# Patient Record
Sex: FEMALE | Race: WHITE | Employment: OTHER | URBAN - METROPOLITAN AREA
[De-identification: names, ages, dates, MRNs, and addresses within clinical notes are randomized per-mention and may not be internally consistent; named-entity substitution may affect disease eponyms.]

---

## 2017-01-04 ENCOUNTER — APPOINTMENT (OUTPATIENT)
Dept: GENERAL RADIOLOGY | Age: 57
End: 2017-01-04
Attending: EMERGENCY MEDICINE
Payer: MEDICAID

## 2017-01-04 ENCOUNTER — HOSPITAL ENCOUNTER (EMERGENCY)
Age: 57
Discharge: HOME | End: 2017-01-04
Attending: EMERGENCY MEDICINE | Admitting: EMERGENCY MEDICINE
Payer: MEDICAID

## 2017-01-04 VITALS
BODY MASS INDEX: 49.61 KG/M2 | HEART RATE: 61 BPM | TEMPERATURE: 98.3 F | RESPIRATION RATE: 16 BRPM | WEIGHT: 280 LBS | HEIGHT: 63 IN | OXYGEN SATURATION: 93 %

## 2017-01-04 DIAGNOSIS — M54.50 ACUTE BILATERAL LOW BACK PAIN WITHOUT SCIATICA: ICD-10-CM

## 2017-01-04 DIAGNOSIS — M25.561 RECURRENT PAIN OF RIGHT KNEE: ICD-10-CM

## 2017-01-04 DIAGNOSIS — M16.11 PRIMARY OSTEOARTHRITIS OF RIGHT HIP: Primary | ICD-10-CM
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
## 2017-01-04 PROCEDURE — 72100 X-RAY EXAM L-S SPINE 2/3 VWS: CPT

## 2017-01-04 PROCEDURE — 99283 EMERGENCY DEPT VISIT LOW MDM: CPT

## 2017-01-04 PROCEDURE — 73502 X-RAY EXAM HIP UNI 2-3 VIEWS: CPT

## 2017-01-04 PROCEDURE — 74011250637 HC RX REV CODE- 250/637: Performed by: EMERGENCY MEDICINE

## 2017-01-04 RX ORDER — OXYCODONE AND ACETAMINOPHEN 5; 325 MG/1; MG/1
1 TABLET ORAL
Status: COMPLETED | OUTPATIENT
Start: 2017-01-04 | End: 2017-01-04

## 2017-01-04 RX ORDER — OXYCODONE AND ACETAMINOPHEN 5; 325 MG/1; MG/1
1 TABLET ORAL
Qty: 20 TAB | Refills: 0 | Status: SHIPPED | OUTPATIENT
Start: 2017-01-04 | End: 2017-03-29

## 2017-01-04 RX ORDER — CYCLOBENZAPRINE HCL 10 MG
10 TABLET ORAL
Qty: 20 TAB | Refills: 0 | Status: SHIPPED | OUTPATIENT
Start: 2017-01-04 | End: 2017-03-29

## 2017-01-04 RX ADMIN — OXYCODONE HYDROCHLORIDE AND ACETAMINOPHEN 1 TABLET: 5; 325 TABLET ORAL at 20:08

## 2017-01-04 NOTE — ED PROVIDER NOTES
HPI Comments: 6:37 PM Josué Neal is a 61 y.o. female with h/o HTN, dyslipidemia, hypothyroid and arthritis who presents to the Emergency Department with c/o sharp R hip and leg pain s/p fall yesterday. Pt explains she was walking when her walker got caught on a crack in the sidewalk causing her to fall backwards. Denies head trauma or LOC. No other complaints at this time. Patient is a 61 y.o. female presenting with fall and leg pain. The history is provided by the patient. Fall   Pertinent negatives include no fever, no abdominal pain, no nausea and no vomiting. Leg Pain    Pertinent negatives include no back pain. Past Medical History:   Diagnosis Date    Atrial fibrillation      CHADS score 1  (-CHF, +HTN, -AGE, -DM, -CVA)    Carpal tunnel syndrome     Chronic pain     Congenital heart disease      presumed pulmonary stenosis s/p repair 1969    Degenerative disc disease     Degenerative joint disease     Dyslipidemia     GERD     Hypertension     Hypothyroid     Morbid obesity     Noncompliance     Schizoaffective disorder     Substance abuse      marijuana, crack cocaine       Past Surgical History:   Procedure Laterality Date    Hx knee replacement       left    Hx knee replacement       right    Hx thyroidectomy      Hx carpal tunnel release       left    Hx hysterectomy           Family History:   Problem Relation Age of Onset    Hypertension Mother     Coronary Artery Disease Mother     Coronary Artery Disease Father     Hypertension Father        Social History     Social History    Marital status: SINGLE     Spouse name: N/A    Number of children: N/A    Years of education: N/A     Occupational History    Not on file.      Social History Main Topics    Smoking status: Never Smoker    Smokeless tobacco: Not on file    Alcohol use No    Drug use: No    Sexual activity: Yes     Birth control/ protection: Surgical     Other Topics Concern    Not on file     Social History Narrative         ALLERGIES: Tetanus vaccines and toxoid    Review of Systems   Constitutional: Negative for chills, fatigue, fever and unexpected weight change. HENT: Negative for congestion and rhinorrhea. Respiratory: Negative for chest tightness and shortness of breath. Cardiovascular: Negative for chest pain, palpitations and leg swelling. Gastrointestinal: Negative for abdominal pain, nausea and vomiting. Genitourinary: Negative for dysuria. Musculoskeletal: Positive for arthralgias (R hip and leg). Negative for back pain. Skin: Negative for rash. Neurological: Negative for dizziness and weakness. Psychiatric/Behavioral: The patient is not nervous/anxious. Vitals:    01/04/17 1832   Pulse: 61   Resp: 16   Temp: 98.3 °F (36.8 °C)   SpO2: 93%   Weight: 127 kg (280 lb)   Height: 5' 3\" (1.6 m)            Physical Exam   Constitutional: She is oriented to person, place, and time. She appears well-developed and well-nourished. No distress. obese   HENT:   Head: Normocephalic and atraumatic. Right Ear: External ear normal.   Left Ear: External ear normal.   Nose: Nose normal.   Mouth/Throat: Oropharynx is clear and moist.   Eyes: Conjunctivae and EOM are normal. Pupils are equal, round, and reactive to light. No scleral icterus. Neck: Normal range of motion. Neck supple. No JVD present. No tracheal deviation present. No thyromegaly present. Cardiovascular: Normal rate, regular rhythm, normal heart sounds and intact distal pulses. Exam reveals no gallop and no friction rub. No murmur heard. Pulmonary/Chest: Effort normal and breath sounds normal. She exhibits no tenderness. Abdominal: Soft. Bowel sounds are normal. She exhibits no distension. There is no tenderness. There is no rebound and no guarding. Musculoskeletal: She exhibits no edema.    R hip pain, bilateral lumbar TTP  Lumbar tenderness poorly localized, R hip pain increases with internal/external rotation   Lymphadenopathy:     She has no cervical adenopathy. Neurological: She is alert and oriented to person, place, and time. No cranial nerve deficit. Coordination normal.   No sensory loss, Gait not observed, R leg motor testing limited due to pain   Skin: Skin is warm and dry. Psychiatric: She has a normal mood and affect. Her behavior is normal. Judgment and thought content normal.   Nursing note and vitals reviewed. [facility]  ED Course       Procedures      Vitals:  Patient Vitals for the past 12 hrs:   Temp Pulse Resp SpO2   01/04/17 1832 98.3 °F (36.8 °C) 61 16 93 %       Medications ordered:   Medications   oxyCODONE-acetaminophen (PERCOCET) 5-325 mg per tablet 1 Tab (1 Tab Oral Given 1/4/17 2008)         Lab findings:  No results found for this or any previous visit (from the past 12 hour(s)). X-Ray, CT or other radiology findings or impressions:  XR HIP RT W OR WO PELV 2-3 VWS      IMPRESSION:    Redemonstration of very severe osteoarthritis at right hip joint. There is no evidence of acute fracture or dislocation at right hip or on rest of  AP view of pelvis. XR SPINE LUMB 2 OR 3 V      IMPRESSION:    Redemonstration of very severe degenerative disc disease at L4-L5 level. No evidence of acute fracture or dislocation or subluxation in lumbar spine or  sacrum. R hip with severe DJD and loss of the joint space, superior displacement, no real change as compared to previous     Progress notes, Consult notes or additional Procedure notes:   Pt is asking to go home. Pt has advanced DJD noted. Awaiting a wet read to evaluate for fracture. If negative will proceed with close outpatient supportive care with percocet. Will have her follow with her ortho and to return if at all worsened or concerned Kapil Good DO 9:05 PM      Disposition:  Diagnosis:   1. Primary osteoarthritis of right hip    2. Fall, initial encounter    3. Recurrent pain of right knee    4. Acute bilateral low back pain without sciatica        Disposition: discharge    Follow-up Information     None           Patient's Medications   Start Taking    OXYCODONE-ACETAMINOPHEN (PERCOCET) 5-325 MG PER TABLET    Take 1 Tab by mouth every four (4) hours as needed for Pain for up to 20 doses. Max Daily Amount: 6 Tabs. Continue Taking    ARIPIPRAZOLE (ABILIFY) 15 MG TABLET    Take 15 mg by mouth daily. ASPIRIN DELAYED-RELEASE 325 MG TABLET    TAKE ONE TABLET BY MOUTH ONCE DAILY    CHLORTHALIDONE (HYGROTEN) 25 MG TABLET    TAKE ONE TABLET BY MOUTH ONCE DAILY    FUROSEMIDE (LASIX) 40 MG TABLET    TAKE ONE TABLET BY MOUTH TWICE DAILY OR AS DIRECTED    IBUPROFEN PO    Take  by mouth as needed. ISOSORBIDE MONONITRATE ER (IMDUR) 30 MG TABLET    Take 1 Tab by mouth every evening. LEVOTHYROXINE (SYNTHROID) 125 MCG TABLET    Take  by mouth daily (before breakfast). LISINOPRIL (PRINIVIL, ZESTRIL) 20 MG TABLET    Take 1 Tab by mouth two (2) times a day. METOPROLOL TARTRATE (LOPRESSOR) 50 MG TABLET    TAKE ONE TABLET BY MOUTH TWICE DAILY    OMEPRAZOLE (PRILOSEC) 20 MG CAPSULE    Take 20 mg by mouth daily. PAROXETINE (PAXIL) 20 MG TABLET    Take 1 Tab by mouth daily. POTASSIUM CHLORIDE (K-DUR, KLOR-CON) 20 MEQ TABLET    TAKE ONE TABLET BY MOUTH TWICE DAILY OR AS DIRECTED    SIMVASTATIN (ZOCOR) 40 MG TABLET    Take  by mouth nightly. These Medications have changed    Modified Medication Previous Medication    CYCLOBENZAPRINE (FLEXERIL) 10 MG TABLET cyclobenzaprine (FLEXERIL) 10 mg tablet       Take 1 Tab by mouth three (3) times daily as needed for Muscle Spasm(s). Take 1 Tab by mouth three (3) times daily as needed for Muscle Spasm(s).    Stop Taking    No medications on file     Scribe Attestation  I, Harl Carrel, am scribing for, and in the presence of Michael Frazier MD, 01/04/17, 6:37 PM    Signed by: Harl Carrel, Scribe, 01/04/17, 6:37 PM    Physician Attestation  SANDRA personally performed the services described in the documentation, reviewed the documentation, as recorded by the scribe in my presence, and it accurately and completely records my words and actions.     Genna Sicard, MD

## 2017-01-04 NOTE — ED TRIAGE NOTES
Irasema Dung yesterday over her walker that got caught up on a crack in the sidewalk. C/o of right leg pain and back pain.

## 2017-01-20 ENCOUNTER — APPOINTMENT (OUTPATIENT)
Dept: GENERAL RADIOLOGY | Age: 57
End: 2017-01-20
Attending: PHYSICIAN ASSISTANT
Payer: MEDICAID

## 2017-01-20 ENCOUNTER — HOSPITAL ENCOUNTER (EMERGENCY)
Age: 57
Discharge: HOME | End: 2017-01-20
Attending: EMERGENCY MEDICINE
Payer: MEDICAID

## 2017-01-20 VITALS
HEART RATE: 61 BPM | RESPIRATION RATE: 18 BRPM | WEIGHT: 279 LBS | SYSTOLIC BLOOD PRESSURE: 146 MMHG | DIASTOLIC BLOOD PRESSURE: 78 MMHG | HEIGHT: 63 IN | TEMPERATURE: 98.4 F | BODY MASS INDEX: 49.43 KG/M2 | OXYGEN SATURATION: 96 %

## 2017-01-20 DIAGNOSIS — S40.022A CONTUSION OF LEFT UPPER ARM, INITIAL ENCOUNTER: Primary | ICD-10-CM

## 2017-01-20 PROCEDURE — 73060 X-RAY EXAM OF HUMERUS: CPT

## 2017-01-20 PROCEDURE — 99282 EMERGENCY DEPT VISIT SF MDM: CPT

## 2017-01-20 NOTE — ED PROVIDER NOTES
HPI Comments: 9:48 AM Kayla Pratt is a 61 y.o. female w/ hx of HTN, asthma, Afib, and chronic pain who presents to the ED c/o L shoulder pain post fall. Pt states that she got up last night to use the restroom when she turned around and her knee gave out causing her to fall and hit her L shoulder into her bed post. Pt has taken Ibuprofen for pain management w/ no relief. Pt is currently using an inhaler for asthma. Pt denies using ice, or concern for fracture. Pt has no other sx or complaints. The history is provided by the patient. Past Medical History:   Diagnosis Date    Atrial fibrillation      CHADS score 1  (-CHF, +HTN, -AGE, -DM, -CVA)    Carpal tunnel syndrome     Chronic pain     Congenital heart disease      presumed pulmonary stenosis s/p repair 1969    Degenerative disc disease     Degenerative joint disease     Dyslipidemia     GERD     Hypertension     Hypothyroid     Morbid obesity     Noncompliance     Schizoaffective disorder     Substance abuse      marijuana, crack cocaine       Past Surgical History:   Procedure Laterality Date    Hx knee replacement       left    Hx knee replacement       right    Hx thyroidectomy      Hx carpal tunnel release       left    Hx hysterectomy           Family History:   Problem Relation Age of Onset    Hypertension Mother     Coronary Artery Disease Mother     Coronary Artery Disease Father     Hypertension Father        Social History     Social History    Marital status: SINGLE     Spouse name: N/A    Number of children: N/A    Years of education: N/A     Occupational History    Not on file.      Social History Main Topics    Smoking status: Never Smoker    Smokeless tobacco: Not on file    Alcohol use No    Drug use: No    Sexual activity: Yes     Birth control/ protection: Surgical     Other Topics Concern    Not on file     Social History Narrative         ALLERGIES: Tetanus vaccines and toxoid    Review of Systems   Constitutional: Negative for activity change and appetite change. Musculoskeletal: Positive for myalgias (L shoulder). Negative for back pain, gait problem and joint swelling. Arthralgias: L shoulder    Neurological: Negative for dizziness, weakness, light-headedness and numbness. All other systems reviewed and are negative. Vitals:    01/20/17 0935   BP: 146/78   Pulse: 61   Resp: 18   Temp: 98.4 °F (36.9 °C)   SpO2: 96%   Weight: 126.6 kg (279 lb)   Height: 5' 3\" (1.6 m)            Physical Exam   Constitutional: She is oriented to person, place, and time. She appears well-developed. No distress. Morbidly obese   HENT:   Head: Atraumatic. Neck: Normal range of motion. Neck supple. Cardiovascular: Normal rate, regular rhythm and normal heart sounds. No murmur heard. Pulmonary/Chest: Effort normal and breath sounds normal.   Musculoskeletal: Normal range of motion. She exhibits no edema. Minimal/mild TTP of left upper arm, laterally and posteriorly. There is a faint linear bruise to lateral aspect. No obvious swelling, no redness, no warmth. Normal ROM of wrist, elbow, shoulder without pain. Neurological: She is alert and oriented to person, place, and time. Skin: She is not diaphoretic. Psychiatric: She has a normal mood and affect. Nursing note and vitals reviewed. MDM  Number of Diagnoses or Management Options  Diagnosis management comments: 60yo female with left arm pain after she fell from standing position into bed post last night. Xray of humerus negative. Symptom secondary to muscle contusion. Advised to continue Motrin. ED Course       Procedures    LEFT HUMERUS- NORMAL.                     Scribe Attestation:   Judy FLORES am scribing for and in the presence of Rick Avila on this day 01/20/17 at 9:48 AM   maria teresa Mccray    Provider Attestation:  I personally performed the services described in the documentation, reviewed the documentation, as recorded by the scribe in my presence, and it accurately and completely records my words and actions.   Lucero Rooney PA-C. 9:48 AM      Signed by: [place], 9:48 AM

## 2017-01-20 NOTE — ED TRIAGE NOTES
Pt, states  Her   Left knee gave way  Last  Night  She fell on her left side,  Hurt her  Left shoulder

## 2017-01-25 ENCOUNTER — TELEPHONE (OUTPATIENT)
Dept: ORTHOPEDIC SURGERY | Facility: CLINIC | Age: 57
End: 2017-01-25

## 2017-01-25 NOTE — TELEPHONE ENCOUNTER
I have contacted every participating pain management provider listed by [facility]. The patient has been denied acceptance w/Dr. Shad Major, Dr. Mook Salmeron, [address] for previous discharge in 2009, [facility], BSN [facility] and [facility] Isaac Narvaez does not want to attend Dr. Aaron Meeks. There are no other participating providers available under her insurance plan for pain mgmt. Our resources are exhausted. Please call the patient to inform her of our inability to place her with a pain mgmt provider and if any alternative treatment can be recommended by Dr. Isa Ho. Thank you.

## 2017-01-27 NOTE — TELEPHONE ENCOUNTER
Ms. Bryn Cranker called for an update regarding her pain management referral. I have explained I cannot locate a provider for her. If she can locate one, we can provide her records to the new provider. Also, I explained I have a message in for Dr. Derrell Banks to see if any alternative treatment is available. Thank you.

## 2017-02-14 NOTE — TELEPHONE ENCOUNTER
Ms. Gabriella Rodríguez has been denied by 2 additional pain providers. I have received 9 denials. A message was left on her phone stating she will be responsible for obtaining her records from [address] [place] [address] and seeking a pain management provider on her own. (See referral notes for further information. Referral dated 11/10/16.) Thank you.

## 2017-02-20 ENCOUNTER — OFFICE VISIT (OUTPATIENT)
Dept: ORTHOPEDIC SURGERY | Facility: CLINIC | Age: 57
End: 2017-02-20

## 2017-02-20 DIAGNOSIS — M75.52 SHOULDER BURSITIS, LEFT: ICD-10-CM

## 2017-02-20 DIAGNOSIS — M25.562 CHRONIC PAIN OF LEFT KNEE: ICD-10-CM

## 2017-02-20 DIAGNOSIS — Z96.652 STATUS POST TOTAL LEFT KNEE REPLACEMENT: ICD-10-CM

## 2017-02-20 DIAGNOSIS — M54.5 RIGHT LOW BACK PAIN, UNSPECIFIED CHRONICITY, WITH SCIATICA PRESENCE UNSPECIFIED: ICD-10-CM

## 2017-02-20 DIAGNOSIS — E66.01 MORBID OBESITY WITH BMI OF 45.0-49.9, ADULT (HCC): ICD-10-CM

## 2017-02-20 DIAGNOSIS — R29.6 FREQUENT FALLS: ICD-10-CM

## 2017-02-20 DIAGNOSIS — M25.512 LEFT SHOULDER PAIN, UNSPECIFIED CHRONICITY: ICD-10-CM

## 2017-02-20 DIAGNOSIS — G89.29 CHRONIC PAIN OF LEFT KNEE: ICD-10-CM

## 2017-02-20 DIAGNOSIS — M16.11 PRIMARY OSTEOARTHRITIS OF RIGHT HIP: Primary | ICD-10-CM

## 2017-02-20 DIAGNOSIS — G89.29 CHRONIC RIGHT HIP PAIN: ICD-10-CM

## 2017-02-20 DIAGNOSIS — M47.26 OSTEOARTHRITIS OF SPINE WITH RADICULOPATHY, LUMBAR REGION: ICD-10-CM

## 2017-02-20 DIAGNOSIS — M25.551 CHRONIC RIGHT HIP PAIN: ICD-10-CM

## 2017-02-20 DIAGNOSIS — M70.61 TROCHANTERIC BURSITIS OF RIGHT HIP: ICD-10-CM

## 2017-02-20 DIAGNOSIS — M25.662 DECREASED ROM OF LEFT KNEE: ICD-10-CM

## 2017-02-20 RX ORDER — BETAMETHASONE SODIUM PHOSPHATE AND BETAMETHASONE ACETATE 3; 3 MG/ML; MG/ML
3 INJECTION, SUSPENSION INTRA-ARTICULAR; INTRALESIONAL; INTRAMUSCULAR; SOFT TISSUE ONCE
Qty: 0.5 ML | Refills: 0 | Status: CANCELLED
Start: 2017-02-20 | End: 2017-02-20

## 2017-02-20 RX ORDER — BETAMETHASONE SODIUM PHOSPHATE AND BETAMETHASONE ACETATE 3; 3 MG/ML; MG/ML
3 INJECTION, SUSPENSION INTRA-ARTICULAR; INTRALESIONAL; INTRAMUSCULAR; SOFT TISSUE ONCE
Qty: 0.5 ML | Refills: 0
Start: 2017-02-20 | End: 2017-02-20

## 2017-02-20 RX ORDER — BUPIVACAINE HYDROCHLORIDE 2.5 MG/ML
4 INJECTION, SOLUTION EPIDURAL; INFILTRATION; INTRACAUDAL ONCE
Qty: 4 ML | Refills: 0 | Status: CANCELLED
Start: 2017-02-20 | End: 2017-02-20

## 2017-02-20 RX ORDER — HYDROCODONE BITARTRATE AND ACETAMINOPHEN 7.5; 325 MG/1; MG/1
1-2 TABLET ORAL
Qty: 60 TAB | Refills: 0 | Status: SHIPPED | OUTPATIENT
Start: 2017-02-20 | End: 2017-05-24

## 2017-02-20 RX ORDER — BUPIVACAINE HYDROCHLORIDE 2.5 MG/ML
4 INJECTION, SOLUTION EPIDURAL; INFILTRATION; INTRACAUDAL ONCE
Qty: 4 ML | Refills: 0
Start: 2017-02-20 | End: 2017-02-20

## 2017-02-20 NOTE — PROGRESS NOTES
Patient: Sridevi Stephenson                MRN: [id number]       SSN: [id number]  YOB: 1960        AGE: 61 y.o. SEX: female    PCP: Adelaida North MD  02/20/17    CC:  RIGHT HIP AND LEFT SHOULDER PAIN    HISTORY:  Sridevi Stephenson is a 61 y.o. female who is seen for right hip and left shoulder pain. She has pain walking, standing, sleeping, and climbing stairs. She is s/p transverse fracture of the left patella on 8/20/15. She has increased pain in the morning. Her hip stiffens up when she sits. She is now a limited ambulator. She describes it as a painful lump over the side of her hip. She has been ambulating with a single point cane. She has difficulty rising from a seated position. She has pain laying on her right side. She states that she wants her raggedy hip out and is ready to throw it away. She is using a single-point cane to ambulate. She states that she fell 2 weeks ago and was seen at the ED where she had x rays taken and was prescribed pain medication. She states that she fell while climbing a step to her porch recently. She responded to previous cortisone injections. She is scheduled to begin pain management on 10/10/16. She notes great difficulty sleeping due to her hip and shoulder pains. She reports throbbing pains in her left shoulder. She is currently in a course of PT. She states that her pains keep her from sleeping at night. She states that she was not accepted into pain management recently because she was told that she needs surgery. Pain Assessment  11/10/2016   Location of Pain Hip   Location Modifiers Right   Severity of Pain 10   Quality of Pain Dull;Aching   Duration of Pain A few days   Frequency of Pain Several times daily   Aggravating Factors Bending   Limiting Behavior Some   Relieving Factors Rest   Result of Injury -     Occupation, etc:  Ms. Lia Carvalho receives social security disability benefits for numerus medical problems.   She has a  assigned to her. She reports that she has gained 30 lbs recently. Current weight is 279 pounds. She does not exercise. She has a h/o diabetes but says that she is no longer diabetic. She lives in [place] with her 42-year-old daughter and her 10 children. She is hard of hearing but is now wearing a hearing aid. She says that she enjoys to play bingo but has not been able to recently due to her pains. She states that she has high blood pressure. There were no vitals filed for this visit. There is no height or weight on file to calculate BMI. Patient Active Problem List   Diagnosis Code    Hypertension I11.9    Congenital heart disease Q24.9    Atrial fibrillation I48.91    Osteoarthritis of both knees M17.0    H/O total knee replacement Z96.659    DJD (degenerative joint disease), lumbosacral M51.37    Schizoaffective disorder, chronic condition ([facility] [address] [facility] F25.8    Unspecified hypothyroidism E03.9    Morbid obesity ([facility] E66.01    Esophageal reflux K21.9    Dyspnea R06.00    Dyslipidemia E78.5    Morbid obesity with BMI of 45.0-49.9, adult ([facility] E66.01, Z68.42     REVIEW OF SYSTEMS: All Below are Negative except: See HPI   Constitutional: negative for fever, chills, and weight loss. Cardiovascular: negative for chest pain, claudication, leg swelling, SOB, PITTS   Gastrointestinal: Negative for pain, N/V/C/D, Blood in stool or urine, dysuria,  hematuria, incontinence, pelvic pain. Musculoskeletal: See HPI   Neurological: Negative for dizziness and weakness. Negative for headaches, Visual changes, confusion, seizures   Phychiatric/Behavioral: Negative for depression, memory loss, substance  abuse. Extremities: Negative for hair changes, rash, or skin lesion changes. Hematologic: Negative for bleeding problems, bruising, pallor or swollen lymph  nodes   Peripheral Vascular: No calf pain, no circulation deficits.     Social History     Social History    Marital status: SINGLE     Spouse name: N/A    Number of children: N/A    Years of education: N/A     Occupational History    Not on file. Social History Main Topics    Smoking status: Never Smoker    Smokeless tobacco: Not on file    Alcohol use No    Drug use: No    Sexual activity: Yes     Birth control/ protection: Surgical     Other Topics Concern    Not on file     Social History Narrative     Allergies   Allergen Reactions    Tetanus Vaccines And Toxoid Swelling     Current Outpatient Prescriptions   Medication Sig    cyclobenzaprine (FLEXERIL) 10 mg tablet Take 1 Tab by mouth three (3) times daily as needed for Muscle Spasm(s).  oxyCODONE-acetaminophen (PERCOCET) 5-325 mg per tablet Take 1 Tab by mouth every four (4) hours as needed for Pain for up to 20 doses. Max Daily Amount: 6 Tabs.  furosemide (LASIX) 40 mg tablet TAKE ONE TABLET BY MOUTH TWICE DAILY OR AS DIRECTED    potassium chloride (K-DUR, KLOR-CON) 20 mEq tablet TAKE ONE TABLET BY MOUTH TWICE DAILY OR AS DIRECTED    metoprolol tartrate (LOPRESSOR) 50 mg tablet TAKE ONE TABLET BY MOUTH TWICE DAILY    chlorthalidone (HYGROTEN) 25 mg tablet TAKE ONE TABLET BY MOUTH ONCE DAILY    isosorbide mononitrate ER (IMDUR) 30 mg tablet Take 1 Tab by mouth every evening.  lisinopril (PRINIVIL, ZESTRIL) 20 mg tablet Take 1 Tab by mouth two (2) times a day.  IBUPROFEN PO Take  by mouth as needed.  PARoxetine (PAXIL) 20 mg tablet Take 1 Tab by mouth daily.  aspirin delayed-release 325 mg tablet TAKE ONE TABLET BY MOUTH ONCE DAILY    omeprazole (PRILOSEC) 20 mg capsule Take 20 mg by mouth daily.  simvastatin (ZOCOR) 40 mg tablet Take  by mouth nightly.  aripiprazole (ABILIFY) 15 mg tablet Take 15 mg by mouth daily.  levothyroxine (SYNTHROID) 125 mcg tablet Take  by mouth daily (before breakfast). No current facility-administered medications for this visit.       PHYSICAL EXAMINATION:  There were no vitals taken for this visit. ORTHO EXAMINATION:  Examination Left shoulder   Skin Intact   Effusion -   Biceps deformity -   Atrophy -   AC joint tenderness -   Acromial tenderness +   Biceps tenderness -   Forward flexion/Elevation    Active abduction    External rotation ROM 20   Internal rotation ROM 40, with pain   Apprehension -   Impingement +   Drop Arm Test -   Neurovascular Intact     Examination Lumbar   Skin Intact   Tenderness +   Tightness +   Lordosis Normal   Kyphosis N/A   Scoliosis -   Flexion Fingertips to knees   Extension 10   Knee reflexes Normal   Ankle reflexes Normal   Straight leg raise -   Calf tenderness -   Wearing a fall-risk bracelet and medical alert necklace    Examination Left hip Right hip   Skin Intact Intact   External Rotation ROM 10 10   Internal Rotation ROM 0 0   Trochanteric tenderness - +, lateral   Hip flexion contracture 5 degree 5 degree   Antalgic gait + +   Trendelenberg sign - -   Lumbar tenderness - -   Straight leg raise - -   Calf tenderness - -   Neurovascular Intact Intact   Stands slightly forward flexed at the wait, she has a 5 degree external rotation contracture, and a 5 degree hip flexion contracture, using a single-point cane to walk, very unsteady gait pattern    PROCEDURE:  After discussing treatment options, patient's left shoulder and right paralumbar region were injected with 4 cc Marcaine and 1/2 cc Celestone.     Chart reviewed for the following:   Delia Rodgers MD, have reviewed the History, Physical and updated the Allergic reactions for [address] [facility] performed immediately prior to start of procedure:  Delia Rodgers MD, have performed the following reviews on [address] prior to the start of the procedure:            * Patient was identified by name and date of birth   * Agreement on procedure being performed was verified  * Risks and Benefits explained to the patient  * Procedure site verified and marked as necessary  * Patient was positioned for comfort  * Consent was obtained     Time: 12:06 PM     Date of procedure: 2/20/2017    Procedure performed by:  Rosalba Mcneil MD    Ms. Angel tolerated the procedure well with no complications. CT PELVIS WO CONT 7/20/15  IMPRESSION:  No acute fracture in the pelvis or sacrum/coccyx. Advanced degenerative joint disease in the right hip. Degenerative disease in the lumbar spine. CT RIGHT HIP WO CONT 5/6/15  IMPRESSION:  Severe osteoarthritis, likely post traumatic, stable since October 2014 with no acute injuries suspected. RADIOGRAPHS:  XR MAREN HIPS 1/25/16    IMPRESSION:  No fractures, near-complete joint space narrowing, no osteophytes present, femoral neck shortening. XR LUMBAR SPINE 7/20/15  IMPRESSION:  No acute findings. Severe degenerative change L4-5 level similar prior exam 05/06/2015. XR RIGHT HIP 5/6/15  IMPRESSION:  Stable chronic findings of the right hip. IMPRESSION:      ICD-10-CM ICD-9-CM    1. Primary osteoarthritis of right hip M16.11 715.15 HYDROcodone-acetaminophen (NORCO) 7.5-325 mg per tablet      REFERRAL TO PAIN MANAGEMENT    Severe   2. Chronic right hip pain M25.551 719.45 HYDROcodone-acetaminophen (NORCO) 7.5-325 mg per tablet    G89.29 338.29 REFERRAL TO PAIN MANAGEMENT   3. Shoulder bursitis, left M75.52 726.10 HYDROcodone-acetaminophen (NORCO) 7.5-325 mg per tablet      REFERRAL TO PAIN MANAGEMENT      betamethasone (CELESTONE SOLUSPAN) 6 mg/mL injection      BETAMETHASONE ACETATE & SODIUM PHOSPHATE INJECTION 3 MG EA.      DRAIN/INJECT LARGE JOINT/BURSA      bupivacaine, PF, (MARCAINE, PF,) 0.25 % (2.5 mg/mL) injection   4.  Left shoulder pain, unspecified chronicity M25.512 719.41 HYDROcodone-acetaminophen (NORCO) 7.5-325 mg per tablet      REFERRAL TO PAIN MANAGEMENT      betamethasone (CELESTONE SOLUSPAN) 6 mg/mL injection      BETAMETHASONE ACETATE & SODIUM PHOSPHATE INJECTION 3 MG EA.      DRAIN/INJECT LARGE JOINT/BURSA      bupivacaine, PF, (MARCAINE, PF,) 0.25 % (2.5 mg/mL) injection   5. Osteoarthritis of spine with radiculopathy, lumbar region M47.26 721.3 HYDROcodone-acetaminophen (NORCO) 7.5-325 mg per tablet      REFERRAL TO PAIN MANAGEMENT      betamethasone (CELESTONE SOLUSPAN) 6 mg/mL injection      BETAMETHASONE ACETATE & SODIUM PHOSPHATE INJECTION 3 MG EA. THER/PROPH/DIAG INJECTION, SUBCUT/IM      bupivacaine, PF, (MARCAINE, PF,) 0.25 % (2.5 mg/mL) injection      REFERRAL TO SPINE SURGERY   6. Trochanteric bursitis of right hip M70.61 726.5 HYDROcodone-acetaminophen (NORCO) 7.5-325 mg per tablet      REFERRAL TO PAIN MANAGEMENT   7. Status post total left knee replacement Z96.652 V43.65 HYDROcodone-acetaminophen (NORCO) 7.5-325 mg per tablet      REFERRAL TO PAIN MANAGEMENT   8. Frequent falls R29.6 V15.88 HYDROcodone-acetaminophen (NORCO) 7.5-325 mg per tablet      REFERRAL TO PAIN MANAGEMENT   9. Decreased ROM of left knee M25.662 719.56 HYDROcodone-acetaminophen (NORCO) 7.5-325 mg per tablet      REFERRAL TO PAIN MANAGEMENT   10. Chronic pain of left knee M25.562 719.46 HYDROcodone-acetaminophen (NORCO) 7.5-325 mg per tablet    G89.29 338.29 REFERRAL TO PAIN MANAGEMENT   11. Morbid obesity with BMI of 45.0-49.9, adult ([facility]) E66.01 278.01 HYDROcodone-acetaminophen (NORCO) 7.5-325 mg per tablet    Z68.42 V85.42 REFERRAL TO PAIN MANAGEMENT   12. Right low back pain, unspecified chronicity, with sciatica presence unspecified M54.5 724.2 HYDROcodone-acetaminophen (NORCO) 7.5-325 mg per tablet      REFERRAL TO PAIN MANAGEMENT      betamethasone (CELESTONE SOLUSPAN) 6 mg/mL injection      BETAMETHASONE ACETATE & SODIUM PHOSPHATE INJECTION 3 MG EA.       THER/PROPH/DIAG INJECTION, SUBCUT/IM      bupivacaine, PF, (MARCAINE, PF,) 0.25 % (2.5 mg/mL) injection      REFERRAL TO SPINE SURGERY      PLAN:  After discussing treatment options, patient's left shoulder and right paralumbar region were injected with 4 cc Marcaine and 1/2 cc Celestone. We discussed need for right hip arthroplasty at some time in the future pending significant weight loss. She will follow up as needed. She will continue with her current course of PT. She will take Norco for the pain as needed at night. She will start pain management and receive her future pain medication there. She will follow up at the spine center if low back pain continues.       Scribed by [employer] ([address] [place] [address] [address]) as dictated by Herminia Martinez MD

## 2017-02-20 NOTE — PATIENT INSTRUCTIONS
Hip Arthritis: Exercises  Your Care Instructions  Here are some examples of exercises for hip arthritis. Start each exercise slowly. Ease off the exercise if you start to have pain. Your doctor or physical therapist will tell you when you can start these exercises and which ones will work best for you. How to do the exercises  Straight-leg raises to the outside    1. Lie on your side, with your affected hip on top. 2. Tighten the front thigh muscles of your top leg to keep your knee straight. 3. Keep your hip and your leg straight in line with the rest of your body, and keep your knee pointing forward. Do not drop your hip back. 4. Lift your top leg straight up toward the ceiling, about 12 inches off the floor. Hold for about 6 seconds, then slowly lower your leg. 5. Repeat 8 to 12 times. 6. Switch legs and repeat steps 1 through 5, even if only one hip is sore. Straight-leg raises to the inside    1. Lie on your side with your affected hip on the floor. 2. You can either prop up your other leg on a chair, or you can bend that knee and put that foot in front of your other knee. Do not drop your hip back. 3. Tighten the muscles on the front thigh of your bottom leg to straighten that knee. 4. Keep your kneecap pointing forward and your leg straight, and lift your bottom leg up toward the ceiling about 6 inches. Hold for about 6 seconds, then lower slowly. 5. Repeat 8 to 12 times. 6. Switch legs and repeat steps 1 through 5, even if only one hip is sore. Hip hike    1. Stand sideways on the bottom step of a staircase, and hold on to the banister or wall. 2. Keeping both knees straight, lift your good leg off the step and let it hang down. Then hike your good hip up to the same level as your affected hip or a little higher. 3. Repeat 8 to 12 times. 4. Switch legs and repeat steps 1 through 3, even if only one hip is sore. Bridging    1. Lie on your back with both knees bent.  Your knees should be bent about 90 degrees. 2. Then push your feet into the floor, squeeze your buttocks, and lift your hips off the floor until your shoulders, hips, and knees are all in a straight line. 3. Hold for about 6 seconds as you continue to breathe normally, and then slowly lower your hips back down to the floor and rest for up to 10 seconds. 4. Repeat 8 to 12 times. Hamstring stretch (lying down)    1. Lie flat on your back with your legs straight. If you feel discomfort in your back, place a small towel roll under your lower back. 2. Holding the back of your affected leg, lift your leg straight up and toward your body until you feel a stretch at the back of your thigh. 3. Hold the stretch for at least 30 seconds. 4. Repeat 2 to 4 times. 5. Switch legs and repeat steps 1 through 4, even if only one hip is sore. Standing quadriceps stretch    1. If you are not steady on your feet, hold on to a chair, counter, or wall. You can also lie on your stomach or your side to do this exercise. 2. Bend the knee of the leg you want to stretch, and reach behind you to grab the front of your foot or ankle with the hand on the same side. For example, if you are stretching your right leg, use your right hand. 3. Keeping your knees next to each other, pull your foot toward your buttock until you feel a gentle stretch across the front of your hip and down the front of your thigh. Your knee should be pointed directly to the ground, and not out to the side. 4. Hold the stretch for at least 15 to 30 seconds. 5. Repeat 2 to 4 times. 6. Switch legs and repeat steps 1 through 5, even if only one hip is sore. Hip rotator stretch    1. Lie on your back with both knees bent and your feet flat on the floor. 2. Put the ankle of your affected leg on your opposite thigh near your knee. 3. Use your hand to gently push your knee away from your body until you feel a gentle stretch around your hip.   4. Hold the stretch for 15 to 30 seconds. 5. Repeat 2 to 4 times. 6. Repeat steps 1 through 5, but this time use your hand to gently pull your knee toward your opposite shoulder. 7. Switch legs and repeat steps 1 through 6, even if only one hip is sore. Knee-to-chest    1. Lie on your back with your knees bent and your feet flat on the floor. 2. Bring your affected leg to your chest, keeping the other foot flat on the floor (or keeping the other leg straight, whichever feels better on your lower back). 3. Keep your lower back pressed to the floor. Hold for at least 15 to 30 seconds. 4. Relax, and lower the knee to the starting position. 5. Repeat 2 to 4 times. 6. Switch legs and repeat steps 1 through 5, even if only one hip is sore. 7. To get more stretch, put your other leg flat on the floor while pulling your knee to your chest.  Clamshell    1. Lie on your side, with your affected hip on top. Keep your feet and knees together and your knees bent. 2. Raise your top knee, but keep your feet together. Do not let your hips roll back. Your legs should open up like a clamshell. 3. Hold for 6 seconds. 4. Slowly lower your knee back down. Rest for 10 seconds. 5. Repeat 8 to 12 times. 6. Switch legs and repeat steps 1 through 5, even if only one hip is sore. Follow-up care is a key part of your treatment and safety. Be sure to make and go to all appointments, and call your doctor if you are having problems. It's also a good idea to know your test results and keep a list of the medicines you take. Where can you learn more? Go to http://bucky-gerard.info/. Enter O669 in the search box to learn more about \"Hip Arthritis: Exercises. \"  Current as of: May 23, 2016  Content Version: 11.1  © 7139-0227 [employer]. Care instructions adapted under license by [employer] (which disclaims liability or warranty for this information).  If you have questions about a medical condition or this instruction, always ask your healthcare professional. [place] [address] any warranty or liability for your use of this information. Shoulder Bursitis: Exercises  Your Care Instructions  Here are some examples of typical rehabilitation exercises for your condition. Start each exercise slowly. Ease off the exercise if you start to have pain. Your doctor or physical therapist will tell you when you can start these exercises and which ones will work best for you. How to do the exercises  Posterior stretching exercise    7. Hold the elbow of your injured arm with your other hand. 8. Use your hand to pull your injured arm gently up and across your body. You will feel a gentle stretch across the back of your injured shoulder. 9. Hold for at least 15 to 30 seconds. Then slowly lower your arm. 10. Repeat 2 to 4 times. Up-the-back stretch    Note: Your doctor or physical therapist may want you to wait to do this stretch until you have regained most of your range of motion and strength. You can do this stretch in different ways. Hold any of these stretches for at least 15 to 30 seconds. Repeat them 2 to 4 times. 7. Put your hand in your back pocket. Let it rest there to stretch your shoulder. 8. With your other hand, hold your injured arm (palm outward) behind your back by the wrist. Pull your arm up gently to stretch your shoulder. 9. Next, put a towel over your other shoulder. Put the hand of your injured arm behind your back. Now hold the back end of the towel. With the other hand, hold the front end of the towel in front of your body. Pull gently on the front end of the towel. This will bring your hand farther up your back to stretch your shoulder. Overhead stretch    5. Standing about an arm's length away, grasp onto a solid surface. You could use a countertop, a doorknob, or the back of a sturdy chair. 6. With your knees slightly bent, bend forward with your arms straight.  Lower your upper body, and let your shoulders stretch. 7. As your shoulders are able to stretch farther, you may need to take a step or two backward. 8. Hold for at least 15 to 30 seconds. Then stand up and relax. If you had stepped back during your stretch, step forward so you can keep your hands on the solid surface. 9. Repeat 2 to 4 times. Shoulder flexion (lying down)    Note: To make a wand for this exercise, use a piece of PVC pipe or a broom handle with the broom removed. Make the wand about a foot wider than your shoulders. 5. Lie on your back, holding a wand with both hands. Your palms should face down as you hold the wand. 6. Keeping your elbows straight, slowly raise your arms over your head. Raise them until you feel a stretch in your shoulders, upper back, and chest.  7. Hold for 15 to 30 seconds. 8. Repeat 2 to 4 times. Shoulder rotation (lying down)    Note: To make a wand for this exercise, use a piece of PVC pipe or a broom handle with the broom removed. Make the wand about a foot wider than your shoulders. 6. Lie on your back. Hold a wand with both hands with your elbows bent and palms up. 7. Keep your elbows close to your body, and move the wand across your body toward the sore arm. 8. Hold for 8 to 12 seconds. 9. Repeat 2 to 4 times. Shoulder blade squeeze    7. Stand with your arms at your sides, and squeeze your shoulder blades together. Do not raise your shoulders up as you squeeze. 8. Hold 6 seconds. 9. Repeat 8 to 12 times. Shoulder flexor and extensor exercise    Note: These are isometric exercises. That means you contract your muscles without actually moving. · Push forward (flex): Stand facing a wall or doorjamb, about 6 inches or less back. Hold your injured arm against your body. Make a closed fist with your thumb on top. Then gently push your hand forward into the wall with about 25% to 50% of your strength. Don't let your body move backward as you push. Hold for about 6 seconds.  Relax for a few seconds. Repeat 8 to 12 times. · Push backward (extend): Stand with your back flat against a wall. Your upper arm should be against the wall, with your elbow bent 90 degrees (your hand straight ahead). Push your elbow gently back against the wall with about 25% to 50% of your strength. Don't let your body move forward as you push. Hold for about 6 seconds. Relax for a few seconds. Repeat 8 to 12 times. Scapular exercise: Wall push-ups    Note: This exercise is best done with your fingers somewhat turned out, rather than straight up and down. 8. Stand facing a wall, about 12 inches to 18 inches away. 9. Place your hands on the wall at shoulder height. 10. Slowly bend your elbows and bring your face to the wall. Keep your back and hips straight. 11. Push back to where you started. 12. Repeat 8 to 12 times. 13. When you can do this exercise against a wall comfortably, you can try it against a counter. You can then slowly progress to the end of a couch, then to a sturdy chair, and finally to the floor. Scapular exercise: Retraction    Note: For this exercise, you will need elastic exercise material, such as surgical tubing or Thera-Band. 7. Put the band around a solid object at about waist level. (A bedpost will work well.) Each hand should hold an end of the band. 8. With your elbows at your sides and bent to 90 degrees, pull the band back. Your shoulder blades should move toward each other. Then move your arms back where you started. 9. Repeat 8 to 12 times. 10. If you have good range of motion in your shoulders, try this exercise with your arms lifted out to the sides. Keep your elbows at a 90-degree angle. Raise the elastic band up to about shoulder level. Pull the band back to move your shoulder blades toward each other. Then move your arms back where you started. Internal rotator strengthening exercise    1. Start by tying a piece of elastic exercise material to a doorknob.  You can use surgical tubing or Thera-Band. 2. Stand or sit with your shoulder relaxed and your elbow bent 90 degrees. Your upper arm should rest comfortably against your side. Squeeze a rolled towel between your elbow and your body for comfort. This will help keep your arm at your side. 3. Hold one end of the elastic band in the hand of the painful arm. 4. Slowly rotate your forearm toward your body until it touches your belly. Slowly move it back to where you started. 5. Keep your elbow and upper arm firmly tucked against the towel roll or at your side. 6. Repeat 8 to 12 times. External rotator strengthening exercise    1. Start by tying a piece of elastic exercise material to a doorknob. You can use surgical tubing or Thera-Band. (You may also hold one end of the band in each hand.)  2. Stand or sit with your shoulder relaxed and your elbow bent 90 degrees. Your upper arm should rest comfortably against your side. Squeeze a rolled towel between your elbow and your body for comfort. This will help keep your arm at your side. 3. Hold one end of the elastic band with the hand of the painful arm. 4. Start with your forearm across your belly. Slowly rotate the forearm out away from your body. Keep your elbow and upper arm tucked against the towel roll or the side of your body until you begin to feel tightness in your shoulder. Slowly move your arm back to where you started. 5. Repeat 8 to 12 times. Follow-up care is a key part of your treatment and safety. Be sure to make and go to all appointments, and call your doctor if you are having problems. It's also a good idea to know your test results and keep a list of the medicines you take. Where can you learn more? Go to http://bucky-gerard.info/. Enter P686 in the search box to learn more about \"Shoulder Bursitis: Exercises. \"  Current as of: May 23, 2016  Content Version: 11.1  © 8129-6739 [employer]  Care instructions adapted under license by [employer] (which disclaims liability or warranty for this information). If you have questions about a medical condition or this instruction, always ask your healthcare professional. [address] any warranty or liability for your use of this information. Joint Injections: Care Instructions  Your Care Instructions  Joint injections are shots into a joint, such as the knee. They may be used to put in medicines, such as pain relievers. Or they can be used to take out fluid. Sometimes the fluid is tested in a lab. This can help find the cause of a joint problem. A corticosteroid, or steroid, shot is used to reduce inflammation in tendons or joints. It is often used to treat problems such as arthritis, tendinitis, and bursitis. Steroids can be injected directly into a painful, inflamed joint. They can also help reduce inflammation of a bursa. A bursa is a sac of fluid. It cushions and lubricates areas where tendons, ligaments, skin, muscles, or bones rub against each other. A steroid shot can sometimes help with short-term pain relief when other treatments haven't worked. If steroid shots help, pain may improve for weeks or months. Follow-up care is a key part of your treatment and safety. Be sure to make and go to all appointments, and call your doctor if you are having problems. It's also a good idea to know your test results and keep a list of the medicines you take. How can you care for yourself at home? · Put ice or a cold pack on the area for 10 to 20 minutes at a time. Put a thin cloth between the ice and your skin. · Take anti-inflammatory medicines to reduce pain, swelling, or inflammation. These include ibuprofen (Advil, Motrin) and naproxen (Aleve). Read and follow all instructions on the label. · Avoid strenuous activities for several days, especially those that put stress on the area where you got the shot.   · If you have dressings over the area, keep them clean and dry. You may remove them when your doctor tells you to. When should you call for help? Call your doctor now or seek immediate medical care if:  · You have signs of infection, such as:  ¨ Increased pain, swelling, warmth, or redness. ¨ Red streaks leading from the site. ¨ Pus draining from the site. ¨ A fever. Watch closely for changes in your health, and be sure to contact your doctor if you have any problems. Where can you learn more? Go to http://bucky-gerard.info/. Enter N616 in the search box to learn more about \"Joint Injections: Care Instructions. \"  Current as of: May 23, 2016  Content Version: 11.1  © 1994-2262 [employer]. Care instructions adapted under license by [employer] (which disclaims liability or warranty for this information). If you have questions about a medical condition or this instruction, always ask your healthcare professional. [address] any warranty or liability for your use of this information.

## 2017-02-28 ENCOUNTER — TELEPHONE (OUTPATIENT)
Dept: ORTHOPEDIC SURGERY | Facility: CLINIC | Age: 57
End: 2017-02-28

## 2017-02-28 NOTE — TELEPHONE ENCOUNTER
I have spoken to Ms. Ortiz on several occasions regarding her pain mgmt referral. Due to the number of denials I have received, I am unable to place her with a practice. [facility] states they have received a request NOT to forward her pain mgmt records to any provider. Without the records, no other provider will agree to see her. It has been explained to Ms. Ortiz she will need to obtain her [facility] records and locate a provider on her own. She expressed her understanding.

## 2017-03-29 ENCOUNTER — OFFICE VISIT (OUTPATIENT)
Dept: ORTHOPEDIC SURGERY | Age: 57
End: 2017-03-29

## 2017-03-29 VITALS
BODY MASS INDEX: 51.38 KG/M2 | RESPIRATION RATE: 18 BRPM | WEIGHT: 290 LBS | SYSTOLIC BLOOD PRESSURE: 147 MMHG | TEMPERATURE: 97.8 F | HEIGHT: 63 IN | DIASTOLIC BLOOD PRESSURE: 102 MMHG | HEART RATE: 95 BPM | OXYGEN SATURATION: 91 %

## 2017-03-29 DIAGNOSIS — M16.11 PRIMARY OSTEOARTHRITIS OF RIGHT HIP: ICD-10-CM

## 2017-03-29 DIAGNOSIS — M54.16 RIGHT LUMBAR RADICULITIS: ICD-10-CM

## 2017-03-29 DIAGNOSIS — M51.36 DDD (DEGENERATIVE DISC DISEASE), LUMBAR: Primary | Chronic | ICD-10-CM

## 2017-03-29 RX ORDER — KETOROLAC TROMETHAMINE 15 MG/ML
60 INJECTION, SOLUTION INTRAMUSCULAR; INTRAVENOUS ONCE
Qty: 1 VIAL | Refills: 0
Start: 2017-03-29 | End: 2017-03-29

## 2017-03-29 RX ORDER — IBUPROFEN 800 MG/1
TABLET ORAL
Status: ON HOLD | COMMUNITY
Start: 2017-03-28 | End: 2017-04-26

## 2017-03-29 RX ORDER — AMOXICILLIN 500 MG/1
CAPSULE ORAL
COMMUNITY
Start: 2017-03-20 | End: 2017-03-29

## 2017-03-29 RX ORDER — LIDOCAINE 50 MG/G
OINTMENT TOPICAL
COMMUNITY
Start: 2017-03-21 | End: 2017-03-29

## 2017-03-29 RX ORDER — ALBUTEROL SULFATE 90 UG/1
AEROSOL, METERED RESPIRATORY (INHALATION)
Status: ON HOLD | COMMUNITY
Start: 2017-03-19 | End: 2017-04-26

## 2017-03-29 RX ORDER — ACETAMINOPHEN 500 MG/1
TABLET, FILM COATED ORAL
COMMUNITY
Start: 2017-03-17 | End: 2017-03-29

## 2017-03-29 RX ORDER — TOPIRAMATE 50 MG/1
TABLET, FILM COATED ORAL
Qty: 60 TAB | Refills: 1 | Status: ON HOLD | OUTPATIENT
Start: 2017-03-29 | End: 2017-04-26

## 2017-03-29 RX ORDER — LOPERAMIDE HYDROCHLORIDE 2 MG/1
CAPSULE ORAL
COMMUNITY
Start: 2017-02-17 | End: 2017-03-29

## 2017-03-29 RX ORDER — GABAPENTIN 100 MG/1
CAPSULE ORAL
COMMUNITY
Start: 2017-03-17 | End: 2017-03-29

## 2017-03-29 RX ORDER — HYDROCODONE BITARTRATE AND ACETAMINOPHEN 5; 300 MG/1; MG/1
TABLET ORAL
COMMUNITY
Start: 2017-03-20 | End: 2017-03-29

## 2017-03-29 RX ORDER — RANITIDINE 150 MG/1
TABLET, FILM COATED ORAL
COMMUNITY
Start: 2017-02-01 | End: 2017-03-29

## 2017-03-29 RX ORDER — HYDROCODONE BITARTRATE AND ACETAMINOPHEN 7.5; 325 MG/1; MG/1
TABLET ORAL
Qty: 21 TAB | Refills: 0 | Status: SHIPPED | OUTPATIENT
Start: 2017-03-29 | End: 2017-04-26

## 2017-03-29 NOTE — PROGRESS NOTES
[address]  [address], [address]  [place] [facility]  Phone: [phone]  Fax: [phone]        [facility]  : 1960  PCP: Cecille Low MD    PROGRESS NOTE      ASSESSMENT AND PLAN    Mai Park was seen today for back pain. Diagnoses and all orders for this visit:    DDD (degenerative disc disease), lumbar, L4/5 advanced  -     KETOROLAC TROMETHAMINE INJ  -     THER/PROPH/DIAG INJECTION, SUBCUT/IM    Right lumbar radiculitis  -     KETOROLAC TROMETHAMINE INJ  -     THER/PROPH/DIAG INJECTION, SUBCUT/IM  -     HYDROcodone-acetaminophen (NORCO) 7.5-325 mg per tablet; 1 tab PO TID PRN severe pain  1 week supply    Primary osteoarthritis of right hip    Other orders  -     ketorolac (TORADOL) 15 mg/mL soln injection; 4 mL by IntraMUSCular route once for 1 dose. -     topiramate (TOPAMAX) 50 mg tablet; 1 tab PO QHS x 1 week then increase to 2 tabs PO QHS    1. Trial of Topamax. 2. Discussed referral to Pain Management. Pt declines at this time. 3. One week Rx for Norco.   4. Pt may be released from specialty care at next visit if stable. 5. Given care instructions for neuropathic pain. Follow-up Disposition:  Return in about 6 weeks (around 5/10/2017). HISTORY OF PRESENT ILLNESS  Cirilo Jenkins is a 61 y.o. female. She was last evaluated by Dr. Annamarie Pichardo in  for lumbar stenosis. Pt returns to the office today for back pain at the request of Dr. Subhash García. This is my first evaluation of this pt. Dr. Annamarie Pichardo not taking pt insurance . Her back pain is mainly in her low back. Pt reports that she has burning pain in her anterior RLE. She also experiences paresthesia in her RLE as well. She has been experiencing burning in her RLE for a few months now. Pt denies having DM or neuropathy. Her pain increases with walking, standing, and ambulating up steps. Her pain interrupts her sleep at night. She denies any weakness in her BLE.  Pt reports that her pain limits her greatly from performing activities. Pt with severe OA hip, not surgical candidate until she loses weight. She does not wish to consider bariatric surgery at this time. No saddle paresthesia. She has taken Gabapentin that caused her to have weakness and dizziness. She was taking two tabs 100 mg. Pt believes that she may have been taking her Gabapentin incorrectly. She has not been on Topamax in the past. Pt is taking Norco 7.5-325 mg for her pain that she received from Dr. Billy Nevarez. Requesting refill of [place]. Pt denies any dizziness, confusion, uncontrolled constipation, and cravings due to controlled substances. She has not been to Pain Management and does not wish to go to Pain Management at this time. Denies persistent fevers, chills, weight changes, neurogenic bowel or bladder symptoms. Pt denies recent ED visits or hospitalizations. Pt denies any recent GI ulcers, bleeds or renal dysfucntion. She also c/o shoulder pain for which she consults with Dr. Billy Nevarez for. Surgical Hx of bilateral knee replacements. Last Creatinine was 0.88    Pain Assessment  3/29/2017   Location of Pain Back; Hip   Location Modifiers Right   Severity of Pain 10   Quality of Pain Aching; [place]; Throbbing   Quality of Pain Comment numbness, tingling   Duration of Pain -   Frequency of Pain Constant   Aggravating Factors Walking;Standing;Squatting;Kneeling;Stretching;Bending   Limiting Behavior -   Relieving Factors (No Data)   Relieving Factors Comment meds sometimes   Result of Injury -           XR Results:      Results from Hospital Encounter encounter on 01/04/17   XR SPINE LUMB 2 OR 3 V   Narrative Lumbar spine, 3 views:        INDICATION:    Trauma due to fall. Low back pain. Lumbar radiculopathy. COMPARISON STUDY: Dated LUMBAR SPINE ON 7/20/2015. FINDINGS:    There is mild lumbar levoscoliosis redemonstrated. On the lateral view there is no malalignment.     There are findings of very severe degenerative disc disease with moderate  narrowing of the space, severe endplate sclerosis and prominent osteophytosis at  L4-L5 level, similar to previous study. There are findings of mild degenerative disc disease at L5-S1 and L3/L4 levels. There is no definable acute fracture or malalignment in lumbar spine or in  visualized sacrum. Impression IMPRESSION:    Redemonstration of very severe degenerative disc disease at L4-L5 level. No evidence of acute fracture or dislocation or subluxation in lumbar spine or  sacrum. XR HIP RT W OR WO PELV 2-3 VWS   Narrative Right hip joint, with AP view of pelvis, 2 views:        INDICATION:    Trauma due to fall. Low back pain. Pain in right hip and leg. COMPARISON STUDY: On radiograph of pelvis on 5/6/2015, 10/16/2014. FINDINGS:    Redemonstration of very severe osteoarthritis at right hip joint with marked  heterogeneous sclerotic changes in head and neck of right femur and right  acetabulum, with evidence of shortening of right femoral neck, similar to  previous studies. There is no finding or change suspicious for acute fracture or  dislocation at right hip joint. On rest of AP view of pelvis including left hip joint there is no fracture or  malalignment of bones. Impression IMPRESSION:        Redemonstration of very severe osteoarthritis at right hip joint. There is no evidence of acute fracture or dislocation at right hip or on rest of  AP view of pelvis. MRI Results (maximum last 3): Results from [place] encounter on 06/30/14   MRI LUMB SPINE WO CONT   Narrative EXAM:  MRI LUMB SPINE WO CONT    INDICATION:   Degenerative discogenic disease, right hip and leg pain    COMPARISON: MR lumbar spine June 29, 2011    TECHNIQUE:  MR imaging of the lumbar spine was performed with sagittal T1, T2, STIR;  axial  T1, T2. Contrast was not administered.     FINDINGS:  Grade 1 anterior listhesis of L4 on L5. Remainder of vertebral bodies maintain  normal alignment. Probable 1.7 cm Hemangioma in the L1 vertebral body, stable  in comparison to prior MR. Moderate to severe degenerative discogenic disease  and endplate the lower degenerative changes at L5-S1. Vertebral body heights  are maintained. No pathologic marrow signal.  The conus medullaris terminates  at L1-L2. L1/2:  Disc bulge with prominence in the posterior lateral corners. Mild facet  and ligamentous hypertrophy. No significant central canal or foraminal  stenosis. L2/3:  Mild disc bulge and facet arthropathy. Mild left foraminal stenosis. Central canal and right neural foramen are patent. L3/4:  Diffuse disc bulge with prominence in the left foraminal region. Moderate facet and ligamentous hypertrophy. Moderate left and mild right  foraminal stenosis. Central canal is patent. L4/5:  Diffuse disc bulge more prominent on the right. Severe facet  arthropathy. Prominent ligamentous hypertrophy. Severe right, moderate to  severe left foraminal stenosis. Moderate central canal stenosis. L5/S1:  Diffuse disc bulge. Moderate facet arthropathy. Central canal is  patent. Moderate foraminal stenosis. Impression IMPRESSION:    Multilevel degenerative changes most prominent at L4-L5. Findings have mildly  progressed in comparison to prior. Again noted is diffuse disc bulge, moderate  to severe degenerative discogenic and facet disease at L4-L5 resulting in  severe right and moderate to severe left foraminal stenosis. Moderate central  canal stenosis at this level. Thank you for this referral.      Results from [place] encounter on 07/29/11   MRI SPINE LUMBAR WITHOUT CONTRAST   Narrative Ordering MD: Silva Currie MD  Signed By: Geoff Stockton.  NOHEMI NICLOAS  ** FINAL **  ---------------------------------------------------------------------  Procedure Date:  07/29/2011   Accession Number:  [id number] Order No:   [id number]        Procedure:   [facility] - L SPINE W/O CONTRAST       CPT Code:   81526     Admit Diag:   LUMBAGO             Reason:   PAIN  INTERPRETATION:  Description:  MRI lumbar spine without contrast  Clinical Indication:  Right sciatica for 3 months  Comparison: 06/13/2006. Findings:    Some straightening of lumbar lordosis is evident. There is moderate   narrowing of the L4-L5 disc with relatively extensive disc centered   marrow signal changes at L4 and L5, with a moderate amount of edema   within these vertebral bodies. The conus medullaris terminates at   L1. Visualized portions of lumbar cord are unremarkable. A few fat   signal lesions are noted within the lumbar spine, benign   characteristics, typical of hemangiomas. L1-L2, small right paracentral-foraminal disc protrusion without   foraminal stenosis. Canal patent. Left neural foramina patent. L2-L3, minimal diffuse disc bulge. No canal or foraminal stenosis. L3-L4, small left foraminal disc protrusion causes mild left   foraminal narrowing. Canal patent. Right neural foramina patent. L4-L5, moderate to marked loss of disc space height with diffuse   disc bulge which narrows the canal (AP diameter is 10 mm) but does   not cause stenosis. Moderate to marked narrowing of the right   neuroforamen. Mild to moderate narrowing the left neural foramen. Moderate facet arthropathy is seen at this level. L5-S1, minimal broad-based disc protrusion. No canal stenosis. Mild   narrowing of the left neural foramina. Right neural foramen patent. SI joints are not well seen but appear grossly normal.  IMPRESSION:    There is moderate to marked degenerative disc disease at L4-L5 with   presumed discogenic edema within the L4 and L5 vertebral bodies. There is moderate to marked narrowing of the right neural foramen at   this level. Moderate facet arthropathy. Mild degenerative disc and joint disease is noted elsewhere as   described above. PAST MEDICAL HISTORY   Past Medical History:   Diagnosis Date    Atrial fibrillation     CHADS score 1  (-CHF, +HTN, -AGE, -DM, -CVA)    Carpal tunnel syndrome     Chronic pain     Congenital heart disease     presumed pulmonary stenosis s/p repair 1969    Degenerative disc disease     Degenerative joint disease     Dyslipidemia     GERD     Hypertension     Hypothyroid     Morbid obesity     Morbid obesity with BMI of 45.0-49.9, adult ([facility] [address] 2/20/2017    Noncompliance     Schizoaffective disorder     Substance abuse     marijuana, crack cocaine       Past Surgical History:   Procedure Laterality Date    HX CARPAL TUNNEL RELEASE      left    HX HYSTERECTOMY      HX KNEE REPLACEMENT      left    HX KNEE REPLACEMENT      right    HX THYROIDECTOMY     . MEDICATIONS      Current Outpatient Prescriptions   Medication Sig Dispense Refill    VENTOLIN HFA 90 mcg/actuation inhaler       ibuprofen (MOTRIN) 800 mg tablet       topiramate (TOPAMAX) 50 mg tablet 1 tab PO QHS x 1 week then increase to 2 tabs PO QHS 60 Tab 1    HYDROcodone-acetaminophen (NORCO) 7.5-325 mg per tablet 1 tab PO TID PRN severe pain  1 week supply 21 Tab 0    HYDROcodone-acetaminophen (NORCO) 7.5-325 mg per tablet Take 1-2 Tabs by mouth nightly as needed for Pain. Max Daily Amount: 2 Tabs. 60 Tab 0    metoprolol tartrate (LOPRESSOR) 50 mg tablet TAKE ONE TABLET BY MOUTH TWICE DAILY 60 Tab 5    lisinopril (PRINIVIL, ZESTRIL) 20 mg tablet Take 1 Tab by mouth two (2) times a day. 60 Tab 5    PARoxetine (PAXIL) 20 mg tablet Take 1 Tab by mouth daily. 30 Tab 3    aspirin delayed-release 325 mg tablet TAKE ONE TABLET BY MOUTH ONCE DAILY 30 Tab 6    simvastatin (ZOCOR) 40 mg tablet Take  by mouth nightly.  aripiprazole (ABILIFY) 15 mg tablet Take 15 mg by mouth daily.  levothyroxine (SYNTHROID) 125 mcg tablet Take  by mouth daily (before breakfast).       isosorbide mononitrate ER (IMDUR) 30 mg tablet TAKE ONE TABLET BY MOUTH EVERY EVENING 30 Tab 7        ALLERGIES    Allergies   Allergen Reactions    Gabapentin Other (comments)     Unsteady, weakness LEs    Tetanus Vaccines And Toxoid Swelling          SOCIAL HISTORY    Social History     Social History    Marital status: SINGLE     Spouse name: N/A    Number of children: N/A    Years of education: N/A     Occupational History    Not on file. Social History Main Topics    Smoking status: Never Smoker    Smokeless tobacco: Not on file    Alcohol use No    Drug use: No    Sexual activity: Yes     Birth control/ protection: Surgical     Other Topics Concern    Not on file     Social History Narrative       FAMILY HISTORY    Family History   Problem Relation Age of Onset    Hypertension Mother     Coronary Artery Disease Mother     Coronary Artery Disease Father     Hypertension Father        REVIEW OF SYSTEMS  Review of Systems   Constitutional: Negative for chills, fever and weight loss. Respiratory: Negative for shortness of breath. Cardiovascular: Negative for chest pain. Gastrointestinal: Negative for constipation. Negative for fecal incontinence   Genitourinary: Negative for dysuria. Negative for urinary incontinence   Musculoskeletal:        Per HPI   Skin: Negative for rash. Neurological: Negative for dizziness, tingling, tremors, focal weakness and headaches. Endo/Heme/Allergies: Does not bruise/bleed easily. Psychiatric/Behavioral: The patient does not have insomnia. PHYSICAL EXAMINATION  Visit Vitals    BP (!) 147/102    Pulse 95    Temp 97.8 °F (36.6 °C) (Oral)    Resp 18    Ht 5' 3\" (1.6 m)    Wt 290 lb (131.5 kg)    SpO2 91%    BMI 51.37 kg/m2         Accompanied by self. Constitutional:  Well developed, well nourished, in no acute distress. Psychiatric: Affect and mood are appropriate. Integumentary: No rashes or abrasions noted on exposed areas.     Cardiovascular/Peripheral Vascular: Intact l pulses. No peripheral edema is noted. Lymphatic:  No evidence of lymphedema. No cervical lymphadenopathy. SPINE/MUSCULOSKELETAL EXAM      Lumbar spine:  No rash, ecchymosis, or gross obliquity. No fasciculations. No focal atrophy is noted. Tenderness to palpation midline at L4-5. No tenderness to palpation at the sciatic notch. SI joints non-tender. Trochanters non tender. Sensation grossly intact to light touch. MOTOR:       Hip Flex  Quads Hamstrings Ankle DF EHL Ankle PF   Right 4/5 Pain inhibited 4/5 Pain inhibited  4/5 Pain inhibited +4/5 +4/5 +4/5   Left +4/5 +4/5 +4/5 +4/5 +4/5 +4/5     Straight Leg raise negative. Ambulation with walker. FWB. RADIOGRAPHS  Lumbar spine from Dr. Jaime Scale office xray films reviewed:  1) Advanced disc pace narrowing at L4-5  2) Anterior osteophytes. Written by Sharlene Garica, as dictated by Rosemary Spicer MD.      Ms. Del Ambrocio may have a reminder for a \"due or due soon\" health maintenance. I have asked that she contact her primary care provider for follow-up on this health maintenance.

## 2017-03-29 NOTE — MR AVS SNAPSHOT
Visit Information Date & Time Provider Department Dept. Phone Encounter #  
 3/29/2017  9:15 AM Memo Finney MD [facility] [phone] [id number] Follow-up Instructions Return in about 6 weeks (around 5/10/2017). Upcoming Health Maintenance Date Due Hepatitis C Screening 1960 DTaP/Tdap/Td series (1 - Tdap) 1/6/1980 PAP AKA CERVICAL CYTOLOGY 1/6/1980 FOBT Q 1 YEAR AGE 50-75 1/6/2009 BREAST CANCER SCRN MAMMOGRAM 11/6/2011 INFLUENZA AGE 9 TO ADULT 8/1/2016 Allergies as of 3/29/2017  Review Complete On: 3/29/2017 By: Gisela Augustin Severity Noted Reaction Type Reactions Gabapentin  03/29/2017    Other (comments) Unsteady, weakness LEs Tetanus Vaccines And Toxoid    Swelling Current Immunizations  Never Reviewed Name Date Pneumococcal Polysaccharide (PPSV-23) 1/21/2014  2:55 PM  
  
 Not reviewed this visit You Were Diagnosed With   
  
 Codes Comments DDD (degenerative disc disease), lumbar    -  Primary ICD-10-CM: M51.36 
ICD-9-CM: 722.52 Right lumbar radiculitis     ICD-10-CM: M54.16 
ICD-9-CM: 724.4 Vitals BP Pulse Temp Resp Height(growth percentile) Weight(growth percentile) (!) 147/102 95 97.8 °F (36.6 °C) (Oral) 18 5' 3\" (1.6 m) 290 lb (131.5 kg) SpO2 BMI OB Status Smoking Status 91% 51.37 kg/m2 Hysterectomy Never Smoker BMI and BSA Data Body Mass Index Body Surface Area  
 51.37 kg/m 2 2.42 m 2 Preferred Pharmacy Pharmacy Name Phone [facility], [address] [place] [address],[address] [phone] Your Updated Medication List  
  
   
This list is accurate as of: 3/29/17 10:26 AM.  Always use your most recent med list.  
  
  
  
  
 ABILIFY 15 mg tablet Generic drug:  ARIPiprazole Take 15 mg by mouth daily. aspirin delayed-release 325 mg tablet TAKE ONE TABLET BY MOUTH ONCE DAILY * HYDROcodone-acetaminophen 7.5-325 mg per tablet Commonly known as:  Shelley Riojas Take 1-2 Tabs by mouth nightly as needed for Pain. Max Daily Amount: 2 Tabs. * HYDROcodone-acetaminophen 7.5-325 mg per tablet Commonly known as:  NORCO  
1 tab PO TID PRN severe pain 1 week supply  
  
 ibuprofen 800 mg tablet Commonly known as:  MOTRIN  
  
 isosorbide mononitrate ER 30 mg tablet Commonly known as:  IMDUR Take 1 Tab by mouth every evening.  
  
 ketorolac 15 mg/mL Soln injection Commonly known as:  TORADOL  
4 mL by IntraMUSCular route once for 1 dose. lisinopril 20 mg tablet Commonly known as:  Sharonda Daryl Take 1 Tab by mouth two (2) times a day. metoprolol tartrate 50 mg tablet Commonly known as:  LOPRESSOR  
TAKE ONE TABLET BY MOUTH TWICE DAILY PARoxetine 20 mg tablet Commonly known as:  PAXIL Take 1 Tab by mouth daily. SYNTHROID 125 mcg tablet Generic drug:  levothyroxine Take  by mouth daily (before breakfast). topiramate 50 mg tablet Commonly known as:  TOPAMAX  
1 tab PO QHS x 1 week then increase to 2 tabs PO QHS  
  
 VENTOLIN HFA 90 mcg/actuation inhaler Generic drug:  albuterol ZOCOR 40 mg tablet Generic drug:  simvastatin Take  by mouth nightly. * Notice: This list has 2 medication(s) that are the same as other medications prescribed for you. Read the directions carefully, and ask your doctor or other care provider to review them with you. Prescriptions Printed Refills HYDROcodone-acetaminophen (NORCO) 7.5-325 mg per tablet 0 Si tab PO TID PRN severe pain 1 week supply Class: Print Prescriptions Sent to Pharmacy Refills  
 topiramate (TOPAMAX) 50 mg tablet 1 Si tab PO QHS x 1 week then increase to 2 tabs PO QHS Class: Normal  
 Pharmacy: [facility], [address] [place] [address] #: [phone] We Performed the Following KETOROLAC TROMETHAMINE INJ [ [facility]] NV THER/PROPH/DIAG INJECTION, SUBCUT/IM [id number] CPT(R)] Follow-up Instructions Return in about 6 weeks (around 5/10/2017). Patient Instructions MyChart Activation Thank you for requesting access to CareToSave. Please follow the instructions below to securely access and download your online medical record. CareToSave allows you to send messages to your doctor, view your test results, renew your prescriptions, schedule appointments, and more. How Do I Sign Up? 1. In your internet browser, go to [email or web address] 
2. Click on the First Time User? Click Here link in the Sign In box. You will be redirect to the New Member Sign Up page. 3. Enter your CareToSave Access Code exactly as it appears below. You will not need to use this code after youve completed the sign-up process. If you do not sign up before the expiration date, you must request a new code. CareToSave Access Code: [email or web address] Expires: 2017  1:04 PM (This is the date your CareToSave access code will ) 4. Enter the last four digits of your Social Security Number (xxxx) and Date of Birth (mm/dd/yyyy) as indicated and click Submit. You will be taken to the next sign-up page. 5. Create a CareToSave ID. This will be your CareToSave login ID and cannot be changed, so think of one that is secure and easy to remember. 6. Create a CareToSave password. You can change your password at any time. 7. Enter your Password Reset Question and Answer. This can be used at a later time if you forget your password. 8. Enter your e-mail address. You will receive e-mail notification when new information is available in [address] 9. Click Sign Up. You can now view and download portions of your medical record. 10. Click the Download Summary menu link to download a portable copy of your medical information. Additional Information If you have questions, please visit the Frequently Asked Questions section of the OnGreen website at [email or web address] Remember, MyChart is NOT to be used for urgent needs. For medical emergencies, dial 911. Neuropathic Pain: Care Instructions Your Care Instructions Neuropathic pain is caused by pressure on or damage to your nerves. It's often simply called nerve pain. Some people feel this type of pain all the time. For others, it comes and goes. Diabetes, shingles, or an injury can cause nerve pain. Many people say the pain feels sharp, burning, or stabbing. But some people feel it as a dull ache. In some cases, it makes your skin very sensitive. So touch, pressure, and other sensations that did not hurt before may now cause pain. It's important to know that this kind of pain is real and can affect your quality of life. It's also important to know that treatment can help. Treatment includes pain medicines, exercise, and physical therapy. Medicines can help reduce the number of pain signals that travel over the nerves. This can make the painful areas less sensitive. It can also help you sleep better and improve your mood. But medicines are only one part of successful treatment. Most people do best with more than one kind of treatment. Your doctor may recommend that you try cognitive-behavioral therapy and stress management. Or, if needed, you may decide to try to quit smoking, lower your blood pressure, or better control blood sugar. These kinds of healthy changes can also make a difference. If you feel that your treatment is not working, talk to your doctor. And be sure to tell your doctor if you think you might be depressed or anxious. These are common problems that can also be treated. Follow-up care is a key part of your treatment and safety.  Be sure to make and go to all appointments, and call your doctor if you are having problems. It's also a good idea to know your test results and keep a list of the medicines you take. How can you care for yourself at home? · Be safe with medicines. Read and follow all instructions on the label. ¨ If the doctor gave you a prescription medicine for pain, take it as prescribed. ¨ If you are not taking a prescription pain medicine, ask your doctor if you can take an over-the-counter medicine. · Save hard tasks for days when you have less pain. Follow a hard task with an easy task. And remember to take breaks. · Relax, and reduce stress. You may want to try deep breathing or meditation. These can help. · Keep moving. Gentle, daily exercise can help reduce pain. Your doctor or physical therapist can tell you what type of exercise is best for you. This may include walking, swimming, and stationary biking. It may also include stretches and range-of-motion exercises. · Try heat, cold packs, and massage. · Get enough sleep. Constant pain can make you more tired. If the pain makes it hard to sleep, talk with your doctor. · Think positively. Your thoughts can affect your pain. Do fun things to distract yourself from the pain. See a movie, read a book, listen to music, or spend time with a friend. · Keep a pain diary. Try to write down how strong your pain is and what it feels like. Also try to notice and write down how your moods, thoughts, sleep, activities, and medicine affect your pain. These notes can help you and your doctor find the best ways to treat your pain. Reducing constipation caused by pain medicine Pain medicines often cause constipation. To reduce constipation: 
· Include fruits, vegetables, beans, and whole grains in your diet each day. These foods are high in fiber. · Drink plenty of fluids, enough so that your urine is light yellow or clear like water.  If you have kidney, heart, or liver disease and have to limit fluids, talk with your doctor before you increase the amount of fluids you drink. · Get some exercise every day. Build up slowly to 30 to 60 minutes a day on 5 or more days of the week. · Take a fiber supplement, such as Citrucel or Metamucil, every day if needed. Read and follow all instructions on the label. · Schedule time each day for a bowel movement. Having a daily routine may help. Take your time and do not strain when having a bowel movement. · Ask your doctor about a laxative. The goal is to have one easy bowel movement every 1 to 2 days. Do not let constipation go untreated for more than 3 days. When should you call for help? Call your doctor now or seek immediate medical care if: 
· You feel sad, anxious, or hopeless for more than a few days. This could mean you are depressed. Depression is common in people who have a lot of pain. But it can be treated. · You have trouble with bowel movements, such as: 
¨ No bowel movement in 3 days. ¨ Blood in the anal area, in your stool, or on the toilet paper. ¨ Diarrhea for more than 24 hours. Watch closely for changes in your health, and be sure to contact your doctor if: 
· Your pain is getting worse. · You can't sleep because of pain. · You are very worried or anxious about your pain. · You have trouble taking your pain medicine. · You have any concerns about your pain medicine or its side effects. · You have vomiting or cramps for more than 2 hours. Where can you learn more? Go to http://bucky-gerard.info/. Enter H074 in the search box to learn more about \"Neuropathic Pain: Care Instructions. \" Current as of: October 14, 2016 Content Version: 11.2 © 1663-6854 LogLogic. Care instructions adapted under license by [employer] (which disclaims liability or warranty for this information).  If you have questions about a medical condition or this instruction, always ask your healthcare professional. [employer] disclaims any warranty or liability for your use of this information. Introducing \[facility]\"" & HEALTH SERVICES! Ky Lee introduces [employer] patient portal. Now you can access parts of your medical record, email your doctor's office, and request medication refills online. 1. In your internet browser, go to [email or web address] 2. Click on the First Time User? Click Here link in the Sign In box. You will see the New Member Sign Up page. 3. Enter your [employer] Access Code exactly as it appears below. You will not need to use this code after youve completed the sign-up process. If you do not sign up before the expiration date, you must request a new code. · [employer] Access Code: [email or web address] Expires: 5/21/2017  1:04 PM 
 
4. Enter the last four digits of your Social Security Number (xxxx) and Date of Birth (mm/dd/yyyy) as indicated and click Submit. You will be taken to the next sign-up page. 5. Create a [employer] Technology ID. This will be your [employer] Technology login ID and cannot be changed, so think of one that is secure and easy to remember. 6. Create a [employer] Technology password. You can change your password at any time. 7. Enter your Password Reset Question and Answer. This can be used at a later time if you forget your password. 8. Enter your e-mail address. You will receive e-mail notification when new information is available in [address] 9. Click Sign Up. You can now view and download portions of your medical record. 10. Click the Download Summary menu link to download a portable copy of your medical information. If you have questions, please visit the Frequently Asked Questions section of the [employer] Technology website. Remember, [employer] Technology is NOT to be used for urgent needs. For medical emergencies, dial 911. Now available from your iPhone and Android! Please provide this summary of care documentation to your next provider. Your primary care clinician is listed as Amita Dai. If you have any questions after today's visit, please call [phone].

## 2017-03-29 NOTE — PATIENT INSTRUCTIONS
Varaa.com Activation    Thank you for requesting access to Varaa.com. Please follow the instructions below to securely access and download your online medical record. Varaa.com allows you to send messages to your doctor, view your test results, renew your prescriptions, schedule appointments, and more. How Do I Sign Up? 1. In your internet browser, go to [email or web address]  2. Click on the First Time User? Click Here link in the Sign In box. You will be redirect to the New Member Sign Up page. 3. Enter your Varaa.com Access Code exactly as it appears below. You will not need to use this code after youve completed the sign-up process. If you do not sign up before the expiration date, you must request a new code. Varaa.com Access Code: [email or web address]  Expires: 2017  1:04 PM (This is the date your Varaa.com access code will )    4. Enter the last four digits of your Social Security Number (xxxx) and Date of Birth (mm/dd/yyyy) as indicated and click Submit. You will be taken to the next sign-up page. 5. Create a Varaa.com ID. This will be your Varaa.com login ID and cannot be changed, so think of one that is secure and easy to remember. 6. Create a Varaa.com password. You can change your password at any time. 7. Enter your Password Reset Question and Answer. This can be used at a later time if you forget your password. 8. Enter your e-mail address. You will receive e-mail notification when new information is available in [address] 9. Click Sign Up. You can now view and download portions of your medical record. 10. Click the Download Summary menu link to download a portable copy of your medical information. Additional Information    If you have questions, please visit the Frequently Asked Questions section of the Varaa.com website at [email or web address] Remember, Varaa.com is NOT to be used for urgent needs. For medical emergencies, dial 911.          Neuropathic Pain: Care Instructions  Your Care Instructions  Neuropathic pain is caused by pressure on or damage to your nerves. It's often simply called nerve pain. Some people feel this type of pain all the time. For others, it comes and goes. Diabetes, shingles, or an injury can cause nerve pain. Many people say the pain feels sharp, burning, or stabbing. But some people feel it as a dull ache. In some cases, it makes your skin very sensitive. So touch, pressure, and other sensations that did not hurt before may now cause pain. It's important to know that this kind of pain is real and can affect your quality of life. It's also important to know that treatment can help. Treatment includes pain medicines, exercise, and physical therapy. Medicines can help reduce the number of pain signals that travel over the nerves. This can make the painful areas less sensitive. It can also help you sleep better and improve your mood. But medicines are only one part of successful treatment. Most people do best with more than one kind of treatment. Your doctor may recommend that you try cognitive-behavioral therapy and stress management. Or, if needed, you may decide to try to quit smoking, lower your blood pressure, or better control blood sugar. These kinds of healthy changes can also make a difference. If you feel that your treatment is not working, talk to your doctor. And be sure to tell your doctor if you think you might be depressed or anxious. These are common problems that can also be treated. Follow-up care is a key part of your treatment and safety. Be sure to make and go to all appointments, and call your doctor if you are having problems. It's also a good idea to know your test results and keep a list of the medicines you take. How can you care for yourself at home? · Be safe with medicines. Read and follow all instructions on the label. ¨ If the doctor gave you a prescription medicine for pain, take it as prescribed.   ¨ If you are not taking a prescription pain medicine, ask your doctor if you can take an over-the-counter medicine. · Save hard tasks for days when you have less pain. Follow a hard task with an easy task. And remember to take breaks. · Relax, and reduce stress. You may want to try deep breathing or meditation. These can help. · Keep moving. Gentle, daily exercise can help reduce pain. Your doctor or physical therapist can tell you what type of exercise is best for you. This may include walking, swimming, and stationary biking. It may also include stretches and range-of-motion exercises. · Try heat, cold packs, and massage. · Get enough sleep. Constant pain can make you more tired. If the pain makes it hard to sleep, talk with your doctor. · Think positively. Your thoughts can affect your pain. Do fun things to distract yourself from the pain. See a movie, read a book, listen to music, or spend time with a friend. · Keep a pain diary. Try to write down how strong your pain is and what it feels like. Also try to notice and write down how your moods, thoughts, sleep, activities, and medicine affect your pain. These notes can help you and your doctor find the best ways to treat your pain. Reducing constipation caused by pain medicine  Pain medicines often cause constipation. To reduce constipation:  · Include fruits, vegetables, beans, and whole grains in your diet each day. These foods are high in fiber. · Drink plenty of fluids, enough so that your urine is light yellow or clear like water. If you have kidney, heart, or liver disease and have to limit fluids, talk with your doctor before you increase the amount of fluids you drink. · Get some exercise every day. Build up slowly to 30 to 60 minutes a day on 5 or more days of the week. · Take a fiber supplement, such as Citrucel or Metamucil, every day if needed. Read and follow all instructions on the label. · Schedule time each day for a bowel movement.  Having a daily routine may help. Take your time and do not strain when having a bowel movement. · Ask your doctor about a laxative. The goal is to have one easy bowel movement every 1 to 2 days. Do not let constipation go untreated for more than 3 days. When should you call for help? Call your doctor now or seek immediate medical care if:  · You feel sad, anxious, or hopeless for more than a few days. This could mean you are depressed. Depression is common in people who have a lot of pain. But it can be treated. · You have trouble with bowel movements, such as:  ¨ No bowel movement in 3 days. ¨ Blood in the anal area, in your stool, or on the toilet paper. ¨ Diarrhea for more than 24 hours. Watch closely for changes in your health, and be sure to contact your doctor if:  · Your pain is getting worse. · You can't sleep because of pain. · You are very worried or anxious about your pain. · You have trouble taking your pain medicine. · You have any concerns about your pain medicine or its side effects. · You have vomiting or cramps for more than 2 hours. Where can you learn more? Go to http://bucky-gerard.info/. Enter A098 in the search box to learn more about \"Neuropathic Pain: Care Instructions. \"  Current as of: October 14, 2016  Content Version: 11.2  © 1103-3588 VoluBill. Care instructions adapted under license by [employer] (which disclaims liability or warranty for this information). If you have questions about a medical condition or this instruction, always ask your healthcare professional. [place] [address] any warranty or liability for your use of this information.

## 2017-03-30 RX ORDER — ISOSORBIDE MONONITRATE 30 MG/1
TABLET, EXTENDED RELEASE ORAL
Qty: 30 TAB | Refills: 7 | Status: SHIPPED | OUTPATIENT
Start: 2017-03-30 | End: 2018-04-24 | Stop reason: SDUPTHER

## 2017-04-18 ENCOUNTER — HOSPITAL ENCOUNTER (INPATIENT)
Age: 57
LOS: 8 days | Discharge: HOME HEALTH | DRG: 194 | End: 2017-04-26
Attending: EMERGENCY MEDICINE | Admitting: FAMILY MEDICINE
Payer: MEDICAID

## 2017-04-18 ENCOUNTER — APPOINTMENT (OUTPATIENT)
Dept: CT IMAGING | Age: 57
DRG: 194 | End: 2017-04-18
Attending: FAMILY MEDICINE
Payer: MEDICAID

## 2017-04-18 ENCOUNTER — APPOINTMENT (OUTPATIENT)
Dept: GENERAL RADIOLOGY | Age: 57
DRG: 194 | End: 2017-04-18
Attending: EMERGENCY MEDICINE
Payer: MEDICAID

## 2017-04-18 DIAGNOSIS — E87.70 HYPERVOLEMIA, UNSPECIFIED HYPERVOLEMIA TYPE: ICD-10-CM

## 2017-04-18 DIAGNOSIS — R06.09 DYSPNEA ON EXERTION: ICD-10-CM

## 2017-04-18 DIAGNOSIS — R09.02 HYPOXIA: Primary | ICD-10-CM

## 2017-04-18 DIAGNOSIS — I48.91 ATRIAL FIBRILLATION, UNSPECIFIED TYPE (HCC): ICD-10-CM

## 2017-04-18 PROBLEM — I50.9 CHF (CONGESTIVE HEART FAILURE) (HCC): Status: ACTIVE | Noted: 2017-04-18

## 2017-04-18 LAB
ALBUMIN SERPL BCP-MCNC: 3.5 G/DL (ref 3.4–5)
ALBUMIN/GLOB SERPL: 0.9 {RATIO} (ref 0.8–1.7)
ALP SERPL-CCNC: 111 U/L (ref 45–117)
ALT SERPL-CCNC: 20 U/L (ref 13–56)
AMPHET UR QL SCN: NEGATIVE
ANION GAP BLD CALC-SCNC: 4 MMOL/L (ref 3–18)
ANION GAP BLD CALC-SCNC: 7 MMOL/L (ref 3–18)
APPEARANCE UR: CLEAR
AST SERPL W P-5'-P-CCNC: 20 U/L (ref 15–37)
ATRIAL RATE: 91 BPM
BACTERIA URNS QL MICRO: ABNORMAL /HPF
BARBITURATES UR QL SCN: NEGATIVE
BASOPHILS # BLD AUTO: 0 K/UL (ref 0–0.06)
BASOPHILS # BLD AUTO: 0 K/UL (ref 0–0.1)
BASOPHILS # BLD: 0 % (ref 0–2)
BASOPHILS # BLD: 0 % (ref 0–2)
BENZODIAZ UR QL: NEGATIVE
BILIRUB SERPL-MCNC: 1.1 MG/DL (ref 0.2–1)
BILIRUB UR QL: NEGATIVE
BNP SERPL-MCNC: 4389 PG/ML (ref 0–900)
BUN SERPL-MCNC: 15 MG/DL (ref 7–18)
BUN SERPL-MCNC: 16 MG/DL (ref 7–18)
BUN/CREAT SERPL: 14 (ref 12–20)
BUN/CREAT SERPL: 15 (ref 12–20)
CALCIUM SERPL-MCNC: 8.6 MG/DL (ref 8.5–10.1)
CALCIUM SERPL-MCNC: 8.9 MG/DL (ref 8.5–10.1)
CALCULATED R AXIS, ECG10: -36 DEGREES
CALCULATED T AXIS, ECG11: -141 DEGREES
CANNABINOIDS UR QL SCN: NEGATIVE
CHLORIDE SERPL-SCNC: 100 MMOL/L (ref 100–108)
CHLORIDE SERPL-SCNC: 102 MMOL/L (ref 100–108)
CK MB CFR SERPL CALC: 0.7 % (ref 0–4)
CK MB CFR SERPL CALC: 0.7 % (ref 0–4)
CK MB SERPL-MCNC: 2.5 NG/ML (ref 5–25)
CK MB SERPL-MCNC: 2.7 NG/ML (ref 5–25)
CK SERPL-CCNC: 339 U/L (ref 26–192)
CK SERPL-CCNC: 396 U/L (ref 26–192)
CO2 SERPL-SCNC: 32 MMOL/L (ref 21–32)
CO2 SERPL-SCNC: 35 MMOL/L (ref 21–32)
COCAINE UR QL SCN: NEGATIVE
COLOR UR: YELLOW
CREAT SERPL-MCNC: 0.97 MG/DL (ref 0.6–1.3)
CREAT SERPL-MCNC: 1.13 MG/DL (ref 0.6–1.3)
DIAGNOSIS, 93000: NORMAL
DIFFERENTIAL METHOD BLD: ABNORMAL
DIFFERENTIAL METHOD BLD: ABNORMAL
EOSINOPHIL # BLD: 0.1 K/UL (ref 0–0.4)
EOSINOPHIL # BLD: 0.1 K/UL (ref 0–0.4)
EOSINOPHIL NFR BLD: 2 % (ref 0–5)
EOSINOPHIL NFR BLD: 2 % (ref 0–5)
EPITH CASTS URNS QL MICRO: ABNORMAL /LPF (ref 0–5)
ERYTHROCYTE [DISTWIDTH] IN BLOOD BY AUTOMATED COUNT: 15 % (ref 11.6–14.5)
ERYTHROCYTE [DISTWIDTH] IN BLOOD BY AUTOMATED COUNT: 15.3 % (ref 11.6–14.5)
GLOBULIN SER CALC-MCNC: 3.9 G/DL (ref 2–4)
GLUCOSE SERPL-MCNC: 103 MG/DL (ref 74–99)
GLUCOSE SERPL-MCNC: 87 MG/DL (ref 74–99)
GLUCOSE UR STRIP.AUTO-MCNC: NEGATIVE MG/DL
HCT VFR BLD AUTO: 38.3 % (ref 35–45)
HCT VFR BLD AUTO: 38.8 % (ref 35–45)
HDSCOM,HDSCOM: NORMAL
HGB BLD-MCNC: 11.9 G/DL (ref 12–16)
HGB BLD-MCNC: 12 G/DL (ref 12–16)
HGB UR QL STRIP: NEGATIVE
KETONES UR QL STRIP.AUTO: NEGATIVE MG/DL
LEUKOCYTE ESTERASE UR QL STRIP.AUTO: NEGATIVE
LYMPHOCYTES # BLD AUTO: 20 % (ref 21–52)
LYMPHOCYTES # BLD AUTO: 23 % (ref 21–52)
LYMPHOCYTES # BLD: 1.2 K/UL (ref 0.9–3.6)
LYMPHOCYTES # BLD: 1.5 K/UL (ref 0.9–3.6)
MAGNESIUM SERPL-MCNC: 2.1 MG/DL (ref 1.6–2.6)
MCH RBC QN AUTO: 25.9 PG (ref 24–34)
MCH RBC QN AUTO: 26.3 PG (ref 24–34)
MCHC RBC AUTO-ENTMCNC: 30.7 G/DL (ref 31–37)
MCHC RBC AUTO-ENTMCNC: 31.3 G/DL (ref 31–37)
MCV RBC AUTO: 84 FL (ref 74–97)
MCV RBC AUTO: 84.3 FL (ref 74–97)
METHADONE UR QL: NEGATIVE
MONOCYTES # BLD: 0.4 K/UL (ref 0.05–1.2)
MONOCYTES # BLD: 0.5 K/UL (ref 0.05–1.2)
MONOCYTES NFR BLD AUTO: 6 % (ref 3–10)
MONOCYTES NFR BLD AUTO: 7 % (ref 3–10)
NEUTS SEG # BLD: 4.3 K/UL (ref 1.8–8)
NEUTS SEG # BLD: 4.4 K/UL (ref 1.8–8)
NEUTS SEG NFR BLD AUTO: 68 % (ref 40–73)
NEUTS SEG NFR BLD AUTO: 72 % (ref 40–73)
NITRITE UR QL STRIP.AUTO: NEGATIVE
OPIATES UR QL: NEGATIVE
PCP UR QL: NEGATIVE
PH UR STRIP: 7 [PH] (ref 5–8)
PLATELET # BLD AUTO: 199 K/UL (ref 135–420)
PLATELET # BLD AUTO: 213 K/UL (ref 135–420)
PMV BLD AUTO: 10 FL (ref 9.2–11.8)
PMV BLD AUTO: 10.1 FL (ref 9.2–11.8)
POTASSIUM SERPL-SCNC: 3 MMOL/L (ref 3.5–5.5)
POTASSIUM SERPL-SCNC: 3.1 MMOL/L (ref 3.5–5.5)
PROT SERPL-MCNC: 7.4 G/DL (ref 6.4–8.2)
PROT UR STRIP-MCNC: ABNORMAL MG/DL
Q-T INTERVAL, ECG07: 442 MS
QRS DURATION, ECG06: 118 MS
QTC CALCULATION (BEZET), ECG08: 483 MS
RBC # BLD AUTO: 4.56 M/UL (ref 4.2–5.3)
RBC # BLD AUTO: 4.6 M/UL (ref 4.2–5.3)
RBC #/AREA URNS HPF: NEGATIVE /HPF (ref 0–5)
SODIUM SERPL-SCNC: 139 MMOL/L (ref 136–145)
SODIUM SERPL-SCNC: 141 MMOL/L (ref 136–145)
SP GR UR REFRACTOMETRY: 1.01 (ref 1–1.03)
TROPONIN I SERPL-MCNC: 0.03 NG/ML (ref 0–0.04)
TROPONIN I SERPL-MCNC: 0.03 NG/ML (ref 0–0.04)
TSH SERPL DL<=0.05 MIU/L-ACNC: 1.06 UIU/ML (ref 0.36–3.74)
UROBILINOGEN UR QL STRIP.AUTO: 2 EU/DL (ref 0.2–1)
VENTRICULAR RATE, ECG03: 72 BPM
WBC # BLD AUTO: 6 K/UL (ref 4.6–13.2)
WBC # BLD AUTO: 6.5 K/UL (ref 4.6–13.2)
WBC URNS QL MICRO: ABNORMAL /HPF (ref 0–4)

## 2017-04-18 PROCEDURE — 74011000250 HC RX REV CODE- 250: Performed by: EMERGENCY MEDICINE

## 2017-04-18 PROCEDURE — 83735 ASSAY OF MAGNESIUM: CPT | Performed by: EMERGENCY MEDICINE

## 2017-04-18 PROCEDURE — 71275 CT ANGIOGRAPHY CHEST: CPT

## 2017-04-18 PROCEDURE — 85025 COMPLETE CBC W/AUTO DIFF WBC: CPT | Performed by: EMERGENCY MEDICINE

## 2017-04-18 PROCEDURE — 74011250637 HC RX REV CODE- 250/637: Performed by: EMERGENCY MEDICINE

## 2017-04-18 PROCEDURE — 71020 XR CHEST PA LAT: CPT

## 2017-04-18 PROCEDURE — 74011250636 HC RX REV CODE- 250/636: Performed by: FAMILY MEDICINE

## 2017-04-18 PROCEDURE — 65660000000 HC RM CCU STEPDOWN

## 2017-04-18 PROCEDURE — 74011250636 HC RX REV CODE- 250/636: Performed by: EMERGENCY MEDICINE

## 2017-04-18 PROCEDURE — 80053 COMPREHEN METABOLIC PANEL: CPT | Performed by: EMERGENCY MEDICINE

## 2017-04-18 PROCEDURE — 77030013140 HC MSK NEB VYRM -A

## 2017-04-18 PROCEDURE — 83880 ASSAY OF NATRIURETIC PEPTIDE: CPT | Performed by: EMERGENCY MEDICINE

## 2017-04-18 PROCEDURE — 80307 DRUG TEST PRSMV CHEM ANLYZR: CPT | Performed by: FAMILY MEDICINE

## 2017-04-18 PROCEDURE — 74011250637 HC RX REV CODE- 250/637: Performed by: FAMILY MEDICINE

## 2017-04-18 PROCEDURE — 82550 ASSAY OF CK (CPK): CPT | Performed by: EMERGENCY MEDICINE

## 2017-04-18 PROCEDURE — 96374 THER/PROPH/DIAG INJ IV PUSH: CPT

## 2017-04-18 PROCEDURE — 94640 AIRWAY INHALATION TREATMENT: CPT

## 2017-04-18 PROCEDURE — 93005 ELECTROCARDIOGRAM TRACING: CPT

## 2017-04-18 PROCEDURE — 74011636320 HC RX REV CODE- 636/320: Performed by: FAMILY MEDICINE

## 2017-04-18 PROCEDURE — 99284 EMERGENCY DEPT VISIT MOD MDM: CPT

## 2017-04-18 PROCEDURE — 81001 URINALYSIS AUTO W/SCOPE: CPT | Performed by: FAMILY MEDICINE

## 2017-04-18 PROCEDURE — 84443 ASSAY THYROID STIM HORMONE: CPT | Performed by: EMERGENCY MEDICINE

## 2017-04-18 PROCEDURE — 80048 BASIC METABOLIC PNL TOTAL CA: CPT | Performed by: FAMILY MEDICINE

## 2017-04-18 RX ORDER — SIMVASTATIN 40 MG/1
40 TABLET, FILM COATED ORAL
Status: DISCONTINUED | OUTPATIENT
Start: 2017-04-18 | End: 2017-04-26 | Stop reason: HOSPADM

## 2017-04-18 RX ORDER — HEPARIN SODIUM 5000 [USP'U]/ML
5000 INJECTION, SOLUTION INTRAVENOUS; SUBCUTANEOUS EVERY 8 HOURS
Status: DISCONTINUED | OUTPATIENT
Start: 2017-04-18 | End: 2017-04-26 | Stop reason: HOSPADM

## 2017-04-18 RX ORDER — PAROXETINE HYDROCHLORIDE 20 MG/1
20 TABLET, FILM COATED ORAL DAILY
Status: DISCONTINUED | OUTPATIENT
Start: 2017-04-19 | End: 2017-04-26 | Stop reason: HOSPADM

## 2017-04-18 RX ORDER — FUROSEMIDE 10 MG/ML
40 INJECTION INTRAMUSCULAR; INTRAVENOUS ONCE
Status: COMPLETED | OUTPATIENT
Start: 2017-04-18 | End: 2017-04-18

## 2017-04-18 RX ORDER — ONDANSETRON 2 MG/ML
4 INJECTION INTRAMUSCULAR; INTRAVENOUS
Status: DISCONTINUED | OUTPATIENT
Start: 2017-04-18 | End: 2017-04-26 | Stop reason: HOSPADM

## 2017-04-18 RX ORDER — METOPROLOL TARTRATE 50 MG/1
50 TABLET ORAL
Status: COMPLETED | OUTPATIENT
Start: 2017-04-18 | End: 2017-04-18

## 2017-04-18 RX ORDER — SODIUM CHLORIDE 0.9 % (FLUSH) 0.9 %
5-10 SYRINGE (ML) INJECTION EVERY 8 HOURS
Status: DISCONTINUED | OUTPATIENT
Start: 2017-04-18 | End: 2017-04-26 | Stop reason: HOSPADM

## 2017-04-18 RX ORDER — HYDROCODONE BITARTRATE AND ACETAMINOPHEN 7.5; 325 MG/1; MG/1
1-2 TABLET ORAL
Status: DISCONTINUED | OUTPATIENT
Start: 2017-04-18 | End: 2017-04-20

## 2017-04-18 RX ORDER — METOPROLOL TARTRATE 50 MG/1
50 TABLET ORAL DAILY
Status: DISCONTINUED | OUTPATIENT
Start: 2017-04-19 | End: 2017-04-22

## 2017-04-18 RX ORDER — FUROSEMIDE 10 MG/ML
40 INJECTION INTRAMUSCULAR; INTRAVENOUS
Status: COMPLETED | OUTPATIENT
Start: 2017-04-18 | End: 2017-04-18

## 2017-04-18 RX ORDER — ARIPIPRAZOLE 15 MG/1
15 TABLET ORAL DAILY
Status: DISCONTINUED | OUTPATIENT
Start: 2017-04-19 | End: 2017-04-26 | Stop reason: HOSPADM

## 2017-04-18 RX ORDER — ASPIRIN 325 MG
325 TABLET, DELAYED RELEASE (ENTERIC COATED) ORAL DAILY
Status: DISCONTINUED | OUTPATIENT
Start: 2017-04-19 | End: 2017-04-26 | Stop reason: HOSPADM

## 2017-04-18 RX ORDER — DIPHENHYDRAMINE HCL 25 MG
25 CAPSULE ORAL
Status: DISCONTINUED | OUTPATIENT
Start: 2017-04-18 | End: 2017-04-22

## 2017-04-18 RX ORDER — ISOSORBIDE MONONITRATE 30 MG/1
30 TABLET, EXTENDED RELEASE ORAL DAILY
Status: DISCONTINUED | OUTPATIENT
Start: 2017-04-19 | End: 2017-04-26 | Stop reason: HOSPADM

## 2017-04-18 RX ORDER — ACETAMINOPHEN 325 MG/1
650 TABLET ORAL
Status: DISCONTINUED | OUTPATIENT
Start: 2017-04-18 | End: 2017-04-26 | Stop reason: HOSPADM

## 2017-04-18 RX ORDER — POTASSIUM CHLORIDE 20 MEQ/1
40 TABLET, EXTENDED RELEASE ORAL
Status: COMPLETED | OUTPATIENT
Start: 2017-04-18 | End: 2017-04-19

## 2017-04-18 RX ORDER — TOPIRAMATE 25 MG/1
50 TABLET ORAL DAILY
Status: DISCONTINUED | OUTPATIENT
Start: 2017-04-19 | End: 2017-04-26 | Stop reason: HOSPADM

## 2017-04-18 RX ORDER — NALOXONE HYDROCHLORIDE 0.4 MG/ML
0.4 INJECTION, SOLUTION INTRAMUSCULAR; INTRAVENOUS; SUBCUTANEOUS AS NEEDED
Status: DISCONTINUED | OUTPATIENT
Start: 2017-04-18 | End: 2017-04-26 | Stop reason: HOSPADM

## 2017-04-18 RX ORDER — IBUPROFEN 400 MG/1
800 TABLET ORAL 3 TIMES DAILY
Status: DISCONTINUED | OUTPATIENT
Start: 2017-04-18 | End: 2017-04-20

## 2017-04-18 RX ORDER — HYDROCODONE BITARTRATE AND ACETAMINOPHEN 7.5; 325 MG/1; MG/1
1 TABLET ORAL
Status: DISCONTINUED | OUTPATIENT
Start: 2017-04-18 | End: 2017-04-22

## 2017-04-18 RX ORDER — LISINOPRIL 20 MG/1
20 TABLET ORAL 2 TIMES DAILY
Status: DISCONTINUED | OUTPATIENT
Start: 2017-04-19 | End: 2017-04-20

## 2017-04-18 RX ORDER — IPRATROPIUM BROMIDE AND ALBUTEROL SULFATE 2.5; .5 MG/3ML; MG/3ML
3 SOLUTION RESPIRATORY (INHALATION)
Status: COMPLETED | OUTPATIENT
Start: 2017-04-18 | End: 2017-04-18

## 2017-04-18 RX ORDER — SODIUM CHLORIDE 0.9 % (FLUSH) 0.9 %
5-10 SYRINGE (ML) INJECTION AS NEEDED
Status: DISCONTINUED | OUTPATIENT
Start: 2017-04-18 | End: 2017-04-26 | Stop reason: HOSPADM

## 2017-04-18 RX ADMIN — IOPAMIDOL 100 ML: 755 INJECTION, SOLUTION INTRAVENOUS at 19:27

## 2017-04-18 RX ADMIN — FUROSEMIDE 40 MG: 10 INJECTION, SOLUTION INTRAVENOUS at 20:22

## 2017-04-18 RX ADMIN — METOPROLOL TARTRATE 50 MG: 50 TABLET ORAL at 16:06

## 2017-04-18 RX ADMIN — ACETAMINOPHEN 650 MG: 325 TABLET, FILM COATED ORAL at 21:47

## 2017-04-18 RX ADMIN — HEPARIN SODIUM 5000 UNITS: 5000 INJECTION, SOLUTION INTRAVENOUS; SUBCUTANEOUS at 20:22

## 2017-04-18 RX ADMIN — IPRATROPIUM BROMIDE AND ALBUTEROL SULFATE 3 ML: .5; 3 SOLUTION RESPIRATORY (INHALATION) at 15:29

## 2017-04-18 RX ADMIN — IPRATROPIUM BROMIDE AND ALBUTEROL SULFATE 3 ML: .5; 3 SOLUTION RESPIRATORY (INHALATION) at 15:17

## 2017-04-18 RX ADMIN — FUROSEMIDE 40 MG: 10 INJECTION, SOLUTION INTRAVENOUS at 13:19

## 2017-04-18 NOTE — ED PROVIDER NOTES
HPI Comments: 12:15 PM Shelly Thao is a 61 y.o. female with a history of CHF, hypertension, COPD who presents to ED complaining of shortness of breath everytime that she gets up and tries to walk. Patient states that trying to walk will cause her knees to give out. Patient also reports an associated cough. Patient reports lower extremity swelling and explains that she has not been taking her Lasix because it makes her urinate frequently and has already has difficulty ambulating. No known drug allergies. No other complaints, associated symptoms or modifying factors at this time. PCP: Maureen Walters MD    The history is provided by the patient and medical records. Past Medical History:   Diagnosis Date    Atrial fibrillation     CHADS score 1  (-CHF, +HTN, -AGE, -DM, -CVA)    Carpal tunnel syndrome     Chronic pain     Congenital heart disease     presumed pulmonary stenosis s/p repair 1969    Degenerative disc disease     Degenerative joint disease     Dyslipidemia     GERD     Hypertension     Hypothyroid     Morbid obesity     Morbid obesity with BMI of 45.0-49.9, adult ([facility] [address] 2/20/2017    Noncompliance     Schizoaffective disorder     Substance abuse     marijuana, crack cocaine       Past Surgical History:   Procedure Laterality Date    HX CARPAL TUNNEL RELEASE      left    HX HYSTERECTOMY      HX KNEE REPLACEMENT      left    HX KNEE REPLACEMENT      right    HX THYROIDECTOMY           Family History:   Problem Relation Age of Onset    Hypertension Mother     Coronary Artery Disease Mother     Coronary Artery Disease Father     Hypertension Father        Social History     Social History    Marital status: SINGLE     Spouse name: N/A    Number of children: N/A    Years of education: N/A     Occupational History    Not on file.      Social History Main Topics    Smoking status: Never Smoker    Smokeless tobacco: Not on file    Alcohol use No    Drug use: No    Sexual activity: Yes     Birth control/ protection: Surgical     Other Topics Concern    Not on file     Social History Narrative         ALLERGIES: Gabapentin and Tetanus vaccines and toxoid    Review of Systems   Constitutional: Negative for chills and unexpected weight change. HENT: Negative for congestion. Respiratory: Positive for cough and shortness of breath. Negative for chest tightness. Cardiovascular: Negative for palpitations. Gastrointestinal: Negative for nausea. Genitourinary: Negative for dysuria. Musculoskeletal: Negative for back pain. Neurological: Negative for dizziness and weakness. Psychiatric/Behavioral: The patient is not nervous/anxious. Vitals:    04/18/17 1029   BP: (!) 162/100   Pulse: 84   Resp: 16   Temp: 98.1 °F (36.7 °C)   SpO2: 92%   Weight: 131.5 kg (290 lb)   Height: 5' 3\" (1.6 m)            Physical Exam   Constitutional: She is oriented to person, place, and time. She appears well-developed and well-nourished. No distress. HENT:   Head: Normocephalic and atraumatic. Right Ear: External ear normal.   Left Ear: External ear normal.   Nose: Nose normal.   Mouth/Throat: Oropharynx is clear and moist.   Eyes: Conjunctivae and EOM are normal. Pupils are equal, round, and reactive to light. No scleral icterus. Neck: Normal range of motion. Neck supple. JVD present. No tracheal deviation present. No thyromegaly present. Cardiovascular: Normal rate, regular rhythm, normal heart sounds and intact distal pulses. Exam reveals no gallop and no friction rub. No murmur heard. Pulmonary/Chest: Effort normal. She has wheezes. She exhibits no tenderness. Rhonchi    Abdominal: Soft. Bowel sounds are normal. She exhibits no distension. There is no tenderness. There is no rebound and no guarding. Musculoskeletal: Normal range of motion. She exhibits no edema or tenderness. Lymphadenopathy:     She has no cervical adenopathy.    Neurological: She is alert and oriented to person, place, and time. No cranial nerve deficit. Coordination normal.   No sensory loss, Gait normal, Motor 5/5   Skin: Skin is warm and dry. Psychiatric: She has a normal mood and affect. Her behavior is normal. Judgment and thought content normal.   Nursing note and vitals reviewed. MDM  Number of Diagnoses or Management Options  Diagnosis management comments: Pt is a 62yo female with a hx of diastolic heart failure, HTN, pulmonic stenosis with repair, chronic AF, obesity presents to the ED with complaint of dyspnea for the past 3 days. Pt notes dyspnea with little exertion with difficulty sleeping. Suspect fluid overload and she notes she has had this in the past.  Will follow cardiac labs, CXR, trial of nebs, lasix for elevated BP and suspected hypervolemia, then reevaluate. Armani Thompson DO 12:27 PM      ED Course       Procedures    Vitals:  Patient Vitals for the past 12 hrs:   Temp Pulse Resp BP SpO2   04/18/17 1029 98.1 °F (36.7 °C) 84 16 (!) 162/100 92 %     92% on RA, indicating inadequate oxygenation. Medications ordered:   Medications   furosemide (LASIX) injection 40 mg (not administered)         Lab findings:  Recent Results (from the past 12 hour(s))   CBC WITH AUTOMATED DIFF    Collection Time: 04/18/17 11:25 AM   Result Value Ref Range    WBC 6.0 4.6 - 13.2 K/uL    RBC 4.56 4.20 - 5.30 M/uL    HGB 12.0 12.0 - 16.0 g/dL    HCT 38.3 35.0 - 45.0 %    MCV 84.0 74.0 - 97.0 FL    MCH 26.3 24.0 - 34.0 PG    MCHC 31.3 31.0 - 37.0 g/dL    RDW 15.0 (H) 11.6 - 14.5 %    PLATELET 431 489 - 927 K/uL    MPV 10.1 9.2 - 11.8 FL    NEUTROPHILS 72 40 - 73 %    LYMPHOCYTES 20 (L) 21 - 52 %    MONOCYTES 6 3 - 10 %    EOSINOPHILS 2 0 - 5 %    BASOPHILS 0 0 - 2 %    ABS. NEUTROPHILS 4.3 1.8 - 8.0 K/UL    ABS. LYMPHOCYTES 1.2 0.9 - 3.6 K/UL    ABS. MONOCYTES 0.4 0.05 - 1.2 K/UL    ABS. EOSINOPHILS 0.1 0.0 - 0.4 K/UL    ABS.  BASOPHILS 0.0 0.0 - 0.1 K/UL    DF AUTOMATED     METABOLIC PANEL, COMPREHENSIVE    Collection Time: 04/18/17 11:25 AM   Result Value Ref Range    Sodium 141 136 - 145 mmol/L    Potassium 3.1 (L) 3.5 - 5.5 mmol/L    Chloride 102 100 - 108 mmol/L    CO2 35 (H) 21 - 32 mmol/L    Anion gap 4 3.0 - 18 mmol/L    Glucose 87 74 - 99 mg/dL    BUN 15 7.0 - 18 MG/DL    Creatinine 0.97 0.6 - 1.3 MG/DL    BUN/Creatinine ratio 15 12 - 20      GFR est AA >60 >60 ml/min/1.73m2    GFR est non-AA 59 (L) >60 ml/min/1.73m2    Calcium 8.6 8.5 - 10.1 MG/DL    Bilirubin, total 1.1 (H) 0.2 - 1.0 MG/DL    ALT (SGPT) 20 13 - 56 U/L    AST (SGOT) 20 15 - 37 U/L    Alk.  phosphatase 111 45 - 117 U/L    Protein, total 7.4 6.4 - 8.2 g/dL    Albumin 3.5 3.4 - 5.0 g/dL    Globulin 3.9 2.0 - 4.0 g/dL    A-G Ratio 0.9 0.8 - 1.7     MAGNESIUM    Collection Time: 04/18/17 11:25 AM   Result Value Ref Range    Magnesium 2.1 1.6 - 2.6 mg/dL   CARDIAC PANEL,(CK, CKMB & TROPONIN)    Collection Time: 04/18/17 11:25 AM   Result Value Ref Range     (H) 26 - 192 U/L    CK - MB 2.5 <3.6 ng/ml    CK-MB Index 0.7 0.0 - 4.0 %    Troponin-I, Qt. 0.03 0.0 - 0.045 NG/ML   PRO-BNP    Collection Time: 04/18/17 11:25 AM   Result Value Ref Range    NT pro-BNP 4389 (H) 0 - 900 PG/ML   TSH 3RD GENERATION    Collection Time: 04/18/17 11:25 AM   Result Value Ref Range    TSH 1.06 0.36 - 3.74 uIU/mL   EKG, 12 LEAD, INITIAL    Collection Time: 04/18/17 12:42 PM   Result Value Ref Range    Ventricular Rate 72 BPM    Atrial Rate 91 BPM    QRS Duration 118 ms    Q-T Interval 442 ms    QTC Calculation (Bezet) 483 ms    Calculated R Axis -36 degrees    Calculated T Axis -141 degrees    Diagnosis       Atrial fibrillation with premature ventricular or aberrantly conducted   complexes  Left axis deviation  Incomplete right bundle branch block  Anterior infarct , age undetermined  ST & T wave abnormality, consider inferolateral ischemia  Abnormal ECG  When compared with ECG of 23-SEP-2016 09:41,  No significant change was found  Confirmed by Mary Choudhury MD, Guilleevie Ximena ([id number]) on 4/18/2017 1:02:08 PM         EKG interpretation by ED Physician:  EKG was interpreted by me at 12:42 PM and showed atrial fibrillation at a rate of 72 bpm. Icomplete right bundle branch block. No STEMI. ST depressions and inversions. X-Ray, CT or other radiology findings or impressions:  XR CHEST PA LAT    (Results Pending)   Mild overload     Progress notes, Consult notes or additional Procedure notes:   Pt remains with hypoxia despite lasix, diuresis and nebs. CXR by radiology suggests adenopathy. Given her hypoxia with ambulation. Suspect this is multifactorial.  Will get a CTA to look for PE and the cause of adenopathy. Dr. Elvira Tolentino is at the bedside and will admit to tele. Maya Luna DO 5:24 PM      Reevaluation of patient:   Pt remains short of breath. Maya Luna DO 5:24 PM      Disposition:  Diagnosis: Dyspnea, hypoxia, CHF    Disposition: Admit     Follow-up Information     None           Patient's Medications   Start Taking    No medications on file   Continue Taking    ARIPIPRAZOLE (ABILIFY) 15 MG TABLET    Take 15 mg by mouth daily. ASPIRIN DELAYED-RELEASE 325 MG TABLET    TAKE ONE TABLET BY MOUTH ONCE DAILY    HYDROCODONE-ACETAMINOPHEN (NORCO) 7.5-325 MG PER TABLET    Take 1-2 Tabs by mouth nightly as needed for Pain. Max Daily Amount: 2 Tabs. HYDROCODONE-ACETAMINOPHEN (NORCO) 7.5-325 MG PER TABLET    1 tab PO TID PRN severe pain  1 week supply    IBUPROFEN (MOTRIN) 800 MG TABLET        ISOSORBIDE MONONITRATE ER (IMDUR) 30 MG TABLET    TAKE ONE TABLET BY MOUTH EVERY EVENING    LEVOTHYROXINE (SYNTHROID) 125 MCG TABLET    Take  by mouth daily (before breakfast). LISINOPRIL (PRINIVIL, ZESTRIL) 20 MG TABLET    Take 1 Tab by mouth two (2) times a day. METOPROLOL TARTRATE (LOPRESSOR) 50 MG TABLET    TAKE ONE TABLET BY MOUTH TWICE DAILY    PAROXETINE (PAXIL) 20 MG TABLET    Take 1 Tab by mouth daily.     SIMVASTATIN (ZOCOR) 40 MG TABLET    Take  by mouth nightly. TOPIRAMATE (TOPAMAX) 50 MG TABLET    1 tab PO QHS x 1 week then increase to 2 tabs PO QHS    VENTOLIN HFA 90 MCG/ACTUATION INHALER       These Medications have changed    No medications on file   Stop Taking    No medications on file       Scribe [address] [place] [address] for and in the presence of Bryan Lockhart, DO 12:17 PM, 04/18/17. Signed by: Sierra Jansen, 12:17 PM, 04/18/17. Physician Attestation  I personally performed the services described in the documentation, reviewed and edited the documentation which was dictated to the scribe in my presence, and it accurately records my words and actions.   Bryan Lockhart, DO 12:17 PM, 04/18/17

## 2017-04-18 NOTE — IP AVS SNAPSHOT
Amna  
 
 
 [address] Patient: Kathleen Rivera MRN: [id number] SYU:[birth year] You are allergic to the following Allergen Reactions Gabapentin Other (comments) Unsteady, weakness LEs Tetanus Vaccines And Toxoid Swelling Recent Documentation Height Weight Breastfeeding? BMI OB Status Smoking Status 1.6 m 132.4 kg No 51.71 kg/m2 Hysterectomy Never Smoker Unresulted Labs Order Current Status ANTINUCLEAR ANTIBODIES, IFA In process LUPUS ANTICOAGULANT PANEL W/ REFLEX In process Emergency Contacts Name Discharge Info Relation Home Work Mobile Varun Steele CAREGIVER [3] Friend [5] [phone] OrtizTatiana DISCHARGE CAREGIVER [3] Child [2] [phone] About your hospitalization You were admitted on:  April 18, 2017 You last received care in the:  [address] [place] [address] You were discharged on:  April 26, 2017 Unit phone number:  [phone] Why you were hospitalized Your primary diagnosis was:  Not on File Your diagnoses also included:  Dyspnea, Atrial Fibrillation (Hcc), Hypoxia, Fluid Overload, Chf (Congestive Heart Failure) (Hcc) Providers Seen During Your Hospitalizations Provider Role Specialty Primary office phone Mireille Ortiz MD Attending Provider Emergency Medicine [phone] Deangelo Dawson MD Attending Provider [facility] [phone] Your Primary Care Physician (PCP) Primary Care Physician Office Phone Office Fax Johnnie Burkett [phone] Follow-up Information Follow up With Details Comments Contact Info Dayami Hancock MD On 5/5/2017 @1015am [address] [place] [id number] 
[phone] Corwin Garcia MD On 5/18/2017 @ 8:30 am [address] [place] [address] [facility] [address] [place] [address] 
[phone] El Huerta MD On 5/15/2017 @ 10:00 am Neftaly Cheng NP) [address] [facility] [address] [facility] 
[phone] Sleep Department On 5/11/2017 @ 8:30 am Medical arts building [address] Your Appointments Thursday May 11, 2017  8:30 PM EDT  
HR PSG with [facility] SLEEP RM 1 [facility] SLEEP LAB [facility]) [address] [facility] [address] Sleep Disorders Centers:      [facility] [phone]: [facility], 4th floor, [place], [address]      [facility] [phone]; [address] [place] [address], [place], [place], [address]  Patient instructions ·  Please do not arrive prior to your scheduled appointment time as your room may not be ready. ·  Avoid afternoon naps, caffeine and alcoholic beverages the day of your study. ·  Please bring pajamas men bottoms, women tops and bottoms. We   ask that you do not bring a one piece nightgown to sleep in. ·  Please do not apply lotion after shower the day of your appointment  ·  Please do not apply leave in hair products, such as, oils, conditioners or hairspray. ·  Remove any hairpieces, such as, extensions, weaves & sewn in wigs prior to your appointment. If you arrive with sewn in hairpieces, we will   reschedule your procedure. The Sleep Disorders Centers are outpatient testing department. ·  We encourage you to bring a non-alcoholic/ non-caffeinated beverage and snack, if desired. The cafeteria is closed at night. ·  Please bring any medications that are routinely taken prior to bed. If you have been given a sedative for the study,  DO NOT TAKE THE SEDATIVE BEFORE ARRIVAL. Be advised that if the sedative is taken, we recommend that you not drive for 10 hours after taking it. ·  Diabetic patients should bring testing device, snack and any medications that may be needed.  ·  Patients who require breathing treatments should bring the unit with them. ·  The person having the sleep study is the only person allowed in the testing room. If another individual needs to be present throughout the night to assist in the patients care, arrangements must be made prior to the scheduled study date. ·  During the study, we encourage a time free environment. Please refrain from checking the time. ·  The technologist will ask you to turn off your cell phone. ·  Televisions are available in each room but cannot remain on during the study; it interferes with monitoring equipment. ·  During the study, the technologist will ask you to sleep on your back for a portion of the night. ·  Showers are available following your sleep study, please bring any toiletry items. We will provide washcloths and towels. Thank you for choosing the [address] If you have any questions prior to your appointment, please do not hesitate to contact us at [phone]. Monday May 15, 2017 10:00 AM EDT  
POST HOSPITAL with Kael Rivera NP [facility] ([facility]) [facility] [id number]  
[phone] Wednesday May 17, 2017 11:15 AM EDT Follow Up with Robbie Snyder MD  
[facility]) [address] [facility] [address]  
[phone] Thursday May 18, 2017  8:30 AM EDT Follow Up with Dana Naidu MD  
[address] ([facility]) [address] [place] [address], [address] Ozuna Hannah [id number] [phone] Current Discharge Medication List  
  
START taking these medications Dose & Instructions Dispensing Information Comments Morning Noon Evening Bedtime  
 esomeprazole 40 mg capsule Commonly known as:  NexIUM Your last dose was:     
   
Your next dose is:    
   
   
 Dose:  40 mg  
 Take 1 Cap by mouth daily. Quantity:  30 Cap Refills:  0  
     
   
   
   
  
 furosemide 40 mg tablet Commonly known as:  LASIX Your last dose was: Your next dose is:    
   
   
 40 mg po daily Quantity:  30 Tab Refills:  0  
     
   
   
   
  
 lactulose 10 gram/15 mL solution Commonly known as:  George Estrin Your last dose was: Your next dose is:    
   
   
 Dose:  15 mL Take 15 mL by mouth two (2) times a day. Quantity:  1 Bottle Refills:  0  
     
   
   
   
  
 potassium chloride 20 mEq tablet Commonly known as:  K-DUR, KLOR-CON Your last dose was: Your next dose is:    
   
   
 Dose:  20 mEq Take 1 Tab by mouth daily. Quantity:  30 Tab Refills:  0 CONTINUE these medications which have CHANGED Dose & Instructions Dispensing Information Comments Morning Noon Evening Bedtime HYDROcodone-acetaminophen 7.5-325 mg per tablet Commonly known as:  Jayla Martínez What changed:  Another medication with the same name was removed. Continue taking this medication, and follow the directions you see here. Your last dose was: Your next dose is:    
   
   
 Dose:  1-2 Tab Take 1-2 Tabs by mouth nightly as needed for Pain. Max Daily Amount: 2 Tabs. Quantity:  60 Tab Refills:  0  
     
   
   
   
  
 ibuprofen 400 mg tablet Commonly known as:  MOTRIN What changed:   
- medication strength 
- how much to take 
- how to take this - when to take this 
- reasons to take this Your last dose was: Your next dose is:    
   
   
 Dose:  400 mg Take 1 Tab by mouth every eight (8) hours as needed for Pain. Quantity:  60 Tab Refills:  0  
     
   
   
   
  
 lisinopril 5 mg tablet Commonly known as:  Dimitrios Apo What changed:   
- medication strength 
- how much to take - when to take this Your last dose was: Your next dose is:    
   
   
 Dose:  5 mg Take 1 Tab by mouth daily. Quantity:  30 Tab Refills:  0  
     
   
   
   
  
 metoprolol tartrate 25 mg tablet Commonly known as:  LOPRESSOR What changed:  See the new instructions. Your last dose was: Your next dose is:    
   
   
 Dose:  25 mg Take 1 Tab by mouth two (2) times a day. Quantity:  60 Tab Refills:  0  
     
   
   
   
  
 topiramate 50 mg tablet Commonly known as:  TOPAMAX What changed:   
- how much to take 
- how to take this - when to take this 
- additional instructions Your last dose was: Your next dose is:    
   
   
 Dose:  50 mg Take 1 Tab by mouth daily. Quantity:  60 Tab Refills:  1 VENTOLIN HFA 90 mcg/actuation inhaler Generic drug:  albuterol What changed:   
- how much to take 
- how to take this - when to take this 
- reasons to take this Your last dose was: Your next dose is:    
   
   
 Dose:  2 Puff Take 2 Puffs by inhalation every four (4) hours as needed for Wheezing or Shortness of Breath. Quantity:  1 Inhaler Refills:  0 CONTINUE these medications which have NOT CHANGED Dose & Instructions Dispensing Information Comments Morning Noon Evening Bedtime ABILIFY 15 mg tablet Generic drug:  ARIPiprazole Your last dose was: Your next dose is:    
   
   
 Dose:  15 mg Take 15 mg by mouth daily. Refills:  0  
     
   
   
   
  
 aspirin delayed-release 325 mg tablet Your last dose was: Your next dose is: TAKE ONE TABLET BY MOUTH ONCE DAILY Quantity:  30 Tab Refills:  6  
     
   
   
   
  
 isosorbide mononitrate ER 30 mg tablet Commonly known as:  IMDUR Your last dose was: Your next dose is: TAKE ONE TABLET BY MOUTH EVERY EVENING Quantity:  30 Tab Refills:  7 PARoxetine 20 mg tablet Commonly known as:  PAXIL Your last dose was: Your next dose is:    
   
   
 Dose:  20 mg Take 1 Tab by mouth daily. Quantity:  30 Tab Refills:  3 SYNTHROID 125 mcg tablet Generic drug:  levothyroxine Your last dose was: Your next dose is: Take  by mouth daily (before breakfast). Refills:  0 ZOCOR 40 mg tablet Generic drug:  simvastatin Your last dose was: Your next dose is: Take  by mouth nightly. Refills:  0 Where to Get Your Medications Information on where to get these meds will be given to you by the nurse or doctor. ! Ask your nurse or doctor about these medications  
  esomeprazole 40 mg capsule  
 furosemide 40 mg tablet  
 ibuprofen 400 mg tablet  
 lactulose 10 gram/15 mL solution  
 lisinopril 5 mg tablet  
 metoprolol tartrate 25 mg tablet  
 potassium chloride 20 mEq tablet  
 topiramate 50 mg tablet VENTOLIN HFA 90 mcg/actuation inhaler Discharge Instructions DISCHARGE SUMMARY from Nurse The following personal items are in your possession at time of discharge: 
 
Dental Appliances: None Visual Aid: Glasses, At bedside, With patient Hearing Aids/Status: Right Home Medications: Kept at bedside Jewelry: Earrings, With patient Clothing: At bedside, Footwear, Pants, Shirt, Socks, Undergarments, With patient Other Valuables: Cell Phone, Omid Joshi, With patient PATIENT INSTRUCTIONS: 
 
 
F-face looks uneven A-arms unable to move or move unevenly S-speech slurred or non-existent T-time-call 911 as soon as signs and symptoms begin-DO NOT go Back to bed or wait to see if you get better-TIME IS BRAIN. Warning Signs of HEART ATTACK Call 911 if you have these symptoms: 
? Chest discomfort. Most heart attacks involve discomfort in the center of the chest that lasts more than a few minutes, or that goes away and comes back. It can feel like uncomfortable pressure, squeezing, fullness, or pain. ? Discomfort in other areas of the upper body. Symptoms can include pain or discomfort in one or both arms, the back, neck, jaw, or stomach. ? Shortness of breath with or without chest discomfort. ? Other signs may include breaking out in a cold sweat, nausea, or lightheadedness. Don't wait more than five minutes to call [address]! Fast action can save your life. Calling 911 is almost always the fastest way to get lifesaving treatment. Emergency Medical Services staff can begin treatment when they arrive  up to an hour sooner than if someone gets to the hospital by car. The discharge information has been reviewed with the patient. The patient verbalized understanding. Discharge medications reviewed with the patient and appropriate educational materials and side effects teaching were provided. Patient armband removed and shredded. [employer] Activation Thank you for requesting access to [employer]. Please follow the instructions below to securely access and download your online medical record. [employer] allows you to send messages to your doctor, view your test results, renew your prescriptions, schedule appointments, and more. How Do I Sign Up? 1. In your internet browser, go to [email or web address] 
2. Click on the First Time User? Click Here link in the Sign In box. You will be redirect to the New Member Sign Up page. 3. Enter your [employer] Access Code exactly as it appears below.  You will not need to use this code after youve completed the sign-up process. If you do not sign up before the expiration date, you must request a new code. [employer] Access Code: [email or web address] Expires: 2017  1:04 PM (This is the date your [employer] access code will ) 4. Enter the last four digits of your Social Security Number (xxxx) and Date of Birth (mm/dd/yyyy) as indicated and click Submit. You will be taken to the next sign-up page. 5. Create a [employer] ID. This will be your [employer] login ID and cannot be changed, so think of one that is secure and easy to remember. 6. Create a [employer] password. You can change your password at any time. 7. Enter your Password Reset Question and Answer. This can be used at a later time if you forget your password. 8. Enter your e-mail address. You will receive e-mail notification when new information is available in [address] 9. Click Sign Up. You can now view and download portions of your medical record. 10. Click the Download Summary menu link to download a portable copy of your medical information. Additional Information If you have questions, please visit the Frequently Asked Questions section of the [employer] website at [email or web address] Remember, [employer] is NOT to be used for urgent needs. For medical emergencies, dial 911. Discharge Orders Procedure Order Date Status Priority Quantity Spec Type Associated Dx METABOLIC PANEL, BASIC  0947 Future Routine 1 Blood Comments:  On 17. Call report to PCP Reason: CHF, look for hypokalemia DIET CARDIAC No options chosen 17 0947 Normal Routine 1 Questions: Additional options:  No options chosen [employer] Announcement We are excited to announce that we are making your provider's discharge notes available to you in [employer].   You will see these notes when they are completed and signed by the physician that discharged you from your recent hospital stay. If you have any questions or concerns about any information you see in [employer], please call the Health Information Department where you were seen or reach out to your Primary Care Provider for more information about your plan of care. Introducing [facility] SERVICES! Janethsuleiman Footeluisa introduces [employer] patient portal. Now you can access parts of your medical record, email your doctor's office, and request medication refills online. 1. In your internet browser, go to [email or web address] 2. Click on the First Time User? Click Here link in the Sign In box. You will see the New Member Sign Up page. 3. Enter your [employer] Access Code exactly as it appears below. You will not need to use this code after youve completed the sign-up process. If you do not sign up before the expiration date, you must request a new code. · [employer] Access Code: [email or web address] Expires: 5/21/2017  1:04 PM 
 
4. Enter the last four digits of your Social Security Number (xxxx) and Date of Birth (mm/dd/yyyy) as indicated and click Submit. You will be taken to the next sign-up page. 5. Create a [employer] ID. This will be your [employer] login ID and cannot be changed, so think of one that is secure and easy to remember. 6. Create a [employer] password. You can change your password at any time. 7. Enter your Password Reset Question and Answer. This can be used at a later time if you forget your password. 8. Enter your e-mail address. You will receive e-mail notification when new information is available in [address] 9. Click Sign Up. You can now view and download portions of your medical record. 10. Click the Download Summary menu link to download a portable copy of your medical information.  
 
If you have questions, please visit the Frequently Asked Questions section of the [employer]. Remember, MyChart is NOT to be used for urgent needs. For medical emergencies, dial 911. Now available from your iPhone and Android! General Information Please provide this summary of care documentation to your next provider. Patient Signature:  ____________________________________________________________ Date:  ____________________________________________________________  
  
Gatha Cyphers Provider Signature:  ____________________________________________________________ Date:  ____________________________________________________________

## 2017-04-18 NOTE — ED TRIAGE NOTES
C/o of SOB and generalized fatigue x 1week. Is able speak in full sentences. Unable to sleep lying down at night. Edema to the legs and feet. States that she doesn't take her lasix because is makes her urinate to often and she cant move around that well.

## 2017-04-18 NOTE — IP AVS SNAPSHOT
Current Discharge Medication List  
  
START taking these medications Dose & Instructions Dispensing Information Comments Morning Noon Evening Bedtime  
 esomeprazole 40 mg capsule Commonly known as:  NexIUM Your last dose was: Your next dose is:    
   
   
 Dose:  40 mg Take 1 Cap by mouth daily. Quantity:  30 Cap Refills:  0  
     
   
   
   
  
 furosemide 40 mg tablet Commonly known as:  LASIX Your last dose was: Your next dose is:    
   
   
 40 mg po daily Quantity:  30 Tab Refills:  0  
     
   
   
   
  
 lactulose 10 gram/15 mL solution Commonly known as:  George Estrin Your last dose was: Your next dose is:    
   
   
 Dose:  15 mL Take 15 mL by mouth two (2) times a day. Quantity:  1 Bottle Refills:  0  
     
   
   
   
  
 potassium chloride 20 mEq tablet Commonly known as:  K-DUR, KLOR-CON Your last dose was: Your next dose is:    
   
   
 Dose:  20 mEq Take 1 Tab by mouth daily. Quantity:  30 Tab Refills:  0 CONTINUE these medications which have CHANGED Dose & Instructions Dispensing Information Comments Morning Noon Evening Bedtime HYDROcodone-acetaminophen 7.5-325 mg per tablet Commonly known as:  Jayla Martínez What changed:  Another medication with the same name was removed. Continue taking this medication, and follow the directions you see here. Your last dose was: Your next dose is:    
   
   
 Dose:  1-2 Tab Take 1-2 Tabs by mouth nightly as needed for Pain. Max Daily Amount: 2 Tabs. Quantity:  60 Tab Refills:  0  
     
   
   
   
  
 ibuprofen 400 mg tablet Commonly known as:  MOTRIN What changed:   
- medication strength 
- how much to take 
- how to take this - when to take this 
- reasons to take this Your last dose was:     
   
Your next dose is:    
   
   
 Dose:  400 mg  
 Take 1 Tab by mouth every eight (8) hours as needed for Pain. Quantity:  60 Tab Refills:  0  
     
   
   
   
  
 lisinopril 5 mg tablet Commonly known as:  Iraida Kincaid What changed:   
- medication strength 
- how much to take - when to take this Your last dose was: Your next dose is:    
   
   
 Dose:  5 mg Take 1 Tab by mouth daily. Quantity:  30 Tab Refills:  0  
     
   
   
   
  
 metoprolol tartrate 25 mg tablet Commonly known as:  LOPRESSOR What changed:  See the new instructions. Your last dose was: Your next dose is:    
   
   
 Dose:  25 mg Take 1 Tab by mouth two (2) times a day. Quantity:  60 Tab Refills:  0  
     
   
   
   
  
 topiramate 50 mg tablet Commonly known as:  TOPAMAX What changed:   
- how much to take 
- how to take this - when to take this 
- additional instructions Your last dose was: Your next dose is:    
   
   
 Dose:  50 mg Take 1 Tab by mouth daily. Quantity:  60 Tab Refills:  1 VENTOLIN HFA 90 mcg/actuation inhaler Generic drug:  albuterol What changed:   
- how much to take 
- how to take this - when to take this 
- reasons to take this Your last dose was: Your next dose is:    
   
   
 Dose:  2 Puff Take 2 Puffs by inhalation every four (4) hours as needed for Wheezing or Shortness of Breath. Quantity:  1 Inhaler Refills:  0 CONTINUE these medications which have NOT CHANGED Dose & Instructions Dispensing Information Comments Morning Noon Evening Bedtime ABILIFY 15 mg tablet Generic drug:  ARIPiprazole Your last dose was: Your next dose is:    
   
   
 Dose:  15 mg Take 15 mg by mouth daily. Refills:  0  
     
   
   
   
  
 aspirin delayed-release 325 mg tablet Your last dose was: Your next dose is: TAKE ONE TABLET BY MOUTH ONCE DAILY Quantity:  30 Tab Refills:  6  
     
   
   
   
  
 isosorbide mononitrate ER 30 mg tablet Commonly known as:  IMDUR Your last dose was: Your next dose is: TAKE ONE TABLET BY MOUTH EVERY EVENING Quantity:  30 Tab Refills:  7 PARoxetine 20 mg tablet Commonly known as:  PAXIL Your last dose was: Your next dose is:    
   
   
 Dose:  20 mg Take 1 Tab by mouth daily. Quantity:  30 Tab Refills:  3 SYNTHROID 125 mcg tablet Generic drug:  levothyroxine Your last dose was: Your next dose is: Take  by mouth daily (before breakfast). Refills:  0 ZOCOR 40 mg tablet Generic drug:  simvastatin Your last dose was: Your next dose is: Take  by mouth nightly. Refills:  0 Where to Get Your Medications Information on where to get these meds will be given to you by the nurse or doctor. ! Ask your nurse or doctor about these medications  
  esomeprazole 40 mg capsule  
 furosemide 40 mg tablet  
 ibuprofen 400 mg tablet  
 lactulose 10 gram/15 mL solution  
 lisinopril 5 mg tablet  
 metoprolol tartrate 25 mg tablet  
 potassium chloride 20 mEq tablet  
 topiramate 50 mg tablet VENTOLIN HFA 90 mcg/actuation inhaler

## 2017-04-18 NOTE — PROGRESS NOTES
H&P dictated  1. Acute on chronic diastolic CHF  2. Chronic AF, not on Coumadin likely from poor compliance  3. Morbid obesity  4. HTN  5. Hypoythyroid  6. HLD  7.  Shizoaffective disorder    Plan  See orders

## 2017-04-18 NOTE — Clinical Note
Status[de-identified] Inpatient [101] Type of Bed: Telemetry [19] Inpatient Hospitalization Certified Necessary for the Following Reasons: 3. Patient receiving treatment that can only be provided in an inpatient setting (further clarification in H&P documentation) Admitting Diagnosis: Hypoxia [533811] Admitting Diagnosis: Fluid overload [7925801] Admitting Diagnosis: Dyspnea [419391] Admitting Diagnosis: [facility] ([facility] [427.31. ICD-9-CM] Admitting Physician: Shelba Merlin Attending Physician: Shelba Merlin Estimated Length of Stay: > or = to 2 Midnights Discharge Plan[de-identified] Home with Office Follow-up

## 2017-04-18 NOTE — ED NOTES
I performed a brief evaluation, including history and physical, of the patient here in triage and I have determined that pt will need further treatment and evaluation from the main side ER physician. I have placed initial orders to help in expediting patients care.      April 18, 2017 at 11:29 AM - Milady Adkins MD        Visit Vitals    BP (!) 162/100 (BP 1 Location: Left arm, BP Patient Position: At rest)    Pulse 84    Temp 98.1 °F (36.7 °C)    Resp 16    Ht 5' 3\" (1.6 m)    Wt 131.5 kg (290 lb)    SpO2 92%    BMI 51.37 kg/m2

## 2017-04-19 LAB
ARTERIAL PATENCY WRIST A: YES
BASE EXCESS BLD CALC-SCNC: 8 MMOL/L
BDY SITE: ABNORMAL
GAS FLOW.O2 O2 DELIVERY SYS: ABNORMAL L/MIN
HCO3 BLD-SCNC: 34.9 MMOL/L (ref 22–26)
O2/TOTAL GAS SETTING VFR VENT: 0.21 %
PCO2 BLD: 62.4 MMHG (ref 35–45)
PH BLD: 7.36 [PH] (ref 7.35–7.45)
PO2 BLD: 59 MMHG (ref 80–100)
SAO2 % BLD: 88 % (ref 92–97)
SERVICE CMNT-IMP: ABNORMAL
SPECIMEN TYPE: ABNORMAL
TOTAL RESP. RATE, ITRR: 16

## 2017-04-19 PROCEDURE — 74011000258 HC RX REV CODE- 258: Performed by: FAMILY MEDICINE

## 2017-04-19 PROCEDURE — 74011250636 HC RX REV CODE- 250/636: Performed by: FAMILY MEDICINE

## 2017-04-19 PROCEDURE — 36600 WITHDRAWAL OF ARTERIAL BLOOD: CPT

## 2017-04-19 PROCEDURE — 65660000000 HC RM CCU STEPDOWN

## 2017-04-19 PROCEDURE — 74011250636 HC RX REV CODE- 250/636: Performed by: INTERNAL MEDICINE

## 2017-04-19 PROCEDURE — 74011250637 HC RX REV CODE- 250/637: Performed by: FAMILY MEDICINE

## 2017-04-19 PROCEDURE — 82803 BLOOD GASES ANY COMBINATION: CPT

## 2017-04-19 PROCEDURE — 94660 CPAP INITIATION&MGMT: CPT

## 2017-04-19 PROCEDURE — 74011000258 HC RX REV CODE- 258: Performed by: INTERNAL MEDICINE

## 2017-04-19 RX ORDER — FUROSEMIDE 40 MG/1
40 TABLET ORAL DAILY
Status: DISCONTINUED | OUTPATIENT
Start: 2017-04-20 | End: 2017-04-26 | Stop reason: HOSPADM

## 2017-04-19 RX ORDER — POTASSIUM CHLORIDE 20 MEQ/1
20 TABLET, EXTENDED RELEASE ORAL 2 TIMES DAILY
Status: DISCONTINUED | OUTPATIENT
Start: 2017-04-19 | End: 2017-04-26 | Stop reason: HOSPADM

## 2017-04-19 RX ADMIN — IBUPROFEN 800 MG: 400 TABLET, FILM COATED ORAL at 17:25

## 2017-04-19 RX ADMIN — CEFTRIAXONE SODIUM 1 G: 1 INJECTION, POWDER, FOR SOLUTION INTRAMUSCULAR; INTRAVENOUS at 20:08

## 2017-04-19 RX ADMIN — HEPARIN SODIUM 5000 UNITS: 5000 INJECTION, SOLUTION INTRAVENOUS; SUBCUTANEOUS at 13:52

## 2017-04-19 RX ADMIN — PIPERACILLIN AND TAZOBACTAM 3.38 G: 3; .375 INJECTION, POWDER, LYOPHILIZED, FOR SOLUTION INTRAVENOUS; PARENTERAL at 18:31

## 2017-04-19 RX ADMIN — METOPROLOL TARTRATE 50 MG: 50 TABLET ORAL at 08:55

## 2017-04-19 RX ADMIN — POTASSIUM CHLORIDE 20 MEQ: 1500 TABLET, EXTENDED RELEASE ORAL at 18:32

## 2017-04-19 RX ADMIN — Medication 325 MG: at 09:43

## 2017-04-19 RX ADMIN — IBUPROFEN 800 MG: 400 TABLET, FILM COATED ORAL at 21:44

## 2017-04-19 RX ADMIN — LEVOTHYROXINE SODIUM 125 MCG: 125 TABLET ORAL at 08:56

## 2017-04-19 RX ADMIN — HEPARIN SODIUM 5000 UNITS: 5000 INJECTION, SOLUTION INTRAVENOUS; SUBCUTANEOUS at 03:53

## 2017-04-19 RX ADMIN — ARIPIPRAZOLE 15 MG: 15 TABLET ORAL at 09:43

## 2017-04-19 RX ADMIN — POTASSIUM CHLORIDE 40 MEQ: 1500 TABLET, EXTENDED RELEASE ORAL at 03:53

## 2017-04-19 RX ADMIN — Medication 10 ML: at 21:49

## 2017-04-19 RX ADMIN — SODIUM CHLORIDE 500 MG: 900 INJECTION, SOLUTION INTRAVENOUS at 21:03

## 2017-04-19 RX ADMIN — LISINOPRIL 20 MG: 20 TABLET ORAL at 08:56

## 2017-04-19 RX ADMIN — IBUPROFEN 800 MG: 400 TABLET, FILM COATED ORAL at 08:55

## 2017-04-19 RX ADMIN — TOPIRAMATE 50 MG: 25 TABLET, FILM COATED ORAL at 08:56

## 2017-04-19 RX ADMIN — HEPARIN SODIUM 5000 UNITS: 5000 INJECTION, SOLUTION INTRAVENOUS; SUBCUTANEOUS at 18:32

## 2017-04-19 RX ADMIN — IBUPROFEN 800 MG: 400 TABLET, FILM COATED ORAL at 03:53

## 2017-04-19 RX ADMIN — SIMVASTATIN 40 MG: 40 TABLET, FILM COATED ORAL at 03:53

## 2017-04-19 RX ADMIN — SIMVASTATIN 40 MG: 40 TABLET, FILM COATED ORAL at 21:44

## 2017-04-19 RX ADMIN — ISOSORBIDE MONONITRATE 30 MG: 30 TABLET, EXTENDED RELEASE ORAL at 08:55

## 2017-04-19 RX ADMIN — LISINOPRIL 20 MG: 20 TABLET ORAL at 17:25

## 2017-04-19 RX ADMIN — PAROXETINE HYDROCHLORIDE 20 MG: 20 TABLET, FILM COATED ORAL at 08:55

## 2017-04-19 NOTE — ED NOTES
TRANSFER - OUT REPORT:    Verbal report given to Gilma Ramos RN(name) on Reva Mejia  being transferred to [facility](unit) for routine progression of care       Report consisted of patients Situation, Background, Assessment and   Recommendations(SBAR). Information from the following report(s) ED Summary was reviewed with the receiving nurse. Lines:   Peripheral IV 04/18/17 Right Forearm (Active)   Site Assessment Clean, dry, & intact 4/18/2017 12:25 PM   Phlebitis Assessment 0 4/18/2017 12:25 PM   Infiltration Assessment 0 4/18/2017 12:25 PM        Opportunity for questions and clarification was provided.       Patient transported with:   Monitor

## 2017-04-19 NOTE — ED NOTES
Bedside and Verbal shift change report given to Armani Alejandro (oncoming nurse) by Elvira Logan (offgoing nurse). Report included the following information SBAR, Kardex and ED Summary.

## 2017-04-19 NOTE — H&P
[address] [place] [address]    Name:  Aaron Lares  MR#:  [id number]  :  1960  Account #:  [de-identified]  Date of Adm:  2017      CHIEF COMPLAINT: Shortness of breath. HISTORY OF PRESENT ILLNESS: This is a 59-year-old black female  with history of congenital heart disease, pulmonic stenosis repaired in  , history of chronic atrial fibrillation, is admitted because of  shortness of breath since a few days prior to admission. The patient's  medications included , Prinivil, Lopressor, also on Lasix. The latter  she has not been taking consistently because she makes urine a lot  and she cannot go to the bathroom. The patient was hypoxemic,  particularly when she walks. She also had elevated BTNP and possible  hilar adenopathy. For all these multiple reasons, particularly suspected  CHF, the patient is admitted for further treatment. PAST MEDICAL HISTORY: History of hypertension. Hypothyroid, on  thyroid medication, she has had thyroidectomy. History of  hyperlipidemia, gastroesophageal reflux, morbid obesity, schizo-  affective disorder. Past history of substance use, using crack  and cocaine and marijuana. Hysterectomy, carpal tunnel surgery. Knee  replacement, bilateral.    FAMILY HISTORY: Mother  of a heart attack. Father  of a  heart attack. SOCIAL HISTORY: She is . She has 2 children, delivered  normal.    ALLERGIES  ALLERGIC TO:  1. GABAPENTIN. 2. TETANUS TOXIN AND TOXOID. REVIEW OF SYSTEMS  HEENT: No headache, no dizziness. No visual or hearing problem. CARDIOVASCULAR/RESPIRATORY: She is short of breath. Denied  chest pain. GASTROINTESTINAL: No complaint. GENITOURINARY: Incontinent due to poor mobility. MUSCULOSKELETAL: No complaint. PHYSICAL EXAMINATION  GENERAL: The patient is morbidly obese. VITAL SIGNS: Blood pressure is /100 pulse 84, respirations 16,  temperature 98. The patient stands 5 feet 3 inches.   HEAD: Normocephalic. EYES: Pupils equal. Eye ocular movements intact. NOSE: No septal deviation. MOUTH: Normal tongue, gums, buccal mucosa. NECK: Thyroid not enlarged. No neck vein distention. CHEST: Symmetrical.  HEART: Irregular. LUNGS: Clear. BREASTS: Negative. ABDOMEN: Soft, nontender. EXTREMITIES: Revealed 1+ edema. LABORATORY DATA: Review of laboratory data revealed hemoglobin  12 grams, white count of 6. Potassium 3.1, BUN 15, creatinine 0.97. EKG showed atrial fibrillation. IMPRESSION  1. Hypoxemia, suspected diastolic heart failure. 2. Hilar adenopathy, rule out malignancy. 3. Rule out pulmonary embolism. 4. Morbid obesity. 5. Hypothyroid. 6. Hypertension. 7. Hyperlipidemia. 8. Schizo-affective disorder. DISCUSSION: Will review home medications. Proceed with CTA. Resume home medications.     CODE STATUS: FULL MD DEXTER Smith / MP  D:  04/18/2017   17:47  T:  04/18/2017   19:48  Job #:  [id number]

## 2017-04-19 NOTE — ED NOTES
Received report from [facility]. Pt is A/Ox4, no complaints to pain and sitting on side of bed. No other needs at this time.

## 2017-04-19 NOTE — CONSULTS
[facility]  Pulmonary, Critical Care, and Sleep Medicine    Initial Patient Consult    Name: Estiven Woodard MRN: [id number]   : 1960 Hospital: [facility]   Date: 2017        IMPRESSION:   · Worsening shortness of breath due to acute on chronic hypoxic and hypercapnic respiratory failure possibly multifactorial, see below. Hypercapnia is compensated. · Chronic atrial fibrillation not on anticoagulation  · Pulmonary HTN with history of prior heart surgery for congenital heart disease, Pulmonic stenosis suggested by prior echo. Suspect worsening right sided pressures based on history, hilar prominence and CT findings  · Morbid obesity, suspect OHS  · Left lung infiltrate without fever or leukocytosis. History of drug induced pneumonitis however UDS negative on admission  · Hypokalemia likely related to diuretic use  · ANDRADE diagnosed in the s, dose not use BIPAP  · History of polysubstance abuse including crack cocaine  · History of poor compliance to medication and therapy  · Psychiatric illness      RECOMMENDATIONS:   · No other evidence to support pneumonia. Brandee in absence of purulent phlegm, fever of leukocytosis  · Continue supplemental O2 to maintain saturation greater than 90%. Suspect pt will need O2 on discharge, will reassess for this frequently  · Agree with diuresis but would closely watch Potassium levels and replace appropriately  · Pt with ANDRADE since the , non compliant with CPAP. Untreated ANDRADE/OHS could be contributing to worsening right sided pressures. Will need outpatient sleep studies  · Full PFT's ordered. Pt carries diagnosis of COPD but no PFT's seen. In the meantime would treat with LINDSAY. Caution with LABA in light of concerns for HR control  · Echocardiogram to assess EF and right sided pressures  · Will start empiric BIPAP tonight for chronic respiratory failure and ANDRADE.  Repeat ABG in am  · DVT prophylaxis  · Defer decision re:anticoagulation for Afib to Cardiology  · Empiric antibiotics to cover CAP, low index of suspicion for HCAP  · Will follow with you. Thanks for consulting     Subjective: This patient has been seen and evaluated at the request of Dr. Skippy Epley for shortness of breath. Patient is a 61 y.o. female with a history of surgical repair of congenital heart disease, echo suggestive of Pulmonic stenosis. Admitted for weeks of worsening shortness of breath, over the past few days becoming dyspneic with walking a few steps. Pt also with chronic Afib and CHF on Lasix. Because of dyspnea and resultant difficulty ambulating to the bathroom pt stopped taking Lasix. Pt denies chest pain, fever, purulent phlegm. She does complain of pedal edema and 3 pillow orthopnea, no PND. Pt with history of ANDRADE diagnosed in the 80's, not using CPAP. \"I don't know what happened to my machine\". Pt also with a history of polysubstance abuse but claims to have stopped using after admission 2 years ago for Procaine induced pneumonitis. Further review of records also show RVSP of at least 50 in 2014. Past Medical History:   Diagnosis Date    Atrial fibrillation     CHADS score 1  (-CHF, +HTN, -AGE, -DM, -CVA)    Carpal tunnel syndrome     Chronic pain     Congenital heart disease     presumed pulmonary stenosis s/p repair 1969    Degenerative disc disease     Degenerative joint disease     Dyslipidemia     GERD     Hypertension     Hypothyroid     Morbid obesity     Morbid obesity with BMI of 45.0-49.9, adult ([facility] [address] 2/20/2017    Noncompliance     Schizoaffective disorder     Substance abuse     marijuana, crack cocaine      Past Surgical History:   Procedure Laterality Date    HX CARPAL TUNNEL RELEASE      left    HX HYSTERECTOMY      HX KNEE REPLACEMENT      left    HX KNEE REPLACEMENT      right    HX THYROIDECTOMY        Prior to Admission medications    Medication Sig Start Date End Date Taking?  Authorizing Provider   VENTOLIN HFA 90 mcg/actuation inhaler  3/19/17  Yes Historical Provider   ibuprofen (MOTRIN) 800 mg tablet  3/28/17  Yes Historical Provider   topiramate (TOPAMAX) 50 mg tablet 1 tab PO QHS x 1 week then increase to 2 tabs PO QHS 3/29/17  Yes Matt Francis MD   HYDROcodone-acetaminophen [facility]) 7.5-325 mg per tablet 1 tab PO TID PRN severe pain  1 week supply 3/29/17  Yes Matt Francis MD   HYDROcodone-acetaminophen [facility]) 7.5-325 mg per tablet Take 1-2 Tabs by mouth nightly as needed for Pain. Max Daily Amount: 2 Tabs. 2/20/17  Yes Clara Partida MD   metoprolol tartrate (LOPRESSOR) 50 mg tablet TAKE ONE TABLET BY MOUTH TWICE DAILY 11/8/16  Yes Jenn Abbott NP   lisinopril (PRINIVIL, ZESTRIL) 20 mg tablet Take 1 Tab by mouth two (2) times a day. 4/18/16  Yes Mark Jacinto MD   PARoxetine (PAXIL) 20 mg tablet Take 1 Tab by mouth daily. 1/21/14  Yes Larry Webster MD   aspirin delayed-release 325 mg tablet TAKE ONE TABLET BY MOUTH ONCE DAILY 8/26/13  Yes Mark Jacinto MD   simvastatin (ZOCOR) 40 mg tablet Take  by mouth nightly. Yes Historical Provider   aripiprazole (ABILIFY) 15 mg tablet Take 15 mg by mouth daily. Yes Historical Provider   levothyroxine (SYNTHROID) 125 mcg tablet Take  by mouth daily (before breakfast).    Yes Historical Provider   isosorbide mononitrate ER (IMDUR) 30 mg tablet TAKE ONE TABLET BY MOUTH EVERY EVENING 3/30/17   Jenn Abbott NP     Allergies   Allergen Reactions    Gabapentin Other (comments)     Unsteady, weakness LEs    Tetanus Vaccines And Toxoid Swelling      Social History   Substance Use Topics    Smoking status: Never Smoker    Smokeless tobacco: Not on file    Alcohol use No      Family History   Problem Relation Age of Onset    Hypertension Mother     Coronary Artery Disease Mother     Coronary Artery Disease Father     Hypertension Father         Current Facility-Administered Medications   Medication Dose Route Frequency    piperacillin-tazobactam (ZOSYN) 3.375 g in 0.9% sodium chloride (MBP/ADV) 100 mL MBP  3.375 g IntraVENous Q6H    potassium chloride (K-DUR, KLOR-CON) SR tablet 20 mEq  20 mEq Oral BID    [START ON 2017] furosemide (LASIX) tablet 40 mg  40 mg Oral DAILY    simvastatin (ZOCOR) tablet 40 mg  40 mg Oral QHS    ARIPiprazole (ABILIFY) tablet 15 mg  15 mg Oral DAILY    levothyroxine (SYNTHROID) tablet 125 mcg  125 mcg Oral ACB    aspirin delayed-release tablet 325 mg  325 mg Oral DAILY    PARoxetine (PAXIL) tablet 20 mg  20 mg Oral DAILY    lisinopril (PRINIVIL, ZESTRIL) tablet 20 mg  20 mg Oral BID    metoprolol tartrate (LOPRESSOR) tablet 50 mg  50 mg Oral DAILY    isosorbide mononitrate ER (IMDUR) tablet 30 mg  30 mg Oral DAILY    ibuprofen (MOTRIN) tablet 800 mg  800 mg Oral TID    topiramate (TOPAMAX) tablet 50 mg  50 mg Oral DAILY    sodium chloride (NS) flush 5-10 mL  5-10 mL IntraVENous Q8H    heparin (porcine) injection 5,000 Units  5,000 Units SubCUTAneous Q8H       Review of Systems:  Pertinent items are noted in HPI. Objective:   Vital Signs:    Visit Vitals    /68 (BP 1 Location: Left arm, BP Patient Position: Supine; Head of bed elevated (Comment degrees))    Pulse 71    Temp 97.6 °F (36.4 °C)    Resp 16    Ht 5' 3\" (1.6 m)    Wt 128.6 kg (283 lb 9.6 oz)    SpO2 95%    Breastfeeding No    BMI 50.24 kg/m2       O2 Device: Nasal cannula   O2 Flow Rate (L/min): 2.5 l/min   Temp (24hrs), Av.6 °F (36.4 °C), Min:97.6 °F (36.4 °C), Max:97.6 °F (36.4 °C)       Intake/Output:   Last shift:         Last 3 shifts:    No intake or output data in the 24 hours ending 17 [id number]   Physical Exam:   General:  Alert, cooperative, no distress, appears stated age. Head:  Normocephalic, without obvious abnormality, atraumatic. Eyes:  Conjunctivae/corneas clear. PERRL, EOMs intact. Nose: Nares normal. Septum midline. Mucosa normal. No drainage or sinus tenderness.    Throat: Lips, mucosa, and tongue normal. Teeth and gums normal.   Neck: Supple, symmetrical, trachea midline, no adenopathy, thyroid: no enlargment/tenderness/nodules, no carotid bruit and no JVD. Back:   Symmetric, no curvature. ROM normal.   Lungs:   Clear to auscultation bilaterally. Chest wall:  No tenderness or deformity. Heart:  Regular rate and rhythm, S1, S2 normal, no murmur, click, rub or gallop. Abdomen:   Soft, non-tender. Bowel sounds normal. No masses,  No organomegaly. Extremities: Extremities normal, atraumatic, no cyanosis or edema. Pulses: 2+ and symmetric all extremities. Skin: Skin color, texture, turgor normal. No rashes or lesions   Lymph nodes: Cervical, supraclavicular, and axillary nodes normal.   Neurologic: Grossly nonfocal     Data review:     Recent Results (from the past 24 hour(s))   METABOLIC PANEL, BASIC    Collection Time: 04/18/17  8:28 PM   Result Value Ref Range    Sodium 139 136 - 145 mmol/L    Potassium 3.0 (L) 3.5 - 5.5 mmol/L    Chloride 100 100 - 108 mmol/L    CO2 32 21 - 32 mmol/L    Anion gap 7 3.0 - 18 mmol/L    Glucose 103 (H) 74 - 99 mg/dL    BUN 16 7.0 - 18 MG/DL    Creatinine 1.13 0.6 - 1.3 MG/DL    BUN/Creatinine ratio 14 12 - 20      GFR est AA 60 (L) >60 ml/min/1.73m2    GFR est non-AA 49 (L) >60 ml/min/1.73m2    Calcium 8.9 8.5 - 10.1 MG/DL   CBC WITH AUTOMATED DIFF    Collection Time: 04/18/17  8:28 PM   Result Value Ref Range    WBC 6.5 4.6 - 13.2 K/uL    RBC 4.60 4.20 - 5.30 M/uL    HGB 11.9 (L) 12.0 - 16.0 g/dL    HCT 38.8 35.0 - 45.0 %    MCV 84.3 74.0 - 97.0 FL    MCH 25.9 24.0 - 34.0 PG    MCHC 30.7 (L) 31.0 - 37.0 g/dL    RDW 15.3 (H) 11.6 - 14.5 %    PLATELET 766 124 - 255 K/uL    MPV 10.0 9.2 - 11.8 FL    NEUTROPHILS 68 40 - 73 %    LYMPHOCYTES 23 21 - 52 %    MONOCYTES 7 3 - 10 %    EOSINOPHILS 2 0 - 5 %    BASOPHILS 0 0 - 2 %    ABS. NEUTROPHILS 4.4 1.8 - 8.0 K/UL    ABS. LYMPHOCYTES 1.5 0.9 - 3.6 K/UL    ABS. MONOCYTES 0.5 0.05 - 1.2 K/UL    ABS. EOSINOPHILS 0.1 0.0 - 0.4 K/UL    ABS. BASOPHILS 0.0 0.0 - 0.06 K/UL    DF AUTOMATED     URINALYSIS W/ RFLX MICROSCOPIC    Collection Time: 04/18/17  8:28 PM   Result Value Ref Range    Color YELLOW      Appearance CLEAR      Specific gravity 1.013 1.005 - 1.030      pH (UA) 7.0 5.0 - 8.0      Protein TRACE (A) NEG mg/dL    Glucose NEGATIVE  NEG mg/dL    Ketone NEGATIVE  NEG mg/dL    Bilirubin NEGATIVE  NEG      Blood NEGATIVE  NEG      Urobilinogen 2.0 (H) 0.2 - 1.0 EU/dL    Nitrites NEGATIVE  NEG      Leukocyte Esterase NEGATIVE  NEG     DRUG SCREEN, URINE    Collection Time: 04/18/17  8:28 PM   Result Value Ref Range    BENZODIAZEPINE NEGATIVE  NEG      BARBITURATES NEGATIVE  NEG      THC (TH-CANNABINOL) NEGATIVE  NEG      OPIATES NEGATIVE  NEG      PCP(PHENCYCLIDINE) NEGATIVE  NEG      COCAINE NEGATIVE  NEG      AMPHETAMINE NEGATIVE  NEG      METHADONE NEGATIVE  NEG      HDSCOM (NOTE)    URINE MICROSCOPIC ONLY    Collection Time: 04/18/17  8:28 PM   Result Value Ref Range    WBC 0 to 2 0 - 4 /hpf    RBC NEGATIVE  0 - 5 /hpf    Epithelial cells 2+ 0 - 5 /lpf    Bacteria FEW (A) NEG /hpf   POC G3    Collection Time: 04/19/17  5:42 PM   Result Value Ref Range    Device: ROOM AIR      FIO2 (POC) 0.21 %    pH (POC) 7.356 7.35 - 7.45      pCO2 (POC) 62.4 (H) 35.0 - 45.0 MMHG    pO2 (POC) 59 (L) 80 - 100 MMHG    HCO3 (POC) 34.9 (H) 22 - 26 MMOL/L    sO2 (POC) 88 (L) 92 - 97 %    Base excess (POC) 8 mmol/L    Allens test (POC) YES      Total resp. rate 16      Site LEFT RADIAL      Specimen type (POC) ARTERIAL      Performed by Christie Fuentes        Imaging:  I have personally reviewed the patients radiographs and have reviewed the reports:  XR Results (most recent):    Results from Hospital Encounter encounter on 04/18/17   XR CHEST PA LAT   Narrative Chest AP and Lateral views. HISTORY:  SOB    COMPARISON: September 23, 2016. FINDINGS:     PA and lateral views of the chest were obtained.      The cardiac silhouette is enlarged. Hilar shadows are enlarged consistent with adenopathy. This is unchanged. Right  lung is clear. The right costophrenic angle is sharp. Left lung base appear hazy  on the frontal view. Left costophrenic angle is ill defined on the frontal view. Lateral view show clear lung bases posteriorly. Posterior costophrenic angles  are sharp. Thoracic spine demonstrated no compression fractures. Impression IMPRESSION:  1. Very prominent hilar shadows. Hilar adenopathy cannot be  excluded. CT of the chest with IV contrast can be helpful for diagnosis. 2. Cardiomegaly is unchanged. 3. No confluent infiltrates or pleural effusion. CT Results (most recent):    Results from Hospital Encounter encounter on 04/18/17   CTA CHEST W OR W WO CONT   Narrative EXAM:  CTA CHEST W OR W WO CONT    INDICATION:   Dyspnea, on exertion; PE and adenopathy eval    COMPARISON: None. CONTRAST:  100 mL of Isovue-370. TECHNIQUE:   Precontrast  images were obtained to localize the volume for acquisition. Multislice helical CT arteriography was performed from the diaphragm to the  thoracic inlet during uneventful rapid bolus intravenous contrast  administration. Lung and soft tissue windows were generated. Coronal and  sagittal images were generated and 3D post processing consisting of coronal  maximum intensity images was performed. CT dose reduction was achieved through  use of a standardized protocol tailored for this examination and automatic  exposure control for dose modulation. FINDINGS:    Limited evaluation due to patient body habitus. Atherosclerosis of the aorta. Aorta is not opacified. Patchy opacities in the lingula and left lower lobe. .    Smaller pulmonary arterial branches are not well opacified. No central pulmonary  embolism is identified. Enlarged pulmonary arteries likely secondary to  pulmonary hypertension. Cardiomegaly. No paracardial effusion. Lethaniel [place] hiatal hernia. No lytic or blastic lesions in the visualized bones. Impression IMPRESSION:     Limited evaluation demonstrates no central pulmonary embolism. Patchy opacity in the lingula and left lower lobe, could represent pneumonia. Cardiomegaly. Small hiatal hernia.                Jeanette Barksdale MD

## 2017-04-19 NOTE — ROUTINE PROCESS
Bedside and Verbal shift change report given to Larry Fried RN(oncoming nurse) by Rod Blackwell (offgoing nurse). Report included the following information Kardex, Intake/Output and MAR.

## 2017-04-19 NOTE — PROGRESS NOTES
Yovany Segura M.D. PROGRESS NOTE    Name: Fernandez Do MRN: [id number]   : 1960 Hospital: [facility]   Date: 2017  Admission Date: 2017     Subjective/Objective/Plans  1. Acute on chronic diastolic CHF  2. Chronic AF, not on Coumadin likely from poor compliance  3. Morbid obesity  4. HTN  5. Hypoythyroid  6. HLD  7. Shizoaffective disorder  8. Left Lung pneumonia  9. Hypokalemia    No complaint  Mildly SOB  Trace leg edema  Add KDUR 20 meq BID  Lasix 40 mg daily added  ABG report noted  Consulted Dr Shoaib Soto        Vital Signs:  Visit Vitals    /68 (BP 1 Location: Left arm, BP Patient Position: Supine; Head of bed elevated (Comment degrees))    Pulse 71    Temp 97.6 °F (36.4 °C)    Resp 16    Ht 5' 3\" (1.6 m)    Wt 128.6 kg (283 lb 9.6 oz)    SpO2 98%    Breastfeeding No    BMI 50.24 kg/m2       O2 Device: Nasal cannula   O2 Flow Rate (L/min): 2.5 l/min   Temp (24hrs), Av.6 °F (36.4 °C), Min:97.6 °F (36.4 °C), Max:97.6 °F (36.4 °C)     5:21 PM  Intake/Output:   Last shift:         Last 3 shifts:    No intake or output data in the 24 hours ending 17 1721      Physical Exam:    General: in no apparent distress, in no respiratory distress and acyanotic and alert    HEENT: pupils equal, no ear discharge   Neck: No abnormally enlarged lymph nodes. , no JDV   Mouth: MMM no lesions    Chest: normal, no breast masses   Lungs: decreased air exchange bilaterally   Heart: AF   Abdomen: abdomen is soft without significant tenderness, masses, organomegaly or guarding   Extremity: Trace edema   Neuro: alert    Skin: Skin color, texture, turgor normal. No rashes or lesions    Data Review:    Labs: Results:       Chemistry Recent Labs      17   1125   GLU  103*  87   NA  139  141   K  3.0*  3.1*   CL  100  102   CO2  32  35*   BUN  16  15   CREA  1.13  0.97   CA  8.9  8.6   AGAP  7  4   BUCR  14  15   TBILI   --   1.1*   AP   --   111   TP   --   7.4 ALB   --   3.5   GLOB   --   3.9   AGRAT   --   0.9      CBC w/Diff Recent Labs      17   2028  17   1125   WBC  6.5  6.0   RBC  4.60  4.56   HGB  11.9*  12.0   HCT  38.8  38.3   PLT  213  199   GRANS  68  72   LYMPH  23  20*   EOS  2  2      Cardiac Enzymes Recent Labs      17   1559  17   1125   CPK  396*  339*   CKND1  0.7  0.7      Coagulation No results for input(s): PTP, INR, APTT in the last 72 hours. No lab exists for component: INREXT    Lipid Panel Lab Results   Component Value Date/Time    Cholesterol, total 168 10/06/2010 12:17 PM    HDL Cholesterol 37 10/06/2010 12:17 PM    LDL, calculated 82.2 10/06/2010 12:17 PM    VLDL, calculated 48.8 10/06/2010 12:17 PM    Triglyceride 244 10/06/2010 12:17 PM    CHOL/HDL Ratio 4.5 10/06/2010 12:17 PM      BNP No results for input(s): BNPP in the last 72 hours. Liver Enzymes Recent Labs      17   1125   TP  7.4   ALB  3.5   TBILI  1.1*   AP  111   SGOT  20   ALT  20      Thyroid Studies Lab Results   Component Value Date/Time    T4, Total 12.3 01/15/2014 02:01 AM    TSH 1.06 2017 11:25 AM          Procedures/imaging: see electronic medical records for all procedures, Xrays and details which were not copied into this note but were reviewed. Allergies: Allergies   Allergen Reactions    Gabapentin Other (comments)     Unsteady, weakness LEs    Tetanus Vaccines And Toxoid Swelling       Home Medications:  Prior to Admission Medications   Prescriptions Last Dose Informant Patient Reported? Taking? HYDROcodone-acetaminophen (NORCO) 7.5-325 mg per tablet   No No   Sig: Take 1-2 Tabs by mouth nightly as needed for Pain. Max Daily Amount: 2 Tabs. HYDROcodone-acetaminophen (NORCO) 7.5-325 mg per tablet   No No   Si tab PO TID PRN severe pain  1 week supply   PARoxetine (PAXIL) 20 mg tablet   No No   Sig: Take 1 Tab by mouth daily.    VENTOLIN HFA 90 mcg/actuation inhaler   Yes No   aripiprazole (ABILIFY) 15 mg tablet Yes No   Sig: Take 15 mg by mouth daily. aspirin delayed-release 325 mg tablet   No No   Sig: TAKE ONE TABLET BY MOUTH ONCE DAILY   ibuprofen (MOTRIN) 800 mg tablet   Yes No   isosorbide mononitrate ER (IMDUR) 30 mg tablet   No No   Sig: TAKE ONE TABLET BY MOUTH EVERY EVENING   levothyroxine (SYNTHROID) 125 mcg tablet   Yes No   Sig: Take  by mouth daily (before breakfast). lisinopril (PRINIVIL, ZESTRIL) 20 mg tablet   No No   Sig: Take 1 Tab by mouth two (2) times a day. metoprolol tartrate (LOPRESSOR) 50 mg tablet   No No   Sig: TAKE ONE TABLET BY MOUTH TWICE DAILY   simvastatin (ZOCOR) 40 mg tablet   Yes No   Sig: Take  by mouth nightly.    topiramate (TOPAMAX) 50 mg tablet   No No   Si tab PO QHS x 1 week then increase to 2 tabs PO QHS      Facility-Administered Medications: None       Current Medications:  Current Facility-Administered Medications   Medication Dose Route Frequency    simvastatin (ZOCOR) tablet 40 mg  40 mg Oral QHS    ARIPiprazole (ABILIFY) tablet 15 mg  15 mg Oral DAILY    levothyroxine (SYNTHROID) tablet 125 mcg  125 mcg Oral ACB    aspirin delayed-release tablet 325 mg  325 mg Oral DAILY    PARoxetine (PAXIL) tablet 20 mg  20 mg Oral DAILY    lisinopril (PRINIVIL, ZESTRIL) tablet 20 mg  20 mg Oral BID    metoprolol tartrate (LOPRESSOR) tablet 50 mg  50 mg Oral DAILY    HYDROcodone-acetaminophen (NORCO) 7.5-325 mg per tablet 1-2 Tab  1-2 Tab Oral QHS PRN    isosorbide mononitrate ER (IMDUR) tablet 30 mg  30 mg Oral DAILY    ibuprofen (MOTRIN) tablet 800 mg  800 mg Oral TID    topiramate (TOPAMAX) tablet 50 mg  50 mg Oral DAILY    HYDROcodone-acetaminophen (NORCO) 7.5-325 mg per tablet 1 Tab  1 Tab Oral Q6H PRN    sodium chloride (NS) flush 5-10 mL  5-10 mL IntraVENous Q8H    sodium chloride (NS) flush 5-10 mL  5-10 mL IntraVENous PRN    acetaminophen (TYLENOL) tablet 650 mg  650 mg Oral Q4H PRN    naloxone (NARCAN) injection 0.4 mg  0.4 mg IntraVENous PRN    diphenhydrAMINE (BENADRYL) capsule 25 mg  25 mg Oral Q4H PRN    ondansetron (ZOFRAN) injection 4 mg  4 mg IntraVENous Q4H PRN    heparin (porcine) injection 5,000 Units  5,000 Units SubCUTAneous Q8H       Chart and notes reviewed. Data reviewed. I have evaluated and examined the patient.         IMPRESSION:   Patient Active Problem List   Diagnosis Code    Hypertension I11.9    Congenital heart disease Q24.9    Atrial fibrillation I48.91    Osteoarthritis of both knees M17.0    H/O total knee replacement Z96.659    DJD (degenerative joint disease), lumbosacral M51.37    Schizoaffective disorder, chronic condition ([address] F25.8    Unspecified hypothyroidism E03.9    Morbid obesity ([address] E66.01    Esophageal reflux K21.9    Dyspnea R06.00    Dyslipidemia E78.5    Morbid obesity with BMI of 45.0-49.9, adult ([address] E66.01, Z68.42    DDD (degenerative disc disease), lumbar, L4/5 advanced M51.36    Hypoxia R09.02    Fluid overload E87.70    CHF (congestive heart failure) ([address] I50.9 ·         PLAN:/DISCUSSION:   · Jessee Welsh MD  4/19/2017, 5:21 PM

## 2017-04-20 LAB
ANION GAP BLD CALC-SCNC: 7 MMOL/L (ref 3–18)
ARTERIAL PATENCY WRIST A: YES
BASE EXCESS BLD CALC-SCNC: 8 MMOL/L
BDY SITE: ABNORMAL
BUN SERPL-MCNC: 18 MG/DL (ref 7–18)
BUN/CREAT SERPL: 16 (ref 12–20)
CALCIUM SERPL-MCNC: 8.5 MG/DL (ref 8.5–10.1)
CHLORIDE SERPL-SCNC: 101 MMOL/L (ref 100–108)
CO2 SERPL-SCNC: 31 MMOL/L (ref 21–32)
CREAT SERPL-MCNC: 1.13 MG/DL (ref 0.6–1.3)
GAS FLOW.O2 O2 DELIVERY SYS: ABNORMAL L/MIN
GAS FLOW.O2 SETTING OXYMISER: 2 L/M
GLUCOSE SERPL-MCNC: 95 MG/DL (ref 74–99)
HCO3 BLD-SCNC: 34 MMOL/L (ref 22–26)
PCO2 BLD: 62.7 MMHG (ref 35–45)
PH BLD: 7.34 [PH] (ref 7.35–7.45)
PO2 BLD: 80 MMHG (ref 80–100)
POTASSIUM SERPL-SCNC: 3.3 MMOL/L (ref 3.5–5.5)
SAO2 % BLD: 95 % (ref 92–97)
SERVICE CMNT-IMP: ABNORMAL
SODIUM SERPL-SCNC: 139 MMOL/L (ref 136–145)
SPECIMEN TYPE: ABNORMAL
TOTAL RESP. RATE, ITRR: 16

## 2017-04-20 PROCEDURE — 94726 PLETHYSMOGRAPHY LUNG VOLUMES: CPT

## 2017-04-20 PROCEDURE — 74011250636 HC RX REV CODE- 250/636: Performed by: FAMILY MEDICINE

## 2017-04-20 PROCEDURE — 74011250637 HC RX REV CODE- 250/637: Performed by: FAMILY MEDICINE

## 2017-04-20 PROCEDURE — 74011000250 HC RX REV CODE- 250: Performed by: INTERNAL MEDICINE

## 2017-04-20 PROCEDURE — 65660000000 HC RM CCU STEPDOWN

## 2017-04-20 PROCEDURE — 82803 BLOOD GASES ANY COMBINATION: CPT

## 2017-04-20 PROCEDURE — 74011000258 HC RX REV CODE- 258: Performed by: INTERNAL MEDICINE

## 2017-04-20 PROCEDURE — 94010 BREATHING CAPACITY TEST: CPT

## 2017-04-20 PROCEDURE — 80048 BASIC METABOLIC PNL TOTAL CA: CPT | Performed by: FAMILY MEDICINE

## 2017-04-20 PROCEDURE — 74011250637 HC RX REV CODE- 250/637: Performed by: INTERNAL MEDICINE

## 2017-04-20 PROCEDURE — 94660 CPAP INITIATION&MGMT: CPT

## 2017-04-20 PROCEDURE — 74011250636 HC RX REV CODE- 250/636: Performed by: INTERNAL MEDICINE

## 2017-04-20 PROCEDURE — 36600 WITHDRAWAL OF ARTERIAL BLOOD: CPT

## 2017-04-20 PROCEDURE — 94640 AIRWAY INHALATION TREATMENT: CPT

## 2017-04-20 PROCEDURE — 94729 DIFFUSING CAPACITY: CPT

## 2017-04-20 PROCEDURE — 36415 COLL VENOUS BLD VENIPUNCTURE: CPT | Performed by: FAMILY MEDICINE

## 2017-04-20 RX ORDER — IBUPROFEN 400 MG/1
400 TABLET ORAL 3 TIMES DAILY
Status: DISCONTINUED | OUTPATIENT
Start: 2017-04-20 | End: 2017-04-26 | Stop reason: HOSPADM

## 2017-04-20 RX ORDER — IPRATROPIUM BROMIDE AND ALBUTEROL SULFATE 2.5; .5 MG/3ML; MG/3ML
3 SOLUTION RESPIRATORY (INHALATION)
Status: DISCONTINUED | OUTPATIENT
Start: 2017-04-20 | End: 2017-04-21

## 2017-04-20 RX ORDER — POTASSIUM CHLORIDE 20 MEQ/1
40 TABLET, EXTENDED RELEASE ORAL
Status: COMPLETED | OUTPATIENT
Start: 2017-04-20 | End: 2017-04-20

## 2017-04-20 RX ORDER — LISINOPRIL 20 MG/1
20 TABLET ORAL DAILY
Status: DISCONTINUED | OUTPATIENT
Start: 2017-04-21 | End: 2017-04-24

## 2017-04-20 RX ADMIN — Medication 10 ML: at 21:36

## 2017-04-20 RX ADMIN — POTASSIUM CHLORIDE 40 MEQ: 1500 TABLET, EXTENDED RELEASE ORAL at 18:56

## 2017-04-20 RX ADMIN — IBUPROFEN 800 MG: 400 TABLET, FILM COATED ORAL at 08:14

## 2017-04-20 RX ADMIN — HYDROCODONE BITARTRATE AND ACETAMINOPHEN 1 TABLET: 7.5; 325 TABLET ORAL at 05:48

## 2017-04-20 RX ADMIN — LEVOTHYROXINE SODIUM 125 MCG: 125 TABLET ORAL at 08:14

## 2017-04-20 RX ADMIN — FUROSEMIDE 40 MG: 40 TABLET ORAL at 08:20

## 2017-04-20 RX ADMIN — ISOSORBIDE MONONITRATE 30 MG: 30 TABLET, EXTENDED RELEASE ORAL at 08:14

## 2017-04-20 RX ADMIN — LISINOPRIL 20 MG: 20 TABLET ORAL at 16:13

## 2017-04-20 RX ADMIN — TOPIRAMATE 50 MG: 25 TABLET, FILM COATED ORAL at 08:14

## 2017-04-20 RX ADMIN — PAROXETINE HYDROCHLORIDE 20 MG: 20 TABLET, FILM COATED ORAL at 08:14

## 2017-04-20 RX ADMIN — Medication 325 MG: at 08:14

## 2017-04-20 RX ADMIN — IPRATROPIUM BROMIDE AND ALBUTEROL SULFATE 3 ML: .5; 3 SOLUTION RESPIRATORY (INHALATION) at 23:10

## 2017-04-20 RX ADMIN — CEFTRIAXONE SODIUM 1 G: 1 INJECTION, POWDER, FOR SOLUTION INTRAMUSCULAR; INTRAVENOUS at 19:03

## 2017-04-20 RX ADMIN — IPRATROPIUM BROMIDE AND ALBUTEROL SULFATE 3 ML: .5; 3 SOLUTION RESPIRATORY (INHALATION) at 19:44

## 2017-04-20 RX ADMIN — IBUPROFEN 400 MG: 400 TABLET ORAL at 21:23

## 2017-04-20 RX ADMIN — HYDROCODONE BITARTRATE AND ACETAMINOPHEN 1 TABLET: 7.5; 325 TABLET ORAL at 16:14

## 2017-04-20 RX ADMIN — HEPARIN SODIUM 5000 UNITS: 5000 INJECTION, SOLUTION INTRAVENOUS; SUBCUTANEOUS at 16:15

## 2017-04-20 RX ADMIN — ARIPIPRAZOLE 15 MG: 15 TABLET ORAL at 08:14

## 2017-04-20 RX ADMIN — POTASSIUM CHLORIDE 20 MEQ: 1500 TABLET, EXTENDED RELEASE ORAL at 08:14

## 2017-04-20 RX ADMIN — METOPROLOL TARTRATE 50 MG: 50 TABLET ORAL at 08:14

## 2017-04-20 RX ADMIN — POTASSIUM CHLORIDE 20 MEQ: 1500 TABLET, EXTENDED RELEASE ORAL at 16:14

## 2017-04-20 RX ADMIN — SODIUM CHLORIDE 500 MG: 900 INJECTION, SOLUTION INTRAVENOUS at 21:27

## 2017-04-20 RX ADMIN — SIMVASTATIN 40 MG: 40 TABLET, FILM COATED ORAL at 21:23

## 2017-04-20 RX ADMIN — LISINOPRIL 20 MG: 20 TABLET ORAL at 08:14

## 2017-04-20 RX ADMIN — HEPARIN SODIUM 5000 UNITS: 5000 INJECTION, SOLUTION INTRAVENOUS; SUBCUTANEOUS at 01:16

## 2017-04-20 NOTE — PROGRESS NOTES
NUTRITION    BPA/MST Referral       RECOMMENDATIONS / PLAN:     - No nutrition intervention indicated at this time. Will re-screen as appropriate. NUTRITION INTERVENTIONS & DIAGNOSIS:     Nutrition Diagnosis: No nutrition diagnosis at this time. ASSESSMENT:     Subjective/Objective:  Appetite improved, consuming all of meals and tolerating diet. Average po intake adequate to meet patients estimated nutritional needs:   [x] Yes     [] No   [] Unable to determine at this time    Diet: DIET CARDIAC Regular      Food Allergies: NKFA  Current Appetite:   [x] Good     [] Fair     [] Poor     [] Other:  Appetite/meal intake prior to admission:   [] Good     [x] Fair     [] Poor     [] Other:  Feeding Limitations:  [] Swallowing difficulty    [] Chewing difficulty    [] Other:  Current Meal Intake: Patient Vitals for the past 100 hrs:   % Diet Eaten   04/19/17 1850 100 %       BM: 4/19   Skin Integrity: WDL  Edema: none   Pertinent Medications: Reviewed    Recent Labs      04/20/17   0433  04/18/17   2028  04/18/17   1125   NA  139  139  141   K  3.3*  3.0*  3.1*   CL  101  100  102   CO2  31  32  35*   GLU  95  103*  87   BUN  18  16  15   CREA  1.13  1.13  0.97   CA  8.5  8.9  8.6   MG   --    --   2.1   ALB   --    --   3.5   SGOT   --    --   20   ALT   --    --   20       Intake/Output Summary (Last 24 hours) at 04/20/17 1520  Last data filed at 04/20/17 1519   Gross per 24 hour   Intake              720 ml   Output             1400 ml   Net             -680 ml       Anthropometrics:  Ht Readings from Last 1 Encounters:   04/19/17 5' 3\" (1.6 m)     Last 3 Recorded Weights in this Encounter    04/18/17 1029 04/19/17 1528 04/20/17 0400   Weight: 131.5 kg (290 lb) 128.6 kg (283 lb 9.6 oz) 129.3 kg (285 lb)     Body mass index is 50.49 kg/(m^2).       Obese, Class III     Weight History: patient denies change in weight PTA    Weight Metrics 4/20/2017 3/29/2017 1/20/2017 1/4/2017 12/7/2016 11/10/2016 9/30/2016 Weight 285 lb 290 lb 279 lb 280 lb 285 lb 279 lb 271 lb   BMI 50.49 kg/m2 51.37 kg/m2 49.42 kg/m2 49.6 kg/m2 50.49 kg/m2 49.42 kg/m2 48.01 kg/m2        Admitting Diagnosis: Hypoxia  Fluid overload  Dyspnea  Atrial fibrillation (HCC)  CHF (congestive heart failure) (HCC)  Past Medical History:   Diagnosis Date    Atrial fibrillation     CHADS score 1  (-CHF, +HTN, -AGE, -DM, -CVA)    Carpal tunnel syndrome     Chronic pain     Congenital heart disease     presumed pulmonary stenosis s/p repair 1969    Degenerative disc disease     Degenerative joint disease     Dyslipidemia     GERD     Hypertension     Hypothyroid     Morbid obesity     Morbid obesity with BMI of 45.0-49.9, adult ([facility] 2/20/2017    Noncompliance     Schizoaffective disorder     Substance abuse     marijuana, crack cocaine       Education Needs:        [x] None identified  [] Identified - Not appropriate at this time  []  Identified and addressed - refer to education log  Learning Limitations:   [x] None identified  [] Identified    Cultural, Jehovah's witness & ethnic food preferences:  [x] None identified    [] Identified and addressed     ESTIMATED NUTRITION NEEDS:     Calories: 0799-2252 kcal (MSJx1.2-1.3) based on  [] Actual BW      [x] Adjusted BW 71 kg   Protein: 57-71 gm (0.8-1 gm/kg) based on  [] Actual BW      [x] Adjusted BW   Fluid: 1 mL/kcal     MONITORING & EVALUATION:     Nutrition Goal(s):   1. Po intake of meals will meet >75% of patient estimated nutritional needs within the next 7 days.   Outcome:  [] Met/Ongoing    []  Not Met    [x] New/Initial Goal     Monitoring:   [x] Diet tolerance   [x] Meal intake   [] Supplement intake   [] GI symptoms/ability to tolerate po diet   [] Respiratory status   [] Plan of care      Previous Recommendations (for follow-up assessments only):     []   Implemented       []   Not Implemented (RD to address)     [] No Recommendation Made     Discharge Planning: cardiac diet   [x] Participated in care planning, discharge planning, & interdisciplinary rounds as appropriate      Tip Acuna, [address] [place] [address], [address] [place]    Pager: [phone]

## 2017-04-20 NOTE — PROGRESS NOTES
SUBJECTIVE:    Feeling somewhat better. SOB present. Dry cough. No nausea or vomiting. Denies for dizziness. No CP/Abdominal pain. Walks with a rolator. Lives with daughter    OBJECTIVE:    Visit Vitals    /75 (BP 1 Location: Left arm, BP Patient Position: At rest)    Pulse 88    Temp 97.8 °F (36.6 °C)    Resp 18    Ht 5' 3\" (1.6 m)    Wt 129.3 kg (285 lb)    SpO2 98%    Breastfeeding No    BMI 50.49 kg/m2     No JVD  IRIR  CTA bilaterally  ND, NT, BS +  No pedal edema  NAD, Awake    ASSESSMENT:    1. Worsening shortness of breath due to acute on chronic hypoxic and hypercapnic respiratory failure possibly multifactorial  2. Chronic atrial fibrillation not on anticoagulation, rate controlled  3. Pulmonary HTN with history of prior heart surgery for congenital heart disease, Pulmonic stenosis suggested by prior echo. 4. Morbid obesity, with likely OHS  5. Left lung infiltrate likely atelectasis. 6. Hypokalemia likely related to diuretic use  7. History of polysubstance abuse including crack cocaine  8. History of poor compliance to medication and therapy  9.  Psychiatric illness    PLAN:    Replete K  Add nebs  ECHO and cardiology consult ordered  Cont current management  Wean her off of oxygen  Reduce lisinopril and NSAIDs    CMP:   Lab Results   Component Value Date/Time     04/20/2017 04:33 AM    K 3.3 (L) 04/20/2017 04:33 AM     04/20/2017 04:33 AM    CO2 31 04/20/2017 04:33 AM    AGAP 7 04/20/2017 04:33 AM    GLU 95 04/20/2017 04:33 AM    BUN 18 04/20/2017 04:33 AM    CREA 1.13 04/20/2017 04:33 AM    GFRAA 60 (L) 04/20/2017 04:33 AM    GFRNA 49 (L) 04/20/2017 04:33 AM    CA 8.5 04/20/2017 04:33 AM     CBC: No results found for: WBC, HGB, HGBEXT, HCT, HCTEXT, PLT, PLTEXT, HGBEXT, HCTEXT, PLTEXT

## 2017-04-20 NOTE — PROGRESS NOTES
Progress Note  Pulmonary, Critical Care, and Sleep Medicine    Name: Matthew Blackwell MRN: [id number]   : 1960 Hospital: [address] [place] [address] Dr   Date: 2017        IMPRESSION:   · Worsening shortness of breath due to acute on chronic hypoxic and hypercapnic respiratory failure possibly multifactorial, see below. Hypercapnia is compensated. SOB improved overnight on BIPAP  · Chronic atrial fibrillation not on anticoagulation, rate controlled  · Pulmonary HTN with history of prior heart surgery for congenital heart disease, Pulmonic stenosis suggested by prior echo. Suspect worsening right sided pressures based on history, hilar prominence and CT findings  · Morbid obesity, with likely OHS  · Left lung infiltrate without fever or leukocytosis likely atelectasis. History of drug induced pneumonitis however UDS negative on admission   · Hypokalemia likely related to diuretic use  · ANDRADE diagnosed in the 's, does not use CPAP  · History of polysubstance abuse including crack cocaine  · History of poor compliance to medication and therapy  · Psychiatric illness       PLAN:   · Pt encouraged NIV HS  · Will arrange for outpatient sleep studies  · Full PFT's pending both to assess obstruction and likely restriction due to morbid obesity  · Continue LINDSAY, caution with LABA due to tachyarrhythmia concerns  · DVT prophylaxis  · Empiric antibiotics  · Potassium replacement per primary team     Subjective/Interval History:   I have reviewed the flowsheet and previous days notes. Reviewed interval history. Discussed management with nursing staff. Reported less SOB after overnight NIV, no new respiratory complaints registered      ROS:Pertinent items are noted in HPI. Orders reviewed including medications. Changes made if indicated. Telemetry monitor reviewed at the bedside.   Objective:   Vital Signs:    Visit Vitals    /65 (BP 1 Location: Left arm, BP Patient Position: At rest)    Pulse 75    Temp 97.3 °F (36.3 °C)    Resp 18    Ht 5' 3\" (1.6 m)    Wt 129.3 kg (285 lb)    SpO2 95%    Breastfeeding No    BMI 50.49 kg/m2       O2 Device: BIPAP   O2 Flow Rate (L/min): 2.5 l/min   Temp (24hrs), Av.5 °F (36.4 °C), Min:97.3 °F (36.3 °C), Max:97.8 °F (36.6 °C)       Intake/Output:   Last shift:       07 - 1900  In: 480 [P.O.:480]  Out: 800 [Urine:800]  Last 3 shifts: 1901 -  0700  In: 240 [P.O.:240]  Out: 600 [Urine:600]    Intake/Output Summary (Last 24 hours) at 17 1535  Last data filed at 17 1519   Gross per 24 hour   Intake              720 ml   Output             1400 ml   Net             -680 ml        Physical Exam:    General:  Alert, cooperative, in no distress, appears stated age. Head:  Normocephalic, without obvious abnormality, atraumatic. Eyes:  ANicteric, PERRL,   Nose: Nares normal.  Mucosa normal. No drainage or sinus tenderness. Throat: Lips, mucosa, and tongue normal.  NO Thrush   Neck: Supple, symmetrical, trachea midline, no adenopathy, thyroid: no enlargment/tenderness/nodules    Back:   Symmetric    Lungs:   Bilateral auscultation no rales or wheezes   Chest wall:  No tenderness or deformity. NO intercostal retractions   Heart:  Regular rate and rhythm, S1, S2 normal, no murmur, click, rub or gallop. Abdomen:   Soft, non-tender. Bowel sounds normal. No masses,  No organomegaly. NO paradoxical motion   Extremities: normal, atraumatic, no cyanosis or edema. Pulses: 2+ and symmetric all extremities. Skin: Skin color, texture, turgor normal. No rashes or lesions.  NO clubbing   Lymph nodes: Cervical  nodes normal.   Neurologic: Grossly nonfocal      :        Devices:             Drips:    DATA:  Labs:  Recent Labs      17   1125   WBC  6.5  6.0   HGB  11.9*  12.0   HCT  38.8  38.3   PLT  213  199     Recent Labs      17   0433  17   1125   NA  139  139  141   K  3.3*  3.0*  3.1*   CL  101  100 102   CO2  31  32  35*   GLU  95  103*  87   BUN  18  16  15   CREA  1.13  1.13  0.97   CA  8.5  8.9  8.6   MG   --    --   2.1   ALB   --    --   3.5   SGOT   --    --   20   ALT   --    --   20     No results for input(s): PH, PCO2, PO2, HCO3, FIO2 in the last 72 hours. Imaging:  []        I have personally reviewed the patients radiographs and reports  []         []        No change from prior, tubes and lines in adequate position  []        Improved   []        Worsening  High complexity decision making was performed during this consultation and evaluation.     Puma Duarte MD

## 2017-04-20 NOTE — NURSE NAVIGATOR
Cardiac Nurse Navigator Initial Note       Date of  Admission: 4/18/2017 11:43 AM   Hospital Day: 2    Admission type:Emergency      Patient Active Problem List    Diagnosis Date Noted    Hypoxia 04/18/2017    Fluid overload 04/18/2017    CHF (congestive heart failure) ([facility] 04/18/2017    DDD (degenerative disc disease), lumbar, L4/5 advanced 03/29/2017    Morbid obesity with BMI of 45.0-49.9, adult ([facility] 02/20/2017    Dyspnea     Dyslipidemia     Osteoarthritis of both knees 01/14/2014    H/O total knee replacement 01/14/2014    DJD (degenerative joint disease), lumbosacral 01/14/2014    Schizoaffective disorder, chronic condition ([facility] 01/14/2014    Unspecified hypothyroidism 01/14/2014    Morbid obesity ([facility] 01/14/2014    Esophageal reflux 01/14/2014    Hypertension     Congenital heart disease     Atrial fibrillation       Vasquez Lee MD      Past Medical History:   Diagnosis Date    Atrial fibrillation     CHADS score 1  (-CHF, +HTN, -AGE, -DM, -CVA)    Carpal tunnel syndrome     Chronic pain     Congenital heart disease     presumed pulmonary stenosis s/p repair 1969    Degenerative disc disease     Degenerative joint disease     Dyslipidemia     GERD     Hypertension     Hypothyroid     Morbid obesity     Morbid obesity with BMI of 45.0-49.9, adult ([facility] 2/20/2017    Noncompliance     Schizoaffective disorder     Substance abuse     marijuana, crack cocaine      Past Surgical History:   Procedure Laterality Date    HX CARPAL TUNNEL RELEASE      left    HX HYSTERECTOMY      HX KNEE REPLACEMENT      left    HX KNEE REPLACEMENT      right    HX THYROIDECTOMY       Current Facility-Administered Medications   Medication Dose Route Frequency    potassium chloride (K-DUR, KLOR-CON) SR tablet 20 mEq  20 mEq Oral BID    furosemide (LASIX) tablet 40 mg  40 mg Oral DAILY    cefTRIAXone (ROCEPHIN) 1 g in 0.9% sodium chloride (MBP/ADV) 50 mL MBP  1 g IntraVENous Q24H    azithromycin (ZITHROMAX) 500 mg in 0.9% sodium chloride (MBP/ADV) 250 mL adv  500 mg IntraVENous Q24H    simvastatin (ZOCOR) tablet 40 mg  40 mg Oral QHS    ARIPiprazole (ABILIFY) tablet 15 mg  15 mg Oral DAILY    levothyroxine (SYNTHROID) tablet 125 mcg  125 mcg Oral ACB    aspirin delayed-release tablet 325 mg  325 mg Oral DAILY    PARoxetine (PAXIL) tablet 20 mg  20 mg Oral DAILY    lisinopril (PRINIVIL, ZESTRIL) tablet 20 mg  20 mg Oral BID    metoprolol tartrate (LOPRESSOR) tablet 50 mg  50 mg Oral DAILY    isosorbide mononitrate ER (IMDUR) tablet 30 mg  30 mg Oral DAILY    ibuprofen (MOTRIN) tablet 800 mg  800 mg Oral TID    topiramate (TOPAMAX) tablet 50 mg  50 mg Oral DAILY    HYDROcodone-acetaminophen (NORCO) 7.5-325 mg per tablet 1 Tab  1 Tab Oral Q6H PRN    sodium chloride (NS) flush 5-10 mL  5-10 mL IntraVENous Q8H    sodium chloride (NS) flush 5-10 mL  5-10 mL IntraVENous PRN    acetaminophen (TYLENOL) tablet 650 mg  650 mg Oral Q4H PRN    naloxone (NARCAN) injection 0.4 mg  0.4 mg IntraVENous PRN    diphenhydrAMINE (BENADRYL) capsule 25 mg  25 mg Oral Q4H PRN    ondansetron (ZOFRAN) injection 4 mg  4 mg IntraVENous Q4H PRN    heparin (porcine) injection 5,000 Units  5,000 Units SubCUTAneous Q8H        Prior to admission patient presented with SOB, edema    Patient observed lying in bed resting    Cardiographics  Patient on Telemetry: Cardiac/Telemetry Monitor On: Yes   Cardiac Rhythm (only for patients on telemetry): Cardiac Rhythm: Atrial fibrillation    Echocardiogram:  []  None ordered    [] Results Pending       Results:     EF:           Labs:   Recent Results (from the past 24 hour(s))   POC G3    Collection Time: 04/19/17  5:42 PM   Result Value Ref Range    Device: ROOM AIR      FIO2 (POC) 0.21 %    pH (POC) 7.356 7.35 - 7.45      pCO2 (POC) 62.4 (H) 35.0 - 45.0 MMHG    pO2 (POC) 59 (L) 80 - 100 MMHG    HCO3 (POC) 34.9 (H) 22 - 26 MMOL/L    sO2 (POC) 88 (L) 92 - 97 %    Base excess (POC) 8 mmol/L    Allens test (POC) YES      Total resp. rate 16      Site LEFT RADIAL      Specimen type (POC) ARTERIAL      Performed by Gerda Donis [address], BASIC    Collection Time: 04/20/17  4:33 AM   Result Value Ref Range    Sodium 139 136 - 145 mmol/L    Potassium 3.3 (L) 3.5 - 5.5 mmol/L    Chloride 101 100 - 108 mmol/L    CO2 31 21 - 32 mmol/L    Anion gap 7 3.0 - 18 mmol/L    Glucose 95 74 - 99 mg/dL    BUN 18 7.0 - 18 MG/DL    Creatinine 1.13 0.6 - 1.3 MG/DL    BUN/Creatinine ratio 16 12 - 20      GFR est AA 60 (L) >60 ml/min/1.73m2    GFR est non-AA 49 (L) >60 ml/min/1.73m2    Calcium 8.5 8.5 - 10.1 MG/DL   POC G3    Collection Time: 04/20/17  4:48 AM   Result Value Ref Range    Device: NASAL CANNULA      Flow rate (POC) 2.0 L/M    pH (POC) 7.342 (L) 7.35 - 7.45      pCO2 (POC) 62.7 (H) 35.0 - 45.0 MMHG    pO2 (POC) 80 80 - 100 MMHG    HCO3 (POC) 34.0 (H) 22 - 26 MMOL/L    sO2 (POC) 95 92 - 97 %    Base excess (POC) 8 mmol/L    Allens test (POC) YES      Total resp. rate 16      Site RIGHT RADIAL      Specimen type (POC) ARTERIAL      Performed by Heladio Robert      No results found for: HBA1C, [id number]    Code Status: Full Code    Patient would benefit from:  [x] Cardiac Rehab             [x]Home Health   [] PT  [x] Case Management             [x] [facility]                           [] OT                                    [] Nutrition                              []  Apnea Link                [] 6 minute walk                            []  Outpatient sleep study  [] Other:         [] Palliative Care     Educational folder provided and contents reviewed, including importance of daily weighing, watching sodium/fluid restriction, patient given opportunity to ask questions.        Kaylynn Ndiaye RN

## 2017-04-20 NOTE — ROUTINE PROCESS
Bedside and Verbal shift change report given to Mariluz Portillo RN (oncoming nurse) by Celeste Hastings RN   (offgoing nurse). Report included the following information SBAR, Kardex, Intake/Output and MAR.

## 2017-04-20 NOTE — PROGRESS NOTES
conducted an initial consultation and Spiritual Assessment for Heather, who is a 61 y.o.,female. Patients Primary Language is: [place]. According to the patients EMR Mormon Affiliation is: Lupe Lugo.     The reason the Patient came to the hospital is:   Patient Active Problem List    Diagnosis Date Noted    Hypoxia 04/18/2017    Fluid overload 04/18/2017    [facility]congest[facility] failure) ([facility] 04/18/2017    DDD (degenerative disc disease), lumbar, L4/5 advanced 03/29/2017    Morbid obesity with BMI of 45.0-49.9, adult ([facility] 02/20/2017    Dyspnea     Dyslipidemia     Osteoarthritis of both knees 01/14/2014    H/O total knee replacement 01/14/2014    DJD (degenerative joint disease), lumbosacral 01/14/2014    Schizoaffective disorder, chronic condition ([facility] 01/14/2014    Unspecified hypothyroidism 01/14/2014    Morbid obesity ([facility] 01/14/2014    Esophageal reflux 01/14/2014    Hypertension     Congenital heart disease     Atrial fibrillation         The  provided the following Interventions:  Initiated a relationship of care and support. Provided information about Spiritual Care Services. Chart reviewed. The following outcomes where achieved:  Patient expressed gratitude for 's visit. Assessment:  Patient does not have any Hinduism/cultural needs that will affect patients preferences in health care. There are no spiritual or Hinduism issues which require intervention at this time. Plan:  Chaplains will continue to follow and will provide pastoral care on an as needed/requested basis.  recommends bedside caregivers page  on duty if patient shows signs of acute spiritual or emotional distress.     [address]  [phone])

## 2017-04-20 NOTE — ROUTINE PROCESS
Bedside and Verbal shift change report given to [address] [place] [address],[address] (oncoming nurse) by Murray Durbin RN (offgoing nurse). Report included the following information SBAR, Kardex, MAR and Recent Results.     SITUATION:    Code Status: Full Code   Reason for Admission: Hypoxia   Fluid overload   Dyspnea   Atrial fibrillation (HCC)   CHF (congestive heart failure) ([facility]    [facility] day: 2   Problem List:       Hospital Problems  Date Reviewed: 2/20/2017          Codes Class Noted POA    Hypoxia ICD-10-CM: R09.02  ICD-9-CM: 799.02  4/18/2017 Unknown        Fluid overload ICD-10-CM: E87.70  ICD-9-CM: 276.69  4/18/2017 Unknown        CHF (congestive heart failure) ([facility] ICD-10-CM: I50.9  ICD-9-CM: 428.0  4/18/2017 Unknown        Dyspnea ICD-10-CM: R06.00  ICD-9-CM: 786.09  Unknown Unknown        Atrial fibrillation ICD-10-CM: I48.91  ICD-9-CM: 427.31  Unknown Unknown    Overview Signed 1/25/2013 10:46 AM by Estrella Loera MD     CHADS score 1  (-CHF, +HTN, -AGE, -DM, -CVA)                   BACKGROUND:    Past Medical History:   Past Medical History:   Diagnosis Date    Atrial fibrillation     CHADS score 1  (-CHF, +HTN, -AGE, -DM, -CVA)    Carpal tunnel syndrome     Chronic pain     Congenital heart disease     presumed pulmonary stenosis s/p repair 1969    Degenerative disc disease     Degenerative joint disease     Dyslipidemia     GERD     Hypertension     Hypothyroid     Morbid obesity     Morbid obesity with BMI of 45.0-49.9, adult ([facility] 2/20/2017    Noncompliance     Schizoaffective disorder     Substance abuse     marijuana, crack cocaine         Patient taking anticoagulants yes     ASSESSMENT:    Changes in Assessment Throughout Shift:      Patient has Central Line: no Reasons if yes:    Patient has Shaikh Cath: no Reasons if yes:       Last Vitals:     Vitals:    04/20/17 0400 04/20/17 0757 04/20/17 1203 04/20/17 1534   BP: 124/68 134/86 108/65 112/75   Pulse: 84 72 75 88   Resp: 18 18 18 18   Temp: 97.8 °F (36.6 °C) 97.4 °F (36.3 °C) 97.3 °F (36.3 °C) 97.8 °F (36.6 °C)   SpO2: 95% 98% 95% 98%   Weight: 129.3 kg (285 lb)      Height:            IV and DRAINS (will only show if present)   Peripheral IV 04/18/17 Right Forearm-Site Assessment: Clean, dry, & intact     WOUND (if present)   Wound Type:  none   Dressing present Dressing Present : No   Wound Concerns/Notes:  none     PAIN    Pain Assessment    Pain Intensity 1: 6 (04/20/17 1614)    Pain Location 1: Leg    Pain Intervention(s) 1: Medication (see MAR)    Patient Stated Pain Goal: 0  o Interventions for Pain:  See mar  o Intervention effective: no  o Time of last intervention:    o Reassessment Completed: yes      Last 3 Weights:  Last 3 Recorded Weights in this Encounter    04/18/17 1029 04/19/17 1528 04/20/17 0400   Weight: 131.5 kg (290 lb) 128.6 kg (283 lb 9.6 oz) 129.3 kg (285 lb)     Weight change: -2.903 kg (-6 lb 6.4 oz)     INTAKE/OUPUT    Current Shift: 04/20 0701 - 04/20 1900  In: 780 [P.O.:780]  Out: 1400 [Urine:1400]    Last three shifts: 04/18 1901 - 04/20 0700  In: 240 [P.O.:240]  Out: 600 [Urine:600]     LAB RESULTS     Recent Labs      04/18/17 2028 04/18/17   1125   WBC  6.5  6.0   HGB  11.9*  12.0   HCT  38.8  38.3   PLT  213  199        Recent Labs      04/20/17   0433  04/18/17 2028 04/18/17   1125   NA  139  139  141   K  3.3*  3.0*  3.1*   GLU  95  103*  87   BUN  18  16  15   CREA  1.13  1.13  0.97   CA  8.5  8.9  8.6   MG   --    --   2.1       RECOMMENDATIONS AND DISCHARGE PLANNING     1. Pending tests/procedures/ Plan of Care or Other Needs:      2. Discharge plan for patient and Needs/Barriers: home    3. Estimated Discharge Date: pending progress Posted on Whiteboard in Patients Room: yes      4. The patient's care plan was reviewed with the oncoming nurse.        \"HEALS\" SAFETY CHECK      Fall Risk    Total Score: 4    Safety Measures: Safety Measures: Bed/Chair-Wheels locked, Bed in low position, Fall prevention (comment), Side rails X 3    A safety check occurred in the patient's room between off going nurse and oncoming nurse listed above. The safety check included the below items  Area Items   H  High Alert Medications - Verify all high alert medication drips (heparin, PCA, etc.)   E  Equipment - Suction is set up for ALL patients (with danya)  - Red plugs utilized for all equipment (IV pumps, etc.)  - WOWs wiped down at end of shift.  - Room stocked with oxygen, suction, and other unit-specific supplies   A  Alarms - Bed alarm is set for fall risk patients  - Ensure chair alarm is in place and activated if patient is up in a chair   L  Lines - Check IV for any infiltration  - Shaikh bag is empty if patient has a Shaikh   - Tubing and IV bags are labeled   S  Safety   - Room is clean, patient is clean, and equipment is clean. - Hallways are clear from equipment besides carts. - Fall bracelet on for fall risk patients  - Ensure room is clear and free of clutter  - Suction is set up for ALL patients (with danya)  - Hallways are clear from equipment besides carts.    - Isolation precautions followed, supplies available outside room, sign posted     Gerhardt Ales, RN

## 2017-04-21 LAB
ANION GAP BLD CALC-SCNC: 5 MMOL/L (ref 3–18)
ATRIAL RATE: 94 BPM
BASOPHILS # BLD AUTO: 0 K/UL (ref 0–0.1)
BASOPHILS # BLD: 0 % (ref 0–2)
BUN SERPL-MCNC: 16 MG/DL (ref 7–18)
BUN/CREAT SERPL: 14 (ref 12–20)
CALCIUM SERPL-MCNC: 8.2 MG/DL (ref 8.5–10.1)
CALCULATED R AXIS, ECG10: -26 DEGREES
CALCULATED T AXIS, ECG11: -157 DEGREES
CHLORIDE SERPL-SCNC: 100 MMOL/L (ref 100–108)
CO2 SERPL-SCNC: 35 MMOL/L (ref 21–32)
CREAT SERPL-MCNC: 1.14 MG/DL (ref 0.6–1.3)
DIAGNOSIS, 93000: NORMAL
DIFFERENTIAL METHOD BLD: ABNORMAL
EOSINOPHIL # BLD: 0.3 K/UL (ref 0–0.4)
EOSINOPHIL NFR BLD: 6 % (ref 0–5)
ERYTHROCYTE [DISTWIDTH] IN BLOOD BY AUTOMATED COUNT: 15.3 % (ref 11.6–14.5)
GLUCOSE SERPL-MCNC: 92 MG/DL (ref 74–99)
HCT VFR BLD AUTO: 36.8 % (ref 35–45)
HGB BLD-MCNC: 10.7 G/DL (ref 12–16)
LYMPHOCYTES # BLD AUTO: 37 % (ref 21–52)
LYMPHOCYTES # BLD: 1.8 K/UL (ref 0.9–3.6)
MAGNESIUM SERPL-MCNC: 2.3 MG/DL (ref 1.6–2.6)
MCH RBC QN AUTO: 25.5 PG (ref 24–34)
MCHC RBC AUTO-ENTMCNC: 29.1 G/DL (ref 31–37)
MCV RBC AUTO: 87.8 FL (ref 74–97)
MONOCYTES # BLD: 0.3 K/UL (ref 0.05–1.2)
MONOCYTES NFR BLD AUTO: 6 % (ref 3–10)
NEUTS SEG # BLD: 2.5 K/UL (ref 1.8–8)
NEUTS SEG NFR BLD AUTO: 51 % (ref 40–73)
PLATELET # BLD AUTO: 223 K/UL (ref 135–420)
PMV BLD AUTO: 10.1 FL (ref 9.2–11.8)
POTASSIUM SERPL-SCNC: 3.7 MMOL/L (ref 3.5–5.5)
Q-T INTERVAL, ECG07: 424 MS
QRS DURATION, ECG06: 120 MS
QTC CALCULATION (BEZET), ECG08: 495 MS
RBC # BLD AUTO: 4.19 M/UL (ref 4.2–5.3)
SODIUM SERPL-SCNC: 140 MMOL/L (ref 136–145)
VENTRICULAR RATE, ECG03: 82 BPM
WBC # BLD AUTO: 4.9 K/UL (ref 4.6–13.2)

## 2017-04-21 PROCEDURE — 36415 COLL VENOUS BLD VENIPUNCTURE: CPT | Performed by: INTERNAL MEDICINE

## 2017-04-21 PROCEDURE — 74011250636 HC RX REV CODE- 250/636: Performed by: INTERNAL MEDICINE

## 2017-04-21 PROCEDURE — 85025 COMPLETE CBC W/AUTO DIFF WBC: CPT | Performed by: INTERNAL MEDICINE

## 2017-04-21 PROCEDURE — 74011250636 HC RX REV CODE- 250/636: Performed by: FAMILY MEDICINE

## 2017-04-21 PROCEDURE — 65660000000 HC RM CCU STEPDOWN

## 2017-04-21 PROCEDURE — 94660 CPAP INITIATION&MGMT: CPT

## 2017-04-21 PROCEDURE — 74011000250 HC RX REV CODE- 250: Performed by: INTERNAL MEDICINE

## 2017-04-21 PROCEDURE — 94760 N-INVAS EAR/PLS OXIMETRY 1: CPT

## 2017-04-21 PROCEDURE — 74011000258 HC RX REV CODE- 258: Performed by: INTERNAL MEDICINE

## 2017-04-21 PROCEDURE — 77010033678 HC OXYGEN DAILY

## 2017-04-21 PROCEDURE — 80048 BASIC METABOLIC PNL TOTAL CA: CPT | Performed by: INTERNAL MEDICINE

## 2017-04-21 PROCEDURE — 74011250637 HC RX REV CODE- 250/637: Performed by: FAMILY MEDICINE

## 2017-04-21 PROCEDURE — 74011250637 HC RX REV CODE- 250/637: Performed by: INTERNAL MEDICINE

## 2017-04-21 PROCEDURE — 94640 AIRWAY INHALATION TREATMENT: CPT

## 2017-04-21 PROCEDURE — 97535 SELF CARE MNGMENT TRAINING: CPT

## 2017-04-21 PROCEDURE — 83735 ASSAY OF MAGNESIUM: CPT | Performed by: INTERNAL MEDICINE

## 2017-04-21 PROCEDURE — 93306 TTE W/DOPPLER COMPLETE: CPT

## 2017-04-21 PROCEDURE — 93005 ELECTROCARDIOGRAM TRACING: CPT

## 2017-04-21 PROCEDURE — 97166 OT EVAL MOD COMPLEX 45 MIN: CPT

## 2017-04-21 PROCEDURE — 97165 OT EVAL LOW COMPLEX 30 MIN: CPT

## 2017-04-21 RX ORDER — IPRATROPIUM BROMIDE AND ALBUTEROL SULFATE 2.5; .5 MG/3ML; MG/3ML
3 SOLUTION RESPIRATORY (INHALATION)
Status: DISCONTINUED | OUTPATIENT
Start: 2017-04-21 | End: 2017-04-26 | Stop reason: HOSPADM

## 2017-04-21 RX ADMIN — SODIUM CHLORIDE 500 MG: 900 INJECTION, SOLUTION INTRAVENOUS at 20:07

## 2017-04-21 RX ADMIN — HEPARIN SODIUM 5000 UNITS: 5000 INJECTION, SOLUTION INTRAVENOUS; SUBCUTANEOUS at 13:38

## 2017-04-21 RX ADMIN — IPRATROPIUM BROMIDE AND ALBUTEROL SULFATE 3 ML: .5; 3 SOLUTION RESPIRATORY (INHALATION) at 07:48

## 2017-04-21 RX ADMIN — ISOSORBIDE MONONITRATE 30 MG: 30 TABLET, EXTENDED RELEASE ORAL at 08:38

## 2017-04-21 RX ADMIN — HYDROCODONE BITARTRATE AND ACETAMINOPHEN 1 TABLET: 7.5; 325 TABLET ORAL at 01:39

## 2017-04-21 RX ADMIN — POTASSIUM CHLORIDE 20 MEQ: 1500 TABLET, EXTENDED RELEASE ORAL at 08:37

## 2017-04-21 RX ADMIN — HEPARIN SODIUM 5000 UNITS: 5000 INJECTION, SOLUTION INTRAVENOUS; SUBCUTANEOUS at 01:40

## 2017-04-21 RX ADMIN — TOPIRAMATE 50 MG: 25 TABLET, FILM COATED ORAL at 08:38

## 2017-04-21 RX ADMIN — PAROXETINE HYDROCHLORIDE 20 MG: 20 TABLET, FILM COATED ORAL at 08:37

## 2017-04-21 RX ADMIN — Medication 10 ML: at 13:38

## 2017-04-21 RX ADMIN — CEFTRIAXONE SODIUM 1 G: 1 INJECTION, POWDER, FOR SOLUTION INTRAMUSCULAR; INTRAVENOUS at 18:37

## 2017-04-21 RX ADMIN — FUROSEMIDE 40 MG: 40 TABLET ORAL at 08:37

## 2017-04-21 RX ADMIN — IBUPROFEN 400 MG: 400 TABLET ORAL at 21:25

## 2017-04-21 RX ADMIN — LISINOPRIL 20 MG: 20 TABLET ORAL at 08:38

## 2017-04-21 RX ADMIN — LEVOTHYROXINE SODIUM 125 MCG: 125 TABLET ORAL at 08:36

## 2017-04-21 RX ADMIN — IBUPROFEN 400 MG: 400 TABLET ORAL at 08:37

## 2017-04-21 RX ADMIN — POTASSIUM CHLORIDE 20 MEQ: 1500 TABLET, EXTENDED RELEASE ORAL at 18:37

## 2017-04-21 RX ADMIN — METOPROLOL TARTRATE 50 MG: 50 TABLET ORAL at 08:38

## 2017-04-21 RX ADMIN — IPRATROPIUM BROMIDE AND ALBUTEROL SULFATE 3 ML: .5; 3 SOLUTION RESPIRATORY (INHALATION) at 03:35

## 2017-04-21 RX ADMIN — IPRATROPIUM BROMIDE AND ALBUTEROL SULFATE 3 ML: .5; 3 SOLUTION RESPIRATORY (INHALATION) at 12:17

## 2017-04-21 RX ADMIN — ARIPIPRAZOLE 15 MG: 15 TABLET ORAL at 08:37

## 2017-04-21 RX ADMIN — SIMVASTATIN 40 MG: 40 TABLET, FILM COATED ORAL at 21:25

## 2017-04-21 RX ADMIN — Medication 10 ML: at 06:07

## 2017-04-21 RX ADMIN — IBUPROFEN 400 MG: 400 TABLET ORAL at 18:37

## 2017-04-21 RX ADMIN — HEPARIN SODIUM 5000 UNITS: 5000 INJECTION, SOLUTION INTRAVENOUS; SUBCUTANEOUS at 18:40

## 2017-04-21 RX ADMIN — HYDROCODONE BITARTRATE AND ACETAMINOPHEN 1 TABLET: 7.5; 325 TABLET ORAL at 15:04

## 2017-04-21 RX ADMIN — Medication 325 MG: at 08:38

## 2017-04-21 NOTE — ROUTINE PROCESS
1848 Patient to Echo via bed, no distress, 02 at 2L via NC.  0955 Patient quietly resting on rounds, no distress.

## 2017-04-21 NOTE — PROGRESS NOTES
SUBJECTIVE:    Sitting up in chair. No chest or abdominal pain. SOB and cough better. No N/V/D. OBJECTIVE:    Visit Vitals    /70 (BP 1 Location: Left arm, BP Patient Position: Sitting)    Pulse 84    Temp 98.3 °F (36.8 °C)    Resp 20    Ht 5' 3\" (1.6 m)    Wt 135 kg (297 lb 11.2 oz)    SpO2 96%    Breastfeeding No    BMI 52.74 kg/m2     No JVD  IRIR  CTA bilaterally  ND, NT, BS +  No pedal edema  NAD, Awake    ASSESSMENT:    1. Worsening shortness of breath due to acute on chronic hypoxic and hypercapnic respiratory failure possibly multifactorial  2. Chronic atrial fibrillation not on anticoagulation due to # 8, rate controlled. 3. Pulmonary HTN with history of prior heart surgery for congenital heart disease, Pulmonic stenosis suggested by prior echo. 4. Morbid obesity, with likely OHS  5. Left lung infiltrate likely atelectasis. 6. Hypokalemia likely related to diuretic use  7. History of polysubstance abuse including crack cocaine  8. History of poor compliance to medication and therapy  9. Psychiatric illness  10. Chronic diastolic CHF with EF of 12%, compensated.      PLAN:    Continue current management  Spo2: 87% on RA after exertion, continue tapering efforts for oxygen    CMP:   Lab Results   Component Value Date/Time     04/21/2017 03:47 AM    K 3.7 04/21/2017 03:47 AM     04/21/2017 03:47 AM    CO2 35 (H) 04/21/2017 03:47 AM    AGAP 5 04/21/2017 03:47 AM    GLU 92 04/21/2017 03:47 AM    BUN 16 04/21/2017 03:47 AM    CREA 1.14 04/21/2017 03:47 AM    GFRAA 59 (L) 04/21/2017 03:47 AM    GFRNA 49 (L) 04/21/2017 03:47 AM    CA 8.2 (L) 04/21/2017 03:47 AM    MG 2.3 04/21/2017 03:47 AM     CBC:   Lab Results   Component Value Date/Time    WBC 4.9 04/21/2017 03:47 AM    HGB 10.7 (L) 04/21/2017 03:47 AM    HCT 36.8 04/21/2017 03:47 AM     04/21/2017 03:47 AM

## 2017-04-21 NOTE — ROUTINE PROCESS
1130 Bedside report given to Ms Alma More , reviewed SBAR, kardex and meds. Patient asleep on rounds.

## 2017-04-21 NOTE — CONSULTS
[facility] - Consult Note    Consultation request by Maddie Ramsey MD for advice/opinion related to evaluating Hypoxia  Fluid overload  Dyspnea  Atrial fibrillation ([facility]  CHF (congestive heart failure) ([facility]    Date of  Admission: 4/18/2017 11:43 AM   Primary Care Physician:  Jagdeep Sandoval MD     Assessment:     Patient Active Problem List   Diagnosis Code    Hypertension I11.9    Congenital heart disease Q24.9    Atrial fibrillation I48.91    Osteoarthritis of both knees M17.0    H/O total knee replacement Z96.659    DJD (degenerative joint disease), lumbosacral M51.37    Schizoaffective disorder, chronic condition ([facility] F25.8    Unspecified hypothyroidism E03.9    Morbid obesity ([facility] E66.01    Esophageal reflux K21.9    Dyspnea R06.00    Dyslipidemia E78.5    Morbid obesity with BMI of 45.0-49.9, adult ([facility] E66.01, Z68.42    DDD (degenerative disc disease), lumbar, L4/5 advanced M51.36    Hypoxia R09.02    Fluid overload E87.70    CHF (congestive heart failure) ([facility] I50.9       -Acute on chronic respiratory failure requiring bipap multifactorial in setting of below.  -Acute likely on chronic diastolic heart failure. Patient with likely component of diastolic heart failure in setting of noncompliance and HTN. BNP is elevated however CXR and CT do not reveal significant pulmonary edema, suspect patients decompensation is primary pulmonary in nature. Echo 1/2014 with EF 50-55%. -Chronic atrial fibrillation with controlled ventricular response on BB, not on OAC given noncompliance, continue ASA. -Obesity with likely ANDRADE and OHVS.  -Pulmonary HTN with RVSP 50 mmHg on echo 2014.  -Left lung infiltrate likely atelectasis. -Congenital heart disease with repair as a child but specifics unknown, previous echos have revealed normal EF with pulmonic stenosis. -Pulmonary HTN with RVSP 50 mmHg  -Noncompliance. -Polysubstance abuse.  -Psychiatric history.     Previously followed by  [facility]     Plan:     Would continue pulmonary work-up, treatment and support. Would continue PO maintenance lasix to maintain negative fluid balance. Would continue current antihypertensive regimen. Will review echocardiogram once complete. Expressed importance of compliance. Needs continued education. History of Present Illness: This is a 61 y.o. female admitted for Hypoxia  Fluid overload  Dyspnea  Atrial fibrillation (HCC)  CHF (congestive heart failure) ([facility] Patient complains of:  SOB. Patient is a 61year old female who presented to ER with increasing SOB 4/18. Patient had stopped taking her lasix. She also had previous complaints of ELIZABETH and orthopnea. Patient is poor historian and not providing full history at this time. Patient denies CP, palp, syncope. Cardiac risk factors  HTN  Pulmonary HTN  Congential heart disease    Echo 1/2014  SUMMARY:  Left ventricle: Size was normal. Systolic function was at the lower limits  of normal by EF (biplane method of disks). Ejection fraction was estimated  in the range of 50 % to 55 %. No obvious wall motion abnormalities  identified in the views obtained. Wall thickness was mildly increased. Ventricular septum: There was dyssynergic motion. There was systolic and  diastolic flattening. These changes are consistent with RV volume and  pressure overload. Right ventricle: The ventricle was mildly dilated. Systolic function was  reduced. Systolic pressure was moderately increased. Estimated peak  pressure was at least 50 mmHg. Right atrium: The atrium was dilated. Tricuspid valve: There was mild to moderate regurgitation. Pulmonic valve: There was moderate regurgitation. INDICATIONS: Assess left ventricular function. Congestive heart failure.       Review of Symptoms:    Constitutional: negative for fevers and chills  Eyes: negative for visual disturbance  Ears, nose, mouth, throat, and face: negative for nasal congestion  Respiratory: positive for cough or dyspnea on exertion  Cardiovascular: positive for dyspnea, negative for chest pain, palpitations, syncope  Gastrointestinal: negative for vomiting and diarrhea  Genitourinary:negative for dysuria  Hematologic/lymphatic: negative for bleeding  Musculoskeletal:negative for muscle weakness  Neurological: negative for dizziness     Past Medical History:     Past Medical History:   Diagnosis Date    Atrial fibrillation     CHADS score 1  (-CHF, +HTN, -AGE, -DM, -CVA)    Carpal tunnel syndrome     Chronic pain     Congenital heart disease     presumed pulmonary stenosis s/p repair 1969    Degenerative disc disease     Degenerative joint disease     Dyslipidemia     GERD     Hypertension     Hypothyroid     Morbid obesity     Morbid obesity with BMI of 45.0-49.9, adult ([facility] 2/20/2017    Noncompliance     Schizoaffective disorder     Substance abuse     marijuana, crack cocaine         Social History:     Social History     Social History    Marital status: SINGLE     Spouse name: N/A    Number of children: N/A    Years of education: N/A     Social History Main Topics    Smoking status: Never Smoker    Smokeless tobacco: Not on file    Alcohol use No    Drug use: No    Sexual activity: Yes     Birth control/ protection: Surgical     Other Topics Concern    Not on file     Social History Narrative        Family History:     Family History   Problem Relation Age of Onset    Hypertension Mother     Coronary Artery Disease Mother     Coronary Artery Disease Father     Hypertension Father         Medications:      Allergies   Allergen Reactions    Gabapentin Other (comments)     Unsteady, weakness LEs    Tetanus Vaccines And Toxoid Swelling        Current Facility-Administered Medications   Medication Dose Route Frequency    albuterol-ipratropium (DUO-NEB) 2.5 MG-0.5 MG/3 ML  3 mL Nebulization Q4H RT    ibuprofen (MOTRIN) tablet 400 mg  400 mg Oral TID    lisinopril (PRINIVIL, ZESTRIL) tablet 20 mg  20 mg Oral DAILY    potassium chloride (K-DUR, KLOR-CON) SR tablet 20 mEq  20 mEq Oral BID    furosemide (LASIX) tablet 40 mg  40 mg Oral DAILY    cefTRIAXone (ROCEPHIN) 1 g in 0.9% sodium chloride (MBP/ADV) 50 mL MBP  1 g IntraVENous Q24H    azithromycin (ZITHROMAX) 500 mg in 0.9% sodium chloride (MBP/ADV) 250 mL adv  500 mg IntraVENous Q24H    simvastatin (ZOCOR) tablet 40 mg  40 mg Oral QHS    ARIPiprazole (ABILIFY) tablet 15 mg  15 mg Oral DAILY    levothyroxine (SYNTHROID) tablet 125 mcg  125 mcg Oral ACB    aspirin delayed-release tablet 325 mg  325 mg Oral DAILY    PARoxetine (PAXIL) tablet 20 mg  20 mg Oral DAILY    metoprolol tartrate (LOPRESSOR) tablet 50 mg  50 mg Oral DAILY    isosorbide mononitrate ER (IMDUR) tablet 30 mg  30 mg Oral DAILY    topiramate (TOPAMAX) tablet 50 mg  50 mg Oral DAILY    HYDROcodone-acetaminophen (NORCO) 7.5-325 mg per tablet 1 Tab  1 Tab Oral Q6H PRN    sodium chloride (NS) flush 5-10 mL  5-10 mL IntraVENous Q8H    sodium chloride (NS) flush 5-10 mL  5-10 mL IntraVENous PRN    acetaminophen (TYLENOL) tablet 650 mg  650 mg Oral Q4H PRN    naloxone (NARCAN) injection 0.4 mg  0.4 mg IntraVENous PRN    diphenhydrAMINE (BENADRYL) capsule 25 mg  25 mg Oral Q4H PRN    ondansetron (ZOFRAN) injection 4 mg  4 mg IntraVENous Q4H PRN    heparin (porcine) injection 5,000 Units  5,000 Units SubCUTAneous Q8H         Physical Exam:     Visit Vitals    /77 (BP 1 Location: Left arm, BP Patient Position: At rest)    Pulse 81    Temp 97.4 °F (36.3 °C)    Resp 18    Ht 5' 3\" (1.6 m)    Wt 135 kg (297 lb 11.2 oz)    SpO2 97%    Breastfeeding No    BMI 52.74 kg/m2     BP Readings from Last 3 Encounters:   04/21/17 117/77   03/29/17 (!) 147/102   01/20/17 146/78     Pulse Readings from Last 3 Encounters:   04/21/17 81   03/29/17 95   01/20/17 61     Wt Readings from Last 3 Encounters:   04/21/17 135 kg (297 lb 11.2 oz)   03/29/17 131.5 kg (290 lb)   01/20/17 126.6 kg (279 lb)       General:  alert, cooperative, no distress, appears stated age  Neck:  no JVD  Lungs:  diminished breath sounds   Heart:  regular rate and rhythm, S1, S2 normal, no murmur, click, rub or gallop  Abdomen:  abdomen is soft without significant tenderness, masses, organomegaly or guarding  Extremities:  extremities normal, atraumatic, no cyanosis trace edema  Skin: Warm and dry.  no hyperpigmentation, vitiligo, or suspicious lesions  Neuro: alert, oriented x3, affect appropriate  Psych: non focal     Data Review:     Recent Labs      04/21/17   0347  04/18/17 2028 04/18/17   1125   WBC  4.9  6.5  6.0   HGB  10.7*  11.9*  12.0   HCT  36.8  38.8  38.3   PLT  223  213  199     Recent Labs      04/21/17   0347  04/20/17   0433  04/18/17 2028 04/18/17   1125   NA  140  139  139  141   K  3.7  3.3*  3.0*  3.1*   CL  100  101  100  102   CO2  35*  31  32  35*   GLU  92  95  103*  87   BUN  16  18  16  15   CREA  1.14  1.13  1.13  0.97   CA  8.2*  8.5  8.9  8.6   MG  2.3   --    --   2.1   ALB   --    --    --   3.5   SGOT   --    --    --   20   ALT   --    --    --   20       Results for orders placed or performed during the hospital encounter of 04/18/17   EKG, 12 LEAD, INITIAL   Result Value Ref Range    Ventricular Rate 72 BPM    Atrial Rate 91 BPM    QRS Duration 118 ms    Q-T Interval 442 ms    QTC Calculation (Bezet) 483 ms    Calculated R Axis -36 degrees    Calculated T Axis -141 degrees    Diagnosis       Atrial fibrillation with premature ventricular or aberrantly conducted   complexes  Left axis deviation  Incomplete right bundle branch block  Anterior infarct , age undetermined  ST & T wave abnormality, consider inferolateral ischemia  Abnormal ECG  When compared with ECG of 23-SEP-2016 09:41,  No significant change was found  Confirmed by Zeynep Klein MD, Johan Lozada ([id number]) on 4/18/2017 1:02:08 PM     Results for orders placed or performed in visit on 05/12/16   AMB POC EKG ROUTINE W/ 12 LEADS, INTER & REP    Impression    Atrial fibrillation w/ventricular response of 60. Incomplete right BBB. Left axis deviation. T-wave inversions anteriorly & in high lateral leads are somewhat    asymmetric and could suggest ischemia or strain.        All Cardiac Markers in the last 24 hours:  No results found for: CPK, CKMMB, CKMB, RCK3, CKMBT, CKNDX, CKND1, SHANNAN, TROPT, TROIQ, JELANI, TROPT, TNIPOC, BNP, BNPP    Last Lipid:    Lab Results   Component Value Date/Time    Cholesterol, total 168 10/06/2010 12:17 PM    HDL Cholesterol 37 10/06/2010 12:17 PM    LDL, calculated 82.2 10/06/2010 12:17 PM    Triglyceride 244 10/06/2010 12:17 PM    CHOL/HDL Ratio 4.5 10/06/2010 12:17 PM       Signed By: VERONICA Jean     April 21, 2017

## 2017-04-21 NOTE — ROUTINE PROCESS
Received patient in bed, awake, alert and oriented. oxygen on at 2 lpm via nasal cannula No complaints made, will continue to monitor.

## 2017-04-21 NOTE — ROUTINE PROCESS
Bedside and Verbal shift change report given to Jhon Smith RN RN (oncoming nurse) by Marilee Vargas   (offgoing nurse). Report included the following information SBAR, Kardex, Intake/Output and MARNighat

## 2017-04-21 NOTE — NURSE NAVIGATOR
Nurse Navigator Note       NAME: Nelida Homans Ortiz AGE: 61 y.o.   GENDER: female  DATE: 4/21/2017    Date of  Admission: 4/18/2017 11:43 AM     Admission type:Emergency      Villa Dexter MD    Current Facility-Administered Medications   Medication Dose Route Frequency    albuterol-ipratropium (DUO-NEB) 2.5 MG-0.5 MG/3 ML  3 mL Nebulization Q4H RT    ibuprofen (MOTRIN) tablet 400 mg  400 mg Oral TID    lisinopril (PRINIVIL, ZESTRIL) tablet 20 mg  20 mg Oral DAILY    potassium chloride (K-DUR, KLOR-CON) SR tablet 20 mEq  20 mEq Oral BID    furosemide (LASIX) tablet 40 mg  40 mg Oral DAILY    cefTRIAXone (ROCEPHIN) 1 g in 0.9% sodium chloride (MBP/ADV) 50 mL MBP  1 g IntraVENous Q24H    azithromycin (ZITHROMAX) 500 mg in 0.9% sodium chloride (MBP/ADV) 250 mL adv  500 mg IntraVENous Q24H    simvastatin (ZOCOR) tablet 40 mg  40 mg Oral QHS    ARIPiprazole (ABILIFY) tablet 15 mg  15 mg Oral DAILY    levothyroxine (SYNTHROID) tablet 125 mcg  125 mcg Oral ACB    aspirin delayed-release tablet 325 mg  325 mg Oral DAILY    PARoxetine (PAXIL) tablet 20 mg  20 mg Oral DAILY    metoprolol tartrate (LOPRESSOR) tablet 50 mg  50 mg Oral DAILY    isosorbide mononitrate ER (IMDUR) tablet 30 mg  30 mg Oral DAILY    topiramate (TOPAMAX) tablet 50 mg  50 mg Oral DAILY    HYDROcodone-acetaminophen (NORCO) 7.5-325 mg per tablet 1 Tab  1 Tab Oral Q6H PRN    sodium chloride (NS) flush 5-10 mL  5-10 mL IntraVENous Q8H    sodium chloride (NS) flush 5-10 mL  5-10 mL IntraVENous PRN    acetaminophen (TYLENOL) tablet 650 mg  650 mg Oral Q4H PRN    naloxone (NARCAN) injection 0.4 mg  0.4 mg IntraVENous PRN    diphenhydrAMINE (BENADRYL) capsule 25 mg  25 mg Oral Q4H PRN    ondansetron (ZOFRAN) injection 4 mg  4 mg IntraVENous Q4H PRN    heparin (porcine) injection 5,000 Units  5,000 Units SubCUTAneous Q8H        Patients Smoking status: @[facility]([id number]      []  Home O2     Current Oxygen therapy O2 Device: Nasal cannula   O2 Sat (%): 96 % Resp Rate: 20        Received Flu Vaccine for Current Season (usually Sept-March): Not Flu Season                       Discharge Dispostion:  [] Cardiac Rehab              [x] Home Health                                                         [x] [facility]                          [] Outpatient sleep study  [] Other:       Education reinforced, set up heart failure dvd for patient, patient given opportunity to ask questions and reminded Navigator will call post discharge.      Darin Keen RN

## 2017-04-21 NOTE — ROUTINE PROCESS
Bedside and Verbal shift change report given to Santos Nelson, MARCELLA (oncoming nurse) by Anand Toure RN (offgoing nurse). Report included the following information SBAR, Kardex, MAR and Recent Results.

## 2017-04-21 NOTE — PROGRESS NOTES
Problem: Self Care Deficits Care Plan (Adult)  Goal: *Acute Goals and Plan of Care (Insert Text)  Occupational Therapy Goals  Initiated 4/21/2017 within 7 day(s). 1. Patient will perform lower body dressing with supervision/set-up, AE prn.   2. Patient will perform toilet transfers with supervision/set-up. 3. Patient will perform all aspects of toileting with supervision/set-up. 4. Patient will participate in upper extremity therapeutic exercise/activities with supervision/set-up for 8 minutes to increase strength/endurance for ADLs. 5. Patient will utilize energy conservation techniques during functional activities with verbal cues. Outcome: Progressing Towards Goal  OCCUPATIONAL THERAPY EVALUATION     Patient: Marge Salas [de-identified]61 y.o. female)  Date: 4/21/2017  Primary Diagnosis: Hypoxia  Fluid overload  Dyspnea  Atrial fibrillation (HCC)  CHF (congestive heart failure) ([facility]        Precautions:   Fall      ASSESSMENT :  Based on the objective data described below, the patient presents with decreased LB ADLs, decreased functional mobility and muscle weakness, complicated by SOB. Patient unable to reach her feet for self care and will benefit from AE training to increase her independence. Min assist given for functional standing and transfers. Patient will benefit from skilled intervention to address the above impairments.   Patients rehabilitation potential is considered to be Good  Factors which may influence rehabilitation potential include:   [ ]             None noted  [ ]             Mental ability/status  [X]             Medical condition  [ ]             Home/family situation and support systems  [ ]             Safety awareness  [ ]             Pain tolerance/management  [ ]             Other:        PLAN :  Recommendations and Planned Interventions:  [X]               Self Care Training                  [X]        Therapeutic Activities  [X]               Functional Mobility Training    [ ] Cognitive Retraining  [X]               Therapeutic Exercises           [X]        Endurance Activities  [X]               Balance Training                   [ ]        Neuromuscular Re-Education  [ ]               Visual/Perceptual Training     [X]   Home Safety Training  [X]               Patient Education                 [X]        Family Training/Education  [ ]               Other (comment):     Frequency/Duration: Patient will be followed by occupational therapy 1-2 times per day/4-7 days per week to address goals. Discharge Recommendations: Home Health  Further Equipment Recommendations for Discharge: N/A       PATIENT COMPLEXITY      Eval Complexity: History: MEDIUM Complexity : Expanded review of history including physical, cognitive and psychosocial  history ; Examination: LOW Complexity : 1-3 performance deficits relating to physical, cognitive , or psychosocial skils that result in activity limitations and / or participation restrictions ; Decision Making:LOW Complexity : No comorbidities that affect functional and no verbal or physical assistance needed to complete eval tasks  Assessment: Low Complexity       G-CODES:      Self Care  Current  CJ= 20-39%   Goal  CI= 1-19%. The severity rating is based on the Level of Assistance required for Functional Mobility and ADLs. SUBJECTIVE:   Patient stated [facility] grand daughter helps me at home.       OBJECTIVE DATA SUMMARY:       Past Medical History:   Diagnosis Date    Atrial fibrillation       CHADS score 1  (-CHF, +HTN, -AGE, -DM, -CVA)    Carpal tunnel syndrome      Chronic pain      Congenital heart disease       presumed pulmonary stenosis s/p repair 1969    Degenerative disc disease      Degenerative joint disease      Dyslipidemia      GERD      Hypertension      Hypothyroid      Morbid obesity      Morbid obesity with BMI of 45.0-49.9, adult ([facility] 2/20/2017    Noncompliance      Schizoaffective disorder      Substance abuse       marijuana, crack cocaine     Past Surgical History:   Procedure Laterality Date    HX CARPAL TUNNEL RELEASE         left    HX HYSTERECTOMY        HX KNEE REPLACEMENT         left    HX KNEE REPLACEMENT         right    HX THYROIDECTOMY         Prior Level of Function/Home Situation: Pt required min assist with basic self care tasks and used a rollator for functional mobility PTA. Home Situation  Home Environment: Apartment  # Steps to Enter: 16 (PT. states that she lives on the 2nd floor apartment.)  One/Two Story Residence: One story  Interior Rails: None  Lift Chair Available: No  Living Alone: No  Support Systems: Child(jules), Family member(s)  Patient Expects to be Discharged to[de-identified] Apartment  Current DME Used/Available at Home: Cane, quad  Tub or Shower Type: Tub/Shower combination  [X]  Right hand dominant          [ ]  Left hand dominant  Cognitive/Behavioral Status:  Neurologic State: Alert  Orientation Level: Oriented X4  Cognition: Appropriate decision making; Follows commands  Safety/Judgement: Awareness of environment; Fall prevention     Skin: Intact on UEs     Edema: None noted in UEs     Vision/Perceptual:    Acuity: Within Defined Limits    Corrective Lenses: Glasses     Coordination:  Fine Motor Skills-Upper: Left Intact; Right Intact    Gross Motor Skills-Upper: Left Intact; Right Intact     Balance:  Sitting: Intact  Standing: With support     Strength:  Strength: Generally decreased, functional (UEs; 4/5 throughout)     Tone & Sensation:  Sensation: Intact (UEs)     Range of Motion:  AROM: Within functional limits (UEs)     Functional Mobility and Transfers for ADLs:  Bed Mobility:  Supine to Sit:  (Patient up in chair.)  Transfers:  Sit to Stand: Minimum assistance              Toilet Transfer : Contact guard assistance (to [address])     ADL Assessment:  Feeding: Independent     Oral Facial Hygiene/Grooming: Setup     Bathing: Minimum assistance     Upper Body Dressing: Setup Lower Body Dressing: Moderate assistance     Toileting: Minimum assistance     ADL Intervention:  Patient transferred to/from [facility] with min assist for toileting. Min assist also given for hygiene but patient able to manage her hospital gown. Cognitive Retraining  Safety/Judgement: Awareness of environment; Fall prevention     Pain:  Pt reports 8/10 pain or discomfort prior to treatment, in right hip. Pain meds given by nursing. Pt reports 8/10 pain or discomfort post treatment, in right hip. Patient resting in chair at end of session. Activity Tolerance:   Good     Please refer to the flowsheet for vital signs taken during this treatment. After treatment:   [X] Patient left in no apparent distress sitting up in chair  [ ] Patient left in no apparent distress in bed  [X] Call bell left within reach  [ ] Nursing notified  [ ] Caregiver present  [ ] Bed alarm activated      COMMUNICATION/EDUCATION:   [X] Home safety education was provided and the patient/caregiver indicated understanding. [X] Patient/family have participated as able in goal setting and plan of care. [X] Patient/family agree to work toward stated goals and plan of care. [ ] Patient understands intent and goals of therapy, but is neutral about his/her participation. [ ] Patient is unable to participate in goal setting and plan of care.      Thank you for this referral.  Zeynep Coe, MS OTR/L  Time Calculation: 25 mins

## 2017-04-21 NOTE — PROGRESS NOTES
Physical Therapy order received, chart reviewed. Attempted treatment at 0930 however pt refused due to eating breakfast. Re-attempt at 1120, however pt on BSC. Will f/u as schedule permits.     Thank you,  KOBE BustamanteT

## 2017-04-21 NOTE — PROGRESS NOTES
ADULT PROTOCOL: JET AEROSOL ASSESSMENT    Patient  Vicki Lesches     61 y.o.   female     4/21/2017  4:17 PM    Breath Sounds Pre Procedure:  Breath Sounds Bilateral: Clear, Diminished                                            Breath Sounds Post Procedure: Breath Sounds Bilateral: Clear, Diminished                                               Breathing pattern: Pre procedure  Breathing Pattern: Regular          Post procedure  Breathing Pattern: Regular    Cough: Pre procedure  Cough: Non-productive               Post procedure Cough: Non-productive    Heart Rate: Pre procedure Pulse: 78           Post procedure Pulse: 78    Resp Rate: Pre procedure  Respirations: 20           Post procedure          Nebulizer Therapy: Current medications Aerosolized Medications: DuoNeb       Problem List:   Patient Active Problem List   Diagnosis Code    Hypertension I11.9    Congenital heart disease Q24.9    Atrial fibrillation I48.91    Osteoarthritis of both knees M17.0    H/O total knee replacement Z96.659    DJD (degenerative joint disease), lumbosacral M51.37    Schizoaffective disorder, chronic condition ([facility] F25.8    Unspecified hypothyroidism E03.9    Morbid obesity ([facility]) E66.01    Esophageal reflux K21.9    Dyspnea R06.00    Dyslipidemia E78.5    Morbid obesity with BMI of 45.0-49.9, adult ([facility]) E66.01, Z68.42    DDD (degenerative disc disease), lumbar, L4/5 advanced M51.36    Hypoxia R09.02    Fluid overload E87.70    CHF (congestive heart failure) ([facility]) I50.9       Patient alert and cooperative to use MDI: Not applicable    Home Respiratory Therapy Regimen/Frequency:  NO  Medication   Device  Frequency     SEVERITY INDEX:    ITEM 0 1 2 3 4 Score   Respiratory Pattern and or Rate Regular  10-19 Regular  20-24   24-30    30-34 Severe SOB or   Greater than 35 1   Breath Sounds Clear Occasional Wheeze Mild Wheezing Moderate Wheezing  wheezing/Absent breath sounds 0   Shortness of Breath None Dyspnea on Exertion Dyspnea at Rest Moderate Shortness of Breath at Rest Severe Shortness of Breath - Limited Speech 0       Total Score:  1    * Scoring Guidelines  0-4 pts:  PRN-BID   5-7 pts:  BID, TID, QID  8-9 pts:  TID, QID, Q6  10-12 pts:  Q4-Q6  * - Guidelines used with clinical judgement. PRN Treatments can be ordered to supplement scheduled treatments. Regardless of score, frequency should not be less than normal home regimen.     Recommended Order/Frequency:  Change to BID    Comments:          Respiratory Therapist: Abilio Kraft

## 2017-04-21 NOTE — PROGRESS NOTES
Care Management Interventions  PCP Verified by CM: Yes Kristine Verma)  Last Visit to PCP: 03/21/17  Palliative Care Consult (Criteria: CHF and RRAT>21): No (no order, RRAT= 13)  Reason for No Palliative Care Consult: Other (see comment)  Mode of Transport at Discharge: BLS  Transition of Care Consult (CM Consult):  Other (Home helalth versus SNF)  MyChart Signup: No  Discharge Durable Medical Equipment: No (has  rollator, SPC at home-)  Physical Therapy Consult: Yes  Occupational Therapy Consult: Yes  Speech Therapy Consult: No  Current Support Network: Relative's Home (Sabrina Zapien in 2 story apt with 16 steps to bedroom with her daughter, Zahida Crain ( phone [phone])  who is her primary support)  Confirm Follow Up Transport: Family (daughter)  Plan discussed with Pt/Family/Caregiver: Yes (with pt laying in bed with continuous oxygen via nasel cannula)  Freedom of Choice Offered: Yes (list of Home hel;aht agencies  left with pt who wises to review with her daughter and grandaughter)  Discharge Location  Discharge Placement: Home with home health (contingent on PT OT recommendations)       Pt has not required oxygen at home in past and if needed upon discharge will require delivery of portable O2 to hospital for transport home through contracted agency, [facility]

## 2017-04-22 ENCOUNTER — APPOINTMENT (OUTPATIENT)
Dept: CT IMAGING | Age: 57
DRG: 194 | End: 2017-04-22
Attending: INTERNAL MEDICINE
Payer: MEDICAID

## 2017-04-22 LAB
AMMONIA PLAS-SCNC: 53 UMOL/L (ref 11–32)
ANION GAP BLD CALC-SCNC: 4 MMOL/L (ref 3–18)
ARTERIAL PATENCY WRIST A: YES
BASE EXCESS BLD CALC-SCNC: 9 MMOL/L
BDY SITE: ABNORMAL
BODY TEMPERATURE: 98.6
BUN SERPL-MCNC: 15 MG/DL (ref 7–18)
BUN/CREAT SERPL: 14 (ref 12–20)
CALCIUM SERPL-MCNC: 8.5 MG/DL (ref 8.5–10.1)
CHLORIDE SERPL-SCNC: 101 MMOL/L (ref 100–108)
CO2 SERPL-SCNC: 36 MMOL/L (ref 21–32)
CREAT SERPL-MCNC: 1.1 MG/DL (ref 0.6–1.3)
GAS FLOW.O2 O2 DELIVERY SYS: ABNORMAL L/MIN
GAS FLOW.O2 SETTING OXYMISER: 2 L/M
GLUCOSE SERPL-MCNC: 94 MG/DL (ref 74–99)
HCO3 BLD-SCNC: 34.7 MMOL/L (ref 22–26)
HCT VFR BLD AUTO: 38.1 % (ref 35–45)
HGB BLD-MCNC: 10.9 G/DL (ref 12–16)
MAGNESIUM SERPL-MCNC: 2.2 MG/DL (ref 1.6–2.6)
O2/TOTAL GAS SETTING VFR VENT: 24 %
PCO2 BLD: 66.6 MMHG (ref 35–45)
PH BLD: 7.33 [PH] (ref 7.35–7.45)
PO2 BLD: 88 MMHG (ref 80–100)
POTASSIUM SERPL-SCNC: 4.2 MMOL/L (ref 3.5–5.5)
SAO2 % BLD: 96 % (ref 92–97)
SERVICE CMNT-IMP: ABNORMAL
SODIUM SERPL-SCNC: 141 MMOL/L (ref 136–145)
SPECIMEN TYPE: ABNORMAL
TOTAL RESP. RATE, ITRR: 14

## 2017-04-22 PROCEDURE — 74011250636 HC RX REV CODE- 250/636: Performed by: INTERNAL MEDICINE

## 2017-04-22 PROCEDURE — 74011000250 HC RX REV CODE- 250: Performed by: FAMILY MEDICINE

## 2017-04-22 PROCEDURE — 97535 SELF CARE MNGMENT TRAINING: CPT

## 2017-04-22 PROCEDURE — 82140 ASSAY OF AMMONIA: CPT | Performed by: INTERNAL MEDICINE

## 2017-04-22 PROCEDURE — 83735 ASSAY OF MAGNESIUM: CPT | Performed by: INTERNAL MEDICINE

## 2017-04-22 PROCEDURE — 74011250637 HC RX REV CODE- 250/637: Performed by: FAMILY MEDICINE

## 2017-04-22 PROCEDURE — 70450 CT HEAD/BRAIN W/O DYE: CPT

## 2017-04-22 PROCEDURE — 74011250637 HC RX REV CODE- 250/637: Performed by: INTERNAL MEDICINE

## 2017-04-22 PROCEDURE — 36415 COLL VENOUS BLD VENIPUNCTURE: CPT | Performed by: INTERNAL MEDICINE

## 2017-04-22 PROCEDURE — 74011000258 HC RX REV CODE- 258: Performed by: INTERNAL MEDICINE

## 2017-04-22 PROCEDURE — 80048 BASIC METABOLIC PNL TOTAL CA: CPT | Performed by: INTERNAL MEDICINE

## 2017-04-22 PROCEDURE — 65660000000 HC RM CCU STEPDOWN

## 2017-04-22 PROCEDURE — 36600 WITHDRAWAL OF ARTERIAL BLOOD: CPT

## 2017-04-22 PROCEDURE — 74011250636 HC RX REV CODE- 250/636: Performed by: FAMILY MEDICINE

## 2017-04-22 PROCEDURE — 85018 HEMOGLOBIN: CPT | Performed by: INTERNAL MEDICINE

## 2017-04-22 PROCEDURE — 82803 BLOOD GASES ANY COMBINATION: CPT

## 2017-04-22 PROCEDURE — 94640 AIRWAY INHALATION TREATMENT: CPT

## 2017-04-22 RX ORDER — HYDROCODONE BITARTRATE AND ACETAMINOPHEN 5; 325 MG/1; MG/1
1 TABLET ORAL
Status: DISCONTINUED | OUTPATIENT
Start: 2017-04-22 | End: 2017-04-25

## 2017-04-22 RX ORDER — METOPROLOL TARTRATE 25 MG/1
12.5 TABLET, FILM COATED ORAL 2 TIMES DAILY
Status: DISCONTINUED | OUTPATIENT
Start: 2017-04-23 | End: 2017-04-24

## 2017-04-22 RX ORDER — DIPHENHYDRAMINE HCL 25 MG
25 CAPSULE ORAL
Status: DISCONTINUED | OUTPATIENT
Start: 2017-04-22 | End: 2017-04-26 | Stop reason: HOSPADM

## 2017-04-22 RX ADMIN — IBUPROFEN 400 MG: 400 TABLET ORAL at 21:31

## 2017-04-22 RX ADMIN — Medication 325 MG: at 08:46

## 2017-04-22 RX ADMIN — LEVOTHYROXINE SODIUM 125 MCG: 125 TABLET ORAL at 07:30

## 2017-04-22 RX ADMIN — METOPROLOL TARTRATE 50 MG: 50 TABLET ORAL at 08:47

## 2017-04-22 RX ADMIN — HEPARIN SODIUM 5000 UNITS: 5000 INJECTION, SOLUTION INTRAVENOUS; SUBCUTANEOUS at 11:24

## 2017-04-22 RX ADMIN — HEPARIN SODIUM 5000 UNITS: 5000 INJECTION, SOLUTION INTRAVENOUS; SUBCUTANEOUS at 21:23

## 2017-04-22 RX ADMIN — POTASSIUM CHLORIDE 20 MEQ: 1500 TABLET, EXTENDED RELEASE ORAL at 08:47

## 2017-04-22 RX ADMIN — IPRATROPIUM BROMIDE AND ALBUTEROL SULFATE 3 ML: .5; 3 SOLUTION RESPIRATORY (INHALATION) at 21:30

## 2017-04-22 RX ADMIN — ACETAMINOPHEN 650 MG: 325 TABLET, FILM COATED ORAL at 03:42

## 2017-04-22 RX ADMIN — CEFTRIAXONE SODIUM 1 G: 1 INJECTION, POWDER, FOR SOLUTION INTRAMUSCULAR; INTRAVENOUS at 21:11

## 2017-04-22 RX ADMIN — Medication 10 ML: at 02:53

## 2017-04-22 RX ADMIN — ISOSORBIDE MONONITRATE 30 MG: 30 TABLET, EXTENDED RELEASE ORAL at 08:47

## 2017-04-22 RX ADMIN — PAROXETINE HYDROCHLORIDE 20 MG: 20 TABLET, FILM COATED ORAL at 08:46

## 2017-04-22 RX ADMIN — HEPARIN SODIUM 5000 UNITS: 5000 INJECTION, SOLUTION INTRAVENOUS; SUBCUTANEOUS at 02:52

## 2017-04-22 RX ADMIN — IBUPROFEN 400 MG: 400 TABLET ORAL at 17:11

## 2017-04-22 RX ADMIN — Medication 10 ML: at 05:42

## 2017-04-22 RX ADMIN — IBUPROFEN 400 MG: 400 TABLET ORAL at 08:47

## 2017-04-22 RX ADMIN — FUROSEMIDE 40 MG: 40 TABLET ORAL at 08:46

## 2017-04-22 RX ADMIN — Medication 10 ML: at 17:12

## 2017-04-22 RX ADMIN — Medication 10 ML: at 04:11

## 2017-04-22 RX ADMIN — ARIPIPRAZOLE 15 MG: 15 TABLET ORAL at 08:46

## 2017-04-22 RX ADMIN — Medication 10 ML: at 21:33

## 2017-04-22 RX ADMIN — LISINOPRIL 20 MG: 20 TABLET ORAL at 08:46

## 2017-04-22 RX ADMIN — IPRATROPIUM BROMIDE AND ALBUTEROL SULFATE 3 ML: .5; 3 SOLUTION RESPIRATORY (INHALATION) at 09:52

## 2017-04-22 RX ADMIN — HYDROCODONE BITARTRATE AND ACETAMINOPHEN 1 TABLET: 5; 325 TABLET ORAL at 14:53

## 2017-04-22 RX ADMIN — POTASSIUM CHLORIDE 20 MEQ: 1500 TABLET, EXTENDED RELEASE ORAL at 17:10

## 2017-04-22 RX ADMIN — TOPIRAMATE 50 MG: 25 TABLET, FILM COATED ORAL at 08:46

## 2017-04-22 RX ADMIN — SIMVASTATIN 40 MG: 40 TABLET, FILM COATED ORAL at 21:27

## 2017-04-22 NOTE — PROGRESS NOTES
Patient heat rate decreasing to 30/40 when sleeping 50/55 when awake patient denies chest pains, shortness of breath dizziness, or nausea. Spoke with Dr. Odilia Hoffman  and Dr Mark Armendariz  Both Drs have come to check on the patient orders given.  Will continue to monitor

## 2017-04-22 NOTE — ROUTINE PROCESS
Bedside and Verbal shift change report given to [facility] MERCEDES AWAD (oncoming nurse) by Marco Hernández (offgoing nurse). Report included the following information SBAR, Kardex, MAR and Recent Results.     SITUATION:    Code Status: Full Code   Reason for Admission: Hypoxia   Fluid overload   Dyspnea   Atrial fibrillation (HCC)   CHF (congestive heart failure) ([facility]    [facility] day: 3   Problem List:       Hospital Problems  Date Reviewed: 2/20/2017          Codes Class Noted POA    Hypoxia ICD-10-CM: R09.02  ICD-9-CM: 799.02  4/18/2017 Unknown        Fluid overload ICD-10-CM: E87.70  ICD-9-CM: 276.69  4/18/2017 Unknown        CHF (congestive heart failure) ([facility] ICD-10-CM: I50.9  ICD-9-CM: 428.0  4/18/2017 Unknown        Dyspnea ICD-10-CM: R06.00  ICD-9-CM: 786.09  Unknown Unknown        Atrial fibrillation ICD-10-CM: I48.91  ICD-9-CM: 427.31  Unknown Unknown    Overview Signed 1/25/2013 10:46 AM by Kulwinder Long MD     CHADS score 1  (-CHF, +HTN, -AGE, -DM, -CVA)                   BACKGROUND:    Past Medical History:   Past Medical History:   Diagnosis Date    Atrial fibrillation     CHADS score 1  (-CHF, +HTN, -AGE, -DM, -CVA)    Carpal tunnel syndrome     Chronic pain     Congenital heart disease     presumed pulmonary stenosis s/p repair 1969    Degenerative disc disease     Degenerative joint disease     Dyslipidemia     GERD     Hypertension     Hypothyroid     Morbid obesity     Morbid obesity with BMI of 45.0-49.9, adult ([facility] 2/20/2017    Noncompliance     Schizoaffective disorder     Substance abuse     marijuana, crack cocaine         Patient taking anticoagulants yes /HEPARIN    ASSESSMENT:    Changes in Assessment Throughout Shift: NONE     Patient has Central Line: no Reasons if yes: NA   Patient has Shaikh Cath: no Reasons if yes: NA      Last Vitals:     Vitals:    04/21/17 1127 04/21/17 1218 04/21/17 1631 04/21/17 2005   BP: 110/70  135/85 103/63   Pulse: 84  76 68 Resp: 20 20 17   Temp: 98.3 °F (36.8 °C)  98 °F (36.7 °C) 97.2 °F (36.2 °C)   SpO2: 95% 96% 97% 97%   Weight:       Height:            IV and DRAINS (will only show if present)   Peripheral IV 04/18/17 Right Forearm-Site Assessment: Clean, dry, & intact  Peripheral IV 04/21/17 Right Hand-Site Assessment: Clean, dry, & intact     WOUND (if present)   Wound Type:  none   Dressing present Dressing Present : No   Wound Concerns/Notes:  none     PAIN    Pain Assessment    Pain Intensity 1: 0 (04/21/17 2250)    Pain Location 1: Hip    Pain Intervention(s) 1: Medication (see MAR)    Patient Stated Pain Goal: 0  o Interventions for Pain:  YES/MOTRINIntervention effective: YES  o Time of last intervention: SEE MAR   o Reassessment Completed: yes      Last 3 Weights:  Last 3 Recorded Weights in this Encounter    04/19/17 1528 04/20/17 0400 04/21/17 0400   Weight: 128.6 kg (283 lb 9.6 oz) 129.3 kg (285 lb) 135 kg (297 lb 11.2 oz)     Weight change: 6.396 kg (14 lb 1.6 oz)     INTAKE/OUPUT    Current Shift:      Last three shifts: 04/20 0701 - 04/21 1900  In: 1010 [P.O.:960; I.V.:50]  Out: 2300 [Urine:2300]     LAB RESULTS     Recent Labs      04/21/17   0347   WBC  4.9   HGB  10.7*   HCT  36.8   PLT  223        Recent Labs      04/21/17   0347  04/20/17   0433   NA  140  139   K  3.7  3.3*   GLU  92  95   BUN  16  18   CREA  1.14  1.13   CA  8.2*  8.5   MG  2.3   --        RECOMMENDATIONS AND DISCHARGE PLANNING     1. Pending tests/procedures/ Plan of Care or Other Needs: CONTINUE TO MONITOR     2. Discharge plan for patient and Needs/Barriers: HOME    3. Estimated Discharge Date: PENDING Posted on Whiteboard in Patients Room: yes      4. The patient's care plan was reviewed with the oncoming nurse.        \"HEALS\" SAFETY CHECK      Fall Risk    Total Score: 4    Safety Measures: Safety Measures: Bed/Chair-Wheels locked, Bed in low position, Call light within reach    A safety check occurred in the patient's room between off going nurse and oncoming nurse listed above. The safety check included the below items  Area Items   H  High Alert Medications - Verify all high alert medication drips (heparin, PCA, etc.)   E  Equipment - Suction is set up for ALL patients (with danya)  - Red plugs utilized for all equipment (IV pumps, etc.)  - WOWs wiped down at end of shift.  - Room stocked with oxygen, suction, and other unit-specific supplies   A  Alarms - Bed alarm is set for fall risk patients  - Ensure chair alarm is in place and activated if patient is up in a chair   L  Lines - Check IV for any infiltration  - Shaikh bag is empty if patient has a Shaikh   - Tubing and IV bags are labeled   S  Safety   - Room is clean, patient is clean, and equipment is clean. - Hallways are clear from equipment besides carts. - Fall bracelet on for fall risk patients  - Ensure room is clear and free of clutter  - Suction is set up for ALL patients (with danya)  - Hallways are clear from equipment besides carts.    - Isolation precautions followed, supplies available outside room, sign posted     Maki Leal

## 2017-04-22 NOTE — PROGRESS NOTES
[facility] - Progress Note  Admit Date: 4/18/2017    Assessment:     -Acute on chronic respiratory failure requiring bipap multifactorial in setting of below.  -Acute likely on chronic diastolic heart failure. Patient with likely component of diastolic heart failure in setting of noncompliance and HTN. BNP is elevated however CXR and CT do not reveal significant pulmonary edema, suspect patients decompensation is primary pulmonary in nature. Echo 1/2014 with EF 50-55%. -Chronic atrial fibrillation with controlled ventricular response on BB, not on OAC given noncompliance, continue ASA. -Obesity with likely ANDRADE and OHVS.  -Pulmonary HTN with RVSP 50 mmHg on echo 2014.  -Left lung infiltrate likely atelectasis. -Congenital heart disease with repair as a child but specifics unknown, previous echos have revealed normal EF with pulmonic stenosis. -Pulmonary HTN with RVSP 50 mmHg  -Noncompliance. -Polysubstance abuse.  -Psychiatric history.     Previously followed by Dr. Antonio Bryson:     Discussed with Dr. Serafin Valadez, decreasing lopressor to 12.5 BID today. I do not suspect bradycardia as cause for fatigue. Will follow. Subjective:     Sleepy this morning but arousable and oriented. A.fib by telemetry with heart rate down to 30's when asleep but increasing to 60's when awake.     Objective:      Patient Vitals for the past 8 hrs:   Temp Pulse Resp BP SpO2   04/22/17 1147 97.5 °F (36.4 °C) 62 18 98/66 98 %   04/22/17 1140 - 68 - 98/62 -   04/22/17 0744 97.4 °F (36.3 °C) 68 18 139/88 99 %         Patient Vitals for the past 96 hrs:   Weight   04/22/17 0500 135.1 kg (297 lb 12.8 oz)   04/21/17 0400 135 kg (297 lb 11.2 oz)   04/20/17 0400 129.3 kg (285 lb)   04/19/17 1528 128.6 kg (283 lb 9.6 oz)                    Intake/Output Summary (Last 24 hours) at 04/22/17 1229  Last data filed at 04/22/17 1147   Gross per 24 hour   Intake              420 ml   Output             1400 ml   Net -980 ml       Physical Exam:  General:  alert, cooperative, no distress, appears stated age  Neck:  nontender  Lungs:  Decreased anteriorly  Heart:  Distant, irreg irreg, S1, S2 normal, no murmur, click, rub or gallop  Abdomen:  abdomen is soft without significant tenderness, masses, organomegaly or guarding  Extremities:  extremities normal, atraumatic, no cyanosis or edema    Data Review:     Labs: Results:       Chemistry Recent Labs      04/22/17 0307 04/21/17 0347  04/20/17   0433   GLU  94  92  95   NA  141  140  139   K  4.2  3.7  3.3*   CL  101  100  101   CO2  36*  35*  31   BUN  15  16  18   CREA  1.10  1.14  1.13   CA  8.5  8.2*  8.5   MG  2.2  2.3   --    AGAP  4  5  7   BUCR  14  14  16      CBC w/Diff Recent Labs      04/22/17 0307 04/21/17   0347   WBC   --   4.9   RBC   --   4.19*   HGB  10.9*  10.7*   HCT  38.1  36.8   PLT   --   223   GRANS   --   51   LYMPH   --   37   EOS   --   6*      Cardiac Enzymes No results found for: CPK, CKMMB, CKMB, RCK3, CKMBT, CKNDX, CKND1, SHANNAN, TROPT, TROIQ, JELANI, TROPT, TNIPOC, BNP, BNPP   Coagulation No results for input(s): PTP, INR, APTT in the last 72 hours. No lab exists for component: INREXT    Lipid Panel Lab Results   Component Value Date/Time    Cholesterol, total 168 10/06/2010 12:17 PM    HDL Cholesterol 37 10/06/2010 12:17 PM    LDL, calculated 82.2 10/06/2010 12:17 PM    VLDL, calculated 48.8 10/06/2010 12:17 PM    Triglyceride 244 10/06/2010 12:17 PM    CHOL/HDL Ratio 4.5 10/06/2010 12:17 PM      BNP Lab Results   Component Value Date/Time    B-type Natriuretic Peptide 3187 05/06/2016 11:10 AM    B-type Natriuretic Peptide 3075 05/06/2016 11:00 AM      Liver Enzymes No results for input(s): TP, ALB, TBIL, AP, SGOT, GPT in the last 72 hours.     No lab exists for component: DBIL   Digoxin    Thyroid Studies Lab Results   Component Value Date/Time    T4, Total 12.3 01/15/2014 02:01 AM    TSH 1.06 04/18/2017 11:25 AM          Signed By: Dmitri Cruz Dana Blanchard MD     April 22, 2017

## 2017-04-22 NOTE — PROGRESS NOTES
Problem: Self Care Deficits Care Plan (Adult)  Goal: *Acute Goals and Plan of Care (Insert Text)  Occupational Therapy Goals  Initiated 4/21/2017 within 7 day(s). 1. Patient will perform lower body dressing with supervision/set-up, AE prn.   2. Patient will perform toilet transfers with supervision/set-up. 3. Patient will perform all aspects of toileting with supervision/set-up. 4. Patient will participate in upper extremity therapeutic exercise/activities with supervision/set-up for 8 minutes to increase strength/endurance for ADLs. 5. Patient will utilize energy conservation techniques during functional activities with verbal cues. Outcome: Progressing Towards Goal  OCCUPATIONAL THERAPY TREATMENT     Patient: David Jones (13 y.o. female)  Date: 4/22/2017  Diagnosis: Hypoxia  Fluid overload  Dyspnea  Atrial fibrillation (HCC)  CHF (congestive heart failure) ([facility] [address] <principal problem not specified>       Precautions: Fall  Chart, occupational therapy assessment, plan of care, and goals were reviewed. ASSESSMENT:  Pt is pleasant and cooperative, agreeable to LB ADL training this session. Pt maneuvered to sit EOB w/Supervised A for LB dressing. Pt is unable to reach her toes to don socks, educated pt on AE for LB dressing (issued reacher, sock aid), following education pt was able to doff/don socks w/Supervised A, required CGA to simulate donning underwear (for std aspects). Pt then performed txfr to [facility] w/CGA and VCs for safe hands placement. Pt returned to [facility] w/CGA for BLE mngm. Pt educated on EC techniques and on DMS equipment for bathroom at home to ensure safety with functional mobility and ADLs. Pt educated on the importance of calling for help when needed to get up for any reason to ensure safety and prevent falls. Pt verbalized understanding.   Progression toward goals:  [X]          Improving appropriately and progressing toward goals  [ ]          Improving slowly and progressing toward goals  [ ]          Not making progress toward goals and plan of care will be adjusted       PLAN:  Patient continues to benefit from skilled intervention to address the above impairments. Continue treatment per established plan of care. Discharge Recommendations:  Home Health  Further Equipment Recommendations for Discharge:  bedside commode and shower chair      G-CODES:      Self Care  Current  CJ= 20-39%. The severity rating is based on the Level of Assistance required for Functional Mobility and ADLs. SUBJECTIVE:   Patient stated I am not sure about the idea of having chair in my bathroom.  Pt required education on bathroom DME and importance of it to prevent falls      OBJECTIVE DATA SUMMARY:   Cognitive/Behavioral Status:  Neurologic State: Alert  Orientation Level: Oriented X4  Cognition: Appropriate for age attention/concentration, Appropriate decision making, Appropriate safety awareness, Follows commands  Safety/Judgement: Awareness of environment, Fall prevention     Functional Mobility and Transfers for ADLs:              Bed Mobility:     Supine to Sit: Supervision  Sit to Supine: Contact guard assistance  Scooting: Stand-by asssistance              Transfers:  Sit to Stand: Contact guard assistance              Toilet Transfer : Contact guard assistance (to [facility])               Balance:  Sitting: Intact  Standing: Impaired; With support  Standing - Static: Good  Standing - Dynamic : Fair     ADL Intervention:   Grooming  Grooming Assistance: Supervision/set up (seated EOB)  Lower Body Dressing Assistance  Underpants: Contact guard assistance (for std aspects)  Socks: Supervision/set-up  Adaptive Equipment Used: Reacher;Sock aid  Pain:  Pt reports 0/10 pain or discomfort prior to treatment. Pt reports 0/10 pain or discomfort post treatment. Activity Tolerance:    Fair     Please refer to the flowsheet for vital signs taken during this treatment.   After treatment:   [ ]  Patient left in no apparent distress sitting up in chair  [X]  Patient left in no apparent distress in bed  [X]  Call bell left within reach  [X]  Nursing notified  [ ]  Caregiver present  [ ]  Bed alarm activated     Oral Garcia, GOLD/L  Time Calculation: 24 mins

## 2017-04-22 NOTE — PROGRESS NOTES
SUBJECTIVE:    Patient drops HR in 40s when sleepy per nursing. I talked to her - she knows where she is, her home address,  and [place] president's name in presence of CNA. Denies for headaches or dizziness. Feeling sleepy. No chest or abdominal pain. No N/V/D. OBJECTIVE:    Visit Vitals    /88 (BP 1 Location: Right arm, BP Patient Position: At rest)    Pulse 68    Temp 97.4 °F (36.3 °C)    Resp 18    Ht 5' 3\" (1.6 m)    Wt 135.1 kg (297 lb 12.8 oz)    SpO2 99%    Breastfeeding No    BMI 52.75 kg/m2     HEENT:  No pallor or icterus  Neck: No JVD  CVS: IRIR  RS: CTA bilaterally. No wheezes  GI: ND, NT, BS +  Extremities: No pedal edema  General: NAD, Awake. Follows commands  CNS: Moves both UEs/LEs    ASSESSMENT:    1. Shortness of breath due to acute on chronic hypoxic and hypercapnic respiratory failure possibly multifactorial  2. Chronic atrial fibrillation not on anticoagulation due to # 8, rate controlled. 3. Pulmonary HTN with history of prior heart surgery for congenital heart disease, Pulmonic stenosis suggested by prior echo. 4. Morbid obesity with likely OHS  5. Left lung infiltrate likely atelectasis. 6. Hypokalemia likely related to diuretic use  7. History of polysubstance abuse including crack cocaine  8. History of poor compliance to medication and therapy  9. Psychiatric illness  10. Chronic diastolic CHF with EF of 35%, compensated.    11. Bradycardia and reportedly confusion    PLAN:    Continue current management  CT head and ABG ordered  Talked to dr. Kirsten Ying- will reduce BB with holding parameters  Cont antibiotic  Talked to nursing     --> total time greater than 35 minutes    CMP:   Lab Results   Component Value Date/Time     2017 03:07 AM    K 4.2 2017 03:07 AM     2017 03:07 AM    CO2 36 (H) 2017 03:07 AM    AGAP 4 2017 03:07 AM    GLU 94 2017 03:07 AM    BUN 15 2017 03:07 AM    CREA 1.10 2017 03:07 AM GFRAA >60 04/22/2017 03:07 AM    GFRNA 51 (L) 04/22/2017 03:07 AM    CA 8.5 04/22/2017 03:07 AM    MG 2.2 04/22/2017 03:07 AM     CBC:   Lab Results   Component Value Date/Time    HGB 10.9 (L) 04/22/2017 03:07 AM    HCT 38.1 04/22/2017 03:07 AM

## 2017-04-22 NOTE — ROUTINE PROCESS
Bedside and Verbal shift change report given to Abe Larry RN (oncoming nurse) by Jonah Pittman (offgoing nurse). Report included the following information SBAR, Kardex, MAR and Recent Results.     SITUATION:    Code Status: Full Code  Reason for Admission: Hypoxia  Fluid overload  Dyspnea  Atrial fibrillation (HCC)   CHF (congestive heart failure) ([facility] [address]    [facility] day: 4   Problem List:       Hospital Problems  Date Reviewed: 2/20/2017          Codes Class Noted POA    Hypoxia ICD-10-CM: R09.02  ICD-9-CM: 799.02  4/18/2017 Unknown        Fluid overload ICD-10-CM: E87.70  ICD-9-CM: 276.69  4/18/2017 Unknown        CHF (congestive heart failure) ([facility] [address] ICD-10-CM: I50.9  ICD-9-CM: 428.0  4/18/2017 Unknown        Dyspnea ICD-10-CM: R06.00  ICD-9-CM: 786.09  Unknown Unknown        Atrial fibrillation ICD-10-CM: I48.91  ICD-9-CM: 427.31  Unknown Unknown    Overview Signed 1/25/2013 10:46 AM by Rajesh Cobb MD     CHADS score 1  (-CHF, +HTN, -AGE, -DM, -CVA)                   BACKGROUND:    Past Medical History:   Past Medical History:   Diagnosis Date    Atrial fibrillation     CHADS score 1  (-CHF, +HTN, -AGE, -DM, -CVA)    Carpal tunnel syndrome     Chronic pain     Congenital heart disease     presumed pulmonary stenosis s/p repair 1969    Degenerative disc disease     Degenerative joint disease     Dyslipidemia     GERD     Hypertension     Hypothyroid     Morbid obesity     Morbid obesity with BMI of 45.0-49.9, adult ([facility] [address] 2/20/2017    Noncompliance     Schizoaffective disorder     Substance abuse     marijuana, crack cocaine         Patient taking anticoagulants yes /HEPARIN    ASSESSMENT:    Changes in Assessment Throughout Shift: NONE     Patient has Central Line: no Reasons if yes: NA   Patient has Shaikh Cath: no Reasons if yes: NA      Last Vitals:     Vitals:    04/21/17 1218 04/21/17 1631 04/21/17 2005 04/21/17 2300   BP:  135/85 103/63 (!) 151/105   Pulse:  76 68 68   Resp:  20 17 23 Temp:  98 °F (36.7 °C) 97.2 °F (36.2 °C) 96.8 °F (36 °C)   SpO2: 96% 97% 97% 94%   Weight:       Height:            IV and DRAINS (will only show if present)   Peripheral IV 04/18/17 Right Forearm-Site Assessment: Clean, dry, & intact  Peripheral IV 04/21/17 Right Hand-Site Assessment: Clean, dry, & intact     WOUND (if present)   Wound Type:  none   Dressing present Dressing Present : No   Wound Concerns/Notes:  none     PAIN    Pain Assessment    Pain Intensity 1: 7 (04/22/17 0418)    Pain Location 1: Hip    Pain Intervention(s) 1: Medication (see MAR)    Patient Stated Pain Goal: 3  o Interventions for Pain:  YES/MOTRINIntervention effective: YES  o Time of last intervention: SEE MAR   o Reassessment Completed: yes      Last 3 Weights:  Last 3 Recorded Weights in this Encounter    04/19/17 1528 04/20/17 0400 04/21/17 0400   Weight: 128.6 kg (283 lb 9.6 oz) 129.3 kg (285 lb) 135 kg (297 lb 11.2 oz)     Weight change:      INTAKE/OUPUT    Current Shift: 04/21 1901 - 04/22 0700  In: -   Out: 200 [Urine:200]    Last three shifts: 04/20 0701 - 04/21 1900  In: 1010 [P.O.:960; I.V.:50]  Out: 2300 [Urine:2300]     LAB RESULTS     Recent Labs      04/22/17   0307  04/21/17   0347   WBC   --   4.9   HGB  10.9*  10.7*   HCT  38.1  36.8   PLT   --   223        Recent Labs      04/22/17   0307  04/21/17   0347  04/20/17   0433   NA  141  140  139   K  4.2  3.7  3.3*   GLU  94  92  95   BUN  15  16  18   CREA  1.10  1.14  1.13   CA  8.5  8.2*  8.5   MG  2.2  2.3   --        RECOMMENDATIONS AND DISCHARGE PLANNING     1. Pending tests/procedures/ Plan of Care or Other Needs: CONTINUE TO MONITOR     2. Discharge plan for patient and Needs/Barriers: HOME    3. Estimated Discharge Date: PENDING Posted on Whiteboard in Patients Room: yes      4. The patient's care plan was reviewed with the oncoming nurse.        \"HEALS\" SAFETY CHECK      Fall Risk    Total Score: 3    Safety Measures: Safety Measures: Bed/Chair-Wheels locked, Bed in low position, Call light within reach, Fall prevention (comment), Side rails X2    A safety check occurred in the patient's room between off going nurse and oncoming nurse listed above. The safety check included the below items  Area Items   H  High Alert Medications - Verify all high alert medication drips (heparin, PCA, etc.)   E  Equipment - Suction is set up for ALL patients (with danya)  - Red plugs utilized for all equipment (IV pumps, etc.)  - WOWs wiped down at end of shift.  - Room stocked with oxygen, suction, and other unit-specific supplies   A  Alarms - Bed alarm is set for fall risk patients  - Ensure chair alarm is in place and activated if patient is up in a chair   L  Lines - Check IV for any infiltration  - Shaikh bag is empty if patient has a Shaikh   - Tubing and IV bags are labeled   S  Safety   - Room is clean, patient is clean, and equipment is clean. - Hallways are clear from equipment besides carts. - Fall bracelet on for fall risk patients  - Ensure room is clear and free of clutter  - Suction is set up for ALL patients (with danya)  - Hallways are clear from equipment besides carts.    - Isolation precautions followed, supplies available outside room, sign posted     Jessica Pleasant

## 2017-04-22 NOTE — PROGRESS NOTES
Progress Note  Pulmonary, Critical Care, and Sleep Medicine    Name: Jaimie Gao MRN: [id number]   : 1960 Hospital: [facility]   Date: 2017        IMPRESSION:   · Worsening shortness of breath due to acute on chronic hypoxic and hypercapnic respiratory failure possibly multifactorial, see below. Hypercapnia is compensated. SOB improved overnight on BIPAP  · Chronic atrial fibrillation not on anticoagulation, rate controlled  · Pulmonary HTN with history of prior heart surgery for congenital heart disease, Pulmonic stenosis suggested by prior echo. Suspect worsening right sided pressures based on history, hilar prominence and CT findings. Unfortunately current echo could not estimate RVSP  · Morbid obesity,  likely OHS  · Bradycardia likely associated with OSAS  · Left lung infiltrate without fever or leukocytosis likely atelectasis. History of drug induced pneumonitis however UDS negative on admission   · Hypokalemia likely related to diuretic use  · ANDRADE diagnosed in the 's, noncompliant with CPAP  · History of polysubstance abuse including crack cocaine may have contributed to [facility]  · History of poor compliance to medication    · Psychiatric illness       PLAN:   · Pt encouraged NIV HS which she now agrees to  · Will arrange for outpatient sleep studies  · Full PFT's pending, both to assess obstruction and likely restriction due to morbid obesity  · Continue LINDSAY, caution with LABA due to tachyarrhythmia concerns  · DVT prophylaxis  · Empiric antibiotics for possible CAP seen on CT scan  · Potassium replacement resolved hypokalemia     Subjective/Interval History:   I have reviewed the flowsheet and previous days notes. Reviewed interval history. Discussed management with nursing staff. Reported less SOB after overnight NIV, no new respiratory complaints registered. Nurse noted bradycardia during sleep down to 30/40      ROS:Pertinent items are noted in HPI.   Orders reviewed including medications. Changes made if indicated. Telemetry monitor reviewed at the bedside. Objective:   Vital Signs:    Visit Vitals    BP 98/66 (BP 1 Location: Left arm, BP Patient Position: At rest)    Pulse 62    Temp 97.5 °F (36.4 °C)    Resp 18    Ht 5' 3\" (1.6 m)    Wt 135.1 kg (297 lb 12.8 oz)    SpO2 98%    Breastfeeding No    BMI 52.75 kg/m2       O2 Device: Nasal cannula   O2 Flow Rate (L/min): 2 l/min   Temp (24hrs), Av.4 °F (36.3 °C), Min:96.8 °F (36 °C), Max:98 °F (36.7 °C)       Intake/Output:   Last shift:       07 -  1900  In: 480 [P.O.:480]  Out: 1400 [Urine:1400]  Last 3 shifts:  190 -  0700  In: 230 [P.O.:180; I.V.:50]  Out: 1100 [Urine:1100]    Intake/Output Summary (Last 24 hours) at 17 1455  Last data filed at 17 1426   Gross per 24 hour   Intake              660 ml   Output             1900 ml   Net            -1240 ml        Physical Exam:    General:  Alert, cooperative, in no distress, appears stated age. Obese    Head:  Normocephalic, without obvious abnormality, atraumatic. Eyes:  ANicteric, PERRL,   Nose: Nares normal. No drainage or sinus tenderness. Throat: Lips, mucosa, and tongue normal.  NO Thrush   Neck: Supple, symmetrical, trachea midline, no adenopathy, thyroid: no enlargment/tenderness/nodules    Back:   Symmetric    Lungs:   Bilateral auscultation no rales or wheezes   Chest wall:  No tenderness or deformity. NO intercostal retractions   Heart:  Regular rate and rhythm, S1, S2 normal, no murmur, click, rub or gallop. Abdomen:   Soft, non-tender. Bowel sounds normal. No masses,  No organomegaly. NO paradoxical motion   Extremities: normal, atraumatic, no cyanosis or edema. Pulses: 2+ and symmetric all extremities. Skin: Skin color, texture, turgor normal. No rashes or lesions.  NO clubbing   Lymph nodes: Cervical  nodes normal.   Neurologic: Grossly nonfocal      :        Devices: Drips: DATA:  Labs:  Recent Labs      04/22/17   0307  04/21/17 0347   WBC   --   4.9   HGB  10.9*  10.7*   HCT  38.1  36.8   PLT   --   223     Recent Labs      04/22/17 0307 04/21/17 0347 04/20/17   0433   NA  141  140  139   K  4.2  3.7  3.3*   CL  101  100  101   CO2  36*  35*  31   GLU  94  92  95   BUN  15  16  18   CREA  1.10  1.14  1.13   CA  8.5  8.2*  8.5   MG  2.2  2.3   --      No results for input(s): PH, PCO2, PO2, HCO3, FIO2 in the last 72 hours. Imaging:  []        I have personally reviewed the patients radiographs and reports  []         []        No change from prior, tubes and lines in adequate position  []        Improved   []        Worsening  High complexity decision making was performed during this consultation and evaluation.     Melanie Gaston MD

## 2017-04-23 LAB
ALBUMIN SERPL BCP-MCNC: 3.2 G/DL (ref 3.4–5)
ALBUMIN/GLOB SERPL: 0.9 {RATIO} (ref 0.8–1.7)
ALP SERPL-CCNC: 89 U/L (ref 45–117)
ALT SERPL-CCNC: 21 U/L (ref 13–56)
ANION GAP BLD CALC-SCNC: 4 MMOL/L (ref 3–18)
AST SERPL W P-5'-P-CCNC: 15 U/L (ref 15–37)
BASOPHILS # BLD AUTO: 0 K/UL (ref 0–0.1)
BASOPHILS # BLD: 0 % (ref 0–2)
BILIRUB SERPL-MCNC: 0.3 MG/DL (ref 0.2–1)
BUN SERPL-MCNC: 15 MG/DL (ref 7–18)
BUN/CREAT SERPL: 13 (ref 12–20)
CALCIUM SERPL-MCNC: 8.9 MG/DL (ref 8.5–10.1)
CHLORIDE SERPL-SCNC: 102 MMOL/L (ref 100–108)
CO2 SERPL-SCNC: 34 MMOL/L (ref 21–32)
CREAT SERPL-MCNC: 1.18 MG/DL (ref 0.6–1.3)
DIFFERENTIAL METHOD BLD: ABNORMAL
EOSINOPHIL # BLD: 0.2 K/UL (ref 0–0.4)
EOSINOPHIL NFR BLD: 5 % (ref 0–5)
ERYTHROCYTE [DISTWIDTH] IN BLOOD BY AUTOMATED COUNT: 15.1 % (ref 11.6–14.5)
GLOBULIN SER CALC-MCNC: 3.6 G/DL (ref 2–4)
GLUCOSE BLD STRIP.AUTO-MCNC: 108 MG/DL (ref 70–110)
GLUCOSE BLD STRIP.AUTO-MCNC: 114 MG/DL (ref 70–110)
GLUCOSE SERPL-MCNC: 123 MG/DL (ref 74–99)
HCT VFR BLD AUTO: 35 % (ref 35–45)
HGB BLD-MCNC: 10.2 G/DL (ref 12–16)
LYMPHOCYTES # BLD AUTO: 28 % (ref 21–52)
LYMPHOCYTES # BLD: 1.1 K/UL (ref 0.9–3.6)
MCH RBC QN AUTO: 25.8 PG (ref 24–34)
MCHC RBC AUTO-ENTMCNC: 29.1 G/DL (ref 31–37)
MCV RBC AUTO: 88.6 FL (ref 74–97)
MONOCYTES # BLD: 0.4 K/UL (ref 0.05–1.2)
MONOCYTES NFR BLD AUTO: 9 % (ref 3–10)
NEUTS SEG # BLD: 2.3 K/UL (ref 1.8–8)
NEUTS SEG NFR BLD AUTO: 58 % (ref 40–73)
PLATELET # BLD AUTO: 197 K/UL (ref 135–420)
PMV BLD AUTO: 10.3 FL (ref 9.2–11.8)
POTASSIUM SERPL-SCNC: 4.2 MMOL/L (ref 3.5–5.5)
PROT SERPL-MCNC: 6.8 G/DL (ref 6.4–8.2)
RBC # BLD AUTO: 3.95 M/UL (ref 4.2–5.3)
SODIUM SERPL-SCNC: 140 MMOL/L (ref 136–145)
WBC # BLD AUTO: 4 K/UL (ref 4.6–13.2)

## 2017-04-23 PROCEDURE — 74011000250 HC RX REV CODE- 250: Performed by: FAMILY MEDICINE

## 2017-04-23 PROCEDURE — 82962 GLUCOSE BLOOD TEST: CPT

## 2017-04-23 PROCEDURE — 74011000258 HC RX REV CODE- 258: Performed by: INTERNAL MEDICINE

## 2017-04-23 PROCEDURE — 80053 COMPREHEN METABOLIC PANEL: CPT | Performed by: INTERNAL MEDICINE

## 2017-04-23 PROCEDURE — 74011250637 HC RX REV CODE- 250/637: Performed by: INTERNAL MEDICINE

## 2017-04-23 PROCEDURE — 74011250636 HC RX REV CODE- 250/636: Performed by: FAMILY MEDICINE

## 2017-04-23 PROCEDURE — 94640 AIRWAY INHALATION TREATMENT: CPT

## 2017-04-23 PROCEDURE — 77010033678 HC OXYGEN DAILY

## 2017-04-23 PROCEDURE — 85025 COMPLETE CBC W/AUTO DIFF WBC: CPT | Performed by: INTERNAL MEDICINE

## 2017-04-23 PROCEDURE — 74011250637 HC RX REV CODE- 250/637: Performed by: FAMILY MEDICINE

## 2017-04-23 PROCEDURE — 97530 THERAPEUTIC ACTIVITIES: CPT

## 2017-04-23 PROCEDURE — 97162 PT EVAL MOD COMPLEX 30 MIN: CPT

## 2017-04-23 PROCEDURE — 65660000000 HC RM CCU STEPDOWN

## 2017-04-23 PROCEDURE — 74011250636 HC RX REV CODE- 250/636: Performed by: INTERNAL MEDICINE

## 2017-04-23 PROCEDURE — 36415 COLL VENOUS BLD VENIPUNCTURE: CPT | Performed by: INTERNAL MEDICINE

## 2017-04-23 RX ADMIN — LEVOTHYROXINE SODIUM 125 MCG: 125 TABLET ORAL at 08:14

## 2017-04-23 RX ADMIN — LACTULOSE 20 G: 20 SOLUTION ORAL at 12:11

## 2017-04-23 RX ADMIN — IBUPROFEN 400 MG: 400 TABLET ORAL at 08:15

## 2017-04-23 RX ADMIN — HYDROCODONE BITARTRATE AND ACETAMINOPHEN 1 TABLET: 5; 325 TABLET ORAL at 06:00

## 2017-04-23 RX ADMIN — LACTULOSE 20 G: 20 SOLUTION ORAL at 17:52

## 2017-04-23 RX ADMIN — HEPARIN SODIUM 5000 UNITS: 5000 INJECTION, SOLUTION INTRAVENOUS; SUBCUTANEOUS at 10:05

## 2017-04-23 RX ADMIN — Medication 325 MG: at 08:14

## 2017-04-23 RX ADMIN — IBUPROFEN 400 MG: 400 TABLET ORAL at 21:12

## 2017-04-23 RX ADMIN — HYDROCODONE BITARTRATE AND ACETAMINOPHEN 1 TABLET: 5; 325 TABLET ORAL at 17:55

## 2017-04-23 RX ADMIN — CEFTRIAXONE SODIUM 1 G: 1 INJECTION, POWDER, FOR SOLUTION INTRAMUSCULAR; INTRAVENOUS at 20:03

## 2017-04-23 RX ADMIN — METOPROLOL TARTRATE 12.5 MG: 25 TABLET ORAL at 17:50

## 2017-04-23 RX ADMIN — ARIPIPRAZOLE 15 MG: 15 TABLET ORAL at 08:14

## 2017-04-23 RX ADMIN — IPRATROPIUM BROMIDE AND ALBUTEROL SULFATE 3 ML: .5; 3 SOLUTION RESPIRATORY (INHALATION) at 21:00

## 2017-04-23 RX ADMIN — Medication 10 ML: at 17:57

## 2017-04-23 RX ADMIN — FUROSEMIDE 40 MG: 40 TABLET ORAL at 08:14

## 2017-04-23 RX ADMIN — SIMVASTATIN 40 MG: 40 TABLET, FILM COATED ORAL at 21:12

## 2017-04-23 RX ADMIN — IPRATROPIUM BROMIDE AND ALBUTEROL SULFATE 3 ML: .5; 3 SOLUTION RESPIRATORY (INHALATION) at 09:08

## 2017-04-23 RX ADMIN — Medication 10 ML: at 08:13

## 2017-04-23 RX ADMIN — ISOSORBIDE MONONITRATE 30 MG: 30 TABLET, EXTENDED RELEASE ORAL at 08:17

## 2017-04-23 RX ADMIN — IBUPROFEN 400 MG: 400 TABLET ORAL at 17:50

## 2017-04-23 RX ADMIN — POTASSIUM CHLORIDE 20 MEQ: 1500 TABLET, EXTENDED RELEASE ORAL at 08:14

## 2017-04-23 RX ADMIN — POTASSIUM CHLORIDE 20 MEQ: 1500 TABLET, EXTENDED RELEASE ORAL at 17:49

## 2017-04-23 RX ADMIN — Medication 10 ML: at 21:12

## 2017-04-23 RX ADMIN — HEPARIN SODIUM 5000 UNITS: 5000 INJECTION, SOLUTION INTRAVENOUS; SUBCUTANEOUS at 03:02

## 2017-04-23 RX ADMIN — METOPROLOL TARTRATE 12.5 MG: 25 TABLET ORAL at 08:14

## 2017-04-23 RX ADMIN — LISINOPRIL 20 MG: 20 TABLET ORAL at 08:14

## 2017-04-23 RX ADMIN — PAROXETINE HYDROCHLORIDE 20 MG: 20 TABLET, FILM COATED ORAL at 08:15

## 2017-04-23 RX ADMIN — TOPIRAMATE 50 MG: 25 TABLET, FILM COATED ORAL at 08:15

## 2017-04-23 NOTE — ROUTINE PROCESS
Received patient in bed, awake, alert and oriented. oxygen on at 3 lpm via nasal cannula. BIPAP at bedside. No complaints made, will continue to monitor.

## 2017-04-23 NOTE — PROGRESS NOTES
Problem: Mobility Impaired (Adult and Pediatric)  Goal: *Acute Goals and Plan of Care (Insert Text)  Physical Therapy Goals  Initiated 4/23/2017 and to be accomplished within 7 day(s)  1. Patient will move from supine to sit and sit to supine , scoot up and down and roll side to side in bed with supervision/set-up. 2. Patient will transfer from bed to chair and chair to bed with supervision/set-up using the least restrictive device. 3. Patient will perform sit to stand with supervision/set-up. 4. Patient will ambulate with supervision/set-up for 50 feet with the least restrictive device. 5. Patient will ascend/descend 5 stairs with 1 handrail(s) with minimal assistance/contact guard assist.  PHYSICAL THERAPY EVALUATION     Patient: Marge Salas (58 y.o. female)  Date: 4/23/2017  Primary Diagnosis: Hypoxia  Fluid overload  Dyspnea  Atrial fibrillation (HCC)  CHF (congestive heart failure) ([facility] [address]  Precautions: Fall      ASSESSMENT :  Based on the objective data described below, the patient presents with decreased strength, decreased ROM, decreased balance, decreased activity tolerance which limits her functional mobility. Pt wearing bipap during therapy secondary low pulse ox level and HR during night. Pt was lethargic at beginning of treatment, RN aware stated it was because she has been refusing to wear the bipap. Pt became more alert during therapy although once returned to supine, fell asleep during conversation with therapist.  Please refer to the flowsheet for vital signs taken during this treatment. Patient will benefit from skilled intervention to address the above impairments.   Patients rehabilitation potential is considered to be Good  Factors which may influence rehabilitation potential include:   [ ]         None noted  [ ]         Mental ability/status  [X]         Medical condition  [ ]         Home/family situation and support systems  [ ]         Safety awareness  [X]         Pain tolerance/management  [X]         Other: architectural barriers       PLAN :  Recommendations and Planned Interventions:  [X]           Bed Mobility Training             [ ]    Neuromuscular Re-Education  [X]           Transfer Training                   [ ]    Orthotic/Prosthetic Training  [X]           Gait Training                          [ ]    Modalities  [X]           Therapeutic Exercises          [ ]    Edema Management/Control  [X]           Therapeutic Activities            [X]    Patient and Family Training/Education  [ ]           Other (comment):     Frequency/Duration: Patient will be followed by physical therapy 1-2 times per day/4-7 days per week to address goals. Discharge Recommendations: Home Health and [address] [place] [address] Recommendations for Discharge: rolling walker       G-CODES       Mobility  Current  CK= 40-59%   Goal  CJ= 20-39%. The severity rating is based on the Level of Assistance required for Functional Mobility and ADLs. SUBJECTIVE:   Patient stated OK.  - Pt agree able to PT.  Pt is [place] and lethargic initially at [facility].      OBJECTIVE DATA SUMMARY:       Past Medical History:   Diagnosis Date    Atrial fibrillation       CHADS score 1  (-CHF, +HTN, -AGE, -DM, -CVA)    Carpal tunnel syndrome      Chronic pain      Congenital heart disease       presumed pulmonary stenosis s/p repair 1969    Degenerative disc disease      Degenerative joint disease      Dyslipidemia      GERD      Hypertension      Hypothyroid      Morbid obesity      Morbid obesity with BMI of 45.0-49.9, adult ([facility] [address] 2/20/2017    Noncompliance      Schizoaffective disorder      Substance abuse       marijuana, crack cocaine     Past Surgical History:   Procedure Laterality Date    HX CARPAL TUNNEL RELEASE         left    HX HYSTERECTOMY        HX KNEE REPLACEMENT         left    HX KNEE REPLACEMENT         right    HX THYROIDECTOMY         Prior Level of Function/Home Situation: Pt ambulated with [employer] [place] prior to admission  Home Situation  Home Environment: Apartment  # Steps to Enter: 12 (lives in 2nd floor apartment)  Rails to Enter: Yes  Hand Rails : Bilateral  Wheelchair Ramp: No  One/Two Story Residence: One story (lives in 2nd floor apartment, no elevator)  Interior Rails: None  Lift Chair Available: No  Living Alone: No  Support Systems: Child(jules), Family member(s)  Patient Expects to be Discharged to[de-identified] Apartment  Current DME Used/Available at Home: Cane, quad  Tub or Shower Type: Tub/Shower combination  Critical Behavior:  Neurologic State: Eyes open to voice; Lethargic (falls asleep during therapy and conversation)  Orientation Level: Oriented X4  Cognition: Follows commands  Safety/Judgement: Awareness of environment; Insight into deficits; Fall prevention  Psychosocial  Patient Behaviors: Calm; Cooperative  Strength:    Strength: Generally decreased, functional  RLE Assessment (WDL): Exceptions to WDL  RLE Strength  R Hip Flexion: 2+  R Hip Extension: 2+  R Hip ABduction: 2+  R Hip ADduction: 3-  R Hip Internal Rotation: 3-  R Hip External Rotation: 3-  R Knee Flexion: 3-  R Knee Extension: 3-  R Ankle Dorsiflexion: 3  R Ankle Plantar Flexion: 3  LLE Assessment (WDL): Exception to WDL  LLE Strength  L Hip Flexion: 3-  L Hip Extension: 3-  L Hip ABduction: 3-  L Hip ADduction: 3-  L Hip Internal Rotation: 3-  L Hip External Rotation: 3-  L Knee Flexion: 3-  L Knee Extension: 3-  L Ankle Dorsiflexion: 3  L Ankle Plantar Flexion: 3  Tone & Sensation:   Tone: Normal  Sensation: Intact  RLE Assessment (WDL): Exceptions to WDL  LLE Assessment (WDL): Exception to WDL   Range Of Motion:  AROM: Generally decreased, functional  RLE Assessment (WDL): Exceptions to WDL  PROM: Generally decreased, functional  LLE Assessment (WDL): Exception to WDL  Functional Mobility:  Bed Mobility:  Rolling: Minimum assistance; Additional time  Supine to Sit: Minimum assistance; Additional time  Sit to Supine: Minimum assistance; Additional time  Scooting: Stand-by asssistance  Transfers:  Sit to Stand: Contact guard assistance [facility])  Stand to Sit: Contact guard assistance [facility])  Stand Pivot Transfers: Contact guard assistance [facility])     Bed to Chair: Contact guard assistance [facility])  Balance:   Sitting: Impaired; Without support  Sitting - Static: Fair (occasional)  Sitting - Dynamic: Fair (occasional)  Standing: Impaired; With support  Standing - Static: Fair;Constant support ([facility])  Standing - Dynamic : Fair ([facility])  Standing - Dynamic : Impaired  Ambulation/Gait Training:  Distance (ft): 5 Feet (ft)  Assistive Device: Cane, quad  Ambulation - Level of Assistance: Contact guard assistance;Assist x1;Additional time; Adaptive equipment  Gait Description (WDL): Exceptions to WDL  Gait Abnormalities: Antalgic;Decreased step clearance; Step to gait;Trunk sway increased  Base of Support: Widened;Shift to right  Stance: Right increased; Left increased;Time;Weight shift  Speed/Reyna: Slow;Shuffled  Step Length: Right shortened;Left shortened  Swing Pattern: Right asymmetrical;Left asymmetrical  Pain:  Pain Scale 1: Numeric (0 - 10)  Pain Intensity 1: 0  Pain Location 1: Hip  Pain Orientation 1: Right  Pain Description 1: Sharp  Pain Intervention(s) 1: Medication (see MAR)  Activity Tolerance:   Pt wearing bipap during therapy secondary low pulse ox level and HR during night. Pt was lethargic at beginning of treatment, RN aware stated it was because she has been refusing to wear the bipap. Pt became more alert during therapy although once returned to supine, fell asleep during conversation with therapist.  Please refer to the flowsheet for vital signs taken during this treatment.   After treatment:   [ ]         Patient left in no apparent distress sitting up in chair  [X]         Patient left in no apparent distress in bed  [X]         Call bell left within reach  [X]         Nursing notified  [ ] Caregiver present  [ ]         Bed alarm activated      COMMUNICATION/EDUCATION:   [X]         Fall prevention education was provided and the patient/caregiver indicated understanding. [X]         Patient/family have participated as able in goal setting and plan of care. [X]         Patient/family agree to work toward stated goals and plan of care. [ ]         Patient understands intent and goals of therapy, but is neutral about his/her participation. [ ]         Patient is unable to participate in goal setting and plan of care.      Thank you for this referral.  Radha Ingram, PT   Time Calculation: 30 mins

## 2017-04-23 NOTE — ROUTINE PROCESS
Bedside and Verbal shift change report given to Miracle Serrano (oncoming nurse) by Loy Poe (offgoing nurse). Report included the following information SBAR, Kardex, MAR and Recent Results.     SITUATION:    Code Status: Full Code  Reason for Admission: Hypoxia  Fluid overload  Dyspnea  Atrial fibrillation (HCC)   CHF (congestive heart failure) ([facility] [address]    [facility] day: 4   Problem List:       Hospital Problems  Date Reviewed: 2/20/2017          Codes Class Noted POA    Hypoxia ICD-10-CM: R09.02  ICD-9-CM: 799.02  4/18/2017 Unknown        Fluid overload ICD-10-CM: E87.70  ICD-9-CM: 276.69  4/18/2017 Unknown        CHF (congestive heart failure) ([facility] [address] ICD-10-CM: I50.9  ICD-9-CM: 428.0  4/18/2017 Unknown        Dyspnea ICD-10-CM: R06.00  ICD-9-CM: 786.09  Unknown Unknown        Atrial fibrillation ICD-10-CM: I48.91  ICD-9-CM: 427.31  Unknown Unknown    Overview Signed 1/25/2013 10:46 AM by Mark Jacinto MD     CHADS score 1  (-CHF, +HTN, -AGE, -DM, -CVA)                   BACKGROUND:    Past Medical History:   Past Medical History:   Diagnosis Date    Atrial fibrillation     CHADS score 1  (-CHF, +HTN, -AGE, -DM, -CVA)    Carpal tunnel syndrome     Chronic pain     Congenital heart disease     presumed pulmonary stenosis s/p repair 1969    Degenerative disc disease     Degenerative joint disease     Dyslipidemia     GERD     Hypertension     Hypothyroid     Morbid obesity     Morbid obesity with BMI of 45.0-49.9, adult ([facility] [address] 2/20/2017    Noncompliance     Schizoaffective disorder     Substance abuse     marijuana, crack cocaine         Patient taking anticoagulants yes /HEPARIN    ASSESSMENT:    Changes in Assessment Throughout Shift: NONE     Patient has Central Line: no Reasons if yes: NA   Patient has Shaikh Cath: no Reasons if yes: NA      Last Vitals:     Vitals:    04/22/17 1140 04/22/17 1147 04/22/17 1537 04/22/17 1544   BP: 98/62 98/66  103/67   Pulse: 68 62  (!) 59   Resp:  18 14   Temp:  97.5 °F (36.4 °C)  96.9 °F (36.1 °C)   SpO2:  98% 98% 100%   Weight:       Height:            IV and DRAINS (will only show if present)   Peripheral IV 04/18/17 Right Forearm-Site Assessment: Clean, dry, & intact  Peripheral IV 04/21/17 Right Hand-Site Assessment: Clean, dry, & intact     WOUND (if present)   Wound Type:  none   Dressing present Dressing Present : No   Wound Concerns/Notes:  none     PAIN    Pain Assessment    Pain Intensity 1: 0 (04/22/17 1630)    Pain Location 1: Hip    Pain Intervention(s) 1: Medication (see MAR)    Patient Stated Pain Goal: 0  o Interventions for Pain:  YES/MOTRINIntervention effective: YES  o Time of last intervention: SEE MAR   o Reassessment Completed: yes      Last 3 Weights:  Last 3 Recorded Weights in this Encounter    04/20/17 0400 04/21/17 0400 04/22/17 0500   Weight: 129.3 kg (285 lb) 135 kg (297 lb 11.2 oz) 135.1 kg (297 lb 12.8 oz)     Weight change: 0.045 kg (1.6 oz)     INTAKE/OUPUT    Current Shift:      Last three shifts: 04/21 0701 - 04/22 1900  In: 1020 [P.O.:1020]  Out: 3000 [Urine:3000]     LAB RESULTS     Recent Labs      04/22/17   0307  04/21/17   0347   WBC   --   4.9   HGB  10.9*  10.7*   HCT  38.1  36.8   PLT   --   223        Recent Labs      04/22/17   0307  04/21/17   0347  04/20/17   0433   NA  141  140  139   K  4.2  3.7  3.3*   GLU  94  92  95   BUN  15  16  18   CREA  1.10  1.14  1.13   CA  8.5  8.2*  8.5   MG  2.2  2.3   --        RECOMMENDATIONS AND DISCHARGE PLANNING     1. Pending tests/procedures/ Plan of Care or Other Needs: CONTINUE TO MONITOR     2. Discharge plan for patient and Needs/Barriers: HOME    3. Estimated Discharge Date: PENDING Posted on Whiteboard in Patients Room: yes      4. The patient's care plan was reviewed with the oncoming nurse.        \"HEALS\" SAFETY CHECK      Fall Risk    Total Score: 3    Safety Measures: Safety Measures: Bed/Chair-Wheels locked, Bed in low position, Call light within reach, Side rails X 3, Gripper socks    A safety check occurred in the patient's room between off going nurse and oncoming nurse listed above. The safety check included the below items  Area Items   H  High Alert Medications - Verify all high alert medication drips (heparin, PCA, etc.)   E  Equipment - Suction is set up for ALL patients (with danya)  - Red plugs utilized for all equipment (IV pumps, etc.)  - WOWs wiped down at end of shift.  - Room stocked with oxygen, suction, and other unit-specific supplies   A  Alarms - Bed alarm is set for fall risk patients  - Ensure chair alarm is in place and activated if patient is up in a chair   L  Lines - Check IV for any infiltration  - Shaikh bag is empty if patient has a Shaikh   - Tubing and IV bags are labeled   S  Safety   - Room is clean, patient is clean, and equipment is clean. - Hallways are clear from equipment besides carts. - Fall bracelet on for fall risk patients  - Ensure room is clear and free of clutter  - Suction is set up for ALL patients (with danya)  - Hallways are clear from equipment besides carts.    - Isolation precautions followed, supplies available outside room, sign posted     Earl Duarte

## 2017-04-23 NOTE — ROUTINE PROCESS
Bedside and Verbal shift change report given to Brandy Alberto RN (oncoming nurse) by Barbara Linn (offgoing nurse). Report included the following information SBAR, Kardex, MAR and Recent Results.     SITUATION:    Code Status: Full Code   Reason for Admission: Hypoxia   Fluid overload   Dyspnea   Atrial fibrillation (HCC)   CHF (congestive heart failure) ([facility] [address]    [facility] day: 5   Problem List:       Hospital Problems  Date Reviewed: 2/20/2017          Codes Class Noted POA    Hypoxia ICD-10-CM: R09.02  ICD-9-CM: 799.02  4/18/2017 Unknown        Fluid overload ICD-10-CM: E87.70  ICD-9-CM: 276.69  4/18/2017 Unknown        CHF (congestive heart failure) ([facility] [address] ICD-10-CM: I50.9  ICD-9-CM: 428.0  4/18/2017 Unknown        Dyspnea ICD-10-CM: R06.00  ICD-9-CM: 786.09  Unknown Unknown        Atrial fibrillation ICD-10-CM: I48.91  ICD-9-CM: 427.31  Unknown Unknown    Overview Signed 1/25/2013 10:46 AM by Brad Ontiveros MD     CHADS score 1  (-CHF, +HTN, -AGE, -DM, -CVA)                   BACKGROUND:    Past Medical History:   Past Medical History:   Diagnosis Date    Atrial fibrillation     CHADS score 1  (-CHF, +HTN, -AGE, -DM, -CVA)    Carpal tunnel syndrome     Chronic pain     Congenital heart disease     presumed pulmonary stenosis s/p repair 1969    Degenerative disc disease     Degenerative joint disease     Dyslipidemia     GERD     Hypertension     Hypothyroid     Morbid obesity     Morbid obesity with BMI of 45.0-49.9, adult ([facility] [address] 2/20/2017    Noncompliance     Schizoaffective disorder     Substance abuse     marijuana, crack cocaine         Patient taking anticoagulants yes     ASSESSMENT:    Changes in Assessment Throughout Shift: NO     Patient has Central Line: no Reasons if yes: N/A   Patient has Shaikh Cath: no Reasons if yes: N/A      Last Vitals:     Vitals:    04/23/17 0022 04/23/17 0453 04/23/17 0533 04/23/17 0837   BP: (!) 148/96 114/70  (!) 151/100   Pulse: 77 63  70   Resp: 17 18 18   Temp: 98.6 °F (37 °C) 98.4 °F (36.9 °C)  97.9 °F (36.6 °C)   SpO2: 99% 98%  98%   Weight:   134.2 kg (295 lb 12.8 oz)    Height:            IV and DRAINS (will only show if present)   Peripheral IV 04/18/17 Right Forearm-Site Assessment: Clean, dry, & intact  Peripheral IV 04/21/17 Right Hand-Site Assessment: Clean, dry, & intact     WOUND (if present)   Wound Type:  none   Dressing present Dressing Present : No   Wound Concerns/Notes:  none     PAIN    Pain Assessment    Pain Intensity 1: 0 (04/23/17 0656)    Pain Location 1: Hip    Pain Intervention(s) 1: Medication (see MAR)    Patient Stated Pain Goal: 0  o Interventions for Pain:  Motrin and Narco  o Intervention effective: yes  o Time of last intervention: SEE MAR   o Reassessment Completed: yes      Last 3 Weights:  Last 3 Recorded Weights in this Encounter    04/21/17 0400 04/22/17 0500 04/23/17 0533   Weight: 135 kg (297 lb 11.2 oz) 135.1 kg (297 lb 12.8 oz) 134.2 kg (295 lb 12.8 oz)     Weight change: -0.907 kg (-2 lb)     INTAKE/OUPUT    Current Shift: 04/23 0701 - 04/23 1900  In: -   Out: 450 [Urine:450]    Last three shifts: 04/21 1901 - 04/23 0700  In: 850 [P.O.:840; I.V.:10]  Out: 2300 [Urine:2300]     LAB RESULTS     Recent Labs      04/23/17 0246 04/22/17 0307 04/21/17 0347   WBC  4.0*   --   4.9   HGB  10.2*  10.9*  10.7*   HCT  35.0  38.1  36.8   PLT  197   --   223        Recent Labs      04/23/17 0246 04/22/17   0307 04/21/17 0347   NA  140  141  140   K  4.2  4.2  3.7   GLU  123*  94  92   BUN  15  15  16   CREA  1.18  1.10  1.14   CA  8.9  8.5  8.2*   MG   --   2.2  2.3       RECOMMENDATIONS AND DISCHARGE PLANNING     1. Pending tests/procedures/ Plan of Care or Other Needs: PT/OT CONSULT     2. Discharge plan for patient and Needs/Barriers: PT/OT    3. Estimated Discharge Date: PENDING Posted on Whiteboard in Patients Room: yes      4. The patient's care plan was reviewed with the oncoming nurse.        \"HEALS\" SAFETY CHECK      Fall Risk    Total Score: 3    Safety Measures: Safety Measures: Bed/Chair-Wheels locked, Bed in low position, Call light within reach, Side rails X 3, Gripper socks    A safety check occurred in the patient's room between off going nurse and oncoming nurse listed above. The safety check included the below items  Area Items   H  High Alert Medications - Verify all high alert medication drips (heparin, PCA, etc.)   E  Equipment - Suction is set up for ALL patients (with danya)  - Red plugs utilized for all equipment (IV pumps, etc.)  - WOWs wiped down at end of shift.  - Room stocked with oxygen, suction, and other unit-specific supplies   A  Alarms - Bed alarm is set for fall risk patients  - Ensure chair alarm is in place and activated if patient is up in a chair   L  Lines - Check IV for any infiltration  - Shaikh bag is empty if patient has a Shaikh   - Tubing and IV bags are labeled   S  Safety   - Room is clean, patient is clean, and equipment is clean. - Hallways are clear from equipment besides carts. - Fall bracelet on for fall risk patients  - Ensure room is clear and free of clutter  - Suction is set up for ALL patients (with danya)  - Hallways are clear from equipment besides carts.    - Isolation precautions followed, supplies available outside room, sign posted     Flora Young

## 2017-04-23 NOTE — PROGRESS NOTES
[facility] - Progress Note  Admit Date: 4/18/2017    Assessment:        -Acute on chronic respiratory failure requiring bipap, multifactorial in setting of below.  -Acute likely on chronic diastolic heart failure. Patient with likely component of diastolic heart failure in setting of noncompliance and HTN. BNP is elevated however CXR and CT do not reveal significant pulmonary edema, suspect patients decompensation is primary pulmonary in nature. Echo 4/20/17 with normal EF  -Chronic atrial fibrillation with controlled ventricular response on BB, not on OAC given noncompliance, continue ASA. -Bradycardia, decreased beta-blocker 4/22/17  -Obesity with likely ANDRADE and OHVS.  -Pulmonary HTN with RVSP 50 mmHg on echo 2014.  -Left lung infiltrate likely atelectasis. -Congenital heart disease with repair as a child but specifics unknown, previous echos have revealed normal EF with pulmonic stenosis. -Pulmonary HTN with RVSP 54 mmHg 4/20/17 echo  -Noncompliance. -Polysubstance abuse.  -Psychiatric history.      Previously followed by Dr. Ana Benito:     A.fib rates improved with decrease in BB. Continue maintenance lasix. Continue to monitor on telemetry. Subjective:     Sleepy but arousable on bipap.   Tele with A.fib, rates 40-70 bpm.    Objective:      Patient Vitals for the past 8 hrs:   Temp Pulse Resp BP SpO2   04/23/17 0837 97.9 °F (36.6 °C) 70 18 (!) 151/100 98 %   04/23/17 0453 98.4 °F (36.9 °C) 63 18 114/70 98 %         Patient Vitals for the past 96 hrs:   Weight   04/23/17 0533 134.2 kg (295 lb 12.8 oz)   04/22/17 0500 135.1 kg (297 lb 12.8 oz)   04/21/17 0400 135 kg (297 lb 11.2 oz)   04/20/17 0400 129.3 kg (285 lb)   04/19/17 1528 128.6 kg (283 lb 9.6 oz)                    Intake/Output Summary (Last 24 hours) at 04/23/17 1033  Last data filed at 04/23/17 0832   Gross per 24 hour   Intake             1060 ml   Output             2350 ml   Net            -1290 ml       Physical Exam:  General:  Sleepy but arousable on bipap  Neck:  nontender  Lungs:  distant  Heart:  irreg irreg, distant, S1, S2 normal, no murmur, click, rub or gallop  Abdomen:  abdomen is soft without significant tenderness, masses, organomegaly or guarding  Extremities:  edema    Data Review:     Labs: Results:       Chemistry Recent Labs      04/23/17 0246 04/22/17 0307 04/21/17   0347   GLU  123*  94  92   NA  140  141  140   K  4.2  4.2  3.7   CL  102  101  100   CO2  34*  36*  35*   BUN  15  15  16   CREA  1.18  1.10  1.14   CA  8.9  8.5  8.2*   MG   --   2.2  2.3   AGAP  4  4  5   BUCR  13  14  14   AP  89   --    --    TP  6.8   --    --    ALB  3.2*   --    --    GLOB  3.6   --    --    AGRAT  0.9   --    --       CBC w/Diff Recent Labs      04/23/17 0246 04/22/17 0307 04/21/17   0347   WBC  4.0*   --   4.9   RBC  3.95*   --   4.19*   HGB  10.2*  10.9*  10.7*   HCT  35.0  38.1  36.8   PLT  197   --   223   GRANS  58   --   51   LYMPH  28   --   37   EOS  5   --   6*      Cardiac Enzymes No results found for: CPK, CKMMB, CKMB, RCK3, CKMBT, CKNDX, CKND1, SHANNAN, TROPT, TROIQ, JELANI, TROPT, TNIPOC, BNP, BNPP   Coagulation No results for input(s): PTP, INR, APTT in the last 72 hours.     No lab exists for component: INREXT    Lipid Panel Lab Results   Component Value Date/Time    Cholesterol, total 168 10/06/2010 12:17 PM    HDL Cholesterol 37 10/06/2010 12:17 PM    LDL, calculated 82.2 10/06/2010 12:17 PM    VLDL, calculated 48.8 10/06/2010 12:17 PM    Triglyceride 244 10/06/2010 12:17 PM    CHOL/HDL Ratio 4.5 10/06/2010 12:17 PM      BNP Lab Results   Component Value Date/Time    B-type Natriuretic Peptide 3187 05/06/2016 11:10 AM    B-type Natriuretic Peptide 3075 05/06/2016 11:00 AM      Liver Enzymes Recent Labs      04/23/17   0246   TP  6.8   ALB  3.2*   AP  89   SGOT  15      Digoxin    Thyroid Studies Lab Results   Component Value Date/Time    T4, Total 12.3 01/15/2014 02:01 AM    TSH 1.06 04/18/2017 11:25 AM          Signed By: Connor Felder MD     April 23, 2017

## 2017-04-23 NOTE — PROGRESS NOTES
Progress Note  Pulmonary, Critical Care, and Sleep Medicine    Name: Ledy Degorot MRN: [id number]   : 1960 Hospital: [facility]   Date: 2017        IMPRESSION:   · Worsening shortness of breath due to acute on chronic hypoxic and hypercapnic respiratory failure possibly multifactorial, see below. Hypercapnia is compensated. SOB improved initially on BIPAP  · Chronic atrial fibrillation not on anticoagulation, rate controlled  · Pulmonary HTN with history of prior heart surgery for congenital heart disease, Pulmonic stenosis suggested by prior echo. Suspect worsening right sided pressures based on history, hilar prominence and CT findings. Recent Echo with RVSP 54  · Morbid obesity,  possible OHS  · Bradycardia likely associated with OSAS  · Left lung infiltrate without fever or leukocytosis likely atelectasis. History of drug induced pneumonitis however UDS negative on admission   · Hypokalemia likely related to diuretic use  · ANDRADE diagnosed in the 's, noncompliant with CPAP  · History of polysubstance abuse including crack cocaine may have contributed to [facility]  · History of poor compliance to medication    · Psychiatric illness       PLAN:   · Pt encouraged NIV HS which she now agrees to  · Will arrange for outpatient sleep studies  · Full PFT's pending, both to assess obstruction and likely restriction due to morbid obesity  · Continue LINDSAY, caution with LABA due to tachyarrhythmia concerns  · DVT prophylaxis  · Empiric antibiotics for possible CAP seen on CT scan  · Potassium replacement resolved hypokalemia  · Encouraged BIPAP use along with pt's nurse     Subjective/Interval History:   I have reviewed the flowsheet and previous days notes. Reviewed interval history. Discussed management with nursing staff. Reported less SOB after overnight NIV, no new respiratory complaints registered.  Nurse noted bradycardia during sleep down to 30/40, Beta blocker decreased  Pt used BIPAP briefly last night. Less drowsy today    ROS:Pertinent items are noted in HPI. Orders reviewed including medications. Changes made if indicated. Telemetry monitor reviewed at the bedside. Objective:   Vital Signs:    Visit Vitals    /42 (BP 1 Location: Left arm, BP Patient Position: At rest)    Pulse (!) 59    Temp 97.4 °F (36.3 °C)    Resp 19    Ht 5' 3\" (1.6 m)    Wt 134.2 kg (295 lb 12.8 oz)    SpO2 100%    Breastfeeding No    BMI 52.4 kg/m2       O2 Device: Nasal cannula   O2 Flow Rate (L/min): 2 l/min   Temp (24hrs), Av.9 °F (36.6 °C), Min:96.9 °F (36.1 °C), Max:98.6 °F (37 °C)       Intake/Output:   Last shift:       07 -  1900  In: -   Out: 450 [Urine:450]  Last 3 shifts: 1901 -  0700  In: 1300 [P.O.:1290; I.V.:10]  Out: 2600 [Urine:2600]    Intake/Output Summary (Last 24 hours) at 17 1312  Last data filed at 17 9508   Gross per 24 hour   Intake             1060 ml   Output             1650 ml   Net             -590 ml        Physical Exam:    General:  Alert, cooperative, in no distress, appears stated age. Obese    Head:  Normocephalic, without obvious abnormality, atraumatic. Eyes:  ANicteric, PERRL,   Nose: Nares normal. No drainage or sinus tenderness. Throat: Lips, mucosa, and tongue normal.  NO Thrush   Neck: Supple, symmetrical, trachea midline, no adenopathy, thyroid: no enlargment/tenderness/nodules    Back:   Symmetric    Lungs:   Bilateral auscultation no rales or wheezes   Chest wall:  No tenderness or deformity. NO intercostal retractions   Heart:  Regular rate and rhythm, S1, S2 normal, no murmur, click, rub or gallop. Abdomen:   Soft, non-tender. Bowel sounds normal. No masses,  No organomegaly. NO paradoxical motion   Extremities: normal, atraumatic, no cyanosis , trace edema resolved   Pulses: 2+ and symmetric all extremities. Skin: Skin color, texture, turgor normal. No rashes or lesions.  NO clubbing   Lymph nodes: Cervical  nodes normal.   Neurologic: Grossly nonfocal      :        Devices:             Drips:    DATA:  Labs:  Recent Labs      04/23/17   0246  04/22/17   0307  04/21/17   0347   WBC  4.0*   --   4.9   HGB  10.2*  10.9*  10.7*   HCT  35.0  38.1  36.8   PLT  197   --   223     Recent Labs      04/23/17   0246  04/22/17   0307  04/21/17   0347   NA  140  141  140   K  4.2  4.2  3.7   CL  102  101  100   CO2  34*  36*  35*   GLU  123*  94  92   BUN  15  15  16   CREA  1.18  1.10  1.14   CA  8.9  8.5  8.2*   MG   --   2.2  2.3   ALB  3.2*   --    --    SGOT  15   --    --    ALT  21   --    --      No results for input(s): PH, PCO2, PO2, HCO3, FIO2 in the last 72 hours. Imaging:  []        I have personally reviewed the patients radiographs and reports  []         []        No change from prior, tubes and lines in adequate position  []        Improved   []        Worsening  High complexity decision making was performed during this consultation and evaluation.     Jayme Pace MD

## 2017-04-23 NOTE — ROUTINE PROCESS
Patients HR dropped to 39 but asymptomatic. Assessed patient, and put her bipap back on. She refuses to keep it on and states she is breathing just fine without it. Started her back on 3L O2 nasal cannula. Reassessed her a few minutes later and she removed the nasal cannula as well. Patient is being non compliant. Will continue to monitor.

## 2017-04-23 NOTE — PROGRESS NOTES
SUBJECTIVE:    Patient is sleepy but arousable. Answers questions appropriately. Denies for headaches or dizziness. Feeling sleepy. No chest or abdominal pain. No N/V/D. OBJECTIVE:    Visit Vitals    BP (!) 151/100 (BP 1 Location: Left arm, BP Patient Position: At rest)    Pulse 70    Temp 97.9 °F (36.6 °C)    Resp 18    Ht 5' 3\" (1.6 m)    Wt 134.2 kg (295 lb 12.8 oz)    SpO2 98%    Breastfeeding No    BMI 52.4 kg/m2     CVS: IRIR  RS: CTA bilaterally. No wheezes  GI: ND, NT, BS +  Extremities: No pedal edema  General: NAD, Awake. Follows commands  CNS: Moves both UEs/LEs    ASSESSMENT:    1. Shortness of breath due to acute on chronic hypoxic and hypercapnic respiratory failure possibly multifactorial  2. Chronic atrial fibrillation not on anticoagulation due to # 8, rate controlled. 3. Pulmonary HTN with history of prior heart surgery for congenital heart disease, Pulmonic stenosis suggested by prior echo. 4. Morbid obesity with likely OHS  5. Left lung infiltrate likely atelectasis. 6. Hypokalemia likely related to diuretic use  7. History of polysubstance abuse including crack cocaine  8. History of poor compliance to medication and therapy  9. Psychiatric illness  10. Chronic diastolic CHF with EF of 23%, compensated. 11. Bradycardia, asymptomatic  12.  Elevated ammonia    PLAN:    Continue current management  CT head and ABG report reviewed  Cont antibiotic  Talked to pulmonology PA to evaluate for home nocturnal BiPAP  Lactulose trial and monitor ammonia     --> total time greater than 30 minutes    CMP:   Lab Results   Component Value Date/Time     04/23/2017 02:46 AM    K 4.2 04/23/2017 02:46 AM     04/23/2017 02:46 AM    CO2 34 (H) 04/23/2017 02:46 AM    AGAP 4 04/23/2017 02:46 AM     (H) 04/23/2017 02:46 AM    BUN 15 04/23/2017 02:46 AM    CREA 1.18 04/23/2017 02:46 AM    GFRAA 57 (L) 04/23/2017 02:46 AM    GFRNA 47 (L) 04/23/2017 02:46 AM    CA 8.9 04/23/2017 02:46 AM    ALB 3.2 (L) 04/23/2017 02:46 AM    TP 6.8 04/23/2017 02:46 AM    GLOB 3.6 04/23/2017 02:46 AM    AGRAT 0.9 04/23/2017 02:46 AM    SGOT 15 04/23/2017 02:46 AM    ALT 21 04/23/2017 02:46 AM     CBC:   Lab Results   Component Value Date/Time    WBC 4.0 (L) 04/23/2017 02:46 AM    HGB 10.2 (L) 04/23/2017 02:46 AM    HCT 35.0 04/23/2017 02:46 AM     04/23/2017 02:46 AM

## 2017-04-24 LAB
AMMONIA PLAS-SCNC: 49 UMOL/L (ref 11–32)
ANION GAP BLD CALC-SCNC: 2 MMOL/L (ref 3–18)
BUN SERPL-MCNC: 15 MG/DL (ref 7–18)
BUN/CREAT SERPL: 15 (ref 12–20)
CALCIUM SERPL-MCNC: 8.8 MG/DL (ref 8.5–10.1)
CHLORIDE SERPL-SCNC: 102 MMOL/L (ref 100–108)
CO2 SERPL-SCNC: 35 MMOL/L (ref 21–32)
CREAT SERPL-MCNC: 1.03 MG/DL (ref 0.6–1.3)
GLUCOSE SERPL-MCNC: 90 MG/DL (ref 74–99)
HCT VFR BLD AUTO: 36.2 % (ref 35–45)
HGB BLD-MCNC: 10.5 G/DL (ref 12–16)
POTASSIUM SERPL-SCNC: 4.3 MMOL/L (ref 3.5–5.5)
SODIUM SERPL-SCNC: 139 MMOL/L (ref 136–145)

## 2017-04-24 PROCEDURE — 94660 CPAP INITIATION&MGMT: CPT

## 2017-04-24 PROCEDURE — 74011250637 HC RX REV CODE- 250/637: Performed by: INTERNAL MEDICINE

## 2017-04-24 PROCEDURE — 74011250636 HC RX REV CODE- 250/636: Performed by: INTERNAL MEDICINE

## 2017-04-24 PROCEDURE — 74011250637 HC RX REV CODE- 250/637: Performed by: FAMILY MEDICINE

## 2017-04-24 PROCEDURE — 36415 COLL VENOUS BLD VENIPUNCTURE: CPT | Performed by: INTERNAL MEDICINE

## 2017-04-24 PROCEDURE — 77030027138 HC INCENT SPIROMETER -A

## 2017-04-24 PROCEDURE — 74011000250 HC RX REV CODE- 250: Performed by: FAMILY MEDICINE

## 2017-04-24 PROCEDURE — 82140 ASSAY OF AMMONIA: CPT | Performed by: INTERNAL MEDICINE

## 2017-04-24 PROCEDURE — 85018 HEMOGLOBIN: CPT | Performed by: INTERNAL MEDICINE

## 2017-04-24 PROCEDURE — 74011250636 HC RX REV CODE- 250/636: Performed by: FAMILY MEDICINE

## 2017-04-24 PROCEDURE — 65660000000 HC RM CCU STEPDOWN

## 2017-04-24 PROCEDURE — 94640 AIRWAY INHALATION TREATMENT: CPT

## 2017-04-24 PROCEDURE — 80048 BASIC METABOLIC PNL TOTAL CA: CPT | Performed by: INTERNAL MEDICINE

## 2017-04-24 PROCEDURE — 74011000258 HC RX REV CODE- 258: Performed by: INTERNAL MEDICINE

## 2017-04-24 PROCEDURE — 97535 SELF CARE MNGMENT TRAINING: CPT

## 2017-04-24 RX ORDER — METOPROLOL TARTRATE 25 MG/1
25 TABLET, FILM COATED ORAL 2 TIMES DAILY
Status: DISCONTINUED | OUTPATIENT
Start: 2017-04-24 | End: 2017-04-26 | Stop reason: HOSPADM

## 2017-04-24 RX ORDER — LISINOPRIL 10 MG/1
10 TABLET ORAL DAILY
Status: DISCONTINUED | OUTPATIENT
Start: 2017-04-25 | End: 2017-04-26

## 2017-04-24 RX ADMIN — PAROXETINE HYDROCHLORIDE 20 MG: 20 TABLET, FILM COATED ORAL at 08:48

## 2017-04-24 RX ADMIN — POTASSIUM CHLORIDE 20 MEQ: 1500 TABLET, EXTENDED RELEASE ORAL at 17:16

## 2017-04-24 RX ADMIN — POTASSIUM CHLORIDE 20 MEQ: 1500 TABLET, EXTENDED RELEASE ORAL at 08:49

## 2017-04-24 RX ADMIN — HYDROCODONE BITARTRATE AND ACETAMINOPHEN 1 TABLET: 5; 325 TABLET ORAL at 14:38

## 2017-04-24 RX ADMIN — CEFTRIAXONE SODIUM 1 G: 1 INJECTION, POWDER, FOR SOLUTION INTRAMUSCULAR; INTRAVENOUS at 22:05

## 2017-04-24 RX ADMIN — LEVOTHYROXINE SODIUM 125 MCG: 125 TABLET ORAL at 08:49

## 2017-04-24 RX ADMIN — ISOSORBIDE MONONITRATE 30 MG: 30 TABLET, EXTENDED RELEASE ORAL at 08:48

## 2017-04-24 RX ADMIN — Medication 10 ML: at 22:08

## 2017-04-24 RX ADMIN — HEPARIN SODIUM 5000 UNITS: 5000 INJECTION, SOLUTION INTRAVENOUS; SUBCUTANEOUS at 17:17

## 2017-04-24 RX ADMIN — Medication 325 MG: at 08:48

## 2017-04-24 RX ADMIN — IPRATROPIUM BROMIDE AND ALBUTEROL SULFATE 3 ML: .5; 3 SOLUTION RESPIRATORY (INHALATION) at 19:54

## 2017-04-24 RX ADMIN — Medication 10 ML: at 14:45

## 2017-04-24 RX ADMIN — LISINOPRIL 20 MG: 20 TABLET ORAL at 08:49

## 2017-04-24 RX ADMIN — IBUPROFEN 400 MG: 400 TABLET ORAL at 08:49

## 2017-04-24 RX ADMIN — ARIPIPRAZOLE 15 MG: 15 TABLET ORAL at 08:49

## 2017-04-24 RX ADMIN — FUROSEMIDE 40 MG: 40 TABLET ORAL at 08:49

## 2017-04-24 RX ADMIN — Medication 10 ML: at 05:04

## 2017-04-24 RX ADMIN — METOPROLOL TARTRATE 12.5 MG: 25 TABLET ORAL at 08:49

## 2017-04-24 RX ADMIN — SIMVASTATIN 40 MG: 40 TABLET, FILM COATED ORAL at 22:07

## 2017-04-24 RX ADMIN — METOPROLOL TARTRATE 25 MG: 25 TABLET ORAL at 17:16

## 2017-04-24 RX ADMIN — HEPARIN SODIUM 5000 UNITS: 5000 INJECTION, SOLUTION INTRAVENOUS; SUBCUTANEOUS at 11:47

## 2017-04-24 RX ADMIN — IBUPROFEN 400 MG: 400 TABLET ORAL at 17:16

## 2017-04-24 RX ADMIN — IBUPROFEN 400 MG: 400 TABLET ORAL at 22:05

## 2017-04-24 RX ADMIN — TOPIRAMATE 50 MG: 25 TABLET, FILM COATED ORAL at 08:49

## 2017-04-24 RX ADMIN — HYDROCODONE BITARTRATE AND ACETAMINOPHEN 1 TABLET: 5; 325 TABLET ORAL at 05:34

## 2017-04-24 NOTE — PROGRESS NOTES
[facility] - Progress Note  Admit Date: 4/18/2017    Assessment:     Hospital Problems  Date Reviewed: 2/20/2017          Codes Class Noted POA    Hypoxia ICD-10-CM: R09.02  ICD-9-CM: 799.02  4/18/2017 Unknown        Fluid overload ICD-10-CM: E87.70  ICD-9-CM: 276.69  4/18/2017 Unknown        CHF (congestive heart failure) (HCC) ICD-10-CM: I50.9  ICD-9-CM: 428.0  4/18/2017 Unknown        Dyspnea ICD-10-CM: R06.00  ICD-9-CM: 786.09  Unknown Unknown        Atrial fibrillation ICD-10-CM: I48.91  ICD-9-CM: 427.31  Unknown Unknown    Overview Signed 1/25/2013 10:46 AM by Tomas Holliday MD     CHADS score 1  (-CHF, +HTN, -AGE, -DM, -CVA)                   -Acute on chronic respiratory failure requiring bipap, multifactorial in setting of below. Improved. -Acute likely on chronic diastolic heart failure. Patient with likely component of diastolic heart failure in setting of noncompliance and HTN. BNP is elevated however CXR and CT do not reveal significant pulmonary edema, suspect patients decompensation is primary pulmonary in nature. Echo 4/20/17 with normal EF  -Chronic atrial fibrillation with controlled ventricular response on BB, not on OAC given noncompliance, continue ASA. -Bradycardia, decreased beta-blocker 4/22/17. Suspect multifactorial.  -Obesity with likely ANDRADE and OHVS.  -Pulmonary HTN with RVSP 50 mmHg on echo 2014.  -Left lung infiltrate likely atelectasis. -Congenital heart disease with repair as a child but specifics unknown, previous echos have revealed normal EF with pulmonic stenosis. -Pulmonary HTN with RVSP 54 mmHg 4/20/17 echo  -Noncompliance. -Polysubstance abuse.  -Psychiatric history.      Previously followed by Dr. Antonio Bryson:     Hemodynamics and rhythm overall stable. No further fast rates, no significant pauses. Will continue decreased BB dose and ASA. Renal function stable and maintaining negative fluid balance on maintenance lasix, would continue.   Continue pulmonary treatment and support. Subjective:     No new complaints. Objective:      Patient Vitals for the past 8 hrs:   Temp Pulse Resp BP SpO2   04/24/17 0756 97.4 °F (36.3 °C) 67 20 147/79 95 %   04/24/17 0400 97.4 °F (36.3 °C) 86 18 98/62 96 %         Patient Vitals for the past 96 hrs:   Weight   04/24/17 0400 134.3 kg (296 lb)   04/23/17 0533 134.2 kg (295 lb 12.8 oz)   04/22/17 0500 135.1 kg (297 lb 12.8 oz)   04/21/17 0400 135 kg (297 lb 11.2 oz)                    Intake/Output Summary (Last 24 hours) at 04/24/17 4822  Last data filed at 04/24/17 7419   Gross per 24 hour   Intake              250 ml   Output              700 ml   Net             -450 ml       Physical Exam:  General:  alert, cooperative, no distress, appears stated age  Neck:  no JVD  Lungs:  clear to auscultation bilaterally  Heart:  irregularly irregular rhythm  Abdomen:  abdomen is soft obese  Extremities:  trace edema    Data Review:     Labs: Results:       Chemistry Recent Labs      04/24/17 0419 04/23/17 0246 04/22/17   0307   GLU  90  123*  94   NA  139  140  141   K  4.3  4.2  4.2   CL  102  102  101   CO2  35*  34*  36*   BUN  15  15  15   CREA  1.03  1.18  1.10   CA  8.8  8.9  8.5   MG   --    --   2.2   AGAP  2*  4  4   BUCR  15  13  14   AP   --   89   --    TP   --   6.8   --    ALB   --   3.2*   --    GLOB   --   3.6   --    AGRAT   --   0.9   --       CBC w/Diff Recent Labs      04/24/17 0419 04/23/17 0246 04/22/17   0307   WBC   --   4.0*   --    RBC   --   3.95*   --    HGB  10.5*  10.2*  10.9*   HCT  36.2  35.0  38.1   PLT   --   197   --    GRANS   --   58   --    LYMPH   --   28   --    EOS   --   5   --       Cardiac Enzymes No results found for: CPK, CKMMB, CKMB, RCK3, CKMBT, CKNDX, CKND1, SHANNAN, TROPT, TROIQ, JELANI, TROPT, TNIPOC, BNP, BNPP   Coagulation No results for input(s): PTP, INR, APTT in the last 72 hours.     No lab exists for component: INREXT    Lipid Panel Lab Results   Component Value Date/Time    Cholesterol, total 168 10/06/2010 12:17 PM    HDL Cholesterol 37 10/06/2010 12:17 PM    LDL, calculated 82.2 10/06/2010 12:17 PM    VLDL, calculated 48.8 10/06/2010 12:17 PM    Triglyceride 244 10/06/2010 12:17 PM    CHOL/HDL Ratio 4.5 10/06/2010 12:17 PM      BNP Lab Results   Component Value Date/Time    B-type Natriuretic Peptide 3187 05/06/2016 11:10 AM    B-type Natriuretic Peptide 3075 05/06/2016 11:00 AM      Liver Enzymes Recent Labs      04/23/17   0246   TP  6.8   ALB  3.2*   AP  89   SGOT  15      Digoxin    Thyroid Studies Lab Results   Component Value Date/Time    T4, Total 12.3 01/15/2014 02:01 AM    TSH 1.06 04/18/2017 11:25 AM          Signed By: VERONICA Martin     April 24, 2017

## 2017-04-24 NOTE — PROGRESS NOTES
Problem: Self Care Deficits Care Plan (Adult)  Goal: *Acute Goals and Plan of Care (Insert Text)  Occupational Therapy Goals  Initiated 4/21/2017 within 7 day(s). 1. Patient will perform lower body dressing with supervision/set-up, AE prn. 4/24 - goal Met for Socks  2. Patient will perform toilet transfers with supervision/set-up. 4/24 - Progressing - CGA w/RW  3. Patient will perform all aspects of toileting with supervision/set-up. 4/24 - Progressing Mod I for Bladder hygiene. 4. Patient will participate in upper extremity therapeutic exercise/activities with supervision/set-up for 8 minutes to increase strength/endurance for ADLs. 5. Patient will utilize energy conservation techniques during functional activities with verbal cues. Outcome: Progressing Towards Goal  OCCUPATIONAL THERAPY TREATMENT     Patient: Michelle Newton (31 y.o. female)  Date: 4/24/2017  Diagnosis: Hypoxia  Fluid overload  Dyspnea  Atrial fibrillation (HCC)  CHF (congestive heart failure) ([facility] <principal problem not specified>       Precautions: Fall  Chart, occupational therapy assessment, plan of care, and goals were reviewed. ASSESSMENT: Pt presented sitting up in recliner in room awake. Provided Pt education on benefit of participating in ADL tasks and transfers for improved activity tolerance at d/c location. Pt demonstrated understanding through return participation in LB ADL tasks, toilet transfers and toileting tasks. Pt exhibiting increasing independence with decreased need of assistance during LB ADL tasks w/AE, and toilet transfers. Please see below for levels of assistance/independence.   Progression toward goals:  [X]          Improving appropriately and progressing toward goals  [ ]          Improving slowly and progressing toward goals  [ ]          Not making progress toward goals and plan of care will be adjusted       PLAN:  Patient continues to benefit from skilled intervention to address the above impairments. Continue treatment per established plan of care. Discharge Recommendations:  Home Health  Further Equipment Recommendations for Discharge:  bedside commode and N/A       SUBJECTIVE:   Patient stated I have a lot of grandkids to help me.       G-CODES:      Self Care  Current  CJ= 20-39%. The severity rating is based on the Level of Assistance required for Functional Mobility and ADLs. OBJECTIVE DATA SUMMARY:   Cognitive/Behavioral Status:  Neurologic State: Anesthetized  Orientation Level: Oriented to person, Oriented to place, Oriented to situation  Cognition: Follows commands, Appropriate for age attention/concentration  Safety/Judgement: Awareness of environment, Insight into deficits, Fall prevention  Functional Mobility and Transfers for ADLs:              Transfers:  Sit to Stand: Contact guard assistance              Toilet Transfer : Contact guard assistance              Balance:  Sitting: Intact  Sitting - Static: Good (unsupported)  Sitting - Dynamic: Fair (occasional)  Standing: With support  Standing - Static: Fair  Standing - Dynamic : Fair  ADL Intervention:  Grooming  Washing Hands: Modified independent  Lower Body Dressing Assistance  Socks: Modified independent  Position Performed: Seated in chair  Adaptive Equipment Used: Reacher;Sock aid  Toileting  Bladder Hygiene: Modified independent  Pain:  Pt reports 0/10 pain or discomfort prior to treatment. Pt reports 0/10 pain or discomfort post treatment. Activity Tolerance:    Fair  Please refer to the flowsheet for vital signs taken during this treatment.   After treatment:   [X]  Patient left in no apparent distress sitting up in chair  [ ]  Patient left in no apparent distress in bed  [X]  Call bell left within reach  [ ]  Nursing notified  [ ]  Caregiver present  [ ]  Bed alarm activated     ALLA Randolph  Time Calculation: 25 mins

## 2017-04-24 NOTE — ROUTINE PROCESS
Bedside and Verbal shift change report given to [address] [place] [address] (oncoming nurse) by Maki Leal (offgoing nurse). Report included the following information SBAR, Kardex, MAR and Recent Results.     SITUATION:    Code Status: Full Code   Reason for Admission: Hypoxia   Fluid overload   Dyspnea   Atrial fibrillation (HCC)   CHF (congestive heart failure) ([facility] [address]    [facility] day: 6   Problem List:       Hospital Problems  Date Reviewed: 2/20/2017          Codes Class Noted POA    Hypoxia ICD-10-CM: R09.02  ICD-9-CM: 799.02  4/18/2017 Unknown        Fluid overload ICD-10-CM: E87.70  ICD-9-CM: 276.69  4/18/2017 Unknown        CHF (congestive heart failure) ([facility] ICD-10-CM: I50.9  ICD-9-CM: 428.0  4/18/2017 Unknown        Dyspnea ICD-10-CM: R06.00  ICD-9-CM: 786.09  Unknown Unknown        Atrial fibrillation ICD-10-CM: I48.91  ICD-9-CM: 427.31  Unknown Unknown    Overview Signed 1/25/2013 10:46 AM by Shaun Ortiz MD     CHADS score 1  (-CHF, +HTN, -AGE, -DM, -CVA)                   BACKGROUND:    Past Medical History:   Past Medical History:   Diagnosis Date    Atrial fibrillation     CHADS score 1  (-CHF, +HTN, -AGE, -DM, -CVA)    Carpal tunnel syndrome     Chronic pain     Congenital heart disease     presumed pulmonary stenosis s/p repair 1969    Degenerative disc disease     Degenerative joint disease     Dyslipidemia     GERD     Hypertension     Hypothyroid     Morbid obesity     Morbid obesity with BMI of 45.0-49.9, adult ([facility] [address] 2/20/2017    Noncompliance     Schizoaffective disorder     Substance abuse     marijuana, crack cocaine         Patient taking anticoagulants yes /heparin    ASSESSMENT:    Changes in Assessment Throughout Shift: none     Patient has Central Line: no Reasons if yes: na   Patient has Shaikh Cath: no Reasons if yes: na      Last Vitals:     Vitals:    04/23/17 1211 04/23/17 1550 04/23/17 2101 04/24/17 0000   BP: 117/42 91/61  98/68   Pulse: (!) 59 60  84 Resp: 19 19 19   Temp: 97.4 °F (36.3 °C) 97.9 °F (36.6 °C)  97.2 °F (36.2 °C)   SpO2: 100% 96% 95% 92%   Weight:       Height:            IV and DRAINS (will only show if present)   [REMOVED] Peripheral IV 04/18/17 Right Forearm-Site Assessment: Clean, dry, & intact  [REMOVED] Peripheral IV 04/21/17 Right Hand-Site Assessment: Clean, dry, & intact     WOUND (if present)   Wound Type:  none   Dressing present Dressing Present : No   Wound Concerns/Notes:  none     PAIN    Pain Assessment    Pain Intensity 1: 0 (04/24/17 0216)    Pain Location 1: Hip    Pain Intervention(s) 1: Medication (see MAR)    Patient Stated Pain Goal: 0  o Interventions for Pain:  Yes/motrin  o Intervention effective: yes  o Time of last intervention: see MAR   o Reassessment Completed: yes      Last 3 Weights:  Last 3 Recorded Weights in this Encounter    04/21/17 0400 04/22/17 0500 04/23/17 0533   Weight: 135 kg (297 lb 11.2 oz) 135.1 kg (297 lb 12.8 oz) 134.2 kg (295 lb 12.8 oz)     Weight change:      INTAKE/OUPUT    Current Shift: 04/23 1901 - 04/24 0700  In: -   Out: 300 [Urine:300]    Last three shifts: 04/22 0701 - 04/23 1900  In: 1300 [P.O.:1290; I.V.:10]  Out: 2850 [Urine:2850]     LAB RESULTS     Recent Labs      04/23/17   0246  04/22/17 0307 04/21/17 0347   WBC  4.0*   --   4.9   HGB  10.2*  10.9*  10.7*   HCT  35.0  38.1  36.8   PLT  197   --   223        Recent Labs      04/23/17   0246  04/22/17   0307 04/21/17   0347   NA  140  141  140   K  4.2  4.2  3.7   GLU  123*  94  92   BUN  15  15  16   CREA  1.18  1.10  1.14   CA  8.9  8.5  8.2*   MG   --   2.2  2.3       RECOMMENDATIONS AND DISCHARGE PLANNING     1. Pending tests/procedures/ Plan of Care or Other Needs: continue to monitor     2. Discharge plan for patient and Needs/Barriers: pending    3. Estimated Discharge Date: pending Posted on Whiteboard in Patients Room: yes      4. The patient's care plan was reviewed with the oncoming nurse.        \"HEALS\" SAFETY CHECK      Fall Risk    Total Score: 3    Safety Measures: Safety Measures: Bed/Chair-Wheels locked, Bed in low position, Call light within reach, Fall prevention (comment), Gripper socks, Side rails X 3    A safety check occurred in the patient's room between off going nurse and oncoming nurse listed above. The safety check included the below items  Area Items   H  High Alert Medications - Verify all high alert medication drips (heparin, PCA, etc.)   E  Equipment - Suction is set up for ALL patients (with danya)  - Red plugs utilized for all equipment (IV pumps, etc.)  - WOWs wiped down at end of shift.  - Room stocked with oxygen, suction, and other unit-specific supplies   A  Alarms - Bed alarm is set for fall risk patients  - Ensure chair alarm is in place and activated if patient is up in a chair   L  Lines - Check IV for any infiltration  - Shaikh bag is empty if patient has a Shaikh   - Tubing and IV bags are labeled   S  Safety   - Room is clean, patient is clean, and equipment is clean. - Hallways are clear from equipment besides carts. - Fall bracelet on for fall risk patients  - Ensure room is clear and free of clutter  - Suction is set up for ALL patients (with danya)  - Hallways are clear from equipment besides carts.    - Isolation precautions followed, supplies available outside room, sign posted     Breezy Huerta

## 2017-04-24 NOTE — PROGRESS NOTES
Progress Note  Pulmonary, Critical Care, and Sleep Medicine    Name: Marcello Weathers MRN: [id number]   : 1960 Hospital: [facility]   Date: 2017        IMPRESSION:   · Acute on chronic hypoxic and hypercapnic respiratory failure possibly multifactorial, see below. Hypercapnia is compensated. SOB improved initially on BIPAP  · Chronic atrial fibrillation not on anticoagulation, rate controlled  · Pulmonary HTN- likely multifactorial, with history of prior heart surgery for congenital heart disease, Pulmonic stenosis suggested by prior echo. Suspect worsening right sided pressures based on history, hilar prominence and CT findings, ANDRADE, crack cocaine use. Recent Echo with RVSP 54  · Morbid obesity, possible OHS  · Bradycardia  · Left lung infiltrate without fever or leukocytosis likely atelectasis. History of drug induced pneumonitis however UDS negative on admission   · ANDRADE diagnosed in the 's, noncompliant with CPAP  · History of polysubstance abuse including crack cocaine may have contributed to [facility]  · History of poor compliance to medication    · Psychiatric illness       PLAN:   · Continue NIV HS which she now agrees to. Incentive Spirometry. Please walk patient around and assess desaturation off O2, pt may need to go home on oxygen. · Continue Ceftriaxone (Day 6, may stop tomorrow) for empiric coverage of possible CAP seen on CT scan. · No need to start pt on ICS/LABA combo, Full PFT's show reduced diffusion capacity with preserved lung volumes, and no signs of obstruction- CTA- negative for PE, likely attributed to pulmonary HTN. Check HIV, RF, SHAHLA, Lupus anticoagulant panel to assess for possible causes contributing to [facility]. · DVT prophylaxis  · Suggest Follow-up with Pulmonary and sleep study as an outpatient to assess severity of ANDRADE and stress to patient the importance of compliance with CPAP/Bipap.       Subjective/Interval History:   I have reviewed the flowsheet and previous days notes. Reviewed interval history. Discussed management with nursing staff. No acute events overnight. Afebrile, hemodynamically stable. Pt used BIPAP briefly last night and understands the importance of it. She appears drowsy, but is easily awakened. She thinks that she is tired because she had just taken a lot of meds. She reports that her breathing is improving. ROS:Pertinent items are noted in HPI. Orders reviewed including medications. Changes made if indicated. Telemetry monitor reviewed at the bedside. Objective:   Vital Signs:    Visit Vitals    /79 (BP 1 Location: Left arm, BP Patient Position: At rest)    Pulse 67    Temp 97.4 °F (36.3 °C)    Resp 20    Ht 5' 3\" (1.6 m)    Wt 134.3 kg (296 lb)    SpO2 95%    Breastfeeding No    BMI 52.43 kg/m2       O2 Device: BIPAP   O2 Flow Rate (L/min): 3 l/min   Temp (24hrs), Av.5 °F (36.4 °C), Min:97.2 °F (36.2 °C), Max:97.9 °F (36.6 °C)       Intake/Output:   Last shift:       07 -  190  In: -   Out: 200 [Urine:200]  Last 3 shifts: 1901 -  0700  In: 710 [P.O.:650; I.V.:60]  Out: 1250 [Urine:1250]    Intake/Output Summary (Last 24 hours) at 17 0853  Last data filed at 17 0806   Gross per 24 hour   Intake              250 ml   Output              500 ml   Net             -250 ml        Physical Exam:    General:  Alert, cooperative, in no distress, appears stated age. Obese    Head:  Normocephalic, without obvious abnormality, atraumatic. Eyes:  ANicteric, PERRL,   Nose: Nares normal. No drainage or sinus tenderness. Throat: Lips, mucosa, and tongue normal.  NO Thrush   Neck: Supple, symmetrical, trachea midline. Lungs:   Bilateral auscultation no rales, rhonchi or wheezes. Decreased breath sounds in lower lobes. No increased WOB at rest.    Chest wall:  No tenderness or deformity. NO intercostal retractions   Heart:  Regular rate and rhythm, S1, S2 normal, no murmur, click, rub or gallop. Abdomen:   Soft, non-tender. Bowel sounds normal. No masses,  No organomegaly. NO paradoxical motion   Extremities: normal, atraumatic, no cyanosis , trace edema resolved   Pulses: 2+ and symmetric all extremities. Skin: Skin color, texture, turgor normal. No rashes or lesions. NO clubbing   Lymph nodes: Cervical  nodes normal.   Neurologic: Grossly nonfocal     DATA:  Labs:  Recent Labs      04/24/17 0419 04/23/17 0246 04/22/17 0307   WBC   --   4.0*   --    HGB  10.5*  10.2*  10.9*   HCT  36.2  35.0  38.1   PLT   --   197   --      Recent Labs      04/24/17 0419 04/23/17 0246 04/22/17   0307   NA  139  140  141   K  4.3  4.2  4.2   CL  102  102  101   CO2  35*  34*  36*   GLU  90  123*  94   BUN  15  15  15   CREA  1.03  1.18  1.10   CA  8.8  8.9  8.5   MG   --    --   2.2   ALB   --   3.2*   --    SGOT   --   15   --    ALT   --   21   --      No results for input(s): PH, PCO2, PO2, HCO3, FIO2 in the last 72 hours.     Imaging:  [x]        I have personally reviewed the patients radiographs and reports  []         [x]        No change from prior, tubes and lines in adequate position  []        Improved   []        Worsening      Karen Scherer PA-C

## 2017-04-24 NOTE — PROGRESS NOTES
Met with pt to follow-up re:  [facility] for home health; she requested I speak to her daughter Malu Shell- called x 2 left VM message-awaiting a return call.  Informed pt to have daughter [place] the agency they wish to use if visits tonight

## 2017-04-24 NOTE — ROUTINE PROCESS
Bedside and Verbal shift change report given to Altagracia Gallardo RN (oncoming nurse) by Ramona Reza (offgoing nurse). Report included the following information SBAR, Kardex, MAR and Recent Results.     SITUATION:    Code Status: Full Code  Reason for Admission: Hypoxia  Fluid overload  Dyspnea  Atrial fibrillation (HCC)   CHF (congestive heart failure) ([facility] [address]    [facility] day: 5   Problem List:       Hospital Problems  Date Reviewed: 2/20/2017          Codes Class Noted POA    Hypoxia ICD-10-CM: R09.02  ICD-9-CM: 799.02  4/18/2017 Unknown        Fluid overload ICD-10-CM: E87.70  ICD-9-CM: 276.69  4/18/2017 Unknown        CHF (congestive heart failure) ([facility] [address] ICD-10-CM: I50.9  ICD-9-CM: 428.0  4/18/2017 Unknown        Dyspnea ICD-10-CM: R06.00  ICD-9-CM: 786.09  Unknown Unknown        Atrial fibrillation ICD-10-CM: I48.91  ICD-9-CM: 427.31  Unknown Unknown    Overview Signed 1/25/2013 10:46 AM by Frankie Masters MD     CHADS score 1  (-CHF, +HTN, -AGE, -DM, -CVA)                   BACKGROUND:    Past Medical History:   Past Medical History:   Diagnosis Date    Atrial fibrillation     CHADS score 1  (-CHF, +HTN, -AGE, -DM, -CVA)    Carpal tunnel syndrome     Chronic pain     Congenital heart disease     presumed pulmonary stenosis s/p repair 1969    Degenerative disc disease     Degenerative joint disease     Dyslipidemia     GERD     Hypertension     Hypothyroid     Morbid obesity     Morbid obesity with BMI of 45.0-49.9, adult ([facility] [address] 2/20/2017    Noncompliance     Schizoaffective disorder     Substance abuse     marijuana, crack cocaine         Patient taking anticoagulants yes     ASSESSMENT:    Changes in Assessment Throughout Shift: NO     Patient has Central Line: no Reasons if yes: N/A   Patient has Shaikh Cath: no Reasons if yes: N/A      Last Vitals:     Vitals:    04/23/17 0837 04/23/17 1211 04/23/17 1550 04/23/17 2101   BP: (!) 151/100 117/42 91/61    Pulse: 70 (!) 59 60    Resp: 18 19 19    Temp: 97.9 °F (36.6 °C) 97.4 °F (36.3 °C) 97.9 °F (36.6 °C)    SpO2: 98% 100% 96% 95%   Weight:       Height:            IV and DRAINS (will only show if present)   [REMOVED] Peripheral IV 04/18/17 Right Forearm-Site Assessment: Clean, dry, & intact  Peripheral IV 04/21/17 Right Hand-Site Assessment: Clean, dry, & intact     WOUND (if present)   Wound Type:  none   Dressing present Dressing Present : No   Wound Concerns/Notes:  none     PAIN    Pain Assessment    Pain Intensity 1: 0 (04/23/17 2017)    Pain Location 1: Hip    Pain Intervention(s) 1: Medication (see MAR)    Patient Stated Pain Goal: 0  o Interventions for Pain:  Motrin and Narco  o Intervention effective: yes  o Time of last intervention: SEE MAR   o Reassessment Completed: yes      Last 3 Weights:  Last 3 Recorded Weights in this Encounter    04/21/17 0400 04/22/17 0500 04/23/17 0533   Weight: 135 kg (297 lb 11.2 oz) 135.1 kg (297 lb 12.8 oz) 134.2 kg (295 lb 12.8 oz)     Weight change: -0.907 kg (-2 lb)     INTAKE/OUPUT    Current Shift:      Last three shifts: 04/22 0701 - 04/23 1900  In: 1300 [P.O.:1290; I.V.:10]  Out: 2850 [Urine:2850]     LAB RESULTS     Recent Labs      04/23/17   0246  04/22/17   0307  04/21/17   0347   WBC  4.0*   --   4.9   HGB  10.2*  10.9*  10.7*   HCT  35.0  38.1  36.8   PLT  197   --   223        Recent Labs      04/23/17   0246  04/22/17   0307  04/21/17   0347   NA  140  141  140   K  4.2  4.2  3.7   GLU  123*  94  92   BUN  15  15  16   CREA  1.18  1.10  1.14   CA  8.9  8.5  8.2*   MG   --   2.2  2.3       RECOMMENDATIONS AND DISCHARGE PLANNING     1. Pending tests/procedures/ Plan of Care or Other Needs: PT/OT CONSULT     2. Discharge plan for patient and Needs/Barriers: PT/OT    3. Estimated Discharge Date: PENDING Posted on Whiteboard in Patients Room: yes      4. The patient's care plan was reviewed with the oncoming nurse.        \"HEALS\" SAFETY CHECK      Fall Risk    Total Score: 3    Safety Measures: Safety Measures: Bed/Chair-Wheels locked, Bed in low position, Call light within reach, Side rails X 3, Gripper socks    A safety check occurred in the patient's room between off going nurse and oncoming nurse listed above. The safety check included the below items  Area Items   H  High Alert Medications - Verify all high alert medication drips (heparin, PCA, etc.)   E  Equipment - Suction is set up for ALL patients (with danya)  - Red plugs utilized for all equipment (IV pumps, etc.)  - WOWs wiped down at end of shift.  - Room stocked with oxygen, suction, and other unit-specific supplies   A  Alarms - Bed alarm is set for fall risk patients  - Ensure chair alarm is in place and activated if patient is up in a chair   L  Lines - Check IV for any infiltration  - Shaikh bag is empty if patient has a Shaikh   - Tubing and IV bags are labeled   S  Safety   - Room is clean, patient is clean, and equipment is clean. - Hallways are clear from equipment besides carts. - Fall bracelet on for fall risk patients  - Ensure room is clear and free of clutter  - Suction is set up for ALL patients (with danya)  - Hallways are clear from equipment besides carts.    - Isolation precautions followed, supplies available outside room, sign posted     Eugene Seals

## 2017-04-24 NOTE — PROGRESS NOTES
SUBJECTIVE:    Patient is sleepy but arousable. Answers questions appropriately. Denies for headaches or dizziness. Feeling sleepy. No chest or abdominal pain. No N/V/D. OBJECTIVE:    Visit Vitals    /79 (BP 1 Location: Left arm, BP Patient Position: At rest)    Pulse 67    Temp 97.4 °F (36.3 °C)    Resp 20    Ht 5' 3\" (1.6 m)    Wt 134.3 kg (296 lb)    SpO2 95%    Breastfeeding No    BMI 52.43 kg/m2     CVS: IRIR  RS: CTA bilaterally. No wheezes  GI: ND, NT, BS +  Extremities: No pedal edema  General: NAD, Awake. Follows commands  CNS: Moves both UEs/LEs    ASSESSMENT:    1. Shortness of breath due to acute on chronic hypoxic and hypercapnic respiratory failure possibly multifactorial  2. Chronic atrial fibrillation not on anticoagulation due to # 8, rate controlled. 3. Pulmonary HTN with history of prior heart surgery for congenital heart disease, Pulmonic stenosis suggested by prior echo. 4. Morbid obesity with likely OHS  5. Left lung infiltrate likely atelectasis. 6. Hypokalemia likely related to diuretic use  7. History of polysubstance abuse including crack cocaine  8. History of poor compliance to medication and therapy  9. Psychiatric illness  10. Chronic diastolic CHF with EF of 52%, compensated. 11. Bradycardia, asymptomatic  12.  Elevated ammonia    PLAN:    Continue current management  Cont Lactulose  ABG on RA in the morning to evaluate patient for home oxygen  Possible discharge in next 2-3 days once BiPAP and home oxygen is set up  Talked to dr. Elder Cam - okay with home nocturnal BiPAP @ 12/5  Talked to dr. Mariluz Houston - will increase BB and reduce Lisinopril     --> total time greater than 30 minutes    CMP:   Lab Results   Component Value Date/Time     04/24/2017 04:19 AM    K 4.3 04/24/2017 04:19 AM     04/24/2017 04:19 AM    CO2 35 (H) 04/24/2017 04:19 AM    AGAP 2 (L) 04/24/2017 04:19 AM    GLU 90 04/24/2017 04:19 AM    BUN 15 04/24/2017 04:19 AM    CREA 1.03 04/24/2017 04:19 AM    GFRAA >60 04/24/2017 04:19 AM    GFRNA 55 (L) 04/24/2017 04:19 AM    CA 8.8 04/24/2017 04:19 AM     CBC:   Lab Results   Component Value Date/Time    HGB 10.5 (L) 04/24/2017 04:19 AM    HCT 36.2 04/24/2017 04:19 AM

## 2017-04-25 LAB
ARTERIAL PATENCY WRIST A: YES
BASE EXCESS BLD CALC-SCNC: 4 MMOL/L
BDY SITE: ABNORMAL
GAS FLOW.O2 O2 DELIVERY SYS: ABNORMAL L/MIN
GAS FLOW.O2 SETTING OXYMISER: 2 L/M
HCO3 BLD-SCNC: 32.1 MMOL/L (ref 22–26)
PCO2 BLD: 63.7 MMHG (ref 35–45)
PH BLD: 7.31 [PH] (ref 7.35–7.45)
PO2 BLD: 80 MMHG (ref 80–100)
RHEUMATOID FACT SER QL LA: POSITIVE
RHEUMATOID FACT TITR SER LA: ABNORMAL {TITER}
SAO2 % BLD: 94 % (ref 92–97)
SERVICE CMNT-IMP: ABNORMAL
SPECIMEN TYPE: ABNORMAL
TOTAL RESP. RATE, ITRR: 12

## 2017-04-25 PROCEDURE — 97530 THERAPEUTIC ACTIVITIES: CPT

## 2017-04-25 PROCEDURE — 74011250637 HC RX REV CODE- 250/637: Performed by: FAMILY MEDICINE

## 2017-04-25 PROCEDURE — 74011000258 HC RX REV CODE- 258: Performed by: PHYSICIAN ASSISTANT

## 2017-04-25 PROCEDURE — 87389 HIV-1 AG W/HIV-1&-2 AB AG IA: CPT | Performed by: PHYSICIAN ASSISTANT

## 2017-04-25 PROCEDURE — 74011250637 HC RX REV CODE- 250/637: Performed by: INTERNAL MEDICINE

## 2017-04-25 PROCEDURE — 94640 AIRWAY INHALATION TREATMENT: CPT

## 2017-04-25 PROCEDURE — 86038 ANTINUCLEAR ANTIBODIES: CPT | Performed by: PHYSICIAN ASSISTANT

## 2017-04-25 PROCEDURE — 65660000000 HC RM CCU STEPDOWN

## 2017-04-25 PROCEDURE — 86430 RHEUMATOID FACTOR TEST QUAL: CPT | Performed by: PHYSICIAN ASSISTANT

## 2017-04-25 PROCEDURE — 97110 THERAPEUTIC EXERCISES: CPT

## 2017-04-25 PROCEDURE — 85613 RUSSELL VIPER VENOM DILUTED: CPT | Performed by: PHYSICIAN ASSISTANT

## 2017-04-25 PROCEDURE — 86431 RHEUMATOID FACTOR QUANT: CPT | Performed by: PHYSICIAN ASSISTANT

## 2017-04-25 PROCEDURE — 82803 BLOOD GASES ANY COMBINATION: CPT

## 2017-04-25 PROCEDURE — 36600 WITHDRAWAL OF ARTERIAL BLOOD: CPT

## 2017-04-25 PROCEDURE — 74011250636 HC RX REV CODE- 250/636: Performed by: FAMILY MEDICINE

## 2017-04-25 PROCEDURE — 94660 CPAP INITIATION&MGMT: CPT

## 2017-04-25 PROCEDURE — 74011250636 HC RX REV CODE- 250/636: Performed by: PHYSICIAN ASSISTANT

## 2017-04-25 PROCEDURE — 36415 COLL VENOUS BLD VENIPUNCTURE: CPT | Performed by: PHYSICIAN ASSISTANT

## 2017-04-25 PROCEDURE — 74011000250 HC RX REV CODE- 250: Performed by: FAMILY MEDICINE

## 2017-04-25 RX ORDER — HYDROCODONE BITARTRATE AND ACETAMINOPHEN 5; 325 MG/1; MG/1
2 TABLET ORAL
Status: DISCONTINUED | OUTPATIENT
Start: 2017-04-25 | End: 2017-04-26 | Stop reason: HOSPADM

## 2017-04-25 RX ORDER — HYDROCODONE BITARTRATE AND ACETAMINOPHEN 10; 325 MG/1; MG/1
2 TABLET ORAL
Status: DISCONTINUED | OUTPATIENT
Start: 2017-04-25 | End: 2017-04-25

## 2017-04-25 RX ADMIN — TOPIRAMATE 50 MG: 25 TABLET, FILM COATED ORAL at 08:58

## 2017-04-25 RX ADMIN — LEVOTHYROXINE SODIUM 125 MCG: 125 TABLET ORAL at 08:58

## 2017-04-25 RX ADMIN — Medication 325 MG: at 08:58

## 2017-04-25 RX ADMIN — ISOSORBIDE MONONITRATE 30 MG: 30 TABLET, EXTENDED RELEASE ORAL at 08:58

## 2017-04-25 RX ADMIN — CEFTRIAXONE SODIUM 1 G: 1 INJECTION, POWDER, FOR SOLUTION INTRAMUSCULAR; INTRAVENOUS at 19:46

## 2017-04-25 RX ADMIN — HYDROCODONE BITARTRATE AND ACETAMINOPHEN 1 TABLET: 5; 325 TABLET ORAL at 09:13

## 2017-04-25 RX ADMIN — HEPARIN SODIUM 5000 UNITS: 5000 INJECTION, SOLUTION INTRAVENOUS; SUBCUTANEOUS at 09:00

## 2017-04-25 RX ADMIN — Medication 10 ML: at 21:21

## 2017-04-25 RX ADMIN — HEPARIN SODIUM 5000 UNITS: 5000 INJECTION, SOLUTION INTRAVENOUS; SUBCUTANEOUS at 18:05

## 2017-04-25 RX ADMIN — FUROSEMIDE 40 MG: 40 TABLET ORAL at 08:58

## 2017-04-25 RX ADMIN — HEPARIN SODIUM 5000 UNITS: 5000 INJECTION, SOLUTION INTRAVENOUS; SUBCUTANEOUS at 03:00

## 2017-04-25 RX ADMIN — PAROXETINE HYDROCHLORIDE 20 MG: 20 TABLET, FILM COATED ORAL at 08:58

## 2017-04-25 RX ADMIN — IPRATROPIUM BROMIDE AND ALBUTEROL SULFATE 3 ML: .5; 3 SOLUTION RESPIRATORY (INHALATION) at 20:39

## 2017-04-25 RX ADMIN — Medication 10 ML: at 06:35

## 2017-04-25 RX ADMIN — ARIPIPRAZOLE 15 MG: 15 TABLET ORAL at 08:58

## 2017-04-25 RX ADMIN — IBUPROFEN 400 MG: 400 TABLET ORAL at 08:58

## 2017-04-25 RX ADMIN — ACETAMINOPHEN 650 MG: 325 TABLET, FILM COATED ORAL at 17:43

## 2017-04-25 RX ADMIN — POTASSIUM CHLORIDE 20 MEQ: 1500 TABLET, EXTENDED RELEASE ORAL at 08:58

## 2017-04-25 RX ADMIN — IPRATROPIUM BROMIDE AND ALBUTEROL SULFATE 3 ML: .5; 3 SOLUTION RESPIRATORY (INHALATION) at 09:38

## 2017-04-25 RX ADMIN — HYDROCODONE BITARTRATE AND ACETAMINOPHEN 2 TABLET: 5; 325 TABLET ORAL at 19:09

## 2017-04-25 RX ADMIN — HYDROCODONE BITARTRATE AND ACETAMINOPHEN 1 TABLET: 5; 325 TABLET ORAL at 02:59

## 2017-04-25 RX ADMIN — SIMVASTATIN 40 MG: 40 TABLET, FILM COATED ORAL at 21:20

## 2017-04-25 RX ADMIN — Medication 10 ML: at 14:00

## 2017-04-25 RX ADMIN — METOPROLOL TARTRATE 25 MG: 25 TABLET ORAL at 08:58

## 2017-04-25 RX ADMIN — LISINOPRIL 10 MG: 10 TABLET ORAL at 08:58

## 2017-04-25 RX ADMIN — IBUPROFEN 400 MG: 400 TABLET ORAL at 21:20

## 2017-04-25 NOTE — PROGRESS NOTES
Reviewed SNF rehab recommendation by PT with Dr. Winston aKplan & then with pt.who agreed she may need skilled therapy in SNF before return home with home health care   List of subacute nursing facilities let for pt to review     Attempted to contact daughter Angel Ferguson again this AM-  phone  now not  accepting messages, pt gave permission to call dantesuresh Marisel howard at [phone] called and left a VM.

## 2017-04-25 NOTE — PROGRESS NOTES
Progress Note  Pulmonary, Critical Care, and Sleep Medicine    Name: Romayne Para MRN: [id number]   : 1960 Hospital: [facility]   Date: 2017        IMPRESSION:   · Acute on chronic hypoxic and hypercapnic respiratory failure possibly multifactorial- on NC during day and bipap QHS   · Chronic atrial fibrillation not on anticoagulation, rate controlled  · Pulmonary HTN- likely multifactorial, with history of prior heart surgery for congenital heart disease, Pulmonic stenosis suggested by prior echo. Suspect worsening right sided pressures based on history, hilar prominence and CT findings, ANDRADE, crack cocaine use. Recent Echo with RVSP 54  · Morbid obesity, possible OHS  · Bradycardia  · Left lung infiltrate without fever or leukocytosis likely atelectasis. History of drug induced pneumonitis however UDS negative on admission- improved  · ANDRADE diagnosed in the 80's, noncompliant with CPAP  · History of polysubstance abuse including crack cocaine may have contributed to [facility]  · History of poor compliance to medication    · Psychiatric illness       PLAN:   · Continue NIV HS which she now agrees to. Incentive Spirometry. Please walk patient around and assess desaturation off O2, pt may need to go home on oxygen. ABG on RA-pending. · Continue last dose of Ceftriaxone (Day 7, may stop today) for empiric coverage of possible CAP seen on CT scan. · No need to start pt on ICS/LABA combo, Full PFT's show reduced diffusion capacity with preserved lung volumes, and no signs of obstruction- CTA- negative for PE, likely attributed to pulmonary HTN. Check HIV, RF, SHAHLA, Lupus anticoagulant panel- pending, to assess for possible causes contributing to [facility]. · DVT prophylaxis  · Suggest Follow-up with Pulmonary for PAH and sleep study as an outpatient. Pt may be discharged on bipap QHS 12/5 and possible home oxygen. Subjective/Interval History:   I have reviewed the flowsheet and previous days notes. Reviewed interval history. Discussed management with nursing staff. No acute events overnight. Afebrile, hemodynamically stable. Pt used BIPAP last night and understands the importance of it. She is feeling good today and thinks she is ready to go home. She reports that her breathing is improving. (+) dry cough that is exacerbated by deep breathing. She complains of chronic right hip pain, but cannot have hip replacement until she loses weight. She denies chest pain, wheezing, hemoptysis, palpitations, syncope, peripheral edema, fevers/chills, weight loss, anorexia, n/v/d, rashes, or any other symptoms at this time. ROS:Pertinent items are noted in HPI. Orders reviewed including medications. Changes made if indicated. Telemetry monitor reviewed at the bedside. Objective:   Vital Signs:    Visit Vitals    /69 (BP 1 Location: Left arm, BP Patient Position: At rest)    Pulse 65    Temp 98 °F (36.7 °C)    Resp 20    Ht 5' 3\" (1.6 m)    Wt 134.9 kg (297 lb 6.4 oz)    SpO2 95%    Breastfeeding No    BMI 52.68 kg/m2       O2 Device: BIPAP   O2 Flow Rate (L/min): 2 l/min   Temp (24hrs), Av.6 °F (36.4 °C), Min:96.8 °F (36 °C), Max:98.3 °F (36.8 °C)       Intake/Output:   Last shift:         Last 3 shifts:  1901 -  0700  In: 980 [P.O.:880; I.V.:100]  Out: 3400 [Urine:3400]    Intake/Output Summary (Last 24 hours) at 17 0847  Last data filed at 17 0541   Gross per 24 hour   Intake              730 ml   Output             2900 ml   Net            -2170 ml        Physical Exam:    General:  Alert, cooperative, in no distress, appears stated age. Obese    Head:  Normocephalic, without obvious abnormality, atraumatic. Eyes:  ANicteric, PERRL,   Nose: Nares normal. No drainage or sinus tenderness. Throat: Lips, mucosa, and tongue normal.  NO Thrush   Neck: Supple, symmetrical, trachea midline. Lungs:   Bilateral auscultation no rales, rhonchi or wheezes.  Decreased breath sounds in lower lobes. No increased WOB at rest.    Chest wall:  No tenderness or deformity. NO intercostal retractions   Heart:  Regular rate and rhythm, S1, S2 normal, no murmur, click, rub or gallop. Abdomen:   Soft, non-tender. Bowel sounds normal. No masses,  No organomegaly. NO paradoxical motion   Extremities: normal, atraumatic, no cyanosis , trace edema resolved   Pulses: 2+ and symmetric all extremities. Skin: Skin color, texture, turgor normal. No rashes or lesions. NO clubbing   Lymph nodes: Cervical  nodes normal.   Neurologic: Grossly nonfocal     DATA:  Labs:  Recent Labs      04/24/17 0419 04/23/17 0246   WBC   --   4.0*   HGB  10.5*  10.2*   HCT  36.2  35.0   PLT   --   197     Recent Labs      04/24/17 0419 04/23/17 0246   NA  139  140   K  4.3  4.2   CL  102  102   CO2  35*  34*   GLU  90  123*   BUN  15  15   CREA  1.03  1.18   CA  8.8  8.9   ALB   --   3.2*   SGOT   --   15   ALT   --   21     No results for input(s): PH, PCO2, PO2, HCO3, FIO2 in the last 72 hours.     Imaging:  [x]        I have personally reviewed the patients radiographs and reports  []         [x]        No change from prior, tubes and lines in adequate position  []        Improved   []        Worsening      Lenore Aguilar PA-C

## 2017-04-25 NOTE — PROGRESS NOTES
SUBJECTIVE:    Sitting up in chair. States she is feeling better. No chest or abdominal pain. No SOB or cough. No N/V/D. OBJECTIVE:    Visit Vitals    /61 (BP 1 Location: Left arm, BP Patient Position: Sitting)    Pulse 66    Temp 97.4 °F (36.3 °C)    Resp 20    Ht 5' 3\" (1.6 m)    Wt 134.9 kg (297 lb 6.4 oz)    SpO2 (!) 87%    Breastfeeding No    BMI 52.68 kg/m2     CVS: IRIR  RS: CTA bilaterally. No wheezes  GI: ND, NT, BS +  Extremities: No pedal edema  General: NAD, Awake. Follows commands  CNS: Moves both UEs/LEs    ASSESSMENT:    1. Shortness of breath due to acute on chronic hypoxic and hypercapnic respiratory failure possibly multifactorial, IMPROVNED   2. Chronic atrial fibrillation not on anticoagulation due to # 8, rate controlled. 3. Pulmonary HTN with history of prior heart surgery for congenital heart disease, Pulmonic stenosis. 4. Morbid obesity with likely OHS  5. Left lung infiltrate likely atelectasis. 6. Hypokalemia likely related to diuretic use  7. History of polysubstance abuse including crack cocaine  8. History of poor compliance to medication and therapy  9. Psychiatric illness  10. Chronic diastolic CHF with EF of 72%, compensated. 11. Bradycardia, asymptomatic - resolved   12. Elevated ammonia  13. CHRONIC Hypoxemic and hypercapnic respiratory failure    PLAN:    Continue current management  Talked to CM - arranging nocturnal home BiPAP and Home oxygen during day time  Spo2 is 87% on RA at rest and 95% on 2 lpm via NC    Possible d/c to SNF in 1-2 days if approved by payer source. Trying to get hold of her daughter.   Talked to sister and she will try to contact her and let us know    --> total time greater than 30 minutes    CMP:   No results found for: NA, K, CL, CO2, AGAP, GLU, BUN, CREA, GFRAA, GFRNA, CA, MG, PHOS, ALB, TBIL, TP, ALB, GLOB, AGRAT, SGOT, ALT, GPT     CBC:   No results found for: WBC, HGB, HGBEXT, HCT, HCTEXT, PLT, PLTEXT, HGBEXT, HCTEXT, PLTEXT

## 2017-04-25 NOTE — NURSE NAVIGATOR
Nurse Navigator Note       NAME: Nelida Homans Ortiz AGE: 61 y.o.   GENDER: female  DATE: 4/25/2017    Date of  Admission: 4/18/2017 11:43 AM     Admission type:Emergency      Villa Dexter MD    Current Facility-Administered Medications   Medication Dose Route Frequency    metoprolol tartrate (LOPRESSOR) tablet 25 mg  25 mg Oral BID    lisinopril (PRINIVIL, ZESTRIL) tablet 10 mg  10 mg Oral DAILY    HYDROcodone-acetaminophen (NORCO) 5-325 mg per tablet 1 Tab  1 Tab Oral Q6H PRN    diphenhydrAMINE (BENADRYL) capsule 25 mg  25 mg Oral Q6H PRN    albuterol-ipratropium (DUO-NEB) 2.5 MG-0.5 MG/3 ML  3 mL Nebulization BID RT    ibuprofen (MOTRIN) tablet 400 mg  400 mg Oral TID    potassium chloride (K-DUR, KLOR-CON) SR tablet 20 mEq  20 mEq Oral BID    furosemide (LASIX) tablet 40 mg  40 mg Oral DAILY    cefTRIAXone (ROCEPHIN) 1 g in 0.9% sodium chloride (MBP/ADV) 50 mL MBP  1 g IntraVENous Q24H    simvastatin (ZOCOR) tablet 40 mg  40 mg Oral QHS    ARIPiprazole (ABILIFY) tablet 15 mg  15 mg Oral DAILY    levothyroxine (SYNTHROID) tablet 125 mcg  125 mcg Oral ACB    aspirin delayed-release tablet 325 mg  325 mg Oral DAILY    PARoxetine (PAXIL) tablet 20 mg  20 mg Oral DAILY    isosorbide mononitrate ER (IMDUR) tablet 30 mg  30 mg Oral DAILY    topiramate (TOPAMAX) tablet 50 mg  50 mg Oral DAILY    sodium chloride (NS) flush 5-10 mL  5-10 mL IntraVENous Q8H    sodium chloride (NS) flush 5-10 mL  5-10 mL IntraVENous PRN    acetaminophen (TYLENOL) tablet 650 mg  650 mg Oral Q4H PRN    naloxone (NARCAN) injection 0.4 mg  0.4 mg IntraVENous PRN    ondansetron (ZOFRAN) injection 4 mg  4 mg IntraVENous Q4H PRN    heparin (porcine) injection 5,000 Units  5,000 Units SubCUTAneous Q8H        Patients Smoking status: @[facility]([id number]      []  Home O2     Current Oxygen therapy O2 Device: Nasal cannula   O2 Sat (%): 95 % Resp Rate: 20        Received Flu Vaccine for Current Season (usually Sept-March): Not Flu Season                       Discharge Dispostion: home  [] Cardiac Rehab              [x] Home Health                                                         [x] [facility]                          [] Outpatient sleep study  [] Other:       Education reinforced, reviewed incentive spirometry with return demonstration given at 1200ml, patient given opportunity to ask questions and reminded Navigator will call post discharge. Patient informed to follow up with Primary Care Provider within 7 days post discharge.      Nicole Talley RN

## 2017-04-25 NOTE — PROGRESS NOTES
Bedside and Verbal shift change report given to Parish Diamond RN (oncoming nurse) by Fidelina Lyman RN (offgoing nurse). Report included the following information SBAR, Kardex and Cardiac Rhythm Atrial Fib.

## 2017-04-25 NOTE — ROUTINE PROCESS
Received report from [facility]. Pt AAOx3, NAD, breathing non labored, on O2 NC at 2L., HOB up. Assisted pt w/ the [facility]. Bed at the lowest level on lock position, call bell w/i reach. Bedside and Verbal shift change report given to HéctorRN (oncoming nurse) by me (offgoing nurse).  Report included the following information SBAR, Kardex, Lab results, I&O, Cardiac Rhythm A-Fib

## 2017-04-25 NOTE — PROGRESS NOTES
Occupational Therapy Note    Patient: Tamica Villaseñor (33 y.o. female)  Date: 4/25/2017  Diagnosis: Hypoxia  Fluid overload  Dyspnea  Atrial fibrillation (HCC)  CH[facility] (congestive heart failure) ([facility] <principal problem not specified>       Precautions: Fall  Chart, occupational therapy assessment, plan of care, and goals were reviewed. Occupational Therapy treatment attempted. Patient is unable to participate due to:  []  Nausea/vomiting  []  Eating  []  Pain  []  Pt lethargic  []  Off Unit  [] Other:  Attempt: 1 pt is asleep, with BiPap on.(08:24)  Attempt x2: Pt is working w/PT (11:12)  Will f/u later as schedule allows. Thank you.     ALLA Russell/XAVIER

## 2017-04-25 NOTE — ROUTINE PROCESS
Received patient in bed sitting, awake, alert and oriented. Oxygen on at 3 lpm via nasal cannula. bipap at HS. No complaints made, will continue to monitor.

## 2017-04-25 NOTE — PROGRESS NOTES
Patient on room air at rest oxygen saturation 87%, no signs of distress. Oxygen placed back on 2L saturations up 90's. Will continue to monitor.

## 2017-04-25 NOTE — PROGRESS NOTES
Problem: Mobility Impaired (Adult and Pediatric)  Goal: *Acute Goals and Plan of Care (Insert Text)  Physical Therapy Goals  Initiated 4/23/2017 and to be accomplished within 7 day(s)  1. Patient will move from supine to sit and sit to supine , scoot up and down and roll side to side in bed with supervision/set-up. 2. Patient will transfer from bed to chair and chair to bed with supervision/set-up using the least restrictive device. 3. Patient will perform sit to stand with supervision/set-up. 4. Patient will ambulate with supervision/set-up for 50 feet with the least restrictive device. 5. Patient will ascend/descend 5 stairs with 1 handrail(s) with minimal assistance/contact guard assist.   PHYSICAL THERAPY TREATMENT     Patient: Duane Rota (9 y.o. female)  Date: 4/25/2017  Diagnosis: Hypoxia  Fluid overload  Dyspnea  Atrial fibrillation (HCC)  CHF (congestive heart failure) ([facility] <principal problem not specified>       Precautions: Fall  Chart, physical therapy assessment, plan of care and goals were reviewed. ASSESSMENT:  Increased activity tolerance noted as pt is more alert throughout session with ability to perform several transfers,increased gait trials, and performs throughout with decreased assist req'd. Pt is motivated,however, cont's to fatigue quickly and require significant rest and redirection 2/2 lethargy. Progression toward goals:  [ ]      Improving appropriately and progressing toward goals  [X]      Improving slowly and progressing toward goals  [ ]      Not making progress toward goals and plan of care will be adjusted       PLAN:  Patient continues to benefit from skilled intervention to address the above impairments. Continue treatment per established plan of care. Discharge Recommendations:  Skilled Nursing Facility  Further Equipment Recommendations for Discharge:  N/A       SUBJECTIVE:   Patient stated I can't use my cane to get up. It only has one point.   Pt reports inability to use personal cane (? SPC) for sit>stand,however, performs several w/o use of AD. OBJECTIVE DATA SUMMARY:   Critical Behavior:  Neurologic State: Alert  Orientation Level: Oriented X4  Cognition: Follows commands  Safety/Judgement: Awareness of environment, Insight into deficits, Fall prevention  Functional Mobility Training:  Bed Mobility:  Rolling: Stand-by asssistance  Supine to Sit: Stand-by asssistance  Transfers:  Sit to Stand: Stand-by asssistance (x's 8 total repetitions/ 6 consecutive w/o AD)  Stand to Sit: Stand-by asssistance  Bed to Chair: Stand-by asssistance  Other: stand step with RW   Min-moderate verbal cues and visual demo for proper sequence and safety with sit<>stand. Pt demo's good carryover in latter repetitions. Balance:  Sitting: Intact  Sitting - Static: Good (unsupported)  Sitting - Dynamic: Good (unsupported)  Standing: With support  Standing - Static: Fair  Standing - Dynamic : Fair  Ambulation/Gait Training:  Distance (ft): 5 Feet (ft) (x's 2 trials)  Assistive Device: Walker, rolling  Ambulation - Level of Assistance: Stand-by asssistance  Gait Abnormalities: Antalgic;Decreased step clearance; Step to gait  Base of Support: Widened;Shift to right  Stance: Right increased; Left increased  Speed/Reyna: Slow;Shuffled  Swing Pattern: Right asymmetrical;Left asymmetrical  Therapeutic Exercises:   Seated: h/t raises x's 30,laq 2 x's 10,glut sets  Pt encouraged to coordinate exhale with exertion to maximize relaxation,energy conservation, and optimal oxygen saturation. Pain:  Pt reports 6/10 pain or discomfort prior to treatment. Pt reports 6/10 pain or discomfort post treatment. With movement in R hip which pt reports chronic pain. Activity Tolerance:   Fair-  Pt cont's to require supplemental O2 with SpO2 ranging between 92-97% with activity. Please refer to the flowsheet for vital signs taken during this treatment.   After treatment:   [X] Patient left in no apparent distress sitting up in chair  [ ] Patient left in no apparent distress in bed  [X] Call bell left within reach  [ ] Nursing notified  [X] Caregiver present  [ ] Bed alarm activated      Bob Chang PTA   Time Calculation: 30 mins

## 2017-04-25 NOTE — PROGRESS NOTES
Problem: Self Care Deficits Care Plan (Adult)  Goal: *Acute Goals and Plan of Care (Insert Text)  Occupational Therapy Goals  Initiated 4/21/2017 within 7 day(s). 1. Patient will perform lower body dressing with supervision/set-up, AE prn. 4/24 - goal Met for Socks  2. Patient will perform toilet transfers with supervision/set-up. 4/24 - Progressing - CGA w/RW  3. Patient will perform all aspects of toileting with supervision/set-up. 4/24 - Progressing Mod I for Bladder hygiene. 4. Patient will participate in upper extremity therapeutic exercise/activities with supervision/set-up for 8 minutes to increase strength/endurance for ADLs. 5. Patient will utilize energy conservation techniques during functional activities with verbal cues. Outcome: Progressing Towards Goal  OCCUPATIONAL THERAPY TREATMENT     Patient: Gaurang Chu (86 y.o. female)  Date: 4/25/2017  Diagnosis: Hypoxia  Fluid overload  Dyspnea  Atrial fibrillation (HCC)  CHF (congestive heart failure) ([facility] <principal problem not specified>       Precautions: Fall  Chart, occupational therapy assessment, plan of care, and goals were reviewed. ASSESSMENT:  Pt is sitting up EOB upon entry w/O2 NC donned on 2L. Pt's O2 sats 86% on NC. Pt's nurse Raj Barragan asked to monitor pt's O2 sats w/o NC O2. Pt participated in [facility] txfr w/FWW w/o supplemental O2. Pt required SBA for txfrs and VCs for deep breathing throughout functional mobility. Pt reports feeling slightly SOB following returning to EOB. Which resolves within 30 sec of deep breathing technique w/visual cues. Pt was educated on and performed BUE TherEx seated EOB in mult planes with VCs throughout for breathing technique. Pt's O2 sats remained 93-97% on RA. Pt's nurse notified and requested for pt to remain on RA. Pt left comfortable, sitting EOB w/friends present in room to visit.   Progression toward goals:  [X]          Improving appropriately and progressing toward goals  [ ]          Improving slowly and progressing toward goals  [ ]          Not making progress toward goals and plan of care will be adjusted       PLAN:  Patient continues to benefit from skilled intervention to address the above impairments. Continue treatment per established plan of care. Discharge Recommendations:  Home Health  Further Equipment Recommendations for Discharge:  [facility]      G-CODES:      Self Care  Current  CI= 1-19%. The severity rating is based on the Level of Assistance required for Functional Mobility and ADLs. SUBJECTIVE:   Patient stated I just get a little nervous when I move around.       OBJECTIVE DATA SUMMARY:   Cognitive/Behavioral Status:  Neurologic State: Alert  Orientation Level: Oriented X4  Cognition: Follows commands  Safety/Judgement: Awareness of environment, Insight into deficits, Fall prevention     Functional Mobility and Transfers for ADLs:              Transfers:  Sit to Stand: Stand-by asssistance (x's 8 total repetitions/ 6 consecutive w/o AD)     Bed to Chair: Stand-by asssistance              Toilet Transfer : Stand-by asssistance (w/FWW)                 Balance:  Sitting: Intact  Sitting - Static: Good (unsupported)  Sitting - Dynamic: Good (unsupported)  Standing: With support  Standing - Static: Fair  Standing - Dynamic : Fair  Therapeutic Exercises:   Pt was educated on and performed BUE TherEx x10 seated EOB in mult planes with VCs throughout for breathing technique. Pt's O2 sats remained 93-97% on RA. Pain:  Pt reports 0/10 pain or discomfort prior to treatment. Pt reports 0/10 pain or discomfort post treatment. Activity Tolerance:    Fair     Please refer to the flowsheet for vital signs taken during this treatment.   After treatment:   [ ]  Patient left in no apparent distress sitting up in chair  [X]  Patient left in no apparent distress seated edge of bed  [X]  Call bell left within reach  [X]  Nursing notified  [X]  Visitors present  [ ]  Bed alarm activated Meg Dunn GOLD/L  Time Calculation: 24 mins

## 2017-04-26 ENCOUNTER — HOME HEALTH ADMISSION (OUTPATIENT)
Dept: HOME HEALTH SERVICES | Facility: HOME HEALTH | Age: 57
End: 2017-04-26
Payer: MEDICAID

## 2017-04-26 VITALS
DIASTOLIC BLOOD PRESSURE: 58 MMHG | RESPIRATION RATE: 16 BRPM | SYSTOLIC BLOOD PRESSURE: 106 MMHG | BODY MASS INDEX: 51.72 KG/M2 | WEIGHT: 291.9 LBS | HEART RATE: 64 BPM | OXYGEN SATURATION: 98 % | TEMPERATURE: 98.7 F | HEIGHT: 63 IN

## 2017-04-26 LAB
ANA TITR SER IF: NEGATIVE {TITER}
ANION GAP BLD CALC-SCNC: 4 MMOL/L (ref 3–18)
BASOPHILS # BLD AUTO: 0 K/UL (ref 0–0.1)
BASOPHILS # BLD: 0 % (ref 0–2)
BUN SERPL-MCNC: 17 MG/DL (ref 7–18)
BUN/CREAT SERPL: 16 (ref 12–20)
CALCIUM SERPL-MCNC: 8.9 MG/DL (ref 8.5–10.1)
CHLORIDE SERPL-SCNC: 100 MMOL/L (ref 100–108)
CO2 SERPL-SCNC: 35 MMOL/L (ref 21–32)
CREAT SERPL-MCNC: 1.06 MG/DL (ref 0.6–1.3)
DIFFERENTIAL METHOD BLD: ABNORMAL
EOSINOPHIL # BLD: 0.2 K/UL (ref 0–0.4)
EOSINOPHIL NFR BLD: 5 % (ref 0–5)
ERYTHROCYTE [DISTWIDTH] IN BLOOD BY AUTOMATED COUNT: 15.1 % (ref 11.6–14.5)
GLUCOSE SERPL-MCNC: 97 MG/DL (ref 74–99)
HCT VFR BLD AUTO: 35.6 % (ref 35–45)
HGB BLD-MCNC: 10.5 G/DL (ref 12–16)
HIV 1+2 AB+HIV1 P24 AG SERPL QL IA: NONREACTIVE
HIV12 RESULT COMMENT, HHIVC: NORMAL
LYMPHOCYTES # BLD AUTO: 29 % (ref 21–52)
LYMPHOCYTES # BLD: 1.1 K/UL (ref 0.9–3.6)
MAGNESIUM SERPL-MCNC: 2.3 MG/DL (ref 1.6–2.6)
MCH RBC QN AUTO: 25.6 PG (ref 24–34)
MCHC RBC AUTO-ENTMCNC: 29.5 G/DL (ref 31–37)
MCV RBC AUTO: 86.8 FL (ref 74–97)
MONOCYTES # BLD: 0.3 K/UL (ref 0.05–1.2)
MONOCYTES NFR BLD AUTO: 8 % (ref 3–10)
NEUTS SEG # BLD: 2.2 K/UL (ref 1.8–8)
NEUTS SEG NFR BLD AUTO: 58 % (ref 40–73)
PLATELET # BLD AUTO: 229 K/UL (ref 135–420)
PMV BLD AUTO: 10.3 FL (ref 9.2–11.8)
POTASSIUM SERPL-SCNC: 4.3 MMOL/L (ref 3.5–5.5)
RBC # BLD AUTO: 4.1 M/UL (ref 4.2–5.3)
SODIUM SERPL-SCNC: 139 MMOL/L (ref 136–145)
WBC # BLD AUTO: 3.8 K/UL (ref 4.6–13.2)

## 2017-04-26 PROCEDURE — 74011250636 HC RX REV CODE- 250/636: Performed by: FAMILY MEDICINE

## 2017-04-26 PROCEDURE — 74011250637 HC RX REV CODE- 250/637: Performed by: INTERNAL MEDICINE

## 2017-04-26 PROCEDURE — 97530 THERAPEUTIC ACTIVITIES: CPT

## 2017-04-26 PROCEDURE — 80048 BASIC METABOLIC PNL TOTAL CA: CPT | Performed by: INTERNAL MEDICINE

## 2017-04-26 PROCEDURE — 77010033678 HC OXYGEN DAILY

## 2017-04-26 PROCEDURE — 94660 CPAP INITIATION&MGMT: CPT

## 2017-04-26 PROCEDURE — 74011250637 HC RX REV CODE- 250/637: Performed by: FAMILY MEDICINE

## 2017-04-26 PROCEDURE — 36415 COLL VENOUS BLD VENIPUNCTURE: CPT | Performed by: INTERNAL MEDICINE

## 2017-04-26 PROCEDURE — 85025 COMPLETE CBC W/AUTO DIFF WBC: CPT | Performed by: INTERNAL MEDICINE

## 2017-04-26 PROCEDURE — 94640 AIRWAY INHALATION TREATMENT: CPT

## 2017-04-26 PROCEDURE — 97535 SELF CARE MNGMENT TRAINING: CPT

## 2017-04-26 PROCEDURE — 74011000250 HC RX REV CODE- 250: Performed by: FAMILY MEDICINE

## 2017-04-26 PROCEDURE — 83735 ASSAY OF MAGNESIUM: CPT | Performed by: INTERNAL MEDICINE

## 2017-04-26 RX ORDER — ALBUTEROL SULFATE 90 UG/1
2 AEROSOL, METERED RESPIRATORY (INHALATION)
Qty: 1 INHALER | Refills: 0 | Status: SHIPPED | OUTPATIENT
Start: 2017-04-26

## 2017-04-26 RX ORDER — LISINOPRIL 5 MG/1
5 TABLET ORAL DAILY
Qty: 30 TAB | Refills: 0 | Status: SHIPPED | OUTPATIENT
Start: 2017-04-26 | End: 2017-08-18 | Stop reason: SDUPTHER

## 2017-04-26 RX ORDER — LISINOPRIL 5 MG/1
5 TABLET ORAL DAILY
Status: DISCONTINUED | OUTPATIENT
Start: 2017-04-27 | End: 2017-04-26 | Stop reason: HOSPADM

## 2017-04-26 RX ORDER — TOPIRAMATE 50 MG/1
50 TABLET, FILM COATED ORAL DAILY
Qty: 60 TAB | Refills: 1 | Status: SHIPPED
Start: 2017-04-26 | End: 2017-05-24 | Stop reason: SINTOL

## 2017-04-26 RX ORDER — METOPROLOL TARTRATE 25 MG/1
25 TABLET, FILM COATED ORAL 2 TIMES DAILY
Qty: 60 TAB | Refills: 0 | Status: SHIPPED | OUTPATIENT
Start: 2017-04-26 | End: 2017-11-28

## 2017-04-26 RX ORDER — PANTOPRAZOLE SODIUM 40 MG/1
40 TABLET, DELAYED RELEASE ORAL
Status: DISCONTINUED | OUTPATIENT
Start: 2017-04-26 | End: 2017-04-26 | Stop reason: HOSPADM

## 2017-04-26 RX ORDER — ESOMEPRAZOLE MAGNESIUM 40 MG/1
40 CAPSULE, DELAYED RELEASE ORAL DAILY
Qty: 30 CAP | Refills: 0 | Status: SHIPPED | OUTPATIENT
Start: 2017-04-26 | End: 2017-11-28

## 2017-04-26 RX ORDER — POTASSIUM CHLORIDE 20 MEQ/1
20 TABLET, EXTENDED RELEASE ORAL DAILY
Qty: 30 TAB | Refills: 0 | Status: SHIPPED | OUTPATIENT
Start: 2017-04-26 | End: 2017-11-28

## 2017-04-26 RX ORDER — FUROSEMIDE 40 MG/1
TABLET ORAL
Qty: 30 TAB | Refills: 0 | Status: ON HOLD | OUTPATIENT
Start: 2017-04-26 | End: 2018-06-06

## 2017-04-26 RX ORDER — IBUPROFEN 400 MG/1
400 TABLET ORAL
Qty: 60 TAB | Refills: 0 | Status: SHIPPED
Start: 2017-04-26 | End: 2017-11-28

## 2017-04-26 RX ADMIN — ARIPIPRAZOLE 15 MG: 15 TABLET ORAL at 09:08

## 2017-04-26 RX ADMIN — LEVOTHYROXINE SODIUM 125 MCG: 125 TABLET ORAL at 09:07

## 2017-04-26 RX ADMIN — LISINOPRIL 10 MG: 10 TABLET ORAL at 09:07

## 2017-04-26 RX ADMIN — HYDROCODONE BITARTRATE AND ACETAMINOPHEN 2 TABLET: 5; 325 TABLET ORAL at 04:53

## 2017-04-26 RX ADMIN — HEPARIN SODIUM 5000 UNITS: 5000 INJECTION, SOLUTION INTRAVENOUS; SUBCUTANEOUS at 09:12

## 2017-04-26 RX ADMIN — IPRATROPIUM BROMIDE AND ALBUTEROL SULFATE 3 ML: .5; 3 SOLUTION RESPIRATORY (INHALATION) at 08:17

## 2017-04-26 RX ADMIN — FUROSEMIDE 40 MG: 40 TABLET ORAL at 09:07

## 2017-04-26 RX ADMIN — Medication 325 MG: at 09:07

## 2017-04-26 RX ADMIN — TOPIRAMATE 50 MG: 25 TABLET, FILM COATED ORAL at 09:08

## 2017-04-26 RX ADMIN — ACETAMINOPHEN 650 MG: 325 TABLET, FILM COATED ORAL at 09:07

## 2017-04-26 RX ADMIN — POTASSIUM CHLORIDE 20 MEQ: 1500 TABLET, EXTENDED RELEASE ORAL at 09:07

## 2017-04-26 RX ADMIN — PAROXETINE HYDROCHLORIDE 20 MG: 20 TABLET, FILM COATED ORAL at 09:08

## 2017-04-26 RX ADMIN — Medication 10 ML: at 04:56

## 2017-04-26 RX ADMIN — IBUPROFEN 400 MG: 400 TABLET ORAL at 09:08

## 2017-04-26 RX ADMIN — METOPROLOL TARTRATE 25 MG: 25 TABLET ORAL at 09:00

## 2017-04-26 RX ADMIN — ISOSORBIDE MONONITRATE 30 MG: 30 TABLET, EXTENDED RELEASE ORAL at 09:08

## 2017-04-26 NOTE — PROGRESS NOTES
Problem: Self Care Deficits Care Plan (Adult)  Goal: *Acute Goals and Plan of Care (Insert Text)  Occupational Therapy Goals  Initiated 4/21/2017 within 7 day(s). 1. Patient will perform lower body dressing with supervision/set-up, AE prn. 4/24 - goal Met for Socks  2. Patient will perform toilet transfers with supervision/set-up. 4/24 - Progressing - CGA w/RW  3. Patient will perform all aspects of toileting with supervision/set-up. 4/24 - Progressing Mod I for Bladder hygiene. 4. Patient will participate in upper extremity therapeutic exercise/activities with supervision/set-up for 8 minutes to increase strength/endurance for ADLs. 5. Patient will utilize energy conservation techniques during functional activities with verbal cues. Outcome: Progressing Towards Goal  OCCUPATIONAL THERAPY TREATMENT     Patient: Marge Salas (34 y.o. female)  Date: 4/26/2017  Diagnosis: Hypoxia  Fluid overload  Dyspnea  Atrial fibrillation (HCC)  CHF (congestive heart failure) ([facility] [address] <principal problem not specified>       Precautions: Fall  Chart, occupational therapy assessment, plan of care, and goals were reviewed. ASSESSMENT:  Pt required some encouragement to participate in OT this morning 2/2 stating she just got off BSC. Pt was able to maneuver to EOB w/Supervised A and participated in functional txfr to the [facility] and back to EOB w/SBA and FWW. Pt is educated on deep breathing techniques during functional mobility and ADLs. Pt demonstrated understanding. Pt performed LB dressing, was Mod Ind for donning socks w/AE demonstrating great carryover from previous sessions, and required min VCs for donning underwear.   Progression toward goals:  [X]          Improving appropriately and progressing toward goals  [ ]          Improving slowly and progressing toward goals  [ ]          Not making progress toward goals and plan of care will be adjusted       PLAN:  Patient continues to benefit from skilled intervention to address the above impairments. Continue treatment per established plan of care. Discharge Recommendations:  Home Health  Further Equipment Recommendations for Discharge:  bedside commode       G-CODES:      Self Care  Current  CI= 1-19%. The severity rating is based on the Level of Assistance required for Functional Mobility and ADLs. SUBJECTIVE:   Patient stated I am nervous about going home.       OBJECTIVE DATA SUMMARY:   Cognitive/Behavioral Status:  Neurologic State: Alert  Orientation Level: Oriented X4  Cognition: Follows commands  Safety/Judgement: Awareness of environment, Insight into deficits, Fall prevention     Functional Mobility and Transfers for ADLs:              Bed Mobility:  Supine to Sit: Supervision  Sit to Supine: Supervision              Transfers:  Sit to Stand: Supervision  Bed to Chair: Supervision              Toilet Transfer : Stand-by asssistance               Balance:  Sitting: Intact  Sitting - Static: Good (unsupported)  Sitting - Dynamic: Good (unsupported)  Standing: With support  Standing - Static: Fair  Standing - Dynamic : Fair     ADL Intervention:        Lower Body Dressing Assistance  Underpants: Stand-by assistance  Socks: Modified independent  Position Performed: Seated edge of bed  Pain:  Pt reports 2/10 pain or discomfort prior to treatment. R hip (pt stating 2/2 arthritis)  Pt reports 2/10 pain or discomfort post treatment. Activity Tolerance:    Fair     Please refer to the flowsheet for vital signs taken during this treatment.   After treatment:   [ ]  Patient left in no apparent distress sitting up in chair  [X]  Patient left in no apparent distress in bed  [X]  Call bell left within reach  [X]  Nursing notified  [ ]  Caregiver present  [ ]  Bed alarm activated     Willma Bones, GOLD/L  Time Calculation: 29 mins

## 2017-04-26 NOTE — ROUTINE PROCESS
Bedside and Verbal shift change report given to Geovani (oncoming nurse) by Merced Becerril (offgoing nurse). Report included the following information SBAR, Kardex, ED Summary, Intake/Output, MAR, Recent Results and Cardiac Rhythm A FIB.

## 2017-04-26 NOTE — PROGRESS NOTES
Progress Note  Pulmonary, Critical Care, and Sleep Medicine    Name: Teagan Wild MRN: [id number]   : 1960 Hospital: [address] [place] [address] Dr   Date: 2017        IMPRESSION:   · Acute on chronic hypoxic and hypercapnic respiratory failure possibly multifactorial- on NC during day and bipap QHS   · Chronic atrial fibrillation not on anticoagulation, rate controlled  · Pulmonary HTN- likely multifactorial, with history of prior heart surgery for congenital heart disease, Pulmonic stenosis suggested by prior echo. Suspect worsening right sided pressures based on history, hilar prominence and CT findings, ANDRADE, crack cocaine use. Recent Echo with RVSP 54  · Morbid obesity, possible OHS  · Bradycardia  · Left lung infiltrate without fever or leukocytosis likely atelectasis. History of drug induced pneumonitis however UDS negative on admission- improved  · ANDRADE diagnosed in the 's, noncompliant with CPAP  · History of polysubstance abuse including crack cocaine may have contributed to [facility]  · History of poor compliance to medication    · Psychiatric illness       PLAN:   · Continue NIV HS which she now agrees to. Incentive Spirometry. Pt will need to go home on home oxygen, pt was 90% on RA at rest and desaturated to 87% with exertion. ABG on NC showed respiratory acidosis on 17, but this was close to baseline. · Completed Course of ABX with clinical improvement. · No need to start pt on ICS/LABA combo, Full PFT's show reduced diffusion capacity with preserved lung volumes, and no signs of obstruction- CTA- negative for PE, likely attributed to pulmonary HTN. Check HIV, RF, SHAHLA, Lupus anticoagulant panel- pending, to assess for possible causes contributing to [facility]. · DVT prophylaxis  · Suggest Follow-up with Pulmonary for PAH and sleep study as an outpatient. Pt may be discharged on bipap QHS 12/5 and possible home oxygen. Pulmonary has nothing to add. Will sign off.       Subjective/Interval History: I have reviewed the flowsheet and previous days notes. Reviewed interval history. Discussed management with nursing staff. No acute events overnight. Afebrile, hemodynamically stable. Pt will need to go home on home oxygen, pt was 90% on RA at rest and desaturated to 87% with exertion per nursing. Pt used BIPAP last night and understands the importance of it. She reports that her breathing is improving. (+) dry cough that is exacerbated by deep breathing. Pt was noted to have some respiratory acidosis on AM ABG on 17 with bipap settings 12/5 overnight. Pt is scared to go home because she has 10 grand children that will disturb her while she is trying to get better. She complains of chronic right hip pain, but cannot have hip replacement until she loses weight. She denies chest pain, wheezing, hemoptysis, palpitations, syncope, peripheral edema, fevers/chills, weight loss, anorexia, n/v/d, rashes, or any other symptoms at this time. ROS:Pertinent items are noted in HPI. Orders reviewed including medications. Changes made if indicated. Telemetry monitor reviewed at the bedside. Objective:   Vital Signs:    Visit Vitals    /58 (BP 1 Location: Left arm, BP Patient Position: Sitting)    Pulse 64    Temp 98.7 °F (37.1 °C)    Resp 16    Ht 5' 3\" (1.6 m)    Wt 132.4 kg (291 lb 14.4 oz)    SpO2 98%    Breastfeeding No    BMI 51.71 kg/m2       O2 Device: Nasal cannula   O2 Flow Rate (L/min): 2 l/min   Temp (24hrs), Av.9 °F (36.6 °C), Min:97.4 °F (36.3 °C), Max:98.7 °F (37.1 °C)       Intake/Output:   Last shift:         Last 3 shifts:  1901 -  0700  In: 600 [P.O.:550;  I.V.:50]  Out: 3100 [Urine:3100]    Intake/Output Summary (Last 24 hours) at 17 0855  Last data filed at 17 0454   Gross per 24 hour   Intake              350 ml   Output             1950 ml   Net            -1600 ml        Physical Exam:    General:  Alert, cooperative, in no distress, appears stated age. Obese    Head:  Normocephalic, without obvious abnormality, atraumatic. Eyes:  ANicteric, PERRL,   Nose: Nares normal. No drainage or sinus tenderness. Throat: Lips, mucosa, and tongue normal.  NO Thrush   Neck: Supple, symmetrical, trachea midline. Lungs:   Soft expiratory wheezing in STEPHEN and RUL. Bilateral auscultation no rales, rhonchi. Decreased breath sounds in lower lobes. No increased WOB at rest.    Chest wall:  No tenderness or deformity. NO intercostal retractions   Heart:  Regular rate and rhythm, S1, S2 normal, no murmur, click, rub or gallop. Abdomen:   Soft, non-tender. Bowel sounds normal. No masses,  No organomegaly. NO paradoxical motion   Extremities: normal, atraumatic, no cyanosis , trace edema resolved   Pulses: 2+ and symmetric all extremities. Skin: Skin color, texture, turgor normal. No rashes or lesions. NO clubbing   Lymph nodes: Cervical  nodes normal.   Neurologic: Grossly nonfocal     DATA:  Labs:  Recent Labs      04/26/17   0408  04/24/17   0419   WBC  3.8*   --    HGB  10.5*  10.5*   HCT  35.6  36.2   PLT  229   --      Recent Labs      04/26/17   0408  04/24/17   0419   NA  139  139   K  4.3  4.3   CL  100  102   CO2  35*  35*   GLU  97  90   BUN  17  15   CREA  1.06  1.03   CA  8.9  8.8   MG  2.3   --      No results for input(s): PH, PCO2, PO2, HCO3, FIO2 in the last 72 hours.     Imaging:  [x]        I have personally reviewed the patients radiographs and reports  []         [x]        No change from prior, tubes and lines in adequate position  []        Improved   []        Worsening      Jose Recio PA-C

## 2017-04-26 NOTE — PROGRESS NOTES
Called daughter ,Chrystal Maier ,who stated she has not yet identified a home montrell provider and will call me back with agency. CM contacted daughter , Demetrius Floyd ( phone [phone]), and reviewed [address]- she chose [facility] for mother. She is aware Portable  Oxygen needs to be delivered to room befor pt leaves and will anticipate picking her mother up at 6:00 pm unless nurse call earlier to say DME has been delivered. Referral entered into EP[employer] & called to Modesta Zafar.

## 2017-04-26 NOTE — PROGRESS NOTES
SUBJECTIVE:    States she is feeling better. No chest or abdominal pain. No SOB or cough. No N/V/D. CM spoke to daughter # [phone] yesterday and they have 24 hours supervision at home and wants her go home with [facility], not to SNF. OBJECTIVE:    Visit Vitals    /58 (BP 1 Location: Left arm, BP Patient Position: Sitting)    Pulse 64    Temp 98.7 °F (37.1 °C)    Resp 16    Ht 5' 3\" (1.6 m)    Wt 132.4 kg (291 lb 14.4 oz)    SpO2 98%    Breastfeeding No    BMI 51.71 kg/m2     CVS: IRIR  RS: CTA bilaterally. No wheezes  GI: ND, NT, BS +  Extremities: No pedal edema  General: NAD, Awake. Follows commands  CNS: Moves both UEs/LEs    ASSESSMENT:    1. Shortness of breath due to acute on chronic hypoxic and hypercapnic respiratory failure possibly multifactorial, IMPROVNED   2. Chronic atrial fibrillation not on anticoagulation due to # 8, rate controlled. 3. Pulmonary HTN with history of prior heart surgery for congenital heart disease, Pulmonic stenosis. 4. Morbid obesity with likely OHS  5. Left lung infiltrate likely atelectasis. 6. Hypokalemia likely related to diuretic use  7. History of polysubstance abuse including crack cocaine  8. History of poor compliance to medication and therapy  9. Psychiatric illness  10. Chronic diastolic CHF with EF of 80%, compensated. 11. Bradycardia, asymptomatic - resolved   12. Elevated ammonia, asymptmatic  13. Chronic Hypoxemic and hypercapnic respiratory failure    PLAN:    Continue current management  Talked to patient's daughter - patient needs to be compliant with BiPAP and medication to prevent re-admission. Verbalized understanding.    Discharge home with [facility] today  Talked to sleep department and set up study on 5/11/17, 8.30 pm     Talked to CM - arranging nocturnal home BiPAP and Home oxygen during day time    PATIENT will benefit from home BiPAP at this time due to hypercapnic respiratory failure and sleep study can be arranged outpatient with the help of dr. Angus Sanchez office so she can be evaluated in future for CPAP use. But she will not benefit from CPAP at this point due to her complex nature of respiratory disease. Spo2 is 87% on RA at rest and 95% on 2 lpm via NC - will qualify for home oxygen      --> total time greater than 35 minutes    CMP:   Lab Results   Component Value Date/Time     04/26/2017 04:08 AM    K 4.3 04/26/2017 04:08 AM     04/26/2017 04:08 AM    CO2 35 (H) 04/26/2017 04:08 AM    AGAP 4 04/26/2017 04:08 AM    GLU 97 04/26/2017 04:08 AM    BUN 17 04/26/2017 04:08 AM    CREA 1.06 04/26/2017 04:08 AM    GFRAA >60 04/26/2017 04:08 AM    GFRNA 53 (L) 04/26/2017 04:08 AM    CA 8.9 04/26/2017 04:08 AM    MG 2.3 04/26/2017 04:08 AM        CBC:   Lab Results   Component Value Date/Time    WBC 3.8 (L) 04/26/2017 04:08 AM    HGB 10.5 (L) 04/26/2017 04:08 AM    HCT 35.6 04/26/2017 04:08 AM     04/26/2017 04:08 AM       Current Discharge Medication List      START taking these medications    Details   lactulose (CHRONULAC) 10 gram/15 mL solution Take 15 mL by mouth two (2) times a day. Qty: 1 Bottle, Refills: 0      furosemide (LASIX) 40 mg tablet 40 mg po daily  Qty: 30 Tab, Refills: 0      potassium chloride (K-DUR, KLOR-CON) 20 mEq tablet Take 1 Tab by mouth daily. Qty: 30 Tab, Refills: 0      esomeprazole (NEXIUM) 40 mg capsule Take 1 Cap by mouth daily. Qty: 30 Cap, Refills: 0         CONTINUE these medications which have CHANGED    Details   ibuprofen (MOTRIN) 400 mg tablet Take 1 Tab by mouth every eight (8) hours as needed for Pain. Qty: 60 Tab, Refills: 0      metoprolol tartrate (LOPRESSOR) 25 mg tablet Take 1 Tab by mouth two (2) times a day. Qty: 60 Tab, Refills: 0      topiramate (TOPAMAX) 50 mg tablet Take 1 Tab by mouth daily. Qty: 60 Tab, Refills: 1      VENTOLIN HFA 90 mcg/actuation inhaler Take 2 Puffs by inhalation every four (4) hours as needed for Wheezing or Shortness of Breath.   Qty: 1 Inhaler, Refills: 0      lisinopril (PRINIVIL, ZESTRIL) 5 mg tablet Take 1 Tab by mouth daily. Qty: 30 Tab, Refills: 0         CONTINUE these medications which have NOT CHANGED    Details   HYDROcodone-acetaminophen (NORCO) 7.5-325 mg per tablet Take 1-2 Tabs by mouth nightly as needed for Pain. Max Daily Amount: 2 Tabs. Qty: 60 Tab, Refills: 0    Associated Diagnoses: Primary osteoarthritis of right hip; Chronic right hip pain; Shoulder bursitis, left; Left shoulder pain, unspecified chronicity; Osteoarthritis of spine with radiculopathy, lumbar region; Trochanteric bursitis of right hip; Status post total left knee replacement; Frequent falls; Decreased ROM of left knee; Chronic pain of left knee; Morbid obesity with BMI of 45.0-49.9, [facility] Right low back pain, unspecified chronicity, with sciatica presence unspecified      PARoxetine (PAXIL) 20 mg tablet Take 1 Tab by mouth daily. Qty: 30 Tab, Refills: 3      aspirin delayed-release 325 mg tablet TAKE ONE TABLET BY MOUTH ONCE DAILY  Qty: 30 Tab, Refills: 6      simvastatin (ZOCOR) 40 mg tablet Take  by mouth nightly. aripiprazole (ABILIFY) 15 mg tablet Take 15 mg by mouth daily. levothyroxine (SYNTHROID) 125 mcg tablet Take  by mouth daily (before breakfast).       isosorbide mononitrate ER (IMDUR) 30 mg tablet TAKE ONE TABLET BY MOUTH EVERY EVENING  Qty: 30 Tab, Refills: 7

## 2017-04-26 NOTE — DISCHARGE SUMMARY
[facility] [address]  [address] [place] [place] SUMMARY    Name:  Cammie Swanson  MR#:  [id number]  :  1960  Account #:  [de-identified]  Date of Adm:  2017  Date of Discharge:  2017      DATE OF ADMISSION: 2017    DATE OF DISCHARGE: 2017    DISPOSITION: Discharge to home with home health care for PT, OT,  DME as needed, including a rolling walker, bedside commode, skilled  nursing and home oxygen 2 liters via nasal cannula, as well as BiPAP  to use at night time. The patient can be discharged home once BiPAP  and home oxygen is set up. DISCHARGE CONDITION: Stable. DISCHARGE DIAGNOSES  1. Shortness of breath, multifactorial, due to acute on chronic  hypoxemic and hypercapnic respiratory failure. 2. Acute hypoxemic respiratory failure due to congestive heart failure  and chronic obstructive pulmonary disease. 3. Acute hypercapnic respiratory failure due to undiagnosed underlying  sleep apnea and obesity hyperventilation syndrome. 4. Obesity. 5. Pulmonary hypertension. 6. Prior heart history for congenital heart disease and pulmonary  stenosis. 7. Left lung infiltrates, likely atelectasis. 8. Morbid obesity with likely obstructive sleep apnea. 9. Hypokalemia due to diuretic use. 10. History of polysubstance abuse including crack cocaine in the past.  11. History of medical noncompliance. 12. Psychiatric illnesses. 13. Chronic diastolic heart failure with ejection fraction of 60%,  compensated. 14. Asymptomatic bradycardia, resolved. 15. Asymptomatic elevated ammonia due to constipation, resolving. DISCHARGE MEDICATIONS  1. Lasix 40 mg p.o. daily. 2. Potassium chloride 20 mEq p.o. daily. 3. Lactulose 15 mL b.i.d., hold for diarrhea. 4. Nexium 40 mg p.o. daily. 5. Ibuprofen 400 mg 3 times a day as needed for pain. 6. Lopressor 25 mg b.i.d.  7. Lisinopril 5 mg daily. 8. Topamax 50 mg daily. 9. Albuterol inhaler 2 puffs q.4 hours p.r.n. for shortness of breath.   10. Norco 7.5 two times a day as needed for pain, if she was taking it. 11. Paxil 20 mg p.o. daily. 12. Aspirin 325 mg daily. 13. Zocor 40 mg daily. 14. Abilify 15 mg daily. 15. Synthroid 125 mcg daily. 16. Imdur 30 mg p.o. daily. IMAGING AND PROCEDURES  1. Chest x-ray was done on 04/18/2017, showed cardiomegaly is  unchanged. No confluent infiltrates. 2. CTA chest with and without contrast done showed patchy opacity in  the left lower lobe, cardiomegaly, no pulmonary embolism. 3. CT scan of the head was done, no acute finding other than mild  atrophy and small vessel disease. 4. Echocardiogram was done, showed ejection fraction 60% to  65% with a right-sided ventricle pressure of 54 with moderate  to severe regurgitation. CONSULTANTS  1. Cardiology with Dr. Jesse Steele and group. 2. Pulmonary with Dr. Belkys Garcia and group. HOSPITAL COURSE: The patient was admitted to the hospital on  04/18/2017 with a complaint of shortness of breath. Please refer to  hospital admission H and P done by Dr. Erica Elias for further details. The  patient was found to have hypoxia and admitted here with a diagnosis  of hypoxia that could be due to acute on chronic diastolic heart failure,  as well as underlying worsening pulmonary hypertension due to  undiagnosed and untreated sleep apnea. The patient initially had a  chest x-ray and started on IV Lasix, which was changed to p.o. Lasix. Pulmonary was consulted as the patient also had hypercapnic and  hypoxic respiratory failure. The patient was treated with BiPAP initially. The patient initially was refusing to have BiPAP, but after multiple  discussions she agreed to use BiPAP every night with oxygen during  daytime and was feeling much better. The patient had a CTA chest  done, showed above-mentioned findings. It did not show any  pulmonary embolism, but showed left-sided atelectasis/infiltrate, which  was treated with 7 days of antibiotic. She remained asymptomatic. Her  shortness of breath got better. At this point, she will be continued on  albuterol inhaler as needed. She was saturating 87% on room air and  98% on 2 L, and will qualify for 2 liters home oxygen at this point. The  patient also had chronic hypercapnic respiratory failure due to morbid  obesity and obesity-induced hypoventilation syndrome. A sleep study  will be done as an outpatient on 05/11/2017 at 8:30 p.m.; however,  until then, the patient will continue to use of BiPAP at home if her  insurance approves it. The compliance issue with her medications as  well as using BiPAP was discussed with the patient's daughter. She  verbalized understanding. The patient also had some bradycardia  initially and was seen by cardiology service. Her metoprolol was  decreased to 25 twice a day with improvement in her symptoms. Lisinopril was also decreased from 20 to 5 mg daily to avoid  hypotension as this patient is using some psychiatric medication as  well as narcotics, which can cause lower hypotension. She was  tolerating these changes without any problem. She has been on  ibuprofen and 325 mg aspirin for chronic atrial fibrillation. I have started  her on PPI to prevent peptic ulcer disease. The patient is not a  candidate for any anticoagulation as per cardiologist due to her  medical noncompliance. They will follow this patient outpatient and see  how she is and if she remains compliant they can put her on an  anticoagulation as an outpatient. The patient was seen by PT/OT,  who recommended SNF versus home health care. We spoke to the  patient's daughter. She told us that she has 24-hour care and they  want to take her home rather than letting her go to nursing home. The  patient will be discharged to home today once home oxygen and  BiPAP is set up. DISCHARGE INSTRUCTIONS  1. Diet: Cardiac diet. 2. Activity as tolerated. PT, OT to follow. 3. Follow with the PCP in 5 days.   4. Follow with Dr. Lilliana Osman in 10-14 days.  5. Follow with Dr. Brenda Tinsley in 2-3 weeks. 6. Sleep study on 05/11/2017 at 8:30 p.m. at the [facility]  in [address] has been set up. The patient will be discharged home with the above-mentioned  medications. To prevent readmissions due to her noncompliance  nature, I am setting up home health care. TOTAL TIME: Greater than 35 minutes.         MD ANJALI Martinez / Chris  D:  04/26/2017   10:21  T:  04/26/2017   15:00  Job #:  [id number]

## 2017-04-26 NOTE — DISCHARGE INSTRUCTIONS
DISCHARGE SUMMARY from Nurse    The following personal items are in your possession at time of discharge:    Dental Appliances: None  Visual Aid: Glasses, At bedside, With patient  Hearing Aids/Status: Right  Home Medications: Kept at bedside  Jewelry: Earrings, With patient  Clothing: At bedside, Footwear, Pants, Shirt, Socks, Undergarments, With patient  Other Valuables: Cell Phone, [place], With patient             PATIENT INSTRUCTIONS:    After general anesthesia or intravenous sedation, for 24 hours or while taking prescription Narcotics:  · Limit your activities  · Do not drive and operate hazardous machinery  · Do not make important personal or business decisions  · Do  not drink alcoholic beverages  · If you have not urinated within 8 hours after discharge, please contact your surgeon on call. Report the following to your surgeon:  · Excessive pain, swelling, redness or odor of or around the surgical area  · Temperature over 100.5  · Nausea and vomiting lasting longer than 4 hours or if unable to take medications  · Any signs of decreased circulation or nerve impairment to extremity: change in color, persistent  numbness, tingling, coldness or increase pain  · Any questions        What to do at Home:  Recommended activity: Activity as tolerated    If you experience any of the following symptoms chest pain, chest tightness, shortness of breathe, nausea, vomiting, or spontaneous bleeding, please follow up with primary care provider or the emergency department. *  Please give a list of your current medications to your Primary Care Provider. *  Please update this list whenever your medications are discontinued, doses are      changed, or new medications (including over-the-counter products) are added. *  Please carry medication information at all times in case of emergency situations.           These are general instructions for a healthy lifestyle:    No smoking/ No tobacco products/ Avoid exposure to second hand smoke    Surgeon General's Warning:  Quitting smoking now greatly reduces serious risk to your health. Obesity, smoking, and sedentary lifestyle greatly increases your risk for illness    A healthy diet, regular physical exercise & weight monitoring are important for maintaining a healthy lifestyle    You may be retaining fluid if you have a history of heart failure or if you experience any of the following symptoms:  Weight gain of 3 pounds or more overnight or 5 pounds in a week, increased swelling in our hands or feet or shortness of breath while lying flat in bed. Please call your doctor as soon as you notice any of these symptoms; do not wait until your next office visit. Recognize signs and symptoms of STROKE:    F-face looks uneven    A-arms unable to move or move unevenly    S-speech slurred or non-existent    T-time-call 911 as soon as signs and symptoms begin-DO NOT go       Back to bed or wait to see if you get better-TIME IS BRAIN. Warning Signs of HEART ATTACK     Call 911 if you have these symptoms:   Chest discomfort. Most heart attacks involve discomfort in the center of the chest that lasts more than a few minutes, or that goes away and comes back. It can feel like uncomfortable pressure, squeezing, fullness, or pain.  Discomfort in other areas of the upper body. Symptoms can include pain or discomfort in one or both arms, the back, neck, jaw, or stomach.  Shortness of breath with or without chest discomfort.  Other signs may include breaking out in a cold sweat, nausea, or lightheadedness. Don't wait more than five minutes to call 911 - MINUTES MATTER! Fast action can save your life. Calling 911 is almost always the fastest way to get lifesaving treatment. Emergency Medical Services staff can begin treatment when they arrive -- up to an hour sooner than if someone gets to the hospital by car.        The discharge information has been reviewed with the patient. The patient verbalized understanding. Discharge medications reviewed with the patient and appropriate educational materials and side effects teaching were provided. Patient armband removed and shredded. MyChart Activation    Thank you for requesting access to [facility]. Please follow the instructions below to securely access and download your online medical record. [facility] allows you to send messages to your doctor, view your test results, renew your prescriptions, schedule appointments, and more. How Do I Sign Up? 1. In your internet browser, go to [email or web address]  2. Click on the First Time User? Click Here link in the Sign In box. You will be redirect to the New Member Sign Up page. 3. Enter your [employer] Access Code exactly as it appears below. You will not need to use this code after youve completed the sign-up process. If you do not sign up before the expiration date, you must request a new code. [employer] Access Code: [email or web address]  Expires: 2017  1:04 PM (This is the date your [employer] access code will )    4. Enter the last four digits of your Social Security Number (xxxx) and Date of Birth (mm/dd/yyyy) as indicated and click Submit. You will be taken to the next sign-up page. 5. Create a [facility] ID. This will be your [employer] login ID and cannot be changed, so think of one that is secure and easy to remember. 6. Create a [employer] [facility] password. You can change your password at any time. 7. Enter your Password Reset Question and Answer. This can be used at a later time if you forget your password. 8. Enter your e-mail address. You will receive e-mail notification when new information is available in [address] 9. Click Sign Up. You can now view and download portions of your medical record. 10. Click the Download Summary menu link to download a portable copy of your medical information.     Additional Information    If you have questions, please visit the Frequently Asked Questions section of the [employer] website at [email or web address] Remember, [employer] is NOT to be used for urgent needs. For medical emergencies, dial 911.

## 2017-04-26 NOTE — DISCHARGE SUMMARY
PATIENT DISCHARGE INSTRUCTIONS      PATIENT DISCHARGE INSTRUCTIONS    Cristhian Jimenez / [id number] : 1960    Admitted 2017 Discharged: 2017     DICTATED # [id number]    · It is important that you take the medication exactly as they are prescribed. · Keep your medication in the bottles provided by the pharmacist and keep a list of the medication names, dosages, and times to be taken in your wallet. · Do not take other medications without consulting your doctor. What to do at Home    Recommended Diet: Cardiac Diet    Recommended Activity: PT/OT Eval and Treat and PT/OT per Home Health    Current Discharge Medication List      START taking these medications    Details   lactulose (CHRONULAC) 10 gram/15 mL solution Take 15 mL by mouth two (2) times a day. Qty: 1 Bottle, Refills: 0      furosemide (LASIX) 40 mg tablet 40 mg po daily  Qty: 30 Tab, Refills: 0      potassium chloride (K-DUR, KLOR-CON) 20 mEq tablet Take 1 Tab by mouth daily. Qty: 30 Tab, Refills: 0      esomeprazole (NEXIUM) 40 mg capsule Take 1 Cap by mouth daily. Qty: 30 Cap, Refills: 0         CONTINUE these medications which have CHANGED    Details   ibuprofen (MOTRIN) 400 mg tablet Take 1 Tab by mouth every eight (8) hours as needed for Pain. Qty: 60 Tab, Refills: 0      metoprolol tartrate (LOPRESSOR) 25 mg tablet Take 1 Tab by mouth two (2) times a day. Qty: 60 Tab, Refills: 0      topiramate (TOPAMAX) 50 mg tablet Take 1 Tab by mouth daily. Qty: 60 Tab, Refills: 1      VENTOLIN HFA 90 mcg/actuation inhaler Take 2 Puffs by inhalation every four (4) hours as needed for Wheezing or Shortness of Breath. Qty: 1 Inhaler, Refills: 0      lisinopril (PRINIVIL, ZESTRIL) 5 mg tablet Take 1 Tab by mouth daily. Qty: 30 Tab, Refills: 0         CONTINUE these medications which have NOT CHANGED    Details   HYDROcodone-acetaminophen (NORCO) 7.5-325 mg per tablet Take 1-2 Tabs by mouth nightly as needed for Pain.  Max Daily Amount: 2 Tabs.  Qty: 60 Tab, Refills: 0    Associated Diagnoses: Primary osteoarthritis of right hip; Chronic right hip pain; Shoulder bursitis, left; Left shoulder pain, unspecified chronicity; Osteoarthritis of spine with radiculopathy, lumbar region; Trochanteric bursitis of right hip; Status post total left knee replacement; Frequent falls; Decreased ROM of left knee; Chronic pain of left knee; Morbid obesity with BMI of 45.0-49.9, adult ([facility] Right low back pain, unspecified chronicity, with sciatica presence unspecified      PARoxetine (PAXIL) 20 mg tablet Take 1 Tab by mouth daily. Qty: 30 Tab, Refills: 3      aspirin delayed-release 325 mg tablet TAKE ONE TABLET BY MOUTH ONCE DAILY  Qty: 30 Tab, Refills: 6      simvastatin (ZOCOR) 40 mg tablet Take  by mouth nightly. aripiprazole (ABILIFY) 15 mg tablet Take 15 mg by mouth daily. levothyroxine (SYNTHROID) 125 mcg tablet Take  by mouth daily (before breakfast).       isosorbide mononitrate ER (IMDUR) 30 mg tablet TAKE ONE TABLET BY MOUTH EVERY EVENING  Qty: 30 Tab, Refills: 7               Signed By: Ines Enriquez MD     April 26, 2017

## 2017-04-26 NOTE — PROGRESS NOTES
Bedside and Verbal shift change report given to [facility] (oncoming nurse) by Kamille Hancock (offgoing nurse). Report included the following information SBAR and Recent Results.

## 2017-04-26 NOTE — PROGRESS NOTES
Problem: Mobility Impaired (Adult and Pediatric)  Goal: *Acute Goals and Plan of Care (Insert Text)  Physical Therapy Goals  Initiated 4/23/2017 and to be accomplished within 7 day(s)  1. Patient will move from supine to sit and sit to supine , scoot up and down and roll side to side in bed with supervision/set-up. 2. Patient will transfer from bed to chair and chair to bed with supervision/set-up using the least restrictive device. 3. Patient will perform sit to stand with supervision/set-up. 4. Patient will ambulate with supervision/set-up for 50 feet with the least restrictive device. 5. Patient will ascend/descend 5 stairs with 1 handrail(s) with minimal assistance/contact guard assist.   PHYSICAL THERAPY TREATMENT     Patient: Estiven Woodard (89 y.o. female)  Date: 4/26/2017  Diagnosis: Hypoxia  Fluid overload  Dyspnea  Atrial fibrillation (HCC)  CHF (congestive heart failure) ([facility] [address] <principal problem not specified>       Precautions: Fall  Chart, physical therapy assessment, plan of care and goals were reviewed. ASSESSMENT:  Increased activity tolerance noted as pt is able to perform increased single trial ambulation distance with use of RW and supplemental O2. Pt will benefit from [facility] PT to further increase strength,increase activity tolerance, and maximize proficiency/safety throughout functional mobility. Progression toward goals:  [ ]      Improving appropriately and progressing toward goals  [X]      Improving slowly and progressing toward goals  [ ]      Not making progress toward goals and plan of care will be adjusted       PLAN:  Patient continues to benefit from skilled intervention to address the above impairments. Continue treatment per established plan of care. Discharge Recommendations:  Home Health  Further Equipment Recommendations for Discharge:  rolling walker and N/A       G-CODES:      Mobility  D/C  CI= 1-19%.   The severity rating is based on the Level of Assistance required for Functional Mobility and ADLs. Mobility   Goal  CI= 1-19%. The severity rating is based on the Level of Assistance required for Functional Mobility and ADLs. SUBJECTIVE:   Patient stated I have people home with me all the time.       OBJECTIVE DATA SUMMARY:   Critical Behavior:  Neurologic State: Alert  Orientation Level: Oriented X4  Cognition: Follows commands  Safety/Judgement: Awareness of environment, Insight into deficits, Fall prevention  Functional Mobility Training:  Transfers:  Sit to Stand: Supervision (to RW x's 2 trials)  Stand to Sit: Supervision  Bed to Chair: Supervision  Other: stand step with Rw  Balance:  Sitting: Intact  Sitting - Static: Good (unsupported)  Sitting - Dynamic: Good (unsupported)  Standing: With support  Standing - Static: Fair  Standing - Dynamic : Fair  Ambulation/Gait Training:  Distance (ft): 10 Feet (ft) (x's 2 trials)  Assistive Device: Walker, rolling  Ambulation - Level of Assistance: Supervision  Gait Abnormalities: Decreased step clearance; Step to gait; Antalgic (R antalgic)  Base of Support: Widened;Shift to right  Stance: Right decreased; Left increased  Speed/Reyna: Slow  Step Length: Right shortened;Left shortened  Swing Pattern: Right asymmetrical;Left asymmetrical  Therapeutic Exercises:   Seated: h/t raises,laq,glut sets x's 10  Pain:  Pt reports 0/10 pain or discomfort prior to treatment. Pt reports 0/10 pain or discomfort post treatment. At rest,however, pt is antalgic with R LE WB'ing. Activity Tolerance:   fair   Please refer to the flowsheet for vital signs taken during this treatment.   After treatment:   [X] Patient left in no apparent distress sitting up in chair  [ ] Patient left in no apparent distress in bed  [X] Call bell left within reach  [ ] Nursing notified  [ ] Caregiver present  [ ] Bed alarm activated      Juan Hawthorne PTA   Time Calculation: 13 mins

## 2017-04-26 NOTE — HOME CARE
Received [facility] referral, Discharge noted for today, [facility] will follow for SN,PT/OT,BIPAP management; pt was ordered BIPAP and home O2 set up and BSC by , pt has been provided BIPAP and portable O2 concentrator for discharge today, Shannen from [facility] states that pt's home O2 set up will be done tomorrow 4/27 and pt's [facility] will be delivered to home tomorrow by [facility] ; pt states she already has a nebulizer and rollator (pt does not qualify for a RW) ; pt's daughter Jo Ann Scales) states that she and her daughter Gwendolyn Ko) will be pt's caregivers and staying with pt. CARLTON MCDANIEL.

## 2017-04-26 NOTE — PROGRESS NOTES
On 04-25-17,  CM spoke to pt and daughter Ekaterina May ( by phone) to review SNF versus [address] stated pt has 24/7 care  between her and granddaughter in the home. She prefers pt return home with home health care & has already aligned a social service visit for re- evaluation of eligibility for PCA in the home. Discussed the [facility] list that was left with pt and she agreed to review and choose an agency and inform CM by VM message. This morning, CM spoke to Ekaterina May again- she had not yet reviewed the [facility] list for home health care and stated she will be at the hospital later in day to meet with CM and identify an agency. She had spoke with [facility] and is aware mother is planned to be discharged today,. She will provide transportation home and requested the discharge medications be filed at hospital [address] [place] [address] at [facility] informed of DME needs & will align delivery to hospital and home today.

## 2017-04-26 NOTE — DISCHARGE SUMMARY
[address]  Face to Face Encounter    Patients Name: Molly Davis    YOB: 1960    Primary Diagnosis: ANDRADE, SOB, Diastolic CHF, medical non-compliance     Date of Face to Face:   4/26/2017                                  Face to Face Encounter findings are related to primary reason for home care:   yes. 1. I certify that the patient needs intermittent care as follows: skilled nursing care:  skilled observation/assessment, patient education and complex care plan management  physical therapy: strengthening, stretching/ROM, transfer training and gait/stair training  occupational therapy:  ADL safety (ie. cooking, bathing, dressing)    2. I certify that this patient is homebound, that is: 1) patient requires the use of a walker and wheelchair device, special transportation, or assistance of another to leave the home; or 2) patient's condition makes leaving the home medically contraindicated; and 3) patient has a normal inability to leave the home and leaving the home requires considerable and taxing effort. Patient may leave the home for infrequent and short duration for medical reasons, and occasional absences for non-medical reasons. Homebound status is due to the following functional limitations: Patient with increased shortness of breath and elevated heart rate with ambulation greater than 20 feet limiting patient's ability to ambulate safely within the community. Patient with strength deficits limiting the performance of all ADL's without caregiver assistance or the use of an assistive device. Patient with poor safety awareness and is at risk for falls without assistance of another person and the use of an assistive device. Patient with poor ambulation endurance limiting their safe ability to ascend/descend the required number of steps to leave the home.     3. I certify that this patient is under my care and that I, or a nurse practitioner or [phone], or clinical nurse specialist, or certified nurse midwife, working with me, had a Face-to-Face Encounter that meets the physician Face-to-Face Encounter requirements. The following are the clinical findings from the [address] [place] [address] encounter that support the need for skilled services and is a summary of the encounter: pulmonary HTN, ANDRADE, SOB, Diastolic CHF, medical non-compliance     See discharge summary      Gabriel Gutierrez MD  4/26/2017      THE FOLLOWING TO BE COMPLETED BY THE COMMUNITY PHYSICIAN:    I concur with the findings described above from the F2F encounter that this patient is homebound and in need of a skilled service.     Certifying Physician: _____________________________________      Printed Certifying Physician Name: _____________________________________    Date: _________________

## 2017-04-27 ENCOUNTER — HOME CARE VISIT (OUTPATIENT)
Dept: SCHEDULING | Facility: HOME HEALTH | Age: 57
End: 2017-04-27
Payer: MEDICAID

## 2017-04-27 ENCOUNTER — NURSE NAVIGATOR (OUTPATIENT)
Dept: OTHER | Age: 57
End: 2017-04-27

## 2017-04-27 LAB
LA PPP-IMP: NORMAL
SCREEN APTT: 40.2 SEC (ref 0–43.6)
SCREEN DRVVT: 32.8 SEC (ref 0–44)

## 2017-04-27 PROCEDURE — 400013 HH SOC

## 2017-04-27 PROCEDURE — G0299 HHS/HOSPICE OF RN EA 15 MIN: HCPCS

## 2017-04-28 ENCOUNTER — HOME CARE VISIT (OUTPATIENT)
Dept: HOME HEALTH SERVICES | Facility: HOME HEALTH | Age: 57
End: 2017-04-28
Payer: MEDICAID

## 2017-04-28 VITALS
WEIGHT: 291 LBS | BODY MASS INDEX: 51.56 KG/M2 | SYSTOLIC BLOOD PRESSURE: 132 MMHG | DIASTOLIC BLOOD PRESSURE: 80 MMHG | RESPIRATION RATE: 20 BRPM | HEART RATE: 80 BPM | HEIGHT: 63 IN | TEMPERATURE: 98.2 F | OXYGEN SATURATION: 90 %

## 2017-04-29 ENCOUNTER — HOME CARE VISIT (OUTPATIENT)
Dept: SCHEDULING | Facility: HOME HEALTH | Age: 57
End: 2017-04-29
Payer: MEDICAID

## 2017-04-29 PROCEDURE — G0299 HHS/HOSPICE OF RN EA 15 MIN: HCPCS

## 2017-04-30 ENCOUNTER — HOME CARE VISIT (OUTPATIENT)
Dept: SCHEDULING | Facility: HOME HEALTH | Age: 57
End: 2017-04-30
Payer: MEDICAID

## 2017-04-30 VITALS
RESPIRATION RATE: 17 BRPM | TEMPERATURE: 97.3 F | DIASTOLIC BLOOD PRESSURE: 80 MMHG | HEART RATE: 78 BPM | SYSTOLIC BLOOD PRESSURE: 130 MMHG

## 2017-04-30 VITALS
TEMPERATURE: 98.2 F | RESPIRATION RATE: 20 BRPM | OXYGEN SATURATION: 97 % | SYSTOLIC BLOOD PRESSURE: 140 MMHG | HEART RATE: 70 BPM | DIASTOLIC BLOOD PRESSURE: 78 MMHG

## 2017-04-30 PROCEDURE — G0151 HHCP-SERV OF PT,EA 15 MIN: HCPCS

## 2017-05-02 ENCOUNTER — HOME CARE VISIT (OUTPATIENT)
Dept: SCHEDULING | Facility: HOME HEALTH | Age: 57
End: 2017-05-02
Payer: MEDICAID

## 2017-05-02 ENCOUNTER — HOME CARE VISIT (OUTPATIENT)
Dept: HOME HEALTH SERVICES | Facility: HOME HEALTH | Age: 57
End: 2017-05-02
Payer: MEDICAID

## 2017-05-02 VITALS — DIASTOLIC BLOOD PRESSURE: 80 MMHG | SYSTOLIC BLOOD PRESSURE: 140 MMHG | HEART RATE: 88 BPM | OXYGEN SATURATION: 95 %

## 2017-05-02 PROCEDURE — G0157 HHC PT ASSISTANT EA 15: HCPCS

## 2017-05-02 PROCEDURE — G0152 HHCP-SERV OF OT,EA 15 MIN: HCPCS

## 2017-05-02 PROCEDURE — G0299 HHS/HOSPICE OF RN EA 15 MIN: HCPCS

## 2017-05-03 VITALS — OXYGEN SATURATION: 94 % | SYSTOLIC BLOOD PRESSURE: 142 MMHG | DIASTOLIC BLOOD PRESSURE: 80 MMHG | HEART RATE: 88 BPM

## 2017-05-04 ENCOUNTER — HOME CARE VISIT (OUTPATIENT)
Dept: SCHEDULING | Facility: HOME HEALTH | Age: 57
End: 2017-05-04
Payer: MEDICAID

## 2017-05-04 PROCEDURE — G0157 HHC PT ASSISTANT EA 15: HCPCS

## 2017-05-05 ENCOUNTER — HOME CARE VISIT (OUTPATIENT)
Dept: SCHEDULING | Facility: HOME HEALTH | Age: 57
End: 2017-05-05
Payer: MEDICAID

## 2017-05-05 PROCEDURE — G0299 HHS/HOSPICE OF RN EA 15 MIN: HCPCS

## 2017-05-08 ENCOUNTER — HOME CARE VISIT (OUTPATIENT)
Dept: SCHEDULING | Facility: HOME HEALTH | Age: 57
End: 2017-05-08
Payer: MEDICAID

## 2017-05-08 PROCEDURE — G0157 HHC PT ASSISTANT EA 15: HCPCS

## 2017-05-09 ENCOUNTER — HOME CARE VISIT (OUTPATIENT)
Dept: SCHEDULING | Facility: HOME HEALTH | Age: 57
End: 2017-05-09
Payer: MEDICAID

## 2017-05-09 VITALS — OXYGEN SATURATION: 95 % | SYSTOLIC BLOOD PRESSURE: 126 MMHG | DIASTOLIC BLOOD PRESSURE: 82 MMHG | HEART RATE: 90 BPM

## 2017-05-09 PROCEDURE — G0299 HHS/HOSPICE OF RN EA 15 MIN: HCPCS

## 2017-05-09 PROCEDURE — G0152 HHCP-SERV OF OT,EA 15 MIN: HCPCS

## 2017-05-10 VITALS
SYSTOLIC BLOOD PRESSURE: 140 MMHG | HEART RATE: 72 BPM | DIASTOLIC BLOOD PRESSURE: 80 MMHG | RESPIRATION RATE: 16 BRPM | OXYGEN SATURATION: 98 % | TEMPERATURE: 97 F

## 2017-05-10 VITALS — HEART RATE: 87 BPM | OXYGEN SATURATION: 90 %

## 2017-05-11 ENCOUNTER — HOME CARE VISIT (OUTPATIENT)
Dept: SCHEDULING | Facility: HOME HEALTH | Age: 57
End: 2017-05-11
Payer: MEDICAID

## 2017-05-11 ENCOUNTER — HOSPITAL ENCOUNTER (OUTPATIENT)
Dept: SLEEP MEDICINE | Age: 57
Discharge: HOME | End: 2017-05-11
Payer: MEDICAID

## 2017-05-11 DIAGNOSIS — F25.9 SCHIZOAFFECTIVE DISORDER, SUBCHRONIC CONDITION (HCC): ICD-10-CM

## 2017-05-11 DIAGNOSIS — G47.33 OSA (OBSTRUCTIVE SLEEP APNEA): ICD-10-CM

## 2017-05-11 DIAGNOSIS — M51.37 DEGENERATION OF LUMBAR OR LUMBOSACRAL INTERVERTEBRAL DISC: ICD-10-CM

## 2017-05-11 DIAGNOSIS — K21.9 GASTROESOPHAGEAL REFLUX DISEASE: ICD-10-CM

## 2017-05-11 DIAGNOSIS — E66.01 MORBID OBESITY (HCC): ICD-10-CM

## 2017-05-11 DIAGNOSIS — Q24.9 UNSPECIFIED CONGENITAL ANOMALY OF HEART: ICD-10-CM

## 2017-05-11 DIAGNOSIS — I11.9 HYPERTENSIVE HEART DISEASE: ICD-10-CM

## 2017-05-11 DIAGNOSIS — E03.9 UNSPECIFIED HYPOTHYROIDISM: ICD-10-CM

## 2017-05-11 DIAGNOSIS — E78.5 HYPERLIPEMIA: ICD-10-CM

## 2017-05-11 DIAGNOSIS — M17.0 OSTEOARTHRITIS OF BOTH KNEES: ICD-10-CM

## 2017-05-11 DIAGNOSIS — R06.03 ACUTE RESPIRATORY DISTRESS: ICD-10-CM

## 2017-05-11 PROCEDURE — 95811 POLYSOM 6/>YRS CPAP 4/> PARM: CPT

## 2017-05-11 PROCEDURE — G0157 HHC PT ASSISTANT EA 15: HCPCS

## 2017-05-12 ENCOUNTER — HOME CARE VISIT (OUTPATIENT)
Dept: HOME HEALTH SERVICES | Facility: HOME HEALTH | Age: 57
End: 2017-05-12
Payer: MEDICAID

## 2017-05-15 ENCOUNTER — HOME CARE VISIT (OUTPATIENT)
Dept: SCHEDULING | Facility: HOME HEALTH | Age: 57
End: 2017-05-15
Payer: MEDICAID

## 2017-05-15 VITALS
RESPIRATION RATE: 16 BRPM | DIASTOLIC BLOOD PRESSURE: 82 MMHG | SYSTOLIC BLOOD PRESSURE: 140 MMHG | HEART RATE: 83 BPM | OXYGEN SATURATION: 97 % | TEMPERATURE: 98 F

## 2017-05-15 VITALS — SYSTOLIC BLOOD PRESSURE: 128 MMHG | DIASTOLIC BLOOD PRESSURE: 78 MMHG | OXYGEN SATURATION: 98 %

## 2017-05-15 PROCEDURE — G0157 HHC PT ASSISTANT EA 15: HCPCS

## 2017-05-15 PROCEDURE — G0152 HHCP-SERV OF OT,EA 15 MIN: HCPCS

## 2017-05-16 DIAGNOSIS — G47.33 OSA (OBSTRUCTIVE SLEEP APNEA): Primary | ICD-10-CM

## 2017-05-17 ENCOUNTER — HOME CARE VISIT (OUTPATIENT)
Dept: SCHEDULING | Facility: HOME HEALTH | Age: 57
End: 2017-05-17
Payer: MEDICAID

## 2017-05-17 PROCEDURE — G0299 HHS/HOSPICE OF RN EA 15 MIN: HCPCS

## 2017-05-18 ENCOUNTER — HOME CARE VISIT (OUTPATIENT)
Dept: SCHEDULING | Facility: HOME HEALTH | Age: 57
End: 2017-05-18
Payer: MEDICAID

## 2017-05-18 VITALS
HEART RATE: 75 BPM | TEMPERATURE: 97 F | DIASTOLIC BLOOD PRESSURE: 80 MMHG | SYSTOLIC BLOOD PRESSURE: 140 MMHG | RESPIRATION RATE: 16 BRPM | OXYGEN SATURATION: 97 %

## 2017-05-18 PROCEDURE — G0157 HHC PT ASSISTANT EA 15: HCPCS

## 2017-05-19 ENCOUNTER — OFFICE VISIT (OUTPATIENT)
Dept: ORTHOPEDIC SURGERY | Facility: CLINIC | Age: 57
End: 2017-05-19

## 2017-05-19 ENCOUNTER — HOME CARE VISIT (OUTPATIENT)
Dept: HOME HEALTH SERVICES | Facility: HOME HEALTH | Age: 57
End: 2017-05-19
Payer: MEDICAID

## 2017-05-19 VITALS
HEIGHT: 63 IN | SYSTOLIC BLOOD PRESSURE: 200 MMHG | TEMPERATURE: 98.4 F | BODY MASS INDEX: 51.03 KG/M2 | WEIGHT: 288 LBS | DIASTOLIC BLOOD PRESSURE: 91 MMHG | HEART RATE: 71 BPM

## 2017-05-19 VITALS — OXYGEN SATURATION: 98 % | HEART RATE: 98 BPM | SYSTOLIC BLOOD PRESSURE: 138 MMHG | DIASTOLIC BLOOD PRESSURE: 80 MMHG

## 2017-05-19 DIAGNOSIS — M25.662 DECREASED ROM OF LEFT KNEE: ICD-10-CM

## 2017-05-19 DIAGNOSIS — M51.36 DDD (DEGENERATIVE DISC DISEASE), LUMBAR: ICD-10-CM

## 2017-05-19 DIAGNOSIS — Z96.652 STATUS POST TOTAL LEFT KNEE REPLACEMENT: ICD-10-CM

## 2017-05-19 DIAGNOSIS — G89.29 CHRONIC PAIN OF LEFT KNEE: ICD-10-CM

## 2017-05-19 DIAGNOSIS — M54.16 RIGHT LUMBAR RADICULITIS: ICD-10-CM

## 2017-05-19 DIAGNOSIS — M47.26 OSTEOARTHRITIS OF SPINE WITH RADICULOPATHY, LUMBAR REGION: ICD-10-CM

## 2017-05-19 DIAGNOSIS — M25.562 CHRONIC PAIN OF LEFT KNEE: ICD-10-CM

## 2017-05-19 DIAGNOSIS — M70.61 TROCHANTERIC BURSITIS OF RIGHT HIP: ICD-10-CM

## 2017-05-19 DIAGNOSIS — M16.11 PRIMARY OSTEOARTHRITIS OF RIGHT HIP: Primary | ICD-10-CM

## 2017-05-19 DIAGNOSIS — G89.29 CHRONIC RIGHT HIP PAIN: ICD-10-CM

## 2017-05-19 DIAGNOSIS — Z97.4 USES HEARING AID: ICD-10-CM

## 2017-05-19 DIAGNOSIS — R29.6 FREQUENT FALLS: ICD-10-CM

## 2017-05-19 DIAGNOSIS — M25.551 CHRONIC RIGHT HIP PAIN: ICD-10-CM

## 2017-05-19 DIAGNOSIS — M75.52 CHRONIC SHOULDER BURSITIS, LEFT: ICD-10-CM

## 2017-05-19 DIAGNOSIS — M25.512 LEFT SHOULDER PAIN, UNSPECIFIED CHRONICITY: ICD-10-CM

## 2017-05-19 RX ORDER — BUPIVACAINE HYDROCHLORIDE 2.5 MG/ML
4 INJECTION, SOLUTION EPIDURAL; INFILTRATION; INTRACAUDAL ONCE
Qty: 4 ML | Refills: 0
Start: 2017-05-19 | End: 2017-05-19

## 2017-05-19 RX ORDER — BETAMETHASONE SODIUM PHOSPHATE AND BETAMETHASONE ACETATE 3; 3 MG/ML; MG/ML
3 INJECTION, SUSPENSION INTRA-ARTICULAR; INTRALESIONAL; INTRAMUSCULAR; SOFT TISSUE ONCE
Qty: 0.5 ML | Refills: 0
Start: 2017-05-19 | End: 2017-05-19

## 2017-05-19 NOTE — PATIENT INSTRUCTIONS
Hip Arthritis: Exercises  Your Care Instructions  Here are some examples of exercises for hip arthritis. Start each exercise slowly. Ease off the exercise if you start to have pain. Your doctor or physical therapist will tell you when you can start these exercises and which ones will work best for you. How to do the exercises  Straight-leg raises to the outside    1. Lie on your side, with your affected hip on top. 2. Tighten the front thigh muscles of your top leg to keep your knee straight. 3. Keep your hip and your leg straight in line with the rest of your body, and keep your knee pointing forward. Do not drop your hip back. 4. Lift your top leg straight up toward the ceiling, about 12 inches off the floor. Hold for about 6 seconds, then slowly lower your leg. 5. Repeat 8 to 12 times. 6. Switch legs and repeat steps 1 through 5, even if only one hip is sore. Straight-leg raises to the inside    1. Lie on your side with your affected hip on the floor. 2. You can either prop up your other leg on a chair, or you can bend that knee and put that foot in front of your other knee. Do not drop your hip back. 3. Tighten the muscles on the front thigh of your bottom leg to straighten that knee. 4. Keep your kneecap pointing forward and your leg straight, and lift your bottom leg up toward the ceiling about 6 inches. Hold for about 6 seconds, then lower slowly. 5. Repeat 8 to 12 times. 6. Switch legs and repeat steps 1 through 5, even if only one hip is sore. Hip hike    1. Stand sideways on the bottom step of a staircase, and hold on to the banister or wall. 2. Keeping both knees straight, lift your good leg off the step and let it hang down. Then hike your good hip up to the same level as your affected hip or a little higher. 3. Repeat 8 to 12 times. 4. Switch legs and repeat steps 1 through 3, even if only one hip is sore. Bridging    1. Lie on your back with both knees bent.  Your knees should be bent about 90 degrees. 2. Then push your feet into the floor, squeeze your buttocks, and lift your hips off the floor until your shoulders, hips, and knees are all in a straight line. 3. Hold for about 6 seconds as you continue to breathe normally, and then slowly lower your hips back down to the floor and rest for up to 10 seconds. 4. Repeat 8 to 12 times. Hamstring stretch (lying down)    1. Lie flat on your back with your legs straight. If you feel discomfort in your back, place a small towel roll under your lower back. 2. Holding the back of your affected leg, lift your leg straight up and toward your body until you feel a stretch at the back of your thigh. 3. Hold the stretch for at least 30 seconds. 4. Repeat 2 to 4 times. 5. Switch legs and repeat steps 1 through 4, even if only one hip is sore. Standing quadriceps stretch    1. If you are not steady on your feet, hold on to a chair, counter, or wall. You can also lie on your stomach or your side to do this exercise. 2. Bend the knee of the leg you want to stretch, and reach behind you to grab the front of your foot or ankle with the hand on the same side. For example, if you are stretching your right leg, use your right hand. 3. Keeping your knees next to each other, pull your foot toward your buttock until you feel a gentle stretch across the front of your hip and down the front of your thigh. Your knee should be pointed directly to the ground, and not out to the side. 4. Hold the stretch for at least 15 to 30 seconds. 5. Repeat 2 to 4 times. 6. Switch legs and repeat steps 1 through 5, even if only one hip is sore. Hip rotator stretch    1. Lie on your back with both knees bent and your feet flat on the floor. 2. Put the ankle of your affected leg on your opposite thigh near your knee. 3. Use your hand to gently push your knee away from your body until you feel a gentle stretch around your hip.   4. Hold the stretch for 15 to 30 seconds. 5. Repeat 2 to 4 times. 6. Repeat steps 1 through 5, but this time use your hand to gently pull your knee toward your opposite shoulder. 7. Switch legs and repeat steps 1 through 6, even if only one hip is sore. Knee-to-chest    1. Lie on your back with your knees bent and your feet flat on the floor. 2. Bring your affected leg to your chest, keeping the other foot flat on the floor (or keeping the other leg straight, whichever feels better on your lower back). 3. Keep your lower back pressed to the floor. Hold for at least 15 to 30 seconds. 4. Relax, and lower the knee to the starting position. 5. Repeat 2 to 4 times. 6. Switch legs and repeat steps 1 through 5, even if only one hip is sore. 7. To get more stretch, put your other leg flat on the floor while pulling your knee to your chest.  Clamshell    1. Lie on your side, with your affected hip on top. Keep your feet and knees together and your knees bent. 2. Raise your top knee, but keep your feet together. Do not let your hips roll back. Your legs should open up like a clamshell. 3. Hold for 6 seconds. 4. Slowly lower your knee back down. Rest for 10 seconds. 5. Repeat 8 to 12 times. 6. Switch legs and repeat steps 1 through 5, even if only one hip is sore. Follow-up care is a key part of your treatment and safety. Be sure to make and go to all appointments, and call your doctor if you are having problems. It's also a good idea to know your test results and keep a list of the medicines you take. Where can you learn more? Go to http://bucky-gerard.info/. Enter R658 in the search box to learn more about \"Hip Arthritis: Exercises. \"  Current as of: May 23, 2016  Content Version: 11.2  © 1847-3214 [employer]. Care instructions adapted under license by [employer] (which disclaims liability or warranty for this information).  If you have questions about a medical condition or this instruction, always ask your healthcare professional. [place] [address] any warranty or liability for your use of this information. Shoulder Bursitis: Exercises  Your Care Instructions  Here are some examples of typical rehabilitation exercises for your condition. Start each exercise slowly. Ease off the exercise if you start to have pain. Your doctor or physical therapist will tell you when you can start these exercises and which ones will work best for you. How to do the exercises  Posterior stretching exercise    7. Hold the elbow of your injured arm with your other hand. 8. Use your hand to pull your injured arm gently up and across your body. You will feel a gentle stretch across the back of your injured shoulder. 9. Hold for at least 15 to 30 seconds. Then slowly lower your arm. 10. Repeat 2 to 4 times. Up-the-back stretch    Note: Your doctor or physical therapist may want you to wait to do this stretch until you have regained most of your range of motion and strength. You can do this stretch in different ways. Hold any of these stretches for at least 15 to 30 seconds. Repeat them 2 to 4 times. 7. Put your hand in your back pocket. Let it rest there to stretch your shoulder. 8. With your other hand, hold your injured arm (palm outward) behind your back by the wrist. Pull your arm up gently to stretch your shoulder. 9. Next, put a towel over your other shoulder. Put the hand of your injured arm behind your back. Now hold the back end of the towel. With the other hand, hold the front end of the towel in front of your body. Pull gently on the front end of the towel. This will bring your hand farther up your back to stretch your shoulder. Overhead stretch    5. Standing about an arm's length away, grasp onto a solid surface. You could use a countertop, a doorknob, or the back of a sturdy chair. 6. With your knees slightly bent, bend forward with your arms straight.  Lower your upper body, and let your shoulders stretch. 7. As your shoulders are able to stretch farther, you may need to take a step or two backward. 8. Hold for at least 15 to 30 seconds. Then stand up and relax. If you had stepped back during your stretch, step forward so you can keep your hands on the solid surface. 9. Repeat 2 to 4 times. Shoulder flexion (lying down)    Note: To make a wand for this exercise, use a piece of PVC pipe or a broom handle with the broom removed. Make the wand about a foot wider than your shoulders. 5. Lie on your back, holding a wand with both hands. Your palms should face down as you hold the wand. 6. Keeping your elbows straight, slowly raise your arms over your head. Raise them until you feel a stretch in your shoulders, upper back, and chest.  7. Hold for 15 to 30 seconds. 8. Repeat 2 to 4 times. Shoulder rotation (lying down)    Note: To make a wand for this exercise, use a piece of PVC pipe or a broom handle with the broom removed. Make the wand about a foot wider than your shoulders. 6. Lie on your back. Hold a wand with both hands with your elbows bent and palms up. 7. Keep your elbows close to your body, and move the wand across your body toward the sore arm. 8. Hold for 8 to 12 seconds. 9. Repeat 2 to 4 times. Shoulder blade squeeze    7. Stand with your arms at your sides, and squeeze your shoulder blades together. Do not raise your shoulders up as you squeeze. 8. Hold 6 seconds. 9. Repeat 8 to 12 times. Shoulder flexor and extensor exercise    Note: These are isometric exercises. That means you contract your muscles without actually moving. · Push forward (flex): Stand facing a wall or doorjamb, about 6 inches or less back. Hold your injured arm against your body. Make a closed fist with your thumb on top. Then gently push your hand forward into the wall with about 25% to 50% of your strength. Don't let your body move backward as you push. Hold for about 6 seconds.  Relax for a few seconds. Repeat 8 to 12 times. · Push backward (extend): Stand with your back flat against a wall. Your upper arm should be against the wall, with your elbow bent 90 degrees (your hand straight ahead). Push your elbow gently back against the wall with about 25% to 50% of your strength. Don't let your body move forward as you push. Hold for about 6 seconds. Relax for a few seconds. Repeat 8 to 12 times. Scapular exercise: Wall push-ups    Note: This exercise is best done with your fingers somewhat turned out, rather than straight up and down. 8. Stand facing a wall, about 12 inches to 18 inches away. 9. Place your hands on the wall at shoulder height. 10. Slowly bend your elbows and bring your face to the wall. Keep your back and hips straight. 11. Push back to where you started. 12. Repeat 8 to 12 times. 13. When you can do this exercise against a wall comfortably, you can try it against a counter. You can then slowly progress to the end of a couch, then to a sturdy chair, and finally to the floor. Scapular exercise: Retraction    Note: For this exercise, you will need elastic exercise material, such as surgical tubing or Thera-Band. 7. Put the band around a solid object at about waist level. (A bedpost will work well.) Each hand should hold an end of the band. 8. With your elbows at your sides and bent to 90 degrees, pull the band back. Your shoulder blades should move toward each other. Then move your arms back where you started. 9. Repeat 8 to 12 times. 10. If you have good range of motion in your shoulders, try this exercise with your arms lifted out to the sides. Keep your elbows at a 90-degree angle. Raise the elastic band up to about shoulder level. Pull the band back to move your shoulder blades toward each other. Then move your arms back where you started. Internal rotator strengthening exercise    1. Start by tying a piece of elastic exercise material to a doorknob.  You can use surgical tubing or Thera-Band. 2. Stand or sit with your shoulder relaxed and your elbow bent 90 degrees. Your upper arm should rest comfortably against your side. Squeeze a rolled towel between your elbow and your body for comfort. This will help keep your arm at your side. 3. Hold one end of the elastic band in the hand of the painful arm. 4. Slowly rotate your forearm toward your body until it touches your belly. Slowly move it back to where you started. 5. Keep your elbow and upper arm firmly tucked against the towel roll or at your side. 6. Repeat 8 to 12 times. External rotator strengthening exercise    1. Start by tying a piece of elastic exercise material to a doorknob. You can use surgical tubing or Thera-Band. (You may also hold one end of the band in each hand.)  2. Stand or sit with your shoulder relaxed and your elbow bent 90 degrees. Your upper arm should rest comfortably against your side. Squeeze a rolled towel between your elbow and your body for comfort. This will help keep your arm at your side. 3. Hold one end of the elastic band with the hand of the painful arm. 4. Start with your forearm across your belly. Slowly rotate the forearm out away from your body. Keep your elbow and upper arm tucked against the towel roll or the side of your body until you begin to feel tightness in your shoulder. Slowly move your arm back to where you started. 5. Repeat 8 to 12 times. Follow-up care is a key part of your treatment and safety. Be sure to make and go to all appointments, and call your doctor if you are having problems. It's also a good idea to know your test results and keep a list of the medicines you take. Where can you learn more? Go to http://bucky-gerard.info/. Enter K514 in the search box to learn more about \"Shoulder Bursitis: Exercises. \"  Current as of: May 23, 2016  Content Version: 11.2  © 5417-7668 [employer].  Care instructions adapted under license by [employer] (which disclaims liability or warranty for this information). If you have questions about a medical condition or this instruction, always ask your healthcare professional. [address] any warranty or liability for your use of this information. Joint Injections: Care Instructions  Your Care Instructions  Joint injections are shots into a joint, such as the knee. They may be used to put in medicines, such as pain relievers. Or they can be used to take out fluid. Sometimes the fluid is tested in a lab. This can help find the cause of a joint problem. A corticosteroid, or steroid, shot is used to reduce inflammation in tendons or joints. It is often used to treat problems such as arthritis, tendinitis, and bursitis. Steroids can be injected directly into a painful, inflamed joint. They can also help reduce inflammation of a bursa. A bursa is a sac of fluid. It cushions and lubricates areas where tendons, ligaments, skin, muscles, or bones rub against each other. A steroid shot can sometimes help with short-term pain relief when other treatments haven't worked. If steroid shots help, pain may improve for weeks or months. Follow-up care is a key part of your treatment and safety. Be sure to make and go to all appointments, and call your doctor if you are having problems. It's also a good idea to know your test results and keep a list of the medicines you take. How can you care for yourself at home? · Put ice or a cold pack on the area for 10 to 20 minutes at a time. Put a thin cloth between the ice and your skin. · Take anti-inflammatory medicines to reduce pain, swelling, or inflammation. These include ibuprofen (Advil, Motrin) and naproxen (Aleve). Read and follow all instructions on the label. · Avoid strenuous activities for several days, especially those that put stress on the area where you got the shot.   · If you have dressings over the area, keep them clean and dry. You may remove them when your doctor tells you to. When should you call for help? Call your doctor now or seek immediate medical care if:  · You have signs of infection, such as:  ¨ Increased pain, swelling, warmth, or redness. ¨ Red streaks leading from the site. ¨ Pus draining from the site. ¨ A fever. Watch closely for changes in your health, and be sure to contact your doctor if you have any problems. Where can you learn more? Go to http://bucky-gerard.info/. Enter N616 in the search box to learn more about \"Joint Injections: Care Instructions. \"  Current as of: May 23, 2016  Content Version: 11.2  © 8325-8444 [employer]. Care instructions adapted under license by [employer] (which disclaims liability or warranty for this information). If you have questions about a medical condition or this instruction, always ask your healthcare professional. [place] [address] any warranty or liability for your use of this information.

## 2017-05-19 NOTE — PROGRESS NOTES
Patient: Nela Bravo                MRN: [id number]       SSN: [id number]  YOB: 1960        AGE: 61 y.o. SEX: female    PCP: Deanna Oropeza MD  05/19/17    Chief Complaint   Patient presents with    Hip Pain     Right     HISTORY:  eNla Bravo is a 61 y.o. female who is seen for increased right hip and left shoulder pain. She has pain walking, standing, sleeping, and climbing stairs. She is s/p transverse fracture of the left patella on 8/20/15. She has increased pain in the morning. Her hip stiffens up when she sits. She is now a limited ambulator. She describes it as a painful lump over the side of her hip. She has been ambulating with a single point cane. She has difficulty rising from a seated position. She has pain laying on her right side. She states that she wants her raggedy hip out and is ready to throw it away. She is using a single-point cane to ambulate. She states that she fell 2 weeks ago and was seen at the ED where she had x rays taken and was prescribed pain medication. She states that she fell while climbing a step to her porch recently. She responded to previous cortisone injections. She is scheduled to begin pain management on 10/10/16. She notes great difficulty sleeping due to her hip and shoulder pains. She reports throbbing pains in her left shoulder. She is currently in a course of PT. She states that her pains keep her from sleeping at night. She states that she is currently being prescribed pain medication by Dr. Hanh Henson. She denies any new hip or shoulder injury or trauma. Pain Assessment  5/19/2017   Location of Pain Hip   Location Modifiers Right   Severity of Pain 10   Quality of Pain Throbbing; Electa Alexander; Aching;Burning   Quality of Pain Comment -   Duration of Pain Persistent   Frequency of Pain Constant   Aggravating Factors Walking;Standing   Limiting Behavior Yes   Relieving Factors Nothing   Relieving Factors Comment -   Result of Injury No     Occupation, etc:  Ms. Norris Walton receives social security disability benefits for numerus medical problems. She has a  assigned to her. She reports that she has gained 30 lbs recently. Current weight is 288 pounds. She is 5'3\" tall. She does not exercise. She has a h/o diabetes but says that she is no longer diabetic. She lives in [place] with her 29-year-old daughter and her 10 children. She is hard of hearing but is now wearing a hearing aid. She says that she enjoys to play bingo but has not been able to recently due to her pains. She states that she has high blood pressure. Her PCP is Dr. Daniella Dietrich. Last 3 Recorded Weights in this Encounter    05/19/17 1126   Weight: 288 lb (130.6 kg)     Body mass index is 51.02 kg/(m^2). Patient Active Problem List   Diagnosis Code    Hypertension I11.9    Congenital heart disease Q24.9    Atrial fibrillation I48.91    Osteoarthritis of both knees M17.0    H/O total knee replacement Z96.659    DJD (degenerative joint disease), lumbosacral M51.37    Schizoaffective disorder, chronic condition ([facility] F25.8    Unspecified hypothyroidism E03.9    Morbid obesity ([facility] E66.01    Esophageal reflux K21.9    Dyspnea R06.00    Dyslipidemia E78.5    Morbid obesity with BMI of 45.0-49.9, adult ([facility] E66.01, Z68.42    DDD (degenerative disc disease), lumbar, L4/5 advanced M51.36    Hypoxia R09.02    Fluid overload E87.70    CHF (congestive heart failure) ([facility]) I50.9     REVIEW OF SYSTEMS: All Below are Negative except: See HPI   Constitutional: negative for fever, chills, and weight loss. Cardiovascular: negative for chest pain, claudication, leg swelling, SOB, PITTS   Gastrointestinal: Negative for pain, N/V/C/D, Blood in stool or urine, dysuria,  hematuria, incontinence, pelvic pain. Musculoskeletal: See HPI   Neurological: Negative for dizziness and weakness.    Negative for headaches, Visual changes, confusion, seizures   Phychiatric/Behavioral: Negative for depression, memory loss, substance  abuse. Extremities: Negative for hair changes, rash, or skin lesion changes. Hematologic: Negative for bleeding problems, bruising, pallor or swollen lymph  nodes   Peripheral Vascular: No calf pain, no circulation deficits. Social History     Social History    Marital status: SINGLE     Spouse name: N/A    Number of children: N/A    Years of education: N/A     Occupational History    Not on file. Social History Main Topics    Smoking status: Never Smoker    Smokeless tobacco: Not on file    Alcohol use No    Drug use: No    Sexual activity: Yes     Birth control/ protection: Surgical     Other Topics Concern    Not on file     Social History Narrative     Allergies   Allergen Reactions    Gabapentin Other (comments)     Unsteady, weakness LEs    Tetanus Vaccines And Toxoid Swelling     Current Outpatient Prescriptions   Medication Sig    Oxygen 2 Each by Nasal route continuous.  omeprazole (PRILOSEC) 40 mg capsule Take 40 mg by mouth daily.  metoprolol tartrate (LOPRESSOR) 25 mg tablet Take 1 Tab by mouth two (2) times a day.  VENTOLIN HFA 90 mcg/actuation inhaler Take 2 Puffs by inhalation every four (4) hours as needed for Wheezing or Shortness of Breath.  lisinopril (PRINIVIL, ZESTRIL) 5 mg tablet Take 1 Tab by mouth daily.  furosemide (LASIX) 40 mg tablet 40 mg po daily    potassium chloride (K-DUR, KLOR-CON) 20 mEq tablet Take 1 Tab by mouth daily.  esomeprazole (NEXIUM) 40 mg capsule Take 1 Cap by mouth daily.  isosorbide mononitrate ER (IMDUR) 30 mg tablet TAKE ONE TABLET BY MOUTH EVERY EVENING    PARoxetine (PAXIL) 20 mg tablet Take 1 Tab by mouth daily.  aspirin delayed-release 325 mg tablet TAKE ONE TABLET BY MOUTH ONCE DAILY    simvastatin (ZOCOR) 40 mg tablet Take  by mouth nightly.  to obtain from pharmacy    aripiprazole (ABILIFY) 15 mg tablet Take 15 mg by mouth daily. to obtain from pharmacy    levothyroxine (SYNTHROID) 125 mcg tablet Take 125 mcg by mouth Daily (before breakfast).  ibuprofen (MOTRIN) 400 mg tablet Take 1 Tab by mouth every eight (8) hours as needed for Pain.  topiramate (TOPAMAX) 50 mg tablet Take 1 Tab by mouth daily.  lactulose (CHRONULAC) 10 gram/15 mL solution Take 15 mL by mouth two (2) times a day.  HYDROcodone-acetaminophen (NORCO) 7.5-325 mg per tablet Take 1-2 Tabs by mouth nightly as needed for Pain. Max Daily Amount: 2 Tabs. (Patient taking differently: Take 1-2 Tabs by mouth nightly as needed for Pain. to obtain from pharmacy)     No current facility-administered medications for this visit.       PHYSICAL EXAMINATION:  Visit Vitals    BP (!) 200/91    Pulse 71    Temp 98.4 °F (36.9 °C) (Oral)    Ht 5' 3\" (1.6 m)    Wt 288 lb (130.6 kg)    BMI 51.02 kg/m2   tearful, requesting pain meds  ORTHO EXAMINATION:  Examination Left shoulder   Skin Intact   Effusion -   Biceps deformity -   Atrophy -   AC joint tenderness -   Acromial tenderness +   Biceps tenderness -   Forward flexion/Elevation    Active abduction    External rotation ROM 20   Internal rotation ROM 40, with pain   Apprehension -   Impingement +   Drop Arm Test -   Neurovascular Intact     Examination Lumbar   Skin Intact   Tenderness +   Tightness +   Lordosis Normal   Kyphosis N/A   Scoliosis -   Flexion Fingertips to knees   Extension 10   Knee reflexes Normal   Ankle reflexes Normal   Straight leg raise -   Calf tenderness -   Wearing a fall-risk bracelet and medical alert necklace    Examination Left hip Right hip   Skin Intact Intact   External Rotation ROM 10 10   Internal Rotation ROM 0 0   Trochanteric tenderness - +, lateral   Hip flexion contracture 5 degree 5 degree   Antalgic gait + +   Trendelenberg sign - -   Lumbar tenderness - -   Straight leg raise - -   Calf tenderness - -   Neurovascular Intact Intact   Stands slightly forward flexed at the wait, she has a 5 degree external rotation contracture, and a 5 degree hip flexion contracture, using a single-point cane to walk, very unsteady gait pattern    PROCEDURE:  After discussing treatment options, patient's left shoulder and right hip were injected with 4 cc Marcaine and 1/2 cc Celestone. Chart reviewed for the following:   Eun Hazel MD, have reviewed the History, Physical and updated the Allergic reactions for [place] [id number] performed immediately prior to start of procedure:  Eun Hazel MD, have performed the following reviews on Page Sima prior to the start of the procedure:            * Patient was identified by name and date of birth   * Agreement on procedure being performed was verified  * Risks and Benefits explained to the patient  * Procedure site verified and marked as necessary  * Patient was positioned for comfort  * Consent was obtained     Time: 11:38 AM     Date of procedure: 5/19/2017    Procedure performed by:  Marly Rueda MD    Ms. Ortiz tolerated the procedure well with no complications. CT PELVIS WO CONT 7/20/15  IMPRESSION:  No acute fracture in the pelvis or sacrum/coccyx. Advanced degenerative joint disease in the right hip. Degenerative disease in the lumbar spine. CT RIGHT HIP WO CONT 5/6/15  IMPRESSION:  Severe osteoarthritis, likely post traumatic, stable since October 2014 with no acute injuries suspected. RADIOGRAPHS:  XR RIGHT HIP 1/4/17  IMPRESSION:  Redemonstration of very severe osteoarthritis at right hip joint. There is no evidence of acute fracture or dislocation at right hip or on rest of AP view of pelvis. XR LUMBAR SPINE 1/4/17  IMPRESSION:  Redemonstration of very severe degenerative disc disease at L4-L5 level. No evidence of acute fracture or dislocation or subluxation in lumbar spine or sacrum.     XR MAREN HIPS 1/25/16    IMPRESSION:  No fractures, near-complete joint space narrowing, no osteophytes present, femoral neck shortening. XR LUMBAR SPINE 7/20/15  IMPRESSION:  No acute findings. Severe degenerative change L4-5 level similar prior exam 05/06/2015. XR RIGHT HIP 5/6/15  IMPRESSION:  Stable chronic findings of the right hip. IMPRESSION:      ICD-10-CM ICD-9-CM    1. Primary osteoarthritis of right hip M16.11 715.15 betamethasone (CELESTONE SOLUSPAN) 6 mg/mL injection      BETAMETHASONE ACETATE & SODIUM PHOSPHATE INJECTION 3 MG EA.      DRAIN/INJECT LARGE JOINT/BURSA      bupivacaine, PF, (MARCAINE, PF,) 0.25 % (2.5 mg/mL) injection    Severe   2. Chronic right hip pain M25.551 719.45 betamethasone (CELESTONE SOLUSPAN) 6 mg/mL injection    G89.29 338.29 BETAMETHASONE ACETATE & SODIUM PHOSPHATE INJECTION 3 MG EA.      DRAIN/INJECT LARGE JOINT/BURSA      bupivacaine, PF, (MARCAINE, PF,) 0.25 % (2.5 mg/mL) injection   3. DDD (degenerative disc disease), lumbar, L4/5 advanced M51.36 722.52    4. Right lumbar radiculitis M54.16 724.4    5. Osteoarthritis of spine with radiculopathy, lumbar region M47.26 721.3    6. Trochanteric bursitis of right hip M70.61 726.5 betamethasone (CELESTONE SOLUSPAN) 6 mg/mL injection      BETAMETHASONE ACETATE & SODIUM PHOSPHATE INJECTION 3 MG EA.      DRAIN/INJECT LARGE JOINT/BURSA      bupivacaine, PF, (MARCAINE, PF,) 0.25 % (2.5 mg/mL) injection   7. Status post total left knee replacement Z96.652 V43.65    8. Left shoulder pain, unspecified chronicity M25.512 719.41 betamethasone (CELESTONE SOLUSPAN) 6 mg/mL injection      BETAMETHASONE ACETATE & SODIUM PHOSPHATE INJECTION 3 MG EA.      DRAIN/INJECT LARGE JOINT/BURSA      bupivacaine, PF, (MARCAINE, PF,) 0.25 % (2.5 mg/mL) injection   9. Frequent falls R29.6 V15.88    10. Decreased ROM of left knee M25.662 719.56    11. Chronic pain of left knee M25.562 719.46     G89.29 338.29    12. Uses hearing aid Z97.4 V45.89    13. BMI 50.0-59.9, adult ([facility]) Z68.43 V85.43    14.  Chronic shoulder bursitis, left M75.52 726.10 betamethasone (CELESTONE SOLUSPAN) 6 mg/mL injection      BETAMETHASONE ACETATE & SODIUM PHOSPHATE INJECTION 3 MG EA.      DRAIN/INJECT LARGE JOINT/BURSA      bupivacaine, PF, (MARCAINE, PF,) 0.25 % (2.5 mg/mL) injection      PLAN:  After discussing treatment options, patient's left shoulder and right hip were injected with 4 cc Marcaine and 1/2 cc Celestone. We discussed need for right hip arthroplasty at some time in the future pending significant weight loss. She will follow up as needed. She will continue with her current course of PT. She will start pain management. She will continue to follow up with Dr. Bing Crews at the spine center if low back pain continues. She will follow up with her primary care physician for high blood pressure.       Scribed by [employer] ([address]) as dictated by Michael Cha MD

## 2017-05-22 ENCOUNTER — HOME CARE VISIT (OUTPATIENT)
Dept: SCHEDULING | Facility: HOME HEALTH | Age: 57
End: 2017-05-22
Payer: MEDICAID

## 2017-05-22 ENCOUNTER — HOME CARE VISIT (OUTPATIENT)
Dept: SCHEDULING | Facility: HOME HEALTH | Age: 57
End: 2017-05-22

## 2017-05-22 ENCOUNTER — HOSPITAL ENCOUNTER (OUTPATIENT)
Dept: SLEEP MEDICINE | Age: 57
Discharge: HOME | End: 2017-05-22
Payer: MEDICAID

## 2017-05-22 VITALS
DIASTOLIC BLOOD PRESSURE: 100 MMHG | HEART RATE: 83 BPM | WEIGHT: 293 LBS | SYSTOLIC BLOOD PRESSURE: 157 MMHG | HEIGHT: 63 IN | BODY MASS INDEX: 51.91 KG/M2

## 2017-05-22 DIAGNOSIS — G47.33 OSA (OBSTRUCTIVE SLEEP APNEA): ICD-10-CM

## 2017-05-22 PROCEDURE — G0151 HHCP-SERV OF PT,EA 15 MIN: HCPCS

## 2017-05-22 PROCEDURE — 95811 POLYSOM 6/>YRS CPAP 4/> PARM: CPT

## 2017-05-23 VITALS
TEMPERATURE: 97.6 F | SYSTOLIC BLOOD PRESSURE: 120 MMHG | HEART RATE: 69 BPM | OXYGEN SATURATION: 95 % | DIASTOLIC BLOOD PRESSURE: 76 MMHG

## 2017-05-24 ENCOUNTER — OFFICE VISIT (OUTPATIENT)
Dept: ORTHOPEDIC SURGERY | Age: 57
End: 2017-05-24

## 2017-05-24 VITALS
RESPIRATION RATE: 18 BRPM | WEIGHT: 288 LBS | TEMPERATURE: 98.5 F | HEIGHT: 63 IN | HEART RATE: 68 BPM | DIASTOLIC BLOOD PRESSURE: 106 MMHG | SYSTOLIC BLOOD PRESSURE: 178 MMHG | BODY MASS INDEX: 51.03 KG/M2

## 2017-05-24 DIAGNOSIS — M51.36 DDD (DEGENERATIVE DISC DISEASE), LUMBAR: Primary | Chronic | ICD-10-CM

## 2017-05-24 DIAGNOSIS — E66.01 MORBID OBESITY WITH BMI OF 45.0-49.9, ADULT (HCC): ICD-10-CM

## 2017-05-24 RX ORDER — METHOCARBAMOL 500 MG/1
500 TABLET, FILM COATED ORAL
Qty: 60 TAB | Refills: 1 | Status: SHIPPED | OUTPATIENT
Start: 2017-05-24 | End: 2017-11-28

## 2017-05-24 NOTE — MR AVS SNAPSHOT
Visit Information Date & Time Provider Department Dept. Phone Encounter #  
 5/24/2017 11:30 AM Mignon Covington MD [facility] [phone] [id number] Follow-up Instructions Return if symptoms worsen or fail to improve. Upcoming Health Maintenance Date Due Hepatitis C Screening 1960 DTaP/Tdap/Td series (1 - Tdap) 1/6/1980 PAP AKA CERVICAL CYTOLOGY 1/6/1980 FOBT Q 1 YEAR AGE 50-75 1/6/2009 BREAST CANCER SCRN MAMMOGRAM 11/6/2011 INFLUENZA AGE 9 TO ADULT 8/1/2017 Allergies as of 5/24/2017  Review Complete On: 5/24/2017 By: Mignon Covington MD  
  
 Severity Noted Reaction Type Reactions Gabapentin  03/29/2017    Other (comments) Unsteady, weakness LEs Tetanus Vaccines And Toxoid    Swelling Current Immunizations  Never Reviewed Name Date Pneumococcal Polysaccharide (PPSV-23) 1/21/2014  2:55 PM  
  
 Not reviewed this visit You Were Diagnosed With   
  
 Codes Comments DDD (degenerative disc disease), lumbar    -  Primary ICD-10-CM: M51.36 
ICD-9-CM: 722.52 Vitals BP Pulse Temp Resp Height(growth percentile) Weight(growth percentile) (!) 178/106 68 98.5 °F (36.9 °C) (Oral) 18 5' 3\" (1.6 m) 288 lb (130.6 kg) BMI OB Status Smoking Status 51.02 kg/m2 Hysterectomy Never Smoker BMI and BSA Data Body Mass Index Body Surface Area 51.02 kg/m 2 2.41 m 2 Preferred Pharmacy Pharmacy Name Phone [facility]  [facility], [address] [place] [address],[address] [place] [address] [phone] Your Updated Medication List  
  
   
This list is accurate as of: 5/24/17 12:32 PM.  Always use your most recent med list.  
  
  
  
  
 ABILIFY 15 mg tablet Generic drug:  ARIPiprazole Take 15 mg by mouth daily. to obtain from pharmacy  
  
 aspirin delayed-release 325 mg tablet TAKE ONE TABLET BY MOUTH ONCE DAILY  
  
 cpap machine kit  
by Does Not Apply route. esomeprazole 40 mg capsule Commonly known as:  NexIUM Take 1 Cap by mouth daily. furosemide 40 mg tablet Commonly known as:  LASIX 40 mg po daily  
  
 ibuprofen 400 mg tablet Commonly known as:  MOTRIN Take 1 Tab by mouth every eight (8) hours as needed for Pain.  
  
 isosorbide mononitrate ER 30 mg tablet Commonly known as:  IMDUR  
TAKE ONE TABLET BY MOUTH EVERY EVENING  
  
 lactulose 10 gram/15 mL solution Commonly known as:  Deveron Burow Take 15 mL by mouth two (2) times a day. lisinopril 5 mg tablet Commonly known as:  Dulcie Mcburney Take 1 Tab by mouth daily. methocarbamol 500 mg tablet Commonly known as:  ROBAXIN Take 1 Tab by mouth two (2) times daily as needed. metoprolol tartrate 25 mg tablet Commonly known as:  LOPRESSOR Take 1 Tab by mouth two (2) times a day. oxyCODONE 7.5 mg Tbor Commonly known as:  OXAYDO Take 1 tab PO BID severe pain. ONE WEEK SUPPLY Oxygen 2 Each by Nasal route continuous. PARoxetine 20 mg tablet Commonly known as:  PAXIL Take 1 Tab by mouth daily. potassium chloride 20 mEq tablet Commonly known as:  K-DUR, KLOR-CON Take 1 Tab by mouth daily. PriLOSEC 40 mg capsule Generic drug:  omeprazole Take 40 mg by mouth daily. SYNTHROID 125 mcg tablet Generic drug:  levothyroxine Take 125 mcg by mouth Daily (before breakfast). VENTOLIN HFA 90 mcg/actuation inhaler Generic drug:  albuterol Take 2 Puffs by inhalation every four (4) hours as needed for Wheezing or Shortness of Breath. ZOCOR 40 mg tablet Generic drug:  simvastatin Take  by mouth nightly. to obtain from pharmacy Prescriptions Printed Refills  
 oxyCODONE (OXAYDO) 7.5 mg TbOr 0 Sig: Take 1 tab PO BID severe pain. ONE WEEK SUPPLY Class: Print Prescriptions Sent to Pharmacy  Refills  
 methocarbamol (ROBAXIN) 500 mg tablet 1  
 Sig: Take 1 Tab by mouth two (2) times daily as needed. Class: Normal  
 Pharmacy: [facility] Karishma Romeo, [address] [place] [address] #: [phone] Route: Oral  
  
Follow-up Instructions Return if symptoms worsen or fail to improve. Patient Instructions Chronic Pain: Care Instructions Your Care Instructions Chronic pain is pain that lasts a long time (months or even years) and may or may not have a clear cause. It is different from acute pain, which usually does have a clear causelike an injury or illnessand gets better over time. Chronic pain: 
· Lasts over time but may vary from day to day. · Does not go away despite efforts to end it. · May disrupt your sleep and lead to fatigue. · May cause depression or anxiety. · May make your muscles tense, causing more pain. · Can disrupt your work, hobbies, home life, and relationships with friends and family. Chronic pain is a very real condition. It is not just in your head. Treatment can help and usually includes several methods used together, such as medicines, physical therapy, exercise, and other treatments. Learning how to relax and changing negative thought patterns can also help you cope. Chronic pain is complex. Taking an active role in your treatment will help you better manage your pain. Tell your doctor if you have trouble dealing with your pain. You may have to try several things before you find what works best for you. Follow-up care is a key part of your treatment and safety. Be sure to make and go to all appointments, and call your doctor if you are having problems. Its also a good idea to know your test results and keep a list of the medicines you take. How can you care for yourself at home? · Pace yourself. Break up large jobs into smaller tasks. Save harder tasks for days when you have less pain, or go back and forth between hard tasks and easier ones. Take rest breaks. · Relax, and reduce stress. Relaxation techniques such as deep breathing or meditation can help. · Keep moving. Gentle, daily exercise can help reduce pain over the long run. Try low- or no-impact exercises such as walking, swimming, and stationary biking. Do stretches to stay flexible. · Try heat, cold packs, and massage. · Get enough sleep. Chronic pain can make you tired and drain your energy. Talk with your doctor if you have trouble sleeping because of pain. · Think positive. Your thoughts can affect your pain level. Do things that you enjoy to distract yourself when you have pain instead of focusing on the pain. See a movie, read a book, listen to music, or spend time with a friend. · If you think you are depressed, talk to your doctor about treatment. · Keep a daily pain diary. Record how your moods, thoughts, sleep patterns, activities, and medicine affect your pain. You may find that your pain is worse during or after certain activities or when you are feeling a certain emotion. Having a record of your pain can help you and your doctor find the best ways to treat your pain. · Take pain medicines exactly as directed. ¨ If the doctor gave you a prescription medicine for pain, take it as prescribed. ¨ If you are not taking a prescription pain medicine, ask your doctor if you can take an over-the-counter medicine. Reducing constipation caused by pain medicine · Include fruits, vegetables, beans, and whole grains in your diet each day. These foods are high in fiber. · Drink plenty of fluids, enough so that your urine is light yellow or clear like water. If you have kidney, heart, or liver disease and have to limit fluids, talk with your doctor before you increase the amount of fluids you drink. · If your doctor recommends it, get more exercise. Walking is a good choice. Bit by bit, increase the amount you walk every day. Try for at least 30 minutes on most days of the week. · Schedule time each day for a bowel movement. A daily routine may help. Take your time and do not strain when having a bowel movement. When should you call for help? Call your doctor now or seek immediate medical care if: 
· Your pain gets worse or is out of control. · You feel down or blue, or you do not enjoy things like you once did. You may be depressed, which is common in people with chronic pain. Depression can be treated. · You have vomiting or cramps for more than 2 hours. Watch closely for changes in your health, and be sure to contact your doctor if: 
· You cannot sleep because of pain. · You are very worried or anxious about your pain. · You have trouble taking your pain medicine. · You have any concerns about your pain medicine. · You have trouble with bowel movements, such as: 
¨ No bowel movement in 3 days. ¨ Blood in the anal area, in your stool, or on the toilet paper. ¨ Diarrhea for more than 24 hours. Where can you learn more? Go to http://bucky-gerard.info/. Enter N004 in the search box to learn more about \"Chronic Pain: Care Instructions. \" Current as of: October 14, 2016 Content Version: 11.2 © 9258-1236 [employer]. Care instructions adapted under license by [employer] (which disclaims liability or warranty for this information). If you have questions about a medical condition or this instruction, always ask your healthcare professional. [place] [address] any warranty or liability for your use of this information. Introducing [facility]! Gian Luis introduces [employer] patient portal. Now you can access parts of your medical record, email your doctor's office, and request medication refills online. 1. In your internet browser, go to [email or web address] 2. Click on the First Time User? Click Here link in the Sign In box. You will see the New Member Sign Up page. 3. Enter your [email or web address] Access Code exactly as it appears below. You will not need to use this code after youve completed the sign-up process. If you do not sign up before the expiration date, you must request a new code. · [email or web address] Access Code: [email or web address] Expires: 8/20/2017  9:24 AM 
 
4. Enter the last four digits of your Social Security Number (xxxx) and Date of Birth (mm/dd/yyyy) as indicated and click Submit. You will be taken to the next sign-up page. 5. Create a [employer] ID. This will be your [employer] login ID and cannot be changed, so think of one that is secure and easy to remember. 6. Create a [employer] password. You can change your password at any time. 7. Enter your Password Reset Question and Answer. This can be used at a later time if you forget your password. 8. Enter your e-mail address. You will receive e-mail notification when new information is available in [address] 9. Click Sign Up. You can now view and download portions of your medical record. 10. Click the Download Summary menu link to download a portable copy of your medical information. If you have questions, please visit the Frequently Asked Questions section of the [employer] website. Remember, [employer] is NOT to be used for urgent needs. For medical emergencies, dial 911. Now available from your iPhone and Android! Please provide this summary of care documentation to your next provider. Your primary care clinician is listed as Tim Black. If you have any questions after today's visit, please call [phone].

## 2017-05-24 NOTE — PROGRESS NOTES
[facility] [address].  [address], [address]  [place], [facility] [address]  Phone: [phone]  Fax: [phone]        Norman Singh  : 1960  PCP: Jordi Carrillo MD    PROGRESS NOTE      ASSESSMENT AND PLAN    Regine Melo was seen today for back pain. Diagnoses and all orders for this visit:    DDD (degenerative disc disease), lumbar, L4/5 advanced  -     oxyCODONE (OXAYDO) 7.5 mg TbOr; Take 1 tab PO BID severe pain. ONE WEEK SUPPLY    Other orders  -     methocarbamol (ROBAXIN) 500 mg tablet; Take 1 Tab by mouth two (2) times daily as needed. 1. She has been denied acceptance into multiple pain management programs including [facility], [facility], [facility], Dr[facility] Luis Daniel Morillo, [facility], and [facility]. 2. Small Rx for Oxaydo. 3. Trial of Robaxin. 4. Released from specialty care, advised that we are not a chronic pain clinic. Poor surgical candidate for fusion due to BMI of 51.  5. Given care instructions for chronic pain. 6.  Encouraged weight loss. Follow-up Disposition:  Return if symptoms worsen or fail to improve. HISTORY OF PRESENT ILLNESS  Edmundo Ceron is a 61 y.o. female. Pt presents to the office for a f/u visit for back pain and RT sciatica. Last visit she was given a trial of Topamax and Toradol injection. We discussed referral to pain management last visit. Pt has stopped taking Topamax as it made her feel weak. Pt continues to have back pain. She has pain shooting into her RLE. She denies any pain in her LLE. Pt also has RT hip pain and will be having a hip replacement after she loses weight. She was told that she has to get down to 200 lb, currently 288 lbs. Pt does not believe that she will be able to get to weight goal of 200 lbs and wishes to find a surgeon that will do surgery if she was 250 lbs. Pt believes that she would be able to get down to 250 lbs. Her pain increases in severity with standing, walking, and ambulating up steps.  Her pain will interrupt her sleep at night. No saddle paresthesia. She has been denied acceptance into multiple pain management programs including [facility], [facility], [facility], Dr. Jean Paul Luna, [facility], and [facility]. Pt states that she no longer does cocaine. She is on Norco 7.5-325 mg for her pain. Pt denies any dizziness, confusion, uncontrolled constipation, and cravings due to controlled substances. Denies persistent fevers, chills, weight changes, neurogenic bowel or bladder symptoms. Pt denies recent ED visits. Pt was in the hospital for sleep apnea. Pt is hard of hearing.  reviewed. Last Rx of Norco filled 4/2017 through dentist.     Pain Assessment  5/24/2017   Location of Pain Back;Leg   Location Modifiers -   Severity of Pain -   Quality of Pain Aching; Sharp   Quality of Pain Comment numbness and tingling in legs   Duration of Pain -   Frequency of Pain -   Aggravating Factors -   Limiting Behavior -   Relieving Factors -   Relieving Factors Comment -   Result of Injury -       PAST MEDICAL HISTORY   Past Medical History:   Diagnosis Date    Atrial fibrillation     CHADS score 1  (-CHF, +HTN, -AGE, -DM, -CVA)    Carpal tunnel syndrome     Chronic pain     Congenital heart disease     presumed pulmonary stenosis s/p repair 1969    Degenerative disc disease     Degenerative joint disease     Dyslipidemia     GERD     Hypertension     Hypothyroid     Morbid obesity     Morbid obesity with BMI of 45.0-49.9, adult ([facility] [address] 2/20/2017    Noncompliance     Schizoaffective disorder     Substance abuse     marijuana, crack cocaine       Past Surgical History:   Procedure Laterality Date    HX CARPAL TUNNEL RELEASE      left    HX HYSTERECTOMY      HX KNEE REPLACEMENT      left    HX KNEE REPLACEMENT      right    HX THYROIDECTOMY     . MEDICATIONS    Current Outpatient Prescriptions   Medication Sig Dispense Refill    cpap machine kit by Does Not Apply route.       Oxygen 2 Each by Nasal route continuous.  omeprazole (PRILOSEC) 40 mg capsule Take 40 mg by mouth daily.  ibuprofen (MOTRIN) 400 mg tablet Take 1 Tab by mouth every eight (8) hours as needed for Pain. 60 Tab 0    metoprolol tartrate (LOPRESSOR) 25 mg tablet Take 1 Tab by mouth two (2) times a day. 60 Tab 0    topiramate (TOPAMAX) 50 mg tablet Take 1 Tab by mouth daily. 60 Tab 1    VENTOLIN HFA 90 mcg/actuation inhaler Take 2 Puffs by inhalation every four (4) hours as needed for Wheezing or Shortness of Breath. 1 Inhaler 0    lisinopril (PRINIVIL, ZESTRIL) 5 mg tablet Take 1 Tab by mouth daily. 30 Tab 0    furosemide (LASIX) 40 mg tablet 40 mg po daily 30 Tab 0    potassium chloride (K-DUR, KLOR-CON) 20 mEq tablet Take 1 Tab by mouth daily. 30 Tab 0    isosorbide mononitrate ER (IMDUR) 30 mg tablet TAKE ONE TABLET BY MOUTH EVERY EVENING 30 Tab 7    PARoxetine (PAXIL) 20 mg tablet Take 1 Tab by mouth daily. 30 Tab 3    aspirin delayed-release 325 mg tablet TAKE ONE TABLET BY MOUTH ONCE DAILY 30 Tab 6    simvastatin (ZOCOR) 40 mg tablet Take  by mouth nightly. to obtain from pharmacy      aripiprazole (ABILIFY) 15 mg tablet Take 15 mg by mouth daily. to obtain from pharmacy      levothyroxine (SYNTHROID) 125 mcg tablet Take 125 mcg by mouth Daily (before breakfast).  lactulose (CHRONULAC) 10 gram/15 mL solution Take 15 mL by mouth two (2) times a day. 1 Bottle 0    esomeprazole (NEXIUM) 40 mg capsule Take 1 Cap by mouth daily. 30 Cap 0    HYDROcodone-acetaminophen (NORCO) 7.5-325 mg per tablet Take 1-2 Tabs by mouth nightly as needed for Pain. Max Daily Amount: 2 Tabs. (Patient taking differently: Take 1-2 Tabs by mouth nightly as needed for Pain.  to obtain from pharmacy) 60 Tab 0        ALLERGIES  Allergies   Allergen Reactions    Gabapentin Other (comments)     Unsteady, weakness LEs    Tetanus Vaccines And Toxoid Swelling          SOCIAL HISTORY    Social History     Social History    Marital status: SINGLE Spouse name: N/A    Number of children: N/A    Years of education: N/A     Occupational History    Not on file. Social History Main Topics    Smoking status: Never Smoker    Smokeless tobacco: Not on file    Alcohol use No    Drug use: No    Sexual activity: Yes     Birth control/ protection: Surgical     Other Topics Concern    Not on file     Social History Narrative       FAMILY HISTORY  Family History   Problem Relation Age of Onset    Hypertension Mother     Coronary Artery Disease Mother     Coronary Artery Disease Father     Hypertension Father        REVIEW OF SYSTEMS  Review of Systems   Constitutional: Negative for chills, fever and weight loss. HENT: Positive for hearing loss. Respiratory: Negative for shortness of breath. Cardiovascular: Negative for chest pain. Gastrointestinal: Negative for constipation. Negative for fecal incontinence   Genitourinary: Negative for dysuria. Negative for urinary incontinence   Musculoskeletal:        Per HPI   Skin: Negative for rash. Neurological: Negative for dizziness, tingling, tremors, focal weakness and headaches. Endo/Heme/Allergies: Does not bruise/bleed easily. Psychiatric/Behavioral: The patient does not have insomnia. PHYSICAL EXAMINATION  Visit Vitals    BP (!) 178/106    Pulse 68    Temp 98.5 °F (36.9 °C) (Oral)    Resp 18    Ht 5' 3\" (1.6 m)    Wt 288 lb (130.6 kg)    BMI 51.02 kg/m2         Accompanied by self. Constitutional:  Well developed, well nourished, in no acute distress. Psychiatric: Affect and mood are appropriate. Integumentary: No rashes or abrasions noted on exposed areas. Cardiovascular/Peripheral Vascular: Intact l pulses. No peripheral edema is noted. Lymphatic:  No evidence of lymphedema. No cervical lymphadenopathy. SPINE/MUSCULOSKELETAL EXAM    Lumbar spine:  No rash, ecchymosis, or gross obliquity. No fasciculations. No focal atrophy is noted.    Tenderness to palpation L4-5. Tenderness to palpation of RT SI joint. No tenderness to palpation at the sciatic notch. SI joints non-tender. Trochanters non tender. Sensation grossly intact to light touch. MOTOR:       Hip Flex  Quads Hamstrings Ankle DF EHL Ankle PF   Right 4/5 Pain inhibited  4/5 Pain inhibited  4/5 Pain inhibited  +4/5 +4/5 +4/5   Left +4/5 +4/5 +4/5 +4/5 +4/5 +4/5       Straight Leg raise negative. Pt presents in wheelchair. Written by Autry Sandhoff, as dictated by Shadia De León MD.    I, Dr. Shadia De León MD, confirm that all documentation is accurate. Ms. Larry Taylor may have a reminder for a \"due or due soon\" health maintenance. I have asked that she contact her primary care provider for follow-up on this health maintenance.

## 2017-05-24 NOTE — PATIENT INSTRUCTIONS

## 2017-05-31 VITALS
SYSTOLIC BLOOD PRESSURE: 175 MMHG | RESPIRATION RATE: 16 BRPM | DIASTOLIC BLOOD PRESSURE: 82 MMHG | OXYGEN SATURATION: 98 % | HEART RATE: 2 BPM | TEMPERATURE: 48.2 F

## 2017-06-07 ENCOUNTER — APPOINTMENT (OUTPATIENT)
Dept: GENERAL RADIOLOGY | Age: 57
End: 2017-06-07
Attending: EMERGENCY MEDICINE
Payer: MEDICAID

## 2017-06-07 ENCOUNTER — HOSPITAL ENCOUNTER (EMERGENCY)
Age: 57
Discharge: HOME | End: 2017-06-07
Attending: EMERGENCY MEDICINE
Payer: MEDICAID

## 2017-06-07 VITALS
WEIGHT: 290 LBS | BODY MASS INDEX: 51.38 KG/M2 | SYSTOLIC BLOOD PRESSURE: 170 MMHG | DIASTOLIC BLOOD PRESSURE: 80 MMHG | OXYGEN SATURATION: 99 % | HEIGHT: 63 IN | TEMPERATURE: 99 F | RESPIRATION RATE: 17 BRPM | HEART RATE: 72 BPM

## 2017-06-07 DIAGNOSIS — M25.551 CHRONIC RIGHT HIP PAIN: Primary | ICD-10-CM

## 2017-06-07 DIAGNOSIS — G89.29 CHRONIC RIGHT HIP PAIN: Primary | ICD-10-CM

## 2017-06-07 PROCEDURE — 73502 X-RAY EXAM HIP UNI 2-3 VIEWS: CPT

## 2017-06-07 PROCEDURE — 74011250636 HC RX REV CODE- 250/636: Performed by: PHYSICIAN ASSISTANT

## 2017-06-07 PROCEDURE — 96372 THER/PROPH/DIAG INJ SC/IM: CPT

## 2017-06-07 PROCEDURE — 99283 EMERGENCY DEPT VISIT LOW MDM: CPT

## 2017-06-07 RX ORDER — KETOROLAC TROMETHAMINE 30 MG/ML
60 INJECTION, SOLUTION INTRAMUSCULAR; INTRAVENOUS
Status: COMPLETED | OUTPATIENT
Start: 2017-06-07 | End: 2017-06-07

## 2017-06-07 RX ADMIN — KETOROLAC TROMETHAMINE 60 MG: 30 INJECTION, SOLUTION INTRAMUSCULAR at 21:17

## 2017-06-08 NOTE — ROUTINE PROCESS
I have reviewed discharge instructions with the patient. The patient verbalized understanding. Patient armband removed and given to patient to take home. Patient was informed of the privacy risks if armband lost or stolen. Pt transferred to waiting room via wheelchair to await ride from her daughter without any distress.

## 2017-06-08 NOTE — DISCHARGE INSTRUCTIONS
Chronic Pain: Care Instructions  Your Care Instructions  Chronic pain is pain that lasts a long time (months or even years) and may or may not have a clear cause. It is different from acute pain, which usually does have a clear cause--like an injury or illness--and gets better over time. Chronic pain:  · Lasts over time but may vary from day to day. · Does not go away despite efforts to end it. · May disrupt your sleep and lead to fatigue. · May cause depression or anxiety. · May make your muscles tense, causing more pain. · Can disrupt your work, hobbies, home life, and relationships with friends and family. Chronic pain is a very real condition. It is not just in your head. Treatment can help and usually includes several methods used together, such as medicines, physical therapy, exercise, and other treatments. Learning how to relax and changing negative thought patterns can also help you cope. Chronic pain is complex. Taking an active role in your treatment will help you better manage your pain. Tell your doctor if you have trouble dealing with your pain. You may have to try several things before you find what works best for you. Follow-up care is a key part of your treatment and safety. Be sure to make and go to all appointments, and call your doctor if you are having problems. Its also a good idea to know your test results and keep a list of the medicines you take. How can you care for yourself at home? · Pace yourself. Break up large jobs into smaller tasks. Save harder tasks for days when you have less pain, or go back and forth between hard tasks and easier ones. Take rest breaks. · Relax, and reduce stress. Relaxation techniques such as deep breathing or meditation can help. · Keep moving. Gentle, daily exercise can help reduce pain over the long run. Try low- or no-impact exercises such as walking, swimming, and stationary biking. Do stretches to stay flexible.   · Try heat, cold packs, and massage. · Get enough sleep. Chronic pain can make you tired and drain your energy. Talk with your doctor if you have trouble sleeping because of pain. · Think positive. Your thoughts can affect your pain level. Do things that you enjoy to distract yourself when you have pain instead of focusing on the pain. See a movie, read a book, listen to music, or spend time with a friend. · If you think you are depressed, talk to your doctor about treatment. · Keep a daily pain diary. Record how your moods, thoughts, sleep patterns, activities, and medicine affect your pain. You may find that your pain is worse during or after certain activities or when you are feeling a certain emotion. Having a record of your pain can help you and your doctor find the best ways to treat your pain. · Take pain medicines exactly as directed. ¨ If the doctor gave you a prescription medicine for pain, take it as prescribed. ¨ If you are not taking a prescription pain medicine, ask your doctor if you can take an over-the-counter medicine. Reducing constipation caused by pain medicine  · Include fruits, vegetables, beans, and whole grains in your diet each day. These foods are high in fiber. · Drink plenty of fluids, enough so that your urine is light yellow or clear like water. If you have kidney, heart, or liver disease and have to limit fluids, talk with your doctor before you increase the amount of fluids you drink. · If your doctor recommends it, get more exercise. Walking is a good choice. Bit by bit, increase the amount you walk every day. Try for at least 30 minutes on most days of the week. · Schedule time each day for a bowel movement. A daily routine may help. Take your time and do not strain when having a bowel movement. When should you call for help? Call your doctor now or seek immediate medical care if:  · Your pain gets worse or is out of control.   · You feel down or blue, or you do not enjoy things like you once did. You may be depressed, which is common in people with chronic pain. Depression can be treated. · You have vomiting or cramps for more than 2 hours. Watch closely for changes in your health, and be sure to contact your doctor if:  · You cannot sleep because of pain. · You are very worried or anxious about your pain. · You have trouble taking your pain medicine. · You have any concerns about your pain medicine. · You have trouble with bowel movements, such as:  ¨ No bowel movement in 3 days. ¨ Blood in the anal area, in your stool, or on the toilet paper. ¨ Diarrhea for more than 24 hours. Where can you learn more? Go to http://bucky-gerard.info/. Enter N004 in the search box to learn more about \"Chronic Pain: Care Instructions. \"  Current as of: October 14, 2016  Content Version: 11.2  © 1685-3207 [employer]. Care instructions adapted under license by [employer] (which disclaims liability or warranty for this information). If you have questions about a medical condition or this instruction, always ask your healthcare professional. [place] [address] any warranty or liability for your use of this information. Hip Pain: Care Instructions  Your Care Instructions  Hip pain may be caused by many things, including overuse, a fall, or a twisting movement. Another cause of hip pain is arthritis. Your pain may increase when you stand up, walk, or squat. The pain may come and go or may be constant. Home treatment can help relieve hip pain, swelling, and stiffness. If your pain is ongoing, you may need more tests and treatment. Follow-up care is a key part of your treatment and safety. Be sure to make and go to all appointments, and call your doctor if you are having problems. Its also a good idea to know your test results and keep a list of the medicines you take. How can you care for yourself at home?   · Take pain medicines exactly as directed. ¨ If the doctor gave you a prescription medicine for pain, take it as prescribed. ¨ If you are not taking a prescription pain medicine, ask your doctor if you can take an over-the-counter medicine. · Rest and protect your hip. Take a break from any activity, including standing or walking, that may cause pain. · Put ice or a cold pack against your hip for 10 to 20 minutes at a time. Try to do this every 1 to 2 hours for the next 3 days (when you are awake) or until the swelling goes down. Put a thin cloth between the ice and your skin. · Sleep on your healthy side with a pillow between your knees, or sleep on your back with pillows under your knees. · If there is no swelling, you can put moist heat, a heating pad, or a warm cloth on your hip. Do gentle stretching exercises to help keep your hip flexible. · Learn how to prevent falls. Have your vision and hearing checked regularly. Wear slippers or shoes with a nonskid sole. · Stay at a healthy weight. · Wear comfortable shoes. When should you call for help? Call 911 anytime you think you may need emergency care. For example, call if:  · You have sudden chest pain and shortness of breath, or you cough up blood. · You are not able to stand or walk or bear weight. · Your buttocks, legs, or feet feel numb or tingly. · Your leg or foot is cool or pale or changes color. · You have severe pain. Call your doctor now or seek immediate medical care if:  · You have signs of infection, such as:  ¨ Increased pain, swelling, warmth, or redness in the hip area. ¨ Red streaks leading from the hip area. ¨ Pus draining from the hip area. ¨ A fever. · You have signs of a blood clot, such as:  ¨ Pain in your calf, back of the knee, thigh, or groin. ¨ Redness and swelling in your leg or groin. · You are not able to bend, straighten, or move your leg normally. · You have trouble urinating or having bowel movements.   Watch closely for changes in your health, and be sure to contact your doctor if:  · You do not get better as expected. Where can you learn more? Go to http://bucky-gerard.info/. Enter D744 in the search box to learn more about \"Hip Pain: Care Instructions. \"  Current as of: May 27, 2016  Content Version: 11.2  © 4545-9242 [employer]. Care instructions adapted under license by [employer] (which disclaims liability or warranty for this information). If you have questions about a medical condition or this instruction, always ask your healthcare professional. [address] any warranty or liability for your use of this information.

## 2017-06-08 NOTE — ED PROVIDER NOTES
HPI Comments: Ansley Castro is a 61 y.o. female with a pertinent history of Afib, chronic hip pain, GERD, DJD, morbid obesity, substance abuse who presents to the emergency department for evaluation of continued chronic pain in her right hip without any new injury. She states she ran out of her norco.  She states she has been told to lose weight, but states that is not going to happen if she is in pain. Advised her that diet would be a good place to start. Pt denies any fevers or chills, headache, dizziness or light headedness, ENT issues, CP or discomfort, SOB, cough, n/v/d/c, abd pain, back pain, diaphoresis, melena/hematochezia, dysuria, hematuria, frequency, focal weakness/numbness/tingling, or rash. Patient has no other complaints at this time. PCP:  Adis Calderon MD           Past Medical History:   Diagnosis Date    Atrial fibrillation     CHADS score 1  (-CHF, +HTN, -AGE, -DM, -CVA)    Carpal tunnel syndrome     Chronic pain     Congenital heart disease     presumed pulmonary stenosis s/p repair 1969    Degenerative disc disease     Degenerative joint disease     Dyslipidemia     GERD     Hypertension     Hypothyroid     Morbid obesity     Morbid obesity with BMI of 45.0-49.9, adult ([facility] 2/20/2017    Noncompliance     Schizoaffective disorder     Substance abuse     marijuana, crack cocaine       Past Surgical History:   Procedure Laterality Date    HX CARPAL TUNNEL RELEASE      left    HX HYSTERECTOMY      HX KNEE REPLACEMENT      left    HX KNEE REPLACEMENT      right    HX THYROIDECTOMY           Family History:   Problem Relation Age of Onset    Hypertension Mother     Coronary Artery Disease Mother     Coronary Artery Disease Father     Hypertension Father        Social History     Social History    Marital status: SINGLE     Spouse name: N/A    Number of children: N/A    Years of education: N/A     Occupational History    Not on file.      Social History Main Topics    Smoking status: Never Smoker    Smokeless tobacco: Not on file    Alcohol use No    Drug use: No    Sexual activity: Yes     Birth control/ protection: Surgical     Other Topics Concern    Not on file     Social History Narrative         ALLERGIES: Gabapentin; Tetanus vaccines and toxoid; and Topamax [topiramate]    Review of Systems   Constitutional: Negative for chills and fever. HENT: Negative for congestion, rhinorrhea and sore throat. Respiratory: Negative for cough and shortness of breath. Cardiovascular: Negative for chest pain. Gastrointestinal: Negative for abdominal pain, blood in stool, constipation, diarrhea, nausea and vomiting. Genitourinary: Negative for dysuria, frequency and hematuria. Musculoskeletal: Positive for arthralgias. Negative for back pain and myalgias. Skin: Negative for rash and wound. Neurological: Negative for dizziness and headaches. Vitals:    06/07/17 1853 06/07/17 2022 06/07/17 2113   BP:  170/86 157/75   Pulse: (!) 52     Resp: 18     Temp: 99 °F (37.2 °C)     SpO2: 96%  98%   Weight: 131.5 kg (290 lb)     Height: 5' 3\" (1.6 m)              Physical Exam   Constitutional: She is oriented to person, place, and time. She appears well-developed and well-nourished. No distress. Morbidly obese   HENT:   Head: Normocephalic and atraumatic. Eyes: Conjunctivae are normal.   Neck: Normal range of motion. Neck supple. Cardiovascular: Normal rate, regular rhythm and normal heart sounds. Pulmonary/Chest: Effort normal and breath sounds normal. No respiratory distress. She exhibits no tenderness. Abdominal: Soft. Bowel sounds are normal. She exhibits no distension. There is no tenderness. There is no rebound and no guarding. Musculoskeletal: Normal range of motion. She exhibits no edema or deformity. Neurological: She is alert and oriented to person, place, and time. She has normal reflexes. Skin: Skin is warm and dry.  She is not diaphoretic. Psychiatric: She has a normal mood and affect. Nursing note and vitals reviewed. MDM  Number of Diagnoses or Management Options  Chronic right hip pain: new and requires workup  Diagnosis management comments: Differential Diagnosis: Septic hip, transient synovitis, Exoj-Segcr-Wblhkou Disease/avascular necrosis of femoral head, slipped capital femoral epiphysis, hip fracture, hip dislocation    Plan: Chronic pain. NAD. ADvised we cannot treat with narcotics. No new injury. Nothing new on CXR. Toradol here. Will DC home with instructions to follow-up with Ortho. At this time, patient is stable and appropriate for discharge home. Patient demonstrates understanding of current diagnoses and is in agreement with the treatment plan. They are advised that while the likelihood of serious underlying condition is low at this point given the evaluation performed today, we cannot fully rule it out. They are advised to immediately return with any new symptoms or worsening of current condition. All questions have been answered. Patient is given educational material regarding their diagnoses, including danger symptoms and when to return to the ED. Follow-up with PCP.            Amount and/or Complexity of Data Reviewed  Tests in the radiology section of CPT®: ordered and reviewed  Review and summarize past medical records: yes    Risk of Complications, Morbidity, and/or Mortality  Presenting problems: moderate  Diagnostic procedures: moderate  Management options: moderate    Patient Progress  Patient progress: improved    ED Course       Procedures      -------------------------------------------------------------------------------------------------------------------  Orders:  Orders Placed This Encounter    XR HIP RT W OR WO PELV 2-3 VWS     Standing Status:   Standing     Number of Occurrences:   1     Order Specific Question:   Transport     Answer:   Guru [5]     Order Specific Question: Reason for Exam     Answer:   pain    ketorolac tromethamine (TORADOL) 60 mg/2 mL injection 60 mg        Radiology Results:  XR HIP RT W OR WO PELV 2-3 VWS   Final Result   IMPRESSION:     No change to prior study. Severe osteoarthritis of the right hip, mild at the  left hip. Progress Notes:  9:44 PM:  Dennise Georges PA-C was at the pt's bedside, assessed the pt and answered the pt's questions regarding treatment.    -------------------------------------------------------------------------------------------------------------------    Disposition:  Diagnosis:   1. Chronic right hip pain        Disposition: [place] Home    Follow-up Information     Follow up With Details Comments Contact Info    Kirstie Eisenberg MD Call in 2 days For follow-up [address]  [facility] [place] [id number] [phone]      [facility] EMERGENCY DEPT Go to As needed, If symptoms worsen [address]  [phone]          Patient's Medications   Start Taking    No medications on file   Continue Taking    ARIPIPRAZOLE (ABILIFY) 15 MG TABLET    Take 15 mg by mouth daily. to obtain from pharmacy    ASPIRIN DELAYED-RELEASE 325 MG TABLET    TAKE ONE TABLET BY MOUTH ONCE DAILY    CPAP MACHINE KIT    by Does Not Apply route. ESOMEPRAZOLE (NEXIUM) 40 MG CAPSULE    Take 1 Cap by mouth daily. FUROSEMIDE (LASIX) 40 MG TABLET    40 mg po daily    IBUPROFEN (MOTRIN) 400 MG TABLET    Take 1 Tab by mouth every eight (8) hours as needed for Pain. ISOSORBIDE MONONITRATE ER (IMDUR) 30 MG TABLET    TAKE ONE TABLET BY MOUTH EVERY EVENING    LACTULOSE (CHRONULAC) 10 GRAM/15 ML SOLUTION    Take 15 mL by mouth two (2) times a day. LEVOTHYROXINE (SYNTHROID) 125 MCG TABLET    Take 125 mcg by mouth Daily (before breakfast). LISINOPRIL (PRINIVIL, ZESTRIL) 5 MG TABLET    Take 1 Tab by mouth daily.     METHOCARBAMOL (ROBAXIN) 500 MG TABLET    Take 1 Tab by mouth two (2) times daily as needed. METOPROLOL TARTRATE (LOPRESSOR) 25 MG TABLET    Take 1 Tab by mouth two (2) times a day. OMEPRAZOLE (PRILOSEC) 40 MG CAPSULE    Take 40 mg by mouth daily. OXYCODONE (OXAYDO) 7.5 MG TBOR    Take 1 tab PO BID severe pain. ONE WEEK SUPPLY    OXYGEN    2 Each by Nasal route continuous. PAROXETINE (PAXIL) 20 MG TABLET    Take 1 Tab by mouth daily. POTASSIUM CHLORIDE (K-DUR, KLOR-CON) 20 MEQ TABLET    Take 1 Tab by mouth daily. SIMVASTATIN (ZOCOR) 40 MG TABLET    Take  by mouth nightly. to obtain from pharmacy    Cream Style HFA 90 MCG/ACTUATION INHALER    Take 2 Puffs by inhalation every four (4) hours as needed for Wheezing or Shortness of Breath.    These Medications have changed    No medications on file   Stop Taking    No medications on file

## 2017-06-28 ENCOUNTER — APPOINTMENT (OUTPATIENT)
Dept: GENERAL RADIOLOGY | Age: 57
End: 2017-06-28
Attending: EMERGENCY MEDICINE
Payer: MEDICAID

## 2017-06-28 ENCOUNTER — HOSPITAL ENCOUNTER (EMERGENCY)
Age: 57
Discharge: HOME | End: 2017-06-28
Attending: EMERGENCY MEDICINE
Payer: MEDICAID

## 2017-06-28 VITALS
HEART RATE: 64 BPM | SYSTOLIC BLOOD PRESSURE: 127 MMHG | OXYGEN SATURATION: 98 % | TEMPERATURE: 98.2 F | DIASTOLIC BLOOD PRESSURE: 78 MMHG | RESPIRATION RATE: 15 BRPM

## 2017-06-28 DIAGNOSIS — M25.562 CHRONIC PAIN OF LEFT KNEE: Primary | ICD-10-CM

## 2017-06-28 DIAGNOSIS — G89.29 CHRONIC PAIN OF LEFT KNEE: Primary | ICD-10-CM

## 2017-06-28 DIAGNOSIS — S82.035K: ICD-10-CM

## 2017-06-28 PROCEDURE — 73564 X-RAY EXAM KNEE 4 OR MORE: CPT

## 2017-06-28 PROCEDURE — 73562 X-RAY EXAM OF KNEE 3: CPT

## 2017-06-28 PROCEDURE — 99283 EMERGENCY DEPT VISIT LOW MDM: CPT

## 2017-06-28 PROCEDURE — 74011250637 HC RX REV CODE- 250/637: Performed by: EMERGENCY MEDICINE

## 2017-06-28 RX ORDER — HYDROCODONE BITARTRATE AND ACETAMINOPHEN 5; 325 MG/1; MG/1
1 TABLET ORAL
Status: COMPLETED | OUTPATIENT
Start: 2017-06-28 | End: 2017-06-28

## 2017-06-28 RX ADMIN — HYDROCODONE BITARTRATE AND ACETAMINOPHEN 1 TABLET: 5; 325 TABLET ORAL at 12:34

## 2017-06-28 NOTE — DISCHARGE INSTRUCTIONS
Chronic Pain: Care Instructions  Your Care Instructions  Chronic pain is pain that lasts a long time (months or even years) and may or may not have a clear cause. It is different from acute pain, which usually does have a clear cause--like an injury or illness--and gets better over time. Chronic pain:  · Lasts over time but may vary from day to day. · Does not go away despite efforts to end it. · May disrupt your sleep and lead to fatigue. · May cause depression or anxiety. · May make your muscles tense, causing more pain. · Can disrupt your work, hobbies, home life, and relationships with friends and family. Chronic pain is a very real condition. It is not just in your head. Treatment can help and usually includes several methods used together, such as medicines, physical therapy, exercise, and other treatments. Learning how to relax and changing negative thought patterns can also help you cope. Chronic pain is complex. Taking an active role in your treatment will help you better manage your pain. Tell your doctor if you have trouble dealing with your pain. You may have to try several things before you find what works best for you. Follow-up care is a key part of your treatment and safety. Be sure to make and go to all appointments, and call your doctor if you are having problems. Its also a good idea to know your test results and keep a list of the medicines you take. How can you care for yourself at home? · Pace yourself. Break up large jobs into smaller tasks. Save harder tasks for days when you have less pain, or go back and forth between hard tasks and easier ones. Take rest breaks. · Relax, and reduce stress. Relaxation techniques such as deep breathing or meditation can help. · Keep moving. Gentle, daily exercise can help reduce pain over the long run. Try low- or no-impact exercises such as walking, swimming, and stationary biking. Do stretches to stay flexible.   · Try heat, cold packs, and massage. · Get enough sleep. Chronic pain can make you tired and drain your energy. Talk with your doctor if you have trouble sleeping because of pain. · Think positive. Your thoughts can affect your pain level. Do things that you enjoy to distract yourself when you have pain instead of focusing on the pain. See a movie, read a book, listen to music, or spend time with a friend. · If you think you are depressed, talk to your doctor about treatment. · Keep a daily pain diary. Record how your moods, thoughts, sleep patterns, activities, and medicine affect your pain. You may find that your pain is worse during or after certain activities or when you are feeling a certain emotion. Having a record of your pain can help you and your doctor find the best ways to treat your pain. · Take pain medicines exactly as directed. ¨ If the doctor gave you a prescription medicine for pain, take it as prescribed. ¨ If you are not taking a prescription pain medicine, ask your doctor if you can take an over-the-counter medicine. Reducing constipation caused by pain medicine  · Include fruits, vegetables, beans, and whole grains in your diet each day. These foods are high in fiber. · Drink plenty of fluids, enough so that your urine is light yellow or clear like water. If you have kidney, heart, or liver disease and have to limit fluids, talk with your doctor before you increase the amount of fluids you drink. · If your doctor recommends it, get more exercise. Walking is a good choice. Bit by bit, increase the amount you walk every day. Try for at least 30 minutes on most days of the week. · Schedule time each day for a bowel movement. A daily routine may help. Take your time and do not strain when having a bowel movement. When should you call for help? Call your doctor now or seek immediate medical care if:  · Your pain gets worse or is out of control.   · You feel down or blue, or you do not enjoy things like you once did. You may be depressed, which is common in people with chronic pain. Depression can be treated. · You have vomiting or cramps for more than 2 hours. Watch closely for changes in your health, and be sure to contact your doctor if:  · You cannot sleep because of pain. · You are very worried or anxious about your pain. · You have trouble taking your pain medicine. · You have any concerns about your pain medicine. · You have trouble with bowel movements, such as:  ¨ No bowel movement in 3 days. ¨ Blood in the anal area, in your stool, or on the toilet paper. ¨ Diarrhea for more than 24 hours. Where can you learn more? Go to http://bucky-gerard.info/. Enter N004 in the search box to learn more about \"Chronic Pain: Care Instructions. \"  Current as of: October 14, 2016  Content Version: 11.3  © 5539-6669 [employer]. Care instructions adapted under license by [employer] (which disclaims liability or warranty for this information). If you have questions about a medical condition or this instruction, always ask your healthcare professional. [place] [address] any warranty or liability for your use of this information. Broken Kneecap: Care Instructions  Your Care Instructions    The kneecap (patella) is a bone that protects the front of your knee joint. It takes the brunt of any blows to your knee, such as a fall onto your knee or your knee hitting the dashboard. Symptoms of a broken kneecap (fracture) are swelling and pain, especially when moving the knee back and forth. You may not need surgery if the fracture has not moved your kneecap out of position. But sometimes surgery is needed to move the pieces of the kneecap back where they belong and to repair damage. Whether or not you have surgery, you probably will wear a cast or immobilizer on your leg for several weeks while the kneecap heals.  Wear and take care of the cast or immobilizer exactly as your doctor advises. You may need to ask for help with daily tasks. You heal best when you take good care of yourself. Eat a variety of healthy foods, and don't smoke. Follow-up care is a key part of your treatment and safety. Be sure to make and go to all appointments, and call your doctor if you are having problems. It's also a good idea to know your test results and keep a list of the medicines you take. How can you care for yourself at home? · Follow your doctor's instructions for taking care of your cast or immobilizer, which is a protective brace that keeps your knee from moving. Do not remove it until your doctor says you can. · Prop up the sore leg on a pillow when you ice it or anytime you sit or lie down during the next 3 days. Try to keep it above the level of your heart. This will help reduce swelling. · Put ice or a cold pack on your knee for 10 to 20 minutes at a time. Try to do this every 1 to 2 hours for the next 3 days (when you are awake) or until the swelling goes down. Put a thin cloth between the ice and your skin. Be careful not to get the cast or immobilizer wet. · Do not use oils or lotions near your cast. If the skin becomes red or sore around the edge of the cast, you may pad the edges with a soft material, such as moleskin, or use tape to cover the edges. · Be safe with medicines. Read and follow all instructions on the label. ¨ If the doctor gave you a prescription medicine for pain, take it as prescribed. ¨ If you are not taking a prescription pain medicine, ask your doctor if you can take an over-the-counter medicine. When should you call for help? Call 911 anytime you think you may need emergency care. For example, call if:  · You have sudden chest pain and shortness of breath, or you cough up blood. Call your doctor now or seek immediate medical care if:  · You have increased or severe pain. · Your leg or foot is cool or pale or changes color.   · You have tingling, weakness, or numbness in your foot and toes. · Your cast or splint feels too tight. · You cannot move your toes. · You have signs of infection, such as:  ¨ Drainage or a bad smell coming from the cast.  ¨ Increased pain, swelling, warmth, or redness. ¨ Red streaks leading from your knee. ¨ Pus draining from the area. ¨ A fever. · You have signs of a blood clot, such as:  ¨ Pain in your calf, back of the knee, thigh, or groin. ¨ Redness and swelling in your leg or groin. Watch closely for changes in your health, and be sure to contact your doctor if:  · The skin under your cast or splint burns or stings. · You do not get better as expected. Where can you learn more? Go to http://bucky-gerard.info/. Enter X351 in the search box to learn more about \"Broken Kneecap: Care Instructions. \"  Current as of: March 21, 2017  Content Version: 11.3  © 2919-2181 "[employer] Care instructions adapted under license by [employer] (which disclaims liability or warranty for this information). If you have questions about a medical condition or this instruction, always ask your healthcare professional. [address] any warranty or liability for your use of this information.

## 2017-06-28 NOTE — ED PROVIDER NOTES
HPI Comments: 12:04 PM Romayne Para is a 61 y.o. female with a hx of HTN, A-fib, degenerative disc and joint disease, and Morbid Obesity who presents to the ED c/o an exacerbation of chronic L knee pain that began 1 month ago and worsened a few days ago. The pt states that she has had a L knee replacement and is currently scheduled for a R hip replacement. She notes that she fell 1 year ago and has a \"cracked knee\" but denies any recent falls. Pt explains that she took Ibuprofen with no relief and that her PCP ceased her Vicodin Rx. No other complaints or concerns at this time. Orthopedist: Ember Sneed MD  PCP:  Ute Cruz MD      The history is provided by the patient. Past Medical History:   Diagnosis Date    Atrial fibrillation     CHADS score 1  (-CHF, +HTN, -AGE, -DM, -CVA)    Carpal tunnel syndrome     Chronic pain     Congenital heart disease     presumed pulmonary stenosis s/p repair 1969    Degenerative disc disease     Degenerative joint disease     Dyslipidemia     GERD     Hypertension     Hypothyroid     Morbid obesity     Morbid obesity with BMI of 45.0-49.9, adult ([facility] [address] [facility] 2/20/2017    Noncompliance     Schizoaffective disorder     Substance abuse     marijuana, crack cocaine       Past Surgical History:   Procedure Laterality Date    HX CARPAL TUNNEL RELEASE      left    HX HYSTERECTOMY      HX KNEE REPLACEMENT      left    HX KNEE REPLACEMENT      right    HX THYROIDECTOMY           Family History:   Problem Relation Age of Onset    Hypertension Mother     Coronary Artery Disease Mother     Coronary Artery Disease Father     Hypertension Father        Social History     Social History    Marital status: SINGLE     Spouse name: N/A    Number of children: N/A    Years of education: N/A     Occupational History    Not on file.      Social History Main Topics    Smoking status: Never Smoker    Smokeless tobacco: Never Used    Alcohol use No    Drug use: No    Sexual activity: Yes     Birth control/ protection: Surgical     Other Topics Concern    Not on file     Social History Narrative         ALLERGIES: Gabapentin; Tetanus vaccines and toxoid; and Topamax [topiramate]    Review of Systems   Constitutional: Negative for diaphoresis and fever. HENT: Negative for ear pain, rhinorrhea and trouble swallowing. Eyes: Negative for visual disturbance. Respiratory: Negative for cough and shortness of breath. Cardiovascular: Negative for chest pain and leg swelling. Gastrointestinal: Negative for abdominal pain, blood in stool, diarrhea, nausea and vomiting. Genitourinary: Negative for difficulty urinating, flank pain and hematuria. Musculoskeletal: Positive for arthralgias (L knee). Negative for back pain and neck pain. Skin: Negative for rash. Neurological: Negative for dizziness, weakness, numbness and headaches. Hematological: Negative. Psychiatric/Behavioral: Negative. All other systems reviewed and are negative. Vitals:    06/28/17 1201   BP: 127/78   Pulse: 64   Resp: 15   Temp: 98.2 °F (36.8 °C)   SpO2: 98%            Physical Exam   Constitutional:   General:  Well-developed, well-nourished, obese nontoxic nondiaphoretic  Head:  Normocephalic atraumatic    Eyes:  Pupils midrange extraocular movements grossly intact. Nose:  No rhinorrhea, inspection grossly normal    Ears:  Grossly normal to inspection    Mouth:  Mucous membranes moist    Neck:  Trachea midline, no asymmetry  Chest:  Grossly normal inspection, symmetric chest rise, respirations nonlabored  Extremities:  Grossly normal to inspection  LEFT knee postsurgical, 2+ dorsalis pedis pulse, no appreciable asymmetry no pain on palpation of the calf, there is no swelling of the foot. Neurologic:  Alert and oriented no appreciable focal neurologic deficit  Psychiatric:  Grossly normal mood and affect  Nursing note reviewed, vital signs reviewed.           MDM  Number of Diagnoses or Management Options  Chronic pain of left knee:   Closed nondisplaced transverse fracture of left patella with nonunion, subsequent encounter:   Diagnosis management comments: ED course:  Patient presents with LEFT knee pain chronic, no longer being prescribed narcotic medications cause her previous provider sounds like he has declined to continue narcotic medications for her. Likely has an element of chronic pain, no recent fall but she does request an x-ray, seems reasonable given her history of hardware with a \"cracked bone \"one year ago. X-ray LEFT knee chronic transverse fracture of the patella. Hardware seems intact    EMR reviewed, she is not a surgical candidate due to her elevated BMI per orthopedics. She was referred to pain management back in 10/10/16, she reports that she is not currently in pain management, she was referred to primary care physician for further assistance with her chronic pain    In my opinion, patient has a chronic pain condition best managed by primary care or pain management. I explained the emergency department's chronic pain prescribing policy. I explained that the emergency department will always be available to evaluate and treat the patient for acute problems, and patient was invited to return with any concerns. Patient will be referred back to primary care or pain management. Given Vicodin here    Patient's presentation, history, physical exam and laboratory evaluations were reviewed. At this time patient was felt to be stable for outpatient management and follow with primary care/specialist.  Patient was instructed to return to the emergency department with any concerns. Disposition:    Discharged home      Portions of this chart were created with Dragon medical speech to text program.   Unrecognized errors may be present.         ED Course       Procedures      SCRIBE ATTESTATION STATEMENT  Documented by: Gilford Niece scribing for, and in the presence of, Mariella Gonzalez MD 06/28/17 12:35 PM     Signed by: Sierra Art, 06/28/17 12:11 PM     PROVIDER ATTESTATION STATEMENT  I personally performed the services described in the documentation, reviewed the documentation, as recorded by the scribe in my presence, and it accurately and completely records my words and actions.   Mariella Gonzalez MD

## 2017-06-28 NOTE — ED TRIAGE NOTES
\"My left knee is stuck and it wont unstick itself\". Patient reports previous injury to left knee one year ago with ongoing pain. Report left knee stiffness and sharp pain today.

## 2017-08-18 RX ORDER — LISINOPRIL 5 MG/1
5 TABLET ORAL DAILY
Qty: 30 TAB | Refills: 0 | Status: SHIPPED | OUTPATIENT
Start: 2017-08-18 | End: 2017-10-12 | Stop reason: SDUPTHER

## 2017-08-30 ENCOUNTER — HOSPITAL ENCOUNTER (OUTPATIENT)
Dept: GENERAL RADIOLOGY | Age: 57
Discharge: HOME | End: 2017-08-30
Attending: ORTHOPAEDIC SURGERY
Payer: MEDICAID

## 2017-08-30 DIAGNOSIS — M16.9 OA (OSTEOARTHRITIS) OF HIP: ICD-10-CM

## 2017-08-30 PROCEDURE — 74011636320 HC RX REV CODE- 636/320: Performed by: ORTHOPAEDIC SURGERY

## 2017-08-30 PROCEDURE — 77002 NEEDLE LOCALIZATION BY XRAY: CPT

## 2017-08-30 PROCEDURE — 74011250636 HC RX REV CODE- 250/636: Performed by: ORTHOPAEDIC SURGERY

## 2017-08-30 PROCEDURE — 74011000250 HC RX REV CODE- 250: Performed by: ORTHOPAEDIC SURGERY

## 2017-08-30 RX ORDER — TRIAMCINOLONE ACETONIDE 40 MG/ML
40 INJECTION, SUSPENSION INTRA-ARTICULAR; INTRAMUSCULAR
Status: COMPLETED | OUTPATIENT
Start: 2017-08-30 | End: 2017-08-30

## 2017-08-30 RX ORDER — LIDOCAINE HYDROCHLORIDE 10 MG/ML
30 INJECTION, SOLUTION EPIDURAL; INFILTRATION; INTRACAUDAL; PERINEURAL
Status: COMPLETED | OUTPATIENT
Start: 2017-08-30 | End: 2017-08-30

## 2017-08-30 RX ADMIN — LIDOCAINE HYDROCHLORIDE 15 ML: 10 INJECTION, SOLUTION EPIDURAL; INFILTRATION; INTRACAUDAL; PERINEURAL at 11:00

## 2017-08-30 RX ADMIN — TRIAMCINOLONE ACETONIDE 40 MG: 40 INJECTION, SUSPENSION INTRA-ARTICULAR; INTRAMUSCULAR at 11:00

## 2017-08-30 RX ADMIN — IOPAMIDOL 4 ML: 408 INJECTION, SOLUTION INTRATHECAL at 11:00

## 2017-09-09 ENCOUNTER — APPOINTMENT (OUTPATIENT)
Dept: GENERAL RADIOLOGY | Age: 57
End: 2017-09-09
Attending: PHYSICIAN ASSISTANT
Payer: MEDICAID

## 2017-09-09 ENCOUNTER — HOSPITAL ENCOUNTER (EMERGENCY)
Age: 57
Discharge: HOME | End: 2017-09-10
Attending: EMERGENCY MEDICINE
Payer: MEDICAID

## 2017-09-09 ENCOUNTER — APPOINTMENT (OUTPATIENT)
Dept: CT IMAGING | Age: 57
End: 2017-09-09
Attending: PHYSICIAN ASSISTANT
Payer: MEDICAID

## 2017-09-09 DIAGNOSIS — M25.562 ACUTE PAIN OF LEFT KNEE: Primary | ICD-10-CM

## 2017-09-09 DIAGNOSIS — S83.105A KNEE DISLOCATION, LEFT, INITIAL ENCOUNTER: ICD-10-CM

## 2017-09-09 LAB
ANION GAP SERPL CALC-SCNC: 5 MMOL/L (ref 3–18)
APTT PPP: 32.3 SEC (ref 23–36.4)
BASOPHILS # BLD: 0 K/UL (ref 0–0.1)
BASOPHILS NFR BLD: 0 % (ref 0–2)
BUN SERPL-MCNC: 29 MG/DL (ref 7–18)
BUN/CREAT SERPL: 19 (ref 12–20)
CALCIUM SERPL-MCNC: 9 MG/DL (ref 8.5–10.1)
CHLORIDE SERPL-SCNC: 102 MMOL/L (ref 100–108)
CO2 SERPL-SCNC: 32 MMOL/L (ref 21–32)
CREAT SERPL-MCNC: 1.52 MG/DL (ref 0.6–1.3)
DIFFERENTIAL METHOD BLD: ABNORMAL
EOSINOPHIL # BLD: 0.2 K/UL (ref 0–0.4)
EOSINOPHIL NFR BLD: 3 % (ref 0–5)
ERYTHROCYTE [DISTWIDTH] IN BLOOD BY AUTOMATED COUNT: 15.3 % (ref 11.6–14.5)
GLUCOSE SERPL-MCNC: 80 MG/DL (ref 74–99)
HCT VFR BLD AUTO: 37 % (ref 35–45)
HGB BLD-MCNC: 11.4 G/DL (ref 12–16)
INR PPP: 1 (ref 0.8–1.2)
LYMPHOCYTES # BLD: 1.2 K/UL (ref 0.9–3.6)
LYMPHOCYTES NFR BLD: 23 % (ref 21–52)
MCH RBC QN AUTO: 26.6 PG (ref 24–34)
MCHC RBC AUTO-ENTMCNC: 30.8 G/DL (ref 31–37)
MCV RBC AUTO: 86.2 FL (ref 74–97)
MONOCYTES # BLD: 0.3 K/UL (ref 0.05–1.2)
MONOCYTES NFR BLD: 7 % (ref 3–10)
NEUTS SEG # BLD: 3.5 K/UL (ref 1.8–8)
NEUTS SEG NFR BLD: 67 % (ref 40–73)
PLATELET # BLD AUTO: 209 K/UL (ref 135–420)
PMV BLD AUTO: 10.8 FL (ref 9.2–11.8)
POTASSIUM SERPL-SCNC: 4.1 MMOL/L (ref 3.5–5.5)
PROTHROMBIN TIME: 12.9 SEC (ref 11.5–15.2)
RBC # BLD AUTO: 4.29 M/UL (ref 4.2–5.3)
SODIUM SERPL-SCNC: 139 MMOL/L (ref 136–145)
WBC # BLD AUTO: 5.2 K/UL (ref 4.6–13.2)

## 2017-09-09 PROCEDURE — 74011636320 HC RX REV CODE- 636/320: Performed by: EMERGENCY MEDICINE

## 2017-09-09 PROCEDURE — 74011250636 HC RX REV CODE- 250/636: Performed by: PHYSICIAN ASSISTANT

## 2017-09-09 PROCEDURE — 80048 BASIC METABOLIC PNL TOTAL CA: CPT | Performed by: PHYSICIAN ASSISTANT

## 2017-09-09 PROCEDURE — 99285 EMERGENCY DEPT VISIT HI MDM: CPT

## 2017-09-09 PROCEDURE — L1830 KO IMMOB CANVAS LONG PRE OTS: HCPCS

## 2017-09-09 PROCEDURE — 73706 CT ANGIO LWR EXTR W/O&W/DYE: CPT

## 2017-09-09 PROCEDURE — 73564 X-RAY EXAM KNEE 4 OR MORE: CPT

## 2017-09-09 PROCEDURE — 85610 PROTHROMBIN TIME: CPT | Performed by: PHYSICIAN ASSISTANT

## 2017-09-09 PROCEDURE — 74011250636 HC RX REV CODE- 250/636: Performed by: EMERGENCY MEDICINE

## 2017-09-09 PROCEDURE — 85730 THROMBOPLASTIN TIME PARTIAL: CPT | Performed by: PHYSICIAN ASSISTANT

## 2017-09-09 PROCEDURE — 73502 X-RAY EXAM HIP UNI 2-3 VIEWS: CPT

## 2017-09-09 PROCEDURE — 74011250637 HC RX REV CODE- 250/637: Performed by: PHYSICIAN ASSISTANT

## 2017-09-09 PROCEDURE — 85025 COMPLETE CBC W/AUTO DIFF WBC: CPT | Performed by: PHYSICIAN ASSISTANT

## 2017-09-09 PROCEDURE — 96360 HYDRATION IV INFUSION INIT: CPT

## 2017-09-09 RX ORDER — FENTANYL CITRATE 50 UG/ML
100 INJECTION, SOLUTION INTRAMUSCULAR; INTRAVENOUS
Status: DISCONTINUED | OUTPATIENT
Start: 2017-09-09 | End: 2017-09-10

## 2017-09-09 RX ORDER — OXYCODONE AND ACETAMINOPHEN 5; 325 MG/1; MG/1
1 TABLET ORAL
Status: COMPLETED | OUTPATIENT
Start: 2017-09-09 | End: 2017-09-09

## 2017-09-09 RX ORDER — NALOXONE HYDROCHLORIDE 1 MG/ML
INJECTION INTRAMUSCULAR; INTRAVENOUS; SUBCUTANEOUS
Status: DISCONTINUED
Start: 2017-09-09 | End: 2017-09-10

## 2017-09-09 RX ORDER — MIDAZOLAM HYDROCHLORIDE 1 MG/ML
5 INJECTION, SOLUTION INTRAMUSCULAR; INTRAVENOUS ONCE
Status: DISCONTINUED | OUTPATIENT
Start: 2017-09-09 | End: 2017-09-10

## 2017-09-09 RX ORDER — FLUMAZENIL 0.1 MG/ML
INJECTION INTRAVENOUS
Status: DISCONTINUED
Start: 2017-09-09 | End: 2017-09-10

## 2017-09-09 RX ORDER — OXYCODONE AND ACETAMINOPHEN 5; 325 MG/1; MG/1
TABLET ORAL
Qty: 12 TAB | Refills: 0 | Status: SHIPPED | OUTPATIENT
Start: 2017-09-09 | End: 2017-09-10

## 2017-09-09 RX ADMIN — IOPAMIDOL 100 ML: 755 INJECTION, SOLUTION INTRAVENOUS at 19:36

## 2017-09-09 RX ADMIN — OXYCODONE HYDROCHLORIDE AND ACETAMINOPHEN 1 TABLET: 5; 325 TABLET ORAL at 13:20

## 2017-09-09 RX ADMIN — SODIUM CHLORIDE 1000 ML: 900 INJECTION, SOLUTION INTRAVENOUS at 18:57

## 2017-09-09 NOTE — ED PROVIDER NOTES
HPI Comments: Meghna Gaffney is a 61 y.o. female with h/o bilateral knee replacement and chronic fx of left patella presents to the ED via EMS with c/o right hip and left knee pain s/p fall which occurred just PTA. States her left knee \"gives out on me\" frequently, states this happened today which is what caused her to fall. She landed on her right hip and was unable to get up. Denies numbness or tingling in legs. No other acute symptoms or complaints were noted.        The history is provided by the patient. Past Medical History:   Diagnosis Date    Atrial fibrillation     CHADS score 1  (-CHF, +HTN, -AGE, -DM, -CVA)    Carpal tunnel syndrome     Chronic pain     Congenital heart disease     presumed pulmonary stenosis s/p repair 1969    Degenerative disc disease     Degenerative joint disease     Dyslipidemia     GERD     Hypertension     Hypothyroid     Morbid obesity     Morbid obesity with BMI of 45.0-49.9, adult ([facility] [address] 2/20/2017    Noncompliance     Schizoaffective disorder     Substance abuse     marijuana, crack cocaine       Past Surgical History:   Procedure Laterality Date    HX CARPAL TUNNEL RELEASE      left    HX HYSTERECTOMY      HX KNEE REPLACEMENT      left    HX KNEE REPLACEMENT      right    HX THYROIDECTOMY           Family History:   Problem Relation Age of Onset    Hypertension Mother     Coronary Artery Disease Mother     Coronary Artery Disease Father     Hypertension Father        Social History     Social History    Marital status: SINGLE     Spouse name: N/A    Number of children: N/A    Years of education: N/A     Occupational History    Not on file.      Social History Main Topics    Smoking status: Never Smoker    Smokeless tobacco: Never Used    Alcohol use No    Drug use: No    Sexual activity: Yes     Birth control/ protection: Surgical     Other Topics Concern    Not on file     Social History Narrative         ALLERGIES: Gabapentin; Tetanus vaccines and toxoid; and Topamax [topiramate]    Review of Systems   Musculoskeletal: Positive for arthralgias and back pain. All other systems reviewed and are negative. Vitals:    09/09/17 1300 09/09/17 1740   BP: 115/67 117/63   Pulse: 74 (!) 53   Resp: 18 15   Temp: 98.3 °F (36.8 °C) 98.1 °F (36.7 °C)   SpO2: 95% 95%   Weight: 134.7 kg (297 lb)    Height: 5' 1\" (1.549 m)             Physical Exam   Constitutional: She is oriented to person, place, and time. She appears well-developed and well-nourished. No distress. HENT:   Head: Normocephalic and atraumatic. Eyes: Conjunctivae are normal.   Neck: Normal range of motion. Neck supple. Cardiovascular: Normal rate, regular rhythm and normal heart sounds. Pulmonary/Chest: Effort normal and breath sounds normal. No respiratory distress. She has no wheezes. She has no rales. Musculoskeletal: Normal range of motion. Right hip moderately TTP, pain with ROM. Able to bear weight on right leg. No ecchymosis or deformity noted. Left knee with FROM with mild pain. Obvious widening of the joint line with ligament laxity. Effusion noted. No erythema. Unable to bear weight on leg. Neurological: She is alert and oriented to person, place, and time. Skin: Skin is warm and dry. Psychiatric: She has a normal mood and affect. Her behavior is normal. Judgment and thought content normal.   Nursing note and vitals reviewed. MDM  Number of Diagnoses or Management Options  Acute pain of left knee:   Knee dislocation, left, initial encounter:   Diagnosis management comments: Preliminary CTA LLE: artifact from TKE decreases sensitivity of test for detection of abnormalities. Femoral artery, popliteal arteries patent. Three vessel run off below trifurcation. Small portions of popliteal artery not visible due to artifact. Joint effusion. No fxs. Posterior dislocation and medial angulation at knee.     Pt as total knee replacement; replacement by Ravi. Just prior to attempt to reduce knee, pt had no pain w/flexion past 90 degrees. Darvin believes ligamentous injury. Will place in immobilizer and have her f/up with ortho tomorrow. Immobilizer placed by nurse to left knee; excellent position. N/v intact before and after application. Amount and/or Complexity of Data Reviewed  Tests in the radiology section of CPT®: ordered and reviewed      ED Course       IFRAH  Date/Time: 9/9/2017 5:50 PM  Performed by: Jennifer Piedra  Authorized by: Jennifer Piedra     Consent:     Consent obtained:  Verbal    Consent given by:  Patient    Risks discussed:  Pain  Post-procedure details:     Patient tolerance of procedure: Tolerated well, no immediate complications  Comments:      IFRAH performed by me:    RLE: PT-138;DP-132  RUE:   IFRAH: 1.35    LLE: PT-120; DP-122  RUE: 112  IFRAH: 1.08        Scribe Attestation:     Tyree Franklin acting as a scribe for and in the presence of Abdias Gallagher PA-C      September 10, 2017 at 12:17 AM       Provider Attestation:     I personally performed the services described in the documentation, reviewed the documentation, as recorded by the scribe in my presence, and it accurately and completely records my words and actions. September 10, 2017 at 12:17 AM - Abdias Gallagher PA-C        -------------------------------------------------------------------------------------------------------------------     EKG INTERPRETATIONS:      RADIOLOGY RESULTS:   XR KNEE LT MIN 4 V   Final Result      XR HIP RT W OR WO PELV 2-3 VWS   Final Result      CTA LOW EXT LT W CONT    (Results Pending)       LABORATORY RESULTS:  Recent Results (from the past 12 hour(s))   CBC WITH AUTOMATED DIFF    Collection Time: 09/09/17  5:15 PM   Result Value Ref Range    WBC 5.2 4.6 - 13.2 K/uL    RBC 4.29 4. 20 - 5.30 M/uL    HGB 11.4 (L) 12.0 - 16.0 g/dL    HCT 37.0 35.0 - 45.0 %    MCV 86.2 74.0 - 97.0 FL    MCH 26.6 24.0 - 34.0 PG    MCHC 30.8 (L) 31.0 - 37.0 g/dL    RDW 15.3 (H) 11.6 - 14.5 %    PLATELET 183 930 - 381 K/uL    MPV 10.8 9.2 - 11.8 FL    NEUTROPHILS 67 40 - 73 %    LYMPHOCYTES 23 21 - 52 %    MONOCYTES 7 3 - 10 %    EOSINOPHILS 3 0 - 5 %    BASOPHILS 0 0 - 2 %    ABS. NEUTROPHILS 3.5 1.8 - 8.0 K/UL    ABS. LYMPHOCYTES 1.2 0.9 - 3.6 K/UL    ABS. MONOCYTES 0.3 0.05 - 1.2 K/UL    ABS. EOSINOPHILS 0.2 0.0 - 0.4 K/UL    ABS. BASOPHILS 0.0 0.0 - 0.1 K/UL    DF AUTOMATED     METABOLIC PANEL, BASIC    Collection Time: 09/09/17  5:15 PM   Result Value Ref Range    Sodium 139 136 - 145 mmol/L    Potassium 4.1 3.5 - 5.5 mmol/L    Chloride 102 100 - 108 mmol/L    CO2 32 21 - 32 mmol/L    Anion gap 5 3.0 - 18 mmol/L    Glucose 80 74 - 99 mg/dL    BUN 29 (H) 7.0 - 18 MG/DL    Creatinine 1.52 (H) 0.6 - 1.3 MG/DL    BUN/Creatinine ratio 19 12 - 20      GFR est AA 43 (L) >60 ml/min/1.73m2    GFR est non-AA 35 (L) >60 ml/min/1.73m2    Calcium 9.0 8.5 - 10.1 MG/DL   PROTHROMBIN TIME + INR    Collection Time: 09/09/17  5:15 PM   Result Value Ref Range    Prothrombin time 12.9 11.5 - 15.2 sec    INR 1.0 0.8 - 1.2     PTT    Collection Time: 09/09/17  5:15 PM   Result Value Ref Range    aPTT 32.3 23.0 - 36.4 SEC           CONSULTATIONS:        PROGRESS NOTES:    5:57 PM Pt resting comfortably at this time awaiting CTA. On initial read of x-ray noted anterior subluxation if not dislocation at the knee. D/w Dr Clay Thomason at that time, advised to get ABIs and send to CTA to evaluate damage to artery. ABIs performed by me at bedside, noted to be normal. Pt asking to eat, NPO at this time until discussed with ortho. 8:58 PM CTA read still pending. Repeat ABIs need to be done. I have discussed case with Jeet Hedrick PA-C. Standard discussion regarding this patient's presenting symptoms, PMHX, exam, vital signs, available ancillary and diagnostic studies. Consulting KAUSHIK agrees to take over care of the patient at this time due to my going off duty.        DISPOSITION:  ED DIAGNOSIS & DISPOSITION INFORMATION  Diagnosis:   1. Acute pain of left knee    2. Knee dislocation, left, initial encounter          Disposition: d/c    Follow-up Information     Follow up With Details Comments Contact Info    [facility] EMERGENCY DEPT  If symptoms worsen return immediately Reece Aaron MD Schedule an appointment as soon as possible for a visit in 1 day  [facility]  [phone]            Patient's Medications   Start Taking    OXYCODONE-ACETAMINOPHEN (PERCOCET) 5-325 MG PER TABLET    Take 1 tablet every 4-6 hours as needed for pain control. If you were instructed to try over the counter ibuprofen or tylenol, only take the percocet for pain not controlled with the over the counter medication. Continue Taking    ARIPIPRAZOLE (ABILIFY) 15 MG TABLET    Take 15 mg by mouth daily. to obtain from pharmacy    ASPIRIN DELAYED-RELEASE 325 MG TABLET    TAKE ONE TABLET BY MOUTH ONCE DAILY    CPAP MACHINE KIT    by Does Not Apply route. ESOMEPRAZOLE (NEXIUM) 40 MG CAPSULE    Take 1 Cap by mouth daily. FUROSEMIDE (LASIX) 40 MG TABLET    40 mg po daily    IBUPROFEN (MOTRIN) 400 MG TABLET    Take 1 Tab by mouth every eight (8) hours as needed for Pain. ISOSORBIDE MONONITRATE ER (IMDUR) 30 MG TABLET    TAKE ONE TABLET BY MOUTH EVERY EVENING    LACTULOSE (CHRONULAC) 10 GRAM/15 ML SOLUTION    Take 15 mL by mouth two (2) times a day. LEVOTHYROXINE (SYNTHROID) 125 MCG TABLET    Take 125 mcg by mouth Daily (before breakfast). LISINOPRIL (PRINIVIL, ZESTRIL) 5 MG TABLET    Take 1 Tab by mouth daily. METHOCARBAMOL (ROBAXIN) 500 MG TABLET    Take 1 Tab by mouth two (2) times daily as needed. METOPROLOL TARTRATE (LOPRESSOR) 25 MG TABLET    Take 1 Tab by mouth two (2) times a day. OMEPRAZOLE (PRILOSEC) 40 MG CAPSULE    Take 40 mg by mouth daily.     OXYCODONE (OXAYDO) 7.5 MG TBOR    Take 1 tab PO BID severe pain. ONE WEEK SUPPLY    OXYGEN    2 Each by Nasal route continuous. PAROXETINE (PAXIL) 20 MG TABLET    Take 1 Tab by mouth daily. POTASSIUM CHLORIDE (K-DUR, KLOR-CON) 20 MEQ TABLET    Take 1 Tab by mouth daily. SIMVASTATIN (ZOCOR) 40 MG TABLET    Take  by mouth nightly. to obtain from pharmacy    VENTOLIN HFA 90 MCG/ACTUATION INHALER    Take 2 Puffs by inhalation every four (4) hours as needed for Wheezing or Shortness of Breath.    These Medications have changed    No medications on file   Stop Taking    No medications on file

## 2017-09-10 VITALS
DIASTOLIC BLOOD PRESSURE: 80 MMHG | HEIGHT: 61 IN | TEMPERATURE: 98.1 F | BODY MASS INDEX: 55.32 KG/M2 | HEART RATE: 74 BPM | OXYGEN SATURATION: 94 % | RESPIRATION RATE: 19 BRPM | WEIGHT: 293 LBS | SYSTOLIC BLOOD PRESSURE: 130 MMHG

## 2017-09-10 RX ORDER — OXYCODONE AND ACETAMINOPHEN 5; 325 MG/1; MG/1
TABLET ORAL
Qty: 12 TAB | Refills: 0 | Status: SHIPPED | OUTPATIENT
Start: 2017-09-10 | End: 2018-03-01

## 2017-09-10 NOTE — DISCHARGE INSTRUCTIONS
Knee Pain or Injury: Care Instructions  Your Care Instructions    Injuries are a common cause of knee problems. Sudden (acute) injuries may be caused by a direct blow to the knee. They can also be caused by abnormal twisting, bending, or falling on the knee. Pain, bruising, or swelling may be severe, and may start within minutes of the injury. Overuse is another cause of knee pain. Other causes are climbing stairs, kneeling, and other activities that use the knee. Everyday wear and tear, especially as you get older, also can cause knee pain. Rest, along with home treatment, often relieves pain and allows your knee to heal. If you have a serious knee injury, you may need tests and treatment. Follow-up care is a key part of your treatment and safety. Be sure to make and go to all appointments, and call your doctor if you are having problems. It's also a good idea to know your test results and keep a list of the medicines you take. How can you care for yourself at home? · Be safe with medicines. Read and follow all instructions on the label. ¨ If the doctor gave you a prescription medicine for pain, take it as prescribed. ¨ If you are not taking a prescription pain medicine, ask your doctor if you can take an over-the-counter medicine. · Rest and protect your knee. Take a break from any activity that may cause pain. · Put ice or a cold pack on your knee for 10 to 20 minutes at a time. Put a thin cloth between the ice and your skin. · Prop up a sore knee on a pillow when you ice it or anytime you sit or lie down for the next 3 days. Try to keep it above the level of your heart. This will help reduce swelling. · If your knee is not swollen, you can put moist heat, a heating pad, or a warm cloth on your knee. · If your doctor recommends an elastic bandage, sleeve, or other type of support for your knee, wear it as directed.   · Follow your doctor's instructions about how much weight you can put on your leg. Use a cane, crutches, or a walker as instructed. · Follow your doctor's instructions about activity during your healing process. If you can do mild exercise, slowly increase your activity. · Reach and stay at a healthy weight. Extra weight can strain the joints, especially the knees and hips, and make the pain worse. Losing even a few pounds may help. When should you call for help? Call 911 anytime you think you may need emergency care. For example, call if:  · You have symptoms of a blood clot in your lung (called a pulmonary embolism). These may include:  ¨ Sudden chest pain. ¨ Trouble breathing. ¨ Coughing up blood. Call your doctor now or seek immediate medical care if:  · You have severe or increasing pain. · Your leg or foot turns cold or changes color. · You cannot stand or put weight on your knee. · Your knee looks twisted or bent out of shape. · You cannot move your knee. · You have signs of infection, such as:  ¨ Increased pain, swelling, warmth, or redness. ¨ Red streaks leading from the knee. ¨ Pus draining from a place on your knee. ¨ A fever. · You have signs of a blood clot in your leg (called a deep vein thrombosis), such as:  ¨ Pain in your calf, back of the knee, thigh, or groin. ¨ Redness and swelling in your leg or groin. Watch closely for changes in your health, and be sure to contact your doctor if:  · You have tingling, weakness, or numbness in your knee. · You have any new symptoms, such as swelling. · You have bruises from a knee injury that last longer than 2 weeks. · You do not get better as expected. Where can you learn more? Go to http://bucky-gerard.info/. Enter K195 in the search box to learn more about \"Knee Pain or Injury: Care Instructions. \"  Current as of: March 20, 2017  Content Version: 11.3  © 1162-9086 [employer].  Care instructions adapted under license by [employer] (which disclaims liability or warranty for this information). If you have questions about a medical condition or this instruction, always ask your healthcare professional. [place] [address] any warranty or liability for your use of this information.

## 2017-09-15 ENCOUNTER — OFFICE VISIT (OUTPATIENT)
Dept: ORTHOPEDIC SURGERY | Facility: CLINIC | Age: 57
End: 2017-09-15

## 2017-09-15 VITALS
SYSTOLIC BLOOD PRESSURE: 148 MMHG | OXYGEN SATURATION: 92 % | TEMPERATURE: 98.7 F | DIASTOLIC BLOOD PRESSURE: 88 MMHG | HEART RATE: 58 BPM

## 2017-09-15 DIAGNOSIS — M25.662 DECREASED ROM OF LEFT KNEE: ICD-10-CM

## 2017-09-15 DIAGNOSIS — M70.61 TROCHANTERIC BURSITIS OF RIGHT HIP: ICD-10-CM

## 2017-09-15 DIAGNOSIS — G89.29 CHRONIC RIGHT HIP PAIN: ICD-10-CM

## 2017-09-15 DIAGNOSIS — G89.29 CHRONIC PAIN OF LEFT KNEE: ICD-10-CM

## 2017-09-15 DIAGNOSIS — Z96.652 STATUS POST TOTAL LEFT KNEE REPLACEMENT: ICD-10-CM

## 2017-09-15 DIAGNOSIS — S83.105D LEFT KNEE DISLOCATION, SUBSEQUENT ENCOUNTER: Primary | ICD-10-CM

## 2017-09-15 DIAGNOSIS — M25.362 KNEE INSTABILITY, LEFT: ICD-10-CM

## 2017-09-15 DIAGNOSIS — R29.6 FALLS FREQUENTLY: ICD-10-CM

## 2017-09-15 DIAGNOSIS — M16.11 PRIMARY OSTEOARTHRITIS OF RIGHT HIP: ICD-10-CM

## 2017-09-15 DIAGNOSIS — M25.551 CHRONIC RIGHT HIP PAIN: ICD-10-CM

## 2017-09-15 DIAGNOSIS — M25.562 CHRONIC PAIN OF LEFT KNEE: ICD-10-CM

## 2017-09-15 NOTE — PROGRESS NOTES
Patient: Gladys Campuzano                MRN: [id number]       SSN: [id number]  YOB: 1960        AGE: 61 y.o. SEX: female    PCP: Tan Fang MD  09/15/17    Chief Complaint   Patient presents with    Knee Pain     left     HISTORY:  Gladys Campuzano is a 61 y.o. female who is seen for increased left knee and right hip injury. She reports that she fell on Saturday 9/9/17 after going to the bathroom. She was seen at [facility] ED on 9/9/17. She is s/p transverse fracture of the left patella on 8/20/15. She is s/p left TKR 7-8 years ago. She states that her left knee has been giving out on her often. She reports a h/o recurrent left knee dislocation/subluxation episdes. She was previously seen for right hip and left shoulder pain. She has pain walking, standing, sleeping, and climbing stairs. She has increased pain in the morning. Her hip stiffens up when she sits. She is now a limited ambulator. She describes it as a painful lump over the side of her hip. She has been ambulating with a single point cane. She has difficulty rising from a seated position. She has pain laying on her right side. She states that she wants her raggedy hip out and is ready to throw it away. She is using a single-point cane to ambulate. She states that she fell 2 weeks ago and was seen at the ED where she had x rays taken and was prescribed pain medication. She states that she fell while climbing a step to her porch recently. She responded to previous cortisone injections. She is scheduled to begin pain management on 10/10/16. She notes great difficulty sleeping due to her hip and shoulder pains. She reports throbbing pains in her left shoulder. She is currently in a course of PT. She states that her pains keep her from sleeping at night. She states that she is currently being prescribed pain medication by Dr. Chery Cody. She denies any new hip or shoulder injury or trauma.       Pain Assessment 9/15/2017   Location of Pain Knee   Location Modifiers Left   Severity of Pain 8   Quality of Pain Sharp; Other (Comment)   Quality of Pain Comment popping out of place per patient   Duration of Pain -   Frequency of Pain Constant   Aggravating Factors -   Limiting Behavior -   Relieving Factors -   Relieving Factors Comment -   Result of Injury -   Some recent data might be hidden     Occupation, etc:  Ms. Patricia Villa receives social security disability benefits for numerus medical problems. She has a  assigned to her. She reports that she has gained 30 lbs recently. Current weight is 297 pounds. She is 5'3\" tall. She does not exercise. She states that she is not able to do much of anything today. She has a h/o diabetes but says that she is no longer diabetic. She lives in [place] with her 70-year-old daughter and her 10 children. She is hard of hearing but is now wearing a hearing aid. She says that she enjoys to play bingo but has not been able to recently due to her pains. She states that she has high blood pressure. Her PCP is Dr. Osman Russo. Last 3 Recorded Weights in this Encounter     There is no height or weight on file to calculate BMI.     Patient Active Problem List   Diagnosis Code    Hypertension I11.9    Congenital heart disease Q24.9    Atrial fibrillation I48.91    Osteoarthritis of both knees M17.0    H/O total knee replacement Z96.659    DJD (degenerative joint disease), lumbosacral M51.37    Schizoaffective disorder, chronic condition ([facility] F25.8    Unspecified hypothyroidism E03.9    Morbid obesity ([address] E66.01    Esophageal reflux K21.9    Dyspnea R06.00    Dyslipidemia E78.5    Morbid obesity with BMI of 45.0-49.9, adult ([address] E66.01, Z68.42    DDD (degenerative disc disease), lumbar, L4/5 advanced M51.36    Hypoxia R09.02    Fluid overload E87.70    CHF (congestive heart failure) ([facility]) I50.9     REVIEW OF SYSTEMS: All Below are Negative except: See HPI   Constitutional: negative for fever, chills, and weight loss. Cardiovascular: negative for chest pain, claudication, leg swelling, SOB, PITTS   Gastrointestinal: Negative for pain, N/V/C/D, Blood in stool or urine, dysuria,  hematuria, incontinence, pelvic pain. Musculoskeletal: See HPI   Neurological: Negative for dizziness and weakness. Negative for headaches, Visual changes, confusion, seizures   Phychiatric/Behavioral: Negative for depression, memory loss, substance  abuse. Extremities: Negative for hair changes, rash, or skin lesion changes. Hematologic: Negative for bleeding problems, bruising, pallor or swollen lymph  nodes   Peripheral Vascular: No calf pain, no circulation deficits. Social History     Social History    Marital status: SINGLE     Spouse name: N/A    Number of children: N/A    Years of education: N/A     Occupational History    Not on file. Social History Main Topics    Smoking status: Never Smoker    Smokeless tobacco: Never Used    Alcohol use No    Drug use: No    Sexual activity: Yes     Birth control/ protection: Surgical     Other Topics Concern    Not on file     Social History Narrative     Allergies   Allergen Reactions    Gabapentin Other (comments)     Unsteady, weakness LEs    Tetanus Vaccines And Toxoid Swelling    Topamax [Topiramate] Other (comments)     weakness     Current Outpatient Prescriptions   Medication Sig    oxyCODONE-acetaminophen (PERCOCET) 5-325 mg per tablet Take 1 tablet every 4-6 hours as needed for pain control. If you were instructed to try over the counter ibuprofen or tylenol, only take the percocet for pain not controlled with the over the counter medication.  lisinopril (PRINIVIL, ZESTRIL) 5 mg tablet Take 1 Tab by mouth daily.  cpap machine kit by Does Not Apply route.  oxyCODONE (OXAYDO) 7.5 mg TbOr Take 1 tab PO BID severe pain.  ONE WEEK SUPPLY    methocarbamol (ROBAXIN) 500 mg tablet Take 1 Tab by mouth two (2) times daily as needed.  Oxygen 2 Each by Nasal route continuous.  omeprazole (PRILOSEC) 40 mg capsule Take 40 mg by mouth daily.  ibuprofen (MOTRIN) 400 mg tablet Take 1 Tab by mouth every eight (8) hours as needed for Pain.  metoprolol tartrate (LOPRESSOR) 25 mg tablet Take 1 Tab by mouth two (2) times a day.  VENTOLIN HFA 90 mcg/actuation inhaler Take 2 Puffs by inhalation every four (4) hours as needed for Wheezing or Shortness of Breath.  lactulose (CHRONULAC) 10 gram/15 mL solution Take 15 mL by mouth two (2) times a day.  furosemide (LASIX) 40 mg tablet 40 mg po daily    potassium chloride (K-DUR, KLOR-CON) 20 mEq tablet Take 1 Tab by mouth daily.  esomeprazole (NEXIUM) 40 mg capsule Take 1 Cap by mouth daily.  isosorbide mononitrate ER (IMDUR) 30 mg tablet TAKE ONE TABLET BY MOUTH EVERY EVENING    PARoxetine (PAXIL) 20 mg tablet Take 1 Tab by mouth daily.  aspirin delayed-release 325 mg tablet TAKE ONE TABLET BY MOUTH ONCE DAILY    simvastatin (ZOCOR) 40 mg tablet Take  by mouth nightly. to obtain from pharmacy    aripiprazole (ABILIFY) 15 mg tablet Take 15 mg by mouth daily. to obtain from pharmacy    levothyroxine (SYNTHROID) 125 mcg tablet Take 125 mcg by mouth Daily (before breakfast). No current facility-administered medications for this visit.       PHYSICAL EXAMINATION:  Visit Vitals    /88    Pulse (!) 58    Temp 98.7 °F (37.1 °C)    SpO2 92%   tearful, requesting pain meds  ORTHO EXAMINATION:  Examination Left shoulder   Skin Intact   Effusion -   Biceps deformity -   Atrophy -   AC joint tenderness -   Acromial tenderness +   Biceps tenderness -   Forward flexion/Elevation    Active abduction    External rotation ROM 20   Internal rotation ROM 40, with pain   Apprehension -   Impingement +   Drop Arm Test -   Neurovascular Intact     Examination Lumbar   Skin Intact   Tenderness +   Tightness +   Lordosis Normal   Kyphosis N/A Scoliosis -   Flexion na   Extension na   Knee reflexes Normal   Ankle reflexes Normal   Straight leg raise -   Calf tenderness -   Wearing a fall-risk bracelet and medical alert necklace    Examination Left hip Right hip   Skin Intact Intact   External Rotation ROM 10 10   Internal Rotation ROM 0 0   Trochanteric tenderness - +, lateral   Hip flexion contracture 5 degree 5 degree   Antalgic gait + +   Trendelenberg sign - -   Lumbar tenderness - -   Straight leg raise - -   Calf tenderness - -   Neurovascular Intact Intact   she has a 5 degree external rotation contracture, and a 5 degree hip flexion contracture, using a single-point cane to walk  Examination Right knee Left knee   Skin Well healed TKR Well healed TKR   Range of motion 110-0 90-0   Effusion - -   Medial joint line tenderness - -   Lateral joint line tenderness - -   Popliteal tenderness - -   Osteophytes palpable - -   Nehals - -   Patella crepitus - +   Anterior drawer - -   Lateral laxity - -   Medial laxity - 2+   Varus deformity - -   Valgus deformity - -   Pretibial edema - -   Calf tenderness - -   Left knee clinically well located  Wound well healed  Brought into office on a stretcher today  CTA LOWER EXTREMITY LEFT W CONT 9/9/17  Impression:   1.  Limited CTA of the left lower extremity due to extensive scatter artifact related to the metallic knee prostheses. Visualized portions of the popliteal artery are normal without evidence of disruption or dissection. PVL of the popliteal artery would be useful in demonstrating its entire length. 2. Visualized portions of the superficial femoral artery and trifurcation vessels are normal.  3. There is anterior subluxation of the femoral component of the knee prosthesis with respect to the tibial component with associated varus angulation. There is a moderate joint effusion present. No significant hemorrhage is seen. There is no evidence of acute fracture.  There is an old fracture involving the superior  aspect of the patella with nonunion.     CT PELVIS WO CONT 7/20/15  IMPRESSION:  No acute fracture in the pelvis or sacrum/coccyx. Advanced degenerative joint disease in the right hip. Degenerative disease in the lumbar spine. CT RIGHT HIP WO CONT 5/6/15  IMPRESSION:  Severe osteoarthritis, likely post traumatic, stable since October 2014 with no acute injuries suspected. RADIOGRAPHS:  XR LEFT KNEE 9/15/17  IMPRESSION:  Two views -s/p reduction of left TKR. No fracture, well-fixed prosthetic components in satisfactory position and alignment. XR RIGHT HIP 9/9/17  IMPRESSION:   Severe degenerative arthritis of the right hip with remodeling unchanged back to 2015. No evidence of acute fracture. XR LEFT KNEE 9/9/17  IMPRESSION:   Anterior subluxation if not dislocation at the knee. Joint effusion. Total knee arthroplasty individual components appear intact. 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 Multiple. Articular ossific densities unchanged. XR RIGHT HIP 1/4/17  IMPRESSION:  Redemonstration of very severe osteoarthritis at right hip joint. There is no evidence of acute fracture or dislocation at right hip or on rest of AP view of pelvis. XR LUMBAR SPINE 1/4/17  IMPRESSION:  Redemonstration of very severe degenerative disc disease at L4-L5 level. No evidence of acute fracture or dislocation or subluxation in lumbar spine or sacrum. XR MAREN HIPS 1/25/16    IMPRESSION:  No fractures, near-complete joint space narrowing, no osteophytes present, femoral neck shortening. XR LUMBAR SPINE 7/20/15  IMPRESSION:  No acute findings. Severe degenerative change L4-5 level similar prior exam 05/06/2015. XR RIGHT HIP 5/6/15  IMPRESSION:  Stable chronic findings of the right hip. IMPRESSION:      ICD-10-CM ICD-9-CM    1. Left knee dislocation, subsequent encounter S83.105D V58.89 REFERRAL TO ORTHOPEDICS      REFERRAL FOR ORTHOTICS      KNEE BRACE   2.  Chronic pain of left knee M25.562 719.46 AMB POC XRAY, KNEE; 1/2 VIEWS    G89.29 338.29 REFERRAL TO ORTHOPEDICS      REFERRAL FOR ORTHOTICS      KNEE BRACE      CANCELED: KNEE IMMOBILIZER   3. Decreased ROM of left knee M25.662 719.56 REFERRAL TO ORTHOPEDICS      REFERRAL FOR ORTHOTICS      KNEE BRACE   4. Primary osteoarthritis of right hip M16.11 715.15    5. Chronic right hip pain M25.551 719.45     G89.29 338.29    6. Trochanteric bursitis of right hip M70.61 726.5    7. Status post total left knee replacement Z96.652 V43.65 REFERRAL TO ORTHOPEDICS      REFERRAL FOR ORTHOTICS      KNEE BRACE      CANCELED: KNEE IMMOBILIZER   8. Falls frequently R29.6 V15.88    9. Knee instability, left M25.362 718.86       PLAN: We discussed need for right hip arthroplasty at some time in the future pending significant weight loss. She will follow up as needed. She will follow up at [facility] for a possible left TKR revision. She will follow up with her primary care physician for high blood pressure. She will be provided with a left hinged knee brace. She will follow up with an orthotic referral for a fitted hinged knee brace.      Scribed by April Pederson ([address]) as dictated by Silvio Peters MD

## 2017-09-15 NOTE — MR AVS SNAPSHOT
Visit Information Date & Time Provider Department Dept. Phone Encounter #  
 9/15/2017  8:45 AM Cassy Brooks, [address] [facility] [phone] [id number] Follow-up Instructions Return if symptoms worsen or fail to improve. Follow-up and Disposition History Your Appointments 10/12/2017  1:20 PM  
Follow Up with Melissa Boles MD  
[facility] [place] ([facility]) Appt Note: follow up (former Dr. Cole Santillan pt, last seen in 12/16); Rescheduling Appointment From 08/23/2017; Rescheduling Appointment From 08/28/2017; Follow up (former Dr. Cole Santillan pt, last seen in 12/16); $ 0. copay/PB; Follow up (former Dr. Cole Santillan pt, last seen in 12/16) Morgan Machado [id number]  
[phone] [address] [place] [address] 108 Upcoming Health Maintenance Date Due Hepatitis C Screening 1960 DTaP/Tdap/Td series (1 - Tdap) 1/6/1980 PAP AKA CERVICAL CYTOLOGY 1/6/1980 FOBT Q 1 YEAR AGE 50-75 1/6/2009 BREAST CANCER SCRN MAMMOGRAM 11/6/2011 INFLUENZA AGE 9 TO ADULT 8/1/2017 Allergies as of 9/15/2017  Review Complete On: 9/15/2017 By: Cassy Brooks MD  
  
 Severity Noted Reaction Type Reactions Gabapentin  03/29/2017    Other (comments) Unsteady, weakness LEs Tetanus Vaccines And Toxoid    Swelling Topamax [Topiramate]  05/24/2017    Other (comments)  
 weakness Current Immunizations  Never Reviewed Name Date Pneumococcal Polysaccharide (PPSV-23) 1/21/2014  2:55 PM  
  
 Not reviewed this visit You Were Diagnosed With   
  
 Codes Comments Left knee dislocation, subsequent encounter    -  Primary ICD-10-CM: S83.105D ICD-9-CM: V58.89 Chronic pain of left knee     ICD-10-CM: M25.562, G89.29 ICD-9-CM: 719.46, 338.29 Decreased ROM of left knee     ICD-10-CM: J18.521 ICD-9-CM: 719.56   
 Primary osteoarthritis of right hip     ICD-10-CM: M16.11 
ICD-9-CM: 715.15 Chronic right hip pain     ICD-10-CM: M25.551, G89.29 ICD-9-CM: 719.45, 338.29 Trochanteric bursitis of right hip     ICD-10-CM: M70.61 ICD-9-CM: 726.5 Status post total left knee replacement     ICD-10-CM: F84.097 ICD-9-CM: V43.65 Falls frequently     ICD-10-CM: R29.6 ICD-9-CM: V15.88 Knee instability, left     ICD-10-CM: M25.362 ICD-9-CM: 718.86 Vitals BP Pulse Temp SpO2 OB Status Smoking Status 148/88 (!) 58 98.7 °F (37.1 °C) 92% Hysterectomy Never Smoker Preferred Pharmacy Pharmacy Name [facility] [address],[facility] [address] [place] [address] [phone] Your Updated Medication List  
  
   
This list is accurate as of: 9/15/17 10:09 AM.  Always use your most recent med list.  
  
  
  
  
 ABILIFY 15 mg tablet Generic drug:  ARIPiprazole Take 15 mg by mouth daily. to obtain from pharmacy  
  
 aspirin delayed-release 325 mg tablet TAKE ONE TABLET BY MOUTH ONCE DAILY  
  
 cpap machine kit  
by Does Not Apply route.  
  
 esomeprazole 40 mg capsule Commonly known as:  NexIUM Take 1 Cap by mouth daily. furosemide 40 mg tablet Commonly known as:  LASIX 40 mg po daily  
  
 ibuprofen 400 mg tablet Commonly known as:  MOTRIN Take 1 Tab by mouth every eight (8) hours as needed for Pain.  
  
 isosorbide mononitrate ER 30 mg tablet Commonly known as:  IMDUR  
TAKE ONE TABLET BY MOUTH EVERY EVENING  
  
 lactulose 10 gram/15 mL solution Commonly known as:  Glendia Muse Take 15 mL by mouth two (2) times a day. lisinopril 5 mg tablet Commonly known as:  Gregor Boehringer Take 1 Tab by mouth daily. methocarbamol 500 mg tablet Commonly known as:  ROBAXIN Take 1 Tab by mouth two (2) times daily as needed. metoprolol tartrate 25 mg tablet Commonly known as:  LOPRESSOR Take 1 Tab by mouth two (2) times a day. oxyCODONE 7.5 mg Tbor Commonly known as:  OXAYDO Take 1 tab PO BID severe pain. ONE WEEK SUPPLY  
  
 oxyCODONE-acetaminophen 5-325 mg per tablet Commonly known as:  PERCOCET Take 1 tablet every 4-6 hours as needed for pain control. If you were instructed to try over the counter ibuprofen or tylenol, only take the percocet for pain not controlled with the over the counter medication. Oxygen 2 Each by Nasal route continuous. PARoxetine 20 mg tablet Commonly known as:  PAXIL Take 1 Tab by mouth daily. potassium chloride 20 mEq tablet Commonly known as:  K-DUR, KLOR-CON Take 1 Tab by mouth daily. PriLOSEC 40 mg capsule Generic drug:  omeprazole Take 40 mg by mouth daily. SYNTHROID 125 mcg tablet Generic drug:  levothyroxine Take 125 mcg by mouth Daily (before breakfast). VENTOLIN HFA 90 mcg/actuation inhaler Generic drug:  albuterol Take 2 Puffs by inhalation every four (4) hours as needed for Wheezing or Shortness of Breath. ZOCOR 40 mg tablet Generic drug:  simvastatin Take  by mouth nightly. to obtain from pharmacy We Performed the Following AMB POC XRAY, KNEE; 1/2 VIEWS [95038 CPT(R)] KNEE BRACE [SUP82 Custom] Comments: LEFT HINGED KNEE BRACE REFERRAL FOR ORTHOTICS [REF2 Custom] Comments:  
 Prosthetics/orthotics referral for a fitted hinged knee brace REFERRAL TO ORTHOPEDICS [PTM582 Custom] Comments:  
 Referral to orthopedics for evaluation and treatment of left TKR revision. [facility] Follow-up Instructions Return if symptoms worsen or fail to improve. Referral Information Referral ID Referred By Referred To  
  
 [id number] Mariluz ARDON Not Available Visits Status Start Date End Date 1 New Request 9/15/17 9/15/18 If your referral has a status of pending review or denied, additional information will be sent to support the outcome of this decision. Referral ID Referred By Referred To  
 [id number] Kaitlynn Notasia REVA Not Available Visits Status Start Date End Date 1 New Request 9/15/17 9/15/18 If your referral has a status of pending review or denied, additional information will be sent to support the outcome of this decision. Introducing [facility]! Connor Marie introduces [employer] patient portal. Now you can access parts of your medical record, email your doctor's office, and request medication refills online. 1. In your internet browser, go to [email or web address] 2. Click on the First Time User? Click Here link in the Sign In box. You will see the New Member Sign Up page. 3. Enter your [employer] Access Code exactly as it appears below. You will not need to use this code after youve completed the sign-up process. If you do not sign up before the expiration date, you must request a new code. · [employer] Access Code: [email or web address] Expires: 11/22/2017 12:41 PM 
 
4. Enter the last four digits of your Social Security Number (xxxx) and Date of Birth (mm/dd/yyyy) as indicated and click Submit. You will be taken to the next sign-up page. 5. Create a [employer] ID. This will be your [employer] login ID and cannot be changed, so think of one that is secure and easy to remember. 6. Create a [employer] password. You can change your password at any time. 7. Enter your Password Reset Question and Answer. This can be used at a later time if you forget your password. 8. Enter your e-mail address. You will receive e-mail notification when new information is available in [address] 9. Click Sign Up. You can now view and download portions of your medical record. 10. Click the Download Summary menu link to download a portable copy of your medical information. If you have questions, please visit the Frequently Asked Questions section of the [employer] website.  Remember, [employer] is NOT to be used for urgent needs. For medical emergencies, dial 911. Now available from your iPhone and Android! Please provide this summary of care documentation to your next provider. Your primary care clinician is listed as Lise Durán. If you have any questions after today's visit, please call [phone].

## 2017-09-24 ENCOUNTER — HOSPITAL ENCOUNTER (EMERGENCY)
Age: 57
Discharge: HOME | End: 2017-09-24
Attending: EMERGENCY MEDICINE
Payer: MEDICAID

## 2017-09-24 VITALS
TEMPERATURE: 98.4 F | HEIGHT: 63 IN | RESPIRATION RATE: 19 BRPM | WEIGHT: 293 LBS | SYSTOLIC BLOOD PRESSURE: 146 MMHG | HEART RATE: 73 BPM | BODY MASS INDEX: 51.91 KG/M2 | OXYGEN SATURATION: 97 % | DIASTOLIC BLOOD PRESSURE: 98 MMHG

## 2017-09-24 DIAGNOSIS — M79.605 LEFT LEG PAIN: Primary | ICD-10-CM

## 2017-09-24 PROCEDURE — 99284 EMERGENCY DEPT VISIT MOD MDM: CPT

## 2017-09-24 PROCEDURE — L1830 KO IMMOB CANVAS LONG PRE OTS: HCPCS

## 2017-09-24 NOTE — ED PROVIDER NOTES
HPI Comments: 4:06 PM Dione Caceres is a 61 y.o. female who presents to the ED c/o multiple falls over the past few months. She reports that she is falling due to a \"bad right hip and dislocated left knee. \" She states that she dislocated her knee \"months ago\" and has been putting it in a knee immobilizer. She is followed by Dr. Giana Paz, Orthopedic Surgery, and she states that he has referred her [place] [facility] [place]. Her only other complaint at this time is left hip soreness. She has no further complaints at this time. The history is provided by the patient. Past Medical History:   Diagnosis Date    Atrial fibrillation     CHADS score 1  (-CHF, +HTN, -AGE, -DM, -CVA)    Carpal tunnel syndrome     Chronic pain     Congenital heart disease     presumed pulmonary stenosis s/p repair 1969    Degenerative disc disease     Degenerative joint disease     Dyslipidemia     GERD     Hypertension     Hypothyroid     Morbid obesity     Morbid obesity with BMI of 45.0-49.9, adult ([facility] 2/20/2017    Noncompliance     Schizoaffective disorder     Substance abuse     marijuana, crack cocaine       Past Surgical History:   Procedure Laterality Date    HX CARPAL TUNNEL RELEASE      left    HX HYSTERECTOMY      HX KNEE REPLACEMENT      left    HX KNEE REPLACEMENT      right    HX THYROIDECTOMY           Family History:   Problem Relation Age of Onset    Hypertension Mother     Coronary Artery Disease Mother     Coronary Artery Disease Father     Hypertension Father        Social History     Social History    Marital status: SINGLE     Spouse name: N/A    Number of children: N/A    Years of education: N/A     Occupational History    Not on file.      Social History Main Topics    Smoking status: Never Smoker    Smokeless tobacco: Never Used    Alcohol use No    Drug use: No    Sexual activity: Yes     Birth control/ protection: Surgical     Other Topics Concern    Not on file Social History Narrative         ALLERGIES: Gabapentin; Tetanus vaccines and toxoid; and Topamax [topiramate]    Review of Systems   Musculoskeletal: Positive for arthralgias. Positive for multiple falls   All other systems reviewed and are negative. Vitals:    09/24/17 1400   BP: (!) 178/127   Pulse: 73   Resp: 18   Temp: 98.4 °F (36.9 °C)   SpO2: 95%   Weight: 134.7 kg (297 lb)   Height: 5' 3\" (1.6 m)            Physical Exam   Constitutional: She is oriented to person, place, and time. She appears well-developed. HENT:   Head: Normocephalic and atraumatic. Eyes: EOM are normal. Pupils are equal, round, and reactive to light. Neck: Normal range of motion. Neck supple. Cardiovascular: Normal rate, regular rhythm and normal heart sounds. Exam reveals no friction rub. No murmur heard. Pulmonary/Chest: Effort normal and breath sounds normal. No respiratory distress. She has no wheezes. Abdominal: Soft. She exhibits no distension. There is no tenderness. There is no rebound and no guarding. Musculoskeletal: Normal range of motion. Minor left hip pain; full passive rom. MInor left knee pain; full passive rom  Left foot warm and well perfused   Neurological: She is alert and oriented to person, place, and time. Skin: Skin is warm and dry. Psychiatric: She has a normal mood and affect. Her behavior is normal. Thought content normal.        MDM  Number of Diagnoses or Management Options  Diagnosis management comments: Consult:  Discussed care with Dr. Kirk Logan, Specialty: Orthopedic Surgery  Standard discussion; including history of patient's chief complaint, available diagnostic results, and treatment course. He states that as long as the pt remains neurovascularly intact, she can follow up as an outpatient.   5:48 PM, 09/24/17     IFRAH:       ED Course       Procedures           SCRIBE ATTESTATION STATEMENT  Documented by: Warren Hidalgo scribing for, and in the presence of, He Warren MD 4:21 PM     Signed by: iSerra García, 09/24/17 4:21 PM     PROVIDER ATTESTATION STATEMENT  I personally performed the services described in the documentation, reviewed the documentation, as recorded by the scribe in my presence, and it accurately and completely records my words and actions.   He Warren MD

## 2017-09-25 NOTE — ED NOTES
Medical Transport called to tranfer patient back to residence, patient is alert, oriented and stable at this time

## 2017-09-25 NOTE — ED NOTES
I have reviewed discharge instructions with the patient. The patient verbalized understanding. Patient armband removed and shredded. Pt is being transported via [facility] back to residence with no acute distress noted at this time, the patient is alert, oriented and stable at time of discharge. Vital Signs stable. Patient is being discharged without prescription at this time.

## 2017-10-09 ENCOUNTER — OFFICE VISIT (OUTPATIENT)
Dept: ORTHOPEDIC SURGERY | Facility: CLINIC | Age: 57
End: 2017-10-09

## 2017-10-09 DIAGNOSIS — Z96.652 STATUS POST TOTAL LEFT KNEE REPLACEMENT: ICD-10-CM

## 2017-10-09 DIAGNOSIS — R20.2 NUMBNESS AND TINGLING IN RIGHT HAND: ICD-10-CM

## 2017-10-09 DIAGNOSIS — R20.0 NUMBNESS AND TINGLING IN RIGHT HAND: ICD-10-CM

## 2017-10-09 DIAGNOSIS — M79.641 RIGHT HAND PAIN: ICD-10-CM

## 2017-10-09 DIAGNOSIS — G56.21 CUBITAL TUNNEL SYNDROME, RIGHT: Primary | ICD-10-CM

## 2017-10-09 NOTE — PROGRESS NOTES
Patient: Cirilo Jenkins                MRN: [id number]       SSN: [id number]  YOB: 1960        AGE: 61 y.o. SEX: female    PCP: Alise Crain MD  10/09/17    CC: RIGHT HAND PAIN    HISTORY:  Cirilo Jenkins is a 61 y.o. female who is seen for ulnar right hand pain, numbness and tingling. She reports increased pain for the past week. She is s/p left CTR many years ago. She reports pain, numbness, and tingling located in her right ring and little finger. She was previously seen for left knee and right hip injury. She reports that she fell on Saturday 9/9/17 after going to the bathroom. She was seen at [facility] ED on 9/9/17. She is s/p transverse fracture of the left patella on 8/20/15. She is s/p left TKR 7-8 years ago. She states that her left knee has been giving out on her often. She reports a h/o recurrent left knee dislocation/subluxation episdes. She was previously seen for right hip and left shoulder pain. She has pain walking, standing, sleeping, and climbing stairs. She has increased pain in the morning. Her hip stiffens up when she sits. She is now a limited ambulator. She describes it as a painful lump over the side of her hip. She has been ambulating with a single point cane. She has difficulty rising from a seated position. She has pain laying on her right side. She states that she wants her raggedy hip out and is ready to throw it away. She is using a single-point cane to ambulate. She states that she fell 2 weeks ago and was seen at the ED where she had x rays taken and was prescribed pain medication. She states that she fell while climbing a step to her porch recently. She responded to previous cortisone injections. She is scheduled to begin pain management on 10/10/16. She notes great difficulty sleeping due to her hip and shoulder pains. She reports throbbing pains in her left shoulder. She is currently in a course of PT.   She states that her pains keep her from sleeping at night. She states that she is currently being prescribed pain medication by Dr. Pasquale Salomon. She denies any new hip or shoulder injury or trauma. Pain Assessment  9/15/2017   Location of Pain Knee   Location Modifiers Left   Severity of Pain 8   Quality of Pain Sharp; Other (Comment)   Quality of Pain Comment popping out of place per patient   Duration of Pain -   Frequency of Pain Constant   Aggravating Factors -   Limiting Behavior -   Relieving Factors -   Relieving Factors Comment -   Result of Injury -   Some recent data might be hidden     Occupation, etc:  Ms. Luis Singh receives social security disability benefits for numerus medical problems. She has a  assigned to her. She reports that she has gained 30 lbs recently. Current weight is 297 pounds. She is 5'3\" tall. She does not exercise. She states that she is not able to do much of anything today. She has a h/o diabetes but says that she is no longer diabetic. She lives in [place] with her 27-year-old daughter and her 10 children. She is hard of hearing but is now wearing a hearing aid. She says that she enjoys to play bingo but has not been able to recently due to her pains. She states that she has high blood pressure. Her PCP is Dr. Moose Apple. There were no vitals filed for this visit. There is no height or weight on file to calculate BMI.     Patient Active Problem List   Diagnosis Code    Hypertension I11.9    Congenital heart disease Q24.9    Atrial fibrillation I48.91    Osteoarthritis of both knees M17.0    H/O total knee replacement Z96.659    DJD (degenerative joint disease), lumbosacral M51.37    Schizoaffective disorder, chronic condition ([facility] F25.8    Unspecified hypothyroidism E03.9    Morbid obesity ([facility] E66.01    Esophageal reflux K21.9    Dyspnea R06.00    Dyslipidemia E78.5    Morbid obesity with BMI of 45.0-49.9, adult ([facility] E66.01, Z68.42    DDD (degenerative disc disease), lumbar, L4/5 advanced M51.36    Hypoxia R09.02    Fluid overload E87.70    CHF (congestive heart failure) ([facility]) I50.9     REVIEW OF SYSTEMS: All Below are Negative except: See HPI   Constitutional: negative for fever, chills, and weight loss. Cardiovascular: negative for chest pain, claudication, leg swelling, SOB, PITTS   Gastrointestinal: Negative for pain, N/V/C/D, Blood in stool or urine, dysuria,  hematuria, incontinence, pelvic pain. Musculoskeletal: See HPI   Neurological: Negative for dizziness and weakness. Negative for headaches, Visual changes, confusion, seizures   Phychiatric/Behavioral: Negative for depression, memory loss, substance  abuse. Extremities: Negative for hair changes, rash, or skin lesion changes. Hematologic: Negative for bleeding problems, bruising, pallor or swollen lymph  nodes   Peripheral Vascular: No calf pain, no circulation deficits. Social History     Social History    Marital status: SINGLE     Spouse name: N/A    Number of children: N/A    Years of education: N/A     Occupational History    Not on file. Social History Main Topics    Smoking status: Never Smoker    Smokeless tobacco: Never Used    Alcohol use No    Drug use: No    Sexual activity: Yes     Birth control/ protection: Surgical     Other Topics Concern    Not on file     Social History Narrative     Allergies   Allergen Reactions    Gabapentin Other (comments)     Unsteady, weakness LEs    Tetanus Vaccines And Toxoid Swelling    Topamax [Topiramate] Other (comments)     weakness     Current Outpatient Prescriptions   Medication Sig    oxyCODONE-acetaminophen (PERCOCET) 5-325 mg per tablet Take 1 tablet every 4-6 hours as needed for pain control. If you were instructed to try over the counter ibuprofen or tylenol, only take the percocet for pain not controlled with the over the counter medication.  lisinopril (PRINIVIL, ZESTRIL) 5 mg tablet Take 1 Tab by mouth daily.     cpap machine kit by Does Not Apply route.  oxyCODONE (OXAYDO) 7.5 mg TbOr Take 1 tab PO BID severe pain. ONE WEEK SUPPLY    methocarbamol (ROBAXIN) 500 mg tablet Take 1 Tab by mouth two (2) times daily as needed.  Oxygen 2 Each by Nasal route continuous.  omeprazole (PRILOSEC) 40 mg capsule Take 40 mg by mouth daily.  ibuprofen (MOTRIN) 400 mg tablet Take 1 Tab by mouth every eight (8) hours as needed for Pain.  metoprolol tartrate (LOPRESSOR) 25 mg tablet Take 1 Tab by mouth two (2) times a day.  VENTOLIN HFA 90 mcg/actuation inhaler Take 2 Puffs by inhalation every four (4) hours as needed for Wheezing or Shortness of Breath.  lactulose (CHRONULAC) 10 gram/15 mL solution Take 15 mL by mouth two (2) times a day.  furosemide (LASIX) 40 mg tablet 40 mg po daily    potassium chloride (K-DUR, KLOR-CON) 20 mEq tablet Take 1 Tab by mouth daily.  esomeprazole (NEXIUM) 40 mg capsule Take 1 Cap by mouth daily.  isosorbide mononitrate ER (IMDUR) 30 mg tablet TAKE ONE TABLET BY MOUTH EVERY EVENING    PARoxetine (PAXIL) 20 mg tablet Take 1 Tab by mouth daily.  aspirin delayed-release 325 mg tablet TAKE ONE TABLET BY MOUTH ONCE DAILY    simvastatin (ZOCOR) 40 mg tablet Take  by mouth nightly. to obtain from pharmacy    aripiprazole (ABILIFY) 15 mg tablet Take 15 mg by mouth daily. to obtain from pharmacy    levothyroxine (SYNTHROID) 125 mcg tablet Take 125 mcg by mouth Daily (before breakfast). No current facility-administered medications for this visit. PHYSICAL EXAMINATION:  There were no vitals taken for this visit. tearful, requesting pain meds  ORTHO EXAMINATION:  Examination Left shoulder   Skin Intact   Effusion -   Biceps deformity -   Atrophy -   AC joint tenderness -   Acromial tenderness +   Biceps tenderness -   Forward flexion/Elevation    Active abduction    External rotation ROM 20   Internal rotation ROM 40, with pain   Apprehension -   Impingement + Drop Arm Test -   Neurovascular Intact     Examination Lumbar   Skin Intact   Tenderness +   Tightness +   Lordosis Normal   Kyphosis N/A   Scoliosis -   Flexion na   Extension na   Knee reflexes Normal   Ankle reflexes Normal   Straight leg raise -   Calf tenderness -   Wearing a fall-risk bracelet and medical alert necklace    Examination Left hip Right hip   Skin Intact Intact   External Rotation ROM 10 10   Internal Rotation ROM 0 0   Trochanteric tenderness - +, lateral   Hip flexion contracture 5 degree 5 degree   Antalgic gait + +   Trendelenberg sign - -   Lumbar tenderness - -   Straight leg raise - -   Calf tenderness - -   Neurovascular Intact Intact   she has a 5 degree external rotation contracture, and a 5 degree hip flexion contracture, using a single-point cane to walk  Examination Right knee Left knee   Skin Well healed TKR Well healed TKR   Range of motion 110-0 90-0   Effusion - -   Medial joint line tenderness - -   Lateral joint line tenderness - -   Popliteal tenderness - -   Osteophytes palpable - -   Nehals - -   Patella crepitus - +   Anterior drawer - -   Lateral laxity - -   Medial laxity - 2+   Varus deformity - -   Valgus deformity - -   Pretibial edema - -   Calf tenderness - -   Left knee clinically well located  Wound well healed  Brought into office on a stretcher today  Examination Right Hand Left Hand   Skin Intact Intact   Deformity - -   Swelling - -   Tenderness - -   Finger flexion Full Full   Finger extension Full Full   Sensation Normal Normal   Capillary refill Normal Normal   Heberden's nodes - -   Dupuytren's - -   Mild clawing of right hand    CTA LOWER EXTREMITY LEFT W CONT 9/9/17  Impression:   1.  Limited CTA of the left lower extremity due to extensive scatter artifact related to the metallic knee prostheses. Visualized portions of the popliteal artery are normal without evidence of disruption or dissection.  PVL of the popliteal artery would be useful in demonstrating its entire length. 2. Visualized portions of the superficial femoral artery and trifurcation vessels are normal.  3. There is anterior subluxation of the femoral component of the knee prosthesis with respect to the tibial component with associated varus angulation. There is a moderate joint effusion present. No significant hemorrhage is seen. There is no evidence of acute fracture. There is an old fracture involving the superior  aspect of the patella with nonunion.     CT PELVIS WO CONT 7/20/15  IMPRESSION:  No acute fracture in the pelvis or sacrum/coccyx. Advanced degenerative joint disease in the right hip. Degenerative disease in the lumbar spine. CT RIGHT HIP WO CONT 5/6/15  IMPRESSION:  Severe osteoarthritis, likely post traumatic, stable since October 2014 with no acute injuries suspected. RADIOGRAPHS:  XR LEFT KNEE 9/15/17  IMPRESSION:  Two views -s/p reduction of left TKR. No fracture, well-fixed prosthetic components in satisfactory position and alignment. XR RIGHT HIP 9/9/17  IMPRESSION:   Severe degenerative arthritis of the right hip with remodeling unchanged back to 2015. No evidence of acute fracture. XR LEFT KNEE 9/9/17  IMPRESSION:   Anterior subluxation if not dislocation at the knee. Joint effusion. Total knee arthroplasty individual components appear intact. 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 Multiple. Articular ossific densities unchanged. XR RIGHT HIP 1/4/17  IMPRESSION:  Redemonstration of very severe osteoarthritis at right hip joint. There is no evidence of acute fracture or dislocation at right hip or on rest of AP view of pelvis. XR LUMBAR SPINE 1/4/17  IMPRESSION:  Redemonstration of very severe degenerative disc disease at L4-L5 level. No evidence of acute fracture or dislocation or subluxation in lumbar spine or sacrum.     XR MAREN HIPS 1/25/16    IMPRESSION:  No fractures, near-complete joint space narrowing, no osteophytes present, femoral neck shortening. XR LUMBAR SPINE 7/20/15  IMPRESSION:  No acute findings. Severe degenerative change L4-5 level similar prior exam 05/06/2015. XR RIGHT HIP 5/6/15  IMPRESSION:  Stable chronic findings of the right hip. IMPRESSION:      ICD-10-CM ICD-9-CM    1. Cubital tunnel syndrome, right G56.21 354.2 NCV/LAT SENSORY PER NERVE UP/RT      EMG ONE EXTREMITY UPPER RT   2. Status post total left knee replacement Z96.652 V43.65    3. Right hand pain M79.641 729.5    4. BMI 50.0-59.9, adult ([facility] Z68.43 V85.43    5. Numbness and tingling in right hand R20.0 782.0 NCV/LAT SENSORY PER NERVE UP/RT    R20.2  EMG ONE EXTREMITY UPPER RT      WRIST SPLINT      PLAN: She will follow up in 5 weeks with the result of her RUE EMG/NCV study. She was provided with a right Velcro wrist support today.        Scribed by Archana Orantes ([facility]) as dictated by Gabriela Hernandez MD

## 2017-10-09 NOTE — PATIENT INSTRUCTIONS
Ulnar Neuropathy (Handlebar Palsy): Exercises  Your Care Instructions  Here are some examples of typical rehabilitation exercises for your condition. Start each exercise slowly. Ease off the exercise if you start to have pain. Your doctor or your physical or occupational therapist will tell you when you can start these exercises and which ones will work best for you. How to do the exercises  Neck rotation    1. Sit in a firm chair, or stand up straight. 2. Keeping your chin level, turn your head to the right, and hold for 15 to 30 seconds. 3. Turn your head to the left, and hold for 15 to 30 seconds. 4. Repeat 2 to 4 times to each side. Shoulder blade squeeze    1. While standing with your arms at your sides, squeeze your shoulder blades together. Do not raise your shoulders as you are squeezing. 2. Hold for 6 seconds. 3. Repeat 8 to 12 times. Neck stretches    1. Look straight ahead, and tip your right ear to your right shoulder. Do not let your left shoulder rise as you tip your head to the right. 2. Hold for 15 to 30 seconds. 3. Tilt your head to the left. Do not let your right shoulder rise as you tip your head to the left. 4. Hold for 15 to 30 seconds. 5. Repeat 2 to 4 times to each side. Elbow flexion and extension    Note: If this exercise causes numbness, tingling, or pain in your hand, ease off of the stretch. You should not have symptoms as you stretch. If you cannot back off enough so that you can do the exercise without symptoms, stop doing the exercise right away. 1. Stand with your arms relaxed at your sides. 2. With your affected arm, gently bend your elbow up toward you as far as possible. 3. Then straighten your arm as much as you can. 4. Repeat 2 to 4 times. Wrist flexor stretch    Note: If this exercise causes numbness, tingling, or pain in your hand, ease off of the stretch. You should not have symptoms as you stretch.  If you cannot back off enough so that you can do the exercise without symptoms, stop doing the exercise right away. 1. Extend your affected arm in front of you with your palm facing away from your body. 2. Bend back your wrist on your affected arm, pointing your hand up toward the ceiling. 3. With your other hand, gently bend your wrist farther until you feel a mild to moderate stretch in your forearm. 4. Hold for at least 15 to 30 seconds. 5. Repeat 2 to 4 times. 6. Repeat steps 1 through 5, but this time extend your affected arm in front of you with your palm facing up. Then bend back your wrist, pointing your hand toward the floor. Wrist flexion and extension    Note: If this exercise causes numbness, tingling, or pain in your hand, ease off of the stretch. You should not have symptoms as you stretch. If you cannot back off enough so that you can do the exercise without symptoms, stop doing the exercise right away. 1. Place your forearm on a table, with your affected hand and wrist extended beyond the table, palm down. 2. Slowly bend your wrist to move your hand upward and allow your hand to close into a fist. Hold for about 6 seconds. 3. Then lower your hand and allow your fingers to relax. Hold this position for about 6 seconds. You should feel a gentle stretch. 4. Repeat 8 to 12 times. Follow-up care is a key part of your treatment and safety. Be sure to make and go to all appointments, and call your doctor if you are having problems. It's also a good idea to know your test results and keep a list of the medicines you take. Where can you learn more? Go to http://bucky-gerard.info/. Enter Y881 in the search box to learn more about \"Ulnar Neuropathy (Handlebar Palsy): Exercises. \"  Current as of: March 21, 2017  Content Version: 11.3  © 7591-3655 [employer]. Care instructions adapted under license by [employer] (which disclaims liability or warranty for this information).  If you have questions about a medical condition or this instruction, always ask your healthcare professional. [place] [address] any warranty or liability for your use of this information.

## 2017-10-12 ENCOUNTER — OFFICE VISIT (OUTPATIENT)
Dept: CARDIOLOGY CLINIC | Age: 57
End: 2017-10-12

## 2017-10-12 VITALS
SYSTOLIC BLOOD PRESSURE: 128 MMHG | WEIGHT: 293 LBS | HEART RATE: 53 BPM | BODY MASS INDEX: 51.91 KG/M2 | HEIGHT: 63 IN | OXYGEN SATURATION: 93 % | DIASTOLIC BLOOD PRESSURE: 84 MMHG

## 2017-10-12 DIAGNOSIS — Q24.9 CONGENITAL HEART DISEASE: ICD-10-CM

## 2017-10-12 DIAGNOSIS — R06.02 SOB (SHORTNESS OF BREATH): ICD-10-CM

## 2017-10-12 DIAGNOSIS — E78.5 DYSLIPIDEMIA: ICD-10-CM

## 2017-10-12 DIAGNOSIS — I11.9 BENIGN HYPERTENSIVE HEART DISEASE WITHOUT HEART FAILURE: ICD-10-CM

## 2017-10-12 DIAGNOSIS — I48.19 PERSISTENT ATRIAL FIBRILLATION (HCC): Primary | ICD-10-CM

## 2017-10-12 DIAGNOSIS — I50.32 DIASTOLIC CHF, CHRONIC (HCC): ICD-10-CM

## 2017-10-12 DIAGNOSIS — E66.01 MORBID OBESITY (HCC): ICD-10-CM

## 2017-10-12 RX ORDER — LISINOPRIL 5 MG/1
5 TABLET ORAL DAILY
Qty: 30 TAB | Refills: 0 | Status: SHIPPED | OUTPATIENT
Start: 2017-10-12 | End: 2017-10-27 | Stop reason: ALTCHOICE

## 2017-10-12 NOTE — PROGRESS NOTES
HISTORY OF PRESENT ILLNESS  Yolanda Craven is a 61 y.o. female. HPI    Patient presents for a follow-up office visit. She was previously followed by Dr. Leida Boswell. She has a past medical history significant for atrial fibrillation, which now appears persistent. She also has known congenital heart disease with a defect repaired with open heart surgery as a child. She also has a long history of medication noncompliance and a history of polysubstance abuse in the past.  Lastly, she has a history of psychiatric illnesses which complicates her compliance. Patient was admitted to the hospital in April 2017 for multifactorial shortness of breath which is felt to be at least in part to acute on chronic diastolic heart failure. She underwent an echocardiogram during her hospital stay which showed preserved LV systolic function, EF 88-62% with mild concentric LVH, a moderately dilated right ventricle with reduced systolic function and a moderately elevated PA pressure of 54 mmHg. She had moderate to severe pulmonic insufficiency. Since hospital discharge, patient states her shortness of breath is back to baseline. She does have chronic leg and ankle edema which is unchanged. No chest pain or pressure. No palpitations, dizziness or syncope. No major change in her activity tolerance. Past Medical History:   Diagnosis Date    Atrial fibrillation     CHADS score 1  (-CHF, +HTN, -AGE, -DM, -CVA)    Carpal tunnel syndrome     Chronic pain     Congenital heart disease     presumed pulmonary stenosis s/p repair 1969    Degenerative disc disease     Degenerative joint disease     Dyslipidemia     GERD     H/O echocardiogram 04/2017    EF 60-65%, mild concentric LVH, dilated RV with RV dysfunction, RVSP 54 mmHg, RA E, moderate to severe pulmonic regurgitation.     Hypertension     Hypothyroid     Morbid obesity     Morbid obesity with BMI of 45.0-49.9, adult ([address] 2/20/2017    Noncompliance     Schizoaffective disorder     Substance abuse     marijuana, crack cocaine     Current Outpatient Prescriptions   Medication Sig Dispense Refill    lisinopril (PRINIVIL, ZESTRIL) 5 mg tablet Take 1 Tab by mouth daily. 30 Tab 0    oxyCODONE-acetaminophen (PERCOCET) 5-325 mg per tablet Take 1 tablet every 4-6 hours as needed for pain control. If you were instructed to try over the counter ibuprofen or tylenol, only take the percocet for pain not controlled with the over the counter medication. 12 Tab 0    cpap machine kit by Does Not Apply route.  oxyCODONE (OXAYDO) 7.5 mg TbOr Take 1 tab PO BID severe pain. ONE WEEK SUPPLY 14 Tab 0    methocarbamol (ROBAXIN) 500 mg tablet Take 1 Tab by mouth two (2) times daily as needed. 60 Tab 1    Oxygen 2 Each by Nasal route continuous.  omeprazole (PRILOSEC) 40 mg capsule Take 40 mg by mouth daily.  ibuprofen (MOTRIN) 400 mg tablet Take 1 Tab by mouth every eight (8) hours as needed for Pain. 60 Tab 0    metoprolol tartrate (LOPRESSOR) 25 mg tablet Take 1 Tab by mouth two (2) times a day. 60 Tab 0    VENTOLIN HFA 90 mcg/actuation inhaler Take 2 Puffs by inhalation every four (4) hours as needed for Wheezing or Shortness of Breath. 1 Inhaler 0    lactulose (CHRONULAC) 10 gram/15 mL solution Take 15 mL by mouth two (2) times a day. 1 Bottle 0    furosemide (LASIX) 40 mg tablet 40 mg po daily 30 Tab 0    potassium chloride (K-DUR, KLOR-CON) 20 mEq tablet Take 1 Tab by mouth daily. 30 Tab 0    esomeprazole (NEXIUM) 40 mg capsule Take 1 Cap by mouth daily. 30 Cap 0    isosorbide mononitrate ER (IMDUR) 30 mg tablet TAKE ONE TABLET BY MOUTH EVERY EVENING 30 Tab 7    PARoxetine (PAXIL) 20 mg tablet Take 1 Tab by mouth daily. 30 Tab 3    aspirin delayed-release 325 mg tablet TAKE ONE TABLET BY MOUTH ONCE DAILY 30 Tab 6    simvastatin (ZOCOR) 40 mg tablet Take  by mouth nightly.  to obtain from pharmacy      aripiprazole (ABILIFY) 15 mg tablet Take 15 mg by mouth daily. to obtain from pharmacy      levothyroxine (SYNTHROID) 125 mcg tablet Take 125 mcg by mouth Daily (before breakfast). Allergies   Allergen Reactions    Gabapentin Other (comments)     Unsteady, weakness LEs    Tetanus Vaccines And Toxoid Swelling    Topamax [Topiramate] Other (comments)     weakness      Social History   Substance Use Topics    Smoking status: Never Smoker    Smokeless tobacco: Never Used    Alcohol use No     Family History   Problem Relation Age of Onset    Hypertension Mother     Coronary Artery Disease Mother     Coronary Artery Disease Father     Hypertension Father          Review of Systems   Constitutional: Negative for chills, fever and weight loss. HENT: Negative for nosebleeds. Eyes: Negative for blurred vision and double vision. Respiratory: Positive for shortness of breath. Negative for cough and wheezing. Cardiovascular: Positive for leg swelling. Negative for chest pain, palpitations, orthopnea, claudication and PND. Gastrointestinal: Negative for abdominal pain, heartburn, nausea and vomiting. Genitourinary: Negative for dysuria and hematuria. Musculoskeletal: Negative for falls and myalgias. Skin: Negative for rash. Neurological: Negative for dizziness, focal weakness and headaches. Endo/Heme/Allergies: Does not bruise/bleed easily. Psychiatric/Behavioral: Negative for substance abuse. Visit Vitals    /84    Pulse (!) 53    Ht 5' 3\" (1.6 m)    Wt 134.7 kg (297 lb)    SpO2 93%    BMI 52.61 kg/m2       Physical Exam   Constitutional: She is oriented to person, place, and time. She appears well-developed and well-nourished. HENT:   Head: Normocephalic and atraumatic. Eyes: Conjunctivae are normal.   Neck: Neck supple. No JVD present. Carotid bruit is not present. Cardiovascular: S1 normal, S2 normal and normal pulses. An irregularly irregular rhythm present. Bradycardia present.   Exam reveals distant heart sounds. Exam reveals no gallop. No murmur heard. Pulmonary/Chest: Effort normal and breath sounds normal. She has no wheezes. She has no rales. Abdominal: Soft. Bowel sounds are normal. There is no tenderness. Musculoskeletal: She exhibits edema ( Trace bilateral lower extremity). Neurological: She is alert and oriented to person, place, and time. Skin: Skin is warm and dry. EKG: Atrial fibrillation, ventricular rate in the 50s, occasional PVC or aberrantly conducted complexes, incomplete right bundle branch block, nonspecific ST-T abnormality. No change compared to the previous EKG. ASSESSMENT and PLAN    Persistent atrial fibrillation. Patient's heart rate remains controlled on metoprolol as monotherapy. She remains on aspirin for CVA prophylaxis. In the past she has not been felt to be a good candidate for anticoagulation due to compliance issues, polysubstance abuse and psychiatric disease. She is relatively asymptomatic to her arrhythmia. Pulmonary hypertension. This is likely resultant of her congenital heart disease status post unknown surgical repair as a child. She has RV dysfunction with moderate to severe pulmonary regurgitation with moderate elevation of her PA pressures. She is on a scheduled dose of Lasix. Her oxygen saturation is reasonable. I doubt she would be a candidate for additional cardiac surgery in the future. Congenital heart disease, status post unknown repair as a child. She does have residual pulmonary hypertension and atrial fibrillation as described above. Chronic diastolic heart failure. Patient's volume status appears to be reasonable on her scheduled Lasix regimen. Her blood pressure is well controlled on metoprolol and lisinopril along with long-acting nitrates. All of which I would continue. Dyslipidemia. Patient has been treated with simvastatin 40 mg daily. This followed by her PCP. Essential hypertension.   Controlled as described above.    Morbid obesity. Patient's weight has been stable for quite some time. She was encouraged to try lose as much weight as possible with lifestyle modification. Degenerative joint disease. Patient states she will likely require to undergo a knee replacement in the near future. She would be an acceptable risk from a cardiac standpoint to proceed with this procedure    The patient can follow-up with our nurse practitioner in 6 months and I will be happy to see her back in 12 months, sooner if needed.

## 2017-10-12 NOTE — MR AVS SNAPSHOT
Visit Information Date & Time Provider Department Dept. Phone Encounter #  
 10/12/2017  1:20 PM Marlen Rebolledo MD [facility] 25 [id number] Your Appointments 11/13/2017 10:15 AM  
Follow Up with Eliane Bedolla MD  
[facility]) Appt Note: 5 WK FU RT HAND  
 [address] [facility], [address] [facility] [address] [phone]  
  
   
 [address] [facility], [address] [place] [address] Upcoming Health Maintenance Date Due Hepatitis C Screening 1960 DTaP/Tdap/Td series (1 - Tdap) 1/6/1980 PAP AKA CERVICAL CYTOLOGY 1/6/1980 FOBT Q 1 YEAR AGE 50-75 1/6/2009 BREAST CANCER SCRN MAMMOGRAM 11/6/2011 INFLUENZA AGE 9 TO ADULT 8/1/2017 Allergies as of 10/12/2017  Review Complete On: 10/12/2017 By: Camila Fisher Severity Noted Reaction Type Reactions Gabapentin  03/29/2017    Other (comments) Unsteady, weakness LEs Tetanus Vaccines And Toxoid    Swelling Topamax [Topiramate]  05/24/2017    Other (comments)  
 weakness Current Immunizations  Never Reviewed Name Date Pneumococcal Polysaccharide (PPSV-23) 1/21/2014  2:55 PM  
  
 Not reviewed this visit You Were Diagnosed With   
  
 Codes Comments SOB (shortness of breath)    -  Primary ICD-10-CM: R06.02 
ICD-9-CM: 786.05 Vitals BP Pulse Height(growth percentile) Weight(growth percentile) SpO2 BMI  
 128/84 (!) 53 5' 3\" (1.6 m) 297 lb (134.7 kg) 93% 52.61 kg/m2 OB Status Smoking Status Hysterectomy Never Smoker Vitals History BMI and BSA Data Body Mass Index Body Surface Area  
 52.61 kg/m 2 2.45 m 2 Preferred Pharmacy Pharmacy Name Phone [facility] - [facility]en Pedersenino, [address] [phone] Your Updated Medication List  
  
   
This list is accurate as of: 10/12/17  3:13 PM.  Always use your most recent med list.  
  
  
  
  
 ABILIFY 15 mg tablet Generic drug:  ARIPiprazole Take 15 mg by mouth daily. to obtain from pharmacy  
  
 aspirin delayed-release 325 mg tablet TAKE ONE TABLET BY MOUTH ONCE DAILY  
  
 cpap machine kit  
by Does Not Apply route.  
  
 esomeprazole 40 mg capsule Commonly known as:  NexIUM Take 1 Cap by mouth daily. furosemide 40 mg tablet Commonly known as:  LASIX 40 mg po daily  
  
 ibuprofen 400 mg tablet Commonly known as:  MOTRIN Take 1 Tab by mouth every eight (8) hours as needed for Pain.  
  
 isosorbide mononitrate ER 30 mg tablet Commonly known as:  IMDUR  
TAKE ONE TABLET BY MOUTH EVERY EVENING  
  
 lactulose 10 gram/15 mL solution Commonly known as:  Charmian Goltz Take 15 mL by mouth two (2) times a day. lisinopril 5 mg tablet Commonly known as:  Liliana Rubin Take 1 Tab by mouth daily. methocarbamol 500 mg tablet Commonly known as:  ROBAXIN Take 1 Tab by mouth two (2) times daily as needed. metoprolol tartrate 25 mg tablet Commonly known as:  LOPRESSOR Take 1 Tab by mouth two (2) times a day. oxyCODONE 7.5 mg Tbor Commonly known as:  OXAYDO Take 1 tab PO BID severe pain. ONE WEEK SUPPLY  
  
 oxyCODONE-acetaminophen 5-325 mg per tablet Commonly known as:  PERCOCET Take 1 tablet every 4-6 hours as needed for pain control. If you were instructed to try over the counter ibuprofen or tylenol, only take the percocet for pain not controlled with the over the counter medication. Oxygen 2 Each by Nasal route continuous. PARoxetine 20 mg tablet Commonly known as:  PAXIL Take 1 Tab by mouth daily. potassium chloride 20 mEq tablet Commonly known as:  K-DUR, KLOR-CON Take 1 Tab by mouth daily. PriLOSEC 40 mg capsule Generic drug:  omeprazole Take 40 mg by mouth daily. SYNTHROID 125 mcg tablet Generic drug:  levothyroxine Take 125 mcg by mouth Daily (before breakfast). VENTOLIN HFA 90 mcg/actuation inhaler Generic drug:  albuterol Take 2 Puffs by inhalation every four (4) hours as needed for Wheezing or Shortness of Breath. ZOCOR 40 mg tablet Generic drug:  simvastatin Take  by mouth nightly. to obtain from pharmacy We Performed the Following AMB POC EKG ROUTINE W/ 12 LEADS, INTER & REP [27886 CPT(R)] Introducing [facility] [place] [facility] SERVICES! Albertina Dillon introduces [employer] patient portal. Now you can access parts of your medical record, email your doctor's office, and request medication refills online. 1. In your internet browser, go to [email or web address] 2. Click on the First Time User? Click Here link in the Sign In box. You will see the New Member Sign Up page. 3. Enter your [employer] Access Code exactly as it appears below. You will not need to use this code after youve completed the sign-up process. If you do not sign up before the expiration date, you must request a new code. · [employer] Access Code: [email or web address] Expires: 11/22/2017 12:41 PM 
 
4. Enter the last four digits of your Social Security Number (xxxx) and Date of Birth (mm/dd/yyyy) as indicated and click Submit. You will be taken to the next sign-up page. 5. Create a [employer] ID. This will be your [employer] login ID and cannot be changed, so think of one that is secure and easy to remember. 6. Create a [employer] password. You can change your password at any time. 7. Enter your Password Reset Question and Answer. This can be used at a later time if you forget your password. 8. Enter your e-mail address. You will receive e-mail notification when new information is available in [address] 9. Click Sign Up. You can now view and download portions of your medical record. 10. Click the Download Summary menu link to download a portable copy of your medical information. If you have questions, please visit the Frequently Asked Questions section of the [employer] website. Remember, [employer] is NOT to be used for urgent needs. For medical emergencies, dial 911. Now available from your iPhone and Android! Please provide this summary of care documentation to your next provider. Your primary care clinician is listed as Mario. If you have any questions after today's visit, please call [phone].

## 2017-10-12 NOTE — PROGRESS NOTES
1. Have you been to the ER, urgent care clinic since your last visit? Hospitalized since your last visit? [facility] 4/18/17 - 4/26/17 sob  2. Have you seen or consulted any other health care providers outside of the [address] [place] [address] since your last visit? Include any pap smears or colon screening.  no

## 2017-10-27 ENCOUNTER — TELEPHONE (OUTPATIENT)
Dept: CARDIOLOGY CLINIC | Age: 57
End: 2017-10-27

## 2017-10-27 RX ORDER — LISINOPRIL AND HYDROCHLOROTHIAZIDE 20; 25 MG/1; MG/1
TABLET ORAL DAILY
COMMUNITY
End: 2018-05-10

## 2017-10-27 NOTE — TELEPHONE ENCOUNTER
Patient has been on:  - Zestoretic (lisinopril/hctz 20mg-25mg) prescribed by Dr Henson More  - Lisinopril 5 mg prescribed by Dr Argenis Olvera    Last BP on 10/12/17 /84 HR 53   Dr Argenis Olvera reviewed this and stopped Lisinopril. Attempted to call patient. She called back. Verified that she was taking zestoretic along with lisonopril. Informed that she needs to stop zestoretic.

## 2017-11-22 ENCOUNTER — TELEPHONE (OUTPATIENT)
Dept: CARDIOLOGY CLINIC | Age: 57
End: 2017-11-22

## 2017-11-28 ENCOUNTER — OFFICE VISIT (OUTPATIENT)
Dept: CARDIOLOGY CLINIC | Age: 57
End: 2017-11-28

## 2017-11-28 VITALS — HEART RATE: 66 BPM | HEIGHT: 63 IN | SYSTOLIC BLOOD PRESSURE: 118 MMHG | DIASTOLIC BLOOD PRESSURE: 70 MMHG

## 2017-11-28 DIAGNOSIS — I11.9 BENIGN HYPERTENSIVE HEART DISEASE WITHOUT HEART FAILURE: Primary | ICD-10-CM

## 2017-11-28 DIAGNOSIS — E66.01 MORBID OBESITY (HCC): ICD-10-CM

## 2017-11-28 DIAGNOSIS — E78.5 DYSLIPIDEMIA: ICD-10-CM

## 2017-11-28 DIAGNOSIS — I50.32 DIASTOLIC CHF, CHRONIC (HCC): ICD-10-CM

## 2017-11-28 DIAGNOSIS — I48.19 PERSISTENT ATRIAL FIBRILLATION (HCC): ICD-10-CM

## 2017-11-28 DIAGNOSIS — Q24.9 CONGENITAL HEART DISEASE: ICD-10-CM

## 2017-11-28 RX ORDER — METOPROLOL TARTRATE 50 MG/1
25 TABLET ORAL 2 TIMES DAILY
Status: ON HOLD | COMMUNITY
Start: 2017-11-27 | End: 2018-05-10

## 2017-11-28 RX ORDER — HYDROCODONE BITARTRATE AND ACETAMINOPHEN 7.5; 325 MG/1; MG/1
TABLET ORAL
COMMUNITY
Start: 2017-11-27 | End: 2018-03-01

## 2017-11-28 RX ORDER — LISINOPRIL 20 MG/1
TABLET ORAL DAILY
COMMUNITY
End: 2018-03-01

## 2017-11-28 RX ORDER — FAMOTIDINE 20 MG/1
20 TABLET, FILM COATED ORAL 2 TIMES DAILY
Status: ON HOLD | COMMUNITY
End: 2018-03-01

## 2017-11-28 NOTE — PATIENT INSTRUCTIONS
Please verify medication list and update it. Please get a copy of the corrected list back to our office so we can clear you for surgery again. Follow-up with Dr. Vilma Ruiz as scheduled and as needed. Please use CPAP and oxygen as prescribed.   Blood pressure today was 118/66

## 2017-11-28 NOTE — PROGRESS NOTES
1. Have you been to the ER, urgent care clinic since your last visit? Hospitalized since your last visit? no  2. Have you seen or consulted any other health care providers outside of the [address] [place] [address] since your last visit? Include any pap smears or colon screening.   no

## 2017-11-28 NOTE — PROGRESS NOTES
Yolanda Craven presents today for evaluation and treatment of hypertension. She was scheduled to have knee surgery at [facility] but due to elevated blood pressures (181/79) during her evaluation, they postponed surgery until her blood pressure is better controlled. She called the office and she was scheduled today for evaluation. She was instructed to bring her medications or an updated list of medications with her but she did not bring the requested items. She is a 61year old female with history of morbid obesity, chronic atrial fibrillation, hypertension, dyslipidemia. Her management has been complicated by non-compliance and polysubstance abuse. She also has a history of unknown congenital heart defect that was repaired as a child. She was brought to the office today by medical transport on a stretcher as she states that she cannot bear weight on her left leg. She is lethargic and easily falls asleep. She admits to taking pain medications. She cannot recall her medications and as her list was being verbally reviewed with her, she was not sure exactly what she is taking. She denies chest pain, tightness, heaviness, and palpitations. She denies shortness of breath at rest, admits to mild, occasional dyspnea on exertion, denies orthopnea and PND. She denies abdominal bloating. She denies lightheadedness, admits to occasional dizziness (\"when blood pressure is high\"), and denies syncope. She admits to lower extremity edema (left greater than right) and  denies claudication. Denies nausea, vomiting, diarrhea, fever, chills. She admits to chronic pain due to her orthopedic issues. We were unable to weigh her today.       PMH:  Past Medical History:   Diagnosis Date    Atrial fibrillation     CHADS score 1  (-CHF, +HTN, -AGE, -DM, -CVA)    Carpal tunnel syndrome     Chronic pain     Congenital heart disease     presumed pulmonary stenosis s/p repair 1969    Degenerative disc disease     Degenerative joint disease     Dyslipidemia     GERD     H/O echocardiogram 04/2017    EF 60-65%, mild concentric LVH, dilated RV with RV dysfunction, RVSP 54 mmHg, RA E, moderate to severe pulmonic regurgitation.  Hypertension     Hypothyroid     Morbid obesity     Morbid obesity with BMI of 45.0-49.9, adult ([facility] [address] 2/20/2017    Noncompliance     Schizoaffective disorder     Substance abuse     marijuana, crack cocaine       PSH:  Past Surgical History:   Procedure Laterality Date    HX CARPAL TUNNEL RELEASE      left    HX HYSTERECTOMY      HX KNEE REPLACEMENT      left    HX KNEE REPLACEMENT      right    HX THYROIDECTOMY         MEDS:  Current Outpatient Prescriptions   Medication Sig    HYDROcodone-acetaminophen (NORCO) 7.5-325 mg per tablet     metoprolol tartrate (LOPRESSOR) 50 mg tablet Take 25 mg by mouth two (2) times a day.  famotidine (PEPCID) 20 mg tablet Take 20 mg by mouth two (2) times a day.  lisinopril (PRINIVIL, ZESTRIL) 20 mg tablet Take  by mouth daily.  oxyCODONE-acetaminophen (PERCOCET) 5-325 mg per tablet Take 1 tablet every 4-6 hours as needed for pain control. If you were instructed to try over the counter ibuprofen or tylenol, only take the percocet for pain not controlled with the over the counter medication.  VENTOLIN HFA 90 mcg/actuation inhaler Take 2 Puffs by inhalation every four (4) hours as needed for Wheezing or Shortness of Breath.  furosemide (LASIX) 40 mg tablet 40 mg po daily    isosorbide mononitrate ER (IMDUR) 30 mg tablet TAKE ONE TABLET BY MOUTH EVERY EVENING    PARoxetine (PAXIL) 20 mg tablet Take 1 Tab by mouth daily.  aspirin delayed-release 325 mg tablet TAKE ONE TABLET BY MOUTH ONCE DAILY    simvastatin (ZOCOR) 40 mg tablet Take  by mouth nightly. to obtain from pharmacy    aripiprazole (ABILIFY) 15 mg tablet Take 15 mg by mouth daily.  to obtain from pharmacy    levothyroxine (SYNTHROID) 125 mcg tablet Take 125 mcg by mouth Daily (before breakfast).  lisinopril-hydroCHLOROthiazide (ZESTORETIC) 20-25 mg per tablet Take  by mouth daily.  cpap machine kit by Does Not Apply route.  oxyCODONE (OXAYDO) 7.5 mg TbOr Take 1 tab PO BID severe pain. ONE WEEK SUPPLY    Oxygen 2 Each by Nasal route continuous.  lactulose (CHRONULAC) 10 gram/15 mL solution Take 15 mL by mouth two (2) times a day. No current facility-administered medications for this visit. Allergies and Sensitivities:  Allergies   Allergen Reactions    Gabapentin Other (comments)     Unsteady, weakness LEs    Tetanus Vaccines And Toxoid Swelling    Topamax [Topiramate] Other (comments)     weakness       Family History:  Family History   Problem Relation Age of Onset    Hypertension Mother     Coronary Artery Disease Mother     Coronary Artery Disease Father     Hypertension Father        Social History:  She  reports that she has never smoked. She has never used smokeless tobacco.  She  reports that she does not drink alcohol. Physical:  Visit Vitals    /70    Pulse 66    Ht 5' 3\" (1.6 m)       Exam:  Neck:  Supple, no JVD, no carotid bruits  CV:  Normal S1 and  S2, grade II/VI DEANDRA, no rubs, or gallops noted. Irregularly, irregular rhythm but rate controlled  Lungs:  Clear to ausculation throughout, no wheezes or rales  Abd:  Soft, non-tender, obese with good bowel sounds. No hepatosplenomegaly  Extremities:  1+ lower extremity edema on the right and 2+ on the left.       Data:  EKG:  Not done today      LABS:  Lab Results   Component Value Date/Time    Sodium 139 09/09/2017 05:15 PM    Potassium 4.1 09/09/2017 05:15 PM    Chloride 102 09/09/2017 05:15 PM    CO2 32 09/09/2017 05:15 PM    Glucose 80 09/09/2017 05:15 PM    BUN 29 09/09/2017 05:15 PM    Creatinine 1.52 09/09/2017 05:15 PM     Lab Results   Component Value Date/Time    Cholesterol, total 168 10/06/2010 12:17 PM    HDL Cholesterol 37 10/06/2010 12:17 PM    LDL, calculated 82.2 10/06/2010 12:17 PM    Triglyceride 244 10/06/2010 12:17 PM    CHOL/HDL Ratio 4.5 10/06/2010 12:17 PM     Lab Results   Component Value Date/Time    ALT (SGPT) 21 04/23/2017 02:46 AM       Impression / Plan:  1. Chronic diastolic heart failure, appears compensated  2. Hypertension, blood pressure controlled today  3. Dyslipidemia, on simvastatin 40mg (managed by PCP)  4. History of Polysubstance abuse  5. History of medical non-compliance  6. Pulmonary hypertension, RV dysfunction with moderate to severe pulmonary regurgitation and moderate elevation of her pulmonary pressures. 7.  Morbid obesity  8. DJD, for left knee replacement and was cleared for surgery in mid October 2017, surgery was postponed due to elevated blood pressure      Ms. Evan Seals was seen today for evaluation and treatment of her hypertension. During an evaluation by orthopedic surgeon at [facility], her blood pressure was quite elevated (181/79) and it was recommended that her blood pressure be better controlled before they proceed with left knee surgery. She scheduled evaluation here at our office and she was instructed to bring a current list of medications or her medication bottles. She did not bring either with her today for this appointment. She is lethargic and readily falls asleep. She admits to taking pain medications and having sleep apnea for which she does not use her CPAP. She could barely stay awake during her visit and could not answer questions regarding her medications. She does not know exactly what she takes. She was given a printed list of medications (what we have in her chart) and she was asked to verify the medications and get an updated list back to us as soon as possible so we can update her chart. She was informed that in order for us to clear her again for surgery, we need to know exactly what she is taking. Her blood pressure today was well controlled at 118/70.   She does not recall what she took this morning. Her breath sounds are clear. She has lower extremity edema, left greater than right. She states that her left knee is causing the problems. She is wearing a soft long leg brace. Congestive heart failure and atrial fibrillation teaching reinforced today. Advised to limit sodium intake to no more than 2000mg per day and to also limit her fluid intake to no more than 48 to 56 ounces at this time. Advised to weigh daily every morning and record weights. Instructed to call our office if progressive weight gain is noted over a 2 to 3 day period of time, shortness of breath increases, or if abdominal bloating, nausea, fatigue, or increased lower extremity edema is noted. She was advised to use her oxygen and CPAP as prescribed. She states that she does not use either. She will follow-up with Dr. Aj Jara as scheduled and as needed. Delonte Alfred MSN, FNP-BC    Please note:  Portions of this chart were created with Dragon medical speech to text program.  Unrecognized errors may be present.

## 2017-11-28 NOTE — MR AVS SNAPSHOT
Visit Information Date & Time Provider Department Dept. Phone Encounter #  
 11/28/2017  9:30 AM Megha Granados NP [facility] [address] [id number] Your Appointments 4/12/2018 11:00 AM  
Follow Up with Megha Granados NP [facility] ([address]) Appt Note: 6 month follow up with Dulce Vigil [facility] [address] [address] [address] [phone] [facility] [address] [address]  
  
    
 10/12/2018 11:40 AM  
Follow Up with Sanchez Snow MD  
[facility] ([address]) Appt Note: 1 year follow up [facility] [address] [address] [address] [phone] [facility] [address] Upcoming Health Maintenance Date Due Hepatitis C Screening 1960 DTaP/Tdap/Td series (1 - Tdap) 1/6/1980 PAP AKA CERVICAL CYTOLOGY 1/6/1980 FOBT Q 1 YEAR AGE 50-75 1/6/2009 BREAST CANCER SCRN MAMMOGRAM 11/6/2011 Influenza Age 5 to Adult 8/1/2017 Allergies as of 11/28/2017  Review Complete On: 11/28/2017 By: Megha Granados NP Severity Noted Reaction Type Reactions Gabapentin  03/29/2017    Other (comments) Unsteady, weakness LEs Tetanus Vaccines And Toxoid    Swelling Topamax [Topiramate]  05/24/2017    Other (comments)  
 weakness Current Immunizations  Never Reviewed Name Date Pneumococcal Polysaccharide (PPSV-23) 1/21/2014  2:55 PM  
  
 Not reviewed this visit Vitals BP Pulse Height(growth percentile) OB Status Smoking Status 118/70 66 5' 3\" (1.6 m) Hysterectomy Never Smoker Vitals History Preferred Pharmacy Pharmacy Name Phone [facility], [address] [place] [address],[address] [phone] Your Updated Medication List  
  
   
 This list is accurate as of: 11/28/17 10:10 AM.  Always use your most recent med list.  
  
  
  
  
 ABILIFY 15 mg tablet Generic drug:  ARIPiprazole Take 15 mg by mouth daily. to obtain from pharmacy  
  
 aspirin delayed-release 325 mg tablet TAKE ONE TABLET BY MOUTH ONCE DAILY  
  
 cpap machine kit  
by Does Not Apply route. furosemide 40 mg tablet Commonly known as:  LASIX 40 mg po daily HYDROcodone-acetaminophen 7.5-325 mg per tablet Commonly known as:  NORCO  
  
 isosorbide mononitrate ER 30 mg tablet Commonly known as:  IMDUR  
TAKE ONE TABLET BY MOUTH EVERY EVENING  
  
 lactulose 10 gram/15 mL solution Commonly known as:  Ruby Monks Take 15 mL by mouth two (2) times a day. lisinopril 20 mg tablet Commonly known as:  Jacqueline [place] Take  by mouth daily. metoprolol tartrate 50 mg tablet Commonly known as:  LOPRESSOR Take 25 mg by mouth two (2) times a day. oxyCODONE 7.5 mg Tbor Commonly known as:  OXAYDO Take 1 tab PO BID severe pain. ONE WEEK SUPPLY  
  
 oxyCODONE-acetaminophen 5-325 mg per tablet Commonly known as:  PERCOCET Take 1 tablet every 4-6 hours as needed for pain control. If you were instructed to try over the counter ibuprofen or tylenol, only take the percocet for pain not controlled with the over the counter medication. Oxygen 2 Each by Nasal route continuous. PARoxetine 20 mg tablet Commonly known as:  PAXIL Take 1 Tab by mouth daily. PEPCID 20 mg tablet Generic drug:  famotidine Take 20 mg by mouth two (2) times a day. SYNTHROID 125 mcg tablet Generic drug:  levothyroxine Take 125 mcg by mouth Daily (before breakfast). VENTOLIN HFA 90 mcg/actuation inhaler Generic drug:  albuterol Take 2 Puffs by inhalation every four (4) hours as needed for Wheezing or Shortness of Breath. ZESTORETIC 20-25 mg per tablet Generic drug:  lisinopril-hydroCHLOROthiazide Take  by mouth daily. ZOCOR 40 mg tablet Generic drug:  simvastatin Take  by mouth nightly. to obtain from pharmacy Patient Instructions Please verify medication list and update it. Please get a copy of the corrected list back to our office so we can clear you for surgery again. Follow-up with Dr. Reva Cardenas as scheduled and as needed. Please use CPAP and oxygen as prescribed. Blood pressure today was 118/66 Introducing [facility] SERVICES! [employer] introduces [employer] patient portal. Now you can access parts of your medical record, email your doctor's office, and request medication refills online. 1. In your internet browser, go to [email or web address] 2. Click on the First Time User? Click Here link in the Sign In box. You will see the New Member Sign Up page. 3. Enter your [employer] Access Code exactly as it appears below. You will not need to use this code after youve completed the sign-up process. If you do not sign up before the expiration date, you must request a new code. · [employer] Access Code: [email or web address] Expires: 2/26/2018  9:20 AM 
 
4. Enter the last four digits of your Social Security Number (xxxx) and Date of Birth (mm/dd/yyyy) as indicated and click Submit. You will be taken to the next sign-up page. 5. Create a [employer] ID. This will be your [employer] login ID and cannot be changed, so think of one that is secure and easy to remember. 6. Create a [employer] password. You can change your password at any time. 7. Enter your Password Reset Question and Answer. This can be used at a later time if you forget your password. 8. Enter your e-mail address. You will receive e-mail notification when new information is available in [address] 9. Click Sign Up. You can now view and download portions of your medical record. 10. Click the Download Summary menu link to download a portable copy of your medical information. If you have questions, please visit the Frequently Asked Questions section of the [employer] website. Remember, [employer] is NOT to be used for urgent needs. For medical emergencies, dial 911. Now available from your iPhone and Android! Please provide this summary of care documentation to your next provider. Your primary care clinician is listed as Mario. If you have any questions after today's visit, please call [phone].

## 2018-02-11 NOTE — ED TRIAGE NOTES
Pt complains of worsening right hip, patient reports chronic hip pain. She was able to transfer with assistance from the wheelchair to the stretcher.
Admitted

## 2018-02-24 ENCOUNTER — APPOINTMENT (OUTPATIENT)
Dept: CT IMAGING | Age: 58
End: 2018-02-24
Attending: EMERGENCY MEDICINE
Payer: MEDICAID

## 2018-02-24 ENCOUNTER — HOSPITAL ENCOUNTER (EMERGENCY)
Age: 58
Discharge: HOME | End: 2018-02-24
Attending: EMERGENCY MEDICINE
Payer: MEDICAID

## 2018-02-24 ENCOUNTER — APPOINTMENT (OUTPATIENT)
Dept: GENERAL RADIOLOGY | Age: 58
End: 2018-02-24
Attending: EMERGENCY MEDICINE
Payer: MEDICAID

## 2018-02-24 VITALS
BODY MASS INDEX: 65.37 KG/M2 | HEART RATE: 72 BPM | SYSTOLIC BLOOD PRESSURE: 156 MMHG | OXYGEN SATURATION: 95 % | WEIGHT: 293 LBS | TEMPERATURE: 98.6 F | RESPIRATION RATE: 19 BRPM | DIASTOLIC BLOOD PRESSURE: 131 MMHG

## 2018-02-24 DIAGNOSIS — I50.9 CONGESTIVE HEART FAILURE, UNSPECIFIED CONGESTIVE HEART FAILURE CHRONICITY, UNSPECIFIED CONGESTIVE HEART FAILURE TYPE: Primary | ICD-10-CM

## 2018-02-24 LAB
ANION GAP SERPL CALC-SCNC: 7 MMOL/L (ref 3–18)
ARTERIAL PATENCY WRIST A: YES
BASE EXCESS BLD CALC-SCNC: 1 MMOL/L
BASOPHILS # BLD: 0 K/UL (ref 0–0.1)
BASOPHILS NFR BLD: 0 % (ref 0–2)
BDY SITE: ABNORMAL
BNP SERPL-MCNC: 6487 PG/ML (ref 0–900)
BUN SERPL-MCNC: 15 MG/DL (ref 7–18)
BUN/CREAT SERPL: 15 (ref 12–20)
CALCIUM SERPL-MCNC: 7.8 MG/DL (ref 8.5–10.1)
CHLORIDE SERPL-SCNC: 107 MMOL/L (ref 100–108)
CK MB CFR SERPL CALC: 1 % (ref 0–4)
CK MB SERPL-MCNC: 2.2 NG/ML (ref 5–25)
CK SERPL-CCNC: 210 U/L (ref 26–192)
CO2 SERPL-SCNC: 25 MMOL/L (ref 21–32)
CREAT SERPL-MCNC: 1.03 MG/DL (ref 0.6–1.3)
D DIMER PPP FEU-MCNC: 1.36 UG/ML(FEU)
DIFFERENTIAL METHOD BLD: ABNORMAL
EOSINOPHIL # BLD: 0.1 K/UL (ref 0–0.4)
EOSINOPHIL NFR BLD: 3 % (ref 0–5)
ERYTHROCYTE [DISTWIDTH] IN BLOOD BY AUTOMATED COUNT: 17.8 % (ref 11.6–14.5)
GAS FLOW.O2 O2 DELIVERY SYS: ABNORMAL L/MIN
GLUCOSE SERPL-MCNC: 105 MG/DL (ref 74–99)
HCO3 BLD-SCNC: 27.7 MMOL/L (ref 22–26)
HCT VFR BLD AUTO: 29.7 % (ref 35–45)
HGB BLD-MCNC: 8.3 G/DL (ref 12–16)
LYMPHOCYTES # BLD: 0.7 K/UL (ref 0.9–3.6)
LYMPHOCYTES NFR BLD: 16 % (ref 21–52)
MAGNESIUM SERPL-MCNC: 2.1 MG/DL (ref 1.6–2.6)
MCH RBC QN AUTO: 22.7 PG (ref 24–34)
MCHC RBC AUTO-ENTMCNC: 27.9 G/DL (ref 31–37)
MCV RBC AUTO: 81.4 FL (ref 74–97)
MONOCYTES # BLD: 0.3 K/UL (ref 0.05–1.2)
MONOCYTES NFR BLD: 7 % (ref 3–10)
NEUTS SEG # BLD: 3.3 K/UL (ref 1.8–8)
NEUTS SEG NFR BLD: 74 % (ref 40–73)
PCO2 BLD: 55.2 MMHG (ref 35–45)
PH BLD: 7.31 [PH] (ref 7.35–7.45)
PLATELET # BLD AUTO: 181 K/UL (ref 135–420)
PMV BLD AUTO: 10.2 FL (ref 9.2–11.8)
PO2 BLD: 106 MMHG (ref 80–100)
POTASSIUM SERPL-SCNC: 3.8 MMOL/L (ref 3.5–5.5)
RBC # BLD AUTO: 3.65 M/UL (ref 4.2–5.3)
SAO2 % BLD: 97 % (ref 92–97)
SERVICE CMNT-IMP: ABNORMAL
SODIUM SERPL-SCNC: 139 MMOL/L (ref 136–145)
SPECIMEN TYPE: ABNORMAL
TOTAL RESP. RATE, ITRR: 24
TROPONIN I SERPL-MCNC: <0.02 NG/ML (ref 0–0.04)
WBC # BLD AUTO: 4.4 K/UL (ref 4.6–13.2)

## 2018-02-24 PROCEDURE — 36600 WITHDRAWAL OF ARTERIAL BLOOD: CPT

## 2018-02-24 PROCEDURE — 82803 BLOOD GASES ANY COMBINATION: CPT

## 2018-02-24 PROCEDURE — 84484 ASSAY OF TROPONIN QUANT: CPT | Performed by: EMERGENCY MEDICINE

## 2018-02-24 PROCEDURE — 83880 ASSAY OF NATRIURETIC PEPTIDE: CPT | Performed by: EMERGENCY MEDICINE

## 2018-02-24 PROCEDURE — 85379 FIBRIN DEGRADATION QUANT: CPT | Performed by: EMERGENCY MEDICINE

## 2018-02-24 PROCEDURE — 99285 EMERGENCY DEPT VISIT HI MDM: CPT

## 2018-02-24 PROCEDURE — 80048 BASIC METABOLIC PNL TOTAL CA: CPT | Performed by: EMERGENCY MEDICINE

## 2018-02-24 PROCEDURE — 83735 ASSAY OF MAGNESIUM: CPT | Performed by: EMERGENCY MEDICINE

## 2018-02-24 PROCEDURE — 74011250636 HC RX REV CODE- 250/636: Performed by: EMERGENCY MEDICINE

## 2018-02-24 PROCEDURE — 85025 COMPLETE CBC W/AUTO DIFF WBC: CPT | Performed by: EMERGENCY MEDICINE

## 2018-02-24 PROCEDURE — 71275 CT ANGIOGRAPHY CHEST: CPT

## 2018-02-24 PROCEDURE — 77030013033 HC MSK BPAP/CPAP MMKA -B

## 2018-02-24 PROCEDURE — 93005 ELECTROCARDIOGRAM TRACING: CPT

## 2018-02-24 PROCEDURE — 96374 THER/PROPH/DIAG INJ IV PUSH: CPT

## 2018-02-24 PROCEDURE — 71045 X-RAY EXAM CHEST 1 VIEW: CPT

## 2018-02-24 PROCEDURE — 94660 CPAP INITIATION&MGMT: CPT

## 2018-02-24 PROCEDURE — 74011250637 HC RX REV CODE- 250/637: Performed by: EMERGENCY MEDICINE

## 2018-02-24 PROCEDURE — 74011636320 HC RX REV CODE- 636/320: Performed by: EMERGENCY MEDICINE

## 2018-02-24 RX ORDER — FUROSEMIDE 10 MG/ML
40 INJECTION INTRAMUSCULAR; INTRAVENOUS
Status: COMPLETED | OUTPATIENT
Start: 2018-02-24 | End: 2018-02-24

## 2018-02-24 RX ORDER — OXYCODONE AND ACETAMINOPHEN 5; 325 MG/1; MG/1
1 TABLET ORAL
Status: COMPLETED | OUTPATIENT
Start: 2018-02-24 | End: 2018-02-24

## 2018-02-24 RX ADMIN — OXYCODONE HYDROCHLORIDE AND ACETAMINOPHEN 1 TABLET: 5; 325 TABLET ORAL at 18:48

## 2018-02-24 RX ADMIN — IOPAMIDOL 100 ML: 755 INJECTION, SOLUTION INTRAVENOUS at 17:03

## 2018-02-24 RX ADMIN — FUROSEMIDE 40 MG: 10 INJECTION, SOLUTION INTRAMUSCULAR; INTRAVENOUS at 14:02

## 2018-02-24 NOTE — ED TRIAGE NOTES
Patient complains of SOB that started last night , patient was placed on Bipap due to work of breathing . Patient has a hx of CHF . Patient has knee surgery two weeks ago and has not been moving around since the surgery. Patient was given 1 albuterol and one nitro by medics.

## 2018-02-24 NOTE — ED PROVIDER NOTES
Cheryal [facility] EMERGENCY DEPT      1:37 PM    Date: 2/24/2018  Patient Name: Zay Nichols    History of Presenting Illness     Chief Complaint   Patient presents with    Shortness of Breath       History Provided By: Patient    Chief Complaint: SOB  Duration: Couple Days   Timing:  Constant and Worsening  Location: Chest   Quality: Tightness  Severity: Severe  Modifying Factors: Dyspnea on exertion   Associated Symptoms: bilateral leg swelling    60 y.o. female with hx of HTN, HLD, CHF, a-fib, chronic LE edema, morbid obesity and substance abuse presenting to the ED with c/o constant, worsening SOB that began a couple days ago. Pt has not been compliant with her fluid pills. Pt recently had knee replacement surgery and has not been physically active. Family at bedside assisting with hx. Family states pt has been sedentary and only ambulates to use the bathroom. Pt denies CP, fever, chills, abdominal pain, nausea, vomiting, diarrhea or urinary sx. Associated sx include bilat leg swelling. Notes dyspnea on exertion. Pt is 2L of O2 at home. Denies smoking. Severity is severe. No other sx or complaints given at this time. Nursing nurses regarding the HPI and triage nursing notes were reviewed. Prior medical records were reviewed. Current Outpatient Prescriptions   Medication Sig Dispense Refill    HYDROcodone-acetaminophen (NORCO) 7.5-325 mg per tablet       metoprolol tartrate (LOPRESSOR) 50 mg tablet Take 25 mg by mouth two (2) times a day.  famotidine (PEPCID) 20 mg tablet Take 20 mg by mouth two (2) times a day.  lisinopril (PRINIVIL, ZESTRIL) 20 mg tablet Take  by mouth daily.  lisinopril-hydroCHLOROthiazide (ZESTORETIC) 20-25 mg per tablet Take  by mouth daily.  oxyCODONE-acetaminophen (PERCOCET) 5-325 mg per tablet Take 1 tablet every 4-6 hours as needed for pain control.   If you were instructed to try over the counter ibuprofen or tylenol, only take the percocet for pain not controlled with the over the counter medication. 12 Tab 0    cpap machine kit by Does Not Apply route.  oxyCODONE (OXAYDO) 7.5 mg TbOr Take 1 tab PO BID severe pain. ONE WEEK SUPPLY 14 Tab 0    Oxygen 2 Each by Nasal route continuous.  VENTOLIN HFA 90 mcg/actuation inhaler Take 2 Puffs by inhalation every four (4) hours as needed for Wheezing or Shortness of Breath. 1 Inhaler 0    lactulose (CHRONULAC) 10 gram/15 mL solution Take 15 mL by mouth two (2) times a day. 1 Bottle 0    furosemide (LASIX) 40 mg tablet 40 mg po daily 30 Tab 0    isosorbide mononitrate ER (IMDUR) 30 mg tablet TAKE ONE TABLET BY MOUTH EVERY EVENING 30 Tab 7    PARoxetine (PAXIL) 20 mg tablet Take 1 Tab by mouth daily. 30 Tab 3    aspirin delayed-release 325 mg tablet TAKE ONE TABLET BY MOUTH ONCE DAILY 30 Tab 6    simvastatin (ZOCOR) 40 mg tablet Take  by mouth nightly. to obtain from pharmacy      aripiprazole (ABILIFY) 15 mg tablet Take 15 mg by mouth daily. to obtain from pharmacy      levothyroxine (SYNTHROID) 125 mcg tablet Take 125 mcg by mouth Daily (before breakfast). Past History     Past Medical History:  Past Medical History:   Diagnosis Date    Atrial fibrillation     CHADS score 1  (-CHF, +HTN, -AGE, -DM, -CVA)    Carpal tunnel syndrome     Chronic pain     Congenital heart disease     presumed pulmonary stenosis s/p repair 1969    Degenerative disc disease     Degenerative joint disease     Dyslipidemia     GERD     H/O echocardiogram 04/2017    EF 60-65%, mild concentric LVH, dilated RV with RV dysfunction, RVSP 54 mmHg, RA E, moderate to severe pulmonic regurgitation.     Hypertension     Hypothyroid     Morbid obesity     Morbid obesity with BMI of 45.0-49.9, adult ([facility] [address] 2/20/2017    Noncompliance     Schizoaffective disorder     Substance abuse     marijuana, crack cocaine       Past Surgical History:  Past Surgical History:   Procedure Laterality Date    HX CARPAL TUNNEL RELEASE      left    HX HYSTERECTOMY      HX KNEE REPLACEMENT      left    HX KNEE REPLACEMENT      right    HX THYROIDECTOMY         Family History:  Family History   Problem Relation Age of Onset    Hypertension Mother     Coronary Artery Disease Mother     Coronary Artery Disease Father     Hypertension Father        Social History:  Social History   Substance Use Topics    Smoking status: Never Smoker    Smokeless tobacco: Never Used    Alcohol use No       Allergies: Allergies   Allergen Reactions    Gabapentin Other (comments)     Unsteady, weakness LEs    Tetanus Vaccines And Toxoid Swelling    Topamax [Topiramate] Other (comments)     weakness       Patient's primary care provider (as noted in EPIC):  Tate Nielsen MD    Review of Systems     Review of Systems   Constitutional: Negative for fever. Respiratory: Positive for shortness of breath. Cardiovascular: Positive for leg swelling (Bilat leg swelling). All other systems reviewed and are negative. Physical Exam       Visit Vitals    /80 (BP 1 Location: Right arm, BP Patient Position: At rest)    Pulse 71    Temp 98.6 °F (37 °C)    Resp 15    Wt (!) 167.4 kg (369 lb)    SpO2 94%    BMI 65.37 kg/m2       PHYSICAL EXAM:    CONSTITUTIONAL:  Alert, in no apparent distress;  well developed;  well nourished. HEAD:  Normocephalic, atraumatic. EYES:  EOMI. Non-icteric sclera. Normal conjunctiva. ENTM:  Nose:  no rhinorrhea. Throat:  no erythema or exudate, mucous membranes moist.  NECK:  No JVD. Supple    RESPIRATORY:  Bilateral lower 1/3 lung rales, but good air movement. CARDIOVASCULAR:  Regular rate and rhythm. No murmurs, rubs, or gallops. GI:  Normal bowel sounds, abdomen soft and non-tender. No rebound or guarding. BACK:  Non-tender. UPPER EXT:  Normal inspection. LOWER EXT:  3+ b/l LE pitting edema, no calf tenderness. Distal pulses intact.   NEURO:  Moves all four extremities, and grossly normal motor exam.  SKIN:  No rashes;  Normal for age. PSYCH:  Alert and normal affect. DIFFERENTIAL DIAGNOSES/ MEDICAL DECISION MAKING:   Shortness of breath etiologies include chronic obstructive pulmonary disease (COPD), acute asthma exacerbation, congestive heart failure, pneumonia, acute bronchitis, pulmonary embolism, upper respiratory infection, cardiac event to include acute coronary syndrome, acute myocardial infarction or a combination of the above (ex URI on top of COPD thus causing respiratory distress). Diagnostic Study Results     Abnormal lab results from this emergency department encounter:  Labs Reviewed   CBC WITH AUTOMATED DIFF - Abnormal; Notable for the following:        Result Value    WBC 4.4 (*)     RBC 3.65 (*)     HGB 8.3 (*)     HCT 29.7 (*)     MCH 22.7 (*)     MCHC 27.9 (*)     RDW 17.8 (*)     NEUTROPHILS 74 (*)     LYMPHOCYTES 16 (*)     ABS.  LYMPHOCYTES 0.7 (*)     All other components within normal limits   METABOLIC PANEL, BASIC - Abnormal; Notable for the following:     Glucose 105 (*)     GFR est non-AA 55 (*)     Calcium 7.8 (*)     All other components within normal limits   CARDIAC PANEL,(CK, CKMB & TROPONIN) - Abnormal; Notable for the following:      (*)     All other components within normal limits   NT-PRO BNP - Abnormal; Notable for the following:     NT pro-BNP 6487 (*)     All other components within normal limits   D DIMER - Abnormal; Notable for the following:     D DIMER 1.36 (*)     All other components within normal limits   POC G3 - Abnormal; Notable for the following:     pH (POC) 7.309 (*)     pCO2 (POC) 55.2 (*)     pO2 (POC) 106 (*)     HCO3 (POC) 27.7 (*)     All other components within normal limits   MAGNESIUM       Lab values for this patient within approximately the last 12 hours:  Recent Results (from the past 12 hour(s))   POC G3    Collection Time: 02/24/18  1:15 PM   Result Value Ref Range    Device: CPAP      pH (POC) 7.309 (L) 7.35 - 7.45 pCO2 (POC) 55.2 (H) 35.0 - 45.0 MMHG    pO2 (POC) 106 (H) 80 - 100 MMHG    HCO3 (POC) 27.7 (H) 22 - 26 MMOL/L    sO2 (POC) 97 92 - 97 %    Base excess (POC) 1 mmol/L    Allens test (POC) YES      Total resp. rate 24      Site LEFT RADIAL      Specimen type (POC) ARTERIAL      Performed by Mely Parrish    EKG, 12 LEAD, INITIAL    Collection Time: 02/24/18  1:19 PM   Result Value Ref Range    Ventricular Rate 64 BPM    Atrial Rate 43 BPM    QRS Duration 94 ms    Q-T Interval 448 ms    QTC Calculation (Bezet) 462 ms    Calculated R Axis -20 degrees    Calculated T Axis -115 degrees    Diagnosis       Atrial fibrillation  Incomplete right bundle branch block  Anteroseptal infarct , age undetermined  ST & T wave abnormality, consider inferolateral ischemia or digitalis effect  Abnormal ECG  When compared with ECG of 21-APR-2017 07:32,  Incomplete right bundle branch block has replaced Nonspecific   intraventricular conduction delay  Anteroseptal infarct is now present     CBC WITH AUTOMATED DIFF    Collection Time: 02/24/18  1:40 PM   Result Value Ref Range    WBC 4.4 (L) 4.6 - 13.2 K/uL    RBC 3.65 (L) 4.20 - 5.30 M/uL    HGB 8.3 (L) 12.0 - 16.0 g/dL    HCT 29.7 (L) 35.0 - 45.0 %    MCV 81.4 74.0 - 97.0 FL    MCH 22.7 (L) 24.0 - 34.0 PG    MCHC 27.9 (L) 31.0 - 37.0 g/dL    RDW 17.8 (H) 11.6 - 14.5 %    PLATELET 717 239 - 816 K/uL    MPV 10.2 9.2 - 11.8 FL    NEUTROPHILS 74 (H) 40 - 73 %    LYMPHOCYTES 16 (L) 21 - 52 %    MONOCYTES 7 3 - 10 %    EOSINOPHILS 3 0 - 5 %    BASOPHILS 0 0 - 2 %    ABS. NEUTROPHILS 3.3 1.8 - 8.0 K/UL    ABS. LYMPHOCYTES 0.7 (L) 0.9 - 3.6 K/UL    ABS. MONOCYTES 0.3 0.05 - 1.2 K/UL    ABS. EOSINOPHILS 0.1 0.0 - 0.4 K/UL    ABS.  BASOPHILS 0.0 0.0 - 0.1 K/UL    DF AUTOMATED     METABOLIC PANEL, BASIC    Collection Time: 02/24/18  1:40 PM   Result Value Ref Range    Sodium 139 136 - 145 mmol/L    Potassium 3.8 3.5 - 5.5 mmol/L    Chloride 107 100 - 108 mmol/L    CO2 25 21 - 32 mmol/L    Anion gap 7 3.0 - 18 mmol/L    Glucose 105 (H) 74 - 99 mg/dL    BUN 15 7.0 - 18 MG/DL    Creatinine 1.03 0.6 - 1.3 MG/DL    BUN/Creatinine ratio 15 12 - 20      GFR est AA >60 >60 ml/min/1.73m2    GFR est non-AA 55 (L) >60 ml/min/1.73m2    Calcium 7.8 (L) 8.5 - 10.1 MG/DL   MAGNESIUM    Collection Time: 02/24/18  1:40 PM   Result Value Ref Range    Magnesium 2.1 1.6 - 2.6 mg/dL   CARDIAC PANEL,(CK, CKMB & TROPONIN)    Collection Time: 02/24/18  1:40 PM   Result Value Ref Range     (H) 26 - 192 U/L    CK - MB 2.2 <3.6 ng/ml    CK-MB Index 1.0 0.0 - 4.0 %    Troponin-I, Qt. <0.02 0.0 - 0.045 NG/ML   NT-PRO BNP    Collection Time: 02/24/18  1:40 PM   Result Value Ref Range    NT pro-BNP 6487 (H) 0 - 900 PG/ML   D DIMER    Collection Time: 02/24/18  1:40 PM   Result Value Ref Range    D DIMER 1.36 (H) <0.46 ug/ml(FEU)       Radiologist and cardiologist interpretations if available at time of this note:  Cta Chest W Or W Wo Cont    Result Date: 2/24/2018  CTA Chest Indication: Shortness of breath started last night. Knee surgery two weeks ago. Comparison: CTA chest 4/18/2017 Technique: Axial imaging was obtained dynamically through the pulmonary arteries using high-resolution CT angiographic protocol, without complications. In addition, coronal and sagittal reconstructed MIP arteriograms were performed, to better evaluate the pulmonary vasculature, and to increase sensitivity for detection of pulmonary emboli. 100 mL Isovue-370 was administered intravenously. All CT scans at this facility are performed using dose optimization technique as appropriate to a performed exam, to include automated exposure control, adjustment of the MA and/or kV according to patient size (including appropriate matching for site-specific examinations) or use of  iterative reconstruction technique. CT angiogram quality parameters: 1. Overall exam quality - satisfactory: adequate 2.  Adequacy of pulmonary enhancement - optimal: More than enough enhancement to evaluate subsegmental vessels. Determined by main PA density 250 HU or greater 3. Adequacy of breath-hold-optimal: adequate: sufficient enough to render diagnosis   4. There are no  significant artifacts affecting scan quality. Findings: The pulmonary arteries are opacified completely, without evidence of filling defects to suggest pulmonary thromboembolism. The main pulmonary artery is significantly dilated to 4.9 cm and much larger than the adjacent ascending aorta. The aorta is normal in caliber; no aneurysm. Very little contrast is present within the aorta, which precludes evaluation for dissection. Cardiomegaly. No pericardial effusion. Contrast refluxes into the dilated hepatic veins. There is no significant axillary, hilar, or mediastinal lymphadenopathy. Mild bibasilar dependent atelectasis. Lingular scarring. No infiltrate or consolidation. No pleural effusion or pneumothorax. Visualized structures of the upper abdomen demonstrate no acute abnormality. Small sliding hiatal hernia. Generalized anasarca. Bone windows demonstrate  multilevel degenerative changes of the thoracic spine and shoulders. Impression: No CT evidence of pulmonary thromboembolism. Dilated main pulmonary artery suggestive of pulmonary arterial hypertension. Cardiomegaly with evidence of right heart failure. Generalized anasarca. Small sliding hiatal hernia. Thank you for this referral.     Xr Chest Port    Result Date: 2/24/2018  EXAM: Chest Radiograph INDICATION: Short of breath TECHNIQUE: AP view of the chest COMPARISON: 4/18/2017, 9/23/2016, 4/9/2015 and 1/14/2014 FINDINGS: No pneumothorax identified. The lungs are clear. No infiltrates appreciated. No effusions identified. Cardiomegaly is noted and unchanged. The proximal pulmonary arteries are enlarged. This was present previously. Degenerative changes of the shoulders. .     Impression: 1. No acute infiltrate or effusion.  2.  Enlarged proximal pulmonary arteries which can be seen in pulmonary hypertension. 3.  Cardiomegaly. Interpreted by ED Physician:  Cardiac Monitor Strip interpretation:  Atrial Fibrillation - controlled about 65 bpm, No ST changes noted, 12 lead EKG interpreted by ED Physician. 12 Lead EKG Interpretation:  Atrial Fibrillation - controlled, Rate is normal about 65 bpm, Interpretation: ATRIAL FIBRILLATION WITH CONTROLLED VENTRICULAR RATE. Medication(s) ordered for patient during this emergency visit encounter:  Medications   furosemide (LASIX) injection 40 mg (40 mg IntraVENous Given 2/24/18 1402)   iopamidol (ISOVUE-370) 76 % injection  mL (100 mL IntraVENous Given 2/24/18 1703)   oxyCODONE-acetaminophen (PERCOCET) 5-325 mg per tablet 1 Tab (1 Tab Oral Given 2/24/18 1848)       Medical Decision Making     I am the first provider for this patient. I reviewed the vital signs, available nursing notes, past medical history, past surgical history, family history and social history. Vital Signs:  Reviewed the patient's vital signs. IMPRESSION AND MEDICAL DECISION MAKING:  Based upon the patient's presentation with noted HPI and PE, along with the work up done in the emergency department, I believe that the patient is having acute decompensated congestive heart failure. I do believe that the patient can be discharged home and can follow up with primary doctor within the next few days. Will increase patients diuretic dosing per the discharge instructions. Patient diuresed very well in ED and feels much better. DIAGNOSIS:    1. Acute decompensated congestive heart failure. 2. Recent orthopedic knee surgery. SPECIFIC PATIENT INSTRUCTIONS FROM THE PHYSICIAN WHO TREATED YOU IN THE ER TODAY:  1. Return if any concerns or worsening of condition(s)  2. Increase your lasix by taking double your normal dose for the next 3 days. 3. FOLLOW UP APPOINTMENT:  Your primary doctor in 2-4 days.       Patient is improved, resting quietly and comfortably. The patient will be discharged home. The patient was reassured that these symptoms do not appear to represent a serious or life threatening condition at this time. Warning signs of worsening condition were discussed and understood by the patient. Based on patient's age, coexisting illness, exam, and the results of this ED evaluation, the decision to treat as an outpatient was made. Based on the information available at time of discharge, acute pathology requiring immediate intervention was deemed relative unlikely. While it is impossible to completely exclude the possibility of underlying serious disease or worsening of condition, I feel the relative likelihood is extremely low. I discussed this uncertainty with the patient, who understood ED evaluation and treatment and felt comfortable with the outpatient treatment plan. All questions regarding care, test results, and follow up were answered. The patient is stable and appropriate to discharge. They understand that they should return to the emergency department for any new or worsening symptoms. I stressed the importance of follow up for repeat assessment and possibly further evaluation/treatment. Coding Diagnoses     Clinical Impression:   1. Congestive heart failure, unspecified congestive heart failure chronicity, unspecified congestive heart failure type ([facility] [address]        Disposition     Disposition:  Home    Christiano Khan M.D. [facility] Board Certified Emergency Physician    Provider Attestation:  If a scribe was utilized in generation of this patient record, I personally performed the services described in the documentation, reviewed the documentation, as recorded by the scribe in my presence, and it accurately records the patient's history of presenting illness, review of systems, patient physical examination, and procedures performed by me as the attending physician. Maribel Khan, MARTHA LEON Board Certified Emergency Physician  2/24/2018.  1:38 PM    Scribe [address] [place] [address] acting as a scribe for and in the presence of Eli Mace MD      February 24, 2018 at 1:43 PM       Provider Attestation:      I personally performed the services described in the documentation, reviewed the documentation, as recorded by the scribe in my presence, and it accurately and completely records my words and actions.  February 24, 2018 at 1:43 PM - Eli Mace MD

## 2018-02-25 LAB
ATRIAL RATE: 43 BPM
CALCULATED R AXIS, ECG10: -20 DEGREES
CALCULATED T AXIS, ECG11: -115 DEGREES
DIAGNOSIS, 93000: NORMAL
Q-T INTERVAL, ECG07: 448 MS
QRS DURATION, ECG06: 94 MS
QTC CALCULATION (BEZET), ECG08: 462 MS
VENTRICULAR RATE, ECG03: 64 BPM

## 2018-02-25 NOTE — DISCHARGE INSTRUCTIONS
SPECIFIC PATIENT INSTRUCTIONS FROM THE PHYSICIAN WHO TREATED YOU IN THE ER TODAY:  1. Return if any concerns or worsening of condition(s)  2. Increase your lasix by taking double your normal dose for the next 3 days. 3. FOLLOW UP APPOINTMENT:  Your primary doctor in 2-4 days. Heart Failure: Care Instructions  Your Care Instructions    Heart failure occurs when your heart does not pump as much blood as the body needs. Failure does not mean that the heart has stopped pumping but rather that it is not pumping as well as it should. Over time, this causes fluid buildup in your lungs and other parts of your body. Fluid buildup can cause shortness of breath, fatigue, swollen ankles, and other problems. By taking medicines regularly, reducing sodium (salt) in your diet, checking your weight every day, and making lifestyle changes, you can feel better and live longer. Follow-up care is a key part of your treatment and safety. Be sure to make and go to all appointments, and call your doctor if you are having problems. It's also a good idea to know your test results and keep a list of the medicines you take. How can you care for yourself at home? Medicines  ? · Be safe with medicines. Take your medicines exactly as prescribed. Call your doctor if you think you are having a problem with your medicine. ? · Do not take any vitamins, over-the-counter medicine, or herbal products without talking to your doctor first. Cristiano Mcgovern not take ibuprofen (Advil or Motrin) and naproxen (Aleve) without talking to your doctor first. They could make your heart failure worse. ? · You may be taking some of the following medicine. ¨ Beta-blockers can slow heart rate, decrease blood pressure, and improve your condition. Taking a beta-blocker may lower your chance of needing to be hospitalized. ¨ Angiotensin-converting enzyme inhibitors (ACEIs) reduce the heart's workload, lower blood pressure, and reduce swelling.  Taking an ACEI may lower your chance of needing to be hospitalized again. ¨ Angiotensin II receptor blockers (ARBs) work like ACEIs. Your doctor may prescribe them instead of ACEIs. ¨ Diuretics, also called water pills, reduce swelling. ¨ Potassium supplements replace this important mineral, which is sometimes lost with diuretics. ¨ Aspirin and other blood thinners prevent blood clots, which can cause a stroke or heart attack. ? You will get more details on the specific medicines your doctor prescribes. Diet  ? · Your doctor may suggest that you limit sodium to 2,000 milligrams (mg) a day or less. That is less than 1 teaspoon of salt a day, including all the salt you eat in cooking or in packaged foods. People get most of their sodium from processed foods. Fast food and restaurant meals also tend to be very high in sodium. ? · Ask your doctor how much liquid you can drink each day. You may have to limit liquids. ?Weight  ? · Weigh yourself without clothing at the same time each day. Record your weight. Call your doctor if you have a sudden weight gain, such as more than 2 to 3 pounds in a day or 5 pounds in a week. (Your doctor may suggest a different range of weight gain.) A sudden weight gain may mean that your heart failure is getting worse. ? Activity level  ? · Start light exercise (if your doctor says it is okay). Even if you can only do a small amount, exercise will help you get stronger, have more energy, and manage your weight and your stress. Walking is an easy way to get exercise. Start out by walking a little more than you did before. Bit by bit, increase the amount you walk. ? · When you exercise, watch for signs that your heart is working too hard. You are pushing yourself too hard if you cannot talk while you are exercising.  If you become short of breath or dizzy or have chest pain, stop, sit down, and rest.   ? · If you feel \"wiped out\" the day after you exercise, walk slower or for a shorter distance until you can work up to a better pace. ? · Get enough rest at night. Sleeping with 1 or 2 pillows under your upper body and head may help you breathe easier. ? Lifestyle changes  ? · Do not smoke. Smoking can make a heart condition worse. If you need help quitting, talk to your doctor about stop-smoking programs and medicines. These can increase your chances of quitting for good. Quitting smoking may be the most important step you can take to protect your heart. ? · Limit alcohol to 2 drinks a day for men and 1 drink a day for women. Too much alcohol can cause health problems. ? · Avoid getting sick from colds and the flu. Get a pneumococcal vaccine shot. If you have had one before, ask your doctor whether you need another dose. Get a flu shot each year. If you must be around people with colds or the flu, wash your hands often. When should you call for help? Call 911 if you have symptoms of sudden heart failure such as:  ? · You have severe trouble breathing. ? · You cough up pink, foamy mucus. ? · You have a new irregular or rapid heartbeat. ?Call your doctor now or seek immediate medical care if:  ? · You have new or increased shortness of breath. ? · You are dizzy or lightheaded, or you feel like you may faint. ? · You have sudden weight gain, such as more than 2 to 3 pounds in a day or 5 pounds in a week. (Your doctor may suggest a different range of weight gain.)   ? · You have increased swelling in your legs, ankles, or feet. ? · You are suddenly so tired or weak that you cannot do your usual activities. ? Watch closely for changes in your health, and be sure to contact your doctor if you develop new symptoms. Where can you learn more? Go to http://bucky-gerard.info/. Enter I588 in the search box to learn more about \"Heart Failure: Care Instructions. \"  Current as of: September 21, 2016  Content Version: 11.4  © 4436-8381 [employer].  Care instructions adapted under license by [employer] (which disclaims liability or warranty for this information). If you have questions about a medical condition or this instruction, always ask your healthcare professional. [address] any warranty or liability for your use of this information. [email or web address] Activation    Thank you for requesting access to [facility]. Please follow the instructions below to securely access and download your online medical record. [facility] allows you to send messages to your doctor, view your test results, renew your prescriptions, schedule appointments, and more. How Do I Sign Up? 1. In your internet browser, go to [email or web address]. 2. Click on the First Time User? Click Here link in the Sign In box. You will see the New Member Sign Up page. 3. Enter your [facility] Access Code exactly as it appears below. You will not need to use this code after youve completed the sign-up process. If you do not sign up before the expiration date, you must request a new code. [facility] Access Code: [email or web address]  Expires: 2018  9:20 AM (This is the date your [facility] access code will )    4. Enter the last four digits of your Social Security Number (xxxx) and Date of Birth (mm/dd/yyyy) as indicated and click Submit. You will be taken to the next sign-up page. 5. Create a [facility] ID. This will be your [facility] login ID and cannot be changed, so think of one that is secure and easy to remember. 6. Create a [facility] password. You can change your password at any time. 7. Enter your Password Reset Question and Answer. This can be used at a later time if you forget your password. 8. Enter your e-mail address. You will receive e-mail notification when new information is available in [address] 9. Click Sign Up. You can now view and download portions of your medical record.   10. Click the Download Summary menu link to download a portable copy of your medical information. Additional Information    If you have questions, please visit the Frequently Asked Questions section of the [employer] website at [email or web address] Remember, [employer] is NOT to be used for urgent needs. For medical emergencies, dial 911.

## 2018-02-25 NOTE — ED NOTES
Reviewed DC instructions with patient. Pt verbalized an understanding. Pt instructed to follow up with PCP this week. Pt instructed to double her dose of lasix for the next three days. Pt verbalized an understanding. Pt is awaiting EMS transport back to her home address. Pt signed paper DC instructions; RN placed in scan bin.

## 2018-02-25 NOTE — ED NOTES
Bedside shift change report given to Belkys Dee RN (oncoming nurse) by [facility] CODIE ROCKWELL RN (offgoing nurse). Report included the following information SBAR, ED Summary, Intake/Output, MAR and Recent Results.

## 2018-02-25 NOTE — ED NOTES
Assumed care of patient from [facility]. Pt is on stretcher, in gown, on monitor. Pt has IV access. Monitoring strict I&O's; periwick in place. Awaiting CT results at this time. Will continue to assess.

## 2018-02-26 ENCOUNTER — HOSPITAL ENCOUNTER (INPATIENT)
Age: 58
LOS: 3 days | Discharge: HOME HEALTH | DRG: 194 | End: 2018-03-01
Attending: EMERGENCY MEDICINE | Admitting: INTERNAL MEDICINE
Payer: MEDICAID

## 2018-02-26 ENCOUNTER — APPOINTMENT (OUTPATIENT)
Dept: GENERAL RADIOLOGY | Age: 58
DRG: 194 | End: 2018-02-26
Attending: EMERGENCY MEDICINE
Payer: MEDICAID

## 2018-02-26 DIAGNOSIS — I50.32 DIASTOLIC CHF, CHRONIC (HCC): Primary | ICD-10-CM

## 2018-02-26 DIAGNOSIS — M17.0 PRIMARY OSTEOARTHRITIS OF BOTH KNEES: ICD-10-CM

## 2018-02-26 LAB
ALBUMIN SERPL-MCNC: 3.5 G/DL (ref 3.4–5)
ALBUMIN/GLOB SERPL: 0.9 {RATIO} (ref 0.8–1.7)
ALP SERPL-CCNC: 135 U/L (ref 45–117)
ALT SERPL-CCNC: 14 U/L (ref 13–56)
ANION GAP SERPL CALC-SCNC: 5 MMOL/L (ref 3–18)
AST SERPL-CCNC: 19 U/L (ref 15–37)
ATRIAL RATE: 576 BPM
BASOPHILS # BLD: 0 K/UL (ref 0–0.1)
BASOPHILS NFR BLD: 0 % (ref 0–2)
BILIRUB SERPL-MCNC: 1.2 MG/DL (ref 0.2–1)
BNP SERPL-MCNC: 3768 PG/ML (ref 0–900)
BUN SERPL-MCNC: 12 MG/DL (ref 7–18)
BUN/CREAT SERPL: 14 (ref 12–20)
CALCIUM SERPL-MCNC: 8.4 MG/DL (ref 8.5–10.1)
CALCULATED R AXIS, ECG10: -23 DEGREES
CALCULATED T AXIS, ECG11: -128 DEGREES
CHLORIDE SERPL-SCNC: 101 MMOL/L (ref 100–108)
CK MB CFR SERPL CALC: 0.8 % (ref 0–4)
CK MB SERPL-MCNC: 1.8 NG/ML (ref 5–25)
CK SERPL-CCNC: 238 U/L (ref 26–192)
CO2 SERPL-SCNC: 34 MMOL/L (ref 21–32)
CREAT SERPL-MCNC: 0.87 MG/DL (ref 0.6–1.3)
DIAGNOSIS, 93000: NORMAL
DIFFERENTIAL METHOD BLD: ABNORMAL
EOSINOPHIL # BLD: 0.1 K/UL (ref 0–0.4)
EOSINOPHIL NFR BLD: 3 % (ref 0–5)
ERYTHROCYTE [DISTWIDTH] IN BLOOD BY AUTOMATED COUNT: 18 % (ref 11.6–14.5)
GLOBULIN SER CALC-MCNC: 3.7 G/DL (ref 2–4)
GLUCOSE SERPL-MCNC: 78 MG/DL (ref 74–99)
HCT VFR BLD AUTO: 31 % (ref 35–45)
HGB BLD-MCNC: 8.7 G/DL (ref 12–16)
LYMPHOCYTES # BLD: 0.9 K/UL (ref 0.9–3.6)
LYMPHOCYTES NFR BLD: 17 % (ref 21–52)
MCH RBC QN AUTO: 22.7 PG (ref 24–34)
MCHC RBC AUTO-ENTMCNC: 28.1 G/DL (ref 31–37)
MCV RBC AUTO: 80.9 FL (ref 74–97)
MONOCYTES # BLD: 0.5 K/UL (ref 0.05–1.2)
MONOCYTES NFR BLD: 9 % (ref 3–10)
NEUTS SEG # BLD: 3.7 K/UL (ref 1.8–8)
NEUTS SEG NFR BLD: 71 % (ref 40–73)
PLATELET # BLD AUTO: 208 K/UL (ref 135–420)
PMV BLD AUTO: 10.1 FL (ref 9.2–11.8)
POTASSIUM SERPL-SCNC: 3.9 MMOL/L (ref 3.5–5.5)
PROT SERPL-MCNC: 7.2 G/DL (ref 6.4–8.2)
Q-T INTERVAL, ECG07: 452 MS
QRS DURATION, ECG06: 110 MS
QTC CALCULATION (BEZET), ECG08: 504 MS
RBC # BLD AUTO: 3.83 M/UL (ref 4.2–5.3)
SODIUM SERPL-SCNC: 140 MMOL/L (ref 136–145)
TROPONIN I SERPL-MCNC: 0.02 NG/ML (ref 0–0.04)
VENTRICULAR RATE, ECG03: 75 BPM
WBC # BLD AUTO: 5.2 K/UL (ref 4.6–13.2)

## 2018-02-26 PROCEDURE — 74011250637 HC RX REV CODE- 250/637: Performed by: EMERGENCY MEDICINE

## 2018-02-26 PROCEDURE — 80053 COMPREHEN METABOLIC PANEL: CPT | Performed by: EMERGENCY MEDICINE

## 2018-02-26 PROCEDURE — 83880 ASSAY OF NATRIURETIC PEPTIDE: CPT | Performed by: EMERGENCY MEDICINE

## 2018-02-26 PROCEDURE — 96374 THER/PROPH/DIAG INJ IV PUSH: CPT

## 2018-02-26 PROCEDURE — 93005 ELECTROCARDIOGRAM TRACING: CPT

## 2018-02-26 PROCEDURE — 74011250637 HC RX REV CODE- 250/637: Performed by: INTERNAL MEDICINE

## 2018-02-26 PROCEDURE — 77030038269 HC DRN EXT URIN PURWCK BARD -A

## 2018-02-26 PROCEDURE — 82550 ASSAY OF CK (CPK): CPT | Performed by: INTERNAL MEDICINE

## 2018-02-26 PROCEDURE — 74011250636 HC RX REV CODE- 250/636: Performed by: INTERNAL MEDICINE

## 2018-02-26 PROCEDURE — 65660000000 HC RM CCU STEPDOWN

## 2018-02-26 PROCEDURE — 85025 COMPLETE CBC W/AUTO DIFF WBC: CPT | Performed by: EMERGENCY MEDICINE

## 2018-02-26 PROCEDURE — 74011250636 HC RX REV CODE- 250/636: Performed by: EMERGENCY MEDICINE

## 2018-02-26 PROCEDURE — 82550 ASSAY OF CK (CPK): CPT | Performed by: EMERGENCY MEDICINE

## 2018-02-26 PROCEDURE — 71045 X-RAY EXAM CHEST 1 VIEW: CPT

## 2018-02-26 PROCEDURE — 99285 EMERGENCY DEPT VISIT HI MDM: CPT

## 2018-02-26 RX ORDER — ARIPIPRAZOLE 15 MG/1
15 TABLET ORAL DAILY
Status: DISCONTINUED | OUTPATIENT
Start: 2018-02-27 | End: 2018-03-01 | Stop reason: HOSPADM

## 2018-02-26 RX ORDER — ALBUTEROL SULFATE 0.83 MG/ML
2.5 SOLUTION RESPIRATORY (INHALATION)
Status: DISCONTINUED | OUTPATIENT
Start: 2018-02-26 | End: 2018-03-01 | Stop reason: HOSPADM

## 2018-02-26 RX ORDER — ISOSORBIDE MONONITRATE 30 MG/1
30 TABLET, EXTENDED RELEASE ORAL DAILY
Status: DISCONTINUED | OUTPATIENT
Start: 2018-02-27 | End: 2018-02-27

## 2018-02-26 RX ORDER — POTASSIUM CHLORIDE 750 MG/1
20 TABLET, FILM COATED, EXTENDED RELEASE ORAL DAILY
COMMUNITY
End: 2018-05-10

## 2018-02-26 RX ORDER — WARFARIN SODIUM 5 MG/1
7.5 TABLET ORAL DAILY
Status: ON HOLD | COMMUNITY
End: 2018-08-24

## 2018-02-26 RX ORDER — HYDROCODONE BITARTRATE AND ACETAMINOPHEN 7.5; 325 MG/1; MG/1
1 TABLET ORAL
Status: DISCONTINUED | OUTPATIENT
Start: 2018-02-26 | End: 2018-02-27

## 2018-02-26 RX ORDER — FAMOTIDINE 20 MG/1
20 TABLET, FILM COATED ORAL 2 TIMES DAILY
Status: DISCONTINUED | OUTPATIENT
Start: 2018-02-27 | End: 2018-03-01 | Stop reason: HOSPADM

## 2018-02-26 RX ORDER — FUROSEMIDE 10 MG/ML
60 INJECTION INTRAMUSCULAR; INTRAVENOUS EVERY 12 HOURS
Status: DISCONTINUED | OUTPATIENT
Start: 2018-02-26 | End: 2018-02-27

## 2018-02-26 RX ORDER — IBUPROFEN 800 MG/1
800 TABLET ORAL
COMMUNITY
End: 2018-03-01

## 2018-02-26 RX ORDER — GUAIFENESIN 100 MG/5ML
162 LIQUID (ML) ORAL
Status: COMPLETED | OUTPATIENT
Start: 2018-02-26 | End: 2018-02-26

## 2018-02-26 RX ORDER — LABETALOL HCL 20 MG/4 ML
20 SYRINGE (ML) INTRAVENOUS
Status: COMPLETED | OUTPATIENT
Start: 2018-02-26 | End: 2018-02-26

## 2018-02-26 RX ORDER — LISINOPRIL AND HYDROCHLOROTHIAZIDE 12.5; 2 MG/1; MG/1
1 TABLET ORAL DAILY
Status: DISCONTINUED | OUTPATIENT
Start: 2018-02-27 | End: 2018-03-01 | Stop reason: HOSPADM

## 2018-02-26 RX ORDER — LISINOPRIL 20 MG/1
20 TABLET ORAL DAILY
Status: DISCONTINUED | OUTPATIENT
Start: 2018-02-27 | End: 2018-02-26 | Stop reason: SDUPTHER

## 2018-02-26 RX ORDER — METOPROLOL TARTRATE 25 MG/1
25 TABLET, FILM COATED ORAL 2 TIMES DAILY
Status: DISCONTINUED | OUTPATIENT
Start: 2018-02-27 | End: 2018-03-01 | Stop reason: HOSPADM

## 2018-02-26 RX ORDER — OXYCODONE AND ACETAMINOPHEN 5; 325 MG/1; MG/1
1 TABLET ORAL
Status: COMPLETED | OUTPATIENT
Start: 2018-02-26 | End: 2018-02-26

## 2018-02-26 RX ORDER — LOPERAMIDE HYDROCHLORIDE 2 MG/1
2 CAPSULE ORAL
COMMUNITY
End: 2018-08-30

## 2018-02-26 RX ORDER — SIMVASTATIN 20 MG/1
20 TABLET, FILM COATED ORAL
Status: DISCONTINUED | OUTPATIENT
Start: 2018-02-27 | End: 2018-03-01 | Stop reason: HOSPADM

## 2018-02-26 RX ORDER — OXYCODONE AND ACETAMINOPHEN 5; 325 MG/1; MG/1
1 TABLET ORAL
Status: DISCONTINUED | OUTPATIENT
Start: 2018-02-26 | End: 2018-02-27

## 2018-02-26 RX ORDER — ASPIRIN 325 MG
325 TABLET, DELAYED RELEASE (ENTERIC COATED) ORAL DAILY
Status: DISCONTINUED | OUTPATIENT
Start: 2018-02-27 | End: 2018-03-01

## 2018-02-26 RX ORDER — PAROXETINE HYDROCHLORIDE 20 MG/1
20 TABLET, FILM COATED ORAL DAILY
Status: DISCONTINUED | OUTPATIENT
Start: 2018-02-27 | End: 2018-03-01 | Stop reason: HOSPADM

## 2018-02-26 RX ORDER — RANITIDINE 150 MG/1
150 TABLET, FILM COATED ORAL 2 TIMES DAILY
COMMUNITY
End: 2020-06-09 | Stop reason: ALTCHOICE

## 2018-02-26 RX ORDER — FUROSEMIDE 10 MG/ML
60 INJECTION INTRAMUSCULAR; INTRAVENOUS
Status: COMPLETED | OUTPATIENT
Start: 2018-02-26 | End: 2018-02-26

## 2018-02-26 RX ADMIN — OXYCODONE HYDROCHLORIDE AND ACETAMINOPHEN 1 TABLET: 5; 325 TABLET ORAL at 20:30

## 2018-02-26 RX ADMIN — FUROSEMIDE 60 MG: 10 INJECTION INTRAVENOUS at 14:57

## 2018-02-26 RX ADMIN — LABETALOL 20 MG/4 ML (5 MG/ML) INTRAVENOUS SYRINGE 20 MG: at 20:30

## 2018-02-26 RX ADMIN — ASPIRIN 81 MG 162 MG: 81 TABLET ORAL at 19:24

## 2018-02-26 NOTE — IP AVS SNAPSHOT
[address] Patient: Federico Lara MRN: [id number] A check nimo indicates which time of day the medication should be taken. My Medications START taking these medications Instructions Each Dose to Equal  
 Morning Noon Evening Bedtime  
 diclofenac 1 % Gel Commonly known as:  VOLTAREN Your last dose was: Your next dose is:    
   
   
 Apply 2 g to affected area three (3) times daily as needed for Pain. Gel should be measured and applied using the supplied dosing card. Apply dose (2 gm or 4 gm) to each location. APPLY TO knees and hips 2 g  
    
   
   
   
  
 docusate sodium 100 mg capsule Commonly known as:  Dawood Banerjee Your last dose was: Your next dose is: Take 1 Cap by mouth two (2) times a day for 90 days. 100 mg  
    
   
   
   
  
 multivitamin, tx-iron-ca-min 9 mg iron-400 mcg Tab tablet Commonly known as:  THERA-M w/ IRON Your last dose was: Your next dose is: Take 1 Tab by mouth daily. 1 Tab CHANGE how you take these medications Instructions Each Dose to Equal  
 Morning Noon Evening Bedtime  
 aspirin delayed-release 81 mg tablet Start taking on:  3/2/2018 What changed:  See the new instructions. Your last dose was: Your next dose is: Take 1 Tab by mouth daily. 81 mg CONTINUE taking these medications Instructions Each Dose to Equal  
 Morning Noon Evening Bedtime ABILIFY 15 mg tablet Generic drug:  ARIPiprazole Your last dose was: Your next dose is: Take 15 mg by mouth daily. to obtain from pharmacy 15 mg  
    
   
   
   
  
 furosemide 40 mg tablet Commonly known as:  LASIX Your last dose was: Your next dose is:    
   
   
 40 mg po daily isosorbide mononitrate ER 30 mg tablet Commonly known as:  IMDUR Your last dose was: Your next dose is: TAKE ONE TABLET BY MOUTH EVERY EVENING  
     
   
   
   
  
 loperamide 2 mg capsule Commonly known as:  IMODIUM Your last dose was: Your next dose is: Take 2 mg by mouth three (3) times daily as needed for Diarrhea.  
 2 mg  
    
   
   
   
  
 metoprolol tartrate 50 mg tablet Commonly known as:  LOPRESSOR Your last dose was: Your next dose is: Take 25 mg by mouth two (2) times a day. 25 mg PARoxetine 20 mg tablet Commonly known as:  PAXIL Your last dose was: Your next dose is: Take 1 Tab by mouth daily. 20 mg  
    
   
   
   
  
 potassium chloride SR 10 mEq tablet Commonly known as:  KLOR-CON 10 Your last dose was: Your next dose is: Take 20 mEq by mouth daily. 20 mEq  
    
   
   
   
  
 raNITIdine 150 mg tablet Commonly known as:  ZANTAC Your last dose was: Your next dose is: Take 150 mg by mouth two (2) times a day. 150 mg SYNTHROID 125 mcg tablet Generic drug:  levothyroxine Your last dose was: Your next dose is: Take 125 mcg by mouth Daily (before breakfast). 125 mcg VENTOLIN HFA 90 mcg/actuation inhaler Generic drug:  albuterol Your last dose was: Your next dose is: Take 2 Puffs by inhalation every four (4) hours as needed for Wheezing or Shortness of Breath. 2 Puff  
    
   
   
   
  
 warfarin 5 mg tablet Commonly known as:  COUMADIN Your last dose was: Your next dose is: Take 5 mg by mouth daily. 5 mg ZESTORETIC 20-25 mg per tablet Generic drug:  lisinopril-hydroCHLOROthiazide Your last dose was: Your next dose is: Take  by mouth daily. ZOCOR 40 mg tablet Generic drug:  simvastatin Your last dose was: Your next dose is: Take  by mouth nightly. to obtain from pharmacy STOP taking these medications HYDROcodone-acetaminophen 7.5-325 mg per tablet Commonly known as:  NORCO  
   
  
 ibuprofen 800 mg tablet Commonly known as:  MOTRIN  
   
  
 lisinopril 20 mg tablet Commonly known as:  PRINIVIL, ZESTRIL  
   
  
 oxyCODONE-acetaminophen 5-325 mg per tablet Commonly known as:  PERCOCET Where to Get Your Medications Information on where to get these meds will be given to you by the nurse or doctor. ! Ask your nurse or doctor about these medications  
  aspirin delayed-release 81 mg tablet  
 diclofenac 1 % Gel  
 docusate sodium 100 mg capsule  
 multivitamin, tx-iron-ca-min 9 mg iron-400 mcg Tab tablet

## 2018-02-26 NOTE — ED PROVIDER NOTES
EMERGENCY DEPARTMENT HISTORY AND PHYSICAL EXAM    2:46 PM      Date: 2/26/2018  Patient Name: Hermenia Siemens    History of Presenting Illness     Chief Complaint   Patient presents with    Shortness of Breath         History Provided By: Patient    Chief Complaint: Bilateral leg swelling  Duration: 1 Weeks  Timing:  Acute  Location: Bilateral legs  Quality: N/A  Severity: Moderate  Modifying Factors: None  Associated Symptoms: SOB and difficulty ambulating      Additional History (Context): Hermenia Siemens is a 60 y.o. female with obesity, HTN, a-fib, hypothyroid, schizophrenia and substance abuse who presents to the ED with a chief complaint of acute bilateral leg swelling for the past 1 week with associated symptoms of SOB and difficulty ambulating. Patient states she is not compliant with her Lasix due to difficulty ambulating to get the pills. Patient also states she is experiencing SOB at this time. Patient does not reports any modifying factors. Patient denies any other symptoms or complaints. PCP: Jenna Reyes MD    Current Facility-Administered Medications   Medication Dose Route Frequency Provider Last Rate Last Dose    aspirin chewable tablet 162 mg  162 mg Oral NOW Pranav Leandra Chavez DO         Current Outpatient Prescriptions   Medication Sig Dispense Refill    HYDROcodone-acetaminophen (NORCO) 7.5-325 mg per tablet       metoprolol tartrate (LOPRESSOR) 50 mg tablet Take 25 mg by mouth two (2) times a day.  famotidine (PEPCID) 20 mg tablet Take 20 mg by mouth two (2) times a day.  lisinopril (PRINIVIL, ZESTRIL) 20 mg tablet Take  by mouth daily.  lisinopril-hydroCHLOROthiazide (ZESTORETIC) 20-25 mg per tablet Take  by mouth daily.  oxyCODONE-acetaminophen (PERCOCET) 5-325 mg per tablet Take 1 tablet every 4-6 hours as needed for pain control.   If you were instructed to try over the counter ibuprofen or tylenol, only take the percocet for pain not controlled with the over the counter medication. 12 Tab 0    cpap machine kit by Does Not Apply route.  oxyCODONE (OXAYDO) 7.5 mg TbOr Take 1 tab PO BID severe pain. ONE WEEK SUPPLY 14 Tab 0    Oxygen 2 Each by Nasal route continuous.  VENTOLIN HFA 90 mcg/actuation inhaler Take 2 Puffs by inhalation every four (4) hours as needed for Wheezing or Shortness of Breath. 1 Inhaler 0    lactulose (CHRONULAC) 10 gram/15 mL solution Take 15 mL by mouth two (2) times a day. 1 Bottle 0    furosemide (LASIX) 40 mg tablet 40 mg po daily 30 Tab 0    isosorbide mononitrate ER (IMDUR) 30 mg tablet TAKE ONE TABLET BY MOUTH EVERY EVENING 30 Tab 7    PARoxetine (PAXIL) 20 mg tablet Take 1 Tab by mouth daily. 30 Tab 3    aspirin delayed-release 325 mg tablet TAKE ONE TABLET BY MOUTH ONCE DAILY 30 Tab 6    simvastatin (ZOCOR) 40 mg tablet Take  by mouth nightly. to obtain from pharmacy      aripiprazole (ABILIFY) 15 mg tablet Take 15 mg by mouth daily. to obtain from pharmacy      levothyroxine (SYNTHROID) 125 mcg tablet Take 125 mcg by mouth Daily (before breakfast). Past History     Past Medical History:  Past Medical History:   Diagnosis Date    Atrial fibrillation     CHADS score 1  (-CHF, +HTN, -AGE, -DM, -CVA)    Carpal tunnel syndrome     Chronic pain     Congenital heart disease     presumed pulmonary stenosis s/p repair 1969    Degenerative disc disease     Degenerative joint disease     Dyslipidemia     GERD     H/O echocardiogram 04/2017    EF 60-65%, mild concentric LVH, dilated RV with RV dysfunction, RVSP 54 mmHg, RA E, moderate to severe pulmonic regurgitation.     Hypertension     Hypothyroid     Morbid obesity     Morbid obesity with BMI of 45.0-49.9, adult ([facility] [address] 2/20/2017    Noncompliance     Schizoaffective disorder     Substance abuse     marijuana, crack cocaine       Past Surgical History:  Past Surgical History:   Procedure Laterality Date    HX CARPAL TUNNEL RELEASE      left    HX HYSTERECTOMY  HX KNEE REPLACEMENT      left    HX KNEE REPLACEMENT      right    HX THYROIDECTOMY         Family History:  Family History   Problem Relation Age of Onset    Hypertension Mother     Coronary Artery Disease Mother     Coronary Artery Disease Father     Hypertension Father        Social History:  Social History   Substance Use Topics    Smoking status: Never Smoker    Smokeless tobacco: Never Used    Alcohol use No       Allergies: Allergies   Allergen Reactions    Gabapentin Other (comments)     Unsteady, weakness LEs    Tetanus Vaccines And Toxoid Swelling    Topamax [Topiramate] Other (comments)     weakness         Review of Systems     Review of Systems   Constitutional: Negative. Negative for chills, diaphoresis and fever. HENT: Negative. Negative for congestion, rhinorrhea and sore throat. Eyes: Negative. Negative for pain, discharge and redness. Respiratory: Positive for shortness of breath. Negative for cough, chest tightness and wheezing. Cardiovascular: Positive for leg swelling. Negative for chest pain. Gastrointestinal: Negative. Negative for abdominal pain, constipation, diarrhea, nausea and vomiting. Genitourinary: Negative. Negative for dysuria, flank pain, frequency, hematuria and urgency. Musculoskeletal: Negative. Negative for back pain and neck pain. Skin: Negative. Negative for rash. Neurological: Negative. Negative for syncope, weakness, numbness and headaches. Psychiatric/Behavioral: Negative. All other systems reviewed and are negative. Physical Exam     Visit Vitals    /87    Pulse 62    Temp 98.2 °F (36.8 °C)    Resp 18    SpO2 100%       Physical Exam   Constitutional: She appears well-developed and well-nourished. Non-toxic appearance. She does not have a sickly appearance. She does not appear ill. No distress. HENT:   Head: Normocephalic and atraumatic.    Mouth/Throat: Oropharynx is clear and moist. No oropharyngeal exudate. Eyes: Conjunctivae and EOM are normal. Pupils are equal, round, and reactive to light. No scleral icterus. Neck: Normal range of motion. Neck supple. No hepatojugular reflux and no JVD present. No tracheal deviation present. No thyromegaly present. Cardiovascular: Normal rate, regular rhythm, S1 normal, S2 normal, normal heart sounds, intact distal pulses and normal pulses. Exam reveals no gallop, no S3 and no S4. No murmur heard. Pulses:       Radial pulses are 2+ on the right side, and 2+ on the left side. Dorsalis pedis pulses are 2+ on the right side, and 2+ on the left side. Pulmonary/Chest: Effort normal and breath sounds normal. No respiratory distress. She has no decreased breath sounds. She has no wheezes. She has no rhonchi. She has no rales. Abdominal: Soft. Normal appearance and bowel sounds are normal. She exhibits no distension and no mass. There is no hepatosplenomegaly. There is no tenderness. There is no rigidity, no rebound, no guarding, no CVA tenderness, no tenderness at McBurney's point and negative Anglin's sign. Musculoskeletal: Normal range of motion. She exhibits edema (3+ edema bilateral ). Strength 4/5 throughout    Lymphadenopathy:        Head (right side): No submental, no submandibular, no preauricular and no occipital adenopathy present. Head (left side): No submental, no submandibular, no preauricular and no occipital adenopathy present. She has no cervical adenopathy. Right: No supraclavicular adenopathy present. Left: No supraclavicular adenopathy present. Neurological: She is alert. She has normal strength and normal reflexes. She is not disoriented. No cranial nerve deficit or sensory deficit. Coordination and gait normal. GCS eye subscore is 4. GCS verbal subscore is 5. GCS motor subscore is 6. Grossly intact    Skin: Skin is warm, dry and intact. No rash noted. She is not diaphoretic.    Psychiatric: She has a normal mood and affect. Her speech is normal and behavior is normal. Judgment and thought content normal. Cognition and memory are normal.   Nursing note and vitals reviewed. Diagnostic Study Results     Labs -  Recent Results (from the past 12 hour(s))   EKG, 12 LEAD, INITIAL    Collection Time: 02/26/18  3:17 PM   Result Value Ref Range    Ventricular Rate 75 BPM    Atrial Rate 576 BPM    QRS Duration 110 ms    Q-T Interval 452 ms    QTC Calculation (Bezet) 504 ms    Calculated R Axis -23 degrees    Calculated T Axis -128 degrees    Diagnosis       Atrial fibrillation  Low voltage QRS  Cannot rule out Anterior infarct (cited on or before 24-FEB-2018)  T wave abnormality, consider inferolateral ischemia  Prolonged QT  Abnormal ECG  When compared with ECG of 24-FEB-2018 13:19,  Incomplete right bundle branch block is no longer present  Questionable change in initial forces of Anteroseptal leads  Confirmed by Yue Fox MD, --- ([id number]) on 2/26/2018 4:25:49 PM     CBC WITH AUTOMATED DIFF    Collection Time: 02/26/18  4:30 PM   Result Value Ref Range    WBC 5.2 4.6 - 13.2 K/uL    RBC 3.83 (L) 4.20 - 5.30 M/uL    HGB 8.7 (L) 12.0 - 16.0 g/dL    HCT 31.0 (L) 35.0 - 45.0 %    MCV 80.9 74.0 - 97.0 FL    MCH 22.7 (L) 24.0 - 34.0 PG    MCHC 28.1 (L) 31.0 - 37.0 g/dL    RDW 18.0 (H) 11.6 - 14.5 %    PLATELET 403 976 - 830 K/uL    MPV 10.1 9.2 - 11.8 FL    NEUTROPHILS 71 40 - 73 %    LYMPHOCYTES 17 (L) 21 - 52 %    MONOCYTES 9 3 - 10 %    EOSINOPHILS 3 0 - 5 %    BASOPHILS 0 0 - 2 %    ABS. NEUTROPHILS 3.7 1.8 - 8.0 K/UL    ABS. LYMPHOCYTES 0.9 0.9 - 3.6 K/UL    ABS. MONOCYTES 0.5 0.05 - 1.2 K/UL    ABS. EOSINOPHILS 0.1 0.0 - 0.4 K/UL    ABS.  BASOPHILS 0.0 0.0 - 0.1 K/UL    DF AUTOMATED     METABOLIC PANEL, COMPREHENSIVE    Collection Time: 02/26/18  4:30 PM   Result Value Ref Range    Sodium 140 136 - 145 mmol/L    Potassium 3.9 3.5 - 5.5 mmol/L    Chloride 101 100 - 108 mmol/L    CO2 34 (H) 21 - 32 mmol/L    Anion gap 5 3.0 - 18 mmol/L    Glucose 78 74 - 99 mg/dL    BUN 12 7.0 - 18 MG/DL    Creatinine 0.87 0.6 - 1.3 MG/DL    BUN/Creatinine ratio 14 12 - 20      GFR est AA >60 >60 ml/min/1.73m2    GFR est non-AA >60 >60 ml/min/1.73m2    Calcium 8.4 (L) 8.5 - 10.1 MG/DL    Bilirubin, total 1.2 (H) 0.2 - 1.0 MG/DL    ALT (SGPT) 14 13 - 56 U/L    AST (SGOT) 19 15 - 37 U/L    Alk. phosphatase 135 (H) 45 - 117 U/L    Protein, total 7.2 6.4 - 8.2 g/dL    Albumin 3.5 3.4 - 5.0 g/dL    Globulin 3.7 2.0 - 4.0 g/dL    A-G Ratio 0.9 0.8 - 1.7     CARDIAC PANEL,(CK, CKMB & TROPONIN)    Collection Time: 02/26/18  4:30 PM   Result Value Ref Range     (H) 26 - 192 U/L    CK - MB 1.8 <3.6 ng/ml    CK-MB Index 0.8 0.0 - 4.0 %    Troponin-I, Qt. 0.02 0.0 - 0.045 NG/ML   NT-PRO BNP    Collection Time: 02/26/18  4:30 PM   Result Value Ref Range    NT pro-BNP 3768 (H) 0 - 900 PG/ML       Radiologic Studies -   XR CHEST PORT   IMPRESSION:     1. Cardiomegaly. 2. Right hilar fullness consistent with the pulmonary artery enlargement seen on  CTA chest 2/24/18. 3. No acute infiltrate or edema. Medical Decision Making   I am the first provider for this patient. I reviewed the vital signs, available nursing notes, past medical history, past surgical history, family history and social history. Vital Signs-Reviewed the patient's vital signs. Records Reviewed: Nursing Notes and Old Medical Records (Time of Review: 2:46 PM)    ED Course: Progress Notes, Reevaluation, and Consults:    Labs essentially normal with the exception hemoglobin of 8.7 which is chronic, cardiac enzymes are negative, BNP of 3768. Chest X-Ray showed No acute process and cardiomegaly. EKG showed a-fib with rate of 75 bpm. With no ST elevations or depression and non specific T wave changes. 5:42 PM 2/26/2018    6:46 PM Consult: I discussed care with Dr. Charlette Moore (Hospitalist).  It was a standard discussion including patient history, chief complaint, available diagnostic results, and predicted treatment course. Dr. Clarise Goldberg agrees to admit the patient. Provider Notes (Medical Decision Making):  MDM  Number of Diagnoses or Management Options  Diastolic CHF, chronic ([facility]):   Diagnosis management comments: Shortness of breath etiologies include chronic obstructive pulmonary disease (COPD), acute asthma exacerbation, congestive heart failure, pneumonia, acute bronchitis, pulmonary embolism, upper respiratory infection, cardiac event to include acute coronary syndrome, acute myocardial infarction or a combination of the above (ex URI on top of COPD thus causing respiratory distress). For Hospitalized Patients:    1. Hospitalization Decision Time:  The decision to hospitalize the patient was made by Dr. Sonia Caceres at [facility] on 2/26/2018    2. Aspirin: Aspirin was given    Diagnosis     Clinical Impression:   1. Diastolic CHF, chronic ([facility]        Disposition: Admit    _______________________________    Attestations:  Scribe Attestation     Mina Chakraborty acting as a scribe for and in the presence of Berenice Crowley DO      February 26, 2018 at 2:46 PM       Provider Attestation:      I personally performed the services described in the documentation, reviewed the documentation, as recorded by the scribe in my presence, and it accurately and completely records my words and actions.  February 26, 2018 at 2:46 PM - Berenice Crowley DO    _______________________________

## 2018-02-26 NOTE — ED TRIAGE NOTES
Pt arrived via EMS alert & oriented times 3 with some edema lower extremity & c/o sob. Pt on home O2 at 2L/nc.

## 2018-02-26 NOTE — IP AVS SNAPSHOT
[address] [address] [address] Patient: Sidney Doll MRN: [id number] QIW:[id number] About your hospitalization You were admitted on:  February 26, 2018 You last received care in the:  [address] [place] [address] You were discharged on:  March 1, 2018 Why you were hospitalized Your primary diagnosis was:  Not on File Your diagnoses also included:  Chf (Congestive Heart Failure) (Hcc) Follow-up Information Follow up With Details Comments Contact Info Fady Ahumada MD On 3/8/2018 @ 9:30 am [address] [facility] 103 [facility] [address] 
[phone] Cherelle Cuba MD On 3/23/2018 @ 10:30am 235 [facility] [address] [address] 
[phone] Your Scheduled Appointments Friday March 23, 2018 10:30 AM EDT Follow Up with Cherelle Cuba MD  
[address] ([facility] [address] [place] [address], [address] [address]  
[phone] Thursday April 12, 2018 11:00 AM EDT Follow Up with Mark Melton NP [facility] ([facility]) [place] [phone] Discharge Orders Procedure Order Date Status Priority Quantity Spec Type Associated Dx PROTHROMBIN TIME + INR 03/01/18 0947 Future Routine 1 Blood Comments:  On 3/5/18. Call report to [facility] doctor Diagnosis; Afib Keep INR between 2-3 A check nimo indicates which time of day the medication should be taken. My Medications START taking these medications Instructions Each Dose to Equal  
 Morning Noon Evening Bedtime  
 diclofenac 1 % Gel Commonly known as:  VOLTAREN Your last dose was: Your next dose is:    
   
   
 Apply 2 g to affected area three (3) times daily as needed for Pain.  Gel should be measured and applied using the supplied dosing card. Apply dose (2 gm or 4 gm) to each location. APPLY TO knees and hips 2 g  
    
   
   
   
  
 docusate sodium 100 mg capsule Commonly known as:  Alana Palin Your last dose was: Your next dose is: Take 1 Cap by mouth two (2) times a day for 90 days. 100 mg  
    
   
   
   
  
 multivitamin, tx-iron-ca-min 9 mg iron-400 mcg Tab tablet Commonly known as:  THERA-M w/ IRON Your last dose was: Your next dose is: Take 1 Tab by mouth daily. 1 Tab CHANGE how you take these medications Instructions Each Dose to Equal  
 Morning Noon Evening Bedtime  
 aspirin delayed-release 81 mg tablet Start taking on:  3/2/2018 What changed:  See the new instructions. Your last dose was: Your next dose is: Take 1 Tab by mouth daily. 81 mg CONTINUE taking these medications Instructions Each Dose to Equal  
 Morning Noon Evening Bedtime ABILIFY 15 mg tablet Generic drug:  ARIPiprazole Your last dose was: Your next dose is: Take 15 mg by mouth daily. to obtain from pharmacy 15 mg  
    
   
   
   
  
 furosemide 40 mg tablet Commonly known as:  LASIX Your last dose was: Your next dose is:    
   
   
 40 mg po daily  
     
   
   
   
  
 isosorbide mononitrate ER 30 mg tablet Commonly known as:  IMDUR Your last dose was: Your next dose is: TAKE ONE TABLET BY MOUTH EVERY EVENING  
     
   
   
   
  
 loperamide 2 mg capsule Commonly known as:  IMODIUM Your last dose was: Your next dose is: Take 2 mg by mouth three (3) times daily as needed for Diarrhea.  
 2 mg  
    
   
   
   
  
 metoprolol tartrate 50 mg tablet Commonly known as:  LOPRESSOR Your last dose was: Your next dose is: Take 25 mg by mouth two (2) times a day. 25 mg PARoxetine 20 mg tablet Commonly known as:  PAXIL Your last dose was: Your next dose is: Take 1 Tab by mouth daily. 20 mg  
    
   
   
   
  
 potassium chloride SR 10 mEq tablet Commonly known as:  KLOR-CON 10 Your last dose was: Your next dose is: Take 20 mEq by mouth daily. 20 mEq  
    
   
   
   
  
 raNITIdine 150 mg tablet Commonly known as:  ZANTAC Your last dose was: Your next dose is: Take 150 mg by mouth two (2) times a day. 150 mg SYNTHROID 125 mcg tablet Generic drug:  levothyroxine Your last dose was: Your next dose is: Take 125 mcg by mouth Daily (before breakfast). 125 mcg VENTOLIN HFA 90 mcg/actuation inhaler Generic drug:  albuterol Your last dose was: Your next dose is: Take 2 Puffs by inhalation every four (4) hours as needed for Wheezing or Shortness of Breath. 2 Puff  
    
   
   
   
  
 warfarin 5 mg tablet Commonly known as:  COUMADIN Your last dose was: Your next dose is: Take 5 mg by mouth daily. 5 mg ZESTORETIC 20-25 mg per tablet Generic drug:  lisinopril-hydroCHLOROthiazide Your last dose was: Your next dose is: Take  by mouth daily. ZOCOR 40 mg tablet Generic drug:  simvastatin Your last dose was: Your next dose is: Take  by mouth nightly. to obtain from pharmacy STOP taking these medications HYDROcodone-acetaminophen 7.5-325 mg per tablet Commonly known as:  NORCO  
   
  
 ibuprofen 800 mg tablet Commonly known as:  MOTRIN  
   
  
 lisinopril 20 mg tablet Commonly known as:  Alcario Jason  
   
  
 oxyCODONE-acetaminophen 5-325 mg per tablet Commonly known as:  PERCOCET Where to Get Your Medications Information on where to get these meds will be given to you by the nurse or doctor. ! Ask your nurse or doctor about these medications  
  aspirin delayed-release 81 mg tablet  
 diclofenac 1 % Gel  
 docusate sodium 100 mg capsule  
 multivitamin, tx-iron-ca-min 9 mg iron-400 mcg Tab tablet Discharge Instructions None [employer] Announcement We are excited to announce that we are making your provider's discharge notes available to you in [employer]. You will see these notes when they are completed and signed by the physician that discharged you from your recent hospital stay. If you have any questions or concerns about any information you see in [employer], please call the Health Information Department where you were seen or reach out to your Primary Care Provider for more information about your plan of care. Introducing [facility]! Paul Feliciano introduces [employer] patient portal. Now you can access parts of your medical record, email your doctor's office, and request medication refills online. 1. In your internet browser, go to [email or web address] 2. Click on the First Time User? Click Here link in the Sign In box. You will see the New Member Sign Up page. 3. Enter your [employer] Access Code exactly as it appears below. You will not need to use this code after youve completed the sign-up process. If you do not sign up before the expiration date, you must request a new code. · [employer] Access Code: [email or web address] Expires: 5/30/2018  4:17 PM 
 
4. Enter the last four digits of your Social Security Number (xxxx) and Date of Birth (mm/dd/yyyy) as indicated and click Submit. You will be taken to the next sign-up page. 5. Create a [employer] ID.  This will be your [employer] login ID and cannot be changed, so think of one that is secure and easy to remember. 6. Create a [employer] password. You can change your password at any time. 7. Enter your Password Reset Question and Answer. This can be used at a later time if you forget your password. 8. Enter your e-mail address. You will receive e-mail notification when new information is available in [address] 9. Click Sign Up. You can now view and download portions of your medical record. 10. Click the Download Summary menu link to download a portable copy of your medical information. If you have questions, please visit the Frequently Asked Questions section of the [employer] website. Remember, [employer] is NOT to be used for urgent needs. For medical emergencies, dial 911. Now available from your iPhone and Android! Providers Seen During Your Hospitalization Provider Specialty Primary office phone Qian Hill DO Emergency Medicine [phone] Nehemiah Alcocer MD Internal Medicine [phone] Essie Mack MD Internal Medicine [phone] Your Primary Care Physician (PCP) Primary Care Physician Office Phone Office Fax Nirmal Alt [phone] You are allergic to the following Allergen Reactions Gabapentin Other (comments) Unsteady, weakness LEs Tetanus Vaccines And Toxoid Swelling Topamax (Topiramate) Other (comments)  
 weakness Recent Documentation Height Weight Breastfeeding? BMI OB Status Smoking Status 1.524 m 141.9 kg No 61.09 kg/m2 Hysterectomy Never Smoker Emergency Contacts Name Discharge Info Relation Home Work Mobile Ortiz,Cesilia DISCHARGE CAREGIVER [3] Sister [23] [phone] Tatiana Ortiz DISCHARGE CAREGIVER [3] Daughter [21] [phone] Daniel Carson  Other Relative [6] [phone] Tatiana Ortiz  Child [2] [phone] Patient Belongings The following personal items are in your possession at time of discharge: 
  Dental Appliances: None  Visual Aid: Glasses      Home Medications: Kept at bedside   Jewelry: Ring, Bracelet  Clothing: None    Other Valuables: Cell Phone, Purse  Personal Items Sent to Safe: yes Please provide this summary of care documentation to your next provider. Signatures-by signing, you are acknowledging that this After Visit Summary has been reviewed with you and you have received a copy. Patient Signature:  ____________________________________________________________ Date:  ____________________________________________________________  
  
[facility] Provider Signature:  ____________________________________________________________ Date:  ____________________________________________________________

## 2018-02-27 LAB
ANION GAP SERPL CALC-SCNC: 6 MMOL/L (ref 3–18)
ARTERIAL PATENCY WRIST A: YES
BASE EXCESS BLD CALC-SCNC: 13 MMOL/L
BASOPHILS # BLD: 0 K/UL (ref 0–0.1)
BASOPHILS NFR BLD: 0 % (ref 0–2)
BDY SITE: ABNORMAL
BUN SERPL-MCNC: 11 MG/DL (ref 7–18)
BUN/CREAT SERPL: 11 (ref 12–20)
CALCIUM SERPL-MCNC: 8.2 MG/DL (ref 8.5–10.1)
CHLORIDE SERPL-SCNC: 98 MMOL/L (ref 100–108)
CK MB CFR SERPL CALC: 0.7 % (ref 0–4)
CK MB CFR SERPL CALC: 0.7 % (ref 0–4)
CK MB CFR SERPL CALC: 1 % (ref 0–4)
CK MB SERPL-MCNC: 1.5 NG/ML (ref 5–25)
CK MB SERPL-MCNC: 1.6 NG/ML (ref 5–25)
CK MB SERPL-MCNC: 1.9 NG/ML (ref 5–25)
CK SERPL-CCNC: 185 U/L (ref 26–192)
CK SERPL-CCNC: 203 U/L (ref 26–192)
CK SERPL-CCNC: 218 U/L (ref 26–192)
CO2 SERPL-SCNC: 35 MMOL/L (ref 21–32)
CREAT SERPL-MCNC: 1 MG/DL (ref 0.6–1.3)
DIFFERENTIAL METHOD BLD: ABNORMAL
EOSINOPHIL # BLD: 0.2 K/UL (ref 0–0.4)
EOSINOPHIL NFR BLD: 3 % (ref 0–5)
ERYTHROCYTE [DISTWIDTH] IN BLOOD BY AUTOMATED COUNT: 18.2 % (ref 11.6–14.5)
GAS FLOW.O2 O2 DELIVERY SYS: ABNORMAL L/MIN
GAS FLOW.O2 SETTING OXYMISER: 2.5 L/M
GLUCOSE SERPL-MCNC: 115 MG/DL (ref 74–99)
HCO3 BLD-SCNC: 39.2 MMOL/L (ref 22–26)
HCT VFR BLD AUTO: 30.5 % (ref 35–45)
HGB BLD-MCNC: 8.3 G/DL (ref 12–16)
LYMPHOCYTES # BLD: 1 K/UL (ref 0.9–3.6)
LYMPHOCYTES NFR BLD: 22 % (ref 21–52)
MCH RBC QN AUTO: 22.6 PG (ref 24–34)
MCHC RBC AUTO-ENTMCNC: 27.2 G/DL (ref 31–37)
MCV RBC AUTO: 83.1 FL (ref 74–97)
MONOCYTES # BLD: 0.4 K/UL (ref 0.05–1.2)
MONOCYTES NFR BLD: 9 % (ref 3–10)
NEUTS SEG # BLD: 3.1 K/UL (ref 1.8–8)
NEUTS SEG NFR BLD: 66 % (ref 40–73)
PCO2 BLD: 76.3 MMHG (ref 35–45)
PH BLD: 7.32 [PH] (ref 7.35–7.45)
PLATELET # BLD AUTO: 209 K/UL (ref 135–420)
PMV BLD AUTO: 10 FL (ref 9.2–11.8)
PO2 BLD: 92 MMHG (ref 80–100)
POTASSIUM SERPL-SCNC: 3.7 MMOL/L (ref 3.5–5.5)
RBC # BLD AUTO: 3.67 M/UL (ref 4.2–5.3)
SAO2 % BLD: 96 % (ref 92–97)
SERVICE CMNT-IMP: ABNORMAL
SODIUM SERPL-SCNC: 139 MMOL/L (ref 136–145)
SPECIMEN TYPE: ABNORMAL
T4 FREE SERPL-MCNC: 1.5 NG/DL (ref 0.7–1.5)
TROPONIN I SERPL-MCNC: 0.02 NG/ML (ref 0–0.04)
TROPONIN I SERPL-MCNC: 0.02 NG/ML (ref 0–0.04)
TROPONIN I SERPL-MCNC: <0.02 NG/ML (ref 0–0.04)
TSH SERPL DL<=0.05 MIU/L-ACNC: 3.63 UIU/ML (ref 0.36–3.74)
WBC # BLD AUTO: 4.6 K/UL (ref 4.6–13.2)

## 2018-02-27 PROCEDURE — 74011250637 HC RX REV CODE- 250/637: Performed by: INTERNAL MEDICINE

## 2018-02-27 PROCEDURE — 80048 BASIC METABOLIC PNL TOTAL CA: CPT | Performed by: INTERNAL MEDICINE

## 2018-02-27 PROCEDURE — 85025 COMPLETE CBC W/AUTO DIFF WBC: CPT | Performed by: INTERNAL MEDICINE

## 2018-02-27 PROCEDURE — 77030027138 HC INCENT SPIROMETER -A

## 2018-02-27 PROCEDURE — 74011250636 HC RX REV CODE- 250/636: Performed by: INTERNAL MEDICINE

## 2018-02-27 PROCEDURE — 84439 ASSAY OF FREE THYROXINE: CPT | Performed by: INTERNAL MEDICINE

## 2018-02-27 PROCEDURE — 36415 COLL VENOUS BLD VENIPUNCTURE: CPT | Performed by: INTERNAL MEDICINE

## 2018-02-27 PROCEDURE — 36600 WITHDRAWAL OF ARTERIAL BLOOD: CPT

## 2018-02-27 PROCEDURE — 65660000000 HC RM CCU STEPDOWN

## 2018-02-27 PROCEDURE — 93970 EXTREMITY STUDY: CPT

## 2018-02-27 PROCEDURE — 84443 ASSAY THYROID STIM HORMONE: CPT | Performed by: INTERNAL MEDICINE

## 2018-02-27 PROCEDURE — 94660 CPAP INITIATION&MGMT: CPT

## 2018-02-27 PROCEDURE — 82803 BLOOD GASES ANY COMBINATION: CPT

## 2018-02-27 PROCEDURE — 93306 TTE W/DOPPLER COMPLETE: CPT

## 2018-02-27 RX ORDER — OXYCODONE AND ACETAMINOPHEN 5; 325 MG/1; MG/1
1 TABLET ORAL
Status: DISCONTINUED | OUTPATIENT
Start: 2018-02-27 | End: 2018-03-01

## 2018-02-27 RX ORDER — ACETAMINOPHEN 325 MG/1
650 TABLET ORAL
Status: DISCONTINUED | OUTPATIENT
Start: 2018-02-27 | End: 2018-03-01 | Stop reason: HOSPADM

## 2018-02-27 RX ORDER — FUROSEMIDE 10 MG/ML
40 INJECTION INTRAMUSCULAR; INTRAVENOUS EVERY 12 HOURS
Status: COMPLETED | OUTPATIENT
Start: 2018-02-27 | End: 2018-02-28

## 2018-02-27 RX ADMIN — SIMVASTATIN 20 MG: 20 TABLET, FILM COATED ORAL at 21:20

## 2018-02-27 RX ADMIN — PAROXETINE 20 MG: 20 TABLET, FILM COATED ORAL at 08:20

## 2018-02-27 RX ADMIN — FAMOTIDINE 20 MG: 20 TABLET, FILM COATED ORAL at 08:20

## 2018-02-27 RX ADMIN — LACTULOSE 10 G: 20 SOLUTION ORAL at 08:20

## 2018-02-27 RX ADMIN — FAMOTIDINE 20 MG: 20 TABLET, FILM COATED ORAL at 17:01

## 2018-02-27 RX ADMIN — FUROSEMIDE 60 MG: 10 INJECTION, SOLUTION INTRAVENOUS at 03:08

## 2018-02-27 RX ADMIN — OXYCODONE HYDROCHLORIDE AND ACETAMINOPHEN 1 TABLET: 5; 325 TABLET ORAL at 17:06

## 2018-02-27 RX ADMIN — LEVOTHYROXINE SODIUM 125 MCG: 75 TABLET ORAL at 08:20

## 2018-02-27 RX ADMIN — SIMVASTATIN 20 MG: 20 TABLET, FILM COATED ORAL at 00:11

## 2018-02-27 RX ADMIN — FUROSEMIDE 40 MG: 10 INJECTION, SOLUTION INTRAMUSCULAR; INTRAVENOUS at 16:59

## 2018-02-27 RX ADMIN — LACTULOSE 10 G: 20 SOLUTION ORAL at 17:01

## 2018-02-27 RX ADMIN — ASPIRIN 325 MG: 325 TABLET, DELAYED RELEASE ORAL at 08:20

## 2018-02-27 RX ADMIN — OXYCODONE HYDROCHLORIDE AND ACETAMINOPHEN 1 TABLET: 5; 325 TABLET ORAL at 03:13

## 2018-02-27 RX ADMIN — METOPROLOL TARTRATE 25 MG: 25 TABLET ORAL at 17:01

## 2018-02-27 RX ADMIN — ARIPIPRAZOLE 15 MG: 15 TABLET ORAL at 08:20

## 2018-02-27 NOTE — PROGRESS NOTES
Problem: Falls - Risk of  Goal: *Absence of Falls  Document Piyush Fall Risk and appropriate interventions in the flowsheet.    Outcome: Progressing Towards Goal  Fall Risk Interventions:            Medication Interventions: Assess postural VS orthostatic hypotension, Bed/chair exit alarm, Evaluate medications/consider consulting pharmacy, Patient to call before getting OOB, Teach patient to arise slowly    Elimination Interventions: Bed/chair exit alarm, Call light in reach, Patient to call for help with toileting needs, Toileting schedule/hourly rounds    History of Falls Interventions: Bed/chair exit alarm, Consult care management for discharge planning, Door open when patient unattended, Evaluate medications/consider consulting pharmacy

## 2018-02-27 NOTE — H&P
History & Physical    Patient: Tanvi Villalba MRN: [id number]  CSN: [id number]    YOB: 1960  Age: 60 y.o. Sex: female      DOA: 2/26/2018    Chief Complaint:   Chief Complaint   Patient presents with    Shortness of Breath          HPI:     Tanvi Villalba is a 60 y.o.  female who has hx of Congenital Heart Disease   ,Pulmonary HTN , Atrial Fibrillation presents with 2 week history of worsening dyspnea and worsening edema legs, abdomen and hand; using lasix with no I improvement has sleep apnea not using machine  In ER 2 days ago CTA no Pulmonary edema but anasarca and hilar enlargement /  In ER given Lasix 60mg Iv with no improvement of dyspnea and minimal diuresis       Past Medical History:   Diagnosis Date    Atrial fibrillation     CHADS score 1  (-CHF, +HTN, -AGE, -DM, -CVA)    Carpal tunnel syndrome     Chronic pain     Congenital heart disease     presumed pulmonary stenosis s/p repair 1969    Degenerative disc disease     Degenerative joint disease     Dyslipidemia     GERD     H/O echocardiogram 04/2017    EF 60-65%, mild concentric LVH, dilated RV with RV dysfunction, RVSP 54 mmHg, RA E, moderate to severe pulmonic regurgitation.     Hypertension     Hypothyroid     Morbid obesity     Morbid obesity with BMI of 45.0-49.9, adult ([facility] [address] 2/20/2017    Noncompliance     Schizoaffective disorder     Substance abuse     marijuana, crack cocaine       Past Surgical History:   Procedure Laterality Date    HX CARPAL TUNNEL RELEASE      left    HX HYSTERECTOMY      HX KNEE REPLACEMENT      left    HX KNEE REPLACEMENT      right    HX THYROIDECTOMY         Family History   Problem Relation Age of Onset    Hypertension Mother     Coronary Artery Disease Mother     Coronary Artery Disease Father     Hypertension Father        Social History     Social History    Marital status: SINGLE     Spouse name: N/A    Number of children: N/A    Years of education: N/A Social History Main Topics    Smoking status: Never Smoker    Smokeless tobacco: Never Used    Alcohol use No    Drug use: No    Sexual activity: Yes     Birth control/ protection: Surgical     Other Topics Concern    None     Social History Narrative       Prior to Admission medications    Medication Sig Start Date End Date Taking? Authorizing Provider   HYDROcodone-acetaminophen [facility]) 7.5-325 mg per tablet  11/27/17   Historical Provider   metoprolol tartrate (LOPRESSOR) 50 mg tablet Take 25 mg by mouth two (2) times a day. 11/27/17   Historical Provider   famotidine (PEPCID) 20 mg tablet Take 20 mg by mouth two (2) times a day. Historical Provider   lisinopril (PRINIVIL, ZESTRIL) 20 mg tablet Take  by mouth daily. Historical Provider   lisinopril-hydroCHLOROthiazide (ZESTORETIC) 20-25 mg per tablet Take  by mouth daily. Historical Provider   oxyCODONE-acetaminophen (PERCOCET) 5-325 mg per tablet Take 1 tablet every 4-6 hours as needed for pain control. If you were instructed to try over the counter ibuprofen or tylenol, only take the percocet for pain not controlled with the over the counter medication. 9/10/17   Verdell Rinne, PA   cpap machine kit by Does Not Apply route. Historical Provider   oxyCODONE (OXAYDO) 7.5 mg TbOr Take 1 tab PO BID severe pain. ONE WEEK SUPPLY 5/24/17   Eileen Everett MD   Oxygen 2 Each by Nasal route continuous. Historical Provider   VENTOLIN HFA 90 mcg/actuation inhaler Take 2 Puffs by inhalation every four (4) hours as needed for Wheezing or Shortness of Breath. 4/26/17   Gatito Poon MD   lactulose (CHRONULAC) 10 gram/15 mL solution Take 15 mL by mouth two (2) times a day.  4/26/17   Gatito Poon MD   furosemide (LASIX) 40 mg tablet 40 mg po daily 4/26/17   Gatito Poon MD   isosorbide mononitrate ER (IMDUR) 30 mg tablet TAKE ONE TABLET BY MOUTH EVERY EVENING 3/30/17   Shayy Oleary NP   PARoxetine (PAXIL) 20 mg tablet Take 1 Tab by mouth daily. 14   MD Mario   aspirin delayed-release 325 mg tablet TAKE ONE TABLET BY MOUTH ONCE DAILY 13   Nathalie Ballard MD   simvastatin (ZOCOR) 40 mg tablet Take  by mouth nightly. to obtain from pharmacy    Historical Provider   aripiprazole (ABILIFY) 15 mg tablet Take 15 mg by mouth daily. to obtain from pharmacy    Historical Provider   levothyroxine (SYNTHROID) 125 mcg tablet Take 125 mcg by mouth Daily (before breakfast). Historical Provider       Allergies   Allergen Reactions    Gabapentin Other (comments)     Unsteady, weakness LEs    Tetanus Vaccines And Toxoid Swelling    Topamax [Topiramate] Other (comments)     weakness         Review of Systems  GENERAL: Patient alert, awake and oriented times 3, able to communicate full sentences and not in distress. HEENT: No change in vision, no earache, tinnitus, sore throat or sinus congestion. NECK: No pain or stiffness. PULMONARY: +shortness of breath, +cough or wheeze. Cardiovascular: no pnd + orthopnea, no CP  GASTROINTESTINAL: No abdominal pain, nausea, vomiting or diarrhea, melena or bright red blood per rectum. GENITOURINARY: No urinary frequency, urgency, hesitancy or dysuria. MUSCULOSKELETAL: has  joint and  muscle pain, no back pain, no recent trauma. Left knee replacement   DERMATOLOGIC: No rash, no itching, no lesions. ENDOCRINE: No polyuria, polydipsia, no heat or cold intolerance+recent change in weight. Gained fluid HEMATOLOGICAL: No anemia or easy bruising or bleeding. NEUROLOGIC: No headache, seizures, numbness, tingling + weakness. Physical Exam:     Physical Exam:  Visit Vitals    BP (!) 200/96    Pulse 74    Temp 98.2 °F (36.8 °C)    Resp 20    SpO2 96%    O2 Flow Rate (L/min): 3 l/min O2 Device: Room air    Temp (24hrs), Av.2 °F (36.8 °C), Min:98.2 °F (36.8 °C), Max:98.2 °F (36.8 °C)             General:  Alert, cooperative, no distress, appears stated age. Head: Normocephalic, without obvious abnormality, atraumatic. Eyes:  Conjunctivae/corneas clear. PERRL, EOMs intact. Nose: Nares normal. No drainage or sinus tenderness. Neck: Supple, symmetrical, trachea midline, no adenopathy, thyroid: no enlargement, no carotid bruit and no JVD. Lungs:   Clear to auscultation bilaterally. Diffuse exp wheeze    Heart:  Irregularly irregular rate and rhythm, S1, S2 normal.     Abdomen: Soft, non-tender. Bowel sounds normal.    Extremities: Extremities normal, atraumatic, no cyanosis +2 edema. Pulses: 2+ and symmetric all extremities. Skin:  No rashes or lesions   Neurologic: AAOx3, No focal motor or sensory deficit. CT Results  (Last 48 hours)    None        Labs Reviewed: All lab results for the last 24 hours reviewed.    and EKG    Procedures/imaging: see electronic medical records for all procedures/Xrays and details which were not copied into this note but were reviewed prior to creation of Plan  Recent Results (from the past 24 hour(s))   EKG, 12 LEAD, INITIAL    Collection Time: 02/26/18  3:17 PM   Result Value Ref Range    Ventricular Rate 75 BPM    Atrial Rate 576 BPM    QRS Duration 110 ms    Q-T Interval 452 ms    QTC Calculation (Bezet) 504 ms    Calculated R Axis -23 degrees    Calculated T Axis -128 degrees    Diagnosis       Atrial fibrillation  Low voltage QRS  Cannot rule out Anterior infarct (cited on or before 24-FEB-2018)  T wave abnormality, consider inferolateral ischemia  Prolonged QT  Abnormal ECG  When compared with ECG of 24-FEB-2018 13:19,  Incomplete right bundle branch block is no longer present  Questionable change in initial forces of Anteroseptal leads  Confirmed by Tan Duarte MD, --- ([id number]) on 2/26/2018 4:25:49 PM     CBC WITH AUTOMATED DIFF    Collection Time: 02/26/18  4:30 PM   Result Value Ref Range    WBC 5.2 4.6 - 13.2 K/uL    RBC 3.83 (L) 4.20 - 5.30 M/uL    HGB 8.7 (L) 12.0 - 16.0 g/dL    HCT 31.0 (L) 35.0 - 45.0 %    MCV 80.9 74.0 - 97.0 FL    MCH 22.7 (L) 24.0 - 34.0 PG    MCHC 28.1 (L) 31.0 - 37.0 g/dL    RDW 18.0 (H) 11.6 - 14.5 %    PLATELET 559 819 - 403 K/uL    MPV 10.1 9.2 - 11.8 FL    NEUTROPHILS 71 40 - 73 %    LYMPHOCYTES 17 (L) 21 - 52 %    MONOCYTES 9 3 - 10 %    EOSINOPHILS 3 0 - 5 %    BASOPHILS 0 0 - 2 %    ABS. NEUTROPHILS 3.7 1.8 - 8.0 K/UL    ABS. LYMPHOCYTES 0.9 0.9 - 3.6 K/UL    ABS. MONOCYTES 0.5 0.05 - 1.2 K/UL    ABS. EOSINOPHILS 0.1 0.0 - 0.4 K/UL    ABS. BASOPHILS 0.0 0.0 - 0.1 K/UL    DF AUTOMATED     METABOLIC PANEL, COMPREHENSIVE    Collection Time: 02/26/18  4:30 PM   Result Value Ref Range    Sodium 140 136 - 145 mmol/L    Potassium 3.9 3.5 - 5.5 mmol/L    Chloride 101 100 - 108 mmol/L    CO2 34 (H) 21 - 32 mmol/L    Anion gap 5 3.0 - 18 mmol/L    Glucose 78 74 - 99 mg/dL    BUN 12 7.0 - 18 MG/DL    Creatinine 0.87 0.6 - 1.3 MG/DL    BUN/Creatinine ratio 14 12 - 20      GFR est AA >60 >60 ml/min/1.73m2    GFR est non-AA >60 >60 ml/min/1.73m2    Calcium 8.4 (L) 8.5 - 10.1 MG/DL    Bilirubin, total 1.2 (H) 0.2 - 1.0 MG/DL    ALT (SGPT) 14 13 - 56 U/L    AST (SGOT) 19 15 - 37 U/L    Alk.  phosphatase 135 (H) 45 - 117 U/L    Protein, total 7.2 6.4 - 8.2 g/dL    Albumin 3.5 3.4 - 5.0 g/dL    Globulin 3.7 2.0 - 4.0 g/dL    A-G Ratio 0.9 0.8 - 1.7     CARDIAC PANEL,(CK, CKMB & TROPONIN)    Collection Time: 02/26/18  4:30 PM   Result Value Ref Range     (H) 26 - 192 U/L    CK - MB 1.8 <3.6 ng/ml    CK-MB Index 0.8 0.0 - 4.0 %    Troponin-I, Qt. 0.02 0.0 - 0.045 NG/ML   NT-PRO BNP    Collection Time: 02/26/18  4:30 PM   Result Value Ref Range    NT pro-BNP 3768 (H) 0 - 900 PG/ML       Assessment/Plan     Active Problems:    CHF (congestive heart failure) ([facility]) (4/18/2017)  IV lasix/check echo  Cont lisinopril   Check cardiac enzymes     Atrial Fibrillation   With CHD  Consider AC not started   Cardiology consult called in   Lopressor for rate controll     Hypertensive Urgency   Labetolol prn  Sleep Apnea  RT eval for CPAP non compliant   Morbid obesity          DVT/GI Prophylaxis: Hep SQ    Discussed with patient at bedside about hospital admission and my plan care, who understood and agree with my plan care.     Nemo Felder MD  2/26/2018 8:39 PM

## 2018-02-27 NOTE — PROGRESS NOTES
SUBJECTIVE:    Lying in bed in NAD. No chest or abdominal pain. SOB better. No cough. Lives with daughter and has 2 lpm oxygen and CPAP at home. Knows where she is and her . States she has not walked for 3-4 months since knee surgery. No dizziness. OBJECTIVE:    BP 93/56 (BP 1 Location: Right arm, BP Patient Position: At rest)  Pulse 70  Temp 97.1 °F (36.2 °C)  Resp 18  Ht 5' (1.524 m)  Wt 151.5 kg (333 lb 14.4 oz)  SpO2 96% Comment: 3 lit  Breastfeeding? No  BMI 65.21 kg/m2     General: NAD, ORIENTED X 3  CVS: IRIR  RS: Diminished BS in bases, no wheezes  GI: NT, BS +  Extremities: pedal edema    ASSESSMENT:    1. Acute on chronic respiratory distress multifactorial in setting of below. 2. Acute on chronic diastolic heart failure. EF 60-65% . 3. Hypertension  4. Pulmonary hypertension AND Obstructive sleep apnea, non compliant with CPAP machine. 5. Obesity with likely obesity ventilation syndrome. 6. Persistent atrial fibrillation. 7. Congenital heart disease, status post unknown repair as a child.    8. Dyslipidemia. 9. H/o polysubstance abuse. 10. H/o schizoaffective disorder. 11. H/o noncompliance.   12. Hypothyroidism     PLAN:    Reduce Lasix  Check doppler  Full code  Cardiology to follow  D/w nursing to obtain ABG  Reduce Percocet  Check thyroid panel      CMP:   Lab Results   Component Value Date/Time     2018 03:06 AM    K 3.7 2018 03:06 AM    CL 98 (L) 2018 03:06 AM    CO2 35 (H) 2018 03:06 AM    AGAP 6 2018 03:06 AM     (H) 2018 03:06 AM    BUN 11 2018 03:06 AM    CREA 1.00 2018 03:06 AM    GFRAA >60 2018 03:06 AM    GFRNA 57 (L) 2018 03:06 AM    CA 8.2 (L) 2018 03:06 AM    ALB 3.5 2018 04:30 PM    TP 7.2 2018 04:30 PM    GLOB 3.7 2018 04:30 PM    AGRAT 0.9 2018 04:30 PM    SGOT 19 2018 04:30 PM    ALT 14 2018 04:30 PM     CBC:   Lab Results   Component Value Date/Time    WBC 4.6 02/27/2018 03:06 AM    HGB 8.3 (L) 02/27/2018 03:06 AM    HCT 30.5 (L) 02/27/2018 03:06 AM     02/27/2018 03:06 AM

## 2018-02-27 NOTE — ED NOTES
Patent A/O x 4, patient to be held in ED until elevated blood pressure is treated. Hospitalist paged, awaiting return call.  Patient states she's also having pain to right hip, will inform hospitalist.

## 2018-02-27 NOTE — ED NOTES
Patient complaining of pain to left leg, requesting more medication. Patient made aware she's not due for another dose of percocet until after midnight based on frequency of medication. Patient verbalized understanding.

## 2018-02-27 NOTE — ROUTINE PROCESS
Bedside and Verbal shift change report given to kristopher nava (oncoming nurse) by Rosalie Faria RN (offgoing nurse). Report included the following information SBAR, Kardex, MAR and Recent Results. SITUATION:    Code Status: Full Code   Reason for Admission: CHF (congestive heart failure) ([facility]    [facility] day: 1   Problem List:       Hospital Problems  Date Reviewed: 11/28/2017          Codes Class Noted POA    CHF (congestive heart failure) ([facility] ICD-10-CM: I50.9  ICD-9-CM: 428.0  4/18/2017 Unknown              BACKGROUND:    Past Medical History:   Past Medical History:   Diagnosis Date    Atrial fibrillation     CHADS score 1  (-CHF, +HTN, -AGE, -DM, -CVA)    Carpal tunnel syndrome     Chronic pain     Congenital heart disease     presumed pulmonary stenosis s/p repair 1969    Degenerative disc disease     Degenerative joint disease     Dyslipidemia     GERD     H/O echocardiogram 04/2017    EF 60-65%, mild concentric LVH, dilated RV with RV dysfunction, RVSP 54 mmHg, RA E, moderate to severe pulmonic regurgitation.     Hypertension     Hypothyroid     Morbid obesity     Morbid obesity with BMI of 45.0-49.9, adult ([facility] 2/20/2017    Noncompliance     Schizoaffective disorder     Substance abuse     marijuana, crack cocaine         Patient taking anticoagulants yes     ASSESSMENT:    Changes in Assessment Throughout Shift: none     Patient has Central Line: no Reasons if yes:    Patient has Shaikh Cath: no Reasons if yes:       Last Vitals:     Vitals:    02/27/18 0531 02/27/18 0738 02/27/18 1011 02/27/18 1136   BP:  93/56  146/80   Pulse:  70  70   Resp:  18  20   Temp:  97.1 °F (36.2 °C)  97.1 °F (36.2 °C)   SpO2:  92% 96% 95%   Weight: 151.5 kg (333 lb 14.4 oz)      Height:            IV and DRAINS (will only show if present)   Peripheral IV 02/26/18 Left External jugular-Site Assessment: Clean, dry, & intact     WOUND (if present)   Wound Type:  none   Dressing present Dressing Present : No   Wound Concerns/Notes:  none     PAIN    Pain Assessment    Pain Intensity 1: 0 (02/27/18 1800)    Pain Location 1: Hip    Pain Intervention(s) 1: Medication (see MAR)    Patient Stated Pain Goal: 0  o Interventions for Pain:  med  o Intervention effective: yes  o Time of last intervention: see mar   o Reassessment Completed: yes      Last 3 Weights:  Last 3 Recorded Weights in this Encounter    02/26/18 2239 02/27/18 0531   Weight: 151.5 kg (333 lb 14.4 oz) 151.5 kg (333 lb 14.4 oz)     Weight change:      INTAKE/OUPUT    Current Shift: 02/27 0701 - 02/27 1900  In: -   Out: 1600 [Urine:1600]    Last three shifts: 02/25 1901 - 02/27 0700  In: 290 [P.O.:290]  Out: 2000 [Urine:2000]     LAB RESULTS     Recent Labs      02/27/18   0306  02/26/18   1630   WBC  4.6  5.2   HGB  8.3*  8.7*   HCT  30.5*  31.0*   PLT  209  208        Recent Labs      02/27/18   0306  02/26/18   1630   NA  139  140   K  3.7  3.9   GLU  115*  78   BUN  11  12   CREA  1.00  0.87   CA  8.2*  8.4*       RECOMMENDATIONS AND DISCHARGE PLANNING     1. Pending tests/procedures/ Plan of Care or Other Needs:      2. Discharge plan for patient and Needs/Barriers:     3. Estimated Discharge Date: tbd Posted on Whiteboard in [facility] [place]: yes      4. The patient's care plan was reviewed with the oncoming nurse. \"HEALS\" SAFETY CHECK      Fall Risk    Total Score: 3    Safety Measures: Safety Measures: Bed/Chair-Wheels locked    A safety check occurred in the patient's room between off going nurse and oncoming nurse listed above.     The safety check included the below items  Area Items   H  High Alert Medications - Verify all high alert medication drips (heparin, PCA, etc.)   E  Equipment - Suction is set up for ALL patients (with yanker)  - Red plugs utilized for all equipment (IV pumps, etc.)  - WOWs wiped down at end of shift.  - Room stocked with oxygen, suction, and other unit-specific supplies   A  Alarms - Bed alarm is set for fall risk patients  - Ensure chair alarm is in place and activated if patient is up in a chair   L  Lines - Check IV for any infiltration  - Shaikh bag is empty if patient has a Shaikh   - Tubing and IV bags are labeled   S  Safety   - Room is clean, patient is clean, and equipment is clean. - Hallways are clear from equipment besides carts. - Fall bracelet on for fall risk patients  - Ensure room is clear and free of clutter  - Suction is set up for ALL patients (with riannaker)  - Hallways are clear from equipment besides carts.    - Isolation precautions followed, supplies available outside room, sign posted     Cristhian Montana RN

## 2018-02-27 NOTE — CONSULTS
[facility]  Pulmonary, Critical Care, and Sleep Medicine    Initial Patient Consult    Name: Laura Parrish MRN: [id number]   : 1960 Hospital: [address] [place] [address]    Date: 2018        IMPRESSION:   · Acute on chronic hypoxic hypercapnic respiratory failure due to below  · Severe Complex sleep apnea with hypoxemia solo in the 70's, only partially resolved with  BIPAP titration  · Obesity hypoventilation syndrome  · Pulmonary HTN PASP on Echo 54 mm Hg, likely WHO Grp 3, less likely PAH due to rheumatoid arthritis  · History of congenital heart disease S/p surgery, unknown contribution to pulmonary HTN   · Morbid obesity  · Substance abuse  · PFT with isolated reduction in DLCO likely related to obesity and pulmonary HTN. Doubt early interstitial lung disease as CT chest from 2018 does not suggest this  · Schizoaffective disorder  · Hypothyroidism  · Atrial fibrillation      RECOMMENDATIONS:   · Pt is on CPAP but with complex sleep disorder above, and poor response on titration to CPAP, doubt that this would help much at this time. Pt will need dedicated BIPAP titration as outpatient. In the meantime would place on max BIPAP with back up rate. · Caution with overdiuresis as pt may be preload dependent  · Continue daytime supplemental O2, titrate to maintain saturation greater than 90%  · Poor compliance and Psych disorder additional barriers to obtaining appropriate care  · Discussed need for healthy weight and lifestyle with pt although I have doubts about short or long term adherence to this as well  · Discussed plan with pt. · Will follow with you. Thank you for consulting     Subjective: This patient has been seen and evaluated at the request of Dr. Abelino Quarles for pulmonary HTN and acute on chronic respiratory failure. Patient is a 60 y.o. female non smoker who presented to the ED with 2 weeks of worsening dyspnea and pedal edema.  Pt used Lasix without response. Records review show pt with severe complex sleep apnea with hypoxemia. During a split night study, pt had titration where she did not respond to CPAP, so BIPAP was titrated up to 25/21 with resolution of hypoxia but without resolution of apneas. Dedicated BIPAP titration was recommended however pt has not been able to do this as yet. Pt denies chest pain, fever, chills, cough with phlegm or hemoptysis. She denies sick contacts. Past Medical History:   Diagnosis Date    Atrial fibrillation     CHADS score 1  (-CHF, +HTN, -AGE, -DM, -CVA)    Carpal tunnel syndrome     Chronic pain     Congenital heart disease     presumed pulmonary stenosis s/p repair 1969    Degenerative disc disease     Degenerative joint disease     Dyslipidemia     GERD     H/O echocardiogram 04/2017    EF 60-65%, mild concentric LVH, dilated RV with RV dysfunction, RVSP 54 mmHg, RA E, moderate to severe pulmonic regurgitation.  Hypertension     Hypothyroid     Morbid obesity     Morbid obesity with BMI of 45.0-49.9, adult ([facility] [address] 2/20/2017    Noncompliance     Schizoaffective disorder     Substance abuse     marijuana, crack cocaine      Past Surgical History:   Procedure Laterality Date    HX CARPAL TUNNEL RELEASE      left    HX HYSTERECTOMY      HX KNEE REPLACEMENT      left    HX KNEE REPLACEMENT      right    HX THYROIDECTOMY        Prior to Admission medications    Medication Sig Start Date End Date Taking? Authorizing Provider   metoprolol tartrate (LOPRESSOR) 50 mg tablet Take 25 mg by mouth two (2) times a day. 11/27/17  Yes Historical Provider   lisinopril-hydroCHLOROthiazide (ZESTORETIC) 20-25 mg per tablet Take  by mouth daily. Yes Historical Provider   cpap machine kit by Does Not Apply route.    Yes Historical Provider   furosemide (LASIX) 40 mg tablet 40 mg po daily 4/26/17  Yes Gordon Conn MD   isosorbide mononitrate ER (IMDUR) 30 mg tablet TAKE ONE TABLET BY MOUTH EVERY EVENING 3/30/17  Yes [facility] KANIKA Walsh   PARoxetine (PAXIL) 20 mg tablet Take 1 Tab by mouth daily. 1/21/14  Yes Kacie Garcia MD   aspirin delayed-release 325 mg tablet TAKE ONE TABLET BY MOUTH ONCE DAILY 8/26/13  Yes Casimiro Acosta MD   simvastatin (ZOCOR) 40 mg tablet Take  by mouth nightly. to obtain from pharmacy   Yes Historical Provider   aripiprazole (ABILIFY) 15 mg tablet Take 15 mg by mouth daily. to obtain from pharmacy   Yes Historical Provider   levothyroxine (SYNTHROID) 125 mcg tablet Take 125 mcg by mouth Daily (before breakfast). Yes Historical Provider   loperamide (IMODIUM) 2 mg capsule Take 2 mg by mouth three (3) times daily as needed for Diarrhea. Historical Provider   potassium chloride SR (KLOR-CON 10) 10 mEq tablet Take 20 mEq by mouth daily. Historical Provider   raNITIdine (ZANTAC) 150 mg tablet Take 150 mg by mouth two (2) times a day. Historical Provider   warfarin (COUMADIN) 5 mg tablet Take 5 mg by mouth daily. Historical Provider   ibuprofen (MOTRIN) 800 mg tablet Take 800 mg by mouth three (3) times daily (after meals). Historical Provider   HYDROcodone-acetaminophen (NORCO) 7.5-325 mg per tablet  11/27/17   Historical Provider   famotidine (PEPCID) 20 mg tablet Take 20 mg by mouth two (2) times a day. Historical Provider   lisinopril (PRINIVIL, ZESTRIL) 20 mg tablet Take  by mouth daily. Historical Provider   oxyCODONE-acetaminophen (PERCOCET) 5-325 mg per tablet Take 1 tablet every 4-6 hours as needed for pain control. If you were instructed to try over the counter ibuprofen or tylenol, only take the percocet for pain not controlled with the over the counter medication. 9/10/17   VERONICA Kwon   oxyCODONE (OXAYDO) 7.5 mg TbOr Take 1 tab PO BID severe pain. ONE WEEK SUPPLY 5/24/17   Keiry Gallagher MD   Oxygen 2 Each by Nasal route continuous.     Historical Provider   VENTOLIN HFA 90 mcg/actuation inhaler Take 2 Puffs by inhalation every four (4) hours as needed for Wheezing or Shortness of Breath. 17   Andrea Gallegos MD   lactulose (CHRONULAC) 10 gram/15 mL solution Take 15 mL by mouth two (2) times a day. 17   Andrea Gallegos MD     Allergies   Allergen Reactions    Gabapentin Other (comments)     Unsteady, weakness LEs    Tetanus Vaccines And Toxoid Swelling    Topamax [Topiramate] Other (comments)     weakness      Social History   Substance Use Topics    Smoking status: Never Smoker    Smokeless tobacco: Never Used    Alcohol use No      Family History   Problem Relation Age of Onset    Hypertension Mother     Coronary Artery Disease Mother     Coronary Artery Disease Father     Hypertension Father         Current Facility-Administered Medications   Medication Dose Route Frequency    furosemide (LASIX) injection 40 mg  40 mg IntraVENous Q12H    ARIPiprazole (ABILIFY) tablet 15 mg  15 mg Oral DAILY    aspirin delayed-release tablet 325 mg  325 mg Oral DAILY    famotidine (PEPCID) tablet 20 mg  20 mg Oral BID    lactulose (CHRONULAC) solution 10 g  15 mL Oral BID    levothyroxine (SYNTHROID) tablet 125 mcg  125 mcg Oral ACB    lisinopril-hydroCHLOROthiazide (PRINZIDE, ZESTORETIC) 20-12.5 mg per tablet 1 Tab  1 Tab Oral DAILY    metoprolol tartrate (LOPRESSOR) tablet 25 mg  25 mg Oral BID    PARoxetine (PAXIL) tablet 20 mg  20 mg Oral DAILY    simvastatin (ZOCOR) tablet 20 mg  20 mg Oral QHS       Review of Systems:  Pertinent items are noted in HPI.     Objective:   Vital Signs:    Visit Vitals    /80 (BP 1 Location: Right arm, BP Patient Position: At rest)    Pulse 70    Temp 97.1 °F (36.2 °C)    Resp 20    Ht 5' (1.524 m)    Wt 151.5 kg (333 lb 14.4 oz)    SpO2 95%    Breastfeeding No    BMI 65.21 kg/m2       O2 Device: CPAP mask   O2 Flow Rate (L/min): 2 l/min   Temp (24hrs), Av.8 °F (36.6 °C), Min:97.1 °F (36.2 °C), Max:98.2 °F (36.8 °C)       Intake/Output:   Last shift:       07 -  1900  In: -   Out: 1600 [Urine:1600]  Last 3 shifts: 02/25 1901 - 02/27 0700  In: 290 [P.O.:290]  Out: 2000 [Urine:2000]    Intake/Output Summary (Last 24 hours) at 02/27/18 1354  Last data filed at 02/27/18 5530   Gross per 24 hour   Intake              290 ml   Output             3600 ml   Net            -3310 ml      Physical Exam:   General:  Alert, cooperative, no distress, appears stated age. Head:  Normocephalic, without obvious abnormality, atraumatic. Eyes:  Conjunctivae/corneas clear. PERRL, EOMs intact. Nose: Nares normal. Septum midline. Mucosa normal. No drainage or sinus tenderness. Throat: Lips, mucosa, and tongue normal. Teeth and gums normal.   Neck: Supple, symmetrical, trachea midline, no adenopathy, thyroid: no enlargment/tenderness/nodules, no carotid bruit and no JVD. Back:   Symmetric, no curvature. ROM normal.   Lungs:   Clear to auscultation bilaterally. Chest wall:  No tenderness or deformity. Heart:  Regular rate and rhythm, S1, S2 normal, no murmur, click, rub or gallop. Abdomen:   Soft, non-tender. Bowel sounds normal. No masses,  No organomegaly. Extremities: Extremities normal, atraumatic, no cyanosis or edema. Pulses: 2+ and symmetric all extremities.    Skin: Skin color, texture, turgor normal. No rashes or lesions   Lymph nodes: Cervical, supraclavicular, and axillary nodes normal.   Neurologic: Grossly nonfocal     Data review:     Recent Results (from the past 24 hour(s))   EKG, 12 LEAD, INITIAL    Collection Time: 02/26/18  3:17 PM   Result Value Ref Range    Ventricular Rate 75 BPM    Atrial Rate 576 BPM    QRS Duration 110 ms    Q-T Interval 452 ms    QTC Calculation (Bezet) 504 ms    Calculated R Axis -23 degrees    Calculated T Axis -128 degrees    Diagnosis       Atrial fibrillation  Low voltage QRS  Cannot rule out Anterior infarct (cited on or before 24-FEB-2018)  T wave abnormality, consider inferolateral ischemia  Prolonged QT  Abnormal ECG  When compared with ECG of 24-FEB-2018 13:19,  Incomplete right bundle branch block is no longer present  Questionable change in initial forces of Anteroseptal leads  Confirmed by Da Mistry MD, --- ([id number]) on 2/26/2018 4:25:49 PM     CBC WITH AUTOMATED DIFF    Collection Time: 02/26/18  4:30 PM   Result Value Ref Range    WBC 5.2 4.6 - 13.2 K/uL    RBC 3.83 (L) 4.20 - 5.30 M/uL    HGB 8.7 (L) 12.0 - 16.0 g/dL    HCT 31.0 (L) 35.0 - 45.0 %    MCV 80.9 74.0 - 97.0 FL    MCH 22.7 (L) 24.0 - 34.0 PG    MCHC 28.1 (L) 31.0 - 37.0 g/dL    RDW 18.0 (H) 11.6 - 14.5 %    PLATELET 163 399 - 792 K/uL    MPV 10.1 9.2 - 11.8 FL    NEUTROPHILS 71 40 - 73 %    LYMPHOCYTES 17 (L) 21 - 52 %    MONOCYTES 9 3 - 10 %    EOSINOPHILS 3 0 - 5 %    BASOPHILS 0 0 - 2 %    ABS. NEUTROPHILS 3.7 1.8 - 8.0 K/UL    ABS. LYMPHOCYTES 0.9 0.9 - 3.6 K/UL    ABS. MONOCYTES 0.5 0.05 - 1.2 K/UL    ABS. EOSINOPHILS 0.1 0.0 - 0.4 K/UL    ABS. BASOPHILS 0.0 0.0 - 0.1 K/UL    DF AUTOMATED     METABOLIC PANEL, COMPREHENSIVE    Collection Time: 02/26/18  4:30 PM   Result Value Ref Range    Sodium 140 136 - 145 mmol/L    Potassium 3.9 3.5 - 5.5 mmol/L    Chloride 101 100 - 108 mmol/L    CO2 34 (H) 21 - 32 mmol/L    Anion gap 5 3.0 - 18 mmol/L    Glucose 78 74 - 99 mg/dL    BUN 12 7.0 - 18 MG/DL    Creatinine 0.87 0.6 - 1.3 MG/DL    BUN/Creatinine ratio 14 12 - 20      GFR est AA >60 >60 ml/min/1.73m2    GFR est non-AA >60 >60 ml/min/1.73m2    Calcium 8.4 (L) 8.5 - 10.1 MG/DL    Bilirubin, total 1.2 (H) 0.2 - 1.0 MG/DL    ALT (SGPT) 14 13 - 56 U/L    AST (SGOT) 19 15 - 37 U/L    Alk.  phosphatase 135 (H) 45 - 117 U/L    Protein, total 7.2 6.4 - 8.2 g/dL    Albumin 3.5 3.4 - 5.0 g/dL    Globulin 3.7 2.0 - 4.0 g/dL    A-G Ratio 0.9 0.8 - 1.7     CARDIAC PANEL,(CK, CKMB & TROPONIN)    Collection Time: 02/26/18  4:30 PM   Result Value Ref Range     (H) 26 - 192 U/L    CK - MB 1.8 <3.6 ng/ml    CK-MB Index 0.8 0.0 - 4.0 %    Troponin-I, Qt. 0.02 0.0 - 0.045 NG/ML NT-PRO BNP    Collection Time: 02/26/18  4:30 PM   Result Value Ref Range    NT pro-BNP 3768 (H) 0 - 900 PG/ML   CARDIAC PANEL,(CK, CKMB & TROPONIN)    Collection Time: 02/26/18 11:35 PM   Result Value Ref Range     (H) 26 - 192 U/L    CK - MB 1.6 <3.6 ng/ml    CK-MB Index 0.7 0.0 - 4.0 %    Troponin-I, Qt. <0.02 0.0 - 7.331 NG/ML   METABOLIC PANEL, BASIC    Collection Time: 02/27/18  3:06 AM   Result Value Ref Range    Sodium 139 136 - 145 mmol/L    Potassium 3.7 3.5 - 5.5 mmol/L    Chloride 98 (L) 100 - 108 mmol/L    CO2 35 (H) 21 - 32 mmol/L    Anion gap 6 3.0 - 18 mmol/L    Glucose 115 (H) 74 - 99 mg/dL    BUN 11 7.0 - 18 MG/DL    Creatinine 1.00 0.6 - 1.3 MG/DL    BUN/Creatinine ratio 11 (L) 12 - 20      GFR est AA >60 >60 ml/min/1.73m2    GFR est non-AA 57 (L) >60 ml/min/1.73m2    Calcium 8.2 (L) 8.5 - 10.1 MG/DL   CBC WITH AUTOMATED DIFF    Collection Time: 02/27/18  3:06 AM   Result Value Ref Range    WBC 4.6 4.6 - 13.2 K/uL    RBC 3.67 (L) 4.20 - 5.30 M/uL    HGB 8.3 (L) 12.0 - 16.0 g/dL    HCT 30.5 (L) 35.0 - 45.0 %    MCV 83.1 74.0 - 97.0 FL    MCH 22.6 (L) 24.0 - 34.0 PG    MCHC 27.2 (L) 31.0 - 37.0 g/dL    RDW 18.2 (H) 11.6 - 14.5 %    PLATELET 620 131 - 644 K/uL    MPV 10.0 9.2 - 11.8 FL    NEUTROPHILS 66 40 - 73 %    LYMPHOCYTES 22 21 - 52 %    MONOCYTES 9 3 - 10 %    EOSINOPHILS 3 0 - 5 %    BASOPHILS 0 0 - 2 %    ABS. NEUTROPHILS 3.1 1.8 - 8.0 K/UL    ABS. LYMPHOCYTES 1.0 0.9 - 3.6 K/UL    ABS. MONOCYTES 0.4 0.05 - 1.2 K/UL    ABS. EOSINOPHILS 0.2 0.0 - 0.4 K/UL    ABS.  BASOPHILS 0.0 0.0 - 0.1 K/UL    DF AUTOMATED     CARDIAC PANEL,(CK, CKMB & TROPONIN)    Collection Time: 02/27/18  3:06 AM   Result Value Ref Range     (H) 26 - 192 U/L    CK - MB 1.5 <3.6 ng/ml    CK-MB Index 0.7 0.0 - 4.0 %    Troponin-I, Qt. 0.02 0.0 - 0.045 NG/ML   CARDIAC PANEL,(CK, CKMB & TROPONIN)    Collection Time: 02/27/18 10:30 AM   Result Value Ref Range     26 - 192 U/L    CK - MB 1.9 <3.6 ng/ml CK-MB Index 1.0 0.0 - 4.0 %    Troponin-I, Qt. 0.02 0.0 - 0.045 NG/ML   POC G3    Collection Time: 02/27/18 12:04 PM   Result Value Ref Range    Device: NASAL CANNULA      Flow rate (POC) 2.5 L/M    pH (POC) 7.318 (L) 7.35 - 7.45      pCO2 (POC) 76.3 (H) 35.0 - 45.0 MMHG    pO2 (POC) 92 80 - 100 MMHG    HCO3 (POC) 39.2 (H) 22 - 26 MMOL/L    sO2 (POC) 96 92 - 97 %    Base excess (POC) 13 mmol/L    Allens test (POC) YES      Site LEFT RADIAL      Specimen type (POC) ARTERIAL      Performed by Delma [place]        Imaging:  I have personally reviewed the patients radiographs and have reviewed the reports:  XR Results (most recent):    Results from Hospital Encounter encounter on 02/26/18   XR CHEST PORT   Narrative Portable Chest    CPT CODE: 63768    HISTORY: SOB.    COMPARISON: 12/24/2018 chest radiograph, CTA chest.    FINDINGS:     Global cardiac enlargement similar to the comparison exams. Fullness in the  right hilum is consistent with the pulmonary artery enlargement seen on the  previous study. No focal consolidation or definite pulmonary edema area no  definite pleural effusion. Left costo phrenic angle was partially obscured by  cardiac enlargement. Chronic degenerative changes in the right glenohumeral  joint. No destructive bone lesions. Impression IMPRESSION:    1. Cardiomegaly. 2. Right hilar fullness consistent with the pulmonary artery enlargement seen on  CTA chest 2/24/18. 3. No acute infiltrate or edema. CT Results (most recent):    Results from Hospital Encounter encounter on 02/24/18   CTA CHEST W OR W WO CONT   Narrative CTA Chest    Indication: Shortness of breath started last night. Knee surgery two weeks ago. Comparison: CTA chest 4/18/2017    Technique: Axial imaging was obtained dynamically through the pulmonary arteries  using high-resolution CT angiographic protocol, without complications.   In  addition, coronal and sagittal reconstructed MIP arteriograms were performed, to  better evaluate the pulmonary vasculature, and to increase sensitivity for  detection of pulmonary emboli. 100 mL Isovue-370 was administered intravenously. All CT scans at this facility are performed using dose optimization technique as  appropriate to a performed exam, to include automated exposure control,  adjustment of the MA and/or kV according to patient size (including appropriate  matching for site-specific examinations) or use of  iterative reconstruction  technique. CT angiogram quality parameters:  1. Overall exam quality - satisfactory: adequate   2. Adequacy of pulmonary enhancement - optimal: More than enough enhancement to  evaluate subsegmental vessels. Determined by main PA density 250 HU or greater  3. Adequacy of breath-hold-optimal: adequate: sufficient enough to render  diagnosis     4. There are no  significant artifacts affecting scan quality. Findings:    The pulmonary arteries are opacified completely, without evidence of filling  defects to suggest pulmonary thromboembolism. The main pulmonary artery is  significantly dilated to 4.9 cm and much larger than the adjacent ascending  aorta. The aorta is normal in caliber; no aneurysm. Very little contrast is present  within the aorta, which precludes evaluation for dissection. Cardiomegaly. No pericardial effusion. Contrast refluxes into the dilated  hepatic veins. There is no significant axillary, hilar, or mediastinal lymphadenopathy. Mild bibasilar dependent atelectasis. Lingular scarring. No infiltrate or  consolidation. No pleural effusion or pneumothorax. Visualized structures of the upper abdomen demonstrate no acute abnormality. Small sliding hiatal hernia. Generalized anasarca. Bone windows demonstrate  multilevel degenerative changes of the thoracic spine  and shoulders. Impression Impression:    No CT evidence of pulmonary thromboembolism.   Dilated main pulmonary artery suggestive of pulmonary arterial hypertension. Cardiomegaly with evidence of right heart failure. Generalized anasarca. Small sliding hiatal hernia.     Thank you for this referral.                Charley Franco MD

## 2018-02-27 NOTE — PROGRESS NOTES
Albuterol nebs was therapeutically interchanged for albuterol inhaler per the P&T Committee approved Therapeutic Interchanges Policy.     Mary Rey [facility], Pharmacist  2/26/2018 11:38 PM

## 2018-02-27 NOTE — ROUTINE PROCESS
TRANSFER - IN REPORT:    Verbal report received from [facility]) on Heather  being received from ED(unit) for routine progression of care      Report consisted of patients Situation, Background, Assessment and   Recommendations(SBAR). Information from the following report(s) SBAR, Kardex, Intake/Output, MAR and Recent Results was reviewed with the receiving nurse. Opportunity for questions and clarification was provided. Assessment will be completed upon patients arrival to unit and care will be assumed.

## 2018-02-27 NOTE — PROGRESS NOTES
Orders received, chart reviewed, PT evaluation attempted. Patient is currently off unit for testing. Will follow-up as patient's schedule allows.

## 2018-02-27 NOTE — PROGRESS NOTES
Attempted PT evaluation, patient remains off unit per nurse for test.  Will follow-up with patient as patient's schedule allows. Thank you.

## 2018-02-27 NOTE — ED NOTES
Patient states she has to go to bathroom, perwick connected to suction and applied to patient. Patient's vital signs are stable.

## 2018-02-27 NOTE — ROUTINE PROCESS
Patient admitted from ED  per  stretcher with chief complaints of shortness of breath accompanied by transport staff. .  Patient isoriented to time, place, person and situation. Not in acute distress, will monitor.

## 2018-02-27 NOTE — CONSULTS
[facility] - Consult Note    Consultation request by Christopher MD Alfonzo for advice/opinion related to evaluating CHF (congestive heart failure) ([facility]    Date of  Admission: 2/26/2018  2:03 PM   Primary Care Physician:  Davie Lombard, MD     Assessment:     Patient Active Problem List   Diagnosis Code    Hypertension I11.9    Congenital heart disease Q24.9    Atrial fibrillation I48.91    Osteoarthritis of both knees M17.0    H/O total knee replacement Z96.659    DJD (degenerative joint disease), lumbosacral M51.37    Schizoaffective disorder, chronic condition ([facility] F25.8    Unspecified hypothyroidism E03.9    Morbid obesity ([facility] E66.01    Esophageal reflux K21.9    Dyspnea R06.00    Dyslipidemia E78.5    Morbid obesity with BMI of 45.0-49.9, adult ([facility] E66.01, Z68.42    DDD (degenerative disc disease), lumbar, L4/5 advanced M51.36    Hypoxia R09.02    Fluid overload E87.70    CHF (congestive heart failure) (HCC) I42.7    Diastolic CHF, chronic ([facility]) I50.32       -Acute on chronic respiratory distress multifactorial in setting of below.  -Acute on chronic diastolic heart failure. EF 60-65% with mild concentric LVH, a moderately dilated right ventricle with reduced systolic function on echo 4/2017. Suspect patient with mostly right sided CHF. -Hypertension uncontrolled on arrival. Now on low side.   -Pulmonary hypertension moderate with PA pressure of 54 mmHg and moderate to severe pulmonic insufficiency on echo 4/2017. This is likely underestimated. Suspect this is in the severe range given her prominent pulmonary arteries on CXR. -Obstructive sleep apnea, non compliant with CPAP machine.  -Obesity with likely obesity ventilation syndrome.  -Acute on chronic anemia.  -Persistent atrial fibrillation, rate control with BB (low dose given history of bradycardia) and CVA prophylaxis with ASA (given noncompliance).   -Congenital heart disease, status post unknown repair as a child. She does have residual pulmonary hypertension and atrial fibrillation as described above. -Dyslipidemia. -H/o polysubstance abuse. -H/o schizoaffective disorder. -H/o noncompliance. Primary cardiologist Dr. Michelet Antoine. Plan:     Would continue pulmonary treatment and support with continued O2 support along with CPAP or bipap as needed. Continue diuresis with IV lasix as renal function allows watching I/Os and daily weights closely. Continue home BB for rate control and ASA for embolic prophylaxis. Will follow hemodynamics closely   Stopped scheduled nitrates with drop in BP. Suspect she does not take her medications a home  Continue to follow Hgb closely. Will review echocardiogram once complete. Needs CHF education. ABG today     History of Present Illness: This is a 60 y.o. female admitted for CHF (congestive heart failure) ([facility] Patient complains of:  SOB. Patient is poor historian, falls asleep often during interview. Patient is a 60year old female who presented to [facility] with increased SOB. Patient was recently treated in ER with IV lasix and discharged home. Patients symptoms returned. Patient has chronic orthopnea and ELIZABETH. Patient denies CP, palp, syncope. Patient has a history of noncompliance and it is unclear if she was taking her medications as prescribed. Cardiac risk factors: dyslipidemia, diabetes mellitus, obesity, sedentary life style, hypertension      Review of Symptoms:  Difficult ROS due to somnolence.      Past Medical History:     Past Medical History:   Diagnosis Date    Atrial fibrillation     CHADS score 1  (-CHF, +HTN, -AGE, -DM, -CVA)    Carpal tunnel syndrome     Chronic pain     Congenital heart disease     presumed pulmonary stenosis s/p repair 1969    Degenerative disc disease     Degenerative joint disease     Dyslipidemia     GERD     H/O echocardiogram 04/2017    EF 60-65%, mild concentric LVH, dilated RV with RV dysfunction, RVSP 54 mmHg, RA E, moderate to severe pulmonic regurgitation.  Hypertension     Hypothyroid     Morbid obesity     Morbid obesity with BMI of 45.0-49.9, adult ([facility] 2/20/2017    Noncompliance     Schizoaffective disorder     Substance abuse     marijuana, crack cocaine         Social History:     Social History     Social History    Marital status: SINGLE     Spouse name: N/A    Number of children: N/A    Years of education: N/A     Social History Main Topics    Smoking status: Never Smoker    Smokeless tobacco: Never Used    Alcohol use No    Drug use: No    Sexual activity: Yes     Birth control/ protection: Surgical     Other Topics Concern    None     Social History Narrative        Family History:     Family History   Problem Relation Age of Onset    Hypertension Mother     Coronary Artery Disease Mother     Coronary Artery Disease Father     Hypertension Father         Medications:      Allergies   Allergen Reactions    Gabapentin Other (comments)     Unsteady, weakness LEs    Tetanus Vaccines And Toxoid Swelling    Topamax [Topiramate] Other (comments)     weakness        Current Facility-Administered Medications   Medication Dose Route Frequency    furosemide (LASIX) injection 60 mg  60 mg IntraVENous Q12H    ARIPiprazole (ABILIFY) tablet 15 mg  15 mg Oral DAILY    aspirin delayed-release tablet 325 mg  325 mg Oral DAILY    famotidine (PEPCID) tablet 20 mg  20 mg Oral BID    HYDROcodone-acetaminophen (NORCO) 7.5-325 mg per tablet 1 Tab  1 Tab Oral Q4H PRN    isosorbide mononitrate ER (IMDUR) tablet 30 mg  30 mg Oral DAILY    lactulose (CHRONULAC) solution 10 g  15 mL Oral BID    levothyroxine (SYNTHROID) tablet 125 mcg  125 mcg Oral ACB    lisinopril-hydroCHLOROthiazide (PRINZIDE, ZESTORETIC) 20-12.5 mg per tablet 1 Tab  1 Tab Oral DAILY    metoprolol tartrate (LOPRESSOR) tablet 25 mg  25 mg Oral BID    oxyCODONE-acetaminophen (PERCOCET) 5-325 mg per tablet 1 Tab  1 Tab Oral Q4H PRN    PARoxetine (PAXIL) tablet 20 mg  20 mg Oral DAILY    simvastatin (ZOCOR) tablet 20 mg  20 mg Oral QHS    albuterol (PROVENTIL VENTOLIN) nebulizer solution 2.5 mg  2.5 mg Nebulization Q4H PRN         Physical Exam:     Visit Vitals    BP 93/56 (BP 1 Location: Right arm, BP Patient Position: At rest)    Pulse 70    Temp 97.1 °F (36.2 °C)    Resp 18    Ht 5' (1.524 m)    Wt 151.5 kg (333 lb 14.4 oz)    SpO2 92%    Breastfeeding No    BMI 65.21 kg/m2     BP Readings from Last 3 Encounters:   02/27/18 93/56   02/24/18 (!) 156/131   11/28/17 118/70     Pulse Readings from Last 3 Encounters:   02/27/18 70   02/24/18 72   11/28/17 66     Wt Readings from Last 3 Encounters:   02/27/18 151.5 kg (333 lb 14.4 oz)   02/24/18 (!) 167.4 kg (369 lb)   10/12/17 134.7 kg (297 lb)       General:  fatigued, cooperative, no distress, appears stated age  Neck:  no JVD  Lungs:  diminished breath sounds   Heart:  irregularly irregular rhythm  Abdomen:  abdomen is soft without significant tenderness, masses, organomegaly or guarding  Extremities:  extremities normal, atraumatic, no cyanosis 1+ edema  Skin: Warm and dry.  no hyperpigmentation, vitiligo, or suspicious lesions  Neuro: alert, oriented  Psych: non focal     Data Review:     Recent Labs      02/27/18   0306  02/26/18   1630  02/24/18   1340   WBC  4.6  5.2  4.4*   HGB  8.3*  8.7*  8.3*   HCT  30.5*  31.0*  29.7*   PLT  209  208  181     Recent Labs      02/27/18   0306  02/26/18   1630  02/24/18   1340   NA  139  140  139   K  3.7  3.9  3.8   CL  98*  101  107   CO2  35*  34*  25   GLU  115*  78  105*   BUN  11  12  15   CREA  1.00  0.87  1.03   CA  8.2*  8.4*  7.8*   MG   --    --   2.1   ALB   --   3.5   --    SGOT   --   19   --    ALT   --   14   --        Results for orders placed or performed during the hospital encounter of 02/26/18   EKG, 12 LEAD, INITIAL   Result Value Ref Range    Ventricular Rate 75 BPM    Atrial Rate 576 BPM    QRS Duration 110 ms    Q-T Interval 452 ms    QTC Calculation (Bezet) 504 ms    Calculated R Axis -23 degrees    Calculated T Axis -128 degrees    Diagnosis       Atrial fibrillation  Low voltage QRS  Cannot rule out Anterior infarct (cited on or before 24-FEB-2018)  T wave abnormality, consider inferolateral ischemia  Prolonged QT  Abnormal ECG  When compared with ECG of 24-FEB-2018 13:19,  Incomplete right bundle branch block is no longer present  Questionable change in initial forces of Anteroseptal leads  Confirmed by Frank Clay MD, --- ([id number]) on 2/26/2018 4:25:49 PM     Results for orders placed or performed in visit on 10/12/17   AMB POC EKG ROUTINE W/ 12 LEADS, INTER & REP    Impression    See progress note.        All Cardiac Markers in the last 24 hours:    Lab Results   Component Value Date/Time     (H) 02/27/2018 03:06 AM     (H) 02/26/2018 11:35 PM     (H) 02/26/2018 04:30 PM    CKMB 1.5 02/27/2018 03:06 AM    CKMB 1.6 02/26/2018 11:35 PM    CKMB 1.8 02/26/2018 04:30 PM    CKND1 0.7 02/27/2018 03:06 AM    CKND1 0.7 02/26/2018 11:35 PM    CKND1 0.8 02/26/2018 04:30 PM    TROIQ 0.02 02/27/2018 03:06 AM    TROIQ <0.02 02/26/2018 11:35 PM    TROIQ 0.02 02/26/2018 04:30 PM       Last Lipid:    Lab Results   Component Value Date/Time    Cholesterol, total 168 10/06/2010 12:17 PM    HDL Cholesterol 37 (L) 10/06/2010 12:17 PM    LDL, calculated 82.2 10/06/2010 12:17 PM    Triglyceride 244 (H) 10/06/2010 12:17 PM    CHOL/HDL Ratio 4.5 10/06/2010 12:17 PM       Signed By: VERONICA Avina     February 27, 2018      Judie Dolan MD

## 2018-02-27 NOTE — PROCEDURES
[facility]  *** FINAL REPORT ***    Name: Kizzy Cash  MRN: [id number]    Inpatient  : 1960  HIS Order #: [id number]  [address] [facility] Visit #: [id number]  Date: 2018    TYPE OF TEST: Peripheral Venous Testing    REASON FOR TEST  Pain in limb, Limb swelling, Shortness of breath, Hypoxia    Right Leg:-  Deep venous thrombosis:           No  Superficial venous thrombosis:    No  Deep venous insufficiency:        Not examined  Superficial venous insufficiency: Not examined    Left Leg:-  Deep venous thrombosis:           No  Superficial venous thrombosis:    No  Deep venous insufficiency:        Not examined  Superficial venous insufficiency: Not examined      INTERPRETATION/FINDINGS  Duplex images were obtained using 2-D gray scale, color flow, and  spectral Doppler analysis. Right leg :  1. Deep veins visualized include the common femoral, femoral,  popliteal and posterior tibial veins. 2. No evidence of deep venous thrombosis detected in the veins  visualized. 3. Superficial veins visualized include the great saphenous vein. 4. No evidence of superficial thrombosis detected. Left leg :  1. Deep veins visualized include the common femoral, femoral,  popliteal and posterior tibial veins. 2. No evidence of deep venous thrombosis detected in the veins  visualized. 3. Superficial veins visualized include the great saphenous vein. 4. No evidence of superficial thrombosis detected. ADDITIONAL COMMENTS    I have personally reviewed the data relevant to the interpretation of  this  study. TECHNOLOGIST: CHARLIE Vazquez, CLAIRES  Signed: 2018 03:15 PM    PHYSICIAN: Iqra Mitchell.  Javier Roque MD  Signed: 2018 01:28 PM

## 2018-02-27 NOTE — ROUTINE PROCESS
Patient is sleeping rousable on 2 lit 02 /nc. ABG done by resp. therapist. 12 :07 placed on bipap by RT. Roxie mitchell informed.

## 2018-02-27 NOTE — ROUTINE PROCESS
Bedside shift change report given to Pj Quintero RN (oncoming nurse) by Araceli James RN (offgoing nurse). Report included the following information SBAR, Intake/Output and MAR.

## 2018-02-27 NOTE — ROUTINE PROCESS
Bedside and Verbal shift change report given to Darylene Amato RN (oncoming nurse) by Mary Grace Beebe (offgoing nurse). Report included the following information SBAR, Kardex, MAR and Recent Results. SITUATION:    Code Status: Prior   Reason for Admission: CHF (congestive heart failure) ([facility]     Hospital day: 1   Problem List:       Hospital Problems  Date Reviewed: 11/28/2017          Codes Class Noted POA    CHF (congestive heart failure) ([facility] ICD-10-CM: I50.9  ICD-9-CM: 428.0  4/18/2017 Unknown              BACKGROUND:    Past Medical History:   Past Medical History:   Diagnosis Date    Atrial fibrillation     CHADS score 1  (-CHF, +HTN, -AGE, -DM, -CVA)    Carpal tunnel syndrome     Chronic pain     Congenital heart disease     presumed pulmonary stenosis s/p repair 1969    Degenerative disc disease     Degenerative joint disease     Dyslipidemia     GERD     H/O echocardiogram 04/2017    EF 60-65%, mild concentric LVH, dilated RV with RV dysfunction, RVSP 54 mmHg, RA E, moderate to severe pulmonic regurgitation.     Hypertension     Hypothyroid     Morbid obesity     Morbid obesity with BMI of 45.0-49.9, adult ([facility] 2/20/2017    Noncompliance     Schizoaffective disorder     Substance abuse     marijuana, crack cocaine         Patient taking anticoagulants no     ASSESSMENT:    Changes in Assessment Throughout Shift: none     Patient has Central Line: no Reasons if yes: na   Patient has Shaikh Cath: no Reasons if yes: na      Last Vitals:     Vitals:    02/26/18 2130 02/26/18 2200 02/26/18 2239 02/26/18 2258   BP: (!) 174/103 (!) 168/92  159/90   Pulse: 77 81  71   Resp: 17 21  20   Temp:  98.1 °F (36.7 °C)  98.2 °F (36.8 °C)   SpO2: 98% 97%  94%   Weight:   151.5 kg (333 lb 14.4 oz)    Height:   5' (1.524 m)         IV and DRAINS (will only show if present)   Peripheral IV 02/26/18 Left External jugular-Site Assessment: Clean, dry, & intact     WOUND (if present)   Wound Type: none   Dressing present Dressing Present : No   Wound Concerns/Notes:  none     PAIN    Pain Assessment    Pain Intensity 1: 0 (02/26/18 4046)              Patient Stated Pain Goal: 0  o Interventions for Pain:  none  o Intervention effective: na  o Time of last intervention: na   o Reassessment Completed: yes      Last 3 Weights:  Last 3 Recorded Weights in this Encounter    02/26/18 2239   Weight: 151.5 kg (333 lb 14.4 oz)     Weight change:      INTAKE/OUPUT    Current Shift:      Last three shifts:       LAB RESULTS     Recent Labs      02/26/18   1630  02/24/18   1340   WBC  5.2  4.4*   HGB  8.7*  8.3*   HCT  31.0*  29.7*   PLT  208  181        Recent Labs      02/26/18   1630  02/24/18   1340   NA  140  139   K  3.9  3.8   GLU  78  105*   BUN  12  15   CREA  0.87  1.03   CA  8.4*  7.8*   MG   --   2.1       RECOMMENDATIONS AND DISCHARGE PLANNING     1. Pending tests/procedures/ Plan of Care or Other Needs: continue to monitor, ECHO     2. Discharge plan for patient and Needs/Barriers: home    3. Estimated Discharge Date: pending Posted on Whiteboard in Patients Room: yes      4. The patient's care plan was reviewed with the oncoming nurse. \"HEALS\" SAFETY CHECK      Fall Risk    Total Score: 3    Safety Measures: Safety Measures: Bed/Chair-Wheels locked, Bed in low position, Call light within reach, Fall prevention (comment), Gripper socks, Side rails X 3    A safety check occurred in the patient's room between off going nurse and oncoming nurse listed above.     The safety check included the below items  Area Items   H  High Alert Medications - Verify all high alert medication drips (heparin, PCA, etc.)   E  Equipment - Suction is set up for ALL patients (with yanker)  - Red plugs utilized for all equipment (IV pumps, etc.)  - WOWs wiped down at end of shift.  - Room stocked with oxygen, suction, and other unit-specific supplies   A  Alarms - Bed alarm is set for fall risk patients  - Ensure chair alarm is in place and activated if patient is up in a chair   L  Lines - Check IV for any infiltration  - Shaikh bag is empty if patient has a Shaikh   - Tubing and IV bags are labeled   S  Safety   - Room is clean, patient is clean, and equipment is clean. - Hallways are clear from equipment besides carts. - Fall bracelet on for fall risk patients  - Ensure room is clear and free of clutter  - Suction is set up for ALL patients (with yanker)  - Hallways are clear from equipment besides carts.    - Isolation precautions followed, supplies available outside room, sign posted     [employer]

## 2018-02-28 LAB
ANION GAP SERPL CALC-SCNC: 6 MMOL/L (ref 3–18)
BUN SERPL-MCNC: 10 MG/DL (ref 7–18)
BUN/CREAT SERPL: 10 (ref 12–20)
CALCIUM SERPL-MCNC: 8.1 MG/DL (ref 8.5–10.1)
CHLORIDE SERPL-SCNC: 96 MMOL/L (ref 100–108)
CO2 SERPL-SCNC: 37 MMOL/L (ref 21–32)
CREAT SERPL-MCNC: 0.98 MG/DL (ref 0.6–1.3)
GLUCOSE SERPL-MCNC: 115 MG/DL (ref 74–99)
HCT VFR BLD AUTO: 31.6 % (ref 35–45)
HGB BLD-MCNC: 8.5 G/DL (ref 12–16)
MAGNESIUM SERPL-MCNC: 2 MG/DL (ref 1.6–2.6)
POTASSIUM SERPL-SCNC: 3.4 MMOL/L (ref 3.5–5.5)
SODIUM SERPL-SCNC: 139 MMOL/L (ref 136–145)

## 2018-02-28 PROCEDURE — 80048 BASIC METABOLIC PNL TOTAL CA: CPT | Performed by: INTERNAL MEDICINE

## 2018-02-28 PROCEDURE — 85018 HEMOGLOBIN: CPT | Performed by: INTERNAL MEDICINE

## 2018-02-28 PROCEDURE — 74011250637 HC RX REV CODE- 250/637: Performed by: INTERNAL MEDICINE

## 2018-02-28 PROCEDURE — 97161 PT EVAL LOW COMPLEX 20 MIN: CPT

## 2018-02-28 PROCEDURE — 74011250636 HC RX REV CODE- 250/636: Performed by: INTERNAL MEDICINE

## 2018-02-28 PROCEDURE — 92610 EVALUATE SWALLOWING FUNCTION: CPT

## 2018-02-28 PROCEDURE — 92526 ORAL FUNCTION THERAPY: CPT

## 2018-02-28 PROCEDURE — 36415 COLL VENOUS BLD VENIPUNCTURE: CPT | Performed by: INTERNAL MEDICINE

## 2018-02-28 PROCEDURE — 65660000000 HC RM CCU STEPDOWN

## 2018-02-28 PROCEDURE — 77010033678 HC OXYGEN DAILY

## 2018-02-28 PROCEDURE — 83735 ASSAY OF MAGNESIUM: CPT | Performed by: INTERNAL MEDICINE

## 2018-02-28 PROCEDURE — 94660 CPAP INITIATION&MGMT: CPT

## 2018-02-28 RX ORDER — FACIAL-BODY WIPES
10 EACH TOPICAL
Status: COMPLETED | OUTPATIENT
Start: 2018-02-28 | End: 2018-02-28

## 2018-02-28 RX ORDER — POTASSIUM CHLORIDE 20 MEQ/1
40 TABLET, EXTENDED RELEASE ORAL
Status: COMPLETED | OUTPATIENT
Start: 2018-02-28 | End: 2018-02-28

## 2018-02-28 RX ADMIN — METOPROLOL TARTRATE 25 MG: 25 TABLET ORAL at 08:50

## 2018-02-28 RX ADMIN — FUROSEMIDE 40 MG: 10 INJECTION, SOLUTION INTRAMUSCULAR; INTRAVENOUS at 05:34

## 2018-02-28 RX ADMIN — OXYCODONE HYDROCHLORIDE AND ACETAMINOPHEN 1 TABLET: 5; 325 TABLET ORAL at 23:23

## 2018-02-28 RX ADMIN — METOPROLOL TARTRATE 25 MG: 25 TABLET ORAL at 16:32

## 2018-02-28 RX ADMIN — FAMOTIDINE 20 MG: 20 TABLET, FILM COATED ORAL at 16:32

## 2018-02-28 RX ADMIN — LISINOPRIL AND HYDROCHLOROTHIAZIDE 1 TABLET: 12.5; 2 TABLET ORAL at 08:49

## 2018-02-28 RX ADMIN — PAROXETINE 20 MG: 20 TABLET, FILM COATED ORAL at 08:49

## 2018-02-28 RX ADMIN — FUROSEMIDE 40 MG: 10 INJECTION, SOLUTION INTRAMUSCULAR; INTRAVENOUS at 16:28

## 2018-02-28 RX ADMIN — OXYCODONE HYDROCHLORIDE AND ACETAMINOPHEN 1 TABLET: 5; 325 TABLET ORAL at 08:55

## 2018-02-28 RX ADMIN — FAMOTIDINE 20 MG: 20 TABLET, FILM COATED ORAL at 08:49

## 2018-02-28 RX ADMIN — BISACODYL 10 MG: 10 SUPPOSITORY RECTAL at 11:13

## 2018-02-28 RX ADMIN — LACTULOSE 20 G: 20 SOLUTION ORAL at 18:31

## 2018-02-28 RX ADMIN — SIMVASTATIN 20 MG: 20 TABLET, FILM COATED ORAL at 21:53

## 2018-02-28 RX ADMIN — LACTULOSE 10 G: 20 SOLUTION ORAL at 08:50

## 2018-02-28 RX ADMIN — POTASSIUM CHLORIDE 40 MEQ: 20 TABLET, EXTENDED RELEASE ORAL at 10:08

## 2018-02-28 RX ADMIN — OXYCODONE HYDROCHLORIDE AND ACETAMINOPHEN 1 TABLET: 5; 325 TABLET ORAL at 16:28

## 2018-02-28 RX ADMIN — ASPIRIN 325 MG: 325 TABLET, DELAYED RELEASE ORAL at 08:50

## 2018-02-28 RX ADMIN — LEVOTHYROXINE SODIUM 125 MCG: 75 TABLET ORAL at 08:50

## 2018-02-28 RX ADMIN — ARIPIPRAZOLE 15 MG: 15 TABLET ORAL at 08:50

## 2018-02-28 NOTE — ROUTINE PROCESS
Bedside and Verbal shift change report given to Marshal Rea RN (oncoming nurse) by Laurie Ross RN (offgoing nurse). Report included the following information SBAR, Kardex, MAR and Recent Results. SITUATION:    Code Status: Full Code   Reason for Admission: CHF (congestive heart failure) ([facility]    [facility] day: 2   Problem List:       Hospital Problems  Date Reviewed: 11/28/2017          Codes Class Noted POA    [facility]congestive [facility] failure) ([facility] ICD-10-CM: I50.9  ICD-9-CM: 428.0  4/18/2017 Unknown              BACKGROUND:    Past Medical History:   Past Medical History:   Diagnosis Date    Atrial fibrillation     CHADS score 1  (-CHF, +HTN, -AGE, -DM, -CVA)    Carpal tunnel syndrome     Chronic pain     Congenital heart disease     presumed pulmonary stenosis s/p repair 1969    Degenerative disc disease     Degenerative joint disease     Dyslipidemia     GERD     H/O echocardiogram 04/2017    EF 60-65%, mild concentric LVH, dilated RV with RV dysfunction, RVSP 54 mmHg, RA E, moderate to severe pulmonic regurgitation.     Hypertension     Hypothyroid     Morbid obesity     Morbid obesity with BMI of 45.0-49.9, adult ([facility] 2/20/2017    Noncompliance     Schizoaffective disorder     Substance abuse     marijuana, crack cocaine         Patient taking anticoagulants no     ASSESSMENT:    Changes in Assessment Throughout Shift: none     Patient has Central Line: no Reasons if yes: na   Patient has Shaikh Cath: no Reasons if yes: na      Last Vitals:     Vitals:    02/27/18 2001 02/27/18 2315 02/28/18 0009 02/28/18 0300   BP: 150/83 (!) 144/93  119/75   Pulse: 75 92  91   Resp: 20 20  22   Temp: 98.4 °F (36.9 °C) 97.8 °F (36.6 °C)  97.9 °F (36.6 °C)   SpO2: 97% 93% 94% 98%   Weight:       Height:            IV and DRAINS (will only show if present)   External Female Catheter 02/27/18-Site Assessment: Clean, dry, & intact  Peripheral IV 02/26/18 Left External jugular-Site Assessment: Clean, dry, & intact     WOUND (if present)   Wound Type:  none   Dressing present Dressing Present : No   Wound Concerns/Notes:  none     PAIN    Pain Assessment    Pain Intensity 1: 0 (02/28/18 0412)    Pain Location 1: Hip    Pain Intervention(s) 1: Medication (see MAR)    Patient Stated Pain Goal: 0  o Interventions for Pain:  none  o Intervention effective: na  o Time of last intervention: na   o Reassessment Completed: yes      Last 3 Weights:  Last 3 Recorded Weights in this Encounter    02/26/18 2239 02/27/18 0531   Weight: 151.5 kg (333 lb 14.4 oz) 151.5 kg (333 lb 14.4 oz)     Weight change:      INTAKE/OUPUT    Current Shift: 02/27 1901 - 02/28 0700  In: 440 [P.O.:440]  Out: 1600 [Urine:1600]    Last three shifts: 02/26 0701 - 02/27 1900  In: 290 [P.O.:290]  Out: 3600 [Urine:3600]     LAB RESULTS     Recent Labs      02/28/18   0330  02/27/18   0306  02/26/18   1630   WBC   --   4.6  5.2   HGB  8.5*  8.3*  8.7*   HCT  31.6*  30.5*  31.0*   PLT   --   209  208        Recent Labs      02/28/18   0330  02/27/18   0306  02/26/18   1630   NA  139  139  140   K  3.4*  3.7  3.9   GLU  115*  115*  78   BUN  10  11  12   CREA  0.98  1.00  0.87   CA  8.1*  8.2*  8.4*   MG  2.0   --    --        RECOMMENDATIONS AND DISCHARGE PLANNING     1. Pending tests/procedures/ Plan of Care or Other Needs: continue to monitor, ECHO     2. Discharge plan for patient and Needs/Barriers: home    3. Estimated Discharge Date: pending Posted on Whiteboard in Patients Room: yes      4. The patient's care plan was reviewed with the oncoming nurse. \"HEALS\" SAFETY CHECK      Fall Risk    Total Score: 2    Safety Measures: Safety Measures: Bed/Chair-Wheels locked, Bed in low position, Call light within reach, Fall prevention (comment), Side rails X 3    A safety check occurred in the patient's room between off going nurse and oncoming nurse listed above.     The safety check included the below items  Area Items   H  High Alert Medications - Verify all high alert medication drips (heparin, PCA, etc.)   E  Equipment - Suction is set up for ALL patients (with danya)  - Red plugs utilized for all equipment (IV pumps, etc.)  - WOWs wiped down at end of shift.  - Room stocked with oxygen, suction, and other unit-specific supplies   A  Alarms - Bed alarm is set for fall risk patients  - Ensure chair alarm is in place and activated if patient is up in a chair   L  Lines - Check IV for any infiltration  - Shaikh bag is empty if patient has a Shaikh   - Tubing and IV bags are labeled   S  Safety   - Room is clean, patient is clean, and equipment is clean. - Hallways are clear from equipment besides carts. - Fall bracelet on for fall risk patients  - Ensure room is clear and free of clutter  - Suction is set up for ALL patients (with danya)  - Hallways are clear from equipment besides carts.    - Isolation precautions followed, supplies available outside room, sign posted     Juanito Bronson RN

## 2018-02-28 NOTE — PROGRESS NOTES
conducted an initial consultation and Spiritual Assessment for Heather, who is a 60 y.o.,female. Patients Primary Language is: [place]. According to the patients EMR Evangelical Affiliation is: Luanne Orr.     The reason the Patient came to the hospital is:   Patient Active Problem List    Diagnosis Date Noted    Diastolic [facility], chronic ([facility] 10/12/2017    Hypoxia 04/18/2017    Fluid overload 04/18/2017    CHF (congestive heart failure) ([facility] 04/18/2017    DDD (degenerative disc disease), lumbar, L4/5 advanced 03/29/2017    Morbid obesity with BMI of 45.0-49.9, adult ([facility] 02/20/2017    Dyspnea     Dyslipidemia     Osteoarthritis of both knees 01/14/2014    H/O total knee replacement 01/14/2014    DJD (degenerative joint disease), lumbosacral 01/14/2014    Schizoaffective disorder, chronic condition ([facility] 01/14/2014    Unspecified hypothyroidism 01/14/2014    Morbid obesity ([facility] 01/14/2014    Esophageal reflux 01/14/2014    Hypertension     Congenital heart disease     Atrial fibrillation         The  provided the following Interventions:  Initiated a relationship of care and support. Provided information about Spiritual Care Services. Chart reviewed. The following outcomes where achieved:  Patient expressed gratitude for 's visit. Assessment:  Patient does not have any Mormonism/cultural needs that will affect patients preferences in health care. There are no spiritual or Mormonism issues which require intervention at this time. Plan:  Chaplains will continue to follow and will provide pastoral care on an as needed/requested basis.  recommends bedside caregivers page  on duty if patient shows signs of acute spiritual or emotional distress.     [address]  ([phone])

## 2018-02-28 NOTE — PROGRESS NOTES
SLP Note:    Bedside swallow evaluation completed; full report to follow. Recommend soft solid diet with thin liquids, meds as tolerated.       Thank you for this referral,   Melo Russo M.S., [id number] [facility]  Speech-Language Pathologist

## 2018-02-28 NOTE — PROGRESS NOTES
Problem: Mobility Impaired (Adult and Pediatric)  Goal: *Acute Goals and Plan of Care (Insert Text)  STG's to be addressed within 3 days:  1. Bed mobility:  Supine to sit to supine CGA with HR for meals. 2. Activity tolerance: Tolerate up in chair 1-2 hrs for ADLs. 3. Transfers:  Sit to stand to chair min/CGA with RW for ADL's. LTG's to be addressed within 7 days:  1. Standing/Ambulation Balance:  Increase to Good with RW for safe transfers and gait. 2. Ambulation:  Ambulate > 50 ft CGA with RW for home mobility. 3. Patient Education:  Independent with HEP for home safety. Outcome: Progressing Towards Goal  physical Therapy EVALUATION    Patient: Gage Jamil (46 y.o. female)  Date: 2/28/2018  Primary Diagnosis: CHF (congestive heart failure) ([facility] [address]  Precautions:   Fall, Aspiration     ASSESSMENT :  Based on the objective data described below, the patient presents to PT with decreased functional mobility with regard to bed mobility, transfers, gait and overall tolerance for activity. Patient reports that she receives assistance from family with completion of ADLs, ambulates with rollator for home/community mobility. Patient reports that she also uses home O2 (unsure of how many liters she uses). Today, patient received semi-reclined position in bed, required min A for bed mobility, vc's for HR use when transitioning to sit EoB. Patient with significant dyspnea noted with activity, on 3LNC supplemental oxygen, vc's for deep breathing. Attempted standing with patient, however, patient unable due to ongoing LE weakness. Patient remained sitting EoB at conclusion of session set up for breakfast tray. Instructed patient to call for assistance when ready to return back to bed. Patient verbalized comprehension. Patient would benefit from PT to address above impairments and assist with discharge planning. Patient will benefit from skilled intervention to address the above impairments.   Patients rehabilitation potential is considered to be Fair  Factors which may influence rehabilitation potential include:   []         None noted  []         Mental ability/status  [x]         Medical condition  []         Home/family situation and support systems  []         Safety awareness  []         Pain tolerance/management  []         Other:      PLAN :  Recommendations and Planned Interventions:  [x]           Bed Mobility Training             [x]    Neuromuscular Re-Education  [x]           Transfer Training                   []    Orthotic/Prosthetic Training  [x]           Gait Training                          []    Modalities  [x]           Therapeutic Exercises          []    Edema Management/Control  [x]           Therapeutic Activities            [x]    Patient and Family Training/Education  []           Other (comment):    Frequency/Duration: Patient will be followed by physical therapy daily x 4-7 x week to address goals. Discharge Recommendations: Zechariah Juárez  Further Equipment Recommendations for Discharge: To be determined     G-CODES     Mobility  Current  CM= 80-99%   Goal  CI= 1-19%. The severity rating is based on the Level of Assistance required for Functional Mobility and ADLs.        G-CODES     Eval Complexity: History: MEDIUM  Complexity : 1-2 comorbidities / personal factors will impact the outcome/ POC Exam:HIGH Complexity : 4+ Standardized tests and measures addressing body structure, function, activity limitation and / or participation in recreation  Presentation: LOW Complexity : Stable, uncomplicated  Overall Complexity:LOW     SUBJECTIVE:   Patient stated I will try.     OBJECTIVE DATA SUMMARY:     Past Medical History:   Diagnosis Date    Atrial fibrillation     CHADS score 1  (-CHF, +HTN, -AGE, -DM, -CVA)    Carpal tunnel syndrome     Chronic pain     Congenital heart disease     presumed pulmonary stenosis s/p repair 1969    Degenerative disc disease     Degenerative joint disease     Dyslipidemia     GERD     H/O echocardiogram 04/2017    EF 60-65%, mild concentric LVH, dilated RV with RV dysfunction, RVSP 54 mmHg, RA E, moderate to severe pulmonic regurgitation.  Hypertension     Hypothyroid     Morbid obesity     Morbid obesity with BMI of 45.0-49.9, adult ([facility] [address] 2/20/2017    Noncompliance     Schizoaffective disorder     Substance abuse     marijuana, crack cocaine     Past Surgical History:   Procedure Laterality Date    HX CARPAL TUNNEL RELEASE      left    HX HYSTERECTOMY      HX KNEE REPLACEMENT      left    HX KNEE REPLACEMENT      right    HX THYROIDECTOMY       Barriers to Learning/Limitations: yes;  sensory deficits-hearing  Compensate with: Visual Cues and Tactile Cues  Prior Level of Function/Home Situation:   Home Situation  Home Environment: Private residence  # Steps to Enter: 0  One/Two Story Residence: Two story  # of Interior Steps: 14  Height of Each Step (in): 6 inches  Interior Rails: Both  Lift Chair Available: No  Living Alone: No  Support Systems: Family member(s)  Patient Expects to be Discharged to[de-identified] Private residence  Current DME Used/Available at Home: Commode, bedside, Oxygen, portable, Shower chair, Walker, rollator, Wheelchair  Tub or Shower Type: Tub/Shower combination  Critical Behavior:  Neurologic State: Drowsy; Eyes open to voice; Eyes open to stimulus  Orientation Level: Oriented to person;Oriented to place;Oriented to situation  Cognition: Follows commands  Safety/Judgement: Fall prevention  Psychosocial  Patient Behaviors: Calm; Cooperative  Purposeful Interaction: Yes  Pt Identified Daily Priority: Clinical issues (comment)  [place] Process: Nurture loving kindness  Caring Interventions: Reassure; Therapeutic modalities  Reassure: Caring rounds  Therapeutic Modalities: Intentional therapeutic touch; Deep breathing  Skin Condition/Temp: Dry;Warm  Skin Integrity: Intact  Skin Integumentary  Skin Color: Appropriate for ethnicity  Skin Condition/Temp: Dry;Warm  Skin Integrity: Intact  Hair Growth: Present  Varicosities: Absent  Strength:    Strength: Generally decreased, functional (B LE 2/5)  Tone & Sensation:   Tone: Normal (B LE)  Sensation: Intact (B LE intact to LT)  Range Of Motion:  AROM: Generally decreased, functional (B LE)  PROM: Generally decreased, functional (B LE)  Functional Mobility:  Bed Mobility:  Rolling: Minimum assistance; Additional time (with use of HR)  Supine to Sit: Minimum assistance; Additional time (with use of HR, elevation of HOB)  Scooting: Maximum assistance;Assist x2; Additional time  Transfers:  Sit to Stand:  (N/A)  Balance:   Sitting: Impaired; With support  Sitting - Static: Good (unsupported)  Sitting - Dynamic: Fair (occasional)  Standing:  (N/A)  Ambulation/Gait Training:  Ambulation - Level of Assistance:  (N/A)  Pain:  Pt reports 7/10 pain or discomfort prior to treatment.    Pt reports 7/10 pain or discomfort post treatment. Activity Tolerance:   Fair  Please refer to the flowsheet for vital signs taken during this treatment. After treatment:   [x]         Patient left in no apparent distress sitting up on EoB  []         Patient left in no apparent distress in bed  [x]         Call bell left within reach  []         Nursing notified   []         Caregiver present  []         Bed alarm activated  []         SCDs in place to B LE     COMMUNICATION/EDUCATION:   [x]         Fall prevention education was provided and the patient/caregiver indicated understanding. [x]         Patient/family have participated as able in goal setting and plan of care. [x]         Patient/family agree to work toward stated goals and plan of care. []         Patient understands intent and goals of therapy, but is neutral about his/her participation. []         Patient is unable to participate in goal setting and plan of care.     Thank you for this referral.  Denise Jett, PT   Time Calculation: 21 mins

## 2018-02-28 NOTE — ROUTINE PROCESS
1920 Bedside report received from [facility]. Patient quietly resting on rounds, denies pain, watching TV. Patient's call light in reach and patient ext cath patent, yellow urine.

## 2018-02-28 NOTE — PROGRESS NOTES
Problem: Falls - Risk of  Goal: *Absence of Falls  Document Piyush Fall Risk and appropriate interventions in the flowsheet.    Outcome: Progressing Towards Goal  Fall Risk Interventions:            Medication Interventions: Patient to call before getting OOB    Elimination Interventions: Call light in reach, Patient to call for help with toileting needs    History of Falls Interventions: Door open when patient unattended

## 2018-02-28 NOTE — PROGRESS NOTES
Progress Note  Pulmonary, Critical Care, and Sleep Medicine    Name: Vitaliy Alfred MRN: [id number]   : 1960 Hospital: [address] [place] [address] Dr   Date: 2018        IMPRESSION:   · Acute on chronic hypoxic hypercapnic respiratory failure due to below. Some symptomatic improvement overnight  · Severe Complex sleep apnea with hypoxemia solo in the 70's, only partially resolved with  BIPAP titration  · Obesity hypoventilation syndrome  · Pulmonary HTN PASP on Echo 54 mm Hg, likely WHO Grp 3, less likely PAH due to rheumatoid arthritis  · History of congenital heart disease S/p surgery, unknown contribution to pulmonary HTN bur likely minimal in light of normal LA size   · Morbid obesity  · Substance abuse  · PFT with isolated reduction in DLCO likely related to obesity and pulmonary HTN. Doubt early interstitial lung disease as CT chest from 2018 does not suggest this  · Schizoaffective disorder  · Hypothyroidism  · Atrial fibrillation       PLAN:   · Pt will need close follow up with sleep MD and dedicated titration due to complex nature of sleep disorder  · Discussed importance of above with pt however not sure if pt able to retain due to Psych issues   · Healthy weight and lifestyle. ? Nutritional consult  · Reviewed Echo which again reflect pulmonary HTN  · Mobilize pt  · No DVT on LE Duplex  · Supplemental O2 to saturation greater than 90%     Subjective/Interval History:   I have reviewed the flowsheet and previous days notes. Reviewed interval history. Discussed management with nursing staff. Less dyspneic today. Up in bed. Denies chest pain      ROS:Pertinent items are noted in HPI. Orders reviewed including medications. Changes made if indicated. Telemetry monitor reviewed at the bedside.   Objective:   Vital Signs:    Visit Vitals    /58 (BP 1 Location: Right arm, BP Patient Position: At rest)    Pulse 78    Temp 97 °F (36.1 °C)    Resp 16    Ht 5' (1.524 m)    Wt 144.6 kg (318 lb 12.8 oz)    SpO2 100%    Breastfeeding No    BMI 62.26 kg/m2       O2 Device: Nasal cannula   O2 Flow Rate (L/min): 3 l/min   Temp (24hrs), Av.6 °F (36.4 °C), Min:97 °F (36.1 °C), Max:98.4 °F (36.9 °C)       Intake/Output:   Last shift:       07 - 1900  In: -   Out: 7551 [Urine:1350]  Last 3 shifts: 1901 -  07  In: 730 [P.O.:730]  Out: 5900 [Urine:5900]    Intake/Output Summary (Last 24 hours) at 18 1129  Last data filed at 18 0857   Gross per 24 hour   Intake              440 ml   Output             3650 ml   Net            -3210 ml        Physical Exam:    General:  Alert, cooperative, in no distress, appears stated age. Head:  Normocephalic, without obvious abnormality, atraumatic. Eyes:  ANicteric, PERRL,   Nose: Nares normal. Mucosa normal. No drainage or sinus tenderness. Throat: Lips, mucosa, and tongue normal.  NO Thrush Mallampati 4   Neck: Supple, symmetrical, trachea midline, no adenopathy, thyroid: no enlargment/tenderness/nodules    Back:   Symmetric    Lungs:   Bilateral auscultation diminished breath sounds, no rales or wheezes   Chest wall:  No tenderness or deformity. NO intercostal retractions   Heart:  Regular rate and rhythm, S1, S2 normal, no murmur, click, rub or gallop. Abdomen:   Soft, non-tender. obese; Bowel sounds normal. No masses,  No organomegaly. NO paradoxical motion   Extremities: normal, atraumatic, no cyanosis, 1+ bipedal edema   Pulses: 2+ and symmetric all extremities. Skin: Skin color, texture, turgor normal. No rashes or lesions.  NO clubbing   Lymph nodes: Cervical  nodes normal.   Neurologic: Grossly nonfocal      :        Devices:             Drips:    DATA:  Labs:  Recent Labs      18   0330  18   0306  18   1630   WBC   --   4.6  5.2   HGB  8.5*  8.3*  8.7*   HCT  31.6*  30.5*  31.0*   PLT   --   209  208     Recent Labs      18   0330  18   0306  18   1630   NA  139  139  140 K  3.4*  3.7  3.9   CL  96*  98*  101   CO2  37*  35*  34*   GLU  115*  115*  78   BUN  10  11  12   CREA  0.98  1.00  0.87   CA  8.1*  8.2*  8.4*   MG  2.0   --    --    ALB   --    --   3.5   SGOT   --    --   19   ALT   --    --   14     No results for input(s): PH, PCO2, PO2, HCO3, FIO2 in the last 72 hours. Imaging:  []        I have personally reviewed the patients radiographs and reports  []         []        No change from prior, tubes and lines in adequate position  []        Improved   []        Worsening  High complexity decision making was performed during this consultation and evaluation.       Devan Ceja MD

## 2018-02-28 NOTE — PROGRESS NOTES
Care Management Interventions  PCP Verified by CM: Yes  Mode of Transport at Discharge:  (TBD)  Transition of Care Consult (CM Consult): Discharge Planning  Physical Therapy Consult: Yes  Occupational Therapy Consult: No  Speech Therapy Consult: Yes  Current Support Network: Relative's Home  Confirm Follow Up Transport: Family  Plan discussed with Pt/Family/Caregiver: Yes  Discharge Location  Discharge Placement:  (TBD)    Pt is a 60year old admitted for CHF. Pt is alert and oriented and alone in room. Pt reports that she lives with her daughter Tomasa Mojica [phone] and that prior to admission she needed help with dressing, and bathing, and that she is unable to shower anymore. Pt reports that since her knee surgery, she can sometimes get to bedside commode herself but that she \"sometimes can't make it. \" Pt states that her daughter does not work and her daughter or her grandkids (ages 8 years to 16 years) are with her all the time. Pt reports that she has a walker, bedside commode, shower chair, wheelchair, home O2 and CPAP at home. Pt reports that she is active with home health and gets physical therapy at home but that she cannot remember the company name and states that it may be [id number]. Called pt's daughter Tomasa Mojica [phone] and unable to LVM - mailbox is full.

## 2018-02-28 NOTE — CDMP QUERY
Please clarify if this patient is being treated/managed for:    => Hypertensive urgency  => Other Explanation of clinical findings  => Unable to Determine (no explanation of clinical findings)    The medical record reflects the following:  Risk:  -- PMH HTN, OHS  -- admitted with CHF EA, acute respiratory failure    Clinical Indicators:  -- 2/26 BP: 149/105, 167/93, 166/106, 159/87, 142/87, 133/78, 177/115, 200/96, 178/90, 157/115, 175/100, 181/96, 174/103, 168/92    Treatment:   -- given 1 dose of IV Labetalol    Please clarify and document your clinical opinion in the progress notes and discharge summary including the definitive and/or presumptive diagnosis, (suspected or probable), related to the above clinical findings. Please include clinical findings supporting your diagnosis. If you DECLINE this query or would like to communicate with [facility], please utilize the \"[employer] message box\" at the TOP of the Progress Note on the right.       Thank you,  Adam Ship [address] [facility], [address] [place] [address]

## 2018-02-28 NOTE — PROGRESS NOTES
[facility] - Progress Note  Admit Date: 2/26/2018    Assessment:     Hospital Problems  Date Reviewed: 11/28/2017          Codes Class Noted POA    CHF (congestive heart failure) ([facility] [address] ICD-10-CM: I50.9  ICD-9-CM: 428.0  4/18/2017 Unknown              -Acute on chronic respiratory distress multifactorial in setting of below.  -Acute on chronic diastolic heart failure. EF 55-60% with mild to moderate concentric LVH, a moderately dilated right ventricle with reduced systolic function on echo 4/2017. Suspect patient with mostly right sided CHF. -Hypertension uncontrolled on arrival. Now on low side.   -Pulmonary hypertension severe. RVSP 67 mmHg on echo this admission.  -Obstructive sleep apnea, non compliant with CPAP machine.  -Obesity with likely obesity ventilation syndrome.  -Acute on chronic anemia.  -Persistent atrial fibrillation, rate control with BB (low dose given history of bradycardia) and CVA prophylaxis with ASA (given noncompliance). -Congenital heart disease, status post unknown repair as a child. She does have residual pulmonary hypertension and atrial fibrillation as described above. -Dyslipidemia. -H/o polysubstance abuse. -H/o schizoaffective disorder. -H/o noncompliance.       Plan:     SOB multifactorial.  Diuresing well with IV lasix with stable renal function, will continue. Continue electrolyte replacement as needed. Continue pulmonary treatment and support. Hemodynamics stable. Continued lopressor and prinzide. Subjective:     More awake today. Reports she is feeling better.     Objective:      Patient Vitals for the past 8 hrs:   Temp Pulse Resp BP SpO2   02/28/18 0828 97 °F (36.1 °C) 78 16 125/58 100 %         Patient Vitals for the past 96 hrs:   Weight   02/28/18 0427 144.6 kg (318 lb 12.8 oz)   02/27/18 0531 151.5 kg (333 lb 14.4 oz)   02/26/18 2239 151.5 kg (333 lb 14.4 oz)                    Intake/Output Summary (Last 24 hours) at 02/28/18 1117  Last data filed at 02/28/18 0857   Gross per 24 hour   Intake              440 ml   Output             3650 ml   Net            -3210 ml       Physical Exam:  General:  alert, cooperative  Neck:  Obese   Lungs:  Diminished breath sounds  Heart:  regular rate and rhythm  Abdomen:  abdomen is soft obese  Extremities:  2+ edema    Data Review:     Labs: Results:       Chemistry Recent Labs      02/28/18   0330 02/27/18   0306  02/26/18   1630   GLU  115*  115*  78   NA  139  139  140   K  3.4*  3.7  3.9   CL  96*  98*  101   CO2  37*  35*  34*   BUN  10  11  12   CREA  0.98  1.00  0.87   CA  8.1*  8.2*  8.4*   MG  2.0   --    --    AGAP  6  6  5   BUCR  10*  11*  14   AP   --    --   135*   TP   --    --   7.2   ALB   --    --   3.5   GLOB   --    --   3.7   AGRAT   --    --   0.9      CBC w/Diff Recent Labs      02/28/18 0330 02/27/18   0306 02/26/18   1630   WBC   --   4.6  5.2   RBC   --   3.67*  3.83*   HGB  8.5*  8.3*  8.7*   HCT  31.6*  30.5*  31.0*   PLT   --   209  208   GRANS   --   66  71   LYMPH   --   22  17*   EOS   --   3  3      Cardiac Enzymes No results found for: CPK, CK, CKMMB, CKMB, RCK3, CKMBT, CKNDX, CKND1, SHANNAN, TROPT, TROIQ, JELANI, TROPT, TNIPOC, BNP, BNPP   Coagulation No results for input(s): PTP, INR, APTT in the last 72 hours.     No lab exists for component: INREXT    Lipid Panel Lab Results   Component Value Date/Time    Cholesterol, total 168 10/06/2010 12:17 PM    HDL Cholesterol 37 (L) 10/06/2010 12:17 PM    LDL, calculated 82.2 10/06/2010 12:17 PM    VLDL, calculated 48.8 10/06/2010 12:17 PM    Triglyceride 244 (H) 10/06/2010 12:17 PM    CHOL/HDL Ratio 4.5 10/06/2010 12:17 PM      BNP Lab Results   Component Value Date/Time    B-type Natriuretic Peptide 3187 (H) 05/06/2016 11:10 AM    B-type Natriuretic Peptide 3075 (H) 05/06/2016 11:00 AM      Liver Enzymes Recent Labs      02/26/18   1630   TP  7.2   ALB  3.5   AP  135*   SGOT  19      Digoxin    Thyroid Studies Lab Results   Component Value Date/Time    T4, Total 12.3 (H) 01/15/2014 02:01 AM    TSH 3.63 02/27/2018 05:45 PM          Signed By: VERONICA Narvaez     February 28, 2018

## 2018-02-28 NOTE — PROGRESS NOTES
Problem: Dysphagia (Adult)  Goal: *Acute Goals and Plan of Care (Insert Text)  Patient will:  1. Tolerate PO trials with 0 s/s overt distress in 4/5 trials  2. Utilize compensatory swallow strategies/maneuvers (decrease bite/sip, size/rate, alt. liq/sol) with min cues in 4/5 trials    Recommend:   Soft solids, thin liquids  Meds as tolerated   Aspiration/Reflux precautions  HOB >45 degrees during all intake and for at least 30 min after po   Small bites/sips, slow rate of intake, alternating bites/sips    Speech LAnguage Pathology bedside swallow evaluation/treatment    Patient: Jasmine Schneider (60 y.o. female)  Date: 2/28/2018  Primary Diagnosis: CHF (congestive heart failure) ([facility])  Precautions: Aspiration       ASSESSMENT :  Based on the objective data described below, the patient presents with suspected mild pharyngeal dysphagia c/b reported globus sensation across po intake. Pt reporting \"things feel like they get stuck\". Oral mech exam revealed structures functional for speech and deglutition. Pt tolerating thin liquids + straw with timely swallow initiation and adequate laryngeal elevation to palpation. No overt s/sx aspiration noted across multiple trials. Pt tolerating regular solids with positive mastication/clearance and no overt distress s/sx; however, may benefit from soft solid consistency for improved comfort with intake. Recommend soft solid diet with thin liquids with meds as tolerated. Further recommend use of the above mentioned compensatory strategies/aspiration precautions. Treatment performed following evaluation (see below). Patient will benefit from skilled intervention to address the above impairments.   Patients rehabilitation potential is considered to be Good   Factors which may influence rehabilitation potential include:   []            None noted  []            Mental ability/status  [x]            Medical condition  [x]            Home/family situation and support systems  [x]            Safety awareness  []            Pain tolerance/management  []            Other:      PLAN :  Recommendations and Planned Interventions:  As above   Frequency/Duration: Patient will be followed by speech-language pathology x1-2 visits to address goals. Discharge Recommendations: To Be Determined     SUBJECTIVE:   Patient stated It feels like it gets stuck. OBJECTIVE:     Past Medical History:   Diagnosis Date    Atrial fibrillation     CHADS score 1  (-CHF, +HTN, -AGE, -DM, -CVA)    Carpal tunnel syndrome     Chronic pain     Congenital heart disease     presumed pulmonary stenosis s/p repair 1969    Degenerative disc disease     Degenerative joint disease     Dyslipidemia     GERD     H/O echocardiogram 04/2017    EF 60-65%, mild concentric LVH, dilated RV with RV dysfunction, RVSP 54 mmHg, RA E, moderate to severe pulmonic regurgitation.  Hypertension     Hypothyroid     Morbid obesity     Morbid obesity with BMI of 45.0-49.9, adult ([facility] [address] 2/20/2017    Noncompliance     Schizoaffective disorder     Substance abuse     marijuana, crack cocaine     Past Surgical History:   Procedure Laterality Date    HX CARPAL TUNNEL RELEASE      left    HX HYSTERECTOMY      HX KNEE REPLACEMENT      left    HX KNEE REPLACEMENT      right    HX THYROIDECTOMY       Prior Level of Function/Home Situation:  Home Situation  Home Environment: Apartment  # Steps to Enter: 0  One/Two Story Residence: Two story, live on 1st floor  # of Interior Steps: 14  Height of Each Step (in): 6 inches  Interior Rails: Both  Lift Chair Available: No  Living Alone: No  Support Systems: Family member(s)  Patient Expects to be Discharged to[de-identified] Apartment  Current DME Used/Available at Home: Cane, quad, Walker  Diet prior to admission: Regular  Current Diet:  [facility] soft, thin liquids;  Recommend change to soft solids, thin liquids    Cognitive and Communication Status:  Neurologic State: Alert  Orientation Level: Oriented to person, Oriented to place, Oriented to situation  Cognition: Follows commands  Oral Assessment:  Oral Assessment  Labial: No impairment  Dentition: Intact  Oral Hygiene: Good  Lingual: No impairment  Velum: No impairment  Mandible: No impairment  P.O. Trials:  Patient Position: 45 at [facility]  Vocal quality prior to P.O.: No impairment  Consistency Presented: Thin liquid; Solid  How Presented: Self-fed/presented;Cup/sip;Straw;Successive swallows  Bolus Acceptance: No impairment  Bolus Formation/Control: No impairment  Propulsion: No impairment  Oral Residue: None  Initiation of Swallow: No impairment  Laryngeal Elevation: Functional  Aspiration Signs/Symptoms: None  Pharyngeal Phase Characteristics: Foreign body sensation  Effective Modifications: Small sips and bites; Alternate liquids/solids  Cues for Modifications: Minimal-moderate  Oral Phase Severity: No impairment  Pharyngeal Phase Severity : Mild    GCODESwallowing:  Swallow Current Status CI= 1-19%   Swallow Goal Status CI= 1-19%    The severity rating is based on the following outcomes:    Clinical Judgment    Treatment Performed Following Evaluation:  Training and education completed following evaluation related to role of SLP, diet recs, aspiration/reflux precautions, comp strategies and positioning. Pt able to return demo understanding of alternating bites/sips on 8/10 trials with mild-mod cues. Encouraged to utilize aspiration/reflux precautions for improved comfort. Will follow x1-2 visits to ensure diet tolerance. Pain:  Pt c/o 0/10 pain prior to evaluation. Pt c/o 0/10 pain post evaluation.     After treatment:   []            Patient left in no apparent distress sitting up in chair  [x]            Patient left in no apparent distress in bed  [x]            Call bell left within reach  [x]            Nursing notified  []            Caregiver present  []            Bed alarm activated    COMMUNICATION/EDUCATION:   [x] SLP educated pt with regard to compensatory swallow strategies and       aspiration/reflux precautions including: small bites/sips,       alternate liquids/solids, decrease feeding rate, HOB > 45 with all po, and upright in bed at 30 degrees after po for at least 45 minutes. []            Patient/family have participated as able in goal setting and plan of care. [x]            Patient/family agree to work toward stated goals and plan of care. []            Patient understands intent and goals of therapy; neutral about participation. []            Patient is unable to participate in goal setting and plan of care.     Thank you for this referral.  Isabel Waters M.S., [id number] [facility]  Speech-Language Pathologist

## 2018-03-01 VITALS
BODY MASS INDEX: 57.52 KG/M2 | RESPIRATION RATE: 20 BRPM | SYSTOLIC BLOOD PRESSURE: 117 MMHG | HEIGHT: 60 IN | OXYGEN SATURATION: 95 % | DIASTOLIC BLOOD PRESSURE: 77 MMHG | HEART RATE: 75 BPM | WEIGHT: 293 LBS | TEMPERATURE: 97.4 F

## 2018-03-01 LAB
ANION GAP SERPL CALC-SCNC: 9 MMOL/L (ref 3–18)
BUN SERPL-MCNC: 10 MG/DL (ref 7–18)
BUN/CREAT SERPL: 9 (ref 12–20)
CALCIUM SERPL-MCNC: 8.4 MG/DL (ref 8.5–10.1)
CHLORIDE SERPL-SCNC: 93 MMOL/L (ref 100–108)
CO2 SERPL-SCNC: 35 MMOL/L (ref 21–32)
CREAT SERPL-MCNC: 1.06 MG/DL (ref 0.6–1.3)
GLUCOSE SERPL-MCNC: 102 MG/DL (ref 74–99)
INR PPP: 1.7 (ref 0.8–1.2)
IRON SATN MFR SERPL: ABNORMAL %
IRON SERPL-MCNC: 7 UG/DL (ref 50–175)
MAGNESIUM SERPL-MCNC: 2.2 MG/DL (ref 1.6–2.6)
POTASSIUM SERPL-SCNC: 3.8 MMOL/L (ref 3.5–5.5)
PROTHROMBIN TIME: 19.7 SEC (ref 11.5–15.2)
SODIUM SERPL-SCNC: 137 MMOL/L (ref 136–145)
TIBC SERPL-MCNC: <8 UG/DL (ref 250–450)

## 2018-03-01 PROCEDURE — 85610 PROTHROMBIN TIME: CPT | Performed by: INTERNAL MEDICINE

## 2018-03-01 PROCEDURE — 36415 COLL VENOUS BLD VENIPUNCTURE: CPT | Performed by: INTERNAL MEDICINE

## 2018-03-01 PROCEDURE — 80048 BASIC METABOLIC PNL TOTAL CA: CPT | Performed by: INTERNAL MEDICINE

## 2018-03-01 PROCEDURE — 97110 THERAPEUTIC EXERCISES: CPT

## 2018-03-01 PROCEDURE — 83540 ASSAY OF IRON: CPT | Performed by: INTERNAL MEDICINE

## 2018-03-01 PROCEDURE — 83735 ASSAY OF MAGNESIUM: CPT | Performed by: INTERNAL MEDICINE

## 2018-03-01 PROCEDURE — 74011250637 HC RX REV CODE- 250/637: Performed by: INTERNAL MEDICINE

## 2018-03-01 PROCEDURE — 97530 THERAPEUTIC ACTIVITIES: CPT

## 2018-03-01 RX ORDER — ASPIRIN 81 MG/1
81 TABLET ORAL DAILY
Qty: 30 TAB | Refills: 0 | Status: SHIPPED | OUTPATIENT
Start: 2018-03-02 | End: 2018-05-10

## 2018-03-01 RX ORDER — DICLOFENAC SODIUM 10 MG/G
2 GEL TOPICAL
Qty: 100 G | Refills: 0 | Status: ON HOLD | OUTPATIENT
Start: 2018-03-01 | End: 2018-05-04

## 2018-03-01 RX ORDER — ASPIRIN 81 MG/1
81 TABLET ORAL DAILY
Status: DISCONTINUED | OUTPATIENT
Start: 2018-03-02 | End: 2018-03-01 | Stop reason: HOSPADM

## 2018-03-01 RX ORDER — ISOSORBIDE MONONITRATE 30 MG/1
30 TABLET, EXTENDED RELEASE ORAL DAILY
Status: DISCONTINUED | OUTPATIENT
Start: 2018-03-01 | End: 2018-03-01 | Stop reason: HOSPADM

## 2018-03-01 RX ORDER — OXYCODONE AND ACETAMINOPHEN 5; 325 MG/1; MG/1
1 TABLET ORAL
Status: DISCONTINUED | OUTPATIENT
Start: 2018-03-01 | End: 2018-03-01 | Stop reason: HOSPADM

## 2018-03-01 RX ORDER — OXYCODONE AND ACETAMINOPHEN 5; 325 MG/1; MG/1
1 TABLET ORAL
Qty: 12 TAB | Refills: 0 | Status: SHIPPED | OUTPATIENT
Start: 2018-03-01 | End: 2018-03-01

## 2018-03-01 RX ORDER — WARFARIN SODIUM 5 MG/1
5 TABLET ORAL DAILY
Status: DISCONTINUED | OUTPATIENT
Start: 2018-03-01 | End: 2018-03-01

## 2018-03-01 RX ORDER — DOCUSATE SODIUM 100 MG/1
100 CAPSULE, LIQUID FILLED ORAL DAILY
Status: DISCONTINUED | OUTPATIENT
Start: 2018-03-01 | End: 2018-03-01 | Stop reason: HOSPADM

## 2018-03-01 RX ORDER — DICLOFENAC SODIUM 10 MG/G
2 GEL TOPICAL
Qty: 100 G | Refills: 0 | Status: SHIPPED
Start: 2018-03-01 | End: 2018-03-01

## 2018-03-01 RX ORDER — FUROSEMIDE 40 MG/1
40 TABLET ORAL DAILY
Status: DISCONTINUED | OUTPATIENT
Start: 2018-03-01 | End: 2018-03-01 | Stop reason: HOSPADM

## 2018-03-01 RX ORDER — DICLOFENAC SODIUM 10 MG/G
2 GEL TOPICAL
Status: DISCONTINUED | OUTPATIENT
Start: 2018-03-01 | End: 2018-03-01 | Stop reason: HOSPADM

## 2018-03-01 RX ORDER — RANITIDINE 150 MG/1
150 TABLET, FILM COATED ORAL 2 TIMES DAILY
Status: DISCONTINUED | OUTPATIENT
Start: 2018-03-01 | End: 2018-03-01 | Stop reason: ALTCHOICE

## 2018-03-01 RX ORDER — WARFARIN SODIUM 5 MG/1
5 TABLET ORAL DAILY
Status: COMPLETED | OUTPATIENT
Start: 2018-03-01 | End: 2018-03-01

## 2018-03-01 RX ORDER — POTASSIUM CHLORIDE 750 MG/1
10 TABLET, EXTENDED RELEASE ORAL DAILY
Status: DISCONTINUED | OUTPATIENT
Start: 2018-03-02 | End: 2018-03-01 | Stop reason: HOSPADM

## 2018-03-01 RX ORDER — DOCUSATE SODIUM 100 MG/1
100 CAPSULE, LIQUID FILLED ORAL 2 TIMES DAILY
Qty: 60 CAP | Refills: 2 | Status: SHIPPED
Start: 2018-03-01 | End: 2018-05-10

## 2018-03-01 RX ADMIN — OXYCODONE HYDROCHLORIDE AND ACETAMINOPHEN 1 TABLET: 5; 325 TABLET ORAL at 15:13

## 2018-03-01 RX ADMIN — LISINOPRIL AND HYDROCHLOROTHIAZIDE 1 TABLET: 12.5; 2 TABLET ORAL at 09:14

## 2018-03-01 RX ADMIN — WARFARIN SODIUM 5 MG: 5 TABLET ORAL at 11:00

## 2018-03-01 RX ADMIN — FAMOTIDINE 20 MG: 20 TABLET, FILM COATED ORAL at 09:15

## 2018-03-01 RX ADMIN — METOPROLOL TARTRATE 25 MG: 25 TABLET ORAL at 09:15

## 2018-03-01 RX ADMIN — LACTULOSE 20 G: 20 SOLUTION ORAL at 09:15

## 2018-03-01 RX ADMIN — ASPIRIN 325 MG: 325 TABLET, DELAYED RELEASE ORAL at 09:14

## 2018-03-01 RX ADMIN — WARFARIN SODIUM 5 MG: 5 TABLET ORAL at 15:14

## 2018-03-01 RX ADMIN — PAROXETINE 20 MG: 20 TABLET, FILM COATED ORAL at 09:15

## 2018-03-01 RX ADMIN — LEVOTHYROXINE SODIUM 125 MCG: 75 TABLET ORAL at 09:15

## 2018-03-01 RX ADMIN — ARIPIPRAZOLE 15 MG: 15 TABLET ORAL at 09:15

## 2018-03-01 RX ADMIN — MULTIPLE VITAMINS W/ MINERALS TAB 1 TABLET: TAB at 15:13

## 2018-03-01 RX ADMIN — FUROSEMIDE 40 MG: 40 TABLET ORAL at 15:13

## 2018-03-01 RX ADMIN — ISOSORBIDE MONONITRATE 30 MG: 30 TABLET, EXTENDED RELEASE ORAL at 15:13

## 2018-03-01 NOTE — PROGRESS NOTES
Care Management Interventions  PCP Verified by CM: Yes  Mode of Transport at Discharge:  (TBD)  Transition of Care Consult (CM Consult): [address]: No  Reason Outside [place]: Patient already serviced by other home care/hospice agency  Partner SNF: No  Reason Why Partner SNF Not Chosen: Location  Physical Therapy Consult: Yes  Occupational Therapy Consult: No  Speech Therapy Consult: No  Current Support Network: Relative's Home  Confirm Follow Up Transport: Family  Plan discussed with Pt/Family/Caregiver: Yes  Freedom of Choice Offered: Yes (home health)  Discharge Location  Discharge Placement: Home with home health    0108 - Pt reports that she would like to go to [facility]. FOC signed. Called pt's daughter Kael Garcia [phone] and she is agreeable for pt to go to short term rehab.     1021 - Pt has been declined by [facility] - no bed available. Bentley Poole with PHR and notified her that pt will need Bipap and she reports that she will review pt now. 1030 - Spoke to Subhash Herrera who states that pt will have to come straight medicaid/LTC as her insurance does not cover rehab and that pt will need to know that her income would go to the SNF. Subhash Herrera states that this pt will need a UAI. Called pt's daughter Kael Garcia [phone] and unable to LVM due to mailbox full. Spoke to pt at bedside and pt is very drowsy at this time. Informed her that this CM would call her daughter again. [address] [place] [address] Dr pt's daughter Kael Garcia [phone] again and unable to LVM as mailbox is full but SMS notification sent with call back phone number. 200 - Called pt's daughter Kael Garcia [phone] who states that she thinks that pt may have medicare. Informed her that we only have her medicaid card on file and that if she has medicare we will need a copy of her card.  Pt's daughter states that she would like pt to come home with home health and personal care if she does not have medicare because pt won't consent to not getting her disability check while at SNF. Pt is active with [facility]. [facility] telephone consent for [facility] for home health and personal care. Called Dr Joe Yost to notify. 1250 - Pt awake and alert and refuses to go to SNF and states \"they ain't getting my check. \" Pt agreeable to go home with home health and personal care. Pt and daughter aware that pt will need 24/7 care until personal care aid is set up and that it may be a week before it processed by  and the company can schedule a nurse to come out. Verbalized understanding. [facility] and set up transportation for this [facility] at 1600. Called [employer] [phone]. Requested [facility] with confirmation # [id number]. Pt, nursing aware. Called pt's daughter but unable to LVM. Pt states that she told her daughter and that her daughter will be home from picking up her kids from school. 1600 - UAI submitted for pt.

## 2018-03-01 NOTE — PROGRESS NOTES
SUBJECTIVE:    Off and on knee and hip pain. States she only used BiPAP for an hour and does not like to sleep with mask on. Denies for chest or abdominal pain. No N/V/D. Was taking coumadin at home - confirmed with her daughter @ [phone], she also mentioned that patient does not use CPAP. OBJECTIVE:    BP (!) 171/99 (BP 1 Location: Left arm, BP Patient Position: At rest)  Pulse 71  Temp 97.6 °F (36.4 °C)  Resp 22  Ht 5' (1.524 m)  Wt 141.9 kg (312 lb 12.8 oz)  SpO2 91%  Breastfeeding? No  BMI 61.09 kg/m2     General: NAD, O X 3  CVS: IRIR  RS: Diminished BS in bases, no wheezes  GI: NT, BS +  Extremities: pedal edema  CNS: moves both UEs well     ASSESSMENT:    1. Acute on chronic hypoxic hypercapnic respiratory failure due to Severe Complex sleep apnea with hypoxemia. 2. Acute on chronic diastolic heart failure. EF 60% . 3. Hypertension and hypertensive heart disease  4. Pulmonary hypertension,Obesity hypoventilation syndrome  AND Obstructive sleep apnea, non compliant with CPAP machine. 5. Obesity with likely obesity ventilation syndrome. 6. Persistent atrial fibrillation. 7. Congenital heart disease, status post unknown repair as a child.    8. Dyslipidemia. 9. H/o polysubstance abuse. 10. H/o schizoaffective disorder. 11. H/o non-compliance.   12. Hypothyroidism     PLAN:    Cont current management  Cardiology and pulmonology to follow  Use oxygen during day time and CPAP/BiPAP nighttime  D/w patient and her daughter about risk of sudden cardiac death if she remains non-complaint to CPAP  Restart coumadin  Discharge patient to SNF    CMP:   Lab Results   Component Value Date/Time     03/01/2018 03:05 AM    K 3.8 03/01/2018 03:05 AM    CL 93 (L) 03/01/2018 03:05 AM    CO2 35 (H) 03/01/2018 03:05 AM    AGAP 9 03/01/2018 03:05 AM     (H) 03/01/2018 03:05 AM    BUN 10 03/01/2018 03:05 AM    CREA 1.06 03/01/2018 03:05 AM    GFRAA >60 03/01/2018 03:05 AM    GFRNA 53 (L) 03/01/2018 03:05 AM    CA 8.4 (L) 03/01/2018 03:05 AM    MG 2.2 03/01/2018 03:05 AM     CBC:   No results found for: WBC, HGB, HGBEXT, HCT, HCTEXT, PLT, PLTEXT, HGBEXT, HCTEXT, PLTEXT    Current Discharge Medication List      START taking these medications    Details   docusate sodium (COLACE) 100 mg capsule Take 1 Cap by mouth two (2) times a day for 90 days. Qty: 60 Cap, Refills: 2      diclofenac (VOLTAREN) 1 % gel Apply 2 g to affected area three (3) times daily as needed for Pain. Gel should be measured and applied using the supplied dosing card. Apply dose (2 gm or 4 gm) to each location. APPLY TO knees and hips  Qty: 100 g, Refills: 0      multivitamin, tx-iron-ca-min (THERA-M W/ IRON) 9 mg iron-400 mcg tab tablet Take 1 Tab by mouth daily. Qty: 30 Tab, Refills: 0         CONTINUE these medications which have CHANGED    Details   aspirin delayed-release 81 mg tablet Take 1 Tab by mouth daily. Qty: 30 Tab, Refills: 0         CONTINUE these medications which have NOT CHANGED    Details   metoprolol tartrate (LOPRESSOR) 50 mg tablet Take 25 mg by mouth two (2) times a day. lisinopril-hydroCHLOROthiazide (ZESTORETIC) 20-25 mg per tablet Take  by mouth daily. furosemide (LASIX) 40 mg tablet 40 mg po daily  Qty: 30 Tab, Refills: 0      isosorbide mononitrate ER (IMDUR) 30 mg tablet TAKE ONE TABLET BY MOUTH EVERY EVENING  Qty: 30 Tab, Refills: 7      PARoxetine (PAXIL) 20 mg tablet Take 1 Tab by mouth daily. Qty: 30 Tab, Refills: 3      simvastatin (ZOCOR) 40 mg tablet Take  by mouth nightly. to obtain from pharmacy      aripiprazole (ABILIFY) 15 mg tablet Take 15 mg by mouth daily. to obtain from pharmacy      levothyroxine (SYNTHROID) 125 mcg tablet Take 125 mcg by mouth Daily (before breakfast). loperamide (IMODIUM) 2 mg capsule Take 2 mg by mouth three (3) times daily as needed for Diarrhea.      potassium chloride SR (KLOR-CON 10) 10 mEq tablet Take 20 mEq by mouth daily.       raNITIdine (ZANTAC) 150 mg tablet Take 150 mg by mouth two (2) times a day. warfarin (COUMADIN) 5 mg tablet Take 5 mg by mouth daily. VENTOLIN HFA 90 mcg/actuation inhaler Take 2 Puffs by inhalation every four (4) hours as needed for Wheezing or Shortness of Breath.   Qty: 1 Inhaler, Refills: 0         STOP taking these medications       ibuprofen (MOTRIN) 800 mg tablet Comments:   Reason for Stopping:         HYDROcodone-acetaminophen (NORCO) 7.5-325 mg per tablet Comments:   Reason for Stopping:         lisinopril (PRINIVIL, ZESTRIL) 20 mg tablet Comments:   Reason for Stopping:         famotidine (PEPCID) 20 mg tablet Comments:   Reason for Stopping:         oxyCODONE-acetaminophen (PERCOCET) 5-325 mg per tablet Comments:   Reason for Stopping:

## 2018-03-01 NOTE — ROUTINE PROCESS
Bedside report received from Bev Chen RN, patient awake with family members in. Patient's call light in reach, pure wick in place. No complaints, patient encouraged to call for assistance as needed.

## 2018-03-01 NOTE — DISCHARGE SUMMARY
[address]  Face to Face Encounter    Patients Name: Tanvi Villalba    YOB: 1960    Primary Diagnosis: CHF, ANDRADE, Non-complaint, generalized weakness    Date of Face to Face:   3/1/18                                    Face to Face Encounter findings are related to primary reason for home care:   yes    1. I certify that the patient needs intermittent skilled nursing care, physical therapy and/or speech therapy. I will not be following this patient in the Community and Dr. Cass Mayer MD  will be responsible for signing the [address] 2. Initial Orders for Care: Must be completed only if Face to Face MD will not be signing the [address] Skilled Nursing, Physical Therapy and Occupational Therapy    3. I certify that this patient is homebound for the following reason(s): Requires considerable and taxing effort to leave the home  and Requires the assistance of 1 or more persons to leave the home     4. I certify that this patient is under my care and that I, or a nurse practitioner or [facility] working with me, had a Face-to-Face Encounter that meets the physician Face-to-Face Encounter requirements. Document the physical findings from the Face-to-Face Encounter that support the need for skilled services: Has wound that requires skilled nursing assessment and treatment , Has new diagnosis that requires skilled nursing teaching and intervention  and Has new finding of weakness and altered mobility that requires skilled physical/occupational and/or speech therapy services for evaluation and interventions.      Jean Glover MD  3/1/2018

## 2018-03-01 NOTE — DISCHARGE SUMMARY
PATIENT DISCHARGE INSTRUCTIONS      PATIENT DISCHARGE INSTRUCTIONS    Merline Goodwill / [id number] : 1960    Admitted 2018 Discharged: 3/1/2018     Dictated # [id number]    · It is important that you take the medication exactly as they are prescribed. · Keep your medication in the bottles provided by the pharmacist and keep a list of the medication names, dosages, and times to be taken in your wallet. · Do not take other medications without consulting your doctor. What to do at Home    Recommended Diet: Cardiac Diet    Recommended Activity: PT/OT per Home Health    Current Discharge Medication List      START taking these medications    Details   docusate sodium (COLACE) 100 mg capsule Take 1 Cap by mouth two (2) times a day for 90 days. Qty: 60 Cap, Refills: 2      multivitamin, tx-iron-ca-min (THERA-M W/ IRON) 9 mg iron-400 mcg tab tablet Take 1 Tab by mouth daily. Qty: 30 Tab, Refills: 0      diclofenac (VOLTAREN) 1 % gel Apply 2 g to affected area three (3) times daily as needed for Pain. Gel should be measured and applied using the supplied dosing card. Apply dose (2 gm or 4 gm) to each location. APPLY TO knees and hips  Qty: 100 g, Refills: 0         CONTINUE these medications which have CHANGED    Details   aspirin delayed-release 81 mg tablet Take 1 Tab by mouth daily. Qty: 30 Tab, Refills: 0         CONTINUE these medications which have NOT CHANGED    Details   metoprolol tartrate (LOPRESSOR) 50 mg tablet Take 25 mg by mouth two (2) times a day. lisinopril-hydroCHLOROthiazide (ZESTORETIC) 20-25 mg per tablet Take  by mouth daily. furosemide (LASIX) 40 mg tablet 40 mg po daily  Qty: 30 Tab, Refills: 0      isosorbide mononitrate ER (IMDUR) 30 mg tablet TAKE ONE TABLET BY MOUTH EVERY EVENING  Qty: 30 Tab, Refills: 7      PARoxetine (PAXIL) 20 mg tablet Take 1 Tab by mouth daily. Qty: 30 Tab, Refills: 3      simvastatin (ZOCOR) 40 mg tablet Take  by mouth nightly.  to obtain from pharmacy      aripiprazole (ABILIFY) 15 mg tablet Take 15 mg by mouth daily. to obtain from pharmacy      levothyroxine (SYNTHROID) 125 mcg tablet Take 125 mcg by mouth Daily (before breakfast). loperamide (IMODIUM) 2 mg capsule Take 2 mg by mouth three (3) times daily as needed for Diarrhea.      potassium chloride SR (KLOR-CON 10) 10 mEq tablet Take 20 mEq by mouth daily. raNITIdine (ZANTAC) 150 mg tablet Take 150 mg by mouth two (2) times a day. warfarin (COUMADIN) 5 mg tablet Take 5 mg by mouth daily. VENTOLIN HFA 90 mcg/actuation inhaler Take 2 Puffs by inhalation every four (4) hours as needed for Wheezing or Shortness of Breath.   Qty: 1 Inhaler, Refills: 0         STOP taking these medications       ibuprofen (MOTRIN) 800 mg tablet Comments:   Reason for Stopping:         HYDROcodone-acetaminophen (Isma Staff) 7.5-325 mg per tablet Comments:   Reason for Stopping:         lisinopril (PRINIVIL, ZESTRIL) 20 mg tablet Comments:   Reason for Stopping:         famotidine (PEPCID) 20 mg tablet Comments:   Reason for Stopping:         oxyCODONE-acetaminophen (PERCOCET) 5-325 mg per tablet Comments:   Reason for Stopping:                 Signed By: Clarisa Morgan MD     March 1, 2018

## 2018-03-01 NOTE — ROUTINE PROCESS
Bedside and Verbal shift change report given to kristopher nava (oncoming nurse) by Diana Finnegan RN (offgoing nurse). Report included the following information SBAR, Kardex, MAR and Recent Results. SITUATION:    Code Status: Full Code   Reason for Admission: CHF (congestive heart failure) ([facility]    [facility] day: 2   Problem List:       Hospital Problems  Date Reviewed: 11/28/2017          Codes Class Noted POA    CHF (congestive heart failure) ([facility] ICD-10-CM: I50.9  ICD-9-CM: 428.0  4/18/2017 Unknown              BACKGROUND:    Past Medical History:   Past Medical History:   Diagnosis Date    Atrial fibrillation     CHADS score 1  (-CHF, +HTN, -AGE, -DM, -CVA)    Carpal tunnel syndrome     Chronic pain     Congenital heart disease     presumed pulmonary stenosis s/p repair 1969    Degenerative disc disease     Degenerative joint disease     Dyslipidemia     GERD     H/O echocardiogram 04/2017    EF 60-65%, mild concentric LVH, dilated RV with RV dysfunction, RVSP 54 mmHg, RA E, moderate to severe pulmonic regurgitation.     Hypertension     Hypothyroid     Morbid obesity     Morbid obesity with BMI of 45.0-49.9, adult ([facility] 2/20/2017    Noncompliance     Schizoaffective disorder     Substance abuse     marijuana, crack cocaine         Patient taking anticoagulants yes     ASSESSMENT:    Changes in Assessment Throughout Shift: none     Patient has Central Line: no Reasons if yes:    Patient has Shaikh Cath: no Reasons if yes:       Last Vitals:     Vitals:    02/28/18 0828 02/28/18 1209 02/28/18 1700 02/28/18 1915   BP: 125/58 124/85 123/56 91/62   Pulse: 78 74 (!) 58 87   Resp: 16 14 14 22   Temp: 97 °F (36.1 °C) 96.9 °F (36.1 °C) 97.6 °F (36.4 °C) 98.4 °F (36.9 °C)   SpO2: 100% 99% 99% 98%   Weight:       Height:            IV and DRAINS (will only show if present)   External Female Catheter 02/27/18-Site Assessment: Clean, dry, & intact  Peripheral IV 02/26/18 Left External jugular-Site Assessment: Clean, dry, & intact     WOUND (if present)   Wound Type:  none   Dressing present Dressing Present : No   Wound Concerns/Notes:  none     PAIN    Pain Assessment    Pain Intensity 1: 0 (02/28/18 1519)    Pain Location 1: Hip    Pain Intervention(s) 1: Medication (see MAR)    Patient Stated Pain Goal: 0  o Interventions for Pain:  med  o Intervention effective: yes  o Time of last intervention: see mar   o Reassessment Completed: yes      Last 3 Weights:  Last 3 Recorded Weights in this Encounter    02/26/18 2239 02/27/18 0531 02/28/18 0427   Weight: 151.5 kg (333 lb 14.4 oz) 151.5 kg (333 lb 14.4 oz) 144.6 kg (318 lb 12.8 oz)     Weight change: -6.849 kg (-15 lb 1.6 oz)     INTAKE/OUPUT    Current Shift:      Last three shifts: 02/27 0701 - 02/28 1900  In: 440 [P.O.:440]  Out: 7650 [Urine:7650]     LAB RESULTS     Recent Labs      02/28/18   0330  02/27/18   0306  02/26/18   1630   WBC   --   4.6  5.2   HGB  8.5*  8.3*  8.7*   HCT  31.6*  30.5*  31.0*   PLT   --   209  208        Recent Labs      02/28/18   0330  02/27/18   0306  02/26/18   1630   NA  139  139  140   K  3.4*  3.7  3.9   GLU  115*  115*  78   BUN  10  11  12   CREA  0.98  1.00  0.87   CA  8.1*  8.2*  8.4*   MG  2.0   --    --        RECOMMENDATIONS AND DISCHARGE PLANNING     1. Pending tests/procedures/ Plan of Care or Other Needs:      2. Discharge plan for patient and Needs/Barriers:     3. Estimated Discharge Date: tbd Posted on Whiteboard in [facility]: yes      4. The patient's care plan was reviewed with the oncoming nurse. \"HEALS\" SAFETY CHECK      Fall Risk    Total Score: 2    Safety Measures: Safety Measures: Bed/Chair-Wheels locked    A safety check occurred in the patient's room between off going nurse and oncoming nurse listed above.     The safety check included the below items  Area Items   H  High Alert Medications - Verify all high alert medication drips (heparin, PCA, etc.)   E  Equipment - Suction is set up for ALL patients (with danya)  - Red plugs utilized for all equipment (IV pumps, etc.)  - WOWs wiped down at end of shift.  - Room stocked with oxygen, suction, and other unit-specific supplies   A  Alarms - Bed alarm is set for fall risk patients  - Ensure chair alarm is in place and activated if patient is up in a chair   L  Lines - Check IV for any infiltration  - Shaikh bag is empty if patient has a Shaikh   - Tubing and IV bags are labeled   S  Safety   - Room is clean, patient is clean, and equipment is clean. - Hallways are clear from equipment besides carts. - Fall bracelet on for fall risk patients  - Ensure room is clear and free of clutter  - Suction is set up for ALL patients (with danya)  - Hallways are clear from equipment besides carts.    - Isolation precautions followed, supplies available outside room, sign posted     Ema Mendoza RN

## 2018-03-01 NOTE — ROUTINE PROCESS
Bedside and Verbal shift change report given to Kaylee Sage RN (oncoming nurse) by Rayshawn Heredia RN (offgoing nurse). Report included the following information SBAR, Kardex, MAR and Recent Results. SITUATION:    Code Status: Full Code   Reason for Admission: CHF (congestive heart failure) ([facility]    [facility] day: 3   Problem List:       Hospital Problems  Date Reviewed: 11/28/2017          Codes Class Noted POA    CHF (congestive heart failure) ([facility] ICD-10-CM: I50.9  ICD-9-CM: 428.0  4/18/2017 Unknown              BACKGROUND:    Past Medical History:   Past Medical History:   Diagnosis Date    Atrial fibrillation     CHADS score 1  (-CHF, +HTN, -AGE, -DM, -CVA)    Carpal tunnel syndrome     Chronic pain     Congenital heart disease     presumed pulmonary stenosis s/p repair 1969    Degenerative disc disease     Degenerative joint disease     Dyslipidemia     GERD     H/O echocardiogram 04/2017    EF 60-65%, mild concentric LVH, dilated RV with RV dysfunction, RVSP 54 mmHg, RA E, moderate to severe pulmonic regurgitation.     Hypertension     Hypothyroid     Morbid obesity     Morbid obesity with BMI of 45.0-49.9, adult ([facility] 2/20/2017    Noncompliance     Schizoaffective disorder     Substance abuse     marijuana, crack cocaine         Patient taking anticoagulants yes     ASSESSMENT:    Changes in Assessment Throughout Shift: none     Patient has Central Line: no Reasons if yes:    Patient has Shaikh Cath: no Reasons if yes:       Last Vitals:     Vitals:    02/28/18 1700 02/28/18 1915 02/28/18 2323 03/01/18 0304   BP: 123/56 91/62 136/83 117/81   Pulse: (!) 58 87 69 80   Resp: 14 22 20 20   Temp: 97.6 °F (36.4 °C) 98.4 °F (36.9 °C) 98 °F (36.7 °C) 97.7 °F (36.5 °C)   SpO2: 99% 98% 96% 97%   Weight:       Height:            IV and DRAINS (will only show if present)   External Female Catheter 02/27/18-Site Assessment: Clean, dry, & intact  Peripheral IV 02/26/18 Left External jugular-Site Assessment: Clean, dry, & intact     WOUND (if present)   Wound Type:  none   Dressing present Dressing Present : No   Wound Concerns/Notes:  none     PAIN    Pain Assessment    Pain Intensity 1: 8 (02/28/18 2323)    Pain Location 1: Hip    Pain Intervention(s) 1: Medication (see MAR), Position    Patient Stated Pain Goal: 0  o Interventions for Pain:  med  o Intervention effective: yes  o Time of last intervention: see mar   o Reassessment Completed: yes      Last 3 Weights:  Last 3 Recorded Weights in this Encounter    02/26/18 2239 02/27/18 0531 02/28/18 0427   Weight: 151.5 kg (333 lb 14.4 oz) 151.5 kg (333 lb 14.4 oz) 144.6 kg (318 lb 12.8 oz)     Weight change:      INTAKE/OUPUT    Current Shift:      Last three shifts: 02/27 0701 - 02/28 1900  In: 440 [P.O.:440]  Out: 7650 [Urine:7650]     LAB RESULTS     Recent Labs      02/28/18   0330  02/27/18   0306  02/26/18   1630   WBC   --   4.6  5.2   HGB  8.5*  8.3*  8.7*   HCT  31.6*  30.5*  31.0*   PLT   --   209  208        Recent Labs      02/28/18   0330  02/27/18   0306  02/26/18   1630   NA  139  139  140   K  3.4*  3.7  3.9   GLU  115*  115*  78   BUN  10  11  12   CREA  0.98  1.00  0.87   CA  8.1*  8.2*  8.4*   MG  2.0   --    --        RECOMMENDATIONS AND DISCHARGE PLANNING     1. Pending tests/procedures/ Plan of Care or Other Needs:      2. Discharge plan for patient and Needs/Barriers:     3. Estimated Discharge Date: tbd Posted on Whiteboard in [facility]: yes      4. The patient's care plan was reviewed with the oncoming nurse. \"HEALS\" SAFETY CHECK      Fall Risk    Total Score: 3    Safety Measures: Safety Measures: Bed/Chair-Wheels locked, Bed in low position, Call light within reach, Fall prevention (comment)    A safety check occurred in the patient's room between off going nurse and oncoming nurse listed above.     The safety check included the below items  Area Items   H  High Alert Medications - Verify all high alert medication drips (heparin, PCA, etc.)   E  Equipment - Suction is set up for ALL patients (with danya)  - Red plugs utilized for all equipment (IV pumps, etc.)  - WOWs wiped down at end of shift.  - Room stocked with oxygen, suction, and other unit-specific supplies   A  Alarms - Bed alarm is set for fall risk patients  - Ensure chair alarm is in place and activated if patient is up in a chair   L  Lines - Check IV for any infiltration  - Shaikh bag is empty if patient has a Shaikh   - Tubing and IV bags are labeled   S  Safety   - Room is clean, patient is clean, and equipment is clean. - Hallways are clear from equipment besides carts. - Fall bracelet on for fall risk patients  - Ensure room is clear and free of clutter  - Suction is set up for ALL patients (with danya)  - Hallways are clear from equipment besides carts.    - Isolation precautions followed, supplies available outside room, sign posted     Lyndon Stout RN

## 2018-03-01 NOTE — PROGRESS NOTES
Problem: Mobility Impaired (Adult and Pediatric)  Goal: *Acute Goals and Plan of Care (Insert Text)  STG's to be addressed within 3 days:  1. Bed mobility:  Supine to sit to supine CGA with HR for meals. 2. Activity tolerance: Tolerate up in chair 1-2 hrs for ADLs. 3. Transfers:  Sit to stand to chair min/CGA with RW for ADL's. LTG's to be addressed within 7 days:  1. Standing/Ambulation Balance:  Increase to Good with RW for safe transfers and gait. 2. Ambulation:  Ambulate > 50 ft CGA with RW for home mobility. 3. Patient Education:  Independent with HEP for home safety. Outcome: Progressing Towards Goal  physical Therapy TREATMENT    Patient: Laura Parrish (3 y.o. female)  Date: 3/1/2018  Diagnosis: CHF (congestive heart failure) ([facility] <principal problem not specified>  Precautions: Fall, Aspiration  Chart, physical therapy assessment, plan of care and goals were reviewed. ASSESSMENT:  Patient required increased assistance today with all mobility. Patient continues to required increased verbal/tactile cues for hand placement and for task sequencing with bed mobility and with transitioning to sit EoB. Patient required mod A x 2 for supine<-->sit, increased dyspnea noted with activity, vc's for deep, pursed lip breathing for respiratory recovery. Patient with good return demonstration. Patient tolerated sitting EoB ~ 20 minutes, self supported, instructed patient with MAYANK Cancino, patient with good return demonstration. Patient verbalized increased fatigue, requested to return to laying down. Assisted patient back to semi-reclined position in bed. Will continue to progress patient as tolerated.   Progression toward goals:  []      Improving appropriately and progressing toward goals  [x]      Improving slowly and progressing toward goals  []      Not making progress toward goals and plan of care will be adjusted     PLAN:  Patient continues to benefit from skilled intervention to address the above impairments. Continue treatment per established plan of care. Discharge Recommendations:  Zechariah Franck  Further Equipment Recommendations for Discharge: To be determined     G-CODES:     Mobility [id number] Current  CL= 60-79%   Goal  CI= 1-19%. The severity rating is based on the Level of Assistance required for Functional Mobility and ADLs. SUBJECTIVE:   Patient stated I will try.     OBJECTIVE DATA SUMMARY:   Critical Behavior:  Neurologic State: Alert  Orientation Level: Oriented to person, Oriented to place  Cognition: Follows commands  Safety/Judgement: Decreased insight into deficits, Decreased awareness of need for assistance, Decreased awareness of need for safety  Functional Mobility Training:  Bed Mobility:  Rolling: Minimum assistance; Additional time  Supine to Sit: Moderate assistance;Assist x2; Additional time  Sit to Supine: Moderate assistance;Assist x2; Additional time  Scooting: Maximum assistance;Assist x2; Additional time  Transfers:  Sit to Stand:  (N/A)  Balance:  Sitting: Impaired; With support  Sitting - Static: Good (unsupported)  Sitting - Dynamic: Fair (occasional)  Standing:  (N/A)  Ambulation/Gait Training:  Ambulation - Level of Assistance:  (N/A)  Therapeutic Exercises:   B LE-AP/circles/LAQs/HR/TR-1 set x 10 reps each  Pain:  Pt reports 0/10 pain or discomfort prior to treatment.    Pt reports 0/10 pain or discomfort post treatment. Activity Tolerance:   Fair/good  Please refer to the flowsheet for vital signs taken during this treatment.   After treatment:   [] Patient left in no apparent distress sitting up in chair  [x] Patient left in no apparent distress in bed  [x] Call bell left within reach  [] Nursing notified   [] Caregiver present  [] Bed alarm activated   [] SCDs applied to [facility], PT   Time Calculation: 27 mins

## 2018-03-01 NOTE — PROGRESS NOTES
[facility] - Progress Note  Admit Date: 2/26/2018    Assessment:     Hospital Problems  Date Reviewed: 11/28/2017          Codes Class Noted POA    CHF (congestive heart failure) ([facility] ICD-10-CM: I50.9  ICD-9-CM: 428.0  4/18/2017 Unknown                -Acute on chronic respiratory distress multifactorial in setting of below.  -Acute on chronic diastolic heart failure. EF 55-60% with mild to moderate concentric LVH, a moderately dilated right ventricle with reduced systolic function on echo 4/2017. Suspect patient with mostly right sided CHF. -Hypertension uncontrolled on arrival. Now on low side.   -Pulmonary hypertension severe. RVSP 67 mmHg on echo this admission.  -Obstructive sleep apnea, non compliant with CPAP machine.  -Obesity with likely obesity ventilation syndrome.  -Acute on chronic anemia.  -Persistent atrial fibrillation, rate control with BB (low dose given history of bradycardia) and CVA prophylaxis with ASA (given noncompliance). But apparently patient was recently started on warfarin as outpatient?  -Congenital heart disease, status post unknown repair as a child. She does have residual pulmonary hypertension and atrial fibrillation as described above. -Dyslipidemia. -H/o polysubstance abuse. -H/o schizoaffective disorder. -H/o noncompliance. Plan:     Volume status difficult to assess but likely volume stable, transitioned to PO lasix. Hemodynamics stable, now on imdur, lopressor, prinzide. Patient apparently was on warfarin as outpatient, so this has been resumed. Continue pulmonary treatment and support. Plan to discharge later today. Subjective:     No new complaints.      Objective:      Patient Vitals for the past 8 hrs:   Temp Pulse Resp BP SpO2   03/01/18 1141 97.4 °F (36.3 °C) 75 20 117/77 95 %   03/01/18 0825 97.6 °F (36.4 °C) 71 22 (!) 171/99 91 %         Patient Vitals for the past 96 hrs:   Weight   03/01/18 0432 141.9 kg (312 lb 12.8 oz)   02/28/18 1857 144.6 kg (318 lb 12.8 oz)   02/27/18 0531 151.5 kg (333 lb 14.4 oz)   02/26/18 2239 151.5 kg (333 lb 14.4 oz)                    Intake/Output Summary (Last 24 hours) at 03/01/18 1156  Last data filed at 03/01/18 2377   Gross per 24 hour   Intake              380 ml   Output             3900 ml   Net            -3520 ml       Physical Exam:  General:  no distress  Neck:  no JVD  Lungs:  diminished breath sounds   Heart:  regular rate and rhythm  Abdomen:  abdomen is soft obese  Extremities:   edema    Data Review:     Labs: Results:       Chemistry Recent Labs      03/01/18   0305  02/28/18   0330  02/27/18   0306  02/26/18   1630   GLU  102*  115*  115*  78   NA  137  139  139  140   K  3.8  3.4*  3.7  3.9   CL  93*  96*  98*  101   CO2  35*  37*  35*  34*   BUN  10  10  11  12   CREA  1.06  0.98  1.00  0.87   CA  8.4*  8.1*  8.2*  8.4*   MG  2.2  2.0   --    --    AGAP  9  6  6  5   BUCR  9*  10*  11*  14   AP   --    --    --   135*   TP   --    --    --   7.2   ALB   --    --    --   3.5   GLOB   --    --    --   3.7   AGRAT   --    --    --   0.9      CBC w/Diff Recent Labs      02/28/18   0330  02/27/18   0306  02/26/18   1630   WBC   --   4.6  5.2   RBC   --   3.67*  3.83*   HGB  8.5*  8.3*  8.7*   HCT  31.6*  30.5*  31.0*   PLT   --   209  208   GRANS   --   66  71   LYMPH   --   22  17*   EOS   --   3  3      Cardiac Enzymes No results found for: CPK, CK, CKMMB, CKMB, RCK3, CKMBT, CKNDX, CKND1, SHANNAN, TROPT, TROIQ, JELANI, TROPT, TNIPOC, BNP, BNPP   Coagulation Recent Labs      03/01/18   1040   PTP  19.7*   INR  1.7*       Lipid Panel Lab Results   Component Value Date/Time    Cholesterol, total 168 10/06/2010 12:17 PM    HDL Cholesterol 37 (L) 10/06/2010 12:17 PM    LDL, calculated 82.2 10/06/2010 12:17 PM    VLDL, calculated 48.8 10/06/2010 12:17 PM    Triglyceride 244 (H) 10/06/2010 12:17 PM    CHOL/HDL Ratio 4.5 10/06/2010 12:17 PM      BNP Lab Results   Component Value Date/Time    B-type Natriuretic Peptide 3187 (H) 05/06/2016 11:10 AM    B-type Natriuretic Peptide 3075 (H) 05/06/2016 11:00 AM      Liver Enzymes Recent Labs      02/26/18   1630   TP  7.2   ALB  3.5   AP  135*   SGOT  19      Digoxin    Thyroid Studies Lab Results   Component Value Date/Time    T4, Total 12.3 (H) 01/15/2014 02:01 AM    TSH 3.63 02/27/2018 05:45 PM          Signed By: VERONICA Valentin     March 1, 2018

## 2018-03-01 NOTE — PROGRESS NOTES
D/c Patient home with discharge instructions and RX. Removed Iv and telemetry box.   Ambulance escort

## 2018-03-02 NOTE — DISCHARGE SUMMARY
[address] SUMMARY    Name:SYD MCLEOD  MR#: [id number]  : 1960  ACCOUNT #: [de-identified]   ADMIT DATE: 2018  DISCHARGE DATE: 2018    DISPOSITION:  Discharged to discharged to home with home health care for PT, OT , DME as needed and skilled nursing. DISCHARGE CONDITION:  Stable. DISCHARGE DIAGNOSES:  1. Acute on chronic hypercapnic and hypoxic respiratory failure due to severe complex sleep apnea. 2.  Acute on chronic diastolic heart failure, ejection fraction 60%, status post treatment. 3.  Hypertension and hypertensive cardiomyopathy. 4.  Medical noncompliance. 5.  Obesity and hypoventilation syndrome, not using continuous positive airway pressure even though she has continuous positive airway pressure at home. 6.  Pulmonary hypertension. 7.  Persistent atrial fibrillation. 8.  Congenital heart disease, status post unknown repair as a child. 9.  Dyslipidemia. 10.  History of schizoaffective disorder. 11.  History of polysubstance abuse. 12.  History of noncompliance. 13.  Hypothyroidism. DISCHARGE MEDICATION:   1. Lasix 40 mg daily. 2.  Colace 100 mg b.i.d.  3.  Voltaren 1% gel to the affected area, especially knee and hips 3 times a day as needed for pain. 4.  Multivitamin 1 tablet daily. 5.  Aspirin 81 mg daily. 6.  Lopressor 25 mg b.i.d.  7.  Lisinopril, hydrochlorothiazide 20/25 daily. 8.  Imdur 30 mg daily. 9.  Paxil 20 mg daily. 10.  Simvastatin 40 mg daily. 11.  Abilify 15 mg daily. 12.  Synthroid 25 mcg daily. 13.  Imodium 2 mg t.i.d. p.r.n. for diarrhea. 14.  Potassium chloride 20 mEq daily. 15.  Zantac 150 mg b.i.d.  16.  Coumadin 5 mg daily. 17.  Albuterol inhaler 2 puffs q.  4 hours p.r.n. for shortness of breath. IMAGING AND PROCEDURES:  1. Chest x-ray was done at the time of admission, showed cardiomegaly, no acute infiltrate. 2.  Bilateral lower extremity venous Doppler, was negative for DVT.   3. Echocardiogram was done, showed ejection fraction around 55% to 60% with peak pulmonary pressure of 67. CONSULTANTS:    1. Cardiology with Dr. Connie Brooks and group. 2.  Pulmonary with Dr. Re Lockwood and group. HOSPITAL COURSE:  The patient was admitted to the hospital on 02/26/2018 with a complaint of shortness of breath. Please refer to hospital admission H and P for further detail. The patient was found out to have acute on chronic hypercapnic and hypoxic respiratory failure due to underlying sleep apnea, as well as acute on chronic diastolic heart failure. Patient was initially treated with IV Lasix as the patient had bilateral lower extremity swelling. The patient was taking Coumadin at home and as per patient's family, the patient has a nurse who comes to the home and checks INR. Patient had Doppler study negative for DVT. Echocardiogram did not show any new finding other than increasing pulmonary pressure. She was noncompliant with her CPAP. She has been on 2 liters oxygen at home. Compliance was readdressed with the patient. Dr. Re Lockwood from Pulmonology Department also saw this patient. Initially, patient required BiPAP here. However, she did not want to keep BiPAP mask. I talked to Dr. Re Lockwood on the day of discharge, she told me that they will do a followup sleep study with Dr. Ayanna Christian as an outpatient, but right now, we will continue CPAP and go from there. It was discussed with the patient and the patient's daughter, the need for CPAP while she sleeps and continuation of oxygen during daytime. The risk of sudden cardiac death from untreated and worsening sleep apnea was discussed with the patient's daughter over the phone. She verbalized understanding about it. The patient has not walked for a while and has a wheelchair at home, per her daughter. The patient was seen by PT, OT who recommended skilled nursing facility versus home health care.   The patient's payer source does not pay for skilled care at nursing home, so they decided to take her home with home health care. The patient was also seen by cardiologist, initially was on IV Lasix and changed to p.o. Lasix. She will be continued on home medication for hypertension management. Patient's INR is 1.7. She will continue home dose of Coumadin and needs to check INR again around 03/05/2018. DISCHARGE INSTRUCTION:   1. Diet:  Cardiac diet. 2.  Activity:  As tolerated. PT, OT to follow. 3.  Follow up with the primary care physician in 5 days. 4.  Follow up with Dr. Aminata Flores in 2 weeks. 5.  Follow up with Dr. Cherelle Cabrera or Dr. Lara Whelan in 2 weeks total.  6.  Continue oxygen 2 liters during daytime and CPAP during night time. 7.  Patient will remain at high risk for readmission if she remains noncompliant to her CPAP machine. The risk of sudden cardiac death was discussed with the patient and patient's family. They verbalized understanding. Total time greater than 35 minutes.       Frantz Winkler MD       /YUN  D: 03/01/2018 16:05     T: 03/02/2018 11:50  JOB #: [id number]  CC: Jay Jason MD  CC: Leo Smart MD  [address] [place] [address] [address] [address] [facility]  CC: Dr. Abel John

## 2018-03-22 NOTE — TELEPHONE ENCOUNTER
Anatomía de los pulmones  Los pulmones se llenan de aire para suministrar al cuerpo el oxígeno que necesita para funcionar. Al igual que los demás tejidos del cuerpo, los pulmones contienen millones de diminutas células especializadas. Las células pulmonares [place] mueren y son reemplazadas por idénticas células nuevas. Onelia proceso natural ayuda a mantener la hugh de los pulmones.    Date Last Reviewed: 8/4/2014  © 5337-9869 [employer]. [address] [place] [address], [place] [address]. Todos los derechos reservados. Esta información no pretende sustituir la atención médica profesional. Sólo oliver médico puede diagnosticar y tratar un problema de hugh.         Patient called today states per her Orthopedic Dr. Mi Mcginnis at [facility] she needs appointment  To get blood pressure under control before having knee surgery. Per pt her blood pressure was 181/79 pt states she had taken her medications patient could not verify what medications she is taking. Patient states she is feeling fine she denies and chest pain, sob, dizziness. Patient was was cleared for the knee surgery per Dr. Severiano Skeeter last office note  10/12/17- \"Patient states she will likely require to undergo a knee replacement in the near future. She would be an acceptable risk from a cardiac standpoint to proceed with this procedure. \"    I discussed with Jill Novak RN- patient should come in for appointment with Ely John NP for medication adjustments. Patient aware scheduled appointment with Ely John NP 11/28/17 patient aware to bring the bottles of all her medications she is taking with her to the appointment. Patient verbalized understanding.   Mackenzie Juarez

## 2018-04-24 RX ORDER — ISOSORBIDE MONONITRATE 30 MG/1
30 TABLET, EXTENDED RELEASE ORAL EVERY EVENING
Qty: 90 TAB | Refills: 3 | Status: SHIPPED | OUTPATIENT
Start: 2018-04-24

## 2018-05-03 ENCOUNTER — HOSPITAL ENCOUNTER (INPATIENT)
Age: 58
LOS: 7 days | Discharge: HOME HEALTH | DRG: 201 | End: 2018-05-10
Attending: EMERGENCY MEDICINE | Admitting: EMERGENCY MEDICINE
Payer: MEDICAID

## 2018-05-03 ENCOUNTER — APPOINTMENT (OUTPATIENT)
Dept: ULTRASOUND IMAGING | Age: 58
DRG: 201 | End: 2018-05-03
Attending: EMERGENCY MEDICINE
Payer: MEDICAID

## 2018-05-03 DIAGNOSIS — I48.20 CHRONIC ATRIAL FIBRILLATION (HCC): Primary | ICD-10-CM

## 2018-05-03 DIAGNOSIS — R00.1 BRADYCARDIA: ICD-10-CM

## 2018-05-03 DIAGNOSIS — K92.2 LOWER GI BLEED: ICD-10-CM

## 2018-05-03 DIAGNOSIS — Z79.01 CHRONIC ANTICOAGULATION: ICD-10-CM

## 2018-05-03 LAB
ABO + RH BLD: NORMAL
ANION GAP SERPL CALC-SCNC: 4 MMOL/L (ref 3–18)
APTT PPP: 51.4 SEC (ref 23–36.4)
ATRIAL RATE: 46 BPM
ATRIAL RATE: 55 BPM
BASOPHILS # BLD: 0 K/UL (ref 0–0.1)
BASOPHILS NFR BLD: 0 % (ref 0–2)
BLOOD GROUP ANTIBODIES SERPL: NORMAL
BNP SERPL-MCNC: 2802 PG/ML (ref 0–900)
BUN SERPL-MCNC: 22 MG/DL (ref 7–18)
BUN/CREAT SERPL: 19 (ref 12–20)
CALCIUM SERPL-MCNC: 7.9 MG/DL (ref 8.5–10.1)
CALCULATED R AXIS, ECG10: -31 DEGREES
CALCULATED R AXIS, ECG10: -37 DEGREES
CALCULATED T AXIS, ECG11: -73 DEGREES
CALCULATED T AXIS, ECG11: -80 DEGREES
CHLORIDE SERPL-SCNC: 104 MMOL/L (ref 100–108)
CHOLEST SERPL-MCNC: 90 MG/DL
CK MB CFR SERPL CALC: 1.1 % (ref 0–4)
CK MB CFR SERPL CALC: 1.1 % (ref 0–4)
CK MB CFR SERPL CALC: 1.2 % (ref 0–4)
CK MB SERPL-MCNC: 1.4 NG/ML (ref 5–25)
CK MB SERPL-MCNC: 1.5 NG/ML (ref 5–25)
CK MB SERPL-MCNC: 1.5 NG/ML (ref 5–25)
CK SERPL-CCNC: 130 U/L (ref 26–192)
CK SERPL-CCNC: 130 U/L (ref 26–192)
CK SERPL-CCNC: 139 U/L (ref 26–192)
CO2 SERPL-SCNC: 32 MMOL/L (ref 21–32)
CREAT SERPL-MCNC: 1.18 MG/DL (ref 0.6–1.3)
D DIMER PPP FEU-MCNC: 0.86 UG/ML(FEU)
DIAGNOSIS, 93000: NORMAL
DIAGNOSIS, 93000: NORMAL
DIFFERENTIAL METHOD BLD: ABNORMAL
EOSINOPHIL # BLD: 0.2 K/UL (ref 0–0.4)
EOSINOPHIL NFR BLD: 5 % (ref 0–5)
ERYTHROCYTE [DISTWIDTH] IN BLOOD BY AUTOMATED COUNT: 16.8 % (ref 11.6–14.5)
GLUCOSE SERPL-MCNC: 112 MG/DL (ref 74–99)
HCT VFR BLD AUTO: 29.2 % (ref 35–45)
HCT VFR BLD AUTO: 30.9 % (ref 35–45)
HDLC SERPL-MCNC: 34 MG/DL (ref 40–60)
HDLC SERPL: 2.6 {RATIO} (ref 0–5)
HGB BLD-MCNC: 8.3 G/DL (ref 12–16)
HGB BLD-MCNC: 8.6 G/DL (ref 12–16)
INR PPP: 2.7 (ref 0.8–1.2)
LDLC SERPL CALC-MCNC: 41.4 MG/DL (ref 0–100)
LIPID PROFILE,FLP: ABNORMAL
LYMPHOCYTES # BLD: 0.9 K/UL (ref 0.9–3.6)
LYMPHOCYTES NFR BLD: 21 % (ref 21–52)
MCH RBC QN AUTO: 21.9 PG (ref 24–34)
MCHC RBC AUTO-ENTMCNC: 28.4 G/DL (ref 31–37)
MCV RBC AUTO: 77 FL (ref 74–97)
MONOCYTES # BLD: 0.4 K/UL (ref 0.05–1.2)
MONOCYTES NFR BLD: 9 % (ref 3–10)
NEUTS SEG # BLD: 2.6 K/UL (ref 1.8–8)
NEUTS SEG NFR BLD: 65 % (ref 40–73)
PLATELET # BLD AUTO: 228 K/UL (ref 135–420)
PMV BLD AUTO: 9.5 FL (ref 9.2–11.8)
POTASSIUM SERPL-SCNC: 3.4 MMOL/L (ref 3.5–5.5)
PROTHROMBIN TIME: 27.5 SEC (ref 11.5–15.2)
Q-T INTERVAL, ECG07: 478 MS
Q-T INTERVAL, ECG07: 504 MS
QRS DURATION, ECG06: 118 MS
QRS DURATION, ECG06: 128 MS
QTC CALCULATION (BEZET), ECG08: 427 MS
QTC CALCULATION (BEZET), ECG08: 468 MS
RBC # BLD AUTO: 3.79 M/UL (ref 4.2–5.3)
SODIUM SERPL-SCNC: 140 MMOL/L (ref 136–145)
SPECIMEN EXP DATE BLD: NORMAL
TRIGL SERPL-MCNC: 73 MG/DL (ref ?–150)
TROPONIN I SERPL-MCNC: <0.02 NG/ML (ref 0–0.04)
VENTRICULAR RATE, ECG03: 48 BPM
VENTRICULAR RATE, ECG03: 52 BPM
VLDLC SERPL CALC-MCNC: 14.6 MG/DL
WBC # BLD AUTO: 4 K/UL (ref 4.6–13.2)

## 2018-05-03 PROCEDURE — 94660 CPAP INITIATION&MGMT: CPT

## 2018-05-03 PROCEDURE — 83880 ASSAY OF NATRIURETIC PEPTIDE: CPT | Performed by: EMERGENCY MEDICINE

## 2018-05-03 PROCEDURE — 85025 COMPLETE CBC W/AUTO DIFF WBC: CPT | Performed by: EMERGENCY MEDICINE

## 2018-05-03 PROCEDURE — 85730 THROMBOPLASTIN TIME PARTIAL: CPT | Performed by: EMERGENCY MEDICINE

## 2018-05-03 PROCEDURE — 93005 ELECTROCARDIOGRAM TRACING: CPT

## 2018-05-03 PROCEDURE — 76856 US EXAM PELVIC COMPLETE: CPT

## 2018-05-03 PROCEDURE — 94761 N-INVAS EAR/PLS OXIMETRY MLT: CPT

## 2018-05-03 PROCEDURE — 65660000000 HC RM CCU STEPDOWN

## 2018-05-03 PROCEDURE — 85379 FIBRIN DEGRADATION QUANT: CPT | Performed by: EMERGENCY MEDICINE

## 2018-05-03 PROCEDURE — 36415 COLL VENOUS BLD VENIPUNCTURE: CPT | Performed by: EMERGENCY MEDICINE

## 2018-05-03 PROCEDURE — 82550 ASSAY OF CK (CPK): CPT | Performed by: EMERGENCY MEDICINE

## 2018-05-03 PROCEDURE — 99285 EMERGENCY DEPT VISIT HI MDM: CPT

## 2018-05-03 PROCEDURE — 84484 ASSAY OF TROPONIN QUANT: CPT | Performed by: EMERGENCY MEDICINE

## 2018-05-03 PROCEDURE — 74011250636 HC RX REV CODE- 250/636: Performed by: EMERGENCY MEDICINE

## 2018-05-03 PROCEDURE — 80061 LIPID PANEL: CPT | Performed by: EMERGENCY MEDICINE

## 2018-05-03 PROCEDURE — 93970 EXTREMITY STUDY: CPT

## 2018-05-03 PROCEDURE — 85610 PROTHROMBIN TIME: CPT | Performed by: EMERGENCY MEDICINE

## 2018-05-03 PROCEDURE — 99218 HC RM OBSERVATION: CPT

## 2018-05-03 PROCEDURE — 86900 BLOOD TYPING SEROLOGIC ABO: CPT | Performed by: EMERGENCY MEDICINE

## 2018-05-03 PROCEDURE — 74011250637 HC RX REV CODE- 250/637: Performed by: EMERGENCY MEDICINE

## 2018-05-03 PROCEDURE — 85018 HEMOGLOBIN: CPT | Performed by: EMERGENCY MEDICINE

## 2018-05-03 PROCEDURE — 80048 BASIC METABOLIC PNL TOTAL CA: CPT | Performed by: EMERGENCY MEDICINE

## 2018-05-03 RX ORDER — DICLOFENAC SODIUM 10 MG/G
2 GEL TOPICAL
Status: DISCONTINUED | OUTPATIENT
Start: 2018-05-03 | End: 2018-05-10 | Stop reason: HOSPADM

## 2018-05-03 RX ORDER — FAMOTIDINE 20 MG/1
20 TABLET, FILM COATED ORAL 2 TIMES DAILY
Status: DISCONTINUED | OUTPATIENT
Start: 2018-05-03 | End: 2018-05-10 | Stop reason: HOSPADM

## 2018-05-03 RX ORDER — DOCUSATE SODIUM 100 MG/1
100 CAPSULE, LIQUID FILLED ORAL 2 TIMES DAILY
Status: DISCONTINUED | OUTPATIENT
Start: 2018-05-03 | End: 2018-05-10 | Stop reason: HOSPADM

## 2018-05-03 RX ORDER — ACETAMINOPHEN 325 MG/1
650 TABLET ORAL
Status: DISCONTINUED | OUTPATIENT
Start: 2018-05-03 | End: 2018-05-10 | Stop reason: HOSPADM

## 2018-05-03 RX ORDER — PAROXETINE HYDROCHLORIDE 20 MG/1
20 TABLET, FILM COATED ORAL DAILY
Status: DISCONTINUED | OUTPATIENT
Start: 2018-05-03 | End: 2018-05-10 | Stop reason: HOSPADM

## 2018-05-03 RX ORDER — SIMVASTATIN 40 MG/1
40 TABLET, FILM COATED ORAL
Status: DISCONTINUED | OUTPATIENT
Start: 2018-05-03 | End: 2018-05-10 | Stop reason: HOSPADM

## 2018-05-03 RX ORDER — SODIUM CHLORIDE 9 MG/ML
50 INJECTION, SOLUTION INTRAVENOUS CONTINUOUS
Status: DISCONTINUED | OUTPATIENT
Start: 2018-05-03 | End: 2018-05-03

## 2018-05-03 RX ORDER — ALBUTEROL SULFATE 0.83 MG/ML
2.5 SOLUTION RESPIRATORY (INHALATION)
Status: DISCONTINUED | OUTPATIENT
Start: 2018-05-03 | End: 2018-05-10 | Stop reason: HOSPADM

## 2018-05-03 RX ORDER — TRAMADOL HYDROCHLORIDE 50 MG/1
50 TABLET ORAL
Status: DISCONTINUED | OUTPATIENT
Start: 2018-05-03 | End: 2018-05-10 | Stop reason: HOSPADM

## 2018-05-03 RX ORDER — ALBUTEROL SULFATE 90 UG/1
2 AEROSOL, METERED RESPIRATORY (INHALATION)
Status: DISCONTINUED | OUTPATIENT
Start: 2018-05-03 | End: 2018-05-03 | Stop reason: CLARIF

## 2018-05-03 RX ORDER — ARIPIPRAZOLE 5 MG/1
15 TABLET ORAL DAILY
Status: DISCONTINUED | OUTPATIENT
Start: 2018-05-03 | End: 2018-05-10 | Stop reason: HOSPADM

## 2018-05-03 RX ADMIN — FAMOTIDINE 20 MG: 20 TABLET ORAL at 13:56

## 2018-05-03 RX ADMIN — SODIUM CHLORIDE 50 ML/HR: 900 INJECTION, SOLUTION INTRAVENOUS at 14:19

## 2018-05-03 RX ADMIN — MULTIPLE VITAMINS W/ MINERALS TAB 1 TABLET: TAB at 13:56

## 2018-05-03 RX ADMIN — ARIPIPRAZOLE 15 MG: 5 TABLET ORAL at 13:56

## 2018-05-03 RX ADMIN — DOCUSATE SODIUM 100 MG: 100 CAPSULE, LIQUID FILLED ORAL at 21:56

## 2018-05-03 RX ADMIN — TRAMADOL HYDROCHLORIDE 50 MG: 50 TABLET, FILM COATED ORAL at 21:56

## 2018-05-03 RX ADMIN — DOCUSATE SODIUM 100 MG: 100 CAPSULE, LIQUID FILLED ORAL at 13:56

## 2018-05-03 RX ADMIN — SODIUM CHLORIDE 50 ML/HR: 900 INJECTION, SOLUTION INTRAVENOUS at 14:23

## 2018-05-03 RX ADMIN — FAMOTIDINE 20 MG: 20 TABLET ORAL at 21:56

## 2018-05-03 RX ADMIN — SIMVASTATIN 40 MG: 40 TABLET, FILM COATED ORAL at 21:56

## 2018-05-03 RX ADMIN — TRAMADOL HYDROCHLORIDE 50 MG: 50 TABLET, FILM COATED ORAL at 14:07

## 2018-05-03 RX ADMIN — PAROXETINE HYDROCHLORIDE 20 MG: 20 TABLET, FILM COATED ORAL at 13:56

## 2018-05-03 NOTE — IP AVS SNAPSHOT
[address] [address] 
[phone] Patient: Toshia Boogie MRN: [id number]:[id number] A check nimo indicates which time of day the medication should be taken. My Medications START taking these medications Instructions Each Dose to Equal  
 Morning Noon Evening Bedtime  
 fluticasone 50 mcg/actuation nasal spray Commonly known as:  [place] 2 Sprays by Both Nostrils route daily. 2 [place] CHANGE how you take these medications Instructions Each Dose to Equal  
 Morning Noon Evening Bedtime  
 metoprolol tartrate 25 mg tablet Commonly known as:  LOPRESSOR What changed:   
- medication strength 
- how much to take Take 0.5 Tabs by mouth two (2) times a day. 12.5 mg  
    
  
   
   
  
   
  
  
CONTINUE taking these medications Instructions Each Dose to Equal  
 Morning Noon Evening Bedtime ABILIFY 15 mg tablet Generic drug:  ARIPiprazole Take 15 mg by mouth daily. to obtain from pharmacy 15 mg  
    
  
   
   
   
  
 furosemide 40 mg tablet Commonly known as:  LASIX 40 mg po daily  
     
  
   
   
   
  
 isosorbide mononitrate ER 30 mg tablet Commonly known as:  IMDUR Take 1 Tab by mouth every evening. 30 mg  
    
   
   
  
   
  
 loperamide 2 mg capsule Commonly known as:  IMODIUM Take 2 mg by mouth three (3) times daily as needed for Diarrhea.  
 2 mg  
    
   
   
   
  
 multivitamin, tx-iron-ca-min 9 mg iron-400 mcg Tab tablet Commonly known as:  THERA-M w/ IRON Take 1 Tab by mouth daily. 1 Tab  
    
  
   
   
   
  
 oxyCODONE IR 5 mg immediate release tablet Commonly known as:  Clarence Kays Take 5 mg by mouth every six (6) hours as needed for Pain. 5 mg PARoxetine 20 mg tablet Commonly known as:  PAXIL Take 1 Tab by mouth daily.   
 20 mg  
    
  
 raNITIdine 150 mg tablet Commonly known as:  ZANTAC Take 150 mg by mouth two (2) times a day. 150 mg SYNTHROID 125 mcg tablet Generic drug:  levothyroxine Take 125 mcg by mouth Daily (before breakfast). 125 mcg VENTOLIN HFA 90 mcg/actuation inhaler Generic drug:  albuterol Take 2 Puffs by inhalation every four (4) hours as needed for Wheezing or Shortness of Breath. 2 Puff  
    
   
   
   
  
 warfarin 5 mg tablet Commonly known as:  COUMADIN Take 5 mg by mouth daily. 5 mg ZOCOR 40 mg tablet Generic drug:  simvastatin Take  by mouth nightly. to obtain from pharmacy STOP taking these medications   
 aspirin delayed-release 81 mg tablet  
   
  
 docusate sodium 100 mg capsule Commonly known as:  COLACE  
   
  
 potassium chloride SR 10 mEq tablet Commonly known as:  KLOR-CON 10 ZESTORETIC 20-25 mg per tablet Generic drug:  lisinopril-hydroCHLOROthiazide Where to Get Your Medications Information on where to get these meds will be given to you by the nurse or doctor. ! Ask your nurse or doctor about these medications  
  fluticasone 50 mcg/actuation nasal spray  
 metoprolol tartrate 25 mg tablet

## 2018-05-03 NOTE — ROUTINE PROCESS
Received patient in bed,sleeping easily awakened, alert and oriented. Oxygen on at 2 lpm via nasal cannula IV fluid infusing No complaints made, will continue to monitor. Report  Given by Maury Singh RN.

## 2018-05-03 NOTE — PROGRESS NOTES
Problem: Falls - Risk of  Goal: *Absence of Falls  Document Piyush Fall Risk and appropriate interventions in the flowsheet.    Outcome: Progressing Towards Goal  Fall Risk Interventions:

## 2018-05-03 NOTE — IP AVS SNAPSHOT
[facility] 
 
 
 [address] [facility] [address] 
[phone] Patient: Carl Cruz MRN: [id number] UVY:[id number] About your hospitalization You were admitted on:  May 3, 2018 You last received care in the:  [facility] 2 CV STEPDOWN You were discharged on:  May 10, 2018 Why you were hospitalized Your primary diagnosis was:  Not on File Your diagnoses also included:  Bradycardia Follow-up Information Follow up With Details Comments Contact Info Traci Pennington MD On 5/16/2018    @ 10:15 a.m appt scheduled by [facility] @ office. [facility]                              [address] [facility] [address] [address] 
[phone] Aarti Shipman MD On 5/31/2018 @ 11:30 a.m. appt scheduled by Adán Banks @ the office. [facility] [address] 
[phone] Nicole Salinas MD On 6/28/2018 @ 10:20 a.m. w/ Liza MARTIN appt scheduled by Irina @ office. [facility] [address] [facility] [address] 200 [facility] 
[phone] Your Scheduled Appointments Thursday May 17, 2018 11:30 AM EDT Follow Up with Chelsea Valero, NP [facility] ([address] [place] [address]) [place] [id number]  
[phone] Tuesday May 22, 2018 11:15 AM EDT Follow Up with Zee Armenta MD  
[address] ([address] [place] [address] Road) [address] [place] [address], [address] [facility] [address]  
[phone] Thursday May 31, 2018 11:30 AM EDT  
POST OP with Aarti Shipman MD  
[facility] ([address] [place] [address]) [address] [place] [address] [address] [facility]  
[phone] Discharge Orders None A check nimo indicates which time of day the medication should be taken. My Medications START taking these medications Instructions Each Dose to Equal  
 Morning Noon Evening Bedtime  
 fluticasone 50 mcg/actuation nasal spray Commonly known as:  Briseyda Padgett 2 Sprays by Both Nostrils route daily. 2 Lawrence CHANGE how you take these medications Instructions Each Dose to Equal  
 Morning Noon Evening Bedtime  
 metoprolol tartrate 25 mg tablet Commonly known as:  LOPRESSOR What changed:   
- medication strength 
- how much to take Take 0.5 Tabs by mouth two (2) times a day. 12.5 mg  
    
  
   
   
  
   
  
  
CONTINUE taking these medications Instructions Each Dose to Equal  
 Morning Noon Evening Bedtime ABILIFY 15 mg tablet Generic drug:  ARIPiprazole Take 15 mg by mouth daily. to obtain from pharmacy 15 mg  
    
  
   
   
   
  
 furosemide 40 mg tablet Commonly known as:  LASIX 40 mg po daily  
     
  
   
   
   
  
 isosorbide mononitrate ER 30 mg tablet Commonly known as:  IMDUR Take 1 Tab by mouth every evening. 30 mg  
    
   
   
  
   
  
 loperamide 2 mg capsule Commonly known as:  IMODIUM Take 2 mg by mouth three (3) times daily as needed for Diarrhea.  
 2 mg  
    
   
   
   
  
 multivitamin, tx-iron-ca-min 9 mg iron-400 mcg Tab tablet Commonly known as:  THERA-M w/ IRON Take 1 Tab by mouth daily. 1 Tab  
    
  
   
   
   
  
 oxyCODONE IR 5 mg immediate release tablet Commonly known as:  Verlan Scar Take 5 mg by mouth every six (6) hours as needed for Pain. 5 mg PARoxetine 20 mg tablet Commonly known as:  PAXIL Take 1 Tab by mouth daily. 20 mg  
    
  
   
   
   
  
 raNITIdine 150 mg tablet Commonly known as:  ZANTAC Take 150 mg by mouth two (2) times a day. 150 mg SYNTHROID 125 mcg tablet Generic drug:  levothyroxine Take 125 mcg by mouth Daily (before breakfast). 125 mcg VENTOLIN HFA 90 mcg/actuation inhaler Generic drug:  albuterol Take 2 Puffs by inhalation every four (4) hours as needed for Wheezing or Shortness of Breath. 2 Puff  
    
   
   
   
  
 warfarin 5 mg tablet Commonly known as:  COUMADIN Take 5 mg by mouth daily. 5 mg ZOCOR 40 mg tablet Generic drug:  simvastatin Take  by mouth nightly. to obtain from pharmacy STOP taking these medications   
 aspirin delayed-release 81 mg tablet  
   
  
 docusate sodium 100 mg capsule Commonly known as:  COLACE  
   
  
 potassium chloride SR 10 mEq tablet Commonly known as:  KLOR-CON 10 ZESTORETIC 20-25 mg per tablet Generic drug:  lisinopril-hydroCHLOROthiazide Where to Get Your Medications Information on where to get these meds will be given to you by the nurse or doctor. ! Ask your nurse or doctor about these medications  
  fluticasone 50 mcg/actuation nasal spray  
 metoprolol tartrate 25 mg tablet Opioid Education Prescription Opioids: What You Need to Know: 
 
Prescription opioids can be used to help relieve moderate-to-severe pain and are often prescribed following a surgery or injury, or for certain health conditions. These medications can be an important part of treatment but also come with serious risks. Opioids are strong pain medicines. Examples include hydrocodone, oxycodone, fentanyl, and morphine. Heroin is an example of an illegal opioid. It is important to work with your health care provider to make sure you are getting the safest, most effective care. WHAT ARE THE RISKS AND SIDE EFFECTS OF OPIOID USE? Prescription opioids carry serious risks of addiction and overdose, especially with prolonged use. An opioid overdose, often marked by slow breathing, can cause sudden death.   The use of prescription opioids can have a number of side effects as well, even when taken as directed. · Tolerance-meaning you might need to take more of a medication for the same pain relief · Physical dependence-meaning you have symptoms of withdrawal when the medication is stopped. Withdrawal symptoms can include nausea, sweating, chills, diarrhea, stomach cramps, and muscle aches. Withdrawal can last up to several weeks, depending on which drug you took and how long you took it. · Increased sensitivity to pain · Constipation · Nausea, vomiting, and dry mouth · Sleepiness and dizziness · Confusion · Depression · Low levels of testosterone that can result in lower sex drive, energy, and strength · Itching and sweating RISKS ARE GREATER WITH:      
· History of drug misuse, substance use disorder, or overdose · Mental health conditions (such as depression or anxiety) · Sleep apnea · Older age (72 years or older) · Pregnancy Avoid alcohol while taking prescription opioids. Also, unless specifically advised by your health care provider, medications to avoid include: · Benzodiazepines (such as Xanax or Valium) · Muscle relaxants (such as Soma or Flexeril) · Hypnotics (such as Ambien or Lunesta) · Other prescription opioids KNOW YOUR OPTIONS Talk to your health care provider about ways to manage your pain that don't involve prescription opioids. Some of these options may actually work better and have fewer risks and side effects. Options may include: 
· Pain relievers such as acetaminophen, ibuprofen, and naproxen · Some medications that are also used for depression or seizures · Physical therapy and exercise · Counseling to help patients learn how to cope better with triggers of pain and stress. · Application of heat or cold compress · Massage therapy · Relaxation techniques Be Informed Make sure you know the name of your medication, how much and how often to take it, and its potential risks & side effects. IF YOU ARE PRESCRIBED OPIOIDS FOR PAIN: 
· Never take opioids in greater amounts or more often than prescribed. Remember the goal is not to be pain-free but to manage your pain at a tolerable level. · Follow up with your primary care provider to: · Work together to create a plan on how to manage your pain. · Talk about ways to help manage your pain that don't involve prescription opioids. · Talk about any and all concerns and side effects. · Help prevent misuse and abuse. · Never sell or share prescription opioids · Help prevent misuse and abuse. · Store prescription opioids in a secure place and out of reach of others (this may include visitors, children, friends, and family). · Safely dispose of unused/unwanted prescription opioids: Find your community drug take-back program or your pharmacy mail-back program, or flush them down the toilet, following guidance from the Food and Drug Administration (www.fda.gov/Drugs/ResourcesForYou). · Visit www.cdc.gov/drugoverdose to learn about the risks of opioid abuse and overdose. · If you believe you may be struggling with addiction, tell your health care provider and ask for guidance or call [address] [place] [address] at 1-327-454-AEDF. Discharge Instructions Atrial Fibrillation: Care Instructions Your Care Instructions Atrial fibrillation is an irregular and often fast heartbeat. Treating this condition is important for several reasons. It can cause blood clots, which can travel from your heart to your brain and cause a stroke. If you have a fast heartbeat, you may feel lightheaded, dizzy, and weak. An irregular heartbeat can also increase your risk for heart failure. Atrial fibrillation is often the result of another heart condition, such as high blood pressure or coronary artery disease.  Making changes to improve your heart condition will help you stay healthy and active. Follow-up care is a key part of your treatment and safety. Be sure to make and go to all appointments, and call your doctor if you are having problems. It's also a good idea to know your test results and keep a list of the medicines you take. How can you care for yourself at home? Medicines ? · Take your medicines exactly as prescribed. Call your doctor if you think you are having a problem with your medicine. You will get more details on the specific medicines your doctor prescribes. ? · If your doctor has given you a blood thinner to prevent a stroke, be sure you get instructions about how to take your medicine safely. Blood thinners can cause serious bleeding problems. ? · Do not take any vitamins, over-the-counter drugs, or herbal products without talking to your doctor first. ? Lifestyle changes ? · Do not smoke. Smoking can increase your chance of a stroke and heart attack. If you need help quitting, talk to your doctor about stop-smoking programs and medicines. These can increase your chances of quitting for good. ? · Eat a heart-healthy diet. ? · Stay at a healthy weight. Lose weight if you need to.  
? · Limit alcohol to 2 drinks a day for men and 1 drink a day for women. Too much alcohol can cause health problems. ? · Avoid colds and flu. Get a pneumococcal vaccine shot. If you have had one before, ask your doctor whether you need another dose. Get a flu shot every year. If you must be around people with colds or flu, wash your hands often. Activity ? · If your doctor recommends it, get more exercise. Walking is a good choice. Bit by bit, increase the amount you walk every day. Try for at least 30 minutes on most days of the week. You also may want to swim, bike, or do other activities.  Your doctor may suggest that you join a cardiac rehabilitation program so that you can have help increasing your physical activity safely. ? · Start light exercise if your doctor says it is okay. Even a small amount will help you get stronger, have more energy, and manage stress. Walking is an easy way to get exercise. Start out by walking a little more than you did in the hospital. Gradually increase the amount you walk. ? · When you exercise, watch for signs that your heart is working too hard. You are pushing too hard if you cannot talk while you are exercising. If you become short of breath or dizzy or have chest pain, sit down and rest immediately. ? · Check your pulse regularly. Place two fingers on the artery at the palm side of your wrist, in line with your thumb. If your heartbeat seems uneven or fast, talk to your doctor. When should you call for help? Call 911 anytime you think you may need emergency care. For example, call if: 
? · You have symptoms of a heart attack. These may include: ¨ Chest pain or pressure, or a strange feeling in the chest. 
¨ Sweating. ¨ Shortness of breath. ¨ Nausea or vomiting. ¨ Pain, pressure, or a strange feeling in the back, neck, jaw, or upper belly or in one or both shoulders or arms. ¨ Lightheadedness or sudden weakness. ¨ A fast or irregular heartbeat. After you call 911, the  may tell you to chew 1 adult-strength or 2 to 4 low-dose aspirin. Wait for an ambulance. Do not try to drive yourself. ? · You have symptoms of a stroke. These may include: 
¨ Sudden numbness, tingling, weakness, or loss of movement in your face, arm, or leg, especially on only one side of your body. ¨ Sudden vision changes. ¨ Sudden trouble speaking. ¨ Sudden confusion or trouble understanding simple statements. ¨ Sudden problems with walking or balance. ¨ A sudden, severe headache that is different from past headaches. ? · You passed out (lost consciousness). ?Call your doctor now or seek immediate medical care if: 
? · You have new or increased shortness of breath. ? · You feel dizzy or lightheaded, or you feel like you may faint. ? · Your heart rate becomes irregular. ? · You can feel your heart flutter in your chest or skip heartbeats. Tell your doctor if these symptoms are new or worse. ? Watch closely for changes in your health, and be sure to contact your doctor if you have any problems. Where can you learn more? Go to http://bucky-gerard.info/. Enter U020 in the search box to learn more about \"Atrial Fibrillation: Care Instructions. \" Current as of: September 21, 2016 Content Version: 11.4 © 5765-3293 [employer]. Care instructions adapted under license by [employer] (which disclaims liability or warranty for this information). If you have questions about a medical condition or this instruction, always ask your healthcare professional. [address] any warranty or liability for your use of this information. Pressglue Announcement We are excited to announce that we are making your provider's discharge notes available to you in Pressglue. You will see these notes when they are completed and signed by the physician that discharged you from your recent hospital stay. If you have any questions or concerns about any information you see in Pressglue, please call the Health Information Department where you were seen or reach out to your Primary Care Provider for more information about your plan of care. Introducing [facility]! Denzel Ceron introduces Pressglue patient portal. Now you can access parts of your medical record, email your doctor's office, and request medication refills online. 1. In your internet browser, go to [email or web address] [email or web address] 2. Click on the First Time User? Click Here link in the Sign In box. You will see the New Member Sign Up page. 3. Enter your Pressglue Access Code exactly as it appears below.  You will not need to use this code after youve completed the sign-up process. If you do not sign up before the expiration date, you must request a new code. · Bitdeli Access Code: [email or web address] Expires: 5/30/2018  5:17 PM 
 
4. Enter the last four digits of your Social Security Number (xxxx) and Date of Birth (mm/dd/yyyy) as indicated and click Submit. You will be taken to the next sign-up page. 5. Create a Bitdeli ID. This will be your Bitdeli login ID and cannot be changed, so think of one that is secure and easy to remember. 6. Create a Bitdeli password. You can change your password at any time. 7. Enter your Password Reset Question and Answer. This can be used at a later time if you forget your password. 8. Enter your e-mail address. You will receive e-mail notification when new information is available in [address] 9. Click Sign Up. You can now view and download portions of your medical record. 10. Click the Download Summary menu link to download a portable copy of your medical information. If you have questions, please visit the Frequently Asked Questions section of the Bitdeli website. Remember, Bitdeli is NOT to be used for urgent needs. For medical emergencies, dial 911. Now available from your iPhone and Android! Introducing Uri Castellanos As a [facility] patient, I wanted to make you aware of our electronic visit tool called Uri Castellanos. [facility] 24/7 allows you to connect within minutes with a medical provider 24 hours a day, seven days a week via a mobile device or tablet or logging into a secure website from your computer. You can access Uri Castellanos from anywhere in the [place].  
 
A virtual visit might be right for you when you have a simple condition and feel like you just dont want to get out of bed, or cant get away from work for an appointment, when your regular [facility] provider is not available (evenings, weekends or holidays), or when youre out of town and need minor care. Electronic visits cost only $49 and if the [facility] 24/7 provider determines a prescription is needed to treat your condition, one can be electronically transmitted to a nearby pharmacy*. Please take a moment to enroll today if you have not already done so. The enrollment process is free and takes just a few minutes. To enroll, please download the [employer] 24/7 laura to your tablet or phone, or visit [email or web address] to enroll on your computer. And, as an [address] [place] [address] patient with a [employer] account, the results of your visits will be scanned into your electronic medical record and your primary care provider will be able to view the scanned results. We urge you to continue to see your regular [facility] provider for your ongoing medical care. And while your primary care provider may not be the one available when you seek a [employer] virtual visit, the peace of mind you get from getting a real diagnosis real time can be priceless. For more information on SimplyCast, view our Frequently Asked Questions (FAQs) at [email or web address]. Sincerely, 
 
Nori Brush MD 
Chief Medical Officer [facility] Teresa Ag *:  certain medications cannot be prescribed via SimplyCast Providers Seen During Your Hospitalization Provider Specialty Primary office phone Melanie Low MD Emergency Medicine [phone] Clinton Andrew MD Emergency Medicine [phone] Olga Rowe MD Internal Medicine [phone] Mckinley Sanabria MD Internal Medicine [phone] Your Primary Care Physician (PCP) Primary Care Physician Office Phone Office Fax Marsha Flores [phone] You are allergic to the following Allergen Reactions Gabapentin Other (comments) Unsteady, weakness LEs Tetanus Vaccines And Toxoid Swelling Topamax (Topiramate) Other (comments)  
 weakness Recent Documentation Height Weight Breastfeeding? BMI OB Status Smoking Status 1.524 m 137.7 kg Unknown 59.27 kg/m2 Hysterectomy Never Smoker Emergency Contacts Name Discharge Info Relation Home Work Mobile Ortiz,Cesilia DISCHARGE CAREGIVER [3] Sister [23] [phone] Ortiz,Tatiana DISCHARGE CAREGIVER [3] Daughter [21] [phone] Fabiano Martin  Other Relative [6] [phone] Ortiz,Tatiana  Child [2] [phone] Patient Belongings The following personal items are in your possession at time of discharge: 
  Dental Appliances: None  Visual Aid: Glasses, With patient      Home Medications: None   Jewelry: None  Clothing: At bedside, Dress, Footwear, Shirt       Personal Items Sent to Safe: none Discharge Instructions Attachments/References MEFS - FLUTICASONE (FLONASE, NOVAPLUS FLUTICASONE PROPIONATE, VERAMYST) - (INTO THE NOSE) (ENGLISH) MEFS - METOPROLOL (LOPRESSOR, TOPROL XL) - (BY MOUTH) (ENGLISH) Patient Handouts Fluticasone (Flonase, Novaplus Fluticasone Propionate, Veramyst) - (Into the nose) Why this medicine is used:  
Treats allergy symptoms, such as runny or stuffy nose. Contact a nurse or doctor right away if you have: 
· Heavy nosebleeds · Eye pain, trouble seeing · Fever, chills, cough, sore throat, body aches Common side effects: · Sores or white patches inside the nose · Burning, redness, swelling, irritation around or inside your nose · Runny or stuffy nose · Headache © 2017 [facility] Information is for End User's use only and may not be sold, redistributed or otherwise used for commercial purposes. Metoprolol (Lopressor, Toprol XL) - (By mouth) Why this medicine is used:  
Treats high blood pressure, chest pain, and heart failure. Contact a nurse or doctor right away if you have: · Lightheadedness, dizziness, fainting · Rapid weight gain; swelling in your hands, ankles, or feet Common side effects: · Mild dizziness · Tiredness © 2017 [facility] Information is for End User's use only and may not be sold, redistributed or otherwise used for commercial purposes. Please provide this summary of care documentation to your next provider. Signatures-by signing, you are acknowledging that this After Visit Summary has been reviewed with you and you have received a copy. Patient Signature:  ____________________________________________________________ Date:  ____________________________________________________________  
  
[facility] Provider Signature:  ____________________________________________________________ Date:  ____________________________________________________________

## 2018-05-03 NOTE — ROUTINE PROCESS
Patient was admitted around 11am, stable and diuresing via mar wick. Stable and eating each meals. Family at bedside. Seen by attending MD.

## 2018-05-03 NOTE — ED NOTES
TRANSFER - OUT REPORT:    Verbal report given to Julianne RN on Sherin Talley  being transferred to [facility] for routine progression of care       Report consisted of patients Situation, Background, Assessment and   Recommendations(SBAR). Information from the following report(s) SBAR, ED Summary, MAR, Recent Results and Cardiac Rhythm afib was reviewed with the receiving nurse. Lines:       Opportunity for questions and clarification was provided.       Patient transported with:   O2 @ 2 liters

## 2018-05-03 NOTE — Clinical Note
Patient Class[de-identified] Observation [765] Type of Bed: Telemetry [19] Reason for Observation: Bradycardia, Atrial fibrillation, lower extremity edema Admitting Diagnosis: Bradycardia [330459] Admitting Physician: Mery Gleason [[id number]] Attending Physician: Mery Gleason [[id number]]

## 2018-05-03 NOTE — ED PROVIDER NOTES
EMERGENCY DEPARTMENT HISTORY AND PHYSICAL EXAM    Date: 5/3/2018  Patient Name: Irena Higuera    History of Presenting Illness     Chief Complaint   Patient presents with    Vaginal Bleeding    Leg Swelling         History Provided By: Patient and Patient's Daughter    Chief Complaint: vaginal bleeding  Duration: 1 Days  Timing:  Acute  Location: vagina  Quality: none  Severity: Moderate  Modifying Factors: none  Associated Symptoms: left leg swelling      Additional History (Context): Irena Higuera is a 60 y.o. female with diabetes, hypertension, hyperlipidemia, obesity, osteoarthritis and afib; DDD; chronic pain; schizoaffectve disorder who presents with vaginal bleeding today; hysterectomy 25yrs ago. Also has had a loose frequent stools today and h/o hemorrhoids. Denies abd pain or rectal pain. Denies pelvic pain. On Coumadin; h/o afib. Daughter just now noticed LLE swelling; left knee replacement 12/2017. PCP: MD Mario    Current Outpatient Prescriptions   Medication Sig Dispense Refill    isosorbide mononitrate ER (IMDUR) 30 mg tablet Take 1 Tab by mouth every evening. 90 Tab 3    aspirin delayed-release 81 mg tablet Take 1 Tab by mouth daily. 30 Tab 0    docusate sodium (COLACE) 100 mg capsule Take 1 Cap by mouth two (2) times a day for 90 days. 60 Cap 2    potassium chloride SR (KLOR-CON 10) 10 mEq tablet Take 20 mEq by mouth daily.  raNITIdine (ZANTAC) 150 mg tablet Take 150 mg by mouth two (2) times a day.  warfarin (COUMADIN) 5 mg tablet Take 5 mg by mouth daily.  metoprolol tartrate (LOPRESSOR) 50 mg tablet Take 25 mg by mouth two (2) times a day.  furosemide (LASIX) 40 mg tablet 40 mg po daily 30 Tab 0    PARoxetine (PAXIL) 20 mg tablet Take 1 Tab by mouth daily. 30 Tab 3    simvastatin (ZOCOR) 40 mg tablet Take  by mouth nightly. to obtain from pharmacy      aripiprazole (ABILIFY) 15 mg tablet Take 15 mg by mouth daily.  to obtain from pharmacy      levothyroxine (SYNTHROID) 125 mcg tablet Take 125 mcg by mouth Daily (before breakfast).  multivitamin, tx-iron-ca-min (THERA-M W/ IRON) 9 mg iron-400 mcg tab tablet Take 1 Tab by mouth daily. 30 Tab 0    diclofenac (VOLTAREN) 1 % gel Apply 2 g to affected area three (3) times daily as needed for Pain. Gel should be measured and applied using the supplied dosing card. Apply dose (2 gm or 4 gm) to each location. APPLY TO knees and hips 100 g 0    loperamide (IMODIUM) 2 mg capsule Take 2 mg by mouth three (3) times daily as needed for Diarrhea.  lisinopril-hydroCHLOROthiazide (ZESTORETIC) 20-25 mg per tablet Take  by mouth daily.  VENTOLIN HFA 90 mcg/actuation inhaler Take 2 Puffs by inhalation every four (4) hours as needed for Wheezing or Shortness of Breath. 1 Inhaler 0       Past History     Past Medical History:  Past Medical History:   Diagnosis Date    Atrial fibrillation     CHADS score 1  (-CHF, +HTN, -AGE, -DM, -CVA)    Carpal tunnel syndrome     Chronic pain     Congenital heart disease     presumed pulmonary stenosis s/p repair 1969    Degenerative disc disease     Degenerative joint disease     Dyslipidemia     GERD     H/O echocardiogram 04/2017    EF 60-65%, mild concentric LVH, dilated RV with RV dysfunction, RVSP 54 mmHg, RA E, moderate to severe pulmonic regurgitation.     Hypertension     Hypothyroid     Morbid obesity     Morbid obesity with BMI of 45.0-49.9, adult ([facility] [address] 2/20/2017    Noncompliance     Schizoaffective disorder     Substance abuse     marijuana, crack cocaine       Past Surgical History:  Past Surgical History:   Procedure Laterality Date    HX CARPAL TUNNEL RELEASE      left    HX HYSTERECTOMY      HX KNEE REPLACEMENT      left    HX KNEE REPLACEMENT      right    HX THYROIDECTOMY         Family History:  Family History   Problem Relation Age of Onset    Hypertension Mother     Coronary Artery Disease Mother     Coronary Artery Disease Father  Hypertension Father        Social History:  Social History   Substance Use Topics    Smoking status: Never Smoker    Smokeless tobacco: Never Used    Alcohol use No       Allergies: Allergies   Allergen Reactions    Gabapentin Other (comments)     Unsteady, weakness LEs    Tetanus Vaccines And Toxoid Swelling    Topamax [Topiramate] Other (comments)     weakness         Review of Systems   Review of Systems   Constitutional: Negative. Negative for fever. HENT: Negative. Eyes: Negative. Respiratory: Negative. Negative for shortness of breath. Cardiovascular: Positive for leg swelling. Negative for chest pain and palpitations. Gastrointestinal: Negative for abdominal distention, anal bleeding, nausea and vomiting. Endocrine: Negative. Genitourinary: Negative. Negative for pelvic pain. Musculoskeletal: Negative. Skin: Negative. Negative for rash and wound. Allergic/Immunologic: Negative. Neurological: Negative. Negative for dizziness and light-headedness. Hematological: Negative for adenopathy. Bruises/bleeds easily. Psychiatric/Behavioral: Negative. All other systems reviewed and are negative. All Other Systems Negative  Physical Exam     Vitals:    05/03/18 0515 05/03/18 0557 05/03/18 0645 05/03/18 0715   BP: 105/51  112/58 128/88   Pulse: (!) 51  (!) 51 (!) 46   Resp:   20 20   Temp:       SpO2: 97%  96% 100%   Weight:  145.6 kg (321 lb)     Height:  5' (1.524 m)       Physical Exam   Constitutional: She is oriented to person, place, and time. She appears well-developed. HENT:   Head: Normocephalic and atraumatic. Eyes: Pupils are equal, round, and reactive to light. Neck: No JVD present. No tracheal deviation present. No thyromegaly present. Cardiovascular: Normal rate, regular rhythm and normal heart sounds. Exam reveals no gallop and no friction rub. No murmur heard. Pulmonary/Chest: Effort normal and breath sounds normal. No stridor.  No respiratory distress. She has no wheezes. She has no rales. She exhibits no tenderness. Abdominal: Soft. She exhibits no distension and no mass. There is no tenderness. There is no rebound and no guarding. Genitourinary: Rectal exam shows guaiac positive stool. Genitourinary Comments: Trace positive   Musculoskeletal: She exhibits edema. She exhibits no tenderness. L>R BLE edema, pitting. Lymphadenopathy:     She has no cervical adenopathy. Neurological: She is alert and oriented to person, place, and time. Skin: Skin is warm and dry. No rash noted. No erythema. No pallor. Psychiatric: She has a normal mood and affect. Her behavior is normal. Thought content normal.   Nursing note and vitals reviewed. Diagnostic Study Results     Labs -     Recent Results (from the past 12 hour(s))   EKG, 12 LEAD, INITIAL    Collection Time: 05/03/18  3:28 AM   Result Value Ref Range    Ventricular Rate 48 BPM    Atrial Rate 55 BPM    QRS Duration 118 ms    Q-T Interval 478 ms    QTC Calculation (Bezet) 427 ms    Calculated R Axis -37 degrees    Calculated T Axis -73 degrees    Diagnosis       Atrial fibrillation with slow ventricular response  Left axis deviation  Incomplete right bundle branch block  ST & T wave abnormality, consider anterior ischemia or digitalis effect  Abnormal ECG  When compared with ECG of 26-FEB-2018 15:17,  Vent.  rate has decreased BY  27 BPM  Incomplete right bundle branch block is now present  T wave inversion no longer evident in Lateral leads  QT has shortened     CBC WITH AUTOMATED DIFF    Collection Time: 05/03/18  3:54 AM   Result Value Ref Range    WBC 4.0 (L) 4.6 - 13.2 K/uL    RBC 3.79 (L) 4.20 - 5.30 M/uL    HGB 8.3 (L) 12.0 - 16.0 g/dL    HCT 29.2 (L) 35.0 - 45.0 %    MCV 77.0 74.0 - 97.0 FL    MCH 21.9 (L) 24.0 - 34.0 PG    MCHC 28.4 (L) 31.0 - 37.0 g/dL    RDW 16.8 (H) 11.6 - 14.5 %    PLATELET 548 913 - 855 K/uL    MPV 9.5 9.2 - 11.8 FL    NEUTROPHILS 65 40 - 73 %    LYMPHOCYTES 21 21 - 52 %    MONOCYTES 9 3 - 10 %    EOSINOPHILS 5 0 - 5 %    BASOPHILS 0 0 - 2 %    ABS. NEUTROPHILS 2.6 1.8 - 8.0 K/UL    ABS. LYMPHOCYTES 0.9 0.9 - 3.6 K/UL    ABS. MONOCYTES 0.4 0.05 - 1.2 K/UL    ABS. EOSINOPHILS 0.2 0.0 - 0.4 K/UL    ABS. BASOPHILS 0.0 0.0 - 0.1 K/UL    DF AUTOMATED     METABOLIC PANEL, BASIC    Collection Time: 05/03/18  3:54 AM   Result Value Ref Range    Sodium 140 136 - 145 mmol/L    Potassium 3.4 (L) 3.5 - 5.5 mmol/L    Chloride 104 100 - 108 mmol/L    CO2 32 21 - 32 mmol/L    Anion gap 4 3.0 - 18 mmol/L    Glucose 112 (H) 74 - 99 mg/dL    BUN 22 (H) 7.0 - 18 MG/DL    Creatinine 1.18 0.6 - 1.3 MG/DL    BUN/Creatinine ratio 19 12 - 20      GFR est AA 57 (L) >60 ml/min/1.73m2    GFR est non-AA 47 (L) >60 ml/min/1.73m2    Calcium 7.9 (L) 8.5 - 10.1 MG/DL   PTT    Collection Time: 05/03/18  3:54 AM   Result Value Ref Range    aPTT 51.4 (H) 23.0 - 36.4 SEC   PROTHROMBIN TIME + INR    Collection Time: 05/03/18  3:54 AM   Result Value Ref Range    Prothrombin time 27.5 (H) 11.5 - 15.2 sec    INR 2.7 (H) 0.8 - 1.2     D DIMER    Collection Time: 05/03/18  3:54 AM   Result Value Ref Range    D DIMER 0.86 (H) <0.46 ug/ml(FEU)   NT-PRO BNP    Collection Time: 05/03/18  3:54 AM   Result Value Ref Range    NT pro-BNP 2802 (H) 0 - 900 PG/ML   TROPONIN I    Collection Time: 05/03/18  3:54 AM   Result Value Ref Range    Troponin-I, Qt. <0.02 0.0 - 0.045 NG/ML       Radiologic Studies -   DUPLEX LOWER EXT VENOUS BILAT           CT Results  (Last 48 hours)    None        CXR Results  (Last 48 hours)    None            Medical Decision Making   I am the first provider for this patient. I reviewed the vital signs, available nursing notes, past medical history, past surgical history, family history and social history. Vital Signs-Reviewed the patient's vital signs.     Records Reviewed: Nursing Notes, Previous Radiology Studies and Previous Laboratory Studies    Procedures:  Pelvic Exam  Date/Time: 5/3/2018 3:29 AM  Performed by: PA  Procedure duration:  1 minutes. Type of exam performed: speculum. Vaginal exam:  normal (no bleeding in vaginal vault). Patient tolerance: Patient tolerated the procedure well with no immediate complications          Provider Notes (Medical Decision Making): DVT; vaginal bleeding; rectal bleeding      Trace positive guaiac. On BB bid; suspect this is cause for pt's bradycardia. Will turn over to ED attending at end of shift. MED RECONCILIATION:  No current facility-administered medications for this encounter. Current Outpatient Prescriptions   Medication Sig    isosorbide mononitrate ER (IMDUR) 30 mg tablet Take 1 Tab by mouth every evening.  aspirin delayed-release 81 mg tablet Take 1 Tab by mouth daily.  docusate sodium (COLACE) 100 mg capsule Take 1 Cap by mouth two (2) times a day for 90 days.  potassium chloride SR (KLOR-CON 10) 10 mEq tablet Take 20 mEq by mouth daily.  raNITIdine (ZANTAC) 150 mg tablet Take 150 mg by mouth two (2) times a day.  warfarin (COUMADIN) 5 mg tablet Take 5 mg by mouth daily.  metoprolol tartrate (LOPRESSOR) 50 mg tablet Take 25 mg by mouth two (2) times a day.  furosemide (LASIX) 40 mg tablet 40 mg po daily    PARoxetine (PAXIL) 20 mg tablet Take 1 Tab by mouth daily.  simvastatin (ZOCOR) 40 mg tablet Take  by mouth nightly. to obtain from pharmacy    aripiprazole (ABILIFY) 15 mg tablet Take 15 mg by mouth daily. to obtain from pharmacy    levothyroxine (SYNTHROID) 125 mcg tablet Take 125 mcg by mouth Daily (before breakfast).  multivitamin, tx-iron-ca-min (THERA-M W/ IRON) 9 mg iron-400 mcg tab tablet Take 1 Tab by mouth daily.  diclofenac (VOLTAREN) 1 % gel Apply 2 g to affected area three (3) times daily as needed for Pain. Gel should be measured and applied using the supplied dosing card. Apply dose (2 gm or 4 gm) to each location.   APPLY TO knees and hips    loperamide (IMODIUM) 2 mg capsule Take 2 mg by mouth three (3) times daily as needed for Diarrhea.  lisinopril-hydroCHLOROthiazide (ZESTORETIC) 20-25 mg per tablet Take  by mouth daily.  VENTOLIN HFA 90 mcg/actuation inhaler Take 2 Puffs by inhalation every four (4) hours as needed for Wheezing or Shortness of Breath. Disposition:  Admitted to observation    4:04 AM : Pt care transferred to Dr. Jeremias Hancock  ,ED provider. History of patient complaint(s), available diagnostic reports and current treatment plan has been discussed thoroughly. Bedside rounding on patient occured : no . Intended disposition of patient : Home  Pending diagnostics reports and/or labs (please list): labs    0830 - Pt with persistent bradycardia. Will admit on telemetry to observation. Cardiology added to treatment team. Advised hospitalist to f/u results of LE duplex scan. Follow-up Information     None          Current Discharge Medication List            Diagnosis     Clinical Impression:   1. Chronic atrial fibrillation ([address]    2. Chronic anticoagulation    3. Lower GI bleed    4.  Bradycardia

## 2018-05-03 NOTE — PROCEDURES
[facility]  *** FINAL REPORT ***    Name: Paris Downey  MRN: [id number]  : 1960  HIS Order #: [id number]  [id number] [facility] Visit #: [id number]  Date: 03 May 2018    TYPE OF TEST: Peripheral Venous Testing    REASON FOR TEST  Pain in limb, Limb swelling    Right Leg:-  Deep venous thrombosis:           No  Superficial venous thrombosis:    No  Deep venous insufficiency:        Not examined  Superficial venous insufficiency: Not examined    Left Leg:-  Deep venous thrombosis:           No  Superficial venous thrombosis:    No  Deep venous insufficiency:        Not examined  Superficial venous insufficiency: Not examined      INTERPRETATION/FINDINGS  Duplex images were obtained using 2-D gray scale, color flow, and  spectral Doppler analysis. Right leg :  1. Deep veins visualized include the common femoral, femoral,  popliteal, posterior tibial and peroneal veins. The femoral vein in  the mid and distal thigh veins could not be visualized due to severe  thigh pain. 2. No evidence of deep venous thrombosis detected in the veins  visualized. An acute, non-occlusive thrombus cannot be totally ruled  out in the mid and distal thigh veins. 3. Superficial veins visualized include the great saphenous vein. 4. No evidence of superficial thrombosis detected. 5. Normal multiphasic flow in the posterior tibial artery. Left leg :  1. Deep veins visualized include the common femoral, femoral,  popliteal, posterior tibial and peroneal veins. The femoral vein in  the mid and distal thigh veins could not be visualized due to severe  thigh pain. 2. No evidence of deep venous thrombosis detected in the veins  visualized. An acute, non-occlusive thrombus cannot be totally ruled  out in the mid and distal thigh veins. 3. Superficial veins visualized include the great saphenous vein. 4. No evidence of superficial thrombosis detected. 5. Normal multiphasic flow in the posterior tibial artery.     ADDITIONAL COMMENTS    I have personally reviewed the data relevant to the interpretation of  this  study. TECHNOLOGIST: CHARLIE Chapin, RVS  Signed: 05/03/2018 08:40 AM    PHYSICIAN: Edwar Murphy MD  Signed: 05/03/2018 09:52 AM

## 2018-05-03 NOTE — CONSULTS
[facility] - Consult Note    Cardiology is consulted for bradycardia. Date of  Admission: 5/3/2018  2:27 AM   Primary Care Physician:  Yoni Rojas MD     The patient was seen, examined, and independently evaluated and I agree with the below assessment and plan by Joellen Yung PA-C with the following comments. This patient has been non complaint and with history of polysubstance abuse and may have possibly taken more than prescribed beta blocker. Agree with holding beta blocker and will need plan on discharge to be sure patient is on the proper medications and a plan for making sure she takes them appropriately moving forward. Assessment:     Patient Active Problem List   Diagnosis Code    Hypertension I11.9    Congenital heart disease Q24.9    Atrial fibrillation I48.91    Osteoarthritis of both knees M17.0    H/O total knee replacement Z96.659    DJD (degenerative joint disease), lumbosacral M51.37    Schizoaffective disorder, chronic condition ([facility] F25.8    Unspecified hypothyroidism E03.9    Morbid obesity ([facility] E66.01    Esophageal reflux K21.9    Dyspnea R06.00    Dyslipidemia E78.5    Morbid obesity with BMI of 45.0-49.9, adult ([facility] E66.01, Z68.42    DDD (degenerative disc disease), lumbar, L4/5 advanced M51.36    Hypoxia R09.02    Fluid overload E87.70    CHF (congestive heart failure) (HCC) C49.0    Diastolic CHF, chronic ([facility]) I50.32    Bradycardia R00.1     -Bradycardia, likely 2/2 beta blocker use. -LE swelling, pt non-compliant with lasix at home as she has trouble ambulating and doesn't like to use the bathroom, BNP 2800, echo Feb 26, 2018 with normal LV function and mod LVH, dilated RV with increased PA pressures at 67mmHg, moderate TR and dilated atria. LE duplex scan negative for PE.  Swelling appears mostly chronic.  -L knee replacement Dec 2017, R knee replacement  -Anemia 2/2 to vaginal spotting and guaiac positive stool, Hgb 8.3/Hematocrit 29.2,   -Chronic afib, rate controlled with BB, on chronic coumadin.   -Mod to severe Pulm HTN  -morbid obesity  -ANDRADE, non-compliant with CPAP  -polysubstance abuse history, marijuana and crack cocaine, pt states she no longer uses  -Hypothyroidism  -Schizoaffective disorder  -DJD  -GERD     Plan:     -Spoke with daughter over the phone. Asked her to bring all medication bottles with her when she visits today so we can update patient's med list and discuss need for patient to have assistance with medication dosing.   -Resume maintenance lasix. 40mg daily (ordered)  -Resume statin and aspirin. -BP stable. Would not resume any other home meds until we can clarify what patient is to be taking. Duplicate ACEi noted in chart. Will verify BB dosing and adjust/resume.   -Hold coumadin until primary team has worked up anemia/rectal bleeding. History of Present Illness: This is a 60 y.o. female admitted for Bradycardia;Bradycardia. Patient is a 64yo morbidly obese AA female who presented with complaints of vaginal spotting and LE swelling. PMHx includes HTN, HLD, chronic afib, polysubstance abuse, congenital heart defect, pulm HTN, ANDRADE, non-compliance with CPAP and medications, s/p R knee replacement in December 2017, chronic pain, pulm HTN, and schizoaffective disorder. Pt has historically been non compliant with medications, often times not able to state what she is taking or if she is taking them at all. At her last office visit with Derek Howard, in our office, she was to bring in her medication bottles and she did not. She tells me that her daughter helps her at home but she takes her medications herself. She is unable to tell me which medications she is on other than metoprolol and upon further questioning it seems she may be taking too much of her beta blocker. I spoke with her daughter over the phone and her daughter is going to bring in all of her mother's medication bottles for verification.  Pt was found to be bradycardic and admitted to telemetry. Her BB has been held. She had a LE duplex scan to rule out DVT which was negative. She also had an abdominal US which was negative for acute process. Her stool was positive for blood and she was noted to have a Hgb of 8.3. Pt denies any other bleeding than some pink tinge on the tissue when she used the bathroom. Pt had no recent trauma or surgeries. Hysterectomy 25 years ago. Cardiac risk factors:  HTN  HLD  DMII      Review of Symptoms:  Except as stated above include:  Constitutional:  Negative for fevers or chills  Respiratory:  Negative for sob, cough, congestion  Cardiovascular:  Negative for chest pain or shortness of breath  Gastrointestinal: negative for abd pain, nausea, or vomiting  Genitourinary:  Negative for hematuria or dysuria  Musculoskeletal:  Negative for weakness or falls  Neurological:  Positive for dizziness, negative for syncope  Dermatological:  Negative for rashes or itching  Endocrinological: Negative for heat or cold intolerance  Psychological:  Negative for anxiety or depression, positive for forgetfulness     Past Medical History:     Past Medical History:   Diagnosis Date    Atrial fibrillation     CHADS score 1  (-CHF, +HTN, -AGE, -DM, -CVA)    Carpal tunnel syndrome     Chronic pain     Congenital heart disease     presumed pulmonary stenosis s/p repair 1969    Degenerative disc disease     Degenerative joint disease     Dyslipidemia     GERD     H/O echocardiogram 04/2017    EF 60-65%, mild concentric LVH, dilated RV with RV dysfunction, RVSP 54 mmHg, RA E, moderate to severe pulmonic regurgitation.     Hypertension     Hypothyroid     Morbid obesity     Morbid obesity with BMI of 45.0-49.9, adult ([facility] 2/20/2017    Noncompliance     Schizoaffective disorder     Substance abuse     marijuana, crack cocaine         Social History:     Social History     Social History    Marital status: SINGLE     Spouse name: N/A    Number of children: N/A    Years of education: N/A     Social History Main Topics    Smoking status: Never Smoker    Smokeless tobacco: Never Used    Alcohol use No    Drug use: No    Sexual activity: Yes     Birth control/ protection: Surgical     Other Topics Concern    None     Social History Narrative        Family History:     Family History   Problem Relation Age of Onset    Hypertension Mother     Coronary Artery Disease Mother     Coronary Artery Disease Father     Hypertension Father         Medications:      Allergies   Allergen Reactions    Gabapentin Other (comments)     Unsteady, weakness LEs    Tetanus Vaccines And Toxoid Swelling    Topamax [Topiramate] Other (comments)     weakness        Current Facility-Administered Medications   Medication Dose Route Frequency    0.9% sodium chloride infusion  50 mL/hr IntraVENous CONTINUOUS    docusate sodium (COLACE) capsule 100 mg  100 mg Oral BID    acetaminophen (TYLENOL) tablet 650 mg  650 mg Oral Q6H PRN    diclofenac (VOLTAREN) 1 % topical gel 2 g  2 g Topical TID PRN    multivitamin, tx-iron-ca-min (THERA-M w/ IRON) tablet 1 Tab  1 Tab Oral DAILY    famotidine (PEPCID) tablet 20 mg  20 mg Oral BID    PARoxetine (PAXIL) tablet 20 mg  20 mg Oral DAILY    ARIPiprazole (ABILIFY) tablet 15 mg  15 mg Oral DAILY    levothyroxine (SYNTHROID) tablet 125 mcg  125 mcg Oral ACB    simvastatin (ZOCOR) tablet 40 mg  40 mg Oral QHS    albuterol (PROVENTIL VENTOLIN) nebulizer solution 2.5 mg  2.5 mg Nebulization Q4H PRN         Physical Exam:     Visit Vitals    /79 (BP 1 Location: Right arm, BP Patient Position: At rest)    Pulse (!) 51    Temp 98.2 °F (36.8 °C)    Resp 20    Ht 5' (1.524 m)    Wt 145.6 kg (321 lb)    SpO2 99%    Breastfeeding Unknown    BMI 62.69 kg/m2     BP Readings from Last 3 Encounters:   05/03/18 131/79   03/01/18 117/77   02/24/18 (!) 156/131     Pulse Readings from Last 3 Encounters:   05/03/18 (!) 51 03/01/18 75   02/24/18 72     Wt Readings from Last 3 Encounters:   05/03/18 145.6 kg (321 lb)   03/01/18 141.9 kg (312 lb 12.8 oz)   02/24/18 (!) 167.4 kg (369 lb)       General:  Awake, alert, oriented x 3, morbidly obese AA female  Neck: supple, unable to assess for jvd d/t body habitus  Lungs: diminished breath sounds, clear, normal effort  Heart: regular rate and rhythm without murmur  Abdomen:  Obese, soft, non-tender, no guarding  Extremities:  1+ bilat LE edema, no cyanosis, bilat well healed scares to knees  Skin: warm and dry  Neuro: [place], speech is clear, PERRL, EOMs intact, moves extremities without difficulty  Psych: normal mood and affect     Data Review:     Recent Labs      05/03/18   0354   WBC  4.0*   HGB  8.3*   HCT  29.2*   PLT  228     Recent Labs      05/03/18   0354   NA  140   K  3.4*   CL  104   CO2  32   GLU  112*   BUN  22*   CREA  1.18   CA  7.9*   INR  2.7*       Results for orders placed or performed during the hospital encounter of 05/03/18   EKG, 12 LEAD, INITIAL   Result Value Ref Range    Ventricular Rate 48 BPM    Atrial Rate 55 BPM    QRS Duration 118 ms    Q-T Interval 478 ms    QTC Calculation (Bezet) 427 ms    Calculated R Axis -37 degrees    Calculated T Axis -73 degrees    Diagnosis       Atrial fibrillation with slow ventricular response  Left axis deviation  Incomplete right bundle branch block  ST & T wave abnormality, consider anterior ischemia or digitalis effect  Abnormal ECG  When compared with ECG of 26-FEB-2018 15:17,  Vent. rate has decreased BY  27 BPM  Incomplete right bundle branch block is now present  T wave inversion no longer evident in Lateral leads  QT has shortened     Results for orders placed or performed in visit on 10/12/17   AMB POC EKG ROUTINE W/ 12 LEADS, INTER & REP    Impression    See progress note.        All Cardiac Markers in the last 24 hours:    Lab Results   Component Value Date/Time     05/03/2018 10:25 AM    CKMB 1.5 05/03/2018 10:25 AM    CKND1 1.1 05/03/2018 10:25 AM    TROIQ <0.02 05/03/2018 10:25 AM    TROIQ <0.02 05/03/2018 03:54 AM       Last Lipid:    Lab Results   Component Value Date/Time    Cholesterol, total 90 05/03/2018 10:25 AM    HDL Cholesterol 34 (L) 05/03/2018 10:25 AM    LDL, calculated 41.4 05/03/2018 10:25 AM    Triglyceride 73 05/03/2018 10:25 AM    CHOL/HDL Ratio 2.6 05/03/2018 10:25 AM       Signed By: Charlena Schaumann,      May 3, 2018

## 2018-05-03 NOTE — ED NOTES
I personally saw and examined the patient and chart was reviewed prior to disposition. I have reviewed and agree with the midlevel findings, including all diagnostic interpretations, and plans as written. I was present during the key portions of separately billed procedures. Jovita Chacon MD    Patient sleepy but arousable, bradycardic occasionally hypotensive but normotensive after waking up, she was here for evaluation of vaginal bleeding, found to be occult still positive he will been 8.3, INR 2.7    Monitor throughout the night, heart rate in the 30s to 50s, blood pressure borderline     Consult:  Discussed care with VERONICA Zavala. Standard discussion; including history of patients chief complaint, available diagnostic results, and treatment course. If she remains stable no need for admission, recommends cutting back on metoprolol to 25 mg twice a day    It is now the end of my shift, I am still awaiting duplex, monitoring for resoluation of bradycardia. Patient will be signed out to the oncoming physician Dr. Terra Hdz at 0700. Disposition:    Pending      Portions of this chart were created with Dragon medical speech to text program.   Unrecognized errors may be present.

## 2018-05-04 ENCOUNTER — DOCUMENTATION ONLY (OUTPATIENT)
Dept: CARDIOLOGY CLINIC | Age: 58
End: 2018-05-04

## 2018-05-04 LAB
ALBUMIN SERPL-MCNC: 3.1 G/DL (ref 3.4–5)
ALBUMIN/GLOB SERPL: 0.8 {RATIO} (ref 0.8–1.7)
ALP SERPL-CCNC: 143 U/L (ref 45–117)
ALT SERPL-CCNC: 15 U/L (ref 13–56)
AMPHET UR QL SCN: NEGATIVE
ANION GAP SERPL CALC-SCNC: 5 MMOL/L (ref 3–18)
APPEARANCE UR: CLEAR
AST SERPL-CCNC: 15 U/L (ref 15–37)
BARBITURATES UR QL SCN: NEGATIVE
BASOPHILS # BLD: 0 K/UL (ref 0–0.1)
BASOPHILS NFR BLD: 0 % (ref 0–2)
BENZODIAZ UR QL: NEGATIVE
BILIRUB SERPL-MCNC: 0.9 MG/DL (ref 0.2–1)
BILIRUB UR QL: NEGATIVE
BUN SERPL-MCNC: 16 MG/DL (ref 7–18)
BUN/CREAT SERPL: 17 (ref 12–20)
CALCIUM SERPL-MCNC: 8.2 MG/DL (ref 8.5–10.1)
CANNABINOIDS UR QL SCN: NEGATIVE
CHLORIDE SERPL-SCNC: 103 MMOL/L (ref 100–108)
CO2 SERPL-SCNC: 32 MMOL/L (ref 21–32)
COCAINE UR QL SCN: NEGATIVE
COLOR UR: YELLOW
CREAT SERPL-MCNC: 0.93 MG/DL (ref 0.6–1.3)
DIFFERENTIAL METHOD BLD: ABNORMAL
EOSINOPHIL # BLD: 0.1 K/UL (ref 0–0.4)
EOSINOPHIL NFR BLD: 3 % (ref 0–5)
ERYTHROCYTE [DISTWIDTH] IN BLOOD BY AUTOMATED COUNT: 16.9 % (ref 11.6–14.5)
GLOBULIN SER CALC-MCNC: 4.1 G/DL (ref 2–4)
GLUCOSE BLD STRIP.AUTO-MCNC: 103 MG/DL (ref 70–110)
GLUCOSE BLD STRIP.AUTO-MCNC: 110 MG/DL (ref 70–110)
GLUCOSE BLD STRIP.AUTO-MCNC: 118 MG/DL (ref 70–110)
GLUCOSE BLD STRIP.AUTO-MCNC: 97 MG/DL (ref 70–110)
GLUCOSE SERPL-MCNC: 87 MG/DL (ref 74–99)
GLUCOSE UR STRIP.AUTO-MCNC: NEGATIVE MG/DL
HCG UR QL: NEGATIVE
HCT VFR BLD AUTO: 30.7 % (ref 35–45)
HCT VFR BLD AUTO: 30.8 % (ref 35–45)
HDSCOM,HDSCOM: NORMAL
HGB BLD-MCNC: 8.8 G/DL (ref 12–16)
HGB BLD-MCNC: 9 G/DL (ref 12–16)
HGB UR QL STRIP: NEGATIVE
INR PPP: 2.1 (ref 0.8–1.2)
KETONES UR QL STRIP.AUTO: NEGATIVE MG/DL
LEUKOCYTE ESTERASE UR QL STRIP.AUTO: NEGATIVE
LYMPHOCYTES # BLD: 0.9 K/UL (ref 0.9–3.6)
LYMPHOCYTES NFR BLD: 22 % (ref 21–52)
MAGNESIUM SERPL-MCNC: 2.2 MG/DL (ref 1.6–2.6)
MCH RBC QN AUTO: 22.1 PG (ref 24–34)
MCHC RBC AUTO-ENTMCNC: 28.6 G/DL (ref 31–37)
MCV RBC AUTO: 77.2 FL (ref 74–97)
METHADONE UR QL: NEGATIVE
MONOCYTES # BLD: 0.3 K/UL (ref 0.05–1.2)
MONOCYTES NFR BLD: 6 % (ref 3–10)
NEUTS SEG # BLD: 2.8 K/UL (ref 1.8–8)
NEUTS SEG NFR BLD: 69 % (ref 40–73)
NITRITE UR QL STRIP.AUTO: NEGATIVE
OPIATES UR QL: NEGATIVE
PCP UR QL: NEGATIVE
PH UR STRIP: 7 [PH] (ref 5–8)
PLATELET # BLD AUTO: 231 K/UL (ref 135–420)
PMV BLD AUTO: 10.1 FL (ref 9.2–11.8)
POTASSIUM SERPL-SCNC: 3.5 MMOL/L (ref 3.5–5.5)
PROT SERPL-MCNC: 7.2 G/DL (ref 6.4–8.2)
PROT UR STRIP-MCNC: NEGATIVE MG/DL
PROTHROMBIN TIME: 23 SEC (ref 11.5–15.2)
RBC # BLD AUTO: 3.99 M/UL (ref 4.2–5.3)
SODIUM SERPL-SCNC: 140 MMOL/L (ref 136–145)
SP GR UR REFRACTOMETRY: 1.01 (ref 1–1.03)
TSH SERPL DL<=0.05 MIU/L-ACNC: 1.97 UIU/ML (ref 0.36–3.74)
UROBILINOGEN UR QL STRIP.AUTO: 1 EU/DL (ref 0.2–1)
WBC # BLD AUTO: 4.1 K/UL (ref 4.6–13.2)

## 2018-05-04 PROCEDURE — 82962 GLUCOSE BLOOD TEST: CPT

## 2018-05-04 PROCEDURE — 87086 URINE CULTURE/COLONY COUNT: CPT | Performed by: EMERGENCY MEDICINE

## 2018-05-04 PROCEDURE — 85610 PROTHROMBIN TIME: CPT | Performed by: EMERGENCY MEDICINE

## 2018-05-04 PROCEDURE — 74011250637 HC RX REV CODE- 250/637: Performed by: PHYSICIAN ASSISTANT

## 2018-05-04 PROCEDURE — 97162 PT EVAL MOD COMPLEX 30 MIN: CPT

## 2018-05-04 PROCEDURE — 65660000000 HC RM CCU STEPDOWN

## 2018-05-04 PROCEDURE — 81025 URINE PREGNANCY TEST: CPT | Performed by: EMERGENCY MEDICINE

## 2018-05-04 PROCEDURE — 80307 DRUG TEST PRSMV CHEM ANLYZR: CPT | Performed by: EMERGENCY MEDICINE

## 2018-05-04 PROCEDURE — 80053 COMPREHEN METABOLIC PANEL: CPT | Performed by: EMERGENCY MEDICINE

## 2018-05-04 PROCEDURE — 81003 URINALYSIS AUTO W/O SCOPE: CPT | Performed by: EMERGENCY MEDICINE

## 2018-05-04 PROCEDURE — 74011250637 HC RX REV CODE- 250/637: Performed by: INTERNAL MEDICINE

## 2018-05-04 PROCEDURE — 74011250637 HC RX REV CODE- 250/637: Performed by: EMERGENCY MEDICINE

## 2018-05-04 PROCEDURE — 85018 HEMOGLOBIN: CPT | Performed by: EMERGENCY MEDICINE

## 2018-05-04 PROCEDURE — 36415 COLL VENOUS BLD VENIPUNCTURE: CPT | Performed by: EMERGENCY MEDICINE

## 2018-05-04 PROCEDURE — 84443 ASSAY THYROID STIM HORMONE: CPT | Performed by: EMERGENCY MEDICINE

## 2018-05-04 PROCEDURE — 97165 OT EVAL LOW COMPLEX 30 MIN: CPT

## 2018-05-04 PROCEDURE — 83735 ASSAY OF MAGNESIUM: CPT | Performed by: EMERGENCY MEDICINE

## 2018-05-04 PROCEDURE — 85025 COMPLETE CBC W/AUTO DIFF WBC: CPT | Performed by: EMERGENCY MEDICINE

## 2018-05-04 RX ORDER — OXYCODONE HYDROCHLORIDE 5 MG/1
5 TABLET ORAL
Status: DISCONTINUED | OUTPATIENT
Start: 2018-05-04 | End: 2018-05-10 | Stop reason: HOSPADM

## 2018-05-04 RX ORDER — OXYCODONE HYDROCHLORIDE 5 MG/1
5 TABLET ORAL
COMMUNITY
End: 2018-08-30

## 2018-05-04 RX ORDER — FUROSEMIDE 40 MG/1
40 TABLET ORAL DAILY
Status: DISCONTINUED | OUTPATIENT
Start: 2018-05-04 | End: 2018-05-10 | Stop reason: HOSPADM

## 2018-05-04 RX ADMIN — DOCUSATE SODIUM 100 MG: 100 CAPSULE, LIQUID FILLED ORAL at 20:08

## 2018-05-04 RX ADMIN — DOCUSATE SODIUM 100 MG: 100 CAPSULE, LIQUID FILLED ORAL at 08:04

## 2018-05-04 RX ADMIN — FAMOTIDINE 20 MG: 20 TABLET ORAL at 08:04

## 2018-05-04 RX ADMIN — PAROXETINE HYDROCHLORIDE 20 MG: 20 TABLET, FILM COATED ORAL at 08:04

## 2018-05-04 RX ADMIN — MULTIPLE VITAMINS W/ MINERALS TAB 1 TABLET: TAB at 08:04

## 2018-05-04 RX ADMIN — SIMVASTATIN 40 MG: 40 TABLET, FILM COATED ORAL at 21:38

## 2018-05-04 RX ADMIN — ARIPIPRAZOLE 15 MG: 5 TABLET ORAL at 08:04

## 2018-05-04 RX ADMIN — FAMOTIDINE 20 MG: 20 TABLET ORAL at 20:08

## 2018-05-04 RX ADMIN — OXYCODONE HYDROCHLORIDE 5 MG: 5 TABLET ORAL at 03:28

## 2018-05-04 RX ADMIN — OXYCODONE HYDROCHLORIDE 5 MG: 5 TABLET ORAL at 20:08

## 2018-05-04 RX ADMIN — FUROSEMIDE 40 MG: 40 TABLET ORAL at 16:26

## 2018-05-04 RX ADMIN — LEVOTHYROXINE SODIUM 125 MCG: 25 TABLET ORAL at 08:04

## 2018-05-04 NOTE — PROGRESS NOTES
Problem: Mobility Impaired (Adult and Pediatric)  Goal: *Acute Goals and Plan of Care (Insert Text)  Acute goals not established. Patient reports/demonstrates baseline functional mobility, acute skilled PT services not indicated at this time. physical Therapy EVALUATION and Discharge    Patient: Luisito Ojeda (47 y.o. female)  Date: 5/4/2018  Primary Diagnosis: Bradycardia  Bradycardia  Precautions: Fall    OBJECTIVE/ASSESSMENT AND RECOMMENDATIONS:  Based on the objective data described below, the patient presents with baseline functional mobility including bed mobility, transfers, ambulation, and general activity tolerance following admission for bradycardia. Patient presents today semi-reclined in bed, alert and agreeable to PT/OT evaluation. Patient transferred to sitting EOB with Concepcion and additional time to complete. She stood from elevated bed with CGA, ambulated 40 ft in room with SBA for safety. Patient assisted back to bed, left with call bell in reach and lunch tray at hand. Patient reports 24/7 assistance at home, presents today at baseline functional mobility. Education: bed mobility, transfers, ambulation, assistive device management, activity pacing -- patient verbalized/demonstrated understanding  Skilled physical therapy is not indicated at this time. Discharge Recommendations: None  Further Equipment Recommendations for Discharge: N/A      SUBJECTIVE:   Patient stated I've got people helping me with everything.     OBJECTIVE DATA SUMMARY:     Past Medical History:   Diagnosis Date    Atrial fibrillation     CHADS score 1  (-CHF, +HTN, -AGE, -DM, -CVA)    Carpal tunnel syndrome     Chronic pain     Congenital heart disease     presumed pulmonary stenosis s/p repair 1969    Degenerative disc disease     Degenerative joint disease     Dyslipidemia     GERD     H/O echocardiogram 04/2017    EF 60-65%, mild concentric LVH, dilated RV with RV dysfunction, RVSP 54 mmHg, RA E, moderate to severe pulmonic regurgitation.  Hypertension     Hypothyroid     Morbid obesity     Morbid obesity with BMI of 45.0-49.9, adult ([facility] 2/20/2017    Noncompliance     Schizoaffective disorder     Substance abuse     marijuana, crack cocaine     Past Surgical History:   Procedure Laterality Date    HX CARPAL TUNNEL RELEASE      left    HX HYSTERECTOMY      HX KNEE REPLACEMENT      left    HX KNEE REPLACEMENT      right    HX THYROIDECTOMY       Barriers to Learning/Limitations: None  Compensate with: N/A  Prior Level of Function/Home Situation: Patient lives in single story home with family. She has , BS and hospital bed, requires assistance with all functional mobility and ADLs. Home Situation  Home Environment: Private residence  # Steps to Enter: 1  One/Two Story Residence: One story  Living Alone: No  Support Systems: Child(jules), Family member(s)  Patient Expects to be Discharged to[de-identified] Private residence  Current DME Used/Available at Home: Мария Mckeon, rolling, Commode, bedside  Critical Behavior:  Neurologic State: Alert  Psychosocial  Patient Behaviors: Calm; Cooperative  Purposeful Interaction: Yes  Pt Identified Daily Priority: Clinical issues (comment)  Car[place] Process: Attend basic human needs  Strength:    Strength: Generally decreased, functional (BLE)  Tone & Sensation:   Tone: Normal (BLE)  Sensation: Intact (BLE)   Range Of Motion:  AROM: Generally decreased, functional (BLE)  Functional Mobility:  Bed Mobility:  Supine to Sit: Minimum assistance; Additional time  Sit to Supine: Moderate assistance (BLE management)  Transfers:  Sit to Stand: Contact guard assistance (elevated bed)  Stand to Sit: Contact guard assistance  Balance:   Sitting: Intact  Standing: With support; Impaired (RW)  Standing - Static: Good  Standing - Dynamic : Fair (Fair+)  Ambulation/Gait Training:  Distance (ft): 40 Feet (ft)  Assistive Device: Walker, rolling  Ambulation - Level of Assistance: Stand-by assistance  [place] of Support: Widened  Speed/Reyna: Pace decreased (<100 feet/min)  Pain:  Pre session: 7.5/10 R hip  Post session: 7.5/10 R hip  Activity Tolerance:   Fair/good  Please refer to the flowsheet for vital signs taken during this treatment. After treatment:   [] Patient left in no apparent distress sitting up in chair  [] Patient left sitting on EOB  [x] Patient left in no apparent distress in bed  [] Patient declined to be OOB at this time due to  [x] Call bell left within reach  [x] Nursing notified(Rosalba)  [x] Caregiver present  [] Bed alarm activated  [] SCDs in place  COMMUNICATION/EDUCATION:   [x]         Fall prevention education was provided and the patient/caregiver indicated understanding. [x]         Patient/family have participated as able in goal setting and plan of care. [x]         Patient/family agree to work toward stated goals and plan of care. []         Patient understands intent and goals of therapy, but is neutral about his/her participation. []         Patient is unable to participate in goal setting and plan of care. Thank you for this referral.  Kris Chávezial   Time Calculation: 18 mins    Mobility  Current  CJ= 20-39%  D/C  CJ= 20-39%. The severity rating is based on the Level of Assistance required for Functional Mobility and ADLs.     Eval Complexity: History: MEDIUM  Complexity : 1-2 comorbidities / personal factors will impact the outcome/ POC Exam:MEDIUM Complexity : 3 Standardized tests and measures addressing body structure, function, activity limitation and / or participation in recreation  Presentation: MEDIUM Complexity : Evolving with changing characteristics  Clinical Decision Making:Medium Complexity   Overall Complexity:MEDIUM

## 2018-05-04 NOTE — H&P
[facility]  HISTORY AND PHYSICAL      Name:SYD MCLEOD  MR#: [id number]  : 1960  ACCOUNT #: [de-identified]   ADMIT DATE: 2018    Please note that patient was seen and evaluated earlier in the day. CHIEF COMPLAINT:  Vaginal bleeding. HISTORY OF PRESENT ILLNESS:  This is a 60-year-old female who was initially brought to the ER with a complaint of vaginal bleeding. While in the ER, the patient was noted to be persistently bradycardic and admission was requested for this. When I saw the patient, the patient was lying in bed in no apparent distress. The patient denies any chest pain, any shortness of breath, any palpitations. The patient denies any lightheadedness. The patient denies any nausea or vomiting. The patient denies any fever or chills. Patient denies any cough. Patient denies any abdominal pain. Patient denies any urinary complaints. The patient denies any diarrhea or rectal bleeding. Patient does have some swelling. Patient denies any history of any black or dark colored stools. No history of any rash. PAST MEDICAL HISTORY:  Significant for type 2 diabetes, hypertension, dyslipidemia, morbid obesity, degenerative joint disease, atrial fibrillation for which patient is on Coumadin. History of left total knee replacement in 2017. History of congenital heart disease and a remote history of surgery for that. History of chronic diastolic congestive heart failure, history of chronic hypoxic respiratory failure on 2 L via nasal cannula at home, history of obstructive sleep apnea. The patient is supposed to use CPAP at home, but is noncompliant. History of pulmonary hypertension. PAST SURGICAL HISTORY:  Include a hysterectomy and a left knee replacement and a right knee replacement and a thyroid surgery and a carpal tunnel surgery. ALLERGIES:  GABAPENTIN, TETANUS TOXOID, TOPAMAX. SOCIAL HISTORY:  The patient denies any tobacco use, any alcohol use. Lives with her family. FAMILY HISTORY:  Mother had heart disease and high blood pressure. Father had heart disease. REVIEW OF SYSTEMS:  Apart from the complaints mentioned above, a complete review of systems was done. A 12 system review of systems was done and is negative. MEDICATIONS:  Prior to admission medications, patient is a poor historian and is not able to clarify her medications. As per the chart and on review of the previous discharge summary, medications include:    1. Imdur 30 mg p.o. daily. 2.  Aspirin 81 mg p.o. daily. 3.  Colace 100 mg p.o. twice daily. 4.  Potassium chloride 20 mEq p.o. daily. 5.   Coumadin as directed. 6.  Lopressor 25 mg. p.o. twice daily. 7.  Lasix 40 mg p.o. daily. 8.  Imodium as needed. 9.  Zestoretic 20/25 one tablet p.o. daily. 10.  Zantac 150 mg p.o. twice daily. 11.  Paxil 40 mg p.o. daily. 12.  Zocor 40 mg p.o. daily at night. 13.  Abilify 15 mg p.o. daily. 14.  Synthroid 125 mcg p.o. daily. 15.  Multivitamin one tablet p.o. daily. 16.  Voltaren gel as directed. 17.  Albuterol inhaler as needed. PHYSICAL EXAMINATION:  VITAL SIGNS:  Today show temperature of 98.2, pulse rate of 51, blood pressure 131/79, respiratory rate of 20, oxygen saturation of 99%. GENERAL:  This is a 54-year-old female sitting in bed, currently in no apparent distress. HEAD:  Normocephalic, atraumatic. EYES:  Pupils are reactive to light. EAR, NOSE, THROAT:  No ear or nasal discharge is seen. Mucous membranes are moist.  NECK:  Supple, no JVD, no carotid bruit, thyromegaly, no lymphadenopathy. LUNGS:  Clear to auscultation bilaterally. No rales, no rhonchi, no wheezing is heard. HEART:  S1, S2 were heard. Bradycardic. ABDOMEN:  Soft. Bowel sounds are positive, nontender, nondistended, obese. EXTREMITIES:  Bilateral 1+ edema is noted. Pulses are 1+ bilaterally equal.  NEUROLOGIC:  The patient is awake and alert.   The patient follows commands and responds appropriately. No focal neurological deficits are identified. DIAGNOSTIC DATA:  EKG as per my reading showed atrial fibrillation with a slow rate and nonspecific ST-T changes were noted. EKG was read by myself. LABORATORY DATA:  Today show a hemoglobin of 8.3, hematocrit 29.2, platelet count of 630. Sodium 140, potassium 3.4, chloride of 104, CO2 32, glucose 112, BUN 22, creatinine 1. 18. Troponin first set is negative. BNP of 2802. INR of 2.7. Dopplers of bilateral lower extremities were done, which did not show any evidence of DVT. Pelvic ultrasound non-obs was done, which was reported as no pathology there identified. Evaluation quite limited because of bowel gas and patient habitus. A stool Hemoccult was checked in the ER by the ER physician and was reportedly positive. No mike bleeding was noted. Pelvic examination was also done and no bleeding was noted per vagina, as per the ER provider. IMPRESSION:  1. Bradycardia, likely secondary to medications. 2.  Possible vaginal bleeding. The patient has had a hysterectomy in the past.    3.  Anemia with heme positive stool in the setting of being on Coumadin. 4.  Type 2 diabetes. 5.  Hypertension. 6.  Dyslipidemia. 7.  Morbid obesity. 8.  Atrial fibrillation on Coumadin as an outpatient. 9.  Degenerative joint disease. 10.  History of chronic diastolic congestive heart failure. 11.  History of pulmonary hypertension. 12.  Obstructive sleep apnea. 13.  Chronic hypoxic respiratory failure on 2 liters of oxygen via nasal cannula continuously. PLAN:  Patient has been admitted to telemetry. Cardiology has been consulted. We will hold the beta blocker at this time. We will monitor the patient's blood pressure. Patient will be monitored on telemetry. Three sets of cardiac enzymes will be monitored. Regarding the heme positive stools and patient being on Coumadin, I have consulted GI.   We will hold Coumadin for now until we get some clarity from the gastroenterologist.  Kaylie Lombardo will be monitored. Blood pressure will be monitored. Dopplers of the lower extremity were negative for DVT. I counseled the patient at length regarding compliance with her CPAP. Patient verbalized understanding. Patient will be continued on oxygen. We will continue the patient on Synthroid for her hypothyroidism. PT, OT will be consulted. Rest depending on the patient's further hospital course. Plan of care was discussed with the patient and she verbalized understanding and agreed.   I also discussed the patient's level of care and the patient wishes to be a Costa Lim MD       VT/TN  D: 05/03/2018 19:08     T: 05/03/2018 20:05  JOB #: [id number]  CC: Venecia Mcgee MD

## 2018-05-04 NOTE — PROGRESS NOTES
[facility] - Progress Note  Admit Date: 5/3/2018    Assessment:     Hospital Problems  Date Reviewed: 11/28/2017          Codes Class Noted POA    Bradycardia ICD-10-CM: R00.1  ICD-9-CM: 427.89  5/3/2018 Unknown              -Bradycardia, likely 2/2 beta blocker use. -LE swelling, pt non-compliant with lasix at home as she has trouble ambulating and doesn't like to use the bathroom, BNP 2800, echo Feb 26, 2018 with normal LV function and mod LVH, dilated RV with increased PA pressures at 67mmHg, moderate TR and dilated atria. LE duplex scan negative for PE. Swelling appears mostly chronic.  -L knee replacement Dec 2017, R knee replacement  -Anemia 2/2 to vaginal spotting and guaiac positive stool, Hgb 8.3/Hematocrit 29.2, GI work-up in progress.  -Chronic afib, rate controlled with BB, on chronic coumadin, INR therapeutic.   -Mod to severe Pulm HTN  -morbid obesity  -ANDRADE, non-compliant with CPAP  -polysubstance abuse history, marijuana and crack cocaine, pt states she no longer uses  -Hypothyroidism  -Schizoaffective disorder  -DJD  -GERD    Plan:     Hemodynamics stable, HR improved, continue to follow off BB. Anemia with rectal bleeding, GI work-up in progress. Holding warfarin with plans on possible scope next week. Resume lasix. Needs continued medication education. Expressed importance of compliance. Subjective:     No CP. No SOB. Tele HR overall 70s-80s.     Objective:      Patient Vitals for the past 8 hrs:   Temp Pulse Resp BP SpO2   05/04/18 1146 96.7 °F (35.9 °C) (!) 53 20 126/74 98 %   05/04/18 0802 97.2 °F (36.2 °C) 62 20 (!) 140/104 97 %         Patient Vitals for the past 96 hrs:   Weight   05/04/18 0428 145.7 kg (321 lb 3.2 oz)   05/03/18 0557 145.6 kg (321 lb)                    Intake/Output Summary (Last 24 hours) at 05/04/18 1209  Last data filed at 05/03/18 2321   Gross per 24 hour   Intake              350 ml   Output             1850 ml   Net            -1500 ml Physical Exam:  General:  alert, cooperative, no distress, appears stated age  Neck:  no JVD  Lungs:  clear to auscultation bilaterally  Heart:  irregularly irregular rhythm  Abdomen:  abdomen is soft without significant tenderness, masses, organomegaly or guarding  Extremities:  extremities normal, atraumatic, no cyanosis 1+ edema    Data Review:     Labs: Results:       Chemistry Recent Labs      05/04/18 0315 05/03/18   0354   GLU  87  112*   NA  140  140   K  3.5  3.4*   CL  103  104   CO2  32  32   BUN  16  22*   CREA  0.93  1.18   CA  8.2*  7.9*   MG  2.2   --    AGAP  5  4   BUCR  17  19   AP  143*   --    TP  7.2   --    ALB  3.1*   --    GLOB  4.1*   --    AGRAT  0.8   --       CBC w/Diff Recent Labs      05/04/18 0315 05/03/18   1455  05/03/18   0354   WBC  4.1*   --   4.0*   RBC  3.99*   --   3.79*   HGB  8.8*  8.6*  8.3*   HCT  30.8*  30.9*  29.2*   PLT  231   --   228   GRANS  69   --   65   LYMPH  22   --   21   EOS  3   --   5      Cardiac Enzymes Lab Results   Component Value Date/Time     05/03/2018 09:00 PM     05/03/2018 02:55 PM    CKMB 1.5 05/03/2018 09:00 PM    CKMB 1.4 05/03/2018 02:55 PM    CKND1 1.2 05/03/2018 09:00 PM    CKND1 1.1 05/03/2018 02:55 PM    TROIQ <0.02 05/03/2018 09:00 PM    TROIQ <0.02 05/03/2018 02:55 PM      Coagulation Recent Labs      05/04/18 0315 05/03/18   0354   PTP  23.0*  27.5*   INR  2.1*  2.7*   APTT   --   51.4*       Lipid Panel Lab Results   Component Value Date/Time    Cholesterol, total 90 05/03/2018 10:25 AM    HDL Cholesterol 34 (L) 05/03/2018 10:25 AM    LDL, calculated 41.4 05/03/2018 10:25 AM    VLDL, calculated 14.6 05/03/2018 10:25 AM    Triglyceride 73 05/03/2018 10:25 AM    CHOL/HDL Ratio 2.6 05/03/2018 10:25 AM      BNP Lab Results   Component Value Date/Time    B-type Natriuretic Peptide 3187 (H) 05/06/2016 11:10 AM    B-type Natriuretic Peptide 3075 (H) 05/06/2016 11:00 AM      Liver Enzymes Recent Labs      05/04/18 0315   TP  7.2   ALB  3.1*   AP  143*   SGOT  15      Digoxin    Thyroid Studies Lab Results   Component Value Date/Time    T4, Total 12.3 (H) 01/15/2014 02:01 AM    TSH 1.97 05/04/2018 03:15 AM          Signed By: VERONICA Cuba     May 4, 2018

## 2018-05-04 NOTE — PROGRESS NOTES
[facility]  Progress Note    Patient: Ashley Quintana Age: 60 y.o. : 1960 MR#: [id number] SSN: [id number]  Date: 2018     Subjective:     Patient lying in bed in NAD    Assessment/Plan:   1. Bradycardia, likely secondary to medications. 2.  Possible vaginal bleeding. The patient has had a hysterectomy in the past.    3.  Anemia with heme positive stool in the setting of being on Coumadin. 4.  Hypertension. 5.  Dyslipidemia. 6.  Morbid obesity. 7.  Atrial fibrillation on Coumadin as an outpatient. 8.  Degenerative joint disease. 10.  History of chronic diastolic congestive heart failure. 11.  History of pulmonary hypertension. 12.  Obstructive sleep apnea. 13.  Chronic hypoxic respiratory failure on 2 liters of oxygen via nasal cannula continuously.     PLAN  Hold BB, cardio following  Coumadin held  GI planning to scope next week  On O2, resume lasix  On synthyroid  Advanced care planning: full code  D/w patient        Case discussed with:  [x]Patient  []Family  []Nursing  []Case Management  DVT Prophylaxis:  []Lovenox  []Hep SQ  []SCDs  []Coumadin   []On Heparin gtt    Objective:   VS:   Visit Vitals    BP (!) 140/104 (BP 1 Location: Left arm, BP Patient Position: At rest)    Pulse 62    Temp 97.2 °F (36.2 °C)    Resp 20    Ht 5' (1.524 m)    Wt 145.7 kg (321 lb 3.2 oz)    SpO2 97%    Breastfeeding Unknown    BMI 62.73 kg/m2      Tmax/24hrs: Temp (24hrs), Av.7 °F (36.5 °C), Min:97.1 °F (36.2 °C), Max:99.1 °F (37.3 °C)    Intake/Output Summary (Last 24 hours) at 18 1142  Last data filed at 18 2321   Gross per 24 hour   Intake              350 ml   Output             1850 ml   Net            -1500 ml       General:  Awake, alert  Cardiovascular:  S1S2+, RRR  Pulmonary: decreased BS b/l Bases  GI:  Soft, BS+, NT, ND, obese  Extremities:  + edema      Labs:    Recent Results (from the past 24 hour(s))   CARDIAC PANEL,(CK, CKMB & TROPONIN)    Collection Time: 05/03/18  2:55 PM   Result Value Ref Range     26 - 192 U/L    CK - MB 1.4 <3.6 ng/ml    CK-MB Index 1.1 0.0 - 4.0 %    Troponin-I, Qt. <0.02 0.0 - 0.045 NG/ML   HGB & HCT    Collection Time: 05/03/18  2:55 PM   Result Value Ref Range    HGB 8.6 (L) 12.0 - 16.0 g/dL    HCT 30.9 (L) 35.0 - 45.0 %   TYPE & SCREEN    Collection Time: 05/03/18  4:50 PM   Result Value Ref Range    Crossmatch Expiration 05/06/2018     ABO/Rh(D) O POSITIVE     Antibody screen NEG    CARDIAC PANEL,(CK, CKMB & TROPONIN)    Collection Time: 05/03/18  9:00 PM   Result Value Ref Range     26 - 192 U/L    CK - MB 1.5 <3.6 ng/ml    CK-MB Index 1.2 0.0 - 4.0 %    Troponin-I, Qt. <0.02 0.0 - 0.045 NG/ML   CBC WITH AUTOMATED DIFF    Collection Time: 05/04/18  3:15 AM   Result Value Ref Range    WBC 4.1 (L) 4.6 - 13.2 K/uL    RBC 3.99 (L) 4.20 - 5.30 M/uL    HGB 8.8 (L) 12.0 - 16.0 g/dL    HCT 30.8 (L) 35.0 - 45.0 %    MCV 77.2 74.0 - 97.0 FL    MCH 22.1 (L) 24.0 - 34.0 PG    MCHC 28.6 (L) 31.0 - 37.0 g/dL    RDW 16.9 (H) 11.6 - 14.5 %    PLATELET 558 172 - 772 K/uL    MPV 10.1 9.2 - 11.8 FL    NEUTROPHILS 69 40 - 73 %    LYMPHOCYTES 22 21 - 52 %    MONOCYTES 6 3 - 10 %    EOSINOPHILS 3 0 - 5 %    BASOPHILS 0 0 - 2 %    ABS. NEUTROPHILS 2.8 1.8 - 8.0 K/UL    ABS. LYMPHOCYTES 0.9 0.9 - 3.6 K/UL    ABS. MONOCYTES 0.3 0.05 - 1.2 K/UL    ABS. EOSINOPHILS 0.1 0.0 - 0.4 K/UL    ABS.  BASOPHILS 0.0 0.0 - 0.1 K/UL    DF AUTOMATED     METABOLIC PANEL, COMPREHENSIVE    Collection Time: 05/04/18  3:15 AM   Result Value Ref Range    Sodium 140 136 - 145 mmol/L    Potassium 3.5 3.5 - 5.5 mmol/L    Chloride 103 100 - 108 mmol/L    CO2 32 21 - 32 mmol/L    Anion gap 5 3.0 - 18 mmol/L    Glucose 87 74 - 99 mg/dL    BUN 16 7.0 - 18 MG/DL    Creatinine 0.93 0.6 - 1.3 MG/DL    BUN/Creatinine ratio 17 12 - 20      GFR est AA >60 >60 ml/min/1.73m2    GFR est non-AA >60 >60 ml/min/1.73m2    Calcium 8.2 (L) 8.5 - 10.1 MG/DL Bilirubin, total 0.9 0.2 - 1.0 MG/DL    ALT (SGPT) 15 13 - 56 U/L    AST (SGOT) 15 15 - 37 U/L    Alk.  phosphatase 143 (H) 45 - 117 U/L    Protein, total 7.2 6.4 - 8.2 g/dL    Albumin 3.1 (L) 3.4 - 5.0 g/dL    Globulin 4.1 (H) 2.0 - 4.0 g/dL    A-G Ratio 0.8 0.8 - 1.7     MAGNESIUM    Collection Time: 05/04/18  3:15 AM   Result Value Ref Range    Magnesium 2.2 1.6 - 2.6 mg/dL   TSH 3RD GENERATION    Collection Time: 05/04/18  3:15 AM   Result Value Ref Range    TSH 1.97 0.36 - 3.74 uIU/mL   PROTHROMBIN TIME + INR    Collection Time: 05/04/18  3:15 AM   Result Value Ref Range    Prothrombin time 23.0 (H) 11.5 - 15.2 sec    INR 2.1 (H) 0.8 - 1.2     URINALYSIS W/ RFLX MICROSCOPIC    Collection Time: 05/04/18  6:16 AM   Result Value Ref Range    Color YELLOW      Appearance CLEAR      Specific gravity 1.009 1.005 - 1.030      pH (UA) 7.0 5.0 - 8.0      Protein NEGATIVE  NEG mg/dL    Glucose NEGATIVE  NEG mg/dL    Ketone NEGATIVE  NEG mg/dL    Bilirubin NEGATIVE  NEG      Blood NEGATIVE  NEG      Urobilinogen 1.0 0.2 - 1.0 EU/dL    Nitrites NEGATIVE  NEG      Leukocyte Esterase NEGATIVE  NEG     DRUG SCREEN, URINE    Collection Time: 05/04/18  6:16 AM   Result Value Ref Range    BENZODIAZEPINES NEGATIVE  NEG      BARBITURATES NEGATIVE  NEG      THC (TH-CANNABINOL) NEGATIVE  NEG      OPIATES NEGATIVE  NEG      PCP(PHENCYCLIDINE) NEGATIVE  NEG      COCAINE NEGATIVE  NEG      AMPHETAMINES NEGATIVE  NEG      METHADONE NEGATIVE  NEG      HDSCOM (NOTE)    HCG URINE, QL    Collection Time: 05/04/18  6:16 AM   Result Value Ref Range    HCG urine, QL NEGATIVE  NEG     GLUCOSE, POC    Collection Time: 05/04/18  8:07 AM   Result Value Ref Range    Glucose (POC) 110 70 - 110 mg/dL   GLUCOSE, POC    Collection Time: 05/04/18 11:41 AM   Result Value Ref Range    Glucose (POC) 103 70 - 110 mg/dL       Signed By: Sheldon Galarza MD     May 4, 2018

## 2018-05-04 NOTE — PROGRESS NOTES
Problem: Self Care Deficits Care Plan (Adult)  Goal: *Acute Goals and Plan of Care (Insert Text)  Outcome: Resolved/Met Date Met: 05/04/18  Occupational Therapy EVALUATION/discharge    Patient: Bentley Apley (81 y.o. female)  Date: 5/4/2018  Primary Diagnosis: Bradycardia  Bradycardia        Precautions:   Fall    ASSESSMENT AND RECOMMENDATIONS:  Based on the objective data described below, the patient presents at her PLOF with basic self care tasks and functional transfers. She has assist at home for all ADLs, mostly LB and uses a RW with one person assist for transfers. Patient has all needed DME for home safety. She wears 2L O2 NC at home and has supportive family to assist her prn. Skilled occupational therapy is not indicated at this time. Discharge Recommendations: None  Further Equipment Recommendations for Discharge: N/A      Barriers to Learning/Limitations: None  Compensate with: visual, verbal, tactile, kinesthetic cues/model     COMPLEXITY     Eval Complexity: History: LOW Complexity : Brief history review ; Examination: LOW Complexity : 1-3 performance deficits relating to physical, cognitive , or psychosocial skils that result in activity limitations and / or participation restrictions ; Decision Making:LOW Complexity : No comorbidities that affect functional and no verbal or physical assistance needed to complete eval tasks  Assessment: Low Complexity        G-CODES:     Self Care  Current  CK= 40-59%   Goal  CK= 40-59%   D/C  CK= 40-59%. The severity rating is based on the Level of Assistance required for Functional Mobility and ADLs. SUBJECTIVE:   Patient stated My family helps me out at home.     OBJECTIVE DATA SUMMARY:     Past Medical History:   Diagnosis Date    Atrial fibrillation     CHADS score 1  (-CHF, +HTN, -AGE, -DM, -CVA)    Carpal tunnel syndrome     Chronic pain     Congenital heart disease     presumed pulmonary stenosis s/p repair 1969    Degenerative disc disease     Degenerative joint disease     Dyslipidemia     GERD     H/O echocardiogram 04/2017    EF 60-65%, mild concentric LVH, dilated RV with RV dysfunction, RVSP 54 mmHg, RA E, moderate to severe pulmonic regurgitation.  Hypertension     Hypothyroid     Morbid obesity     Morbid obesity with BMI of 45.0-49.9, adult ([facility] [address] 2/20/2017    Noncompliance     Schizoaffective disorder     Substance abuse     marijuana, crack cocaine     Past Surgical History:   Procedure Laterality Date    HX CARPAL TUNNEL RELEASE      left    HX HYSTERECTOMY      HX KNEE REPLACEMENT      left    HX KNEE REPLACEMENT      right    HX THYROIDECTOMY       Prior Level of Function/Home Situation: Pt required min to mod assist with basic self care tasks and used a RW for functional mobility PTA. Home Situation  Home Environment: Private residence  # Steps to Enter: 1  One/Two Story Residence: One story  Living Alone: No  Support Systems: Child(jules), Family member(s)  Patient Expects to be Discharged to[de-identified] Private residence  Current DME Used/Available at Home: Walker, rolling, Commode, bedside  Tub or Shower Type:  (Pt sponge bathes at home.)  [x]     Right hand dominant   []     Left hand dominant  Cognitive/Behavioral Status:  Neurologic State: Alert  Orientation Level: Oriented X4  Cognition: Appropriate decision making; Follows commands  Safety/Judgement: Awareness of environment; Fall prevention    Skin: Intact on UEs    Edema: None noted in UEs    Vision/Perceptual:    Acuity: Within Defined Limits    Corrective Lenses: Glasses    Coordination:  Fine Motor Skills-Upper: Left Intact; Right Intact    Gross Motor Skills-Upper: Left Intact; Right Intact    Balance:  Sitting: Intact  Standing: With support; Impaired (RW)  Standing - Static: Good  Standing - Dynamic : Fair (Fair+)    Strength:  Strength: Generally decreased, functional (UEs; 4/5 throughout )    Tone & Sensation:  Tone: Normal (UEs)  Sensation: Intact (UEs)    Range of Motion:  AROM: Generally decreased, functional (UEs; bilateral shoulders limited flexion)    Functional Mobility and Transfers for ADLs:  Bed Mobility:  Supine to Sit: Minimum assistance; Additional time  Sit to Supine: Moderate assistance (BLE management)  Transfers:  Sit to Stand: Contact guard assistance (elevated bed)   Toilet Transfer : Contact guard assistance     ADL Assessment:  Feeding: Modified independent    Oral Facial Hygiene/Grooming: Setup    Bathing: Moderate assistance    Upper Body Dressing: Setup    Lower Body Dressing: Moderate assistance    Toileting: Minimum assistance    Pain:  Pt reports 7.5/10 pain or discomfort prior to treatment, in right hip. Pain meds given prior to session.    Pt reports 7.5/10 pain or discomfort post treatment, in right hip. Patient resting in bed at end of session. Activity Tolerance:   Good    Please refer to the flowsheet for vital signs taken during this treatment. After treatment:   []  Patient left in no apparent distress sitting up in chair  [x]  Patient left in no apparent distress in bed  [x]  Call bell left within reach  []  Nursing notified  []  Caregiver present  []  Bed alarm activated    COMMUNICATION/EDUCATION:   Communication/Collaboration:  [x]      Home safety education was provided and the patient/caregiver indicated understanding. [x]      Patient/family have participated as able and agree with findings and recommendations. []      Patient is unable to participate in plan of care at this time.     Jemma Amin MS OTR/L  Time Calculation: 15 mins

## 2018-05-04 NOTE — PROGRESS NOTES
conducted an initial consultation for Heather, who is a 60 y.o.,female. Patients Primary Language is: [place]. According to the patients EMR Spiritism Affiliation is: [facility].     The reason the Patient came to the hospital is:   Patient Active Problem List    Diagnosis Date Noted    Bradycardia 87/54/2032    Diastolic CHF, chronic ([address] 10/12/2017    Hypoxia 04/18/2017    Fluid overload 04/18/2017    CHF (congestive heart failure) ([address] 04/18/2017    DDD (degenerative disc disease), lumbar, L4/5 advanced 03/29/2017    Morbid obesity with BMI of 45.0-49.9, adult ([facility] [address] 02/20/2017    Dyspnea     Dyslipidemia     Osteoarthritis of both knees 01/14/2014    H/O total knee replacement 01/14/2014    DJD (degenerative joint disease), lumbosacral 01/14/2014    Schizoaffective disorder, chronic condition ([facility] [address] 01/14/2014    Unspecified hypothyroidism 01/14/2014    Morbid obesity ([facility] [address] 01/14/2014    Esophageal reflux 01/14/2014    Hypertension     Congenital heart disease     Atrial fibrillation         The  provided the following Interventions:  Initiated a relationship of care and support. Provided information about Spiritual Care Services. Offered prayer on patient's behalf. Provided devotional/inspirational reading materials per patient's request.  Chart reviewed. The following outcomes where achieved:  Patient expressed gratitude for 's visit. Assessment:  Patient was open to and grateful for prayer; requested and received Daily Bread after  offered reading materials. Plan:  Chaplains will continue to follow and will provide pastoral care on an as needed/requested basis.  recommends bedside caregivers page  on duty if patient shows signs of acute spiritual or emotional distress. Stevie Swenson.  [employer] [phone]

## 2018-05-04 NOTE — PROGRESS NOTES
Patient is scheduled to see Keila Keene on 5/17/18. Ortiz, Yana Arabic  Female, 60 y.o., 1960  Weight:   145.7 kg (321 lb 3.2 oz)  Phone:   [phone]  General  Privacy Note  PCP:   Niharika Gil MD  MRN:   [id number]  MyChart:   Pending  Next Appt (With Me)  None  Next Appt (Any Provider)  05/17/2018    F/u    Schedule follow-up appointment Received: Yesterday       VERONICA Bhat sent to Nighat Stubbs       Cc: Eugene Villa. Martinez                     Patient needs follow up for bradycardia with primary cardiologist or Keila Keene within next couple of weeks. Thanks!  Agnes

## 2018-05-04 NOTE — CONSULTS
[facility]   [email or web address]      Impression:   1. Rectal bleeding on Coumadin for a-fib. INR 2.1. Plan:     1. Need to hold Coumadin and we can discuss interventions next week. Chief Complaint: none      HPI:  Regina Amos is a 60 y.o. female who is being seen on consult for what she thought was vaginal bleeding, turns out to be rectal bleeding. No pain. Hg stable 8.8. Endoscopic history not known. PMH:   Past Medical History:   Diagnosis Date    Atrial fibrillation     CHADS score 1  (-CHF, +HTN, -AGE, -DM, -CVA)    Carpal tunnel syndrome     Chronic pain     Congenital heart disease     presumed pulmonary stenosis s/p repair 1969    Degenerative disc disease     Degenerative joint disease     Dyslipidemia     GERD     H/O echocardiogram 04/2017    EF 60-65%, mild concentric LVH, dilated RV with RV dysfunction, RVSP 54 mmHg, RA E, moderate to severe pulmonic regurgitation.  Hypertension     Hypothyroid     Morbid obesity     Morbid obesity with BMI of 45.0-49.9, adult ([facility] [address] 2/20/2017    Noncompliance     Schizoaffective disorder     Substance abuse     marijuana, crack cocaine       PSH:   Past Surgical History:   Procedure Laterality Date    HX CARPAL TUNNEL RELEASE      left    HX HYSTERECTOMY      HX KNEE REPLACEMENT      left    HX KNEE REPLACEMENT      right    HX THYROIDECTOMY         Social HX:   Social History     Social History    Marital status: SINGLE     Spouse name: N/A    Number of children: N/A    Years of education: N/A     Occupational History    Not on file.      Social History Main Topics    Smoking status: Never Smoker    Smokeless tobacco: Never Used    Alcohol use No    Drug use: No    Sexual activity: Yes     Birth control/ protection: Surgical     Other Topics Concern    Not on file     Social History Narrative       FHX:   Family History   Problem Relation Age of Onset    Hypertension Mother     Coronary Artery Disease Mother     Coronary Artery Disease Father     Hypertension Father        Allergy:   Allergies   Allergen Reactions    Gabapentin Other (comments)     Unsteady, weakness LEs    Tetanus Vaccines And Toxoid Swelling    Topamax [Topiramate] Other (comments)     weakness       Home Medications:     Prescriptions Prior to Admission   Medication Sig    oxyCODONE IR (ROXICODONE) 5 mg immediate release tablet Take 5 mg by mouth every six (6) hours as needed for Pain.  isosorbide mononitrate ER (IMDUR) 30 mg tablet Take 1 Tab by mouth every evening.  aspirin delayed-release 81 mg tablet Take 1 Tab by mouth daily.  docusate sodium (COLACE) 100 mg capsule Take 1 Cap by mouth two (2) times a day for 90 days.  potassium chloride SR (KLOR-CON 10) 10 mEq tablet Take 20 mEq by mouth daily.  raNITIdine (ZANTAC) 150 mg tablet Take 150 mg by mouth two (2) times a day.  warfarin (COUMADIN) 5 mg tablet Take 5 mg by mouth daily.  metoprolol tartrate (LOPRESSOR) 50 mg tablet Take 25 mg by mouth two (2) times a day.  furosemide (LASIX) 40 mg tablet 40 mg po daily    PARoxetine (PAXIL) 20 mg tablet Take 1 Tab by mouth daily.  simvastatin (ZOCOR) 40 mg tablet Take  by mouth nightly. to obtain from pharmacy    aripiprazole (ABILIFY) 15 mg tablet Take 15 mg by mouth daily. to obtain from pharmacy    levothyroxine (SYNTHROID) 125 mcg tablet Take 125 mcg by mouth Daily (before breakfast).  multivitamin, tx-iron-ca-min (THERA-M W/ IRON) 9 mg iron-400 mcg tab tablet Take 1 Tab by mouth daily.  loperamide (IMODIUM) 2 mg capsule Take 2 mg by mouth three (3) times daily as needed for Diarrhea.  lisinopril-hydroCHLOROthiazide (ZESTORETIC) 20-25 mg per tablet Take  by mouth daily.  VENTOLIN HFA 90 mcg/actuation inhaler Take 2 Puffs by inhalation every four (4) hours as needed for Wheezing or Shortness of Breath.        Review of Systems:     Constitutional: No fevers, chills, weight loss, fatigue. Skin: No rashes, pruritis, jaundice, ulcerations, erythema. HENT: No headaches, nosebleeds, sinus pressure, rhinorrhea, sore throat. Eyes: No visual changes, blurred vision, eye pain, photophobia, jaundice. Cardiovascular: No chest pain, heart palpitations. Respiratory: No cough, SOB, wheezing, chest discomfort, orthopnea. Gastrointestinal: bleeding   Genitourinary: No dysuria, bleeding, discharge, pyuria. Musculoskeletal: No weakness, arthralgias, wasting. Endo: No sweats. Heme: No bruising, easy bleeding. Allergies: As noted. Neurological: Cranial nerves intact. Alert and oriented. Gait not assessed. Psychiatric:  No anxiety, depression, hallucinations. Visit Vitals    BP (!) 140/104 (BP 1 Location: Left arm, BP Patient Position: At rest)    Pulse 62    Temp 97.2 °F (36.2 °C)    Resp 20    Ht 5' (1.524 m)    Wt 145.7 kg (321 lb 3.2 oz)    SpO2 97%    Breastfeeding Unknown    BMI 62.73 kg/m2       Physical Assessment:     constitutional: appearance: well developed, normal habitus, no deformities, in no acute distress. skin: inspection: no rashes, ulcers, icterus or other lesions; no clubbing or telangiectasias. palpation: no induration or subcutaneos nodules. eyes: inspection: normal conjunctivae and lids; no jaundice pupils: symmetrical, normoreactive to light, normal accommodation and size. ENMT: mouth: normal oral mucosa,lips and gums; good dentition. respiratory: effort: normal chest excursion; no intercostal retraction or accessory muscle use. cardiovascular: abdominal aorta: normal size and position; no bruits. palpation: PMI of normal size and position; normal rhythm; no thrill or murmurs. abdominal: abdomen: normal consistency; no tenderness or masses. hernias: no hernias appreciated. liver: normal size and consistency. spleen: not palpable. rectal: hemoccult/guaiac: not performed.    musculoskeletal: digits and nails: not assessed. neurologic: cranial nerves: II-XII normal.   psychiatric: judgement/insight: within normal limits. memory: within normal limits for recent and remote events. mood and affect: no evidence of depression, anxiety or agitation. orientation: oriented to time, space and person. Basic Metabolic Profile   Recent Labs      05/04/18 0315   NA  140   K  3.5   CL  103   CO2  32   BUN  16   GLU  87   CA  8.2*   MG  2.2         CBC w/Diff    Recent Labs      05/04/18 0315   WBC  4.1*   RBC  3.99*   HGB  8.8*   HCT  30.8*   MCV  77.2   MCH  22.1*   MCHC  28.6*   RDW  16.9*   PLT  231    Recent Labs      05/04/18 0315   GRANS  69   LYMPH  22   EOS  3        Hepatic Function   Recent Labs      05/04/18 0315   ALB  3.1*   TP  7.2   TBILI  0.9   SGOT  15   AP  143*          Elvira Berry MD, M.D. [facility], [address] [place] [address]  [email or web address]

## 2018-05-04 NOTE — ROUTINE PROCESS
Bedside shift change report given to Jerrod Johnson RN (oncoming nurse) by Giulia Buitrago (offgoing nurse). Report included the following information SBAR, Kardex, ED Summary, Intake/Output, MAR, Accordion, Recent Results and Med Rec Status.

## 2018-05-04 NOTE — ROUTINE PROCESS
Bedside and Verbal shift change report given to [address] Yaima Finney (oncoming nurse) by Minesh Wayne   (offgoing nurse). Report included the following information Kardex, Intake/Output, MAR, Recent Results and Cardiac Rhythm Sinus Rhythm with PVC.

## 2018-05-05 LAB
BACTERIA SPEC CULT: ABNORMAL
BACTERIA SPEC CULT: ABNORMAL
GLUCOSE BLD STRIP.AUTO-MCNC: 100 MG/DL (ref 70–110)
GLUCOSE BLD STRIP.AUTO-MCNC: 107 MG/DL (ref 70–110)
GLUCOSE BLD STRIP.AUTO-MCNC: 113 MG/DL (ref 70–110)
GLUCOSE BLD STRIP.AUTO-MCNC: 94 MG/DL (ref 70–110)
HCT VFR BLD AUTO: 29.4 % (ref 35–45)
HGB BLD-MCNC: 8.6 G/DL (ref 12–16)
INR PPP: 1.7 (ref 0.8–1.2)
PROTHROMBIN TIME: 19.3 SEC (ref 11.5–15.2)
SERVICE CMNT-IMP: ABNORMAL

## 2018-05-05 PROCEDURE — 74011250637 HC RX REV CODE- 250/637: Performed by: PHYSICIAN ASSISTANT

## 2018-05-05 PROCEDURE — 85610 PROTHROMBIN TIME: CPT | Performed by: EMERGENCY MEDICINE

## 2018-05-05 PROCEDURE — 77010033678 HC OXYGEN DAILY

## 2018-05-05 PROCEDURE — 74011250637 HC RX REV CODE- 250/637: Performed by: INTERNAL MEDICINE

## 2018-05-05 PROCEDURE — 85018 HEMOGLOBIN: CPT | Performed by: EMERGENCY MEDICINE

## 2018-05-05 PROCEDURE — 82962 GLUCOSE BLOOD TEST: CPT

## 2018-05-05 PROCEDURE — 65660000000 HC RM CCU STEPDOWN

## 2018-05-05 PROCEDURE — 74011250637 HC RX REV CODE- 250/637: Performed by: EMERGENCY MEDICINE

## 2018-05-05 PROCEDURE — 94660 CPAP INITIATION&MGMT: CPT

## 2018-05-05 PROCEDURE — 36415 COLL VENOUS BLD VENIPUNCTURE: CPT | Performed by: EMERGENCY MEDICINE

## 2018-05-05 PROCEDURE — 74011000250 HC RX REV CODE- 250: Performed by: INTERNAL MEDICINE

## 2018-05-05 RX ORDER — ONDANSETRON 4 MG/1
4 TABLET, FILM COATED ORAL 4 TIMES DAILY
Status: DISPENSED | OUTPATIENT
Start: 2018-05-05 | End: 2018-05-05

## 2018-05-05 RX ORDER — BISACODYL 5 MG
10 TABLET, DELAYED RELEASE (ENTERIC COATED) ORAL
Status: COMPLETED | OUTPATIENT
Start: 2018-05-05 | End: 2018-05-05

## 2018-05-05 RX ADMIN — SIMVASTATIN 40 MG: 40 TABLET, FILM COATED ORAL at 22:07

## 2018-05-05 RX ADMIN — LEVOTHYROXINE SODIUM 125 MCG: 25 TABLET ORAL at 06:36

## 2018-05-05 RX ADMIN — ONDANSETRON HYDROCHLORIDE 4 MG: 4 TABLET, FILM COATED ORAL at 09:12

## 2018-05-05 RX ADMIN — OXYCODONE HYDROCHLORIDE 5 MG: 5 TABLET ORAL at 06:36

## 2018-05-05 RX ADMIN — MULTIPLE VITAMINS W/ MINERALS TAB 1 TABLET: TAB at 09:12

## 2018-05-05 RX ADMIN — FAMOTIDINE 20 MG: 20 TABLET ORAL at 22:07

## 2018-05-05 RX ADMIN — BISACODYL 10 MG: 5 TABLET, COATED ORAL at 09:12

## 2018-05-05 RX ADMIN — ARIPIPRAZOLE 15 MG: 5 TABLET ORAL at 09:12

## 2018-05-05 RX ADMIN — POLYETHYLENE GLYCOL 3350, SODIUM SULFATE ANHYDROUS, SODIUM BICARBONATE, SODIUM CHLORIDE, POTASSIUM CHLORIDE 2000 ML: 236; 22.74; 6.74; 5.86; 2.97 POWDER, FOR SOLUTION ORAL at 15:51

## 2018-05-05 RX ADMIN — DOCUSATE SODIUM 100 MG: 100 CAPSULE, LIQUID FILLED ORAL at 09:12

## 2018-05-05 RX ADMIN — OXYCODONE HYDROCHLORIDE 5 MG: 5 TABLET ORAL at 02:53

## 2018-05-05 RX ADMIN — PAROXETINE HYDROCHLORIDE 20 MG: 20 TABLET, FILM COATED ORAL at 09:12

## 2018-05-05 RX ADMIN — FAMOTIDINE 20 MG: 20 TABLET ORAL at 09:12

## 2018-05-05 RX ADMIN — FUROSEMIDE 40 MG: 40 TABLET ORAL at 09:12

## 2018-05-05 RX ADMIN — OXYCODONE HYDROCHLORIDE 5 MG: 5 TABLET ORAL at 18:37

## 2018-05-05 NOTE — ROUTINE PROCESS
Bedside and Verbal shift change report given to Neville Aviles RN (oncoming nurse) by Ilsa Teixeira (offgoing nurse). Report included the following information SBAR, Kardex, MAR and Recent Results. SITUATION:    Code Status: Full Code   Reason for Admission: Bradycardia   Bradycardia    [facility] day: 2   Problem List:       Hospital Problems  Date Reviewed: 11/28/2017          Codes Class Noted POA    Bradycardia ICD-10-CM: R00.1  ICD-9-CM: 427.89  5/3/2018 Unknown              BACKGROUND:    Past Medical History:   Past Medical History:   Diagnosis Date    Atrial fibrillation     CHADS score 1  (-CHF, +HTN, -AGE, -DM, -CVA)    Carpal tunnel syndrome     Chronic pain     Congenital heart disease     presumed pulmonary stenosis s/p repair 1969    Degenerative disc disease     Degenerative joint disease     Dyslipidemia     GERD     H/O echocardiogram 04/2017    EF 60-65%, mild concentric LVH, dilated RV with RV dysfunction, RVSP 54 mmHg, RA E, moderate to severe pulmonic regurgitation.     Hypertension     Hypothyroid     Morbid obesity     Morbid obesity with BMI of 45.0-49.9, adult ([facility] [address] 2/20/2017    Noncompliance     Schizoaffective disorder     Substance abuse     marijuana, crack cocaine         Patient taking anticoagulants no     ASSESSMENT:    Changes in Assessment Throughout Shift: none     Patient has Central Line: no Reasons if yes: n/a   Patient has Shaikh Cath: no Reasons if yes: n/a      Last Vitals:     Vitals:    05/04/18 1946 05/05/18 0016 05/05/18 0329 05/05/18 0422   BP: 131/82 149/83 139/86    Pulse: 64 85 60    Resp: 20 20 20    Temp: 99.1 °F (37.3 °C) 98.8 °F (37.1 °C) 98.8 °F (37.1 °C)    SpO2: 93% 98% 98%    Weight:    146 kg (321 lb 12.8 oz)   Height:            IV and DRAINS (will only show if present)   Peripheral IV 05/03/18 Left Hand-Site Assessment: Clean, dry, & intact  External Female Catheter 05/03/18-Site Assessment: Clean, dry, & intact     WOUND (if present)   Wound Type:  none   Dressing present Dressing Present : No   Wound Concerns/Notes:  none     PAIN    Pain Assessment    Pain Intensity 1: 8 (05/05/18 0640)    Pain Location 1: Hip    Pain Intervention(s) 1: Medication (see MAR)    Patient Stated Pain Goal: 0  o Interventions for Pain:  yes  o Intervention effective: yes  o Time of last intervention: See MAR   o Reassessment Completed: yes      Last 3 Weights:  Last 3 Recorded Weights in this Encounter    05/03/18 0557 05/04/18 0428 05/05/18 0422   Weight: 145.6 kg (321 lb) 145.7 kg (321 lb 3.2 oz) 146 kg (321 lb 12.8 oz)     Weight change: 0.272 kg (9.6 oz)     INTAKE/OUPUT    Current Shift:      Last three shifts: 05/03 1901 - 05/05 0700  In: 980 [P.O.:980]  Out: 3700 [Urine:3700]     LAB RESULTS     Recent Labs      05/05/18   0630  05/04/18   1320  05/04/18   0315   05/03/18   0354   WBC   --    --   4.1*   --   4.0*   HGB  8.6*  9.0*  8.8*   < >  8.3*   HCT  29.4*  30.7*  30.8*   < >  29.2*   PLT   --    --   231   --   228    < > = values in this interval not displayed. Recent Labs      05/05/18   0630  05/04/18   0315  05/03/18   0354   NA   --   140  140   K   --   3.5  3.4*   GLU   --   87  112*   BUN   --   16  22*   CREA   --   0.93  1.18   CA   --   8.2*  7.9*   MG   --   2.2   --    INR  1.7*  2.1*  2.7*       RECOMMENDATIONS AND DISCHARGE PLANNING     1. Pending tests/procedures/ Plan of Care or Other Needs: continue to monitor     2. Discharge plan for patient and Needs/Barriers: home    3. Estimated Discharge Date: TBD Posted on Whiteboard in Patients Room: no      4. The patient's care plan was reviewed with the oncoming nurse. \"HEALS\" SAFETY CHECK      Fall Risk    Total Score: 2    Safety Measures: Safety Measures: Bed/Chair-Wheels locked, Bed in low position, Call light within reach    A safety check occurred in the patient's room between off going nurse and oncoming nurse listed above.     The safety check included the below items  Area Items   H  High Alert Medications - Verify all high alert medication drips (heparin, PCA, etc.)   E  Equipment - Suction is set up for ALL patients (with danya)  - Red plugs utilized for all equipment (IV pumps, etc.)  - WOWs wiped down at end of shift.  - Room stocked with oxygen, suction, and other unit-specific supplies   A  Alarms - Bed alarm is set for fall risk patients  - Ensure chair alarm is in place and activated if patient is up in a chair   L  Lines - Check IV for any infiltration  - Shaikh bag is empty if patient has a Shaikh   - Tubing and IV bags are labeled   S  Safety   - Room is clean, patient is clean, and equipment is clean. - Hallways are clear from equipment besides carts. - Fall bracelet on for fall risk patients  - Ensure room is clear and free of clutter  - Suction is set up for ALL patients (with danya)  - Hallways are clear from equipment besides carts.    - Isolation precautions followed, supplies available outside room, sign posted     Dalila Armenta

## 2018-05-05 NOTE — PROGRESS NOTES
Problem: Falls - Risk of  Goal: *Absence of Falls  Document Piyush Fall Risk and appropriate interventions in the flowsheet.    Outcome: Progressing Towards Goal  Fall Risk Interventions:            Medication Interventions: Patient to call before getting OOB, Evaluate medications/consider consulting pharmacy    Elimination Interventions: Call light in reach, Patient to call for help with toileting needs    History of Falls Interventions: Consult care management for discharge planning, Door open when patient unattended, Evaluate medications/consider consulting pharmacy

## 2018-05-05 NOTE — PROGRESS NOTES
[facility] - Progress Note  Admit Date: 5/3/2018    Assessment:     -Bradycardia, likely 2/2 beta blocker use, improved after stopping BB.    -LE swelling, pt non-compliant with lasix at home as she has trouble ambulating and doesn't like to use the bathroom, BNP 2800, echo Feb 26, 2018 with normal LV function and mod LVH, dilated RV with increased PA pressures at 67mmHg, moderate TR and dilated atria. LE duplex scan negative for PE. Swelling appears mostly chronic.  -L knee replacement Dec 2017, R knee replacement  -Anemia 2/2 to vaginal spotting and guaiac positive stool, Hgb 8.3/Hematocrit 29.2, GI work-up in progress.  -Chronic afib, rate controlled with BB, on chronic coumadin, INR therapeutic.   -Mod to severe Pulm HTN  -morbid obesity  -ANDRADE, non-compliant with CPAP  -polysubstance abuse history, marijuana and crack cocaine, pt states she no longer uses  -Hypothyroidism  -Schizoaffective disorder  -DJD  -GERD    Plan:     A.fib rates improved. Coumadin on hold for GI scope. Will follow. Subjective:     No new complaints.      Objective:      Patient Vitals for the past 8 hrs:   Temp Pulse Resp BP SpO2   05/05/18 0746 98.8 °F (37.1 °C) 75 20 126/73 98 %   05/05/18 0329 98.8 °F (37.1 °C) 60 20 139/86 98 %         Patient Vitals for the past 96 hrs:   Weight   05/05/18 0422 146 kg (321 lb 12.8 oz)   05/04/18 0428 145.7 kg (321 lb 3.2 oz)   05/03/18 0557 145.6 kg (321 lb)                    Intake/Output Summary (Last 24 hours) at 05/05/18 0946  Last data filed at 05/05/18 9795   Gross per 24 hour   Intake              980 ml   Output             2800 ml   Net            -1820 ml       Physical Exam:  General:  alert, cooperative, no distress, appears stated age  Neck:  nontender  Lungs:  clear to auscultation bilaterally  Heart:  irreg irreg, S1, S2 normal, no murmur, click, rub or gallop  Abdomen:  abdomen is soft without significant tenderness, masses, organomegaly or guarding  Extremities: extremities + edema    Data Review:     Labs: Results:       Chemistry Recent Labs      05/04/18   0315 05/03/18   0354   GLU  87  112*   NA  140  140   K  3.5  3.4*   CL  103  104   CO2  32  32   BUN  16  22*   CREA  0.93  1.18   CA  8.2*  7.9*   MG  2.2   --    AGAP  5  4   BUCR  17  19   AP  143*   --    TP  7.2   --    ALB  3.1*   --    GLOB  4.1*   --    AGRAT  0.8   --       CBC w/Diff Recent Labs      05/05/18   0630  05/04/18   1320  05/04/18 0315   05/03/18   0354   WBC   --    --   4.1*   --   4.0*   RBC   --    --   3.99*   --   3.79*   HGB  8.6*  9.0*  8.8*   < >  8.3*   HCT  29.4*  30.7*  30.8*   < >  29.2*   PLT   --    --   231   --   228   GRANS   --    --   69   --   65   LYMPH   --    --   22   --   21   EOS   --    --   3   --   5    < > = values in this interval not displayed.       Cardiac Enzymes No results found for: CPK, CK, CKMMB, CKMB, RCK3, CKMBT, CKNDX, CKND1, SHANNAN, TROPT, TROIQ, JELANI, TROPT, TNIPOC, BNP, BNPP   Coagulation Recent Labs      05/05/18   0630 05/04/18 0315 05/03/18   0354   PTP  19.3*  23.0*  27.5*   INR  1.7*  2.1*  2.7*   APTT   --    --   51.4*       Lipid Panel Lab Results   Component Value Date/Time    Cholesterol, total 90 05/03/2018 10:25 AM    HDL Cholesterol 34 (L) 05/03/2018 10:25 AM    LDL, calculated 41.4 05/03/2018 10:25 AM    VLDL, calculated 14.6 05/03/2018 10:25 AM    Triglyceride 73 05/03/2018 10:25 AM    CHOL/HDL Ratio 2.6 05/03/2018 10:25 AM      BNP Lab Results   Component Value Date/Time    B-type Natriuretic Peptide 3187 (H) 05/06/2016 11:10 AM    B-type Natriuretic Peptide 3075 (H) 05/06/2016 11:00 AM      Liver Enzymes Recent Labs      05/04/18   0315   TP  7.2   ALB  3.1*   AP  143*   SGOT  15      Digoxin    Thyroid Studies Lab Results   Component Value Date/Time    T4, Total 12.3 (H) 01/15/2014 02:01 AM    TSH 1.97 05/04/2018 03:15 AM          Signed By: Rickey Salmon MD     May 5, 2018

## 2018-05-05 NOTE — ROUTINE PROCESS
Bedside shift change report given to Deirdre Lopez (oncoming nurse) by Darnell Forrester RN  (offgoing nurse). Report included the following information SBAR, Kardex, Recent Results and Cardiac Rhythm NSR.

## 2018-05-05 NOTE — PROGRESS NOTES
[facility] Hospitalist Group  Progress Note    Patient: Divine Newman Age: 60 y.o. : 1960 MR#: [id number] SSN: [id number]  Date: 2018     Subjective:     Patient lying in bed in NAD, awake, follows commands    Assessment/Plan:   1. Bradycardia, likely secondary to medications. 2.  Possible vaginal bleeding. The patient has had a hysterectomy in the past.    3.  Anemia with heme positive stool in the setting of being on Coumadin. 4.  Hypertension. 5.  Dyslipidemia. 6.  Morbid obesity. 7.  Atrial fibrillation on Coumadin as an outpatient. 8.  Degenerative joint disease. 10.  History of chronic diastolic congestive heart failure. 11.  History of pulmonary hypertension. 12.  Obstructive sleep apnea. 13.  Chronic hypoxic respiratory failure on 2 liters of oxygen via nasal cannula continuously.     PLAN  BB held  Coumadin held  Plans for colonoscopy noted, d/w Dr Salima Mckinney  On O2, lasix  On synthyroid  On statin  Counseled compliance with CPAP  Advanced care planning: full code  D/w patient, d/w RN        Case discussed with:  [x]Patient  []Family  [x]Nursing  []Case Management  DVT Prophylaxis:  []Lovenox  []Hep SQ  []SCDs  []Coumadin   []On Heparin gtt    Objective:   VS:   Visit Vitals    /75 (BP 1 Location: Right arm, BP Patient Position: At rest)    Pulse 76    Temp 97.9 °F (36.6 °C)    Resp 20    Ht 5' (1.524 m)    Wt 146 kg (321 lb 12.8 oz)    SpO2 98%    Breastfeeding Unknown    BMI 62.85 kg/m2      Tmax/24hrs: Temp (24hrs), Av.7 °F (37.1 °C), Min:97.9 °F (36.6 °C), Max:99.1 °F (37.3 °C)      Intake/Output Summary (Last 24 hours) at 18 1356  Last data filed at 18 1334   Gross per 24 hour   Intake              780 ml   Output             2100 ml   Net            -1320 ml       General:  Awake, follows commands, responds appropriately  Cardiovascular:  S1S2+, RRR  Pulmonary:  Decreased bs b/l bases  GI:  Soft, BS+, NT, ND, obese  Extremities:  +edema      Labs:    Recent Results (from the past 24 hour(s))   GLUCOSE, POC    Collection Time: 05/04/18  4:06 PM   Result Value Ref Range    Glucose (POC) 97 70 - 110 mg/dL   GLUCOSE, POC    Collection Time: 05/04/18  9:39 PM   Result Value Ref Range    Glucose (POC) 118 (H) 70 - 110 mg/dL   PROTHROMBIN TIME + INR    Collection Time: 05/05/18  6:30 AM   Result Value Ref Range    Prothrombin time 19.3 (H) 11.5 - 15.2 sec    INR 1.7 (H) 0.8 - 1.2     HGB & HCT    Collection Time: 05/05/18  6:30 AM   Result Value Ref Range    HGB 8.6 (L) 12.0 - 16.0 g/dL    HCT 29.4 (L) 35.0 - 45.0 %   GLUCOSE, POC    Collection Time: 05/05/18  7:58 AM   Result Value Ref Range    Glucose (POC) 107 70 - 110 mg/dL   GLUCOSE, POC    Collection Time: 05/05/18 11:42 AM   Result Value Ref Range    Glucose (POC) 100 70 - 110 mg/dL       Signed By: Pascual Pablo MD     May 5, 2018

## 2018-05-05 NOTE — PROGRESS NOTES
[facility]   [email or web address]      Impression:   1.rectal bleed now stable off coumadin with INR down to 1.7 should be under 1.5 by tomorrow   2. Bradycardia, improved  3. LE swelling, pt non-compliant, improved now  4. Bilateral knee replacement  5. Heme pos rectal bleed with multifactorial anemia   6. Chronic afib on anticoagulation now held for procedure   7. Mod to severe Pulm HTN  8. Morbid obesity  9. ANDRADE, non-compliant with CPAP  10. polysubstance abuse history, marijuana and crack cocaine, pt states she no longer uses and UDS neg this adm  11. Schizoaffective disorder      Plan:     1. [place] mac all risks benefits and alt discussed   2. Iron profile   3. inr in am       Chief Complaint: rectal bleed       HPI:  Gerry Jauregui is a 60 y.o. female who is being seen on consult for heme pos rectal bleeding with anemia no abd pain dyspepsia or bleeding prior to this . BMs regular . Lots of compliance issues and marked recent leg edema now improved. Leg swelling improved. No nasid use . PMH:   Past Medical History:   Diagnosis Date    Atrial fibrillation     CHADS score 1  (-CHF, +HTN, -AGE, -DM, -CVA)    Carpal tunnel syndrome     Chronic pain     Congenital heart disease     presumed pulmonary stenosis s/p repair 1969    Degenerative disc disease     Degenerative joint disease     Dyslipidemia     GERD     H/O echocardiogram 04/2017    EF 60-65%, mild concentric LVH, dilated RV with RV dysfunction, RVSP 54 mmHg, RA E, moderate to severe pulmonic regurgitation.     Hypertension     Hypothyroid     Morbid obesity     Morbid obesity with BMI of 45.0-49.9, adult ([address] 2/20/2017    Noncompliance     Schizoaffective disorder     Substance abuse     marijuana, crack cocaine       PSH:   Past Surgical History:   Procedure Laterality Date    HX CARPAL TUNNEL RELEASE      left    HX HYSTERECTOMY      HX KNEE REPLACEMENT      left    HX KNEE REPLACEMENT      right    HX THYROIDECTOMY         Social HX:   Social History     Social History    Marital status: SINGLE     Spouse name: N/A    Number of children: N/A    Years of education: N/A     Occupational History    Not on file. Social History Main Topics    Smoking status: Never Smoker    Smokeless tobacco: Never Used    Alcohol use No    Drug use: No    Sexual activity: Yes     Birth control/ protection: Surgical     Other Topics Concern    Not on file     Social History Narrative       FHX:   Family History   Problem Relation Age of Onset    Hypertension Mother     Coronary Artery Disease Mother     Coronary Artery Disease Father     Hypertension Father        Allergy:   Allergies   Allergen Reactions    Gabapentin Other (comments)     Unsteady, weakness LEs    Tetanus Vaccines And Toxoid Swelling    Topamax [Topiramate] Other (comments)     weakness       Home Medications:     Prescriptions Prior to Admission   Medication Sig    oxyCODONE IR (ROXICODONE) 5 mg immediate release tablet Take 5 mg by mouth every six (6) hours as needed for Pain.  isosorbide mononitrate ER (IMDUR) 30 mg tablet Take 1 Tab by mouth every evening.  aspirin delayed-release 81 mg tablet Take 1 Tab by mouth daily.  docusate sodium (COLACE) 100 mg capsule Take 1 Cap by mouth two (2) times a day for 90 days.  potassium chloride SR (KLOR-CON 10) 10 mEq tablet Take 20 mEq by mouth daily.  raNITIdine (ZANTAC) 150 mg tablet Take 150 mg by mouth two (2) times a day.  warfarin (COUMADIN) 5 mg tablet Take 5 mg by mouth daily.  metoprolol tartrate (LOPRESSOR) 50 mg tablet Take 25 mg by mouth two (2) times a day.  furosemide (LASIX) 40 mg tablet 40 mg po daily    PARoxetine (PAXIL) 20 mg tablet Take 1 Tab by mouth daily.  simvastatin (ZOCOR) 40 mg tablet Take  by mouth nightly. to obtain from pharmacy    aripiprazole (ABILIFY) 15 mg tablet Take 15 mg by mouth daily.  to obtain from pharmacy    levothyroxine (SYNTHROID) 125 mcg tablet Take 125 mcg by mouth Daily (before breakfast).  multivitamin, tx-iron-ca-min (THERA-M W/ IRON) 9 mg iron-400 mcg tab tablet Take 1 Tab by mouth daily.  loperamide (IMODIUM) 2 mg capsule Take 2 mg by mouth three (3) times daily as needed for Diarrhea.  lisinopril-hydroCHLOROthiazide (ZESTORETIC) 20-25 mg per tablet Take  by mouth daily.  VENTOLIN HFA 90 mcg/actuation inhaler Take 2 Puffs by inhalation every four (4) hours as needed for Wheezing or Shortness of Breath. Review of Systems:     Constitutional: No fevers, chills, weight loss,+ fatigue. Skin: No rashes, pruritis, jaundice, ulcerations, erythema, lots of edema distally    HENT: No headaches, nosebleeds, sinus pressure, rhinorrhea, sore throat. Eyes: No visual changes, blurred vision, eye pain, photophobia, jaundice. Cardiovascular: No chest pain, heart palpitations. Respiratory: No cough,+ SOB with exertion improved , no wheezing, chest discomfort, orthopnea. Gastrointestinal: Rectal bright red blood no abd pain or n/v    Genitourinary: No dysuria, bleeding, discharge, pyuria. Musculoskeletal: No weakness, arthralgias, wasting. Endo: No sweats. Heme: No bruising, + easy bleeding. Allergies: As noted. Neurological: Cranial nerves intact. Alert and oriented. Gait not assessed. Psychiatric:  Notes some  anxiety, no depression, hallucinations. Visit Vitals    /73 (BP 1 Location: Right arm, BP Patient Position: At rest)    Pulse 75    Temp 98.8 °F (37.1 °C)    Resp 20    Ht 5' (1.524 m)    Wt 146 kg (321 lb 12.8 oz)    SpO2 98%    Breastfeeding Unknown    BMI 62.85 kg/m2       Physical Assessment:     constitutional: appearance:morbid obese nad , in no acute distress. skin: inspection: no rashes, ulcers, icterus or other lesions; no clubbing or telangiectasias. palpation: no induration or subcutaneos nodules.    eyes: inspection: normal conjunctivae and lids; no jaundice pupils: symmetrical, normoreactive to light, normal accommodation and size. ENMT: mouth: normal oral mucosa,lips and gums; good dentition. oropharynx: normal tongue, hard and soft palate; posterior pharynx without erythema, exudate or lesions. neck: no masses organomegaly or tenderness. respiratory: effort: normal chest excursion; no intercostal retraction or accessory muscle use. cardiovascular: abdominal aorta: normal size and position; no bruits. palpation: PMI of normal size and position; normal rhythm; no thrill or murmurs. abdominal: abdomen: normal consistency; no tenderness or masses. hernias: no hernias appreciated. liver: normal size and consistency. spleen: not palpable. rectal: hemoccult/guaiac: not performed. musculoskeletal: no deformities or muscle wasting , minimal edema now   lymphatic: axilae: not palpable. groin: not palpable. neck: within normal limits. other: not palpable. neurologic: cranial nerves: II-XII normal.   psychiatric: judgement/insight: within normal limits. memory: within normal limits for recent and remote events. mood and affect: no evidence of depression, anxiety or agitation. orientation: oriented to time, space and person. Basic Metabolic Profile   Recent Labs      05/04/18 0315   NA  140   K  3.5   CL  103   CO2  32   BUN  16   GLU  87   CA  8.2*   MG  2.2         CBC w/Diff    Recent Labs      05/05/18   0630   05/04/18 0315   WBC   --    --   4.1*   RBC   --    --   3.99*   HGB  8.6*   < >  8.8*   HCT  29.4*   < >  30.8*   MCV   --    --   77.2   MCH   --    --   22.1*   MCHC   --    --   28.6*   RDW   --    --   16.9*   PLT   --    --   231    < > = values in this interval not displayed.     Recent Labs      05/04/18 0315   GRANS  69   LYMPH  22   EOS  3        Hepatic Function   Recent Labs      05/04/18 0315   ALB  3.1*   TP  7.2   TBILI  0.9   SGOT  15   AP  143*          Khadijah Dumont MD, M.D. [facility], [address] [facility]  [email or web address]

## 2018-05-05 NOTE — ROUTINE PROCESS
Bedside shift change report given to Wolf Rooney RN (oncoming nurse) by Steven Mathew RN (offgoing nurse). Report included the following information SBAR, Kardex, Recent Results and Cardiac Rhythm A. Fib.

## 2018-05-06 ENCOUNTER — ANESTHESIA EVENT (OUTPATIENT)
Dept: ENDOSCOPY | Age: 58
End: 2018-05-06

## 2018-05-06 ENCOUNTER — ANESTHESIA (OUTPATIENT)
Dept: ENDOSCOPY | Age: 58
End: 2018-05-06

## 2018-05-06 LAB
ANION GAP SERPL CALC-SCNC: 4 MMOL/L (ref 3–18)
BASOPHILS # BLD: 0 K/UL (ref 0–0.1)
BASOPHILS NFR BLD: 0 % (ref 0–2)
BUN SERPL-MCNC: 10 MG/DL (ref 7–18)
BUN/CREAT SERPL: 12 (ref 12–20)
CALCIUM SERPL-MCNC: 8.3 MG/DL (ref 8.5–10.1)
CHLORIDE SERPL-SCNC: 98 MMOL/L (ref 100–108)
CO2 SERPL-SCNC: 36 MMOL/L (ref 21–32)
CREAT SERPL-MCNC: 0.82 MG/DL (ref 0.6–1.3)
DIFFERENTIAL METHOD BLD: ABNORMAL
EOSINOPHIL # BLD: 0.2 K/UL (ref 0–0.4)
EOSINOPHIL NFR BLD: 3 % (ref 0–5)
ERYTHROCYTE [DISTWIDTH] IN BLOOD BY AUTOMATED COUNT: 16.8 % (ref 11.6–14.5)
GLUCOSE BLD STRIP.AUTO-MCNC: 103 MG/DL (ref 70–110)
GLUCOSE BLD STRIP.AUTO-MCNC: 91 MG/DL (ref 70–110)
GLUCOSE BLD STRIP.AUTO-MCNC: 94 MG/DL (ref 70–110)
GLUCOSE SERPL-MCNC: 86 MG/DL (ref 74–99)
HCT VFR BLD AUTO: 32.8 % (ref 35–45)
HGB BLD-MCNC: 9.2 G/DL (ref 12–16)
INR PPP: 1.7 (ref 0.8–1.2)
IRON SATN MFR SERPL: 8 % (ref 20–50)
IRON SERPL-MCNC: 34 UG/DL (ref 50–175)
LYMPHOCYTES # BLD: 0.8 K/UL (ref 0.9–3.6)
LYMPHOCYTES NFR BLD: 15 % (ref 21–52)
MCH RBC QN AUTO: 21.9 PG (ref 24–34)
MCHC RBC AUTO-ENTMCNC: 28 G/DL (ref 31–37)
MCV RBC AUTO: 77.9 FL (ref 74–97)
MONOCYTES # BLD: 0.5 K/UL (ref 0.05–1.2)
MONOCYTES NFR BLD: 9 % (ref 3–10)
NEUTS SEG # BLD: 3.9 K/UL (ref 1.8–8)
NEUTS SEG NFR BLD: 73 % (ref 40–73)
PLATELET # BLD AUTO: 254 K/UL (ref 135–420)
PMV BLD AUTO: 9.9 FL (ref 9.2–11.8)
POTASSIUM SERPL-SCNC: 3.9 MMOL/L (ref 3.5–5.5)
PROTHROMBIN TIME: 19.2 SEC (ref 11.5–15.2)
RBC # BLD AUTO: 4.21 M/UL (ref 4.2–5.3)
SODIUM SERPL-SCNC: 138 MMOL/L (ref 136–145)
TIBC SERPL-MCNC: 405 UG/DL (ref 250–450)
WBC # BLD AUTO: 5.4 K/UL (ref 4.6–13.2)

## 2018-05-06 PROCEDURE — 82378 CARCINOEMBRYONIC ANTIGEN: CPT | Performed by: INTERNAL MEDICINE

## 2018-05-06 PROCEDURE — 74011250636 HC RX REV CODE- 250/636: Performed by: INTERNAL MEDICINE

## 2018-05-06 PROCEDURE — 85025 COMPLETE CBC W/AUTO DIFF WBC: CPT | Performed by: EMERGENCY MEDICINE

## 2018-05-06 PROCEDURE — 0DBN8ZX EXCISION OF SIGMOID COLON, VIA NATURAL OR ARTIFICIAL OPENING ENDOSCOPIC, DIAGNOSTIC: ICD-10-PCS | Performed by: INTERNAL MEDICINE

## 2018-05-06 PROCEDURE — 74011250637 HC RX REV CODE- 250/637: Performed by: INTERNAL MEDICINE

## 2018-05-06 PROCEDURE — 77030003657 HC NDL SCLER BSC -B: Performed by: INTERNAL MEDICINE

## 2018-05-06 PROCEDURE — 77030018846 HC SOL IRR STRL H20 ICUM -A: Performed by: INTERNAL MEDICINE

## 2018-05-06 PROCEDURE — 77030008565 HC TBNG SUC IRR ERBE -B: Performed by: INTERNAL MEDICINE

## 2018-05-06 PROCEDURE — 88305 TISSUE EXAM BY PATHOLOGIST: CPT | Performed by: INTERNAL MEDICINE

## 2018-05-06 PROCEDURE — 74011250636 HC RX REV CODE- 250/636

## 2018-05-06 PROCEDURE — 76060000032 HC ANESTHESIA 0.5 TO 1 HR: Performed by: INTERNAL MEDICINE

## 2018-05-06 PROCEDURE — 36415 COLL VENOUS BLD VENIPUNCTURE: CPT | Performed by: EMERGENCY MEDICINE

## 2018-05-06 PROCEDURE — 80048 BASIC METABOLIC PNL TOTAL CA: CPT | Performed by: EMERGENCY MEDICINE

## 2018-05-06 PROCEDURE — 94660 CPAP INITIATION&MGMT: CPT

## 2018-05-06 PROCEDURE — 74011250637 HC RX REV CODE- 250/637: Performed by: PHYSICIAN ASSISTANT

## 2018-05-06 PROCEDURE — 83540 ASSAY OF IRON: CPT | Performed by: EMERGENCY MEDICINE

## 2018-05-06 PROCEDURE — 77010033678 HC OXYGEN DAILY

## 2018-05-06 PROCEDURE — 85610 PROTHROMBIN TIME: CPT | Performed by: EMERGENCY MEDICINE

## 2018-05-06 PROCEDURE — 77030020018 HC MRKR ENDOSC SPOT 5ML SYR GISP -B: Performed by: INTERNAL MEDICINE

## 2018-05-06 PROCEDURE — 65660000000 HC RM CCU STEPDOWN

## 2018-05-06 PROCEDURE — 74011000250 HC RX REV CODE- 250: Performed by: INTERNAL MEDICINE

## 2018-05-06 PROCEDURE — 74011250637 HC RX REV CODE- 250/637: Performed by: EMERGENCY MEDICINE

## 2018-05-06 PROCEDURE — 77030009426 HC FCPS BIOP ENDOSC BSC -B: Performed by: INTERNAL MEDICINE

## 2018-05-06 PROCEDURE — 77030013992 HC SNR POLYP ENDOSC BSC -B: Performed by: INTERNAL MEDICINE

## 2018-05-06 PROCEDURE — 76040000007: Performed by: INTERNAL MEDICINE

## 2018-05-06 PROCEDURE — 82962 GLUCOSE BLOOD TEST: CPT

## 2018-05-06 RX ORDER — EPINEPHRINE 0.1 MG/ML
1 INJECTION INTRACARDIAC; INTRAVENOUS
Status: DISCONTINUED | OUTPATIENT
Start: 2018-05-06 | End: 2018-05-06 | Stop reason: HOSPADM

## 2018-05-06 RX ORDER — NALOXONE HYDROCHLORIDE 0.4 MG/ML
0.4 INJECTION, SOLUTION INTRAMUSCULAR; INTRAVENOUS; SUBCUTANEOUS
Status: DISCONTINUED | OUTPATIENT
Start: 2018-05-06 | End: 2018-05-06 | Stop reason: HOSPADM

## 2018-05-06 RX ORDER — FLUMAZENIL 0.1 MG/ML
0.2 INJECTION INTRAVENOUS
Status: DISCONTINUED | OUTPATIENT
Start: 2018-05-06 | End: 2018-05-06 | Stop reason: HOSPADM

## 2018-05-06 RX ORDER — MIDAZOLAM HYDROCHLORIDE 1 MG/ML
6 INJECTION, SOLUTION INTRAMUSCULAR; INTRAVENOUS
Status: DISCONTINUED | OUTPATIENT
Start: 2018-05-06 | End: 2018-05-06 | Stop reason: HOSPADM

## 2018-05-06 RX ORDER — FENTANYL CITRATE 50 UG/ML
200 INJECTION, SOLUTION INTRAMUSCULAR; INTRAVENOUS
Status: DISCONTINUED | OUTPATIENT
Start: 2018-05-06 | End: 2018-05-10 | Stop reason: HOSPADM

## 2018-05-06 RX ORDER — METOPROLOL TARTRATE 5 MG/5ML
5 INJECTION INTRAVENOUS
Status: DISCONTINUED | OUTPATIENT
Start: 2018-05-06 | End: 2018-05-10 | Stop reason: HOSPADM

## 2018-05-06 RX ORDER — SODIUM CHLORIDE 0.9 % (FLUSH) 0.9 %
5-10 SYRINGE (ML) INJECTION EVERY 8 HOURS
Status: DISPENSED | OUTPATIENT
Start: 2018-05-06 | End: 2018-05-06

## 2018-05-06 RX ORDER — ATROPINE SULFATE 0.1 MG/ML
0.5 INJECTION INTRAVENOUS
Status: DISCONTINUED | OUTPATIENT
Start: 2018-05-06 | End: 2018-05-06 | Stop reason: HOSPADM

## 2018-05-06 RX ORDER — FENTANYL CITRATE 50 UG/ML
INJECTION, SOLUTION INTRAMUSCULAR; INTRAVENOUS AS NEEDED
Status: DISCONTINUED | OUTPATIENT
Start: 2018-05-06 | End: 2018-05-06 | Stop reason: HOSPADM

## 2018-05-06 RX ORDER — DEXTROMETHORPHAN/PSEUDOEPHED 2.5-7.5/.8
1.2 DROPS ORAL
Status: DISCONTINUED | OUTPATIENT
Start: 2018-05-06 | End: 2018-05-06 | Stop reason: HOSPADM

## 2018-05-06 RX ORDER — SODIUM CHLORIDE 0.9 % (FLUSH) 0.9 %
5-10 SYRINGE (ML) INJECTION AS NEEDED
Status: DISPENSED | OUTPATIENT
Start: 2018-05-06 | End: 2018-05-06

## 2018-05-06 RX ADMIN — MULTIPLE VITAMINS W/ MINERALS TAB 1 TABLET: TAB at 12:28

## 2018-05-06 RX ADMIN — ARIPIPRAZOLE 15 MG: 5 TABLET ORAL at 12:28

## 2018-05-06 RX ADMIN — OXYCODONE HYDROCHLORIDE 5 MG: 5 TABLET ORAL at 00:57

## 2018-05-06 RX ADMIN — METOPROLOL TARTRATE 5 MG: 5 INJECTION, SOLUTION INTRAVENOUS at 06:12

## 2018-05-06 RX ADMIN — OXYCODONE HYDROCHLORIDE 5 MG: 5 TABLET ORAL at 12:35

## 2018-05-06 RX ADMIN — FUROSEMIDE 40 MG: 40 TABLET ORAL at 12:28

## 2018-05-06 RX ADMIN — PAROXETINE HYDROCHLORIDE 20 MG: 20 TABLET, FILM COATED ORAL at 12:28

## 2018-05-06 RX ADMIN — FAMOTIDINE 20 MG: 20 TABLET ORAL at 21:33

## 2018-05-06 RX ADMIN — DOCUSATE SODIUM 100 MG: 100 CAPSULE, LIQUID FILLED ORAL at 21:33

## 2018-05-06 RX ADMIN — SIMVASTATIN 40 MG: 40 TABLET, FILM COATED ORAL at 21:33

## 2018-05-06 RX ADMIN — FAMOTIDINE 20 MG: 20 TABLET ORAL at 08:09

## 2018-05-06 RX ADMIN — OXYCODONE HYDROCHLORIDE 5 MG: 5 TABLET ORAL at 21:33

## 2018-05-06 NOTE — ROUTINE PROCESS
Bedside and Verbal shift change report given to PEG Freire (oncoming nurse) by Dez Burgess   (offgoing nurse). Report included the following information Procedure Summary, Intake/Output, MAR and Cardiac Rhythm Controlled A Fib.

## 2018-05-06 NOTE — PERIOP NOTES
TRANSFER - IN REPORT:    Verbal report received from [address] [facility] on [facility]  being received from 2S for ordered procedure      Report consisted of patients Situation, Background, Assessment and   Recommendations(SBAR). Information from the following report(s) SBAR was reviewed with the receiving nurse. Opportunity for questions and clarification was provided. Assessment completed upon patients arrival to unit and care assumed.

## 2018-05-06 NOTE — PROGRESS NOTES
[facility] - Progress Note  Admit Date: 5/3/2018    Assessment:     -Bradycardia, likely 2/2 beta blocker use, improved after stopping BB.    -s/p colonoscopy 5/6/18 with colonic mass at 40 cm, might need surgery  -LE swelling, pt non-compliant with lasix at home as she has trouble ambulating and doesn't like to use the bathroom, BNP 2800, echo Feb 26, 2018 with normal LV function and mod LVH, dilated RV with increased PA pressures at 67mmHg, moderate TR and dilated atria. LE duplex scan negative for PE. Swelling appears mostly chronic.  -L knee replacement Dec 2017, R knee replacement  -Anemia 2/2 to vaginal spotting and guaiac positive stool, Hgb 8.3/Hematocrit 29.2, GI work-up in progress.  -Chronic afib, rate controlled with BB, on chronic coumadin, INR therapeutic.   -Mod to severe Pulm HTN  -morbid obesity  -ANDRADE, non-compliant with CPAP  -polysubstance abuse history, marijuana and crack cocaine, pt states she no longer uses  -Hypothyroidism  -Schizoaffective disorder  -DJD  -GERD    Plan:     Seen post-colonoscopy, results reviewed. Patient has multifactorial chronic respiratory failure. Will repeat echo for EF/PA pressure in case surgery needed. Subjective:     Seen after colonoscopy.     Objective:      Patient Vitals for the past 8 hrs:   Temp Pulse Resp BP SpO2   05/06/18 0923 - 99 18 - 100 %   05/06/18 0921 - - - 121/58 100 %   05/06/18 0915 - (!) 102 19 - 99 %   05/06/18 0913 - (!) 102 20 - 100 %   05/06/18 0911 98.5 °F (36.9 °C) (!) 109 22 122/64 100 %   05/06/18 0905 - (!) 132 13 - (!) 65 %   05/06/18 0900 - (!) 140 15 141/72 (!) 84 %   05/06/18 0855 - (!) 140 15 98/71 (!) 88 %   05/06/18 0850 - (!) 146 11 (!) 160/103 92 %   05/06/18 0845 - 98 9 177/84 95 %   05/06/18 0840 - (!) 147 9 (!) 153/98 (!) 81 %   05/06/18 0741 97.4 °F (36.3 °C) 92 20 137/87 95 %   05/06/18 0612 - (!) 118 - - -   05/06/18 0411 97.1 °F (36.2 °C) (!) 102 22 141/79 96 %         Patient Vitals for the past 96 hrs:   Weight   05/06/18 0411 144.1 kg (317 lb 11.2 oz)   05/05/18 0422 146 kg (321 lb 12.8 oz)   05/04/18 0428 145.7 kg (321 lb 3.2 oz)   05/03/18 0557 145.6 kg (321 lb)                    Intake/Output Summary (Last 24 hours) at 05/06/18 0955  Last data filed at 05/05/18 1400   Gross per 24 hour   Intake                0 ml   Output              900 ml   Net             -900 ml       Physical Exam:  General:  alert, cooperative, no distress, appears stated age  Neck:  nontender  Lungs:  decreased  Heart:  irreg irreg S1, S2 normal, no murmur, click, rub or gallop  Abdomen:  abdomen is soft without significant tenderness, masses, organomegaly or guarding  Extremities:  extremities normal, atraumatic, no cyanosis or edema    Data Review:     Labs: Results:       Chemistry Recent Labs      05/06/18 0245 05/04/18   0315   GLU  86  87   NA  138  140   K  3.9  3.5   CL  98*  103   CO2  36*  32   BUN  10  16   CREA  0.82  0.93   CA  8.3*  8.2*   MG   --   2.2   AGAP  4  5   BUCR  12  17   AP   --   143*   TP   --   7.2   ALB   --   3.1*   GLOB   --   4.1*   AGRAT   --   0.8      CBC w/Diff Recent Labs      05/06/18   0245  05/05/18   0630  05/04/18   1320  05/04/18   0315   WBC  5.4   --    --   4.1*   RBC  4.21   --    --   3.99*   HGB  9.2*  8.6*  9.0*  8.8*   HCT  32.8*  29.4*  30.7*  30.8*   PLT  254   --    --   231   GRANS  73   --    --   69   LYMPH  15*   --    --   22   EOS  3   --    --   3      Cardiac Enzymes No results found for: CPK, CK, CKMMB, CKMB, RCK3, CKMBT, CKNDX, CKND1, SHANNAN, TROPT, TROIQ, JELAIN, TROPT, TNIPOC, BNP, BNPP   Coagulation Recent Labs      05/06/18 0245  05/05/18   0630   PTP  19.2*  19.3*   INR  1.7*  1.7*       Lipid Panel Lab Results   Component Value Date/Time    Cholesterol, total 90 05/03/2018 10:25 AM    HDL Cholesterol 34 (L) 05/03/2018 10:25 AM    LDL, calculated 41.4 05/03/2018 10:25 AM    VLDL, calculated 14.6 05/03/2018 10:25 AM    Triglyceride 73 05/03/2018 10:25 AM CHOL/HDL Ratio 2.6 05/03/2018 10:25 AM      BNP Lab Results   Component Value Date/Time    B-type Natriuretic Peptide 3187 (H) 05/06/2016 11:10 AM    B-type Natriuretic Peptide 3075 (H) 05/06/2016 11:00 AM      Liver Enzymes Recent Labs      05/04/18   0315   TP  7.2   ALB  3.1*   AP  143*   SGOT  15      Digoxin    Thyroid Studies Lab Results   Component Value Date/Time    T4, Total 12.3 (H) 01/15/2014 02:01 AM    TSH 1.97 05/04/2018 03:15 AM          Signed By: Namita Sparrow MD     May 6, 2018

## 2018-05-06 NOTE — ROUTINE PROCESS
Bedside shift change report given to Wolf Rooney RN (oncoming nurse) by Reynaldo Nazario RN (offgoing nurse).  Report included the following information SBAR, Kardex, Recent Results and Cardiac Rhythm A. Fib.   '

## 2018-05-06 NOTE — PROGRESS NOTES
Date of Surgery Update:  Stephanie Jorgensen was seen and examined. History and physical has been reviewed. The patient has been examined.  There have been no significant clinical changes since the completion of the originally dated History and Physical.    Signed By: Khadijah Dumont MD     May 6, 2018 8:11 AM

## 2018-05-06 NOTE — PROGRESS NOTES
[facility]  Progress Note    Patient: Magen Monroy Age: 60 y.o. : 1960 MR#: [id number] SSN: [id number]  Date: 2018     Subjective:     Patient sitting in a chair in NAD, s/p colonoscopy. Denies CP or sob    Assessment/Plan:   1. Bradycardia, likely secondary to medications. 2.  Possible vaginal bleeding. The patient has had a hysterectomy in the past.    3.  Anemia with heme positive stool in the setting of being on Coumadin. 4.  Hypertension. 5.  Dyslipidemia. 6.  Morbid obesity. 7.  Atrial fibrillation on Coumadin as an outpatient. 8.  Degenerative joint disease. 10.  History of chronic diastolic congestive heart failure. 11.  History of pulmonary hypertension. 12.  Obstructive sleep apnea. Obesity hypoventilation syndrome  13. Chronic hypoxic respiratory failure on 2 liters of oxygen via nasal cannula continuously. - worsening hypoxia noted during colonoscopy/sedation- will d/w Pulm  14- Colon Mass      PLAN    BB held  Coumadin held  CRS consult in am  On O2, lasix  On synthyroid  On statin   CPAP  Advanced care planning: full code  D/w patient    Case discussed with:  [x]Patient  []Family  [x]Nursing  []Case Management  DVT Prophylaxis:  []Lovenox  []Hep SQ  []SCDs  []Coumadin   []On Heparin gtt    Objective:   VS:   Visit Vitals    /77 (BP 1 Location: Right arm, BP Patient Position: At rest)    Pulse (!) 101    Temp 98.2 °F (36.8 °C)    Resp 18    Ht 5' (1.524 m)    Wt 144.1 kg (317 lb 11.2 oz)    SpO2 98%    Breastfeeding Unknown    BMI 62.05 kg/m2      Tmax/24hrs: Temp (24hrs), Av.8 °F (36.6 °C), Min:97.1 °F (36.2 °C), Max:98.5 °F (36.9 °C)      Intake/Output Summary (Last 24 hours) at 18 1215  Last data filed at 18 1400   Gross per 24 hour   Intake                0 ml   Output              700 ml   Net             -700 ml       General:  Awake, alert, follows commands  Cardiovascular:  S1S2+, RRR  Pulmonary: Decreased bs b/l bases  GI:  Soft, BS+, NT, ND, obese  Extremities:  + edema        Labs:    Recent Results (from the past 24 hour(s))   GLUCOSE, POC    Collection Time: 05/05/18  4:04 PM   Result Value Ref Range    Glucose (POC) 113 (H) 70 - 110 mg/dL   GLUCOSE, POC    Collection Time: 05/05/18  9:38 PM   Result Value Ref Range    Glucose (POC) 94 70 - 110 mg/dL   PROTHROMBIN TIME + INR    Collection Time: 05/06/18  2:45 AM   Result Value Ref Range    Prothrombin time 19.2 (H) 11.5 - 15.2 sec    INR 1.7 (H) 0.8 - 1.2     CBC WITH AUTOMATED DIFF    Collection Time: 05/06/18  2:45 AM   Result Value Ref Range    WBC 5.4 4.6 - 13.2 K/uL    RBC 4.21 4.20 - 5.30 M/uL    HGB 9.2 (L) 12.0 - 16.0 g/dL    HCT 32.8 (L) 35.0 - 45.0 %    MCV 77.9 74.0 - 97.0 FL    MCH 21.9 (L) 24.0 - 34.0 PG    MCHC 28.0 (L) 31.0 - 37.0 g/dL    RDW 16.8 (H) 11.6 - 14.5 %    PLATELET 472 301 - 764 K/uL    MPV 9.9 9.2 - 11.8 FL    NEUTROPHILS 73 40 - 73 %    LYMPHOCYTES 15 (L) 21 - 52 %    MONOCYTES 9 3 - 10 %    EOSINOPHILS 3 0 - 5 %    BASOPHILS 0 0 - 2 %    ABS. NEUTROPHILS 3.9 1.8 - 8.0 K/UL    ABS. LYMPHOCYTES 0.8 (L) 0.9 - 3.6 K/UL    ABS. MONOCYTES 0.5 0.05 - 1.2 K/UL    ABS. EOSINOPHILS 0.2 0.0 - 0.4 K/UL    ABS.  BASOPHILS 0.0 0.0 - 0.1 K/UL    DF AUTOMATED     METABOLIC PANEL, BASIC    Collection Time: 05/06/18  2:45 AM   Result Value Ref Range    Sodium 138 136 - 145 mmol/L    Potassium 3.9 3.5 - 5.5 mmol/L    Chloride 98 (L) 100 - 108 mmol/L    CO2 36 (H) 21 - 32 mmol/L    Anion gap 4 3.0 - 18 mmol/L    Glucose 86 74 - 99 mg/dL    BUN 10 7.0 - 18 MG/DL    Creatinine 0.82 0.6 - 1.3 MG/DL    BUN/Creatinine ratio 12 12 - 20      GFR est AA >60 >60 ml/min/1.73m2    GFR est non-AA >60 >60 ml/min/1.73m2    Calcium 8.3 (L) 8.5 - 10.1 MG/DL   IRON PROFILE    Collection Time: 05/06/18  2:45 AM   Result Value Ref Range    Iron 34 (L) 50 - 175 ug/dL    TIBC 405 250 - 450 ug/dL    Iron % saturation 8 (L) 20 - 50 %   GLUCOSE, POC    Collection Time: 05/06/18  7:40 AM   Result Value Ref Range    Glucose (POC) 94 70 - 110 mg/dL   GLUCOSE, POC    Collection Time: 05/06/18 11:04 AM   Result Value Ref Range    Glucose (POC) 91 70 - 110 mg/dL       Signed By: Olga Rowe MD     May 6, 2018

## 2018-05-06 NOTE — PROCEDURES
[facility]  [facility], [address]    Procedure Note    Patient: Luisito Ojeda MRN: [id number]  SSN: [id number]    YOB: 1960  Age: 60 y.o. Sex: female        Date/Time:  5/6/2018 8:43 AM    Colonoscopy Operative Report    Procedure Type:   Colonoscopy to 40cm with biopsy, polypectomy (cold biopsy)     Indications:     Lower GI bleeding  Pre-operative Diagnosis: see indication above  Post-operative Diagnosis:  See findings below  :    Referring Provider: Zunilda Fallon MD    Exam:  Airway: clear, no airway problems anticipated  Heart: RRR, without gallops or rubs  Lungs: clear bilaterally without wheezes, crackles, or rhonchi  Abdomen: soft, nontender, nondistended, bowel sounds present  Mental Status: awake, alert and oriented to person, place and time    Sedation:  MAC anesthesia Propofol  Procedure Details:  After informed consent was obtained with all risks and benefits of procedure explained and preoperative exam completed, the patient was taken to the endoscopy suite and placed in the left lateral decubitus position. Upon sequential sedation as per above, a digital rectal exam was performed demonstrating external hemorrhoids. The Olympus videocolonoscope  was inserted in the rectum and carefully advanced to the a distance of 40 cm. The quality of preparation was good. The colonoscope was slowly withdrawn with careful evaluation between folds. Retroflexion in the rectum was completed demonstrating external hemorrhoids. Findings: 1. Non bleeding external hemorrhoids 2. Large polyp/mass at 40cm r/o carcinoma 3. Unable to advance above 40cm 4. O2 sat < 90% on 3liters O2 corrected with hgh flow O2    Specimen Removed:  4cm polyp/mass at 40cm bx and site tattooed     Complications: None. EBL:  None.     Impression:  1. hypoxia on 3 Liters O2 2. large colon polyp/mass at 40cm bx and tattooed not endoscopically remvable 4. unable to advance above 40cm     Recommendations: --1. CEA 2. CT colonography 3. Consult colorectal surgery in am to determine if pt is a surgical candidate 4.  Needs further eval of hypoxia         Discharge Disposition: Return to [facility]      Twin Gr MD

## 2018-05-06 NOTE — PROGRESS NOTES
05/06/18 0411   Vitals   Temp 97.1 °F (36.2 °C)   Temp Source Oral   Pulse (Heart Rate) (!) 102   Heart Rate Source Monitor   Resp Rate 22   O2 Sat (%) 96 %   Level of Consciousness Alert   /79   MAP (Calculated) 100   BP 1 Location Right arm   BP 1 Method Automatic   BP Patient Position At rest   MEWS Score 3   Patient Observation   Repositioned Head of bed elevated (degrees)   Patient Turned Supine   Observations vitals   Ambulate No (Comment)   Activity In bed;Resting quietly   Height/Weight   Weight 144.1 kg (317 lb 11.2 oz)   BMI (calculated) 62   Patient was controlled AFib, Beta Blocker was discontinued.  Heartrate discussed with Dr Jannet Mcginnis who ordered Metoprolol 5mg IV PRN for heart rate >110

## 2018-05-06 NOTE — PERIOP NOTES
TRANSFER - OUT REPORT:    Verbal report given to Yordan Nevarez RN on Cordelia Polk  being transferred to  for routine post - op       Report consisted of patients Situation, Background, Assessment and   Recommendations(SBAR). Information from the following report(s) SBAR and MAR was reviewed with the receiving nurse. Lines:   Peripheral IV 05/03/18 Left Hand (Active)   Site Assessment Clean, dry, & intact 5/6/2018  9:11 AM   Phlebitis Assessment 0 5/6/2018  9:11 AM   Infiltration Assessment 0 5/6/2018  9:11 AM   Dressing Status Clean, dry, & intact 5/6/2018  9:11 AM   Dressing Type Tape 5/6/2018  9:11 AM   Hub Color/Line Status Blue; Infusing 5/6/2018  9:11 AM   Action Taken Open ports on tubing capped 5/5/2018  8:00 AM   Alcohol Cap Used Yes 5/5/2018  8:07 PM        Opportunity for questions and clarification was provided.       Patient transported with:   O2 @ 3 liters

## 2018-05-07 ENCOUNTER — APPOINTMENT (OUTPATIENT)
Dept: CT IMAGING | Age: 58
DRG: 201 | End: 2018-05-07
Attending: COLON & RECTAL SURGERY
Payer: MEDICAID

## 2018-05-07 LAB
CEA SERPL-MCNC: 2.2 NG/ML
GLUCOSE BLD STRIP.AUTO-MCNC: 72 MG/DL (ref 70–110)
GLUCOSE BLD STRIP.AUTO-MCNC: 91 MG/DL (ref 70–110)
INR PPP: 1.6 (ref 0.8–1.2)
PROTHROMBIN TIME: 18.2 SEC (ref 11.5–15.2)

## 2018-05-07 PROCEDURE — 74011250636 HC RX REV CODE- 250/636: Performed by: EMERGENCY MEDICINE

## 2018-05-07 PROCEDURE — 74011250637 HC RX REV CODE- 250/637: Performed by: EMERGENCY MEDICINE

## 2018-05-07 PROCEDURE — 5A09457 ASSISTANCE WITH RESPIRATORY VENTILATION, 24-96 CONSECUTIVE HOURS, CONTINUOUS POSITIVE AIRWAY PRESSURE: ICD-10-PCS | Performed by: INTERNAL MEDICINE

## 2018-05-07 PROCEDURE — C8924 2D TTE W OR W/O FOL W/CON,FU: HCPCS

## 2018-05-07 PROCEDURE — 74011636320 HC RX REV CODE- 636/320: Performed by: EMERGENCY MEDICINE

## 2018-05-07 PROCEDURE — 74011250637 HC RX REV CODE- 250/637: Performed by: PHYSICIAN ASSISTANT

## 2018-05-07 PROCEDURE — 36415 COLL VENOUS BLD VENIPUNCTURE: CPT | Performed by: EMERGENCY MEDICINE

## 2018-05-07 PROCEDURE — 85610 PROTHROMBIN TIME: CPT | Performed by: EMERGENCY MEDICINE

## 2018-05-07 PROCEDURE — 82962 GLUCOSE BLOOD TEST: CPT

## 2018-05-07 PROCEDURE — 65270000029 HC RM PRIVATE

## 2018-05-07 PROCEDURE — 74177 CT ABD & PELVIS W/CONTRAST: CPT

## 2018-05-07 PROCEDURE — 74011250637 HC RX REV CODE- 250/637: Performed by: INTERNAL MEDICINE

## 2018-05-07 RX ADMIN — FUROSEMIDE 40 MG: 40 TABLET ORAL at 10:12

## 2018-05-07 RX ADMIN — PAROXETINE HYDROCHLORIDE 20 MG: 20 TABLET, FILM COATED ORAL at 10:12

## 2018-05-07 RX ADMIN — FAMOTIDINE 20 MG: 20 TABLET ORAL at 20:34

## 2018-05-07 RX ADMIN — LEVOTHYROXINE SODIUM 125 MCG: 25 TABLET ORAL at 10:13

## 2018-05-07 RX ADMIN — FAMOTIDINE 20 MG: 20 TABLET ORAL at 10:12

## 2018-05-07 RX ADMIN — OXYCODONE HYDROCHLORIDE 5 MG: 5 TABLET ORAL at 05:02

## 2018-05-07 RX ADMIN — TRAMADOL HYDROCHLORIDE 50 MG: 50 TABLET, FILM COATED ORAL at 20:33

## 2018-05-07 RX ADMIN — SIMVASTATIN 40 MG: 40 TABLET, FILM COATED ORAL at 22:07

## 2018-05-07 RX ADMIN — MULTIPLE VITAMINS W/ MINERALS TAB 1 TABLET: TAB at 10:12

## 2018-05-07 RX ADMIN — PERFLUTREN 2 ML: 6.52 INJECTION, SUSPENSION INTRAVENOUS at 09:10

## 2018-05-07 RX ADMIN — IOPAMIDOL 100 ML: 612 INJECTION, SOLUTION INTRAVENOUS at 17:28

## 2018-05-07 RX ADMIN — OXYCODONE HYDROCHLORIDE 5 MG: 5 TABLET ORAL at 16:19

## 2018-05-07 RX ADMIN — DIATRIZOATE MEGLUMINE AND DIATRIZOATE SODIUM 30 ML: 600; 100 SOLUTION ORAL; RECTAL at 12:11

## 2018-05-07 RX ADMIN — ARIPIPRAZOLE 15 MG: 5 TABLET ORAL at 10:12

## 2018-05-07 NOTE — ROUTINE PROCESS
1530/Pt admitted to CVT stepdown via bed. Report received from transferring RN. Pt placed on Bedside commode per request of pt with 2 min assist.  1545/ VSS, c/o pain in Hip. Pt ordered for cat scan with oral contrast.  1645/ pt to Cat scan  1715/ pt returned from Cat scan. VSS.

## 2018-05-07 NOTE — CONSULTS
[facility]  Pulmonary, Critical Care, and Sleep Medicine    Name: Bentley Apley MRN: [id number]   : 1960 Hospital: [address] [place] [address]   Date: 2018        Pulmonary & Sleep Medicine Initial Patient Consult      IMPRESSION:   1.  ACute on chronic respiratory failure, hypoxic and hypercarbic, due to:  2.  Obesity-hypoventilation syndrome  3. ANDRADE, severe, with hypersomnolence. 4.  Morbid obesity  5. GI bleeding  6. Anemia of blood loss  7. Atrial fibrillation  8. Schizoaffective disorder, substance abuse, noncompliance  9. Other stable problems as listed below     Patient Active Problem List   Diagnosis Code    Hypertension I11.9    Congenital heart disease Q24.9    Atrial fibrillation I48.91    Osteoarthritis of both knees M17.0    H/O total knee replacement Z96.659    DJD (degenerative joint disease), lumbosacral M51.37    Schizoaffective disorder, chronic condition ([facility] F25.8    Unspecified hypothyroidism E03.9    Morbid obesity ([facility] E66.01    Esophageal reflux K21.9    Dyspnea R06.00    Dyslipidemia E78.5    Morbid obesity with BMI of 45.0-49.9, adult ([facility]) E66.01, Z68.42    DDD (degenerative disc disease), lumbar, L4/5 advanced M51.36    Hypoxia R09.02    Fluid overload E87.70    CHF (congestive heart failure) ([facility]) N93.1    Diastolic CHF, chronic ([facility]) I50.32    Bradycardia R00.1        RECOMMENDATIONS:   · BiPAP continuously at 15/8, rate 10, FIO2 titrated to >90% for 1-2 days, until patient's sleep deprivation is improved enough to respond normally post op (i.e., not cleared for surgery yet)  · BiPAP will be needed in the PACU post sedation.   · Avoid sedative-hypnotic medications, and other medications known to depress respiratory drive  · After clearance, patient should be intubated for any procedure requiring moderate sedation or more, with close observation in PACU on BiPAP until fully awake and following commands  · ABG on BiPAP  · Continue conservative, supportive management of bleeding  · Outpatient polysomnography at earliest availability, note that patient has missed appointments  · Case management for home health visits     Subjective/History: This patient has been seen and evaluated at the request of Dr. Janelle Landon for ANDRADE. For pre-op pulmonary clearance. 05/07/18    Patient is a 60 y.o. female with sleep apnea   She is a poor historian, and was falling asleep repeatedly during my interview, could only speak 5-10 words before falling asleep, and could not pay attention enough to answer a question. She has been diagnosed with ANDRADE on prior hospitalizations, was prescribed CPAP, but has been noncompliant with it. She is on O2 at 2 LPM which patient stated she has had \"for a few months\", did not remember her DME provider or prior pulmonologist.  Patient produced a note which showed she had missed her appointment in March 2018, and has another scheduled with [facility] on May 12 2018. She stated she was hospitalized for hemoptysis after \"smoking crack\" but denies cigarette smoking or marijuana use. She \"Came down with asthma\" as an adult, and has been on albuterol PRN with no other inhalers recalled. She denies cough, chest pain, abdominal pain, pleurisy, wheezing. She could not complete an [place] scale with me today due to falling asleep. I placed her on BiPAP 15/8 rate 10 with 25% FIO2 at the bedside, she immediately fell asleep with SpO2 100%, VE 9LPM, Vt 923cc, and remained at the set rate of 10.min. The remainder of the history is per the chart.     Past Medical History:   Diagnosis Date    Atrial fibrillation     CHADS score 1  (-CHF, +HTN, -AGE, -DM, -CVA)    Carpal tunnel syndrome     Chronic pain     Congenital heart disease     presumed pulmonary stenosis s/p repair 1969    Degenerative disc disease     Degenerative joint disease     Dyslipidemia     GERD     H/O echocardiogram 04/2017    EF 60-65%, mild concentric LVH, dilated RV with RV dysfunction, RVSP 54 mmHg, RA E, moderate to severe pulmonic regurgitation.  Hypertension     Hypothyroid     Morbid obesity     Morbid obesity with BMI of 45.0-49.9, adult ([facility] 2/20/2017    Noncompliance     Schizoaffective disorder     Substance abuse     marijuana, crack cocaine      Past Surgical History:   Procedure Laterality Date    HX CARPAL TUNNEL RELEASE      left    HX HYSTERECTOMY      HX KNEE REPLACEMENT      left    HX KNEE REPLACEMENT      right    HX THYROIDECTOMY        Prior to Admission medications    Medication Sig Start Date End Date Taking? Authorizing Provider   oxyCODONE IR (ROXICODONE) 5 mg immediate release tablet Take 5 mg by mouth every six (6) hours as needed for Pain. Yes Historical Provider   isosorbide mononitrate ER (IMDUR) 30 mg tablet Take 1 Tab by mouth every evening. 4/24/18  Yes Spring Felipe NP   aspirin delayed-release 81 mg tablet Take 1 Tab by mouth daily. 3/2/18  Yes Jason Echevarria MD   docusate sodium (COLACE) 100 mg capsule Take 1 Cap by mouth two (2) times a day for 90 days. 3/1/18 5/30/18 Yes Jason Echevarria MD   potassium chloride SR (KLOR-CON 10) 10 mEq tablet Take 20 mEq by mouth daily. Yes Historical Provider   raNITIdine (ZANTAC) 150 mg tablet Take 150 mg by mouth two (2) times a day. Yes Historical Provider   warfarin (COUMADIN) 5 mg tablet Take 5 mg by mouth daily. Yes Historical Provider   metoprolol tartrate (LOPRESSOR) 50 mg tablet Take 25 mg by mouth two (2) times a day. 11/27/17  Yes Historical Provider   furosemide (LASIX) 40 mg tablet 40 mg po daily 4/26/17  Yes Jason Echevarria MD   PARoxetine (PAXIL) 20 mg tablet Take 1 Tab by mouth daily. 1/21/14  Yes MD Mario   simvastatin (ZOCOR) 40 mg tablet Take  by mouth nightly.  to obtain from pharmacy   Yes Historical Provider   aripiprazole (ABILIFY) 15 mg tablet Take 15 mg by mouth daily. to obtain from pharmacy   Yes Historical Provider   levothyroxine (SYNTHROID) 125 mcg tablet Take 125 mcg by mouth Daily (before breakfast). Yes Historical Provider   multivitamin, tx-iron-ca-min (THERA-M W/ IRON) 9 mg iron-400 mcg tab tablet Take 1 Tab by mouth daily. 3/1/18   Lili Mcgarry MD   loperamide (IMODIUM) 2 mg capsule Take 2 mg by mouth three (3) times daily as needed for Diarrhea. Historical Provider   lisinopril-hydroCHLOROthiazide (ZESTORETIC) 20-25 mg per tablet Take  by mouth daily.     Historical Provider   VENTOLIN HFA 90 mcg/actuation inhaler Take 2 Puffs by inhalation every four (4) hours as needed for Wheezing or Shortness of Breath. 4/26/17   Lili Mcgarry MD     Current Facility-Administered Medications   Medication Dose Route Frequency    diatrizoate meglumine-d.sodium (MD-GASTROVIEW,GASTROGRAFIN) 66-10 % contrast solution 30 mL  30 mL Oral ONCE    furosemide (LASIX) tablet 40 mg  40 mg Oral DAILY    docusate sodium (COLACE) capsule 100 mg  100 mg Oral BID    multivitamin, tx-iron-ca-min (THERA-M w/ IRON) tablet 1 Tab  1 Tab Oral DAILY    famotidine (PEPCID) tablet 20 mg  20 mg Oral BID    PARoxetine (PAXIL) tablet 20 mg  20 mg Oral DAILY    ARIPiprazole (ABILIFY) tablet 15 mg  15 mg Oral DAILY    levothyroxine (SYNTHROID) tablet 125 mcg  125 mcg Oral ACB    simvastatin (ZOCOR) tablet 40 mg  40 mg Oral QHS     Allergies   Allergen Reactions    Gabapentin Other (comments)     Unsteady, weakness LEs    Tetanus Vaccines And Toxoid Swelling    Topamax [Topiramate] Other (comments)     weakness      Social History   Substance Use Topics    Smoking status: Never Smoker    Smokeless tobacco: Never Used    Alcohol use No      Family History   Problem Relation Age of Onset    Hypertension Mother     Coronary Artery Disease Mother     Coronary Artery Disease Father     Hypertension Father         Review of Systems:  Review of systems not obtained due to patient factors. Objective:   Vital Signs:    Visit Vitals    /88 (BP 1 Location: Right arm, BP Patient Position: At rest)    Pulse (!) 118    Temp 97.5 °F (36.4 °C)    Resp 20    Ht 5' (1.524 m)    Wt 142.2 kg (313 lb 9.6 oz)    SpO2 97%    Breastfeeding Unknown    BMI 61.25 kg/m2       O2 Device: Nasal cannula   O2 Flow Rate (L/min): 3 l/min   Temp (24hrs), Av.1 °F (36.7 °C), Min:97.5 °F (36.4 °C), Max:98.4 °F (36.9 °C)       Intake/Output Summary (Last 24 hours) at 18 1058  Last data filed at 18 0412   Gross per 24 hour   Intake                0 ml   Output             2150 ml   Net            -2150 ml     Ventilator Settings:  Mode Rate Tidal Volume Pressure FiO2 PEEP    BiPAP 10 500-900 IPAP 15 24 %  8     Peak airway pressure:  17    Minute ventilation: 8 l/min        Physical Exam: PIV L wrist    General:  Somnolent, cooperative, no distress. Head:  Normocephalic, without obvious abnormality, atraumatic. Eyes:  Conjunctivae/corneas clear. PERRL,   Nose: Nares normal. Septum midline. Mucosa normal. No drainage or sinus tenderness. Throat: Lips, mucosa, and tongue normal. Teeth and gums normal. Mallampati 3 airway. Neck: Supple, symmetrical, trachea midline, no adenopathy, thyroid: no enlargment/tenderness/nodules, no carotid bruit and no JVD. Back:   Symmetric, no curvature. ROM normal.   Lungs:   Clear to auscultation bilaterally. Chest wall:  No tenderness or deformity. Heart:  Irregular, S1, S2 normal, no murmur, click, rub or gallop. Abdomen:   Soft, non-tender. Bowel sounds normal. No masses,  No organomegaly. Extremities: Extremities normal, atraumatic, no cyanosis, 3+ LE edema. Pulses: 2+ and symmetric all extremities. Skin: Skin color, texture, turgor normal. No rashes or lesions   Lymph nodes:      Cervical nodes normal.   Neurologic: Grossly nonfocal, somnolent, oriented x 2.        Data:     Recent Results (from the past 24 hour(s))   GLUCOSE, POC Collection Time: 05/06/18 11:04 AM   Result Value Ref Range    Glucose (POC) 91 70 - 110 mg/dL   GLUCOSE, POC    Collection Time: 05/06/18  3:43 PM   Result Value Ref Range    Glucose (POC) 103 70 - 110 mg/dL   PROTHROMBIN TIME + INR    Collection Time: 05/07/18  3:35 AM   Result Value Ref Range    Prothrombin time 18.2 (H) 11.5 - 15.2 sec    INR 1.6 (H) 0.8 - 1.2     GLUCOSE, POC    Collection Time: 05/07/18  8:00 AM   Result Value Ref Range    Glucose (POC) 91 70 - 110 mg/dL        Imaging:  CXR 2/26/18:  1. Cardiomegaly. 2. Right hilar fullness consistent with the pulmonary artery enlargement seen on  CTA chest 2/24/18. 3. No acute infiltrate or edema. CTA 2/24/18:  Impression:   No CT evidence of pulmonary thromboembolism. Dilated main pulmonary artery suggestive of pulmonary arterial hypertension. Cardiomegaly with evidence of right heart failure. Generalized anasarca. Small sliding hiatal hernia. I have personally reviewed the patients radiographs and have reviewed the reports:  CXR Results  (Last 48 hours)    None         Best practice :    Glycemic control   IHI ICU bundles: Total of 45  min critical care time spent at bedside during the course of care providing evaluation,management and care decisions and ordering appropriate treatment related to critical care problems exclusive of procedures. The reason for providing this level of medical care for this critically ill patient was due a critical illness that impaired one or more vital organ systems such that there was a high probability of imminent or life threatening deterioration in the patients condition. This care involved high complexity decision making to assess, manipulate, and support vital system functions, to treat this degree vital organ system failure and to prevent further life threatening deterioration of the patients condition.     Tolu Tillman MD

## 2018-05-07 NOTE — ROUTINE PROCESS
Bedside shift change report given to Germaine Li RN (oncoming nurse) by Giulia Buitrago (offgoing nurse). Report included the following information SBAR, Kardex, Procedure Summary, Intake/Output, MAR, Accordion, Recent Results and Med Rec Status.

## 2018-05-07 NOTE — PROGRESS NOTES
Pt found not wearing oxygen twice. Both times O2 sats were 74%-82%. NC placed and pt instructed to deep breath through nose. Pt was educated on the need to be compliant with O2 therapy, and voiced understanding. Will continue to monitor pt for compliance.

## 2018-05-07 NOTE — PROGRESS NOTES
HPI: Dione Caceres is a 60 y.o. female presenting with chief complain of newly diagnosed sigmoid mass. Pt was found to have mild amount of blood per rectum. Pt denies symptoms at home of nausea or vomiting. Pt has severe respiratory issues and atrial fibrillation currently anticoagulated with coumadin. Colonoscopy performed and showed mass at 40 cm. Biopsied and tattoo placed distally. Path pending. Past Medical History:   Diagnosis Date    Atrial fibrillation     CHADS score 1  (-CHF, +HTN, -AGE, -DM, -CVA)    Carpal tunnel syndrome     Chronic pain     Congenital heart disease     presumed pulmonary stenosis s/p repair 1969    Degenerative disc disease     Degenerative joint disease     Dyslipidemia     GERD     H/O echocardiogram 04/2017    EF 60-65%, mild concentric LVH, dilated RV with RV dysfunction, RVSP 54 mmHg, RA E, moderate to severe pulmonic regurgitation.     Hypertension     Hypothyroid     Morbid obesity     Morbid obesity with BMI of 45.0-49.9, adult ([facility] [address] 2/20/2017    Noncompliance     Schizoaffective disorder     Substance abuse     marijuana, crack cocaine       Past Surgical History:   Procedure Laterality Date    COLONOSCOPY N/A 5/6/2018    COLONOSCOPY with tatooing and biopsy performed by Se Tiwari MD at [facility] ENDOSCOPY    HX [address]      left    HX HYSTERECTOMY      HX KNEE REPLACEMENT      left    HX KNEE REPLACEMENT      right    HX THYROIDECTOMY         Family History   Problem Relation Age of Onset    Hypertension Mother     Coronary Artery Disease Mother     Coronary Artery Disease Father     Hypertension Father        Social History     Social History    Marital status: SINGLE     Spouse name: N/A    Number of children: N/A    Years of education: N/A     Social History Main Topics    Smoking status: Never Smoker    Smokeless tobacco: Never Used    Alcohol use No    Drug use: No    Sexual activity: Yes     Birth control/ protection: Surgical     Other Topics Concern    None     Social History Narrative       Review of Systems - aside from above all others negative. Outpatient Prescriptions Marked as Taking for the 5[facility]18 [facility]   Medication Sig Dispense Refill    oxyCODONE IR (ROXICODONE) 5 mg immediate release tablet Take 5 mg by mouth every six (6) hours as needed for Pain.  isosorbide mononitrate ER (IMDUR) 30 mg tablet Take 1 Tab by mouth every evening. 90 Tab 3    aspirin delayed-release 81 mg tablet Take 1 Tab by mouth daily. 30 Tab 0    docusate sodium (COLACE) 100 mg capsule Take 1 Cap by mouth two (2) times a day for 90 days. 60 Cap 2    potassium chloride SR (KLOR-CON 10) 10 mEq tablet Take 20 mEq by mouth daily.  raNITIdine (ZANTAC) 150 mg tablet Take 150 mg by mouth two (2) times a day.  warfarin (COUMADIN) 5 mg tablet Take 5 mg by mouth daily.  metoprolol tartrate (LOPRESSOR) 50 mg tablet Take 25 mg by mouth two (2) times a day.  furosemide (LASIX) 40 mg tablet 40 mg po daily 30 Tab 0    PARoxetine (PAXIL) 20 mg tablet Take 1 Tab by mouth daily. 30 Tab 3    simvastatin (ZOCOR) 40 mg tablet Take  by mouth nightly. to obtain from pharmacy      aripiprazole (ABILIFY) 15 mg tablet Take 15 mg by mouth daily. to obtain from pharmacy      levothyroxine (SYNTHROID) 125 mcg tablet Take 125 mcg by mouth Daily (before breakfast). Allergies   Allergen Reactions    Gabapentin Other (comments)     Unsteady, weakness LEs    Tetanus Vaccines And Toxoid Swelling    Topamax [Topiramate] Other (comments)     weakness       Vitals:    05/07/18 1201 05/07/18 1619 05/07/18 1631 05/07/18 1722   BP: (!) 142/91 (!) 158/105     Pulse: 91 97     Resp: 20 20     Temp: 97.9 °F (36.6 °C)   98.3 °F (36.8 °C)   SpO2: 90% 96% 95%    Weight:       Height:           Physical Exam   Constitutional: She appears well-developed and well-nourished. HENT:   Head: Normocephalic and atraumatic. Eyes: Conjunctivae and EOM are normal.   Abdominal: Soft. She exhibits no distension. There is no tenderness. Musculoskeletal: Normal range of motion. Lymphadenopathy:     She has no cervical adenopathy. Right: No inguinal adenopathy present. Left: No inguinal adenopathy present. Neurological: She exhibits normal muscle tone. Skin: Skin is warm and dry. No rash noted. Psychiatric: She has a normal mood and affect. Her speech is normal.     CEA 2.2  CT chest no evidence lung lesions  CT abd/pelvis just performed and personally visualized by me; no liver lesions. Mid-sigmoid mass. No obstructive signs    Assessment / Plan    Sigmoid mass in setting of severe pulmonary disease and morbid obesity  High risk surgical candidate  Although lesion was not traversed by GI, no indication it is obstructing  F/U radiology read for CT abd/pelvis  F/U path  Pt needs medical optimization prior to surgery  Prefer pt f/u as outpatient prior to surgery unless urgency develops  Will follow    The diagnoses and plan were discussed with the patient. All questions answered. Plan of care agreed to by all concerned.

## 2018-05-07 NOTE — PROGRESS NOTES
[facility]  -  Progress Note      Patient: Berhane Lewis MRN: [id number]  SSN: [id number]    YOB: 1960  Age: 60 y.o. Sex: female      Admit Date: 5/3/2018    Assessment:     Hospital Problems  Date Reviewed: 11/28/2017          Codes Class Noted POA    Bradycardia ICD-10-CM: R00.1  ICD-9-CM: 427.89  5/3/2018 Unknown            -Bradycardia, likely 2/2 beta blocker use. -LE swelling, pt non-compliant with lasix at home as she has trouble ambulating and doesn't like to use the bathroom, BNP 2800, echo Feb 26, 2018 with normal LV function and mod LVH, dilated RV with increased PA pressures at 67mmHg, moderate TR and dilated atria. LE duplex scan negative for PE. Swelling appears mostly chronic.  -L knee replacement Dec 2017, R knee replacement  -Anemia 2/2 to vaginal spotting and guaiac positive stool, Hgb 8.3/Hematocrit 29.2, GI work-up in progress.  -Chronic afib, rate controlled with BB, on chronic coumadin, INR therapeutic.   -Mod to severe Pulm HTN  -morbid obesity  -ANDRADE, non-compliant with CPAP  -polysubstance abuse history, marijuana and crack cocaine, pt states she no longer uses  -Hypothyroidism  -Schizoaffective disorder  -DJD  -GERD    Plan:     No evidence of decompensated CHF at this time. I would continue her current cardiac medication regimen. Her GI bleed workup is in progress with possible scope next week. She is currently off of oral anticoagulation. Subjective:     No new complaints.      Objective:      Patient Vitals for the past 8 hrs:   Temp Pulse Resp BP SpO2   05/07/18 0757 97.5 °F (36.4 °C) (!) 118 20 137/88 97 %   05/07/18 0407 98 °F (36.7 °C) 93 16 145/88 99 %         Patient Vitals for the past 96 hrs:   Weight   05/07/18 0407 142.2 kg (313 lb 9.6 oz)   05/06/18 0411 144.1 kg (317 lb 11.2 oz)   05/05/18 0422 146 kg (321 lb 12.8 oz)   05/04/18 0428 145.7 kg (321 lb 3.2 oz)         Intake/Output Summary (Last 24 hours) at 05/07/18 1100  Last data filed at 05/07/18 0412   Gross per 24 hour   Intake                0 ml   Output             2150 ml   Net            -2150 ml       Physical Exam:  General:  alert, cooperative, no distress, appears stated age  Neck:  no JVD  Lungs:  clear to auscultation bilaterally  Heart:  irregularly irregular rhythm  Abdomen:  no guarding or rigidity  Extremities:  no edema    Data Review:     Labs: Results:       Chemistry Recent Labs      05/06/18   0245   GLU  86   NA  138   K  3.9   CL  98*   CO2  36*   BUN  10   CREA  0.82   CA  8.3*   AGAP  4   BUCR  12      CBC w/Diff Recent Labs      05/06/18   0245  05/05/18   0630  05/04/18   1320   WBC  5.4   --    --    RBC  4.21   --    --    HGB  9.2*  8.6*  9.0*   HCT  32.8*  29.4*  30.7*   PLT  254   --    --    GRANS  73   --    --    LYMPH  15*   --    --    EOS  3   --    --       Cardiac Enzymes No results found for: CPK, CK, CKMMB, CKMB, RCK3, CKMBT, CKNDX, CKND1, SHANNAN, TROPT, TROIQ, JELANI, TROPT, TNIPOC, BNP, BNPP   Coagulation Recent Labs      05/07/18   0335  05/06/18   0245   PTP  18.2*  19.2*   INR  1.6*  1.7*       Lipid Panel Lab Results   Component Value Date/Time    Cholesterol, total 90 05/03/2018 10:25 AM    HDL Cholesterol 34 (L) 05/03/2018 10:25 AM    LDL, calculated 41.4 05/03/2018 10:25 AM    VLDL, calculated 14.6 05/03/2018 10:25 AM    Triglyceride 73 05/03/2018 10:25 AM    CHOL/HDL Ratio 2.6 05/03/2018 10:25 AM      BNP Lab Results   Component Value Date/Time    B-type Natriuretic Peptide 3187 (H) 05/06/2016 11:10 AM    B-type Natriuretic Peptide 3075 (H) 05/06/2016 11:00 AM      Liver Enzymes No results for input(s): TP, ALB, TBIL, AP, SGOT, GPT in the last 72 hours.     No lab exists for component: DBIL   Digoxin    Thyroid Studies Lab Results   Component Value Date/Time    T4, Total 12.3 (H) 01/15/2014 02:01 AM    TSH 1.97 05/04/2018 03:15 AM

## 2018-05-07 NOTE — ROUTINE PROCESS
Bedside shift change report given to Yadira Okeefe RN (oncoming nurse) by April RN (offgoing nurse). Report included the following information SBAR, Kardex, Intake/Output, Recent Results and Cardiac Rhythm A. Fib.

## 2018-05-07 NOTE — PROGRESS NOTES
Limited echocardiogram with definity completed. Report to follow. Patient to be transported back to room.

## 2018-05-07 NOTE — PROGRESS NOTES
[facility]  Progress Note    Patient: Sara Mckinnon Age: 60 y.o. : 1960 MR#: [id number] SSN: [id number]  Date: 2018     Subjective:     Patient lying in bed , on BIPAP    Assessment/Plan:   1. Bradycardia, likely secondary to medications. 2.  Possible vaginal bleeding. The patient has had a hysterectomy in the past.    3.  Anemia with heme positive stool in the setting of being on Coumadin. 4.  Hypertension. 5.  Dyslipidemia. 6.  Morbid obesity. 7.  Atrial fibrillation on Coumadin as an outpatient. 8.  Degenerative joint disease. 10.  History of chronic diastolic congestive heart failure. 11.  History of pulmonary hypertension. 12.  Obstructive sleep apnea. Obesity hypoventilation syndrome  13. Chronic hypoxic respiratory failure on 2 liters of oxygen via nasal cannula continuously. - worsening hypoxia noted during colonoscopy/sedation- will d/w Pulm  14- Colon Mass      PLAN    BB held  Coumadin on hold  CRS consulted- d/w Dr marrufo  Pulmonary Consulted, continuous BIPAP as per pulm- transfer to step down  On O2, lasix  On synthyroid  On statin  Advanced care planning: full code  D/w patient , d/w RN    Case discussed with:  [x]Patient  []Family  [x]Nursing  []Case Management  DVT Prophylaxis:  []Lovenox  []Hep SQ  []SCDs  []Coumadin   []On Heparin gtt    Objective:   VS:   Visit Vitals    BP (!) 142/91 (BP 1 Location: Right arm, BP Patient Position: At rest)    Pulse 91    Temp 97.9 °F (36.6 °C)    Resp 20    Ht 5' (1.524 m)    Wt 142.2 kg (313 lb 9.6 oz)    SpO2 90%    Breastfeeding Unknown    BMI 61.25 kg/m2      Tmax/24hrs: Temp (24hrs), Av °F (36.7 °C), Min:97.5 °F (36.4 °C), Max:98.4 °F (36.9 °C)      Intake/Output Summary (Last 24 hours) at 18 1300  Last data filed at 18 0412   Gross per 24 hour   Intake                0 ml   Output             2150 ml   Net            -2150 ml       General:  Awake, on BIPAP  Cardiovascular:  S1S2+, RRR  Pulmonary: coarse bs b/l  GI:  Soft, BS+, NT, ND, obese  Extremities:  trace edema          Labs:    Recent Results (from the past 24 hour(s))   GLUCOSE, POC    Collection Time: 05/06/18  3:43 PM   Result Value Ref Range    Glucose (POC) 103 70 - 110 mg/dL   PROTHROMBIN TIME + INR    Collection Time: 05/07/18  3:35 AM   Result Value Ref Range    Prothrombin time 18.2 (H) 11.5 - 15.2 sec    INR 1.6 (H) 0.8 - 1.2     GLUCOSE, POC    Collection Time: 05/07/18  8:00 AM   Result Value Ref Range    Glucose (POC) 91 70 - 110 mg/dL   GLUCOSE, POC    Collection Time: 05/07/18 12:06 PM   Result Value Ref Range    Glucose (POC) 72 70 - 110 mg/dL       Signed By: Kendrick Wilkinson MD     May 7, 2018

## 2018-05-08 LAB
INR PPP: 1.7 (ref 0.8–1.2)
PROTHROMBIN TIME: 19.6 SEC (ref 11.5–15.2)

## 2018-05-08 PROCEDURE — 77010033678 HC OXYGEN DAILY

## 2018-05-08 PROCEDURE — 65270000029 HC RM PRIVATE

## 2018-05-08 PROCEDURE — 74011250637 HC RX REV CODE- 250/637: Performed by: EMERGENCY MEDICINE

## 2018-05-08 PROCEDURE — 74011250637 HC RX REV CODE- 250/637: Performed by: PHYSICIAN ASSISTANT

## 2018-05-08 PROCEDURE — 85610 PROTHROMBIN TIME: CPT | Performed by: EMERGENCY MEDICINE

## 2018-05-08 PROCEDURE — 36415 COLL VENOUS BLD VENIPUNCTURE: CPT | Performed by: EMERGENCY MEDICINE

## 2018-05-08 PROCEDURE — 74011250637 HC RX REV CODE- 250/637: Performed by: INTERNAL MEDICINE

## 2018-05-08 RX ORDER — FLUTICASONE PROPIONATE 50 MCG
2 SPRAY, SUSPENSION (ML) NASAL DAILY
Status: DISCONTINUED | OUTPATIENT
Start: 2018-05-09 | End: 2018-05-10 | Stop reason: HOSPADM

## 2018-05-08 RX ORDER — METOPROLOL TARTRATE 25 MG/1
12.5 TABLET, FILM COATED ORAL EVERY 12 HOURS
Status: DISCONTINUED | OUTPATIENT
Start: 2018-05-08 | End: 2018-05-10 | Stop reason: HOSPADM

## 2018-05-08 RX ORDER — WARFARIN SODIUM 5 MG/1
5 TABLET ORAL ONCE
Status: COMPLETED | OUTPATIENT
Start: 2018-05-08 | End: 2018-05-08

## 2018-05-08 RX ADMIN — PAROXETINE HYDROCHLORIDE 20 MG: 20 TABLET, FILM COATED ORAL at 09:37

## 2018-05-08 RX ADMIN — SIMVASTATIN 40 MG: 40 TABLET, FILM COATED ORAL at 21:58

## 2018-05-08 RX ADMIN — ARIPIPRAZOLE 15 MG: 5 TABLET ORAL at 09:36

## 2018-05-08 RX ADMIN — OXYCODONE HYDROCHLORIDE 5 MG: 5 TABLET ORAL at 06:21

## 2018-05-08 RX ADMIN — OXYCODONE HYDROCHLORIDE 5 MG: 5 TABLET ORAL at 22:00

## 2018-05-08 RX ADMIN — METOPROLOL TARTRATE 12.5 MG: 25 TABLET ORAL at 21:57

## 2018-05-08 RX ADMIN — LEVOTHYROXINE SODIUM 125 MCG: 25 TABLET ORAL at 09:36

## 2018-05-08 RX ADMIN — WARFARIN SODIUM 5 MG: 5 TABLET ORAL at 18:03

## 2018-05-08 RX ADMIN — FAMOTIDINE 20 MG: 20 TABLET ORAL at 21:57

## 2018-05-08 RX ADMIN — FUROSEMIDE 40 MG: 40 TABLET ORAL at 09:37

## 2018-05-08 RX ADMIN — METOPROLOL TARTRATE 12.5 MG: 25 TABLET ORAL at 11:45

## 2018-05-08 RX ADMIN — OXYCODONE HYDROCHLORIDE 5 MG: 5 TABLET ORAL at 01:30

## 2018-05-08 RX ADMIN — FAMOTIDINE 20 MG: 20 TABLET ORAL at 09:36

## 2018-05-08 RX ADMIN — MULTIPLE VITAMINS W/ MINERALS TAB 1 TABLET: TAB at 09:37

## 2018-05-08 NOTE — PROGRESS NOTES
[facility] 2 CV STEPDOWN  [address] [place] [id number]  [phone]  Colon and Rectal Surgery Progress Note      Patient: Clara Archer MRN: [id number]  SSN: [id number]    YOB: 1960  Age: 60 y.o. Sex: female      Admit Date: 5/3/2018    LOS: 5 days     Subjective:     No acute events overnight. Currently in SVT stepdown and on 3L NC and tolerating. Having BM, non bloody, and tolerating PO intake w/o nausea. Denies abd pain    Objective:     Vitals:    05/07/18 2012 05/08/18 0000 05/08/18 0454 05/08/18 0531   BP: (!) 178/96 131/73 120/70    Pulse: (!) 102 91 99    Resp: 15 15 13    Temp: 98 °F (36.7 °C) 97.8 °F (36.6 °C) 98 °F (36.7 °C)    SpO2: 94%  95%    Weight:   140.8 kg (310 lb 6.4 oz) 140.1 kg (308 lb 14.4 oz)   Height:            Intake and Output:  Current Shift:    Last three shifts: 05/06 1901 - 05/08 0700  In: 780 [P.O.:780]  Out: 1200 [Urine:1200]    Physical Exam:     GEN: NAD. Abd: Obese. Soft, NT, + BS    Lab/Data Review:    Recent Results (from the past 12 hour(s))   PROTHROMBIN TIME + INR    Collection Time: 05/08/18  5:54 AM   Result Value Ref Range    Prothrombin time 19.6 (H) 11.5 - 15.2 sec    INR 1.7 (H) 0.8 - 1.2           Assessment:     Sigmoid mass in setting of severe pulmonary disease and morbid obesity    Plan:     F/U path report. No s/sx of obstruction at this time  Medical management and optimization per primary team  High risk surgical candidate  F/U CT A/P read. Umbilical hernia present.     Signed By: Alex Fabian MD        May 8, 2018

## 2018-05-08 NOTE — PROGRESS NOTES
[facility] - Progress Note  Admit Date: 5/3/2018    Assessment:     Hospital Problems  Date Reviewed: 11/28/2017          Codes Class Noted POA    Bradycardia ICD-10-CM: R00.1  ICD-9-CM: 427.89  5/3/2018 Unknown              -Bradycardia, presented with slow afib likely 2/2 beta blocker use. Now with HR low 100s. -Sigmoid mass on colonoscopy this admission, appreciate surgery evaluation, path reports pending, increased surgical risk given severe pulmonary HTN. -Chronic respiratory failure multifactorial in setting of severe pulmonary HTN, OHVS, ANDRADE. Appreciate Pulmonary evaluation.  -Chronic LE swelling, pt non-compliant with lasix at home as she has trouble ambulating and doesn't like to use the bathroom, BNP 2800, echo this admission with EF 50-55%. Swelling appears mostly chronic.  -Severe pulmonary HTN with RVSP 87 mmHg on echo this admission (RVSP 67 mmHg on echo 2/2018). -L knee replacement Dec 2017, R knee replacement  -Anemia with guaiac positive stool, GI work-up in progress.  -Chronic afib, rate controlled with BB, on chronic coumadin, INR therapeutic on admission. -morbid obesity  -ANDRADE, non-compliant with CPAP  -polysubstance abuse history, marijuana and crack cocaine, pt states she no longer uses  -Hypothyroidism  -Schizoaffective disorder  -DJD  -GERD    Plan:     Appreciate GI and surgical involvement, awaiting path report, high surgical risk in setting of respiratory issues, likely follow up as outpatient for surgical planning. Appreciate ongoing pulmonary evaluation and treatment. Continue maintenance lasix. Resume low dose BB and follow hemodynamics. Anticoagulation remains on hold in setting of colon mass. Subjective:     Palpitations. Denies CP, SOB.     Objective:      Patient Vitals for the past 8 hrs:   Temp Pulse Resp BP SpO2   05/08/18 0758 98.1 °F (36.7 °C) (!) 111 15 113/69 94 %   05/08/18 0757 - (!) 128 - - -   05/08/18 0731 - (!) 135 - - -   05/08/18 0454 98 °F (36.7 °C) 99 13 120/70 95 %         Patient Vitals for the past 96 hrs:   Weight   05/08/18 0531 140.1 kg (308 lb 14.4 oz)   05/08/18 0454 140.8 kg (310 lb 6.4 oz)   05/07/18 0407 142.2 kg (313 lb 9.6 oz)   05/06/18 0411 144.1 kg (317 lb 11.2 oz)   05/05/18 0422 146 kg (321 lb 12.8 oz)                    Intake/Output Summary (Last 24 hours) at 05/08/18 9587  Last data filed at 05/08/18 0930   Gross per 24 hour   Intake             1140 ml   Output              750 ml   Net              390 ml       Physical Exam:  General:  alert, cooperative, no distress, appears stated age  Neck:   JVD  Lungs:  diminished breath sounds   Heart:  irregularly irregular rhythm  Abdomen:  abdomen is soft without significant tenderness, masses, organomegaly or guarding  Extremities:  extremities normal, atraumatic, no cyanosis trace edema    Data Review:     Labs: Results:       Chemistry Recent Labs      05/06/18   0245   GLU  86   NA  138   K  3.9   CL  98*   CO2  36*   BUN  10   CREA  0.82   CA  8.3*   AGAP  4   BUCR  12      CBC w/Diff Recent Labs      05/06/18   0245   WBC  5.4   RBC  4.21   HGB  9.2*   HCT  32.8*   PLT  254   GRANS  73   LYMPH  15*   EOS  3      Cardiac Enzymes No results found for: CPK, CK, CKMMB, CKMB, RCK3, CKMBT, CKNDX, CKND1, SHANNAN, TROPT, TROIQ, JELANI, TROPT, TNIPOC, BNP, BNPP   Coagulation Recent Labs      05/08/18   0554  05/07/18   0335   PTP  19.6*  18.2*   INR  1.7*  1.6*       Lipid Panel Lab Results   Component Value Date/Time    Cholesterol, total 90 05/03/2018 10:25 AM    HDL Cholesterol 34 (L) 05/03/2018 10:25 AM    LDL, calculated 41.4 05/03/2018 10:25 AM    VLDL, calculated 14.6 05/03/2018 10:25 AM    Triglyceride 73 05/03/2018 10:25 AM    CHOL/HDL Ratio 2.6 05/03/2018 10:25 AM      BNP Lab Results   Component Value Date/Time    B-type Natriuretic Peptide 3187 (H) 05/06/2016 11:10 AM    B-type Natriuretic Peptide 3075 (H) 05/06/2016 11:00 AM      Liver Enzymes No results for input(s): TP, ALB, TBIL, AP, SGOT, GPT in the last 72 hours.     No lab exists for component: DBIL   Digoxin    Thyroid Studies Lab Results   Component Value Date/Time    T4, Total 12.3 (H) 01/15/2014 02:01 AM    TSH 1.97 05/04/2018 03:15 AM          Signed By: VERONICA Clark     May 8, 2018

## 2018-05-08 NOTE — PROGRESS NOTES
[facility]  Progress Note    Patient: Cathryn Casanova Age: 60 y.o. : 1960 MR#: [id number] SSN: [id number]  Date: 2018     Subjective:     Patient sitting in bed in NAD    Assessment/Plan:   1. Bradycardia, likely secondary to medications. 2.  Possible vaginal bleeding. The patient has had a hysterectomy in the past.    3.  Anemia with heme positive stool in the setting of being on Coumadin. 4.  Hypertension. 5.  Dyslipidemia. 6.  Morbid obesity. 7.  Atrial fibrillation on Coumadin as an outpatient. 8.  Degenerative joint disease. 10.  History of chronic diastolic congestive heart failure. 11.  History of pulmonary hypertension. 12.  Obstructive sleep apnea. Obesity hypoventilation syndrome  13. Chronic hypoxic respiratory failure on 2 liters of oxygen via nasal cannula continuously. - worsening hypoxia noted during colonoscopy/sedation- will d/w Pulm  14- Colon Mass      PLAN    BB held  Resume coumadin-   CRS input noted  Pulmonary following  On O2, lasix  On synthyroid  On statin  Advanced care planning: full code  D/w patient ,   PT, OT  Discharge planning    Case discussed with:  [x]Patient  []Family  [x]Nursing  []Case Management  DVT Prophylaxis:  []Lovenox  []Hep SQ  []SCDs  []Coumadin   []On Heparin gtt    Objective:   VS:   Visit Vitals    /77    Pulse (!) 113    Temp 98.1 °F (36.7 °C)    Resp 18    Ht 5' (1.524 m)    Wt 140.1 kg (308 lb 14.4 oz)    SpO2 97%    Breastfeeding Unknown    BMI 60.33 kg/m2      Tmax/24hrs: Temp (24hrs), Av.1 °F (36.7 °C), Min:97.8 °F (36.6 °C), Max:98.3 °F (36.8 °C)      Intake/Output Summary (Last 24 hours) at 18 1428  Last data filed at 18 1241   Gross per 24 hour   Intake             1380 ml   Output              750 ml   Net              630 ml       General:  Awake, follows commands  Cardiovascular:  S1S2+, RRR  Pulmonary: coarse bs b/l  GI:  Soft, BS+, NT, ND, obese  Extremities:  trace edema          Labs:    Recent Results (from the past 24 hour(s))   PROTHROMBIN TIME + INR    Collection Time: 05/08/18  5:54 AM   Result Value Ref Range    Prothrombin time 19.6 (H) 11.5 - 15.2 sec    INR 1.7 (H) 0.8 - 1.2         Signed By: Kimi Rudd MD     May 8, 2018

## 2018-05-08 NOTE — ROUTINE PROCESS
Pt is resting quietly in bed. Alert and oriented x4. Pt refused Bipap at this time. O2 3L n/c. O2 Sat 98%. Pt denies pain. 0300 Resting quietly in bed.    0745 Bedside shift change report given to [address] [place] [address] (oncoming nurse) by Maribel Johnson (offgoing nurse). Report given with SBAR, Kardex, Intake/Output, MAR and Recent Results.

## 2018-05-08 NOTE — PROGRESS NOTES
Problem: Falls - Risk of  Goal: *Absence of Falls  Document Piyush Fall Risk and appropriate interventions in the flowsheet. Outcome: Progressing Towards Goal  Fall Risk Interventions:  Mobility Interventions: Communicate number of staff needed for ambulation/transfer, Patient to call before getting OOB         Medication Interventions: Patient to call before getting OOB    Elimination Interventions: Call light in reach, Patient to call for help with toileting needs    History of Falls Interventions: Consult care management for discharge planning, Door open when patient unattended        Problem: Tissue Perfusion - Cardiopulmonary, Altered  Goal: *Absence of hypoxia  Outcome: Progressing Towards Goal  Pt complains of PITTS. Currently on 3L NC, sats >95%. Problem: Hypertension  Goal: *Blood pressure within specified parameters  Outcome: Progressing Towards Goal  BP WNL, but pt having intermittent increased HR (Afib, HR 's). Cardiology PA aware, continuing to monitor. 1930 - Bedside and Verbal shift change report given to Alisson Galeas, RN (oncoming nurse) by Blu Dsouza RN (offgoing nurse). Report included the following information SBAR, Kardex, MAR, Recent Results and Cardiac Rhythm Afib, controlled. Zahida Velásquez

## 2018-05-08 NOTE — PROGRESS NOTES
[facility]  Pulmonary, Critical Care, and Sleep Medicine    Name: Toshia Boogie MRN: [id number]   : 1960 Hospital: [address] [place] [address] Dr   Date: 2018        IMPRESSION:   OHS/ANDRADE with severe Pulmonary HTN and chronic hypoxic and hypercapnicRespiratory failure. Has O2 and BiPAP long term  History of chronic diastolic congestive heart failure. , atrial fibrillation  History of pulmonary hypertension. secondary to above  Bradycardia- resolved  Hypothyroidism  Schizoaffective disorder     Patient Active Problem List   Diagnosis Code    Hypertension I11.9    Congenital heart disease Q24.9    Atrial fibrillation I48.91    Osteoarthritis of both knees M17.0    H/O total knee replacement Z96.659    DJD (degenerative joint disease), lumbosacral M51.37    Schizoaffective disorder, chronic condition ([facility] [address] F25.8    Unspecified hypothyroidism E03.9    Morbid [facility] E66.01    Esophageal reflux K21.9    Dyspnea R06.00    Dyslipidemia E78.5    Morbid obesity with BMI of 45.0-49.9, adult ([facility]) E66.01, Z68.42    DDD (degenerative disc disease), lumbar, L4/5 advanced M51.36    Hypoxia R09.02    Fluid overload E87.70    CHF (congestive heart failure) ([facility]) W48.8    Diastolic CHF, chronic ([facility]) I50.32    Bradycardia R00.1      PLAN:   · BiPAP nocturnal and prn at 15/8, rate 10, FIO2 titrated to >90% for 1-2 days, until patient's sleep deprivation is improved enough to respond normally post op (i.e., not cleared for surgery yet)  · BiPAP will be needed in the PACU post sedation.   · Avoid sedative-hypnotic medications, and other medications known to depress respiratory drive  · After clearance, patient should be intubated for any procedure requiring moderate sedation or more, with close observation in PACU on BiPAP until fully awake and following commands  · ABG on BiPAP  · Continue conservative, supportive management of bleeding  · Will need supplies for BiPAP Subjective/Interval History:     18  No new complaints. Wore BiPAP overnight. C/O nasal stuffiness  Denies chest pain  Denies cough, congestion    ROS:Pertinent items are noted in HPI. Objective:   Vital Signs:    Visit Vitals    /77    Pulse (!) 113    Temp 98.1 °F (36.7 °C)    Resp 18    Ht 5' (1.524 m)    Wt 140.1 kg (308 lb 14.4 oz)    SpO2 97%    Breastfeeding Unknown    BMI 60.33 kg/m2       O2 Device: Nasal cannula   O2 Flow Rate (L/min): 3 l/min   Temp (24hrs), Av.1 °F (36.7 °C), Min:97.8 °F (36.6 °C), Max:98.3 °F (36.8 °C)       Intake/Output:   Last shift:      701 - 1900  In: 600 [P.O.:600]  Out: -   Last 3 shifts: 1901 -  07  In: 780 [P.O.:780]  Out: 1300 [Urine:1300]    Intake/Output Summary (Last 24 hours) at 18 1251  Last data filed at 18 1241   Gross per 24 hour   Intake             1380 ml   Output              750 ml   Net              630 ml        Physical Exam:    General: in no apparent distress, well developed and well nourished, in no respiratory distress and acyanotic and oriented times 3   HEENT: Normal   Neck: No abnormally enlarged lymph nodes. Chest: poor excursion   Lungs: decreased air exchange bilaterally   Heart: Regular rate and rhythm or S1S2 present   Abdomen: abdomen is soft without significant tenderness, masses, organomegaly or guarding   Extremity: 1+ edema   Neuro: alert   Skin: Skin color, texture, turgor normal. No rashes or lesions        DATA:  Labs:  Recent Labs      18   0245   WBC  5.4   HGB  9.2*   HCT  32.8*   PLT  254     Recent Labs      18   0554  18   0335  18   0245   NA   --    --   138   K   --    --   3.9   CL   --    --   98*   CO2   --    --   36*   GLU   --    --   86   BUN   --    --   10   CREA   --    --   0.82   CA   --    --   8.3*   INR  1.7*  1.6*  1.7*     No results for input(s): PH, PCO2, PO2, HCO3, FIO2 in the last 72 hours.     PFT: 2017:Normal flows. DLCO- 50% predicted                                             Echo:  Left ventricle: Size was normal. Systolic function was by visual assessment. Ejection fraction was estimated in the  range of 50 % to 55 %. No obvious wall motion abnormalities identified in the   views obtained. Wall thickness was mildly  to moderately increased. Right ventricle: The ventricle was moderately to severely dilated. Estimated   peak pressure was 87 mmHg. Right atrium: The atrium was moderately to severely dilated. Tricuspid valve: There was moderate to severe regurgitation. Right atrial   pressure estimate: 15 mmHg. There was severe  pulmonary hypertension. 5/2017-Sleep study- severe ANDRADE needing BiPAP 23/18 cwp     Imaging:  [x]I have personally reviewed the patients radiographs  CXR Results  (Last 48 hours)    None          High complexity decision making was performed during the evaluation of this patient at high risk for decompensation with multiple organ involvement     Above mentioned total time spent on reviewing the case/medical record/data/notes/EMR/patient examination/documentation/coordinating care with nurse/consultants, exclusive of procedures with complex decision making performed and > 50% time spent in face to face evaluation.     Beau Lindsey MD

## 2018-05-09 LAB
ANION GAP SERPL CALC-SCNC: 2 MMOL/L (ref 3–18)
ARTERIAL PATENCY WRIST A: YES
BASE EXCESS BLD CALC-SCNC: 13 MMOL/L
BASOPHILS # BLD: 0 K/UL (ref 0–0.1)
BASOPHILS NFR BLD: 0 % (ref 0–2)
BDY SITE: ABNORMAL
BUN SERPL-MCNC: 13 MG/DL (ref 7–18)
BUN/CREAT SERPL: 15 (ref 12–20)
CALCIUM SERPL-MCNC: 8.5 MG/DL (ref 8.5–10.1)
CHLORIDE SERPL-SCNC: 99 MMOL/L (ref 100–108)
CO2 SERPL-SCNC: 39 MMOL/L (ref 21–32)
CREAT SERPL-MCNC: 0.86 MG/DL (ref 0.6–1.3)
DIFFERENTIAL METHOD BLD: ABNORMAL
EOSINOPHIL # BLD: 0.2 K/UL (ref 0–0.4)
EOSINOPHIL NFR BLD: 4 % (ref 0–5)
ERYTHROCYTE [DISTWIDTH] IN BLOOD BY AUTOMATED COUNT: 17.2 % (ref 11.6–14.5)
GAS FLOW.O2 O2 DELIVERY SYS: ABNORMAL L/MIN
GAS FLOW.O2 SETTING OXYMISER: 2.5 L/M
GLUCOSE SERPL-MCNC: 107 MG/DL (ref 74–99)
HCO3 BLD-SCNC: 40.3 MMOL/L (ref 22–26)
HCT VFR BLD AUTO: 32.6 % (ref 35–45)
HGB BLD-MCNC: 9.2 G/DL (ref 12–16)
INR PPP: 1.5 (ref 0.8–1.2)
LYMPHOCYTES # BLD: 1.1 K/UL (ref 0.9–3.6)
LYMPHOCYTES NFR BLD: 21 % (ref 21–52)
MCH RBC QN AUTO: 22.2 PG (ref 24–34)
MCHC RBC AUTO-ENTMCNC: 28.2 G/DL (ref 31–37)
MCV RBC AUTO: 78.6 FL (ref 74–97)
MONOCYTES # BLD: 0.5 K/UL (ref 0.05–1.2)
MONOCYTES NFR BLD: 10 % (ref 3–10)
NEUTS SEG # BLD: 3.4 K/UL (ref 1.8–8)
NEUTS SEG NFR BLD: 65 % (ref 40–73)
PCO2 BLD: 65.3 MMHG (ref 35–45)
PH BLD: 7.4 [PH] (ref 7.35–7.45)
PLATELET # BLD AUTO: 266 K/UL (ref 135–420)
PMV BLD AUTO: 10.1 FL (ref 9.2–11.8)
PO2 BLD: 83 MMHG (ref 80–100)
POTASSIUM SERPL-SCNC: 4.3 MMOL/L (ref 3.5–5.5)
PROTHROMBIN TIME: 17.7 SEC (ref 11.5–15.2)
RBC # BLD AUTO: 4.15 M/UL (ref 4.2–5.3)
SAO2 % BLD: 96 % (ref 92–97)
SERVICE CMNT-IMP: ABNORMAL
SODIUM SERPL-SCNC: 140 MMOL/L (ref 136–145)
SPECIMEN TYPE: ABNORMAL
WBC # BLD AUTO: 5.2 K/UL (ref 4.6–13.2)

## 2018-05-09 PROCEDURE — 94660 CPAP INITIATION&MGMT: CPT

## 2018-05-09 PROCEDURE — 97530 THERAPEUTIC ACTIVITIES: CPT

## 2018-05-09 PROCEDURE — 74011250637 HC RX REV CODE- 250/637: Performed by: PHYSICIAN ASSISTANT

## 2018-05-09 PROCEDURE — 74011250637 HC RX REV CODE- 250/637: Performed by: INTERNAL MEDICINE

## 2018-05-09 PROCEDURE — 65270000029 HC RM PRIVATE

## 2018-05-09 PROCEDURE — 85610 PROTHROMBIN TIME: CPT | Performed by: EMERGENCY MEDICINE

## 2018-05-09 PROCEDURE — 74011250637 HC RX REV CODE- 250/637: Performed by: EMERGENCY MEDICINE

## 2018-05-09 PROCEDURE — 97164 PT RE-EVAL EST PLAN CARE: CPT

## 2018-05-09 PROCEDURE — 36415 COLL VENOUS BLD VENIPUNCTURE: CPT | Performed by: EMERGENCY MEDICINE

## 2018-05-09 PROCEDURE — 97168 OT RE-EVAL EST PLAN CARE: CPT

## 2018-05-09 PROCEDURE — 80048 BASIC METABOLIC PNL TOTAL CA: CPT | Performed by: EMERGENCY MEDICINE

## 2018-05-09 PROCEDURE — 82803 BLOOD GASES ANY COMBINATION: CPT

## 2018-05-09 PROCEDURE — 85025 COMPLETE CBC W/AUTO DIFF WBC: CPT | Performed by: EMERGENCY MEDICINE

## 2018-05-09 PROCEDURE — 36600 WITHDRAWAL OF ARTERIAL BLOOD: CPT

## 2018-05-09 RX ORDER — WARFARIN SODIUM 5 MG/1
5 TABLET ORAL ONCE
Status: COMPLETED | OUTPATIENT
Start: 2018-05-09 | End: 2018-05-09

## 2018-05-09 RX ORDER — MAGNESIUM CITRATE
296 SOLUTION, ORAL ORAL
Status: COMPLETED | OUTPATIENT
Start: 2018-05-09 | End: 2018-05-09

## 2018-05-09 RX ADMIN — FAMOTIDINE 20 MG: 20 TABLET ORAL at 10:18

## 2018-05-09 RX ADMIN — PAROXETINE HYDROCHLORIDE 20 MG: 20 TABLET, FILM COATED ORAL at 10:19

## 2018-05-09 RX ADMIN — MULTIPLE VITAMINS W/ MINERALS TAB 1 TABLET: TAB at 10:19

## 2018-05-09 RX ADMIN — FLUTICASONE PROPIONATE 2 SPRAY: 50 SPRAY, METERED NASAL at 13:33

## 2018-05-09 RX ADMIN — METOPROLOL TARTRATE 12.5 MG: 25 TABLET ORAL at 20:59

## 2018-05-09 RX ADMIN — MAGNESIUM CITRATE 296 ML: 1.75 LIQUID ORAL at 20:59

## 2018-05-09 RX ADMIN — SIMVASTATIN 40 MG: 40 TABLET, FILM COATED ORAL at 21:08

## 2018-05-09 RX ADMIN — LEVOTHYROXINE SODIUM 125 MCG: 25 TABLET ORAL at 10:18

## 2018-05-09 RX ADMIN — ARIPIPRAZOLE 15 MG: 5 TABLET ORAL at 10:19

## 2018-05-09 RX ADMIN — DOCUSATE SODIUM 100 MG: 100 CAPSULE, LIQUID FILLED ORAL at 10:19

## 2018-05-09 RX ADMIN — WARFARIN SODIUM 5 MG: 5 TABLET ORAL at 17:19

## 2018-05-09 RX ADMIN — FAMOTIDINE 20 MG: 20 TABLET ORAL at 20:59

## 2018-05-09 RX ADMIN — FUROSEMIDE 40 MG: 40 TABLET ORAL at 10:19

## 2018-05-09 RX ADMIN — OXYCODONE HYDROCHLORIDE 5 MG: 5 TABLET ORAL at 23:57

## 2018-05-09 RX ADMIN — ACETAMINOPHEN 650 MG: 325 TABLET ORAL at 13:30

## 2018-05-09 RX ADMIN — OXYCODONE HYDROCHLORIDE 5 MG: 5 TABLET ORAL at 04:05

## 2018-05-09 NOTE — PROGRESS NOTES
[facility]  Pulmonary, Critical Care, and Sleep Medicine    Name: Gurpreet Vasquez MRN: [id number]   : 1960 Hospital: [address] [place] [address]   Date: 2018        IMPRESSION:   OHS/ANDRADE with severe Pulmonary HTN and chronic hypoxic and hypercapnicRespiratory failure. Has O2 and BiPAP long term  History of chronic diastolic congestive heart failure. , atrial fibrillation  History of pulmonary hypertension. secondary to above  Bradycardia- resolved  Hypothyroidism  Positive RA factor 1:8  Schizoaffective disorder     Patient Active Problem List   Diagnosis Code    Hypertension I11.9    Congenital heart disease Q24.9    Atrial fibrillation I48.91    Osteoarthritis of both knees M17.0    H/O total knee replacement Z96.659    DJD (degenerative joint disease), lumbosacral M51.37    Schizoaffective disorder, chronic condition ([facility] [address] [facility] F25.8    Unspecified hypothyroidism E03.9    Morbid obesity ([facility] E66.01    Esophageal reflux K21.9    Dyspnea R06.00    Dyslipidemia E78.5    Morbid obesity with BMI of 45.0-49.9, adult ([facility] [facility] E66.01, Z68.42    DDD (degenerative disc disease), lumbar, L4/5 advanced M51.36    Hypoxia R09.02    Fluid overload E87.70    CHF (congestive heart failure) (HCC) V01.8    Diastolic CHF, chronic (HCC) I50.32    Bradycardia R00.1      PLAN:   · BiPAP nocturnal and prn at 15/8, rate 10, FIO2 titrated to >90% for 1-2 days, until patient's sleep deprivation is improved   · Will check ABG  · BiPAP will be needed in the PACU post sedation. · Avoid sedative-hypnotic medications, and other medications known to depress respiratory drive  · OOB, ambulate  · Continue conservative, supportive management of bleeding  · Will need supplies for BiPAP  · Discussed with bedside nursing and patient     Subjective/Interval History:     18  No new complaints. Wore BiPAP overnight.  C/O nasal stuffiness  Denies chest pain  Denies cough, congestion    ROS:Pertinent items are noted in HPI. Objective:   Vital Signs:    Visit Vitals    /71    Pulse 80    Temp 98.9 °F (37.2 °C)    Resp 16    Ht 5' (1.524 m)    Wt 142 kg (313 lb 0.9 oz)    SpO2 93%    Breastfeeding Unknown    BMI 61.14 kg/m2       O2 Device: BIPAP   O2 Flow Rate (L/min): 3 l/min   Temp (24hrs), Av.3 °F (36.8 °C), Min:97.4 °F (36.3 °C), Max:98.9 °F (37.2 °C)       Intake/Output:   Last shift:      701 - 1900  In: 120 [P.O.:120]  Out: -   Last 3 shifts: 1901 - 700  In: 1500 [P.O.:1500]  Out: 1900 [Urine:1900]    Intake/Output Summary (Last 24 hours) at 18 0958  Last data filed at 18 0801   Gross per 24 hour   Intake              960 ml   Output             1150 ml   Net             -190 ml        Physical Exam:    General: in no apparent distress, well developed and well nourished, in no respiratory distress and acyanotic and oriented times 3   HEENT: Normal   Neck: No abnormally enlarged lymph nodes. Chest: poor excursion   Lungs: decreased air exchange bilaterally   Heart: Regular rate and rhythm or S1S2 present   Abdomen: abdomen is soft without significant tenderness, masses, organomegaly or guarding   Extremity: 1+ edema   Neuro: alert   Skin: Skin color, texture, turgor normal. No rashes or lesions        DATA:  Labs:  Recent Labs      18   0553   WBC  5.2   HGB  9.2*   HCT  32.6*   PLT  266     Recent Labs      18   0553  18   0554  18   0335   NA  140   --    --    K  4.3   --    --    CL  99*   --    --    CO2  39*   --    --    GLU  107*   --    --    BUN  13   --    --    CREA  0.86   --    --    CA  8.5   --    --    INR  1.5*  1.7*  1.6*     No results for input(s): PH, PCO2, PO2, HCO3, FIO2 in the last 72 hours. PFT: 2017:Normal flows. DLCO- 50% predicted                                             Echo:  Left ventricle: Size was normal. Systolic function was by visual assessment.    Ejection fraction was estimated in the  range of 50 % to 55 %. No obvious wall motion abnormalities identified in the   views obtained. Wall thickness was mildly  to moderately increased. Right ventricle: The ventricle was moderately to severely dilated. Estimated   peak pressure was 87 mmHg. Right atrium: The atrium was moderately to severely dilated. Tricuspid valve: There was moderate to severe regurgitation. Right atrial   pressure estimate: 15 mmHg. There was severe  pulmonary hypertension. 5/2017-Sleep study- severe ANDRADE needing BiPAP 23/18 cwp     Imaging:  [x]I have personally reviewed the patients radiographs  CXR Results  (Last 48 hours)    None          High complexity decision making was performed during the evaluation of this patient at high risk for decompensation with multiple organ involvement     Above mentioned total time spent on reviewing the case/medical record/data/notes/EMR/patient examination/documentation/coordinating care with nurse/consultants, exclusive of procedures with complex decision making performed and > 50% time spent in face to face evaluation.     Yeimy Braswell MD

## 2018-05-09 NOTE — PROGRESS NOTES
[facility] 2 CV STEPDOWN  [address] [place] [id number]  [phone]  Colon and Rectal Surgery Progress Note      Patient: Radha Grimes MRN: [id number]  SSN: [id number]    YOB: 1960  Age: 60 y.o. Sex: female      Admit Date: 5/3/2018    LOS: 6 days     Subjective:     No acute events overnight. Currently in SVT stepdown and on 3L NC and tolerating. Bipap o/n. Having small amounts of BM, non bloody, and tolerating PO intake w/o nausea. Increased eructation. Minimal abd pain    Objective:     Vitals:    05/09/18 0000 05/09/18 0400 05/09/18 0620 05/09/18 0757   BP: 132/75 110/68  134/71   Pulse: 82 76  80   Resp: 16 16  16   Temp: 98 °F (36.7 °C) 98.4 °F (36.9 °C)  98.9 °F (37.2 °C)   SpO2: 98% 97%  93%   Weight:   142 kg (313 lb 0.9 oz)    Height:            Intake and Output:  Current Shift: 05/09 0701 - 05/09 1900  In: 120 [P.O.:120]  Out: -   Last three shifts: 05/07 1901 - 05/09 0700  In: 1500 [P.O.:1500]  Out: 1900 [Urine:1900]    Physical Exam:     GEN: NAD. Abd: Obese. Soft, minimal TTP on left quadrants, + BS    Lab/Data Review:    Recent Results (from the past 12 hour(s))   PROTHROMBIN TIME + INR    Collection Time: 05/09/18  5:53 AM   Result Value Ref Range    Prothrombin time 17.7 (H) 11.5 - 15.2 sec    INR 1.5 (H) 0.8 - 1.2     CBC WITH AUTOMATED DIFF    Collection Time: 05/09/18  5:53 AM   Result Value Ref Range    WBC 5.2 4.6 - 13.2 K/uL    RBC 4.15 (L) 4.20 - 5.30 M/uL    HGB 9.2 (L) 12.0 - 16.0 g/dL    HCT 32.6 (L) 35.0 - 45.0 %    MCV 78.6 74.0 - 97.0 FL    MCH 22.2 (L) 24.0 - 34.0 PG    MCHC 28.2 (L) 31.0 - 37.0 g/dL    RDW 17.2 (H) 11.6 - 14.5 %    PLATELET 650 050 - 306 K/uL    MPV 10.1 9.2 - 11.8 FL    NEUTROPHILS 65 40 - 73 %    LYMPHOCYTES 21 21 - 52 %    MONOCYTES 10 3 - 10 %    EOSINOPHILS 4 0 - 5 %    BASOPHILS 0 0 - 2 %    ABS. NEUTROPHILS 3.4 1.8 - 8.0 K/UL    ABS. LYMPHOCYTES 1.1 0.9 - 3.6 K/UL    ABS. MONOCYTES 0.5 0.05 - 1.2 K/UL    ABS.  EOSINOPHILS 0.2 0.0 - 0.4 K/UL    ABS. BASOPHILS 0.0 0.0 - 0.1 K/UL    DF AUTOMATED     METABOLIC PANEL, BASIC    Collection Time: 05/09/18  5:53 AM   Result Value Ref Range    Sodium 140 136 - 145 mmol/L    Potassium 4.3 3.5 - 5.5 mmol/L    Chloride 99 (L) 100 - 108 mmol/L    CO2 39 (H) 21 - 32 mmol/L    Anion gap 2 (L) 3.0 - 18 mmol/L    Glucose 107 (H) 74 - 99 mg/dL    BUN 13 7.0 - 18 MG/DL    Creatinine 0.86 0.6 - 1.3 MG/DL    BUN/Creatinine ratio 15 12 - 20      GFR est AA >60 >60 ml/min/1.73m2    GFR est non-AA >60 >60 ml/min/1.73m2    Calcium 8.5 8.5 - 10.1 MG/DL         Assessment:     Sigmoid mass in setting of severe pulmonary disease and morbid obesity. Path report of mass from colonoscopy shows likely hyperplastic polyp    Plan:     No s/sx of obstruction at this time. CT A/P neg for mass. Medical management and optimization per primary team  High risk surgical candidate  FU OP with surgery  FU xr barium air enema.     Signed By: Jerman Fabian MD        May 9, 2018

## 2018-05-09 NOTE — PROGRESS NOTES
[email or web address]  [phone]       Impression  1. Colon mass - identified during colonoscopy at 40 cm, unable to traverse, however CEA normal, CT does not show a mass, and biopsies reveal sessile serrated adenoma. This could be sampling error, however her picture is confounding     Plan:  1. ACBE to assess for mass lesion  2. Surgical consultation noted -- appreciate care and recommendations  3. If ACBE does not show a mass lesion, will need repeat colonoscopy as outpatient to resect polyp and visualize colon proximal to 40 cm    Chief Complaint: colon mass    Subjective:  Patient without new complaint, no overnight events. Pleased to hear results of pathology and CT scan.         Eyes: conjunctiva normal, EOM normal   ENT: Mucous membranes moist, no lesion or thrush   Cardiovascular:  normal rate and regular rhythm, no murmur   Pulmonary/Chest Wall: breath sounds diminished anteriorly with increased effort    Abdominal:  soft, non-acute, non-tender, no appreciable mass or hepatosplenomegaly, obese, no appreciable ascites       Visit Vitals    /71    Pulse 80    Temp 98.9 °F (37.2 °C)    Resp 16    Ht 5' (1.524 m)    Wt 142 kg (313 lb 0.9 oz)    SpO2 93%    Breastfeeding Unknown    BMI 61.14 kg/m2           Intake/Output Summary (Last 24 hours) at 05/09/18 0939  Last data filed at 05/09/18 0801   Gross per 24 hour   Intake              960 ml   Output             1150 ml   Net             -190 ml       CBC w/Diff    Lab Results   Component Value Date/Time    WBC 5.2 05/09/2018 05:53 AM    RBC 4.15 (L) 05/09/2018 05:53 AM    HGB 9.2 (L) 05/09/2018 05:53 AM    HCT 32.6 (L) 05/09/2018 05:53 AM    MCV 78.6 05/09/2018 05:53 AM    MCH 22.2 (L) 05/09/2018 05:53 AM    MCHC 28.2 (L) 05/09/2018 05:53 AM    RDW 17.2 (H) 05/09/2018 05:53 AM     05/09/2018 05:53 AM    Lab Results   Component Value Date/Time    GRANS 65 05/09/2018 05:53 AM    LYMPH 21 05/09/2018 05:53 AM    EOS 4 05/09/2018 05:53 AM BASOS 0 05/09/2018 05:53 AM      Basic Metabolic Profile   Recent Labs      05/09/18   0553   NA  140   K  4.3   CL  99*   CO2  39*   BUN  13   CA  8.5        Hepatic Function    Lab Results   Component Value Date/Time    ALB 3.1 (L) 05/04/2018 03:15 AM    TP 7.2 05/04/2018 03:15 AM     (H) 05/04/2018 03:15 AM    Lab Results   Component Value Date/Time    SGOT 15 05/04/2018 03:15 AM          Ro Manuel MD  [facility], [address] [place] [address] [phone]  [email or web address]

## 2018-05-09 NOTE — PROGRESS NOTES
Problem: Mobility Impaired (Adult and Pediatric)  Goal: *Acute Goals and Plan of Care (Insert Text)  Physical Therapy Goals  Initiated 5/9/2018 and to be accomplished within 7 day(s)  1. Patient will move from supine to sit and sit to supine , scoot up and down and roll side to side in bed with minimal assistance/contact guard assist.     2.  Patient will transfer from bed to chair and chair to bed with supervision/set-up using the least restrictive device. 3.  Patient will perform sit to stand with supervision/set-up. 4.  Patient will ambulate with supervision/set-up for 10-15 feet with the least restrictive device. physical Therapy RE-EVALUATION and TREATMENT    Patient: Toshia Boogie (6 y.o. female)  Date: 5/9/2018  Diagnosis: Bradycardia  Bradycardia  Anemia <principal problem not specified>  Procedure(s) (LRB):  COLONOSCOPY with tatooing and biopsy (N/A) 3 Days Post-Op  Precautions: Fall, Skin, Aspiration    ASSESSMENT:  Patient is 64yo F admitted to hospital for anemia and presents for re-eval today with decreased mobility from prior eval. Patient reports today is \"a bad day\" in regards to her mobility and with Right hip pain; she reports the pain does get to this same level of severity at home and attributes in to the rain today. Patient was sitting on [facility] upon arrival and stood with CG/SB of 2nd person. Patient then transferred to bed for objective and subjective assessment. Patient reports her daughter assists her to get OOB and that she has a hospital bed at home that she elevates in order to stand. Patient states on good days she can get to [facility] on her own and bad days this is the only activity she does due to right hip pain. On good days patient reports that she is able to walk for short distance which she describes to be less than 20ft. Patient states that she has her daughter with her close by when walking and that she uses rolling walker.  Patient would benefit from additional therapy session to ensure safe mobility as patient was limited in mobility this session due to RLE chronic arthritis pain. Educated patient on TE (see below) and on using WC for mobility on days she is in a lot of pain to allow patient to increase mobility safely as on these days she reports [facility] Patient was left resting in chair with call bell by her side and RN in room. Patient's progression toward goals since last assessment: Goals updated with decreased mobility this re-eval     PLAN:  Goals have been updated based on progression since last assessment. Patient continues to benefit from skilled intervention to address the above impairments. Continue to follow the patient 1-2 times per day/4-7 days per week to address goals. Planned Interventions:  [x]     Bed Mobility Training          [x]     Neuromuscular Re-Education  [x]     Transfer Training                []    Orthotic/Prosthetic Training  [x]     Gait Training                       []     Modalities  [x]     Therapeutic Exercises       []     Edema Management/Control  [x]     Therapeutic Activities         [x]     Patient and Family Training/Education  []     Other (comment):  Discharge Recommendations: Home Health with 24/7 assist  Further Equipment Recommendations for Discharge: bariatric rolling walker     G-CODES:     Mobility  Current  CI= 1-19%   Goal  CI= 1-19%. The severity rating is based on the Level of Assistance required for Functional Mobility and ADLs. SUBJECTIVE:   Patient stated I have bad days at home just like today and I don't walk much on those days.     OBJECTIVE DATA SUMMARY:   Critical Behavior:  Neurologic State: Alert  Orientation Level: Oriented X4  Cognition: Follows commands  Safety/Judgement: Awareness of environment, Fall prevention  Functional Mobility Training:  Bed Mobility:   Scooting: Stand-by assistance; Additional time   Transfers:  Sit to Stand: Stand-by assistance;Contact guard assistance (Bed height elevated; has hospital bed at home)  Stand to Sit: Stand-by assistance;Contact guard assistance   Balance:  Sitting: Intact  Standing: Impaired; With support  Standing - Static: Good  Standing - Dynamic : Fair  Ambulation/Gait Training:  Distance (ft): 5 Feet (ft)  Assistive Device: Walker, rolling  Ambulation - Level of Assistance: Contact guard assistance   Gait Abnormalities: Antalgic;Decreased step clearance   Base of Support: Widened   Speed/Reyna: Slow  Step Length: Left shortened;Right shortened  Therapeutic Exercises:   Educated patient on performed gentle AROM when sitting EOB first thing in AM and after she's been sitting to prepare to weight bear/ambulate to mitigate arthritis pain. Teach back of seated B AP x20, LAQ LLE X5, Hip flexion x5  Pain:  9/10 pre and post; RN in room aware  Activity Tolerance:   Patient demonstrated decreased tolerance to activity this session due to RLE pain that she reports \"gets this bad at home sometimes\". Please refer to the flowsheet for vital signs taken during this treatment.   After treatment:   [x]  Patient left in no apparent distress sitting up in chair  []  Patient left in no apparent distress in bed  [x]  Call bell left within reach  [x]  Nursing notified  []  Caregiver present  []  Bed alarm activated    Katharine Manzo, PT   Time Calculation: 23 mins

## 2018-05-09 NOTE — ROUTINE PROCESS
Bedside and Verbal shift change report given to Reece Rodriguez (oncoming nurse) by David Rooney RN (offgoing nurse). Report included the following information SBAR, Intake/Output and Recent Results.

## 2018-05-09 NOTE — PROGRESS NOTES
[facility] Hospitalist Group  Progress Note    Patient: Bakari Braxton Age: 60 y.o. : 1960 MR#: [id number] SSN: [id number]  Date: 2018     Subjective:     Patient sitting in bed in NAD, awake, follows commands    Assessment/Plan:   1. Bradycardia, likely secondary to medications. 2.  Possible vaginal bleeding. The patient has had a hysterectomy in the past.    3.  Anemia with heme positive stool in the setting of being on Coumadin. 4.  Hypertension. 5.  Dyslipidemia. 6.  Morbid obesity. 7.  Atrial fibrillation on Coumadin as an outpatient. 8.  Degenerative joint disease. 10.  History of chronic diastolic congestive heart failure. 11.  History of pulmonary hypertension. 12.  Obstructive sleep apnea. Obesity hypoventilation syndrome  13. Chronic hypoxic respiratory failure on 2 liters of oxygen via nasal cannula continuously. - worsening hypoxia noted during colonoscopy/sedation- will d/w Pulm  14- Colon Mass      PLAN    BB held  Resume coumadin- monitor   CRS input noted, follow barium enema as per CRS  Pulmonary following, d/w Dr eKllie mitchell  On O2, lasix  On synthyroid  On statin  Will need close OP f/u with Gynecology regarding recent vaginal bleeding  Will need close OP f/u with Dr Adry Kinney regarding colon mass  Advanced care planning: full code  D/w patient ,   PT, OT  Discharge planning, d/w  noris about arranging [address] and home bipap supplies  D/w RN  Dispo- possible home tomorrow    Case discussed with:  [x]Patient  []Family  [x]Nursing  []Case Management  DVT Prophylaxis:  []Lovenox  []Hep SQ  []SCDs  []Coumadin   []On Heparin gtt    Objective:   VS:   Visit Vitals    /71    Pulse 80    Temp 98.9 °F (37.2 °C)    Resp 16    Ht 5' (1.524 m)    Wt 142 kg (313 lb 0.9 oz)    SpO2 93%    Breastfeeding Unknown    BMI 61.14 kg/m2      Tmax/24hrs: Temp (24hrs), Av.3 °F (36.8 °C), Min:97.4 °F (36.3 °C), Max:98.9 °F (37.2 °C)      Intake/Output Summary (Last 24 hours) at 05/09/18 0906  Last data filed at 05/09/18 0801   Gross per 24 hour   Intake             1320 ml   Output             1150 ml   Net              170 ml       General:  Awake, alert  Cardiovascular:  S1S2+, RRR  Pulmonary:  Coarse bs b/l  GI:  Soft, BS+, NT, ND  Extremities:  trace edema        Labs:    Recent Results (from the past 24 hour(s))   PROTHROMBIN TIME + INR    Collection Time: 05/09/18  5:53 AM   Result Value Ref Range    Prothrombin time 17.7 (H) 11.5 - 15.2 sec    INR 1.5 (H) 0.8 - 1.2     CBC WITH AUTOMATED DIFF    Collection Time: 05/09/18  5:53 AM   Result Value Ref Range    WBC 5.2 4.6 - 13.2 K/uL    RBC 4.15 (L) 4.20 - 5.30 M/uL    HGB 9.2 (L) 12.0 - 16.0 g/dL    HCT 32.6 (L) 35.0 - 45.0 %    MCV 78.6 74.0 - 97.0 FL    MCH 22.2 (L) 24.0 - 34.0 PG    MCHC 28.2 (L) 31.0 - 37.0 g/dL    RDW 17.2 (H) 11.6 - 14.5 %    PLATELET 014 625 - 577 K/uL    MPV 10.1 9.2 - 11.8 FL    NEUTROPHILS 65 40 - 73 %    LYMPHOCYTES 21 21 - 52 %    MONOCYTES 10 3 - 10 %    EOSINOPHILS 4 0 - 5 %    BASOPHILS 0 0 - 2 %    ABS. NEUTROPHILS 3.4 1.8 - 8.0 K/UL    ABS. LYMPHOCYTES 1.1 0.9 - 3.6 K/UL    ABS. MONOCYTES 0.5 0.05 - 1.2 K/UL    ABS. EOSINOPHILS 0.2 0.0 - 0.4 K/UL    ABS.  BASOPHILS 0.0 0.0 - 0.1 K/UL    DF AUTOMATED     METABOLIC PANEL, BASIC    Collection Time: 05/09/18  5:53 AM   Result Value Ref Range    Sodium 140 136 - 145 mmol/L    Potassium 4.3 3.5 - 5.5 mmol/L    Chloride 99 (L) 100 - 108 mmol/L    CO2 39 (H) 21 - 32 mmol/L    Anion gap 2 (L) 3.0 - 18 mmol/L    Glucose 107 (H) 74 - 99 mg/dL    BUN 13 7.0 - 18 MG/DL    Creatinine 0.86 0.6 - 1.3 MG/DL    BUN/Creatinine ratio 15 12 - 20      GFR est AA >60 >60 ml/min/1.73m2    GFR est non-AA >60 >60 ml/min/1.73m2    Calcium 8.5 8.5 - 10.1 MG/DL       Signed By: Gian Rivas MD     May 9, 2018

## 2018-05-09 NOTE — PROGRESS NOTES
[facility] - Progress Note  Admit Date: 5/3/2018    Assessment:     Hospital Problems  Date Reviewed: 11/28/2017          Codes Class Noted POA    Bradycardia ICD-10-CM: R00.1  ICD-9-CM: 427.89  5/3/2018 Unknown              -Bradycardia, presented with slow afib likely 2/2 beta blocker use. Now with HR low 100s. -Sigmoid mass on colonoscopy this admission, appreciate surgery evaluation, path reports pending, increased surgical risk given severe pulmonary HTN. -Chronic respiratory failure multifactorial in setting of severe pulmonary HTN, OHVS, ANDRADE. Appreciate Pulmonary evaluation.  -Chronic LE swelling, pt non-compliant with lasix at home as she has trouble ambulating and doesn't like to use the bathroom, BNP 2800, echo this admission with EF 50-55%. Swelling appears mostly chronic.  -Severe pulmonary HTN with RVSP 87 mmHg on echo this admission (RVSP 67 mmHg on echo 2/2018). -L knee replacement Dec 2017, R knee replacement  -Anemia with guaiac positive stool, GI work-up in progress.  -Chronic afib, rate controlled with BB, on chronic coumadin, INR therapeutic on admission. -morbid obesity  -ANDRADE, non-compliant with CPAP  -polysubstance abuse history, marijuana and crack cocaine, pt states she no longer uses  -Hypothyroidism  -Schizoaffective disorder  -DJD  -GERD       Plan:     HR controlled 70s on low dose lopressor will continue. Anticoagulation with warfarin resumed. Appreciate GI and surgical involvement, awaiting path report, likely follow up as outpatient for surgical planning. Continue pulmonary treatment and support. Continue maintenance lasix. Cardiac status stable. Dispo planning    Subjective:     No new complaints.      Objective:      Patient Vitals for the past 8 hrs:   Temp Pulse Resp BP SpO2   05/09/18 0757 98.9 °F (37.2 °C) 80 16 134/71 93 %   05/09/18 0400 98.4 °F (36.9 °C) 76 16 110/68 97 %         Patient Vitals for the past 96 hrs:   Weight   05/09/18 0620 142 kg (313 lb 0.9 oz)   05/08/18 0531 140.1 kg (308 lb 14.4 oz)   05/08/18 0454 140.8 kg (310 lb 6.4 oz)   05/07/18 0407 142.2 kg (313 lb 9.6 oz)   05/06/18 0411 144.1 kg (317 lb 11.2 oz)                    Intake/Output Summary (Last 24 hours) at 05/09/18 0814  Last data filed at 05/09/18 0801   Gross per 24 hour   Intake             1320 ml   Output             1150 ml   Net              170 ml       Physical Exam:  General:  alert, cooperative  Neck:  no JVD  Lungs:  wheezes scattered  Heart:  irregularly irregular rhythm  Abdomen:  abdomen is soft   Extremities:  no edema    Data Review:     Labs: Results:       Chemistry Recent Labs      05/09/18   0553   GLU  107*   NA  140   K  4.3   CL  99*   CO2  39*   BUN  13   CREA  0.86   CA  8.5   AGAP  2*   BUCR  15      CBC w/Diff Recent Labs      05/09/18   0553   WBC  5.2   RBC  4.15*   HGB  9.2*   HCT  32.6*   PLT  266   GRANS  65   LYMPH  21   EOS  4      Cardiac Enzymes No results found for: CPK, CK, CKMMB, CKMB, RCK3, CKMBT, CKNDX, CKND1, SHANNAN, TROPT, TROIQ, JELANI, TROPT, TNIPOC, BNP, BNPP   Coagulation Recent Labs      05/09/18   0553  05/08/18   0554   PTP  17.7*  19.6*   INR  1.5*  1.7*       Lipid Panel Lab Results   Component Value Date/Time    Cholesterol, total 90 05/03/2018 10:25 AM    HDL Cholesterol 34 (L) 05/03/2018 10:25 AM    LDL, calculated 41.4 05/03/2018 10:25 AM    VLDL, calculated 14.6 05/03/2018 10:25 AM    Triglyceride 73 05/03/2018 10:25 AM    CHOL/HDL Ratio 2.6 05/03/2018 10:25 AM      BNP Lab Results   Component Value Date/Time    B-type Natriuretic Peptide 3187 (H) 05/06/2016 11:10 AM    B-type Natriuretic Peptide 3075 (H) 05/06/2016 11:00 AM      Liver Enzymes No results for input(s): TP, ALB, TBIL, AP, SGOT, GPT in the last 72 hours.     No lab exists for component: DBIL   Digoxin    Thyroid Studies Lab Results   Component Value Date/Time    T4, Total 12.3 (H) 01/15/2014 02:01 AM    TSH 1.97 05/04/2018 03:15 AM          Signed By: VERONICA Rivas May 9, 2018

## 2018-05-09 NOTE — PROGRESS NOTES
conducted a Follow up consultation and Spiritual Assessment for Elvia Meza, who is a 60 y.o.,female. The  provided the following Interventions:  Continued the relationship of care and support. Listened empathically. Offered prayer and assurance of continued prayer on patients behalf. Chart reviewed. The following outcomes were achieved:  Patient expressed gratitude for pastoral care visit. Assessment:  There are no further spiritual or Sikhism issues which require Spiritual Care Services interventions at this time. Plan:  Chaplains will continue to follow and will provide pastoral care on an as needed/requested basis.  recommends bedside caregivers page  on duty if patient shows signs of acute spiritual or emotional distress.    Nette Stone, [address] [place] [address] Care  [phone]

## 2018-05-09 NOTE — PROGRESS NOTES
Problem: Self Care Deficits Care Plan (Adult)  Goal: *Acute Goals and Plan of Care (Insert Text)  Outcome: Resolved/Met Date Met: 05/09/18  Occupational Therapy RE-EVALUATION/discharge    Patient: Dione Caceres (67 y.o. female)  Date: 5/9/2018  Primary Diagnosis: Bradycardia  Bradycardia  Anemia  Procedure(s) (LRB):  COLONOSCOPY with tatooing and biopsy (N/A) 3 Days Post-Op   Precautions:   Fall, Skin, Aspiration    ASSESSMENT AND RECOMMENDATIONS:  Based on the objective data described below, the patient was sitting on BSC upon arrival. Patient needed mod. max A for toileting tasks. Patient needed CGA/SBA during functional mobility with rolling walker, limited by chronic right hip pain (patient reported she has \"bad\" days and \"good\" days). Patient reported her daughter is present all the time at home and assists with all dressing/bathing/toileting tasks at home. Skilled acute care occupational therapy is not indicated at this time. Discharge Recommendations: Home Health with 24/7 assistance (patient reported her daughter is there all the time)   Further Equipment Recommendations for Discharge: N/A, patient has all necessary equipment     Barriers to Learning/Limitations: none     G-CODES:     Self Care  Current  CJ= 20-39%   Goal  CJ= 20-39%   D/C  CJ= 20-39%. The severity rating is based on the Level of Assistance required for Functional Mobility and ADLs. SUBJECTIVE:   Patient stated My daughter helps me at home.     OBJECTIVE DATA SUMMARY:     Past Medical History:   Diagnosis Date    Atrial fibrillation     CHADS score 1  (-CHF, +HTN, -AGE, -DM, -CVA)    Carpal tunnel syndrome     Chronic pain     Congenital heart disease     presumed pulmonary stenosis s/p repair 1969    Degenerative disc disease     Degenerative joint disease     Dyslipidemia     GERD     H/O echocardiogram 04/2017    EF 60-65%, mild concentric LVH, dilated RV with RV dysfunction, RVSP 54 mmHg, RA E, moderate to severe pulmonic regurgitation.  Hypertension     Hypothyroid     Morbid obesity     Morbid obesity with BMI of 45.0-49.9, adult ([facility] [address] [address] 2/20/2017    Noncompliance     Schizoaffective disorder     Substance abuse     marijuana, crack cocaine     Past Surgical History:   Procedure Laterality Date    COLONOSCOPY N/A 5/6/2018    COLONOSCOPY with tatooing and biopsy performed by Twin Gr MD at [facility]    HX [address]      left    HX HYSTERECTOMY      HX KNEE REPLACEMENT      left    HX KNEE REPLACEMENT      right    HX THYROIDECTOMY       Prior Level of Function/Home Situation: Patient needed assistance with all self care tasks at home and uses rolling walker for short distances with daughter assistance. Home Situation  Home Environment: Private residence  # Steps to Enter: 1  One/Two Story Residence: One story  Living Alone: No  Support Systems: Child(jules)  Patient Expects to be Discharged to[de-identified] Private residence  Current DME Used/Available at Home: Wheelchair, Walker, rolling, Oxygen, portable, Hospital bed, Commode, bedside  Tub or Shower Type:  (patient sponge bathes at home in bed, with daughter assist)  [x]     Right hand dominant   []     Left hand dominant  Cognitive/Behavioral Status:  Neurologic State: Alert  Orientation Level: Oriented X4  Cognition: Follows commands    Skin: No skin changes noted    Edema: No edema noted    Vision/Perceptual:    Acuity: Within Defined Limits      Coordination:  Coordination: Within functional limits (UBEs)       Balance:  Sitting: Intact  Standing: Impaired; With support  Standing - Static: Good  Standing - Dynamic : Fair    Strength:  Strength: Generally decreased, functional (BUEs)     Tone & Sensation:  Tone: Normal (BUEs)  Sensation: Intact (BUEs)     Range of Motion:  AROM: Within functional limits (BUEs)     Functional Mobility and Transfers for ADLs:  Bed Mobility:    Patient on BSC upon arrival  Scooting: Stand-by assistance  Transfers:  Sit to Stand: Contact guard assistance;Stand-by assistance (elevated bed-pateint has hospital bed at home)    Toilet Transfer : Contact guard assistance;Stand-by assistance (BSC transfer with rolling walker)       ADL Assessment:(clinical judgement)  Feeding: Independent    Oral Facial Hygiene/Grooming: Stand-by assistance    Bathing: Moderate assistance;Maximum assistance    Upper Body Dressing: Stand-by assistance;Setup    Lower Body Dressing: Moderate assistance;Maximum assistance    Toileting: Moderate assistance    Pain:  Pt reports 9/10 pain or discomfort prior to treatment.    Pt reports 9/10 pain or discomfort post treatment. Activity Tolerance:   Fair    Please refer to the flowsheet for vital signs taken during this treatment. After treatment:   [x]  Patient left in no apparent distress sitting up in chair  []  Patient left in no apparent distress in bed  [x]  Call bell left within reach  [x]  PT and nurse present  []  Caregiver present  []  Bed alarm activated    COMMUNICATION/EDUCATION:   Communication/Collaboration:  []      Home safety education was provided and the patient/caregiver indicated understanding. [x]      Patient/family have participated as able and agree with findings and recommendations. []      Patient is unable to participate in plan of care at this time.     Larissa Valiente, OTR/L  Time Calculation: 19 mins

## 2018-05-10 ENCOUNTER — APPOINTMENT (OUTPATIENT)
Dept: GENERAL RADIOLOGY | Age: 58
DRG: 201 | End: 2018-05-10
Attending: INTERNAL MEDICINE
Payer: MEDICAID

## 2018-05-10 VITALS
HEIGHT: 60 IN | DIASTOLIC BLOOD PRESSURE: 94 MMHG | WEIGHT: 293 LBS | BODY MASS INDEX: 57.52 KG/M2 | OXYGEN SATURATION: 96 % | SYSTOLIC BLOOD PRESSURE: 145 MMHG | TEMPERATURE: 98.4 F | RESPIRATION RATE: 19 BRPM | HEART RATE: 80 BPM

## 2018-05-10 LAB
ANION GAP SERPL CALC-SCNC: 2 MMOL/L (ref 3–18)
BASOPHILS # BLD: 0 K/UL (ref 0–0.1)
BASOPHILS NFR BLD: 0 % (ref 0–2)
BUN SERPL-MCNC: 12 MG/DL (ref 7–18)
BUN/CREAT SERPL: 15 (ref 12–20)
CALCIUM SERPL-MCNC: 8.3 MG/DL (ref 8.5–10.1)
CHLORIDE SERPL-SCNC: 101 MMOL/L (ref 100–108)
CO2 SERPL-SCNC: 37 MMOL/L (ref 21–32)
CREAT SERPL-MCNC: 0.82 MG/DL (ref 0.6–1.3)
DIFFERENTIAL METHOD BLD: ABNORMAL
EOSINOPHIL # BLD: 0.1 K/UL (ref 0–0.4)
EOSINOPHIL NFR BLD: 3 % (ref 0–5)
ERYTHROCYTE [DISTWIDTH] IN BLOOD BY AUTOMATED COUNT: 17.1 % (ref 11.6–14.5)
GLUCOSE SERPL-MCNC: 108 MG/DL (ref 74–99)
HCT VFR BLD AUTO: 31.7 % (ref 35–45)
HGB BLD-MCNC: 8.9 G/DL (ref 12–16)
INR PPP: 1.4 (ref 0.8–1.2)
LYMPHOCYTES # BLD: 0.9 K/UL (ref 0.9–3.6)
LYMPHOCYTES NFR BLD: 17 % (ref 21–52)
MAGNESIUM SERPL-MCNC: 2.2 MG/DL (ref 1.6–2.6)
MCH RBC QN AUTO: 21.9 PG (ref 24–34)
MCHC RBC AUTO-ENTMCNC: 28.1 G/DL (ref 31–37)
MCV RBC AUTO: 77.9 FL (ref 74–97)
MONOCYTES # BLD: 0.4 K/UL (ref 0.05–1.2)
MONOCYTES NFR BLD: 8 % (ref 3–10)
NEUTS SEG # BLD: 3.6 K/UL (ref 1.8–8)
NEUTS SEG NFR BLD: 72 % (ref 40–73)
PLATELET # BLD AUTO: 210 K/UL (ref 135–420)
PMV BLD AUTO: 10.9 FL (ref 9.2–11.8)
POTASSIUM SERPL-SCNC: 4.1 MMOL/L (ref 3.5–5.5)
PROTHROMBIN TIME: 16.5 SEC (ref 11.5–15.2)
RBC # BLD AUTO: 4.07 M/UL (ref 4.2–5.3)
SODIUM SERPL-SCNC: 140 MMOL/L (ref 136–145)
WBC # BLD AUTO: 5 K/UL (ref 4.6–13.2)

## 2018-05-10 PROCEDURE — 74011250637 HC RX REV CODE- 250/637: Performed by: EMERGENCY MEDICINE

## 2018-05-10 PROCEDURE — 80048 BASIC METABOLIC PNL TOTAL CA: CPT | Performed by: EMERGENCY MEDICINE

## 2018-05-10 PROCEDURE — 77010033678 HC OXYGEN DAILY: Performed by: EMERGENCY MEDICINE

## 2018-05-10 PROCEDURE — 85610 PROTHROMBIN TIME: CPT | Performed by: EMERGENCY MEDICINE

## 2018-05-10 PROCEDURE — 83735 ASSAY OF MAGNESIUM: CPT | Performed by: EMERGENCY MEDICINE

## 2018-05-10 PROCEDURE — 74270 X-RAY XM COLON 1CNTRST STD: CPT

## 2018-05-10 PROCEDURE — 85025 COMPLETE CBC W/AUTO DIFF WBC: CPT | Performed by: EMERGENCY MEDICINE

## 2018-05-10 PROCEDURE — 74011250637 HC RX REV CODE- 250/637: Performed by: PHYSICIAN ASSISTANT

## 2018-05-10 PROCEDURE — 74011250637 HC RX REV CODE- 250/637: Performed by: HOSPITALIST

## 2018-05-10 PROCEDURE — 36415 COLL VENOUS BLD VENIPUNCTURE: CPT | Performed by: EMERGENCY MEDICINE

## 2018-05-10 PROCEDURE — 74011250637 HC RX REV CODE- 250/637: Performed by: INTERNAL MEDICINE

## 2018-05-10 PROCEDURE — 74011000255 HC RX REV CODE- 255: Performed by: HOSPITALIST

## 2018-05-10 RX ORDER — METOPROLOL TARTRATE 25 MG/1
12.5 TABLET, FILM COATED ORAL 2 TIMES DAILY
Qty: 15 TAB | Refills: 2 | Status: SHIPPED | OUTPATIENT
Start: 2018-05-10 | End: 2018-05-17 | Stop reason: SDUPTHER

## 2018-05-10 RX ORDER — WARFARIN SODIUM 5 MG/1
5 TABLET ORAL
Status: COMPLETED | OUTPATIENT
Start: 2018-05-10 | End: 2018-05-10

## 2018-05-10 RX ORDER — FLUTICASONE PROPIONATE 50 MCG
2 SPRAY, SUSPENSION (ML) NASAL DAILY
Qty: 1 BOTTLE | Refills: 2 | Status: SHIPPED | OUTPATIENT
Start: 2018-05-10

## 2018-05-10 RX ADMIN — PAROXETINE HYDROCHLORIDE 20 MG: 20 TABLET, FILM COATED ORAL at 09:20

## 2018-05-10 RX ADMIN — DOCUSATE SODIUM 100 MG: 100 CAPSULE, LIQUID FILLED ORAL at 09:20

## 2018-05-10 RX ADMIN — WARFARIN SODIUM 5 MG: 5 TABLET ORAL at 19:47

## 2018-05-10 RX ADMIN — ARIPIPRAZOLE 15 MG: 5 TABLET ORAL at 09:20

## 2018-05-10 RX ADMIN — OXYCODONE HYDROCHLORIDE 5 MG: 5 TABLET ORAL at 12:15

## 2018-05-10 RX ADMIN — FAMOTIDINE 20 MG: 20 TABLET ORAL at 09:20

## 2018-05-10 RX ADMIN — ACETAMINOPHEN 650 MG: 325 TABLET ORAL at 09:30

## 2018-05-10 RX ADMIN — BARIUM SULFATE 120 ML: 1.05 SUSPENSION ORAL; RECTAL at 09:00

## 2018-05-10 RX ADMIN — OXYCODONE HYDROCHLORIDE 5 MG: 5 TABLET ORAL at 19:47

## 2018-05-10 RX ADMIN — MULTIPLE VITAMINS W/ MINERALS TAB 1 TABLET: TAB at 09:21

## 2018-05-10 RX ADMIN — FLUTICASONE PROPIONATE 2 SPRAY: 50 SPRAY, METERED NASAL at 09:21

## 2018-05-10 RX ADMIN — LEVOTHYROXINE SODIUM 125 MCG: 25 TABLET ORAL at 09:20

## 2018-05-10 RX ADMIN — FUROSEMIDE 40 MG: 40 TABLET ORAL at 09:21

## 2018-05-10 RX ADMIN — METOPROLOL TARTRATE 12.5 MG: 25 TABLET ORAL at 09:20

## 2018-05-10 NOTE — PROGRESS NOTES
CMS spoke with LASHELL Rodrigues [facility] [employer] CCCP\" representative Artelia Case) to confirm a scheduled time frame of 7045-2716, p/u time via \"[place]. \"  Representative Artelia Case) confirmed that all of the pt special accommodations (bariatric stretcher, 3L nasal canula) has been previously documented and will be met during transport.

## 2018-05-10 NOTE — PROGRESS NOTES
CMS spoke with LASHELL Rodrigues [facility] CCCP\" representative ([place]) to schedule transportation, per MILES [place], [place]. Requested time is between 5851-6328. Awaiting confirmation call.

## 2018-05-10 NOTE — ROUTINE PROCESS
Plan for discharged today. Transfer delayed patient need bariatric equipment for transport. Patient for transport later today. Bedside shift change report given to Gaurang Holt RN (oncoming nurse) by Ervin Flannery RN (offgoing nurse). Report included the following information SBAR, Kardex, ED Summary, Intake/Output, MAR, Recent Results, Med Rec Status, Cardiac Rhythm Afib and Alarm Parameters .

## 2018-05-10 NOTE — PROGRESS NOTES
[facility]  Pulmonary, Critical Care, and Sleep Medicine    Name: Gladys Campuzano MRN: [id number]   : 1960 Hospital: [facility]   Date: 5/10/2018        IMPRESSION:   · OHS/ANDRADE with severe Pulmonary HTN and chronic hypoxic and hypercapnicRespiratory failure. Has O2 and BiPAP long term however pt claims BIPAP machine malfunction  · History of chronic diastolic congestive heart failure. , atrial fibrillation rate controlled  · History of pulmonary hypertension. secondary to above  · Bradycardia- resolved  · Morbid obesity  · Hypothyroidism  · Positive RA factor 1:8  · Schizoaffective disorder     Patient Active Problem List   Diagnosis Code    Hypertension I11.9    Congenital heart disease Q24.9    Atrial fibrillation I48.91    Osteoarthritis of both knees M17.0    H/O total knee replacement Z96.659    DJD (degenerative joint disease), lumbosacral M51.37    Schizoaffective disorder, chronic condition ([address] F25.8    Unspecified hypothyroidism E03.9    Morbid obesity ([address] E66.01    Esophageal reflux K21.9    Dyspnea R06.00    Dyslipidemia E78.5    Morbid obesity with BMI of 45.0-49.9, adult ([address] E66.01, Z68.42    DDD (degenerative disc disease), lumbar, L4/5 advanced M51.36    Hypoxia R09.02    Fluid overload E87.70    CHF (congestive heart failure) ([facility]) G42.3    Diastolic CHF, chronic ([facility]) I50.32    Bradycardia R00.1      PLAN:   · BiPAP nocturnal and prn , HH to look into BIPAP situation  · ABG reviewed. Hypercapnia is well compensated and likely at baseline  · Avoid sedative-hypnotic medications, and other medications known to depress respiratory drive  · OOB, ambulate  · Will need supplies for BiPAP  · Discussed with bedside nursing and patient     Subjective/Interval History:     05/10/18  No new complaints. Wore BiPAP overnight.  C/O nasal stuffiness  Denies chest pain  Denies cough, congestion  Complains of right hip pain causing difficulty with PT    ROS:Pertinent items are noted in HPI. Objective:   Vital Signs:    Visit Vitals    /59    Pulse 66    Temp 98 °F (36.7 °C)    Resp 14    Ht 5' (1.524 m)    Wt 137.7 kg (303 lb 8 oz)    SpO2 100%    Breastfeeding Unknown    BMI 59.27 kg/m2       O2 Device: Nasal cannula   O2 Flow Rate (L/min): 3 l/min   Temp (24hrs), Av °F (36.7 °C), Min:97.5 °F (36.4 °C), Max:98.6 °F (37 °C)       Intake/Output:   Last shift:         Last 3 shifts:  1901 - 05/10 0700  In: 1320 [P.O.:1320]  Out: 1101 [Urine:1100]    Intake/Output Summary (Last 24 hours) at 05/10/18 1218  Last data filed at 05/10/18 0530   Gross per 24 hour   Intake              840 ml   Output              801 ml   Net               39 ml        Physical Exam:    General: in no apparent distress, well developed and morbidly obese, in no respiratory distress and acyanotic and oriented times 3   HEENT: Normal   Neck: No abnormally enlarged lymph nodes. Chest: poor excursion   Lungs: decreased air exchange bilaterally, no rales or wheezes   Heart: Regular rate and rhythm or S1S2 present   Abdomen: abdomen is soft without significant tenderness, masses, organomegaly or guarding   Extremity: 1+ edema   Neuro: alert   Skin: Skin color, texture, turgor normal. No rashes or lesions        DATA:  Labs:  Recent Labs      05/10/18   0240  18   0553   WBC  5.0  5.2   HGB  8.9*  9.2*   HCT  31.7*  32.6*   PLT  210  266     Recent Labs      05/10/18   0240  18   0553  18   0554   NA  140  140   --    K  4.1  4.3   --    CL  101  99*   --    CO2  37*  39*   --    GLU  108*  107*   --    BUN  12  13   --    CREA  0.82  0.86   --    CA  8.3*  8.5   --    MG  2.2   --    --    INR  1.4*  1.5*  1.7*     No results for input(s): PH, PCO2, PO2, HCO3, FIO2 in the last 72 hours. PFT: 2017:Normal flows.  DLCO- 50% predicted                                             Echo:  Left ventricle: Size was normal. Systolic function was by visual assessment. Ejection fraction was estimated in the  range of 50 % to 55 %. No obvious wall motion abnormalities identified in the   views obtained. Wall thickness was mildly  to moderately increased. Right ventricle: The ventricle was moderately to severely dilated. Estimated   peak pressure was 87 mmHg. Right atrium: The atrium was moderately to severely dilated. Tricuspid valve: There was moderate to severe regurgitation. Right atrial   pressure estimate: 15 mmHg. There was severe  pulmonary hypertension. 5/2017-Sleep study- severe ANDRADE needing BiPAP 23/18 cwp     Imaging:  [x]I have personally reviewed the patients radiographs  CXR Results  (Last 48 hours)    None          High complexity decision making was performed during the evaluation of this patient at high risk for decompensation with multiple organ involvement     Above mentioned total time spent on reviewing the case/medical record/data/notes/EMR/patient examination/documentation/coordinating care with nurse/consultants, exclusive of procedures with complex decision making performed and > 50% time spent in face to face evaluation.     Isaiah Fitzpatrick MD

## 2018-05-10 NOTE — PROGRESS NOTES
[facility] 2 CV STEPDOWN  [address] [id number]  [phone]  Colon and Rectal Surgery Progress Note      Patient: Sander Callahan MRN: [id number]  SSN: [id number]    YOB: 1960  Age: 60 y.o. Sex: female      Admit Date: 5/3/2018    LOS: 7 days     Subjective:     No acute events overnight. Tolerated bowel prep o/n. Passing stool. Denies n/v, fever/chills    Objective:     Vitals:    05/09/18 2000 05/09/18 2345 05/10/18 0500 05/10/18 0600   BP: 137/72 (!) 157/101 116/56    Pulse: 72 85 66    Resp: 16 15 16    Temp: 97.5 °F (36.4 °C) 97.9 °F (36.6 °C) 98 °F (36.7 °C)    SpO2: 96% 96% 96%    Weight:    137.7 kg (303 lb 8 oz)   Height:            Intake and Output:  Current Shift: 05/09 1901 - 05/10 0700  In: 360 [P.O.:360]  Out: 401 [Urine:400]  Last three shifts: 05/08 0701 - 05/09 1900  In: 1800 [P.O.:1800]  Out: 1700 [Urine:1700]    Physical Exam:     GEN: NAD. Abd: Obese. Soft, minimal TTP on left quadrants, + BS    Lab/Data Review:    Recent Results (from the past 12 hour(s))   PROTHROMBIN TIME + INR    Collection Time: 05/10/18  2:40 AM   Result Value Ref Range    Prothrombin time 16.5 (H) 11.5 - 15.2 sec    INR 1.4 (H) 0.8 - 1.2     CBC WITH AUTOMATED DIFF    Collection Time: 05/10/18  2:40 AM   Result Value Ref Range    WBC 5.0 4.6 - 13.2 K/uL    RBC 4.07 (L) 4.20 - 5.30 M/uL    HGB 8.9 (L) 12.0 - 16.0 g/dL    HCT 31.7 (L) 35.0 - 45.0 %    MCV 77.9 74.0 - 97.0 FL    MCH 21.9 (L) 24.0 - 34.0 PG    MCHC 28.1 (L) 31.0 - 37.0 g/dL    RDW 17.1 (H) 11.6 - 14.5 %    PLATELET 721 769 - 878 K/uL    MPV 10.9 9.2 - 11.8 FL    NEUTROPHILS 72 40 - 73 %    LYMPHOCYTES 17 (L) 21 - 52 %    MONOCYTES 8 3 - 10 %    EOSINOPHILS 3 0 - 5 %    BASOPHILS 0 0 - 2 %    ABS. NEUTROPHILS 3.6 1.8 - 8.0 K/UL    ABS. LYMPHOCYTES 0.9 0.9 - 3.6 K/UL    ABS. MONOCYTES 0.4 0.05 - 1.2 K/UL    ABS. EOSINOPHILS 0.1 0.0 - 0.4 K/UL    ABS.  BASOPHILS 0.0 0.0 - 0.1 K/UL    DF AUTOMATED     METABOLIC PANEL, BASIC Collection Time: 05/10/18  2:40 AM   Result Value Ref Range    Sodium 140 136 - 145 mmol/L    Potassium 4.1 3.5 - 5.5 mmol/L    Chloride 101 100 - 108 mmol/L    CO2 37 (H) 21 - 32 mmol/L    Anion gap 2 (L) 3.0 - 18 mmol/L    Glucose 108 (H) 74 - 99 mg/dL    BUN 12 7.0 - 18 MG/DL    Creatinine 0.82 0.6 - 1.3 MG/DL    BUN/Creatinine ratio 15 12 - 20      GFR est AA >60 >60 ml/min/1.73m2    GFR est non-AA >60 >60 ml/min/1.73m2    Calcium 8.3 (L) 8.5 - 10.1 MG/DL   MAGNESIUM    Collection Time: 05/10/18  2:40 AM   Result Value Ref Range    Magnesium 2.2 1.6 - 2.6 mg/dL     CT Results  (Last 48 hours)    None            Assessment:     Sigmoid mass in setting of severe pulmonary disease and morbid obesity. Path report of mass from colonoscopy shows likely hyperplastic polyp. CT shows luminal narrowing and wall thickening mid-sigmoid    Plan:     No s/sx of obstruction at this time.   FU barium air cont enema  Medical management and optimization per primary team  High risk surgical candidate  FU OP with surgery    Signed By: Cruz Fabian MD        May 10, 2018

## 2018-05-10 NOTE — PROGRESS NOTES
Problem: Mobility Impaired (Adult and Pediatric)  Goal: *Acute Goals and Plan of Care (Insert Text)  Physical Therapy Goals  Initiated 5/9/2018 and to be accomplished within 7 day(s)  1. Patient will move from supine to sit and sit to supine , scoot up and down and roll side to side in bed with minimal assistance/contact guard assist.     2.  Patient will transfer from bed to chair and chair to bed with supervision/set-up using the least restrictive device. 3.  Patient will perform sit to stand with supervision/set-up. 4.  Patient will ambulate with supervision/set-up for 10-15 feet with the least restrictive device. 1145 - Pt declines PT at this time, stating she is in 8/10 pain. Will f/u later in day. Nurse Yisel Rooney notified.

## 2018-05-10 NOTE — ROUTINE PROCESS
Bedside shift change report given to Aminata Wahl RN (oncoming nurse) by Flor Zuluaga RN (offgoing nurse).  Report included the following information SBAR, Kardex, Intake/Output, MAR, Med Rec Status and Cardiac Rhythm SB.

## 2018-05-10 NOTE — ROUTINE PROCESS
Bedside and Verbal shift change report given to [id number] Apollo Young (oncoming nurse) by Monika Adamson RN (offgoing nurse). Report included the following information SBAR, MAR and Recent Results.

## 2018-05-10 NOTE — PROGRESS NOTES
PATTIE NOTE: PATTIE acknowledges consults for [facility]. Pt is linked with [facility]. FOC is in the chart and referral has been made. PATTIE has also contacted Deborah Camilo with [facility] regarding pt's home BiPap and the DME order. Reason for Admission:   Bradycardia; Anemia                  RRAT Score:    13              Do you (patient/family) have any concerns for transition/discharge? No concerns noted by pt              Plan for utilizing home health:   Pt is linked with [facility]. FOC is in the chart. Referral made in [place]. Call made to Fabiola Irene [phone] with [facility] and voicemail left. Likelihood of readmission? Yellow/mod            Transition of Care Plan:      Pt will be d/c home with HH. Pt resides with her daughter, Maddie Grace in a duplex; however, the pt remains on the main level. Pt reported having a standard walker, a rolling walker (which she reported is not big enough) and a wheelchair. Pt has a BiPap machine at home and [facility] will be going into the home to service. Pt also is on home O2 24/7. PCP: Mir Negron MD seen within the last month. SW sent scripts to the pharmacy to be filled, so the pt does not have to worry about getting them filled after she is home. Pt reported she would need ambulance transport home. Once transportation is confirmed, PATTIE will notify nursing staff of time. SW has completed the PCS form and placed on the chart. Care Management Interventions  PCP Verified by CM: Yes  Mode of Transport at Discharge:  Other (see comment)  Transition of Care Consult (CM Consult): [address]: No  Reason [facility]: Patient already serviced by other home care/hospice agency  Discharge Durable Medical Equipment: Yes  Physical Therapy Consult: Yes  Occupational Therapy Consult: Yes  Speech Therapy Consult: No  Current Support Network: Relative's Home  Confirm Follow Up Transport: Other (see comment)  Freedom of Choice Offered: Yes  Discharge Location  Discharge Placement: Home with home health    MILES Salgado LSW  [phone] (pager)    3:58 - [facility] will arrive between 5:30 - 6:00. PCS is on the pt's chart. Nursing staff has been notified. Blanca Aguiar MSZACARIAS LSW  [phone] (pager)    6:34 - [facility] arrived and was unable to accommodate the pt due to weight. SW contacted Medicaid and they will arrange for another ambulance company and call the nurses station with [facility] and the company that has accepted the assignment.      Blanca Aguiar MSZACARIAS LSW  [phone] (pager)

## 2018-05-10 NOTE — DISCHARGE SUMMARY
Discharge Summary    Patient: German Mclean MRN: [id number]  CSN: [id number]    YOB: 1960  Age: 60 y.o.   Sex: female    DOA: 5/3/2018 LOS:  LOS: 7 days   Discharge Date:      Admission Diagnoses: Bradycardia  Bradycardia  Anemia    Discharge Diagnoses:    Problem List as of 5/10/2018  Date Reviewed: 11/28/2017          Codes Class Noted - Resolved    Bradycardia ICD-10-CM: R00.1  ICD-9-CM: 427.89  5/3/2018 - Present        Diastolic CHF, chronic ([facility] ICD-10-CM: I50.32  ICD-9-CM: 428.32, 428.0  10/12/2017 - Present        Hypoxia ICD-10-CM: R09.02  ICD-9-CM: 799.02  4/18/2017 - Present        Fluid overload ICD-10-CM: E87.70  ICD-9-CM: 276.69  4/18/2017 - Present        CHF (congestive heart failure) ([facility] ICD-10-CM: I50.9  ICD-9-CM: 428.0  4/18/2017 - Present        DDD (degenerative disc disease), lumbar, L4/5 advanced (Chronic) ICD-10-CM: M51.36  ICD-9-CM: 722.52  3/29/2017 - Present        Morbid obesity with BMI of 45.0-49.9, adult ([facility] ICD-10-CM: E66.01, Z68.42  ICD-9-CM: 278.01, V85.42  2/20/2017 - Present        Dyspnea ICD-10-CM: R06.00  ICD-9-CM: 786.09  Unknown - Present        Dyslipidemia ICD-10-CM: E78.5  ICD-9-CM: 272.4  Unknown - Present        Osteoarthritis of both knees ICD-10-CM: M17.0  ICD-9-CM: 715.96  1/14/2014 - Present        H/O total knee replacement ICD-10-CM: Z96.659  ICD-9-CM: V43.65  1/14/2014 - Present        DJD (degenerative joint disease), lumbosacral ICD-10-CM: M51.37  ICD-9-CM: 722.52  1/14/2014 - Present        Schizoaffective disorder, chronic condition ([facility] ICD-10-CM: F25.8  ICD-9-CM: 295.72  1/14/2014 - Present        Unspecified hypothyroidism ICD-10-CM: E03.9  ICD-9-CM: 244.9  1/14/2014 - Present        Morbid obesity ([facility] ICD-10-CM: E66.01  ICD-9-CM: 278.01  1/14/2014 - Present        Esophageal reflux ICD-10-CM: K21.9  ICD-9-CM: 530.81  1/14/2014 - Present        Hypertension ICD-10-CM: I11.9  ICD-9-CM: 402.10  Unknown - Present        Congenital heart disease ICD-10-CM: Q24.9  ICD-9-CM: 697. 9  Unknown - Present    Overview Signed 1/25/2013 10:46 AM by Mark Gagnon MD     presumed pulmonary stenosis s/p repair 1969             Atrial fibrillation ICD-10-CM: I48.91  ICD-9-CM: 427.31  Unknown - Present    Overview Signed 1/25/2013 10:46 AM by Mark Gagnon MD     CHADS score 1  (-CHF, +HTN, -AGE, -DM, -CVA)             RESOLVED: Dyslipidemia ICD-10-CM: E78.5  ICD-9-CM: 272.4  Unknown - 1/14/2014            Reason for Admission  59yo morbidly obese AA female who presented with complaints of vaginal spotting and LE swelling. PMHx includes HTN, HLD, chronic afib, polysubstance abuse, congenital heart defect, pulm HTN, ANDRADE, non-compliance with CPAP and medications, s/p R knee replacement in December 2017, chronic pain, pulm HTN, and schizoaffective disorder. Pt has historically been non compliant with medications, often times not able to state what she is taking or if she is taking them at all. In the ED, noted to be persistently bradycardic and admission was requested for this. Discharge Condition: Fair    PHYSICAL EXAM at discharge. Visit Vitals    /84    Pulse 66    Temp 98.5 °F (36.9 °C)    Resp 16    Ht 5' (1.524 m)    Wt 137.7 kg (303 lb 8 oz)    SpO2 98%    Breastfeeding Unknown    BMI 59.27 kg/m2     General:  Awake, alert. Sitting up in chair nad. Obese  Heent: ncat. Perrl. Cardiovascular:  S1S2+, RRR  Pulmonary: cta b.l  GI:  Soft, NT, ND nabs. Extremities:  trace edema  Neuro alert, moving all 4s  Skin no rash    Hospital Course:   1. Bradycardia, likely secondary to medications - metoprolol dose reduced. 2. Possible vaginal bleeding.  The patient has had a hysterectomy in the past.    3. Anemia with heme positive stool in the setting of being on Coumadin. 4. Hypertension. 5. Dyslipidemia. 6. Morbid obesity Body mass index is 59.27 kg/(m^2). 7. Atrial fibrillation on Coumadin. 8. Degenerative joint disease.  Positive RA factor 1:8  10. History of chronic diastolic congestive heart failure. 11. History of pulmonary hypertension. 12. Obstructive sleep apnea. Obesity hypoventilation syndrome with severe Pulmonary HTN and chronic hypoxic and hypercapnicRespiratory failure. Has O2 and BiPAP long term however pt claims BIPAP machine malfunction - CM has contacted her DME company. 13. Chronic hypoxic respiratory failure on 2 liters of oxygen via nasal cannula continuously. Avoid sedative-hypnotic medications, and other medications known to depress   14. Sigmoid mass in setting of severe pulmonary disease and morbid obesity. mass identified during colonoscopy at 40 cm, unable to traverse, however CEA normal, and biopsies reveal sessile serrated adenoma. ACBE: only the lower rectum was opacified. need repeat colonoscopy as outpatient to resect polyp and visualize colon proximal to 40 cm. no s/sx of obstruction. High risk surgical candidate. Close op f/u with Dr. Gennaro Dela Cruz. 15. Hypothyroidism on synthroid. 16. Schizoaffective d/o  17. Full code. Discharge to home with hh. Patient agrees; all questions answered to the best of my ability.      Consults: Julio James Amilcar Dr. Cheri Estrella  Cardiology Dr. Acosta Catching Dr. Gennaro Dela Cruz    Procedures  Colonoscopy to 40cm with biopsy, polypectomy (cold biopsy) Lam Anaya MD 5/6/2018     Significant Diagnostic Studies: labs:   Recent Results (from the past 24 hour(s))   POC G3    Collection Time: 05/09/18  4:40 PM   Result Value Ref Range    Device: NASAL CANNULA      Flow rate (POC) 2.5 L/M    pH (POC) 7.399 7.35 - 7.45      pCO2 (POC) 65.3 (H) 35.0 - 45.0 MMHG    pO2 (POC) 83 80 - 100 MMHG    HCO3 (POC) 40.3 (H) 22 - 26 MMOL/L    sO2 (POC) 96 92 - 97 %    Base excess (POC) 13 mmol/L    Allens test (POC) YES      Site RIGHT RADIAL      Specimen type (POC) ARTERIAL      Performed by Gerhardt Leech + INR    Collection Time: 05/10/18  2:40 AM   Result Value Ref Range    Prothrombin time 16.5 (H) 11.5 - 15.2 sec    INR 1.4 (H) 0.8 - 1.2     CBC WITH AUTOMATED DIFF    Collection Time: 05/10/18  2:40 AM   Result Value Ref Range    WBC 5.0 4.6 - 13.2 K/uL    RBC 4.07 (L) 4.20 - 5.30 M/uL    HGB 8.9 (L) 12.0 - 16.0 g/dL    HCT 31.7 (L) 35.0 - 45.0 %    MCV 77.9 74.0 - 97.0 FL    MCH 21.9 (L) 24.0 - 34.0 PG    MCHC 28.1 (L) 31.0 - 37.0 g/dL    RDW 17.1 (H) 11.6 - 14.5 %    PLATELET 177 881 - 001 K/uL    MPV 10.9 9.2 - 11.8 FL    NEUTROPHILS 72 40 - 73 %    LYMPHOCYTES 17 (L) 21 - 52 %    MONOCYTES 8 3 - 10 %    EOSINOPHILS 3 0 - 5 %    BASOPHILS 0 0 - 2 %    ABS. NEUTROPHILS 3.6 1.8 - 8.0 K/UL    ABS. LYMPHOCYTES 0.9 0.9 - 3.6 K/UL    ABS. MONOCYTES 0.4 0.05 - 1.2 K/UL    ABS. EOSINOPHILS 0.1 0.0 - 0.4 K/UL    ABS. BASOPHILS 0.0 0.0 - 0.1 K/UL    DF AUTOMATED     METABOLIC PANEL, BASIC    Collection Time: 05/10/18  2:40 AM   Result Value Ref Range    Sodium 140 136 - 145 mmol/L    Potassium 4.1 3.5 - 5.5 mmol/L    Chloride 101 100 - 108 mmol/L    CO2 37 (H) 21 - 32 mmol/L    Anion gap 2 (L) 3.0 - 18 mmol/L    Glucose 108 (H) 74 - 99 mg/dL    BUN 12 7.0 - 18 MG/DL    Creatinine 0.82 0.6 - 1.3 MG/DL    BUN/Creatinine ratio 15 12 - 20      GFR est AA >60 >60 ml/min/1.73m2    GFR est non-AA >60 >60 ml/min/1.73m2    Calcium 8.3 (L) 8.5 - 10.1 MG/DL   MAGNESIUM    Collection Time: 05/10/18  2:40 AM   Result Value Ref Range    Magnesium 2.2 1.6 - 2.6 mg/dL     All Micro Results     Procedure Component Value Units Date/Time    CULTURE, URINE [[id number]]  (Abnormal) Collected:  05/04/18 0616    Order Status:  Completed Specimen:  Clean catch Updated:  05/05/18 0955     Special Requests: NO SPECIAL REQUESTS        Culture result:         <10,000 COLONIES/mL GRAM NEGATIVE RODS (A)            63074  COLONIES/mL  MIXED GRAM POSITIVE DAVID, PROBABLE SKIN/GENITAL CONTAMINATION.           IMAGING  XR Results (most recent):    Results from Hospital Encounter encounter on 05/03/18   XR BA ENEMA   Narrative BARIUM ENEMA SINGLE CONTRAST    CPT CODE: 20984    HISTORY: Sigmoid colon mass. COMPARISON: CT abdomen and pelvis May 7, 2018. TECHNIQUE: Single contrast.    FLUORO TIME: 2 minutes 12 seconds    NUMBER OF FLUOROSCOPIC IMAGES: [2]     FINDINGS:      film of the abdomen demonstrates an unremarkable bowel gas pattern. L4/5  marked Disc space narrowing with exuberant spurring, reactive endplate  sclerosis, and mild AP offset seen on the lateral view. The balloon was inflated under fluoroscopic guidance. Under fluoroscopic  visualization the barium was followed in a retrograde fashion as it opacified  the colon. The patient was unable to tolerate the introduction of barium into the colon. Only the lower rectum has been opacified. The patient was reassured and 4  attempts were made to introduce opacification of more than the lower rectum but  were unsuccessful. Impression IMPRESSION:    Patient was unable to tolerate filling of the colon. Only the lower rectum was  opacified. CT Results (most recent):    Results from Hospital Encounter encounter on 05/03/18   CT ABD PELV W CONT   Narrative CT ABD PELV W CONT    Clinical information: sigmoid colon mass    Comparison: CTA chest from 2/24/2018    Technique: Axial CT of the abdomen, and pelvis with intravenous and oral  contrast. Coronal and sagittal reconstructions. Soft tissue, lung, and bone  window reconstructions. All CT scans at this facility are performed using dose  optimization technique as appropriate to a performed exam, to include automated  exposure control, adjustment of the mA and/or kV according to patient size  (including appropriate matching for site-specific examinations), or use of  iterative reconstruction technique. Findings:     - Lung bases: Subsegmental opacity in the lingula, likely chronic atelectasis. - Liver: Unremarkable. No liver masses.     - Bile ducts: No intrahepatic biliary dilatation. No extrahepatic biliary  dilatation.     - Gallbladder: Unremarkable. - Pancreas: No pancreatic ductal dilatation. Unremarkable. - Spleen: Unremarkable. - Adrenals: Unremarkable. - Kidneys: No hydronephrosis, stone or mass. - Bladder: Underdistended. - Pelvic organs: Unremarkable. - Bowel: Mild circumferential wall thickening of the mid sigmoid colon, however  the sigmoid colon is decompressed    - Vessels: Unremarkable. - Lymph nodes: No enlarged lymph nodes. - Peritoneum: No pneumoperitoneum or ascites. - Osseous structures: Mild multi-level degenerative changes in the spine. Severe  degenerative changes in the right hip. Impression Impression:     1. No sigmoid colon mass identified, however there is mild circumferential wall  thickening of the mid sigmoid colon which may be due to underdistention. Recommend correlation with colonoscopy. 2.  No liver masses. No lymphadenopathy. US Results (most recent):    Results from [place] encounter on 05/03/18   US PELV NON OBS   Narrative TRANSABDOMINAL AND TRANS VAGINAL PELVIC ULTRASOUND    CPT CODE:  93683    CLINICAL: Vaginal bleeding. TECHNIQUE: Select images from a real  time ultrasound scan of the pelvis  performed transabdominally are submitted. Cheryl Jenkins FINDINGS:    Uterus is surgically absent. The right ovary and left ovary are not visualized. It is not clear whether they  are surgically absent or obscured by bowel gas. Comparison with the CT scan of  July 20, 2015 revealed a left ovary was present at that time. A right ovary was  not visualized on that CT  . Cul de sac fluid is not present. Impression IMPRESSION:    No pathology is identified; evaluation quite limited due to bowel gas and  patient habitus. .      Peripheral Venous Testing 5/3/2018  Right Leg:-  Deep venous thrombosis:           No  Superficial venous thrombosis:    No  Deep venous insufficiency: Not examined  Superficial venous insufficiency: Not examined  Left Leg:-  Deep venous thrombosis:           No  Superficial venous thrombosis:    No  Deep venous insufficiency:        Not examined  Superficial venous insufficiency: Not examined    Discharge Medications:     Current Discharge Medication List      START taking these medications    Details   fluticasone (FLONASE) 50 mcg/actuation nasal spray 2 Sprays by Both Nostrils route daily. Qty: 1 Bottle, Refills: 2         CONTINUE these medications which have CHANGED    Details   metoprolol tartrate (LOPRESSOR) 25 mg tablet Take 0.5 Tabs by mouth two (2) times a day. Qty: 15 Tab, Refills: 2         CONTINUE these medications which have NOT CHANGED    Details   oxyCODONE IR (ROXICODONE) 5 mg immediate release tablet Take 5 mg by mouth every six (6) hours as needed for Pain.      isosorbide mononitrate ER (IMDUR) 30 mg tablet Take 1 Tab by mouth every evening. Qty: 90 Tab, Refills: 3      raNITIdine (ZANTAC) 150 mg tablet Take 150 mg by mouth two (2) times a day. warfarin (COUMADIN) 5 mg tablet Take 5 mg by mouth daily. furosemide (LASIX) 40 mg tablet 40 mg po daily  Qty: 30 Tab, Refills: 0      PARoxetine (PAXIL) 20 mg tablet Take 1 Tab by mouth daily. Qty: 30 Tab, Refills: 3      simvastatin (ZOCOR) 40 mg tablet Take  by mouth nightly. to obtain from pharmacy      aripiprazole (ABILIFY) 15 mg tablet Take 15 mg by mouth daily. to obtain from pharmacy      levothyroxine (SYNTHROID) 125 mcg tablet Take 125 mcg by mouth Daily (before breakfast). multivitamin, tx-iron-ca-min (THERA-M W/ IRON) 9 mg iron-400 mcg tab tablet Take 1 Tab by mouth daily. Qty: 30 Tab, Refills: 0      loperamide (IMODIUM) 2 mg capsule Take 2 mg by mouth three (3) times daily as needed for Diarrhea. VENTOLIN HFA 90 mcg/actuation inhaler Take 2 Puffs by inhalation every four (4) hours as needed for Wheezing or Shortness of Breath.   Qty: 1 Inhaler, Refills: 0 STOP taking these medications       aspirin delayed-release 81 mg tablet Comments:   Reason for Stopping:         docusate sodium (COLACE) 100 mg capsule Comments:   Reason for Stopping:         potassium chloride SR (KLOR-CON 10) 10 mEq tablet Comments:   Reason for Stopping:         diclofenac (VOLTAREN) 1 % gel Comments:   Reason for Stopping:         lisinopril-hydroCHLOROthiazide (ZESTORETIC) 20-25 mg per tablet Comments:   Reason for Stopping:               Activity: PT/OT per Home Health    Diet: Cardiac Diet    Wound Care: None needed    Follow-up:   1. Dr. Stewart Penny 7 - 10 days. 2. Dr. Agueda Meza 3 weeks  3. Dr. Laurie Bateman 6 weeks.     Minutes spent on discharge: greater than 30

## 2018-05-10 NOTE — DISCHARGE INSTRUCTIONS
Atrial Fibrillation: Care Instructions  Your Care Instructions    Atrial fibrillation is an irregular and often fast heartbeat. Treating this condition is important for several reasons. It can cause blood clots, which can travel from your heart to your brain and cause a stroke. If you have a fast heartbeat, you may feel lightheaded, dizzy, and weak. An irregular heartbeat can also increase your risk for heart failure. Atrial fibrillation is often the result of another heart condition, such as high blood pressure or coronary artery disease. Making changes to improve your heart condition will help you stay healthy and active. Follow-up care is a key part of your treatment and safety. Be sure to make and go to all appointments, and call your doctor if you are having problems. It's also a good idea to know your test results and keep a list of the medicines you take. How can you care for yourself at home? Medicines  ? · Take your medicines exactly as prescribed. Call your doctor if you think you are having a problem with your medicine. You will get more details on the specific medicines your doctor prescribes. ? · If your doctor has given you a blood thinner to prevent a stroke, be sure you get instructions about how to take your medicine safely. Blood thinners can cause serious bleeding problems. ? · Do not take any vitamins, over-the-counter drugs, or herbal products without talking to your doctor first.   ? Lifestyle changes  ? · Do not smoke. Smoking can increase your chance of a stroke and heart attack. If you need help quitting, talk to your doctor about stop-smoking programs and medicines. These can increase your chances of quitting for good. ? · Eat a heart-healthy diet. ? · Stay at a healthy weight. Lose weight if you need to.   ? · Limit alcohol to 2 drinks a day for men and 1 drink a day for women. Too much alcohol can cause health problems. ? · Avoid colds and flu.  Get a pneumococcal vaccine shot. If you have had one before, ask your doctor whether you need another dose. Get a flu shot every year. If you must be around people with colds or flu, wash your hands often. Activity  ? · If your doctor recommends it, get more exercise. Walking is a good choice. Bit by bit, increase the amount you walk every day. Try for at least 30 minutes on most days of the week. You also may want to swim, bike, or do other activities. Your doctor may suggest that you join a cardiac rehabilitation program so that you can have help increasing your physical activity safely. ? · Start light exercise if your doctor says it is okay. Even a small amount will help you get stronger, have more energy, and manage stress. Walking is an easy way to get exercise. Start out by walking a little more than you did in the hospital. Gradually increase the amount you walk. ? · When you exercise, watch for signs that your heart is working too hard. You are pushing too hard if you cannot talk while you are exercising. If you become short of breath or dizzy or have chest pain, sit down and rest immediately. ? · Check your pulse regularly. Place two fingers on the artery at the palm side of your wrist, in line with your thumb. If your heartbeat seems uneven or fast, talk to your doctor. When should you call for help? Call 911 anytime you think you may need emergency care. For example, call if:  ? · You have symptoms of a heart attack. These may include:  ¨ Chest pain or pressure, or a strange feeling in the chest.  ¨ Sweating. ¨ Shortness of breath. ¨ Nausea or vomiting. ¨ Pain, pressure, or a strange feeling in the back, neck, jaw, or upper belly or in one or both shoulders or arms. ¨ Lightheadedness or sudden weakness. ¨ A fast or irregular heartbeat. After you call 911, the  may tell you to chew 1 adult-strength or 2 to 4 low-dose aspirin. Wait for an ambulance. Do not try to drive yourself.    ? · You have symptoms of a stroke. These may include:  ¨ Sudden numbness, tingling, weakness, or loss of movement in your face, arm, or leg, especially on only one side of your body. ¨ Sudden vision changes. ¨ Sudden trouble speaking. ¨ Sudden confusion or trouble understanding simple statements. ¨ Sudden problems with walking or balance. ¨ A sudden, severe headache that is different from past headaches. ? · You passed out (lost consciousness). ?Call your doctor now or seek immediate medical care if:  ? · You have new or increased shortness of breath. ? · You feel dizzy or lightheaded, or you feel like you may faint. ? · Your heart rate becomes irregular. ? · You can feel your heart flutter in your chest or skip heartbeats. Tell your doctor if these symptoms are new or worse. ? Watch closely for changes in your health, and be sure to contact your doctor if you have any problems. Where can you learn more? Go to http://bucky-gerard.info/. Enter U020 in the search box to learn more about \"Atrial Fibrillation: Care Instructions. \"  Current as of: September 21, 2016  Content Version: 11.4  © 3530-8171 [employer]. Care instructions adapted under license by [employer] (which disclaims liability or warranty for this information). If you have questions about a medical condition or this instruction, always ask your healthcare professional. [address] any warranty or liability for your use of this information.

## 2018-05-11 NOTE — ROUTINE PROCESS
Pt discharged with [facility] for transport with belongings on bariatric stretcher, pt alert and oriented times four, no apparent distress, armbands removed and shredded, IV removed.

## 2018-05-14 ENCOUNTER — HOSPITAL ENCOUNTER (EMERGENCY)
Age: 58
Discharge: HOME | End: 2018-05-14
Attending: EMERGENCY MEDICINE
Payer: MEDICAID

## 2018-05-14 VITALS
OXYGEN SATURATION: 96 % | HEART RATE: 78 BPM | DIASTOLIC BLOOD PRESSURE: 67 MMHG | SYSTOLIC BLOOD PRESSURE: 134 MMHG | RESPIRATION RATE: 20 BRPM | TEMPERATURE: 98.5 F

## 2018-05-14 DIAGNOSIS — K59.00 CONSTIPATION, UNSPECIFIED CONSTIPATION TYPE: Primary | ICD-10-CM

## 2018-05-14 LAB
ALBUMIN SERPL-MCNC: 3.4 G/DL (ref 3.4–5)
ALBUMIN/GLOB SERPL: 0.8 {RATIO} (ref 0.8–1.7)
ALP SERPL-CCNC: 145 U/L (ref 45–117)
ALT SERPL-CCNC: 19 U/L (ref 13–56)
ANION GAP SERPL CALC-SCNC: 6 MMOL/L (ref 3–18)
AST SERPL-CCNC: 19 U/L (ref 15–37)
BASOPHILS # BLD: 0 K/UL (ref 0–0.06)
BASOPHILS NFR BLD: 0 % (ref 0–2)
BILIRUB SERPL-MCNC: 0.9 MG/DL (ref 0.2–1)
BUN SERPL-MCNC: 14 MG/DL (ref 7–18)
BUN/CREAT SERPL: 16 (ref 12–20)
CALCIUM SERPL-MCNC: 8.1 MG/DL (ref 8.5–10.1)
CHLORIDE SERPL-SCNC: 102 MMOL/L (ref 100–108)
CO2 SERPL-SCNC: 30 MMOL/L (ref 21–32)
CREAT SERPL-MCNC: 0.9 MG/DL (ref 0.6–1.3)
DIFFERENTIAL METHOD BLD: ABNORMAL
EOSINOPHIL # BLD: 0.1 K/UL (ref 0–0.4)
EOSINOPHIL NFR BLD: 3 % (ref 0–5)
ERYTHROCYTE [DISTWIDTH] IN BLOOD BY AUTOMATED COUNT: 17.4 % (ref 11.6–14.5)
GLOBULIN SER CALC-MCNC: 4.1 G/DL (ref 2–4)
GLUCOSE SERPL-MCNC: 96 MG/DL (ref 74–99)
HCT VFR BLD AUTO: 30.7 % (ref 35–45)
HGB BLD-MCNC: 9 G/DL (ref 12–16)
INR PPP: 1.5 (ref 0.8–1.2)
LIPASE SERPL-CCNC: 42 U/L (ref 73–393)
LYMPHOCYTES # BLD: 0.8 K/UL (ref 0.9–3.6)
LYMPHOCYTES NFR BLD: 19 % (ref 21–52)
MAGNESIUM SERPL-MCNC: 2.3 MG/DL (ref 1.6–2.6)
MCH RBC QN AUTO: 22.3 PG (ref 24–34)
MCHC RBC AUTO-ENTMCNC: 29.3 G/DL (ref 31–37)
MCV RBC AUTO: 76.2 FL (ref 74–97)
MONOCYTES # BLD: 0.3 K/UL (ref 0.05–1.2)
MONOCYTES NFR BLD: 7 % (ref 3–10)
NEUTS SEG # BLD: 3.1 K/UL (ref 1.8–8)
NEUTS SEG NFR BLD: 71 % (ref 40–73)
PLATELET # BLD AUTO: 200 K/UL (ref 135–420)
PMV BLD AUTO: 9.9 FL (ref 9.2–11.8)
POTASSIUM SERPL-SCNC: 4.1 MMOL/L (ref 3.5–5.5)
PROT SERPL-MCNC: 7.5 G/DL (ref 6.4–8.2)
PROTHROMBIN TIME: 17.9 SEC (ref 11.5–15.2)
RBC # BLD AUTO: 4.03 M/UL (ref 4.2–5.3)
SODIUM SERPL-SCNC: 138 MMOL/L (ref 136–145)
WBC # BLD AUTO: 4.3 K/UL (ref 4.6–13.2)

## 2018-05-14 PROCEDURE — 99284 EMERGENCY DEPT VISIT MOD MDM: CPT

## 2018-05-14 PROCEDURE — 83735 ASSAY OF MAGNESIUM: CPT | Performed by: EMERGENCY MEDICINE

## 2018-05-14 PROCEDURE — 80053 COMPREHEN METABOLIC PANEL: CPT | Performed by: EMERGENCY MEDICINE

## 2018-05-14 PROCEDURE — 85025 COMPLETE CBC W/AUTO DIFF WBC: CPT | Performed by: EMERGENCY MEDICINE

## 2018-05-14 PROCEDURE — 85610 PROTHROMBIN TIME: CPT | Performed by: EMERGENCY MEDICINE

## 2018-05-14 PROCEDURE — 83690 ASSAY OF LIPASE: CPT | Performed by: EMERGENCY MEDICINE

## 2018-05-14 NOTE — ED TRIAGE NOTES
Abdominal pain x 2 days. Pt states LBM 5/10 \"except I have a little teeny one today. \" Denies N/V/D. BS normoactive. Pt denies taking laxative at home. Pain */10 intermittent \"cramps. \"

## 2018-05-14 NOTE — ED PROVIDER NOTES
EMERGENCY DEPARTMENT HISTORY AND PHYSICAL EXAM    12:03 PM      Date: 5/14/2018  Patient Name: Carlos Yusuf    History of Presenting Illness     No chief complaint on file. History Provided By: Patient    Additional History (Context): Carlos Yusuf is a 60 y.o. female with morbid obesity, HTN, dyslipidemia, congenital heart disease, Afib, Schizoaffective disorder  who presents with severe constipation and cramping that has been constant for the past few days since her last hospitalization. Associated sx are lower abd pain. Denies vomiting and SOB. Pt does not drink or smoke. PCP: Nubia Gonsales MD    Chief Complaint: Constipated   Duration:  days since her last hospitalization  Timing:  Constant  Location: GI  Quality: Cramping  Severity: Severe  Associated Symptoms: lower abd pain. Denies vomiting and SOB      Current Outpatient Prescriptions   Medication Sig Dispense Refill    metoprolol tartrate (LOPRESSOR) 25 mg tablet Take 0.5 Tabs by mouth two (2) times a day. 15 Tab 2    fluticasone (FLONASE) 50 mcg/actuation nasal spray 2 Sprays by Both Nostrils route daily. 1 Bottle 2    oxyCODONE IR (ROXICODONE) 5 mg immediate release tablet Take 5 mg by mouth every six (6) hours as needed for Pain.  isosorbide mononitrate ER (IMDUR) 30 mg tablet Take 1 Tab by mouth every evening. 90 Tab 3    multivitamin, tx-iron-ca-min (THERA-M W/ IRON) 9 mg iron-400 mcg tab tablet Take 1 Tab by mouth daily. 30 Tab 0    loperamide (IMODIUM) 2 mg capsule Take 2 mg by mouth three (3) times daily as needed for Diarrhea.  raNITIdine (ZANTAC) 150 mg tablet Take 150 mg by mouth two (2) times a day.  warfarin (COUMADIN) 5 mg tablet Take 5 mg by mouth daily.  VENTOLIN HFA 90 mcg/actuation inhaler Take 2 Puffs by inhalation every four (4) hours as needed for Wheezing or Shortness of Breath.  1 Inhaler 0    furosemide (LASIX) 40 mg tablet 40 mg po daily 30 Tab 0    PARoxetine (PAXIL) 20 mg tablet Take 1 Tab by mouth daily. 30 Tab 3    simvastatin (ZOCOR) 40 mg tablet Take  by mouth nightly. to obtain from pharmacy      aripiprazole (ABILIFY) 15 mg tablet Take 15 mg by mouth daily. to obtain from pharmacy      levothyroxine (SYNTHROID) 125 mcg tablet Take 125 mcg by mouth Daily (before breakfast). Past History     Past Medical History:  Past Medical History:   Diagnosis Date    Atrial fibrillation     CHADS score 1  (-CHF, +HTN, -AGE, -DM, -CVA)    Carpal tunnel syndrome     Chronic pain     Congenital heart disease     presumed pulmonary stenosis s/p repair 1969    Degenerative disc disease     Degenerative joint disease     Dyslipidemia     GERD     H/O echocardiogram 04/2017    EF 60-65%, mild concentric LVH, dilated RV with RV dysfunction, RVSP 54 mmHg, RA E, moderate to severe pulmonic regurgitation.  Hypertension     Hypothyroid     Morbid obesity     Morbid obesity with BMI of 45.0-49.9, adult ([facility] [address] 2/20/2017    Noncompliance     Schizoaffective disorder     Substance abuse     marijuana, crack cocaine       Past Surgical History:  Past Surgical History:   Procedure Laterality Date    COLONOSCOPY N/A 5/6/2018    COLONOSCOPY with tatooing and biopsy performed by Crystal Bowen MD at [facility]    HX [address]      left    HX HYSTERECTOMY      HX KNEE REPLACEMENT      left    HX KNEE REPLACEMENT      right    HX THYROIDECTOMY         Family History:  Family History   Problem Relation Age of Onset    Hypertension Mother     Coronary Artery Disease Mother     Coronary Artery Disease Father     Hypertension Father        Social History:  Social History   Substance Use Topics    Smoking status: Never Smoker    Smokeless tobacco: Never Used    Alcohol use No       Allergies:   Allergies   Allergen Reactions    Gabapentin Other (comments)     Unsteady, weakness LEs    Tetanus Vaccines And Toxoid Swelling    Topamax [Topiramate] Other (comments)     weakness Review of Systems     Review of Systems   Respiratory: Negative for shortness of breath. Gastrointestinal: Positive for abdominal pain and constipation. Negative for vomiting. All other systems reviewed and are negative. Physical Exam     Visit Vitals    /80 (BP 1 Location: Left arm)    Pulse 61    Temp 98 °F (36.7 °C)    Resp 17    SpO2 97%       Physical Exam   Constitutional: She is oriented to person, place, and time. She appears well-developed. HENT:   Head: Normocephalic and atraumatic. Eyes: EOM are normal. Pupils are equal, round, and reactive to light. Neck: Normal range of motion. Neck supple. Cardiovascular: Normal rate, regular rhythm and normal heart sounds. Exam reveals no friction rub. No murmur heard. Pulmonary/Chest: Effort normal and breath sounds normal. No respiratory distress. She has no wheezes. Abdominal: Soft. She exhibits no distension. There is tenderness. There is no rebound and no guarding. B/l lower abd pain without rebound or guarding   Musculoskeletal: Normal range of motion. Neurological: She is alert and oriented to person, place, and time. Skin: Skin is warm and dry. Psychiatric: She has a normal mood and affect. Her behavior is normal. Thought content normal.         Diagnostic Study Results         Medical Decision Making     1 .abd pain ; no bm since d/c from hospital.     2:29 PM pt had large bm after enema; feelign better;     Diagnosis     No diagnosis found. _______________________________    Attestations:  Scribe Attestation     Lázaro Emerson acting as a scribe for and in the presence of Reginaldo Corrales MD      May 14, 2018 at 12:03 PM       Provider Attestation:      I personally performed the services described in the documentation, reviewed the documentation, as recorded by the scribe in my presence, and it accurately and completely records my words and actions.  May 14, 2018 at 12:03 PM - Reginaldo Corrales MD _______________________________

## 2018-05-14 NOTE — ED NOTES
Pt with large soft stool BM. Ab pain relieved. Back to bed with assist x 1. MD made aware and at bedside.

## 2018-05-14 NOTE — ED NOTES
Soap suds enema given with pt taking approx 75% of enema but unable to hold for longer than approx 4 minutes. Pt sitting up on BSC at this time. Pt reports \"cramping. \"

## 2018-05-17 ENCOUNTER — OFFICE VISIT (OUTPATIENT)
Dept: CARDIOLOGY CLINIC | Age: 58
End: 2018-05-17

## 2018-05-17 VITALS — WEIGHT: 293 LBS | BODY MASS INDEX: 57.52 KG/M2 | HEIGHT: 60 IN | OXYGEN SATURATION: 94 %

## 2018-05-17 DIAGNOSIS — I50.32 DIASTOLIC CHF, CHRONIC (HCC): Primary | ICD-10-CM

## 2018-05-17 DIAGNOSIS — E66.01 MORBID OBESITY (HCC): ICD-10-CM

## 2018-05-17 DIAGNOSIS — E78.5 DYSLIPIDEMIA: ICD-10-CM

## 2018-05-17 DIAGNOSIS — R06.02 SOB (SHORTNESS OF BREATH): ICD-10-CM

## 2018-05-17 DIAGNOSIS — I11.9 BENIGN HYPERTENSIVE HEART DISEASE WITHOUT HEART FAILURE: ICD-10-CM

## 2018-05-17 DIAGNOSIS — R00.1 BRADYCARDIA: ICD-10-CM

## 2018-05-17 DIAGNOSIS — I48.19 PERSISTENT ATRIAL FIBRILLATION (HCC): ICD-10-CM

## 2018-05-17 RX ORDER — METOPROLOL TARTRATE 25 MG/1
12.5 TABLET, FILM COATED ORAL 2 TIMES DAILY
Qty: 30 TAB | Refills: 6 | Status: SHIPPED | OUTPATIENT
Start: 2018-05-17 | End: 2019-07-14

## 2018-05-17 NOTE — PROGRESS NOTES
Carl Cruz presents today for a 6 month check-up and coincidentally a post-hospital follow-up. She presented to the hospital with complaints of vaginal bleeding however she states that she was not sure where the bleeding was coming from. During examination in the emergency room, she was noted to have heme positive stools and she was also noted to be bradycardic. Her hemoglobin on admission was 8.3. She reports that she was told that she has polyps and is scheduled to follow-up with Dr. Tatianna Sullivan next week. As noted in the cardiology consult, it was felt that she may have taken more of her metoprolol than she should have. She was not sure what she normally takes at home. During previous office visits, we have had problems with her being compliant with bringing her medications or medication list with her. She is a 60year old female with history of morbid obesity, chronic atrial fibrillation, hypertension, dyslipidemia. Her management has been complicated by non-compliance and polysubstance abuse. She also has a history of unknown congenital heart defect that was repaired as a child. She is still awaiting knee or hip surgery and she states that they will not allow her to have it done until she loses some weight. She was brought to the office today by medical transport on a stretcher as she states that she cannot bear weight on her left leg. She admits to taking pain medications but denies any recent recreational drug use. She brought her discharge list of medications and while reviewing it, she admitted that she has been taking a whole tablet of metoprolol 25mg twice a day instead of 1/2 tablet twice a day as prescribed upon discharge. She denies chest pain, tightness, heaviness, and palpitations. She denies shortness of breath at rest, admits to mild, occasional dyspnea on exertion, denies orthopnea and PND. She denies abdominal bloating.   She denies lightheadedness, admits to occasional dizziness (\"when blood pressure is high\"), and denies syncope. She admits to chronic lower extremity edema and  denies claudication. Denies nausea, vomiting, diarrhea, fever, chills. She admits to chronic pain due to her orthopedic issues. We were unable to weigh her today and has recorded hospital weight was 303 lbs. PMH:  Past Medical History:   Diagnosis Date    Atrial fibrillation     CHADS score 1  (-CHF, +HTN, -AGE, -DM, -CVA)    Carpal tunnel syndrome     Chronic pain     Congenital heart disease     presumed pulmonary stenosis s/p repair 1969    Degenerative disc disease     Degenerative joint disease     Dyslipidemia     GERD     H/O echocardiogram 04/2017    EF 60-65%, mild concentric LVH, dilated RV with RV dysfunction, RVSP 54 mmHg, RA E, moderate to severe pulmonic regurgitation.  Hypertension     Hypothyroid     Morbid obesity     Morbid obesity with BMI of 45.0-49.9, adult ([facility] [address] 2/20/2017    Noncompliance     Schizoaffective disorder     Substance abuse     marijuana, crack cocaine       PSH:  Past Surgical History:   Procedure Laterality Date    COLONOSCOPY N/A 5/6/2018    COLONOSCOPY with tatooing and biopsy performed by Luna V[facility]ra [facility]      left    HX HYSTERECTOMY      HX KNEE REPLACEMENT      left    HX KNEE REPLACEMENT      right    HX THYROIDECTOMY         MEDS:  Current Outpatient Prescriptions   Medication Sig    metoprolol tartrate (LOPRESSOR) 25 mg tablet Take 0.5 Tabs by mouth two (2) times a day. (Patient taking differently: Take 25 mg by mouth two (2) times a day.)    fluticasone (FLONASE) 50 mcg/actuation nasal spray 2 Sprays by Both Nostrils route daily.  oxyCODONE IR (ROXICODONE) 5 mg immediate release tablet Take 5 mg by mouth every six (6) hours as needed for Pain.  isosorbide mononitrate ER (IMDUR) 30 mg tablet Take 1 Tab by mouth every evening.     multivitamin, tx-iron-ca-min (THERA-M W/ IRON) 9 mg iron-400 mcg tab tablet Take 1 Tab by mouth daily.  loperamide (IMODIUM) 2 mg capsule Take 2 mg by mouth three (3) times daily as needed for Diarrhea.  raNITIdine (ZANTAC) 150 mg tablet Take 150 mg by mouth two (2) times a day.  warfarin (COUMADIN) 5 mg tablet Take 7.5 mg by mouth daily.  VENTOLIN HFA 90 mcg/actuation inhaler Take 2 Puffs by inhalation every four (4) hours as needed for Wheezing or Shortness of Breath.  furosemide (LASIX) 40 mg tablet 40 mg po daily    PARoxetine (PAXIL) 20 mg tablet Take 1 Tab by mouth daily.  simvastatin (ZOCOR) 40 mg tablet Take  by mouth nightly. to obtain from pharmacy    aripiprazole (ABILIFY) 15 mg tablet Take 15 mg by mouth daily. to obtain from pharmacy    levothyroxine (SYNTHROID) 125 mcg tablet Take 125 mcg by mouth Daily (before breakfast). No current facility-administered medications for this visit. Allergies and Sensitivities:  Allergies   Allergen Reactions    Gabapentin Other (comments)     Unsteady, weakness LEs    Tetanus Vaccines And Toxoid Swelling    Topamax [Topiramate] Other (comments)     weakness       Family History:  Family History   Problem Relation Age of Onset    Hypertension Mother     Coronary Artery Disease Mother     Coronary Artery Disease Father     Hypertension Father        Social History:  She  reports that she has never smoked. She has never used smokeless tobacco.  She  reports that she does not drink alcohol. Physical:  Visit Vitals    BP (P) 150/84    Pulse (!) (P) 48    Ht 5' (1.524 m)    Wt 137.4 kg (303 lb)    SpO2 94%    BMI 59.18 kg/m2       Exam:  Neck:  Supple, no JVD, no carotid bruits  CV:  Normal S1 and  S2, grade II/VI DEANDRA, no rubs, or gallops noted. Irregularly, irregular rhythm (bradycardic)  Lungs:  Clear to ausculation throughout, no wheezes or rales  Abd:  Soft, non-tender, obese with good bowel sounds.   No hepatosplenomegaly  Extremities:  1+ lower extremity edema       Data:  EKG:    Atrial fibrillation with slow ventricular rate.  Intraventricular conduction delay.  Diffuse T-wave abnormality      LABS:  Lab Results   Component Value Date/Time    Sodium 138 05/14/2018 12:20 PM    Potassium 4.1 05/14/2018 12:20 PM    Chloride 102 05/14/2018 12:20 PM    CO2 30 05/14/2018 12:20 PM    Glucose 96 05/14/2018 12:20 PM    BUN 14 05/14/2018 12:20 PM    Creatinine 0.90 05/14/2018 12:20 PM     Lab Results   Component Value Date/Time    Cholesterol, total 90 05/03/2018 10:25 AM    HDL Cholesterol 34 (L) 05/03/2018 10:25 AM    LDL, calculated 41.4 05/03/2018 10:25 AM    Triglyceride 73 05/03/2018 10:25 AM    CHOL/HDL Ratio 2.6 05/03/2018 10:25 AM     Lab Results   Component Value Date/Time    ALT (SGPT) 19 05/14/2018 12:20 PM       Impression / Plan:  1. Chronic diastolic heart failure, appears compensated  2. Hypertension, blood pressure slightly elevated  3. Dyslipidemia, on simvastatin 40mg (managed by PCP)  4. History of Polysubstance abuse  5. History of medical non-compliance  6. Pulmonary hypertension, RV dysfunction with moderate to severe pulmonary regurgitation and moderate elevation of her pulmonary pressures. 7.  Morbid obesity  8. DJD, for left knee replacement and was cleared for surgery in mid October 2017, surgery was postponed due to elevated blood pressure  9. Chronic atrial fibrillation, bradycardic today  10. Obstructive sleep apnea, has not used her CPAP for some time. 11.  Atrial fibrillation    Ms. Shira Marte was seen today for a 6 month check-up and consequently, a post-hospital follow-up for bradycardia. During her recent hospitalization, she was bradycardic and possibly had been taking more of her beta blocker than she was supposed to be taking.   She was not exactly sure what she was taking when questioned in the hospital.  She brought her discharge list of medications with her today and it was noted that she was again taking the metoprolol incorrectly. She was supposed to be taking 12.5mg BID (1/2 of her 25mg tablet) but was taking 25mg BID instead. Her EKG today shows atrial fibrillation with a heart rate of 48 bpm.    Will request that she wear a 48 hour Holter monitor to evaluate her overall heart rate on the lower dose of beta blocker. I discussed the possibility of a pacemaker with her if she truly is bradycardic. She states that she does not want a pacemaker because \"that thing will blow up if you're near a microwave. \"  She was reassured that this will not happen and I asked that is she has questions, she needs to discuss them with her healthcare providers and not believe what she hears from others. Her blood pressure today is slightly elevated but it was unclear whether or not she has taken her medications today. She does not recall what she took this morning. Her breath sounds are clear. She has lower extremity edema which she states is chronic. She states that her orthopedic issues prevent her from moving around as she would like and is mainly sitting or lying down most of the day. Congestive heart failure and atrial fibrillation teaching reinforced today. Advised to limit sodium intake to no more than 2000mg per day and to also limit her fluid intake to no more than 48 to 56 ounces at this time. Advised to weigh daily every morning and record weights. Instructed to call our office if progressive weight gain is noted over a 2 to 3 day period of time, shortness of breath increases, or if abdominal bloating, nausea, fatigue, or increased lower extremity edema is noted. She reports that she \"stops breathing\" once in a while. She admits to not using her CPAP for some time as \"it is missing a piece and I hate wearing the mask. \"  She has an appointment with her pulmonologist in late May and she was advised to discuss the issues she is having with her CPAP with her pulmonologist.  She will likely need to be re-evaluated for a new CPAP machine. She will follow-up with Dr. Katina Augustin as scheduled and as needed. Lizz Franco MSN, FNP-BC    Please note:  Portions of this chart were created with Dragon medical speech to text program.  Unrecognized errors may be present.

## 2018-05-17 NOTE — PATIENT INSTRUCTIONS
Please make sure that you are taking 1/2 of your metoprolol (lopressor) 25mg twice a day  48 hour Holter monitor; dx: bradycardia  Follow-up with Dr. Hayley Sloan as scheduled and as needed

## 2018-05-17 NOTE — PROGRESS NOTES
1. Have you been to the ER, urgent care clinic since your last visit? Hospitalized since your last visit? Yes leg swelling on 05/03/2018    2. Have you seen or consulted any other health care providers outside of the [address] [place] [address] since your last visit? Include any pap smears or colon screening.  No

## 2018-05-17 NOTE — MR AVS SNAPSHOT
[address] [place] [address] 
[phone] Patient: Toshia Boogie MRN: [id number] JRT:[birth year] Visit Information Date & Time Provider Department Dept. Phone Encounter #  
 5/17/2018 11:30 AM Vandy Simmonds, NP [facility] [address] [id number] Your Appointments 5/22/2018 11:15 AM  
Follow Up with Eun Abarca MD  
[address] ([facility]) Appt Note: hosp fu disch mv 3/1; 3/22/18 L/M A/M TO [facility] 3/23/18 APPT; RS from 3/23/18  
 [address] [place] [address], [address] [facility] [address] [place] [address], [address] [facility] [address] [place] [address]  
  
    
 5/31/2018 11:30 AM  
POST OP with Brina Pro MD  
[facility] ([address] [place] [address]) Appt Note: 3 wk f/up from [place] [address] [address] [facility] [address] [address]  
  
    
 10/12/2018 11:40 AM  
Follow Up with Farshad Daniels MD  
[facility] ([address] [place] [address]) Appt Note: 1 year follow up [place] [id number]  
[phone] [address] Upcoming Health Maintenance Date Due Hepatitis C Screening 1960 DTaP/Tdap/Td series (1 - Tdap) 1/6/1980 PAP AKA CERVICAL CYTOLOGY 1/6/1980 FOBT Q 1 YEAR AGE 50-75 1/6/2009 BREAST CANCER SCRN MAMMOGRAM 11/6/2011 Influenza Age 5 to Adult 8/1/2018 Allergies as of 5/17/2018  Review Complete On: 5/17/2018 By: Vandy Simmonds, NP Severity Noted Reaction Type Reactions Gabapentin  03/29/2017    Other (comments) Unsteady, weakness LEs Tetanus Vaccines And Toxoid    Swelling Topamax [Topiramate]  05/24/2017    Other (comments) weakness Current Immunizations  Reviewed on 2/27/2018 Name Date Pneumococcal Polysaccharide (PPSV-23) 1/21/2014  2:55 PM  
  
 Not reviewed this visit You Were Diagnosed With   
  
 Codes Comments Diastolic CHF, chronic (HCC)    -  Primary ICD-10-CM: I50.32 
ICD-9-CM: 428.32, 428.0 Benign hypertensive heart disease without heart failure     ICD-10-CM: I11.9 ICD-9-CM: 402.10 Dyslipidemia     ICD-10-CM: E78.5 ICD-9-CM: 272.4 Morbid obesity ([address]     ICD-10-CM: E66.01 
ICD-9-CM: 278.01 Persistent atrial fibrillation (HCC)     ICD-10-CM: I48.1 ICD-9-CM: 427.31   
 SOB (shortness of breath)     ICD-10-CM: R06.02 
ICD-9-CM: 786.05 Bradycardia     ICD-10-CM: R00.1 ICD-9-CM: 427.89 Vitals BP Pulse Height(growth percentile) Weight(growth percentile) SpO2 BMI  
 (P) 150/84 (!) (P) 48 5' (1.524 m) 303 lb (137.4 kg) 94% 59.18 kg/m2 OB Status Smoking Status Hysterectomy Never Smoker Vitals History BMI and BSA Data Body Mass Index Body Surface Area  
 59.18 kg/m 2 2.41 m 2 Preferred Pharmacy Pharmacy Name [facility] [address] [place] [address],[facility] [address] [place] [address] [phone] Your Updated Medication List  
  
   
This list is accurate as of 5/17/18 12:30 PM.  Always use your most recent med list.  
  
  
  
  
 ABILIFY 15 mg tablet Generic drug:  ARIPiprazole Take 15 mg by mouth daily. to obtain from pharmacy  
  
 fluticasone 50 mcg/actuation nasal spray Commonly known as:  Francella Lacks 2 Sprays by Both Nostrils route daily. furosemide 40 mg tablet Commonly known as:  LASIX 40 mg po daily  
  
 isosorbide mononitrate ER 30 mg tablet Commonly known as:  IMDUR Take 1 Tab by mouth every evening. loperamide 2 mg capsule Commonly known as:  IMODIUM Take 2 mg by mouth three (3) times daily as needed for Diarrhea.  
  
 metoprolol tartrate 25 mg tablet Commonly known as:  LOPRESSOR  
 Take 0.5 Tabs by mouth two (2) times a day. multivitamin, tx-iron-ca-min 9 mg iron-400 mcg Tab tablet Commonly known as:  THERA-M w/ IRON Take 1 Tab by mouth daily. oxyCODONE IR 5 mg immediate release tablet Commonly known as:  Monet Mathias Take 5 mg by mouth every six (6) hours as needed for Pain. PARoxetine 20 mg tablet Commonly known as:  PAXIL Take 1 Tab by mouth daily. raNITIdine 150 mg tablet Commonly known as:  ZANTAC Take 150 mg by mouth two (2) times a day. SYNTHROID 125 mcg tablet Generic drug:  levothyroxine Take 125 mcg by mouth Daily (before breakfast). VENTOLIN HFA 90 mcg/actuation inhaler Generic drug:  albuterol Take 2 Puffs by inhalation every four (4) hours as needed for Wheezing or Shortness of Breath.  
  
 warfarin 5 mg tablet Commonly known as:  COUMADIN Take 7.5 mg by mouth daily. ZOCOR 40 mg tablet Generic drug:  simvastatin Take  by mouth nightly. to obtain from pharmacy We Performed the Following AMB POC EKG ROUTINE W/ 12 LEADS, INTER & REP [07730 CPT(R)] To-Do List   
 05/23/2018 ECG:  ECG HOLTER MONITOR, UP TO 48 HRS Patient Instructions Please make sure that you are taking 1/2 of your metoprolol (lopressor) 25mg twice a day 48 hour Holter monitor; dx: bradycardia Follow-up with Dr. Altagracia Boone as scheduled and as needed Introducing [facility]! Bee Ryan introduces [employer] patient portal. Now you can access parts of your medical record, email your doctor's office, and request medication refills online. 1. In your internet browser, go to [email or web address] 2. Click on the First Time User? Click Here link in the Sign In box. You will see the New Member Sign Up page. 3. Enter your [employer] Access Code exactly as it appears below. You will not need to use this code after youve completed the sign-up process.  If you do not sign up before the expiration date, you must request a new code. · [employer] Access Code: [email or web address] Expires: 5/30/2018  5:17 PM 
 
4. Enter the last four digits of your Social Security Number (xxxx) and Date of Birth (mm/dd/yyyy) as indicated and click Submit. You will be taken to the next sign-up page. 5. Create a [employer] ID. This will be your [employer] login ID and cannot be changed, so think of one that is secure and easy to remember. 6. Create a [employer] password. You can change your password at any time. 7. Enter your Password Reset Question and Answer. This can be used at a later time if you forget your password. 8. Enter your e-mail address. You will receive e-mail notification when new information is available in [address] 9. Click Sign Up. You can now view and download portions of your medical record. 10. Click the Download Summary menu link to download a portable copy of your medical information. If you have questions, please visit the Frequently Asked Questions section of the [employer] website. Remember, [employer] is NOT to be used for urgent needs. For medical emergencies, dial 911. Now available from your iPhone and Android! Please provide this summary of care documentation to your next provider. Your primary care clinician is listed as Vernika. If you have any questions after today's visit, please call [phone].

## 2018-05-22 ENCOUNTER — OFFICE VISIT (OUTPATIENT)
Dept: PULMONOLOGY | Age: 58
End: 2018-05-22

## 2018-05-22 ENCOUNTER — TELEPHONE (OUTPATIENT)
Dept: PULMONOLOGY | Age: 58
End: 2018-05-22

## 2018-05-22 VITALS
HEART RATE: 65 BPM | SYSTOLIC BLOOD PRESSURE: 150 MMHG | HEIGHT: 60 IN | TEMPERATURE: 97.9 F | DIASTOLIC BLOOD PRESSURE: 54 MMHG | BODY MASS INDEX: 57.52 KG/M2 | RESPIRATION RATE: 20 BRPM | WEIGHT: 293 LBS | OXYGEN SATURATION: 97 %

## 2018-05-22 DIAGNOSIS — I11.9 BENIGN HYPERTENSIVE HEART DISEASE WITHOUT HEART FAILURE: ICD-10-CM

## 2018-05-22 DIAGNOSIS — F25.9 SCHIZOAFFECTIVE DISORDER, CHRONIC CONDITION (HCC): ICD-10-CM

## 2018-05-22 DIAGNOSIS — I27.20 PULMONARY HTN (HCC): ICD-10-CM

## 2018-05-22 DIAGNOSIS — J96.12 CHRONIC RESPIRATORY FAILURE WITH HYPOXIA AND HYPERCAPNIA (HCC): Primary | ICD-10-CM

## 2018-05-22 DIAGNOSIS — E66.01 MORBID OBESITY WITH BMI OF 60.0-69.9, ADULT (HCC): ICD-10-CM

## 2018-05-22 DIAGNOSIS — G47.33 OSA (OBSTRUCTIVE SLEEP APNEA): ICD-10-CM

## 2018-05-22 DIAGNOSIS — J96.11 CHRONIC RESPIRATORY FAILURE WITH HYPOXIA AND HYPERCAPNIA (HCC): Primary | ICD-10-CM

## 2018-05-22 DIAGNOSIS — E66.2 OBESITY HYPOVENTILATION SYNDROME (HCC): ICD-10-CM

## 2018-05-22 DIAGNOSIS — I50.32 DIASTOLIC CHF, CHRONIC (HCC): ICD-10-CM

## 2018-05-22 NOTE — PROGRESS NOTES
HISTORY OF PRESENT ILLNESS  Regina Amos is a 60 y.o. female. HPI Comments: Pt seen in hospital for acute on chronic hypercapnic and hypoxic respiratory failure and multiple admissions for this. She is a poor historian due to Psychiatric illness but review of records reveal severe ANDRADE/OHS with failed CPAP titration. Pt was scheduled for dedicated BIPAP titration but would end up in the hospital for either shortness of breath or AMS due to CO2 narcosis. She presents for follow up today, denies SOB or chest pain. She is inquiring about obtaining a portable O2 concentrator. Pt with severe ANDRADE/OHS Rxed with BIPAP with admitted poor compliance, per pt due to device malfunction. Shortness of Breath   Associated symptoms include leg swelling. Pertinent negatives include no fever, no headaches, no sore throat, no ear pain, no neck pain, no cough, no sputum production, no hemoptysis, no wheezing, no PND, no orthopnea, no chest pain, no vomiting, no abdominal pain, no rash and no claudication. Review of Systems   Constitutional: Positive for malaise/fatigue. Negative for chills, diaphoresis, fever and weight loss. HENT: Negative for congestion, ear discharge, ear pain, hearing loss, nosebleeds, sinus pain, sore throat and tinnitus. Eyes: Negative for blurred vision, double vision, photophobia, pain, discharge and redness. Respiratory: Positive for shortness of breath. Negative for cough, hemoptysis, sputum production, wheezing and stridor. Cardiovascular: Positive for leg swelling. Negative for chest pain, palpitations, orthopnea, claudication and PND. Gastrointestinal: Negative for abdominal pain, blood in stool, constipation, diarrhea, heartburn, melena, nausea and vomiting. Genitourinary: Negative for dysuria, flank pain, frequency, hematuria and urgency. Musculoskeletal: Positive for back pain. Negative for falls, joint pain, myalgias and neck pain. Skin: Negative for itching and rash. Neurological: Positive for sensory change and weakness. Negative for dizziness, tingling, tremors, speech change, seizures, loss of consciousness and headaches. Endo/Heme/Allergies: Negative for environmental allergies and polydipsia. Does not bruise/bleed easily. Psychiatric/Behavioral: Positive for depression. Negative for memory loss, substance abuse and suicidal ideas. Hallucinations: denies. The patient is nervous/anxious. The patient does not have insomnia. Past Medical History:   Diagnosis Date    Atrial fibrillation     CHADS score 1  (-CHF, +HTN, -AGE, -DM, -CVA)    Carpal tunnel syndrome     Chronic pain     Congenital heart disease     presumed pulmonary stenosis s/p repair 1969    Degenerative disc disease     Degenerative joint disease     Dyslipidemia     GERD     H/O echocardiogram 04/2017    EF 60-65%, mild concentric LVH, dilated RV with RV dysfunction, RVSP 54 mmHg, RA E, moderate to severe pulmonic regurgitation.  Hypertension     Hypothyroid     Morbid obesity     Morbid obesity with BMI of 45.0-49.9, adult ([facility] 2/20/2017    Noncompliance     Schizoaffective disorder     Substance abuse     marijuana, crack cocaine     Past Surgical History:   Procedure Laterality Date    COLONOSCOPY N/A 5/6/2018    COLONOSCOPY with tatooing and biopsy performed by Tucker Richter MD at [facility] 9      left    HX HYSTERECTOMY      HX KNEE REPLACEMENT      left    HX KNEE REPLACEMENT      right    HX THYROIDECTOMY       Current Outpatient Prescriptions on File Prior to Visit   Medication Sig Dispense Refill    metoprolol tartrate (LOPRESSOR) 25 mg tablet Take 0.5 Tabs by mouth two (2) times a day. 30 Tab 6    fluticasone (FLONASE) 50 mcg/actuation nasal spray 2 Sprays by Both Nostrils route daily. 1 Bottle 2    oxyCODONE IR (ROXICODONE) 5 mg immediate release tablet Take 5 mg by mouth every six (6) hours as needed for Pain.       isosorbide mononitrate ER (IMDUR) 30 mg tablet Take 1 Tab by mouth every evening. 90 Tab 3    multivitamin, tx-iron-ca-min (THERA-M W/ IRON) 9 mg iron-400 mcg tab tablet Take 1 Tab by mouth daily. 30 Tab 0    raNITIdine (ZANTAC) 150 mg tablet Take 150 mg by mouth two (2) times a day.  warfarin (COUMADIN) 5 mg tablet Take 7.5 mg by mouth daily.  VENTOLIN HFA 90 mcg/actuation inhaler Take 2 Puffs by inhalation every four (4) hours as needed for Wheezing or Shortness of Breath. 1 Inhaler 0    furosemide (LASIX) 40 mg tablet 40 mg po daily 30 Tab 0    PARoxetine (PAXIL) 20 mg tablet Take 1 Tab by mouth daily. 30 Tab 3    simvastatin (ZOCOR) 40 mg tablet Take  by mouth nightly. to obtain from pharmacy      aripiprazole (ABILIFY) 15 mg tablet Take 15 mg by mouth daily. to obtain from pharmacy      levothyroxine (SYNTHROID) 125 mcg tablet Take 125 mcg by mouth Daily (before breakfast).  loperamide (IMODIUM) 2 mg capsule Take 2 mg by mouth three (3) times daily as needed for Diarrhea. No current facility-administered medications on file prior to visit. Allergies   Allergen Reactions    Gabapentin Other (comments)     Unsteady, weakness LEs    Tetanus Vaccines And Toxoid Swelling    Topamax [Topiramate] Other (comments)     weakness     Family History   Problem Relation Age of Onset    Hypertension Mother     Coronary Artery Disease Mother     Coronary Artery Disease Father     Hypertension Father      Social History     Social History    Marital status: SINGLE     Spouse name: N/A    Number of children: N/A    Years of education: N/A     Occupational History    Not on file.      Social History Main Topics    Smoking status: Never Smoker    Smokeless tobacco: Never Used    Alcohol use No    Drug use: No    Sexual activity: Yes     Birth control/ protection: Surgical     Other Topics Concern    Not on file     Social History Narrative     Blood pressure 150/54, pulse 65, temperature 97.9 °F (36.6 °C), temperature source Oral, resp. rate 20, height 5' (1.524 m), weight 141.1 kg (311 lb), SpO2 97 %, unknown if currently breastfeeding. Physical Exam   Constitutional: She is oriented to person, place, and time. No distress. Morbidly obese, in stretcher   HENT:   Head: Normocephalic and atraumatic. Nose: Nose normal.   Mouth/Throat: Oropharynx is clear and moist. No oropharyngeal exudate. Eyes: Conjunctivae and EOM are normal. Pupils are equal, round, and reactive to light. Right eye exhibits no discharge. Left eye exhibits no discharge. No scleral icterus. Neck: No JVD present. No tracheal deviation present. No thyromegaly present. Cardiovascular: Normal rate, regular rhythm, normal heart sounds and intact distal pulses. Exam reveals no gallop. No murmur heard. Pulmonary/Chest: Effort normal. No stridor. No respiratory distress. She has no wheezes. She has no rales. She exhibits no tenderness. Distant breath sounds   Abdominal: Soft. She exhibits no mass. There is no tenderness. Musculoskeletal: She exhibits no tenderness or deformity. Edema: 3+ bipedal.   Lymphadenopathy:     She has no cervical adenopathy. Neurological: She is alert and oriented to person, place, and time. Skin: Skin is warm and dry. No rash noted. She is not diaphoretic. No erythema. No pallor.    Psychiatric: Her behavior is normal. Judgment and thought content normal.   Flat affect         2018 12:27 PM - Deepak, Card Result In       [facility], [address] [phone]    LimitedTransthoracic Echocardiogram    Patient: Hilda Perez  MRN: [id number]  ACCT #: [de-identified]  : 1960  Age: 60 years  Gender: Female  Height: 60 in  Weight: 312.4 lb  BSA: 2.26 m-sq  BP: 145 / 88 mmHg  Study date: 07-May-2018  Status: Routine  Location: [facility] DMS ACC #: [id number]    Allergies: GABAPENTIN, TETANUS VACCINES AND TOXOID, TOPIRAMATE    Referring_Ordering Physician: Shay Lawler. Berlin Brown MD  Interpreting Group: [address] [place] [address]  Interpreting Physician: Elizabeth Gomez. Susan Oakley MD  Technologist: Valeriy Brink RDMS    IMPRESSIONS:  A clinically significant myopathic process is ruled out. The findings are   consistent with significant valve disease. The findings are consistent with significant right ventricular dysfunction. Features were consistent with pulmonary  hypertension. SUMMARY:  Procedure information: This was a technically difficult study. Intravenous   contrast (Definity) was administered to  opacify the left ventricle and enhance Doppler signals. Left ventricle: Size was normal. Systolic function was by visual assessment. Ejection fraction was estimated in the  range of 50 % to 55 %. No obvious wall motion abnormalities identified in the   views obtained. Wall thickness was mildly  to moderately increased. Right ventricle: The ventricle was moderately to severely dilated. Estimated   peak pressure was 87 mmHg. Right atrium: The atrium was moderately to severely dilated. Tricuspid valve: There was moderate to severe regurgitation. Right atrial   pressure estimate: 15 mmHg. There was severe  pulmonary hypertension. Pulmonic valve: There was severe regurgitation. Inferior vena cava, hepatic veins: The inferior vena cava was dilated. The   respirophasic change in diameter was less  than 50%. The proximal IVC diameter was 31 mm. COMPARISONS:  Comparison was made with the previous study of 27-Feb-2018. LV overall   function has not changed. Pulmonary artery  pressure has increased. INDICATIONS: Shortness of breath. HISTORY: Prior history: Atrial fibrillation. Risk factors: hypertension,   diabetes, and morbid obesity. PROCEDURE: This was a routine study. The study included limited 2D imaging,   M-mode, limited spectral Doppler, and color  Doppler. The heart rate was 115 bpm, at the start of the study. Systolic   blood pressure was 145 mmHg, at the start of  the study. Diastolic blood pressure was 88 mmHg, at the start of the study. Intravenous contrast (Definity) was  administered to opacify the left ventricle and enhance Doppler signals. Echocardiographic views were limited by poor  acoustic window availability due to body habitus. This was a technically   difficult study. LEFT VENTRICLE: Size was normal. Systolic function was by visual assessment. Ejection fraction was estimated in the  range of 50 % to 55 %. No obvious wall motion abnormalities identified in the   views obtained. Wall thickness was mildly  to moderately increased. VENTRICULAR SEPTUM: There was leftward deviation. These changes are   consistent with RV volume and pressure overload. RIGHT VENTRICLE: The ventricle was moderately to severely dilated. DOPPLER:   Estimated peak pressure was 87 mmHg. RIGHT ATRIUM: The atrium was moderately to severely dilated. TRICUSPID VALVE: Normal valve structure. There was normal leaflet separation.   DOPPLER: There was no evidence for  tricuspid stenosis. There was moderate to severe regurgitation. Right atrial   pressure estimate: 15 mmHg. There was  severe pulmonary hypertension. PULMONIC VALVE: DOPPLER: There was severe regurgitation. SYSTEMIC VEINS: IVC: The inferior vena cava was dilated. The respirophasic   change in diameter was less than 50%. MEASUREMENT TABLES    2D measurements  Systemic veins   (Reference normals)  Prox IVC diam   31 mm   (12-23)           ASSESSMENT and PLAN  Encounter Diagnoses   Name Primary?     Chronic respiratory failure with hypoxia and hypercapnia (HCC) Yes    Morbid obesity with BMI of 60.0-69.9, adult (HCC)     Diastolic CHF, chronic (HCC)     Schizoaffective disorder, chronic condition (HCC)     Hypertension     Obesity hypoventilation syndrome (HCC)     Pulmonary HTN (HCC)     ANDRADE (obstructive sleep apnea)      Complex pt with multiple comorbidities resulting in repeated hospital admissions. She has severe ANDRADE/OHS difficult to treat as she failed CPAP titration and needs dedicated BIPAP titration. Will schedule this and change BIPAP settings pending these results. In the meantime pt to continue supplemental O2. Pt with SSx of pulmonary HTN likely WHO Grp 3 due to chronic hypoxemia, see above. Discussed importance of treating her respiratory illness and need for BIPAP titration with suspect that pt has poor insight into her health issues. I have asked her to have a family member or guardian around for the next visit. Will return in 4 months, hopefully BIPAP titration would have been done then. Pt advised to continue rest of medications as listed. Over 50% of this 40 minute visit was spent in face to face counseling.

## 2018-05-22 NOTE — TELEPHONE ENCOUNTER
Spoke with pt. She was not able to walk today in office. Pt came today on stretcher with medical transport. Pt states she doesn't ambulate well and would not be able to do a walk test for the POC for [facility].  She states she will discuss at a later visit if ambulation improves

## 2018-05-22 NOTE — MR AVS SNAPSHOT
[address] [facility] [place] [address] 
[phone] Patient: Brad [place] MRN: [id number] RAP:5/0/9415 Visit Information Date & Time Provider Department Dept. Phone Encounter #  
 5/22/2018 11:15 AM Cordelia Payton MD [facility] [place] [phone] [id number] Follow-up Instructions Return in about 4 months (around 9/22/2018). Follow-up and Disposition History Your Appointments 5/31/2018 11:30 AM  
[facility] Appt Note: 3 wk f/up from [address]  [phone]  
  
    
 10/12/2018 11:40 AM  
Follow Up with Trice Corral MD  
[facility] ([facility] Appt Note: 1 year follow up [place] [phone]  
[phone] [address] [place] [address] [address] Upcoming Health Maintenance Date Due Hepatitis C Screening 1960 DTaP/Tdap/Td series (1 - Tdap) 1/6/1980 PAP AKA CERVICAL CYTOLOGY 1/6/1980 FOBT Q 1 YEAR AGE 50-75 1/6/2009 BREAST CANCER SCRN MAMMOGRAM 11/6/2011 Influenza Age 5 to Adult 8/1/2018 Allergies as of 5/22/2018  Review Complete On: 5/22/2018 By: Cordelia Payton MD  
  
 Severity Noted Reaction Type Reactions Gabapentin  03/29/2017    Other (comments) Unsteady, weakness LEs Tetanus Vaccines And Toxoid    Swelling Topamax [Topiramate]  05/24/2017    Other (comments)  
 weakness Current Immunizations  Reviewed on 2/27/2018 Name Date Pneumococcal Polysaccharide (PPSV-23) 1/21/2014  2:55 PM  
  
 Not reviewed this visit You Were Diagnosed With   
  
 Codes Comments Chronic respiratory failure with hypoxia and hypercapnia (HCC)    -  Primary ICD-10-CM: J96.11, J96.12 
ICD-9-CM: 518.83, 799.02, 786.09 Morbid obesity with BMI of 60.0-69.9, adult (HCC)     ICD-10-CM: E66.01, Z68.44 
ICD-9-CM: 278.01, V85.44 Diastolic CHF, chronic (HCC)     ICD-10-CM: I50.32 
ICD-9-CM: 428.32, 428.0 Schizoaffective disorder, chronic condition ([facility]     ICD-10-CM: F25.8 ICD-9-CM: 295.72 Benign hypertensive heart disease without heart failure     ICD-10-CM: I11.9 ICD-9-CM: 402.10 Obesity hypoventilation syndrome (HCC)     ICD-10-CM: O37.5 ICD-9-CM: 278.03   
 Pulmonary HTN ([facility]     ICD-10-CM: I27.20 ICD-9-CM: 416.8 ANDRADE (obstructive sleep apnea)     ICD-10-CM: G47.33 
ICD-9-CM: 327.23 Vitals BP Pulse Temp Resp Height(growth percentile) 150/54 (BP 1 Location: Right arm, BP Patient Position: Lying left side) 65 97.9 °F (36.6 °C) (Oral) 20 5' (1.524 m) Weight(growth percentile) SpO2 BMI OB Status Smoking Status 311 lb (141.1 kg) 97% 60.74 kg/m2 Hysterectomy Never Smoker Vitals History BMI and BSA Data Body Mass Index Body Surface Area 60.74 kg/m 2 2.44 m 2 Preferred Pharmacy Pharmacy Name [facility] [address] [place] [address] [phone] Your Updated Medication List  
  
   
This list is accurate as of 5/22/18 12:08 PM.  Always use your most recent med list.  
  
  
  
  
 ABILIFY 15 mg tablet Generic drug:  ARIPiprazole Take 15 mg by mouth daily. to obtain from pharmacy  
  
 fluticasone 50 mcg/actuation nasal spray Commonly known as:  Wenda Maze 2 Sprays by Both Nostrils route daily. furosemide 40 mg tablet Commonly known as:  LASIX 40 mg po daily  
  
 isosorbide mononitrate ER 30 mg tablet Commonly known as:  IMDUR Take 1 Tab by mouth every evening. loperamide 2 mg capsule Commonly known as:  IMODIUM  
 Take 2 mg by mouth three (3) times daily as needed for Diarrhea.  
  
 metoprolol tartrate 25 mg tablet Commonly known as:  LOPRESSOR Take 0.5 Tabs by mouth two (2) times a day. multivitamin, tx-iron-ca-min 9 mg iron-400 mcg Tab tablet Commonly known as:  THERA-M w/ IRON Take 1 Tab by mouth daily. oxyCODONE IR 5 mg immediate release tablet Commonly known as:  Santo Bers Take 5 mg by mouth every six (6) hours as needed for Pain. PARoxetine 20 mg tablet Commonly known as:  PAXIL Take 1 Tab by mouth daily. raNITIdine 150 mg tablet Commonly known as:  ZANTAC Take 150 mg by mouth two (2) times a day. SYNTHROID 125 mcg tablet Generic drug:  levothyroxine Take 125 mcg by mouth Daily (before breakfast). VENTOLIN HFA 90 mcg/actuation inhaler Generic drug:  albuterol Take 2 Puffs by inhalation every four (4) hours as needed for Wheezing or Shortness of Breath.  
  
 warfarin 5 mg tablet Commonly known as:  COUMADIN Take 7.5 mg by mouth daily. ZOCOR 40 mg tablet Generic drug:  simvastatin Take  by mouth nightly. to obtain from pharmacy Follow-up Instructions Return in about 4 months (around 9/22/2018). To-Do List   
 05/23/2018 Sleep Center:  POLYSOMNOGRAPHY (CPAP)   
  
 05/29/2018 1:00 PM  
  Appointment with Juancarlos Geiger at [facility] NON-INVASIVE [address] [place] [address] ([phone]) Age Limit for ALL Heart procedures [facility] is 18 yrs and older only. Under the age of 25, refer to [address] [place] [address] ([phone]). This study requires a physician's written prescription. Patients may bring the order or it may also be faxed to Central Scheduling at [phone]. Please wear a shirt with buttons down the front. Bring list of medications. Do not have a bath/shower during your 24 hour recording.     Please remind patient they will need to return 24 hours, 48 hours, or 72 hours after monitor is in place to have it removed by the technician.  (example: If patient is scheduled for a 24 hour holter, return 24 hours after monitor is in place to have it removed. If patient is scheduled for a 48 hour holter monitor, then they would return 48 hours after holter is in place, etc.)  Staff is available until 2:30 p.m. for equipment removal/returns due on Saturday. IMPORTANT:  Once patient has been fitted with a HOLTER MONITOR, they should not have any other exams performed until the Holter has been removed. 05/30/2018 1:00 PM  
  Appointment with Juancarlos Geiger at [facility] NON-INVASIVE [address] [place] [address] ([phone]) Introducing [facility]! [facility] introduces [employer] patient portal. Now you can access parts of your medical record, email your doctor's office, and request medication refills online. 1. In your internet browser, go to [email or web address]. [employer]/[facility] 2. Click on the First Time User? Click Here link in the Sign In box. You will see the New Member Sign Up page. 3. Enter your [employer] Access Code exactly as it appears below. You will not need to use this code after youve completed the sign-up process. If you do not sign up before the expiration date, you must request a new code. · [employer] Access Code: [email or web address] Expires: 5/30/2018  5:17 PM 
 
4. Enter the last four digits of your Social Security Number (xxxx) and Date of Birth (mm/dd/yyyy) as indicated and click Submit. You will be taken to the next sign-up page. 5. Create a [employer] ID. This will be your [employer] login ID and cannot be changed, so think of one that is secure and easy to remember. 6. Create a [employer] password. You can change your password at any time. 7. Enter your Password Reset Question and Answer. This can be used at a later time if you forget your password. 8. Enter your e-mail address. You will receive e-mail notification when new information is available in [address] 9. Click Sign Up. You can now view and download portions of your medical record. 10. Click the Download Summary menu link to download a portable copy of your medical information. If you have questions, please visit the Frequently Asked Questions section of the [employer] website. Remember, [employer] is NOT to be used for urgent needs. For medical emergencies, dial 911. Now available from your iPhone and Android! Please provide this summary of care documentation to your next provider. Your primary care clinician is listed as Mel Garcia. If you have any questions after today's visit, please call [phone].

## 2018-05-22 NOTE — TELEPHONE ENCOUNTER
Pt was seen today. She said that her insurance would cover her portable o2 if she used [facility]. She asked that we send an order over.  Please call pt [phone]

## 2018-06-02 ENCOUNTER — HOSPITAL ENCOUNTER (INPATIENT)
Age: 58
LOS: 4 days | Discharge: HOME HEALTH | DRG: 194 | End: 2018-06-06
Attending: EMERGENCY MEDICINE | Admitting: INTERNAL MEDICINE
Payer: MEDICAID

## 2018-06-02 ENCOUNTER — APPOINTMENT (OUTPATIENT)
Dept: GENERAL RADIOLOGY | Age: 58
DRG: 194 | End: 2018-06-02
Attending: EMERGENCY MEDICINE
Payer: MEDICAID

## 2018-06-02 DIAGNOSIS — I50.33 ACUTE ON CHRONIC DIASTOLIC CONGESTIVE HEART FAILURE (HCC): Primary | ICD-10-CM

## 2018-06-02 PROBLEM — Z79.01 CHRONIC ANTICOAGULATION: Status: ACTIVE | Noted: 2018-06-02

## 2018-06-02 PROBLEM — I50.9 ACUTE EXACERBATION OF CHF (CONGESTIVE HEART FAILURE) (HCC): Status: ACTIVE | Noted: 2018-06-02

## 2018-06-02 LAB
ALBUMIN SERPL-MCNC: 3.2 G/DL (ref 3.4–5)
ALBUMIN/GLOB SERPL: 0.7 {RATIO} (ref 0.8–1.7)
ALP SERPL-CCNC: 141 U/L (ref 45–117)
ALT SERPL-CCNC: 18 U/L (ref 13–56)
ANION GAP SERPL CALC-SCNC: 6 MMOL/L (ref 3–18)
AST SERPL-CCNC: 19 U/L (ref 15–37)
BASOPHILS # BLD: 0 K/UL (ref 0–0.1)
BASOPHILS NFR BLD: 0 % (ref 0–2)
BILIRUB SERPL-MCNC: 0.8 MG/DL (ref 0.2–1)
BNP SERPL-MCNC: 3018 PG/ML (ref 0–900)
BUN SERPL-MCNC: 17 MG/DL (ref 7–18)
BUN/CREAT SERPL: 17 (ref 12–20)
CALCIUM SERPL-MCNC: 8.4 MG/DL (ref 8.5–10.1)
CHLORIDE SERPL-SCNC: 106 MMOL/L (ref 100–108)
CK MB CFR SERPL CALC: 1.5 % (ref 0–4)
CK MB SERPL-MCNC: 2.2 NG/ML (ref 5–25)
CK SERPL-CCNC: 148 U/L (ref 26–192)
CO2 SERPL-SCNC: 29 MMOL/L (ref 21–32)
CREAT SERPL-MCNC: 1 MG/DL (ref 0.6–1.3)
DIFFERENTIAL METHOD BLD: ABNORMAL
EOSINOPHIL # BLD: 0.1 K/UL (ref 0–0.4)
EOSINOPHIL NFR BLD: 3 % (ref 0–5)
ERYTHROCYTE [DISTWIDTH] IN BLOOD BY AUTOMATED COUNT: 18.4 % (ref 11.6–14.5)
GLOBULIN SER CALC-MCNC: 4.3 G/DL (ref 2–4)
GLUCOSE SERPL-MCNC: 104 MG/DL (ref 74–99)
HCT VFR BLD AUTO: 30.3 % (ref 35–45)
HGB BLD-MCNC: 8.8 G/DL (ref 12–16)
LYMPHOCYTES # BLD: 0.9 K/UL (ref 0.9–3.6)
LYMPHOCYTES NFR BLD: 22 % (ref 21–52)
MCH RBC QN AUTO: 21.9 PG (ref 24–34)
MCHC RBC AUTO-ENTMCNC: 29 G/DL (ref 31–37)
MCV RBC AUTO: 75.4 FL (ref 74–97)
MONOCYTES # BLD: 0.3 K/UL (ref 0.05–1.2)
MONOCYTES NFR BLD: 7 % (ref 3–10)
NEUTS SEG # BLD: 2.7 K/UL (ref 1.8–8)
NEUTS SEG NFR BLD: 68 % (ref 40–73)
PLATELET # BLD AUTO: 176 K/UL (ref 135–420)
PMV BLD AUTO: 10.1 FL (ref 9.2–11.8)
POTASSIUM SERPL-SCNC: 3.7 MMOL/L (ref 3.5–5.5)
PROT SERPL-MCNC: 7.5 G/DL (ref 6.4–8.2)
RBC # BLD AUTO: 4.02 M/UL (ref 4.2–5.3)
SODIUM SERPL-SCNC: 141 MMOL/L (ref 136–145)
TROPONIN I SERPL-MCNC: 0.02 NG/ML (ref 0–0.04)
WBC # BLD AUTO: 4 K/UL (ref 4.6–13.2)

## 2018-06-02 PROCEDURE — 74011250636 HC RX REV CODE- 250/636: Performed by: EMERGENCY MEDICINE

## 2018-06-02 PROCEDURE — 74011250637 HC RX REV CODE- 250/637: Performed by: EMERGENCY MEDICINE

## 2018-06-02 PROCEDURE — 99285 EMERGENCY DEPT VISIT HI MDM: CPT

## 2018-06-02 PROCEDURE — 93005 ELECTROCARDIOGRAM TRACING: CPT

## 2018-06-02 PROCEDURE — 65270000029 HC RM PRIVATE

## 2018-06-02 PROCEDURE — 85025 COMPLETE CBC W/AUTO DIFF WBC: CPT | Performed by: EMERGENCY MEDICINE

## 2018-06-02 PROCEDURE — 80053 COMPREHEN METABOLIC PANEL: CPT | Performed by: EMERGENCY MEDICINE

## 2018-06-02 PROCEDURE — 83880 ASSAY OF NATRIURETIC PEPTIDE: CPT

## 2018-06-02 PROCEDURE — 82550 ASSAY OF CK (CPK): CPT | Performed by: EMERGENCY MEDICINE

## 2018-06-02 PROCEDURE — 96374 THER/PROPH/DIAG INJ IV PUSH: CPT

## 2018-06-02 PROCEDURE — 71045 X-RAY EXAM CHEST 1 VIEW: CPT

## 2018-06-02 RX ORDER — METOPROLOL TARTRATE 25 MG/1
12.5 TABLET, FILM COATED ORAL 2 TIMES DAILY
Status: DISCONTINUED | OUTPATIENT
Start: 2018-06-03 | End: 2018-06-06 | Stop reason: HOSPADM

## 2018-06-02 RX ORDER — WARFARIN 7.5 MG/1
7.5 TABLET ORAL
Status: COMPLETED | OUTPATIENT
Start: 2018-06-03 | End: 2018-06-03

## 2018-06-02 RX ORDER — FUROSEMIDE 10 MG/ML
80 INJECTION INTRAMUSCULAR; INTRAVENOUS
Status: COMPLETED | OUTPATIENT
Start: 2018-06-02 | End: 2018-06-02

## 2018-06-02 RX ORDER — ARIPIPRAZOLE 5 MG/1
15 TABLET ORAL DAILY
Status: DISCONTINUED | OUTPATIENT
Start: 2018-06-03 | End: 2018-06-06 | Stop reason: HOSPADM

## 2018-06-02 RX ORDER — OXYCODONE HYDROCHLORIDE 5 MG/1
5 TABLET ORAL
Status: DISCONTINUED | OUTPATIENT
Start: 2018-06-02 | End: 2018-06-06 | Stop reason: HOSPADM

## 2018-06-02 RX ORDER — DOCUSATE SODIUM 100 MG/1
100 CAPSULE, LIQUID FILLED ORAL
Status: DISCONTINUED | OUTPATIENT
Start: 2018-06-02 | End: 2018-06-05

## 2018-06-02 RX ORDER — ONDANSETRON 2 MG/ML
4 INJECTION INTRAMUSCULAR; INTRAVENOUS
Status: DISCONTINUED | OUTPATIENT
Start: 2018-06-03 | End: 2018-06-06 | Stop reason: HOSPADM

## 2018-06-02 RX ORDER — ACETAMINOPHEN 325 MG/1
650 TABLET ORAL
Status: DISCONTINUED | OUTPATIENT
Start: 2018-06-02 | End: 2018-06-06 | Stop reason: HOSPADM

## 2018-06-02 RX ORDER — RANITIDINE 150 MG/1
150 TABLET, FILM COATED ORAL 2 TIMES DAILY
Status: DISCONTINUED | OUTPATIENT
Start: 2018-06-03 | End: 2018-06-06 | Stop reason: HOSPADM

## 2018-06-02 RX ORDER — OXYCODONE HYDROCHLORIDE 5 MG/1
10 TABLET ORAL
Status: COMPLETED | OUTPATIENT
Start: 2018-06-02 | End: 2018-06-02

## 2018-06-02 RX ORDER — POTASSIUM CHLORIDE 20 MEQ/1
40 TABLET, EXTENDED RELEASE ORAL DAILY
Status: COMPLETED | OUTPATIENT
Start: 2018-06-03 | End: 2018-06-04

## 2018-06-02 RX ORDER — FUROSEMIDE 10 MG/ML
40 INJECTION INTRAMUSCULAR; INTRAVENOUS EVERY 12 HOURS
Status: COMPLETED | OUTPATIENT
Start: 2018-06-03 | End: 2018-06-05

## 2018-06-02 RX ORDER — ISOSORBIDE MONONITRATE 30 MG/1
30 TABLET, EXTENDED RELEASE ORAL EVERY EVENING
Status: DISCONTINUED | OUTPATIENT
Start: 2018-06-03 | End: 2018-06-06 | Stop reason: HOSPADM

## 2018-06-02 RX ORDER — NALOXONE HYDROCHLORIDE 0.4 MG/ML
0.4 INJECTION, SOLUTION INTRAMUSCULAR; INTRAVENOUS; SUBCUTANEOUS AS NEEDED
Status: DISCONTINUED | OUTPATIENT
Start: 2018-06-02 | End: 2018-06-06 | Stop reason: HOSPADM

## 2018-06-02 RX ORDER — LEVOTHYROXINE SODIUM 125 UG/1
125 TABLET ORAL
Status: DISCONTINUED | OUTPATIENT
Start: 2018-06-03 | End: 2018-06-06 | Stop reason: HOSPADM

## 2018-06-02 RX ORDER — WARFARIN 7.5 MG/1
7.5 TABLET ORAL DAILY
Status: DISCONTINUED | OUTPATIENT
Start: 2018-06-03 | End: 2018-06-02

## 2018-06-02 RX ORDER — SIMVASTATIN 40 MG/1
40 TABLET, FILM COATED ORAL
Status: DISCONTINUED | OUTPATIENT
Start: 2018-06-03 | End: 2018-06-06 | Stop reason: HOSPADM

## 2018-06-02 RX ORDER — PAROXETINE HYDROCHLORIDE 20 MG/1
20 TABLET, FILM COATED ORAL DAILY
Status: DISCONTINUED | OUTPATIENT
Start: 2018-06-03 | End: 2018-06-06 | Stop reason: HOSPADM

## 2018-06-02 RX ADMIN — OXYCODONE HYDROCHLORIDE 10 MG: 5 TABLET ORAL at 21:15

## 2018-06-02 RX ADMIN — FUROSEMIDE 80 MG: 10 INJECTION, SOLUTION INTRAVENOUS at 21:16

## 2018-06-02 NOTE — IP AVS SNAPSHOT
[address] [address] [place] Patient: Luisito Ojeda MRN: [id number] QGM:0[birth year]45 A check nimo indicates which time of day the medication should be taken. My Medications START taking these medications Instructions Each Dose to Equal  
 Morning Noon Evening Bedtime  
 docusate sodium 100 mg capsule Commonly known as:  Leigh Busing Your last dose was: Your next dose is: Take 1 Cap by mouth two (2) times a day for 90 days. HOLD for loose stool. 100 mg  
    
   
   
   
  
 lisinopril 10 mg tablet Commonly known as:  Velora Bobby Start taking on:  6/7/2018 Your last dose was: Your next dose is: Take 1 Tab by mouth daily. 10 mg  
    
   
   
   
  
 polyethylene glycol 17 gram packet Commonly known as:  Rudolph Greenock Your last dose was: Your next dose is: Take 1 Packet by mouth daily. 17 g CHANGE how you take these medications Instructions Each Dose to Equal  
 Morning Noon Evening Bedtime  
 furosemide 40 mg tablet Commonly known as:  LASIX What changed:   
- how much to take 
- how to take this - when to take this Your last dose was: Your next dose is: Take 1 Tab by mouth two (2) times a day. 40 mg po daily 40 mg CONTINUE taking these medications Instructions Each Dose to Equal  
 Morning Noon Evening Bedtime ABILIFY 15 mg tablet Generic drug:  ARIPiprazole Your last dose was: Your next dose is: Take 15 mg by mouth daily. to obtain from pharmacy 15 mg  
    
   
   
   
  
 fluticasone 50 mcg/actuation nasal spray Commonly known as:  Conrado Alejo Your last dose was: Your next dose is: 2 Sprays by Both Nostrils route daily. 2 [place]de isosorbide mononitrate ER 30 mg tablet Commonly known as:  IMDUR Your last dose was: Your next dose is: Take 1 Tab by mouth every evening. 30 mg  
    
   
   
   
  
 loperamide 2 mg capsule Commonly known as:  IMODIUM Your last dose was: Your next dose is: Take 2 mg by mouth three (3) times daily as needed for Diarrhea.  
 2 mg  
    
   
   
   
  
 metoprolol tartrate 25 mg tablet Commonly known as:  LOPRESSOR Your last dose was: Your next dose is: Take 0.5 Tabs by mouth two (2) times a day. 12.5 mg  
    
   
   
   
  
 multivitamin, tx-iron-ca-min 9 mg iron-400 mcg Tab tablet Commonly known as:  THERA-M w/ IRON Your last dose was: Your next dose is: Take 1 Tab by mouth daily. 1 Tab  
    
   
   
   
  
 oxyCODONE IR 5 mg immediate release tablet Commonly known as:  Onita Estimable Your last dose was: Your next dose is: Take 5 mg by mouth every six (6) hours as needed for Pain. 5 mg PARoxetine 20 mg tablet Commonly known as:  PAXIL Your last dose was: Your next dose is: Take 1 Tab by mouth daily. 20 mg  
    
   
   
   
  
 raNITIdine 150 mg tablet Commonly known as:  ZANTAC Your last dose was: Your next dose is: Take 150 mg by mouth two (2) times a day. 150 mg SYNTHROID 125 mcg tablet Generic drug:  levothyroxine Your last dose was: Your next dose is: Take 125 mcg by mouth Daily (before breakfast). 125 mcg VENTOLIN HFA 90 mcg/actuation inhaler Generic drug:  albuterol Your last dose was: Your next dose is: Take 2 Puffs by inhalation every four (4) hours as needed for Wheezing or Shortness of Breath. 2 Puff  
    
   
   
   
  
 warfarin 5 mg tablet Commonly known as:  COUMADIN Your last dose was: Your next dose is: Take 7.5 mg by mouth daily. 7.5 mg  
    
   
   
   
  
 ZOCOR 40 mg tablet Generic drug:  simvastatin Your last dose was: Your next dose is: Take  by mouth nightly. to obtain from pharmacy Where to Get Your Medications Information on where to get these meds will be given to you by the nurse or doctor. ! Ask your nurse or doctor about these medications  
  docusate sodium 100 mg capsule  
 furosemide 40 mg tablet  
 lisinopril 10 mg tablet  
 polyethylene glycol 17 gram packet

## 2018-06-02 NOTE — IP AVS SNAPSHOT
[facility] 
 
 
 [address] [address] Ariton Donta Patient: Stephanie Jorgensen MRN: [id number] MYN:6[birth year]1/[birth year]77 About your hospitalization You were admitted on:  June 2, 2018 You last received care in the:  [address] [place] [address] You were discharged on:  June 6, 2018 Why you were hospitalized Your primary diagnosis was:  Acute On Chronic Diastolic Congestive Heart Failure (Hcc) Your diagnoses also included:  Fluid Overload, Benign Hypertensive Heart Disease Without Heart Failure, Hypothyroidism, Morbid Obesity (Hcc), Atrial Fibrillation (Hcc), Chronic Anticoagulation Follow-up Information Follow up With Details Comments Contact Info Steve Davis MD On 6/13/2018 at 1:15pm 500 [facility] 103 Merged with [facility] [id number] [phone] Mora Moore MD On 6/28/2018 at 10:20am ( as previously scheduled) [address] 200 200 [facility] 
[phone] Carlyn Lam MD  office will call patient with follow-up appt. [address] [place] [address] 2520 [facility] [address] [phone] Discharge Orders None A check nimo indicates which time of day the medication should be taken. My Medications START taking these medications Instructions Each Dose to Equal  
 Morning Noon Evening Bedtime  
 docusate sodium 100 mg capsule Commonly known as:  Vikas Beltran Your last dose was: Your next dose is: Take 1 Cap by mouth two (2) times a day for 90 days. HOLD for loose stool. 100 mg  
    
   
   
   
  
 lisinopril 10 mg tablet Commonly known as:  Phong Marvin Start taking on:  6/7/2018 Your last dose was: Your next dose is: Take 1 Tab by mouth daily. 10 mg  
    
   
   
   
  
 polyethylene glycol 17 gram packet Commonly known as:  Jeanine Dora Your last dose was: Your next dose is: Take 1 Packet by mouth daily. 17 g CHANGE how you take these medications Instructions Each Dose to Equal  
 Morning Noon Evening Bedtime  
 furosemide 40 mg tablet Commonly known as:  LASIX What changed:   
- how much to take 
- how to take this - when to take this Your last dose was: Your next dose is: Take 1 Tab by mouth two (2) times a day. 40 mg po daily 40 mg CONTINUE taking these medications Instructions Each Dose to Equal  
 Morning Noon Evening Bedtime ABILIFY 15 mg tablet Generic drug:  ARIPiprazole Your last dose was: Your next dose is: Take 15 mg by mouth daily. to obtain from pharmacy 15 mg  
    
   
   
   
  
 fluticasone 50 mcg/actuation nasal spray Commonly known as:  Norbert Linton Your last dose was: Your next dose is: 2 Sprays by Both Nostrils route daily. 2 Spray  
    
   
   
   
  
 isosorbide mononitrate ER 30 mg tablet Commonly known as:  IMDUR Your last dose was: Your next dose is: Take 1 Tab by mouth every evening. 30 mg  
    
   
   
   
  
 loperamide 2 mg capsule Commonly known as:  IMODIUM Your last dose was: Your next dose is: Take 2 mg by mouth three (3) times daily as needed for Diarrhea.  
 2 mg  
    
   
   
   
  
 metoprolol tartrate 25 mg tablet Commonly known as:  LOPRESSOR Your last dose was: Your next dose is: Take 0.5 Tabs by mouth two (2) times a day. 12.5 mg  
    
   
   
   
  
 multivitamin, tx-iron-ca-min 9 mg iron-400 mcg Tab tablet Commonly known as:  THERA-M w/ IRON Your last dose was: Your next dose is: Take 1 Tab by mouth daily. 1 Tab  
    
   
   
   
  
 oxyCODONE IR 5 mg immediate release tablet Commonly known as:  Uzair De La Cruz Your last dose was: Your next dose is: Take 5 mg by mouth every six (6) hours as needed for Pain. 5 mg PARoxetine 20 mg tablet Commonly known as:  PAXIL Your last dose was: Your next dose is: Take 1 Tab by mouth daily. 20 mg  
    
   
   
   
  
 raNITIdine 150 mg tablet Commonly known as:  ZANTAC Your last dose was: Your next dose is: Take 150 mg by mouth two (2) times a day. 150 mg SYNTHROID 125 mcg tablet Generic drug:  levothyroxine Your last dose was: Your next dose is: Take 125 mcg by mouth Daily (before breakfast). 125 mcg VENTOLIN HFA 90 mcg/actuation inhaler Generic drug:  albuterol Your last dose was: Your next dose is: Take 2 Puffs by inhalation every four (4) hours as needed for Wheezing or Shortness of Breath. 2 Puff  
    
   
   
   
  
 warfarin 5 mg tablet Commonly known as:  COUMADIN Your last dose was: Your next dose is: Take 7.5 mg by mouth daily. 7.5 mg  
    
   
   
   
  
 ZOCOR 40 mg tablet Generic drug:  simvastatin Your last dose was: Your next dose is: Take  by mouth nightly. to obtain from pharmacy Where to Get Your Medications Information on where to get these meds will be given to you by the nurse or doctor. ! Ask your nurse or doctor about these medications  
  docusate sodium 100 mg capsule  
 furosemide 40 mg tablet  
 lisinopril 10 mg tablet  
 polyethylene glycol 17 gram packet Opioid Education Prescription Opioids: What You Need to Know: 
 
Prescription opioids can be used to help relieve moderate-to-severe pain and are often prescribed following a surgery or injury, or for certain health conditions.   These medications can be an important part of treatment but also come with serious risks. Opioids are strong pain medicines. Examples include hydrocodone, oxycodone, fentanyl, and morphine. Heroin is an example of an illegal opioid. It is important to work with your health care provider to make sure you are getting the safest, most effective care. WHAT ARE THE RISKS AND SIDE EFFECTS OF OPIOID USE? Prescription opioids carry serious risks of addiction and overdose, especially with prolonged use. An opioid overdose, often marked by slow breathing, can cause sudden death. The use of prescription opioids can have a number of side effects as well, even when taken as directed. · Tolerance-meaning you might need to take more of a medication for the same pain relief · Physical dependence-meaning you have symptoms of withdrawal when the medication is stopped. Withdrawal symptoms can include nausea, sweating, chills, diarrhea, stomach cramps, and muscle aches. Withdrawal can last up to several weeks, depending on which drug you took and how long you took it. · Increased sensitivity to pain · Constipation · Nausea, vomiting, and dry mouth · Sleepiness and dizziness · Confusion · Depression · Low levels of testosterone that can result in lower sex drive, energy, and strength · Itching and sweating RISKS ARE GREATER WITH:      
· History of drug misuse, substance use disorder, or overdose · Mental health conditions (such as depression or anxiety) · Sleep apnea · Older age (72 years or older) · Pregnancy Avoid alcohol while taking prescription opioids. Also, unless specifically advised by your health care provider, medications to avoid include: · Benzodiazepines (such as Xanax or Valium) · Muscle relaxants (such as Soma or Flexeril) · Hypnotics (such as Ambien or Lunesta) · Other prescription opioids KNOW YOUR OPTIONS Talk to your health care provider about ways to manage your pain that don't involve prescription opioids. Some of these options may actually work better and have fewer risks and side effects. Options may include: 
· Pain relievers such as acetaminophen, ibuprofen, and naproxen · Some medications that are also used for depression or seizures · Physical therapy and exercise · Counseling to help patients learn how to cope better with triggers of pain and stress. · Application of heat or cold compress · Massage therapy · Relaxation techniques Be Informed Make sure you know the name of your medication, how much and how often to take it, and its potential risks & side effects. IF YOU ARE PRESCRIBED OPIOIDS FOR PAIN: 
· Never take opioids in greater amounts or more often than prescribed. Remember the goal is not to be pain-free but to manage your pain at a tolerable level. · Follow up with your primary care provider to: · Work together to create a plan on how to manage your pain. · Talk about ways to help manage your pain that don't involve prescription opioids. · Talk about any and all concerns and side effects. · Help prevent misuse and abuse. · Never sell or share prescription opioids · Help prevent misuse and abuse. · Store prescription opioids in a secure place and out of reach of others (this may include visitors, children, friends, and family). · Safely dispose of unused/unwanted prescription opioids: Find your community drug take-back program or your pharmacy mail-back program, or flush them down the toilet, following guidance from the Food and Drug Administration (www.fda.gov/Drugs/ResourcesForYou). · Visit www.cdc.gov/drugoverdose to learn about the risks of opioid abuse and overdose. · If you believe you may be struggling with addiction, tell your health care provider and ask for guidance or call [address] [place], [place] [address] at [phone]. Discharge Instructions Atrial Fibrillation: Care Instructions Your Care Instructions Atrial fibrillation is an irregular and often fast heartbeat. Treating this condition is important for several reasons. It can cause blood clots, which can travel from your heart to your brain and cause a stroke. If you have a fast heartbeat, you may feel lightheaded, dizzy, and weak. An irregular heartbeat can also increase your risk for heart failure. Atrial fibrillation is often the result of another heart condition, such as high blood pressure or coronary artery disease. Making changes to improve your heart condition will help you stay healthy and active. Follow-up care is a key part of your treatment and safety. Be sure to make and go to all appointments, and call your doctor if you are having problems. It's also a good idea to know your test results and keep a list of the medicines you take. How can you care for yourself at home? Medicines ? · Take your medicines exactly as prescribed. Call your doctor if you think you are having a problem with your medicine. You will get more details on the specific medicines your doctor prescribes. ? · If your doctor has given you a blood thinner to prevent a stroke, be sure you get instructions about how to take your medicine safely. Blood thinners can cause serious bleeding problems. ? · Do not take any vitamins, over-the-counter drugs, or herbal products without talking to your doctor first. ? Lifestyle changes ? · Do not smoke. Smoking can increase your chance of a stroke and heart attack. If you need help quitting, talk to your doctor about stop-smoking programs and medicines. These can increase your chances of quitting for good. ? · Eat a heart-healthy diet. ? · Stay at a healthy weight. Lose weight if you need to.  
? · Limit alcohol to 2 drinks a day for men and 1 drink a day for women. Too much alcohol can cause health problems. ? · Avoid colds and flu. Get a pneumococcal vaccine shot. If you have had one before, ask your doctor whether you need another dose. Get a flu shot every year. If you must be around people with colds or flu, wash your hands often. Activity ? · If your doctor recommends it, get more exercise. Walking is a good choice. Bit by bit, increase the amount you walk every day. Try for at least 30 minutes on most days of the week. You also may want to swim, bike, or do other activities. Your doctor may suggest that you join a cardiac rehabilitation program so that you can have help increasing your physical activity safely. ? · Start light exercise if your doctor says it is okay. Even a small amount will help you get stronger, have more energy, and manage stress. Walking is an easy way to get exercise. Start out by walking a little more than you did in the hospital. Gradually increase the amount you walk. ? · When you exercise, watch for signs that your heart is working too hard. You are pushing too hard if you cannot talk while you are exercising. If you become short of breath or dizzy or have chest pain, sit down and rest immediately. ? · Check your pulse regularly. Place two fingers on the artery at the palm side of your wrist, in line with your thumb. If your heartbeat seems uneven or fast, talk to your doctor. When should you call for help? Call 911 anytime you think you may need emergency care. For example, call if: 
? · You have symptoms of a heart attack. These may include: ¨ Chest pain or pressure, or a strange feeling in the chest. 
¨ Sweating. ¨ Shortness of breath. ¨ Nausea or vomiting. ¨ Pain, pressure, or a strange feeling in the back, neck, jaw, or upper belly or in one or both shoulders or arms. ¨ Lightheadedness or sudden weakness. ¨ A fast or irregular heartbeat.  
After you call 911, the  may tell you to chew 1 adult-strength or 2 to 4 low-dose aspirin. Wait for an ambulance. Do not try to drive yourself. ? · You have symptoms of a stroke. These may include: 
¨ Sudden numbness, tingling, weakness, or loss of movement in your face, arm, or leg, especially on only one side of your body. ¨ Sudden vision changes. ¨ Sudden trouble speaking. ¨ Sudden confusion or trouble understanding simple statements. ¨ Sudden problems with walking or balance. ¨ A sudden, severe headache that is different from past headaches. ? · You passed out (lost consciousness). ?Call your doctor now or seek immediate medical care if: 
? · You have new or increased shortness of breath. ? · You feel dizzy or lightheaded, or you feel like you may faint. ? · Your heart rate becomes irregular. ? · You can feel your heart flutter in your chest or skip heartbeats. Tell your doctor if these symptoms are new or worse. ? Watch closely for changes in your health, and be sure to contact your doctor if you have any problems. Where can you learn more? Go to http://bucky-gerard.info/. Enter U020 in the search box to learn more about \"Atrial Fibrillation: Care Instructions. \" Current as of: September 21, 2016 Content Version: 11.4 © 2847-7361 [employer]. Care instructions adapted under license by [employer] (which disclaims liability or warranty for this information). If you have questions about a medical condition or this instruction, always ask your healthcare professional. [address] [address] any warranty or liability for your use of this information. [email or web address] Activation Thank you for requesting access to [email or web address]. Please follow the instructions below to securely access and download your online medical record. [email or web address] allows you to send messages to your doctor, view your test results, renew your prescriptions, schedule appointments, and more. How Do I Sign Up? 1. In your internet browser, go to [email or web address] 
2. Click on the First Time User? Click Here link in the Sign In box. You will be redirect to the New Member Sign Up page. 3. Enter your Citilog Access Code exactly as it appears below. You will not need to use this code after youve completed the sign-up process. If you do not sign up before the expiration date, you must request a new code. Citilog Access Code: [email or web address] Expires: 2018  7:58 PM (This is the date your Citilog access code will ) 4. Enter the last four digits of your Social Security Number (xxxx) and Date of Birth (mm/dd/yyyy) as indicated and click Submit. You will be taken to the next sign-up page. 5. Create a Citilog ID. This will be your Citilog login ID and cannot be changed, so think of one that is secure and easy to remember. 6. Create a Citilog password. You can change your password at any time. 7. Enter your Password Reset Question and Answer. This can be used at a later time if you forget your password. 8. Enter your e-mail address. You will receive e-mail notification when new information is available in [address] 9. Click Sign Up. You can now view and download portions of your medical record. 10. Click the Download Summary menu link to download a portable copy of your medical information. Additional Information If you have questions, please visit the Frequently Asked Questions section of the Citilog website at [email or web address] Remember, Citilog is NOT to be used for urgent needs. For medical emergencies, dial 911. DISCHARGE SUMMARY from Nurse PATIENT INSTRUCTIONS: 
 
 
F-face looks uneven A-arms unable to move or move unevenly S-speech slurred or non-existent T-time-call 911 as soon as signs and symptoms begin-DO NOT go Back to bed or wait to see if you get better-TIME IS BRAIN. Warning Signs of HEART ATTACK Call 911 if you have these symptoms: 
? Chest discomfort. Most heart attacks involve discomfort in the center of the chest that lasts more than a few minutes, or that goes away and comes back. It can feel like uncomfortable pressure, squeezing, fullness, or pain. ? Discomfort in other areas of the upper body. Symptoms can include pain or discomfort in one or both arms, the back, neck, jaw, or stomach. ? Shortness of breath with or without chest discomfort. ? Other signs may include breaking out in a cold sweat, nausea, or lightheadedness. Don't wait more than five minutes to call [address]! Fast action can save your life. Calling 911 is almost always the fastest way to get lifesaving treatment. Emergency Medical Services staff can begin treatment when they arrive  up to an hour sooner than if someone gets to the hospital by car. The discharge information has been reviewed with the patient. The patient verbalized understanding. Discharge medications reviewed with the patient and appropriate educational materials and side effects teaching were provided. _____________________ Patient armband removed and shredded 
______________________________________________________________________________________________________________ iCouchhart Announcement We are excited to announce that we are making your provider's discharge notes available to you in MediSwipet. You will see these notes when they are completed and signed by the physician that discharged you from your recent hospital stay.   If you have any questions or concerns about any information you see in MediSwipet, please call the Health Information Department where you were seen or reach out to your Primary Care Provider for more information about your plan of care. Introducing [facility]! [facility] introduces [email or web address] patient portal. Now you can access parts of your medical record, email your doctor's office, and request medication refills online. 1. In your internet browser, go to [email or web address] 2. Click on the First Time User? Click Here link in the Sign In box. You will see the New Member Sign Up page. 3. Enter your [email or web address] Access Code exactly as it appears below. You will not need to use this code after youve completed the sign-up process. If you do not sign up before the expiration date, you must request a new code. · [email or web address] Access Code: [email or web address] Expires: 8/31/2018  7:58 PM 
 
4. Enter the last four digits of your Social Security Number (xxxx) and Date of Birth (mm/dd/yyyy) as indicated and click Submit. You will be taken to the next sign-up page. 5. Create a [email or web address] ID. This will be your [email or web address] login ID and cannot be changed, so think of one that is secure and easy to remember. 6. Create a [email or web address] password. You can change your password at any time. 7. Enter your Password Reset Question and Answer. This can be used at a later time if you forget your password. 8. Enter your e-mail address. You will receive e-mail notification when new information is available in [address] 9. Click Sign Up. You can now view and download portions of your medical record. 10. Click the Download Summary menu link to download a portable copy of your medical information. If you have questions, please visit the Frequently Asked Questions section of the [email or web address] website. Remember, [email or web address] is NOT to be used for urgent needs. For medical emergencies, dial 911. Now available from your iPhone and Android! Introducing Uri Castellanos As a [facility] patient, I wanted to make you aware of our electronic visit tool called Uri Castellanos. [employer] 24/7 allows you to connect within minutes with a medical provider 24 hours a day, seven days a week via a mobile device or tablet or logging into a secure website from your computer. You can access [email or web address] from anywhere in the [place]. A virtual visit might be right for you when you have a simple condition and feel like you just dont want to get out of bed, or cant get away from work for an appointment, when your regular [employer] provider is not available (evenings, weekends or holidays), or when youre out of town and need minor care. Electronic visits cost only $49 and if the [employer] 24/7 provider determines a prescription is needed to treat your condition, one can be electronically transmitted to a nearby pharmacy*. Please take a moment to enroll today if you have not already done so. The enrollment process is free and takes just a few minutes. To enroll, please download the [employer] 24/7 laura to your tablet or phone, or visit [email or web address] to enroll on your computer. And, as an [address] [place] [address] patient with a [email or web address] account, the results of your visits will be scanned into your electronic medical record and your primary care provider will be able to view the scanned results. We urge you to continue to see your regular [employer] provider for your ongoing medical care. And while your primary care provider may not be the one available when you seek a RFEyeDdantefin virtual visit, the peace of mind you get from getting a real diagnosis real time can be priceless. For more information on RFEyeDdantefin, view our Frequently Asked Questions (FAQs) at [email or web address]. Sincerely, 
 
Tisha Calderon MD 
Chief Medical Officer [employer] *:  certain medications cannot be prescribed via RFEyeDdantefin Providers Seen During Your Hospitalization Provider Specialty Primary office phone Belkis Bowen MD Emergency Medicine [phone] Ching Bray MD Hospitalist [phone] Mariana Juares MD Internal Medicine [phone] Your Primary Care Physician (PCP) Primary Care Physician Office Phone Office Fax Elba Presley [phone] You are allergic to the following Allergen Reactions Gabapentin Other (comments) Unsteady, weakness LEs Tetanus Vaccines And Toxoid Swelling Topamax (Topiramate) Other (comments)  
 weakness Recent Documentation Weight Breastfeeding? BMI OB Status Smoking Status 136.7 kg No 58.86 kg/m2 Hysterectomy Never Smoker Emergency Contacts Name Discharge Info Relation Home Work Mobile Ortiz,Cesilia DISCHARGE CAREGIVER [3] Sister [23] [phone] Ortiz,Tatiana DISCHARGE CAREGIVER [3] Daughter [21] [phone] Patient Belongings The following personal items are in your possession at time of discharge: 
  Dental Appliances: None  Visual Aid: Glasses      Home Medications: None   Jewelry: None  Clothing: At bedside, Pants, Shirt    Other Valuables: At bedside, Cell Phone, Eyeglasses, Money (comment), Ramsay Gosselin (placed in patient's closet)  Personal Items Sent to Safe: no 
 
  
  
Discharge Instructions Attachments/References WEIGHT: OVERWEIGHT (ENGLISH) Patient Handouts When You Are Overweight: Care Instructions Your Care Instructions If you're overweight, your doctor may recommend that you make changes in your eating and exercise habits. Being overweight can lead to serious health problems, such as high blood pressure, heart disease, type 2 diabetes, and arthritis, or it can make these problems worse. Eating a healthy diet and being more active can help you reach and stay at a healthy weight. You don't have to make huge changes all at once. Start by making small changes in your eating and exercise habits.  To lose weight, you need to burn more calories than you take in. You can do this by eating healthy foods in reasonable amounts and becoming more active every day. Follow-up care is a key part of your treatment and safety. Be sure to make and go to all appointments, and call your doctor if you are having problems. It's also a good idea to know your test results and keep a list of the medicines you take. How can you care for yourself at home? · Improve your eating habits. You'll be more successful if you work on changing one eating habit at a time. All foods, if eaten in moderation, can be part of healthy eating. Remember to: 
[facility] a variety of foods from each food group. Include grains, vegetables, fruits, dairy, and protein foods. ¨ Limit foods high in fat, sugar, and calories. ¨ Eat slowly. And don't do anything else, such as watch TV, while you are eating. ¨ Pay attention to portion sizes. Put your food on a smaller plate. ¨ Plan your meals ahead of time. You'll be less likely to grab something that's not as healthy. · Get active. Regular activity can help you feel better, have more energy, and burn more calories. If you haven't been active, start slowly. Start with at least 30 minutes of moderate activity on most days of the week. Then gradually increase the amount of activity. Try for 60 or 90 minutes a day, at least 5 days a week. There are a lot of ways to fit activity into your life. You can: 
¨ Walk or bike to the store. Or walk with a friend, or walk the dog. ¨ Mow the lawn, rake leaves, shovel snow, or do some gardening. ¨ Use the stairs instead of the elevator, at least for a few floors. · Change your thinking. Your thoughts have a lot to do with how you feel and what you do. When you're trying to reach a healthy weight, changing how you think about certain things may help. Here are some ideas: ¨ Don't compare yourself to others. Healthy bodies come in all shapes and sizes. ¨ Pay attention to how hungry or full you feel. When you eat, be aware of why you're eating and how much you're eating. ¨ Focus on improving your health instead of dieting. Dieting almost never works over the long term. · Ask your doctor about other health professionals who can help you reach a healthy weight. ¨ A dietitian can help you make healthy changes in your diet. ¨ An exercise specialist or  can help you develop a safe and effective exercise program. 
¨ A counselor or psychiatrist can help you cope with issues such as depression, anxiety, or family problems that can make it hard to focus on reaching a healthy weight. · Get support from your family, your doctor, your friends, a support group-and support yourself. Where can you learn more? Go to http://bucky-gerard.info/. Enter U669 in the search box to learn more about \"When You Are Overweight: Care Instructions. \" Current as of: October 13, 2016 Content Version: 11.4 © 1116-4891 [employer], [employer]. Care instructions adapted under license by [employer] (which disclaims liability or warranty for this information). If you have questions about a medical condition or this instruction, always ask your healthcare professional. [address] any warranty or liability for your use of this information. Please provide this summary of care documentation to your next provider. Signatures-by signing, you are acknowledging that this After Visit Summary has been reviewed with you and you have received a copy. Patient Signature:  ____________________________________________________________ Date:  ____________________________________________________________  
  
Hanna Garcia Provider Signature:  ____________________________________________________________ Date:  ____________________________________________________________

## 2018-06-02 NOTE — ED NOTES
Assumed care of pt from EMS. Pt reports to ED with c/o SOB x3 days. Pt given breathing treatment en route with relief. Pt now stating \"very little SOB\". Pt on 2L NC at baseline. Lung sounds diminished in all fields to auscultation. 3+ pitting edema noted to BLE. Pt sating 96% on 2L Pt denies CP. A&Ox4. Will continue to monitor.

## 2018-06-02 NOTE — IP AVS SNAPSHOT
Jim Garces 
 
 
 [address] [facility] [address] Patient: Ruby Harper MRN: [id number] About your hospitalization You were admitted on:  June 2, 2018 You last received care in the:  [facility] [facility] You were discharged on:  June 6, 2018 Why you were hospitalized Your primary diagnosis was:  Acute On Chronic Diastolic Congestive Heart Failure (Hcc) Your diagnoses also included:  Fluid Overload, Benign Hypertensive Heart Disease Without Heart Failure, Hypothyroidism, Morbid Obesity (Hcc), Atrial Fibrillation (Hcc), Chronic Anticoagulation Follow-up Information Follow up With Details Comments Contact Info Juanita Ojeda MD   [address] [facility] [address] [place] [address] 
[phone] Tobias Terry MD On 6/28/2018 at 10:20am ( as previously scheduled) [address] [place] [address] [address] 200 [facility] 
[phone] Alma Delia Zuleta MD   [address] [place] [id number] [phone] Discharge Orders None A check nimo indicates which time of day the medication should be taken. My Medications START taking these medications Instructions Each Dose to Equal  
 Morning Noon Evening Bedtime  
 docusate sodium 100 mg capsule Commonly known as:  Grey Guppy Your last dose was: Your next dose is: Take 1 Cap by mouth two (2) times a day for 90 days. HOLD for loose stool. 100 mg  
    
   
   
   
  
 lisinopril 10 mg tablet Commonly known as:  Willie Sims Start taking on:  6/7/2018 Your last dose was: Your next dose is: Take 1 Tab by mouth daily. 10 mg  
    
   
   
   
  
 polyethylene glycol 17 gram packet Commonly known as:  Barton Baumgarten Your last dose was: Your next dose is: Take 1 Packet by mouth daily. 17 g CHANGE how you take these medications Instructions Each Dose to Equal  
 Morning Noon Evening Bedtime  
 furosemide 40 mg tablet Commonly known as:  LASIX What changed:   
- how much to take 
- how to take this - when to take this Your last dose was: Your next dose is: Take 1 Tab by mouth two (2) times a day. 40 mg po daily 40 mg CONTINUE taking these medications Instructions Each Dose to Equal  
 Morning Noon Evening Bedtime ABILIFY 15 mg tablet Generic drug:  ARIPiprazole Your last dose was: Your next dose is: Take 15 mg by mouth daily. to obtain from pharmacy 15 mg  
    
   
   
   
  
 fluticasone 50 mcg/actuation nasal spray Commonly known as:  Zeina Colbert Your last dose was: Your next dose is: 2 Sprays by Both Nostrils route daily. 2 Spray  
    
   
   
   
  
 isosorbide mononitrate ER 30 mg tablet Commonly known as:  IMDUR Your last dose was: Your next dose is: Take 1 Tab by mouth every evening. 30 mg  
    
   
   
   
  
 loperamide 2 mg capsule Commonly known as:  IMODIUM Your last dose was: Your next dose is: Take 2 mg by mouth three (3) times daily as needed for Diarrhea.  
 2 mg  
    
   
   
   
  
 metoprolol tartrate 25 mg tablet Commonly known as:  LOPRESSOR Your last dose was: Your next dose is: Take 0.5 Tabs by mouth two (2) times a day. 12.5 mg  
    
   
   
   
  
 multivitamin, tx-iron-ca-min 9 mg iron-400 mcg Tab tablet Commonly known as:  THERA-M w/ IRON Your last dose was: Your next dose is: Take 1 Tab by mouth daily. 1 Tab  
    
   
   
   
  
 oxyCODONE IR 5 mg immediate release tablet Commonly known as:  Vimal Zurita Your last dose was: Your next dose is: Take 5 mg by mouth every six (6) hours as needed for Pain. 5 mg PARoxetine 20 mg tablet Commonly known as:  PAXIL Your last dose was: Your next dose is: Take 1 Tab by mouth daily. 20 mg  
    
   
   
   
  
 raNITIdine 150 mg tablet Commonly known as:  ZANTAC Your last dose was: Your next dose is: Take 150 mg by mouth two (2) times a day. 150 mg SYNTHROID 125 mcg tablet Generic drug:  levothyroxine Your last dose was: Your next dose is: Take 125 mcg by mouth Daily (before breakfast). 125 mcg VENTOLIN HFA 90 mcg/actuation inhaler Generic drug:  albuterol Your last dose was: Your next dose is: Take 2 Puffs by inhalation every four (4) hours as needed for Wheezing or Shortness of Breath. 2 Puff  
    
   
   
   
  
 warfarin 5 mg tablet Commonly known as:  COUMADIN Your last dose was: Your next dose is: Take 7.5 mg by mouth daily. 7.5 mg  
    
   
   
   
  
 ZOCOR 40 mg tablet Generic drug:  simvastatin Your last dose was: Your next dose is: Take  by mouth nightly. to obtain from pharmacy Where to Get Your Medications Information on where to get these meds will be given to you by the nurse or doctor. ! Ask your nurse or doctor about these medications  
  docusate sodium 100 mg capsule  
 furosemide 40 mg tablet  
 lisinopril 10 mg tablet  
 polyethylene glycol 17 gram packet Opioid Education Prescription Opioids: What You Need to Know: 
 
Prescription opioids can be used to help relieve moderate-to-severe pain and are often prescribed following a surgery or injury, or for certain health conditions.   These medications can be an important part of treatment but also come with serious risks. Opioids are strong pain medicines. Examples include hydrocodone, oxycodone, fentanyl, and morphine. Heroin is an example of an illegal opioid. It is important to work with your health care provider to make sure you are getting the safest, most effective care. WHAT ARE THE RISKS AND SIDE EFFECTS OF OPIOID USE? Prescription opioids carry serious risks of addiction and overdose, especially with prolonged use. An opioid overdose, often marked by slow breathing, can cause sudden death. The use of prescription opioids can have a number of side effects as well, even when taken as directed. · Tolerance-meaning you might need to take more of a medication for the same pain relief · Physical dependence-meaning you have symptoms of withdrawal when the medication is stopped. Withdrawal symptoms can include nausea, sweating, chills, diarrhea, stomach cramps, and muscle aches. Withdrawal can last up to several weeks, depending on which drug you took and how long you took it. · Increased sensitivity to pain · Constipation · Nausea, vomiting, and dry mouth · Sleepiness and dizziness · Confusion · Depression · Low levels of testosterone that can result in lower sex drive, energy, and strength · Itching and sweating RISKS ARE GREATER WITH:      
· History of drug misuse, substance use disorder, or overdose · Mental health conditions (such as depression or anxiety) · Sleep apnea · Older age (72 years or older) · Pregnancy Avoid alcohol while taking prescription opioids. Also, unless specifically advised by your health care provider, medications to avoid include: · Benzodiazepines (such as Xanax or Valium) · Muscle relaxants (such as Soma or Flexeril) · Hypnotics (such as Ambien or Lunesta) · Other prescription opioids KNOW YOUR OPTIONS Talk to your health care provider about ways to manage your pain that don't involve prescription opioids. Some of these options may actually work better and have fewer risks and side effects. Options may include: 
· Pain relievers such as acetaminophen, ibuprofen, and naproxen · Some medications that are also used for depression or seizures · Physical therapy and exercise · Counseling to help patients learn how to cope better with triggers of pain and stress. · Application of heat or cold compress · Massage therapy · Relaxation techniques Be Informed Make sure you know the name of your medication, how much and how often to take it, and its potential risks & side effects. IF YOU ARE PRESCRIBED OPIOIDS FOR PAIN: 
· Never take opioids in greater amounts or more often than prescribed. Remember the goal is not to be pain-free but to manage your pain at a tolerable level. · Follow up with your primary care provider to: · Work together to create a plan on how to manage your pain. · Talk about ways to help manage your pain that don't involve prescription opioids. · Talk about any and all concerns and side effects. · Help prevent misuse and abuse. · Never sell or share prescription opioids · Help prevent misuse and abuse. · Store prescription opioids in a secure place and out of reach of others (this may include visitors, children, friends, and family). · Safely dispose of unused/unwanted prescription opioids: Find your community drug take-back program or your pharmacy mail-back program, or flush them down the toilet, following guidance from the Food and Drug Administration (www.fda.gov/Drugs/ResourcesForYou). · Visit www.cdc.gov/drugoverdose to learn about the risks of opioid abuse and overdose. · If you believe you may be struggling with addiction, tell your health care provider and ask for guidance or call [address] [place], [place] [address] at 2-842-148-YIFL. Discharge Instructions Atrial Fibrillation: Care Instructions Your Care Instructions Atrial fibrillation is an irregular and often fast heartbeat. Treating this condition is important for several reasons. It can cause blood clots, which can travel from your heart to your brain and cause a stroke. If you have a fast heartbeat, you may feel lightheaded, dizzy, and weak. An irregular heartbeat can also increase your risk for heart failure. Atrial fibrillation is often the result of another heart condition, such as high blood pressure or coronary artery disease. Making changes to improve your heart condition will help you stay healthy and active. Follow-up care is a key part of your treatment and safety. Be sure to make and go to all appointments, and call your doctor if you are having problems. It's also a good idea to know your test results and keep a list of the medicines you take. How can you care for yourself at home? Medicines ? · Take your medicines exactly as prescribed. Call your doctor if you think you are having a problem with your medicine. You will get more details on the specific medicines your doctor prescribes. ? · If your doctor has given you a blood thinner to prevent a stroke, be sure you get instructions about how to take your medicine safely. Blood thinners can cause serious bleeding problems. ? · Do not take any vitamins, over-the-counter drugs, or herbal products without talking to your doctor first. ? Lifestyle changes ? · Do not smoke. Smoking can increase your chance of a stroke and heart attack. If you need help quitting, talk to your doctor about stop-smoking programs and medicines. These can increase your chances of quitting for good. ? · Eat a heart-healthy diet. ? · Stay at a healthy weight. Lose weight if you need to.  
? · Limit alcohol to 2 drinks a day for men and 1 drink a day for women. Too much alcohol can cause health problems. ? · Avoid colds and flu. Get a pneumococcal vaccine shot. If you have had one before, ask your doctor whether you need another dose. Get a flu shot every year. If you must be around people with colds or flu, wash your hands often. Activity ? · If your doctor recommends it, get more exercise. Walking is a good choice. Bit by bit, increase the amount you walk every day. Try for at least 30 minutes on most days of the week. You also may want to swim, bike, or do other activities. Your doctor may suggest that you join a cardiac rehabilitation program so that you can have help increasing your physical activity safely. ? · Start light exercise if your doctor says it is okay. Even a small amount will help you get stronger, have more energy, and manage stress. Walking is an easy way to get exercise. Start out by walking a little more than you did in the hospital. Gradually increase the amount you walk. ? · When you exercise, watch for signs that your heart is working too hard. You are pushing too hard if you cannot talk while you are exercising. If you become short of breath or dizzy or have chest pain, sit down and rest immediately. ? · Check your pulse regularly. Place two fingers on the artery at the palm side of your wrist, in line with your thumb. If your heartbeat seems uneven or fast, talk to your doctor. When should you call for help? Call 911 anytime you think you may need emergency care. For example, call if: 
? · You have symptoms of a heart attack. These may include: ¨ Chest pain or pressure, or a strange feeling in the chest. 
¨ Sweating. ¨ Shortness of breath. ¨ Nausea or vomiting. ¨ Pain, pressure, or a strange feeling in the back, neck, jaw, or upper belly or in one or both shoulders or arms. ¨ Lightheadedness or sudden weakness. ¨ A fast or irregular heartbeat.  
After you call 911, the  may tell you to chew 1 adult-strength or 2 to 4 low-dose aspirin. Wait for an ambulance. Do not try to drive yourself. ? · You have symptoms of a stroke. These may include: 
¨ Sudden numbness, tingling, weakness, or loss of movement in your face, arm, or leg, especially on only one side of your body. ¨ Sudden vision changes. ¨ Sudden trouble speaking. ¨ Sudden confusion or trouble understanding simple statements. ¨ Sudden problems with walking or balance. ¨ A sudden, severe headache that is different from past headaches. ? · You passed out (lost consciousness). ?Call your doctor now or seek immediate medical care if: 
? · You have new or increased shortness of breath. ? · You feel dizzy or lightheaded, or you feel like you may faint. ? · Your heart rate becomes irregular. ? · You can feel your heart flutter in your chest or skip heartbeats. Tell your doctor if these symptoms are new or worse. ? Watch closely for changes in your health, and be sure to contact your doctor if you have any problems. Where can you learn more? Go to http://bucky-gerard.info/. Enter U020 in the search box to learn more about \"Atrial Fibrillation: Care Instructions. \" Current as of: September 21, 2016 Content Version: 11.4 © 1266-2770 [employer] Care instructions adapted under license by [employer] (which disclaims liability or warranty for this information). If you have questions about a medical condition or this instruction, always ask your healthcare professional. [place] [address] any warranty or liability for your use of this information. [employer] Activation Thank you for requesting access to [employer]. Please follow the instructions below to securely access and download your online medical record. [employer] allows you to send messages to your doctor, view your test results, renew your prescriptions, schedule appointments, and more. How Do I Sign Up? 1. In your internet browser, go to [email or web address] 
2. Click on the First Time User? Click Here link in the Sign In box. You will be redirect to the New Member Sign Up page. 3. Enter your [employer] Access Code exactly as it appears below. You will not need to use this code after youve completed the sign-up process. If you do not sign up before the expiration date, you must request a new code. [employer] Access Code: [email or web address] Expires: 2018  7:58 PM (This is the date your [employer] access code will ) 4. Enter the last four digits of your Social Security Number (xxxx) and Date of Birth (mm/dd/yyyy) as indicated and click Submit. You will be taken to the next sign-up page. 5. Create a [employer] ID. This will be your [employer] login ID and cannot be changed, so think of one that is secure and easy to remember. 6. Create a [employer] password. You can change your password at any time. 7. Enter your Password Reset Question and Answer. This can be used at a later time if you forget your password. 8. Enter your e-mail address. You will receive e-mail notification when new information is available in [address] 9. Click Sign Up. You can now view and download portions of your medical record. 10. Click the Download Summary menu link to download a portable copy of your medical information. Additional Information If you have questions, please visit the Frequently Asked Questions section of the [employer] website at [email or web address] Remember, [employer] is NOT to be used for urgent needs. For medical emergencies, dial 911. DISCHARGE SUMMARY from Nurse PATIENT INSTRUCTIONS: 
 
 
F-face looks uneven A-arms unable to move or move unevenly S-speech slurred or non-existent T-time-call 911 as soon as signs and symptoms begin-DO NOT go Back to bed or wait to see if you get better-TIME IS BRAIN. Warning Signs of HEART ATTACK Call 911 if you have these symptoms: 
? Chest discomfort. Most heart attacks involve discomfort in the center of the chest that lasts more than a few minutes, or that goes away and comes back. It can feel like uncomfortable pressure, squeezing, fullness, or pain. ? Discomfort in other areas of the upper body. Symptoms can include pain or discomfort in one or both arms, the back, neck, jaw, or stomach. ? Shortness of breath with or without chest discomfort. ? Other signs may include breaking out in a cold sweat, nausea, or lightheadedness. Don't wait more than five minutes to call [address]! Fast action can save your life. Calling 911 is almost always the fastest way to get lifesaving treatment. Emergency Medical Services staff can begin treatment when they arrive  up to an hour sooner than if someone gets to the hospital by car. The discharge information has been reviewed with the patient. The patient verbalized understanding. Discharge medications reviewed with the patient and appropriate educational materials and side effects teaching were provided. _____________________ Patient armband removed and shredded 
______________________________________________________________________________________________________________ [employer] Announcement We are excited to announce that we are making your provider's discharge notes available to you in Sendmybagt. You will see these notes when they are completed and signed by the physician that discharged you from your recent hospital stay.   If you have any questions or concerns about any information you see in Sendmybagt, please call the Health Information Department where you were seen or reach out to your Primary Care Provider for more information about your plan of care. Introducing [facility]! [facility] introduces [employer] Apps patient portal. Now you can access parts of your medical record, email your doctor's office, and request medication refills online. 1. In your internet browser, go to [email or web address] 2. Click on the First Time User? Click Here link in the Sign In box. You will see the New Member Sign Up page. 3. Enter your [employer] Access Code exactly as it appears below. You will not need to use this code after youve completed the sign-up process. If you do not sign up before the expiration date, you must request a new code. · [employer] Access Code: [email or web address] Expires: 8/31/2018  7:58 PM 
 
4. Enter the last four digits of your Social Security Number (xxxx) and Date of Birth (mm/dd/yyyy) as indicated and click Submit. You will be taken to the next sign-up page. 5. Create a [employer] ID. This will be your [employer] login ID and cannot be changed, so think of one that is secure and easy to remember. 6. Create a [employer] password. You can change your password at any time. 7. Enter your Password Reset Question and Answer. This can be used at a later time if you forget your password. 8. Enter your e-mail address. You will receive e-mail notification when new information is available in [address] 9. Click Sign Up. You can now view and download portions of your medical record. 10. Click the Download Summary menu link to download a portable copy of your medical information. If you have questions, please visit the Frequently Asked Questions section of the [employer] website. Remember, [employer] is NOT to be used for urgent needs. For medical emergencies, dial 911. Now available from your iPhone and Android! Introducing Uri Castellanos As a [facility] patient, I wanted to make you aware of our electronic visit tool called Uri Castellanos. [facility] 24/7 allows you to connect within minutes with a medical provider 24 hours a day, seven days a week via a mobile device or tablet or logging into a secure website from your computer. You can access SourceLair from anywhere in the [place]. A virtual visit might be right for you when you have a simple condition and feel like you just dont want to get out of bed, or cant get away from work for an appointment, when your regular [facility] provider is not available (evenings, weekends or holidays), or when youre out of town and need minor care. Electronic visits cost only $49 and if the [employer]â„¢ Networkreganan Saint 24/7 provider determines a prescription is needed to treat your condition, one can be electronically transmitted to a nearby pharmacy*. Please take a moment to enroll today if you have not already done so. The enrollment process is free and takes just a few minutes. To enroll, please download the [facility] 24/7 laura to your tablet or phone, or visit [email or web address] to enroll on your computer. And, as an [address] [place] [address] patient with a [employer] account, the results of your visits will be scanned into your electronic medical record and your primary care provider will be able to view the scanned results. We urge you to continue to see your regular [facility] provider for your ongoing medical care. And while your primary care provider may not be the one available when you seek a Uri Zambranodantefin virtual visit, the peace of mind you get from getting a real diagnosis real time can be priceless. For more information on Uir Fanattacdantefin, view our Frequently Asked Questions (FAQs) at [email or web address]. Sincerely, 
 
Elena Cheatham MD 
Chief Medical Officer [employer] *:  certain medications cannot be prescribed via SOAK (Smart [employer] toolKit)dantefin Providers Seen During Your Hospitalization Provider Specialty Primary office phone Clarke Laboy MD Emergency Medicine [phone] Marleny Khan MD Hospitalist [phone] Minal Hammond MD Internal Medicine [phone] Your Primary Care Physician (PCP) Primary Care Physician Office Phone Office Fax Laisha Ayala [phone] You are allergic to the following Allergen Reactions Gabapentin Other (comments) Unsteady, weakness LEs Tetanus Vaccines And Toxoid Swelling Topamax (Topiramate) Other (comments)  
 weakness Recent Documentation Weight Breastfeeding? BMI OB Status Smoking Status 136.7 kg No 58.86 kg/m2 Hysterectomy Never Smoker Emergency Contacts Name Discharge Info Relation Home Work Mobile Ortiz,Cesilia DISCHARGE CAREGIVER [3] Sister [23] [phone] Ortiz,Tatiana DISCHARGE CAREGIVER [3] Daughter [21] [phone] Patient Belongings The following personal items are in your possession at time of discharge: 
  Dental Appliances: None  Visual Aid: Glasses      Home Medications: None   Jewelry: None  Clothing: At bedside, Pants, Shirt    Other Valuables: At bedside, Cell Phone, Eyeglasses, Money (comment), [place] (placed in patient's closet)  Personal Items Sent to Safe: no 
 
  
  
Discharge Instructions Attachments/References WEIGHT: OVERWEIGHT (ENGLISH) Patient Handouts When You Are Overweight: Care Instructions Your Care Instructions If you're overweight, your doctor may recommend that you make changes in your eating and exercise habits. Being overweight can lead to serious health problems, such as high blood pressure, heart disease, type 2 diabetes, and arthritis, or it can make these problems worse. Eating a healthy diet and being more active can help you reach and stay at a healthy weight. You don't have to make huge changes all at once. Start by making small changes in your eating and exercise habits.  To lose weight, you need to burn more calories than you take in. You can do this by eating healthy foods in reasonable amounts and becoming more active every day. Follow-up care is a key part of your treatment and safety. Be sure to make and go to all appointments, and call your doctor if you are having problems. It's also a good idea to know your test results and keep a list of the medicines you take. How can you care for yourself at home? · Improve your eating habits. You'll be more successful if you work on changing one eating habit at a time. All foods, if eaten in moderation, can be part of healthy eating. Remember to: 
114 [facility] [place] a variety of foods from each food group. Include grains, vegetables, fruits, dairy, and protein foods. ¨ Limit foods high in fat, sugar, and calories. ¨ Eat slowly. And don't do anything else, such as watch TV, while you are eating. ¨ Pay attention to portion sizes. Put your food on a smaller plate. ¨ Plan your meals ahead of time. You'll be less likely to grab something that's not as healthy. · Get active. Regular activity can help you feel better, have more energy, and burn more calories. If you haven't been active, start slowly. Start with at least 30 minutes of moderate activity on most days of the week. Then gradually increase the amount of activity. Try for 60 or 90 minutes a day, at least 5 days a week. There are a lot of ways to fit activity into your life. You can: 
¨ Walk or bike to the store. Or walk with a friend, or walk the dog. ¨ Mow the lawn, rake leaves, shovel snow, or do some gardening. ¨ Use the stairs instead of the elevator, at least for a few floors. · Change your thinking. Your thoughts have a lot to do with how you feel and what you do. When you're trying to reach a healthy weight, changing how you think about certain things may help. Here are some ideas: ¨ Don't compare yourself to others. Healthy bodies come in all shapes and sizes. ¨ Pay attention to how hungry or full you feel. When you eat, be aware of why you're eating and how much you're eating. ¨ Focus on improving your health instead of dieting. Dieting almost never works over the long term. · Ask your doctor about other health professionals who can help you reach a healthy weight. ¨ A dietitian can help you make healthy changes in your diet. ¨ An exercise specialist or  can help you develop a safe and effective exercise program. 
¨ A counselor or psychiatrist can help you cope with issues such as depression, anxiety, or family problems that can make it hard to focus on reaching a healthy weight. · Get support from your family, your doctor, your friends, a support group-and support yourself. Where can you learn more? Go to http://bucky-gerard.info/. Enter C554 in the search box to learn more about \"When You Are Overweight: Care Instructions. \" Current as of: October 13, 2016 Content Version: 11.4 © 8185-7496 Healthwise, [employer]. Care instructions adapted under license by [employer] (which disclaims liability or warranty for this information). If you have questions about a medical condition or this instruction, always ask your healthcare professional. [address] any warranty or liability for your use of this information. Please provide this summary of care documentation to your next provider. Signatures-by signing, you are acknowledging that this After Visit Summary has been reviewed with you and you have received a copy. Patient Signature:  ____________________________________________________________ Date:  ____________________________________________________________  
  
Dara Venegas Provider Signature:  ____________________________________________________________ Date:  ____________________________________________________________

## 2018-06-03 LAB
ANION GAP SERPL CALC-SCNC: 3 MMOL/L (ref 3–18)
ARTERIAL PATENCY WRIST A: YES
ATRIAL RATE: 54 BPM
BASE EXCESS BLD CALC-SCNC: 14 MMOL/L
BASOPHILS # BLD: 0 K/UL (ref 0–0.1)
BASOPHILS NFR BLD: 0 % (ref 0–2)
BDY SITE: ABNORMAL
BODY TEMPERATURE: 98.6
BUN SERPL-MCNC: 16 MG/DL (ref 7–18)
BUN/CREAT SERPL: 17 (ref 12–20)
CALCIUM SERPL-MCNC: 8.5 MG/DL (ref 8.5–10.1)
CALCULATED R AXIS, ECG10: -35 DEGREES
CALCULATED T AXIS, ECG11: -171 DEGREES
CHLORIDE SERPL-SCNC: 103 MMOL/L (ref 100–108)
CK MB CFR SERPL CALC: 1.4 % (ref 0–4)
CK MB CFR SERPL CALC: 1.5 % (ref 0–4)
CK MB SERPL-MCNC: 1.8 NG/ML (ref 5–25)
CK MB SERPL-MCNC: 2 NG/ML (ref 5–25)
CK SERPL-CCNC: 120 U/L (ref 26–192)
CK SERPL-CCNC: 139 U/L (ref 26–192)
CO2 SERPL-SCNC: 35 MMOL/L (ref 21–32)
CREAT SERPL-MCNC: 0.95 MG/DL (ref 0.6–1.3)
DIAGNOSIS, 93000: NORMAL
DIFFERENTIAL METHOD BLD: ABNORMAL
EOSINOPHIL # BLD: 0.2 K/UL (ref 0–0.4)
EOSINOPHIL NFR BLD: 4 % (ref 0–5)
ERYTHROCYTE [DISTWIDTH] IN BLOOD BY AUTOMATED COUNT: 18.4 % (ref 11.6–14.5)
GAS FLOW.O2 O2 DELIVERY SYS: ABNORMAL L/MIN
GAS FLOW.O2 SETTING OXYMISER: 2 L/M
GLUCOSE SERPL-MCNC: 100 MG/DL (ref 74–99)
HCO3 BLD-SCNC: 38.9 MMOL/L (ref 22–26)
HCT VFR BLD AUTO: 31.5 % (ref 35–45)
HGB BLD-MCNC: 9 G/DL (ref 12–16)
INR PPP: 1.8 (ref 0.8–1.2)
INR PPP: 1.9 (ref 0.8–1.2)
LYMPHOCYTES # BLD: 0.9 K/UL (ref 0.9–3.6)
LYMPHOCYTES NFR BLD: 21 % (ref 21–52)
MAGNESIUM SERPL-MCNC: 1.9 MG/DL (ref 1.6–2.6)
MCH RBC QN AUTO: 21.6 PG (ref 24–34)
MCHC RBC AUTO-ENTMCNC: 28.6 G/DL (ref 31–37)
MCV RBC AUTO: 75.5 FL (ref 74–97)
MONOCYTES # BLD: 0.4 K/UL (ref 0.05–1.2)
MONOCYTES NFR BLD: 8 % (ref 3–10)
NEUTS SEG # BLD: 3 K/UL (ref 1.8–8)
NEUTS SEG NFR BLD: 67 % (ref 40–73)
O2/TOTAL GAS SETTING VFR VENT: 28 %
PCO2 BLD: 63.4 MMHG (ref 35–45)
PH BLD: 7.4 [PH] (ref 7.35–7.45)
PHOSPHATE SERPL-MCNC: 3.9 MG/DL (ref 2.5–4.9)
PLATELET # BLD AUTO: 178 K/UL (ref 135–420)
PMV BLD AUTO: 9.6 FL (ref 9.2–11.8)
PO2 BLD: 88 MMHG (ref 80–100)
POTASSIUM SERPL-SCNC: 3.5 MMOL/L (ref 3.5–5.5)
PROTHROMBIN TIME: 19.8 SEC (ref 11.5–15.2)
PROTHROMBIN TIME: 20.7 SEC (ref 11.5–15.2)
Q-T INTERVAL, ECG07: 464 MS
QRS DURATION, ECG06: 118 MS
QTC CALCULATION (BEZET), ECG08: 482 MS
RBC # BLD AUTO: 4.17 M/UL (ref 4.2–5.3)
SAO2 % BLD: 96 % (ref 92–97)
SERVICE CMNT-IMP: ABNORMAL
SODIUM SERPL-SCNC: 141 MMOL/L (ref 136–145)
SPECIMEN TYPE: ABNORMAL
TOTAL RESP. RATE, ITRR: 18
TROPONIN I SERPL-MCNC: <0.02 NG/ML (ref 0–0.04)
TROPONIN I SERPL-MCNC: <0.02 NG/ML (ref 0–0.04)
VENTRICULAR RATE, ECG03: 65 BPM
WBC # BLD AUTO: 4.5 K/UL (ref 4.6–13.2)

## 2018-06-03 PROCEDURE — 77030038269 HC DRN EXT URIN PURWCK BARD -A

## 2018-06-03 PROCEDURE — 65270000029 HC RM PRIVATE

## 2018-06-03 PROCEDURE — 84100 ASSAY OF PHOSPHORUS: CPT | Performed by: INTERNAL MEDICINE

## 2018-06-03 PROCEDURE — 80048 BASIC METABOLIC PNL TOTAL CA: CPT | Performed by: INTERNAL MEDICINE

## 2018-06-03 PROCEDURE — 77030018846 HC SOL IRR STRL H20 ICUM -A

## 2018-06-03 PROCEDURE — 74011250636 HC RX REV CODE- 250/636: Performed by: INTERNAL MEDICINE

## 2018-06-03 PROCEDURE — 85610 PROTHROMBIN TIME: CPT | Performed by: INTERNAL MEDICINE

## 2018-06-03 PROCEDURE — 36600 WITHDRAWAL OF ARTERIAL BLOOD: CPT

## 2018-06-03 PROCEDURE — 82803 BLOOD GASES ANY COMBINATION: CPT

## 2018-06-03 PROCEDURE — 36415 COLL VENOUS BLD VENIPUNCTURE: CPT | Performed by: INTERNAL MEDICINE

## 2018-06-03 PROCEDURE — 82550 ASSAY OF CK (CPK): CPT | Performed by: INTERNAL MEDICINE

## 2018-06-03 PROCEDURE — 85025 COMPLETE CBC W/AUTO DIFF WBC: CPT | Performed by: INTERNAL MEDICINE

## 2018-06-03 PROCEDURE — 74011250637 HC RX REV CODE- 250/637: Performed by: INTERNAL MEDICINE

## 2018-06-03 PROCEDURE — 83735 ASSAY OF MAGNESIUM: CPT | Performed by: INTERNAL MEDICINE

## 2018-06-03 RX ORDER — LISINOPRIL 10 MG/1
10 TABLET ORAL DAILY
Status: DISCONTINUED | OUTPATIENT
Start: 2018-06-04 | End: 2018-06-06 | Stop reason: HOSPADM

## 2018-06-03 RX ORDER — WARFARIN SODIUM 5 MG/1
5 TABLET ORAL
Status: DISCONTINUED | OUTPATIENT
Start: 2018-06-03 | End: 2018-06-03

## 2018-06-03 RX ORDER — WARFARIN SODIUM 5 MG/1
5 TABLET ORAL EVERY EVENING
Status: DISCONTINUED | OUTPATIENT
Start: 2018-06-03 | End: 2018-06-03

## 2018-06-03 RX ORDER — AMLODIPINE BESYLATE 5 MG/1
5 TABLET ORAL DAILY
Status: DISCONTINUED | OUTPATIENT
Start: 2018-06-03 | End: 2018-06-03

## 2018-06-03 RX ADMIN — ISOSORBIDE MONONITRATE 30 MG: 30 TABLET, EXTENDED RELEASE ORAL at 17:45

## 2018-06-03 RX ADMIN — LEVOTHYROXINE SODIUM 125 MCG: 125 TABLET ORAL at 09:59

## 2018-06-03 RX ADMIN — SIMVASTATIN 40 MG: 40 TABLET, FILM COATED ORAL at 21:56

## 2018-06-03 RX ADMIN — ARIPIPRAZOLE 15 MG: 5 TABLET ORAL at 09:59

## 2018-06-03 RX ADMIN — FUROSEMIDE 40 MG: 10 INJECTION, SOLUTION INTRAVENOUS at 21:56

## 2018-06-03 RX ADMIN — POTASSIUM CHLORIDE 40 MEQ: 1500 TABLET, EXTENDED RELEASE ORAL at 09:59

## 2018-06-03 RX ADMIN — PAROXETINE HYDROCHLORIDE 20 MG: 20 TABLET, FILM COATED ORAL at 09:59

## 2018-06-03 RX ADMIN — WARFARIN SODIUM 7.5 MG: 7.5 TABLET ORAL at 01:14

## 2018-06-03 RX ADMIN — OXYCODONE HYDROCHLORIDE 5 MG: 5 TABLET ORAL at 22:05

## 2018-06-03 RX ADMIN — METOPROLOL TARTRATE 12.5 MG: 25 TABLET ORAL at 17:46

## 2018-06-03 RX ADMIN — RANITIDINE HYDROCHLORIDE 150 MG: 150 TABLET, FILM COATED ORAL at 09:59

## 2018-06-03 RX ADMIN — OXYCODONE HYDROCHLORIDE 5 MG: 5 TABLET ORAL at 00:33

## 2018-06-03 RX ADMIN — METOPROLOL TARTRATE 12.5 MG: 25 TABLET ORAL at 09:59

## 2018-06-03 RX ADMIN — AMLODIPINE BESYLATE 5 MG: 5 TABLET ORAL at 09:59

## 2018-06-03 RX ADMIN — RANITIDINE HYDROCHLORIDE 150 MG: 150 TABLET, FILM COATED ORAL at 17:45

## 2018-06-03 RX ADMIN — FUROSEMIDE 40 MG: 10 INJECTION, SOLUTION INTRAVENOUS at 10:00

## 2018-06-03 RX ADMIN — OXYCODONE HYDROCHLORIDE 5 MG: 5 TABLET ORAL at 14:39

## 2018-06-03 NOTE — CONSULTS
[facility]  Pulmonary, Critical Care, and Sleep Medicine    Name: Sherin Talley MRN: [id number]   : 1960 Hospital: [address] [place] [address] Dr   Date: 6/3/2018        Pulmonary Initial Patient Consult    IMPRESSION:   · Acute hypoxic respiratory ailure improve with diurese,  Hypoxia likely due to chf exacerbation in setting of pulmonary hypertension  · Severe pulmonary hypertension, possibly due to chronic hypoxia in setting of chronic diastolic heart failure vs.mitral valvular insuffiency ( no report of MV on last several echo). vs. Primary pulmonary hypertension. At this stage I suspect patient with chronic hypoxia due to ANDRADE/OHS and further exacerbated with left sided diastolic heart failure  · ANDRADE/OHS  · Volume overload  · Acute diastolic heart failure. improving  · Morbid obesity. · AFib rate controlled      RECOMMENDATIONS:   · Continue diurese  · Replete lytes as needed  · Resume home VPAP,   Setting IPAP12 over EPAP 5 with PS 4  · Keep spo2 above 92%  · Ok to use VPAP at night   · No need for bronchodilators at this time  · Will likely to benefit from right heart cath and dilation study if needed if wedge pressure is normal, if not possible need to make arrangement possibly even as outpatient at other facility if unable to provide here  · Continue anticoagulation  ·  Repeat outpatient PFT. Last DLCO 50% in 2017. · Counseled patient importance of VPAP use. Subjective/History: This patient has been seen and evaluated at the request ioolivia Diaz for hypoxic respiratory failure. .  Patient is a 60 y.o. female with multiple medical problems listed below, readmitted with hypoxic respiratory failure. Thisapparently is chronic. Pt is suffering from chronic diastolic heardt failure. Was admitted and discharged recently from this facility. Pt reports she was never told fluid restriction nor dietary changes.  Pt notes worsening shortness of breath since recent discharge with increase in leg swelling. Orthopnea. No PND. No chest pain no fever or chills. No wheezing. Previous echo consistent with pulmonary hypertension progressively worsening compare to previous echos since 2010. Past Medical History:   Diagnosis Date    Atrial fibrillation     CHADS score 1  (-CHF, +HTN, -AGE, -DM, -CVA)    Carpal tunnel syndrome     Chronic pain     Congenital heart disease     presumed pulmonary stenosis s/p repair 1969    Degenerative disc disease     Degenerative joint disease     Dyslipidemia     GERD     H/O echocardiogram 04/2017    EF 60-65%, mild concentric LVH, dilated RV with RV dysfunction, RVSP 54 mmHg, RA E, moderate to severe pulmonic regurgitation.  Hypertension     Hypothyroid     Morbid obesity     Morbid obesity with BMI of 45.0-49.9, adult ([facility] [address] 2/20/2017    Noncompliance     Schizoaffective disorder     Substance abuse     marijuana, crack cocaine      Past Surgical History:   Procedure Laterality Date    COLONOSCOPY N/A 5/6/2018    COLONOSCOPY with tatooing and biopsy performed by Elder Orellana [facility] at [facility]      left    HX HYSTERECTOMY      HX KNEE REPLACEMENT      left    HX KNEE REPLACEMENT      right    HX THYROIDECTOMY        Prior to Admission medications    Medication Sig Start Date End Date Taking? Authorizing Provider   metoprolol tartrate (LOPRESSOR) 25 mg tablet Take 0.5 Tabs by mouth two (2) times a day. 5/17/18  Yes Annamarie Walsh NP   fluticasone (FLONASE) 50 mcg/actuation nasal spray 2 Sprays by Both Nostrils route daily. 5/10/18  Yes Sera Ricketts MD   oxyCODONE IR (ROXICODONE) 5 mg immediate release tablet Take 5 mg by mouth every six (6) hours as needed for Pain.    Yes Historical Provider   isosorbide mononitrate ER (IMDUR) 30 mg tablet Take 1 Tab by mouth every evening. 4/24/18  Yes Janeen Bills NP   multivitamin, tx-iron-ca-min (THERA-M W/ IRON) 9 mg iron-400 mcg tab tablet Take 1 Tab by mouth daily. 3/1/18  Yes Julio Nugent MD   raNITIdine (ZANTAC) 150 mg tablet Take 150 mg by mouth two (2) times a day. Yes Historical Provider   warfarin (COUMADIN) 5 mg tablet Take 7.5 mg by mouth daily. Yes Historical Provider   VENTOLIN HFA 90 mcg/actuation inhaler Take 2 Puffs by inhalation every four (4) hours as needed for Wheezing or Shortness of Breath. 4/26/17  Yes Julio Nugent MD   furosemide (LASIX) 40 mg tablet 40 mg po daily 4/26/17  Yes Julio Nugent MD   PARoxetine (PAXIL) 20 mg tablet Take 1 Tab by mouth daily. 1/21/14  Yes Sanaz Gaytan MD   simvastatin (ZOCOR) 40 mg tablet Take  by mouth nightly. to obtain from pharmacy   Yes Historical Provider   aripiprazole (ABILIFY) 15 mg tablet Take 15 mg by mouth daily. to obtain from pharmacy   Yes Historical Provider   levothyroxine (SYNTHROID) 125 mcg tablet Take 125 mcg by mouth Daily (before breakfast). Yes Historical Provider   loperamide (IMODIUM) 2 mg capsule Take 2 mg by mouth three (3) times daily as needed for Diarrhea.     Historical Provider     Current Facility-Administered Medications   Medication Dose Route Frequency    WARFARIN INFORMATION NOTE (COUMADIN)   Other Rx Dosing/Monitoring    [START ON 6/4/2018] lisinopril (PRINIVIL, ZESTRIL) tablet 10 mg  10 mg Oral DAILY    ARIPiprazole (ABILIFY) tablet 15 mg  15 mg Oral DAILY    isosorbide mononitrate ER (IMDUR) tablet 30 mg  30 mg Oral QPM    levothyroxine (SYNTHROID) tablet 125 mcg  125 mcg Oral ACB    metoprolol tartrate (LOPRESSOR) tablet 12.5 mg  12.5 mg Oral BID    PARoxetine (PAXIL) tablet 20 mg  20 mg Oral DAILY    simvastatin (ZOCOR) tablet 40 mg  40 mg Oral QHS    raNITIdine (ZANTAC) tablet 150 mg  150 mg Oral BID    furosemide (LASIX) injection 40 mg  40 mg IntraVENous Q12H    potassium chloride (K-DUR, KLOR-CON) SR tablet 40 mEq  40 mEq Oral DAILY     Allergies   Allergen Reactions    Gabapentin Other (comments) Unsteady, weakness LEs    Tetanus Vaccines And Toxoid Swelling    Topamax [Topiramate] Other (comments)     weakness      Social History   Substance Use Topics    Smoking status: Never Smoker    Smokeless tobacco: Never Used    Alcohol use No      Family History   Problem Relation Age of Onset    Hypertension Mother     Coronary Artery Disease Mother     Coronary Artery Disease Father     Hypertension Father         Review of Systems:  A comprehensive review of systems was negative except for that written in the HPI. Objective:   Vital Signs:    Visit Vitals    /84 (BP 1 Location: Left arm, BP Patient Position: At rest)    Pulse 62    Temp 97.6 °F (36.4 °C)    Resp 18    Wt 147.4 kg (324 lb 14.4 oz)    SpO2 98%    Breastfeeding No    BMI 63.45 kg/m2       O2 Device: Nasal cannula   O2 Flow Rate (L/min): 2 l/min   Temp (24hrs), Av.9 °F (36.6 °C), Min:97.5 °F (36.4 °C), Max:98.2 °F (36.8 °C)       Intake/Output:   Last shift:      701 -  1900  In: 240 [P.O.:240]  Out: 800 [Urine:800]  Last 3 shifts:  190 -  0700  In: 480 [P.O.:480]  Out: 5100 [Urine:5100]    Intake/Output Summary (Last 24 hours) at 18 1611  Last data filed at 18 1323   Gross per 24 hour   Intake              720 ml   Output             5900 ml   Net            -5180 ml       Physical Exam:    General:  Alert, cooperative, no distress, appears stated age. Morbidly obese   Head:  Normocephalic, without obvious abnormality, atraumatic. Eyes:  Conjunctivae/corneas clear. PERRL, EOMs intact. Nose: Nares normal. Septum midline. Mucosa normal. No drainage or sinus tenderness. Throat: Lips, mucosa, and tongue normal. Teeth and gums normal.   Neck: Supple, symmetrical, trachea midline, no adenopathy, thyroid: no enlargment/tenderness/nodules, no carotid bruit and no JVD. Back:   Symmetric, no curvature. ROM normal.   Lungs:   Clear to auscultation bilaterally.  Inspiratory crackles  No wheeziing   Chest wall:  No tenderness or deformity. Heart:  Regular rate and rhythm, S1, S2 normal, no murmur, click, rub or gallop. Abdomen:   Soft, non-tender. Bowel sounds normal. No masses,  No organomegaly. Extremities: Extremities normal, atraumatic, no cyanosis or edema. Pulses: 2+ and symmetric all extremities. Skin: Skin color, texture, turgor normal. No rashes or lesions   Lymph nodes: Cervical, supraclavicular, and axillary nodes normal.   Neurologic: Grossly nonfocal       Data:     Recent Results (from the past 24 hour(s))   CBC WITH AUTOMATED DIFF    Collection Time: 06/02/18  7:16 PM   Result Value Ref Range    WBC 4.0 (L) 4.6 - 13.2 K/uL    RBC 4.02 (L) 4.20 - 5.30 M/uL    HGB 8.8 (L) 12.0 - 16.0 g/dL    HCT 30.3 (L) 35.0 - 45.0 %    MCV 75.4 74.0 - 97.0 FL    MCH 21.9 (L) 24.0 - 34.0 PG    MCHC 29.0 (L) 31.0 - 37.0 g/dL    RDW 18.4 (H) 11.6 - 14.5 %    PLATELET 880 975 - 382 K/uL    MPV 10.1 9.2 - 11.8 FL    NEUTROPHILS 68 40 - 73 %    LYMPHOCYTES 22 21 - 52 %    MONOCYTES 7 3 - 10 %    EOSINOPHILS 3 0 - 5 %    BASOPHILS 0 0 - 2 %    ABS. NEUTROPHILS 2.7 1.8 - 8.0 K/UL    ABS. LYMPHOCYTES 0.9 0.9 - 3.6 K/UL    ABS. MONOCYTES 0.3 0.05 - 1.2 K/UL    ABS. EOSINOPHILS 0.1 0.0 - 0.4 K/UL    ABS.  BASOPHILS 0.0 0.0 - 0.1 K/UL    DF AUTOMATED     CARDIAC PANEL,(CK, CKMB & TROPONIN)    Collection Time: 06/02/18  7:16 PM   Result Value Ref Range     26 - 192 U/L    CK - MB 2.2 <3.6 ng/ml    CK-MB Index 1.5 0.0 - 4.0 %    Troponin-I, Qt. 0.02 0.0 - 2.329 NG/ML   METABOLIC PANEL, COMPREHENSIVE    Collection Time: 06/02/18  7:16 PM   Result Value Ref Range    Sodium 141 136 - 145 mmol/L    Potassium 3.7 3.5 - 5.5 mmol/L    Chloride 106 100 - 108 mmol/L    CO2 29 21 - 32 mmol/L    Anion gap 6 3.0 - 18 mmol/L    Glucose 104 (H) 74 - 99 mg/dL    BUN 17 7.0 - 18 MG/DL    Creatinine 1.00 0.6 - 1.3 MG/DL    BUN/Creatinine ratio 17 12 - 20      GFR est AA >60 >60 ml/min/1.73m2    GFR est non-AA 57 (L) >60 ml/min/1.73m2    Calcium 8.4 (L) 8.5 - 10.1 MG/DL    Bilirubin, total 0.8 0.2 - 1.0 MG/DL    ALT (SGPT) 18 13 - 56 U/L    AST (SGOT) 19 15 - 37 U/L    Alk.  phosphatase 141 (H) 45 - 117 U/L    Protein, total 7.5 6.4 - 8.2 g/dL    Albumin 3.2 (L) 3.4 - 5.0 g/dL    Globulin 4.3 (H) 2.0 - 4.0 g/dL    A-G Ratio 0.7 (L) 0.8 - 1.7     NT-PRO BNP    Collection Time: 06/02/18  7:16 PM   Result Value Ref Range    NT pro-BNP 3018 (H) 0 - 900 PG/ML   EKG, 12 LEAD, INITIAL    Collection Time: 06/02/18  8:13 PM   Result Value Ref Range    Ventricular Rate 65 BPM    Atrial Rate 54 BPM    QRS Duration 118 ms    Q-T Interval 464 ms    QTC Calculation (Bezet) 482 ms    Calculated R Axis -35 degrees    Calculated T Axis -171 degrees    Diagnosis       Atrial fibrillation  Left axis deviation  Possible Anterior infarct , age undetermined  ST & T wave abnormality, consider inferolateral ischemia or digitalis effect  Abnormal ECG  When compared with ECG of 03-MAY-2018 10:34,  Borderline criteria for Anterior infarct are now present  T wave inversion now evident in Lateral leads  Confirmed by Katina Augustin MD, Daniel Orr ([id number]) on 6/3/2018 8:47:39 AM     PROTHROMBIN TIME + INR    Collection Time: 06/03/18 12:53 AM   Result Value Ref Range    Prothrombin time 20.7 (H) 11.5 - 15.2 sec    INR 1.9 (H) 0.8 - 1.2     METABOLIC PANEL, BASIC    Collection Time: 06/03/18  2:49 AM   Result Value Ref Range    Sodium 141 136 - 145 mmol/L    Potassium 3.5 3.5 - 5.5 mmol/L    Chloride 103 100 - 108 mmol/L    CO2 35 (H) 21 - 32 mmol/L    Anion gap 3 3.0 - 18 mmol/L    Glucose 100 (H) 74 - 99 mg/dL    BUN 16 7.0 - 18 MG/DL    Creatinine 0.95 0.6 - 1.3 MG/DL    BUN/Creatinine ratio 17 12 - 20      GFR est AA >60 >60 ml/min/1.73m2    GFR est non-AA >60 >60 ml/min/1.73m2    Calcium 8.5 8.5 - 10.1 MG/DL   MAGNESIUM    Collection Time: 06/03/18  2:49 AM   Result Value Ref Range    Magnesium 1.9 1.6 - 2.6 mg/dL   PHOSPHORUS    Collection Time: 06/03/18  2:49 AM Result Value Ref Range    Phosphorus 3.9 2.5 - 4.9 MG/DL   CBC WITH AUTOMATED DIFF    Collection Time: 06/03/18  2:49 AM   Result Value Ref Range    WBC 4.5 (L) 4.6 - 13.2 K/uL    RBC 4.17 (L) 4.20 - 5.30 M/uL    HGB 9.0 (L) 12.0 - 16.0 g/dL    HCT 31.5 (L) 35.0 - 45.0 %    MCV 75.5 74.0 - 97.0 FL    MCH 21.6 (L) 24.0 - 34.0 PG    MCHC 28.6 (L) 31.0 - 37.0 g/dL    RDW 18.4 (H) 11.6 - 14.5 %    PLATELET 516 803 - 702 K/uL    MPV 9.6 9.2 - 11.8 FL    NEUTROPHILS 67 40 - 73 %    LYMPHOCYTES 21 21 - 52 %    MONOCYTES 8 3 - 10 %    EOSINOPHILS 4 0 - 5 %    BASOPHILS 0 0 - 2 %    ABS. NEUTROPHILS 3.0 1.8 - 8.0 K/UL    ABS. LYMPHOCYTES 0.9 0.9 - 3.6 K/UL    ABS. MONOCYTES 0.4 0.05 - 1.2 K/UL    ABS. EOSINOPHILS 0.2 0.0 - 0.4 K/UL    ABS. BASOPHILS 0.0 0.0 - 0.1 K/UL    DF AUTOMATED     CARDIAC PANEL,(CK, CKMB & TROPONIN)    Collection Time: 06/03/18  2:49 AM   Result Value Ref Range     26 - 192 U/L    CK - MB 2.0 <3.6 ng/ml    CK-MB Index 1.4 0.0 - 4.0 %    Troponin-I, Qt. <0.02 0.0 - 0.045 NG/ML   CARDIAC PANEL,(CK, CKMB & TROPONIN)    Collection Time: 06/03/18  9:35 AM   Result Value Ref Range     26 - 192 U/L    CK - MB 1.8 <3.6 ng/ml    CK-MB Index 1.5 0.0 - 4.0 %    Troponin-I, Qt. <0.02 0.0 - 0.045 NG/ML   PROTHROMBIN TIME + INR    Collection Time: 06/03/18  1:49 PM   Result Value Ref Range    Prothrombin time 19.8 (H) 11.5 - 15.2 sec    INR 1.8 (H) 0.8 - 1.2     POC G3    Collection Time: 06/03/18  3:24 PM   Result Value Ref Range    Device: NASAL CANNULA      Flow rate (POC) 2 L/M    FIO2 (POC) 28 %    pH (POC) 7.396 7.35 - 7.45      pCO2 (POC) 63.4 (H) 35.0 - 45.0 MMHG    pO2 (POC) 88 80 - 100 MMHG    HCO3 (POC) 38.9 (H) 22 - 26 MMOL/L    sO2 (POC) 96 92 - 97 %    Base excess (POC) 14 mmol/L    Allens test (POC) YES      Total resp. rate 18      Site LEFT RADIAL      Patient temp. 98.6      Specimen type (POC) ARTERIAL      Performed by Purvi Armenta              Telemetry:AFIB    Imaging:   I have personally reviewed the patients radiographs and have reviewed the reports:  IMPRESSION  Impression:     No CT evidence of pulmonary thromboembolism. Dilated main pulmonary artery suggestive of pulmonary arterial hypertension. Cardiomegaly with evidence of right heart failure. Generalized anasarca. Small sliding hiatal hernia.     Thank you for this referral.    IMPRESSION  Impression:      1. No sigmoid colon mass identified, however there is mild circumferential wall  thickening of the mid sigmoid colon which may be due to underdistention. Recommend correlation with colonoscopy.      2. No liver masses. No lymphadenopathy.        Study Result      PORTABLE CHEST RADIOGRAPH      CPT CODE: 45725      INDICATION: 3 days of shortness of breath on home oxygen, diminished lung  sounds.     COMPARISON: 2/26/2018.     FINDINGS:     Frontal view of the chest obtained at 2058 hours. Cardiomediastinal enlargement  is similar. Similar prominence of the pulmonary vasculature. Central venous  congestion. Mild reticulations at the lung bases. Left costophrenic angle is  similarly obscured, likely a pericardial fat pad. No significant right pleural  effusion or pneumothorax.     IMPRESSION  IMPRESSION:     Cardiomediastinal enlargement and pulmonary arterial hypertension. Similar central venous congestion. Mild increased lung base reticulations may  reflect mild interstitial edema and/or subsegmental atelectasis.                         Yamileth Klein MD  6/3/2018, 4:11 PM

## 2018-06-03 NOTE — CONSULTS
[facility] - Consult Note    Consultation request by Dr. Friend  for advice/opinion related to evaluating CHF    Date of  Admission: 6/2/2018  7:30 PM   Primary Care Physician:  MD Mario     Assessment:     -Acute on chronic respiratory failure, multifactorial in setting of severe pulmonary HTN, OHS, ANDRADE with likely component of acute on chronic diastolic CHF. -Chronic LE swelling, history of non-compliance with diuretic due to difficulty ambulating. According to patient this looks to be at her baseline.  -Chronic atrial fibrillation, on Coumadin. Low dose B-blocker for rate control  -Recent admission for bradycardia, likely associated with excessive BB use  -Echo 04/2018: EF 50-55%, mildly to moderately increased LV wall thickness, RV moderately to severely dilated with est. Peak pressure 87 mmHg, moderately to severely dilated RA, moderate to severe tricuspid regurgitation, severe pulmonic insufficiency  -Severe pulmonary hypertension, RV dysfunction with moderate to severe pulmonary insufficiency and moderate pulmonary pressure elevation  -Hx congenital heart defect repaired as child  -HTN  -Dyslipidemia, on Simvastatin  -ANDRADE  -OHS  -Morbid obesity  -DJD, pending left knee replacement  -History of polysubstance abuse  -History of non-compliance  -Psychiatric illness    Primary cardiologist is Dr. Pauly Moore:     Continue IV diuretics, strict I/O's. Will discontinue Amlodipine and start Lisinopril due to her chronic leg swelling. Will order ABG, pt appears drowsy, suspect elevated CO2. Would recommend CPAP/BiPAP given her severe ANDRADE/OHS and chronic CO2 retension  Consider pulmonary evaluation. Follows up with Dr. Yang Jackson     History of Present Illness: This is a 60 y.o. female admitted for Acute exacerbation of CHF (congestive heart failure) ([facility] [address]     Patient complains of:  Shortness of breath    Pt is a 60 y.o. F with Hx HTN, dyslipidemia, ANDRADE, OHS, morbid obesity, diastolic heart failure, non-compliance who presented to the hospital due to shortness of breath and lower extremity swelling. She reports chronic nocturnal shortness of breath and denies recent change. Pt admits to eating some fried chicken recently. No chest pain. She has difficulty staying awake, requiring to ask questions repeatedly. She states that her daughter helps her with her medications and reports recent compliance. Cardiac risk factors: dyslipidemia, obesity, sedentary life style, hypertension      Review of Symptoms:      Review of systems not obtained due to patient factors. Pt very drowsy, falls asleep multiple times during evaluation. Past Medical History:     Past Medical History:   Diagnosis Date    Atrial fibrillation     CHADS score 1  (-CHF, +HTN, -AGE, -DM, -CVA)    Carpal tunnel syndrome     Chronic pain     Congenital heart disease     presumed pulmonary stenosis s/p repair 1969    Degenerative disc disease     Degenerative joint disease     Dyslipidemia     GERD     H/O echocardiogram 04/2017    EF 60-65%, mild concentric LVH, dilated RV with RV dysfunction, RVSP 54 mmHg, RA E, moderate to severe pulmonic regurgitation.     Hypertension     Hypothyroid     Morbid obesity     Morbid obesity with BMI of 45.0-49.9, adult ([facility] [address] 2/20/2017    Noncompliance     Schizoaffective disorder     Substance abuse     marijuana, crack cocaine         Social History:     Social History     Social History    Marital status: SINGLE     Spouse name: N/A    Number of children: N/A    Years of education: N/A     Social History Main Topics    Smoking status: Never Smoker    Smokeless tobacco: Never Used    Alcohol use No    Drug use: No    Sexual activity: Yes     Birth control/ protection: Surgical     Other Topics Concern    None     Social History Narrative        Family History:     Family History   Problem Relation Age of Onset    Hypertension Mother    Luis Angel Grant Coronary Artery Disease Mother     Coronary Artery Disease Father     Hypertension Father         Medications:      Allergies   Allergen Reactions    Gabapentin Other (comments)     Unsteady, weakness LEs    Tetanus Vaccines And Toxoid Swelling    Topamax [Topiramate] Other (comments)     weakness        Current Facility-Administered Medications   Medication Dose Route Frequency    WARFARIN INFORMATION NOTE (COUMADIN)   Other Rx Dosing/Monitoring    amLODIPine (NORVASC) tablet 5 mg  5 mg Oral DAILY    ARIPiprazole (ABILIFY) tablet 15 mg  15 mg Oral DAILY    isosorbide mononitrate ER (IMDUR) tablet 30 mg  30 mg Oral QPM    levothyroxine (SYNTHROID) tablet 125 mcg  125 mcg Oral ACB    metoprolol tartrate (LOPRESSOR) tablet 12.5 mg  12.5 mg Oral BID    oxyCODONE IR (ROXICODONE) tablet 5 mg  5 mg Oral Q6H PRN    PARoxetine (PAXIL) tablet 20 mg  20 mg Oral DAILY    simvastatin (ZOCOR) tablet 40 mg  40 mg Oral QHS    raNITIdine (ZANTAC) tablet 150 mg  150 mg Oral BID    acetaminophen (TYLENOL) tablet 650 mg  650 mg Oral Q6H PRN    naloxone (NARCAN) injection 0.4 mg  0.4 mg IntraVENous PRN    ondansetron (ZOFRAN) injection 4 mg  4 mg IntraVENous Q6H PRN    docusate sodium (COLACE) capsule 100 mg  100 mg Oral BID PRN    furosemide (LASIX) injection 40 mg  40 mg IntraVENous Q12H    potassium chloride (K-DUR, KLOR-CON) SR tablet 40 mEq  40 mEq Oral DAILY         Physical Exam:     Visit Vitals    BP (!) 163/98 (BP 1 Location: Left arm, BP Patient Position: At rest)    Pulse 86    Temp 98 °F (36.7 °C)    Resp 18    Wt 324 lb 14.4 oz (147.4 kg)    SpO2 94%    Breastfeeding No    BMI 63.45 kg/m2     BP Readings from Last 3 Encounters:   06/03/18 (!) 163/98   05/22/18 150/54   05/17/18 (P) 150/84     Pulse Readings from Last 3 Encounters:   06/03/18 86   05/22/18 65   05/17/18 (!) (P) 48     Wt Readings from Last 3 Encounters:   06/03/18 324 lb 14.4 oz (147.4 kg)   05/22/18 311 lb (141.1 kg)   05/17/18 303 lb (137.4 kg)       General:  somnolent, no distress, appears stated age  Neck:  No JVD  Lungs:  Decreased breath sounds to anterolateral lung fields  Heart:  Irregularly irregular rhythm  Abdomen:  abdomen is soft without significant tenderness, masses, organomegaly or guarding  Extremities:  2+ pitting edema  Skin: Warm and dry. Neuro: somnolent, no focal neurological deficits, no involuntary movements  Psych: non focal     Data Review:     Recent Labs      06/03/18 0249 06/02/18 1916   WBC  4.5*  4.0*   HGB  9.0*  8.8*   HCT  31.5*  30.3*   PLT  178  176     Recent Labs      06/03/18   0249  06/03/18   0053  06/02/18 1916   NA  141   --   141   K  3.5   --   3.7   CL  103   --   106   CO2  35*   --   29   GLU  100*   --   104*   BUN  16   --   17   CREA  0.95   --   1.00   CA  8.5   --   8.4*   MG  1.9   --    --    PHOS  3.9   --    --    ALB   --    --   3.2*   SGOT   --    --   19   ALT   --    --   18   INR   --   1.9*   --        Results for orders placed or performed during the hospital encounter of 06/02/18   EKG, 12 LEAD, INITIAL   Result Value Ref Range    Ventricular Rate 65 BPM    Atrial Rate 54 BPM    QRS Duration 118 ms    Q-T Interval 464 ms    QTC Calculation (Bezet) 482 ms    Calculated R Axis -35 degrees    Calculated T Axis -171 degrees    Diagnosis       Atrial fibrillation  Left axis deviation  Possible Anterior infarct , age undetermined  ST & T wave abnormality, consider inferolateral ischemia or digitalis effect  Abnormal ECG  When compared with ECG of 03-MAY-2018 10:34,  Borderline criteria for Anterior infarct are now present  T wave inversion now evident in Lateral leads  Confirmed by Shaquille Laws MD, Rene Irwin ([id number]) on 6/3/2018 8:47:39 AM     Results for orders placed or performed in visit on 05/17/18   AMB POC EKG ROUTINE W/ 12 LEADS, INTER & REP    Narrative    Atrial fibrillation with slow ventricular rate. Intraventricular conduction delay. Diffuse T-wave abnormality. All Cardiac Markers in the last 24 hours:    Lab Results   Component Value Date/Time     06/03/2018 02:49 AM     06/02/2018 07:16 PM    CKMB 2.0 06/03/2018 02:49 AM    CKMB 2.2 06/02/2018 07:16 PM    CKND1 1.4 06/03/2018 02:49 AM    CKND1 1.5 06/02/2018 07:16 PM    TROIQ <0.02 06/03/2018 02:49 AM    TROIQ 0.02 06/02/2018 07:16 PM       Last Lipid:    Lab Results   Component Value Date/Time    Cholesterol, total 90 05/03/2018 10:25 AM    HDL Cholesterol 34 (L) 05/03/2018 10:25 AM    LDL, calculated 41.4 05/03/2018 10:25 AM    Triglyceride 73 05/03/2018 10:25 AM    CHOL/HDL Ratio 2.6 05/03/2018 10:25 AM       Signed By: Robb Rosa PA-C     Ce 3, 2018      Kwadwo Gamez MD

## 2018-06-03 NOTE — ROUTINE PROCESS
Received patient from ER per stretcher in stable condition. Orientation to room provided. Call light placed in reach and personal items placed in reach. Placed pure wick. Complained of right hip pain, chronic per patient. Position in bed for comfort. Primary Nurse Minnie Burch RN and Kasey Stockton RN performed a dual skin assessment on this patient No impairment noted  Stas score is 18    7:44 AM -Bedside shift change report given to KUNAL Diaz (oncoming nurse) by Delonte Mars RN (offgoing nurse). Report given with SBAR, Kardex, Intake/Output, MAR and Recent Results.

## 2018-06-03 NOTE — ROUTINE PROCESS
Report received from Tamir Canada. Assumed patient care. Patient in bed, awake, alert and oriented x3. Denies pain or discomforts at this time. HOB elevated to 30 degrees. Call light placed within reach. Bed in low position and personal items placed in reach. 2200 - Patient refusing to be turned to her side , she prefers to stay on her back. Educated on the importance of being turned and repositioned but she continues to refused. 0330 - Placed patient on home CPAP machine. 7:38 AM - Bedside shift change report given to KUNAL Lozano (oncoming nurse) by Joanne Cain RN (offgoing nurse). Report given with SBAR, Kardex, Intake/Output, MAR and Recent Results.

## 2018-06-03 NOTE — ED NOTES
Chen wick applied at patient request.  Reviewed medications as administered and poc. Questions encouraged and answewred.   Patient verbalized an understanding

## 2018-06-03 NOTE — PROGRESS NOTES
Received report on pt.from off going RN. Resting quietly in bed on rounds. Denies c/o pain or SOB at this time. Purewick in place. Call bell at side. Bed alarms on. No acute distress noted. Will cont to monitor for any changes in status. 1330 talked with pt in regards to CHF education and she doesn't know anything about it and to talk to her daughter, that she will be in soon. Sts she doesn't hear well enough to really listen to any education and leaves it up to her daughter to take care of everything. Will watch for daughter to come in.     1600 sister at bedside and brought pts sleep apnea machine in. Will have checked by INNO[facility] for use. 19-- Bedside and Verbal shift change report given to Ignacia Finney (oncoming nurse) by Tran Harrison RN (offgoing nurse). Report given with Bianca CASTRO and MAR.

## 2018-06-03 NOTE — H&P
History & Physical    Patient: German Mclean MRN: [id number]  CSN: [id number]    YOB: 1960  Age: 60 y.o. Sex: female      DOA: 6/2/2018    Chief Complaint:   Chief Complaint   Patient presents with    Respiratory Distress          HPI:     German Mclean is a 60 y.o.  female who has PMH of PH, decompensated diastolic HF, LE edema, A-fib on Warfarin. Pt is on Home oxygen 2 L. Pt was recently discharged from the hospital after been treated for CHF exacerbation and other complications  Pt returned to ER today with progressively worsening SOB and LE edema since she was discharged. Pt states that she felt better on discharge but gradually felt worse. States that she was not told to restrict fluids. Denies fever and cP. Pt will be admitted for Decompensated CHF >> diastolic     Past Medical History:   Diagnosis Date    Atrial fibrillation     CHADS score 1  (-CHF, +HTN, -AGE, -DM, -CVA)    Carpal tunnel syndrome     Chronic pain     Congenital heart disease     presumed pulmonary stenosis s/p repair 1969    Degenerative disc disease     Degenerative joint disease     Dyslipidemia     GERD     H/O echocardiogram 04/2017    EF 60-65%, mild concentric LVH, dilated RV with RV dysfunction, RVSP 54 mmHg, RA E, moderate to severe pulmonic regurgitation.     Hypertension     Hypothyroid     Morbid obesity     Morbid obesity with BMI of 45.0-49.9, adult ([facility] [address] 2/20/2017    Noncompliance     Schizoaffective disorder     Substance abuse     marijuana, crack cocaine       Past Surgical History:   Procedure Laterality Date    COLONOSCOPY N/A 5/6/2018    COLONOSCOPY with tatooing and biopsy performed by Gustavo Mai MD at [facility]    HX [address]      left    HX HYSTERECTOMY      HX KNEE REPLACEMENT      left    HX KNEE REPLACEMENT      right    HX THYROIDECTOMY         Family History   Problem Relation Age of Onset    Hypertension Mother     Coronary Artery Disease Mother     Coronary Artery Disease Father     Hypertension Father        Social History     Social History    Marital status: SINGLE     Spouse name: N/A    Number of children: N/A    Years of education: N/A     Social History Main Topics    Smoking status: Never Smoker    Smokeless tobacco: Never Used    Alcohol use No    Drug use: No    Sexual activity: Yes     Birth control/ protection: Surgical     Other Topics Concern    None     Social History Narrative       Prior to Admission medications    Medication Sig Start Date End Date Taking? Authorizing Provider   metoprolol tartrate (LOPRESSOR) 25 mg tablet Take 0.5 Tabs by mouth two (2) times a day. 5/17/18  Yes Kiko Walsh NP   fluticasone (FLONASE) 50 mcg/actuation nasal spray 2 Sprays by Both Nostrils route daily. 5/10/18  Yes Claude Baron, MD   oxyCODONE IR (ROXICODONE) 5 mg immediate release tablet Take 5 mg by mouth every six (6) hours as needed for Pain. Yes Historical Provider   isosorbide mononitrate ER (IMDUR) 30 mg tablet Take 1 Tab by mouth every evening. 4/24/18  Yes Zuleika Taylor NP   multivitamin, tx-iron-ca-min (THERA-M W/ IRON) 9 mg iron-400 mcg tab tablet Take 1 Tab by mouth daily. 3/1/18  Yes Kimber Roman MD   raNITIdine (ZANTAC) 150 mg tablet Take 150 mg by mouth two (2) times a day. Yes Historical Provider   warfarin (COUMADIN) 5 mg tablet Take 7.5 mg by mouth daily. Yes Historical Provider   VENTOLIN HFA 90 mcg/actuation inhaler Take 2 Puffs by inhalation every four (4) hours as needed for Wheezing or Shortness of Breath. 4/26/17  Yes Kimber Roman MD   furosemide (LASIX) 40 mg tablet 40 mg po daily 4/26/17  Yes Kimber Roman MD   PARoxetine (PAXIL) 20 mg tablet Take 1 Tab by mouth daily. 1/21/14  Yes Sharmila Robison MD   simvastatin (ZOCOR) 40 mg tablet Take  by mouth nightly.  to obtain from pharmacy   Yes Historical Provider   aripiprazole (ABILIFY) 15 mg tablet Take 15 mg by mouth daily. to obtain from pharmacy   Yes Historical Provider   levothyroxine (SYNTHROID) 125 mcg tablet Take 125 mcg by mouth Daily (before breakfast). Yes Historical Provider   loperamide (IMODIUM) 2 mg capsule Take 2 mg by mouth three (3) times daily as needed for Diarrhea. Historical Provider       Allergies   Allergen Reactions    Gabapentin Other (comments)     Unsteady, weakness LEs    Tetanus Vaccines And Toxoid Swelling    Topamax [Topiramate] Other (comments)     weakness         Review of Systems  GENERAL: Patient alert, awake and oriented times 3, SOB. Mild distress   HEENT: No change in vision, no earache, tinnitus, sore throat or sinus congestion. NECK: No pain or stiffness. PULMONARY: +ve shortness of breath, -ve cough or wheeze. Cardiovascular: +ve pnd / orthopnea, no CP  GASTROINTESTINAL: No abdominal pain, nausea, vomiting or diarrhea, melena or bright red blood per rectum. GENITOURINARY: No urinary frequency, urgency, hesitancy or dysuria. MUSCULOSKELETAL: No joint or muscle pain, no back pain, no recent trauma. DERMATOLOGIC: No rash, no itching, no lesions. ENDOCRINE: No polyuria, polydipsia, no heat or cold intolerance. N+ve increase in weight    HEMATOLOGICAL: +ve anemia . No easy bruising or bleeding. NEUROLOGIC: No headache, seizures, numbness, tingling or weakness. Physical Exam:     Physical Exam:  Visit Vitals    /84 (BP 1 Location: Left arm, BP Patient Position: At rest)    Pulse 70    Temp 98.1 °F (36.7 °C)    Resp 18    Wt 147.4 kg (324 lb 14.4 oz)    SpO2 97%    Breastfeeding No    BMI 63.45 kg/m2    O2 Flow Rate (L/min): 2 l/min O2 Device: Nasal cannula    Temp (24hrs), Av.2 °F (36.8 °C), Min:98.1 °F (36.7 °C), Max:98.2 °F (36.8 °C)     1901 -  0700  In: 480 [P.O.:480]  Out: 4900 [Urine:4900]        General:  Alert, cooperative, mild distress, appears stated age.               Head: Normocephalic, without obvious abnormality, atraumatic. Eyes:  Conjunctivae/corneas clear. PERRL, EOMs intact. Nose: Nares normal. No drainage or sinus tenderness. Neck: Supple, symmetrical, trachea midline, no adenopathy, thyroid: no enlargement, no carotid bruit and no JVD. Lungs:    Decrease BS with B/L rales    Heart:  Regular rate and rhythm, S1, S2 +murmur      Abdomen: Soft, non-tender. Bowel sounds normal.    Extremities: Extremities normal, atraumatic, +1 LE edema    Pulses: 2+ and symmetric all extremities. Skin:  No rashes or lesions   Neurologic: AAOx3, No focal motor or sensory deficit.generally weak       Labs Reviewed: All lab results for the last 24 hours reviewed. CXR and EKG    Procedures/imaging: see electronic medical records for all procedures/Xrays and details which were not copied into this note but were reviewed prior to creation of Plan      Assessment/Plan     Principal Problem:    Acute on chronic diastolic congestive heart failure ([facility] [address] (6/2/2018)    Active Problems:    Hypertension ()      Atrial fibrillation ()      Overview: CHADS score 1  (-CHF, +HTN, -AGE, -DM, -CVA)      Hypothyroidism (1/14/2014)      Morbid obesity ([address] (1/14/2014)      Fluid overload (4/18/2017)      Chronic anticoagulation (6/2/2018)       Pt is admitted for worsening SOB with Hx of chronic respiratory failure on home oxygen 2 ry to fluid overload.   Hx of decompensated diastolic HF  Continue Lasix IV  Blood Pressure is not controlled>> will add amlodipine   Continue BB and Imdur    Continue warfarin and monitor INR     Hypothyroid >> continue Synthroid   Hx of SCZ >> continue Abilify and Paxil    Pt is educated on fluid restriction       DVT/GI Prophylaxis: Hep SQ    Plan of care is discussed in details with Patient/Family at bedside and agreed upon    Nargis Roberson MD  6/3/2018 11:53 PM

## 2018-06-03 NOTE — PROGRESS NOTES
Ro called and requisition placed to have pts aleep apnea machine from home checked and cleared for use tonight. Work Order# [id number].

## 2018-06-03 NOTE — ROUTINE PROCESS
TRANSFER - IN REPORT:    Verbal report received from Mounika(name) on Carlos ManuelRizo  being received from ER(unit) for routine progression of care      Report consisted of patients Situation, Background, Assessment and   Recommendations(SBAR). Information from the following report(s) Kardex and Cardiac Rhythm atrial fib controlled was reviewed with the receiving nurse. Opportunity for questions and clarification was provided. Assessment completed upon patients arrival to unit and care assumed.

## 2018-06-03 NOTE — ED NOTES
Received patient in room 1. Patient appears in no distress. Cardiac monitor in place. Reviewed poc with patient.

## 2018-06-03 NOTE — PROGRESS NOTES
[facility] Hospitalist Group  Progress Note    Patient: Cathryn Casanova Age: 60 y.o. : 1960 MR#: [id number] SSN: [id number]  Date: 6/3/2018     Subjective:     Pt reports having SOB PTA that has improved, with associated Bilat leg swelling. Has PITTS and PND. Denies any chest pain. Lives with her daughter and uses a walker to ambulate. Assessment/Plan:   1. Acute on chronic diastolic congestive heart failure: cardiology consult, cont Lasix IV, strict I&O. Net neg 5.1L. 2. Acute on chronic respiratory failure, multifactorial in setting of severe pulmonary HTN, OHS, ANDRADE: pulm consult, plan to see in the am, follow ABG results. Cont O2 supplementation. 3. Chronic LE swelling, history of non-compliance with diuretics: PVLs negative, continue Lasix  4. Chronic atrial fibrillation, on Coumadin. Low dose B-blocker for rate control. INR is 1.8. Pt was given coumadin 7.5mg at 1 am today, pharmacy recommend next dose tomorrow at 6pm.  5. Sigmoid mass: seen by Dr. Sangita Mariscal w/ plan to follow up on path, s/p Colonoscopy on 18 for rectal bleed. Pathology of bx of polyp collected showed serrated polyp. Consult Dr. Sangita Mariscal in the am.    6. HTN: imdur, low dose BB, and Lisinopril  7. Dyslipidemia: cont Simvastatin  8. Severe pulmonary hypertension, RV dysfunction with moderate to severe pulmonary insufficiency and moderate pulmonary pressure elevation: pulm consult  9. Recent admission for bradycardia  10. ANDRADE: pulm consult, follow ABG  11. OHS  12. Morbid obesity  13. History of polysubstance abuse  14. History of non-compliance    Goals of care:  Full code  Disposition:  [x]PT/OT ordered   [x] Case management referral    Case discussed with:  [x]Patient  []Family  [x]Nursing  []Case Management  DVT Prophylaxis:  []Lovenox  []Hep SQ  []SCDs  [x]Coumadin   []On Heparin gtt    Objective:   VS:   Visit Vitals    /84 (BP 1 Location: Left arm, BP Patient Position: At rest)    Pulse 73  Temp 97.5 °F (36.4 °C)    Resp 18    Wt 147.4 kg (324 lb 14.4 oz)    SpO2 97%    Breastfeeding No    BMI 63.45 kg/m2      Tmax/24hrs: Temp (24hrs), Av °F (36.7 °C), Min:97.5 °F (36.4 °C), Max:98.2 °F (36.8 °C)    Intake/Output Summary (Last 24 hours) at 18 1442  Last data filed at 18 1323   Gross per 24 hour   Intake              720 ml   Output             5900 ml   Net            -5180 ml       General:  Appears sleepy, no distress, appears stated age  Cardiovascular:  Irregularly irregular rhythm  Pulmonary:  Decreased breath sounds to anterolateral lung fields  GI:  abdomen is soft without significant tenderness, normal BS  Extremities:   2+ pitting edema      Labs:    Recent Results (from the past 24 hour(s))   CBC WITH AUTOMATED DIFF    Collection Time: 18  7:16 PM   Result Value Ref Range    WBC 4.0 (L) 4.6 - 13.2 K/uL    RBC 4.02 (L) 4.20 - 5.30 M/uL    HGB 8.8 (L) 12.0 - 16.0 g/dL    HCT 30.3 (L) 35.0 - 45.0 %    MCV 75.4 74.0 - 97.0 FL    MCH 21.9 (L) 24.0 - 34.0 PG    MCHC 29.0 (L) 31.0 - 37.0 g/dL    RDW 18.4 (H) 11.6 - 14.5 %    PLATELET 693 746 - 636 K/uL    MPV 10.1 9.2 - 11.8 FL    NEUTROPHILS 68 40 - 73 %    LYMPHOCYTES 22 21 - 52 %    MONOCYTES 7 3 - 10 %    EOSINOPHILS 3 0 - 5 %    BASOPHILS 0 0 - 2 %    ABS. NEUTROPHILS 2.7 1.8 - 8.0 K/UL    ABS. LYMPHOCYTES 0.9 0.9 - 3.6 K/UL    ABS. MONOCYTES 0.3 0.05 - 1.2 K/UL    ABS. EOSINOPHILS 0.1 0.0 - 0.4 K/UL    ABS.  BASOPHILS 0.0 0.0 - 0.1 K/UL    DF AUTOMATED     CARDIAC PANEL,(CK, CKMB & TROPONIN)    Collection Time: 18  7:16 PM   Result Value Ref Range     26 - 192 U/L    CK - MB 2.2 <3.6 ng/ml    CK-MB Index 1.5 0.0 - 4.0 %    Troponin-I, Qt. 0.02 0.0 - 8.068 NG/ML   METABOLIC PANEL, COMPREHENSIVE    Collection Time: 18  7:16 PM   Result Value Ref Range    Sodium 141 136 - 145 mmol/L    Potassium 3.7 3.5 - 5.5 mmol/L    Chloride 106 100 - 108 mmol/L    CO2 29 21 - 32 mmol/L    Anion gap 6 3.0 - 18 mmol/L    Glucose 104 (H) 74 - 99 mg/dL    BUN 17 7.0 - 18 MG/DL    Creatinine 1.00 0.6 - 1.3 MG/DL    BUN/Creatinine ratio 17 12 - 20      GFR est AA >60 >60 ml/min/1.73m2    GFR est non-AA 57 (L) >60 ml/min/1.73m2    Calcium 8.4 (L) 8.5 - 10.1 MG/DL    Bilirubin, total 0.8 0.2 - 1.0 MG/DL    ALT (SGPT) 18 13 - 56 U/L    AST (SGOT) 19 15 - 37 U/L    Alk.  phosphatase 141 (H) 45 - 117 U/L    Protein, total 7.5 6.4 - 8.2 g/dL    Albumin 3.2 (L) 3.4 - 5.0 g/dL    Globulin 4.3 (H) 2.0 - 4.0 g/dL    A-G Ratio 0.7 (L) 0.8 - 1.7     NT-PRO BNP    Collection Time: 06/02/18  7:16 PM   Result Value Ref Range    NT pro-BNP 3018 (H) 0 - 900 PG/ML   EKG, 12 LEAD, INITIAL    Collection Time: 06/02/18  8:13 PM   Result Value Ref Range    Ventricular Rate 65 BPM    Atrial Rate 54 BPM    QRS Duration 118 ms    Q-T Interval 464 ms    QTC Calculation (Bezet) 482 ms    Calculated R Axis -35 degrees    Calculated T Axis -171 degrees    Diagnosis       Atrial fibrillation  Left axis deviation  Possible Anterior infarct , age undetermined  ST & T wave abnormality, consider inferolateral ischemia or digitalis effect  Abnormal ECG  When compared with ECG of 03-MAY-2018 10:34,  Borderline criteria for Anterior infarct are now present  T wave inversion now evident in Lateral leads  Confirmed by Reema Black MD, Juliano Randolph ([id number]) on 6/3/2018 8:47:39 AM     PROTHROMBIN TIME + INR    Collection Time: 06/03/18 12:53 AM   Result Value Ref Range    Prothrombin time 20.7 (H) 11.5 - 15.2 sec    INR 1.9 (H) 0.8 - 1.2     METABOLIC PANEL, BASIC    Collection Time: 06/03/18  2:49 AM   Result Value Ref Range    Sodium 141 136 - 145 mmol/L    Potassium 3.5 3.5 - 5.5 mmol/L    Chloride 103 100 - 108 mmol/L    CO2 35 (H) 21 - 32 mmol/L    Anion gap 3 3.0 - 18 mmol/L    Glucose 100 (H) 74 - 99 mg/dL    BUN 16 7.0 - 18 MG/DL    Creatinine 0.95 0.6 - 1.3 MG/DL    BUN/Creatinine ratio 17 12 - 20      GFR est AA >60 >60 ml/min/1.73m2    GFR est non-AA >60 >60 ml/min/1.73m2    Calcium 8.5 8.5 - 10.1 MG/DL   MAGNESIUM    Collection Time: 06/03/18  2:49 AM   Result Value Ref Range    Magnesium 1.9 1.6 - 2.6 mg/dL   PHOSPHORUS    Collection Time: 06/03/18  2:49 AM   Result Value Ref Range    Phosphorus 3.9 2.5 - 4.9 MG/DL   CBC WITH AUTOMATED DIFF    Collection Time: 06/03/18  2:49 AM   Result Value Ref Range    WBC 4.5 (L) 4.6 - 13.2 K/uL    RBC 4.17 (L) 4.20 - 5.30 M/uL    HGB 9.0 (L) 12.0 - 16.0 g/dL    HCT 31.5 (L) 35.0 - 45.0 %    MCV 75.5 74.0 - 97.0 FL    MCH 21.6 (L) 24.0 - 34.0 PG    MCHC 28.6 (L) 31.0 - 37.0 g/dL    RDW 18.4 (H) 11.6 - 14.5 %    PLATELET 776 421 - 638 K/uL    MPV 9.6 9.2 - 11.8 FL    NEUTROPHILS 67 40 - 73 %    LYMPHOCYTES 21 21 - 52 %    MONOCYTES 8 3 - 10 %    EOSINOPHILS 4 0 - 5 %    BASOPHILS 0 0 - 2 %    ABS. NEUTROPHILS 3.0 1.8 - 8.0 K/UL    ABS. LYMPHOCYTES 0.9 0.9 - 3.6 K/UL    ABS. MONOCYTES 0.4 0.05 - 1.2 K/UL    ABS. EOSINOPHILS 0.2 0.0 - 0.4 K/UL    ABS.  BASOPHILS 0.0 0.0 - 0.1 K/UL    DF AUTOMATED     CARDIAC PANEL,(CK, CKMB & TROPONIN)    Collection Time: 06/03/18  2:49 AM   Result Value Ref Range     26 - 192 U/L    CK - MB 2.0 <3.6 ng/ml    CK-MB Index 1.4 0.0 - 4.0 %    Troponin-I, Qt. <0.02 0.0 - 0.045 NG/ML   CARDIAC PANEL,(CK, CKMB & TROPONIN)    Collection Time: 06/03/18  9:35 AM   Result Value Ref Range     26 - 192 U/L    CK - MB 1.8 <3.6 ng/ml    CK-MB Index 1.5 0.0 - 4.0 %    Troponin-I, Qt. <0.02 0.0 - 0.045 NG/ML   PROTHROMBIN TIME + INR    Collection Time: 06/03/18  1:49 PM   Result Value Ref Range    Prothrombin time 19.8 (H) 11.5 - 15.2 sec    INR 1.8 (H) 0.8 - 1.2         Signed By: Erica Hernandez PA-C     Ce 3, 2018 2:42 PM

## 2018-06-03 NOTE — ED PROVIDER NOTES
EMERGENCY DEPARTMENT HISTORY AND PHYSICAL EXAM    8:58 PM      Date: 6/2/2018  Patient Name: Stephanie Jorgensen    History of Presenting Illness     Chief Complaint   Patient presents with    Respiratory Distress         History Provided By: Patient    Chief Complaint: Leg swelling with associated SOB  Duration: 2 Days  Timing:  Progressive and Worsening  Location: Bilateral LEs  Modifying Factors: No alleviating or exacerbating factors reported  Associated Symptoms: orthopnea, productive cough      Additional History (Context):     Stephanie Jorgensen is a 60 y.o. female with a pertinent history of CHF, A-fib (on Warfarin), HTN, presenting to the ED c/o progressively worsening, bilat LE swelling and pain x 2 days with associated intermittent SOB. Also reports orthopnea (sleeps sitting up) and productive cough. Pt denies fever, chills, abd pain. States she is normally on 2L O2 via NC at home. No other acute symptoms or complaints were noted. PCP: Steve Davis MD        Past History     Past Medical History:  Past Medical History:   Diagnosis Date    Atrial fibrillation     CHADS score 1  (-CHF, +HTN, -AGE, -DM, -CVA)    Carpal tunnel syndrome     Chronic pain     Congenital heart disease     presumed pulmonary stenosis s/p repair 1969    Degenerative disc disease     Degenerative joint disease     Dyslipidemia     GERD     H/O echocardiogram 04/2017    EF 60-65%, mild concentric LVH, dilated RV with RV dysfunction, RVSP 54 mmHg, RA E, moderate to severe pulmonic regurgitation.     Hypertension     Hypothyroid     Morbid obesity     Morbid obesity with BMI of 45.0-49.9, adult ([facility] [address] 2/20/2017    Noncompliance     Schizoaffective disorder     Substance abuse     marijuana, crack cocaine       Past Surgical History:  Past Surgical History:   Procedure Laterality Date    COLONOSCOPY N/A 5/6/2018    COLONOSCOPY with tatooing and biopsy performed by Khadijah Dumont MD at [place] [id number] TUNNEL RELEASE      left    HX HYSTERECTOMY      HX KNEE REPLACEMENT      left    HX KNEE REPLACEMENT      right    HX THYROIDECTOMY         Family History:  Family History   Problem Relation Age of Onset    Hypertension Mother     Coronary Artery Disease Mother     Coronary Artery Disease Father     Hypertension Father        Social History:  Social History   Substance Use Topics    Smoking status: Never Smoker    Smokeless tobacco: Never Used    Alcohol use No       Allergies: Allergies   Allergen Reactions    Gabapentin Other (comments)     Unsteady, weakness LEs    Tetanus Vaccines And Toxoid Swelling    Topamax [Topiramate] Other (comments)     weakness         Review of Systems       Review of Systems   Constitutional: Negative for chills, fatigue and fever. HENT: Negative for congestion, rhinorrhea and sore throat. Eyes: Negative for visual disturbance. Respiratory: Positive for cough and shortness of breath. Negative for wheezing.         +orthopnea    Cardiovascular: Positive for leg swelling. Negative for chest pain and palpitations. Gastrointestinal: Negative for abdominal pain, diarrhea, nausea and vomiting. Genitourinary: Negative for difficulty urinating and dysuria. Musculoskeletal: Negative for arthralgias. Skin: Negative for rash. Neurological: Negative for weakness and headaches. All other systems reviewed and are negative. Physical Exam     Visit Vitals    /80 (BP 1 Location: Left arm, BP Patient Position: Post activity)    Pulse 60    Temp 98.2 °F (36.8 °C)    Resp 22    Wt 150.9 kg (332 lb 11.2 oz)    SpO2 97%    Breastfeeding No    BMI 64.98 kg/m2         Physical Exam   Constitutional: She is oriented to person, place, and time. Morbidly obese. Tearful at times. HENT:   Head: Normocephalic and atraumatic. Mouth/Throat: Oropharynx is clear and moist and mucous membranes are normal. No oropharyngeal exudate.    Eyes: Conjunctivae and EOM are normal. No scleral icterus. Neck: Normal range of motion. Neck supple. No JVD present. No thyromegaly present. Cardiovascular: Normal rate, S1 normal, S2 normal, normal heart sounds and intact distal pulses. An irregular rhythm present. Exam reveals no gallop and no friction rub. No murmur heard. Pulmonary/Chest: Effort normal. No accessory muscle usage. No respiratory distress. She exhibits no tenderness. Lung sounds are diminished in the bases     Abdominal: Soft. Normal appearance and bowel sounds are normal. She exhibits no distension and no pulsatile midline mass. There is no tenderness. There is no rebound and no guarding. Musculoskeletal: Normal range of motion. She exhibits edema (4+ pitting edema bilat LE throughout   ). Lymphadenopathy:        Head (right side): No submandibular adenopathy present. She has no cervical adenopathy. Neurological: She is alert and oriented to person, place, and time. Moving all extremities. No obvious deficits or facial asymmetry. Skin: Skin is warm, dry and intact. No rash noted. No erythema. Psychiatric: Her speech is normal.   Nursing note and vitals reviewed. Diagnostic Study Results     Labs -  Recent Results (from the past 12 hour(s))   CBC WITH AUTOMATED DIFF    Collection Time: 06/02/18  7:16 PM   Result Value Ref Range    WBC 4.0 (L) 4.6 - 13.2 K/uL    RBC 4.02 (L) 4.20 - 5.30 M/uL    HGB 8.8 (L) 12.0 - 16.0 g/dL    HCT 30.3 (L) 35.0 - 45.0 %    MCV 75.4 74.0 - 97.0 FL    MCH 21.9 (L) 24.0 - 34.0 PG    MCHC 29.0 (L) 31.0 - 37.0 g/dL    RDW 18.4 (H) 11.6 - 14.5 %    PLATELET 914 534 - 118 K/uL    MPV 10.1 9.2 - 11.8 FL    NEUTROPHILS 68 40 - 73 %    LYMPHOCYTES 22 21 - 52 %    MONOCYTES 7 3 - 10 %    EOSINOPHILS 3 0 - 5 %    BASOPHILS 0 0 - 2 %    ABS. NEUTROPHILS 2.7 1.8 - 8.0 K/UL    ABS. LYMPHOCYTES 0.9 0.9 - 3.6 K/UL    ABS. MONOCYTES 0.3 0.05 - 1.2 K/UL    ABS. EOSINOPHILS 0.1 0.0 - 0.4 K/UL    ABS.  BASOPHILS 0.0 0.0 - 0.1 K/UL    DF AUTOMATED     CARDIAC PANEL,(CK, CKMB & TROPONIN)    Collection Time: 06/02/18  7:16 PM   Result Value Ref Range     26 - 192 U/L    CK - MB 2.2 <3.6 ng/ml    CK-MB Index 1.5 0.0 - 4.0 %    Troponin-I, Qt. 0.02 0.0 - 2.587 NG/ML   METABOLIC PANEL, COMPREHENSIVE    Collection Time: 06/02/18  7:16 PM   Result Value Ref Range    Sodium 141 136 - 145 mmol/L    Potassium 3.7 3.5 - 5.5 mmol/L    Chloride 106 100 - 108 mmol/L    CO2 29 21 - 32 mmol/L    Anion gap 6 3.0 - 18 mmol/L    Glucose 104 (H) 74 - 99 mg/dL    BUN 17 7.0 - 18 MG/DL    Creatinine 1.00 0.6 - 1.3 MG/DL    BUN/Creatinine ratio 17 12 - 20      GFR est AA >60 >60 ml/min/1.73m2    GFR est non-AA 57 (L) >60 ml/min/1.73m2    Calcium 8.4 (L) 8.5 - 10.1 MG/DL    Bilirubin, total 0.8 0.2 - 1.0 MG/DL    ALT (SGPT) 18 13 - 56 U/L    AST (SGOT) 19 15 - 37 U/L    Alk.  phosphatase 141 (H) 45 - 117 U/L    Protein, total 7.5 6.4 - 8.2 g/dL    Albumin 3.2 (L) 3.4 - 5.0 g/dL    Globulin 4.3 (H) 2.0 - 4.0 g/dL    A-G Ratio 0.7 (L) 0.8 - 1.7     NT-PRO BNP    Collection Time: 06/02/18  7:16 PM   Result Value Ref Range    NT pro-BNP 3018 (H) 0 - 900 PG/ML   EKG, 12 LEAD, INITIAL    Collection Time: 06/02/18  8:13 PM   Result Value Ref Range    Ventricular Rate 65 BPM    Atrial Rate 54 BPM    QRS Duration 118 ms    Q-T Interval 464 ms    QTC Calculation (Bezet) 482 ms    Calculated R Axis -35 degrees    Calculated T Axis -171 degrees    Diagnosis       Atrial fibrillation  Left axis deviation  Possible Anterior infarct , age undetermined  ST & T wave abnormality, consider inferolateral ischemia or digitalis effect  Abnormal ECG  When compared with ECG of 03-MAY-2018 10:34,  Borderline criteria for Anterior infarct are now present  T wave inversion now evident in Lateral leads         Radiologic Studies -   Xr Chest Port    Result Date: 6/2/2018  PORTABLE CHEST RADIOGRAPH CPT CODE: 88401 INDICATION: 3 days of shortness of breath on home oxygen, diminished lung sounds. COMPARISON: 2/26/2018. FINDINGS: Frontal view of the chest obtained at 2058 hours. Cardiomediastinal enlargement is similar. Similar prominence of the pulmonary vasculature. Central venous congestion. Mild reticulations at the lung bases. Left costophrenic angle is similarly obscured, likely a pericardial fat pad. No significant right pleural effusion or pneumothorax. IMPRESSION: Cardiomediastinal enlargement and pulmonary arterial hypertension. Similar central venous congestion. Mild increased lung base reticulations may reflect mild interstitial edema and/or subsegmental atelectasis. Medical Decision Making   I am the first provider for this patient. I reviewed the vital signs, available nursing notes, past medical history, past surgical history, family history and social history. Vital Signs-Reviewed the patient's vital signs. Pulse Oximetry Analysis -  96% on 2L of O2 via NC (Interpretation)    EKG: Interpreted by the EP. Time Interpreted: 20:15   Rate: 65   Rhythm: Atrial Fibrillation    Interpretation: T-wave inversion in leads II, V2 to V6. No ST elevations. Comparison: No change when compared to the EKG taken on May 3rd, 2018    Records Reviewed: Nursing Notes and Old Medical Records (Time of Review: 8:58 PM)      Provider Notes (Medical Decision Making):   ASSESSMENT / PLAN:       56y/o AA woman, PMHx morbid obesity, HFpEF, HTN, Hypothyroid, aFIB (coumadin), DJD who presents with several days of increasing bilat LE edema, SOB, leigh, orthopnea and bila LE leg pain. Difficult to stand or even walk w/out severe SOB. EMS brought to ED, on home O2 2lnc 96%. On exam, morbidly obese, sitting up in bed, tearful at time.s HEENT benign. CV irregular ~60bpm, no m/r/g, 4+ bilat LE pitting edema throughout, diffuse ttp but no warmth, erythema or darainage. Lungs diminshed in bases, nonlaobred. Abd benign. Seems like CHF exacerbation.  Could be pneumonia, ACS, arrythmia, anemia, simon. -EKG  -CXR  -CBC, CMP, Card Enzymes, Coags  -BNP  -Lasix 80mg IV  -Reassess, anticipate admission      Gualberto Hylton MD  EM-IM Physician          ED Course: Progress Notes, Reevaluation, and Consults:  UPDATE  -CXR w/pulm edmea  -proBNP 3,000 (has been lower and higher in past)  -Rest of labs at baseline  -EKG nonishcemic  -Ethan speak w/hospitalist about admission    Ben Pruitt MD  EM-IM Physician       9:48 PM Consult: I discussed care with Dr. Jannet Mcginnis (hospitalist). It was a standard discussion including patient history, chief complaint, available diagnostic results, and predicted treatment course. Accepts admission. For Hospitalized Patients:  1. Hospitalization Decision Time:  The decision to hospitalize the patient was made by Dr. Aleksander Cash on 6/2/2018      Diagnosis     Clinical Impression:   1. Acute on chronic diastolic congestive heart failure (HCC)        Disposition: Admit    Follow-up Information     None           Current Discharge Medication List      CONTINUE these medications which have NOT CHANGED    Details   metoprolol tartrate (LOPRESSOR) 25 mg tablet Take 0.5 Tabs by mouth two (2) times a day. Qty: 30 Tab, Refills: 6      fluticasone (FLONASE) 50 mcg/actuation nasal spray 2 Sprays by Both Nostrils route daily. Qty: 1 Bottle, Refills: 2      oxyCODONE IR (ROXICODONE) 5 mg immediate release tablet Take 5 mg by mouth every six (6) hours as needed for Pain.      isosorbide mononitrate ER (IMDUR) 30 mg tablet Take 1 Tab by mouth every evening. Qty: 90 Tab, Refills: 3      multivitamin, tx-iron-ca-min (THERA-M W/ IRON) 9 mg iron-400 mcg tab tablet Take 1 Tab by mouth daily. Qty: 30 Tab, Refills: 0      raNITIdine (ZANTAC) 150 mg tablet Take 150 mg by mouth two (2) times a day. warfarin (COUMADIN) 5 mg tablet Take 7.5 mg by mouth daily.       VENTOLIN HFA 90 mcg/actuation inhaler Take 2 Puffs by inhalation every four (4) hours as needed for Wheezing or Shortness of Breath. Qty: 1 Inhaler, Refills: 0      furosemide (LASIX) 40 mg tablet 40 mg po daily  Qty: 30 Tab, Refills: 0      PARoxetine (PAXIL) 20 mg tablet Take 1 Tab by mouth daily. Qty: 30 Tab, Refills: 3      simvastatin (ZOCOR) 40 mg tablet Take  by mouth nightly. to obtain from pharmacy      aripiprazole (ABILIFY) 15 mg tablet Take 15 mg by mouth daily. to obtain from pharmacy      levothyroxine (SYNTHROID) 125 mcg tablet Take 125 mcg by mouth Daily (before breakfast). loperamide (IMODIUM) 2 mg capsule Take 2 mg by mouth three (3) times daily as needed for Diarrhea.           _______________________________    Attestations:  Scribe Attestation     [facility] Department[facility] acting as a scribe for and in the presence of Venice Stephen MD    June 02, 2018 at 8:58 PM       Provider Attestation:      I personally performed the services described in the documentation, reviewed the documentation, as recorded by the scribe in my presence, and it accurately and completely records my words and actions.  June 02, 2018 at 8:58 PM - Venice Stephen MD    _______________________________

## 2018-06-03 NOTE — PROGRESS NOTES
Problem: Falls - Risk of  Goal: *Absence of Falls  Document Piyush Fall Risk and appropriate interventions in the flowsheet. Outcome: Progressing Towards Goal  Fall Risk Interventions:  Mobility Interventions: Assess mobility with egress test, Communicate number of staff needed for ambulation/transfer, OT consult for ADLs, Patient to call before getting OOB, PT Consult for mobility concerns, PT Consult for assist device competence, Strengthening exercises (ROM-active/passive), Utilize walker, cane, or other assitive device, Utilize gait belt for transfers/ambulation         Medication Interventions: Evaluate medications/consider consulting pharmacy, Patient to call before getting OOB, Utilize gait belt for transfers/ambulation    Elimination Interventions: Call light in reach, Patient to call for help with toileting needs    History of Falls Interventions: Consult care management for discharge planning, Door open when patient unattended, Evaluate medications/consider consulting pharmacy        Problem: Pressure Injury - Risk of  Goal: *Prevention of pressure injury  Document Stas Scale and appropriate interventions in the flowsheet.    Outcome: Progressing Towards Goal  Pressure Injury Interventions:       Moisture Interventions: Absorbent underpads, Apply protective barrier, creams and emollients, Assess need for specialty bed, Check for incontinence Q2 hours and as needed, Internal/External urinary devices, Maintain skin hydration (lotion/cream), Minimize layers, Moisture barrier    Activity Interventions: Assess need for specialty bed, Increase time out of bed, Pressure redistribution bed/mattress(bed type), PT/OT evaluation    Mobility Interventions: Assess need for specialty bed, HOB 30 degrees or less, Pressure redistribution bed/mattress (bed type), PT/OT evaluation

## 2018-06-04 LAB
ANION GAP SERPL CALC-SCNC: 5 MMOL/L (ref 3–18)
BASOPHILS # BLD: 0 K/UL (ref 0–0.06)
BASOPHILS NFR BLD: 0 % (ref 0–2)
BUN SERPL-MCNC: 14 MG/DL (ref 7–18)
BUN/CREAT SERPL: 15 (ref 12–20)
CALCIUM SERPL-MCNC: 8.4 MG/DL (ref 8.5–10.1)
CHLORIDE SERPL-SCNC: 95 MMOL/L (ref 100–108)
CO2 SERPL-SCNC: 38 MMOL/L (ref 21–32)
CREAT SERPL-MCNC: 0.93 MG/DL (ref 0.6–1.3)
DIFFERENTIAL METHOD BLD: ABNORMAL
EOSINOPHIL # BLD: 0.2 K/UL (ref 0–0.4)
EOSINOPHIL NFR BLD: 4 % (ref 0–5)
ERYTHROCYTE [DISTWIDTH] IN BLOOD BY AUTOMATED COUNT: 18.2 % (ref 11.6–14.5)
GLUCOSE SERPL-MCNC: 109 MG/DL (ref 74–99)
HCT VFR BLD AUTO: 32 % (ref 35–45)
HGB BLD-MCNC: 9.2 G/DL (ref 12–16)
INR PPP: 1.9 (ref 0.8–1.2)
LYMPHOCYTES # BLD: 1.2 K/UL (ref 0.9–3.6)
LYMPHOCYTES NFR BLD: 21 % (ref 21–52)
MCH RBC QN AUTO: 22.2 PG (ref 24–34)
MCHC RBC AUTO-ENTMCNC: 28.8 G/DL (ref 31–37)
MCV RBC AUTO: 77.1 FL (ref 74–97)
MONOCYTES # BLD: 0.5 K/UL (ref 0.05–1.2)
MONOCYTES NFR BLD: 9 % (ref 3–10)
NEUTS SEG # BLD: 3.6 K/UL (ref 1.8–8)
NEUTS SEG NFR BLD: 66 % (ref 40–73)
PLATELET # BLD AUTO: 206 K/UL (ref 135–420)
PMV BLD AUTO: 9.9 FL (ref 9.2–11.8)
POTASSIUM SERPL-SCNC: 3.7 MMOL/L (ref 3.5–5.5)
PROTHROMBIN TIME: 20.8 SEC (ref 11.5–15.2)
RBC # BLD AUTO: 4.15 M/UL (ref 4.2–5.3)
SODIUM SERPL-SCNC: 138 MMOL/L (ref 136–145)
WBC # BLD AUTO: 5.5 K/UL (ref 4.6–13.2)

## 2018-06-04 PROCEDURE — 74011250637 HC RX REV CODE- 250/637: Performed by: PHYSICIAN ASSISTANT

## 2018-06-04 PROCEDURE — 77030038269 HC DRN EXT URIN PURWCK BARD -A

## 2018-06-04 PROCEDURE — 36415 COLL VENOUS BLD VENIPUNCTURE: CPT | Performed by: INTERNAL MEDICINE

## 2018-06-04 PROCEDURE — 65270000029 HC RM PRIVATE

## 2018-06-04 PROCEDURE — 74011250637 HC RX REV CODE- 250/637: Performed by: HOSPITALIST

## 2018-06-04 PROCEDURE — 74011250637 HC RX REV CODE- 250/637: Performed by: INTERNAL MEDICINE

## 2018-06-04 PROCEDURE — 80048 BASIC METABOLIC PNL TOTAL CA: CPT | Performed by: INTERNAL MEDICINE

## 2018-06-04 PROCEDURE — 85610 PROTHROMBIN TIME: CPT | Performed by: INTERNAL MEDICINE

## 2018-06-04 PROCEDURE — 85025 COMPLETE CBC W/AUTO DIFF WBC: CPT | Performed by: INTERNAL MEDICINE

## 2018-06-04 PROCEDURE — 74011250636 HC RX REV CODE- 250/636: Performed by: INTERNAL MEDICINE

## 2018-06-04 RX ORDER — WARFARIN 7.5 MG/1
7.5 TABLET ORAL EVERY EVENING
Status: COMPLETED | OUTPATIENT
Start: 2018-06-04 | End: 2018-06-04

## 2018-06-04 RX ADMIN — RANITIDINE HYDROCHLORIDE 150 MG: 150 TABLET, FILM COATED ORAL at 17:54

## 2018-06-04 RX ADMIN — RANITIDINE HYDROCHLORIDE 150 MG: 150 TABLET, FILM COATED ORAL at 10:14

## 2018-06-04 RX ADMIN — FUROSEMIDE 40 MG: 10 INJECTION, SOLUTION INTRAVENOUS at 10:18

## 2018-06-04 RX ADMIN — FUROSEMIDE 40 MG: 10 INJECTION, SOLUTION INTRAVENOUS at 20:47

## 2018-06-04 RX ADMIN — SIMVASTATIN 40 MG: 40 TABLET, FILM COATED ORAL at 21:00

## 2018-06-04 RX ADMIN — OXYCODONE HYDROCHLORIDE 5 MG: 5 TABLET ORAL at 13:58

## 2018-06-04 RX ADMIN — METOPROLOL TARTRATE 12.5 MG: 25 TABLET ORAL at 17:55

## 2018-06-04 RX ADMIN — LISINOPRIL 10 MG: 10 TABLET ORAL at 10:14

## 2018-06-04 RX ADMIN — POTASSIUM CHLORIDE 40 MEQ: 1500 TABLET, EXTENDED RELEASE ORAL at 10:14

## 2018-06-04 RX ADMIN — OXYCODONE HYDROCHLORIDE 5 MG: 5 TABLET ORAL at 20:46

## 2018-06-04 RX ADMIN — ARIPIPRAZOLE 15 MG: 5 TABLET ORAL at 10:14

## 2018-06-04 RX ADMIN — WARFARIN SODIUM 7.5 MG: 7.5 TABLET ORAL at 17:54

## 2018-06-04 RX ADMIN — DOCUSATE SODIUM 100 MG: 100 CAPSULE, LIQUID FILLED ORAL at 10:14

## 2018-06-04 RX ADMIN — OXYCODONE HYDROCHLORIDE 5 MG: 5 TABLET ORAL at 06:00

## 2018-06-04 RX ADMIN — ISOSORBIDE MONONITRATE 30 MG: 30 TABLET, EXTENDED RELEASE ORAL at 17:54

## 2018-06-04 RX ADMIN — LEVOTHYROXINE SODIUM 125 MCG: 125 TABLET ORAL at 10:14

## 2018-06-04 RX ADMIN — PAROXETINE HYDROCHLORIDE 20 MG: 20 TABLET, FILM COATED ORAL at 10:14

## 2018-06-04 RX ADMIN — METOPROLOL TARTRATE 12.5 MG: 25 TABLET ORAL at 10:15

## 2018-06-04 NOTE — PROGRESS NOTES
Progress Note  Pulmonary, Critical Care, and Sleep Medicine    Name: Irena Higuera MRN: [id number]   : 1960 Hospital: [facility]   Date: 2018        IMPRESSION:   · Acute  on chronic respiratory failure improved with diuresis, likely due to CHF exacerbation superimposed on severe ANDRADE/OHS and pulmonary HTN  · Pulmonary HTN probably Grp 3 +/- Grp 2 in pt with chronic hypoxemia due to ANDRADE/OHS and decompensated diastolic failure  · Severe ANDRADE/OHS failed CPAP titration. Recently seen in office and scheduled for outpatient BIPAP titration  · Volume overload  · Paranoid schizophrenia  · Morbid obesity  · Decompensated diastolic heart failure  · Atrial fibrillation rate controlled      PLAN:   · Continue gentle diuresis. Would avoid overdiuresis as pt is likely preload dependent  · Electrolyte replacement per protocol  · Will probably need higher pressures on NIV, await titration  · Supplemental O2 to maintain saturation greater than 92%  · Schizophrenia acting as barrier to care as pt with limited insight into her illness  · Continue anticoagulation  · Schedule PFT as outpatient     Subjective/Interval History:   I have reviewed the flowsheet and previous days notes. Reviewed interval history. Discussed management with nursing staff. Pt was admitted for shortness of breath and hypoxemia. She is a poor historian due to Psychiatric illness but review of records reveal severe ANDRADE/OHS with failed CPAP titration. Pt was scheduled for dedicated BIPAP titration but would end up in the hospital for either shortness of breath or AMS due to CO2 narcosis. Pt with severe ANDRADE/OHS Rxed with BIPAP with admitted poor compliance, per pt due to device malfunction. ROS:Pertinent items are noted in HPI. Orders reviewed including medications. Changes made if indicated. Telemetry monitor reviewed at the bedside.   Objective:   Vital Signs:    Visit Vitals    /84 (BP 1 Location: Left arm, BP Patient Position: At rest)    Pulse 71    Temp 97.9 °F (36.6 °C)    Resp 18    Wt 143.7 kg (316 lb 12.8 oz)    SpO2 100%    Breastfeeding No    BMI 61.87 kg/m2       O2 Device: Nasal cannula   O2 Flow Rate (L/min): 2 l/min   Temp (24hrs), Av.8 °F (36.6 °C), Min:97.5 °F (36.4 °C), Max:98.2 °F (36.8 °C)       Intake/Output:   Last shift:         Last 3 shifts: 1901 -  07  In: 6838 [P.O.:1530]  Out: 9200 [Urine:9200]    Intake/Output Summary (Last 24 hours) at 18 0914  Last data filed at 18 0301   Gross per 24 hour   Intake             1050 ml   Output             4100 ml   Net            -3050 ml        Physical Exam:    General:  Alert, cooperative, in no distress, appears stated age. Morbidly obese   Head:  Normocephalic, without obvious abnormality, atraumatic. Eyes:  ANicteric, PERRL,   Nose: Nares normal. Mucosa normal. No drainage or sinus tenderness. Throat: Lips, mucosa, and tongue normal.  NO Thrush   Neck: Supple, symmetrical, trachea midline, no adenopathy, thyroid: no enlargment/tenderness/nodules    Back:   Symmetric    Lungs:   Bilateral auscultation no rales or wheezes anteriorly   Chest wall:  No tenderness or deformity. NO intercostal retractions   Heart:  Regular rate and rhythm, S1, S2 normal, no murmur, click, rub or gallop. Abdomen:   Soft, non-tender. PROTUBERANT; Bowel sounds normal. No masses,  No organomegaly. NO paradoxical motion   Extremities: normal, atraumatic, no cyanosis, +1 bipedal edema   Pulses: 2+ and symmetric all extremities. Skin: Skin color, texture, turgor normal. No rashes or lesions.  NO clubbing   Lymph nodes: Cervical nodes normal.   Neurologic: Grossly nonfocal      :        Devices:             Drips:    DATA:  Labs:  Recent Labs      18   0409  18   0249  18   1916   WBC  5.5  4.5*  4.0*   HGB  9.2*  9.0*  8.8*   HCT  32.0*  31.5*  30.3*   PLT  206  178  176     Recent Labs      18   0409  18 1349  06/03/18   0249  06/03/18   0053  06/02/18   1916   NA  138   --   141   --   141   K  3.7   --   3.5   --   3.7   CL  95*   --   103   --   106   CO2  38*   --   35*   --   29   GLU  109*   --   100*   --   104*   BUN  14   --   16   --   17   CREA  0.93   --   0.95   --   1.00   CA  8.4*   --   8.5   --   8.4*   MG   --    --   1.9   --    --    PHOS   --    --   3.9   --    --    ALB   --    --    --    --   3.2*   SGOT   --    --    --    --   19   ALT   --    --    --    --   18   INR  1.9*  1.8*   --   1.9*   --      No results for input(s): PH, PCO2, PO2, HCO3, FIO2 in the last 72 hours. Imaging:  [x]        I have personally reviewed the patients radiographs and reports  []         []        No change from prior, tubes and lines in adequate position  []        Improved   []        Worsening  High complexity decision making was performed during this consultation and evaluation.      Kayla Andrade MD

## 2018-06-04 NOTE — PROGRESS NOTES
Patient is not available to be assessed at this time. No family at bedside.     [address] [facility] Certified [address] [place] [address]   [phone]

## 2018-06-04 NOTE — PROGRESS NOTES
[facility] Hospitalist Group  Progress Note    Patient: Radha Grimes Age: 60 y.o. : 1960 MR#: [id number] SSN: [id number]  Date: 2018     Subjective:     INR 1.9, warfarin dosed. She states her daughter brought in home cpap, which is now working well. She overall feels better. Remains in atrial fib, rates controlled. Assessment/Plan:   1. Acute on chronic diastolic congestive heart failure: cardiology consult, cont Lasix IV, strict I&O.   2. Acute on chronic respiratory failure, multifactorial in setting of severe pulmonary HTN, OHS, ANDRADE: pulm consult, plan to see in the am, follow ABG results. Cont O2 supplementation. 3. Chronic LE swelling, history of non-compliance with diuretics: PVLs negative, continue Lasix  4. Chronic atrial fibrillation, on Coumadin. Low dose B-blocker for rate control. INR is 1.8. Pt was given coumadin 7.5mg at 1 am today, pharmacy recommend next dose tomorrow at 6pm.  5. sigmoid mass: seen by Dr. Scott Mcdaniel w/ plan to follow up on path, s/p Colonoscopy on 18 for rectal bleed. Pathology: hyperplastic polyp versus sessile serrated polyp     5. HTN: imdur, low dose BB, and Lisinopril  6. Dyslipidemia: cont Simvastatin  7. Severe pulmonary hypertension, RV dysfunction with moderate to severe pulmonary insufficiency and moderate pulmonary pressure elevation: pulm consult  8. Recent admission for bradycardia  9. ANDRADE: pulmonary input appreciated. 10. OHS  11. Morbid obesity  12. History of polysubstance abuse  13. History of non-compliance  14. Anemia microcytic hypochromic  15. Metabolic alkalosis in the setting of diuresis  16. Full code pt / ot.      Case discussed with:  [x]Patient  []Family  [x]Nursing  []Case Management  DVT Prophylaxis:  []Lovenox  []Hep SQ  []SCDs  [x]Coumadin   []On Heparin gtt    Objective:   VS:   Visit Vitals    /86 (BP 1 Location: Right arm, BP Patient Position: At rest)    Pulse 72    Temp 98.1 °F (36.7 °C)    Resp 19    Wt 143.7 kg (316 lb 12.8 oz)    SpO2 94%    Breastfeeding No    BMI 61.87 kg/m2      Tmax/24hrs: Temp (24hrs), Av.9 °F (36.6 °C), Min:97.5 °F (36.4 °C), Max:98.2 °F (36.8 °C)      Intake/Output Summary (Last 24 hours) at 18 1047  Last data filed at 18 0301   Gross per 24 hour   Intake              690 ml   Output             4100 ml   Net            -3410 ml       General:  Awake and alert, on home cpap machine. Cardiovascular:  Irregularly irregular rhythm  Pulmonary:  Decreased breath sounds to anterolateral lung fields  GI: soft nt nd nabs  Extremities: + edema b.l LE  Neuro no focal deficit  Skin no rash    Labs:    Recent Results (from the past 24 hour(s))   PROTHROMBIN TIME + INR    Collection Time: 18  1:49 PM   Result Value Ref Range    Prothrombin time 19.8 (H) 11.5 - 15.2 sec    INR 1.8 (H) 0.8 - 1.2     POC G3    Collection Time: 18  3:24 PM   Result Value Ref Range    Device: NASAL CANNULA      Flow rate (POC) 2 L/M    FIO2 (POC) 28 %    pH (POC) 7.396 7.35 - 7.45      pCO2 (POC) 63.4 (H) 35.0 - 45.0 MMHG    pO2 (POC) 88 80 - 100 MMHG    HCO3 (POC) 38.9 (H) 22 - 26 MMOL/L    sO2 (POC) 96 92 - 97 %    Base excess (POC) 14 mmol/L    Allens test (POC) YES      Total resp. rate 18      Site LEFT RADIAL      Patient temp.  98.6      Specimen type (POC) ARTERIAL      Performed by Anca Finney, [facility]    Collection Time: 18  4:09 AM   Result Value Ref Range    Sodium 138 136 - 145 mmol/L    Potassium 3.7 3.5 - 5.5 mmol/L    Chloride 95 (L) 100 - 108 mmol/L    CO2 38 (H) 21 - 32 mmol/L    Anion gap 5 3.0 - 18 mmol/L    Glucose 109 (H) 74 - 99 mg/dL    BUN 14 7.0 - 18 MG/DL    Creatinine 0.93 0.6 - 1.3 MG/DL    BUN/Creatinine ratio 15 12 - 20      GFR est AA >60 >60 ml/min/1.73m2    GFR est non-AA >60 >60 ml/min/1.73m2    Calcium 8.4 (L) 8.5 - 10.1 MG/DL   CBC WITH AUTOMATED DIFF    Collection Time: 18  4:09 AM   Result Value Ref Range    WBC 5.5 4.6 - 13.2 K/uL    RBC 4.15 (L) 4.20 - 5.30 M/uL    HGB 9.2 (L) 12.0 - 16.0 g/dL    HCT 32.0 (L) 35.0 - 45.0 %    MCV 77.1 74.0 - 97.0 FL    MCH 22.2 (L) 24.0 - 34.0 PG    MCHC 28.8 (L) 31.0 - 37.0 g/dL    RDW 18.2 (H) 11.6 - 14.5 %    PLATELET 383 249 - 292 K/uL    MPV 9.9 9.2 - 11.8 FL    NEUTROPHILS 66 40 - 73 %    LYMPHOCYTES 21 21 - 52 %    MONOCYTES 9 3 - 10 %    EOSINOPHILS 4 0 - 5 %    BASOPHILS 0 0 - 2 %    ABS. NEUTROPHILS 3.6 1.8 - 8.0 K/UL    ABS. LYMPHOCYTES 1.2 0.9 - 3.6 K/UL    ABS. MONOCYTES 0.5 0.05 - 1.2 K/UL    ABS. EOSINOPHILS 0.2 0.0 - 0.4 K/UL    ABS.  BASOPHILS 0.0 0.0 - 0.06 K/UL    DF AUTOMATED     PROTHROMBIN TIME + INR    Collection Time: 06/04/18  4:09 AM   Result Value Ref Range    Prothrombin time 20.8 (H) 11.5 - 15.2 sec    INR 1.9 (H) 0.8 - 1.2         Signed By: Abdon Warren MD     June 4, 2018 2:42 PM

## 2018-06-04 NOTE — PROGRESS NOTES
Problem: Falls - Risk of  Goal: *Absence of Falls  Document Piyush Fall Risk and appropriate interventions in the flowsheet. Outcome: Progressing Towards Goal  Fall Risk Interventions:  Mobility Interventions: Assess mobility with egress test, Communicate number of staff needed for ambulation/transfer, OT consult for ADLs, Patient to call before getting OOB, PT Consult for mobility concerns, PT Consult for assist device competence, Strengthening exercises (ROM-active/passive), Utilize walker, cane, or other assitive device, Utilize gait belt for transfers/ambulation         Medication Interventions: Evaluate medications/consider consulting pharmacy, Patient to call before getting OOB, Utilize gait belt for transfers/ambulation    Elimination Interventions: Bed/chair exit alarm, Call light in reach, Patient to call for help with toileting needs, Toileting schedule/hourly rounds    History of Falls Interventions: Bed/chair exit alarm, Door open when patient unattended        Problem: Pressure Injury - Risk of  Goal: *Prevention of pressure injury  Document Stas Scale and appropriate interventions in the flowsheet.    Outcome: Progressing Towards Goal  Pressure Injury Interventions:       Moisture Interventions: Check for incontinence Q2 hours and as needed, Internal/External urinary devices, Maintain skin hydration (lotion/cream)    Activity Interventions: Assess need for specialty bed, Increase time out of bed, Pressure redistribution bed/mattress(bed type), PT/OT evaluation    Mobility Interventions: Assess need for specialty bed, HOB 30 degrees or less, Pressure redistribution bed/mattress (bed type), PT/OT evaluation    Nutrition Interventions: Document food/fluid/supplement intake

## 2018-06-04 NOTE — ROUTINE PROCESS
Received bedside report from [address] [place] [address], patient was resting in bed, watching television, HOB elevated, bed in lowest position, and oriented to call button. Gave report to Hakan Marie RN, using SBAR, MAR, and Kardex.

## 2018-06-04 NOTE — PROGRESS NOTES
[facility]  -  Progress Note      Patient: Rupali Mathias MRN: [id number]  SSN: [id number]    YOB: 1960  Age: 60 y.o. Sex: female      Admit Date: 6/2/2018    Assessment:     Hospital Problems  Date Reviewed: 5/22/2018          Codes Class Noted POA    * (Principal)Acute on chronic diastolic congestive heart failure (HCC) ICD-10-CM: I50.33  ICD-9-CM: 428.33, 428.0  6/2/2018 Unknown        Chronic anticoagulation ICD-10-CM: Z79.01  ICD-9-CM: V58.61  6/2/2018 Unknown        Fluid overload ICD-10-CM: E87.70  ICD-9-CM: 276.69  4/18/2017 Yes        Hypothyroidism ICD-10-CM: E03.9  ICD-9-CM: 244.9  1/14/2014 Yes        Morbid obesity ([address] ICD-10-CM: E66.01  ICD-9-CM: 278.01  1/14/2014 Yes        Hypertension ICD-10-CM: I11.9  ICD-9-CM: 402.10  Unknown Yes        Atrial fibrillation ICD-10-CM: I48.91  ICD-9-CM: 427.31  Unknown Yes    Overview Signed 1/25/2013 10:46 AM by Arlen Shelton     CHADS score 1  (-CHF, +HTN, -AGE, -DM, -CVA)                 -Acute on chronic respiratory failure, multifactorial in setting of severe pulmonary HTN, OHS, ANDRADE with likely component of acute on chronic diastolic CHF. -Chronic LE swelling, history of non-compliance with diuretic due to difficulty ambulating. Has some increased edema to the buttocks and upper thigh region  -Chronic atrial fibrillation, on Coumadin.  Low dose B-blocker for rate control  -Recent admission for bradycardia, likely associated with excessive BB use  -Echo 04/2018: EF 50-55%, mildly to moderately increased LV wall thickness, RV moderately to severely dilated with est. 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 Cici Hernandez    Plan:     Improving and diuresing well on current dosing. She has edema to the buttocks and upper thigh area and would likely benefit with an additional day of IV Lasix. Renal function normal.  Increase activity as tolerated. Subjective:     No new complaints. States her breathing is improving but still with some shortness of breath.   No pain    Objective:      Patient Vitals for the past 8 hrs:   Temp Pulse Resp BP SpO2   06/04/18 0936 98.1 °F (36.7 °C) 72 19 145/86 94 %   06/04/18 0419 97.9 °F (36.6 °C) 71 18 137/84 100 %         Patient Vitals for the past 96 hrs:   Weight   06/04/18 0425 143.7 kg (316 lb 12.8 oz)   06/03/18 0447 147.4 kg (324 lb 14.4 oz)   06/02/18 2249 150.9 kg (332 lb 11.2 oz)         Intake/Output Summary (Last 24 hours) at 06/04/18 0956  Last data filed at 06/04/18 0301   Gross per 24 hour   Intake              930 ml   Output             4100 ml   Net            -3170 ml       Physical Exam:  General:  alert, cooperative, no distress, appears stated age  Neck:  nontender  Lungs:  clear to auscultation bilaterally  Heart:  regular rate and rhythm, S1, S2 normal, no murmur, click, rub or gallop  Abdomen:  abdomen is soft without significant tenderness, masses, organomegaly or guarding  Extremities:  2+ edema to lower legs with increased edema to the upper thigh and buttocks area    Data Review:     Labs: Results:       Chemistry Recent Labs      06/04/18   0409  06/03/18   0249  06/02/18   1916   GLU  109*  100*  104*   NA  138  141  141   K  3.7  3.5  3.7   CL  95*  103  106   CO2  38*  35*  29   BUN  14  16  17   CREA  0.93  0.95  1.00   CA  8.4*  8.5  8.4*   MG   --   1.9   --    PHOS   --   3.9   --    AGAP  5  3  6   BUCR  15  17  17   AP   --    --   141*   TP   --    --   7.5 ALB   --    --   3.2*   GLOB   --    --   4.3*   AGRAT   --    --   0.7*      CBC w/Diff Recent Labs      06/04/18   0409  06/03/18   0249  06/02/18   1916   WBC  5.5  4.5*  4.0*   RBC  4.15*  4.17*  4.02*   HGB  9.2*  9.0*  8.8*   HCT  32.0*  31.5*  30.3*   PLT  206  178  176   GRANS  66  67  68   LYMPH  21  21  22   EOS  4  4  3      Cardiac Enzymes No results found for: CPK, CK, CKMMB, CKMB, RCK3, CKMBT, CKNDX, CKND1, SHANNAN, TROPT, TROIQ, JELANI, TROPT, TNIPOC, BNP, BNPP   Coagulation Recent Labs      06/04/18   0409  06/03/18   1349   PTP  20.8*  19.8*   INR  1.9*  1.8*       Lipid Panel Lab Results   Component Value Date/Time    Cholesterol, total 90 05/03/2018 10:25 AM    HDL Cholesterol 34 (L) 05/03/2018 10:25 AM    LDL, calculated 41.4 05/03/2018 10:25 AM    VLDL, calculated 14.6 05/03/2018 10:25 AM    Triglyceride 73 05/03/2018 10:25 AM    CHOL/HDL Ratio 2.6 05/03/2018 10:25 AM      BNP Lab Results   Component Value Date/Time    B-type Natriuretic Peptide 3187 (H) 05/06/2016 11:10 AM    B-type Natriuretic Peptide 3075 (H) 05/06/2016 11:00 AM      Liver Enzymes Recent Labs      06/02/18   1916   TP  7.5   ALB  3.2*   AP  141*   SGOT  19      Digoxin    Thyroid Studies Lab Results   Component Value Date/Time    T4, Total 12.3 (H) 01/15/2014 02:01 AM    TSH 1.97 05/04/2018 03:15 AM

## 2018-06-05 LAB
ANION GAP SERPL CALC-SCNC: 2 MMOL/L (ref 3–18)
BASOPHILS # BLD: 0 K/UL (ref 0–0.1)
BASOPHILS NFR BLD: 0 % (ref 0–2)
BUN SERPL-MCNC: 17 MG/DL (ref 7–18)
BUN/CREAT SERPL: 18 (ref 12–20)
CALCIUM SERPL-MCNC: 8.6 MG/DL (ref 8.5–10.1)
CHLORIDE SERPL-SCNC: 96 MMOL/L (ref 100–108)
CO2 SERPL-SCNC: 40 MMOL/L (ref 21–32)
CREAT SERPL-MCNC: 0.97 MG/DL (ref 0.6–1.3)
DIFFERENTIAL METHOD BLD: ABNORMAL
EOSINOPHIL # BLD: 0.2 K/UL (ref 0–0.4)
EOSINOPHIL NFR BLD: 4 % (ref 0–5)
ERYTHROCYTE [DISTWIDTH] IN BLOOD BY AUTOMATED COUNT: 18.2 % (ref 11.6–14.5)
GLUCOSE SERPL-MCNC: 98 MG/DL (ref 74–99)
HCT VFR BLD AUTO: 31.3 % (ref 35–45)
HGB BLD-MCNC: 8.9 G/DL (ref 12–16)
INR PPP: 1.9 (ref 0.8–1.2)
LYMPHOCYTES # BLD: 1.1 K/UL (ref 0.9–3.6)
LYMPHOCYTES NFR BLD: 26 % (ref 21–52)
MAGNESIUM SERPL-MCNC: 1.7 MG/DL (ref 1.6–2.6)
MCH RBC QN AUTO: 21.7 PG (ref 24–34)
MCHC RBC AUTO-ENTMCNC: 28.4 G/DL (ref 31–37)
MCV RBC AUTO: 76.3 FL (ref 74–97)
MONOCYTES # BLD: 0.4 K/UL (ref 0.05–1.2)
MONOCYTES NFR BLD: 9 % (ref 3–10)
NEUTS SEG # BLD: 2.7 K/UL (ref 1.8–8)
NEUTS SEG NFR BLD: 61 % (ref 40–73)
PLATELET # BLD AUTO: 235 K/UL (ref 135–420)
PMV BLD AUTO: 9.6 FL (ref 9.2–11.8)
POTASSIUM SERPL-SCNC: 3.7 MMOL/L (ref 3.5–5.5)
PROTHROMBIN TIME: 20.8 SEC (ref 11.5–15.2)
RBC # BLD AUTO: 4.1 M/UL (ref 4.2–5.3)
SODIUM SERPL-SCNC: 138 MMOL/L (ref 136–145)
WBC # BLD AUTO: 4.4 K/UL (ref 4.6–13.2)

## 2018-06-05 PROCEDURE — 74011250636 HC RX REV CODE- 250/636: Performed by: INTERNAL MEDICINE

## 2018-06-05 PROCEDURE — 74011250637 HC RX REV CODE- 250/637: Performed by: PHYSICIAN ASSISTANT

## 2018-06-05 PROCEDURE — 36415 COLL VENOUS BLD VENIPUNCTURE: CPT | Performed by: INTERNAL MEDICINE

## 2018-06-05 PROCEDURE — 97530 THERAPEUTIC ACTIVITIES: CPT

## 2018-06-05 PROCEDURE — 74011250637 HC RX REV CODE- 250/637: Performed by: HOSPITALIST

## 2018-06-05 PROCEDURE — 85025 COMPLETE CBC W/AUTO DIFF WBC: CPT | Performed by: INTERNAL MEDICINE

## 2018-06-05 PROCEDURE — 74011250637 HC RX REV CODE- 250/637: Performed by: STUDENT IN AN ORGANIZED HEALTH CARE EDUCATION/TRAINING PROGRAM

## 2018-06-05 PROCEDURE — 80048 BASIC METABOLIC PNL TOTAL CA: CPT | Performed by: INTERNAL MEDICINE

## 2018-06-05 PROCEDURE — 65270000029 HC RM PRIVATE

## 2018-06-05 PROCEDURE — 74011250637 HC RX REV CODE- 250/637: Performed by: INTERNAL MEDICINE

## 2018-06-05 PROCEDURE — 85610 PROTHROMBIN TIME: CPT | Performed by: INTERNAL MEDICINE

## 2018-06-05 PROCEDURE — 97162 PT EVAL MOD COMPLEX 30 MIN: CPT

## 2018-06-05 PROCEDURE — 77030038269 HC DRN EXT URIN PURWCK BARD -A

## 2018-06-05 PROCEDURE — 83735 ASSAY OF MAGNESIUM: CPT | Performed by: INTERNAL MEDICINE

## 2018-06-05 RX ORDER — DOCUSATE SODIUM 100 MG/1
100 CAPSULE, LIQUID FILLED ORAL 2 TIMES DAILY
Status: DISCONTINUED | OUTPATIENT
Start: 2018-06-05 | End: 2018-06-06 | Stop reason: HOSPADM

## 2018-06-05 RX ORDER — WARFARIN SODIUM 5 MG/1
5 TABLET ORAL EVERY EVENING
Status: COMPLETED | OUTPATIENT
Start: 2018-06-05 | End: 2018-06-05

## 2018-06-05 RX ORDER — POLYETHYLENE GLYCOL 3350 17 G/17G
17 POWDER, FOR SOLUTION ORAL DAILY PRN
Status: DISCONTINUED | OUTPATIENT
Start: 2018-06-05 | End: 2018-06-06 | Stop reason: HOSPADM

## 2018-06-05 RX ORDER — FUROSEMIDE 40 MG/1
40 TABLET ORAL
Status: DISCONTINUED | OUTPATIENT
Start: 2018-06-06 | End: 2018-06-06 | Stop reason: HOSPADM

## 2018-06-05 RX ADMIN — WARFARIN SODIUM 5 MG: 5 TABLET ORAL at 17:44

## 2018-06-05 RX ADMIN — OXYCODONE HYDROCHLORIDE 5 MG: 5 TABLET ORAL at 17:05

## 2018-06-05 RX ADMIN — OXYCODONE HYDROCHLORIDE 5 MG: 5 TABLET ORAL at 04:00

## 2018-06-05 RX ADMIN — LEVOTHYROXINE SODIUM 125 MCG: 125 TABLET ORAL at 08:12

## 2018-06-05 RX ADMIN — DOCUSATE SODIUM 100 MG: 100 CAPSULE, LIQUID FILLED ORAL at 17:12

## 2018-06-05 RX ADMIN — ISOSORBIDE MONONITRATE 30 MG: 30 TABLET, EXTENDED RELEASE ORAL at 17:09

## 2018-06-05 RX ADMIN — FUROSEMIDE 40 MG: 10 INJECTION, SOLUTION INTRAVENOUS at 08:12

## 2018-06-05 RX ADMIN — FUROSEMIDE 40 MG: 10 INJECTION, SOLUTION INTRAVENOUS at 21:45

## 2018-06-05 RX ADMIN — LISINOPRIL 10 MG: 10 TABLET ORAL at 08:12

## 2018-06-05 RX ADMIN — PAROXETINE HYDROCHLORIDE 20 MG: 20 TABLET, FILM COATED ORAL at 08:12

## 2018-06-05 RX ADMIN — OXYCODONE HYDROCHLORIDE 5 MG: 5 TABLET ORAL at 11:04

## 2018-06-05 RX ADMIN — METOPROLOL TARTRATE 12.5 MG: 25 TABLET ORAL at 08:13

## 2018-06-05 RX ADMIN — RANITIDINE HYDROCHLORIDE 150 MG: 150 TABLET, FILM COATED ORAL at 08:12

## 2018-06-05 RX ADMIN — ARIPIPRAZOLE 15 MG: 5 TABLET ORAL at 08:12

## 2018-06-05 RX ADMIN — SIMVASTATIN 40 MG: 40 TABLET, FILM COATED ORAL at 21:41

## 2018-06-05 RX ADMIN — RANITIDINE HYDROCHLORIDE 150 MG: 150 TABLET, FILM COATED ORAL at 17:07

## 2018-06-05 NOTE — PROGRESS NOTES
[facility] Hospitalist Group  Progress Note    Patient: Luisito Ojeda Age: 60 y.o. : 1960 MR#: [id number] SSN: [id number]  Date: 2018     Subjective:     INR 1.9, warfarin dosed. Patient states she's breathing better. She reports constipation. Assessment/Plan:   1. Acute on chronic diastolic congestive heart failure: cardiology consult, cont Lasix IV, strict I&O.   2. Acute on chronic respiratory failure, multifactorial in setting of severe pulmonary HTN, OHS, ANDRADE: pulm consult, plan to see in the am, follow ABG results. Cont O2 supplementation. 3. Chronic LE swelling, history of non-compliance with diuretics: PVLs negative, continue Lasix  4. Chronic atrial fibrillation, on Coumadin. Low dose B-blocker for rate control. INR is 1.8. Pt was given coumadin 7.5mg at 1 am today, pharmacy recommend next dose tomorrow at 6pm.  5. sigmoid mass: seen by Dr. Blaze Ruelas w/ plan to follow up on path, s/p Colonoscopy on 18 for rectal bleed. Pathology: hyperplastic polyp versus sessile serrated polyp     5. HTN: imdur, low dose BB, and Lisinopril  6. Dyslipidemia: cont Simvastatin  7. Severe pulmonary hypertension, RV dysfunction with moderate to severe pulmonary insufficiency and moderate pulmonary pressure elevation: pulm input appreciated. 8. Recent admission for bradycardia  9. ANDRADE: pulmonary input appreciated. 10. OHS  11. Morbid obesity  12. History of polysubstance abuse  13. History of non-compliance  14. Anemia microcytic hypochromic  15. Metabolic alkalosis in the setting of diuresis  16. Full code pt / ot.      Case discussed with:  [x]Patient  []Family  [x]Nursing  []Case Management  DVT Prophylaxis:  []Lovenox  []Hep SQ  []SCDs  [x]Coumadin   []On Heparin gtt    Objective:   VS:   Visit Vitals    BP 96/65 (BP 1 Location: Right arm, BP Patient Position: At rest)    Pulse 60    Temp 97.8 °F (36.6 °C)    Resp 20    Wt 137.9 kg (304 lb 1.6 oz)    SpO2 98%    Breastfeeding No    BMI 59.39 kg/m2      Tmax/24hrs: Temp (24hrs), Av.8 °F (36.6 °C), Min:97.5 °F (36.4 °C), Max:98.1 °F (36.7 °C)      Intake/Output Summary (Last 24 hours) at 18 1720  Last data filed at 18 1630   Gross per 24 hour   Intake              840 ml   Output             4000 ml   Net            -3160 ml       General:  Awake and alert, nad. Cardiovascular:  Irregularly irregular rhythm  Pulmonary: improved air entry. GI: soft nt nd nabs  Extremities: + edema b.l LE  Neuro no focal deficit  Skin no rash    Labs:    Recent Results (from the past 24 hour(s))   PROTHROMBIN TIME + INR    Collection Time: 18  2:38 AM   Result Value Ref Range    Prothrombin time 20.8 (H) 11.5 - 15.2 sec    INR 1.9 (H) 0.8 - 1.2     CBC WITH AUTOMATED DIFF    Collection Time: 18  2:38 AM   Result Value Ref Range    WBC 4.4 (L) 4.6 - 13.2 K/uL    RBC 4.10 (L) 4.20 - 5.30 M/uL    HGB 8.9 (L) 12.0 - 16.0 g/dL    HCT 31.3 (L) 35.0 - 45.0 %    MCV 76.3 74.0 - 97.0 FL    MCH 21.7 (L) 24.0 - 34.0 PG    MCHC 28.4 (L) 31.0 - 37.0 g/dL    RDW 18.2 (H) 11.6 - 14.5 %    PLATELET 659 082 - 855 K/uL    MPV 9.6 9.2 - 11.8 FL    NEUTROPHILS 61 40 - 73 %    LYMPHOCYTES 26 21 - 52 %    MONOCYTES 9 3 - 10 %    EOSINOPHILS 4 0 - 5 %    BASOPHILS 0 0 - 2 %    ABS. NEUTROPHILS 2.7 1.8 - 8.0 K/UL    ABS. LYMPHOCYTES 1.1 0.9 - 3.6 K/UL    ABS. MONOCYTES 0.4 0.05 - 1.2 K/UL    ABS. EOSINOPHILS 0.2 0.0 - 0.4 K/UL    ABS.  BASOPHILS 0.0 0.0 - 0.1 K/UL    DF AUTOMATED     METABOLIC PANEL, BASIC    Collection Time: 18  2:38 AM   Result Value Ref Range    Sodium 138 136 - 145 mmol/L    Potassium 3.7 3.5 - 5.5 mmol/L    Chloride 96 (L) 100 - 108 mmol/L    CO2 40 (H) 21 - 32 mmol/L    Anion gap 2 (L) 3.0 - 18 mmol/L    Glucose 98 74 - 99 mg/dL    BUN 17 7.0 - 18 MG/DL    Creatinine 0.97 0.6 - 1.3 MG/DL    BUN/Creatinine ratio 18 12 - 20      GFR est AA >60 >60 ml/min/1.73m2    GFR est non-AA 59 (L) >60 ml/min/1.73m2 Calcium 8.6 8.5 - 10.1 MG/DL   MAGNESIUM    Collection Time: 06/05/18  2:38 AM   Result Value Ref Range    Magnesium 1.7 1.6 - 2.6 mg/dL       Signed By: Mckinley Sanabria MD     June 5, 2018 2:42 PM

## 2018-06-05 NOTE — PROGRESS NOTES
Problem: Pressure Injury - Risk of  Goal: *Prevention of pressure injury  Document Stas Scale and appropriate interventions in the flowsheet.    Pressure Injury Interventions:       Moisture Interventions: Absorbent underpads    Activity Interventions: Assess need for specialty bed    Mobility Interventions: Assess need for specialty bed    Nutrition Interventions: Document food/fluid/supplement intake    Friction and Shear Interventions: Apply protective barrier, creams and emollients

## 2018-06-05 NOTE — PROGRESS NOTES
Problem: Mobility Impaired (Adult and Pediatric)  Goal: *Acute Goals and Plan of Care (Insert Text)  Physical Therapy Goals  Initiated 6/5/2018 and to be accomplished within 7 day(s)  1. Patient will move from supine to sit and sit to supine  and roll side to side in bed with contact guard assist.     2.  Patient will transfer from bed to chair and chair to bed with contact guard assist using rolling walker for support. 3.  Patient will perform sit to stand with contact guard assist.  4.  Patient will ambulate with minimal assistance/contact guard assist for 10 feet with the least restrictive device. physical Therapy EVALUATION    Patient: Bakari Braxton (21 y.o. female)  Date: 6/5/2018  Primary Diagnosis: Acute exacerbation of CHF (congestive heart failure) ([facility])        Precautions: falls, O2       ASSESSMENT :  Based on the objective data described below, the patient presents with decreased strength and ROM at B LE's, impaired standing balance, decreased activity tolerance, increased pain with movement, and impaired functional mobility to include bed mobility, transfers, and gait. Pt is cooperative and was willing to work with PT, however pain in R hip increased significantly with activity. Pt reports PLOF of being able to sit up to EOB, stand, and transfer to w/c; but had not walked much recently due to the R hip pain. Pt stated she has \"bad arthritis in that hip and need to have a hip replacement\". Pt needed mod A and additional time to complete tasks due to pain level. Pt had received pain meds prior to attempting PT. Pt was able to take several small steps sideways to the right to move toward [facility] prior to sitting back down on bed, with use of RW. Pt noted pain in R hip and foot with weight-bearing in standing/gait. Session limited and pt declined sitting up in chair at this time due to pain level in R hip.   Pt reported being \"comfortable\" after repositioned in supine at end of session with [facility] elevated. Patient will benefit from skilled intervention to address the above impairments. Patients rehabilitation potential is considered to be Fair  Factors which may influence rehabilitation potential include:   []         None noted  []         Mental ability/status  []         Medical condition  []         Home/family situation and support systems  []         Safety awareness  [x]         Pain tolerance/management  []         Other:      PLAN :  Recommendations and Planned Interventions:  [x]           Bed Mobility Training             [x]    Neuromuscular Re-Education  [x]           Transfer Training                   []    Orthotic/Prosthetic Training  [x]           Gait Training                          []    Modalities  [x]           Therapeutic Exercises          []    Edema Management/Control  [x]           Therapeutic Activities            [x]    Patient and Family Training/Education  []           Other (comment):    Frequency/Duration: Patient will be followed by physical therapy 1-2 times per day/4-7 days per week to address goals. Discharge Recommendations: Home Health and 24 hr assistance  Further Equipment Recommendations for Discharge: N/A, has needed equipment at home already     G-CODES     Mobility  Current  CL= 60-79%   Goal  CJ= 20-39%. The severity rating is based on the Level of Assistance required for Functional Mobility and ADLs.        G-CODES     Eval Complexity: History: MEDIUM  Complexity : 1-2 comorbidities / personal factors will impact the outcome/ POC Exam:MEDIUM Complexity : 3 Standardized tests and measures addressing body structure, function, activity limitation and / or participation in recreation  Presentation: MEDIUM Complexity : Evolving with changing characteristics  Clinical Decision Making: medium complexity Overall Complexity:MEDIUM    SUBJECTIVE:   Patient stated I have bad arthritis in this hip (right) and need to have a hip replacement.   \"I just got my pain medicine recently, do we have to do this now? \" Pt then agreed to participate. \"I haven't walked much recently because of the pain. They would push me in the w/c.\"     OBJECTIVE DATA SUMMARY:     Past Medical History:   Diagnosis Date    Atrial fibrillation     CHADS score 1  (-CHF, +HTN, -AGE, -DM, -CVA)    Carpal tunnel syndrome     Chronic pain     Congenital heart disease     presumed pulmonary stenosis s/p repair 1969    Degenerative disc disease     Degenerative joint disease     Dyslipidemia     GERD     H/O echocardiogram 04/2017    EF 60-65%, mild concentric LVH, dilated RV with RV dysfunction, RVSP 54 mmHg, RA E, moderate to severe pulmonic regurgitation.  Hypertension     Hypothyroid     Morbid obesity     Morbid obesity with BMI of 45.0-49.9, adult ([facility] [address] [address] 2/20/2017    Noncompliance     Schizoaffective disorder     Substance abuse     marijuana, crack cocaine     Past Surgical History:   Procedure Laterality Date    COLONOSCOPY N/A 5/6/2018    COLONOSCOPY with tatooing and biopsy performed by Velma Golden [facility] at [facility] 9      left    HX HYSTERECTOMY      HX KNEE REPLACEMENT      left    HX KNEE REPLACEMENT      right    HX THYROIDECTOMY       Prior Level of Function/Home Situation: pt reports she was able to move around in the bed, sit up to the edge of the bed, stand up from the bed and w/c levels, and turn to sit down in the w/c; with some assistance from her daughter. Hadn't walked much recently because her R hip pain was so bad.     Home Situation  Home Environment: Apartment (duplex)  # Steps to Enter: 1 (\"big step\")  Rails to Enter: Yes  Hand Rails : Right  Wheelchair Ramp: No  One/Two Story Residence: Two story (has been staying on 1st floor)  Lift Chair Available: No  Living Alone: No  Support Systems: Child(jules), Family member(s)  Patient Expects to be Discharged to[de-identified] Apartment  Current DME Used/Available at Home: Parminder Huizar straight, Oxygen, portable, Commode, bedside, Shower chair, Walker, rollator, Walker, rolling    Critical Behavior:  Neurologic State: Alert  Orientation Level: Oriented X4  Cognition: Follows commands  Safety/Judgement: Awareness of environment  Psychosocial  Patient Behaviors: Calm; Cooperative; Tearful (teary due to pain R hip)     Strength:    Strength: Generally decreased, functional (B LE's)     Tone & Sensation:   Tone: Normal (B LE's)      Range Of Motion:  AROM: Grossly decreased, non-functional (R LE due to pain; L LE WFL's)     PROM: Generally decreased, functional (R LE due to R hip pain)        Functional Mobility:  Bed Mobility:  Rolling: Minimum assistance; Additional time (painful in R hip )  Supine to Sit: Moderate assistance; Additional time (due to pain in R hip)  Sit to Supine: Moderate assistance; Additional time (for LE mgm't onto bed due to pain R hip)     Transfers:  Sit to Stand: Moderate assistance (with bed slightly elevated, and R UE on RW to push up)  Stand to Sit: Minimum assistance     Balance:   Sitting: Intact  Standing: Impaired; With support (RW)  Standing - Static: Fair  Standing - Dynamic : Fair    Ambulation/Gait Training:  Distance (ft): 3 Feet (ft) (small side-steps at EOB toward [facility])  Assistive Device: Walker, rolling  Ambulation - Level of Assistance: Minimal assistance;Assist x2  Gait Description (WDL): Exceptions to WDL  Gait Abnormalities: Antalgic;Decreased step clearance  Base of Support: Widened  Speed/Reyna: Slow  Step Length: Left shortened;Right shortened     Pain:  Pt reports 8/10 pain or discomfort prior to treatment. Pt moaning and tearful during session due to pain in R hip.    Pt reports 10/10 pain or discomfort post treatment. Nursing notified, reports pt had pain meds recently. Pt reported improvement in pain when repositioned in supine in bed and resting. Activity Tolerance:   Fair, pt cooperative, but severely limited by pain levels in R hip.   No c/o nausea, dizziness, or SOB. Pt had O2 donned during session. Please refer to the flowsheet for vital signs taken during this treatment. After treatment:   []         Patient left in no apparent distress sitting up in chair  [x]         Patient left in no apparent distress in bed  [x]         Call bell left within reach  [x]         Nursing notified  []         Caregiver present  []         Bed alarm activated    COMMUNICATION/EDUCATION:   [x]         Fall prevention education was provided and the patient indicated understanding. [x]         Patient has participated as able in goal setting and plan of care. [x]         Patient agrees to work toward stated goals and plan of care. []         Patient understands intent and goals of therapy, but is neutral about his/her participation. []         Patient is unable to participate in goal setting and plan of care.     Thank you for this referral.  Amado Jimenez, PT   Time Calculation: 25 mins

## 2018-06-05 NOTE — CONSULTS
HPI: Augustus Donato is a 60 y.o. female presenting with chief complain of sigmoid mass as previously identified on CT, polyp on colonoscopy. Patient reports three day history of constipation. Patient reports while she sometimes becomes constipated, this degree of constipation is unusual. Reports BRBPR last week, some on the toilet paper after wiping and a small amount of blood in the toilet. No N/V. Tolerating current diet. RLQ with palpation. Plan following last discharge was to follow up with GI to see if polyp would be endoscopically excisable, as she is a very poor candidate for sigmoid colectomy. Past Medical History:   Diagnosis Date    Atrial fibrillation     CHADS score 1  (-CHF, +HTN, -AGE, -DM, -CVA)    Carpal tunnel syndrome     Chronic pain     Congenital heart disease     presumed pulmonary stenosis s/p repair 1969    Degenerative disc disease     Degenerative joint disease     Dyslipidemia     GERD     H/O echocardiogram 04/2017    EF 60-65%, mild concentric LVH, dilated RV with RV dysfunction, RVSP 54 mmHg, RA E, moderate to severe pulmonic regurgitation.     Hypertension     Hypothyroid     Morbid obesity     Morbid obesity with BMI of 45.0-49.9, adult ([facility] [address] 2/20/2017    Noncompliance     Schizoaffective disorder     Substance abuse     marijuana, crack cocaine       Past Surgical History:   Procedure Laterality Date    COLONOSCOPY N/A 5/6/2018    COLONOSCOPY with tatooing and biopsy performed by Luna Vieyra MD at [facility]    HX [address]      left    HX HYSTERECTOMY      HX KNEE REPLACEMENT      left    HX KNEE REPLACEMENT      right    HX THYROIDECTOMY         Family History   Problem Relation Age of Onset    Hypertension Mother     Coronary Artery Disease Mother     Coronary Artery Disease Father     Hypertension Father        Social History     Social History    Marital status: SINGLE     Spouse name: N/A    Number of children: N/A    Years of education: N/A     Social History Main Topics    Smoking status: Never Smoker    Smokeless tobacco: Never Used    Alcohol use No    Drug use: No    Sexual activity: Yes     Birth control/ protection: Surgical     Other Topics Concern    None     Social History Narrative       Review of Systems   Constitutional: Negative for chills and fever. Eyes: Negative for blurred vision and double vision. Respiratory: Negative for cough, sputum production, shortness of breath and wheezing. Cardiovascular: Negative for chest pain and palpitations. Gastrointestinal: Negative for diarrhea, nausea and vomiting. Reports constipation x 3 days  Small amount of BRBPR last week, now resolved  Denied melena   Genitourinary: Negative for dysuria. Neurological: Negative for headaches. Dizzy with standing     All others negative      Allergies   Allergen Reactions    Gabapentin Other (comments)     Unsteady, weakness LEs    Tetanus Vaccines And Toxoid Swelling    Topamax [Topiramate] Other (comments)     weakness       Vitals:    06/04/18 2320 06/05/18 0331 06/05/18 0803 06/05/18 0811   BP: 104/65 141/80  139/84   Pulse: 80 81  89   Resp: 20 20  20   Temp: 97.5 °F (36.4 °C) 98.1 °F (36.7 °C)  97.6 °F (36.4 °C)   SpO2: 99% 97% 99% 94%   Weight:  137.9 kg (304 lb 1.6 oz)         Physical Exam   Constitutional: She appears well-developed and well-nourished. No distress. HENT:   Head: Normocephalic and atraumatic. Eyes: Right eye exhibits no discharge. Left eye exhibits no discharge. No scleral icterus. Cardiovascular: Normal rate and regular rhythm. Exam reveals no friction rub. Systolic murmur  Heave   Pulmonary/Chest: Effort normal. No respiratory distress. She has no wheezes. She has no rales. Abdominal: Soft. Bowel sounds are normal. She exhibits no distension. There is no rebound and no guarding. TTP RLQ   Musculoskeletal: She exhibits no edema. Neurological: She is alert.    Skin: Skin is warm and dry. No rash noted. She is not diaphoretic. No erythema. No pallor. Assessment / Plan    Ms. Stanley Reddy is a 60year old female admitted with acute on chronic respiratory failure, PMH also significant for CHF, severe pulmonary hypertension and morbid obesity. Patient with sigmoid mass, CT with narrowing and thickening mid-sigmoid, hyperplastic polyp on colonoscopy, could not tolerate barium contrast enema last admission 05/2018. - Admitted for acute CHF exacerbation, improving  - Will discuss miralax vs enema on rounds. Patient reported bad experience with last enema. - Pulmonology, cardiology following. Appreciate the recs  - On coumadin anticoagulation for atrial fibrillation     The diagnoses and plan were discussed with the patient. All questions answered. Plan of care agreed to by all concerned. Marcos Garcia MD PGY-1  [facility] [place] [facility]     I saw and evaluated the patient on 6/5/18. I discussed patient with resident and agree with above. Stool softener to avoid constipation, miralax PRN  Given current medical condition and overall medical status, poor surgical candidate  Should follow up with GI after discharge to re-evaluate if sigmoid polyp is endoscopically excisable.   Aaliyah Anguiano MD

## 2018-06-05 NOTE — PROGRESS NOTES
Progress Note  Pulmonary, Critical Care, and Sleep Medicine    Name: Elvia Meza MRN: [id number]   : 1960 Hospital: [address] [place] [address] Dr   Date: 2018        IMPRESSION:   · Acute  on chronic respiratory failure improved with diuresis, likely due to CHF exacerbation superimposed on severe ANDRADE/OHS and pulmonary HTN  · Pulmonary HTN probably Grp 3 +/- Grp 2 in pt with chronic hypoxemia due to ANDRADE/OHS and decompensated diastolic failure  · Severe ANDRADE/OHS failed CPAP titration. Recently seen in office and scheduled for outpatient BIPAP titration  · Volume overload  · Paranoid schizophrenia  · Morbid obesity  · Decompensated diastolic heart failure  · Atrial fibrillation rate controlled      PLAN:   · Continue gentle diuresis. Would avoid overdiuresis as pt is likely preload dependent  · Electrolyte replacement per protocol  · Will probably need higher pressures on NIV, await titration  · Supplemental O2 to maintain saturation greater than 92%  · Schizophrenia acting as barrier to care as pt with limited insight into her illness  · Continue anticoagulation  · Schedule PFT as outpatient     Subjective/Interval History:   I have reviewed the flowsheet and previous days notes. Reviewed interval history. Discussed management with nursing staff. 18   Less SOB than yesterday- still intermittent SOB  Feels sleepy  Wore CPAP overnight  Denies chest pain, cough  C/o hip hurting    HPI:  Pt was admitted for shortness of breath and hypoxemia. She is a poor historian due to Psychiatric illness but review of records reveal severe ANDRADE/OHS with failed CPAP titration. Pt was scheduled for dedicated BIPAP titration but would end up in the hospital for either shortness of breath or AMS due to CO2 narcosis. Pt with severe ANDRADE/OHS Rxed with BIPAP with admitted poor compliance, per pt due to device malfunction. ROS:Pertinent items are noted in HPI. Orders reviewed including medications.  Changes made if indicated. Telemetry monitor reviewed at the bedside. Objective:   Vital Signs:    Visit Vitals    /84 (BP 1 Location: Right arm, BP Patient Position: At rest)    Pulse 89    Temp 97.6 °F (36.4 °C)    Resp 20    Wt 137.9 kg (304 lb 1.6 oz)    SpO2 94%    Breastfeeding No    BMI 59.39 kg/m2       O2 Device: Nasal cannula   O2 Flow Rate (L/min): 2 l/min   Temp (24hrs), Av.9 °F (36.6 °C), Min:97.5 °F (36.4 °C), Max:98.1 °F (36.7 °C)       Intake/Output:   Last shift:         Last 3 shifts:  1901 -  0700  In: 1680 [P.O.:1680]  Out: 6400 [Urine:6400]    Intake/Output Summary (Last 24 hours) at 18 1016  Last data filed at 18 0504   Gross per 24 hour   Intake             1080 ml   Output             3800 ml   Net            -2720 ml        Physical Exam:    General:  Alert, cooperative, in no distress, appears stated age. Morbidly obese   Head:  Normocephalic, without obvious abnormality, atraumatic. Eyes:  ANicteric, PERRL,   Nose: Nares normal. Mucosa normal. No drainage or sinus tenderness. Throat: Lips, mucosa, and tongue normal.  NO Thrush   Neck: Supple, symmetrical, trachea midline, no adenopathy, thyroid: no enlargment/tenderness/nodules    Back:   Symmetric    Lungs:   Bilateral auscultation no rales or wheezes anteriorly   Chest wall:  No tenderness or deformity. NO intercostal retractions   Heart:  Regular rate and rhythm, S1, S2 normal, no murmur, click, rub or gallop. Abdomen:   Soft, non-tender. PROTUBERANT; Bowel sounds normal. No masses,  No organomegaly. NO paradoxical motion   Extremities: normal, atraumatic, no cyanosis, +1 bipedal edema   Pulses: 2+ and symmetric all extremities. Skin: Skin color, texture, turgor normal. No rashes or lesions.  NO clubbing   Lymph nodes: Cervical nodes normal.   Neurologic: Grossly nonfocal        DATA:  Labs:  Recent Labs      18   0238  18   0409  18   0249   WBC  4.4*  5.5  4.5*   HGB  8.9*  9.2* 9.0*   HCT  31.3*  32.0*  31.5*   PLT  235  206  178     Recent Labs      06/05/18   0238  06/04/18   0409  06/03/18   1349  06/03/18   0249   06/02/18   1916   NA  138  138   --   141   --   141   K  3.7  3.7   --   3.5   --   3.7   CL  96*  95*   --   103   --   106   CO2  40*  38*   --   35*   --   29   GLU  98  109*   --   100*   --   104*   BUN  17  14   --   16   --   17   CREA  0.97  0.93   --   0.95   --   1.00   CA  8.6  8.4*   --   8.5   --   8.4*   MG  1.7   --    --   1.9   --    --    PHOS   --    --    --   3.9   --    --    ALB   --    --    --    --    --   3.2*   SGOT   --    --    --    --    --   19   ALT   --    --    --    --    --   18   INR  1.9*  1.9*  1.8*   --    < >   --     < > = values in this interval not displayed. No results for input(s): PH, PCO2, PO2, HCO3, FIO2 in the last 72 hours. Imaging:  [x]        I have personally reviewed the patients radiographs and reports  6/2/2018:  Cardiomediastinal enlargement and pulmonary arterial hypertension. Similar central venous congestion. Mild increased lung base reticulations may reflect mild interstitial edema and/or subsegmental atelectasis. High complexity decision making was performed during this consultation and evaluation.      Yeimy Braswell MD

## 2018-06-05 NOTE — PROGRESS NOTES
Reason for Admission:   CHF exacerbation                  RRAT Score:     13           Do you (patient/family) have any concerns for transition/discharge? Plan for utilizing home health:   Has [facility]  This writer contacted [facility] to notify of patient's admission and that she wishes to resume their services at the time of d/c.    Likelihood of readmission? Mod/yellow due to chronicity of diagnosis            Transition of Care Plan: This writer confirmed demographic information with patient. She lives with her daughter Rossana Whelan [phone]. She has a walker, bedside commode, CPAP machine, home O2. She uses Medicaid medical transport to go to hospitals. Dr. Kimberly Juares is her PCP. States she is taking her medications just atarted to fill up with fluid    Care Management Interventions  PCP Verified by CM: Yes  Palliative Care Criteria Met (RRAT>21 & CHF Dx)?: No  Mode of Transport at Discharge:  Other (see comment) (medicaid transport)  Transition of Care Consult (CM Consult): [address]: No  Reason Outside [place]: Patient already serviced by other home care/hospice agency  Current Support Network: Estefania0 S Kael Bledsoe Follow Up Transport: Family  Plan discussed with Pt/Family/Caregiver: Yes   Resource Information Provided?: No  Discharge Location  Discharge Placement: Home with home health

## 2018-06-05 NOTE — PROGRESS NOTES
Received report on pt.from off going RN. Resting quietly in bed on rounds. Denies c/o pain or SOB at this time. Call bell at side. No acute distress noted. Will cont to monitor for any changes in status. 1930 Bedside and Verbal shift change report given to [address] (oncoming nurse) by Scarlet Fine RN (offgoing nurse). Report given with MATTHEW, Bianca and MAR.

## 2018-06-05 NOTE — PROGRESS NOTES
[facility]  -  Progress Note      Patient: Meghna Gaffney MRN: [id number]  SSN: [id number]    YOB: 1960  Age: 60 y.o. Sex: female      Admit Date: 6/2/2018    Assessment:     -Acute on chronic respiratory failure, multifactorial in setting of severe pulmonary HTN, OHS, ANDRADE with likely component of acute on chronic diastolic CHF. Volume status much better on IV Lasix. I/O - 10 liters since admission if accurate  -Chronic atrial fibrillation, on Coumadin. Low dose B-blocker for rate control  -Recent admission for bradycardia, likely associated with excessive BB use  -Echo 04/2018: EF 50-55%, mildly to moderately increased LV wall thickness, RV moderately to severely dilated with est. Peak pressure 87 mmHg, moderately to severely dilated RA, moderate to severe tricuspid regurgitation, severe pulmonic insufficiency  -Severe pulmonary hypertension, RV dysfunction with moderate to severe pulmonary insufficiency and moderate pulmonary pressure elevation  -Hx congenital heart defect repaired as child  -HTN  -Dyslipidemia, on Simvastatin  -ANDRADE  -OHS  -Morbid obesity  -DJD, pending left knee replacement  -History of polysubstance abuse  -History of non-compliance  -Psychiatric illness      Primary cardiologist is Dr. Unique Lopez:     -- would continue IV lasix through tonight, then start po Lasix 40 mg bid tomorrow  -- adjust warfarin for goal INR 2.5  -- continue remainder of cardiac regimen  -- dispo planning.  Should be able to go home in 1-2 days    Subjective:     Dyspnea and leg swelling improved    Objective:      Patient Vitals for the past 8 hrs:   Temp Pulse Resp BP SpO2   06/05/18 0811 97.6 °F (36.4 °C) 89 20 139/84 94 %   06/05/18 0803 - - - - 99 %   06/05/18 0331 98.1 °F (36.7 °C) 81 20 141/80 97 %         Patient Vitals for the past 96 hrs:   Weight   06/05/18 0331 137.9 kg (304 lb 1.6 oz)   06/04/18 0425 143.7 kg (316 lb 12.8 oz)   06/03/18 0447 147.4 kg (324 lb 14.4 oz)   06/02/18 2249 150.9 kg (332 lb 11.2 oz)         Intake/Output Summary (Last 24 hours) at 06/05/18 1017  Last data filed at 06/05/18 0504   Gross per 24 hour   Intake             1080 ml   Output             3800 ml   Net            -2720 ml       Physical Exam:  General:  fatigued, cooperative, no distress, appears older than stated age  Neck:  no JVD  Lungs:  clear to auscultation bilaterally, with only few rhonchi a bases  Heart:  irregularly irregular rhythm, 2/6 systolic murmur  Abdomen:  abdomen is soft without significant tenderness, masses, organomegaly or guarding  Extremities:  1+ ble edema, warm, brisk peripheral pulses.      Data Review:     Labs: Results:       Chemistry Recent Labs      06/05/18 0238 06/04/18   0409  06/03/18   0249  06/02/18   1916   GLU  98  109*  100*  104*   NA  138  138  141  141   K  3.7  3.7  3.5  3.7   CL  96*  95*  103  106   CO2  40*  38*  35*  29   BUN  17  14  16  17   CREA  0.97  0.93  0.95  1.00   CA  8.6  8.4*  8.5  8.4*   MG  1.7   --   1.9   --    PHOS   --    --   3.9   --    AGAP  2*  5  3  6   BUCR  18  15  17  17   AP   --    --    --   141*   TP   --    --    --   7.5   ALB   --    --    --   3.2*   GLOB   --    --    --   4.3*   AGRAT   --    --    --   0.7*      CBC w/Diff Recent Labs      06/05/18   0238  06/04/18   0409  06/03/18   0249   WBC  4.4*  5.5  4.5*   RBC  4.10*  4.15*  4.17*   HGB  8.9*  9.2*  9.0*   HCT  31.3*  32.0*  31.5*   PLT  235  206  178   GRANS  61  66  67   LYMPH  26  21  21   EOS  4  4  4      Cardiac Enzymes No results found for: CPK, CK, CKMMB, CKMB, RCK3, CKMBT, CKNDX, CKND1, SHANNAN, TROPT, TROIQ, JELANI, TROPT, TNIPOC, BNP, BNPP   Coagulation Recent Labs      06/05/18   0238  06/04/18   0409   PTP  20.8*  20.8*   INR  1.9*  1.9*       Lipid Panel Lab Results   Component Value Date/Time    Cholesterol, total 90 05/03/2018 10:25 AM    HDL Cholesterol 34 (L) 05/03/2018 10:25 AM    LDL, calculated 41.4 05/03/2018 10:25 AM    VLDL, calculated 14.6 05/03/2018 10:25 AM    Triglyceride 73 05/03/2018 10:25 AM    CHOL/HDL Ratio 2.6 05/03/2018 10:25 AM      BNP Lab Results   Component Value Date/Time    B-type Natriuretic Peptide 3187 (H) 05/06/2016 11:10 AM    B-type Natriuretic Peptide 3075 (H) 05/06/2016 11:00 AM      Liver Enzymes Recent Labs      06/02/18   1916   TP  7.5   ALB  3.2*   AP  141*   SGOT  19      Digoxin    Thyroid Studies Lab Results   Component Value Date/Time    T4, Total 12.3 (H) 01/15/2014 02:01 AM    TSH 1.97 05/04/2018 03:15 AM

## 2018-06-06 VITALS
OXYGEN SATURATION: 95 % | WEIGHT: 293 LBS | HEART RATE: 81 BPM | RESPIRATION RATE: 20 BRPM | DIASTOLIC BLOOD PRESSURE: 74 MMHG | BODY MASS INDEX: 58.86 KG/M2 | TEMPERATURE: 97.3 F | SYSTOLIC BLOOD PRESSURE: 106 MMHG

## 2018-06-06 PROBLEM — E87.70 FLUID OVERLOAD: Status: RESOLVED | Noted: 2017-04-18 | Resolved: 2018-06-06

## 2018-06-06 PROBLEM — I50.33 ACUTE ON CHRONIC DIASTOLIC CONGESTIVE HEART FAILURE (HCC): Status: RESOLVED | Noted: 2018-06-02 | Resolved: 2018-06-06

## 2018-06-06 LAB
ANION GAP SERPL CALC-SCNC: 3 MMOL/L (ref 3–18)
BASOPHILS # BLD: 0 K/UL (ref 0–0.1)
BASOPHILS NFR BLD: 0 % (ref 0–2)
BUN SERPL-MCNC: 16 MG/DL (ref 7–18)
BUN/CREAT SERPL: 17 (ref 12–20)
CALCIUM SERPL-MCNC: 8.8 MG/DL (ref 8.5–10.1)
CHLORIDE SERPL-SCNC: 93 MMOL/L (ref 100–108)
CO2 SERPL-SCNC: 40 MMOL/L (ref 21–32)
CREAT SERPL-MCNC: 0.94 MG/DL (ref 0.6–1.3)
DIFFERENTIAL METHOD BLD: ABNORMAL
EOSINOPHIL # BLD: 0.2 K/UL (ref 0–0.4)
EOSINOPHIL NFR BLD: 3 % (ref 0–5)
ERYTHROCYTE [DISTWIDTH] IN BLOOD BY AUTOMATED COUNT: 18.1 % (ref 11.6–14.5)
GLUCOSE SERPL-MCNC: 99 MG/DL (ref 74–99)
HCT VFR BLD AUTO: 35.5 % (ref 35–45)
HGB BLD-MCNC: 10 G/DL (ref 12–16)
INR PPP: 1.7 (ref 0.8–1.2)
LYMPHOCYTES # BLD: 1.1 K/UL (ref 0.9–3.6)
LYMPHOCYTES NFR BLD: 21 % (ref 21–52)
MAGNESIUM SERPL-MCNC: 1.9 MG/DL (ref 1.6–2.6)
MCH RBC QN AUTO: 21.7 PG (ref 24–34)
MCHC RBC AUTO-ENTMCNC: 28.2 G/DL (ref 31–37)
MCV RBC AUTO: 77.2 FL (ref 74–97)
MONOCYTES # BLD: 0.5 K/UL (ref 0.05–1.2)
MONOCYTES NFR BLD: 9 % (ref 3–10)
NEUTS SEG # BLD: 3.4 K/UL (ref 1.8–8)
NEUTS SEG NFR BLD: 67 % (ref 40–73)
PLATELET # BLD AUTO: 241 K/UL (ref 135–420)
PMV BLD AUTO: 9.7 FL (ref 9.2–11.8)
POTASSIUM SERPL-SCNC: 3.9 MMOL/L (ref 3.5–5.5)
PROTHROMBIN TIME: 19.6 SEC (ref 11.5–15.2)
RBC # BLD AUTO: 4.6 M/UL (ref 4.2–5.3)
SODIUM SERPL-SCNC: 136 MMOL/L (ref 136–145)
WBC # BLD AUTO: 5.2 K/UL (ref 4.6–13.2)

## 2018-06-06 PROCEDURE — 77030038269 HC DRN EXT URIN PURWCK BARD -A

## 2018-06-06 PROCEDURE — 74011250637 HC RX REV CODE- 250/637: Performed by: INTERNAL MEDICINE

## 2018-06-06 PROCEDURE — 85025 COMPLETE CBC W/AUTO DIFF WBC: CPT | Performed by: INTERNAL MEDICINE

## 2018-06-06 PROCEDURE — 97166 OT EVAL MOD COMPLEX 45 MIN: CPT

## 2018-06-06 PROCEDURE — 85610 PROTHROMBIN TIME: CPT | Performed by: INTERNAL MEDICINE

## 2018-06-06 PROCEDURE — 74011250637 HC RX REV CODE- 250/637: Performed by: PHYSICIAN ASSISTANT

## 2018-06-06 PROCEDURE — 74011250637 HC RX REV CODE- 250/637: Performed by: STUDENT IN AN ORGANIZED HEALTH CARE EDUCATION/TRAINING PROGRAM

## 2018-06-06 PROCEDURE — 80048 BASIC METABOLIC PNL TOTAL CA: CPT | Performed by: INTERNAL MEDICINE

## 2018-06-06 PROCEDURE — 36415 COLL VENOUS BLD VENIPUNCTURE: CPT | Performed by: INTERNAL MEDICINE

## 2018-06-06 PROCEDURE — 83735 ASSAY OF MAGNESIUM: CPT | Performed by: INTERNAL MEDICINE

## 2018-06-06 RX ORDER — LISINOPRIL 10 MG/1
10 TABLET ORAL DAILY
Qty: 30 TAB | Refills: 2 | Status: ON HOLD | OUTPATIENT
Start: 2018-06-07 | End: 2018-08-24

## 2018-06-06 RX ORDER — FUROSEMIDE 40 MG/1
40 TABLET ORAL 2 TIMES DAILY
Qty: 60 TAB | Refills: 2 | Status: ON HOLD | OUTPATIENT
Start: 2018-06-06 | End: 2019-05-23 | Stop reason: SDUPTHER

## 2018-06-06 RX ORDER — DOCUSATE SODIUM 100 MG/1
100 CAPSULE, LIQUID FILLED ORAL 2 TIMES DAILY
Qty: 60 CAP | Refills: 2 | Status: SHIPPED | OUTPATIENT
Start: 2018-06-06 | End: 2018-09-04

## 2018-06-06 RX ORDER — POLYETHYLENE GLYCOL 3350 17 G/17G
17 POWDER, FOR SOLUTION ORAL DAILY
Qty: 30 PACKET | Refills: 2 | Status: SHIPPED | OUTPATIENT
Start: 2018-06-06 | End: 2018-11-14 | Stop reason: ALTCHOICE

## 2018-06-06 RX ADMIN — METOPROLOL TARTRATE 12.5 MG: 25 TABLET ORAL at 08:19

## 2018-06-06 RX ADMIN — ARIPIPRAZOLE 15 MG: 5 TABLET ORAL at 08:19

## 2018-06-06 RX ADMIN — PAROXETINE HYDROCHLORIDE 20 MG: 20 TABLET, FILM COATED ORAL at 08:19

## 2018-06-06 RX ADMIN — OXYCODONE HYDROCHLORIDE 5 MG: 5 TABLET ORAL at 00:58

## 2018-06-06 RX ADMIN — OXYCODONE HYDROCHLORIDE 5 MG: 5 TABLET ORAL at 08:19

## 2018-06-06 RX ADMIN — LISINOPRIL 10 MG: 10 TABLET ORAL at 08:19

## 2018-06-06 RX ADMIN — LEVOTHYROXINE SODIUM 125 MCG: 125 TABLET ORAL at 08:19

## 2018-06-06 RX ADMIN — RANITIDINE HYDROCHLORIDE 150 MG: 150 TABLET, FILM COATED ORAL at 08:18

## 2018-06-06 RX ADMIN — FUROSEMIDE 40 MG: 40 TABLET ORAL at 08:18

## 2018-06-06 RX ADMIN — DOCUSATE SODIUM 100 MG: 100 CAPSULE, LIQUID FILLED ORAL at 08:19

## 2018-06-06 NOTE — PROGRESS NOTES
[facility] [address] [facility] [address] [address]  [phone]  Colon and Rectal Surgery Progress Note      Patient: Iesha Orantes MRN: [id number]  SSN: [id number]    YOB: 1960  Age: 60 y.o. Sex: female      Admit Date: 6/2/2018    LOS: 4 days     Subjective:     Ms. Alli Hendrix reports resolved constipation with BM this morning, does not know if there was blood in her stool. Tolerating diet. No N/V. Minimal abdominal pain with palpation RLQ as before. Objective:     Vitals:    06/05/18 1625 06/05/18 1944 06/05/18 2331 06/06/18 0526   BP: 96/65 114/76 111/67    Pulse: 60 75 72    Resp: 20 20 20    Temp: 97.8 °F (36.6 °C) 98.1 °F (36.7 °C) 98.1 °F (36.7 °C)    SpO2: 98% 97% 97%    Weight:    136.7 kg (301 lb 5.9 oz)        Intake and Output:  Current Shift:    Last three shifts: 06/04 1901 - 06/06 0700  In: 480 [P.O.:480]  Out: 5400 [Urine:5400]    Physical Exam:     Physical Exam   Constitutional: She appears well-developed and well-nourished. No distress. Cardiovascular: Normal rate and regular rhythm. Exam reveals no gallop and no friction rub. No murmur heard. Abdominal: Soft. Bowel sounds are normal. She exhibits no distension. There is no rebound and no guarding. RLQ tenderness   Neurological: She is alert. Skin: Skin is warm and dry. No rash noted. She is not diaphoretic. No erythema. No pallor.          Lab/Data Review:    BMP:   Lab Results   Component Value Date/Time     06/06/2018 05:20 AM    K 3.9 06/06/2018 05:20 AM    CL 93 (L) 06/06/2018 05:20 AM    CO2 40 (H) 06/06/2018 05:20 AM    AGAP 3 06/06/2018 05:20 AM    GLU 99 06/06/2018 05:20 AM    BUN 16 06/06/2018 05:20 AM    CREA 0.94 06/06/2018 05:20 AM    GFRAA >60 06/06/2018 05:20 AM    GFRNA >60 06/06/2018 05:20 AM     CBC:   Lab Results   Component Value Date/Time    WBC 5.2 06/06/2018 05:20 AM    HGB 10.0 (L) 06/06/2018 05:20 AM    HCT 35.5 06/06/2018 05:20 AM     06/06/2018 05:20 AM Assessment:     Ms. Dayana Sparrow is a 60year old female admitted with acute on chronic respiratory failure, PMH also significant for CHF, severe pulmonary hypertension and morbid obesity. Patient with sigmoid mass, CT with narrowing and thickening mid-sigmoid, hyperplastic polyp on colonoscopy, could not tolerate barium contrast enema last admission 05/2018    Plan:     - Poor surgical candidate for sigmoid colectomy. OP FU with GI to assess if endoscopic excision possible. - Constipation resolved.  Patient reports BM this morning    Signed By: Lucita Bajwa MD        June 6, 2018        Micah Dance, MD PGY-1  [address] [place] [address]

## 2018-06-06 NOTE — DISCHARGE SUMMARY
Discharge Summary    Patient: Meghna Gaffney MRN: [id number]  CSN: [id number]    YOB: 1960  Age: 60 y.o.   Sex: female    DOA: 6/2/2018 LOS:  LOS: 4 days   Discharge Date:      Admission Diagnoses: Acute exacerbation of CHF (congestive heart failure) ([place] [address].)    Discharge Diagnoses:    Problem List as of 6/6/2018  Date Reviewed: 5/22/2018          Codes Class Noted - Resolved    Acute exacerbation of CHF (congestive heart failure) ([place] [address].) ICD-10-CM: I50.9  ICD-9-CM: 428.0  6/2/2018 - Present        Chronic anticoagulation ICD-10-CM: Z79.01  ICD-9-CM: V58.61  6/2/2018 - Present        Bradycardia ICD-10-CM: R00.1  ICD-9-CM: 427.89  5/3/2018 - Present        Diastolic CHF, chronic ([place] [address].) ICD-10-CM: I50.32  ICD-9-CM: 428.32, 428.0  10/12/2017 - Present        Hypoxia ICD-10-CM: R09.02  ICD-9-CM: 799.02  4/18/2017 - Present        CHF (congestive heart failure) (HCC) ICD-10-CM: I50.9  ICD-9-CM: 428.0  4/18/2017 - Present        DDD (degenerative disc disease), lumbar, L4/5 advanced (Chronic) ICD-10-CM: M51.36  ICD-9-CM: 722.52  3/29/2017 - Present        Morbid obesity with BMI of 45.0-49.9, adult ([place] [address].) ICD-10-CM: E66.01, Z68.42  ICD-9-CM: 278.01, V85.42  2/20/2017 - Present        Dyspnea ICD-10-CM: R06.00  ICD-9-CM: 786.09  Unknown - Present        Dyslipidemia ICD-10-CM: E78.5  ICD-9-CM: 272.4  Unknown - Present        Osteoarthritis of both knees ICD-10-CM: M17.0  ICD-9-CM: 715.96  1/14/2014 - Present        H/O total knee replacement ICD-10-CM: Z96.659  ICD-9-CM: V43.65  1/14/2014 - Present        DJD (degenerative joint disease), lumbosacral ICD-10-CM: M51.37  ICD-9-CM: 722.52  1/14/2014 - Present        Schizoaffective disorder, chronic condition ([facility] ICD-10-CM: F25.8  ICD-9-CM: 295.72  1/14/2014 - Present        Hypothyroidism ICD-10-CM: E03.9  ICD-9-CM: 244.9  1/14/2014 - Present        Morbid obesity ([facility] ICD-10-CM: E66.01  ICD-9-CM: 278.01  1/14/2014 - Present        Esophageal reflux ICD-10-CM: K21.9  ICD-9-CM: 530.81  1/14/2014 - Present        Hypertension ICD-10-CM: I11.9  ICD-9-CM: 402.10  Unknown - Present        Congenital heart disease ICD-10-CM: Q24.9  ICD-9-CM: 665. 9  Unknown - Present    Overview Signed 1/25/2013 10:46 AM by Maurisio Little     presumed pulmonary stenosis s/p repair 1969             Atrial fibrillation ICD-10-CM: I48.91  ICD-9-CM: 427.31  Unknown - Present    Overview Signed 1/25/2013 10:46 AM by Maurisio Little     CHADS score 1  (-CHF, +HTN, -AGE, -DM, -CVA)             * (Principal)RESOLVED: Acute on chronic diastolic congestive heart failure (HCC) ICD-10-CM: I50.33  ICD-9-CM: 428.33, 428.0  6/2/2018 - 6/6/2018        RESOLVED: Fluid overload ICD-10-CM: E87.70  ICD-9-CM: 276.69  4/18/2017 - 6/6/2018        RESOLVED: Dyslipidemia ICD-10-CM: Z90.6  ICD-9-CM: 272.4  Unknown - 1/14/2014            Reason for Admission  60 y.o.  female who has PMHx of PH, decompensated diastolic HF, LE edema, A-fib on Warfarin. Pt is on Home oxygen 2 L. Pt was recently discharged from the hospital after been treated for CHF exacerbation. She returned to ER with progressively worsening SOB and LE edema since she was discharged. Pt stated she felt better initially after discharge but then gradually declined. States that she was not told to restrict fluids. Denied fever or chest pain. Discharge Condition: stable    PHYSICAL EXAM at discharge. Visit Vitals    /73 (BP 1 Location: Left arm, BP Patient Position: At rest)    Pulse 70    Temp 97.4 °F (36.3 °C)    Resp 20    Wt 136.7 kg (301 lb 5.9 oz)    SpO2 96%    Breastfeeding No    BMI 58.86 kg/m2     General:  Awake and alert, nad. Cardiovascular:  Irregularly irregular rhythm  Pulmonary: improved air entry. GI: soft nt nd nabs  Extremities: + edema b.l LE  Neuro no focal deficit  Skin no rash    Hospital Course:   1.  Acute on chronic diastolic congestive heart failure: resolving s/p iv lasix, cardiology followed in consult, CHF education done. 2. Acute on chronic respiratory failure, multifactorial in setting of severe pulmonary HTN, OHS, ANDRADE: pulmonary followed in consult. 3. Chronic LE swelling, history of non-compliance with diuretics: PVLs negative, continue Lasix  4. Chronic atrial fibrillation, on Coumadin. Low dose B-blocker for rate control. INR is 1.8.  5. sigmoid mass: seen by Dr. Nicky Mariee w/ plan to follow up on path, s/p Colonoscopy on 5/6/18 for rectal bleed. Pathology: hyperplastic polyp versus sessile serrated polyp. Plan following last discharge was to follow up with GI to see if polyp would be endoscopically excisable, as she is a very poor candidate for sigmoid colectomy. 5. HTN: imdur, low dose BB, and Lisinopril  6. Dyslipidemia: cont Simvastatin  7. Severe pulmonary hypertension, RV dysfunction with moderate to severe pulmonary insufficiency and moderate pulmonary pressure elevation: pulm input appreciated. 8. Recent admission for bradycardia  9. ANDRADE: pulmonary input appreciated. 10. OHS  11. Super morbid obesity Body mass index is 58.86 kg/(m^2). 12. History of polysubstance abuse  13. History of non-compliance  14. Anemia microcytic hypochromic  15. Metabolic alkalosis in the setting of diuresis  16. Full code . Discharge to home with hh. Patient and daughter agree; all questions answered to the best of my ability.      Consults: colorectal surgery Dr. Antoine Manual Dr. Leonila Lynch  Cardiology Dr. Zach Archer    Significant Diagnostic Studies: labs:   Recent Results (from the past 24 hour(s))   PROTHROMBIN TIME + INR    Collection Time: 06/06/18  5:20 AM   Result Value Ref Range    Prothrombin time 19.6 (H) 11.5 - 15.2 sec    INR 1.7 (H) 0.8 - 1.2     CBC WITH AUTOMATED DIFF    Collection Time: 06/06/18  5:20 AM   Result Value Ref Range    WBC 5.2 4.6 - 13.2 K/uL    RBC 4.60 4.20 - 5.30 M/uL    HGB 10.0 (L) 12.0 - 16.0 g/dL    HCT 35.5 35.0 - 45.0 %    MCV 77.2 74.0 - 97.0 FL    MCH 21.7 (L) 24.0 - 34.0 PG    MCHC 28.2 (L) 31.0 - 37.0 g/dL    RDW 18.1 (H) 11.6 - 14.5 %    PLATELET 550 246 - 158 K/uL    MPV 9.7 9.2 - 11.8 FL    NEUTROPHILS 67 40 - 73 %    LYMPHOCYTES 21 21 - 52 %    MONOCYTES 9 3 - 10 %    EOSINOPHILS 3 0 - 5 %    BASOPHILS 0 0 - 2 %    ABS. NEUTROPHILS 3.4 1.8 - 8.0 K/UL    ABS. LYMPHOCYTES 1.1 0.9 - 3.6 K/UL    ABS. MONOCYTES 0.5 0.05 - 1.2 K/UL    ABS. EOSINOPHILS 0.2 0.0 - 0.4 K/UL    ABS. BASOPHILS 0.0 0.0 - 0.1 K/UL    DF AUTOMATED     MAGNESIUM    Collection Time: 06/06/18  5:20 AM   Result Value Ref Range    Magnesium 1.9 1.6 - 2.6 mg/dL   METABOLIC PANEL, BASIC    Collection Time: 06/06/18  5:20 AM   Result Value Ref Range    Sodium 136 136 - 145 mmol/L    Potassium 3.9 3.5 - 5.5 mmol/L    Chloride 93 (L) 100 - 108 mmol/L    CO2 40 (H) 21 - 32 mmol/L    Anion gap 3 3.0 - 18 mmol/L    Glucose 99 74 - 99 mg/dL    BUN 16 7.0 - 18 MG/DL    Creatinine 0.94 0.6 - 1.3 MG/DL    BUN/Creatinine ratio 17 12 - 20      GFR est AA >60 >60 ml/min/1.73m2    GFR est non-AA >60 >60 ml/min/1.73m2    Calcium 8.8 8.5 - 10.1 MG/DL     All Micro Results     None        IMAGING  XR Results (most recent):    Results from Hospital Encounter encounter on 06/02/18   XR CHEST PORT   Narrative PORTABLE CHEST RADIOGRAPH     CPT CODE: 66198     INDICATION: 3 days of shortness of breath on home oxygen, diminished lung  sounds. COMPARISON: 2/26/2018. FINDINGS:    Frontal view of the chest obtained at 2058 hours. Cardiomediastinal enlargement  is similar. Similar prominence of the pulmonary vasculature. Central venous  congestion. Mild reticulations at the lung bases. Left costophrenic angle is  similarly obscured, likely a pericardial fat pad. No significant right pleural  effusion or pneumothorax. Impression IMPRESSION:    Cardiomediastinal enlargement and pulmonary arterial hypertension. Similar central venous congestion.  Mild increased lung base reticulations may  reflect mild interstitial edema and/or subsegmental atelectasis. EKG Results     Procedure 720 Value Units Date/Time    SCANNED CARDIAC RHYTHM STRIP [[id number]] Collected:  06/05/18 0715    Order Status:  Completed Updated:  06/05/18 0719    EKG, 12 LEAD, INITIAL [[id number]] Collected:  06/02/18 2013    Order Status:  Completed Updated:  06/03/18 0847     Ventricular Rate 65 BPM      Atrial Rate 54 BPM      QRS Duration 118 ms      Q-T Interval 464 ms      QTC Calculation (Bezet) 482 ms      Calculated R Axis -35 degrees      Calculated T Axis -171 degrees      Diagnosis --     Atrial fibrillation  Left axis deviation  Possible Anterior infarct , age undetermined  ST & T wave abnormality, consider inferolateral ischemia or digitalis effect  Abnormal ECG  When compared with ECG of 03-MAY-2018 10:34,  Borderline criteria for Anterior infarct are now present  T wave inversion now evident in Lateral leads  Confirmed by Shaquille Laws MD, Rene Irwin ([id number]) on 6/3/2018 8:47:39 AM      EKG, 12 LEAD, INITIAL [[id number]]     Order Status:  Canceled         Discharge Medications:     Current Discharge Medication List      START taking these medications    Details   lisinopril (PRINIVIL, ZESTRIL) 10 mg tablet Take 1 Tab by mouth daily. Qty: 30 Tab, Refills: 2      polyethylene glycol (MIRALAX) 17 gram packet Take 1 Packet by mouth daily. Qty: 30 Packet, Refills: 2      docusate sodium (COLACE) 100 mg capsule Take 1 Cap by mouth two (2) times a day for 90 days. HOLD for loose stool. Qty: 60 Cap, Refills: 2         CONTINUE these medications which have CHANGED    Details   furosemide (LASIX) 40 mg tablet Take 1 Tab by mouth two (2) times a day. 40 mg po daily  Qty: 60 Tab, Refills: 2         CONTINUE these medications which have NOT CHANGED    Details   metoprolol tartrate (LOPRESSOR) 25 mg tablet Take 0.5 Tabs by mouth two (2) times a day.   Qty: 30 Tab, Refills: 6      fluticasone (FLONASE) 50 mcg/actuation nasal spray 2 Sprays by Both Nostrils route daily.  Qty: 1 Bottle, Refills: 2      oxyCODONE IR (ROXICODONE) 5 mg immediate release tablet Take 5 mg by mouth every six (6) hours as needed for Pain.      isosorbide mononitrate ER (IMDUR) 30 mg tablet Take 1 Tab by mouth every evening. Qty: 90 Tab, Refills: 3      multivitamin, tx-iron-ca-min (THERA-M W/ IRON) 9 mg iron-400 mcg tab tablet Take 1 Tab by mouth daily. Qty: 30 Tab, Refills: 0      raNITIdine (ZANTAC) 150 mg tablet Take 150 mg by mouth two (2) times a day. warfarin (COUMADIN) 5 mg tablet Take 7.5 mg by mouth daily. VENTOLIN HFA 90 mcg/actuation inhaler Take 2 Puffs by inhalation every four (4) hours as needed for Wheezing or Shortness of Breath. Qty: 1 Inhaler, Refills: 0      PARoxetine (PAXIL) 20 mg tablet Take 1 Tab by mouth daily. Qty: 30 Tab, Refills: 3      simvastatin (ZOCOR) 40 mg tablet Take  by mouth nightly. to obtain from pharmacy      aripiprazole (ABILIFY) 15 mg tablet Take 15 mg by mouth daily. to obtain from pharmacy      levothyroxine (SYNTHROID) 125 mcg tablet Take 125 mcg by mouth Daily (before breakfast). loperamide (IMODIUM) 2 mg capsule Take 2 mg by mouth three (3) times daily as needed for Diarrhea. Activity: PT/OT per Home Health. Fall precautions. Diet: Cardiac Diet    Wound Care: None needed    Follow-up:   1. Dr Javier Gonsalez 7 - 10 days  2. Dr. Jose Young 3 - 4 weeks (should already be scheduled).   3. Dr. Leonarda Daniel 4 weeks    Minutes spent on discharge: greater than 30

## 2018-06-06 NOTE — ROUTINE PROCESS
Received bedside report from [address] [place] [address], patient was sleeping in bed, HOB elevated, bed in lowest position, bed alarm on, call button within reach of patient. 0405 - Patient has five beats of v-tach, the rate was [id number]. Patient did not sustain. Gave bedside report to [address] [place] [address], using SBAR, MAR, and Kardex.

## 2018-06-06 NOTE — DISCHARGE INSTRUCTIONS
Atrial Fibrillation: Care Instructions  Your Care Instructions    Atrial fibrillation is an irregular and often fast heartbeat. Treating this condition is important for several reasons. It can cause blood clots, which can travel from your heart to your brain and cause a stroke. If you have a fast heartbeat, you may feel lightheaded, dizzy, and weak. An irregular heartbeat can also increase your risk for heart failure. Atrial fibrillation is often the result of another heart condition, such as high blood pressure or coronary artery disease. Making changes to improve your heart condition will help you stay healthy and active. Follow-up care is a key part of your treatment and safety. Be sure to make and go to all appointments, and call your doctor if you are having problems. It's also a good idea to know your test results and keep a list of the medicines you take. How can you care for yourself at home? Medicines  ? · Take your medicines exactly as prescribed. Call your doctor if you think you are having a problem with your medicine. You will get more details on the specific medicines your doctor prescribes. ? · If your doctor has given you a blood thinner to prevent a stroke, be sure you get instructions about how to take your medicine safely. Blood thinners can cause serious bleeding problems. ? · Do not take any vitamins, over-the-counter drugs, or herbal products without talking to your doctor first.   ? Lifestyle changes  ? · Do not smoke. Smoking can increase your chance of a stroke and heart attack. If you need help quitting, talk to your doctor about stop-smoking programs and medicines. These can increase your chances of quitting for good. ? · Eat a heart-healthy diet. ? · Stay at a healthy weight. Lose weight if you need to.   ? · Limit alcohol to 2 drinks a day for men and 1 drink a day for women. Too much alcohol can cause health problems. ? · Avoid colds and flu.  Get a pneumococcal vaccine shot. If you have had one before, ask your doctor whether you need another dose. Get a flu shot every year. If you must be around people with colds or flu, wash your hands often. Activity  ? · If your doctor recommends it, get more exercise. Walking is a good choice. Bit by bit, increase the amount you walk every day. Try for at least 30 minutes on most days of the week. You also may want to swim, bike, or do other activities. Your doctor may suggest that you join a cardiac rehabilitation program so that you can have help increasing your physical activity safely. ? · Start light exercise if your doctor says it is okay. Even a small amount will help you get stronger, have more energy, and manage stress. Walking is an easy way to get exercise. Start out by walking a little more than you did in the hospital. Gradually increase the amount you walk. ? · When you exercise, watch for signs that your heart is working too hard. You are pushing too hard if you cannot talk while you are exercising. If you become short of breath or dizzy or have chest pain, sit down and rest immediately. ? · Check your pulse regularly. Place two fingers on the artery at the palm side of your wrist, in line with your thumb. If your heartbeat seems uneven or fast, talk to your doctor. When should you call for help? Call 911 anytime you think you may need emergency care. For example, call if:  ? · You have symptoms of a heart attack. These may include:  ¨ Chest pain or pressure, or a strange feeling in the chest.  ¨ Sweating. ¨ Shortness of breath. ¨ Nausea or vomiting. ¨ Pain, pressure, or a strange feeling in the back, neck, jaw, or upper belly or in one or both shoulders or arms. ¨ Lightheadedness or sudden weakness. ¨ A fast or irregular heartbeat. After you call 911, the  may tell you to chew 1 adult-strength or 2 to 4 low-dose aspirin. Wait for an ambulance. Do not try to drive yourself.    ? · You have symptoms of a stroke. These may include:  ¨ Sudden numbness, tingling, weakness, or loss of movement in your face, arm, or leg, especially on only one side of your body. ¨ Sudden vision changes. ¨ Sudden trouble speaking. ¨ Sudden confusion or trouble understanding simple statements. ¨ Sudden problems with walking or balance. ¨ A sudden, severe headache that is different from past headaches. ? · You passed out (lost consciousness). ?Call your doctor now or seek immediate medical care if:  ? · You have new or increased shortness of breath. ? · You feel dizzy or lightheaded, or you feel like you may faint. ? · Your heart rate becomes irregular. ? · You can feel your heart flutter in your chest or skip heartbeats. Tell your doctor if these symptoms are new or worse. ? Watch closely for changes in your health, and be sure to contact your doctor if you have any problems. Where can you learn more? Go to http://bucky-gerard.info/. Enter U020 in the search box to learn more about \"Atrial Fibrillation: Care Instructions. \"  Current as of: September 21, 2016  Content Version: 11.4  © 4002-4355 [employer]. Care instructions adapted under license by [employer] (which disclaims liability or warranty for this information). If you have questions about a medical condition or this instruction, always ask your healthcare professional. [address] any warranty or liability for your use of this information. [employer] Activation    Thank you for requesting access to [employer]. Please follow the instructions below to securely access and download your online medical record. [employer] allows you to send messages to your doctor, view your test results, renew your prescriptions, schedule appointments, and more. How Do I Sign Up? 1. In your internet browser, go to [email or web address]  2. Click on the First Time User? Click Here link in the Sign In box.  You will be redirect to the New Member Sign Up page. 3. Enter your My-Apps Access Code exactly as it appears below. You will not need to use this code after youve completed the sign-up process. If you do not sign up before the expiration date, you must request a new code. My-Apps Access Code: [email or web address]  Expires: 2018  7:58 PM (This is the date your My-Apps access code will )    4. Enter the last four digits of your Social Security Number (xxxx) and Date of Birth (mm/dd/yyyy) as indicated and click Submit. You will be taken to the next sign-up page. 5. Create a My-Apps ID. This will be your My-Apps login ID and cannot be changed, so think of one that is secure and easy to remember. 6. Create a My-Apps password. You can change your password at any time. 7. Enter your Password Reset Question and Answer. This can be used at a later time if you forget your password. 8. Enter your e-mail address. You will receive e-mail notification when new information is available in [address] 9. Click Sign Up. You can now view and download portions of your medical record. 10. Click the Download Summary menu link to download a portable copy of your medical information. Additional Information    If you have questions, please visit the Frequently Asked Questions section of the My-Apps website at [email or web address] second [employer] [email or web address]/. Remember, My-Apps is NOT to be used for urgent needs. For medical emergencies, dial 911. DISCHARGE SUMMARY from Nurse    PATIENT INSTRUCTIONS:    After general anesthesia or intravenous sedation, for 24 hours or while taking prescription Narcotics:  · Limit your activities  · Do not drive and operate hazardous machinery  · Do not make important personal or business decisions  · Do  not drink alcoholic beverages  · If you have not urinated within 8 hours after discharge, please contact your surgeon on call.     Report the following to your surgeon:  · Excessive pain, swelling, redness or odor of or around the surgical area  · Temperature over 100.5  · Nausea and vomiting lasting longer than 4 hours or if unable to take medications  · Any signs of decreased circulation or nerve impairment to extremity: change in color, persistent  numbness, tingling, coldness or increase pain  · Any questions    What to do at Home:  Recommended activity: Activity as tolerated,     If you experience any of the following symptoms chest pain. Shortness of breath, please follow up with PCP. *  Please give a list of your current medications to your Primary Care Provider. *  Please update this list whenever your medications are discontinued, doses are      changed, or new medications (including over-the-counter products) are added. *  Please carry medication information at all times in case of emergency situations. These are general instructions for a healthy lifestyle:    No smoking/ No tobacco products/ Avoid exposure to second hand smoke  Surgeon General's Warning:  Quitting smoking now greatly reduces serious risk to your health. Obesity, smoking, and sedentary lifestyle greatly increases your risk for illness    A healthy diet, regular physical exercise & weight monitoring are important for maintaining a healthy lifestyle    You may be retaining fluid if you have a history of heart failure or if you experience any of the following symptoms:  Weight gain of 3 pounds or more overnight or 5 pounds in a week, increased swelling in our hands or feet or shortness of breath while lying flat in bed. Please call your doctor as soon as you notice any of these symptoms; do not wait until your next office visit. Recognize signs and symptoms of STROKE:    F-face looks uneven    A-arms unable to move or move unevenly    S-speech slurred or non-existent    T-time-call 911 as soon as signs and symptoms begin-DO NOT go       Back to bed or wait to see if you get better-TIME IS BRAIN.     Warning Signs of HEART ATTACK     Call 911 if you have these symptoms:   Chest discomfort. Most heart attacks involve discomfort in the center of the chest that lasts more than a few minutes, or that goes away and comes back. It can feel like uncomfortable pressure, squeezing, fullness, or pain.  Discomfort in other areas of the upper body. Symptoms can include pain or discomfort in one or both arms, the back, neck, jaw, or stomach.  Shortness of breath with or without chest discomfort.  Other signs may include breaking out in a cold sweat, nausea, or lightheadedness. Don't wait more than five minutes to call 911 - MINUTES MATTER! Fast action can save your life. Calling 911 is almost always the fastest way to get lifesaving treatment. Emergency Medical Services staff can begin treatment when they arrive -- up to an hour sooner than if someone gets to the hospital by car. The discharge information has been reviewed with the patient. The patient verbalized understanding. Discharge medications reviewed with the patient and appropriate educational materials and side effects teaching were provided.   _____________________  Patient armband removed and shredded  ______________________________________________________________________________________________________________

## 2018-06-06 NOTE — PROGRESS NOTES
As stated in last note. Eura Jose Raul Eura Jose Raul \"    - CMS was asked to arrange transportation to pt home address [facility], [address]), per Marietta Bush, RN (care manager). CMS spoke with \"[facility] \" ([phone]) representative Zuhair President), and requested a p/u time of 1400, via stretcher. \"[facility] [employer]" has agreed to call back with a confirmation of \"[facility]\" transports when dispatch and assignment has been completed. Marietta Bush RN (care manager) were made aware of the requested p/u, along with details of awaiting confirmation. \"    - **UPDATE** per \"[facility] \" ([phone]) representative [employer] President) \"[facility]\" transports has confirmed to p/u the pt in approximately one (1) hr (1700); Marietta Bush RN (care manager) were made aware of the scheduled p/u.

## 2018-06-06 NOTE — PROGRESS NOTES
Problem: Self Care Deficits Care Plan (Adult)  Goal: *Acute Goals and Plan of Care (Insert Text)  Outcome: Resolved/Met Date Met: 06/06/18  Occupational Therapy EVALUATION/discharge    Patient: Magen Monroy (59 y.o. female)  Date: 6/6/2018  Primary Diagnosis: Acute exacerbation of CHF (congestive heart failure) ([facility] [address]        Precautions:   Fall, Skin    ASSESSMENT AND RECOMMENDATIONS:  Based on the objective data described below, the patient presents at her PLOF with basic self care tasks. She receives assistance at home from her daughter with LB ADLs. Mod assist given for functional standing and min assist for functional transfers. Will defer to PT for mobility training. No muscle weakness noted in UEs. Patient limited by right hip pain but states she needs a hip replacement. No DME needs at this time. Skilled occupational therapy is not indicated at this time. Discharge Recommendations: None  Further Equipment Recommendations for Discharge: N/A      Barriers to Learning/Limitations: None  Compensate with: visual, verbal, tactile, kinesthetic cues/model     COMPLEXITY     Eval Complexity: History: MEDIUM Complexity : Expanded review of history including physical, cognitive and psychosocial  history ; Examination: MEDIUM Complexity : 3-5 performance deficits relating to physical, cognitive , or psychosocial skils that result in activity limitations and / or participation restrictions; Decision Making:MEDIUM Complexity : Patient may present with comorbidities that affect occupational performnce. Miniml to moderate modification of tasks or assistance (eg, physical or verbal ) with assesment(s) is necessary to enable patient to complete evaluation  Assessment: Moderate Complexity        G-CODES:     Self Care  Current  CK= 40-59%   Goal  CK= 40-59%   D/C  CK= 40-59%. The severity rating is based on the Level of Assistance required for Functional Mobility and ADLs.     SUBJECTIVE:   Patient stated My daughter helps me at home.     OBJECTIVE DATA SUMMARY:     Past Medical History:   Diagnosis Date    Atrial fibrillation     CHADS score 1  (-CHF, +HTN, -AGE, -DM, -CVA)    Carpal tunnel syndrome     Chronic pain     Congenital heart disease     presumed pulmonary stenosis s/p repair 1969    Degenerative disc disease     Degenerative joint disease     Dyslipidemia     GERD     H/O echocardiogram 04/2017    EF 60-65%, mild concentric LVH, dilated RV with RV dysfunction, RVSP 54 mmHg, RA E, moderate to severe pulmonic regurgitation.  Hypertension     Hypothyroid     Morbid obesity     Morbid obesity with BMI of 45.0-49.9, adult ([facility] [address] 2/20/2017    Noncompliance     Schizoaffective disorder     Substance abuse     marijuana, crack cocaine     Past Surgical History:   Procedure Laterality Date    COLONOSCOPY N/A 5/6/2018    COLONOSCOPY with tatooing and biopsy performed by Tucker Richter MD at [facility] 9      left    HX HYSTERECTOMY      HX KNEE REPLACEMENT      left    HX KNEE REPLACEMENT      right    HX THYROIDECTOMY       Prior Level of Function/Home Situation: Pt required min to mod assist with basic self care tasks and used a wheelchair for functional mobility PTA.   Home Situation  Home Environment: Apartment (duplex)  # Steps to Enter: 1 (\"big step\")  Rails to Enter: Yes  Hand Rails : Right  Wheelchair Ramp: No  One/Two Story Residence: Two story (has been staying on 1st floor)  Lift Chair Available: No  Living Alone: No  Support Systems: Child(jules), Family member(s)  Patient Expects to be Discharged to[de-identified] Apartment  Current DME Used/Available at Home: Cane, straight, Oxygen, portable, Commode, bedside, Shower chair, Walker, rollator, Walker, rolling  Tub or Shower Type:  (Pt sponge bathes at home.)  [x]     Right hand dominant   []     Left hand dominant  Cognitive/Behavioral Status:  Neurologic State: Alert  Orientation Level: Oriented X4  Cognition: Appropriate decision making; Follows commands  Safety/Judgement: Awareness of environment; Fall prevention    Skin: Intact on UEs    Edema: None noted in UEs    Vision/Perceptual:    Acuity: Within Defined Limits      Coordination:  Fine Motor Skills-Upper: Left Intact; Right Intact    Gross Motor Skills-Upper: Left Intact; Right Intact    Balance:  Sitting: Intact  Standing: With support    Strength:  Strength: Within functional limits (UEs)    Tone & Sensation:  Tone: Normal (UEs)  Sensation: Intact (UEs)    Range of Motion:  AROM: Within functional limits (UEs)  PROM: Within functional limits (UEs)    Functional Mobility and Transfers for ADLs:  Bed Mobility:  Supine to Sit: Moderate assistance  Sit to Supine: Moderate assistance  Transfers:  Sit to Stand: Moderate assistance   Toilet Transfer : Minimum assistance    ADL Assessment:  Feeding: Modified independent    Oral Facial Hygiene/Grooming: Setup    Bathing: Moderate assistance    Upper Body Dressing: Setup    Lower Body Dressing: Moderate assistance    Toileting: Minimum assistance    Pain:  Pt reports 8/10 pain or discomfort prior to treatment, in right hip. Pain meds given prior to session.    Pt reports 8/10 pain or discomfort post treatment, in right hip. Patient resting in bed at end of session. Activity Tolerance:   Good    Please refer to the flowsheet for vital signs taken during this treatment. After treatment:   []  Patient left in no apparent distress sitting up in chair  [x]  Patient left in no apparent distress in bed  [x]  Call bell left within reach  []  Nursing notified  [x]  Caregiver (sister) present  []  Bed alarm activated    COMMUNICATION/EDUCATION:   Communication/Collaboration:  [x]      Home safety education was provided and the patient/caregiver indicated understanding. [x]      Patient/family have participated as able and agree with findings and recommendations.   []      Patient is unable to participate in plan of care at this time.     Stoney Caceres MS OTR/L  Time Calculation: 15 mins

## 2018-06-06 NOTE — PROGRESS NOTES
CMS was asked to arrange transportation to pt home address [facility]), per Stas Montelongo, RN (care manager). CMS spoke with \"[facility] \" ([phone]) representative Niharika Alfaro), and requested a p/u time of 1400, via stretcher. \"[facility] [employer]" has agreed to call back with a confirmation of \"[facility]\" transports when dispatch and assignment has been completed. Stas Montelongo RN (care manager) were made aware of the requested p/u, along with details of awaiting confirmation.

## 2018-06-06 NOTE — PROGRESS NOTES
[facility]  -  Progress Note      Patient: Lidia Vargas MRN: [id number]  SSN: [id number]    YOB: 1960  Age: 60 y.o. Sex: female      Admit Date: 6/2/2018    Hospital Problem List:     Hospital Problems  Date Reviewed: 5/22/2018          Codes Class Noted POA    Chronic anticoagulation ICD-10-CM: Z79.01  ICD-9-CM: V58.61  6/2/2018 Unknown        Hypothyroidism ICD-10-CM: E03.9  ICD-9-CM: 244.9  1/14/2014 Yes        Morbid obesity ([address] ICD-10-CM: E66.01  ICD-9-CM: 278.01  1/14/2014 Yes        Hypertension ICD-10-CM: I11.9  ICD-9-CM: 402.10  Unknown Yes        Atrial fibrillation ICD-10-CM: I48.91  ICD-9-CM: 427.31  Unknown Yes    Overview Signed 1/25/2013 10:46 AM by Len Salmeron     CHADS score 1  (-CHF, +HTN, -AGE, -DM, -CVA)                 -Acute on chronic respiratory failure, multifactorial in setting of severe pulmonary HTN, OHS, ANDRADE with likely component of acute on chronic diastolic CHF. Volume status much better on IV Lasix. I/O - 10 liters since admission if accurate  -Chronic atrial fibrillation, on Coumadin. Low dose B-blocker for rate control  -Recent admission for bradycardia, likely associated with excessive BB use  -Echo 04/2018: EF 50-55%, mildly to moderately increased LV wall thickness, RV moderately to severely dilated with est. Peak pressure 87 mmHg, moderately to severely dilated RA, moderate to severe tricuspid regurgitation, severe pulmonic insufficiency  -Severe pulmonary hypertension, RV dysfunction with moderate to severe pulmonary insufficiency and moderate pulmonary pressure elevation  -Hx congenital heart defect repaired as child  -HTN  -Dyslipidemia, on Simvastatin  -ANDRADE  -OHS  -Morbid obesity  -DJD, pending left knee replacement  -History of polysubstance abuse  -History of non-compliance  -Psychiatric illness      Primary cardiologist is Dr. Kiki Dyer:     -PO lasix resumed.  Continue.   -Would like patient to see patient in one month for follow up.   -Will have our office call patient and daughter at home to schedule.   -Continue coumadin. INR 1.7. Goal 2.5  -Continue rest of cardiac regimen. Hemodynamics stable. Subjective:     No new complaints.      Objective:      Patient Vitals for the past 8 hrs:   Temp Pulse Resp BP SpO2   06/06/18 1228 97.3 °F (36.3 °C) 81 20 106/74 95 %   06/06/18 0931 97.4 °F (36.3 °C) 70 20 113/73 96 %         Patient Vitals for the past 96 hrs:   Weight   06/06/18 0526 136.7 kg (301 lb 5.9 oz)   06/05/18 0331 137.9 kg (304 lb 1.6 oz)   06/04/18 0425 143.7 kg (316 lb 12.8 oz)   06/03/18 0447 147.4 kg (324 lb 14.4 oz)   06/02/18 2249 150.9 kg (332 lb 11.2 oz)         Intake/Output Summary (Last 24 hours) at 06/06/18 1233  Last data filed at 06/06/18 0121   Gross per 24 hour   Intake                0 ml   Output             2900 ml   Net            -2900 ml       Physical Exam:  General:  Morbidly obese, [place] IRA, awake, alert, family friend at bedside  Neck:  Supple, cannot assess for jvd given body habitus  Lungs: diminished breath sounds d/t body habitus, no rales, wheezing, or crackles  Heart: reg rate and rhythm  Abdomen:  soft  Extremities:  1+ bilat LE edema, skin is warm and dry, atraumatic    Data Review:     Labs: Results:       Chemistry Recent Labs      06/06/18   0520  06/05/18   0238  06/04/18   0409   GLU  99  98  109*   NA  136  138  138   K  3.9  3.7  3.7   CL  93*  96*  95*   CO2  40*  40*  38*   BUN  16  17  14   CREA  0.94  0.97  0.93   CA  8.8  8.6  8.4*   MG  1.9  1.7   --    AGAP  3  2*  5   BUCR  17  18  15      CBC w/Diff Recent Labs      06/06/18   0520  06/05/18   0238  06/04/18   0409   WBC  5.2  4.4*  5.5   RBC  4.60  4.10*  4.15*   HGB  10.0*  8.9*  9.2*   HCT  35.5  31.3*  32.0*   PLT  241  235  206   GRANS  67  61  66   LYMPH  21  26  21   EOS  3  4  4      Cardiac Enzymes No results found for: CPK, CK, CKMMB, CKMB, RCK3, CKMBT, CKNDX, CKND1, SHANNAN, TROPT, TROIQ, JELANI, TROPT, TNIPOC, BNP, BNPP   Coagulation Recent Labs      06/06/18   0520  06/05/18   0238   PTP  19.6*  20.8*   INR  1.7*  1.9*       Lipid Panel Lab Results   Component Value Date/Time    Cholesterol, total 90 05/03/2018 10:25 AM    HDL Cholesterol 34 (L) 05/03/2018 10:25 AM    LDL, calculated 41.4 05/03/2018 10:25 AM    VLDL, calculated 14.6 05/03/2018 10:25 AM    Triglyceride 73 05/03/2018 10:25 AM    CHOL/HDL Ratio 2.6 05/03/2018 10:25 AM      BNP Lab Results   Component Value Date/Time    B-type Natriuretic Peptide 3187 (H) 05/06/2016 11:10 AM    B-type Natriuretic Peptide 3075 (H) 05/06/2016 11:00 AM      Liver Enzymes No results for input(s): TP, ALB, TBIL, AP, SGOT, GPT in the last 72 hours.     No lab exists for component: DBIL   Digoxin    Thyroid Studies Lab Results   Component Value Date/Time    T4, Total 12.3 (H) 01/15/2014 02:01 AM    TSH 1.97 05/04/2018 03:15 AM            Signed By: VERONICA Miller     June 6, 2018

## 2018-06-06 NOTE — PROGRESS NOTES
Transition of Care (PIERRE) Plan:  Patient is being d/c home with home health through [facility]. Consult for [facility] sent through [facility] and this writer also called intake at PT to notify of d/c today. PIERRE Transportation:       How is patient being transported at discharge? [facility]     When? Being scheduled for within the next hour   Is transport scheduled? Follow-up appointment and transportation:     PCP? Specialist? imer on 6/28 @ 56          Who is transporting to the follow-up appointment? [employer] LTM      Is transport for follow up appointment scheduled? Communication plan (with patient/family): Who is being called? Patient    What number(s) is to be used? [phone]      What service provider is calling for [facility] services? When are they calling? Readmission Risk?   (Green/Low; Yellow/Moderate; Red/High):

## 2018-06-06 NOTE — PROGRESS NOTES
Progress Note  Pulmonary, Critical Care, and Sleep Medicine    Name: Irena Higuera MRN: [id number]   : 1960 Hospital: [facility]   Date: 2018        IMPRESSION:   · Acute  on chronic respiratory failure improved with diuresis, likely due to CHF exacerbation superimposed on severe ANDRADE/OHS and pulmonary HTN  · Pulmonary HTN probably Grp 3 +/- Grp 2 in pt with chronic hypoxemia due to ANDRADE/OHS and decompensated diastolic failure  · Severe ANDRADE/OHS failed CPAP titration. Recently seen in office and scheduled for outpatient BIPAP titration  · Volume overload  · Paranoid schizophrenia  · Morbid obesity  · Decompensated diastolic heart failure  · Atrial fibrillation rate controlled      PLAN:   · Continue gentle diuresis. Would avoid overdiuresis as pt is likely preload dependent  · Electrolyte replacement per protocol  · Will probably need higher pressures on NIV, await titration  · Supplemental O2 to maintain saturation greater than 92%  · Schizophrenia acting as barrier to care as pt with limited insight into her illness  · Continue anticoagulation  · Schedule PFT as outpatient     Subjective/Interval History:   I have reviewed the flowsheet and previous days notes. Reviewed interval history. Discussed management with nursing staff. 18     Less SOB than yesterday- still intermittent SOB  Feels sleepy  Wore CPAP overnight  Denies chest pain, cough  C/o hip hurting    HPI:  Pt was admitted for shortness of breath and hypoxemia. She is a poor historian due to Psychiatric illness but review of records reveal severe ANDRADE/OHS with failed CPAP titration. Pt was scheduled for dedicated BIPAP titration but would end up in the hospital for either shortness of breath or AMS due to CO2 narcosis. Pt with severe ANDRADE/OHS Rxed with BIPAP with admitted poor compliance, per pt due to device malfunction. ROS:Pertinent items are noted in HPI. Orders reviewed including medications.  Changes made if indicated. Telemetry monitor reviewed at the bedside. Objective:   Vital Signs:    Visit Vitals    /73 (BP 1 Location: Left arm, BP Patient Position: At rest)    Pulse 70    Temp 97.4 °F (36.3 °C)    Resp 20    Wt 136.7 kg (301 lb 5.9 oz)    SpO2 96%    Breastfeeding No    BMI 58.86 kg/m2       O2 Device: Nasal cannula   O2 Flow Rate (L/min): 2 l/min   Temp (24hrs), Av.9 °F (36.6 °C), Min:97.4 °F (36.3 °C), Max:98.1 °F (36.7 °C)       Intake/Output:   Last shift:         Last 3 shifts:  1901 -  0700  In: 480 [P.O.:480]  Out: 5400 [Urine:5400]    Intake/Output Summary (Last 24 hours) at 18 1115  Last data filed at 18 0121   Gross per 24 hour   Intake                0 ml   Output             2900 ml   Net            -2900 ml        Physical Exam:    General:  Alert, cooperative, in no distress, appears stated age. Morbidly obese   Head:  Normocephalic, without obvious abnormality, atraumatic. Eyes:  ANicteric, PERRL,   Nose: Nares normal. Mucosa normal. No drainage or sinus tenderness. Throat: Lips, mucosa, and tongue normal.  NO Thrush   Neck: Supple, symmetrical, trachea midline, no adenopathy, thyroid: no enlargment/tenderness/nodules    Back:   Symmetric    Lungs:   Bilateral auscultation no rales or wheezes anteriorly   Chest wall:  No tenderness or deformity. NO intercostal retractions   Heart:  Regular rate and rhythm, S1, S2 normal, no murmur, click, rub or gallop. Abdomen:   Soft, non-tender. PROTUBERANT; Bowel sounds normal. No masses,  No organomegaly. NO paradoxical motion   Extremities: normal, atraumatic, no cyanosis, +1 bipedal edema   Pulses: 2+ and symmetric all extremities. Skin: Skin color, texture, turgor normal. No rashes or lesions.  NO clubbing   Lymph nodes: Cervical nodes normal.   Neurologic: Grossly nonfocal        DATA:  Labs:  Recent Labs      18   0520  18   0238  18   0409   WBC  5.2  4.4*  5.5   HGB  10.0*  8.9*  9.2* HCT  35.5  31.3*  32.0*   PLT  241  235  206     Recent Labs      06/06/18   0520  06/05/18   0238  06/04/18   0409   NA  136  138  138   K  3.9  3.7  3.7   CL  93*  96*  95*   CO2  40*  40*  38*   GLU  99  98  109*   BUN  16  17  14   CREA  0.94  0.97  0.93   CA  8.8  8.6  8.4*   MG  1.9  1.7   --    INR  1.7*  1.9*  1.9*     No results for input(s): PH, PCO2, PO2, HCO3, FIO2 in the last 72 hours. Imaging:  [x]        I have personally reviewed the patients radiographs and reports  6/2/2018:  Cardiomediastinal enlargement and pulmonary arterial hypertension. Similar central venous congestion. Mild increased lung base reticulations may reflect mild interstitial edema and/or subsegmental atelectasis. High complexity decision making was performed during this consultation and evaluation.      Kenneth Aleman MD

## 2018-06-07 ENCOUNTER — APPOINTMENT (OUTPATIENT)
Dept: GENERAL RADIOLOGY | Age: 58
End: 2018-06-07
Attending: NURSE PRACTITIONER
Payer: MEDICAID

## 2018-06-07 ENCOUNTER — HOSPITAL ENCOUNTER (EMERGENCY)
Age: 58
Discharge: HOME | End: 2018-06-07
Attending: EMERGENCY MEDICINE
Payer: MEDICAID

## 2018-06-07 ENCOUNTER — APPOINTMENT (OUTPATIENT)
Dept: CT IMAGING | Age: 58
End: 2018-06-07
Attending: NURSE PRACTITIONER
Payer: MEDICAID

## 2018-06-07 VITALS
TEMPERATURE: 98.6 F | SYSTOLIC BLOOD PRESSURE: 105 MMHG | HEART RATE: 77 BPM | RESPIRATION RATE: 11 BRPM | OXYGEN SATURATION: 92 % | DIASTOLIC BLOOD PRESSURE: 54 MMHG
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
## 2018-06-07 DIAGNOSIS — S70.01XA CONTUSION OF RIGHT HIP, INITIAL ENCOUNTER: ICD-10-CM

## 2018-06-07 PROCEDURE — 99285 EMERGENCY DEPT VISIT HI MDM: CPT

## 2018-06-07 PROCEDURE — 72170 X-RAY EXAM OF PELVIS: CPT

## 2018-06-07 PROCEDURE — 74011250637 HC RX REV CODE- 250/637: Performed by: NURSE PRACTITIONER

## 2018-06-07 PROCEDURE — 73700 CT LOWER EXTREMITY W/O DYE: CPT

## 2018-06-07 RX ORDER — OXYCODONE AND ACETAMINOPHEN 5; 325 MG/1; MG/1
2 TABLET ORAL
Status: COMPLETED | OUTPATIENT
Start: 2018-06-07 | End: 2018-06-07

## 2018-06-07 RX ORDER — OXYCODONE AND ACETAMINOPHEN 5; 325 MG/1; MG/1
1 TABLET ORAL
Status: DISCONTINUED | OUTPATIENT
Start: 2018-06-07 | End: 2018-06-07 | Stop reason: HOSPADM

## 2018-06-07 RX ADMIN — OXYCODONE HYDROCHLORIDE AND ACETAMINOPHEN 2 TABLET: 5; 325 TABLET ORAL at 00:59

## 2018-06-07 NOTE — ED NOTES
Assumed care of patient, report received from [facility], [address]. Patient resting comfortably on stretcher. Will continue to monitor.

## 2018-06-07 NOTE — ED NOTES
Pt arrived to the ED by EMS with co right hip and upper extremity pain after being accidentally dropped on a stretcher after DC from the hospital today. Pt states she took pain medication at 1800 tonight wo relief.

## 2018-06-07 NOTE — ED NOTES
Pt sleeping on stretcher in no apparent distress at this time. Door open, lights dimmed, TV on, and call bell within reach. Will continue to monitor awaiting transportation.

## 2018-06-07 NOTE — ED PROVIDER NOTES
HPI Comments: Pt with a hx of multiple medical problems and morbid obesity, she presents to ed for a complaint of right hip and right upper leg pain. Pt was discharged from this hospital a few hours ago and she states the transport team dropped her. Pt did not come to hospital right after fall and then decided she wanted to have it checked. Patient is a 60 y.o. female presenting with hip pain and leg pain. The history is provided by the patient. No  was used. Hip Injury    This is a new problem. The current episode started 12 to 24 hours ago. The problem occurs constantly. The problem has not changed since onset. The pain is present in the right hip and right upper leg. The quality of the pain is described as aching. The pain is at a severity of 6/10. The pain is moderate. Pertinent negatives include no numbness, no tingling, no back pain and no neck pain. She has tried nothing for the symptoms. There has been a history of trauma. Leg Pain    This is a new problem. The current episode started 12 to 24 hours ago. The problem occurs constantly. The problem has not changed since onset. The pain is present in the right upper leg and right hip. The quality of the pain is described as aching. The pain is at a severity of 6/10. The pain is moderate. Pertinent negatives include no numbness, no tingling, no back pain and no neck pain. The symptoms are aggravated by movement. She has tried nothing for the symptoms. There has been a history of trauma.         Past Medical History:   Diagnosis Date    Atrial fibrillation     CHADS score 1  (-CHF, +HTN, -AGE, -DM, -CVA)    Carpal tunnel syndrome     Chronic pain     Congenital heart disease     presumed pulmonary stenosis s/p repair 1969    Degenerative disc disease     Degenerative joint disease     Dyslipidemia     GERD     H/O echocardiogram 04/2017    EF 60-65%, mild concentric LVH, dilated RV with RV dysfunction, RVSP 54 mmHg, RA E, moderate to severe pulmonic regurgitation.  Hypertension     Hypothyroid     Morbid obesity     Morbid obesity with BMI of 45.0-49.9, adult ([facility] [address] 2/20/2017    Noncompliance     Schizoaffective disorder     Substance abuse     marijuana, crack cocaine       Past Surgical History:   Procedure Laterality Date    COLONOSCOPY N/A 5/6/2018    COLONOSCOPY with tatooing and biopsy performed by Clifford Marquez MD at [facility]    HX [address]      left    HX HYSTERECTOMY      HX KNEE REPLACEMENT      left    HX KNEE REPLACEMENT      right    HX THYROIDECTOMY           Family History:   Problem Relation Age of Onset    Hypertension Mother     Coronary Artery Disease Mother     Coronary Artery Disease Father     Hypertension Father        Social History     Social History    Marital status: SINGLE     Spouse name: N/A    Number of children: N/A    Years of education: N/A     Occupational History    Not on file. Social History Main Topics    Smoking status: Never Smoker    Smokeless tobacco: Never Used    Alcohol use No    Drug use: No    Sexual activity: Yes     Birth control/ protection: Surgical     Other Topics Concern    Not on file     Social History Narrative         ALLERGIES: Gabapentin; Tetanus vaccines and toxoid; and Topamax [topiramate]    Review of Systems   Constitutional: Negative for fever. Musculoskeletal: Positive for arthralgias and gait problem. Negative for back pain, joint swelling, neck pain and neck stiffness. Neurological: Negative for tingling and numbness. All other systems reviewed and are negative. Vitals:    06/07/18 0115 06/07/18 0215 06/07/18 0300 06/07/18 0330   BP: 105/54      Pulse: (!) 104 95 81 84   Resp: 23 15 13 13   Temp:       SpO2: 96% 97% 96% 94%            Physical Exam   Constitutional: She is oriented to person, place, and time. She appears well-developed and well-nourished.    Obese   HENT:   Head: Normocephalic and atraumatic. Eyes: Conjunctivae and EOM are normal. Pupils are equal, round, and reactive to light. Neck: Normal range of motion. Neck supple. Cardiovascular: Normal rate and regular rhythm. Pulmonary/Chest: Effort normal and breath sounds normal.   Abdominal: Soft. Bowel sounds are normal.   Musculoskeletal: Normal range of motion. She exhibits tenderness. Legs:  +tenderness no swelling, no bruising, no erythema. +minor shortening of right leg, normal distal circulation and pulse   Neurological: She is alert and oriented to person, place, and time. She has normal reflexes. Skin: Skin is warm and dry. Psychiatric: She has a normal mood and affect. Her behavior is normal. Judgment and thought content normal.   Nursing note and vitals reviewed. MDM  Number of Diagnoses or Management Options  Contusion of right hip, initial encounter: established and improving  Fall, initial encounter: established and improving  Diagnosis management comments: Ct negative for fracture, +advanced degenerative findings. Pt will be discharged to home to f/u with pcp.          Amount and/or Complexity of Data Reviewed  Tests in the radiology section of CPT®: ordered and reviewed  Review and summarize past medical records: yes  Independent visualization of images, tracings, or specimens: yes    Risk of Complications, Morbidity, and/or Mortality  Presenting problems: moderate  Diagnostic procedures: moderate  Management options: moderate    Patient Progress  Patient progress: stable        ED Course       Procedures          Vitals:  Patient Vitals for the past 12 hrs:   Temp Pulse Resp BP SpO2   06/07/18 0330 - 84 13 - 94 %   06/07/18 0300 - 81 13 - 96 %   06/07/18 0215 - 95 15 - 97 %   06/07/18 0115 - (!) 104 23 105/54 96 %   06/07/18 0030 98.6 °F (37 °C) 85 20 103/66 96 %       Medications ordered:   Medications   oxyCODONE-acetaminophen (PERCOCET) 5-325 mg per tablet 2 Tab (2 Tabs Oral Given 6/7/18 1399) Lab findings:  No results found for this or any previous visit (from the past 12 hour(s)). X-Ray, CT or other radiology findings or impressions:  CT HIP RT WO CONT   Final Result      XR PELV 1 OR 2 V    (Results Pending)     CT Lower extremity without contrast     Indications: Right hip CT; pain after fall from stretcher.     Technique: Contiguous 2.5 mm thickness axial images were obtained through the  right hip/pelvis. Sagittal and coronal MPR generated.     CT scans at this facility are performed using dose optimization technique as  appropriate with performed exam, to include automated exposure control,  adjustment of mA and/or kV according to patient's size (including appropriate  matching for site-specific examinations), or use of iterative reconstruction  technique.     Comparison: Preceding plain films; prior CT scans inclusive of right hip     Findings: There is no acute fracture of the right hip. However, severe end-stage  degenerative findings of the right hip with extensive chronic bony remodeling. Innumerable small caliber subchondral cysts. There is chronic deformity and  squat morphology of the right femoral head/neck with appearance as before. Broadening of the acetabular cup. Prominent shelflike spurring particularly at  the upper rim. There are multiple corticated ossific bodies at the inferomedial  recesses.     Included proximal right femoral shaft intact. Similarly, bony pelvis intact. Left hip with relatively mild arthritic findings. There is also mild  degenerative change of the bilateral SI joints. Advanced lower lumbar  degenerative disc disease and facet arthropathy.     No acute findings within the pelvic soft tissues. Some large bowel  diverticulosis. No gross change in some fullness of the left ovary/adnexal  region. Likely some scarring and vascular structures at the right posterior  pelvis. Appendix unremarkable.     IMPRESSION  IMPRESSION[de-identified]     1.   No acute fracture of the right hip. 2. End-stage degenerative changes of the right hip with extensive chronic bony  remodeling, appearance as before. 3. Other regions of lesser degenerative findings as delineated above.     Thank you for this referral.                Reevaluation of patient:   I have reassessed the patient. Patient is feeling better and is asking to go home    Disposition:    Diagnosis:   1. Fall, initial encounter    2. Contusion of right hip, initial encounter        Disposition: to Home      Follow-up Information     Follow up With Details Comments 99 Clover Finney MD In 2 days  [address]  [phone]             Patient's Medications   Start Taking    No medications on file   Continue Taking    ARIPIPRAZOLE (ABILIFY) 15 MG TABLET    Take 15 mg by mouth daily. to obtain from pharmacy    DOCUSATE SODIUM (COLACE) 100 MG CAPSULE    Take 1 Cap by mouth two (2) times a day for 90 days. HOLD for loose stool. FLUTICASONE (FLONASE) 50 MCG/ACTUATION NASAL SPRAY    2 Sprays by Both Nostrils route daily. FUROSEMIDE (LASIX) 40 MG TABLET    Take 1 Tab by mouth two (2) times a day. 40 mg po daily    ISOSORBIDE MONONITRATE ER (IMDUR) 30 MG TABLET    Take 1 Tab by mouth every evening. LEVOTHYROXINE (SYNTHROID) 125 MCG TABLET    Take 125 mcg by mouth Daily (before breakfast). LISINOPRIL (PRINIVIL, ZESTRIL) 10 MG TABLET    Take 1 Tab by mouth daily. LOPERAMIDE (IMODIUM) 2 MG CAPSULE    Take 2 mg by mouth three (3) times daily as needed for Diarrhea. METOPROLOL TARTRATE (LOPRESSOR) 25 MG TABLET    Take 0.5 Tabs by mouth two (2) times a day. MULTIVITAMIN, TX-IRON-CA-MIN (THERA-M W/ IRON) 9 MG IRON-400 MCG TAB TABLET    Take 1 Tab by mouth daily. OXYCODONE IR (ROXICODONE) 5 MG IMMEDIATE RELEASE TABLET    Take 5 mg by mouth every six (6) hours as needed for Pain. PAROXETINE (PAXIL) 20 MG TABLET    Take 1 Tab by mouth daily.     POLYETHYLENE GLYCOL (Marlane Blinks) [place]    Take 1 Packet by mouth daily. RANITIDINE (ZANTAC) 150 MG TABLET    Take 150 mg by mouth two (2) times a day. SIMVASTATIN (ZOCOR) 40 MG TABLET    Take  by mouth nightly. to obtain from pharmacy    VENTOLIN HFA 90 MCG/ACTUATION INHALER    Take 2 Puffs by inhalation every four (4) hours as needed for Wheezing or Shortness of Breath. WARFARIN (COUMADIN) 5 MG TABLET    Take 7.5 mg by mouth daily. These Medications have changed    No medications on file   Stop Taking    No medications on file       Return to the ER if you are unable to obtain referral as directed. Manuelito Ortiz's  results have been reviewed with her. She has been counseled regarding her diagnosis, treatment, and plan. She verbally conveys understanding and agreement of the signs, symptoms, diagnosis, treatment and prognosis and additionally agrees to follow up as discussed. She also agrees with the care-plan and conveys that all of her questions have been answered. I have also provided discharge instructions for her that include: educational information regarding their diagnosis and treatment, and list of reasons why they would want to return to the ED prior to their follow-up appointment, should her condition change.         Urmila SHENP-C,FNP-C

## 2018-06-07 NOTE — DISCHARGE INSTRUCTIONS

## 2018-06-12 ENCOUNTER — TELEPHONE (OUTPATIENT)
Dept: CARDIOLOGY CLINIC | Age: 58
End: 2018-06-12

## 2018-06-22 ENCOUNTER — HOSPITAL ENCOUNTER (EMERGENCY)
Age: 58
Discharge: HOME | End: 2018-06-23
Attending: EMERGENCY MEDICINE
Payer: MEDICAID

## 2018-06-22 ENCOUNTER — APPOINTMENT (OUTPATIENT)
Dept: GENERAL RADIOLOGY | Age: 58
End: 2018-06-22
Attending: EMERGENCY MEDICINE
Payer: MEDICAID

## 2018-06-22 DIAGNOSIS — I50.9 ACUTE ON CHRONIC CONGESTIVE HEART FAILURE, UNSPECIFIED HEART FAILURE TYPE (HCC): Primary | ICD-10-CM

## 2018-06-22 LAB
ALBUMIN SERPL-MCNC: 3.5 G/DL (ref 3.4–5)
ALBUMIN/GLOB SERPL: 0.7 {RATIO} (ref 0.8–1.7)
ALP SERPL-CCNC: 155 U/L (ref 45–117)
ALT SERPL-CCNC: 22 U/L (ref 13–56)
ANION GAP SERPL CALC-SCNC: 5 MMOL/L (ref 3–18)
AST SERPL-CCNC: 19 U/L (ref 15–37)
BASOPHILS # BLD: 0 K/UL (ref 0–0.1)
BASOPHILS NFR BLD: 0 % (ref 0–2)
BILIRUB SERPL-MCNC: 0.8 MG/DL (ref 0.2–1)
BNP SERPL-MCNC: 1789 PG/ML (ref 0–900)
BUN SERPL-MCNC: 20 MG/DL (ref 7–18)
BUN/CREAT SERPL: 18 (ref 12–20)
CALCIUM SERPL-MCNC: 8.5 MG/DL (ref 8.5–10.1)
CHLORIDE SERPL-SCNC: 105 MMOL/L (ref 100–108)
CK MB CFR SERPL CALC: 1.2 % (ref 0–4)
CK MB SERPL-MCNC: 2 NG/ML (ref 5–25)
CK SERPL-CCNC: 171 U/L (ref 26–192)
CO2 SERPL-SCNC: 32 MMOL/L (ref 21–32)
CREAT SERPL-MCNC: 1.13 MG/DL (ref 0.6–1.3)
DIFFERENTIAL METHOD BLD: ABNORMAL
EOSINOPHIL # BLD: 0.1 K/UL (ref 0–0.4)
EOSINOPHIL NFR BLD: 3 % (ref 0–5)
ERYTHROCYTE [DISTWIDTH] IN BLOOD BY AUTOMATED COUNT: 18.4 % (ref 11.6–14.5)
GLOBULIN SER CALC-MCNC: 4.7 G/DL (ref 2–4)
GLUCOSE SERPL-MCNC: 109 MG/DL (ref 74–99)
HCT VFR BLD AUTO: 32 % (ref 35–45)
HGB BLD-MCNC: 9.4 G/DL (ref 12–16)
LYMPHOCYTES # BLD: 0.9 K/UL (ref 0.9–3.6)
LYMPHOCYTES NFR BLD: 18 % (ref 21–52)
MCH RBC QN AUTO: 22.2 PG (ref 24–34)
MCHC RBC AUTO-ENTMCNC: 29.4 G/DL (ref 31–37)
MCV RBC AUTO: 75.7 FL (ref 74–97)
MONOCYTES # BLD: 0.3 K/UL (ref 0.05–1.2)
MONOCYTES NFR BLD: 6 % (ref 3–10)
NEUTS SEG # BLD: 3.8 K/UL (ref 1.8–8)
NEUTS SEG NFR BLD: 73 % (ref 40–73)
PLATELET # BLD AUTO: 208 K/UL (ref 135–420)
PMV BLD AUTO: 9.8 FL (ref 9.2–11.8)
POTASSIUM SERPL-SCNC: 3.9 MMOL/L (ref 3.5–5.5)
PROT SERPL-MCNC: 8.2 G/DL (ref 6.4–8.2)
RBC # BLD AUTO: 4.23 M/UL (ref 4.2–5.3)
SODIUM SERPL-SCNC: 142 MMOL/L (ref 136–145)
TROPONIN I SERPL-MCNC: <0.02 NG/ML (ref 0–0.04)
WBC # BLD AUTO: 5.1 K/UL (ref 4.6–13.2)

## 2018-06-22 PROCEDURE — 74011250636 HC RX REV CODE- 250/636: Performed by: EMERGENCY MEDICINE

## 2018-06-22 PROCEDURE — 99285 EMERGENCY DEPT VISIT HI MDM: CPT

## 2018-06-22 PROCEDURE — 96374 THER/PROPH/DIAG INJ IV PUSH: CPT

## 2018-06-22 PROCEDURE — 82550 ASSAY OF CK (CPK): CPT | Performed by: EMERGENCY MEDICINE

## 2018-06-22 PROCEDURE — 85025 COMPLETE CBC W/AUTO DIFF WBC: CPT | Performed by: EMERGENCY MEDICINE

## 2018-06-22 PROCEDURE — 83880 ASSAY OF NATRIURETIC PEPTIDE: CPT | Performed by: EMERGENCY MEDICINE

## 2018-06-22 PROCEDURE — 93005 ELECTROCARDIOGRAM TRACING: CPT

## 2018-06-22 PROCEDURE — 71045 X-RAY EXAM CHEST 1 VIEW: CPT

## 2018-06-22 PROCEDURE — 80053 COMPREHEN METABOLIC PANEL: CPT | Performed by: EMERGENCY MEDICINE

## 2018-06-22 RX ORDER — FUROSEMIDE 10 MG/ML
40 INJECTION INTRAMUSCULAR; INTRAVENOUS
Status: COMPLETED | OUTPATIENT
Start: 2018-06-22 | End: 2018-06-22

## 2018-06-22 RX ADMIN — FUROSEMIDE 40 MG: 10 INJECTION, SOLUTION INTRAMUSCULAR; INTRAVENOUS at 21:34

## 2018-06-22 NOTE — ED TRIAGE NOTES
Pt arrived via EMS from Home, medic reports pt c/o SOB for the last 2 days  pt wears 2liters O2 via nasal cannula, medic reports pt hast history CHF hypertension CABG AFIB. Medic reports rales to upper both upper lobes of lungs.

## 2018-06-23 VITALS
TEMPERATURE: 98.6 F | SYSTOLIC BLOOD PRESSURE: 136 MMHG | RESPIRATION RATE: 14 BRPM | DIASTOLIC BLOOD PRESSURE: 73 MMHG | BODY MASS INDEX: 53.92 KG/M2 | OXYGEN SATURATION: 98 % | WEIGHT: 293 LBS | HEART RATE: 85 BPM | HEIGHT: 62 IN

## 2018-06-23 LAB
ATRIAL RATE: 170 BPM
CALCULATED R AXIS, ECG10: -27 DEGREES
CALCULATED T AXIS, ECG11: -173 DEGREES
CK MB CFR SERPL CALC: 1.1 % (ref 0–4)
CK MB SERPL-MCNC: 1.6 NG/ML (ref 5–25)
CK SERPL-CCNC: 149 U/L (ref 26–192)
DIAGNOSIS, 93000: NORMAL
Q-T INTERVAL, ECG07: 464 MS
QRS DURATION, ECG06: 124 MS
QTC CALCULATION (BEZET), ECG08: 474 MS
TROPONIN I SERPL-MCNC: <0.02 NG/ML (ref 0–0.04)
VENTRICULAR RATE, ECG03: 63 BPM

## 2018-06-23 PROCEDURE — 74011250637 HC RX REV CODE- 250/637: Performed by: EMERGENCY MEDICINE

## 2018-06-23 PROCEDURE — 82550 ASSAY OF CK (CPK): CPT | Performed by: EMERGENCY MEDICINE

## 2018-06-23 RX ORDER — OXYCODONE HYDROCHLORIDE 5 MG/1
5 TABLET ORAL
Status: COMPLETED | OUTPATIENT
Start: 2018-06-23 | End: 2018-06-23

## 2018-06-23 RX ADMIN — OXYCODONE HYDROCHLORIDE 5 MG: 5 TABLET ORAL at 06:43

## 2018-06-23 NOTE — ED NOTES
Pt in ED on stretcher in gown on monitor with c/o SOB for the last 2 days, pt c/o bilateral leg swelling for the last 3 days, pt takes Lasix, Pt reports legs have increased in size.

## 2018-06-23 NOTE — ED PROVIDER NOTES
EMERGENCY DEPARTMENT HISTORY AND PHYSICAL EXAM    8:15 PM      Date: 6/22/2018  Patient Name: Carl Cruz    History of Presenting Illness     Chief Complaint   Patient presents with    Shortness of Breath         History Provided By: Patient    Chief Complaint: Shortness of breath  Duration:  Days  Timing:  Constant and Worsening  Severity: Moderate  Modifying Factors: No improvement despite Home O2. Associated Symptoms: bilateral foot swelling, sinus congestion, headache      Additional History (Context): Carl Cruz is a 60 y.o. female with a history of HTN, HLD, A-Fib, hypothyroidism s/p thyroidectomy, and schizoaffective disorder, who presents to the ED via EMS with complaint of constant shortness of breath which began two days ago. Patient reports associated bilateral foot swelling, sinus congestion, and headache localized to the top and frontal regions. Patient denies associated chest pain and cough. Patient denies change in shortness of breath despite Home O2 use. She makes no further complaints. PCP: Traci Pennington MD    Current Outpatient Prescriptions   Medication Sig Dispense Refill    furosemide (LASIX) 40 mg tablet Take 1 Tab by mouth two (2) times a day. 40 mg po daily 60 Tab 2    lisinopril (PRINIVIL, ZESTRIL) 10 mg tablet Take 1 Tab by mouth daily. 30 Tab 2    docusate sodium (COLACE) 100 mg capsule Take 1 Cap by mouth two (2) times a day for 90 days. HOLD for loose stool. 60 Cap 2    metoprolol tartrate (LOPRESSOR) 25 mg tablet Take 0.5 Tabs by mouth two (2) times a day. 30 Tab 6    fluticasone (FLONASE) 50 mcg/actuation nasal spray 2 Sprays by Both Nostrils route daily. 1 Bottle 2    oxyCODONE IR (ROXICODONE) 5 mg immediate release tablet Take 5 mg by mouth every six (6) hours as needed for Pain.  isosorbide mononitrate ER (IMDUR) 30 mg tablet Take 1 Tab by mouth every evening.  90 Tab 3    multivitamin, tx-iron-ca-min (THERA-M W/ IRON) 9 mg iron-400 mcg tab tablet Take 1 Tab by mouth daily. 30 Tab 0    loperamide (IMODIUM) 2 mg capsule Take 2 mg by mouth three (3) times daily as needed for Diarrhea.  raNITIdine (ZANTAC) 150 mg tablet Take 150 mg by mouth two (2) times a day.  warfarin (COUMADIN) 5 mg tablet Take 7.5 mg by mouth daily.  VENTOLIN HFA 90 mcg/actuation inhaler Take 2 Puffs by inhalation every four (4) hours as needed for Wheezing or Shortness of Breath. 1 Inhaler 0    PARoxetine (PAXIL) 20 mg tablet Take 1 Tab by mouth daily. 30 Tab 3    simvastatin (ZOCOR) 40 mg tablet Take  by mouth nightly. to obtain from pharmacy      aripiprazole (ABILIFY) 15 mg tablet Take 15 mg by mouth daily. to obtain from pharmacy      levothyroxine (SYNTHROID) 125 mcg tablet Take 125 mcg by mouth Daily (before breakfast).  polyethylene glycol (MIRALAX) 17 gram packet Take 1 Packet by mouth daily. 27 Packet 2       Past History     Past Medical History:  Past Medical History:   Diagnosis Date    Atrial fibrillation     CHADS score 1  (-CHF, +HTN, -AGE, -DM, -CVA)    Carpal tunnel syndrome     Chronic pain     Congenital heart disease     presumed pulmonary stenosis s/p repair 1969    Degenerative disc disease     Degenerative joint disease     Dyslipidemia     GERD     H/O echocardiogram 04/2017    EF 60-65%, mild concentric LVH, dilated RV with RV dysfunction, RVSP 54 mmHg, RA E, moderate to severe pulmonic regurgitation.    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      left    HX HYSTERECTOMY      HX KNEE REPLACEMENT      left    HX KNEE REPLACEMENT      right    HX THYROIDECTOMY         Family History:  Family History   Problem Relation Age of Onset    Hypertension Mother     Coronary Artery Disease Mother     Coronary Artery Disease Father     Hypertension Father        Social History:  Social History   Substance Use Topics    Smoking status: Never Smoker    Smokeless tobacco: Never Used    Alcohol use No       Allergies: Allergies   Allergen Reactions    Gabapentin Other (comments)     Unsteady, weakness LEs    Tetanus Vaccines And Toxoid Swelling    Topamax [Topiramate] Other (comments)     weakness         Review of Systems       Review of Systems   Constitutional: Negative for chills and fever. HENT: Positive for congestion (sinus). Respiratory: Positive for shortness of breath. Negative for cough. Cardiovascular: Positive for leg swelling (bilateral feet). Negative for chest pain. Gastrointestinal: Negative for diarrhea, nausea and vomiting. Neurological: Positive for headaches (pressure in frontal, top). All other systems reviewed and are negative. Physical Exam     Visit Vitals    /80    Pulse 70    Temp 98.6 °F (37 °C)    Resp 18    Ht 5' 2\" (1.575 m)    Wt 147.4 kg (325 lb)    SpO2 99%    BMI 59.44 kg/m2         Physical Exam   Constitutional: She is oriented to person, place, and time. She appears well-developed and well-nourished. No distress. +Obese   HENT:   Head: Normocephalic and atraumatic. Eyes: Conjunctivae and EOM are normal. Right eye exhibits no discharge. Left eye exhibits no discharge. No scleral icterus. Neck: Normal range of motion. Neck supple. No tracheal deviation present. Cardiovascular: Normal rate, regular rhythm and normal heart sounds. No murmur heard. 3+ BLE pitting edema, L > R.   Pulmonary/Chest: Effort normal and breath sounds normal. Tachypnea noted. No respiratory distress.  She has no wheezes. She has no rales. Distant lung sounds. Abdominal: Soft. She exhibits no distension. There is no tenderness. There is no rebound and no guarding. Musculoskeletal: Normal range of motion. She exhibits no edema or deformity. Old surgical scar to the left knee. Neurological: She is alert and oriented to person, place, and time. No cranial nerve deficit. Skin: Skin is warm and dry. She is not diaphoretic. Psychiatric: She has a normal mood and affect. Her behavior is normal. Judgment and thought content normal.         Diagnostic Study Results     Labs -  Recent Results (from the past 12 hour(s))   EKG, 12 LEAD, INITIAL    Collection Time: 06/22/18  8:05 PM   Result Value Ref Range    Ventricular Rate 63 BPM    Atrial Rate 170 BPM    QRS Duration 124 ms    Q-T Interval 464 ms    QTC Calculation (Bezet) 474 ms    Calculated R Axis -27 degrees    Calculated T Axis -173 degrees    Diagnosis       Atrial fibrillation  Nonspecific intraventricular conduction delay  ST & T wave abnormality, consider anterolateral ischemia or digitalis effect  Abnormal ECG  When compared with ECG of 02-JUN-2018 20:13,  Borderline criteria for Anterior infarct are no longer present     CBC WITH AUTOMATED DIFF    Collection Time: 06/22/18  8:49 PM   Result Value Ref Range    WBC 5.1 4.6 - 13.2 K/uL    RBC 4.23 4.20 - 5.30 M/uL    HGB 9.4 (L) 12.0 - 16.0 g/dL    HCT 32.0 (L) 35.0 - 45.0 %    MCV 75.7 74.0 - 97.0 FL    MCH 22.2 (L) 24.0 - 34.0 PG    MCHC 29.4 (L) 31.0 - 37.0 g/dL    RDW 18.4 (H) 11.6 - 14.5 %    PLATELET 940 262 - 808 K/uL    MPV 9.8 9.2 - 11.8 FL    NEUTROPHILS 73 40 - 73 %    LYMPHOCYTES 18 (L) 21 - 52 %    MONOCYTES 6 3 - 10 %    EOSINOPHILS 3 0 - 5 %    BASOPHILS 0 0 - 2 %    ABS. NEUTROPHILS 3.8 1.8 - 8.0 K/UL    ABS. LYMPHOCYTES 0.9 0.9 - 3.6 K/UL    ABS. MONOCYTES 0.3 0.05 - 1.2 K/UL    ABS. EOSINOPHILS 0.1 0.0 - 0.4 K/UL    ABS.  BASOPHILS 0.0 0.0 - 0.1 K/UL    DF AUTOMATED     METABOLIC PANEL, COMPREHENSIVE    Collection Time: 06/22/18  8:49 PM   Result Value Ref Range    Sodium 142 136 - 145 mmol/L    Potassium 3.9 3.5 - 5.5 mmol/L    Chloride 105 100 - 108 mmol/L    CO2 32 21 - 32 mmol/L    Anion gap 5 3.0 - 18 mmol/L    Glucose 109 (H) 74 - 99 mg/dL    BUN 20 (H) 7.0 - 18 MG/DL    Creatinine 1.13 0.6 - 1.3 MG/DL    BUN/Creatinine ratio 18 12 - 20      GFR est AA 60 (L) >60 ml/min/1.73m2    GFR est non-AA 49 (L) >60 ml/min/1.73m2    Calcium 8.5 8.5 - 10.1 MG/DL    Bilirubin, total 0.8 0.2 - 1.0 MG/DL    ALT (SGPT) 22 13 - 56 U/L    AST (SGOT) 19 15 - 37 U/L    Alk. phosphatase 155 (H) 45 - 117 U/L    Protein, total 8.2 6.4 - 8.2 g/dL    Albumin 3.5 3.4 - 5.0 g/dL    Globulin 4.7 (H) 2.0 - 4.0 g/dL    A-G Ratio 0.7 (L) 0.8 - 1.7     NT-PRO BNP    Collection Time: 06/22/18  8:49 PM   Result Value Ref Range    NT pro-BNP 1789 (H) 0 - 900 PG/ML   CARDIAC PANEL,(CK, CKMB & TROPONIN)    Collection Time: 06/22/18  8:49 PM   Result Value Ref Range     26 - 192 U/L    CK - MB 2.0 <3.6 ng/ml    CK-MB Index 1.2 0.0 - 4.0 %    Troponin-I, Qt. <0.02 0.0 - 0.045 NG/ML   CARDIAC PANEL,(CK, CKMB & TROPONIN)    Collection Time: 06/23/18  1:02 AM   Result Value Ref Range     26 - 192 U/L    CK - MB 1.6 <3.6 ng/ml    CK-MB Index 1.1 0.0 - 4.0 %    Troponin-I, Qt. <0.02 0.0 - 0.045 NG/ML       Radiologic Studies -   XR CHEST PORT    (Results Pending)       Medical Decision Making   I am the first provider for this patient. I reviewed the vital signs, available nursing notes, past medical history, past surgical history, family history and social history. Vital Signs-Reviewed the patient's vital signs. EKG: Interpreted by the EP. Time Interpreted: 8:06 PM   Rate: 63 bpm   Rhythm: Atrial Fibrillation   Interpretation: Non-specific intraventricular conduction delay. Wavy baseline. Non-specific T-wave abnormality.     Records Reviewed: Nursing Notes and Old Medical Records (Time of Review: 8:15 PM)    Provider Notes (Medical Decision Making):   Patient with SOB likely due to mild CHF exacerbation. Given IV Lasix and put out over a liter of fluid. Two sets of enzymes negative. Will discharge home with Cardiology follow-up. Diagnosis     Clinical Impression:   1. Acute on chronic congestive heart failure, unspecified heart failure type [facility])        Disposition: Discharge    Follow-up Information     Follow up With Details Comments Contact Info    Jing Roldan MD Schedule an appointment as soon as possible for a visit  [facility]  [address]  [phone]       [facility] EMERGENCY DEPT  As needed, If symptoms worsen [address] [facility]  [phone]           Patient's Medications   Start Taking    No medications on file   Continue Taking    ARIPIPRAZOLE (ABILIFY) 15 MG TABLET    Take 15 mg by mouth daily. to obtain from pharmacy    DOCUSATE SODIUM (COLACE) 100 MG CAPSULE    Take 1 Cap by mouth two (2) times a day for 90 days. HOLD for loose stool. FLUTICASONE (FLONASE) 50 MCG/ACTUATION NASAL SPRAY    2 Sprays by Both Nostrils route daily. FUROSEMIDE (LASIX) 40 MG TABLET    Take 1 Tab by mouth two (2) times a day. 40 mg po daily    ISOSORBIDE MONONITRATE ER (IMDUR) 30 MG TABLET    Take 1 Tab by mouth every evening. LEVOTHYROXINE (SYNTHROID) 125 MCG TABLET    Take 125 mcg by mouth Daily (before breakfast). LISINOPRIL (PRINIVIL, ZESTRIL) 10 MG TABLET    Take 1 Tab by mouth daily. LOPERAMIDE (IMODIUM) 2 MG CAPSULE    Take 2 mg by mouth three (3) times daily as needed for Diarrhea. METOPROLOL TARTRATE (LOPRESSOR) 25 MG TABLET    Take 0.5 Tabs by mouth two (2) times a day. MULTIVITAMIN, TX-IRON-CA-MIN (THERA-M W/ IRON) 9 MG IRON-400 MCG TAB TABLET    Take 1 Tab by mouth daily. OXYCODONE IR (ROXICODONE) 5 MG IMMEDIATE RELEASE TABLET    Take 5 mg by mouth every six (6) hours as needed for Pain.     PAROXETINE (PAXIL) 20 MG TABLET    Take 1 Tab by mouth daily. POLYETHYLENE GLYCOL (MIRALAX) 17 GRAM PACKET    Take 1 Packet by mouth daily. RANITIDINE (ZANTAC) 150 MG TABLET    Take 150 mg by mouth two (2) times a day. SIMVASTATIN (ZOCOR) 40 MG TABLET    Take  by mouth nightly. to obtain from pharmacy    VENTOLIN HFA 90 MCG/ACTUATION INHALER    Take 2 Puffs by inhalation every four (4) hours as needed for Wheezing or Shortness of Breath. WARFARIN (COUMADIN) 5 MG TABLET    Take 7.5 mg by mouth daily. These Medications have changed    No medications on file   Stop Taking    No medications on file     _______________________________    Scribe Attestation:     Sara Chris, acting as a scribe for and in the presence of Valorie Orr MD      June 22, 2018 at 8:15 PM       Provider Attestation:      I personally performed the services described in the documentation, reviewed the documentation, as recorded by the scribe in my presence, and it accurately and completely records my words and actions.  June 22, 2018 at 8:15 PM - Valorie Orr MD      _______________________________

## 2018-06-23 NOTE — DISCHARGE INSTRUCTIONS
Learning About Saving Energy When You Have a Chronic Condition  Introduction    Everyday tasks can be tiring when you have COPD, heart failure, or another long-term (chronic) condition. You may feel at times that you've lost your ability to live your life. But learning to conserve, or save, your energy can help you be less tired. Conserving your energy means finding ways of doing daily activities with as little effort as possible. With some small changes in the way you do things, you can get your tasks done more easily. Some treatments are available that might help. Pulmonary rehabilitation can teach you ways to breathe easier. Cardiac rehabilitation can help make your heart stronger. You also may want to see an occupational or physical therapist. The therapist can give you more tips on building strength and moving with less effort. What can you do to conserve your energy? Planning  · Make a list of what you have to do every day. Group the tasks by location. · Do all the chores in one part of your house around the same time. · Go out for errands or do chores at the time of day when you have the most energy. · Plan rest periods into your day. Getting things done  · Sit down as often as you can when you get dressed, do chores, or cook. · Use a cart with wheels to roll items, such as laundry, from one room to another. · Push or slide boxes or other large items instead of lifting them. Reaching and bending  · Put things you use the most on shelves that are at the level of your waist or shoulder. · Use long-handled grabbers or other tools to reach items on a high shelf or to  things off the floor. Use long-handled dusters when you clean the house. · Use a raised toilet seat to avoid bending too far to sit or stand up. Eating  · Eat several small meals instead of three larger meals. · If you get too tired to eat much, try to choose healthy foods that have more calories.  Have a yogurt-and-fruit smoothie for breakfast. Put avocado on a sandwich. Or add cheese or peanut butter to snacks. · If you don't feel very hungry, try to eat first and drink water or other fluids later, after a meal. This can help keep you from losing weight. Sip small amounts of fluids if you need to drink while you eat. Having sex  · Choose the time of day when you have more energy. · A [id number] position for sex can be less tiring. Sometimes you may want to focus more on caressing. Watch closely for changes in your health, and be sure to contact your doctor if you have any problems. Where can you learn more? Go to http://bucky-gerard.info/. Enter H190 in the search box to learn more about \"Learning About Saving Energy When You Have a Chronic Condition. \"  Current as of: May 12, 2017  Content Version: 11.4  © 0714-8653 [employer]. Care instructions adapted under license by [employer] (which disclaims liability or warranty for this information). If you have questions about a medical condition or this instruction, always ask your healthcare professional. [place] [address] any warranty or liability for your use of this information.

## 2018-06-23 NOTE — ED NOTES
Assumed care of patient. Pt sleeping on stretcher. Cardiac monitoring, continous pulse ox, and blood pressure monitoring in place. No acute distress noted.

## 2018-07-09 ENCOUNTER — APPOINTMENT (OUTPATIENT)
Dept: GENERAL RADIOLOGY | Age: 58
End: 2018-07-09
Attending: PHYSICIAN ASSISTANT
Payer: MEDICAID

## 2018-07-09 ENCOUNTER — HOSPITAL ENCOUNTER (EMERGENCY)
Age: 58
Discharge: HOME | End: 2018-07-09
Attending: EMERGENCY MEDICINE
Payer: MEDICAID

## 2018-07-09 VITALS
DIASTOLIC BLOOD PRESSURE: 113 MMHG | OXYGEN SATURATION: 98 % | HEART RATE: 65 BPM | RESPIRATION RATE: 16 BRPM | TEMPERATURE: 98.5 F | SYSTOLIC BLOOD PRESSURE: 186 MMHG

## 2018-07-09 DIAGNOSIS — I50.9 ACUTE ON CHRONIC CONGESTIVE HEART FAILURE, UNSPECIFIED HEART FAILURE TYPE (HCC): Primary | ICD-10-CM

## 2018-07-09 LAB
ANION GAP SERPL CALC-SCNC: 5 MMOL/L (ref 3–18)
ATRIAL RATE: 45 BPM
BASOPHILS # BLD: 0 K/UL (ref 0–0.1)
BASOPHILS NFR BLD: 0 % (ref 0–2)
BNP SERPL-MCNC: 3080 PG/ML (ref 0–900)
BUN SERPL-MCNC: 19 MG/DL (ref 7–18)
BUN/CREAT SERPL: 21 (ref 12–20)
CALCIUM SERPL-MCNC: 8.6 MG/DL (ref 8.5–10.1)
CALCULATED R AXIS, ECG10: -34 DEGREES
CALCULATED T AXIS, ECG11: 132 DEGREES
CHLORIDE SERPL-SCNC: 103 MMOL/L (ref 100–108)
CK MB CFR SERPL CALC: 1.3 % (ref 0–4)
CK MB SERPL-MCNC: 2.7 NG/ML (ref 5–25)
CK SERPL-CCNC: 213 U/L (ref 26–192)
CO2 SERPL-SCNC: 32 MMOL/L (ref 21–32)
CREAT SERPL-MCNC: 0.92 MG/DL (ref 0.6–1.3)
DIAGNOSIS, 93000: NORMAL
DIFFERENTIAL METHOD BLD: ABNORMAL
EOSINOPHIL # BLD: 0.2 K/UL (ref 0–0.4)
EOSINOPHIL NFR BLD: 3 % (ref 0–5)
ERYTHROCYTE [DISTWIDTH] IN BLOOD BY AUTOMATED COUNT: 18.7 % (ref 11.6–14.5)
GLUCOSE SERPL-MCNC: 76 MG/DL (ref 74–99)
HCT VFR BLD AUTO: 32.6 % (ref 35–45)
HGB BLD-MCNC: 9.7 G/DL (ref 12–16)
INR PPP: 3.2 (ref 0.8–1.2)
LYMPHOCYTES # BLD: 0.9 K/UL (ref 0.9–3.6)
LYMPHOCYTES NFR BLD: 19 % (ref 21–52)
MCH RBC QN AUTO: 22.7 PG (ref 24–34)
MCHC RBC AUTO-ENTMCNC: 29.8 G/DL (ref 31–37)
MCV RBC AUTO: 76.2 FL (ref 74–97)
MONOCYTES # BLD: 0.4 K/UL (ref 0.05–1.2)
MONOCYTES NFR BLD: 7 % (ref 3–10)
NEUTS SEG # BLD: 3.4 K/UL (ref 1.8–8)
NEUTS SEG NFR BLD: 71 % (ref 40–73)
PLATELET # BLD AUTO: 203 K/UL (ref 135–420)
PMV BLD AUTO: 9.8 FL (ref 9.2–11.8)
POTASSIUM SERPL-SCNC: 3.8 MMOL/L (ref 3.5–5.5)
PROTHROMBIN TIME: 31.8 SEC (ref 11.5–15.2)
Q-T INTERVAL, ECG07: 474 MS
QRS DURATION, ECG06: 120 MS
QTC CALCULATION (BEZET), ECG08: 453 MS
RBC # BLD AUTO: 4.28 M/UL (ref 4.2–5.3)
SODIUM SERPL-SCNC: 140 MMOL/L (ref 136–145)
TROPONIN I SERPL-MCNC: <0.02 NG/ML (ref 0–0.04)
VENTRICULAR RATE, ECG03: 55 BPM
WBC # BLD AUTO: 4.9 K/UL (ref 4.6–13.2)

## 2018-07-09 PROCEDURE — 99285 EMERGENCY DEPT VISIT HI MDM: CPT

## 2018-07-09 PROCEDURE — 82550 ASSAY OF CK (CPK): CPT | Performed by: PHYSICIAN ASSISTANT

## 2018-07-09 PROCEDURE — 71045 X-RAY EXAM CHEST 1 VIEW: CPT

## 2018-07-09 PROCEDURE — 74011250636 HC RX REV CODE- 250/636: Performed by: EMERGENCY MEDICINE

## 2018-07-09 PROCEDURE — 85610 PROTHROMBIN TIME: CPT | Performed by: PHYSICIAN ASSISTANT

## 2018-07-09 PROCEDURE — 93005 ELECTROCARDIOGRAM TRACING: CPT

## 2018-07-09 PROCEDURE — 85025 COMPLETE CBC W/AUTO DIFF WBC: CPT | Performed by: PHYSICIAN ASSISTANT

## 2018-07-09 PROCEDURE — 74011250637 HC RX REV CODE- 250/637: Performed by: EMERGENCY MEDICINE

## 2018-07-09 PROCEDURE — 96374 THER/PROPH/DIAG INJ IV PUSH: CPT

## 2018-07-09 PROCEDURE — 80048 BASIC METABOLIC PNL TOTAL CA: CPT | Performed by: PHYSICIAN ASSISTANT

## 2018-07-09 PROCEDURE — 83880 ASSAY OF NATRIURETIC PEPTIDE: CPT | Performed by: PHYSICIAN ASSISTANT

## 2018-07-09 RX ORDER — OXYCODONE AND ACETAMINOPHEN 5; 325 MG/1; MG/1
1 TABLET ORAL
Status: COMPLETED | OUTPATIENT
Start: 2018-07-09 | End: 2018-07-09

## 2018-07-09 RX ORDER — FUROSEMIDE 10 MG/ML
40 INJECTION INTRAMUSCULAR; INTRAVENOUS
Status: COMPLETED | OUTPATIENT
Start: 2018-07-09 | End: 2018-07-09

## 2018-07-09 RX ADMIN — FUROSEMIDE 40 MG: 10 INJECTION INTRAVENOUS at 19:16

## 2018-07-09 RX ADMIN — OXYCODONE HYDROCHLORIDE AND ACETAMINOPHEN 1 TABLET: 5; 325 TABLET ORAL at 20:40

## 2018-07-09 NOTE — ED PROVIDER NOTES
EMERGENCY DEPARTMENT HISTORY AND PHYSICAL EXAM    6:10 PM      Date: 7/9/2018  Patient Name: Clint Francis    History of Presenting Illness     Chief Complaint   Patient presents with    Shortness of Breath         History Provided By: Patient    Chief Complaint: SOB  Duration:  Today  Timing:  constant  Location: N/a  Quality: \"catching her breath\"  Severity: 0/10  Modifying Factors: Laying down or receiving O2 alleviates SOB  Associated Symptoms: cough and unexpected weight gain      Additional History (Context): Clint Francis is a 60 y.o. female with morbid obesity, HTN, A-Fib, GERD, and DJD who presents with constant SOB that started today. The pt was having trouble \"catching her breath. \" She uses home O2 at a rate of 2L/min. Her diuretic medication was changed a few weeks ago and her weight has gone up within the past few days. The pt's SOB is alleviated by laying down or increasing O2 flow. CP and abdominal pain were denied by the pt but she does have a cough. The pt takes Wafarin daily. No other concerns, modifying factors, or symptoms were expressed by the pt at this time. PCP: Cheryle Alar, MD    Current Facility-Administered Medications   Medication Dose Route Frequency Provider Last Rate Last Dose    oxyCODONE-acetaminophen (PERCOCET) 5-325 mg per tablet 1 Tab  1 Tab Oral NOW Hailey Willis, DO         Current Outpatient Prescriptions   Medication Sig Dispense Refill    furosemide (LASIX) 40 mg tablet Take 1 Tab by mouth two (2) times a day. 40 mg po daily 60 Tab 2    lisinopril (PRINIVIL, ZESTRIL) 10 mg tablet Take 1 Tab by mouth daily. 30 Tab 2    polyethylene glycol (MIRALAX) 17 gram packet Take 1 Packet by mouth daily. 30 Packet 2    docusate sodium (COLACE) 100 mg capsule Take 1 Cap by mouth two (2) times a day for 90 days. HOLD for loose stool. 60 Cap 2    metoprolol tartrate (LOPRESSOR) 25 mg tablet Take 0.5 Tabs by mouth two (2) times a day.  30 Tab 6    fluticasone (FLONASE) 50 mcg/actuation nasal spray 2 Sprays by Both Nostrils route daily. 1 Bottle 2    oxyCODONE IR (ROXICODONE) 5 mg immediate release tablet Take 5 mg by mouth every six (6) hours as needed for Pain.  isosorbide mononitrate ER (IMDUR) 30 mg tablet Take 1 Tab by mouth every evening. 90 Tab 3    multivitamin, tx-iron-ca-min (THERA-M W/ IRON) 9 mg iron-400 mcg tab tablet Take 1 Tab by mouth daily. 30 Tab 0    loperamide (IMODIUM) 2 mg capsule Take 2 mg by mouth three (3) times daily as needed for Diarrhea.  raNITIdine (ZANTAC) 150 mg tablet Take 150 mg by mouth two (2) times a day.  warfarin (COUMADIN) 5 mg tablet Take 7.5 mg by mouth daily.  VENTOLIN HFA 90 mcg/actuation inhaler Take 2 Puffs by inhalation every four (4) hours as needed for Wheezing or Shortness of Breath. 1 Inhaler 0    PARoxetine (PAXIL) 20 mg tablet Take 1 Tab by mouth daily. 30 Tab 3    simvastatin (ZOCOR) 40 mg tablet Take  by mouth nightly. to obtain from pharmacy      aripiprazole (ABILIFY) 15 mg tablet Take 15 mg by mouth daily. to obtain from pharmacy      levothyroxine (SYNTHROID) 125 mcg tablet Take 125 mcg by mouth Daily (before breakfast). Past History     Past Medical History:  Past Medical History:   Diagnosis Date    Atrial fibrillation     CHADS score 1  (-CHF, +HTN, -AGE, -DM, -CVA)    Carpal tunnel syndrome     Chronic pain     Congenital heart disease     presumed pulmonary stenosis s/p repair 1969    Degenerative disc disease     Degenerative joint disease     Dyslipidemia     GERD     H/O echocardiogram 04/2017    EF 60-65%, mild concentric LVH, dilated RV with RV dysfunction, RVSP 54 mmHg, RA E, moderate to severe pulmonic regurgitation.    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    COLONOSCOPY N/A 5/6/2018    COLONOSCOPY with tatooing and biopsy performed by Israel Montoya MD at [facility] 9      left    HX HYSTERECTOMY      HX KNEE REPLACEMENT      left    HX KNEE REPLACEMENT      right    HX THYROIDECTOMY         Family History:  Family History   Problem Relation Age of Onset    Hypertension Mother     Coronary Artery Disease Mother     Coronary Artery Disease Father     Hypertension Father        Social History:  Social History   Substance Use Topics    Smoking status: Never Smoker    Smokeless tobacco: Never Used    Alcohol use No       Allergies: Allergies   Allergen Reactions    Gabapentin Other (comments)     Unsteady, weakness LEs    Tetanus Vaccines And Toxoid Swelling    Topamax [Topiramate] Other (comments)     weakness         Review of Systems     Review of Systems   Constitutional: Positive for unexpected weight change. Negative for diaphoresis and fever (gain). Respiratory: Positive for cough and shortness of breath. Cardiovascular: Negative for chest pain. Gastrointestinal: Negative for abdominal pain, nausea and vomiting. Skin: Negative for rash. Neurological: Negative for weakness. All other systems reviewed and are negative. Physical Exam     Visit Vitals    /71    Pulse (!) 57    Temp 98.5 °F (36.9 °C)    Resp 18    SpO2 99%       Physical Exam   Constitutional: She is oriented to person, place, and time. She appears well-developed and well-nourished. HENT:   Head: Normocephalic and atraumatic. Neck: Neck supple. No JVD present. Cardiovascular: An irregularly irregular rhythm present. Bradycardia present. Pulmonary/Chest: Effort normal. No respiratory distress. She has wheezes (trace expiratory). Abdominal: Soft. She exhibits no distension.  There is no tenderness. There is no rebound and no guarding. Musculoskeletal:   3+ lower extremity edema. Neurological: She is alert and oriented to person, place, and time. Skin: Skin is warm and dry. No erythema. Psychiatric: Judgment normal.         Diagnostic Study Results     Labs -  Recent Results (from the past 12 hour(s))   EKG, 12 LEAD, INITIAL    Collection Time: 07/09/18  6:04 PM   Result Value Ref Range    Ventricular Rate 55 BPM    Atrial Rate 45 BPM    QRS Duration 120 ms    Q-T Interval 474 ms    QTC Calculation (Bezet) 453 ms    Calculated R Axis -34 degrees    Calculated T Axis 132 degrees    Diagnosis       Atrial fibrillation with slow ventricular response with premature ventricular   or aberrantly conducted complexes  Left axis deviation  Nonspecific intraventricular conduction delay  ST & T wave abnormality, consider anterior ischemia  Abnormal ECG  When compared with ECG of 22-JUN-2018 20:05,  No significant change was found    Confirmed by Clara Gage ([id number]) on 7/9/2018 6:36:46 PM     CBC WITH AUTOMATED DIFF    Collection Time: 07/09/18  6:38 PM   Result Value Ref Range    WBC 4.9 4.6 - 13.2 K/uL    RBC 4.28 4.20 - 5.30 M/uL    HGB 9.7 (L) 12.0 - 16.0 g/dL    HCT 32.6 (L) 35.0 - 45.0 %    MCV 76.2 74.0 - 97.0 FL    MCH 22.7 (L) 24.0 - 34.0 PG    MCHC 29.8 (L) 31.0 - 37.0 g/dL    RDW 18.7 (H) 11.6 - 14.5 %    PLATELET 606 637 - 953 K/uL    MPV 9.8 9.2 - 11.8 FL    NEUTROPHILS 71 40 - 73 %    LYMPHOCYTES 19 (L) 21 - 52 %    MONOCYTES 7 3 - 10 %    EOSINOPHILS 3 0 - 5 %    BASOPHILS 0 0 - 2 %    ABS. NEUTROPHILS 3.4 1.8 - 8.0 K/UL    ABS. LYMPHOCYTES 0.9 0.9 - 3.6 K/UL    ABS. MONOCYTES 0.4 0.05 - 1.2 K/UL    ABS. EOSINOPHILS 0.2 0.0 - 0.4 K/UL    ABS.  BASOPHILS 0.0 0.0 - 0.1 K/UL    DF AUTOMATED     METABOLIC PANEL, BASIC    Collection Time: 07/09/18  6:38 PM   Result Value Ref Range    Sodium 140 136 - 145 mmol/L    Potassium 3.8 3.5 - 5.5 mmol/L    Chloride 103 100 - 108 mmol/L    CO2 32 21 - 32 mmol/L    Anion gap 5 3.0 - 18 mmol/L    Glucose 76 74 - 99 mg/dL    BUN 19 (H) 7.0 - 18 MG/DL    Creatinine 0.92 0.6 - 1.3 MG/DL    BUN/Creatinine ratio 21 (H) 12 - 20      GFR est AA >60 >60 ml/min/1.73m2    GFR est non-AA >60 >60 ml/min/1.73m2    Calcium 8.6 8.5 - 10.1 MG/DL   CARDIAC PANEL,(CK, CKMB & TROPONIN)    Collection Time: 07/09/18  6:38 PM   Result Value Ref Range     (H) 26 - 192 U/L    CK - MB 2.7 <3.6 ng/ml    CK-MB Index 1.3 0.0 - 4.0 %    Troponin-I, Qt. <0.02 0.0 - 0.045 NG/ML   NT-PRO BNP    Collection Time: 07/09/18  6:38 PM   Result Value Ref Range    NT pro-BNP 3080 (H) 0 - 900 PG/ML   PROTHROMBIN TIME + INR    Collection Time: 07/09/18  6:38 PM   Result Value Ref Range    Prothrombin time 31.8 (H) 11.5 - 15.2 sec    INR 3.2 (H) 0.8 - 1.2         Radiologic Studies -   XR CHEST PORT   Final Result        Cardiomegaly, no acute process    Medical Decision Making   I am the first provider for this patient. I reviewed the vital signs, available nursing notes, past medical history, past surgical history, family history and social history. Vital Signs-Reviewed the patient's vital signs. Pulse Oximetry Analysis -  Nonhypoxic on NC     EKG: Interpreted by the EP. Time Interpreted: 3456   Rate: 55   Rhythm: Atrial Fibrillation    Interpretation: left axis, afib, PVC, no St changes, t wave inversion V3-v4   Comparison: 6/22/18, unchanged    Records Reviewed: Nursing Notes and Old Medical Records (Time of Review: 6:10 PM)    ED Course: Progress Notes, Reevaluation, and Consults:    Provider Notes (Medical Decision Making): 59 y/o female presents with sob. Hx of chf, suspect overload as pt reports weight gain. No change in O2 requirement. Doubt PE as pt on coumadin. eval for pna, acs,       Pt with volume overload, no increased O2 requirement. Given IV lasix, stable for dc home. Advised to increase lasix for 3 days. PCP and cardiology follow up.      Diagnosis     Clinical Impression: 1. Acute on chronic congestive heart failure, unspecified heart failure type ([facility] [address]        Disposition: discharged    Follow-up Information     Follow up With Details Comments Contact Info    Tomas Davey MD Schedule an appointment as soon as possible for a visit in 2 days As needed for patient follow up care [address] [facility] 61      Linda Carrel, MD Schedule an appointment as soon as possible for a visit in 2 days As needed for patient follow up care [facility]  [facility]  [facility] [address] [facility] EMERGENCY DEPT Go to If symptoms worsen [address] [facility]  [phone]           Patient's Medications   Start Taking    No medications on file   Continue Taking    ARIPIPRAZOLE (ABILIFY) 15 MG TABLET    Take 15 mg by mouth daily. to obtain from pharmacy    DOCUSATE SODIUM (COLACE) 100 MG CAPSULE    Take 1 Cap by mouth two (2) times a day for 90 days. HOLD for loose stool. FLUTICASONE (FLONASE) 50 MCG/ACTUATION NASAL SPRAY    2 Sprays by Both Nostrils route daily. FUROSEMIDE (LASIX) 40 MG TABLET    Take 1 Tab by mouth two (2) times a day. 40 mg po daily    ISOSORBIDE MONONITRATE ER (IMDUR) 30 MG TABLET    Take 1 Tab by mouth every evening. LEVOTHYROXINE (SYNTHROID) 125 MCG TABLET    Take 125 mcg by mouth Daily (before breakfast). LISINOPRIL (PRINIVIL, ZESTRIL) 10 MG TABLET    Take 1 Tab by mouth daily. LOPERAMIDE (IMODIUM) 2 MG CAPSULE    Take 2 mg by mouth three (3) times daily as needed for Diarrhea. METOPROLOL TARTRATE (LOPRESSOR) 25 MG TABLET    Take 0.5 Tabs by mouth two (2) times a day. MULTIVITAMIN, TX-IRON-CA-MIN (THERA-M W/ IRON) 9 MG IRON-400 MCG TAB TABLET    Take 1 Tab by mouth daily. OXYCODONE IR (ROXICODONE) 5 MG IMMEDIATE RELEASE TABLET    Take 5 mg by mouth every six (6) hours as needed for Pain.     PAROXETINE (PAXIL) 20 MG TABLET    Take 1 Tab by mouth daily.    POLYETHYLENE GLYCOL (MIRALAX) 17 GRAM PACKET    Take 1 Packet by mouth daily. RANITIDINE (ZANTAC) 150 MG TABLET    Take 150 mg by mouth two (2) times a day. SIMVASTATIN (ZOCOR) 40 MG TABLET    Take  by mouth nightly. to obtain from pharmacy    VENTOLIN HFA 90 MCG/ACTUATION INHALER    Take 2 Puffs by inhalation every four (4) hours as needed for Wheezing or Shortness of Breath. WARFARIN (COUMADIN) 5 MG TABLET    Take 7.5 mg by mouth daily. These Medications have changed    No medications on file   Stop Taking    No medications on file     _______________________________    Attestations:  Dario Goff acting as a scribe for and in the presence of Austin Sawyer DO      July 09, 2018 at [facility] ([facility]) PM       Provider Attestation:      I personally performed the services described in the documentation, reviewed the documentation, as recorded by the scribe in my presence, and it accurately and completely records my words and actions.  July 09, 2018 at 6:10 PM - Austin Sawyer DO    _______________________________

## 2018-07-09 NOTE — ED NOTES
Bedside shift change report given to Steve Lr RN (oncoming nurse) by Darryl Mata RN (offgoing nurse). Report included the following information SBAR, ED Summary, Intake/Output, MAR and Recent Results.

## 2018-07-09 NOTE — DISCHARGE INSTRUCTIONS
Heart Failure: Care Instructions  Your Care Instructions    Heart failure occurs when your heart does not pump as much blood as the body needs. Failure does not mean that the heart has stopped pumping but rather that it is not pumping as well as it should. Over time, this causes fluid buildup in your lungs and other parts of your body. Fluid buildup can cause shortness of breath, fatigue, swollen ankles, and other problems. By taking medicines regularly, reducing sodium (salt) in your diet, checking your weight every day, and making lifestyle changes, you can feel better and live longer. Follow-up care is a key part of your treatment and safety. Be sure to make and go to all appointments, and call your doctor if you are having problems. It's also a good idea to know your test results and keep a list of the medicines you take. How can you care for yourself at home? Medicines  ? · Be safe with medicines. Take your medicines exactly as prescribed. Call your doctor if you think you are having a problem with your medicine. ? · Do not take any vitamins, over-the-counter medicine, or herbal products without talking to your doctor first. Nelly Louis not take ibuprofen (Advil or Motrin) and naproxen (Aleve) without talking to your doctor first. They could make your heart failure worse. ? · You may be taking some of the following medicine. ¨ Beta-blockers can slow heart rate, decrease blood pressure, and improve your condition. Taking a beta-blocker may lower your chance of needing to be hospitalized. ¨ Angiotensin-converting enzyme inhibitors (ACEIs) reduce the heart's workload, lower blood pressure, and reduce swelling. Taking an ACEI may lower your chance of needing to be hospitalized again. ¨ Angiotensin II receptor blockers (ARBs) work like ACEIs. Your doctor may prescribe them instead of ACEIs. ¨ Diuretics, also called water pills, reduce swelling.   ¨ Potassium supplements replace this important mineral, which is sometimes lost with diuretics. ¨ Aspirin and other blood thinners prevent blood clots, which can cause a stroke or heart attack. ? You will get more details on the specific medicines your doctor prescribes. Diet  ? · Your doctor may suggest that you limit sodium to 2,000 milligrams (mg) a day or less. That is less than 1 teaspoon of salt a day, including all the salt you eat in cooking or in packaged foods. People get most of their sodium from processed foods. Fast food and restaurant meals also tend to be very high in sodium. ? · Ask your doctor how much liquid you can drink each day. You may have to limit liquids. ?Weight  ? · Weigh yourself without clothing at the same time each day. Record your weight. Call your doctor if you have a sudden weight gain, such as more than 2 to 3 pounds in a day or 5 pounds in a week. (Your doctor may suggest a different range of weight gain.) A sudden weight gain may mean that your heart failure is getting worse. ? Activity level  ? · Start light exercise (if your doctor says it is okay). Even if you can only do a small amount, exercise will help you get stronger, have more energy, and manage your weight and your stress. Walking is an easy way to get exercise. Start out by walking a little more than you did before. Bit by bit, increase the amount you walk. ? · When you exercise, watch for signs that your heart is working too hard. You are pushing yourself too hard if you cannot talk while you are exercising. If you become short of breath or dizzy or have chest pain, stop, sit down, and rest.   ? · If you feel \"wiped out\" the day after you exercise, walk slower or for a shorter distance until you can work up to a better pace. ? · Get enough rest at night. Sleeping with 1 or 2 pillows under your upper body and head may help you breathe easier. ? Lifestyle changes  ? · Do not smoke. Smoking can make a heart condition worse.  If you need help quitting, talk to your doctor about stop-smoking programs and medicines. These can increase your chances of quitting for good. Quitting smoking may be the most important step you can take to protect your heart. ? · Limit alcohol to 2 drinks a day for men and 1 drink a day for women. Too much alcohol can cause health problems. ? · Avoid getting sick from colds and the flu. Get a pneumococcal vaccine shot. If you have had one before, ask your doctor whether you need another dose. Get a flu shot each year. If you must be around people with colds or the flu, wash your hands often. When should you call for help? Call 911 if you have symptoms of sudden heart failure such as:  ? · You have severe trouble breathing. ? · You cough up pink, foamy mucus. ? · You have a new irregular or rapid heartbeat. ?Call your doctor now or seek immediate medical care if:  ? · You have new or increased shortness of breath. ? · You are dizzy or lightheaded, or you feel like you may faint. ? · You have sudden weight gain, such as more than 2 to 3 pounds in a day or 5 pounds in a week. (Your doctor may suggest a different range of weight gain.)   ? · You have increased swelling in your legs, ankles, or feet. ? · You are suddenly so tired or weak that you cannot do your usual activities. ? Watch closely for changes in your health, and be sure to contact your doctor if you develop new symptoms. Where can you learn more? Go to http://bucky-gerard.info/. Enter H134 in the search box to learn more about \"Heart Failure: Care Instructions. \"  Current as of: September 21, 2016  Content Version: 11.4  © 9331-9110 [employer]. Care instructions adapted under license by [employer] (which disclaims liability or warranty for this information).  If you have questions about a medical condition or this instruction, always ask your healthcare professional. [address]  any warranty or liability for your use of this information.

## 2018-07-23 NOTE — ED NOTES
Discussed plan of care at this time. Questions encouraged and addressed. Verbalized understanding. Call light within reach. Resolved Continue medications as prescribed  Follow up with pulmonologist Continue medications as prescribed  Follow up with PCP  Low-sodium diet  AHA Recipes - https://recipes.heart.org/ Stable  Continue medications as prescribed Continue medications as prescribed  Follow up with PCP  Heart healthy diet - https://recipes.heart.org/

## 2018-07-25 ENCOUNTER — OFFICE VISIT (OUTPATIENT)
Dept: SURGERY | Age: 58
End: 2018-07-25

## 2018-07-25 VITALS
TEMPERATURE: 98.2 F | BODY MASS INDEX: 57.52 KG/M2 | SYSTOLIC BLOOD PRESSURE: 160 MMHG | HEIGHT: 60 IN | DIASTOLIC BLOOD PRESSURE: 84 MMHG | RESPIRATION RATE: 20 BRPM | HEART RATE: 62 BPM | WEIGHT: 293 LBS

## 2018-07-25 DIAGNOSIS — I50.32 DIASTOLIC CHF, CHRONIC (HCC): ICD-10-CM

## 2018-07-25 DIAGNOSIS — I48.19 PERSISTENT ATRIAL FIBRILLATION (HCC): ICD-10-CM

## 2018-07-25 DIAGNOSIS — E66.01 MORBID OBESITY (HCC): Primary | ICD-10-CM

## 2018-07-25 DIAGNOSIS — F25.9 SCHIZOAFFECTIVE DISORDER, CHRONIC CONDITION (HCC): ICD-10-CM

## 2018-07-25 DIAGNOSIS — I11.9 BENIGN HYPERTENSIVE HEART DISEASE WITHOUT HEART FAILURE: ICD-10-CM

## 2018-07-25 NOTE — PROGRESS NOTES
Pt ID confirmed    Weight Loss Metrics 7/25/2018 7/25/2018 6/22/2018 6/6/2018 5/22/2018 5/17/2018 5/10/2018   Pre op / Initial Wt 323 - - - - - -   Today's Wt - 323 lb 325 lb 301 lb 5.9 oz 311 lb 303 lb 303 lb 8 oz   BMI - 63.08 kg/m2 59.44 kg/m2 58.86 kg/m2 60.74 kg/m2 59.18 kg/m2 59.27 kg/m2   Ideal Body Wt 120 - - - - - -   Excess Body Wt 203 - - - - - -   Goal Wt 161 - - - - - -   Wt loss to date 0 - - - - - -   % Wt Loss 0 - - - - - -   80% .4 - - - - - -       Body mass index is 63.08 kg/(m^2).

## 2018-07-25 NOTE — PROGRESS NOTES
Initial Consultation for Bariatric Surgery Template (Gastric Bypass)    Shira Lewis is a 60 y.o. female who comes into the office today for initial consultation for the surgical options for the treatment of morbid obesity. The patient initially identified obesity at the age of 35  and at age 24 weighed 150 lbs. She has tried a variety of unsupervised weight-loss attempts including self imposed and fasting, but has yet to meet with lasting success. Maximum weight lost on a diet is about 60 lbs, but that the weight loss always seems to return. Today, the patient is  Height: 5' (152.4 cm) tall, Weight: 146.5 kg (323 lb) lbs for a Body mass index is 63.08 kg/(m^2). It is due to the patient's severe obesity, which is further complicated by hypertension, hyperlipidemia, cardiovascular disease, reactive airway disease, obstructive sleep apnea - CPAP, weight related arthopathies and chronic atrial fibrillation, chronic diastolic heart failure, severe obesity inducd hypercapnia  that the patient is now seeking out bariatric surgery, specifically, gastric bypass. Past Medical History:   Diagnosis Date    Atrial fibrillation     CHADS score 1  (-CHF, +HTN, -AGE, -DM, -CVA)    Carpal tunnel syndrome     Chronic pain     Congenital heart disease     presumed pulmonary stenosis s/p repair 1969    Congestive heart failure (HCC)     Degenerative disc disease     Degenerative joint disease     GERD     H/O echocardiogram 04/2017    EF 60-65%, mild concentric LVH, dilated RV with RV dysfunction, RVSP 54 mmHg, RA E, moderate to severe pulmonic regurgitation.     Hypercholesterolemia     Hypertension     Hypothyroid     Morbid obesity     Morbid obesity with BMI of 45.0-49.9, adult ([facility] [address] 2/20/2017    Noncompliance     Schizoaffective disorder     Substance abuse     marijuana, crack cocaine       Past Surgical History:   Procedure Laterality Date    CARDIAC SURG PROCEDURE UNLIST  1971    VSD repair    COLONOSCOPY N/A 5/6/2018    COLONOSCOPY with tatooing and biopsy performed by Virginia Cohn MD at [facility]      left    HX HYSTERECTOMY      HX KNEE REPLACEMENT      left    HX KNEE REPLACEMENT      right    HX ORTHOPAEDIC      left ankle    HX ORTHOPAEDIC      carpel tunnel right and left     HX THYROIDECTOMY Left        Current Outpatient Prescriptions   Medication Sig Dispense Refill    furosemide (LASIX) 40 mg tablet Take 1 Tab by mouth two (2) times a day. 40 mg po daily 60 Tab 2    lisinopril (PRINIVIL, ZESTRIL) 10 mg tablet Take 1 Tab by mouth daily. 30 Tab 2    polyethylene glycol (MIRALAX) 17 gram packet Take 1 Packet by mouth daily. 30 Packet 2    docusate sodium (COLACE) 100 mg capsule Take 1 Cap by mouth two (2) times a day for 90 days. HOLD for loose stool. 60 Cap 2    metoprolol tartrate (LOPRESSOR) 25 mg tablet Take 0.5 Tabs by mouth two (2) times a day. 30 Tab 6    fluticasone (FLONASE) 50 mcg/actuation nasal spray 2 Sprays by Both Nostrils route daily. 1 Bottle 2    oxyCODONE IR (ROXICODONE) 5 mg immediate release tablet Take 5 mg by mouth every six (6) hours as needed for Pain.  isosorbide mononitrate ER (IMDUR) 30 mg tablet Take 1 Tab by mouth every evening. 90 Tab 3    multivitamin, tx-iron-ca-min (THERA-M W/ IRON) 9 mg iron-400 mcg tab tablet Take 1 Tab by mouth daily. 30 Tab 0    loperamide (IMODIUM) 2 mg capsule Take 2 mg by mouth three (3) times daily as needed for Diarrhea.  raNITIdine (ZANTAC) 150 mg tablet Take 150 mg by mouth two (2) times a day.  warfarin (COUMADIN) 5 mg tablet Take 7.5 mg by mouth daily.  VENTOLIN HFA 90 mcg/actuation inhaler Take 2 Puffs by inhalation every four (4) hours as needed for Wheezing or Shortness of Breath. 1 Inhaler 0    PARoxetine (PAXIL) 20 mg tablet Take 1 Tab by mouth daily. 30 Tab 3    simvastatin (ZOCOR) 40 mg tablet Take  by mouth nightly.  to obtain from pharmacy      aripiprazole (ABILIFY) 15 mg tablet Take 15 mg by mouth daily. to obtain from pharmacy      levothyroxine (SYNTHROID) 125 mcg tablet Take 125 mcg by mouth Daily (before breakfast).          Allergies   Allergen Reactions    Gabapentin Other (comments)     Unsteady, weakness LEs    Tetanus Vaccines And Toxoid Swelling    Topamax [Topiramate] Other (comments)     weakness       Social History   Substance Use Topics    Smoking status: Never Smoker    Smokeless tobacco: Never Used    Alcohol use No       Family History   Problem Relation Age of Onset    Hypertension Mother     Coronary Artery Disease Mother     Coronary Artery Disease Father     Hypertension Father        Family Status   Relation Status    Mother  at age 76    Father  at age 79       Review of Systems:  Positive in BOLD    CONST: Fever, weight loss, fatigue or chills  GI: Nausea, vomiting, abdominal pain, change in bowel habits, hematochezia, melena, and GERD, chronic constipation   INTEG: Dermatitis, abnormal moles  HEENT: Recent changes in vision, vertigo, epistaxis, dysphagia and hoarseness  CV: Chest pain, palpitations, HTN, edema and varicosities  RESP: Cough, shortness of breath, wheezing, hemoptysis, snoring and reactive airway disease, she is oxygen dependent  : Hematuria, dysuria, frequency, urgency, nocturia and stress urinary incontinence   MS: Weakness, joint pain and arthritis  ENDO: Diabetes, thyroid disease, polyuria, polydipsia, polyphagia, poor wound healing, heat intolerance, cold intolerance  LYMPH/HEME: Anemia, bruising and history of blood transfusions  NEURO: Dizziness, headache, fainting, seizures and stroke  PSYCH: Anxiety and depression, schizoaffective disorder      Physical Exam    Visit Vitals    /84    Pulse 62    Temp 98.2 °F (36.8 °C)    Resp 20    Ht 5' (1.524 m)    Wt 146.5 kg (323 lb)    BMI 63.08 kg/m2       Pre op weight: 323  EBW: 203  Wt loss to date: 0       General: 58 y.o.) female in no acute distress. Morbidly obese in abdomen, hips, thighs, legs  HEENT: Normocephalic, atraumatic, Pupils equal and reactive, nasopharynx clear, oropharynx clear and moist without lesions  NECK: Supple, no lymphadenopathy, thyromegaly, carotid bruits or jugular venous distension. trachea midline  RESP: Clear to auscultation bilaterally, extensive expiratory wheezing, with rhonchi, but no rales, normal respiratory excursion  CV: irreg irreg rhythm, no murmurs, rubs or gallops. 3+/4 pulses in bilateral dorsalis pedis and posterior tibialis. No distal edema or varicosities. ABD: Soft, nontender, nondistended, normoactive bowel sounds, no hernias, no hepatosplenomegaly, easily palpable costal margins, gynecoid distribution  Extremities: Warm, well perfused, no tenderness or swelling, normal gait/station  Neuro: Sensation and strength grossly intact and symmetrical  Psych: Alert and oriented to person, place, and time. Impression:    Vertis Dance is a 60 y.o. female who is suffering from morbid obesity with a BMI of 63  and comorbidities including hypertension, hypercholesterolemia, cardiovascular disease, GERD, reactive airway disease, obstructive sleep apnea - CPAP and weight related arthopathies  who would benefit from bariatric surgery. We have had an extensive discussion with regard to the risks, benefits and likely outcomes of the operation. We've discussed the restrictive and malabsorptive nature of the gastric bypass and compared and contrasted with the sleeve gastrectomy. The patient understands the likelihood of losing approximately 80% of their excess weight in 12 to 18 months. She also understands the risks including but not limited to bleeding, infection, need for reoperation, ulcers, leaks and strictures, bowel obstruction secondary to adhesions and internal hernias, DVT, PE, heart attack, stroke, and death.  Patient also understands risks of inadequate weight loss, excess weight loss, vitamin insufficiency, protein malnutrition, excess skin, and loss of hair. We have reviewed the components of a successful postoperative course including requirement for a high protein, low carbohydrate diet, 60 oz a day of zero calorie liquids, daily vitamin supplementation, daily exercise, regular follow-up, and participation in support groups. At this time we will enroll the patient in our bariatric program, undertake routine laboratory evaluation, chest X-ray, EKG, possible UGI and evaluation by  nutritionist as well as psychologist and pending their satisfactory completion of the preop evaluation, plan to perform a gastric bypass. She will need a careful cardiac and pulmonary work-up for suitability for general anesthetic because she does not appear to be able to tolerate it at this time.      She needs to lose 25-30 lbs before surgery to help her CV/Pulm risks

## 2018-07-26 ENCOUNTER — HOSPITAL ENCOUNTER (OUTPATIENT)
Dept: NON INVASIVE DIAGNOSTICS | Age: 58
Discharge: HOME | End: 2018-07-26
Attending: NURSE PRACTITIONER
Payer: MEDICAID

## 2018-07-26 DIAGNOSIS — R00.1 BRADYCARDIA: ICD-10-CM

## 2018-07-26 PROCEDURE — 93226 XTRNL ECG REC<48 HR SCAN A/R: CPT

## 2018-08-03 ENCOUNTER — HOSPITAL ENCOUNTER (EMERGENCY)
Age: 58
Discharge: HOME | End: 2018-08-03
Attending: EMERGENCY MEDICINE
Payer: MEDICAID

## 2018-08-03 ENCOUNTER — APPOINTMENT (OUTPATIENT)
Dept: GENERAL RADIOLOGY | Age: 58
End: 2018-08-03
Attending: EMERGENCY MEDICINE
Payer: MEDICAID

## 2018-08-03 VITALS
WEIGHT: 293 LBS | OXYGEN SATURATION: 98 % | TEMPERATURE: 98.2 F | RESPIRATION RATE: 24 BRPM | HEART RATE: 62 BPM | HEIGHT: 63 IN | SYSTOLIC BLOOD PRESSURE: 150 MMHG | BODY MASS INDEX: 51.91 KG/M2 | DIASTOLIC BLOOD PRESSURE: 88 MMHG

## 2018-08-03 DIAGNOSIS — I50.33 ACUTE ON CHRONIC DIASTOLIC CHF (CONGESTIVE HEART FAILURE), NYHA CLASS 2 (HCC): ICD-10-CM

## 2018-08-03 DIAGNOSIS — R06.02 SOB (SHORTNESS OF BREATH): Primary | ICD-10-CM

## 2018-08-03 LAB
ALBUMIN SERPL-MCNC: 3.2 G/DL (ref 3.4–5)
ALBUMIN/GLOB SERPL: 0.7 {RATIO} (ref 0.8–1.7)
ALP SERPL-CCNC: 155 U/L (ref 45–117)
ALT SERPL-CCNC: 26 U/L (ref 13–56)
ANION GAP SERPL CALC-SCNC: 4 MMOL/L (ref 3–18)
AST SERPL-CCNC: 28 U/L (ref 15–37)
BASOPHILS # BLD: 0 K/UL (ref 0–0.1)
BASOPHILS NFR BLD: 0 % (ref 0–2)
BILIRUB SERPL-MCNC: 0.9 MG/DL (ref 0.2–1)
BUN SERPL-MCNC: 13 MG/DL (ref 7–18)
BUN/CREAT SERPL: 16 (ref 12–20)
CALCIUM SERPL-MCNC: 8.2 MG/DL (ref 8.5–10.1)
CHLORIDE SERPL-SCNC: 103 MMOL/L (ref 100–108)
CK MB CFR SERPL CALC: 1 % (ref 0–4)
CK MB SERPL-MCNC: 2.5 NG/ML (ref 5–25)
CK SERPL-CCNC: 263 U/L (ref 26–192)
CO2 SERPL-SCNC: 35 MMOL/L (ref 21–32)
CREAT SERPL-MCNC: 0.81 MG/DL (ref 0.6–1.3)
DIFFERENTIAL METHOD BLD: ABNORMAL
EOSINOPHIL # BLD: 0.1 K/UL (ref 0–0.4)
EOSINOPHIL NFR BLD: 2 % (ref 0–5)
ERYTHROCYTE [DISTWIDTH] IN BLOOD BY AUTOMATED COUNT: 18.5 % (ref 11.6–14.5)
GLOBULIN SER CALC-MCNC: 4.4 G/DL (ref 2–4)
GLUCOSE SERPL-MCNC: 94 MG/DL (ref 74–99)
HCT VFR BLD AUTO: 31.8 % (ref 35–45)
HGB BLD-MCNC: 9.4 G/DL (ref 12–16)
INR PPP: 2 (ref 0.8–1.2)
LYMPHOCYTES # BLD: 1.2 K/UL (ref 0.9–3.6)
LYMPHOCYTES NFR BLD: 22 % (ref 21–52)
MCH RBC QN AUTO: 23.4 PG (ref 24–34)
MCHC RBC AUTO-ENTMCNC: 29.6 G/DL (ref 31–37)
MCV RBC AUTO: 79.3 FL (ref 74–97)
MONOCYTES # BLD: 0.5 K/UL (ref 0.05–1.2)
MONOCYTES NFR BLD: 9 % (ref 3–10)
NEUTS SEG # BLD: 3.7 K/UL (ref 1.8–8)
NEUTS SEG NFR BLD: 67 % (ref 40–73)
PLATELET # BLD AUTO: 179 K/UL (ref 135–420)
PMV BLD AUTO: 9.7 FL (ref 9.2–11.8)
POTASSIUM SERPL-SCNC: 3.7 MMOL/L (ref 3.5–5.5)
PROT SERPL-MCNC: 7.6 G/DL (ref 6.4–8.2)
PROTHROMBIN TIME: 22.8 SEC (ref 11.5–15.2)
RBC # BLD AUTO: 4.01 M/UL (ref 4.2–5.3)
SODIUM SERPL-SCNC: 142 MMOL/L (ref 136–145)
TROPONIN I SERPL-MCNC: <0.02 NG/ML (ref 0–0.04)
WBC # BLD AUTO: 5.4 K/UL (ref 4.6–13.2)

## 2018-08-03 PROCEDURE — 85610 PROTHROMBIN TIME: CPT | Performed by: EMERGENCY MEDICINE

## 2018-08-03 PROCEDURE — 85025 COMPLETE CBC W/AUTO DIFF WBC: CPT | Performed by: EMERGENCY MEDICINE

## 2018-08-03 PROCEDURE — 99285 EMERGENCY DEPT VISIT HI MDM: CPT

## 2018-08-03 PROCEDURE — 96374 THER/PROPH/DIAG INJ IV PUSH: CPT

## 2018-08-03 PROCEDURE — 82550 ASSAY OF CK (CPK): CPT | Performed by: EMERGENCY MEDICINE

## 2018-08-03 PROCEDURE — 71045 X-RAY EXAM CHEST 1 VIEW: CPT

## 2018-08-03 PROCEDURE — 74011250636 HC RX REV CODE- 250/636: Performed by: EMERGENCY MEDICINE

## 2018-08-03 PROCEDURE — 93005 ELECTROCARDIOGRAM TRACING: CPT

## 2018-08-03 PROCEDURE — 80053 COMPREHEN METABOLIC PANEL: CPT | Performed by: EMERGENCY MEDICINE

## 2018-08-03 RX ORDER — FUROSEMIDE 10 MG/ML
40 INJECTION INTRAMUSCULAR; INTRAVENOUS
Status: COMPLETED | OUTPATIENT
Start: 2018-08-03 | End: 2018-08-03

## 2018-08-03 RX ADMIN — FUROSEMIDE 40 MG: 10 INJECTION, SOLUTION INTRAMUSCULAR; INTRAVENOUS at 16:11

## 2018-08-03 NOTE — DISCHARGE INSTRUCTIONS
Heart Failure: Care Instructions  Your Care Instructions    Heart failure occurs when your heart does not pump as much blood as the body needs. Failure does not mean that the heart has stopped pumping but rather that it is not pumping as well as it should. Over time, this causes fluid buildup in your lungs and other parts of your body. Fluid buildup can cause shortness of breath, fatigue, swollen ankles, and other problems. By taking medicines regularly, reducing sodium (salt) in your diet, checking your weight every day, and making lifestyle changes, you can feel better and live longer. Follow-up care is a key part of your treatment and safety. Be sure to make and go to all appointments, and call your doctor if you are having problems. It's also a good idea to know your test results and keep a list of the medicines you take. How can you care for yourself at home? Medicines    · Be safe with medicines. Take your medicines exactly as prescribed. Call your doctor if you think you are having a problem with your medicine.     · Do not take any vitamins, over-the-counter medicine, or herbal products without talking to your doctor first. Gabi Meiers not take ibuprofen (Advil or Motrin) and naproxen (Aleve) without talking to your doctor first. They could make your heart failure worse.     · You may be taking some of the following medicine. ¨ Beta-blockers can slow heart rate, decrease blood pressure, and improve your condition. Taking a beta-blocker may lower your chance of needing to be hospitalized. ¨ Angiotensin-converting enzyme inhibitors (ACEIs) reduce the heart's workload, lower blood pressure, and reduce swelling. Taking an ACEI may lower your chance of needing to be hospitalized again. ¨ Angiotensin II receptor blockers (ARBs) work like ACEIs. Your doctor may prescribe them instead of ACEIs. ¨ Diuretics, also called water pills, reduce swelling.   ¨ Potassium supplements replace this important mineral, which is sometimes lost with diuretics. ¨ Aspirin and other blood thinners prevent blood clots, which can cause a stroke or heart attack.    You will get more details on the specific medicines your doctor prescribes. Diet    · Your doctor may suggest that you limit sodium to 2,000 milligrams (mg) a day or less. That is less than 1 teaspoon of salt a day, including all the salt you eat in cooking or in packaged foods. People get most of their sodium from processed foods. Fast food and restaurant meals also tend to be very high in sodium.     · Ask your doctor how much liquid you can drink each day. You may have to limit liquids.    Weight    · Weigh yourself without clothing at the same time each day. Record your weight. Call your doctor if you have a sudden weight gain, such as more than 2 to 3 pounds in a day or 5 pounds in a week. (Your doctor may suggest a different range of weight gain.) A sudden weight gain may mean that your heart failure is getting worse.    Activity level    · Start light exercise (if your doctor says it is okay). Even if you can only do a small amount, exercise will help you get stronger, have more energy, and manage your weight and your stress. Walking is an easy way to get exercise. Start out by walking a little more than you did before. Bit by bit, increase the amount you walk.     · When you exercise, watch for signs that your heart is working too hard. You are pushing yourself too hard if you cannot talk while you are exercising. If you become short of breath or dizzy or have chest pain, stop, sit down, and rest.     · If you feel \"wiped out\" the day after you exercise, walk slower or for a shorter distance until you can work up to a better pace.     · Get enough rest at night. Sleeping with 1 or 2 pillows under your upper body and head may help you breathe easier.    Lifestyle changes    · Do not smoke. Smoking can make a heart condition worse.  If you need help quitting, talk to your doctor about stop-smoking programs and medicines. These can increase your chances of quitting for good. Quitting smoking may be the most important step you can take to protect your heart.     · Limit alcohol to 2 drinks a day for men and 1 drink a day for women. Too much alcohol can cause health problems.     · Avoid getting sick from colds and the flu. Get a pneumococcal vaccine shot. If you have had one before, ask your doctor whether you need another dose. Get a flu shot each year. If you must be around people with colds or the flu, wash your hands often. When should you call for help? Call 911 if you have symptoms of sudden heart failure such as:    · You have severe trouble breathing.     · You cough up pink, foamy mucus.     · You have a new irregular or rapid heartbeat.    Call your doctor now or seek immediate medical care if:    · You have new or increased shortness of breath.     · You are dizzy or lightheaded, or you feel like you may faint.     · You have sudden weight gain, such as more than 2 to 3 pounds in a day or 5 pounds in a week. (Your doctor may suggest a different range of weight gain.)     · You have increased swelling in your legs, ankles, or feet.     · You are suddenly so tired or weak that you cannot do your usual activities.    Watch closely for changes in your health, and be sure to contact your doctor if you develop new symptoms. Where can you learn more? Go to http://bucky-gerard.info/. Enter Y676 in the search box to learn more about \"Heart Failure: Care Instructions. \"  Current as of: May 10, 2017  Content Version: 11.7  © 0376-2886 [employer]. Care instructions adapted under license by [employer] (which disclaims liability or warranty for this information).  If you have questions about a medical condition or this instruction, always ask your healthcare professional. [address] any warranty or liability for your use of this information. Shortness of Breath: Care Instructions  Your Care Instructions  Shortness of breath has many causes. Sometimes conditions such as anxiety can lead to shortness of breath. Some people get mild shortness of breath when they exercise. Trouble breathing also can be a symptom of a serious problem, such as asthma, lung disease, emphysema, heart problems, and pneumonia. If your shortness of breath continues, you may need tests and treatment. Watch for any changes in your breathing and other symptoms. Follow-up care is a key part of your treatment and safety. Be sure to make and go to all appointments, and call your doctor if you are having problems. It's also a good idea to know your test results and keep a list of the medicines you take. How can you care for yourself at home? · Do not smoke or allow others to smoke around you. If you need help quitting, talk to your doctor about stop-smoking programs and medicines. These can increase your chances of quitting for good. · Get plenty of rest and sleep. · Take your medicines exactly as prescribed. Call your doctor if you think you are having a problem with your medicine. · Find healthy ways to deal with stress. ¨ Exercise daily. ¨ Get plenty of sleep. ¨ Eat regularly and well. When should you call for help? Call 911 anytime you think you may need emergency care. For example, call if:    · You have severe shortness of breath.     · You have symptoms of a heart attack. These may include:  ¨ Chest pain or pressure, or a strange feeling in the chest.  ¨ Sweating. ¨ Shortness of breath. ¨ Nausea or vomiting. ¨ Pain, pressure, or a strange feeling in the back, neck, jaw, or upper belly or in one or both shoulders or arms. ¨ Lightheadedness or sudden weakness. ¨ A fast or irregular heartbeat. After you call 911, the  may tell you to chew 1 adult-strength or 2 to 4 low-dose aspirin. Wait for an ambulance.  Do not try to drive yourself.    Call your doctor now or seek immediate medical care if:    · Your shortness of breath gets worse or you start to wheeze. Wheezing is a high-pitched sound when you breathe.     · You wake up at night out of breath or have to prop your head up on several pillows to breathe.     · You are short of breath after only light activity or while at rest.    Watch closely for changes in your health, and be sure to contact your doctor if:    · You do not get better over the next 1 to 2 days. Where can you learn more? Go to http://bucky-gerard.info/. Enter S780 in the search box to learn more about \"Shortness of Breath: Care Instructions. \"  Current as of: December 6, 2017  Content Version: 11.7  © 2440-3650 [employer]. Care instructions adapted under license by [employer] (which disclaims liability or warranty for this information). If you have questions about a medical condition or this instruction, always ask your healthcare professional. [address] any warranty or liability for your use of this information.

## 2018-08-03 NOTE — ED PROVIDER NOTES
EMERGENCY DEPARTMENT HISTORY AND PHYSICAL EXAM 
 
2:33 PM 
 
 
Date: 8/3/2018 Patient Name: Niles Walker History of Presenting Illness Chief Complaint Patient presents with  Shortness of Breath History Provided By: Patient Chief Complaint: SOB Duration: This morning Timing:  Acute Location: N/A Quality: N/A Severity: Moderate Modifying Factors: No modifying factors Associated Symptoms: bilateral leg swelling, cough, and diarrhea Additional History (Context): 3:12 PM Niles Walker is a 60 y.o. female with h/o Congenital Heart Disease, AFIB, Hypothyroid, HTN, and CHF who presents to ED complaining of acute moderate SOB associated with chronic leg swelling onset this morning. Patient states that the leg swelling has been going on for 8 months. She also admits to cough, and some diarrhea. Denies fever, vomiting, and nausea. States that she has been compliant with her Lasix. No other concerns or symptoms at this time. PCP: Humphrey Calvin MD 
 
Current Facility-Administered Medications Medication Dose Route Frequency Provider Last Rate Last Dose  sodium chloride 0.9 % bolus infusion 1,000 mL  1,000 mL IntraVENous ONCE Angus Chavez DO      
 furosemide (LASIX) injection 40 mg  40 mg IntraVENous NOW Dell Chavez DO      
 
Current Outpatient Prescriptions Medication Sig Dispense Refill  furosemide (LASIX) 40 mg tablet Take 1 Tab by mouth two (2) times a day. 40 mg po daily 60 Tab 2  
 lisinopril (PRINIVIL, ZESTRIL) 10 mg tablet Take 1 Tab by mouth daily. 30 Tab 2  polyethylene glycol (MIRALAX) 17 gram packet Take 1 Packet by mouth daily. 30 Packet 2  
 docusate sodium (COLACE) 100 mg capsule Take 1 Cap by mouth two (2) times a day for 90 days. HOLD for loose stool. 60 Cap 2  
 metoprolol tartrate (LOPRESSOR) 25 mg tablet Take 0.5 Tabs by mouth two (2) times a day. 30 Tab 6  fluticasone (FLONASE) 50 mcg/actuation nasal spray 2 Sprays by Both Nostrils route daily. 1 Bottle 2  
 oxyCODONE IR (ROXICODONE) 5 mg immediate release tablet Take 5 mg by mouth every six (6) hours as needed for Pain.  isosorbide mononitrate ER (IMDUR) 30 mg tablet Take 1 Tab by mouth every evening. 90 Tab 3  
 multivitamin, tx-iron-ca-min (THERA-M W/ IRON) 9 mg iron-400 mcg tab tablet Take 1 Tab by mouth daily. 30 Tab 0  
 loperamide (IMODIUM) 2 mg capsule Take 2 mg by mouth three (3) times daily as needed for Diarrhea.  raNITIdine (ZANTAC) 150 mg tablet Take 150 mg by mouth two (2) times a day.  warfarin (COUMADIN) 5 mg tablet Take 7.5 mg by mouth daily.  VENTOLIN HFA 90 mcg/actuation inhaler Take 2 Puffs by inhalation every four (4) hours as needed for Wheezing or Shortness of Breath. 1 Inhaler 0  
 PARoxetine (PAXIL) 20 mg tablet Take 1 Tab by mouth daily. 30 Tab 3  
 simvastatin (ZOCOR) 40 mg tablet Take  by mouth nightly. to obtain from pharmacy  aripiprazole (ABILIFY) 15 mg tablet Take 15 mg by mouth daily. to obtain from pharmacy  levothyroxine (SYNTHROID) 125 mcg tablet Take 125 mcg by mouth Daily (before breakfast). Past History Past Medical History: 
Past Medical History:  
Diagnosis Date  Atrial fibrillation CHADS score 1  (-CHF, +HTN, -AGE, -DM, -CVA)  Carpal tunnel syndrome  Chronic pain  Congenital heart disease   
 presumed pulmonary stenosis s/p repair 1969  Congestive heart failure ([address]  Degenerative disc disease  Degenerative joint disease  GERD   
 H/O echocardiogram 04/2017 EF 60-65%, mild concentric LVH, dilated RV with RV dysfunction, RVSP 54 mmHg, RA E, moderate to severe pulmonic regurgitation.  Hypercholesterolemia  Hypertension  Hypothyroid  Morbid obesity  Morbid obesity with BMI of 45.0-49.9, adult ([address] 2/20/2017  Noncompliance  Schizoaffective disorder  Substance abuse   
 marijuana, crack cocaine Past Surgical History: 
Past Surgical History:  
Procedure Laterality Date [address] VSD repair  COLONOSCOPY N/A 5/6/2018 COLONOSCOPY with tatooing and biopsy performed by Amelia Griffin MD at [facility]    
 left  HX HYSTERECTOMY  HX KNEE REPLACEMENT    
 left  HX KNEE REPLACEMENT    
 right  HX ORTHOPAEDIC    
 left ankle  HX ORTHOPAEDIC    
 carpel tunnel right and left  HX THYROIDECTOMY Left Family History: 
Family History Problem Relation Age of Onset  Hypertension Mother  Coronary Artery Disease Mother  Coronary Artery Disease Father  Hypertension Father Social History: 
Social History Substance Use Topics  Smoking status: Never Smoker  Smokeless tobacco: Never Used  Alcohol use No  
 
 
Allergies: Allergies Allergen Reactions  Gabapentin Other (comments) Unsteady, weakness LEs  Tetanus Vaccines And Toxoid Swelling  Topamax [Topiramate] Other (comments)  
  weakness Review of Systems Review of Systems Constitutional: Negative. Negative for chills, diaphoresis and fever. HENT: Negative. Negative for congestion, rhinorrhea and sore throat. Eyes: Negative. Negative for pain, discharge and redness. Respiratory: Positive for cough and shortness of breath. Negative for chest tightness and wheezing. Cardiovascular: Positive for leg swelling. Negative for chest pain. Gastrointestinal: Positive for diarrhea. Negative for abdominal pain, constipation, nausea and vomiting. Genitourinary: Negative. Negative for dysuria, flank pain, frequency, hematuria and urgency. Musculoskeletal: Negative. Negative for back pain and neck pain. Skin: Negative. Negative for rash. Neurological: Negative. Negative for syncope, weakness, numbness and headaches. Psychiatric/Behavioral: Negative. All other systems reviewed and are negative. Physical Exam  
 
Visit Vitals  BP (!) 153/92  Pulse (!) 56  Temp 98.2 °F (36.8 °C)  Resp 16  
 Ht 5' 3\" (1.6 m)  Wt 146.5 kg (323 lb)  SpO2 100%  BMI 57.22 kg/m2 Physical Exam  
Constitutional: She appears well-developed and well-nourished. Non-toxic appearance. She does not have a sickly appearance. She does not appear ill. No distress. Morbidly obese HENT:  
Head: Normocephalic and atraumatic. Mouth/Throat: Oropharynx is clear and moist. No oropharyngeal exudate. Eyes: Conjunctivae and EOM are normal. Pupils are equal, round, and reactive to light. No scleral icterus. Neck: Trachea normal and normal range of motion. Neck supple. No hepatojugular reflux and no JVD present. No tracheal deviation present. No thyromegaly present. Cardiovascular: Regular rhythm, S1 normal, S2 normal, normal heart sounds, intact distal pulses and normal pulses. Bradycardia present. Exam reveals no gallop, no S3 and no S4. No murmur heard. Pulses: 
     Radial pulses are 2+ on the right side, and 2+ on the left side. Dorsalis pedis pulses are 2+ on the right side, and 2+ on the left side. Pulmonary/Chest: Effort normal and breath sounds normal. No accessory muscle usage. No respiratory distress. She has no decreased breath sounds. She has no wheezes. She has no rhonchi. She has no rales. Abdominal: Soft. Normal appearance and bowel sounds are normal. She exhibits no distension and no mass. There is no hepatosplenomegaly. There is no tenderness. There is no rigidity, no rebound, no guarding, no CVA tenderness, no tenderness at McBurney's point and negative Anglin's sign. Musculoskeletal: Normal range of motion. Strength 3/5 throughout Lymphadenopathy:  
     Head (right side): No submental, no submandibular, no preauricular and no occipital adenopathy present. Head (left side): No submental, no submandibular, no preauricular and no occipital adenopathy present. She has no cervical adenopathy. Right: No supraclavicular adenopathy present. Left: No supraclavicular adenopathy present. Neurological: She is alert. She has normal strength and normal reflexes. She is not disoriented. No cranial nerve deficit or sensory deficit. Coordination and gait normal. GCS eye subscore is 4. GCS verbal subscore is 5. GCS motor subscore is 6. Grossly intact Skin: Skin is warm, dry and intact. No rash noted. She is not diaphoretic. Psychiatric: She has a normal mood and affect. Her speech is normal and behavior is normal. Judgment and thought content normal. Cognition and memory are normal.  
Nursing note and vitals reviewed. Diagnostic Study Results Labs - Recent Results (from the past 12 hour(s)) EKG, 12 LEAD, INITIAL Collection Time: 08/03/18  2:27 PM  
Result Value Ref Range Ventricular Rate 59 BPM  
 Atrial Rate 56 BPM  
 QRS Duration 122 ms  
 Q-T Interval 476 ms QTC Calculation (Bezet) 471 ms Calculated R Axis -34 degrees Calculated T Axis -163 degrees Diagnosis Atrial fibrillation with slow ventricular response Left axis deviation RSR' or QR pattern in V1 suggests right ventricular conduction delay T wave abnormality, consider anterior ischemia or digitalis effect Abnormal ECG When compared with ECG of 09-JUL-2018 18:04, 
RSR' pattern in V1 has replaced Nonspecific intraventricular conduction delay Nonspecific T wave abnormality, worse in Inferior leads CBC WITH AUTOMATED DIFF Collection Time: 08/03/18  2:35 PM  
Result Value Ref Range WBC 5.4 4.6 - 13.2 K/uL  
 RBC 4.01 (L) 4.20 - 5.30 M/uL HGB 9.4 (L) 12.0 - 16.0 g/dL HCT 31.8 (L) 35.0 - 45.0 % MCV 79.3 74.0 - 97.0 FL  
 MCH 23.4 (L) 24.0 - 34.0 PG  
 MCHC 29.6 (L) 31.0 - 37.0 g/dL  
 RDW 18.5 (H) 11.6 - 14.5 % PLATELET 946 134 - 039 K/uL MPV 9.7 9.2 - 11.8 FL  
 NEUTROPHILS 67 40 - 73 % LYMPHOCYTES 22 21 - 52 % MONOCYTES 9 3 - 10 % EOSINOPHILS 2 0 - 5 %  BASOPHILS 0 0 - 2 % ABS. NEUTROPHILS 3.7 1.8 - 8.0 K/UL  
 ABS. LYMPHOCYTES 1.2 0.9 - 3.6 K/UL  
 ABS. MONOCYTES 0.5 0.05 - 1.2 K/UL  
 ABS. EOSINOPHILS 0.1 0.0 - 0.4 K/UL  
 ABS. BASOPHILS 0.0 0.0 - 0.1 K/UL  
 DF AUTOMATED METABOLIC PANEL, COMPREHENSIVE Collection Time: 08/03/18  2:35 PM  
Result Value Ref Range Sodium 142 136 - 145 mmol/L Potassium 3.7 3.5 - 5.5 mmol/L Chloride 103 100 - 108 mmol/L  
 CO2 35 (H) 21 - 32 mmol/L Anion gap 4 3.0 - 18 mmol/L Glucose 94 74 - 99 mg/dL BUN 13 7.0 - 18 MG/DL Creatinine 0.81 0.6 - 1.3 MG/DL  
 BUN/Creatinine ratio 16 12 - 20 GFR est AA >60 >60 ml/min/1.73m2 GFR est non-AA >60 >60 ml/min/1.73m2 Calcium 8.2 (L) 8.5 - 10.1 MG/DL Bilirubin, total 0.9 0.2 - 1.0 MG/DL  
 ALT (SGPT) 26 13 - 56 U/L  
 AST (SGOT) 28 15 - 37 U/L Alk. phosphatase 155 (H) 45 - 117 U/L Protein, total 7.6 6.4 - 8.2 g/dL Albumin 3.2 (L) 3.4 - 5.0 g/dL Globulin 4.4 (H) 2.0 - 4.0 g/dL A-G Ratio 0.7 (L) 0.8 - 1.7 CARDIAC PANEL,(CK, CKMB & TROPONIN) Collection Time: 08/03/18  2:35 PM  
Result Value Ref Range  (H) 26 - 192 U/L  
 CK - MB 2.5 <3.6 ng/ml CK-MB Index 1.0 0.0 - 4.0 % Troponin-I, Qt. <0.02 0.0 - 0.045 NG/ML  
PROTHROMBIN TIME + INR Collection Time: 08/03/18  2:35 PM  
Result Value Ref Range Prothrombin time 22.8 (H) 11.5 - 15.2 sec INR 2.0 (H) 0.8 - 1.2 Radiologic Studies -  
XR CHEST PORT Final Result Medical Decision Making Provider Notes (Medical Decision Making): MDM Number of Diagnoses or Management Options Acute on chronic diastolic CHF (congestive heart failure), NYHA class 2 ([facility]):  
SOB (shortness of breath):  
Diagnosis management comments: Shortness of breath etiologies include chronic obstructive pulmonary disease (COPD), acute asthma exacerbation, congestive heart failure, pneumonia, acute bronchitis, pulmonary embolism, upper respiratory infection, cardiac event to include acute coronary syndrome, acute myocardial infarction or a combination of the above (ex URI on top of COPD thus causing respiratory distress). I am the first provider for this patient. I reviewed the vital signs, available nursing notes, past medical history, past surgical history, family history and social history. Vital Signs-Reviewed the patient's vital signs. Records Reviewed: Nursing Notes and Old Medical Records (Time of Review: 2:33 PM) ED Course: Progress Notes, Reevaluation, and Consults: 
Consult:  Discussed care with Dr. Swetha Valencia, Specialty: Primary Care  Standard discussion; including history of patients chief complaint, available diagnostic results, and treatment course. States that patient is OK for discharge 3:35 PM, 8/3/2018 Labs essentially normal with the exception of HGB of 9.4, this is chronic. Troponin is negative. INR 2.0. Chest X-Ray showed bilateral pulmonary congestion and stable cardiomegaly. EKG showed AFIB with rate of 59 bpm. With no ST elevations or depression and non specific T wave changes. 3:33 PM 8/3/2018 Diagnosis I have reassessed the patient. Patient is feeling better. Patient was discharged in stable condition. Patient is to return to emergency department if any new or worsening condition. Clinical Impression:  
1. SOB (shortness of breath) 2. Acute on chronic diastolic CHF (congestive heart failure), NYHA class 2 ([address] Disposition: DC Follow-up Information Follow up With Details Comments Contact Info Jerilyn Denson MD Schedule an appointment as soon as possible for a visit in 2 days  [address] [place] [id number] 
[phone] [facility] EMERGENCY DEPT Go to If symptoms worsen, As needed Shane [address]  
  
  
 
_______________________________ Attestations: 
Scribe Attestation Rosalind Alfred acting as a scribe for and in the presence of [employer], DO     
August 03, 2018 at 2:33 PM 
    
Provider Attestation:     
I personally performed the services described in the documentation, reviewed the documentation, as recorded by the scribe in my presence, and it accurately and completely records my words and actions. August 03, 2018 at 2:33 PM - [employer], DO   
_______________________________

## 2018-08-05 LAB
ATRIAL RATE: 56 BPM
CALCULATED R AXIS, ECG10: -34 DEGREES
CALCULATED T AXIS, ECG11: -163 DEGREES
DIAGNOSIS, 93000: NORMAL
Q-T INTERVAL, ECG07: 476 MS
QRS DURATION, ECG06: 122 MS
QTC CALCULATION (BEZET), ECG08: 471 MS
VENTRICULAR RATE, ECG03: 59 BPM

## 2018-08-09 ENCOUNTER — DOCUMENTATION ONLY (OUTPATIENT)
Dept: BARIATRICS/WEIGHT MGMT | Age: 58
End: 2018-08-09

## 2018-08-09 ENCOUNTER — HOSPITAL ENCOUNTER (OUTPATIENT)
Dept: BARIATRICS/WEIGHT MGMT | Age: 58
Discharge: HOME | End: 2018-08-09

## 2018-08-09 NOTE — PROGRESS NOTES
[address] [place] [address] [facility], [address]    Patient's Name: Vertis Dance   Age: 60 y.o. YOB: 1960   Sex: female    Date:   8/9/2018    Insurance:            Session: 1 of 6  Revision:   Surgeon:  Dr. Melissa Colón    Height: 5 f  Weight:    325      Lbs. BMI: 63.5   Pounds Lost since last month: 0               Pounds Gained since last month: 2    Starting Weight: 323   Previous Months Weight: 323  Overall Pounds Lost: 0 Overall Pounds Gained: 2      Do you smoke? None    Alcohol intake:  Number of drinks at a time:  NOne  Number of times a week: NOne    Class Guidelines    Guidelines are reviewed with patient at the start of every class. 1. Patient understands that weight loss trial classes must be consecutive. Patient understands if they miss a class, it is their responsibility to contact me to reschedule class. I will reach out to patient after their first no show. 2.  Patient understands the expectations that weight maintenance/weight loss is expected during the classes. Failure to demonstrate changes may result in one extra month of weight loss trial, followed by going back to see the surgeon. Patient understands that they CAN NOT gain any weight during the weight loss trial.  Gaining weight will result in extra classes. 3. Patient is also instructed to be doing their labs, blood work, psych visit, support group and any other test that the surgeon has used while they are working on their weight loss trial.  4.  Patient was instructed to bring their blue binder to every class and appointment. Other Pertinent Information:  Needs to lose 25 pounds, per Dr. Beau Yuan Since Last Class:     Eating Habits and Behaviors    Today in class, we reviewed the key diet principles. I have talked to patient about pushing the fluid and working towards 64 ounces per day.   Patient was encouraged to stop all liquid calories. We talked about protein-based snacks and cutting out carbohydrates. Patient was encouraged to follow a meat and vegetable diet with small amounts of fruit, trying to focus more on berries. Patient was shown a portion size plate and we talked about how patient could save 200 calories by using a small plate. Patient's current diet habits include: Patient states she is eating fried chicken and iced tea from the little store for breakfast.  She did not indicate what she is eating for lunch or dinner on her diet history, but I talked to her about getting in the habit of 3 meals a day and focus on protein at meals. She is snacking on moon pies and suckers. She is eating out 4-6 x a week, which I have addressed with patient the frequency of her eating out and the need to plan ahead. Physical Activity/Exercise    Comments: We talked about exercise. Patient was given reasons of why exercise is so important and how that can help with their long-term success. I have encouraged patient to get a support system to help with the activity. Currently for activity, patient is not doing anything, goals were addressed. Behavior Modification       Comments:  During today's lesson, I also focused on alternatives to managing stress. Patient was given tips to managing stress, but also encouraged patient to start looking for other outlets to manage stress. Patient was encouraged to identify events or situations that are a source of stress. These could include work, finances, health, weight, personal events, or daily hassles. Patient was then encouraged to list ways that they respond to stress, including physical and emotional reactions and any changes in behavior. Patient was then asked to identify strategies to help reduce or manage stress in their life. Goals that patient wants to work on includes:  1. I want to stop eating fried food  2. I want to stop eating sweets.   3. I want to stop drinking soda.       Jose G Love, MS RD  8/9/2018

## 2018-08-19 ENCOUNTER — HOSPITAL ENCOUNTER (INPATIENT)
Age: 58
LOS: 11 days | Discharge: NURSING, REHAB OR LONG-TERM CARE | DRG: 194 | End: 2018-08-30
Attending: EMERGENCY MEDICINE | Admitting: INTERNAL MEDICINE
Payer: MEDICAID

## 2018-08-19 ENCOUNTER — APPOINTMENT (OUTPATIENT)
Dept: GENERAL RADIOLOGY | Age: 58
DRG: 194 | End: 2018-08-19
Attending: EMERGENCY MEDICINE
Payer: MEDICAID

## 2018-08-19 DIAGNOSIS — M17.0 PRIMARY OSTEOARTHRITIS OF BOTH KNEES: ICD-10-CM

## 2018-08-19 DIAGNOSIS — I50.33 ACUTE ON CHRONIC DIASTOLIC CONGESTIVE HEART FAILURE (HCC): Primary | ICD-10-CM

## 2018-08-19 LAB
ANION GAP SERPL CALC-SCNC: 7 MMOL/L (ref 3–18)
ATRIAL RATE: 267 BPM
BASOPHILS # BLD: 0 K/UL (ref 0–0.1)
BASOPHILS NFR BLD: 0 % (ref 0–2)
BNP SERPL-MCNC: 2157 PG/ML (ref 0–900)
BUN SERPL-MCNC: 17 MG/DL (ref 7–18)
BUN/CREAT SERPL: 19 (ref 12–20)
CALCIUM SERPL-MCNC: 8.3 MG/DL (ref 8.5–10.1)
CALCULATED R AXIS, ECG10: -35 DEGREES
CALCULATED T AXIS, ECG11: 166 DEGREES
CHLORIDE SERPL-SCNC: 102 MMOL/L (ref 100–108)
CO2 SERPL-SCNC: 31 MMOL/L (ref 21–32)
CREAT SERPL-MCNC: 0.91 MG/DL (ref 0.6–1.3)
DIAGNOSIS, 93000: NORMAL
DIFFERENTIAL METHOD BLD: ABNORMAL
EOSINOPHIL # BLD: 0.1 K/UL (ref 0–0.4)
EOSINOPHIL NFR BLD: 3 % (ref 0–5)
ERYTHROCYTE [DISTWIDTH] IN BLOOD BY AUTOMATED COUNT: 18.3 % (ref 11.6–14.5)
GLUCOSE SERPL-MCNC: 84 MG/DL (ref 74–99)
HCT VFR BLD AUTO: 35.5 % (ref 35–45)
HGB BLD-MCNC: 10.2 G/DL (ref 12–16)
INR PPP: 2.9 (ref 0.8–1.2)
LYMPHOCYTES # BLD: 1 K/UL (ref 0.9–3.6)
LYMPHOCYTES NFR BLD: 24 % (ref 21–52)
MCH RBC QN AUTO: 23 PG (ref 24–34)
MCHC RBC AUTO-ENTMCNC: 28.7 G/DL (ref 31–37)
MCV RBC AUTO: 80 FL (ref 74–97)
MONOCYTES # BLD: 0.4 K/UL (ref 0.05–1.2)
MONOCYTES NFR BLD: 9 % (ref 3–10)
NEUTS SEG # BLD: 2.8 K/UL (ref 1.8–8)
NEUTS SEG NFR BLD: 64 % (ref 40–73)
PLATELET # BLD AUTO: 172 K/UL (ref 135–420)
PMV BLD AUTO: 9.6 FL (ref 9.2–11.8)
POTASSIUM SERPL-SCNC: 4.3 MMOL/L (ref 3.5–5.5)
PROTHROMBIN TIME: 30.6 SEC (ref 11.5–15.2)
Q-T INTERVAL, ECG07: 464 MS
QRS DURATION, ECG06: 122 MS
QTC CALCULATION (BEZET), ECG08: 518 MS
RBC # BLD AUTO: 4.44 M/UL (ref 4.2–5.3)
SODIUM SERPL-SCNC: 140 MMOL/L (ref 136–145)
TROPONIN I SERPL-MCNC: <0.02 NG/ML (ref 0–0.04)
VENTRICULAR RATE, ECG03: 75 BPM
WBC # BLD AUTO: 4.2 K/UL (ref 4.6–13.2)

## 2018-08-19 PROCEDURE — 96374 THER/PROPH/DIAG INJ IV PUSH: CPT

## 2018-08-19 PROCEDURE — 84484 ASSAY OF TROPONIN QUANT: CPT | Performed by: EMERGENCY MEDICINE

## 2018-08-19 PROCEDURE — 74011250636 HC RX REV CODE- 250/636: Performed by: INTERNAL MEDICINE

## 2018-08-19 PROCEDURE — 74011250636 HC RX REV CODE- 250/636: Performed by: EMERGENCY MEDICINE

## 2018-08-19 PROCEDURE — 74011250637 HC RX REV CODE- 250/637: Performed by: EMERGENCY MEDICINE

## 2018-08-19 PROCEDURE — 93005 ELECTROCARDIOGRAM TRACING: CPT

## 2018-08-19 PROCEDURE — 83880 ASSAY OF NATRIURETIC PEPTIDE: CPT | Performed by: EMERGENCY MEDICINE

## 2018-08-19 PROCEDURE — 85025 COMPLETE CBC W/AUTO DIFF WBC: CPT | Performed by: EMERGENCY MEDICINE

## 2018-08-19 PROCEDURE — 99285 EMERGENCY DEPT VISIT HI MDM: CPT

## 2018-08-19 PROCEDURE — 80048 BASIC METABOLIC PNL TOTAL CA: CPT | Performed by: EMERGENCY MEDICINE

## 2018-08-19 PROCEDURE — 74011250637 HC RX REV CODE- 250/637: Performed by: INTERNAL MEDICINE

## 2018-08-19 PROCEDURE — 71045 X-RAY EXAM CHEST 1 VIEW: CPT

## 2018-08-19 PROCEDURE — 85610 PROTHROMBIN TIME: CPT | Performed by: INTERNAL MEDICINE

## 2018-08-19 PROCEDURE — 65660000000 HC RM CCU STEPDOWN

## 2018-08-19 RX ORDER — FUROSEMIDE 10 MG/ML
80 INJECTION INTRAMUSCULAR; INTRAVENOUS
Status: COMPLETED | OUTPATIENT
Start: 2018-08-19 | End: 2018-08-19

## 2018-08-19 RX ORDER — DOCUSATE SODIUM 100 MG/1
100 CAPSULE, LIQUID FILLED ORAL 2 TIMES DAILY
Status: DISCONTINUED | OUTPATIENT
Start: 2018-08-19 | End: 2018-08-28

## 2018-08-19 RX ORDER — LEVOTHYROXINE SODIUM 125 UG/1
125 TABLET ORAL
Status: DISCONTINUED | OUTPATIENT
Start: 2018-08-20 | End: 2018-08-30 | Stop reason: HOSPADM

## 2018-08-19 RX ORDER — WARFARIN SODIUM 5 MG/1
5 TABLET ORAL ONCE
Status: COMPLETED | OUTPATIENT
Start: 2018-08-19 | End: 2018-08-19

## 2018-08-19 RX ORDER — FUROSEMIDE 10 MG/ML
40 INJECTION INTRAMUSCULAR; INTRAVENOUS 3 TIMES DAILY
Status: DISCONTINUED | OUTPATIENT
Start: 2018-08-19 | End: 2018-08-20

## 2018-08-19 RX ORDER — ARIPIPRAZOLE 15 MG/1
15 TABLET ORAL DAILY
Status: DISCONTINUED | OUTPATIENT
Start: 2018-08-20 | End: 2018-08-30 | Stop reason: HOSPADM

## 2018-08-19 RX ORDER — ACETAMINOPHEN 500 MG
1000 TABLET ORAL
Status: COMPLETED | OUTPATIENT
Start: 2018-08-19 | End: 2018-08-19

## 2018-08-19 RX ORDER — NALOXONE HYDROCHLORIDE 0.4 MG/ML
0.4 INJECTION, SOLUTION INTRAMUSCULAR; INTRAVENOUS; SUBCUTANEOUS AS NEEDED
Status: DISCONTINUED | OUTPATIENT
Start: 2018-08-19 | End: 2018-08-30 | Stop reason: HOSPADM

## 2018-08-19 RX ORDER — OXYCODONE AND ACETAMINOPHEN 5; 325 MG/1; MG/1
1 TABLET ORAL
Status: DISCONTINUED | OUTPATIENT
Start: 2018-08-19 | End: 2018-08-19

## 2018-08-19 RX ORDER — FAMOTIDINE 20 MG/1
20 TABLET, FILM COATED ORAL 2 TIMES DAILY
Status: DISCONTINUED | OUTPATIENT
Start: 2018-08-19 | End: 2018-08-30 | Stop reason: HOSPADM

## 2018-08-19 RX ORDER — LISINOPRIL 10 MG/1
10 TABLET ORAL DAILY
Status: DISCONTINUED | OUTPATIENT
Start: 2018-08-20 | End: 2018-08-24

## 2018-08-19 RX ORDER — ISOSORBIDE MONONITRATE 30 MG/1
30 TABLET, EXTENDED RELEASE ORAL EVERY EVENING
Status: DISCONTINUED | OUTPATIENT
Start: 2018-08-19 | End: 2018-08-30 | Stop reason: HOSPADM

## 2018-08-19 RX ORDER — ACETAMINOPHEN 325 MG/1
650 TABLET ORAL
Status: DISCONTINUED | OUTPATIENT
Start: 2018-08-19 | End: 2018-08-30 | Stop reason: HOSPADM

## 2018-08-19 RX ORDER — POLYETHYLENE GLYCOL 3350 17 G/17G
17 POWDER, FOR SOLUTION ORAL DAILY
Status: DISCONTINUED | OUTPATIENT
Start: 2018-08-20 | End: 2018-08-30 | Stop reason: HOSPADM

## 2018-08-19 RX ORDER — PAROXETINE HYDROCHLORIDE 20 MG/1
20 TABLET, FILM COATED ORAL DAILY
Status: DISCONTINUED | OUTPATIENT
Start: 2018-08-20 | End: 2018-08-30 | Stop reason: HOSPADM

## 2018-08-19 RX ORDER — SIMVASTATIN 40 MG/1
40 TABLET, FILM COATED ORAL
Status: DISCONTINUED | OUTPATIENT
Start: 2018-08-19 | End: 2018-08-30 | Stop reason: HOSPADM

## 2018-08-19 RX ORDER — OXYCODONE HYDROCHLORIDE 5 MG/1
5 TABLET ORAL
Status: DISCONTINUED | OUTPATIENT
Start: 2018-08-19 | End: 2018-08-22

## 2018-08-19 RX ORDER — METOPROLOL TARTRATE 25 MG/1
12.5 TABLET, FILM COATED ORAL 2 TIMES DAILY
Status: DISCONTINUED | OUTPATIENT
Start: 2018-08-19 | End: 2018-08-30 | Stop reason: HOSPADM

## 2018-08-19 RX ORDER — ONDANSETRON 2 MG/ML
4 INJECTION INTRAMUSCULAR; INTRAVENOUS
Status: DISCONTINUED | OUTPATIENT
Start: 2018-08-19 | End: 2018-08-30 | Stop reason: HOSPADM

## 2018-08-19 RX ORDER — RANITIDINE 150 MG/1
150 TABLET, FILM COATED ORAL 2 TIMES DAILY
Status: DISCONTINUED | OUTPATIENT
Start: 2018-08-19 | End: 2018-08-19 | Stop reason: CLARIF

## 2018-08-19 RX ADMIN — METOPROLOL TARTRATE 12.5 MG: 25 TABLET ORAL at 19:12

## 2018-08-19 RX ADMIN — FAMOTIDINE 20 MG: 20 TABLET ORAL at 19:11

## 2018-08-19 RX ADMIN — ISOSORBIDE MONONITRATE 30 MG: 30 TABLET, EXTENDED RELEASE ORAL at 19:11

## 2018-08-19 RX ADMIN — FUROSEMIDE 40 MG: 10 INJECTION, SOLUTION INTRAMUSCULAR; INTRAVENOUS at 22:44

## 2018-08-19 RX ADMIN — SIMVASTATIN 40 MG: 40 TABLET, FILM COATED ORAL at 22:42

## 2018-08-19 RX ADMIN — WARFARIN SODIUM 5 MG: 5 TABLET ORAL at 21:05

## 2018-08-19 RX ADMIN — OXYCODONE HYDROCHLORIDE 5 MG: 5 TABLET ORAL at 22:42

## 2018-08-19 RX ADMIN — FUROSEMIDE 80 MG: 10 INJECTION, SOLUTION INTRAMUSCULAR; INTRAVENOUS at 14:18

## 2018-08-19 RX ADMIN — ACETAMINOPHEN 1000 MG: 500 TABLET, FILM COATED ORAL at 15:40

## 2018-08-19 NOTE — IP AVS SNAPSHOT
[address] [id number] [facility] 1501 [facility] Patient: Sly Rucker MRN: [id number] About your hospitalization You were admitted on:  August 19, 2018 You last received care in the:  [facility] 870 [facility] You were discharged on:  August 30, 2018 Why you were hospitalized Your primary diagnosis was:  Acute Exacerbation Of Chf (Congestive Heart Failure) (Hcc) Your diagnoses also included:  Atrial Fibrillation (Hcc), Benign Hypertensive Heart Disease Without Heart Failure, Morbid Obesity With Bmi Of 45.0-49.9, Adult (Hcc), Chronic Anticoagulation, Fluid Overload, Anxiety Follow-up Information Follow up With Details Comments Contact Info Sonia Melvin MD On 10/3/2018 @ 10:15 (appointment arranged  [address] [place] [address] 103 [facility] [address] 
[phone] Janella Pallas, MD On 10/1/2018 @ 9:30 27 [facility] 270 [facility] 200 [facility] [address] 
[phone] Jovita Catherine MD On 10/4/2018 @ 2:15 [address] [place] [address] 2520 [facility] [address] 
[phone] 968 [facility]   [address] [place] [address] 200 [facility] 
[phone] Your Scheduled Appointments Wednesday September 05, 2018  9:15 AM EDT HBV BARIATRIC WEIGHT LOSS TRIAL with HBV BARIATRIC DIETITIAN  
[facility] BAR[facility]ATRIC  ([facility]) [address] [address] [phone] The appointment time noted includes the 15 minutes time frame for check in. Please do not arrive earlier than posted appointment time. Upon arrival report to the Conference Room located in 21 [facility], [address], [place], [address] Please report to: [place] ([facility], located on the 2nd floor) [address], [address] Monday September 10, 2018  1:45 PM EDT Follow Up with Eddie Valero MD  
[facility] ([address] [place] [address]) [address] 200 [facility] [address]  
[phone] Monday October 01, 2018  9:30 AM EDT  
[facility] with Vitaliy Rojas NP [facility] [address] [address] ([address] [place] [address]) [facility] [address] [address] [address] [phone] Thursday October 04, 2018  2:15 PM EDT Follow Up with Shilpi Grande MD  
[address] ([address] [place] [address]) [address] [place] [address], [address] [facility] [address] [phone] Friday October 12, 2018 11:40 AM EDT Follow Up with Clare Hess MD  
[facility] [address] [address] ([address] [place] [address]) [facility] [address] [address] [address] [phone] Wednesday October 17, 2018 11:00 AM EDT Follow Up with Terra Donnelly NP [facility] ([address] [place] [address]) 2300 [facility] [address] 200 [facility]  
[phone] Discharge Orders None A check nimo indicates which time of day the medication should be taken. My Medications START taking these medications Instructions Each Dose to Equal  
 Morning Noon Evening Bedtime  
 acetaminophen-codeine 300-30 mg per tablet Commonly known as:  TYLENOL #3 Your last dose was: Your next dose is: Take 1 Tab by mouth every eight (8) hours as needed (moderate pain). Max Daily Amount: 3 Tabs. 1 Tab  
    
   
   
   
  
 albuterol-ipratropium 2.5 mg-0.5 mg/3 ml Nebu Commonly known as:  Anthony Figueroa Your last dose was: Your next dose is:    
   
   
 3 mL by Nebulization route every six (6) hours as needed. 3 mL  
    
   
   
   
  
 senna-docusate 8.6-50 mg per tablet Commonly known as:  Dinorah Rosa  
   
 Your last dose was: Your next dose is: Take 1 Tab by mouth nightly. 1 Tab CHANGE how you take these medications Instructions Each Dose to Equal  
 Morning Noon Evening Bedtime  
 lisinopril 5 mg tablet Commonly known as:  Keri Ewing What changed:   
- medication strength 
- how much to take Your last dose was: Your next dose is: Take 1 Tab by mouth daily. 5 mg * warfarin 7.5 mg tablet Commonly known as:  COUMADIN What changed:  medication strength Your last dose was: Your next dose is: Take 1 Tab by mouth daily. 7.5 mg  
    
   
   
   
  
 * warfarin 7.5 mg tablet Commonly known as:  COUMADIN What changed: You were already taking a medication with the same name, and this prescription was added. Make sure you understand how and when to take each. Your last dose was: Your next dose is: Take 1 Tab by mouth daily. 7.5 mg  
    
   
   
   
  
 * Notice: This list has 2 medication(s) that are the same as other medications prescribed for you. Read the directions carefully, and ask your doctor or other care provider to review them with you. CONTINUE taking these medications Instructions Each Dose to Equal  
 Morning Noon Evening Bedtime ABILIFY 15 mg tablet Generic drug:  ARIPiprazole Your last dose was: Your next dose is: Take 15 mg by mouth daily. to obtain from pharmacy 15 mg  
    
   
   
   
  
 docusate sodium 100 mg capsule Commonly known as:  Ganesh Knight Your last dose was: Your next dose is: Take 1 Cap by mouth two (2) times a day for 90 days. HOLD for loose stool. 100 mg  
    
   
   
   
  
 fluticasone 50 mcg/actuation nasal spray Commonly known as:  Naida Fetch Your last dose was: Your next dose is: 2 Sprays by Both Nostrils route daily. 2 Spray  
    
   
   
   
  
 furosemide 40 mg tablet Commonly known as:  LASIX Your last dose was: Your next dose is: Take 1 Tab by mouth two (2) times a day. 40 mg po daily 40 mg  
    
   
   
   
  
 isosorbide mononitrate ER 30 mg tablet Commonly known as:  IMDUR Your last dose was: Your next dose is: Take 1 Tab by mouth every evening. 30 mg  
    
   
   
   
  
 metoprolol tartrate 25 mg tablet Commonly known as:  LOPRESSOR Your last dose was: Your next dose is: Take 0.5 Tabs by mouth two (2) times a day. 12.5 mg  
    
   
   
   
  
 multivitamin, tx-iron-ca-min 9 mg iron-400 mcg Tab tablet Commonly known as:  THERA-M w/ IRON Your last dose was: Your next dose is: Take 1 Tab by mouth daily. 1 Tab PARoxetine 20 mg tablet Commonly known as:  PAXIL Your last dose was: Your next dose is: Take 1 Tab by mouth daily. 20 mg  
    
   
   
   
  
 polyethylene glycol 17 gram packet Commonly known as:  Hedy Bowen Your last dose was: Your next dose is: Take 1 Packet by mouth daily. 17 g  
    
   
   
   
  
 raNITIdine 150 mg tablet Commonly known as:  ZANTAC Your last dose was: Your next dose is: Take 150 mg by mouth two (2) times a day. 150 mg SYNTHROID 125 mcg tablet Generic drug:  levothyroxine Your last dose was: Your next dose is: Take 125 mcg by mouth Daily (before breakfast). 125 mcg VENTOLIN HFA 90 mcg/actuation inhaler Generic drug:  albuterol Your last dose was: Your next dose is: Take 2 Puffs by inhalation every four (4) hours as needed for Wheezing or Shortness of Breath. 2 Puff ZOCOR 40 mg tablet Generic drug:  simvastatin Your last dose was: Your next dose is: Take  by mouth nightly. to obtain from pharmacy STOP taking these medications   
 loperamide 2 mg capsule Commonly known as:  IMODIUM  
   
  
 oxyCODONE IR 5 mg immediate release tablet Commonly known as:  Anni Deluca Where to Get Your Medications Information on where to get these meds will be given to you by the nurse or doctor. ! Ask your nurse or doctor about these medications  
  acetaminophen-codeine 300-30 mg per tablet  
 albuterol-ipratropium 2.5 mg-0.5 mg/3 ml Nebu  
 lisinopril 5 mg tablet  
 senna-docusate 8.6-50 mg per tablet  
 warfarin 7.5 mg tablet  
 warfarin 7.5 mg tablet Opioid Education Prescription Opioids: What You Need to Know: 
 
Prescription opioids can be used to help relieve moderate-to-severe pain and are often prescribed following a surgery or injury, or for certain health conditions. These medications can be an important part of treatment but also come with serious risks. Opioids are strong pain medicines. Examples include hydrocodone, oxycodone, fentanyl, and morphine. Heroin is an example of an illegal opioid. It is important to work with your health care provider to make sure you are getting the safest, most effective care. WHAT ARE THE RISKS AND SIDE EFFECTS OF OPIOID USE? Prescription opioids carry serious risks of addiction and overdose, especially with prolonged use. An opioid overdose, often marked by slow breathing, can cause sudden death. The use of prescription opioids can have a number of side effects as well, even when taken as directed. · Tolerance-meaning you might need to take more of a medication for the same pain relief · Physical dependence-meaning you have symptoms of withdrawal when the medication is stopped.   Withdrawal symptoms can include nausea, sweating, chills, diarrhea, stomach cramps, and muscle aches. Withdrawal can last up to several weeks, depending on which drug you took and how long you took it. · Increased sensitivity to pain · Constipation · Nausea, vomiting, and dry mouth · Sleepiness and dizziness · Confusion · Depression · Low levels of testosterone that can result in lower sex drive, energy, and strength · Itching and sweating RISKS ARE GREATER WITH:      
· History of drug misuse, substance use disorder, or overdose · Mental health conditions (such as depression or anxiety) · Sleep apnea · Older age (72 years or older) · Pregnancy Avoid alcohol while taking prescription opioids. Also, unless specifically advised by your health care provider, medications to avoid include: · Benzodiazepines (such as Xanax or Valium) · Muscle relaxants (such as Soma or Flexeril) · Hypnotics (such as Ambien or Lunesta) · Other prescription opioids KNOW YOUR OPTIONS Talk to your health care provider about ways to manage your pain that don't involve prescription opioids. Some of these options may actually work better and have fewer risks and side effects. Options may include: 
· Pain relievers such as acetaminophen, ibuprofen, and naproxen · Some medications that are also used for depression or seizures · Physical therapy and exercise · Counseling to help patients learn how to cope better with triggers of pain and stress. · Application of heat or cold compress · Massage therapy · Relaxation techniques Be Informed Make sure you know the name of your medication, how much and how often to take it, and its potential risks & side effects. IF YOU ARE PRESCRIBED OPIOIDS FOR PAIN: 
· Never take opioids in greater amounts or more often than prescribed. Remember the goal is not to be pain-free but to manage your pain at a tolerable level. · Follow up with your primary care provider to: · Work together to create a plan on how to manage your pain. · Talk about ways to help manage your pain that don't involve prescription opioids. · Talk about any and all concerns and side effects. · Help prevent misuse and abuse. · Never sell or share prescription opioids · Help prevent misuse and abuse. · Store prescription opioids in a secure place and out of reach of others (this may include visitors, children, friends, and family). · Safely dispose of unused/unwanted prescription opioids: Find your community drug take-back program or your pharmacy mail-back program, or flush them down the toilet, following guidance from the Food and Drug Administration (www.fda.gov/Drugs/ResourcesForYou). · Visit www.cdc.gov/drugoverdose to learn about the risks of opioid abuse and overdose. · If you believe you may be struggling with addiction, tell your health care provider and ask for guidance or call [employer] at 0-597-656-LMKU. Discharge Instructions Patient armband removed and given to patient to take home. Patient was informed of the privacy risks if armband lost or stolen [employer] Activation Thank you for requesting access to [employer]. Please follow the instructions below to securely access and download your online medical record. [employer] allows you to send messages to your doctor, view your test results, renew your prescriptions, schedule appointments, and more. How Do I Sign Up? 1. In your internet browser, go to [email or web address] 
2. Click on the First Time User? Click Here link in the Sign In box. You will be redirect to the New Member Sign Up page. 3. Enter your [employer] Access Code exactly as it appears below. You will not need to use this code after youve completed the sign-up process. If you do not sign up before the expiration date, you must request a new code. [employer] Access Code: [email or web address] Expires: 2018  7:58 PM (This is the date your [employer] access code will ) 4. Enter the last four digits of your Social Security Number (xxxx) and Date of Birth (mm/dd/yyyy) as indicated and click Submit. You will be taken to the next sign-up page. 5. Create a [employer] ID. This will be your [employer] login ID and cannot be changed, so think of one that is secure and easy to remember. 6. Create a [employer] password. You can change your password at any time. 7. Enter your Password Reset Question and Answer. This can be used at a later time if you forget your password. 8. Enter your e-mail address. You will receive e-mail notification when new information is available in [address] 9. Click Sign Up. You can now view and download portions of your medical record. 10. Click the Download Summary menu link to download a portable copy of your medical information. Additional Information If you have questions, please visit the Frequently Asked Questions section of the [employer] website at [email or web address] Remember, [employer] is NOT to be used for urgent needs. For medical emergencies, dial 911. DISCHARGE SUMMARY from Nurse PATIENT INSTRUCTIONS: 
 
 
F-face looks uneven A-arms unable to move or move unevenly S-speech slurred or non-existent T-time-call 911 as soon as signs and symptoms begin-DO NOT go Back to bed or wait to see if you get better-TIME IS BRAIN. Warning Signs of HEART ATTACK Call 911 if you have these symptoms: 
? Chest discomfort.  Most heart attacks involve discomfort in the center of the chest that lasts more than a few minutes, or that goes away and comes back. It can feel like uncomfortable pressure, squeezing, fullness, or pain. ? Discomfort in other areas of the upper body. Symptoms can include pain or discomfort in one or both arms, the back, neck, jaw, or stomach. ? Shortness of breath with or without chest discomfort. ? Other signs may include breaking out in a cold sweat, nausea, or lightheadedness. Don't wait more than five minutes to call [address]! Fast action can save your life. Calling 911 is almost always the fastest way to get lifesaving treatment. Emergency Medical Services staff can begin treatment when they arrive  up to an hour sooner than if someone gets to the hospital by car. The discharge information has been reviewed with the patient. The patient verbalized understanding. Discharge medications reviewed with the patient and appropriate educational materials and side effects teaching were provided. ___________________________________________________________________________________________________________________________________ [employer] Announcement We are excited to announce that we are making your provider's discharge notes available to you in Meet You. You will see these notes when they are completed and signed by the physician that discharged you from your recent hospital stay. If you have any questions or concerns about any information you see in Jumiat, please call the Health Information Department where you were seen or reach out to your Primary Care Provider for more information about your plan of care. Introducing [facility]! Tal Hernandez introduces Meet You patient portal. Now you can access parts of your medical record, email your doctor's office, and request medication refills online. 1. In your internet browser, go to [email or web address] 2. Click on the First Time User? Click Here link in the Sign In box. You will see the New Member Sign Up page. 3. Enter your [employer] Access Code exactly as it appears below. You will not need to use this code after youve completed the sign-up process. If you do not sign up before the expiration date, you must request a new code. · [employer] Access Code: [email or web address] Expires: 8/31/2018  7:58 PM 
 
4. Enter the last four digits of your Social Security Number (xxxx) and Date of Birth (mm/dd/yyyy) as indicated and click Submit. You will be taken to the next sign-up page. 5. Create a [employer] ID. This will be your [employer] login ID and cannot be changed, so think of one that is secure and easy to remember. 6. Create a [employer] password. You can change your password at any time. 7. Enter your Password Reset Question and Answer. This can be used at a later time if you forget your password. 8. Enter your e-mail address. You will receive e-mail notification when new information is available in [address] 9. Click Sign Up. You can now view and download portions of your medical record. 10. Click the Download Summary menu link to download a portable copy of your medical information. If you have questions, please visit the Frequently Asked Questions section of the [employer] website. Remember, [employer] is NOT to be used for urgent needs. For medical emergencies, dial 911. Now available from your iPhone and Android! Introducing Uri Castellanos As a Pamela Mendoza patient, I wanted to make you aware of our electronic visit tool called Uri Juan Manueldantejuvencio. Pamela Mendoza 24/7 allows you to connect within minutes with a medical provider 24 hours a day, seven days a week via a mobile device or tablet or logging into a secure website from your computer. You can access Uri Castellanos from anywhere in the [place].  
 
A virtual visit might be right for you when you have a simple condition and feel like you just dont want to get out of bed, or cant get away from work for an appointment, when your regular [address] [place] [address] provider is not available (evenings, weekends or holidays), or when youre out of town and need minor care. Electronic visits cost only $49 and if the [address] [place] [address] 24/7 provider determines a prescription is needed to treat your condition, one can be electronically transmitted to a nearby pharmacy*. Please take a moment to enroll today if you have not already done so. The enrollment process is free and takes just a few minutes. To enroll, please download the [facility] 24/7 laura to your tablet or phone, or visit [email or web address] to enroll on your computer. And, as an [address] [place] [address] patient with a [employer] account, the results of your visits will be scanned into your electronic medical record and your primary care provider will be able to view the scanned results. We urge you to continue to see your regular [address] [place] [address] provider for your ongoing medical care. And while your primary care provider may not be the one available when you seek a OPHTHONIXdantefin virtual visit, the peace of mind you get from getting a real diagnosis real time can be priceless. For more information on OPHTHONIXdantefin, view our Frequently Asked Questions (FAQs) at [email or web address]. Sincerely, 
 
Juhi Mcmillan MD 
Chief Medical Officer [address] *:  certain medications cannot be prescribed via OPHTHONIXdantefin Providers Seen During Your Hospitalization Provider Specialty Primary office phone Etienne Meek MD Emergency Medicine [phone] Orion Oviedo MD Hospitalist [phone] Rao Hinojosa MD Internal Medicine [phone] Jose Ramsey MD Internal Medicine [phone] Your Primary Care Physician (PCP) Primary Care Physician Office Phone Office Fax Carolyn Lopez [phone] You are allergic to the following Allergen Reactions Gabapentin Other (comments) Unsteady, weakness LEs Tetanus Vaccines And Toxoid Swelling Topamax (Topiramate) Other (comments)  
 weakness Recent Documentation Height Weight BMI OB Status Smoking Status 1.524 m 137.7 kg 59.29 kg/m2 Hysterectomy Never Smoker Emergency Contacts Name Discharge Info Relation Home Work Mobile Ortiz,Cesilia DISCHARGE CAREGIVER [3] Sister [23] [phone] Ortiz,Tatiana DISCHARGE CAREGIVER [3] Daughter [21] [phone] Ortiz,Tatiana  Child [2] [phone] Patient Belongings The following personal items are in your possession at time of discharge: 
  Dental Appliances: None  Visual Aid: Glasses      Home Medications: None   Jewelry: Ring, Bracelet, Other (comment) (Medical alert necklace)  Clothing: Socks WILD MITA COM [facility])    Other Valuables: Cell Phone, Emergency Alert Device, Eyeglasses, Money (comment), Darylene Pontiff (See attached note) Please provide this summary of care documentation to your next provider. Signatures-by signing, you are acknowledging that this After Visit Summary has been reviewed with you and you have received a copy. Patient Signature:  ____________________________________________________________ Date:  ____________________________________________________________  
  
Janyth Mins Provider Signature:  ____________________________________________________________ Date:  ____________________________________________________________

## 2018-08-19 NOTE — ED TRIAGE NOTES
The patient presents for evaluation of shortness of breath, right hip pain, and increased in BLE edema. Denies chest pain, nausea, vomiting, or diaphoresis.

## 2018-08-19 NOTE — ED NOTES
Checked on patient and she is resting well call bell within reach, no additional wants or needs at this time. All safety measures are in place.

## 2018-08-19 NOTE — ED NOTES
Pure wick applied on patient, after administration of lasix. Patient c/o pain tylenol provided call bell within reach. No additional wants or needs noted at time.

## 2018-08-19 NOTE — H&P
History & Physical    Patient: Amparo Kanner MRN: [id number]  CSN: [id number]    YOB: 1960  Age: 60 y.o. Sex: female      DOA: 8/19/2018    Chief Complaint:   Chief Complaint   Patient presents with    Shortness of Breath          HPI:     Amparo Kanner is a 60 y.o.  female who has PMH of A-fib on warfarin, Rt HF and p. HTN, morbidly obese, ANDRADE and chronic respiratory failure on home oxygen 2 L. Pt presented with worsening SOB while on oxygen, incresingly worsening LE edema and swelling for the last few days. Pt admitted to eating salty food and being non complaint to fluid restriction. Pt also had elevated BP on admission. Denies CP and felt better after 60 mg of lasix. Non-compliant with CPAP for ANDRADE and is being followed by pulmonology for possible BIPAP approval    Of note Echo 05/2018 >> shows EF of 50% and severe p. HTN with moderate to severe Tricuspid Regurgitation         Past Medical History:   Diagnosis Date    Atrial fibrillation     CHADS score 1  (-CHF, +HTN, -AGE, -DM, -CVA)    Carpal tunnel syndrome     Chronic pain     Congenital heart disease     presumed pulmonary stenosis s/p repair 1969    Congestive heart failure (HCC)     Degenerative disc disease     Degenerative joint disease     GERD     H/O echocardiogram 04/2017    EF 60-65%, mild concentric LVH, dilated RV with RV dysfunction, RVSP 54 mmHg, RA E, moderate to severe pulmonic regurgitation.     Hypercholesterolemia     Hypertension     Hypothyroid     Morbid obesity     Morbid obesity with BMI of 45.0-49.9, adult ([facility] [address] 2/20/2017    Noncompliance     Schizoaffective disorder     Substance abuse     marijuana, crack cocaine       Past Surgical History:   Procedure Laterality Date    CARDIAC SURG PROCEDURE UNLIST  1971    VSD repair    COLONOSCOPY N/A 5/6/2018    COLONOSCOPY with tatooing and biopsy performed by Burton Morgan MD at [place] [address] left    HX HYSTERECTOMY      HX KNEE REPLACEMENT      left    HX KNEE REPLACEMENT      right    HX ORTHOPAEDIC      left ankle    HX ORTHOPAEDIC      carpel tunnel right and left     HX THYROIDECTOMY Left        Family History   Problem Relation Age of Onset    Hypertension Mother     Coronary Artery Disease Mother     Coronary Artery Disease Father     Hypertension Father        Social History     Social History    Marital status: SINGLE     Spouse name: N/A    Number of children: N/A    Years of education: N/A     Social History Main Topics    Smoking status: Never Smoker    Smokeless tobacco: Never Used    Alcohol use No    Drug use: No      Comment: Quit crack cocaine and marijuana 2015    Sexual activity: Yes     Birth control/ protection: Surgical     Other Topics Concern    None     Social History Narrative       Prior to Admission medications    Medication Sig Start Date End Date Taking? Authorizing Provider   furosemide (LASIX) 40 mg tablet Take 1 Tab by mouth two (2) times a day. 40 mg po daily 6/6/18  Yes Daniel Hooks MD   lisinopril (PRINIVIL, ZESTRIL) 10 mg tablet Take 1 Tab by mouth daily. 6/7/18  Yes Daniel Hooks MD   docusate sodium (COLACE) 100 mg capsule Take 1 Cap by mouth two (2) times a day for 90 days. HOLD for loose stool. 6/6/18 9/4/18 Yes Daniel Hooks MD   metoprolol tartrate (LOPRESSOR) 25 mg tablet Take 0.5 Tabs by mouth two (2) times a day. 5/17/18  Yes Caro Walsh NP   fluticasone (FLONASE) 50 mcg/actuation nasal spray 2 Sprays by Both Nostrils route daily. 5/10/18  Yes Daniel Hooks MD   oxyCODONE IR (ROXICODONE) 5 mg immediate release tablet Take 5 mg by mouth every six (6) hours as needed for Pain.    Yes Historical Provider   isosorbide mononitrate ER (IMDUR) 30 mg tablet Take 1 Tab by mouth every evening. 4/24/18  Yes [facility], NP   multivitamin, tx-iron-ca-min (THERA-M W/ IRON) 9 mg iron-400 mcg tab tablet Take 1 Tab by mouth daily. 3/1/18  Yes Juan Carlos Garcia MD   loperamide (IMODIUM) 2 mg capsule Take 2 mg by mouth three (3) times daily as needed for Diarrhea. Yes Historical Provider   raNITIdine (ZANTAC) 150 mg tablet Take 150 mg by mouth two (2) times a day. Yes Historical Provider   warfarin (COUMADIN) 5 mg tablet Take 7.5 mg by mouth daily. Yes Historical Provider   VENTOLIN HFA 90 mcg/actuation inhaler Take 2 Puffs by inhalation every four (4) hours as needed for Wheezing or Shortness of Breath. 4/26/17  Yes Juan Carlos Garcia MD   PARoxetine (PAXIL) 20 mg tablet Take 1 Tab by mouth daily. 1/21/14  Yes Jason Ngo MD   simvastatin (ZOCOR) 40 mg tablet Take  by mouth nightly. to obtain from pharmacy   Yes Historical Provider   aripiprazole (ABILIFY) 15 mg tablet Take 15 mg by mouth daily. to obtain from pharmacy   Yes Historical Provider   levothyroxine (SYNTHROID) 125 mcg tablet Take 125 mcg by mouth Daily (before breakfast). Yes Historical Provider   polyethylene glycol (MIRALAX) 17 gram packet Take 1 Packet by mouth daily. 6/6/18   Daniel Hooks MD       Allergies   Allergen Reactions    Gabapentin Other (comments)     Unsteady, weakness LEs    Tetanus Vaccines And Toxoid Swelling    Topamax [Topiramate] Other (comments)     weakness         Review of Systems  GENERAL: Patient alert, awake and oriented times 3, Unable to communicate full sentences and in distress. HEENT: No change in vision, no earache, tinnitus, sore throat or sinus congestion. NECK: No pain or stiffness. PULMONARY: +ve shortness of breath, cough or wheeze. Cardiovascular: +ve pnd / orthopnea, no CP  GASTROINTESTINAL: No abdominal pain, nausea, vomiting or diarrhea, melena or bright red blood per rectum. GENITOURINARY: No urinary frequency, urgency, hesitancy or dysuria. MUSCULOSKELETAL: No joint or muscle pain, no back pain, no recent trauma. DERMATOLOGIC: No rash, no itching, no lesions.    ENDOCRINE: No polyuria, polydipsia, no heat or cold intolerance. No recent change in weight. HEMATOLOGICAL: No anemia or easy bruising or bleeding. NEUROLOGIC: No headache, seizures, numbness, tingling or weakness. Physical Exam:     Physical Exam:  Visit Vitals    /87 (BP 1 Location: Left arm, BP Patient Position: At rest)    Pulse 69    Temp 97.9 °F (36.6 °C)    Resp 18    Ht 5' (1.524 m)    Wt 152 kg (335 lb)    SpO2 99%    BMI 65.43 kg/m2    O2 Flow Rate (L/min): 2.5 l/min O2 Device: Nasal cannula    Temp (24hrs), Av.4 °F (36.9 °C), Min:97.9 °F (36.6 °C), Max:98.8 °F (37.1 °C)     1901 -  0700  In: 222 [P.O.:222]  Out: 1400 [Urine:1400]        General:  Alert, cooperative, mild distress, appears stated age. On NC 3 L              Head: Normocephalic, without obvious abnormality, atraumatic. Eyes:  Conjunctivae/corneas clear. PERRL, EOMs intact. Nose: Nares normal. No drainage or sinus tenderness. Neck: Supple, symmetrical, trachea midline, no adenopathy, thyroid: no enlargement, no carotid bruit and no JVD. Lungs:   Decrease BS with +ve rales    Heart:  Regular rate and rhythm, S1, S2 normal. +ve murmur     Abdomen: Soft, non-tender. Bowel sounds normal.    Extremities: Extremities normal, atraumatic, no cyanosis+2 LE edema. Pulses: 2+ and symmetric all extremities. Skin:  No rashes or lesions   Neurologic: AAOx3, No focal motor or sensory deficit. Labs Reviewed:    Lab results reviewed. For significant abnormal values and values requiring intervention, see assessment and plan.   CXR and EKG    Procedures/imaging: see electronic medical records for all procedures/Xrays and details which were not copied into this note but were reviewed prior to creation of Plan      Assessment/Plan     Principal Problem:    Acute exacerbation of CHF (congestive heart failure) ([facility] [address] (2018)    Active Problems:    Hypertension ()      Atrial fibrillation ()      Overview: CHADS score 1  (-CHF, +HTN, -AGE, -DM, -CVA)      Morbid obesity with BMI of 45.0-49.9, adult (HCC) (2/20/2017)      Fluid overload (4/18/2017)      Chronic anticoagulation (6/2/2018)       Pt is admitted for cute on Chronic dHF exacerbation 2 ry to fluid overload and uncontrolled BP  Continue IV Lasix    Cardiology consult  Echo limited in AM  Continue Home meds BB, ACE-I     Chronic anticoagulation >> Continue Warfarin and monitor INR     Home meds are reconciled      DVT/GI Prophylaxis: Hep SQ and SCD's    Plan of care is discussed in details with Patient/Family at bedside and agreed upon    Rahat Atkinson MD  8/20/2018 5:11 PM

## 2018-08-19 NOTE — ED PROVIDER NOTES
EMERGENCY DEPARTMENT HISTORY AND PHYSICAL EXAM    1:24 PM      Date: 8/19/2018  Patient Name: Corwin Soto    History of Presenting Illness     Chief Complaint   Patient presents with    Shortness of Breath         History Provided By: Patient    Chief Complaint: SOB  Duration:  Last night  Timing:  Acute on chronic  Location: N/A  Quality: N/A  Severity: Moderate  Modifying Factors: Exacerbated with laying flat  Associated Symptoms: leg swelling      Additional History (Context): 1:24 PM Corwin Soto is a 60 y.o. female with h/o AFIB, Hypothyroid, HTN, and CHF who presents to ED complaining of moderate acute on chronic SOB that is exacerbated with laying flat and associated with leg swelling onset last night. Patient states that her legs began swelling abnormally last night and it was hard to lift them. She does usually sleep propped up with 3 pillows which relieves that SOB but last not did not help. She admits to eating a burger yesterday. States she is compliant with her medication and states that Lasix still makes her urinate a lot. Patient uses 2L O2 NC at home. She denies smoking, ETOH use, and drug use. States that she is allergic to gabapentin, steristrips, and the tetanus shot. Denies any coughing, fever, chills, N/V/D. She was last admitted for similar sx about 2 or 3 months ago. No other concerns or symptoms at this time.     PCP: Scooby Smyth MD    Current Facility-Administered Medications   Medication Dose Route Frequency Provider Last Rate Last Dose    [START ON 8/20/2018] ARIPiprazole (ABILIFY) tablet 15 mg  15 mg Oral DAILY Laisha Novak MD        docusate sodium (COLACE) capsule 100 mg  100 mg Oral BID Laisha Novak MD        isosorbide mononitrate ER (IMDUR) tablet 30 mg  30 mg Oral QPM Laisha Novak MD   30 mg at 08/19/18 1911    [START ON 8/20/2018] levothyroxine (SYNTHROID) tablet 125 mcg  125 mcg Oral ACB Laisha Novak MD        [START ON 8/20/2018] lisinopril (Maribel Escort) tablet 10 mg  10 mg Oral DAILY Omero Villanueva MD        metoprolol tartrate (LOPRESSOR) tablet 12.5 mg  12.5 mg Oral BID Omero Villanueva MD   12.5 mg at 08/19/18 1912    oxyCODONE IR (ROXICODONE) tablet 5 mg  5 mg Oral Q6H PRN Omero Villanueva MD        [START ON 8/20/2018] PARoxetine (PAXIL) tablet 20 mg  20 mg Oral DAILY Omero Villanueva MD       [facility] ON 8/20/2018] polyethylene glycol (MIRALAX) packet 17 g  17 g Oral DAILY Omero Villanueva MD        simvastatin (ZOCOR) tablet 40 mg  40 mg Oral QHS Omero Villanueva MD        furosemide (LASIX) injection 40 mg  40 mg IntraVENous TID Omero Villanueva MD        acetaminophen (TYLENOL) tablet 650 mg  650 mg Oral Q6H PRN Omero Villanueva MD        [facility]) injection 0.4 mg  0.4 mg IntraVENous PRN Omero Villanueva MD        [facility]dansetron [facility]) injection 4 mg  4 mg IntraVENous Q6H PRN Omero Villanueva MD        famotidine (PEPCID) tablet 20 mg  20 mg Oral BID Omero Villanueva MD   20 mg at 08/19/18 1911    [START ON 8/20/2018] WARFARIN INFORMATION NOTE (COUMADIN)   Other Q24H Omero Villanueva MD        warfarin (COUMADIN) tablet 5 mg  5 mg Oral ONCE Omero Villanueva MD         Current Outpatient Prescriptions   Medication Sig Dispense Refill    furosemide (LASIX) 40 mg tablet Take 1 Tab by mouth two (2) times a day. 40 mg po daily 60 Tab 2    lisinopril (PRINIVIL, ZESTRIL) 10 mg tablet Take 1 Tab by mouth daily. 30 Tab 2    polyethylene glycol (MIRALAX) 17 gram packet Take 1 Packet by mouth daily. 30 Packet 2    docusate sodium (COLACE) 100 mg capsule Take 1 Cap by mouth two (2) times a day for 90 days. HOLD for loose stool. 60 Cap 2    metoprolol tartrate (LOPRESSOR) 25 mg tablet Take 0.5 Tabs by mouth two (2) times a day. 30 Tab 6    fluticasone (FLONASE) 50 mcg/actuation nasal spray 2 Sprays by Both Nostrils route daily.  1 Bottle 2    oxyCODONE IR (ROXICODONE) 5 mg immediate release tablet Take 5 mg by mouth every six (6) hours as needed for Pain.  isosorbide mononitrate ER (IMDUR) 30 mg tablet Take 1 Tab by mouth every evening. 90 Tab 3    multivitamin, tx-iron-ca-min (THERA-M W/ IRON) 9 mg iron-400 mcg tab tablet Take 1 Tab by mouth daily. 30 Tab 0    loperamide (IMODIUM) 2 mg capsule Take 2 mg by mouth three (3) times daily as needed for Diarrhea.  raNITIdine (ZANTAC) 150 mg tablet Take 150 mg by mouth two (2) times a day.  warfarin (COUMADIN) 5 mg tablet Take 7.5 mg by mouth daily.  VENTOLIN HFA 90 mcg/actuation inhaler Take 2 Puffs by inhalation every four (4) hours as needed for Wheezing or Shortness of Breath. 1 Inhaler 0    PARoxetine (PAXIL) 20 mg tablet Take 1 Tab by mouth daily. 30 Tab 3    simvastatin (ZOCOR) 40 mg tablet Take  by mouth nightly. to obtain from pharmacy      aripiprazole (ABILIFY) 15 mg tablet Take 15 mg by mouth daily. to obtain from pharmacy      levothyroxine (SYNTHROID) 125 mcg tablet Take 125 mcg by mouth Daily (before breakfast). Past History     Past Medical History:  Past Medical History:   Diagnosis Date    Atrial fibrillation     CHADS score 1  (-CHF, +HTN, -AGE, -DM, -CVA)    Carpal tunnel syndrome     Chronic pain     Congenital heart disease     presumed pulmonary stenosis s/p repair 1969    Congestive heart failure (HCC)     Degenerative disc disease     Degenerative joint disease     GERD     H/O echocardiogram 04/2017    EF 60-65%, mild concentric LVH, dilated RV with RV dysfunction, RVSP 54 mmHg, RA E, moderate to severe pulmonic regurgitation.     Hypercholesterolemia     Hypertension     Hypothyroid     Morbid obesity     Morbid obesity with BMI of 45.0-49.9, adult ([facility] [address] 2/20/2017    Noncompliance     Schizoaffective disorder     Substance abuse     marijuana, crack cocaine       Past Surgical History:  Past Surgical History:   Procedure Laterality Date    CARDIAC SURG PROCEDURE UNLIST  1971    VSD repair    COLONOSCOPY N/A 5/6/2018    COLONOSCOPY with tatooing and biopsy performed by Eulogio Napier MD at [facility] ENDOSCOPY    HX [address]      left    HX HYSTERECTOMY      HX KNEE REPLACEMENT      left    HX KNEE REPLACEMENT      right    HX ORTHOPAEDIC      left ankle    HX ORTHOPAEDIC      carpel tunnel right and left     HX THYROIDECTOMY Left        Family History:  Family History   Problem Relation Age of Onset    Hypertension Mother     Coronary Artery Disease Mother     Coronary Artery Disease Father     Hypertension Father        Social History:  Social History   Substance Use Topics    Smoking status: Never Smoker    Smokeless tobacco: Never Used    Alcohol use No       Allergies: Allergies   Allergen Reactions    Gabapentin Other (comments)     Unsteady, weakness LEs    Tetanus Vaccines And Toxoid Swelling    Topamax [Topiramate] Other (comments)     weakness         Review of Systems     Review of Systems   Constitutional: Negative for activity change, appetite change, chills, diaphoresis, fatigue, fever and unexpected weight change. HENT: Negative for congestion, dental problem, drooling, ear discharge, ear pain, facial swelling, hearing loss, mouth sores, nosebleeds, postnasal drip, rhinorrhea, sinus pressure, sneezing, sore throat, tinnitus and trouble swallowing. Eyes: Negative for photophobia, pain, discharge, redness, itching and visual disturbance. Respiratory: Positive for shortness of breath. Negative for apnea, cough, choking, chest tightness, wheezing and stridor. Cardiovascular: Positive for leg swelling. Negative for chest pain and palpitations. Gastrointestinal: Negative for abdominal distention, abdominal pain, anal bleeding, blood in stool, constipation, diarrhea, nausea, rectal pain and vomiting. Endocrine: Negative for cold intolerance, heat intolerance, polydipsia, polyphagia and polyuria.    Genitourinary: Negative for decreased urine volume, difficulty urinating, dysuria, enuresis, flank pain, frequency, genital sores, hematuria and urgency. Musculoskeletal: Negative for arthralgias, back pain, gait problem, joint swelling, myalgias, neck pain and neck stiffness. Skin: Negative for color change, pallor, rash and wound. Allergic/Immunologic: Negative for environmental allergies, food allergies and immunocompromised state. Neurological: Negative for dizziness, tremors, seizures, syncope, facial asymmetry, speech difficulty, weakness, light-headedness, numbness and headaches. Hematological: Negative for adenopathy. Does not bruise/bleed easily. Psychiatric/Behavioral: Negative for agitation, behavioral problems, confusion, decreased concentration, dysphoric mood, hallucinations, self-injury, sleep disturbance and suicidal ideas. The patient is not nervous/anxious and is not hyperactive. Physical Exam     Visit Vitals    /79 (BP 1 Location: Left arm, BP Patient Position: At rest)    Pulse 68    Temp 98.8 °F (37.1 °C)    Resp 20    Ht 5' (1.524 m)    Wt 152 kg (335 lb)    SpO2 96%    BMI 65.43 kg/m2         Physical Exam   Constitutional: She is oriented to person, place, and time. She appears well-developed and well-nourished. Alert and appropriate with moderate dyspnea and tachypnea without signs of respiratory failure   HENT:   Head: Normocephalic and atraumatic. Right Ear: External ear normal.   Left Ear: External ear normal.   Mouth/Throat: Oropharynx is clear and moist. No oropharyngeal exudate. Eyes: Conjunctivae and EOM are normal. Pupils are equal, round, and reactive to light. Right eye exhibits no discharge. Left eye exhibits no discharge. No scleral icterus. Neck: Normal range of motion. Neck supple. No tracheal deviation present. No thyromegaly present. Cardiovascular: Normal rate, normal heart sounds and intact distal pulses. Exam reveals no gallop and no friction rub. No murmur heard.   Irregular rhythm Pulmonary/Chest: No respiratory distress. She has no wheezes. She has no rales. She exhibits no tenderness. Bilateral distant breath sounds, diminished at the bases   Abdominal: Soft. Bowel sounds are normal. She exhibits no distension. There is no tenderness. There is no rebound and no guarding. Musculoskeletal: Normal range of motion. She exhibits edema. She exhibits no tenderness. 4+ BLE pitting edema   Lymphadenopathy:     She has no cervical adenopathy. Neurological: She is alert and oriented to person, place, and time. No cranial nerve deficit. Coordination normal.   Gait not tested   Skin: Skin is warm. No erythema. Psychiatric: She has a normal mood and affect. Her behavior is normal. Judgment and thought content normal.   Nursing note and vitals reviewed.         Diagnostic Study Results     Labs -  Recent Results (from the past 12 hour(s))   EKG, 12 LEAD, INITIAL    Collection Time: 08/19/18 12:58 PM   Result Value Ref Range    Ventricular Rate 75 BPM    Atrial Rate 267 BPM    QRS Duration 122 ms    Q-T Interval 464 ms    QTC Calculation (Bezet) 518 ms    Calculated R Axis -35 degrees    Calculated T Axis 166 degrees    Diagnosis       Atrial fibrillation  Left axis deviation  RSR' or QR pattern in V1 suggests right ventricular conduction delay  Nonspecific T wave abnormality , probably digitalis effect  Abnormal ECG  When compared with ECG of 03-AUG-2018 14:27,  No significant change was found  Confirmed by Fabian Solorio ([id number]) on 8/19/2018 5:49:56 PM     NT-PRO BNP    Collection Time: 08/19/18  1:45 PM   Result Value Ref Range    NT pro-BNP 2157 (H) 0 - 900 PG/ML   TROPONIN I    Collection Time: 08/19/18  1:45 PM   Result Value Ref Range    Troponin-I, Qt. <0.02 0.0 - 0.045 NG/ML   CBC WITH AUTOMATED DIFF    Collection Time: 08/19/18  1:45 PM   Result Value Ref Range    WBC 4.2 (L) 4.6 - 13.2 K/uL    RBC 4.44 4.20 - 5.30 M/uL    HGB 10.2 (L) 12.0 - 16.0 g/dL    HCT 35.5 35.0 - 45.0 %    MCV 80.0 74.0 - 97.0 FL    MCH 23.0 (L) 24.0 - 34.0 PG    MCHC 28.7 (L) 31.0 - 37.0 g/dL    RDW 18.3 (H) 11.6 - 14.5 %    PLATELET 644 472 - 617 K/uL    MPV 9.6 9.2 - 11.8 FL    NEUTROPHILS 64 40 - 73 %    LYMPHOCYTES 24 21 - 52 %    MONOCYTES 9 3 - 10 %    EOSINOPHILS 3 0 - 5 %    BASOPHILS 0 0 - 2 %    ABS. NEUTROPHILS 2.8 1.8 - 8.0 K/UL    ABS. LYMPHOCYTES 1.0 0.9 - 3.6 K/UL    ABS. MONOCYTES 0.4 0.05 - 1.2 K/UL    ABS. EOSINOPHILS 0.1 0.0 - 0.4 K/UL    ABS. BASOPHILS 0.0 0.0 - 0.1 K/UL    DF AUTOMATED     METABOLIC PANEL, BASIC    Collection Time: 08/19/18  1:45 PM   Result Value Ref Range    Sodium 140 136 - 145 mmol/L    Potassium 4.3 3.5 - 5.5 mmol/L    Chloride 102 100 - 108 mmol/L    CO2 31 21 - 32 mmol/L    Anion gap 7 3.0 - 18 mmol/L    Glucose 84 74 - 99 mg/dL    BUN 17 7.0 - 18 MG/DL    Creatinine 0.91 0.6 - 1.3 MG/DL    BUN/Creatinine ratio 19 12 - 20      GFR est AA >60 >60 ml/min/1.73m2    GFR est non-AA >60 >60 ml/min/1.73m2    Calcium 8.3 (L) 8.5 - 10.1 MG/DL   PROTHROMBIN TIME + INR    Collection Time: 08/19/18  2:01 PM   Result Value Ref Range    Prothrombin time 30.6 (H) 11.5 - 15.2 sec    INR 2.9 (H) 0.8 - 1.2         Radiologic Studies -   XR CHEST PORT   Final Result   Impression:      IMPRESSION:  1. Enlarged cardiac silhouette with prominent hilar shadows similar to prior,  correlating with enlarged pulmonary arteries, and suspected mild perihilar  vascular congestion versus infiltrate. Medical Decision Making   I am the first provider for this patient. I reviewed the vital signs, available nursing notes, past medical history, past surgical history, family history and social history. Vital Signs-Reviewed the patient's vital signs.     Pulse Oximetry Analysis -  100% 3L of O2 via NC     Cardiac Monitor:  Rate: 68 bpm      Records Reviewed: Nursing Notes and Old Medical Records (Time of Review: 1:24 PM)    ED Course: Progress Notes, Reevaluation, and Consults:  Patient felt mildly improved with diuresis in the ED and remained afebrile and hemodynamically stable. She did require admission to assist with treatment of her CHF until she was able to return to her baseline and be able to perform her normal ADL's. Patient agreed with this plan. Provider Notes (Medical Decision Making): Patient with admitted excessive salt and free water intake with examination concerning for and acute CHF exacerbation to the point that patient is unable to ambulate. No signs of PE and will hold on PE work-up if therapeutic on Coumadin. Will evaluate for arrhythmia, ACS, dehydration, renal failure and electrolyte abnormality, while consulting Cardiology and beginning on treatment for CHF. For Hospitalized Patients:    1. Hospitalization Decision Time:  The decision to hospitalize the patient was made by Dr. Kash Lundy at 3:35PM on 8/19/2018    2. Aspirin: Aspirin was not given because the patient did not present with a stroke at the time of their Emergency Department evaluation    Diagnosis     Clinical Impression:   1. Acute on chronic diastolic congestive heart failure (HCC)        Disposition: Admit    Follow-up Information     None           Patient's Medications   Start Taking    No medications on file   Continue Taking    ARIPIPRAZOLE (ABILIFY) 15 MG TABLET    Take 15 mg by mouth daily. to obtain from pharmacy    DOCUSATE SODIUM (COLACE) 100 MG CAPSULE    Take 1 Cap by mouth two (2) times a day for 90 days. HOLD for loose stool. FLUTICASONE (FLONASE) 50 MCG/ACTUATION NASAL SPRAY    2 Sprays by Both Nostrils route daily. FUROSEMIDE (LASIX) 40 MG TABLET    Take 1 Tab by mouth two (2) times a day. 40 mg po daily    ISOSORBIDE MONONITRATE ER (IMDUR) 30 MG TABLET    Take 1 Tab by mouth every evening. LEVOTHYROXINE (SYNTHROID) 125 MCG TABLET    Take 125 mcg by mouth Daily (before breakfast). LISINOPRIL (PRINIVIL, ZESTRIL) 10 MG TABLET    Take 1 Tab by mouth daily.     LOPERAMIDE (IMODIUM) 2 MG CAPSULE    Take 2 mg by mouth three (3) times daily as needed for Diarrhea. METOPROLOL TARTRATE (LOPRESSOR) 25 MG TABLET    Take 0.5 Tabs by mouth two (2) times a day. MULTIVITAMIN, TX-IRON-CA-MIN (THERA-M W/ IRON) 9 MG IRON-400 MCG TAB TABLET    Take 1 Tab by mouth daily. OXYCODONE IR (ROXICODONE) 5 MG IMMEDIATE RELEASE TABLET    Take 5 mg by mouth every six (6) hours as needed for Pain. PAROXETINE (PAXIL) 20 MG TABLET    Take 1 Tab by mouth daily. POLYETHYLENE GLYCOL (MIRALAX) 17 GRAM PACKET    Take 1 Packet by mouth daily. RANITIDINE (ZANTAC) 150 MG TABLET    Take 150 mg by mouth two (2) times a day. SIMVASTATIN (ZOCOR) 40 MG TABLET    Take  by mouth nightly. to obtain from pharmacy    VENTOLIN HFA 90 MCG/ACTUATION INHALER    Take 2 Puffs by inhalation every four (4) hours as needed for Wheezing or Shortness of Breath. WARFARIN (COUMADIN) 5 MG TABLET    Take 7.5 mg by mouth daily. These Medications have changed    No medications on file   Stop Taking    No medications on file     _______________________________    Attestations:  Juan Manuel Burton acting as a scribe for and in the presence of Claudeen Clerk, MD      August 19, 2018 at [facility] PM       Provider Attestation:      I personally performed the services described in the documentation, reviewed the documentation, as recorded by the scribe in my presence, and it accurately and completely records my words and actions.  August 19, 2018 at 1:24 PM - Claudeen Clerk, MD    _______________________________

## 2018-08-20 LAB
ANION GAP SERPL CALC-SCNC: 3 MMOL/L (ref 3–18)
BASOPHILS # BLD: 0 K/UL (ref 0–0.1)
BASOPHILS NFR BLD: 1 % (ref 0–2)
BUN SERPL-MCNC: 19 MG/DL (ref 7–18)
BUN/CREAT SERPL: 21 (ref 12–20)
CALCIUM SERPL-MCNC: 8.3 MG/DL (ref 8.5–10.1)
CHLORIDE SERPL-SCNC: 98 MMOL/L (ref 100–108)
CO2 SERPL-SCNC: 38 MMOL/L (ref 21–32)
CREAT SERPL-MCNC: 0.92 MG/DL (ref 0.6–1.3)
DIFFERENTIAL METHOD BLD: ABNORMAL
EOSINOPHIL # BLD: 0.1 K/UL (ref 0–0.4)
EOSINOPHIL NFR BLD: 3 % (ref 0–5)
ERYTHROCYTE [DISTWIDTH] IN BLOOD BY AUTOMATED COUNT: 18.2 % (ref 11.6–14.5)
GLUCOSE SERPL-MCNC: 99 MG/DL (ref 74–99)
HCT VFR BLD AUTO: 33.6 % (ref 35–45)
HGB BLD-MCNC: 10 G/DL (ref 12–16)
INR PPP: 2.9 (ref 0.8–1.2)
LYMPHOCYTES # BLD: 1.1 K/UL (ref 0.9–3.6)
LYMPHOCYTES NFR BLD: 25 % (ref 21–52)
MAGNESIUM SERPL-MCNC: 2.1 MG/DL (ref 1.6–2.6)
MCH RBC QN AUTO: 23.3 PG (ref 24–34)
MCHC RBC AUTO-ENTMCNC: 29.8 G/DL (ref 31–37)
MCV RBC AUTO: 78.1 FL (ref 74–97)
MONOCYTES # BLD: 0.4 K/UL (ref 0.05–1.2)
MONOCYTES NFR BLD: 9 % (ref 3–10)
NEUTS SEG # BLD: 2.7 K/UL (ref 1.8–8)
NEUTS SEG NFR BLD: 62 % (ref 40–73)
PHOSPHATE SERPL-MCNC: 4.3 MG/DL (ref 2.5–4.9)
PLATELET # BLD AUTO: 200 K/UL (ref 135–420)
PMV BLD AUTO: 9.7 FL (ref 9.2–11.8)
POTASSIUM SERPL-SCNC: 3.6 MMOL/L (ref 3.5–5.5)
PROTHROMBIN TIME: 30.8 SEC (ref 11.5–15.2)
RBC # BLD AUTO: 4.3 M/UL (ref 4.2–5.3)
SODIUM SERPL-SCNC: 139 MMOL/L (ref 136–145)
WBC # BLD AUTO: 4.3 K/UL (ref 4.6–13.2)

## 2018-08-20 PROCEDURE — 74011250637 HC RX REV CODE- 250/637: Performed by: INTERNAL MEDICINE

## 2018-08-20 PROCEDURE — 65660000000 HC RM CCU STEPDOWN

## 2018-08-20 PROCEDURE — 80048 BASIC METABOLIC PNL TOTAL CA: CPT | Performed by: INTERNAL MEDICINE

## 2018-08-20 PROCEDURE — 85025 COMPLETE CBC W/AUTO DIFF WBC: CPT | Performed by: INTERNAL MEDICINE

## 2018-08-20 PROCEDURE — 83735 ASSAY OF MAGNESIUM: CPT | Performed by: INTERNAL MEDICINE

## 2018-08-20 PROCEDURE — 85610 PROTHROMBIN TIME: CPT | Performed by: INTERNAL MEDICINE

## 2018-08-20 PROCEDURE — 84100 ASSAY OF PHOSPHORUS: CPT | Performed by: INTERNAL MEDICINE

## 2018-08-20 PROCEDURE — 36415 COLL VENOUS BLD VENIPUNCTURE: CPT | Performed by: INTERNAL MEDICINE

## 2018-08-20 PROCEDURE — 74011250636 HC RX REV CODE- 250/636: Performed by: PHYSICIAN ASSISTANT

## 2018-08-20 PROCEDURE — 74011000250 HC RX REV CODE- 250: Performed by: INTERNAL MEDICINE

## 2018-08-20 PROCEDURE — 74011250636 HC RX REV CODE- 250/636: Performed by: INTERNAL MEDICINE

## 2018-08-20 RX ORDER — IPRATROPIUM BROMIDE AND ALBUTEROL SULFATE 2.5; .5 MG/3ML; MG/3ML
3 SOLUTION RESPIRATORY (INHALATION)
Status: DISCONTINUED | OUTPATIENT
Start: 2018-08-20 | End: 2018-08-30 | Stop reason: HOSPADM

## 2018-08-20 RX ORDER — FUROSEMIDE 10 MG/ML
40 INJECTION INTRAMUSCULAR; INTRAVENOUS EVERY 12 HOURS
Status: DISCONTINUED | OUTPATIENT
Start: 2018-08-20 | End: 2018-08-22

## 2018-08-20 RX ORDER — BACLOFEN 10 MG/1
10 TABLET ORAL 3 TIMES DAILY
Status: DISCONTINUED | OUTPATIENT
Start: 2018-08-20 | End: 2018-08-30 | Stop reason: HOSPADM

## 2018-08-20 RX ADMIN — PAROXETINE 20 MG: 20 TABLET, FILM COATED ORAL at 10:26

## 2018-08-20 RX ADMIN — LEVOTHYROXINE SODIUM 125 MCG: 125 TABLET ORAL at 06:58

## 2018-08-20 RX ADMIN — LISINOPRIL 10 MG: 10 TABLET ORAL at 10:26

## 2018-08-20 RX ADMIN — SIMVASTATIN 40 MG: 40 TABLET, FILM COATED ORAL at 22:25

## 2018-08-20 RX ADMIN — OXYCODONE HYDROCHLORIDE 5 MG: 5 TABLET ORAL at 22:35

## 2018-08-20 RX ADMIN — METOPROLOL TARTRATE 12.5 MG: 25 TABLET ORAL at 10:27

## 2018-08-20 RX ADMIN — ARIPIPRAZOLE 15 MG: 15 TABLET ORAL at 10:26

## 2018-08-20 RX ADMIN — FAMOTIDINE 20 MG: 20 TABLET ORAL at 10:26

## 2018-08-20 RX ADMIN — FUROSEMIDE 40 MG: 10 INJECTION, SOLUTION INTRAMUSCULAR; INTRAVENOUS at 22:26

## 2018-08-20 RX ADMIN — FAMOTIDINE 20 MG: 20 TABLET ORAL at 18:04

## 2018-08-20 RX ADMIN — OXYCODONE HYDROCHLORIDE 5 MG: 5 TABLET ORAL at 06:58

## 2018-08-20 RX ADMIN — OXYCODONE HYDROCHLORIDE 5 MG: 5 TABLET ORAL at 13:33

## 2018-08-20 RX ADMIN — METOPROLOL TARTRATE 12.5 MG: 25 TABLET ORAL at 18:04

## 2018-08-20 RX ADMIN — POLYETHYLENE GLYCOL 3350 17 G: 17 POWDER, FOR SOLUTION ORAL at 10:26

## 2018-08-20 RX ADMIN — ISOSORBIDE MONONITRATE 30 MG: 30 TABLET, EXTENDED RELEASE ORAL at 18:04

## 2018-08-20 RX ADMIN — ACETAMINOPHEN 650 MG: 325 TABLET ORAL at 10:26

## 2018-08-20 RX ADMIN — DOCUSATE SODIUM 100 MG: 100 CAPSULE, LIQUID FILLED ORAL at 18:04

## 2018-08-20 RX ADMIN — FUROSEMIDE 40 MG: 10 INJECTION, SOLUTION INTRAMUSCULAR; INTRAVENOUS at 10:26

## 2018-08-20 RX ADMIN — DOCUSATE SODIUM 100 MG: 100 CAPSULE, LIQUID FILLED ORAL at 10:26

## 2018-08-20 RX ADMIN — BACLOFEN 10 MG: 10 TABLET ORAL at 23:07

## 2018-08-20 NOTE — ROUTINE PROCESS
Bedside and Verbal shift change report given to Kristyn Crews (oncoming nurse) by Macey Fay RN (offgoing nurse). Report included the following information SBAR, Kardex, MAR and Recent Results. SITUATION:    Code Status: Full Code   Reason for Admission: Acute exacerbation of CHF (congestive heart failure) ([facility] [address]    Major Hospital day: 1   Problem List:       Hospital Problems  Date Reviewed: 5/22/2018          Codes Class Noted POA    * (Principal)Acute [facility]est[facility]) [facility] ICD-10-CM: I50.9  ICD-9-CM: 428.0  6/2/2018 Unknown        Chronic anticoagulation ICD-10-CM: Z79.01  ICD-9-CM: V58.61  6/2/2018 Yes        Fluid overload ICD-10-CM: E87.70  ICD-9-CM: 276.69  4/18/2017 Yes        Morbid obesity with BMI of 45.0-49.9, adult [facility]) ICD-10-CM: E66.01, Z68.42  ICD-9-CM: 278.01, V85.42  2/20/2017 Yes        Hypertension ICD-10-CM: I11.9  ICD-9-CM: 402.10  Unknown Yes        Atrial fibrillation ICD-10-CM: I48.91  ICD-9-CM: 427.31  Unknown Yes    Overview Signed 1/25/2013 10:46 AM by Edwige Lazar     CHADS score 1  (-CHF, +HTN, -AGE, -DM, -CVA)                   BACKGROUND:    Past Medical History:   Past Medical History:   Diagnosis Date    Atrial fibrillation     CHADS score 1  (-CHF, +HTN, -AGE, -DM, -CVA)    Carpal tunnel syndrome     Chronic pain     Congenital heart disease     presumed pulmonary stenosis s/p repair 1969    Congestive heart failure ([facility] [address]     Degenerative disc disease     Degenerative joint disease     GERD     H/O echocardiogram 04/2017    EF 60-65%, mild concentric LVH, dilated RV with RV dysfunction, RVSP 54 mmHg, RA E, moderate to severe pulmonic regurgitation.  Hypercholesterolemia     Hypertension     Hypothyroid     Morbid obesity     Morbid obesity with BMI of 45.0-49.9, adult ([facility] 2/20/2017    Noncompliance     Schizoaffective disorder     Substance abuse     marijuana, crack cocaine         Patient taking anticoagulants yes . Coumadin on hold today. INR 2.9    ASSESSMENT:    Changes in Assessment Throughout Shift: no     Patient has Central Line: no Reasons if yes: n/a   Patient has Shaikh Cath: yes Reasons if yes: Monitor I/O. CHF, lasix.  Last Vitals:     Vitals:    08/20/18 1228 08/20/18 1540 08/20/18 1614 08/20/18 1804   BP: 127/80 (!) 153/91     Pulse: (!) 59 (!) 58  83   Resp:  18     Temp: 97.1 °F (36.2 °C) 97.8 °F (36.6 °C)     SpO2: 97% 99%     Weight:   148.5 kg (327 lb 4.8 oz)    Height:            IV and DRAINS (will only show if present)   Peripheral IV 08/19/18 Left Hand-Site Assessment: Clean, dry, & intact     WOUND (if present)   Wound Type:  none   Dressing present Dressing Present : No   Wound Concerns/Notes:  none     PAIN    Pain Assessment    Pain Intensity 1: 0 (08/20/18 1615)    Pain Location 1: Hip    Pain Intervention(s) 1: Medication (see MAR)    Patient Stated Pain Goal: 0  o Interventions for Pain:  See mar  o Intervention effective: yes  o Time of last intervention: 1333   o Reassessment Completed: yes      Last 3 Weights:  Last 3 Recorded Weights in this Encounter    08/19/18 1305 08/20/18 1614   Weight: 152 kg (335 lb) 148.5 kg (327 lb 4.8 oz)     Weight change:      INTAKE/OUPUT    Current Shift:      Last three shifts: 08/19 0701 - 08/20 1900  In: 731 [P.O.:894]  Out: 6601 [Urine:6600]     LAB RESULTS     Recent Labs      08/20/18   0243  08/19/18   1345   WBC  4.3*  4.2*   HGB  10.0*  10.2*   HCT  33.6*  35.5   PLT  200  172        Recent Labs      08/20/18   0243  08/19/18   1401  08/19/18   1345   NA  139   --   140   K  3.6   --   4.3   GLU  99   --   84   BUN  19*   --   17   CREA  0.92   --   0.91   CA  8.3*   --   8.3*   MG  2.1   --    --    INR  2.9*  2.9*   --        RECOMMENDATIONS AND DISCHARGE PLANNING     1. Pending tests/procedures/ Plan of Care or Other Needs: Discharge     2. Discharge plan for patient and Needs/Barriers: Home with [facility]    3.  Estimated Discharge Date: 8/22/18 Posted on Whiteboard in [facility] Room: yes      4. The patient's care plan was reviewed with the oncoming nurse. \"HEALS\" SAFETY CHECK      Fall Risk    Total Score: 4    Safety Measures: Safety Measures: Bed/Chair-Wheels locked, Bed in low position, Call light within reach, Fall prevention (comment)    A safety check occurred in the patient's room between off going nurse and oncoming nurse listed above. The safety check included the below items  Area Items   H  High Alert Medications - Verify all high alert medication drips (heparin, PCA, etc.)   E  Equipment - Suction is set up for ALL patients (with danya)  - Red plugs utilized for all equipment (IV pumps, etc.)  - WOWs wiped down at end of shift.  - Room stocked with oxygen, suction, and other unit-specific supplies   A  Alarms - Bed alarm is set for fall risk patients  - Ensure chair alarm is in place and activated if patient is up in a chair   L  Lines - Check IV for any infiltration  - Shaikh bag is empty if patient has a Shaikh   - Tubing and IV bags are labeled   S  Safety   - Room is clean, patient is clean, and equipment is clean. - Hallways are clear from equipment besides carts. - Fall bracelet on for fall risk patients  - Ensure room is clear and free of clutter  - Suction is set up for ALL patients (with danya)  - Hallways are clear from equipment besides carts.    - Isolation precautions followed, supplies available outside room, sign posted     Malina Hooks RN

## 2018-08-20 NOTE — PROGRESS NOTES
[facility]  Progress Note    Patient: Richie Shay Age: 60 y.o. : 1960 MR#: [id number] SSN: [id number]  Date/Time: 2018    Subjective:     Pt reports breathing has improved. Assessment/Plan:   - SOB as presenting complaint. Likely multifactorial in this pt w/ morbid obesity/OHS, history of severe pulm HTN, , ANDRADE and non compliance w/ CPAP.  Echo ordered, results pending.  -Acute on chronice diastolic HF-cardiology following  -Chronic afib therapeutic on coumadin, INR 2.9  - HTN  -Dyslipidemia  -History of polysubstance abuse    PLAN:  -Cont IV diuretics while closely monitoring electrolytes and renal function.  -Hold today's dose of coumadin and follow INR      Additional Notes:      Case discussed with:  [x]Patient  []Family  []Nursing  []Case Management  DVT Prophylaxis:  []Lovenox  []Hep SQ  []SCDs  [x]Coumadin   []On Heparin gtt    Objective:   VS:   Visit Vitals    /80    Pulse (!) 59    Temp 97.1 °F (36.2 °C)    Resp 20    Ht 5' (1.524 m)    Wt 152 kg (335 lb)    SpO2 97%    BMI 65.43 kg/m2      Tmax/24hrs: Temp (24hrs), Av.5 °F (36.4 °C), Min:97.1 °F (36.2 °C), Max:97.9 °F (36.6 °C)    Input/Output:   Intake/Output Summary (Last 24 hours) at 18 1314  Last data filed at 18 1225   Gross per 24 hour   Intake              694 ml   Output             5725 ml   Net            -5031 ml       General:  Somnolent, in nad able to speak in full sentences, morbidly obese  Cardiovascular:  Rrr,  Pulmonary:  ctab  GI: soft, nt, nd   Extremities:  No edema  Additional:      Labs:    Recent Results (from the past 24 hour(s))   NT-PRO BNP    Collection Time: 18  1:45 PM   Result Value Ref Range    NT pro-BNP 2157 (H) 0 - 900 PG/ML   TROPONIN I    Collection Time: 18  1:45 PM   Result Value Ref Range    Troponin-I, Qt. <0.02 0.0 - 0.045 NG/ML   CBC WITH AUTOMATED DIFF    Collection Time: 18  1:45 PM   Result Value Ref Range    WBC 4.2 (L) 4.6 - 13.2 K/uL    RBC 4.44 4.20 - 5.30 M/uL    HGB 10.2 (L) 12.0 - 16.0 g/dL    HCT 35.5 35.0 - 45.0 %    MCV 80.0 74.0 - 97.0 FL    MCH 23.0 (L) 24.0 - 34.0 PG    MCHC 28.7 (L) 31.0 - 37.0 g/dL    RDW 18.3 (H) 11.6 - 14.5 %    PLATELET 426 186 - 000 K/uL    MPV 9.6 9.2 - 11.8 FL    NEUTROPHILS 64 40 - 73 %    LYMPHOCYTES 24 21 - 52 %    MONOCYTES 9 3 - 10 %    EOSINOPHILS 3 0 - 5 %    BASOPHILS 0 0 - 2 %    ABS. NEUTROPHILS 2.8 1.8 - 8.0 K/UL    ABS. LYMPHOCYTES 1.0 0.9 - 3.6 K/UL    ABS. MONOCYTES 0.4 0.05 - 1.2 K/UL    ABS. EOSINOPHILS 0.1 0.0 - 0.4 K/UL    ABS.  BASOPHILS 0.0 0.0 - 0.1 K/UL    DF AUTOMATED     METABOLIC PANEL, BASIC    Collection Time: 08/19/18  1:45 PM   Result Value Ref Range    Sodium 140 136 - 145 mmol/L    Potassium 4.3 3.5 - 5.5 mmol/L    Chloride 102 100 - 108 mmol/L    CO2 31 21 - 32 mmol/L    Anion gap 7 3.0 - 18 mmol/L    Glucose 84 74 - 99 mg/dL    BUN 17 7.0 - 18 MG/DL    Creatinine 0.91 0.6 - 1.3 MG/DL    BUN/Creatinine ratio 19 12 - 20      GFR est AA >60 >60 ml/min/1.73m2    GFR est non-AA >60 >60 ml/min/1.73m2    Calcium 8.3 (L) 8.5 - 10.1 MG/DL   PROTHROMBIN TIME + INR    Collection Time: 08/19/18  2:01 PM   Result Value Ref Range    Prothrombin time 30.6 (H) 11.5 - 15.2 sec    INR 2.9 (H) 0.8 - 1.2     METABOLIC PANEL, BASIC    Collection Time: 08/20/18  2:43 AM   Result Value Ref Range    Sodium 139 136 - 145 mmol/L    Potassium 3.6 3.5 - 5.5 mmol/L    Chloride 98 (L) 100 - 108 mmol/L    CO2 38 (H) 21 - 32 mmol/L    Anion gap 3 3.0 - 18 mmol/L    Glucose 99 74 - 99 mg/dL    BUN 19 (H) 7.0 - 18 MG/DL    Creatinine 0.92 0.6 - 1.3 MG/DL    BUN/Creatinine ratio 21 (H) 12 - 20      GFR est AA >60 >60 ml/min/1.73m2    GFR est non-AA >60 >60 ml/min/1.73m2    Calcium 8.3 (L) 8.5 - 10.1 MG/DL   MAGNESIUM    Collection Time: 08/20/18  2:43 AM   Result Value Ref Range    Magnesium 2.1 1.6 - 2.6 mg/dL   PHOSPHORUS    Collection Time: 08/20/18  2:43 AM   Result Value Ref Range    Phosphorus 4.3 2.5 - 4.9 MG/DL   CBC WITH AUTOMATED DIFF    Collection Time: 08/20/18  2:43 AM   Result Value Ref Range    WBC 4.3 (L) 4.6 - 13.2 K/uL    RBC 4.30 4.20 - 5.30 M/uL    HGB 10.0 (L) 12.0 - 16.0 g/dL    HCT 33.6 (L) 35.0 - 45.0 %    MCV 78.1 74.0 - 97.0 FL    MCH 23.3 (L) 24.0 - 34.0 PG    MCHC 29.8 (L) 31.0 - 37.0 g/dL    RDW 18.2 (H) 11.6 - 14.5 %    PLATELET 826 703 - 775 K/uL    MPV 9.7 9.2 - 11.8 FL    NEUTROPHILS 62 40 - 73 %    LYMPHOCYTES 25 21 - 52 %    MONOCYTES 9 3 - 10 %    EOSINOPHILS 3 0 - 5 %    BASOPHILS 1 0 - 2 %    ABS. NEUTROPHILS 2.7 1.8 - 8.0 K/UL    ABS. LYMPHOCYTES 1.1 0.9 - 3.6 K/UL    ABS. MONOCYTES 0.4 0.05 - 1.2 K/UL    ABS. EOSINOPHILS 0.1 0.0 - 0.4 K/UL    ABS.  BASOPHILS 0.0 0.0 - 0.1 K/UL    DF AUTOMATED     PROTHROMBIN TIME + INR    Collection Time: 08/20/18  2:43 AM   Result Value Ref Range    Prothrombin time 30.8 (H) 11.5 - 15.2 sec    INR 2.9 (H) 0.8 - 1.2       Additional Data Reviewed:      Signed By: Chad Saldivar MD     August 20, 2018 1:14 PM

## 2018-08-20 NOTE — PROGRESS NOTES
Bedside shift change report given to Michial Lombard (oncoming nurse) by Sanjay Farrell RN (offgoing nurse). Report included the following information SBAR, Kardex, Intake/Output, MAR and Recent Results.

## 2018-08-20 NOTE — ROUTINE PROCESS
Patient's possessions sent to safe with security. Contents included: 10- $20.00 bills, 1- 5.00 bill, 2- $1.00 bills for a total of $207.00 in cash. 1 Ring that has 4 stones. 1 medical alert necklace. Copy of signed inventory put in patient's hard chart.

## 2018-08-20 NOTE — CDMP QUERY
Documentation of Acute Respiratory Gaylin Scale is noted in the progress notes. Please document in the progress notes the clinical indicators that support this diagnosis or state that the diagnosis has been ruled out. Current documentation: Acute on Chronic Resp. Failure    Acute Respiratory Failure indicators include:   - Respirations <12 or >25   - Air hunger   - Use of accessory muscles of respiration   - Inability to speak in full sentences   - Cyanosis     AND    - Pulse ox <90% RA or <95% on O2   - pH <7.35 or >7.45   - pO2 < 60 mm Hg (or 10mm below COPD patient's baseline)   - pCO2 >50mm Hg (or 10mm above COPD patient's baseline)     Please clarify and document your clinical opinion in the progress notes and discharge summary including the definitive and/or presumptive diagnosis, (suspected or probable), related to the above clinical findings. Please include clinical findings supporting your diagnosis.      Thank Keyur Whitehead RN ext [phone]

## 2018-08-20 NOTE — PROGRESS NOTES
Care Management Interventions  PCP Verified by CM: Yes  Mode of Transport at Discharge: Other (see comment) (medicaid transportation)  Transition of Care Consult (CM Consult): Discharge Planning  Current Support Network: Other (pt and daughter live together)  Confirm Follow Up Transport: Other (see comment) (medicaid cab)  Plan discussed with Pt/Family/Caregiver: Yes     Reason for Admission:   Acute exacerbation of CHF                  RRAT Score:     13             Do you (patient/family) have any concerns for transition/discharge? no              Plan for utilizing home health:         Likelihood of readmission? Yellow/moderate            Transition of Care Plan:   Pt is AOx4 and states she and her daughter live together. Pt reports that she was independent with ADLs and had oxygen, CPAP and nebulizer at home. She states she would need medicaid transportation to go home when discharged.

## 2018-08-20 NOTE — ED NOTES
TRANSFER - OUT REPORT:    Verbal report given to [address] [place] [address] (name) on Vertis Dance  being transferred to [facility](unit) for routine progression of care       Report consisted of patients Situation, Background, Assessment and   Recommendations(SBAR). Information from the following report(s) ED Summary and MAR was reviewed with the receiving nurse. Lines:   Peripheral IV 08/19/18 Left Hand (Active)   Site Assessment Clean, dry, & intact 8/19/2018  5:48 PM   Dressing Status Clean, dry, & intact 8/19/2018  5:48 PM        Opportunity for questions and clarification was provided.       Patient transported with:   [employer]

## 2018-08-20 NOTE — CONSULTS
[facility] - Consult Note    Consultation request by Rahat Atkinson MD for advice/opinion related to evaluating Acute exacerbation of CHF (congestive heart failure) ([facility]    Date of  Admission: 8/19/2018 12:50 PM   Primary Care Physician:  MD Mario     Assessment:     Patient Active Problem List   Diagnosis Code    Hypertension I11.9    Congenital heart disease Q24.9    Atrial fibrillation I48.91    Osteoarthritis of both knees M17.0    H/O total knee replacement Z96.659    DJD (degenerative joint disease), lumbosacral M51.37    Schizoaffective disorder, chronic condition ([facility] F25.8    Hypothyroidism E03.9    Morbid obesity ([facility] E66.01    Esophageal reflux K21.9    Dyspnea R06.00    Dyslipidemia E78.5    Morbid obesity with BMI of 45.0-49.9, adult ([facility] E66.01, Z68.42    DDD (degenerative disc disease), lumbar, L4/5 advanced M51.36    Hypoxia R09.02    Fluid overload E87.70    CHF (congestive heart failure) (HCC) S66.0    Diastolic CHF, chronic (HCC) I50.32    Bradycardia R00.1    Acute exacerbation of CHF (congestive heart failure) (HCC) I50.9    Chronic anticoagulation Z79.01     -Acute on chronic respiratory failure, multifactorial in setting of severe pulmonary HTN, OHS, ANDRADE with likely component of acute on chronic diastolic CHF. These are similar conditions when patient admitted for same problem  -Chronic LE swelling, history of non-compliance with diuretic due to difficulty ambulating. Her edema appears to be about the same as in the past  -Chronic atrial fibrillation, on Coumadin.  She is maintained on low dose B-blocker for rate control  -Echo 04/2018: EF 50-55%, mildly to moderately increased LV wall thickness, RV moderately to severely dilated with est. Peak pressure 87 mmHg, moderately to severely dilated RA, moderate to severe tricuspid regurgitation, severe pulmonic insufficiency  -Severe pulmonary hypertension, RV dysfunction with moderate to severe pulmonary insufficiency and moderate pulmonary pressure elevation  -Hx congenital heart defect repaired as child  -HTN  -Dyslipidemia, on Simvastatin  -ANDRADE  -OHS  -Morbid obesity  -DJD, pending left knee replacement  -History of polysubstance abuse  -History of non-compliance  -Psychiatric illness      Primary cardiologist is Dr. Walters :     She is diuresing well on Lasix IV, and her lungs are sounding close to her baseline. Will cut down to q12 hours then reassess  Her BP is under better control and was elevated on admission;   INR on the upper limits of normal and would continue with this dosing. Rates stable  Continue with ACE, BB, statins  No need to repeat echocardiogram since she just had one 3 mos ago  She may be a candidate for a CardioMems (implantable PA monitor) to prevent readmissions. Will discuss with her as an outpatient  Will follow     History of Present Illness: This is a 60 y.o. female admitted for Acute exacerbation of CHF (congestive heart failure) ([facility] Patient complains of:  Patient admitted for two to three days of shortness of breath that is very similar to her prior admissions for the same. She has been doing the same normal activity and has been adherent to her medications. She is having some pain to the L lower ribs area, but has denied any trauma, cough, chest pain, palpitations or other symptoms.       Cardiac risk factors: family history, obesity, sedentary life style, hypertension, post-menopausal, HF      Review of Symptoms:  Except as stated above include:  Constitutional:  negative  Respiratory:  negative  Cardiovascular:  negative  Gastrointestinal: negative  Genitourinary:  negative  Musculoskeletal:  Negative  Neurological:  Negative  Dermatological:  Negative  Endocrinological: Negative  Psychological:  Negative    A comprehensive review of systems was negative except for: Musculoskeletal: positive for back pain     Past Medical History:     Past Medical History:   Diagnosis Date    Atrial fibrillation     CHADS score 1  (-CHF, +HTN, -AGE, -DM, -CVA)    Carpal tunnel syndrome     Chronic pain     Congenital heart disease     presumed pulmonary stenosis s/p repair 1969    Congestive heart failure (HCC)     Degenerative disc disease     Degenerative joint disease     GERD     H/O echocardiogram 04/2017    EF 60-65%, mild concentric LVH, dilated RV with RV dysfunction, RVSP 54 mmHg, RA E, moderate to severe pulmonic regurgitation.  Hypercholesterolemia     Hypertension     Hypothyroid     Morbid obesity     Morbid obesity with BMI of 45.0-49.9, adult ([facility] 2/20/2017    Noncompliance     Schizoaffective disorder     Substance abuse     marijuana, crack cocaine         Social History:     Social History     Social History    Marital status: SINGLE     Spouse name: N/A    Number of children: N/A    Years of education: N/A     Social History Main Topics    Smoking status: Never Smoker    Smokeless tobacco: Never Used    Alcohol use No    Drug use: No      Comment: Quit crack cocaine and marijuana 2015    Sexual activity: Yes     Birth control/ protection: Surgical     Other Topics Concern    None     Social History Narrative        Family History:     Family History   Problem Relation Age of Onset    Hypertension Mother     Coronary Artery Disease Mother     Coronary Artery Disease Father     Hypertension Father         Medications:      Allergies   Allergen Reactions    Gabapentin Other (comments)     Unsteady, weakness LEs    Tetanus Vaccines And Toxoid Swelling    Topamax [Topiramate] Other (comments)     weakness        Current Facility-Administered Medications   Medication Dose Route Frequency    albuterol-ipratropium (DUO-NEB) 2.5 MG-0.5 MG/3 ML  3 mL Nebulization Q6H PRN    ARIPiprazole (ABILIFY) tablet 15 mg  15 mg Oral DAILY    docusate sodium (COLACE) capsule 100 mg  100 mg Oral BID    isosorbide mononitrate ER (IMDUR) tablet 30 mg  30 mg Oral QPM    levothyroxine (SYNTHROID) tablet 125 mcg  125 mcg Oral ACB    lisinopril (PRINIVIL, ZESTRIL) tablet 10 mg  10 mg Oral DAILY    metoprolol tartrate (LOPRESSOR) tablet 12.5 mg  12.5 mg Oral BID    oxyCODONE IR (ROXICODONE) tablet 5 mg  5 mg Oral Q6H PRN    PARoxetine (PAXIL) tablet 20 mg  20 mg Oral DAILY    polyethylene glycol (MIRALAX) packet 17 g  17 g Oral DAILY    simvastatin (ZOCOR) tablet 40 mg  40 mg Oral QHS    furosemide (LASIX) injection 40 mg  40 mg IntraVENous TID    acetaminophen (TYLENOL) tablet 650 mg  650 mg Oral Q6H PRN    naloxone (NARCAN) injection 0.4 mg  0.4 mg IntraVENous PRN    ondansetron (ZOFRAN) injection 4 mg  4 mg IntraVENous Q6H PRN    famotidine (PEPCID) tablet 20 mg  20 mg Oral BID    WARFARIN INFORMATION NOTE (COUMADIN)   Other Q24H         Physical Exam:     Visit Vitals    /83 (BP 1 Location: Left arm, BP Patient Position: Head of bed elevated (Comment degrees))    Pulse 74    Temp 97.6 °F (36.4 °C)    Resp 20    Ht 5' (1.524 m)    Wt 152 kg (335 lb)    SpO2 97%    BMI 65.43 kg/m2     BP Readings from Last 3 Encounters:   08/20/18 154/83   08/03/18 150/88   07/25/18 160/84     Pulse Readings from Last 3 Encounters:   08/20/18 74   08/03/18 62   07/25/18 62     Wt Readings from Last 3 Encounters:   08/19/18 152 kg (335 lb)   08/03/18 146.5 kg (323 lb)   07/25/18 146.5 kg (323 lb)       General:  alert, cooperative, no distress, appears stated age  Neck:  nontender, no carotid bruit, no JVD  Lungs:  Mild crackles at bases without wheeze or rhonchi noted  Heart:  irregularly irregular rhythm, S1, S2 normal, systolic murmur: early systolic 2/6, crescendo at 2nd left intercostal space  Abdomen:  abdomen is soft without significant tenderness, masses, organomegaly or guarding  Extremities:  extremities normal, atraumatic, no cyanosis, edema 2+ on L  Skin: Warm and dry.  no hyperpigmentation, vitiligo, or suspicious lesions  Neuro: alert, oriented x3, affect appropriate, no focal neurological deficits, moves all extremities well, no involuntary movements  Psych: non focal     Data Review:     Recent Labs      08/20/18   0243  08/19/18   1345   WBC  4.3*  4.2*   HGB  10.0*  10.2*   HCT  33.6*  35.5   PLT  200  172     Recent Labs      08/20/18   0243  08/19/18   1401  08/19/18   1345   NA  139   --   140   K  3.6   --   4.3   CL  98*   --   102   CO2  38*   --   31   GLU  99   --   84   BUN  19*   --   17   CREA  0.92   --   0.91   CA  8.3*   --   8.3*   MG  2.1   --    --    PHOS  4.3   --    --    INR  2.9*  2.9*   --        Results for orders placed or performed during the hospital encounter of 08/19/18   EKG, 12 LEAD, INITIAL   Result Value Ref Range    Ventricular Rate 75 BPM    Atrial Rate 267 BPM    QRS Duration 122 ms    Q-T Interval 464 ms    QTC Calculation (Bezet) 518 ms    Calculated R Axis -35 degrees    Calculated T Axis 166 degrees    Diagnosis       Atrial fibrillation  Left axis deviation  RSR' or QR pattern in V1 suggests right ventricular conduction delay  Nonspecific T wave abnormality , probably digitalis effect  Abnormal ECG  When compared with ECG of 03-AUG-2018 14:27,  No significant change was found  Confirmed by Mary James ([id number]) on 8/19/2018 5:49:56 PM     Results for orders placed or performed in visit on 05/17/18   AMB POC EKG ROUTINE W/ 12 LEADS, INTER & REP    Narrative    Atrial fibrillation with slow ventricular rate. Intraventricular conduction delay. Diffuse T-wave abnormality.        All Cardiac Markers in the last 24 hours:    Lab Results   Component Value Date/Time    TROIQ <0.02 08/19/2018 01:45 PM       Last Lipid:    Lab Results   Component Value Date/Time    Cholesterol, total 90 05/03/2018 10:25 AM    HDL Cholesterol 34 (L) 05/03/2018 10:25 AM    LDL, calculated 41.4 05/03/2018 10:25 AM    Triglyceride 73 05/03/2018 10:25 AM    CHOL/HDL Ratio 2.6 05/03/2018 10:25 AM       Signed By: Eileen Aceves, PA     August 20, 2018      Reena Mack MD

## 2018-08-21 LAB
ANION GAP SERPL CALC-SCNC: 2 MMOL/L (ref 3–18)
BASOPHILS # BLD: 0 K/UL (ref 0–0.1)
BASOPHILS NFR BLD: 0 % (ref 0–2)
BUN SERPL-MCNC: 19 MG/DL (ref 7–18)
BUN/CREAT SERPL: 18 (ref 12–20)
CALCIUM SERPL-MCNC: 8 MG/DL (ref 8.5–10.1)
CHLORIDE SERPL-SCNC: 97 MMOL/L (ref 100–108)
CO2 SERPL-SCNC: 41 MMOL/L (ref 21–32)
CREAT SERPL-MCNC: 1.05 MG/DL (ref 0.6–1.3)
DIFFERENTIAL METHOD BLD: ABNORMAL
EOSINOPHIL # BLD: 0.2 K/UL (ref 0–0.4)
EOSINOPHIL NFR BLD: 4 % (ref 0–5)
ERYTHROCYTE [DISTWIDTH] IN BLOOD BY AUTOMATED COUNT: 18.1 % (ref 11.6–14.5)
GLUCOSE SERPL-MCNC: 115 MG/DL (ref 74–99)
HCT VFR BLD AUTO: 33.5 % (ref 35–45)
HGB BLD-MCNC: 9.7 G/DL (ref 12–16)
INR PPP: 2.2 (ref 0.8–1.2)
LYMPHOCYTES # BLD: 1 K/UL (ref 0.9–3.6)
LYMPHOCYTES NFR BLD: 22 % (ref 21–52)
MCH RBC QN AUTO: 23.1 PG (ref 24–34)
MCHC RBC AUTO-ENTMCNC: 29 G/DL (ref 31–37)
MCV RBC AUTO: 79.8 FL (ref 74–97)
MONOCYTES # BLD: 0.4 K/UL (ref 0.05–1.2)
MONOCYTES NFR BLD: 9 % (ref 3–10)
NEUTS SEG # BLD: 2.8 K/UL (ref 1.8–8)
NEUTS SEG NFR BLD: 65 % (ref 40–73)
PLATELET # BLD AUTO: 167 K/UL (ref 135–420)
PMV BLD AUTO: 9.1 FL (ref 9.2–11.8)
POTASSIUM SERPL-SCNC: 3.5 MMOL/L (ref 3.5–5.5)
PROTHROMBIN TIME: 24.8 SEC (ref 11.5–15.2)
RBC # BLD AUTO: 4.2 M/UL (ref 4.2–5.3)
SODIUM SERPL-SCNC: 140 MMOL/L (ref 136–145)
WBC # BLD AUTO: 4.3 K/UL (ref 4.6–13.2)

## 2018-08-21 PROCEDURE — 74011250637 HC RX REV CODE- 250/637: Performed by: INTERNAL MEDICINE

## 2018-08-21 PROCEDURE — 36415 COLL VENOUS BLD VENIPUNCTURE: CPT | Performed by: INTERNAL MEDICINE

## 2018-08-21 PROCEDURE — 77010033678 HC OXYGEN DAILY

## 2018-08-21 PROCEDURE — 85025 COMPLETE CBC W/AUTO DIFF WBC: CPT | Performed by: INTERNAL MEDICINE

## 2018-08-21 PROCEDURE — 80048 BASIC METABOLIC PNL TOTAL CA: CPT | Performed by: INTERNAL MEDICINE

## 2018-08-21 PROCEDURE — 97161 PT EVAL LOW COMPLEX 20 MIN: CPT

## 2018-08-21 PROCEDURE — 85610 PROTHROMBIN TIME: CPT | Performed by: INTERNAL MEDICINE

## 2018-08-21 PROCEDURE — 97116 GAIT TRAINING THERAPY: CPT

## 2018-08-21 PROCEDURE — 65660000000 HC RM CCU STEPDOWN

## 2018-08-21 PROCEDURE — 74011250636 HC RX REV CODE- 250/636: Performed by: PHYSICIAN ASSISTANT

## 2018-08-21 RX ORDER — WARFARIN SODIUM 5 MG/1
5 TABLET ORAL ONCE
Status: COMPLETED | OUTPATIENT
Start: 2018-08-21 | End: 2018-08-21

## 2018-08-21 RX ADMIN — ACETAMINOPHEN 650 MG: 325 TABLET ORAL at 11:51

## 2018-08-21 RX ADMIN — ARIPIPRAZOLE 15 MG: 15 TABLET ORAL at 08:51

## 2018-08-21 RX ADMIN — LISINOPRIL 10 MG: 10 TABLET ORAL at 08:50

## 2018-08-21 RX ADMIN — BACLOFEN 10 MG: 10 TABLET ORAL at 08:50

## 2018-08-21 RX ADMIN — FAMOTIDINE 20 MG: 20 TABLET ORAL at 18:47

## 2018-08-21 RX ADMIN — METOPROLOL TARTRATE 12.5 MG: 25 TABLET ORAL at 08:48

## 2018-08-21 RX ADMIN — SIMVASTATIN 40 MG: 40 TABLET, FILM COATED ORAL at 21:01

## 2018-08-21 RX ADMIN — FAMOTIDINE 20 MG: 20 TABLET ORAL at 08:50

## 2018-08-21 RX ADMIN — FUROSEMIDE 40 MG: 10 INJECTION, SOLUTION INTRAMUSCULAR; INTRAVENOUS at 08:51

## 2018-08-21 RX ADMIN — DOCUSATE SODIUM 100 MG: 100 CAPSULE, LIQUID FILLED ORAL at 18:47

## 2018-08-21 RX ADMIN — METOPROLOL TARTRATE 12.5 MG: 25 TABLET ORAL at 18:48

## 2018-08-21 RX ADMIN — WARFARIN SODIUM 5 MG: 5 TABLET ORAL at 18:48

## 2018-08-21 RX ADMIN — OXYCODONE HYDROCHLORIDE 5 MG: 5 TABLET ORAL at 13:46

## 2018-08-21 RX ADMIN — BACLOFEN 10 MG: 10 TABLET ORAL at 21:01

## 2018-08-21 RX ADMIN — PAROXETINE 20 MG: 20 TABLET, FILM COATED ORAL at 08:50

## 2018-08-21 RX ADMIN — DOCUSATE SODIUM 100 MG: 100 CAPSULE, LIQUID FILLED ORAL at 08:51

## 2018-08-21 RX ADMIN — LEVOTHYROXINE SODIUM 125 MCG: 125 TABLET ORAL at 06:56

## 2018-08-21 RX ADMIN — ISOSORBIDE MONONITRATE 30 MG: 30 TABLET, EXTENDED RELEASE ORAL at 18:47

## 2018-08-21 RX ADMIN — FUROSEMIDE 40 MG: 10 INJECTION, SOLUTION INTRAMUSCULAR; INTRAVENOUS at 20:58

## 2018-08-21 RX ADMIN — BACLOFEN 10 MG: 10 TABLET ORAL at 16:06

## 2018-08-21 NOTE — PROGRESS NOTES
Problem: Falls - Risk of  Goal: *Absence of Falls  Document Piyush Fall Risk and appropriate interventions in the flowsheet. Outcome: Progressing Towards Goal  Fall Risk Interventions:  Mobility Interventions: Bed/chair exit alarm, Communicate number of staff needed for ambulation/transfer, Patient to call before getting OOB         Medication Interventions: Bed/chair exit alarm    Elimination Interventions: Bed/chair exit alarm, Call light in reach, Patient to call for help with toileting needs    History of Falls Interventions: Bed/chair exit alarm, Door open when patient unattended, Room close to nurse's station        Problem: Pressure Injury - Risk of  Goal: *Prevention of pressure injury  Document Stas Scale and appropriate interventions in the flowsheet.    Outcome: Progressing Towards Goal  Pressure Injury Interventions:       Moisture Interventions: Absorbent underpads, Internal/External urinary devices, Minimize layers    Activity Interventions: Pressure redistribution bed/mattress(bed type)    Mobility Interventions: HOB 30 degrees or less, Pressure redistribution bed/mattress (bed type)    Nutrition Interventions: Document food/fluid/supplement intake    Friction and Shear Interventions: HOB 30 degrees or less, Minimize layers

## 2018-08-21 NOTE — PROGRESS NOTES
Bedside shift change report given to Leah Grewal (oncoming nurse) by DELMI Greene (offgoing nurse). Report included the following information SBAR, Kardex, Intake/Output, MAR and Recent Results.

## 2018-08-21 NOTE — PROGRESS NOTES
Problem: Falls - Risk of  Goal: *Absence of Falls  Document Piyush Fall Risk and appropriate interventions in the flowsheet. Outcome: Progressing Towards Goal  Fall Risk Interventions:  Mobility Interventions: Bed/chair exit alarm, Communicate number of staff needed for ambulation/transfer         Medication Interventions: Bed/chair exit alarm    Elimination Interventions: Call light in reach, Bed/chair exit alarm, Patient to call for help with toileting needs    History of Falls Interventions: Bed/chair exit alarm, Door open when patient unattended, Investigate reason for fall, Room close to nurse's station        Problem: Pressure Injury - Risk of  Goal: *Prevention of pressure injury  Document Stas Scale and appropriate interventions in the flowsheet.    Outcome: Progressing Towards Goal  Pressure Injury Interventions:       Moisture Interventions: Absorbent underpads, Assess need for specialty bed, Internal/External urinary devices, Minimize layers    Activity Interventions: Assess need for specialty bed, Pressure redistribution bed/mattress(bed type)    Mobility Interventions: Assess need for specialty bed, HOB 30 degrees or less, Pressure redistribution bed/mattress (bed type)    Nutrition Interventions: Document food/fluid/supplement intake    Friction and Shear Interventions: HOB 30 degrees or less, Minimize layers

## 2018-08-21 NOTE — PROGRESS NOTES
met with Patient completed the initial Spiritual Assessment of the patient, and offered Pastoral Care, see flow sheets for interventions. Patient does not have any Anabaptism/cultural needs that will affect patients preferences in health care. Charted reviewed. Chaplains will continue to follow and will provide pastoral care on an as needed/requested basis.       Ángel [phone]  Pager:[phone]

## 2018-08-21 NOTE — PROGRESS NOTES
Problem: Mobility Impaired (Adult and Pediatric)  Goal: *Acute Goals and Plan of Care (Insert Text)  Physical Therapy Goals  Initiated 8/21/2018 and to be accomplished within 7 day(s)  1. Patient will move from supine to sit and sit to supine, scoot up and down and roll side to side in bed with contact guard assist.     2.  Patient will transfer from bed to chair and chair to bed with contact guard assist using the least restrictive device. 3.  Patient will perform sit to stand with minimal assistance/contact guard assist.  physical Therapy EVALUATION    Patient: Amparo Kanner (56 y.o. female)  Date: 8/21/2018  Primary Diagnosis: Acute exacerbation of CHF (congestive heart failure) ([facility] [address]        Precautions: Fall   ASSESSMENT :  Patient is 64yo F admitted to hospital for acute CHF exacerbation and presents today alert and agreeable to therapy and was supine in bed upon arrival. Patient transferred to sitting at EOB by using hospital to elevated head as she reports she does at home. Sitting EOB patient required additional time to allow BLE spasms to decrease. Patient then performed objective assessment and then scooted towards HOB. Patient unsafe to stand with BLE spasms and pt also demos BUE spasms as she held phone to ear to really to family that she was with therapy. Patient demos decreased strength, mobility, endurance, and increased pain and LE spasms limiting mobility. Patient will benefit from skilled intervention to address the above impairments.   Patients rehabilitation potential is considered to be Good  Factors which may influence rehabilitation potential include:   []         None noted   [x]         Mental ability/status  [x]         Medical condition  [x]         Home/family situation and support systems  [x]         Safety awareness  [x]         Pain tolerance/management  []         Other:      PLAN :  Recommendations and Planned Interventions:  [x]           Bed Mobility Training             [x] Neuromuscular Re-Education  [x]           Transfer Training                   []    Orthotic/Prosthetic Training  [x]           Gait Training                          []    Modalities  [x]           Therapeutic Exercises          []    Edema Management/Control  [x]           Therapeutic Activities            [x]    Patient and Family Training/Education  []           Other (comment):    Frequency/Duration: Patient will be followed by physical therapy 1-2 times per day/4-7 days per week to address goals. Discharge Recommendations: Zechariah Juárez  Further Equipment Recommendations for Discharge: N/A     G-CODES     Mobility  Current  CJ= 20-39%   Goal  CI= 1-19%. The severity rating is based on the Level of Assistance required for Functional Mobility and ADLs.        G-CODES     Eval Complexity: History: MEDIUM  Complexity : 1-2 comorbidities / personal factors will impact the outcome/ POC Exam:LOW Complexity : 1-2 Standardized tests and measures addressing body structure, function, activity limitation and / or participation in recreation  Presentation: LOW Complexity : Stable, uncomplicated  Clinical Decision Making:Low Complexity   Overall Complexity:LOW     SUBJECTIVE:   Patient stated I can't do it with all this pain.     OBJECTIVE DATA SUMMARY:     Past Medical History:   Diagnosis Date    Atrial fibrillation     CHADS score 1  (-CHF, +HTN, -AGE, -DM, -CVA)    Carpal tunnel syndrome     Chronic pain     Congenital heart disease     presumed pulmonary stenosis s/p repair 1969    Congestive heart failure (HCC)     Degenerative disc disease     Degenerative joint disease     GERD     H/O echocardiogram 04/2017    EF 60-65%, mild concentric LVH, dilated RV with RV dysfunction, RVSP 54 mmHg, RA E, moderate to severe pulmonic regurgitation.     Hypercholesterolemia     Hypertension     Hypothyroid     Morbid obesity     Morbid obesity with BMI of 45.0-49.9, adult ([facility] [address] 2/20/2017  Noncompliance     Schizoaffective disorder     Substance abuse     marijuana, crack cocaine     Past Surgical History:   Procedure Laterality Date    CARDIAC SURG PROCEDURE UNLIST  1971    VSD repair    COLONOSCOPY N/A 5/6/2018    COLONOSCOPY with tatooing and biopsy performed by Vijay Mckinney MD at [facility] ENDOSCOPY    HX [address]      left    HX HYSTERECTOMY      HX KNEE REPLACEMENT      left    HX KNEE REPLACEMENT      right    HX ORTHOPAEDIC      left ankle    HX ORTHOPAEDIC      carpel tunnel right and left     HX THYROIDECTOMY Left      Barriers to Learning/Limitations: yes;  physical  Compensate with: Visual Cues, Verbal Cues and Tactile Cues  Prior Level of Function/Home Situation: Patient lives in 2 story home with [address]. Patient reports that she has a hospital bed at home that she [place] [facility] and then transferred to [facility] from bed using RW and assist from daughter. Patient has [facility] and performs basin bath with daughter assist. Patient was able to propel WC for very short distances and otherwise required assist to push WC. Home Situation  Home Environment: Apartment  # Steps to Enter: 1  One/Two Story Residence: Two story, live on 1st floor  Living Alone: No  Support Systems: Child(jules)  Patient Expects to be Discharged to[de-identified] Apartment  Current DME Used/Available at Home: Commode, bedside, Walker, rollator, Wheelchair, Shower chair, Oxygen, portable  Critical Behavior:    A&Ox4  Strength:    Strength: Generally decreased, functional (BLE)   Tone & Sensation:   Tone: Normal (BLE)   Sensation: Intact (BLE)     Range Of Motion:  AROM: Generally decreased, functional (BLE)   Functional Mobility:  Bed Mobility:  Rolling: Contact guard assistance  Supine to Sit: Contact guard assistance; Adaptive equipment; Additional time (bed rail use)  Sit to Supine: Moderate assistance; Additional time  Scooting: Contact guard assistance  Transfers:  Sit to Stand:  (deferred due to BLE spasms/pain) Balance:   Sitting: Intact; With support  Pain:  Pt reports 8/10 pain or discomfort prior to treatment.  (BLE spasms)  Pt reports 8/10 pain or discomfort post treatment. Activity Tolerance:   Patient tolerated activity fair; unable to safely perform further mobility due to pain and BLE spasms. Please refer to the flowsheet for vital signs taken during this treatment. After treatment:   []         Patient left in no apparent distress sitting up in chair  [x]         Patient left in no apparent distress in bed  [x]         Call bell left within reach  [x]         Nursing notified Gabriele Kuo)  []         Caregiver present  []         Bed alarm activated  []         SCDs in place to B LE     COMMUNICATION/EDUCATION:   [x]         Fall prevention education was provided and the patient/caregiver indicated understanding. [x]         Patient/family have participated as able in goal setting and plan of care. [x]         Patient/family agree to work toward stated goals and plan of care. []         Patient understands intent and goals of therapy, but is neutral about his/her participation. []         Patient is unable to participate in goal setting and plan of care.     Thank you for this referral.  Irena Anguiano, PT   Time Calculation: 24 mins

## 2018-08-21 NOTE — PROGRESS NOTES
[facility] Hospitalist Group  Progress Note    Patient: Cirilo Jenkins Age: 60 y.o. : 1960 MR#: [id number] SSN: [id number]  Date/Time: 2018    Subjective:     Pt reports breathing has improved. Assessment/Plan:   - SOB as presenting complaint. Likely multifactorial in this pt w/ morbid obesity/OHS, history of severe pulm HTN, , ANDRADE and non compliance w/ CPAP.   -Acute on chronice diastolic HF-cardiology following  -Chronic afib therapeutic on coumadin, INR 2.9  - HTN  -Dyslipidemia  -History of polysubstance abuse    PLAN:  -Cont IV diuretics while closely monitoring electrolytes and renal function.  -give dose of coumadin and follow INR  -PT/OT      Additional Notes:      Case discussed with:  [x]Patient  []Family  []Nursing  []Case Management  DVT Prophylaxis:  []Lovenox  []Hep SQ  []SCDs  [x]Coumadin   []On Heparin gtt    Objective:   VS:   Visit Vitals    /89 (BP 1 Location: Left arm, BP Patient Position: At rest)    Pulse 80    Temp 98 °F (36.7 °C)    Resp 18    Ht 5' (1.524 m)    Wt 146.1 kg (322 lb)    SpO2 94%    BMI 62.89 kg/m2      Tmax/24hrs: Temp (24hrs), Av.3 °F (36.3 °C), Min:96.9 °F (36.1 °C), Max:98 °F (36.7 °C)    Input/Output:     Intake/Output Summary (Last 24 hours) at 18 1718  Last data filed at 18 1606   Gross per 24 hour   Intake             1184 ml   Output             4851 ml   Net            -3667 ml       General:  Somnolent, in nad able to speak in full sentences, morbidly obese  Cardiovascular:  Rrr,  Pulmonary:  ctab  GI: soft, nt, nd   Extremities:  No edema  Additional:      Labs:    Recent Results (from the past 24 hour(s))   METABOLIC PANEL, BASIC    Collection Time: 18  2:06 AM   Result Value Ref Range    Sodium 140 136 - 145 mmol/L    Potassium 3.5 3.5 - 5.5 mmol/L    Chloride 97 (L) 100 - 108 mmol/L    CO2 41 (HH) 21 - 32 mmol/L    Anion gap 2 (L) 3.0 - 18 mmol/L    Glucose 115 (H) 74 - 99 mg/dL    BUN 19 (H) 7.0 - 18 MG/DL    Creatinine 1.05 0.6 - 1.3 MG/DL    BUN/Creatinine ratio 18 12 - 20      GFR est AA >60 >60 ml/min/1.73m2    GFR est non-AA 54 (L) >60 ml/min/1.73m2    Calcium 8.0 (L) 8.5 - 10.1 MG/DL   CBC WITH AUTOMATED DIFF    Collection Time: 08/21/18  2:06 AM   Result Value Ref Range    WBC 4.3 (L) 4.6 - 13.2 K/uL    RBC 4.20 4. 20 - 5.30 M/uL    HGB 9.7 (L) 12.0 - 16.0 g/dL    HCT 33.5 (L) 35.0 - 45.0 %    MCV 79.8 74.0 - 97.0 FL    MCH 23.1 (L) 24.0 - 34.0 PG    MCHC 29.0 (L) 31.0 - 37.0 g/dL    RDW 18.1 (H) 11.6 - 14.5 %    PLATELET 906 558 - 608 K/uL    MPV 9.1 (L) 9.2 - 11.8 FL    NEUTROPHILS 65 40 - 73 %    LYMPHOCYTES 22 21 - 52 %    MONOCYTES 9 3 - 10 %    EOSINOPHILS 4 0 - 5 %    BASOPHILS 0 0 - 2 %    ABS. NEUTROPHILS 2.8 1.8 - 8.0 K/UL    ABS. LYMPHOCYTES 1.0 0.9 - 3.6 K/UL    ABS. MONOCYTES 0.4 0.05 - 1.2 K/UL    ABS. EOSINOPHILS 0.2 0.0 - 0.4 K/UL    ABS.  BASOPHILS 0.0 0.0 - 0.1 K/UL    DF AUTOMATED     PROTHROMBIN TIME + INR    Collection Time: 08/21/18  2:06 AM   Result Value Ref Range    Prothrombin time 24.8 (H) 11.5 - 15.2 sec    INR 2.2 (H) 0.8 - 1.2       Additional Data Reviewed:      Signed By: Gilmer Armstrong MD     August 21, 2018 1:14 PM

## 2018-08-21 NOTE — PROGRESS NOTES
Patient complaint of muscle spasm bilat legs from hips to toes. Contacted Dr. Veena Echeverria, orders provided.

## 2018-08-21 NOTE — ROUTINE PROCESS
Bedside and Verbal shift change report given to Bryon Armstrong (oncoming nurse) by Ciro Abbasi RN (offgoing nurse). Report included the following information SBAR, Kardex, MAR and Recent Results. SITUATION:    Code Status: Full Code   Reason for Admission: Acute exacerbation of CHF (congestive heart failure) ([facility] [address]    [facility] day: 2   Problem List:       Hospital Problems  Date Reviewed: 5/22/2018          Codes Class Noted POA    * (Principal)Acute exacerbation of CHF (congestive heart failure) [facility] ICD-10-CM: I50.9  ICD-9-CM: 428.0  6/2/2018 Unknown        Chronic anticoagulation ICD-10-CM: Z79.01  ICD-9-CM: V58.61  6/2/2018 Yes        Fluid overload ICD-10-CM: E87.70  ICD-9-CM: 276.69  4/18/2017 Yes        Morbid obesity with BMI of 45.0-49.9, adult [facility]) ICD-10-CM: E66.01, Z68.42  ICD-9-CM: 278.01, V85.42  2/20/2017 Yes        Hypertension ICD-10-CM: I11.9  ICD-9-CM: 402.10  Unknown Yes        Atrial fibrillation ICD-10-CM: I48.91  ICD-9-CM: 427.31  Unknown Yes    Overview Signed 1/25/2013 10:46 AM by Shahnaz Hogue     CHADS score 1  (-CHF, +HTN, -AGE, -DM, -CVA)                   BACKGROUND:    Past Medical History:   Past Medical History:   Diagnosis Date    Atrial fibrillation     CHADS score 1  (-CHF, +HTN, -AGE, -DM, -CVA)    Carpal tunnel syndrome     Chronic pain     Congenital heart disease     presumed pulmonary stenosis s/p repair 1969    Congestive heart failure ([facility]     Degenerative disc disease     Degenerative joint disease     GERD     H/O echocardiogram 04/2017    EF 60-65%, mild concentric LVH, dilated RV with RV dysfunction, RVSP 54 mmHg, RA E, moderate to severe pulmonic regurgitation.  Hypercholesterolemia     Hypertension     Hypothyroid     Morbid obesity     Morbid obesity with BMI of 45.0-49.9, adult ([facility] 2/20/2017    Noncompliance     Schizoaffective disorder     Substance abuse     marijuana, crack cocaine         Patient taking anticoagulants yes . Coumadin on hold today. INR 2.9    ASSESSMENT:    Changes in Assessment Throughout Shift: no     Patient has Central Line: no Reasons if yes: n/a   Patient has Shaikh Cath: yes Reasons if yes: Monitor I/O. CHF, lasix.      Last Vitals:     Vitals:    08/21/18 0750 08/21/18 0850 08/21/18 1134 08/21/18 1534   BP: 125/81  (!) 149/93 154/89   Pulse: 63  64 80   Resp: 18 19 18   Temp: 97.1 °F (36.2 °C)  97 °F (36.1 °C) 98 °F (36.7 °C)   SpO2: 96%  96% 94%   Weight:  146.1 kg (322 lb)     Height:            IV and DRAINS (will only show if present)   Peripheral IV 08/19/18 Left Hand-Site Assessment: Clean, dry, & intact     WOUND (if present)   Wound Type:  none   Dressing present Dressing Present : No   Wound Concerns/Notes:  none     PAIN    Pain Assessment    Pain Intensity 1: 0 (08/21/18 1705)    Pain Location 1: Hip    Pain Intervention(s) 1: Medication (see MAR)    Patient Stated Pain Goal: 0  o Interventions for Pain:  See mar  o Intervention effective: yes  o Time of last intervention: 1606   o Reassessment Completed: yes      Last 3 Weights:  Last 3 Recorded Weights in this Encounter    08/19/18 1305 08/20/18 1614 08/21/18 0850   Weight: 152 kg (335 lb) 148.5 kg (327 lb 4.8 oz) 146.1 kg (322 lb)     Weight change: -3.493 kg (-7 lb 11.2 oz)     INTAKE/OUPUT    Current Shift:      Last three shifts: 08/20 0701 - 08/21 1900  In: 6322 [P.O.:1674]  Out: 6852 [Urine:6850]     LAB RESULTS     Recent Labs      08/21/18   0206  08/20/18   0243  08/19/18   1345   WBC  4.3*  4.3*  4.2*   HGB  9.7*  10.0*  10.2*   HCT  33.5*  33.6*  35.5   PLT  167  200  172        Recent Labs      08/21/18   0206  08/20/18   0243  08/19/18   1401  08/19/18   1345   NA  140  139   --   140   K  3.5  3.6   --   4.3   GLU  115*  99   --   84   BUN  19*  19*   --   17   CREA  1.05  0.92   --   0.91   CA  8.0*  8.3*   --   8.3*   MG   --   2.1   --    --    INR  2.2*  2.9*  2.9*   --        RECOMMENDATIONS AND DISCHARGE PLANNING 1. Pending tests/procedures/ Plan of Care or Other Needs: Discharge     2. Discharge plan for patient and Needs/Barriers: Home with [facility]    3. Estimated Discharge Date: 8/22/18 Posted on Whiteboard in [address] [place] [address] Room: yes      4. The patient's care plan was reviewed with the oncoming nurse. \"HEALS\" SAFETY CHECK      Fall Risk    Total Score: 3    Safety Measures: Safety Measures: Bed/Chair alarm on, Bed/Chair-Wheels locked, Bed in low position, Call light within reach, Fall prevention (comment), Side rails X 3    A safety check occurred in the patient's room between off going nurse and oncoming nurse listed above. The safety check included the below items  Area Items   H  High Alert Medications - Verify all high alert medication drips (heparin, PCA, etc.)   E  Equipment - Suction is set up for ALL patients (with danya)  - Red plugs utilized for all equipment (IV pumps, etc.)  - WOWs wiped down at end of shift.  - Room stocked with oxygen, suction, and other unit-specific supplies   A  Alarms - Bed alarm is set for fall risk patients  - Ensure chair alarm is in place and activated if patient is up in a chair   L  Lines - Check IV for any infiltration  - Shaikh bag is empty if patient has a Shaikh   - Tubing and IV bags are labeled   S  Safety   - Room is clean, patient is clean, and equipment is clean. - Hallways are clear from equipment besides carts. - Fall bracelet on for fall risk patients  - Ensure room is clear and free of clutter  - Suction is set up for ALL patients (with danya)  - Hallways are clear from equipment besides carts.    - Isolation precautions followed, supplies available outside room, sign posted     Macey Fay RN

## 2018-08-21 NOTE — PROGRESS NOTES
[facility] - Progress Note  Admit Date: 8/19/2018    Assessment:     Hospital Problems  Date Reviewed: 5/22/2018          Codes Class Noted POA    * (Principal)Acute exacerbation of CHF (congestive heart failure) ([facility] [address] ICD-10-CM: I50.9  ICD-9-CM: 428.0  6/2/2018 Unknown        Chronic anticoagulation ICD-10-CM: Z79.01  ICD-9-CM: V58.61  6/2/2018 Yes        Fluid overload ICD-10-CM: E87.70  ICD-9-CM: 276.69  4/18/2017 Yes        Morbid obesity with BMI of 45.0-49.9, adult [facility]) ICD-10-CM: E66.01, Z68.42  ICD-9-CM: 278.01, V85.42  2/20/2017 Yes        Hypertension ICD-10-CM: I11.9  ICD-9-CM: 402.10  Unknown Yes        Atrial fibrillation ICD-10-CM: I48.91  ICD-9-CM: 427.31  Unknown Yes    Overview Signed 1/25/2013 10:46 AM by Roxanne Juarez     CHADS score 1  (-CHF, +HTN, -AGE, -DM, -CVA)                   -Acute on chronic respiratory failure, multifactorial in setting of severe pulmonary HTN, OHS, ANDRADE with likely component of acute on chronic diastolic CHF. These are similar conditions when patient admitted for same problem  -Chronic LE swelling, history of non-compliance with diuretic due to difficulty ambulating. Her edema appears to be about the same as in the past  -Chronic atrial fibrillation, on Coumadin.  She is maintained on low dose B-blocker for rate control  -Echo 04/2018: EF 50-55%, mildly to moderately increased LV wall thickness, RV moderately to severely dilated with est. Peak pressure 87 mmHg, moderately to severely dilated RA, moderate to severe tricuspid regurgitation, severe pulmonic insufficiency  -Severe pulmonary hypertension, RV dysfunction with moderate to severe pulmonary insufficiency and moderate pulmonary pressure elevation  -Hx congenital heart defect repaired as child  -HTN  -Dyslipidemia, on Simvastatin  -ANDRADE  -OHS  -Morbid obesity  -DJD, pending left knee replacement  -History of polysubstance abuse  -History of non-compliance  -Psychiatric illness      Primary cardiologist is Dr. Sue Mckeon:     Renal function stable, continues to diurese, continued on Lasix 40 IV BID. Hemodynamics stable, continued on imdur, lisinopril, lopressor. INR 2.2, continued on warfarin. Can further discuss [facility] as outpatient. Subjective:     Sleepy this AM. Denies complaints.     Objective:      Patient Vitals for the past 8 hrs:   Temp Pulse Resp BP SpO2   08/21/18 0750 97.1 °F (36.2 °C) 63 18 125/81 96 %   08/21/18 0540 96.9 °F (36.1 °C) 60 18 144/77 100 %         Patient Vitals for the past 96 hrs:   Weight   08/21/18 0850 146.1 kg (322 lb)   08/20/18 1614 148.5 kg (327 lb 4.8 oz)   08/19/18 1305 152 kg (335 lb)                    Intake/Output Summary (Last 24 hours) at 08/21/18 1044  Last data filed at 08/21/18 1008   Gross per 24 hour   Intake             1014 ml   Output             4951 ml   Net            -3937 ml       Physical Exam:  General:  alert, cooperative, no distress, appears stated age  Neck:  no JVD  Lungs:  rales R base, L base  Heart:  irregularly irregular rhythm  Abdomen:  abdomen is soft without significant tenderness, masses, organomegaly or guarding  Extremities:  extremities normal, atraumatic, no cyanosis  Edema 2+    Data Review:     Labs: Results:       Chemistry Recent Labs      08/21/18   0206  08/20/18   0243  08/19/18   1345   GLU  115*  99  84   NA  140  139  140   K  3.5  3.6  4.3   CL  97*  98*  102   CO2  41*  38*  31   BUN  19*  19*  17   CREA  1.05  0.92  0.91   CA  8.0*  8.3*  8.3*   MG   --   2.1   --    PHOS   --   4.3   --    AGAP  2*  3  7   BUCR  18  21*  19      CBC w/Diff Recent Labs      08/21/18   0206  08/20/18   0243  08/19/18   1345   WBC  4.3*  4.3*  4.2*   RBC  4.20  4.30  4.44   HGB  9.7*  10.0*  10.2*   HCT  33.5*  33.6*  35.5   PLT  167  200  172   GRANS  65  62  64   LYMPH  22  25  24   EOS  4  3  3      Cardiac Enzymes No results found for: CPK, CK, CKMMB, CKMB, RCK3, CKMBT, CKNDX, CKND1, SHANNAN, TROPT, TROIQ, JELANI, TROPT, TNIPOC, BNP, BNPP   Coagulation Recent Labs      08/21/18   0206  08/20/18   0243   PTP  24.8*  30.8*   INR  2.2*  2.9*       Lipid Panel Lab Results   Component Value Date/Time    Cholesterol, total 90 05/03/2018 10:25 AM    HDL Cholesterol 34 (L) 05/03/2018 10:25 AM    LDL, calculated 41.4 05/03/2018 10:25 AM    VLDL, calculated 14.6 05/03/2018 10:25 AM    Triglyceride 73 05/03/2018 10:25 AM    CHOL/HDL Ratio 2.6 05/03/2018 10:25 AM      BNP Lab Results   Component Value Date/Time    B-type Natriuretic Peptide 3187 (H) 05/06/2016 11:10 AM    B-type Natriuretic Peptide 3075 (H) 05/06/2016 11:00 AM      Liver Enzymes No results for input(s): TP, ALB, TBIL, AP, SGOT, GPT in the last 72 hours.     No lab exists for component: DBIL   Digoxin    Thyroid Studies Lab Results   Component Value Date/Time    T4, Total 12.3 (H) 01/15/2014 02:01 AM    TSH 1.97 05/04/2018 03:15 AM          Signed By: VERONICA Ca     August 21, 2018

## 2018-08-21 NOTE — PROGRESS NOTES
NUTRITION    Nutrition Consult: Diet Education    RECOMMENDATIONS / PLAN:     No additional nutritional interventions indicated at this time. Please consult nutrition if further nutrtion intervention is needed. Will re-screen this patient per policy. NUTRITION INTERVENTIONS:     [x] Nutrition counseling/education    ASSESSMENT:     Diet: DIET CARDIAC Regular; FR 1500ML  Po intake:  [x]  Good    []  Fair    []  Poor  Weight History:  [x] No unintentional weight loss PTA    [] Had weight change:   Nutrition Risk: None identified at this time     Provided Education On:  Coumadin and CHF dietary considerations  Person(s) Instructed:  [x]  Patient    [] Family    [] Other:  Education Methods Used:  [x] Explanation    [x] Handout    [] Other:   Patients Readiness:  [x] Daren Yohan    [x] Acceptance    [] Non-Acceptance    [] Refused  Learning Limitations:   [x] None identified (uses a hearing aide)    [] Identified:  Level of Comprehension:  [x] Good    [] Needs Reinforcement     Additional Concerns/Comments:  Discussed coumadin and vitamin K consistent diet yesterday and today with pt. Discussed dietary considerations with consuming foods rich in vitamin K. Pt also with hx and current diagnosis of CHF. Discussed following low sodium and fluid restricted diet for management of CHF symptoms; discussed dietary considerations. Educational handouts provided/reviewed with her. Questions answered; pt verbalized understanding the information provided.  Has good appetite and meal intake    Follow-up education indicated:   [x] No    [] Yes  Discharge needs: [x] None identified    [] Identified and addressed    Jaswinder Felipe [address]  Pager: [phone]

## 2018-08-22 ENCOUNTER — APPOINTMENT (OUTPATIENT)
Dept: GENERAL RADIOLOGY | Age: 58
DRG: 194 | End: 2018-08-22
Attending: INTERNAL MEDICINE
Payer: MEDICAID

## 2018-08-22 LAB
ANION GAP SERPL CALC-SCNC: 2 MMOL/L (ref 3–18)
ANION GAP SERPL CALC-SCNC: 6 MMOL/L (ref 3–18)
ARTERIAL PATENCY WRIST A: YES
BASE EXCESS BLD CALC-SCNC: 20 MMOL/L
BASOPHILS # BLD: 0 K/UL (ref 0–0.1)
BASOPHILS NFR BLD: 0 % (ref 0–2)
BDY SITE: ABNORMAL
BODY TEMPERATURE: 98.6
BUN SERPL-MCNC: 15 MG/DL (ref 7–18)
BUN SERPL-MCNC: 15 MG/DL (ref 7–18)
BUN/CREAT SERPL: 16 (ref 12–20)
BUN/CREAT SERPL: 16 (ref 12–20)
CALCIUM SERPL-MCNC: 8.4 MG/DL (ref 8.5–10.1)
CALCIUM SERPL-MCNC: 8.8 MG/DL (ref 8.5–10.1)
CHLORIDE SERPL-SCNC: 96 MMOL/L (ref 100–108)
CHLORIDE SERPL-SCNC: 97 MMOL/L (ref 100–108)
CO2 SERPL-SCNC: 38 MMOL/L (ref 21–32)
CO2 SERPL-SCNC: 41 MMOL/L (ref 21–32)
CREAT SERPL-MCNC: 0.94 MG/DL (ref 0.6–1.3)
CREAT SERPL-MCNC: 0.94 MG/DL (ref 0.6–1.3)
DIFFERENTIAL METHOD BLD: ABNORMAL
EOSINOPHIL # BLD: 0.2 K/UL (ref 0–0.4)
EOSINOPHIL NFR BLD: 3 % (ref 0–5)
ERYTHROCYTE [DISTWIDTH] IN BLOOD BY AUTOMATED COUNT: 17.9 % (ref 11.6–14.5)
GAS FLOW.O2 O2 DELIVERY SYS: ABNORMAL L/MIN
GAS FLOW.O2 SETTING OXYMISER: 2 L/M
GLUCOSE SERPL-MCNC: 100 MG/DL (ref 74–99)
GLUCOSE SERPL-MCNC: 96 MG/DL (ref 74–99)
HCO3 BLD-SCNC: 45 MMOL/L (ref 22–26)
HCT VFR BLD AUTO: 33.9 % (ref 35–45)
HGB BLD-MCNC: 9.9 G/DL (ref 12–16)
INR PPP: 2 (ref 0.8–1.2)
LYMPHOCYTES # BLD: 1.1 K/UL (ref 0.9–3.6)
LYMPHOCYTES NFR BLD: 24 % (ref 21–52)
MCH RBC QN AUTO: 23.2 PG (ref 24–34)
MCHC RBC AUTO-ENTMCNC: 29.2 G/DL (ref 31–37)
MCV RBC AUTO: 79.4 FL (ref 74–97)
MONOCYTES # BLD: 0.4 K/UL (ref 0.05–1.2)
MONOCYTES NFR BLD: 10 % (ref 3–10)
NEUTS SEG # BLD: 2.8 K/UL (ref 1.8–8)
NEUTS SEG NFR BLD: 63 % (ref 40–73)
O2/TOTAL GAS SETTING VFR VENT: 28 %
PCO2 BLD: 80.5 MMHG (ref 35–45)
PH BLD: 7.36 [PH] (ref 7.35–7.45)
PLATELET # BLD AUTO: 196 K/UL (ref 135–420)
PMV BLD AUTO: 9.6 FL (ref 9.2–11.8)
PO2 BLD: 85 MMHG (ref 80–100)
POTASSIUM SERPL-SCNC: 3.8 MMOL/L (ref 3.5–5.5)
POTASSIUM SERPL-SCNC: 3.9 MMOL/L (ref 3.5–5.5)
PROTHROMBIN TIME: 22.4 SEC (ref 11.5–15.2)
RBC # BLD AUTO: 4.27 M/UL (ref 4.2–5.3)
SAO2 % BLD: 95 % (ref 92–97)
SERVICE CMNT-IMP: ABNORMAL
SODIUM SERPL-SCNC: 139 MMOL/L (ref 136–145)
SODIUM SERPL-SCNC: 141 MMOL/L (ref 136–145)
SPECIMEN TYPE: ABNORMAL
TOTAL RESP. RATE, ITRR: 18
WBC # BLD AUTO: 4.4 K/UL (ref 4.6–13.2)

## 2018-08-22 PROCEDURE — 94660 CPAP INITIATION&MGMT: CPT

## 2018-08-22 PROCEDURE — 82803 BLOOD GASES ANY COMBINATION: CPT

## 2018-08-22 PROCEDURE — 85025 COMPLETE CBC W/AUTO DIFF WBC: CPT | Performed by: INTERNAL MEDICINE

## 2018-08-22 PROCEDURE — 97110 THERAPEUTIC EXERCISES: CPT

## 2018-08-22 PROCEDURE — 36600 WITHDRAWAL OF ARTERIAL BLOOD: CPT

## 2018-08-22 PROCEDURE — 74011250637 HC RX REV CODE- 250/637: Performed by: INTERNAL MEDICINE

## 2018-08-22 PROCEDURE — 97165 OT EVAL LOW COMPLEX 30 MIN: CPT

## 2018-08-22 PROCEDURE — A4216 STERILE WATER/SALINE, 10 ML: HCPCS | Performed by: INTERNAL MEDICINE

## 2018-08-22 PROCEDURE — 74011250636 HC RX REV CODE- 250/636: Performed by: INTERNAL MEDICINE

## 2018-08-22 PROCEDURE — 74011000250 HC RX REV CODE- 250: Performed by: INTERNAL MEDICINE

## 2018-08-22 PROCEDURE — 5A09357 ASSISTANCE WITH RESPIRATORY VENTILATION, LESS THAN 24 CONSECUTIVE HOURS, CONTINUOUS POSITIVE AIRWAY PRESSURE: ICD-10-PCS | Performed by: INTERNAL MEDICINE

## 2018-08-22 PROCEDURE — 97166 OT EVAL MOD COMPLEX 45 MIN: CPT

## 2018-08-22 PROCEDURE — 36415 COLL VENOUS BLD VENIPUNCTURE: CPT | Performed by: INTERNAL MEDICINE

## 2018-08-22 PROCEDURE — 65660000000 HC RM CCU STEPDOWN

## 2018-08-22 PROCEDURE — 80048 BASIC METABOLIC PNL TOTAL CA: CPT | Performed by: INTERNAL MEDICINE

## 2018-08-22 PROCEDURE — 85610 PROTHROMBIN TIME: CPT | Performed by: INTERNAL MEDICINE

## 2018-08-22 RX ORDER — FUROSEMIDE 10 MG/ML
20 INJECTION INTRAMUSCULAR; INTRAVENOUS EVERY 12 HOURS
Status: DISCONTINUED | OUTPATIENT
Start: 2018-08-22 | End: 2018-08-24

## 2018-08-22 RX ORDER — ACETAMINOPHEN AND CODEINE PHOSPHATE 300; 30 MG/1; MG/1
1 TABLET ORAL
Status: DISCONTINUED | OUTPATIENT
Start: 2018-08-22 | End: 2018-08-23

## 2018-08-22 RX ADMIN — OXYCODONE HYDROCHLORIDE 5 MG: 5 TABLET ORAL at 11:46

## 2018-08-22 RX ADMIN — LISINOPRIL 10 MG: 10 TABLET ORAL at 08:38

## 2018-08-22 RX ADMIN — FUROSEMIDE 20 MG: 10 INJECTION, SOLUTION INTRAMUSCULAR; INTRAVENOUS at 08:37

## 2018-08-22 RX ADMIN — BACLOFEN 10 MG: 10 TABLET ORAL at 08:38

## 2018-08-22 RX ADMIN — IPRATROPIUM BROMIDE AND ALBUTEROL SULFATE 3 ML: .5; 3 SOLUTION RESPIRATORY (INHALATION) at 01:38

## 2018-08-22 RX ADMIN — BACLOFEN 10 MG: 10 TABLET ORAL at 17:07

## 2018-08-22 RX ADMIN — LEVOTHYROXINE SODIUM 125 MCG: 125 TABLET ORAL at 06:18

## 2018-08-22 RX ADMIN — FUROSEMIDE 20 MG: 10 INJECTION, SOLUTION INTRAMUSCULAR; INTRAVENOUS at 21:47

## 2018-08-22 RX ADMIN — ACETAMINOPHEN 650 MG: 325 TABLET ORAL at 23:28

## 2018-08-22 RX ADMIN — METOPROLOL TARTRATE 12.5 MG: 25 TABLET ORAL at 08:39

## 2018-08-22 RX ADMIN — DOCUSATE SODIUM 100 MG: 100 CAPSULE, LIQUID FILLED ORAL at 18:00

## 2018-08-22 RX ADMIN — FAMOTIDINE 20 MG: 20 TABLET ORAL at 08:37

## 2018-08-22 RX ADMIN — FAMOTIDINE 20 MG: 20 TABLET ORAL at 17:25

## 2018-08-22 RX ADMIN — WARFARIN SODIUM 7.5 MG: 5 TABLET ORAL at 17:25

## 2018-08-22 RX ADMIN — ACETAZOLAMIDE 250 MG: 500 INJECTION, POWDER, LYOPHILIZED, FOR SOLUTION INTRAVENOUS at 05:31

## 2018-08-22 RX ADMIN — SIMVASTATIN 40 MG: 40 TABLET, FILM COATED ORAL at 21:47

## 2018-08-22 RX ADMIN — ISOSORBIDE MONONITRATE 30 MG: 30 TABLET, EXTENDED RELEASE ORAL at 17:25

## 2018-08-22 RX ADMIN — ACETAZOLAMIDE 250 MG: 500 INJECTION, POWDER, LYOPHILIZED, FOR SOLUTION INTRAVENOUS at 19:08

## 2018-08-22 RX ADMIN — METOPROLOL TARTRATE 12.5 MG: 25 TABLET ORAL at 17:28

## 2018-08-22 RX ADMIN — BACLOFEN 10 MG: 10 TABLET ORAL at 21:47

## 2018-08-22 RX ADMIN — PAROXETINE 20 MG: 20 TABLET, FILM COATED ORAL at 08:38

## 2018-08-22 RX ADMIN — ACETAZOLAMIDE 250 MG: 500 INJECTION, POWDER, LYOPHILIZED, FOR SOLUTION INTRAVENOUS at 13:14

## 2018-08-22 RX ADMIN — ARIPIPRAZOLE 15 MG: 15 TABLET ORAL at 08:38

## 2018-08-22 RX ADMIN — OXYCODONE HYDROCHLORIDE 5 MG: 5 TABLET ORAL at 01:32

## 2018-08-22 NOTE — PROGRESS NOTES
PT is recommending SNF. Left a list of SNF with pt. She states her daughter will be coming to see her later.

## 2018-08-22 NOTE — ROUTINE PROCESS
Bedside and Verbal shift change report given to DELMI Mars (oncoming nurse) by Korin Dunbar RN (offgoing nurse). Report included the following information SBAR, Kardex, MAR and Recent Results. SITUATION:    Code Status: Full Code   Reason for Admission: Acute exacerbation of CHF (congestive heart failure) ([facility]    [facility] day: 3   Problem List:       Hospital Problems  Date Reviewed: 5/22/2018          Codes Class Noted POA    * (Principal)Acute exacerbation of CHF (congestive heart failure) ([facility] ICD-10-CM: I50.9  ICD-9-CM: 428.0  6/2/2018 Unknown        Chronic anticoagulation ICD-10-CM: Z79.01  ICD-9-CM: V58.61  6/2/2018 Yes        Fluid overload ICD-10-CM: E87.70  ICD-9-CM: 276.69  4/18/2017 Yes        Morbid obesity with BMI of 45.0-49.9, adult [facility]) ICD-10-CM: E66.01, Z68.42  ICD-9-CM: 278.01, V85.42  2/20/2017 Yes        Hypertension ICD-10-CM: I11.9  ICD-9-CM: 402.10  Unknown Yes        Atrial fibrillation ICD-10-CM: I48.91  ICD-9-CM: 427.31  Unknown Yes    Overview Signed 1/25/2013 10:46 AM by Meryl Dominguez     CHADS score 1  (-CHF, +HTN, -AGE, -DM, -CVA)                   BACKGROUND:    Past Medical History:   Past Medical History:   Diagnosis Date    Atrial fibrillation     CHADS score 1  (-CHF, +HTN, -AGE, -DM, -CVA)    Carpal tunnel syndrome     Chronic pain     Congenital heart disease     presumed pulmonary stenosis s/p repair 1969    Congestive heart failure ([facility]     Degenerative disc disease     Degenerative joint disease     GERD     H/O echocardiogram 04/2017    EF 60-65%, mild concentric LVH, dilated RV with RV dysfunction, RVSP 54 mmHg, RA E, moderate to severe pulmonic regurgitation.  Hypercholesterolemia     Hypertension     Hypothyroid     Morbid obesity     Morbid obesity with BMI of 45.0-49.9, adult ([facility] 2/20/2017    Noncompliance     Schizoaffective disorder     Substance abuse     marijuana, crack cocaine         Patient taking anticoagulants yes .  Coumadin on hold today. INR 2.9    ASSESSMENT:    Changes in Assessment Throughout Shift: no     Patient has Central Line: no Reasons if yes: n/a   Patient has Shaikh Cath: yes Reasons if yes: Monitor I/O. CHF, lasix.  Last Vitals:     Vitals:    08/22/18 0017 08/22/18 0432 08/22/18 0517 08/22/18 0742   BP: 91/54 111/70  136/76   Pulse: (!) 55 66  64   Resp: 18 18 22   Temp: 97 °F (36.1 °C) 97.9 °F (36.6 °C)  97.4 °F (36.3 °C)   SpO2: 96% 100% 100% 96%   Weight:       Height:            IV and DRAINS (will only show if present)   Peripheral IV 08/19/18 Left Hand-Site Assessment: Clean, dry, & intact     WOUND (if present)   Wound Type:  none   Dressing present Dressing Present : No   Wound Concerns/Notes:  none     PAIN    Pain Assessment    Pain Intensity 1: 0 (08/22/18 0432)    Pain Location 1: Back, Hip    Pain Intervention(s) 1: Medication (see MAR)    Patient Stated Pain Goal: 0  o Interventions for Pain:  See mar  o Intervention effective: yes  o Time of last intervention: 0132   o Reassessment Completed: yes      Last 3 Weights:  Last 3 Recorded Weights in this Encounter    08/19/18 1305 08/20/18 1614 08/21/18 0850   Weight: 152 kg (335 lb) 148.5 kg (327 lb 4.8 oz) 146.1 kg (322 lb)     Weight change: -2.404 kg (-5 lb 4.8 oz)     INTAKE/OUPUT    Current Shift:      Last three shifts: 08/20 1901 - 08/22 0700  In: 8654 [P.O.:1284]  Out: 6451 [Urine:6450]     LAB RESULTS     Recent Labs      08/22/18   0209  08/21/18   0206  08/20/18   0243   WBC  4.4*  4.3*  4.3*   HGB  9.9*  9.7*  10.0*   HCT  33.9*  33.5*  33.6*   PLT  196  167  200        Recent Labs      08/22/18   0209  08/21/18   0206  08/20/18   0243   NA  139  140  139   K  3.8  3.5  3.6   GLU  100*  115*  99   BUN  15  19*  19*   CREA  0.94  1.05  0.92   CA  8.4*  8.0*  8.3*   MG   --    --   2.1   INR  2.0*  2.2*  2.9*       RECOMMENDATIONS AND DISCHARGE PLANNING     1.  Pending tests/procedures/ Plan of Care or Other Needs: Discharge 2. Discharge plan for patient and Needs/Barriers: Home with [place]    3. Estimated Discharge Date: 8/22/18 Posted on Whiteboard in [address] [place] [address] Room: yes      4. The patient's care plan was reviewed with the oncoming nurse. \"HEALS\" SAFETY CHECK      Fall Risk    Total Score: 3    Safety Measures: Safety Measures: Bed/Chair alarm on, Bed/Chair-Wheels locked, Bed in low position, Call light within reach, Fall prevention (comment), Gripper socks, Side rails X 3    A safety check occurred in the patient's room between off going nurse and oncoming nurse listed above. The safety check included the below items  Area Items   H  High Alert Medications - Verify all high alert medication drips (heparin, PCA, etc.)   E  Equipment - Suction is set up for ALL patients (with danya)  - Red plugs utilized for all equipment (IV pumps, etc.)  - WOWs wiped down at end of shift.  - Room stocked with oxygen, suction, and other unit-specific supplies   A  Alarms - Bed alarm is set for fall risk patients  - Ensure chair alarm is in place and activated if patient is up in a chair   L  Lines - Check IV for any infiltration  - Shaikh bag is empty if patient has a Shaikh   - Tubing and IV bags are labeled   S  Safety   - Room is clean, patient is clean, and equipment is clean. - Hallways are clear from equipment besides carts. - Fall bracelet on for fall risk patients  - Ensure room is clear and free of clutter  - Suction is set up for ALL patients (with danya)  - Hallways are clear from equipment besides carts.    - Isolation precautions followed, supplies available outside room, sign posted     Vanadna Murry RN

## 2018-08-22 NOTE — PROGRESS NOTES
0515 Bipap applied at 0500 and patient requeated that it be removed.  She was placed back on 2lpm nc

## 2018-08-22 NOTE — INTERDISCIPLINARY ROUNDS
Interdisciplinary Round Note   Patient Information:   Corwin Soto   504/77   Reason for Admission: Acute exacerbation of CHF (congestive heart failure) [facility])   Attending Provider:   Dawood Contreras MD  Primary Care Physician:       Scooby Smyth MD       [phone]   Estimated discharge date:  8/24/2018   Hospital day: 3  [unfilled]  3d 12h  RRAT Score: Medium Risk            13       Total Score        3 Has Seen PCP in Last 6 Months (Yes=3, No=0)    4 IP Visits Last 12 Months (1-3=4, 4=9, >4=11)    4 Pt. Coverage (Medicare=5 , Medicaid, or Self-Pay=4)    2 Charlson Comorbidity Score (Age + Comorbid Conditions)        Criteria that do not apply:    . Living with Significant Other. Assisted Living. LTAC. SNF. or   Rehab    Patient Length of Stay (>5 days = 3)            No         Chemical      Lines, Drains, & Airways  Shaikh       IV Antibiotics:    Current Antimicrobial Therapy     None        GI Prophylaxis: GI Prophylaxis: YES   Type: Famotidine PO      Recent Glucose Results:   Lab Results   Component Value Date/Time    GLU 96 08/22/2018 12:32 PM     (H) 08/22/2018 02:09 AM      Activity Level: Activity Level: Dangle Side of Bed    Needs assistance with ADLs: no       Goals for Today:  To be pain free  Patent airway     Recommendations:   Discharge Disposition: Home Independent  CM  Follow up phone call to assess:   medication knowledge and compliance, follow up physician appointments and ongoing needs assessment    Needs for Discharge:  IDR Team:   Recommendations from IDR team:     Other Notes:

## 2018-08-22 NOTE — PROGRESS NOTES
[facility] - Progress Note  Admit Date: 8/19/2018    Assessment:     Hospital Problems  Date Reviewed: 5/22/2018          Codes Class Noted POA    * (Principal)Acute exacerbation of CHF (congestive heart failure) ([facility] ICD-10-CM: I50.9  ICD-9-CM: 428.0  6/2/2018 Unknown        Chronic anticoagulation ICD-10-CM: Z79.01  ICD-9-CM: V58.61  6/2/2018 Yes        Fluid overload ICD-10-CM: E87.70  ICD-9-CM: 276.69  4/18/2017 Yes        Morbid obesity with BMI of 45.0-49.9, adult [facility]) ICD-10-CM: E66.01, Z68.42  ICD-9-CM: 278.01, V85.42  2/20/2017 Yes        Hypertension ICD-10-CM: I11.9  ICD-9-CM: 402.10  Unknown Yes        Atrial fibrillation ICD-10-CM: I48.91  ICD-9-CM: 427.31  Unknown Yes    Overview Signed 1/25/2013 10:46 AM by Agustina Son     CHADS score 1  (-CHF, +HTN, -AGE, -DM, -CVA)                      -Acute on chronic hypercapnic respiratory failure. PCO2 > 80 on ABG. -Acute on chronic diastolic heart failure. This is likely contributing as well to her symptomatology.  -Chronic LE swelling, history of non-compliance with diuretic due to difficulty ambulating. Her edema appears to be about the same as in the past.  -Chronic atrial fibrillation, on Coumadin. She is maintained on low dose B-blocker for rate control.  -Echo 04/2018: EF 50-55%, mildly to moderately increased LV wall thickness, RV moderately to severely dilated with est. Peak pressure 87 mmHg, moderately to severely dilated RA, moderate to severe tricuspid regurgitation, severe pulmonic insufficiency.   -Severe pulmonary hypertension, RV dysfunction with moderate to severe pulmonary insufficiency and moderate pulmonary pressure elevation.  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 Roby Sexton      Plan: Patient continues to diurese with stable renal function, continued on IV lasix. Continue pulmonary treatment and support. Hemodynamics stable. Continued on warfarin. Subjective:     Sleepy but wakes. Denies complaints.     Objective:      Patient Vitals for the past 8 hrs:   Temp Pulse Resp BP SpO2   08/22/18 0742 97.4 °F (36.3 °C) 64 22 136/76 96 %   08/22/18 0517 - - - - 100 %   08/22/18 0432 97.9 °F (36.6 °C) 66 18 111/70 100 %         Patient Vitals for the past 96 hrs:   Weight   08/21/18 0850 146.1 kg (322 lb)   08/20/18 1614 148.5 kg (327 lb 4.8 oz)   08/19/18 1305 152 kg (335 lb)                    Intake/Output Summary (Last 24 hours) at 08/22/18 1043  Last data filed at 08/22/18 0932   Gross per 24 hour   Intake              840 ml   Output             3251 ml   Net            -2411 ml       Physical Exam:  General:  alert, cooperative, no distress, appears stated age  Neck:   JVD  Lungs:  clear to auscultation bilaterally  Heart:  regular rate and rhythm  Abdomen:  abdomen is soft without significant tenderness, masses, organomegaly or guarding  Extremities:  extremities normal, atraumatic, no cyanosis 2+ edema    Data Review:     Labs: Results:       Chemistry Recent Labs      08/22/18   0209 08/21/18   0206  08/20/18   0243   GLU  100*  115*  99   NA  139  140  139   K  3.8  3.5  3.6   CL  96*  97*  98*   CO2  41*  41*  38*   BUN  15  19*  19*   CREA  0.94  1.05  0.92   CA  8.4*  8.0*  8.3*   MG   --    --   2.1   PHOS   --    --   4.3   AGAP  2*  2*  3   BUCR  16  18  21*      CBC w/Diff Recent Labs      08/22/18   0209 08/21/18   0206  08/20/18   0243   WBC  4.4*  4.3*  4.3*   RBC  4.27  4.20  4.30   HGB  9.9*  9.7*  10.0*   HCT  33.9*  33.5*  33.6*   PLT  196  167  200   GRANS  63  65  62   LYMPH  24  22  25   EOS  3  4  3      Cardiac Enzymes No results found for: CPK, CK, CKMMB, CKMB, RCK3, CKMBT, CKNDX, CKND1, SHANNAN, TROPT, TROIQ, JELANI, TROPT, TNIPOC, BNP, BNPP   Coagulation Recent Labs 08/22/18   0209  08/21/18   0206   PTP  22.4*  24.8*   INR  2.0*  2.2*       Lipid Panel Lab Results   Component Value Date/Time    Cholesterol, total 90 05/03/2018 10:25 AM    HDL Cholesterol 34 (L) 05/03/2018 10:25 AM    LDL, calculated 41.4 05/03/2018 10:25 AM    VLDL, calculated 14.6 05/03/2018 10:25 AM    Triglyceride 73 05/03/2018 10:25 AM    CHOL/HDL Ratio 2.6 05/03/2018 10:25 AM      BNP Lab Results   Component Value Date/Time    B-type Natriuretic Peptide 3187 (H) 05/06/2016 11:10 AM    B-type Natriuretic Peptide 3075 (H) 05/06/2016 11:00 AM      Liver Enzymes No results for input(s): TP, ALB, TBIL, AP, SGOT, GPT in the last 72 hours.     No lab exists for component: DBIL   Digoxin    Thyroid Studies Lab Results   Component Value Date/Time    T4, Total 12.3 (H) 01/15/2014 02:01 AM    TSH 1.97 05/04/2018 03:15 AM          Signed By: VERONICA Coughlin     August 22, 2018

## 2018-08-22 NOTE — PROGRESS NOTES
Problem: Self Care Deficits Care Plan (Adult)  Goal: *Acute Goals and Plan of Care (Insert Text)  1. The pt will use the LRAD to complete bed mobility & OOB functional transfers with modified independence, for improved ADL participation. 2. The pt will use AE as needed to complete lower body dressing with stand-by assist.  3. The pt will complete all toileting tasks at bedside commode vs. toilet level with modified independence. 4. The pt will complete at least 3 grooming tasks in standing at sink level with modified independence. Outcome: Progressing Towards Goal  Occupational Therapy EVALUATION    Patient: Madalyn Lopez (29 y.o. female)  Date: 8/22/2018  Primary Diagnosis: Acute exacerbation of CHF (congestive heart failure) ([facility] [address]        Precautions: Falls    ASSESSMENT :  Ms. Luis Singh is a 60 yr old female who was found to be seated upright at the EOB and tearful, upon this OT entering her room; the pt reported her tearfulness was secondary to her experiencing new onset low back pain. The pt was however agreeable to participate in the OT eval process, and she was noted to have 2 L O2 via nasal cannula, urinary catheter, and IV access in place. The pt is currently presenting below her baseline level of functioning, as she requires min assist for sit to stand with a RW, stand-by assist for upper body dressing, and max assist for lower body dressing. The pt is limited by reports of significant low back pain, SOB with associated decreased activity tolerance, subjective reports of sporadic BUE & BLE twitches, and deconditioning, which restricts her ADL participation and functional mobility. Patient will benefit from skilled intervention to address the above impairments.   Patients rehabilitation potential is considered to be Good  Factors which may influence rehabilitation potential include:   []             None noted  []             Mental ability/status  []             Medical condition  [] Home/family situation and support systems  []             Safety awareness  [x]             Pain tolerance/management  []             Other:      PLAN :  Recommendations and Planned Interventions:  [x]               Self Care Training                  [x]        Therapeutic Activities  [x]               Functional Mobility Training    []        Cognitive Retraining  [x]               Therapeutic Exercises           [x]        Endurance Activities  [x]               Balance Training                   []        Neuromuscular Re-Education  []               Visual/Perceptual Training     [x]   Home Safety Training  [x]               Patient Education                 []        Family Training/Education  []               Other (comment):    Frequency/Duration: Patient will be followed by occupational therapy 1-2 times per day/4-7 days per week to address goals. Discharge Recommendations: Zechariah Juárez  Further Equipment Recommendations for Discharge: To be determined, pending the pt's functional progress during this hospitalization. Barriers to Learning/Limitations: yes; emotional  Compensate with: visual, verbal, tactile, kinesthetic cues/model     PATIENT COMPLEXITY      Eval Complexity: History: MEDIUM Complexity : Expanded review of history including physical, cognitive and psychosocial  history ; Examination: MEDIUM Complexity : 3-5 performance deficits relating to physical, cognitive , or psychosocial skils that result in activity limitations and / or participation restrictions; Decision Making:MEDIUM Complexity : Patient may present with comorbidities that affect occupational performnce.  Miniml to moderate modification of tasks or assistance (eg, physical or verbal ) with assesment(s) is necessary to enable patient to complete evaluation  Assessment: Medium Complexity     G-CODES:     Self Care  Current =40-59% impaired, limited, or restricted    Goal= 1-19% impaired, limited, or restricted. The severity rating is based on the Level of Assistance required for Functional Mobility and ADLs. SUBJECTIVE:   Patient stated My leg won't stop twitching. \"    OBJECTIVE DATA SUMMARY:     Past Medical History:   Diagnosis Date    Atrial fibrillation     CHADS score 1  (-CHF, +HTN, -AGE, -DM, -CVA)    Carpal tunnel syndrome     Chronic pain     Congenital heart disease     presumed pulmonary stenosis s/p repair 1969    Congestive heart failure (HCC)     Degenerative disc disease     Degenerative joint disease     GERD     H/O echocardiogram 04/2017    EF 60-65%, mild concentric LVH, dilated RV with RV dysfunction, RVSP 54 mmHg, RA E, moderate to severe pulmonic regurgitation.  Hypercholesterolemia     Hypertension     Hypothyroid     Morbid obesity     Morbid obesity with BMI of 45.0-49.9, adult ([address] 2/20/2017    Noncompliance     Schizoaffective disorder     Substance abuse     marijuana, crack cocaine     Past Surgical History:   Procedure Laterality Date    CARDIAC SURG PROCEDURE UNLIST  1971    VSD repair    COLONOSCOPY N/A 5/6/2018    COLONOSCOPY with tatooing and biopsy performed by Mika Blum MD at [facility]    HX [address]      left    HX HYSTERECTOMY      HX KNEE REPLACEMENT      left    HX KNEE REPLACEMENT      right    HX ORTHOPAEDIC      left ankle    HX ORTHOPAEDIC      carpel tunnel right and left     HX THYROIDECTOMY Left      Prior Level of Function/Home Situation: The pt reported requiring min assist with lower body dressing and sponge bathing from her family; she was independent with toileting, feeding, & grooming. The pt was able to ambulate in the home without assistance using a RW, however she used a manual wheelchair for community mobility; pt was able to propel the wheelchair without assistance for short distances only. The pt's daughter and/or grandchildren managed all household cooking & cleaning tasks.     Home Situation  Home Environment: Apartment  # Steps to Enter:  (0)  [employer]ails [employer]:  (none)  One/Two Story Residence: One story  Living Alone: No (Lives with her daughter & six grandchildren)  Support Systems: Family member(s)  Patient Expects to be Discharged to[de-identified] Apartment  Current DME Used/Available at Home: Commode, bedside, Hospital bed, Oxygen (2 L used continuously), portable, Shower chair, Walker, rollator, Walker, rolling, Wheelchair  Tub or Shower Type: Tub/Shower combination (Pt does not use, as she takes sponge baths.)  [x]  Right hand dominant   []  Left hand dominant    Cognitive/Behavioral Status:  Neurologic State: Alert  Orientation Level: Oriented X4  Cognition: Decreased attention/concentration; Follows commands with repetition       Skin: BUE appeared intact, based on informal observation (see nursing assessment for detailed report)    Vision/Perceptual:    Tracking: Able to track stimulus in all quadrants w/o difficulty              Coordination:          Gross Motor Skills-Upper:  (BUE WFL, however minimal spontaneous twitching like movements noted intermittently)    Balance:  Sitting:  (sitting static good; sitting dynamic fair)  Standing:  (static standing fair+ with RW; dynamic standing fair with RW)    Strength:    Strength: Within functional limits (BUE biceps, triceps, and  strength grossly 4+/5)     Tone & Sensation:  No abnormal tone noted through informal observation        Range of Motion:    AROM: Within functional limits (BUE AROM WFL)         Functional Mobility and Transfers for ADLs:  Bed Mobility:        Sit to Supine: Moderate assistance; Additional time (assist needed for BLE management )     Transfers:  Sit to Stand: Minimum assistance      ADL Assessment:  Feeding: Independent (based on clinical judgment)    Oral Facial Hygiene/Grooming: Supervision;Setup (sitting at EOB)    Bathing:  Moderate assistance (in sitting at EOB; based on clinical judgment))    Upper Body Dressing: Stand-by assistance (in sitting)    Lower Body Dressing: Maximum assistance (needed to don/doff socks in sitting)    Toileting: Moderate assistance (at bedside commode level )           Pain:  Pt reports 10/10 low back pain or discomfort prior to treatment.    Pt reports 10/10 low back pain or discomfort post treatment. Per the pt & her grandson, the pt's RN recently gave pain medication. This OT also provided emotional support & repositioned the pt in bed at the end of the session. Activity Tolerance:   fair    Please refer to the flowsheet for vital signs taken during this treatment. After treatment:   [] Patient left in no apparent distress sitting up in chair  [] Patient left in no apparent distress in bed  [x] Call bell left within reach. Pt left in semi-griggs's position  [] Nursing notified  [x] Family present  [] Bed alarm activated    COMMUNICATION/EDUCATION:   [] Home safety education was provided and the patient/caregiver indicated understanding. [x] Patient/family have participated as able in goal setting and plan of care. [x] Patient/family agree to work toward stated goals and plan of care. [] Patient understands intent and goals of therapy, but is neutral about his/her participation. [] Patient is unable to participate in goal setting and plan of care.     Thank you for this referral.  Jorge Downey, OT

## 2018-08-22 NOTE — ROUTINE PROCESS
Bedside and Verbal shift change report given to Hema Monsalve (oncoming nurse) by Rolando Doss RN (offgoing nurse). Report included the following information SBAR, Kardex, MAR and Recent Results. SITUATION:    Code Status: Full Code   Reason for Admission: Acute exacerbation of CHF (congestive heart failure) ([facility]    [facility] day: 3   Problem List:       Hospital Problems  Date Reviewed: 5/22/2018          Codes Class Noted POA    * (Principal)Acute exacerbation of CHF (congestive heart failure) ([facility] ICD-10-CM: I50.9  ICD-9-CM: 428.0  6/2/2018 Unknown        Chronic anticoagulation ICD-10-CM: Z79.01  ICD-9-CM: V58.61  6/2/2018 Yes        Fluid overload ICD-10-CM: E87.70  ICD-9-CM: 276.69  4/18/2017 Yes        Morbid obesity with BMI of 45.0-49.9, adult [facility]) ICD-10-CM: E66.01, Z68.42  ICD-9-CM: 278.01, V85.42  2/20/2017 Yes        Hypertension ICD-10-CM: I11.9  ICD-9-CM: 402.10  Unknown Yes        Atrial fibrillation ICD-10-CM: I48.91  ICD-9-CM: 427.31  Unknown Yes    Overview Signed 1/25/2013 10:46 AM by Shannen Justice     CHADS score 1  (-CHF, +HTN, -AGE, -DM, -CVA)                   BACKGROUND:    Past Medical History:   Past Medical History:   Diagnosis Date    Atrial fibrillation     CHADS score 1  (-CHF, +HTN, -AGE, -DM, -CVA)    Carpal tunnel syndrome     Chronic pain     Congenital heart disease     presumed pulmonary stenosis s/p repair 1969    Congestive heart failure ([facility]     Degenerative disc disease     Degenerative joint disease     GERD     H/O echocardiogram 04/2017    EF 60-65%, mild concentric LVH, dilated RV with RV dysfunction, RVSP 54 mmHg, RA E, moderate to severe pulmonic regurgitation.  Hypercholesterolemia     Hypertension     Hypothyroid     Morbid obesity     Morbid obesity with BMI of 45.0-49.9, adult ([facility] 2/20/2017    Noncompliance     Schizoaffective disorder     Substance abuse     marijuana, crack cocaine         Patient taking anticoagulants yes . Coumadin on hold today. INR 2.9    ASSESSMENT:    Changes in Assessment Throughout Shift: no     Patient has Central Line: no Reasons if yes: n/a   Patient has Shaikh Cath: yes Reasons if yes: Monitor I/O. CHF, lasix.  Last Vitals:     Vitals:    08/22/18 0432 08/22/18 0517 08/22/18 0742 08/22/18 1725   BP: 111/70  136/76 145/77   Pulse: 66  64 91   Resp: 18 22 19   Temp: 97.9 °F (36.6 °C)  97.4 °F (36.3 °C) 97.8 °F (36.6 °C)   SpO2: 100% 100% 96% 98%   Weight:       Height:            IV and DRAINS (will only show if present)   Peripheral IV 08/19/18 Left Hand-Site Assessment: Clean, dry, & intact     WOUND (if present)   Wound Type:  none   Dressing present Dressing Present : No   Wound Concerns/Notes:  none     PAIN    Pain Assessment    Pain Intensity 1: 0 (08/22/18 1832)    Pain Location 1: Back    Pain Intervention(s) 1: Medication (see MAR)    Patient Stated Pain Goal: 0  o Interventions for Pain:  See mar  o Intervention effective: yes  o Time of last intervention: 1900   o Reassessment Completed: yes      Last 3 Weights:  Last 3 Recorded Weights in this Encounter    08/19/18 1305 08/20/18 1614 08/21/18 0850   Weight: 152 kg (335 lb) 148.5 kg (327 lb 4.8 oz) 146.1 kg (322 lb)     Weight change: -2.404 kg (-5 lb 4.8 oz)     INTAKE/OUPUT    Current Shift:      Last three shifts: 08/21 0701 - 08/22 1900  In: 960 [P.O.:960]  Out: 6276 [Urine:6275]     LAB RESULTS     Recent Labs      08/22/18   0209  08/21/18   0206  08/20/18   0243   WBC  4.4*  4.3*  4.3*   HGB  9.9*  9.7*  10.0*   HCT  33.9*  33.5*  33.6*   PLT  196  167  200        Recent Labs      08/22/18   1232  08/22/18   0209  08/21/18   0206  08/20/18   0243   NA  141  139  140  139   K  3.9  3.8  3.5  3.6   GLU  96  100*  115*  99   BUN  15  15  19*  19*   CREA  0.94  0.94  1.05  0.92   CA  8.8  8.4*  8.0*  8.3*   MG   --    --    --   2.1   INR   --   2.0*  2.2*  2.9*       RECOMMENDATIONS AND DISCHARGE PLANNING     1.  Pending tests/procedures/ Plan of Care or Other Needs: Discharge     2. Discharge plan for patient and Needs/Barriers: Home with [facility]    3. Estimated Discharge Date: 8/22/18 Posted on Whiteboard in cht Room: yes      4. The patient's care plan was reviewed with the oncoming nurse. \"HEALS\" SAFETY CHECK      Fall Risk    Total Score: 2    Safety Measures: Safety Measures: Bed/Chair alarm on, Bed/Chair-Wheels locked, Bed in low position    A safety check occurred in the patient's room between off going nurse and oncoming nurse listed above. The safety check included the below items  Area Items   H  High Alert Medications - Verify all high alert medication drips (heparin, PCA, etc.)   E  Equipment - Suction is set up for ALL patients (with danya)  - Red plugs utilized for all equipment (IV pumps, etc.)  - WOWs wiped down at end of shift.  - Room stocked with oxygen, suction, and other unit-specific supplies   A  Alarms - Bed alarm is set for fall risk patients  - Ensure chair alarm is in place and activated if patient is up in a chair   L  Lines - Check IV for any infiltration  - Shaikh bag is empty if patient has a Shaikh   - Tubing and IV bags are labeled   S  Safety   - Room is clean, patient is clean, and equipment is clean. - Hallways are clear from equipment besides carts. - Fall bracelet on for fall risk patients  - Ensure room is clear and free of clutter  - Suction is set up for ALL patients (with danya)  - Hallways are clear from equipment besides carts.    - Isolation precautions followed, supplies available outside room, sign posted     Charley Mccormick RN

## 2018-08-22 NOTE — PROGRESS NOTES
Problem: Falls - Risk of  Goal: *Absence of Falls  Document Piyush Fall Risk and appropriate interventions in the flowsheet. Outcome: Progressing Towards Goal  Fall Risk Interventions:  Mobility Interventions: Bed/chair exit alarm         Medication Interventions: Bed/chair exit alarm, Evaluate medications/consider consulting pharmacy    Elimination Interventions: Bed/chair exit alarm, Call light in reach    History of Falls Interventions: Bed/chair exit alarm, Evaluate medications/consider consulting pharmacy        Problem: Pressure Injury - Risk of  Goal: *Prevention of pressure injury  Document Stas Scale and appropriate interventions in the flowsheet.    Outcome: Progressing Towards Goal  Pressure Injury Interventions:       Moisture Interventions: Absorbent underpads    Activity Interventions: Increase time out of bed, Pressure redistribution bed/mattress(bed type)    Mobility Interventions: HOB 30 degrees or less, PT/OT evaluation    Nutrition Interventions: Document food/fluid/supplement intake    Friction and Shear Interventions: HOB 30 degrees or less

## 2018-08-22 NOTE — ROUTINE PROCESS
Pt c/o muscle twitching, notified MD, was informed due to elevated CO2 in the blood. Pt is non compliant wearing the bipap. Encouraged pt to wear bipap. Pt is wearing bipap at this time, family at the bedside. Will continue to monitor pt.

## 2018-08-22 NOTE — PROGRESS NOTES
Dr. Janelle Cha was informed of ABG results. New orders were received. RT was informed to bring and apply BiPAP.

## 2018-08-22 NOTE — PROGRESS NOTES
Dr. Analilia Nance was informed about patient refusing BiPAP. He said, the patient has to wear the BiPAP or she will be intubated. Explained to and encouraged patient to wear BiPAP mask.

## 2018-08-22 NOTE — PROGRESS NOTES
Had spoken to Radiology about getting Portable X-ray of right hip because patient is on BiPAP. Lizbeth Harper, radiology tech said that the X-ray cannot be done by portable. He said that they will reschedule it.

## 2018-08-22 NOTE — PROGRESS NOTES
Explanations and encouragement were given to patient regarding wearing BiPAP, but she continued to refuse.

## 2018-08-22 NOTE — PROGRESS NOTES
Problem: Mobility Impaired (Adult and Pediatric)  Goal: *Acute Goals and Plan of Care (Insert Text)  Physical Therapy Goals  Initiated 8/21/2018 and to be accomplished within 7 day(s)  1. Patient will move from supine to sit and sit to supine, scoot up and down and roll side to side in bed with contact guard assist.     2.  Patient will transfer from bed to chair and chair to bed with contact guard assist using the least restrictive device. 3.  Patient will perform sit to stand with minimal assistance/contact guard assist.   Outcome: Progressing Towards Goal  physical Therapy TREATMENT    Patient: Brandi Clements (65 y.o. female)  Date: 8/22/2018  Diagnosis: Acute exacerbation of CHF (congestive heart failure) ([facility] Acute exacerbation of CHF (congestive heart failure) ([facility]  Precautions:     Chart, physical therapy assessment, plan of care and goals were reviewed. OBJECTIVE/ASSESSMENT:  Patient presented today semi-reclined in bed, drowsy but agreeable to PT treatment. Patient refusing transfer to sit EOB as she reports she just transferred back to supine after sitting to eat lunch. Patient completed BLE therex with Min/ModA to complete, patient actively resisting RLE hip ABD d/t reported pain. Patient continues to demonstrate full body spasms/twitches, requires mod/max cuing to follow some commands. At conclusion of session, patient left resting in bed with HOB elevated, call bell in reach, and needs met. Education: therex, position change, body mechanics -- patient requires reinforcement  Progression toward goals:  []      Improving appropriately and progressing toward goals  [x]      Improving slowly and progressing toward goals  []      Not making progress toward goals and plan of care will be adjusted     PLAN:  Patient continues to benefit from skilled intervention to address the above impairments. Continue treatment per established plan of care.   Discharge Recommendations:  Skilled Nursing Facility  Further Equipment Recommendations for Discharge:  TBD     SUBJECTIVE:   Patient stated I just laid back down.  Patient then begins to cry. OBJECTIVE DATA SUMMARY:   Critical Behavior:  Neurologic State: Alert  Orientation Level: Oriented X4  Cognition: Decreased attention/concentration, Follows commands  Functional Mobility Training:  Bed Mobility:  Sit to Supine: Moderate assistance; Additional time (assist needed for BLE management )  Transfers:  Sit to Stand: Minimum assistance  Stand to Sit: Contact guard assistance  Balance:  Sitting:  (sitting static good; sitting dynamic fair)  Standing:  (static standing fair+ with RW; dynamic standing fair with RW)  Ambulation/Gait Training:   N/A  Therapeutic Exercises:       EXERCISE   Sets   Reps   Active Active Assist   Passive Self ROM   Comments   Ankle Pumps    [] [] [] []    Quad Sets/Glut Sets   [] [] [] []    Hamstring Sets   [] [] [] []    Short Arc Quads   [] [] [] []    Heel Slides 1 10 [] [x] [] [] BLE   Straight Leg Raises   [] [] [] []    Hip Abd/Add 1 10 [] [x] [] [] BLE   Long Arc Quads   [] [] [] []    Seated Marching   [] [] [] []    Standing Marching   [] [] [] []       [] [] [] []      Pain:  Pre session: R thigh/hip with movement  Post session: R thigh/hip with movement  Activity Tolerance:   Fair  Please refer to the flowsheet for vital signs taken during this treatment. After treatment:   [] Patient left in no apparent distress sitting up in chair  [x] Patient left in no apparent distress in bed  [x] Call bell left within reach  [x] Nursing notified (Ryanne Grider)  [x] Caregiver present  [] Bed alarm activated      Irvin Collado   Time Calculation: 12 mins    Mobility [id number] Current  CK= 40-59%   Goal  CJ= 20-39%. The severity rating is based on the Level of Assistance required for Functional Mobility and ADLs.

## 2018-08-22 NOTE — PROGRESS NOTES
[facility] Hospitalist Group  Progress Note    Patient: Jeet Russo Age: 60 y.o. : 1960 MR#: [id number] SSN: [id number]  Date/Time: 2018    Subjective:     Pt c/o right hip pain and is tearful, she states pain is chronic. Refusing BiPAP. Assessment/Plan:   - SOB as presenting complaint. Likely multifactorial in this pt w/ morbid obesity/OHS, history of severe pulm HTN, , ANDRADE and non compliance w/ CPAP. -Acute on chronice diastolic HF-cardiology following  -Hypercarbia refusing BiPAP  -Chronic afib on coumadin, therapeutic INR yesterday, have alerted nursing for today's check.   - HTN  -Dyslipidemia  -History of polysubstance abuse    PLAN:  -Diuresis per cardiology  -XRAY right hip.  -give dose (7.5mg) of coumadin and follow INR  -PT/OT will need SNF placement      Additional Notes:      Case discussed with:  [x]Patient  []Family  []Nursing  []Case Management  DVT Prophylaxis:  []Lovenox  []Hep SQ  []SCDs  [x]Coumadin   []On Heparin gtt    Objective:   VS:   Visit Vitals    /76 (BP 1 Location: Left arm, BP Patient Position: At rest)    Pulse 64    Temp 97.4 °F (36.3 °C)    Resp 22    Ht 5' (1.524 m)    Wt 146.1 kg (322 lb)    SpO2 96%    BMI 62.89 kg/m2      Tmax/24hrs: Temp (24hrs), Av.5 °F (36.4 °C), Min:97 °F (36.1 °C), Max:98 °F (36.7 °C)    Input/Output:     Intake/Output Summary (Last 24 hours) at 18 1443  Last data filed at 18 1147   Gross per 24 hour   Intake              600 ml   Output             3575 ml   Net            -2975 ml       General:  Alert awake tearful complaining of right hip pain,  morbidly obese  Cardiovascular:  Rrr,  Pulmonary:  ctab  GI: soft, nt, nd   Extremities:  No edema  Additional:      Labs:    Recent Results (from the past 24 hour(s))   METABOLIC PANEL, BASIC    Collection Time: 18  2:09 AM   Result Value Ref Range    Sodium 139 136 - 145 mmol/L    Potassium 3.8 3.5 - 5.5 mmol/L    Chloride 96 (L) 100 - 108 mmol/L    CO2 41 (HH) 21 - 32 mmol/L    Anion gap 2 (L) 3.0 - 18 mmol/L    Glucose 100 (H) 74 - 99 mg/dL    BUN 15 7.0 - 18 MG/DL    Creatinine 0.94 0.6 - 1.3 MG/DL    BUN/Creatinine ratio 16 12 - 20      GFR est AA >60 >60 ml/min/1.73m2    GFR est non-AA >60 >60 ml/min/1.73m2    Calcium 8.4 (L) 8.5 - 10.1 MG/DL   CBC WITH AUTOMATED DIFF    Collection Time: 08/22/18  2:09 AM   Result Value Ref Range    WBC 4.4 (L) 4.6 - 13.2 K/uL    RBC 4.27 4.20 - 5.30 M/uL    HGB 9.9 (L) 12.0 - 16.0 g/dL    HCT 33.9 (L) 35.0 - 45.0 %    MCV 79.4 74.0 - 97.0 FL    MCH 23.2 (L) 24.0 - 34.0 PG    MCHC 29.2 (L) 31.0 - 37.0 g/dL    RDW 17.9 (H) 11.6 - 14.5 %    PLATELET 216 569 - 612 K/uL    MPV 9.6 9.2 - 11.8 FL    NEUTROPHILS 63 40 - 73 %    LYMPHOCYTES 24 21 - 52 %    MONOCYTES 10 3 - 10 %    EOSINOPHILS 3 0 - 5 %    BASOPHILS 0 0 - 2 %    ABS. NEUTROPHILS 2.8 1.8 - 8.0 K/UL    ABS. LYMPHOCYTES 1.1 0.9 - 3.6 K/UL    ABS. MONOCYTES 0.4 0.05 - 1.2 K/UL    ABS. EOSINOPHILS 0.2 0.0 - 0.4 K/UL    ABS. BASOPHILS 0.0 0.0 - 0.1 K/UL    DF AUTOMATED     PROTHROMBIN TIME + INR    Collection Time: 08/22/18  2:09 AM   Result Value Ref Range    Prothrombin time 22.4 (H) 11.5 - 15.2 sec    INR 2.0 (H) 0.8 - 1.2     POC G3    Collection Time: 08/22/18  4:26 AM   Result Value Ref Range    Device: NASAL CANNULA      Flow rate (POC) 2 L/M    FIO2 (POC) 28 %    pH (POC) 7.356 7.35 - 7.45      pCO2 (POC) 80.5 (H) 35.0 - 45.0 MMHG    pO2 (POC) 85 80 - 100 MMHG    HCO3 (POC) 45.0 (H) 22 - 26 MMOL/L    sO2 (POC) 95 92 - 97 %    Base excess (POC) 20 mmol/L    Allens test (POC) YES      Total resp. rate 18      Site LEFT RADIAL      Patient temp.  98.6      Specimen type (POC) ARTERIAL      Performed by Timo Payne 27, BASIC    Collection Time: 08/22/18 12:32 PM   Result Value Ref Range    Sodium 141 136 - 145 mmol/L    Potassium 3.9 3.5 - 5.5 mmol/L    Chloride 97 (L) 100 - 108 mmol/L    CO2 38 (H) 21 - 32 mmol/L    Anion gap 6 3.0 - 18 mmol/L    Glucose 96 74 - 99 mg/dL    BUN 15 7.0 - 18 MG/DL    Creatinine 0.94 0.6 - 1.3 MG/DL    BUN/Creatinine ratio 16 12 - 20      GFR est AA >60 >60 ml/min/1.73m2    GFR est non-AA >60 >60 ml/min/1.73m2    Calcium 8.8 8.5 - 10.1 MG/DL     Additional Data Reviewed:      Signed By: Seth Young MD     August 22, 2018 1:14 PM

## 2018-08-22 NOTE — INTERDISCIPLINARY ROUNDS
Interdisciplinary Round Note   Patient Information:   Elio Wei   [birth year]   Reason for Admission: Acute exacerbation of CHF (congestive heart failure) [facility])   Attending Provider:   Rivera Kendall MD  Primary Care Physician:       Darlin Sharma MD       [phone]   Estimated discharge date:  8/24/2018   Hospital day: 3  [unfilled]  3d 12h  RRAT Score: Medium Risk            13       Total Score        3 Has Seen PCP in Last 6 Months (Yes=3, No=0)    4 IP Visits Last 12 Months (1-3=4, 4=9, >4=11)    4 Pt. Coverage (Medicare=5 , Medicaid, or Self-Pay=4)    2 Charlson Comorbidity Score (Age + Comorbid Conditions)        Criteria that do not apply:    . Living with Significant Other. Assisted Living. LTAC. SNF. or   Rehab    Patient Length of Stay (>5 days = 3)            No         Chemical      Lines, Drains, & Airways  Shaikh       IV Antibiotics:    Current Antimicrobial Therapy     None        GI Prophylaxis: GI Prophylaxis: YES   Type: Famotidine PO      Recent Glucose Results:   Lab Results   Component Value Date/Time     (H) 08/21/2018 02:06 AM      Activity Level: Activity Level: Up with Assistance    Needs assistance with ADLs: no       Goals for Today:  To be pain free     Recommendations:   Discharge Disposition: Home Independent  CM  Follow up phone call to assess:   medication knowledge and compliance, follow up physician appointments and ongoing needs assessment    Needs for Discharge:  IDR Team:   Recommendations from IDR team:     Other Notes:

## 2018-08-23 ENCOUNTER — APPOINTMENT (OUTPATIENT)
Dept: GENERAL RADIOLOGY | Age: 58
DRG: 194 | End: 2018-08-23
Attending: INTERNAL MEDICINE
Payer: MEDICAID

## 2018-08-23 LAB
ANION GAP SERPL CALC-SCNC: 7 MMOL/L (ref 3–18)
ARTERIAL PATENCY WRIST A: YES
BASE EXCESS BLD CALC-SCNC: 13 MMOL/L
BASOPHILS # BLD: 0 K/UL (ref 0–0.1)
BASOPHILS NFR BLD: 0 % (ref 0–2)
BDY SITE: ABNORMAL
BUN SERPL-MCNC: 18 MG/DL (ref 7–18)
BUN/CREAT SERPL: 18 (ref 12–20)
CALCIUM SERPL-MCNC: 8.6 MG/DL (ref 8.5–10.1)
CHLORIDE SERPL-SCNC: 100 MMOL/L (ref 100–108)
CO2 SERPL-SCNC: 35 MMOL/L (ref 21–32)
CREAT SERPL-MCNC: 1.02 MG/DL (ref 0.6–1.3)
DIFFERENTIAL METHOD BLD: ABNORMAL
EOSINOPHIL # BLD: 0.2 K/UL (ref 0–0.4)
EOSINOPHIL NFR BLD: 4 % (ref 0–5)
ERYTHROCYTE [DISTWIDTH] IN BLOOD BY AUTOMATED COUNT: 18 % (ref 11.6–14.5)
GAS FLOW.O2 O2 DELIVERY SYS: ABNORMAL L/MIN
GAS FLOW.O2 SETTING OXYMISER: 2 L/M
GLUCOSE SERPL-MCNC: 98 MG/DL (ref 74–99)
HCO3 BLD-SCNC: 39 MMOL/L (ref 22–26)
HCT VFR BLD AUTO: 35.4 % (ref 35–45)
HGB BLD-MCNC: 9.9 G/DL (ref 12–16)
INR PPP: 1.8 (ref 0.8–1.2)
LYMPHOCYTES # BLD: 1.3 K/UL (ref 0.9–3.6)
LYMPHOCYTES NFR BLD: 23 % (ref 21–52)
MCH RBC QN AUTO: 22.7 PG (ref 24–34)
MCHC RBC AUTO-ENTMCNC: 28 G/DL (ref 31–37)
MCV RBC AUTO: 81.2 FL (ref 74–97)
MONOCYTES # BLD: 0.5 K/UL (ref 0.05–1.2)
MONOCYTES NFR BLD: 8 % (ref 3–10)
NEUTS SEG # BLD: 3.7 K/UL (ref 1.8–8)
NEUTS SEG NFR BLD: 65 % (ref 40–73)
PCO2 BLD: 70.3 MMHG (ref 35–45)
PH BLD: 7.35 [PH] (ref 7.35–7.45)
PLATELET # BLD AUTO: 209 K/UL (ref 135–420)
PMV BLD AUTO: 10 FL (ref 9.2–11.8)
PO2 BLD: 60 MMHG (ref 80–100)
POTASSIUM SERPL-SCNC: 3.7 MMOL/L (ref 3.5–5.5)
PROTHROMBIN TIME: 20.3 SEC (ref 11.5–15.2)
RBC # BLD AUTO: 4.36 M/UL (ref 4.2–5.3)
SAO2 % BLD: 88 % (ref 92–97)
SERVICE CMNT-IMP: ABNORMAL
SODIUM SERPL-SCNC: 142 MMOL/L (ref 136–145)
SPECIMEN TYPE: ABNORMAL
WBC # BLD AUTO: 5.7 K/UL (ref 4.6–13.2)

## 2018-08-23 PROCEDURE — 65660000000 HC RM CCU STEPDOWN

## 2018-08-23 PROCEDURE — 74011250636 HC RX REV CODE- 250/636: Performed by: INTERNAL MEDICINE

## 2018-08-23 PROCEDURE — 74011250637 HC RX REV CODE- 250/637: Performed by: INTERNAL MEDICINE

## 2018-08-23 PROCEDURE — 80048 BASIC METABOLIC PNL TOTAL CA: CPT | Performed by: INTERNAL MEDICINE

## 2018-08-23 PROCEDURE — 94660 CPAP INITIATION&MGMT: CPT

## 2018-08-23 PROCEDURE — 74011000250 HC RX REV CODE- 250: Performed by: INTERNAL MEDICINE

## 2018-08-23 PROCEDURE — 36600 WITHDRAWAL OF ARTERIAL BLOOD: CPT

## 2018-08-23 PROCEDURE — 36415 COLL VENOUS BLD VENIPUNCTURE: CPT | Performed by: INTERNAL MEDICINE

## 2018-08-23 PROCEDURE — 97530 THERAPEUTIC ACTIVITIES: CPT

## 2018-08-23 PROCEDURE — 82803 BLOOD GASES ANY COMBINATION: CPT

## 2018-08-23 PROCEDURE — 85610 PROTHROMBIN TIME: CPT | Performed by: INTERNAL MEDICINE

## 2018-08-23 PROCEDURE — 85025 COMPLETE CBC W/AUTO DIFF WBC: CPT | Performed by: INTERNAL MEDICINE

## 2018-08-23 PROCEDURE — 73080 X-RAY EXAM OF ELBOW: CPT

## 2018-08-23 PROCEDURE — 73502 X-RAY EXAM HIP UNI 2-3 VIEWS: CPT

## 2018-08-23 RX ORDER — WARFARIN SODIUM 5 MG/1
10 TABLET ORAL EVERY EVENING
Status: DISCONTINUED | OUTPATIENT
Start: 2018-08-23 | End: 2018-08-23

## 2018-08-23 RX ORDER — ACETAMINOPHEN AND CODEINE PHOSPHATE 300; 30 MG/1; MG/1
1 TABLET ORAL
Status: DISCONTINUED | OUTPATIENT
Start: 2018-08-23 | End: 2018-08-30 | Stop reason: HOSPADM

## 2018-08-23 RX ORDER — WARFARIN SODIUM 5 MG/1
10 TABLET ORAL ONCE
Status: COMPLETED | OUTPATIENT
Start: 2018-08-23 | End: 2018-08-23

## 2018-08-23 RX ADMIN — ACETAZOLAMIDE 250 MG: 500 INJECTION, POWDER, LYOPHILIZED, FOR SOLUTION INTRAVENOUS at 12:20

## 2018-08-23 RX ADMIN — ARIPIPRAZOLE 15 MG: 15 TABLET ORAL at 09:23

## 2018-08-23 RX ADMIN — DOCUSATE SODIUM 100 MG: 100 CAPSULE, LIQUID FILLED ORAL at 09:23

## 2018-08-23 RX ADMIN — LISINOPRIL 10 MG: 10 TABLET ORAL at 09:22

## 2018-08-23 RX ADMIN — FAMOTIDINE 20 MG: 20 TABLET ORAL at 17:59

## 2018-08-23 RX ADMIN — SIMVASTATIN 40 MG: 40 TABLET, FILM COATED ORAL at 22:24

## 2018-08-23 RX ADMIN — FUROSEMIDE 20 MG: 10 INJECTION, SOLUTION INTRAMUSCULAR; INTRAVENOUS at 09:28

## 2018-08-23 RX ADMIN — ACETAZOLAMIDE 250 MG: 500 INJECTION, POWDER, LYOPHILIZED, FOR SOLUTION INTRAVENOUS at 00:14

## 2018-08-23 RX ADMIN — METOPROLOL TARTRATE 12.5 MG: 25 TABLET ORAL at 18:00

## 2018-08-23 RX ADMIN — ACETAMINOPHEN 650 MG: 325 TABLET ORAL at 09:32

## 2018-08-23 RX ADMIN — DOCUSATE SODIUM 100 MG: 100 CAPSULE, LIQUID FILLED ORAL at 17:50

## 2018-08-23 RX ADMIN — LEVOTHYROXINE SODIUM 125 MCG: 125 TABLET ORAL at 09:23

## 2018-08-23 RX ADMIN — BACLOFEN 10 MG: 10 TABLET ORAL at 18:10

## 2018-08-23 RX ADMIN — PAROXETINE 20 MG: 20 TABLET, FILM COATED ORAL at 09:22

## 2018-08-23 RX ADMIN — METOPROLOL TARTRATE 12.5 MG: 25 TABLET ORAL at 09:22

## 2018-08-23 RX ADMIN — WARFARIN SODIUM 10 MG: 5 TABLET ORAL at 22:24

## 2018-08-23 RX ADMIN — BACLOFEN 10 MG: 10 TABLET ORAL at 22:24

## 2018-08-23 RX ADMIN — FUROSEMIDE 20 MG: 10 INJECTION, SOLUTION INTRAMUSCULAR; INTRAVENOUS at 22:24

## 2018-08-23 RX ADMIN — ACETAMINOPHEN AND CODEINE PHOSPHATE 1 TABLET: 300; 30 TABLET ORAL at 22:24

## 2018-08-23 RX ADMIN — BACLOFEN 10 MG: 10 TABLET ORAL at 09:23

## 2018-08-23 RX ADMIN — POLYETHYLENE GLYCOL 3350 17 G: 17 POWDER, FOR SOLUTION ORAL at 09:29

## 2018-08-23 RX ADMIN — ISOSORBIDE MONONITRATE 30 MG: 30 TABLET, EXTENDED RELEASE ORAL at 17:59

## 2018-08-23 RX ADMIN — FAMOTIDINE 20 MG: 20 TABLET ORAL at 09:23

## 2018-08-23 NOTE — PROGRESS NOTES
[facility] - Progress Note  Admit Date: 8/19/2018     The patient was seen, examined, and independently evaluated and I agree with the below assessment and plan by Adria Wan PA-C with the following comments. This lady is very lethargic and ABG's this AM with CO2 of 70, but pO2 60. May need consideration for Pulmonary consultation to help in management of this morbidly obese lady with  Hypoventilation obesity syndrome. Would continue with some diuresis for component of diastolic heart failure. Assessment:     Hospital Problems  Date Reviewed: 5/22/2018          Codes Class Noted POA    * (Principal)Acute exacerbation of CHF (congestive heart failure) ([facility] ICD-10-CM: I50.9  ICD-9-CM: 428.0  6/2/2018 Unknown        Chronic anticoagulation ICD-10-CM: Z79.01  ICD-9-CM: V58.61  6/2/2018 Yes        Fluid overload ICD-10-CM: E87.70  ICD-9-CM: 276.69  4/18/2017 Yes        Morbid obesity with BMI of 45.0-49.9, adult [facility]) ICD-10-CM: E66.01, Z68.42  ICD-9-CM: 278.01, V85.42  2/20/2017 Yes        Hypertension ICD-10-CM: I11.9  ICD-9-CM: 402.10  Unknown Yes        Atrial fibrillation ICD-10-CM: I48.91  ICD-9-CM: 427.31  Unknown Yes    Overview Signed 1/25/2013 10:46 AM by Soumya Talley     CHADS score 1  (-CHF, +HTN, -AGE, -DM, -CVA)                      -Acute on chronic hypercapnic respiratory failure. PCO2 > 80 on ABG. -Acute on chronic diastolic heart failure. This is likely contributing as well to her symptomatology.  -Chronic LE swelling, history of non-compliance with diuretic due to difficulty ambulating. Her edema appears to be about the same as in the past.  -Chronic atrial fibrillation, on Coumadin.  She is maintained on low dose B-blocker for rate control.  -Echo 04/2018: EF 50-55%, mildly to moderately increased LV wall thickness, RV moderately to severely dilated with est. Peak pressure 87 mmHg, moderately to severely dilated RA, moderate to severe tricuspid regurgitation, severe pulmonic insufficiency. -Severe pulmonary hypertension, RV dysfunction with moderate to severe pulmonary insufficiency and moderate pulmonary pressure elevation.  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 Shani Pratt:     Patient is awake this AM, but very confused, unclear baseline as patient is typically hard to wake on rounds. ABG ordered. Continue pulmonary treatment and support. Continue diuresis as renal function and hemodynamics allow. Hemodynamics stable. Anticoagulated with warfarin, INR 1.8.     Subjective:     Confused as noted above    Objective:      Patient Vitals for the past 8 hrs:   Temp Pulse Resp BP SpO2   08/23/18 0819 97.6 °F (36.4 °C) 60 16 126/81 97 %   08/23/18 0321 - - - - 99 %   08/23/18 0315 98.1 °F (36.7 °C) 64 18 106/62 100 %         Patient Vitals for the past 96 hrs:   Weight   08/23/18 0015 144.7 kg (319 lb 1.6 oz)   08/21/18 0850 146.1 kg (322 lb)   08/20/18 1614 148.5 kg (327 lb 4.8 oz)   08/19/18 1305 152 kg (335 lb)                    Intake/Output Summary (Last 24 hours) at 08/23/18 0956  Last data filed at 08/23/18 0841   Gross per 24 hour   Intake              180 ml   Output             4095 ml   Net            -3915 ml       Physical Exam:  General:  alert, cooperative, no distress, appears stated age  Neck:  obese   Lungs:  diminished breath sounds  Heart:  regular rate and rhythm  Abdomen:  abdomen is soft without significant tenderness, masses, organomegaly or guarding  Extremities:  extremities normal, atraumatic, no cyanosis 2+ edema    Data Review:     Labs: Results:       Chemistry Recent Labs      08/23/18   0240  08/22/18   1232  08/22/18   0209   GLU  98  96  100*   NA  142  141  139   K  3.7  3.9  3.8   CL  100  97*  96*   CO2  35*  38* 41*   BUN  18  15  15   CREA  1.02  0.94  0.94   CA  8.6  8.8  8.4*   AGAP  7  6  2*   BUCR  18  16  16      CBC w/Diff Recent Labs      08/23/18   0240  08/22/18   0209  08/21/18   0206   WBC  5.7  4.4*  4.3*   RBC  4.36  4.27  4.20   HGB  9.9*  9.9*  9.7*   HCT  35.4  33.9*  33.5*   PLT  209  196  167   GRANS  65  63  65   LYMPH  23  24  22   EOS  4  3  4      Cardiac Enzymes No results found for: CPK, CK, CKMMB, CKMB, RCK3, CKMBT, CKNDX, CKND1, SHANNAN, TROPT, TROIQ, JELANI, TROPT, TNIPOC, BNP, BNPP   Coagulation Recent Labs      08/23/18   0240  08/22/18   0209   PTP  20.3*  22.4*   INR  1.8*  2.0*       Lipid Panel Lab Results   Component Value Date/Time    Cholesterol, total 90 05/03/2018 10:25 AM    HDL Cholesterol 34 (L) 05/03/2018 10:25 AM    LDL, calculated 41.4 05/03/2018 10:25 AM    VLDL, calculated 14.6 05/03/2018 10:25 AM    Triglyceride 73 05/03/2018 10:25 AM    CHOL/HDL Ratio 2.6 05/03/2018 10:25 AM      BNP Lab Results   Component Value Date/Time    B-type Natriuretic Peptide 3187 (H) 05/06/2016 11:10 AM    B-type Natriuretic Peptide 3075 (H) 05/06/2016 11:00 AM      Liver Enzymes No results for input(s): TP, ALB, TBIL, AP, SGOT, GPT in the last 72 hours.     No lab exists for component: DBIL   Digoxin    Thyroid Studies Lab Results   Component Value Date/Time    T4, Total 12.3 (H) 01/15/2014 02:01 AM    TSH 1.97 05/04/2018 03:15 AM          Signed By: VERONICA Osorio     August 23, 2018

## 2018-08-23 NOTE — PROGRESS NOTES
Problem: Falls - Risk of  Goal: *Absence of Falls  Document Piyush Fall Risk and appropriate interventions in the flowsheet. Outcome: Progressing Towards Goal  Fall Risk Interventions:  Mobility Interventions: Bed/chair exit alarm, Patient to call before getting OOB, Utilize walker, cane, or other assistive device         Medication Interventions: Bed/chair exit alarm, Patient to call before getting OOB, Teach patient to arise slowly    Elimination Interventions: Bed/chair exit alarm, Call light in reach, Patient to call for help with toileting needs, Toilet paper/wipes in reach, Toileting schedule/hourly rounds    History of Falls Interventions: Bed/chair exit alarm, Door open when patient unattended, Room close to nurse's station        Problem: Pressure Injury - Risk of  Goal: *Prevention of pressure injury  Document Stas Scale and appropriate interventions in the flowsheet.    Outcome: Progressing Towards Goal  Pressure Injury Interventions:       Moisture Interventions: Absorbent underpads, Check for incontinence Q2 hours and as needed, Internal/External urinary devices, Minimize layers    Activity Interventions: Pressure redistribution bed/mattress(bed type)    Mobility Interventions: Float heels, HOB 30 degrees or less, Assess need for specialty bed, Pressure redistribution bed/mattress (bed type)    Nutrition Interventions: Document food/fluid/supplement intake    Friction and Shear Interventions: HOB 30 degrees or less, Lift sheet, Minimize layers

## 2018-08-23 NOTE — PROGRESS NOTES
Problem: Mobility Impaired (Adult and Pediatric)  Goal: *Acute Goals and Plan of Care (Insert Text)  Physical Therapy Goals  Initiated 8/21/2018 and to be accomplished within 7 day(s)  1. Patient will move from supine to sit and sit to supine, scoot up and down and roll side to side in bed with contact guard assist.     2.  Patient will transfer from bed to chair and chair to bed with contact guard assist using the least restrictive device. 3.  Patient will perform sit to stand with minimal assistance/contact guard assist.   Outcome: Progressing Towards Goal  physical Therapy TREATMENT    Patient: Eric Ayala (89 y.o. female)  Date: 8/23/2018  Diagnosis: Acute exacerbation of CHF (congestive heart failure) ([facility]) Acute exacerbation of CHF (congestive heart failure) ([facility] [address]       Precautions:     Chart, physical therapy assessment, plan of care and goals were reviewed. OBJECTIVE/ ASSESSMENT:  Nursing Melissa Blue) cleared pt to participate with PT. Patient found semi reclined in bed asleep. Pt required vigorous stimulation to arouse and ultimately was willing to work with PT with max encouragement from this LPTA and Kellen Hernandez from the turn team (thank you Kellen Hernnadez). This LPTA increased HOB angle in order to assist with Supine to EOB transfer and pt began to cry and stated \"it hurts. \" Pt stated that the pain was in her back. Educated pt on importance of OOB activity. This LPTA lowered the HOB and pt was then able to transfer to EOB with Mod Ax2 and additional time with multiple VC's for sequencing and hand placement. Pt tolerated sitting on EOB for 6 minutes with CGA for increased safety due to pt twitching and leaning posteriorly. Pt agreed to stand for bed linens to be changed. Pt stood to walker with Max Ax2. Pt was given multiple VC's to stand still as pt was attempting to move walker forward. Pt tolerated standing with CGAx2  for less than 1 minute before impulsively sitting back down on EOB.  Pt then transferred back to supine with Max Ax2 and scooted towards HOB with Mod Ax2 and bed in trendelenburg position. Pt was left comfortably with all needs in reach and nursing assistant present. Pt demonstrated impaired decision making, decreased command following, and decreased safety awareness throughout tx. Education:bed mobility, importance of OOB activity to increase strength and activity tolerance, transfers, safety awareness. Progression toward goals:  []      Improving appropriately and progressing toward goals  [x]      Improving slowly and progressing toward goals  []      Not making progress toward goals and plan of care will be adjusted     PLAN:  Patient continues to benefit from skilled intervention to address the above impairments. Continue treatment per established plan of care. Discharge Recommendations:  Skilled Nursing Facility  Further Equipment Recommendations for Discharge:  rolling walker     SUBJECTIVE:   Patient stated I didn't poop. I said I pooted    OBJECTIVE DATA SUMMARY:   Critical Behavior:  Neurologic State: Confused  Orientation Level: Oriented to person, Oriented to place  Cognition: Impaired decision making  Functional Mobility Training:  Bed Mobility:  Supine to Sit: Moderate assistance;Assist x2  Sit to Supine: Maximum assistance;Assist x2  Scooting: Moderate assistance;Assist x2 ((trendelenburg))  Transfers:  Sit to Stand: Maximum assistance;Assist x2  Stand to Sit: Moderate assistance;Assist x2  Balance:  Sitting: Impaired  Sitting - Static: Poor (constant support)  Standing: Impaired; With support  Pain:  Pre tx pain: pt does not rate pain but stated \"Owww my back, when elevating the HOB\"   Post tx pain: not rated  Activity Tolerance:   poor  Please refer to the flowsheet for vital signs taken during this treatment.   After treatment:   [] Patient left in no apparent distress sitting up in chair  [x] Patient left in no apparent distress in bed  [x] Call bell left within reach  [x] Nursing assistant present  [] Caregiver present  [] Bed alarm activated  [] SCDs applied  [] Ice applied      Lauren E D'Amico, MYRIAM   Time Calculation: 18 mins    Mobility  Current  CM= 80-99%. The severity rating is based on the Level of Assistance required for Functional Mobility and ADLs. Mobility   Goal  CI= 1-19%. The severity rating is based on the Level of Assistance required for Functional Mobility and ADLs.

## 2018-08-23 NOTE — PROGRESS NOTES
Called pt's daughter Ketan Hopper [phone]. Mail box was full and CM could not leave a message  Called pt's sister Meghna Brought [phone] and she states she is at the doctor's office and will be coming to see her sister. 1151:  Called pt's daughter and her son stated she went to the store. Will call back. 1238:  Pt's sister Meghna Lind is at pt's bedside and she was agreeable to pt going to SNF and she signed [facility]. Called pt's daughter Katherine Cervantes and she was agreeable for pt to go to [facility]. Info uploaded to [facility] via [employer]. Called Sreekanth Rueda of [facility] and she states she will review. 1330:  Dago Jack and she states she has to started Doreatha Daylin on pt and she will call me back. 1540:   Sreekanth Rueda states they still have not got Doreatha Daylin.

## 2018-08-23 NOTE — PROGRESS NOTES
[facility] Hospitalist Group  Progress Note    Patient: Corwin Soto Age: 60 y.o. : 1960 MR#: [id number] SSN: [id number]  Date/Time: 2018    Subjective:     Pt very somnolent. Assessment/Plan:   - SOB as presenting complaint. Likely multifactorial in this pt w/ morbid obesity/OHS, history of severe pulm HTN, , ANDRADE and non compliance w/ CPAP. -Acute on chronice diastolic HF-cardiology following  -Hypercarbia somewhat improved after use of BiPAP overnight, PaC02 remains high and pt remains very somnolent however. Pulmonary consulted. -Chronic afib on coumadin, therapeutic INR yesterday, have alerted nursing for today's check.   - HTN  -Dyslipidemia  -History of polysubstance abuse    PLAN:  -Diuresis per cardiology  -XRAY right hip.  -give dose (10mg) of coumadin and follow INR  -PT/OT will need SNF placement      Additional Notes:      Case discussed with:  [x]Patient  []Family  [x]Nursing  []Case Management  DVT Prophylaxis:  []Lovenox  []Hep SQ  []SCDs  [x]Coumadin   []On Heparin gtt    Objective:   VS:   Visit Vitals    /71 (BP 1 Location: Left arm, BP Patient Position: Head of bed elevated (Comment degrees))    Pulse (!) 53    Temp 97.3 °F (36.3 °C)    Resp 20    Ht 5' (1.524 m)    Wt 144.7 kg (319 lb 1.6 oz)    SpO2 100%    BMI 62.32 kg/m2      Tmax/24hrs: Temp (24hrs), Av.8 °F (36.6 °C), Min:97 °F (36.1 °C), Max:98.8 °F (37.1 °C)    Input/Output:     Intake/Output Summary (Last 24 hours) at 18 1609  Last data filed at 18 1321   Gross per 24 hour   Intake              600 ml   Output             3170 ml   Net            -2570 ml       General:  Somnolent, difficult to wake up  Cardiovascular:  Rrr,  Pulmonary:  ctab  GI: soft, nt, nd   Extremities:  No edema  Additional:      Labs:    Recent Results (from the past 24 hour(s))   PROTHROMBIN TIME + INR    Collection Time: 18  2:40 AM   Result Value Ref Range    Prothrombin time 20.3 (H) 11.5 - 15.2 sec    INR 1.8 (H) 0.8 - 1.2     CBC WITH AUTOMATED DIFF    Collection Time: 08/23/18  2:40 AM   Result Value Ref Range    WBC 5.7 4.6 - 13.2 K/uL    RBC 4.36 4.20 - 5.30 M/uL    HGB 9.9 (L) 12.0 - 16.0 g/dL    HCT 35.4 35.0 - 45.0 %    MCV 81.2 74.0 - 97.0 FL    MCH 22.7 (L) 24.0 - 34.0 PG    MCHC 28.0 (L) 31.0 - 37.0 g/dL    RDW 18.0 (H) 11.6 - 14.5 %    PLATELET 566 153 - 971 K/uL    MPV 10.0 9.2 - 11.8 FL    NEUTROPHILS 65 40 - 73 %    LYMPHOCYTES 23 21 - 52 %    MONOCYTES 8 3 - 10 %    EOSINOPHILS 4 0 - 5 %    BASOPHILS 0 0 - 2 %    ABS. NEUTROPHILS 3.7 1.8 - 8.0 K/UL    ABS. LYMPHOCYTES 1.3 0.9 - 3.6 K/UL    ABS. MONOCYTES 0.5 0.05 - 1.2 K/UL    ABS. EOSINOPHILS 0.2 0.0 - 0.4 K/UL    ABS.  BASOPHILS 0.0 0.0 - 0.1 K/UL    DF AUTOMATED     METABOLIC PANEL, BASIC    Collection Time: 08/23/18  2:40 AM   Result Value Ref Range    Sodium 142 136 - 145 mmol/L    Potassium 3.7 3.5 - 5.5 mmol/L    Chloride 100 100 - 108 mmol/L    CO2 35 (H) 21 - 32 mmol/L    Anion gap 7 3.0 - 18 mmol/L    Glucose 98 74 - 99 mg/dL    BUN 18 7.0 - 18 MG/DL    Creatinine 1.02 0.6 - 1.3 MG/DL    BUN/Creatinine ratio 18 12 - 20      GFR est AA >60 >60 ml/min/1.73m2    GFR est non-AA 55 (L) >60 ml/min/1.73m2    Calcium 8.6 8.5 - 10.1 MG/DL   POC G3    Collection Time: 08/23/18  9:08 AM   Result Value Ref Range    Device: NASAL CANNULA      Flow rate (POC) 2 L/M    pH (POC) 7.351 7.35 - 7.45      pCO2 (POC) 70.3 (H) 35.0 - 45.0 MMHG    pO2 (POC) 60 (L) 80 - 100 MMHG    HCO3 (POC) 39.0 (H) 22 - 26 MMOL/L    sO2 (POC) 88 (L) 92 - 97 %    Base excess (POC) 13 mmol/L    Allens test (POC) YES      Site RIGHT RADIAL      Specimen type (POC) ARTERIAL      Performed by Santy Aponte      Additional Data Reviewed:      Signed By: King Dago MD     August 23, 2018 1:14 PM

## 2018-08-23 NOTE — PROGRESS NOTES
Received on rounds in bed. Eyes closed appears to be sleeping. Vigorous stimulation to arouse. Alert to name and place. Disoriented to time, place, situation. Side rails X3. Bed alarm on. Fall precautions. Removed Bipap. Applied 02 2 liters via nasal cannula. Lung sound diminished bilaterally. Call bell phone within reach. Skin intact. No open wounds noted. C/o pain everywhere. Hard of hearing. Wears a hearing aid right ear. Right arm elevated up on pillow. 1 plus edema noted. Edema 3 plus left leg and 2 plus right leg.

## 2018-08-23 NOTE — CONSULTS
[facility]  Pulmonary, Critical Care, and Sleep Medicine    Initial Patient Consult    Name: Jacqui Rawls MRN: [id number]   : 1960 Hospital: [facility]   Date: 2018        IMPRESSION:   · Acute  on chronic respiratory failure improved with diuresis, likely due to CHF exacerbation superimposed on severe ANDRADE/OHS and pulmonary HTN. ABG with Compensated chronic respiratory acidosis with mild acute component  · Pulmonary HTN probably Grp 3 +/- Grp 2 in pt with chronic hypoxemia due to ANDRADE/OHS and decompensated diastolic failure  · Severe ANDRADE/OHS failed CPAP titration. Recently seen in office and scheduled for outpatient BIPAP titration- not completed  · Volume overload  · Paranoid schizophrenia  · Morbid obesity  · Decompensated diastolic heart failure  · Atrial fibrillation rate controlled       PLAN:   · Will trial BIPAP via nasal mask as she is not tolerating full face mask  · Continue gentle diuresis. Would avoid overdiuresis as pt is likely preload dependent  · Electrolyte replacement per protocol  · Will probably need higher pressures on NIV, await titration  · Supplemental O2 to maintain saturation greater than 92%  · Schizophrenia acting as barrier to care as pt with limited insight into her illness  · Continue anticoagulation  · Schedule PFT as outpatient     Subjective: This patient has been seen and evaluated at the request of Dr. Yoly Baca for hypercapnic respiratory failure. Patient is a 60 y.o. female admitted for shortness of breath and hypoxemia on  and being treated with response in SOB and Oxygenation. Noted to be lethargic on  so ABG obtained and placed on BiPAP which she does not keep on. She is a poor historian due to Psychiatric illness but review of records reveal severe ANDRADE/OHS with failed CPAP titration.  Pt was scheduled for dedicated BIPAP titration but would end up in the hospital for either shortness of breath or AMS due to CO2 narcosis. Pt with severe ANDRADE/OHS Rxed with BIPAP with admitted poor compliance, per pt due to device malfunction. Past Medical History:   Diagnosis Date    Atrial fibrillation     CHADS score 1  (-CHF, +HTN, -AGE, -DM, -CVA)    Carpal tunnel syndrome     Chronic pain     Congenital heart disease     presumed pulmonary stenosis s/p repair 1969    Congestive heart failure (HCC)     Degenerative disc disease     Degenerative joint disease     GERD     H/O echocardiogram 04/2017    EF 60-65%, mild concentric LVH, dilated RV with RV dysfunction, RVSP 54 mmHg, RA E, moderate to severe pulmonic regurgitation.  Hypercholesterolemia     Hypertension     Hypothyroid     Morbid obesity     Morbid obesity with BMI of 45.0-49.9, adult ([address] 2/20/2017    Noncompliance     Schizoaffective disorder     Substance abuse     marijuana, crack cocaine      Past Surgical History:   Procedure Laterality Date    CARDIAC SURG PROCEDURE UNLIST  1971    VSD repair    COLONOSCOPY N/A 5/6/2018    COLONOSCOPY with tatooing and biopsy performed by Dionicio Cogan, MD at [facility] ENDOSCOPY    HX [address]      left    HX HYSTERECTOMY      HX KNEE REPLACEMENT      left    HX KNEE REPLACEMENT      right    HX ORTHOPAEDIC      left ankle    HX ORTHOPAEDIC      carpel tunnel right and left     HX THYROIDECTOMY Left       Prior to Admission medications    Medication Sig Start Date End Date Taking? Authorizing Provider   furosemide (LASIX) 40 mg tablet Take 1 Tab by mouth two (2) times a day. 40 mg po daily 6/6/18  Yes Faby Martinez MD   lisinopril (PRINIVIL, ZESTRIL) 10 mg tablet Take 1 Tab by mouth daily. 6/7/18  Yes Faby Martinez MD   docusate sodium (COLACE) 100 mg capsule Take 1 Cap by mouth two (2) times a day for 90 days. HOLD for loose stool. 6/6/18 9/4/18 Yes Faby Martinez MD   metoprolol tartrate (LOPRESSOR) 25 mg tablet Take 0.5 Tabs by mouth two (2) times a day.  5/17/18  Yes Lawanda Rubinstein P KANIKA Walsh   fluticasone (FLONASE) 50 mcg/actuation nasal spray 2 Sprays by Both Nostrils route daily. 5/10/18  Yes Erum Mccoy MD   oxyCODONE IR (ROXICODONE) 5 mg immediate release tablet Take 5 mg by mouth every six (6) hours as needed for Pain. Yes Historical Provider   isosorbide mononitrate ER (IMDUR) 30 mg tablet Take 1 Tab by mouth every evening. 4/24/18  Yes Petra Crane NP   multivitamin, tx-iron-ca-min (THERA-M W/ IRON) 9 mg iron-400 mcg tab tablet Take 1 Tab by mouth daily. 3/1/18  Yes Soy Polk MD   loperamide (IMODIUM) 2 mg capsule Take 2 mg by mouth three (3) times daily as needed for Diarrhea. Yes Historical Provider   raNITIdine (ZANTAC) 150 mg tablet Take 150 mg by mouth two (2) times a day. Yes Historical Provider   warfarin (COUMADIN) 5 mg tablet Take 7.5 mg by mouth daily. Yes Historical Provider   VENTOLIN HFA 90 mcg/actuation inhaler Take 2 Puffs by inhalation every four (4) hours as needed for Wheezing or Shortness of Breath. 4/26/17  Yes Soy Polk MD   PARoxetine (PAXIL) 20 mg tablet Take 1 Tab by mouth daily. 1/21/14  Yes Alise Crain MD   simvastatin (ZOCOR) 40 mg tablet Take  by mouth nightly. to obtain from pharmacy   Yes Historical Provider   aripiprazole (ABILIFY) 15 mg tablet Take 15 mg by mouth daily. to obtain from pharmacy   Yes Historical Provider   levothyroxine (SYNTHROID) 125 mcg tablet Take 125 mcg by mouth Daily (before breakfast). Yes Historical Provider   polyethylene glycol (MIRALAX) 17 gram packet Take 1 Packet by mouth daily.  6/6/18   rEum Mccoy MD     Allergies   Allergen Reactions    Gabapentin Other (comments)     Unsteady, weakness LEs    Tetanus Vaccines And Toxoid Swelling    Topamax [Topiramate] Other (comments)     weakness      Social History   Substance Use Topics    Smoking status: Never Smoker    Smokeless tobacco: Never Used    Alcohol use No      Family History   Problem Relation Age of Onset  Hypertension Mother     Coronary Artery Disease Mother     Coronary Artery Disease Father     Hypertension Father         Current Facility-Administered Medications   Medication Dose Route Frequency    warfarin (COUMADIN) tablet 10 mg  10 mg Oral ONCE    furosemide (LASIX) injection 20 mg  20 mg IntraVENous Q12H    baclofen (LIORESAL) tablet 10 mg  10 mg Oral TID    ARIPiprazole (ABILIFY) tablet 15 mg  15 mg Oral DAILY    docusate sodium (COLACE) capsule 100 mg  100 mg Oral BID    isosorbide mononitrate ER (IMDUR) tablet 30 mg  30 mg Oral QPM    levothyroxine (SYNTHROID) tablet 125 mcg  125 mcg Oral ACB    lisinopril (PRINIVIL, ZESTRIL) tablet 10 mg  10 mg Oral DAILY    metoprolol tartrate (LOPRESSOR) tablet 12.5 mg  12.5 mg Oral BID    PARoxetine (PAXIL) tablet 20 mg  20 mg Oral DAILY    polyethylene glycol (MIRALAX) packet 17 g  17 g Oral DAILY    simvastatin (ZOCOR) tablet 40 mg  40 mg Oral QHS    famotidine (PEPCID) tablet 20 mg  20 mg Oral BID    WARFARIN INFORMATION NOTE (COUMADIN)   Other Q24H       Review of Systems:  Review of systems not obtained due to patient factors.     Objective:   Vital Signs:    Visit Vitals    /71 (BP 1 Location: Left arm, BP Patient Position: Head of bed elevated (Comment degrees))    Pulse (!) 53    Temp 97.3 °F (36.3 °C)    Resp 20    Ht 5' (1.524 m)    Wt 144.7 kg (319 lb 1.6 oz)    SpO2 100%    BMI 62.32 kg/m2       O2 Device: Nasal cannula   O2 Flow Rate (L/min): 2 l/min   Temp (24hrs), Av.8 °F (36.6 °C), Min:97 °F (36.1 °C), Max:98.8 °F (37.1 °C)       Intake/Output:   Last shift:       0701 -  1900  In: 420 [P.O.:420]  Out: 1620 [Urine:1620]  Last 3 shifts:  190 -  0700  In: 360 [P.O.:360]  Out: 5225 [Urine:5225]    Intake/Output Summary (Last 24 hours) at 18 1627  Last data filed at 18 1610   Gross per 24 hour   Intake              600 ml   Output             3520 ml   Net            -2920 ml Physical Exam:   General:  Awake cooperative, no distress, appears stated age. Head:  Normocephalic, without obvious abnormality, atraumatic. Eyes:  Conjunctivae/corneas clear. PERRL, EOMs intact. Nose: Nares normal. Septum midline. Mucosa normal. No drainage or sinus tenderness. Throat: Lips, mucosa, and tongue normal. Teeth and gums normal.   Neck: Supple, symmetrical, trachea midline, no adenopathy, thyroid: no enlargment/tenderness/nodules, no carotid bruit and no JVD. Back:   Symmetric, no curvature. ROM normal.   Lungs:   Clear to auscultation bilaterally. Chest wall:  No tenderness or deformity. Heart:  Regular rate and rhythm, S1, S2 normal, no murmur, click, rub or gallop. Abdomen:   Soft, non-tender. Bowel sounds normal. No masses,  No organomegaly. Extremities: Extremities normal, atraumatic, no cyanosis or edema. Pulses: 2+ and symmetric all extremities. Skin: Skin color, texture, turgor normal. No rashes or lesions   Lymph nodes: Cervical, supraclavicular, and axillary nodes normal.   Neurologic: Grossly nonfocal     Data review:     Recent Results (from the past 24 hour(s))   PROTHROMBIN TIME + INR    Collection Time: 08/23/18  2:40 AM   Result Value Ref Range    Prothrombin time 20.3 (H) 11.5 - 15.2 sec    INR 1.8 (H) 0.8 - 1.2     CBC WITH AUTOMATED DIFF    Collection Time: 08/23/18  2:40 AM   Result Value Ref Range    WBC 5.7 4.6 - 13.2 K/uL    RBC 4.36 4.20 - 5.30 M/uL    HGB 9.9 (L) 12.0 - 16.0 g/dL    HCT 35.4 35.0 - 45.0 %    MCV 81.2 74.0 - 97.0 FL    MCH 22.7 (L) 24.0 - 34.0 PG    MCHC 28.0 (L) 31.0 - 37.0 g/dL    RDW 18.0 (H) 11.6 - 14.5 %    PLATELET 991 622 - 953 K/uL    MPV 10.0 9.2 - 11.8 FL    NEUTROPHILS 65 40 - 73 %    LYMPHOCYTES 23 21 - 52 %    MONOCYTES 8 3 - 10 %    EOSINOPHILS 4 0 - 5 %    BASOPHILS 0 0 - 2 %    ABS. NEUTROPHILS 3.7 1.8 - 8.0 K/UL    ABS. LYMPHOCYTES 1.3 0.9 - 3.6 K/UL    ABS. MONOCYTES 0.5 0.05 - 1.2 K/UL    ABS.  EOSINOPHILS 0.2 0.0 - 0.4 K/UL    ABS. BASOPHILS 0.0 0.0 - 0.1 K/UL    DF AUTOMATED     METABOLIC PANEL, BASIC    Collection Time: 08/23/18  2:40 AM   Result Value Ref Range    Sodium 142 136 - 145 mmol/L    Potassium 3.7 3.5 - 5.5 mmol/L    Chloride 100 100 - 108 mmol/L    CO2 35 (H) 21 - 32 mmol/L    Anion gap 7 3.0 - 18 mmol/L    Glucose 98 74 - 99 mg/dL    BUN 18 7.0 - 18 MG/DL    Creatinine 1.02 0.6 - 1.3 MG/DL    BUN/Creatinine ratio 18 12 - 20      GFR est AA >60 >60 ml/min/1.73m2    GFR est non-AA 55 (L) >60 ml/min/1.73m2    Calcium 8.6 8.5 - 10.1 MG/DL   POC G3    Collection Time: 08/23/18  9:08 AM   Result Value Ref Range    Device: NASAL CANNULA      Flow rate (POC) 2 L/M    pH (POC) 7.351 7.35 - 7.45      pCO2 (POC) 70.3 (H) 35.0 - 45.0 MMHG    pO2 (POC) 60 (L) 80 - 100 MMHG    HCO3 (POC) 39.0 (H) 22 - 26 MMOL/L    sO2 (POC) 88 (L) 92 - 97 %    Base excess (POC) 13 mmol/L    Allens test (POC) YES      Site RIGHT RADIAL      Specimen type (POC) ARTERIAL      Performed by Chandni Allen        Imaging:  I have personally reviewed the patients radiographs and have reviewed the reports:  CXR Results 8/19/2018    IMPRESSION:  1. Enlarged cardiac silhouette with prominent hilar shadows similar to prior,  correlating with enlarged pulmonary arteries, and suspected mild perihilar  vascular congestion versus infiltrate. CT Results  (Last 48 hours)    None           PFT Results  2015    Normal flows, reduced DLCO- 50% predicted        Echo Results  (Last 48 hours)    None      High complexity decision making was performed during the evaluation of this patient at high risk for decompensation with multiple organ involvement     Above mentioned total time spent on reviewing the case/medical record/data/notes/EMR/patient examination/documentation/coordinating care with nurse/consultants, exclusive of procedures with complex decision making performed and > 50% time spent in face to face evaluation.        Benton Austin MD

## 2018-08-23 NOTE — ROUTINE PROCESS
Bedside and Verbal shift change report given to Claude Jain LPN (oncoming nurse) by Lucrecia Chapman, RN (offgoing nurse). Report included the following information SBAR, Kardex, MAR and Recent Results. SITUATION:    Code Status: Full Code   Reason for Admission: Acute exacerbation of CHF (congestive heart failure) ([facility]    [facility] day: 4   Problem List:       Hospital Problems  Date Reviewed: 5/22/2018          Codes Class Noted POA    * (Principal)Acute exacerbation of CHF (congestive heart failure) ([facility] ICD-10-CM: I50.9  ICD-9-CM: 428.0  6/2/2018 Unknown        Chronic anticoagulation ICD-10-CM: Z79.01  ICD-9-CM: V58.61  6/2/2018 Yes        Fluid overload ICD-10-CM: E87.70  ICD-9-CM: 276.69  4/18/2017 Yes        Morbid obesity with BMI of 45.0-49.9, adult [facility]) ICD-10-CM: E66.01, Z68.42  ICD-9-CM: 278.01, V85.42  2/20/2017 Yes        Hypertension ICD-10-CM: I11.9  ICD-9-CM: 402.10  Unknown Yes        Atrial fibrillation ICD-10-CM: I48.91  ICD-9-CM: 427.31  Unknown Yes    Overview Signed 1/25/2013 10:46 AM by Rahel Shaw     CHADS score 1  (-CHF, +HTN, -AGE, -DM, -CVA)                   BACKGROUND:    Past Medical History:   Past Medical History:   Diagnosis Date    Atrial fibrillation     CHADS score 1  (-CHF, +HTN, -AGE, -DM, -CVA)    Carpal tunnel syndrome     Chronic pain     Congenital heart disease     presumed pulmonary stenosis s/p repair 1969    Congestive heart failure ([facility]     Degenerative disc disease     Degenerative joint disease     GERD     H/O echocardiogram 04/2017    EF 60-65%, mild concentric LVH, dilated RV with RV dysfunction, RVSP 54 mmHg, RA E, moderate to severe pulmonic regurgitation.  Hypercholesterolemia     Hypertension     Hypothyroid     Morbid obesity     Morbid obesity with BMI of 45.0-49.9, adult ([facility] 2/20/2017    Noncompliance     Schizoaffective disorder     Substance abuse     marijuana, crack cocaine         Patient taking anticoagulants yes . Coumadin on hold today. INR 2.9    ASSESSMENT:    Changes in Assessment Throughout Shift: no     Patient has Central Line: no Reasons if yes: n/a   Patient has Shaikh Cath: yes Reasons if yes: Monitor I/O. CHF, lasix.  Last Vitals:     Vitals:    08/22/18 2327 08/23/18 0015 08/23/18 0315 08/23/18 0321   BP: 128/71  106/62    Pulse: 67  64    Resp: 20  18    Temp: 98.3 °F (36.8 °C)  98.1 °F (36.7 °C)    SpO2: 97%  100% 99%   Weight:  144.7 kg (319 lb 1.6 oz)     Height:            IV and DRAINS (will only show if present)   Peripheral IV 08/19/18 Left Hand-Site Assessment: Clean, dry, & intact     WOUND (if present)   Wound Type:  none   Dressing present Dressing Present : No   Wound Concerns/Notes:  none     PAIN    Pain Assessment    Pain Intensity 1: 0 (08/23/18 0315)    Pain Location 1: Head    Pain Intervention(s) 1: Medication (see MAR)    Patient Stated Pain Goal: 0  o Interventions for Pain:  See mar  o Intervention effective: yes  o Time of last intervention: 2328  o Reassessment Completed: yes      Last 3 Weights:  Last 3 Recorded Weights in this Encounter    08/20/18 1614 08/21/18 0850 08/23/18 0015   Weight: 148.5 kg (327 lb 4.8 oz) 146.1 kg (322 lb) 144.7 kg (319 lb 1.6 oz)     Weight change: -1.315 kg (-2 lb 14.4 oz)     INTAKE/OUPUT    Current Shift:      Last three shifts: 08/21 1901 - 08/23 0700  In: 360 [P.O.:360]  Out: 5225 [Urine:5225]     LAB RESULTS     Recent Labs      08/23/18   0240  08/22/18   0209  08/21/18   0206   WBC  5.7  4.4*  4.3*   HGB  9.9*  9.9*  9.7*   HCT  35.4  33.9*  33.5*   PLT  209  196  167        Recent Labs      08/23/18   0240  08/22/18   1232  08/22/18   0209  08/21/18   0206   NA  142  141  139  140   K  3.7  3.9  3.8  3.5   GLU  98  96  100*  115*   BUN  18  15  15  19*   CREA  1.02  0.94  0.94  1.05   CA  8.6  8.8  8.4*  8.0*   INR  1.8*   --   2.0*  2.2*       RECOMMENDATIONS AND DISCHARGE PLANNING     1.  Pending tests/procedures/ Plan of Care or Other Needs: Discharge     2. Discharge plan for patient and Needs/Barriers: Home with [place]    3. Estimated Discharge Date: 8/22/18 Posted on Whiteboard in [address] [place] [address] Room: yes      4. The patient's care plan was reviewed with the oncoming nurse. \"HEALS\" SAFETY CHECK      Fall Risk    Total Score: 3    Safety Measures: Safety Measures: Bed/Chair alarm on, Bed/Chair-Wheels locked, Bed in low position, Call light within reach, Fall prevention (comment), Gripper socks, Side rails X 3    A safety check occurred in the patient's room between off going nurse and oncoming nurse listed above. The safety check included the below items  Area Items   H  High Alert Medications - Verify all high alert medication drips (heparin, PCA, etc.)   E  Equipment - Suction is set up for ALL patients (with danya)  - Red plugs utilized for all equipment (IV pumps, etc.)  - WOWs wiped down at end of shift.  - Room stocked with oxygen, suction, and other unit-specific supplies   A  Alarms - Bed alarm is set for fall risk patients  - Ensure chair alarm is in place and activated if patient is up in a chair   L  Lines - Check IV for any infiltration  - Shaikh bag is empty if patient has a Shaikh   - Tubing and IV bags are labeled   S  Safety   - Room is clean, patient is clean, and equipment is clean. - Hallways are clear from equipment besides carts. - Fall bracelet on for fall risk patients  - Ensure room is clear and free of clutter  - Suction is set up for ALL patients (with danya)  - Hallways are clear from equipment besides carts.    - Isolation precautions followed, supplies available outside room, sign posted     Vandana Murry, RN

## 2018-08-24 ENCOUNTER — APPOINTMENT (OUTPATIENT)
Dept: GENERAL RADIOLOGY | Age: 58
DRG: 194 | End: 2018-08-24
Attending: PHYSICIAN ASSISTANT
Payer: MEDICAID

## 2018-08-24 LAB
ANION GAP SERPL CALC-SCNC: 6 MMOL/L (ref 3–18)
BASOPHILS # BLD: 0 K/UL (ref 0–0.1)
BASOPHILS NFR BLD: 0 % (ref 0–2)
BUN SERPL-MCNC: 19 MG/DL (ref 7–18)
BUN/CREAT SERPL: 18 (ref 12–20)
CALCIUM SERPL-MCNC: 8.4 MG/DL (ref 8.5–10.1)
CHLORIDE SERPL-SCNC: 105 MMOL/L (ref 100–108)
CO2 SERPL-SCNC: 30 MMOL/L (ref 21–32)
CREAT SERPL-MCNC: 1.05 MG/DL (ref 0.6–1.3)
DIFFERENTIAL METHOD BLD: ABNORMAL
EOSINOPHIL # BLD: 0.2 K/UL (ref 0–0.4)
EOSINOPHIL NFR BLD: 3 % (ref 0–5)
ERYTHROCYTE [DISTWIDTH] IN BLOOD BY AUTOMATED COUNT: 18 % (ref 11.6–14.5)
GLUCOSE SERPL-MCNC: 91 MG/DL (ref 74–99)
HCT VFR BLD AUTO: 36.3 % (ref 35–45)
HGB BLD-MCNC: 10.2 G/DL (ref 12–16)
INR PPP: 1.8 (ref 0.8–1.2)
LYMPHOCYTES # BLD: 1.4 K/UL (ref 0.9–3.6)
LYMPHOCYTES NFR BLD: 25 % (ref 21–52)
MCH RBC QN AUTO: 22.6 PG (ref 24–34)
MCHC RBC AUTO-ENTMCNC: 28.1 G/DL (ref 31–37)
MCV RBC AUTO: 80.5 FL (ref 74–97)
MONOCYTES # BLD: 0.4 K/UL (ref 0.05–1.2)
MONOCYTES NFR BLD: 7 % (ref 3–10)
NEUTS SEG # BLD: 3.6 K/UL (ref 1.8–8)
NEUTS SEG NFR BLD: 65 % (ref 40–73)
PLATELET # BLD AUTO: 201 K/UL (ref 135–420)
PMV BLD AUTO: 10.5 FL (ref 9.2–11.8)
POTASSIUM SERPL-SCNC: 3.5 MMOL/L (ref 3.5–5.5)
PROTHROMBIN TIME: 21 SEC (ref 11.5–15.2)
RBC # BLD AUTO: 4.51 M/UL (ref 4.2–5.3)
SODIUM SERPL-SCNC: 141 MMOL/L (ref 136–145)
WBC # BLD AUTO: 5.6 K/UL (ref 4.6–13.2)

## 2018-08-24 PROCEDURE — 74011250637 HC RX REV CODE- 250/637: Performed by: INTERNAL MEDICINE

## 2018-08-24 PROCEDURE — 97530 THERAPEUTIC ACTIVITIES: CPT

## 2018-08-24 PROCEDURE — 97535 SELF CARE MNGMENT TRAINING: CPT

## 2018-08-24 PROCEDURE — 65660000000 HC RM CCU STEPDOWN

## 2018-08-24 PROCEDURE — 36415 COLL VENOUS BLD VENIPUNCTURE: CPT | Performed by: INTERNAL MEDICINE

## 2018-08-24 PROCEDURE — 73502 X-RAY EXAM HIP UNI 2-3 VIEWS: CPT

## 2018-08-24 PROCEDURE — 97110 THERAPEUTIC EXERCISES: CPT

## 2018-08-24 PROCEDURE — 85610 PROTHROMBIN TIME: CPT | Performed by: INTERNAL MEDICINE

## 2018-08-24 PROCEDURE — 74011250636 HC RX REV CODE- 250/636: Performed by: INTERNAL MEDICINE

## 2018-08-24 PROCEDURE — 85025 COMPLETE CBC W/AUTO DIFF WBC: CPT | Performed by: INTERNAL MEDICINE

## 2018-08-24 PROCEDURE — 80048 BASIC METABOLIC PNL TOTAL CA: CPT | Performed by: INTERNAL MEDICINE

## 2018-08-24 PROCEDURE — 94660 CPAP INITIATION&MGMT: CPT

## 2018-08-24 PROCEDURE — 74011250637 HC RX REV CODE- 250/637: Performed by: PHYSICIAN ASSISTANT

## 2018-08-24 PROCEDURE — 77010033678 HC OXYGEN DAILY

## 2018-08-24 PROCEDURE — 74011000250 HC RX REV CODE- 250: Performed by: INTERNAL MEDICINE

## 2018-08-24 RX ORDER — WARFARIN SODIUM 5 MG/1
10 TABLET ORAL EVERY EVENING
Status: COMPLETED | OUTPATIENT
Start: 2018-08-24 | End: 2018-08-24

## 2018-08-24 RX ORDER — WARFARIN 7.5 MG/1
7.5 TABLET ORAL DAILY
Qty: 7 TAB | Refills: 0 | Status: ON HOLD
Start: 2018-08-24 | End: 2018-10-08

## 2018-08-24 RX ORDER — IPRATROPIUM BROMIDE AND ALBUTEROL SULFATE 2.5; .5 MG/3ML; MG/3ML
3 SOLUTION RESPIRATORY (INHALATION)
Qty: 30 NEBULE | Refills: 0 | Status: ON HOLD
Start: 2018-08-24 | End: 2019-08-21 | Stop reason: SDUPTHER

## 2018-08-24 RX ORDER — FUROSEMIDE 40 MG/1
40 TABLET ORAL
Status: DISCONTINUED | OUTPATIENT
Start: 2018-08-25 | End: 2018-08-30 | Stop reason: HOSPADM

## 2018-08-24 RX ORDER — LISINOPRIL 5 MG/1
5 TABLET ORAL DAILY
Status: DISCONTINUED | OUTPATIENT
Start: 2018-08-25 | End: 2018-08-30 | Stop reason: HOSPADM

## 2018-08-24 RX ORDER — LISINOPRIL 5 MG/1
5 TABLET ORAL DAILY
Qty: 30 TAB | Refills: 0 | Status: SHIPPED
Start: 2018-08-24 | End: 2018-10-08

## 2018-08-24 RX ADMIN — DOCUSATE SODIUM 100 MG: 100 CAPSULE, LIQUID FILLED ORAL at 17:57

## 2018-08-24 RX ADMIN — FAMOTIDINE 20 MG: 20 TABLET ORAL at 17:57

## 2018-08-24 RX ADMIN — METOPROLOL TARTRATE 12.5 MG: 25 TABLET ORAL at 18:35

## 2018-08-24 RX ADMIN — LEVOTHYROXINE SODIUM 125 MCG: 125 TABLET ORAL at 08:22

## 2018-08-24 RX ADMIN — FUROSEMIDE 20 MG: 10 INJECTION, SOLUTION INTRAMUSCULAR; INTRAVENOUS at 08:21

## 2018-08-24 RX ADMIN — BACLOFEN 10 MG: 10 TABLET ORAL at 21:34

## 2018-08-24 RX ADMIN — WARFARIN SODIUM 10 MG: 5 TABLET ORAL at 18:16

## 2018-08-24 RX ADMIN — ARIPIPRAZOLE 15 MG: 15 TABLET ORAL at 08:25

## 2018-08-24 RX ADMIN — SIMVASTATIN 40 MG: 40 TABLET, FILM COATED ORAL at 21:34

## 2018-08-24 RX ADMIN — METOPROLOL TARTRATE 12.5 MG: 25 TABLET ORAL at 08:25

## 2018-08-24 RX ADMIN — BACLOFEN 10 MG: 10 TABLET ORAL at 17:03

## 2018-08-24 RX ADMIN — BACLOFEN 10 MG: 10 TABLET ORAL at 08:25

## 2018-08-24 RX ADMIN — LISINOPRIL 10 MG: 10 TABLET ORAL at 08:25

## 2018-08-24 RX ADMIN — PAROXETINE 20 MG: 20 TABLET, FILM COATED ORAL at 08:26

## 2018-08-24 RX ADMIN — FAMOTIDINE 20 MG: 20 TABLET ORAL at 08:26

## 2018-08-24 RX ADMIN — ACETAMINOPHEN 650 MG: 325 TABLET ORAL at 18:15

## 2018-08-24 RX ADMIN — ISOSORBIDE MONONITRATE 30 MG: 30 TABLET, EXTENDED RELEASE ORAL at 18:17

## 2018-08-24 RX ADMIN — ACETAMINOPHEN AND CODEINE PHOSPHATE 1 TABLET: 300; 30 TABLET ORAL at 21:34

## 2018-08-24 RX ADMIN — DOCUSATE SODIUM 100 MG: 100 CAPSULE, LIQUID FILLED ORAL at 08:21

## 2018-08-24 RX ADMIN — POLYETHYLENE GLYCOL 3350 17 G: 17 POWDER, FOR SOLUTION ORAL at 08:21

## 2018-08-24 NOTE — DISCHARGE SUMMARY
[facility]  [facility]       Patient: Hayley Shipman Age: 60 y.o. : 1960 MR#: [id number] SSN: [id number]  PCP on record: Samson Meredith MD  Admit date: 2018  Discharge date: 2018    Disposition:    []Home   []Home with Home Health   [x]SNF/NH - [facility]  []Rehab   []Home with family   []Alternate Facility:____________________    Admission Diagnoses:  Acute exacerbation of CHF (congestive heart failure) ([facility]    Discharge Diagnoses:                             -SOB as presenting complaint. Likely multifactorial in this pt w/ morbid obesity/OHS/ANDRADE  -History of severe pulm HTN, ANDRADE and non compliance w/ CPAP. -Acute on chronice diastolic HF  -Hypercarbia  -Chronic afib on coumadin: rate controlled  -HTN  -Dyslipidemia  -History of polysubstance abuse  -Paranoid schizophrenia  -Morbid obesity    Discharge Medications:     Current Discharge Medication List      START taking these medications    Details   albuterol-ipratropium (DUO-NEB) 2.5 mg-0.5 mg/3 ml nebu 3 mL by Nebulization route every six (6) hours as needed. Qty: 30 Nebule, Refills: 0         CONTINUE these medications which have CHANGED    Details   lisinopril (PRINIVIL, ZESTRIL) 5 mg tablet Take 1 Tab by mouth daily. Qty: 30 Tab, Refills: 0      warfarin (COUMADIN) 7.5 mg tablet Take 1 Tab by mouth daily. Qty: 7 Tab, Refills: 0         CONTINUE these medications which have NOT CHANGED    Details   furosemide (LASIX) 40 mg tablet Take 1 Tab by mouth two (2) times a day. 40 mg po daily  Qty: 60 Tab, Refills: 2      docusate sodium (COLACE) 100 mg capsule Take 1 Cap by mouth two (2) times a day for 90 days. HOLD for loose stool. Qty: 60 Cap, Refills: 2      metoprolol tartrate (LOPRESSOR) 25 mg tablet Take 0.5 Tabs by mouth two (2) times a day. Qty: 30 Tab, Refills: 6      fluticasone (FLONASE) 50 mcg/actuation nasal spray 2 Sprays by Both Nostrils route daily.   Qty: 1 Bottle, Refills: 2 isosorbide mononitrate ER (IMDUR) 30 mg tablet Take 1 Tab by mouth every evening. Qty: 90 Tab, Refills: 3      multivitamin, tx-iron-ca-min (THERA-M W/ IRON) 9 mg iron-400 mcg tab tablet Take 1 Tab by mouth daily. Qty: 30 Tab, Refills: 0      raNITIdine (ZANTAC) 150 mg tablet Take 150 mg by mouth two (2) times a day. VENTOLIN HFA 90 mcg/actuation inhaler Take 2 Puffs by inhalation every four (4) hours as needed for Wheezing or Shortness of Breath. Qty: 1 Inhaler, Refills: 0      PARoxetine (PAXIL) 20 mg tablet Take 1 Tab by mouth daily. Qty: 30 Tab, Refills: 3      simvastatin (ZOCOR) 40 mg tablet Take  by mouth nightly. to obtain from pharmacy      aripiprazole (ABILIFY) 15 mg tablet Take 15 mg by mouth daily. to obtain from pharmacy      levothyroxine (SYNTHROID) 125 mcg tablet Take 125 mcg by mouth Daily (before breakfast). polyethylene glycol (MIRALAX) 17 gram packet Take 1 Packet by mouth daily. Qty: 30 Packet, Refills: 2         STOP taking these medications       oxyCODONE IR (ROXICODONE) 5 mg immediate release tablet Comments:   Reason for Stopping:         loperamide (IMODIUM) 2 mg capsule Comments:   Reason for Stopping:               Consults:  Cardiology, Pulmonology  -   Procedures: none  -     Significant Diagnostic Studies:     CXR  IMPRESSION:  1. Enlarged cardiac silhouette with prominent hilar shadows similar to prior,  correlating with enlarged pulmonary arteries, and suspected mild perihilar  vascular congestion versus infiltrate. Right elbow series:  IMPRESSION:     1. No convincing evidence of acute fracture or subluxation. Mild fat pad  elevation, potentially indicative of joint effusion of unclear etiology. Occult  fracture cannot be excluded in the background of extensive degenerative changes  and joint effusion. Correlation with cross-sectional imaging could be helpful  for further delineation. 2.  Advanced osteoarthrosis of the right elbow.   3.  Possible loose intra-articular bodies.     AP pelvis and frog-leg right hip views.      FINDINGS:      - The overall technical quality of the study is significantly compromised by  suboptimal penetration. According to the technologist note, the tube maximum is  reached during the image acquisition. This is felt to be related to body  habitus, i.e. morbid obesity.  - Difficult to exclude subtle fracture with current images. - Previous study (06/07/18) demonstrated severe right hip osteoarthrosis with  apparent flattening of the femoral head and extensive subcortical cyst formation  with large marginal osteophyte development.       IMPRESSION  IMPRESSION:     1. Technically suboptimal study. 2.  Correlation with cross-sectional imaging could be helpful for further  delineation.    [facility] Course by Problem   -SOB as presenting complaint. Likely multifactorial in this pt w/ morbid obesity/OHS/ANDRADE. Admitted due to worsening SOB while on oxygen, increasingly worsening LE edema and swelling for the last few days. CXR showed suspected mild perihilar vascular congestion versus infiltrate. Pulmonary was consulted, ABG showed high CO2 at 80.5, bicarb at 45.0. Subsequent ABG showed pCO2 of 70.3, pO2 of 60. HCO3 of 39.0. Pt was started on BIPAP as she failed CPAP use. Per pulmonary, pt needs BIPAP moving forward, however, since [facility] does have BIPAP [facility], Dr. Ruiz Postin states she can use CPAP at 15cm with nasal mask with plan to switch back to BIPAP at 25/21 after SNF stay. -History of severe pulm HTN, ANDRADE and non compliance w/ CPAP prior to admission.  -Acute on chronice diastolic HF- managed with IV Lasix and transitioned to PO. Pt had a Net neg of 19.8 L and dropped 8kg in weight after diuresis. -Hypercarbia somewhat improved after use of BiPAP while inpt, however PaC02 remains high and pt remains intermittently somnolent. Pulmonary managed pt on BIPAP.    -Chronic afib on coumadin: rate controlled while inpt, managed with BB and warfarin. INR is 1.8 prior to discharge. Please check INR levels while at SNF. -HTN: managed with BP meds as above. Lisinopril dose was decreased due to low normal BP.   -Dyslipidemia: managed with statin  -History of polysubstance abuse  -Paranoid schizophrenia: on Abilify  -Morbid obesity: 144.7kg    Today's examination of the patient revealed:     Subjective:   Pt is somnolent. Not easily roused but was able to wake up and talk. Pt was in no distress. No complaints. Objective:   VS:   Visit Vitals    /63 (BP 1 Location: Left arm, BP Patient Position: At rest)    Pulse (!) 54    Temp 96.7 °F (35.9 °C)    Resp 18    Ht 5' (1.524 m)    Wt 144.7 kg (319 lb 1.6 oz)    SpO2 100%    BMI 62.32 kg/m2      Tmax/24hrs: Temp (24hrs), Av.4 °F (36.3 °C), Min:96.7 °F (35.9 °C), Max:98 °F (36.7 °C)     Input/Output:   Intake/Output Summary (Last 24 hours) at 18 1617  Last data filed at 18 1513   Gross per 24 hour   Intake              600 ml   Output             4825 ml   Net            -4225 ml       General:  Somnolent.    Cardiovascular:  RRR  Pulmonary: CTA   GI:  NT, normal BS  Extremities:  No edema or cyanosis, 2+ dorsalis pedis pulses bilaterally  Additional:      Labs:    Recent Results (from the past 24 hour(s))   PROTHROMBIN TIME + INR    Collection Time: 18  4:45 AM   Result Value Ref Range    Prothrombin time 21.0 (H) 11.5 - 15.2 sec    INR 1.8 (H) 0.8 - 1.2     CBC WITH AUTOMATED DIFF    Collection Time: 18  4:45 AM   Result Value Ref Range    WBC 5.6 4.6 - 13.2 K/uL    RBC 4.51 4.20 - 5.30 M/uL    HGB 10.2 (L) 12.0 - 16.0 g/dL    HCT 36.3 35.0 - 45.0 %    MCV 80.5 74.0 - 97.0 FL    MCH 22.6 (L) 24.0 - 34.0 PG    MCHC 28.1 (L) 31.0 - 37.0 g/dL    RDW 18.0 (H) 11.6 - 14.5 %    PLATELET 849 402 - 119 K/uL    MPV 10.5 9.2 - 11.8 FL    NEUTROPHILS 65 40 - 73 %    LYMPHOCYTES 25 21 - 52 %    MONOCYTES 7 3 - 10 %    EOSINOPHILS 3 0 - 5 %    BASOPHILS 0 0 - 2 %    ABS. NEUTROPHILS 3.6 1.8 - 8.0 K/UL    ABS. LYMPHOCYTES 1.4 0.9 - 3.6 K/UL    ABS. MONOCYTES 0.4 0.05 - 1.2 K/UL    ABS. EOSINOPHILS 0.2 0.0 - 0.4 K/UL    ABS. BASOPHILS 0.0 0.0 - 0.1 K/UL    DF AUTOMATED     METABOLIC PANEL, BASIC    Collection Time: 08/24/18  4:45 AM   Result Value Ref Range    Sodium 141 136 - 145 mmol/L    Potassium 3.5 3.5 - 5.5 mmol/L    Chloride 105 100 - 108 mmol/L    CO2 30 21 - 32 mmol/L    Anion gap 6 3.0 - 18 mmol/L    Glucose 91 74 - 99 mg/dL    BUN 19 (H) 7.0 - 18 MG/DL    Creatinine 1.05 0.6 - 1.3 MG/DL    BUN/Creatinine ratio 18 12 - 20      GFR est AA >60 >60 ml/min/1.73m2    GFR est non-AA 54 (L) >60 ml/min/1.73m2    Calcium 8.4 (L) 8.5 - 10.1 MG/DL     DIET:  Cardiac    ACTIVITY: as tolerated  Patient needs to be on Fall, aspiration, decubitus precaution. ·    PT/OT consult  ·  Accuchecks QAC and QHS  ·             DVT prophylaxis     ADDITIONAL INFORMATION: If you experience any of the following symptoms but not limited to Fever, chills, nausea, vomiting, diarrhea, change in mentation, falling, bleeding, shortness of breath, chest pain, please call your primary care physician or return to the emergency room if you cannot get hold of your doctor:     FOLLOW UP CARE:  Follow-up with 1. Physician at SNF in 1-2 days with Cbc with diff, bmp, mg, INR. Check INR levels per routine. Pt's PCP: Jerilyn Denson MD.    Condition: Stable  Follow-up Appointments:   1. Your PCP: Jerilyn Denson MD, within 7-10days  2. Your Pulmonologist: Ladi Monroy MD within 2 weeks  3.  Your Cardiologist: Brigid Cote MD within 2 weeks    >30 minutes spent coordinating this discharge (review instructions/follow-up, prescriptions, preparing report for sign off)    Electronically Signed By:  Essie Jones PA-C  8/24/2018  4:17 PM

## 2018-08-24 NOTE — PROGRESS NOTES
Problem: Self Care Deficits Care Plan (Adult)  Goal: *Acute Goals and Plan of Care (Insert Text)  1. The pt will use the LRAD to complete bed mobility & OOB functional transfers with modified independence, for improved ADL participation. 2. The pt will use AE as needed to complete lower body dressing with stand-by assist.  3. The pt will complete all toileting tasks at bedside commode vs. toilet level with modified independence. 4. The pt will complete at least 3 grooming tasks in standing at sink level with modified independence. Outcome: Progressing Towards Goal  Occupational Therapy TREATMENT    Patient: Richie Shay (34 y.o. female)  Date: 8/24/2018  Diagnosis: Acute exacerbation of CHF (congestive heart failure) ([facility] Acute exacerbation of CHF (congestive heart failure) ([facility]       Precautions:    Chart, occupational therapy assessment, plan of care, and goals were reviewed. ASSESSMENT:  Pt is agreeable to participate in therapy this afternoon, in spite of pain in LEs. Pt required Mod Ax2 to maneuver to EOB, and additional time 2/2 pain in BLE w/movement. Pt participated in seated ADL grooming task at EOB, requiring set-up and Supervision 2/2 decreased sitting balance. Pt then was able to std at bedside w/Mod Ax2 and standard walker, in preparation for functional txfr to the [facility], however tolerated std for ~5 sec prior to requiring to sit down for safety. Pt was educated on and performed scapular strengthening/stabilization TherEx at EOB x10 to improve UB strength for ADLs carryover. Pt reports at home her daughter is assisting her w/functional mobility, and grandson was assisting w/LB ADLs. Pt returned to sup at the end of the session, call bell within reach.   Progression toward goals:  []          Improving appropriately and progressing toward goals  [x]          Improving slowly and progressing toward goals  []          Not making progress toward goals and plan of care will be adjusted PLAN:  Patient continues to benefit from skilled intervention to address the above impairments. Continue treatment per established plan of care. Discharge Recommendations:  Zechariah Juárez  Further Equipment Recommendations for Discharge:  bedside commode      G-CODES:     Self Care  Current  CK= 40-59%   Goal  CI= 1-19%. The severity rating is based on the Level of Assistance required for Functional Mobility and ADLs. SUBJECTIVE:   Patient stated All three of my legs are hurting.     OBJECTIVE DATA SUMMARY:   Cognitive/Behavioral Status:  Neurologic State: Alert  Orientation Level: Oriented X4  Cognition: Follows commands       Functional Mobility and Transfers for ADLs:   Bed Mobility:     Supine to Sit: Moderate assistance;Assist x2  Sit to Supine: Maximum assistance  Scooting: Minimum assistance;Assist x2 (EOB, SBA in supine)   Transfers:  Sit to Stand: Moderate assistance;Assist x2   Balance:  Sitting: Impaired  Sitting - Static: Fair (occasional)  Standing: Impaired; With support  Standing - Static: Fair (-)    ADL Intervention:    Grooming  Grooming Assistance: Supervision/set up (seated EOB)  Washing Face: Supervision/set-up  Washing Hands: Supervision/set-up  Therapeutic Exercises:   See above    Pain:  Pt didn't rate pain or discomfort prior to treatment.    Pt didn't rate pain or discomfort post treatment. Activity Tolerance:    Fair    Please refer to the flowsheet for vital signs taken during this treatment.   After treatment:   []  Patient left in no apparent distress sitting up in chair  [x]  Patient left in no apparent distress in bed  [x]  Call bell left within reach  [x]  Nursing notified  []  Caregiver present  []  Bed alarm activated    RITO Bar  Time Calculation: 23 mins

## 2018-08-24 NOTE — PROGRESS NOTES
Problem: Mobility Impaired (Adult and Pediatric)  Goal: *Acute Goals and Plan of Care (Insert Text)  Physical Therapy Goals  Initiated 8/21/2018 and to be accomplished within 7 day(s)  1. Patient will move from supine to sit and sit to supine, scoot up and down and roll side to side in bed with contact guard assist.     2.  Patient will transfer from bed to chair and chair to bed with contact guard assist using the least restrictive device. 3.  Patient will perform sit to stand with minimal assistance/contact guard assist.   Outcome: Progressing Towards Goal  physical Therapy TREATMENT    Patient: Twin Carrasco (88 y.o. female)  Date: 8/24/2018  Diagnosis: Acute exacerbation of CHF (congestive heart failure) ([facility] Acute exacerbation of CHF (congestive heart failure) ([facility]  Precautions:   Chart, physical therapy assessment, plan of care and goals were reviewed. OBJECTIVE/ ASSESSMENT:  Patient found supine in bed willing to work with PT. Patient seen with OT to maximize safety of patient and staff. Pt sat at EOB this tx, moaning in pain which she reports is in bilateral LEs in all joints. Pt stood at EOB this tx for about 5 seconds before needing to sit. Pt performed LE therex at EOB and then scooted toward [facility] with min A x 2. Pt returned supine, then scooted up toward [facility], utilizing her UEs on bed rails to scoot. Pt was left supine with needs in reach. Pt continues to progress slowly. Education: transfers, therex. Progression toward goals:  []      Improving appropriately and progressing toward goals  [x]      Improving slowly and progressing toward goals  []      Not making progress toward goals and plan of care will be adjusted     PLAN:  Patient continues to benefit from skilled intervention to address the above impairments. Continue treatment per established plan of care. Discharge Recommendations:  Skilled Nursing Facility  Further Equipment Recommendations for Discharge:  Pt has WC and RW at home. SUBJECTIVE:   Patient stated I walk to my wheelchair, which is in another room.     OBJECTIVE DATA SUMMARY:   Critical Behavior:  Neurologic State: Confused  Orientation Level: Oriented to person, Oriented to place, Disoriented to situation, Disoriented to time  Cognition: Decreased attention/concentration  Functional Mobility Training:  Bed Mobility:  Supine to Sit: Moderate assistance;Assist x2  Sit to Supine: Maximum assistance  Scooting: Minimum assistance;Assist x2 (EOB, SBA in supine)  Transfers:  Sit to Stand: Moderate assistance;Assist x2  Stand to Sit: Moderate assistance;Maximum assistance;Assist x2  Balance:  Sitting: Impaired  Sitting - Static: Fair (occasional)  Standing: Impaired; With support  Standing - Static: Fair (-)    Therapeutic Exercises:       EXERCISE   Sets   Reps   Active Active Assist   Passive Self- assited ROM   Comments   Ankle Pumps    [] [] [] []    Quad Sets   [] [] [] []    Glut Sets   [] [] [] []    Short Arc Quads   [] [] [] []    Heel Slides   [] [] [] []    Straight Leg Raises   [] [] [] []    Hip Abd   [] [] [] []    Long Arc Quads 1 10 [x] [] [] [] Bilaterally   Seated Marching 1 10 [x] [] [] [] Bilaterally   Seated Knee Flexion   [] [] [] []    Standing Marching   [] [] [] []      Pain:  Pre tx pain: not rated  Post tx pain: not rated  Activity Tolerance:   Fair  Please refer to the flowsheet for vital signs taken during this treatment. After treatment:   [] Patient left in no apparent distress sitting up in chair  [x] Patient left in no apparent distress in bed  [x] Call bell left within reach  [] Nursing notified  [] Caregiver present  [] Bed alarm activated  [] SCDs applied  [] Ice applied      Abe Price PTA   Time Calculation: 23 mins    Mobility [id number] Current  CK= 40-59%. The severity rating is based on the Level of Assistance required for Functional Mobility and ADLs. Mobility   Goal  CJ= 20-39%.   The severity rating is based on the Level of Assistance required for Functional Mobility and ADLs.

## 2018-08-24 NOTE — PROGRESS NOTES
Problem: Falls - Risk of  Goal: *Absence of Falls  Document Piyush Fall Risk and appropriate interventions in the flowsheet.    Fall Risk Interventions:  Mobility Interventions: Bed/chair exit alarm, Communicate number of staff needed for ambulation/transfer    Mentation Interventions: Bed/chair exit alarm, Door open when patient unattended, More frequent rounding, Room close to nurse's station    Medication Interventions: Bed/chair exit alarm    Elimination Interventions: Call light in reach, Bed/chair exit alarm, Patient to call for help with toileting needs    History of Falls Interventions: Door open when patient unattended

## 2018-08-24 NOTE — PROGRESS NOTES
Problem: Falls - Risk of  Goal: *Absence of Falls  Document Piyush Fall Risk and appropriate interventions in the flowsheet.    Outcome: Progressing Towards Goal  Fall Risk Interventions:  Mobility Interventions: Bed/chair exit alarm, Communicate number of staff needed for ambulation/transfer    Mentation Interventions: Bed/chair exit alarm, Door open when patient unattended, More frequent rounding, Room close to nurse's station    Medication Interventions: Bed/chair exit alarm    Elimination Interventions: Call light in reach, Bed/chair exit alarm, Patient to call for help with toileting needs    History of Falls Interventions: Door open when patient unattended

## 2018-08-24 NOTE — PROGRESS NOTES
DIAGNOSIS:  1.Obesity Hypoventilation syndrome   2. Severe complex ANDRADE   Dx: ANDRADE (obstructive sleep apnea) [G47.33 (ICD-10-CM)]     Hypoventilation Syndrome testing needed: PSG or ABG ( done)  Results:    ABG:on 2 L canula  Results for Ike Bajwa (MRN [id number]) as of 8/24/2018 09:58   Ref.  Range 8/23/2018 09:08   pH (POC) Latest Ref Range: 7.35 - 7.45   7.351   pCO2 (POC) Latest Ref Range: 35.0 - 45.0 MMHG 70.3 (H)   pO2 (POC) Latest Ref Range: 80 - 100 MMHG 60 (L)   HCO3 (POC) Latest Ref Range: 22 - 26 MMOL/L 39.0 (H)   sO2 (POC) Latest Ref Range: 92 - 97 % 88 (L)     Polysomnogram 5/22/2017:  Severe complex ANDRADE- need dedicated BIPAP 25/21 with possible back up rate.  Use positional therapy  (pillow wedge or sleep on side)due to high pressure requirement    SETTINGS:    IPAP 25  EPAP 18  O2- 28%  Back up rate-10    Doreen Hamilton MD

## 2018-08-24 NOTE — ROUTINE PROCESS
Ms. Rhonda Can advised she has been having her bipap on and off throughout the shift. I advised Ms. Ortiz I'd check on her in a few hours to see if she wants to be put on her bipap. I will administer quality respiratory care until properly relieved.

## 2018-08-24 NOTE — PROGRESS NOTES
Bedside shift change report given to Conchita Adams RN (oncoming nurse) by Bert Ochoa (offgoing nurse). Report included the following information SBAR, Kardex and MAR.

## 2018-08-24 NOTE — PROGRESS NOTES
[facility] Hospitalist Group  Progress Note    Patient: Shira Lewis Age: 60 y.o. : 1960 MR#: [id number] SSN: [id number]  Date: 2018     Subjective:     Pt is somnolent. Not easily roused but was able to wake up and talk. Pt was in no distress. No complaints. Assessment/Plan:   -SOB as presenting complaint. Likely multifactorial in this pt w/ morbid obesity/OHS/ANDRADE  -History of severe pulm HTN, ANDRADE and non compliance w/ CPAP. -Acute on chronice diastolic HF-cardiology following  -Hypercarbia somewhat improved after use of BiPAP overnight, PaC02 remains high and pt remains very somnolent however. Pulmonary consulted. -Chronic afib on coumadin: rate controlled  -HTN  -Dyslipidemia  -History of polysubstance abuse  -Paranoid schizophrenia  -Morbid obesity     PLAN:  -Diuresis per cardiology  -XRAY right hip reviewed, suboptimal study, repeat in the am.  -give dose (10mg) of coumadin and follow INR  -PT/OT will need SNF placement. D/w CM - [facility] did not accept pt, awaiting response from [facility].      Additional Notes:       Case discussed with:  [x]Patient  []Family  [x]Nursing  []Case Management  DVT Prophylaxis:  []Lovenox  []Hep SQ  []SCDs  [x]Coumadin   []On Heparin gtt     Objective:   VS:   Visit Vitals    /63 (BP 1 Location: Left arm, BP Patient Position: At rest)    Pulse (!) 54    Temp 96.7 °F (35.9 °C)    Resp 18    Ht 5' (1.524 m)    Wt 144.7 kg (319 lb 1.6 oz)    SpO2 100%    BMI 62.32 kg/m2      Tmax/24hrs: Temp (24hrs), Av.4 °F (36.3 °C), Min:96.7 °F (35.9 °C), Max:98 °F (36.7 °C)    Intake/Output Summary (Last 24 hours) at 18 1537  Last data filed at 18 1513   Gross per 24 hour   Intake              600 ml   Output             5175 ml   Net            -4575 ml       General:  Somnolent.    Cardiovascular:  RRR  Pulmonary: CTA   GI:  NT, normal BS  Extremities:  No edema or cyanosis      Labs:    Recent Results (from the past 24 hour(s))   PROTHROMBIN TIME + INR    Collection Time: 08/24/18  4:45 AM   Result Value Ref Range    Prothrombin time 21.0 (H) 11.5 - 15.2 sec    INR 1.8 (H) 0.8 - 1.2     CBC WITH AUTOMATED DIFF    Collection Time: 08/24/18  4:45 AM   Result Value Ref Range    WBC 5.6 4.6 - 13.2 K/uL    RBC 4.51 4.20 - 5.30 M/uL    HGB 10.2 (L) 12.0 - 16.0 g/dL    HCT 36.3 35.0 - 45.0 %    MCV 80.5 74.0 - 97.0 FL    MCH 22.6 (L) 24.0 - 34.0 PG    MCHC 28.1 (L) 31.0 - 37.0 g/dL    RDW 18.0 (H) 11.6 - 14.5 %    PLATELET 657 075 - 576 K/uL    MPV 10.5 9.2 - 11.8 FL    NEUTROPHILS 65 40 - 73 %    LYMPHOCYTES 25 21 - 52 %    MONOCYTES 7 3 - 10 %    EOSINOPHILS 3 0 - 5 %    BASOPHILS 0 0 - 2 %    ABS. NEUTROPHILS 3.6 1.8 - 8.0 K/UL    ABS. LYMPHOCYTES 1.4 0.9 - 3.6 K/UL    ABS. MONOCYTES 0.4 0.05 - 1.2 K/UL    ABS. EOSINOPHILS 0.2 0.0 - 0.4 K/UL    ABS.  BASOPHILS 0.0 0.0 - 0.1 K/UL    DF AUTOMATED     METABOLIC PANEL, BASIC    Collection Time: 08/24/18  4:45 AM   Result Value Ref Range    Sodium 141 136 - 145 mmol/L    Potassium 3.5 3.5 - 5.5 mmol/L    Chloride 105 100 - 108 mmol/L    CO2 30 21 - 32 mmol/L    Anion gap 6 3.0 - 18 mmol/L    Glucose 91 74 - 99 mg/dL    BUN 19 (H) 7.0 - 18 MG/DL    Creatinine 1.05 0.6 - 1.3 MG/DL    BUN/Creatinine ratio 18 12 - 20      GFR est AA >60 >60 ml/min/1.73m2    GFR est non-AA 54 (L) >60 ml/min/1.73m2    Calcium 8.4 (L) 8.5 - 10.1 MG/DL       Signed By: Addison Talbot PA-C     August 24, 2018 3:37 PM

## 2018-08-24 NOTE — PROGRESS NOTES
Problem: Mobility Impaired (Adult and Pediatric)  Goal: *Acute Goals and Plan of Care (Insert Text)  Physical Therapy Goals  Initiated 8/21/2018 and to be accomplished within 7 day(s)  1. Patient will move from supine to sit and sit to supine, scoot up and down and roll side to side in bed with contact guard assist.     2.  Patient will transfer from bed to chair and chair to bed with contact guard assist using the least restrictive device. 3.  Patient will perform sit to stand with minimal assistance/contact guard assist.     1038 - Pt is very lethargic at this time. Pt is arousable but quickly falls back to sleep. Will f/u later in day.

## 2018-08-24 NOTE — PROGRESS NOTES
[facility] - Progress Note  Admit Date: 8/19/2018    Assessment:     Hospital Problems  Date Reviewed: 5/22/2018          Codes Class Noted POA    * (Principal)Acute exacerbation of CHF (congestive heart failure) ([facility] [address] ICD-10-CM: I50.9  ICD-9-CM: 428.0  6/2/2018 Unknown        Chronic anticoagulation ICD-10-CM: Z79.01  ICD-9-CM: V58.61  6/2/2018 Yes        Fluid overload ICD-10-CM: E87.70  ICD-9-CM: 276.69  4/18/2017 Yes        Morbid obesity with BMI of 45.0-49.9, adult [facility]) ICD-10-CM: E66.01, Z68.42  ICD-9-CM: 278.01, V85.42  2/20/2017 Yes        Hypertension ICD-10-CM: I11.9  ICD-9-CM: 402.10  Unknown Yes        Atrial fibrillation ICD-10-CM: I48.91  ICD-9-CM: 427.31  Unknown Yes    Overview Signed 1/25/2013 10:46 AM by Louis Joya     CHADS score 1  (-CHF, +HTN, -AGE, -DM, -CVA)                      -Acute on chronic hypercapnic respiratory failure.  PCO2 > 80 on ABG. -Acute on chronic diastolic heart failure.  This is likely contributing as well to her symptomatology.  -Chronic LE swelling, history of non-compliance with diuretic due to difficulty ambulating. Her edema appears to be about the same as in the past.  -Chronic atrial fibrillation, on Coumadin. She is maintained on low dose B-blocker for rate control.  -Echo 04/2018: EF 50-55%, mildly to moderately increased LV wall thickness, RV moderately to severely dilated with est. Peak pressure 87 mmHg, moderately to severely dilated RA, moderate to severe tricuspid regurgitation, severe pulmonic insufficiency.   -Severe pulmonary hypertension, RV dysfunction with moderate to severe pulmonary insufficiency and moderate pulmonary pressure elevation.  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 Bharati Otero    Plan: Appreciate pulmonary evaluation. OHV/ANDRADE. Needs dedicated bipap, patient unfortunately not keeping mask on. On intermittent bipap and high flow. Continues to diurese with stable renal function and stable hemodynamics. Continued on lopressor and lisinopril. Continued on warfarin, INR 1.8.     Subjective:     Sleeping, slow to wake    Objective:      Patient Vitals for the past 8 hrs:   Temp Pulse Resp BP SpO2   08/24/18 0737 97.3 °F (36.3 °C) 75 18 129/78 94 %         Patient Vitals for the past 96 hrs:   Weight   08/23/18 0015 144.7 kg (319 lb 1.6 oz)   08/21/18 0850 146.1 kg (322 lb)   08/20/18 1614 148.5 kg (327 lb 4.8 oz)                    Intake/Output Summary (Last 24 hours) at 08/24/18 1030  Last data filed at 08/24/18 0954   Gross per 24 hour   Intake              660 ml   Output             5575 ml   Net            -4915 ml       Physical Exam:  General:  no distress  Neck:   JVD  Lungs:  diminished breath sounds   Heart:  regular rate and rhythm  Abdomen:  abdomen is soft obese  Extremities:  2+ edema    Data Review:     Labs: Results:       Chemistry Recent Labs      08/24/18   0445  08/23/18   0240  08/22/18   1232   GLU  91  98  96   NA  141  142  141   K  3.5  3.7  3.9   CL  105  100  97*   CO2  30  35*  38*   BUN  19*  18  15   CREA  1.05  1.02  0.94   CA  8.4*  8.6  8.8   AGAP  6  7  6   BUCR  18  18  16      CBC w/Diff Recent Labs      08/24/18   0445  08/23/18   0240  08/22/18   0209   WBC  5.6  5.7  4.4*   RBC  4.51  4.36  4.27   HGB  10.2*  9.9*  9.9*   HCT  36.3  35.4  33.9*   PLT  201  209  196   GRANS  65  65  63   LYMPH  25  23  24   EOS  3  4  3      Cardiac Enzymes No results found for: CPK, CK, CKMMB, CKMB, RCK3, CKMBT, CKNDX, CKND1, SHANNAN, TROPT, TROIQ, JELANI, TROPT, TNIPOC, BNP, BNPP   Coagulation Recent Labs      08/24/18   0445  08/23/18   0240   PTP  21.0*  20.3*   INR  1.8*  1.8*       Lipid Panel Lab Results   Component Value Date/Time    Cholesterol, total 90 05/03/2018 10:25 AM HDL Cholesterol 34 (L) 05/03/2018 10:25 AM    LDL, calculated 41.4 05/03/2018 10:25 AM    VLDL, calculated 14.6 05/03/2018 10:25 AM    Triglyceride 73 05/03/2018 10:25 AM    CHOL/HDL Ratio 2.6 05/03/2018 10:25 AM      BNP Lab Results   Component Value Date/Time    B-type Natriuretic Peptide 3187 (H) 05/06/2016 11:10 AM    B-type Natriuretic Peptide 3075 (H) 05/06/2016 11:00 AM      Liver Enzymes No results for input(s): TP, ALB, TBIL, AP, SGOT, GPT in the last 72 hours.     No lab exists for component: DBIL   Digoxin    Thyroid Studies Lab Results   Component Value Date/Time    T4, Total 12.3 (H) 01/15/2014 02:01 AM    TSH 1.97 05/04/2018 03:15 AM          Signed By: VERONICA Tejada     August 24, 2018

## 2018-08-24 NOTE — PROGRESS NOTES
NUTRITION    Nutrition Consult:  Diet Education     RECOMMENDATIONS / PLAN:     - Continue current nutrition interventions  - Continue RD inpatient monitoring and evaluation. NUTRITION INTERVENTIONS & DIAGNOSIS:     [x] Meals/snacks: modified composition  [x] Nutrition education/ counseling: coumadin and vitamin K consistent diet and CHF dietary considerations on 8/21    Nutrition Diagnosis:  Obesity related to prolonged excessive oral intake as evidenced by BMI 62.3 kg/m^2. Food and nutrition related knowledge deficit related to vitamin K consistent diet and dietary considerations for managing CHF as evidenced by pt report. ASSESSMENT:     8/24: Pt unavailable at time of visit. Has good/excellent meal intake per chart documentation    8/21: Discussed coumadin and vitamin K consistent diet yesterday and today with pt. Discussed dietary considerations with consuming foods rich in vitamin K. Pt also with hx and current diagnosis of CHF. Discussed following low sodium and fluid restricted diet for management of CHF symptoms; discussed dietary considerations. Educational handouts provided/reviewed with her. Questions answered; pt verbalized understanding the information provided.  Has good appetite and meal intake    Average po intake adequate to meet patients estimated nutritional needs:   [x] Yes     [] No   [] Unable to determine at this time    Diet: DIET CARDIAC Regular; FR 1500ML      Food Allergies:  None known   Current Appetite:   [] Good     [] Fair     [] Poor     [x] Other: unknown   Appetite/meal intake prior to admission:   [] Good     [] Fair     [] Poor     [x] Other: unknown  Feeding Limitations:  [] Swallowing difficulty    [] Chewing difficulty    [] Other:  Current Meal Intake: Patient Vitals for the past 100 hrs:   % Diet Eaten   08/24/18 1313 100 %   08/24/18 0906 75 %   08/23/18 1807 100 %   08/23/18 1321 100 %   08/22/18 0932 50 %   08/21/18 1808 85 %   08/21/18 1240 100 %   08/21/18 0913 50 %   08/21/18 0844 66 %   08/20/18 1225 90 %       BM: 8/23  Skin Integrity:  No pressure injury or wound noted  Edema:   [] No     [x] Yes   Pertinent Medications: Reviewed: bowel regimen, zofran, coumadin    Recent Labs      08/24/18   0445  08/23/18   0240  08/22/18   1232   NA  141  142  141   K  3.5  3.7  3.9   CL  105  100  97*   CO2  30  35*  38*   GLU  91  98  96   BUN  19*  18  15   CREA  1.05  1.02  0.94   CA  8.4*  8.6  8.8       Intake/Output Summary (Last 24 hours) at 08/24/18 1514  Last data filed at 08/24/18 1313   Gross per 24 hour   Intake              480 ml   Output             4775 ml   Net            -4295 ml       Anthropometrics:  Ht Readings from Last 1 Encounters:   08/19/18 5' (1.524 m)     Last 3 Recorded Weights in this Encounter    08/20/18 1614 08/21/18 0850 08/23/18 0015   Weight: 148.5 kg (327 lb 4.8 oz) 146.1 kg (322 lb) 144.7 kg (319 lb 1.6 oz)     Body mass index is 62.32 kg/(m^2).     Weight History:  Noted weight fluctuations PTA per chart hx    Weight Metrics 8/23/2018 8/3/2018 7/25/2018 6/22/2018 6/6/2018 5/22/2018 5/17/2018   Weight 319 lb 1.6 oz 323 lb 323 lb 325 lb 301 lb 5.9 oz 311 lb 303 lb   BMI 62.32 kg/m2 57.22 kg/m2 63.08 kg/m2 59.44 kg/m2 58.86 kg/m2 60.74 kg/m2 59.18 kg/m2        Admitting Diagnosis: Acute exacerbation of CHF (congestive heart failure) ([facility] [address]  Pertinent PMHx:  GERD, CHF, HTN, hypercholesterolemia     Education Needs:        [] None identified  [] Identified - Not appropriate at this time  [x]  Identified and addressed - refer to education log  Learning Limitations:   [x] None identified  [] Identified    Cultural, Hinduism & ethnic food preferences:  [x] None identified    [] Identified and addressed     ESTIMATED NUTRITION NEEDS:     Calories: 3998-7050 kcal (MSJx1.2-1.3 - 1000 kcal daily for promotion of wt loss) based on  [x] Actual BW: 145 kg     [] IBW   Protein: 160-174 gm (1.1-1.2 gm/kg) based on  [x] Actual BW      [] IBW   Fluid: 1 mL/kcal     MONITORING & EVALUATION:     Nutrition Goal(s):   1. Po intake of meals will meet >75% of patient estimated nutritional needs within the next 7 days. Outcome:  [] Met/Ongoing    []  Not Met    [x] New/Initial Goal   2. Weight loss of 1-2 lbs over the next 7 days. Outcome:  [] Met/Ongoing    []  Not Met    [x] New/Initial Goal   3. Patient will increase knowledge of appropriate food choices on a low sodium and low fluid diet for managing CHF within 7 days. Outcome:  [] Met    []  Not Met    [x] New/Initial Goal   4. Patient will increase knowledge of appropriate food choices on a vitamin K consistent diet within 7 days.   Outcome:  [] Met    []  Not Met    [x] New/Initial Goal       Monitoring:   [x] Food and beverage intake   [x] Diet order   [x] Nutrition-focused physical findings   [x] Treatment/therapy   [] Weight   [] Enteral nutrition intake        Previous Recommendations (for follow-up assessments only):     []   Implemented       []   Not Implemented (RD to address)      [] No Longer Appropriate     [] No Recommendation Made     Discharge Planning:  Cardiac diet   [x] Participated in care planning, discharge planning, & interdisciplinary rounds as appropriate      Chinyere Davis, [address] [place] [address]   Pager: [phone]

## 2018-08-24 NOTE — PROGRESS NOTES
[facility]  Pulmonary, Critical Care, and Sleep Medicine    Progress note    Name: Corwin Soto MRN: [id number]   : 1960 Hospital: [address] [place] [address]   Date: 2018        IMPRESSION:   · Acute  on chronic respiratory failure improved with diuresis, likely due to CHF exacerbation superimposed on severe ANDRADE/OHS and pulmonary HTN. ABG with Compensated chronic respiratory acidosis with mild acute component  · Pulmonary HTN probably Grp 3 +/- Grp 2 in pt with chronic hypoxemia due to ANDRADE/OHS and decompensated diastolic failure  · Severe ANDRADE/OHS failed CPAP titration. Recently seen in office and scheduled for outpatient BIPAP titration- not completed  · Volume overload  · Paranoid schizophrenia  · Morbid obesity  · Decompensated diastolic heart failure  · Atrial fibrillation rate controlled       PLAN:   · BIPAP via nasal mask as she is not tolerating full face mask- wore all night  · Continue gentle diuresis. Would avoid overdiuresis as pt is likely preload dependent  · Electrolyte replacement per protocol  · Will probably need higher pressures on NIV, await titration as out patient  · Supplemental O2 to maintain saturation greater than 92%  · Schizophrenia acting as barrier to care as pt with limited insight into her illness  · Continue anticoagulation  · Schedule PFT as outpatient     Subjective:     18   More awake. Slept all night with BiPAP via nasal mask. Tolerated better. Denies any chest pain, cough, SOB  Laying semisupine in bed  No new complaints. HPI:  This patient has been seen and evaluated at the request of Dr. Blayne Workman for hypercapnic respiratory failure. Patient is a 60 y.o. female admitted for shortness of breath and hypoxemia on  and being treated with response in SOB and Oxygenation. Noted to be lethargic on  so ABG obtained and placed on BiPAP which she does not keep on.   She is a poor historian due to Psychiatric illness but review of records reveal severe ANDRADE/OHS with failed CPAP titration. Pt was scheduled for dedicated BIPAP titration but would end up in the hospital for either shortness of breath or AMS due to CO2 narcosis. Pt with severe ANDRADE/OHS Rxed with BIPAP with admitted poor compliance, per pt due to device malfunction. Past Medical History:   Diagnosis Date    Atrial fibrillation     CHADS score 1  (-CHF, +HTN, -AGE, -DM, -CVA)    Carpal tunnel syndrome     Chronic pain     Congenital heart disease     presumed pulmonary stenosis s/p repair 1969    Congestive heart failure (HCC)     Degenerative disc disease     Degenerative joint disease     GERD     H/O echocardiogram 04/2017    EF 60-65%, mild concentric LVH, dilated RV with RV dysfunction, RVSP 54 mmHg, RA E, moderate to severe pulmonic regurgitation.     Hypercholesterolemia     Hypertension     Hypothyroid     Morbid obesity     Morbid obesity with BMI of 45.0-49.9, adult ([facility] [address] 2/20/2017    Noncompliance     Schizoaffective disorder     Substance abuse     marijuana, crack cocaine      Past Surgical History:   Procedure Laterality Date    CARDIAC SURG PROCEDURE UNLIST  1971    VSD repair    COLONOSCOPY N/A 5/6/2018    COLONOSCOPY with tatooing and biopsy performed by Israel Montoya MD at [facility] 9      left    HX HYSTERECTOMY      HX KNEE REPLACEMENT      left    HX KNEE REPLACEMENT      right    HX ORTHOPAEDIC      left ankle    HX ORTHOPAEDIC      carpel tunnel right and left     HX THYROIDECTOMY Left         Allergies   Allergen Reactions    Gabapentin Other (comments)     Unsteady, weakness LEs    Tetanus Vaccines And Toxoid Swelling    Topamax [Topiramate] Other (comments)     weakness        Current Facility-Administered Medications   Medication Dose Route Frequency    furosemide (LASIX) injection 20 mg  20 mg IntraVENous Q12H    baclofen (LIORESAL) tablet 10 mg  10 mg Oral TID    ARIPiprazole (ABILIFY) tablet 15 mg  15 mg Oral DAILY    docusate sodium (COLACE) capsule 100 mg  100 mg Oral BID    isosorbide mononitrate ER (IMDUR) tablet 30 mg  30 mg Oral QPM    levothyroxine (SYNTHROID) tablet 125 mcg  125 mcg Oral ACB    lisinopril (PRINIVIL, ZESTRIL) tablet 10 mg  10 mg Oral DAILY    metoprolol tartrate (LOPRESSOR) tablet 12.5 mg  12.5 mg Oral BID    PARoxetine (PAXIL) tablet 20 mg  20 mg Oral DAILY    polyethylene glycol (MIRALAX) packet 17 g  17 g Oral DAILY    simvastatin (ZOCOR) tablet 40 mg  40 mg Oral QHS    famotidine (PEPCID) tablet 20 mg  20 mg Oral BID    WARFARIN INFORMATION NOTE (COUMADIN)   Other Q24H       Review of Systems:  Review of systems not obtained due to patient factors. Objective:   Vital Signs:    Visit Vitals    /78 (BP 1 Location: Left arm, BP Patient Position: At rest)    Pulse 75    Temp 97.3 °F (36.3 °C)    Resp 18    Ht 5' (1.524 m)    Wt 144.7 kg (319 lb 1.6 oz)    SpO2 94%    BMI 62.32 kg/m2       O2 Device: Hi flow nasal cannula   O2 Flow Rate (L/min): 2 l/min   Temp (24hrs), Av.4 °F (36.3 °C), Min:97 °F (36.1 °C), Max:98 °F (36.7 °C)       Intake/Output:   Last shift:       07 -  190  In: 0   Out: 125 [Urine:125]  Last 3 shifts: 1901 -  0700  In: 720 [P.O.:720]  Out: 6420 [Urine:6420]    Intake/Output Summary (Last 24 hours) at 18 0820  Last data filed at 18 0737   Gross per 24 hour   Intake              540 ml   Output             4395 ml   Net            -3855 ml      Physical Exam:   General:  Awake cooperative, no distress, appears stated age. Head:  Normocephalic, without obvious abnormality, atraumatic. Eyes:  Conjunctivae/corneas clear. PERRL, EOMs intact. Nose: Nares normal. Septum midline. Mucosa normal. No drainage or sinus tenderness.    Throat: Lips, mucosa, and tongue normal. Teeth and gums normal.   Neck: Supple, symmetrical, trachea midline, no adenopathy, thyroid: no enlargment/tenderness/nodules, no carotid bruit and no JVD. Back:   Symmetric, no curvature. ROM normal.   Lungs:   Clear to auscultation bilaterally. Chest wall:  No tenderness or deformity. Heart:  Regular rate and rhythm, S1, S2 normal, no murmur, click, rub or gallop. Abdomen:   Soft, non-tender. Bowel sounds normal. No masses,  No organomegaly. Extremities: Extremities normal, atraumatic, no cyanosis or edema. Pulses: 2+ and symmetric all extremities. Skin: Skin color, texture, turgor normal. No rashes or lesions   Lymph nodes: Cervical, supraclavicular, and axillary nodes normal.   Neurologic: Grossly nonfocal     Data review:     Recent Results (from the past 24 hour(s))   POC G3    Collection Time: 08/23/18  9:08 AM   Result Value Ref Range    Device: NASAL CANNULA      Flow rate (POC) 2 L/M    pH (POC) 7.351 7.35 - 7.45      pCO2 (POC) 70.3 (H) 35.0 - 45.0 MMHG    pO2 (POC) 60 (L) 80 - 100 MMHG    HCO3 (POC) 39.0 (H) 22 - 26 MMOL/L    sO2 (POC) 88 (L) 92 - 97 %    Base excess (POC) 13 mmol/L    Allens test (POC) YES      Site RIGHT RADIAL      Specimen type (POC) ARTERIAL      Performed by Gold Flores + INR    Collection Time: 08/24/18  4:45 AM   Result Value Ref Range    Prothrombin time 21.0 (H) 11.5 - 15.2 sec    INR 1.8 (H) 0.8 - 1.2     CBC WITH AUTOMATED DIFF    Collection Time: 08/24/18  4:45 AM   Result Value Ref Range    WBC 5.6 4.6 - 13.2 K/uL    RBC 4.51 4.20 - 5.30 M/uL    HGB 10.2 (L) 12.0 - 16.0 g/dL    HCT 36.3 35.0 - 45.0 %    MCV 80.5 74.0 - 97.0 FL    MCH 22.6 (L) 24.0 - 34.0 PG    MCHC 28.1 (L) 31.0 - 37.0 g/dL    RDW 18.0 (H) 11.6 - 14.5 %    PLATELET 007 585 - 209 K/uL    MPV 10.5 9.2 - 11.8 FL    NEUTROPHILS 65 40 - 73 %    LYMPHOCYTES 25 21 - 52 %    MONOCYTES 7 3 - 10 %    EOSINOPHILS 3 0 - 5 %    BASOPHILS 0 0 - 2 %    ABS. NEUTROPHILS 3.6 1.8 - 8.0 K/UL    ABS. LYMPHOCYTES 1.4 0.9 - 3.6 K/UL    ABS. MONOCYTES 0.4 0.05 - 1.2 K/UL    ABS. EOSINOPHILS 0.2 0.0 - 0.4 K/UL    ABS. BASOPHILS 0.0 0.0 - 0.1 K/UL    DF AUTOMATED     METABOLIC PANEL, BASIC    Collection Time: 08/24/18  4:45 AM   Result Value Ref Range    Sodium 141 136 - 145 mmol/L    Potassium 3.5 3.5 - 5.5 mmol/L    Chloride 105 100 - 108 mmol/L    CO2 30 21 - 32 mmol/L    Anion gap 6 3.0 - 18 mmol/L    Glucose 91 74 - 99 mg/dL    BUN 19 (H) 7.0 - 18 MG/DL    Creatinine 1.05 0.6 - 1.3 MG/DL    BUN/Creatinine ratio 18 12 - 20      GFR est AA >60 >60 ml/min/1.73m2    GFR est non-AA 54 (L) >60 ml/min/1.73m2    Calcium 8.4 (L) 8.5 - 10.1 MG/DL       Imaging:  I have personally reviewed the patients radiographs and have reviewed the reports:  CXR Results 8/19/2018    IMPRESSION:  1. Enlarged cardiac silhouette with prominent hilar shadows similar to prior,  correlating with enlarged pulmonary arteries, and suspected mild perihilar  vascular congestion versus infiltrate. CT Results  (Last 48 hours)    None           PFT Results  2015    Normal flows, reduced DLCO- 50% predicted        Echo Results  (Last 48 hours)    None      High complexity decision making was performed during the evaluation of this patient at high risk for decompensation with multiple organ involvement     Above mentioned total time spent on reviewing the case/medical record/data/notes/EMR/patient examination/documentation/coordinating care with nurse/consultants, exclusive of procedures with complex decision making performed and > 50% time spent in face to face evaluation.        Kailey Caro MD

## 2018-08-24 NOTE — PROGRESS NOTES
conducted a Follow up consultation and Spiritual Assessment for Shar Thrasher, who is a 60 y.o.,female. The  provided the following Interventions:  Continued the relationship of care and support. Listened empathically. Chart reviewed. The following outcomes were achieved:  Patient expressed gratitude for pastoral care visit. Assessment:  There are no further spiritual or Judaism issues which require Spiritual Care Services interventions at this time. Plan:  Chaplains will continue to follow and will provide pastoral care on an as needed/requested basis.  recommends bedside caregivers page  on duty if patient shows signs of acute spiritual or emotional distress.    [facility], [address] [facility]  Spiritual Care  [phone]

## 2018-08-24 NOTE — PROGRESS NOTES
Bedside shift change report given to Av Zhao (oncoming nurse) by Laura Mckeon RN (offgoing nurse). Report included the following information SBAR, Kardex and MAR.

## 2018-08-24 NOTE — PROGRESS NOTES
Called Peggy of [facility] and she states they will not accept pt pt she is not participating in therapy. 1133:  Called pt's daughter and she states to try [facility]. 1157:  Info has been uploaded to [facility] and message was left for Maribel. 1300:  Pt has order for Bipap. Spoke with Jocelyn Del Rosario of [facility] and she states if pt is going to SNF  she will not get Bipap.  1430:  Called pt's daughter and she states pt has Cpap at home. 1520:  Called Maribel of [facility] concerning Bipap and she states if pt gets auth for placement at [facility], they are not equipped for it. They can do Cpap if pt will bring hers. They are still waiting for auth. She states she will call when she gets [place]. 1600: Informed VERONICA Rodríguez of Bipap issue.

## 2018-08-25 LAB
ANION GAP SERPL CALC-SCNC: 8 MMOL/L (ref 3–18)
BASOPHILS # BLD: 0 K/UL (ref 0–0.1)
BASOPHILS NFR BLD: 0 % (ref 0–2)
BUN SERPL-MCNC: 27 MG/DL (ref 7–18)
BUN/CREAT SERPL: 22 (ref 12–20)
CALCIUM SERPL-MCNC: 8.5 MG/DL (ref 8.5–10.1)
CHLORIDE SERPL-SCNC: 105 MMOL/L (ref 100–108)
CO2 SERPL-SCNC: 28 MMOL/L (ref 21–32)
CREAT SERPL-MCNC: 1.22 MG/DL (ref 0.6–1.3)
DIFFERENTIAL METHOD BLD: ABNORMAL
EOSINOPHIL # BLD: 0.2 K/UL (ref 0–0.4)
EOSINOPHIL NFR BLD: 3 % (ref 0–5)
ERYTHROCYTE [DISTWIDTH] IN BLOOD BY AUTOMATED COUNT: 17.8 % (ref 11.6–14.5)
GLUCOSE SERPL-MCNC: 100 MG/DL (ref 74–99)
HCT VFR BLD AUTO: 33.7 % (ref 35–45)
HGB BLD-MCNC: 9.7 G/DL (ref 12–16)
INR PPP: 2 (ref 0.8–1.2)
LYMPHOCYTES # BLD: 1.5 K/UL (ref 0.9–3.6)
LYMPHOCYTES NFR BLD: 30 % (ref 21–52)
MCH RBC QN AUTO: 22.9 PG (ref 24–34)
MCHC RBC AUTO-ENTMCNC: 28.8 G/DL (ref 31–37)
MCV RBC AUTO: 79.7 FL (ref 74–97)
MONOCYTES # BLD: 0.4 K/UL (ref 0.05–1.2)
MONOCYTES NFR BLD: 8 % (ref 3–10)
NEUTS SEG # BLD: 3 K/UL (ref 1.8–8)
NEUTS SEG NFR BLD: 59 % (ref 40–73)
PLATELET # BLD AUTO: 198 K/UL (ref 135–420)
PMV BLD AUTO: 9.8 FL (ref 9.2–11.8)
POTASSIUM SERPL-SCNC: 3.9 MMOL/L (ref 3.5–5.5)
PROTHROMBIN TIME: 22.3 SEC (ref 11.5–15.2)
RBC # BLD AUTO: 4.23 M/UL (ref 4.2–5.3)
SODIUM SERPL-SCNC: 141 MMOL/L (ref 136–145)
WBC # BLD AUTO: 5.2 K/UL (ref 4.6–13.2)

## 2018-08-25 PROCEDURE — 94660 CPAP INITIATION&MGMT: CPT

## 2018-08-25 PROCEDURE — 74011250637 HC RX REV CODE- 250/637: Performed by: PHYSICIAN ASSISTANT

## 2018-08-25 PROCEDURE — 74011000250 HC RX REV CODE- 250: Performed by: INTERNAL MEDICINE

## 2018-08-25 PROCEDURE — 74011250637 HC RX REV CODE- 250/637: Performed by: INTERNAL MEDICINE

## 2018-08-25 PROCEDURE — 65660000000 HC RM CCU STEPDOWN

## 2018-08-25 PROCEDURE — 85610 PROTHROMBIN TIME: CPT | Performed by: INTERNAL MEDICINE

## 2018-08-25 PROCEDURE — 80048 BASIC METABOLIC PNL TOTAL CA: CPT | Performed by: INTERNAL MEDICINE

## 2018-08-25 PROCEDURE — 85025 COMPLETE CBC W/AUTO DIFF WBC: CPT | Performed by: INTERNAL MEDICINE

## 2018-08-25 PROCEDURE — 77030038269 HC DRN EXT URIN PURWCK BARD -A

## 2018-08-25 PROCEDURE — 36415 COLL VENOUS BLD VENIPUNCTURE: CPT | Performed by: INTERNAL MEDICINE

## 2018-08-25 RX ORDER — WARFARIN SODIUM 5 MG/1
10 TABLET ORAL EVERY EVENING
Status: COMPLETED | OUTPATIENT
Start: 2018-08-25 | End: 2018-08-25

## 2018-08-25 RX ADMIN — BACLOFEN 10 MG: 10 TABLET ORAL at 21:46

## 2018-08-25 RX ADMIN — BACLOFEN 10 MG: 10 TABLET ORAL at 09:24

## 2018-08-25 RX ADMIN — BACLOFEN 10 MG: 10 TABLET ORAL at 16:03

## 2018-08-25 RX ADMIN — PAROXETINE 20 MG: 20 TABLET, FILM COATED ORAL at 09:25

## 2018-08-25 RX ADMIN — DOCUSATE SODIUM 100 MG: 100 CAPSULE, LIQUID FILLED ORAL at 09:24

## 2018-08-25 RX ADMIN — FAMOTIDINE 20 MG: 20 TABLET ORAL at 17:20

## 2018-08-25 RX ADMIN — FUROSEMIDE 40 MG: 40 TABLET ORAL at 16:03

## 2018-08-25 RX ADMIN — LEVOTHYROXINE SODIUM 125 MCG: 125 TABLET ORAL at 09:24

## 2018-08-25 RX ADMIN — LISINOPRIL 5 MG: 5 TABLET ORAL at 09:24

## 2018-08-25 RX ADMIN — WARFARIN SODIUM 10 MG: 5 TABLET ORAL at 17:20

## 2018-08-25 RX ADMIN — ARIPIPRAZOLE 15 MG: 15 TABLET ORAL at 09:25

## 2018-08-25 RX ADMIN — METOPROLOL TARTRATE 12.5 MG: 25 TABLET ORAL at 09:24

## 2018-08-25 RX ADMIN — FUROSEMIDE 40 MG: 40 TABLET ORAL at 09:25

## 2018-08-25 RX ADMIN — POLYETHYLENE GLYCOL 3350 17 G: 17 POWDER, FOR SOLUTION ORAL at 09:24

## 2018-08-25 RX ADMIN — ISOSORBIDE MONONITRATE 30 MG: 30 TABLET, EXTENDED RELEASE ORAL at 17:20

## 2018-08-25 RX ADMIN — SIMVASTATIN 40 MG: 40 TABLET, FILM COATED ORAL at 21:46

## 2018-08-25 RX ADMIN — ACETAMINOPHEN 650 MG: 325 TABLET ORAL at 16:03

## 2018-08-25 RX ADMIN — DOCUSATE SODIUM 100 MG: 100 CAPSULE, LIQUID FILLED ORAL at 17:20

## 2018-08-25 RX ADMIN — FAMOTIDINE 20 MG: 20 TABLET ORAL at 09:25

## 2018-08-25 RX ADMIN — METOPROLOL TARTRATE 12.5 MG: 25 TABLET ORAL at 17:21

## 2018-08-25 NOTE — ROUTINE PROCESS
Ms. Jonathon Robison requested not to go on the Bipap at this time. I advised that I would check on her in about an hour.

## 2018-08-25 NOTE — PROGRESS NOTES
Problem: Falls - Risk of  Goal: *Absence of Falls  Document Piyush Fall Risk and appropriate interventions in the flowsheet.    Outcome: Progressing Towards Goal  Fall Risk Interventions:  Mobility Interventions: Bed/chair exit alarm    Mentation Interventions: Bed/chair exit alarm    Medication Interventions: Bed/chair exit alarm    Elimination Interventions: Call light in reach    History of Falls Interventions: Door open when patient unattended

## 2018-08-25 NOTE — PROGRESS NOTES
0715- Bedside and Verbal shift change report given to Diana Vital RN (oncoming nurse) by Langston Saint, RN (offgoing nurse). Report included the following information SBAR, Kardex, Intake/Output, MAR and Recent Results. Patient sleeping. 1915- Bedside and Verbal shift change report given to Robin Busby (oncoming nurse) by Diana Vital RN (offgoing nurse). Report included the following information SBAR, Kardex, Intake/Output, MAR and Recent Results.

## 2018-08-25 NOTE — PROGRESS NOTES
[facility] Hospitalist Group  Progress Note    Patient: Jeet Russo Age: 60 y.o. : 1960 MR#: [id number] SSN: [id number]  Date: 2018     Subjective:     Pt is somnolent. Not easily roused but was able to wake up and talk. Pt was in no distress. No complaints. Assessment/Plan:   -SOB as presenting complaint. Likely multifactorial in this pt w/ morbid obesity/OHS/ANDRADE  -History of severe pulm HTN, ANDRADE and non compliance w/ CPAP. -Acute on chronice diastolic HF-cardiology following  -Hypercarbia somewhat improved after use of BiPAP overnight, PaC02 remains high and pt remains very somnolent however. Pulmonary consulted. -Chronic afib on coumadin: rate controlled  -HTN  -Dyslipidemia  -History of polysubstance abuse  -Paranoid schizophrenia  -Morbid obesity      PLAN:  -Diuresis per cardiology, net neg 20.5L  -XRAY right hip reviewed, suboptimal study, repeat with no change.   -give dose (10mg) of coumadin and follow INR. INR is 2.0  -PT/OT will need SNF placement. D/w CM - [facility] did not accept pt, accepted to [facility] but insurance company needed to work with SNF.   [place]     Goals of care: full code  Disposition:  [x]PT/OT ordered   [x] Case management referral    Case discussed with:  [x]Patient  []Family  []Nursing  []Case Management  DVT Prophylaxis:  []Lovenox  []Hep SQ  []SCDs  [x]Coumadin   []On Heparin gtt    Objective:   VS:   Visit Vitals    BP 96/57 (BP 1 Location: Right arm, BP Patient Position: At rest)    Pulse 79    Temp 98 °F (36.7 °C)    Resp 18    Ht 5' (1.524 m)    Wt 143.5 kg (316 lb 5.8 oz)    SpO2 97%    BMI 61.78 kg/m2      Tmax/24hrs: Temp (24hrs), Av.9 °F (36.6 °C), Min:96.7 °F (35.9 °C), Max:98.6 °F (37 °C)    Intake/Output Summary (Last 24 hours) at 18 1506  Last data filed at 18 1245   Gross per 24 hour   Intake              370 ml   Output             1300 ml   Net             -930 ml       General:  Somnolent and sleepy. Cardiovascular:  RRR  Pulmonary: CTA   GI:  NT, normal BS  Extremities:  No edema or cyanosis    Labs:    Recent Results (from the past 24 hour(s))   PROTHROMBIN TIME + INR    Collection Time: 08/25/18  1:27 AM   Result Value Ref Range    Prothrombin time 22.3 (H) 11.5 - 15.2 sec    INR 2.0 (H) 0.8 - 1.2     CBC WITH AUTOMATED DIFF    Collection Time: 08/25/18  1:27 AM   Result Value Ref Range    WBC 5.2 4.6 - 13.2 K/uL    RBC 4.23 4.20 - 5.30 M/uL    HGB 9.7 (L) 12.0 - 16.0 g/dL    HCT 33.7 (L) 35.0 - 45.0 %    MCV 79.7 74.0 - 97.0 FL    MCH 22.9 (L) 24.0 - 34.0 PG    MCHC 28.8 (L) 31.0 - 37.0 g/dL    RDW 17.8 (H) 11.6 - 14.5 %    PLATELET 803 881 - 364 K/uL    MPV 9.8 9.2 - 11.8 FL    NEUTROPHILS 59 40 - 73 %    LYMPHOCYTES 30 21 - 52 %    MONOCYTES 8 3 - 10 %    EOSINOPHILS 3 0 - 5 %    BASOPHILS 0 0 - 2 %    ABS. NEUTROPHILS 3.0 1.8 - 8.0 K/UL    ABS. LYMPHOCYTES 1.5 0.9 - 3.6 K/UL    ABS. MONOCYTES 0.4 0.05 - 1.2 K/UL    ABS. EOSINOPHILS 0.2 0.0 - 0.4 K/UL    ABS.  BASOPHILS 0.0 0.0 - 0.1 K/UL    DF AUTOMATED     METABOLIC PANEL, BASIC    Collection Time: 08/25/18  1:27 AM   Result Value Ref Range    Sodium 141 136 - 145 mmol/L    Potassium 3.9 3.5 - 5.5 mmol/L    Chloride 105 100 - 108 mmol/L    CO2 28 21 - 32 mmol/L    Anion gap 8 3.0 - 18 mmol/L    Glucose 100 (H) 74 - 99 mg/dL    BUN 27 (H) 7.0 - 18 MG/DL    Creatinine 1.22 0.6 - 1.3 MG/DL    BUN/Creatinine ratio 22 (H) 12 - 20      GFR est AA 55 (L) >60 ml/min/1.73m2    GFR est non-AA 45 (L) >60 ml/min/1.73m2    Calcium 8.5 8.5 - 10.1 MG/DL       Signed By: Aydin Gonzalez PA-C     August 25, 2018 3:06 PM

## 2018-08-25 NOTE — PROGRESS NOTES
[facility]  Pulmonary, Critical Care, and Sleep Medicine    Progress note    Name: Elio Wei MRN: [id number]   : 1960 Hospital: [address] [place] [address]   Date: 2018        IMPRESSION:   · Acute  on chronic respiratory failure improved with diuresis, likely due to CHF exacerbation superimposed on severe ANDRADE/OHS and pulmonary HTN. ABG with Compensated chronic respiratory acidosis with mild acute component  · Pulmonary HTN probably Grp 3 +/- Grp 2 in pt with chronic hypoxemia due to ANDRADE/OHS and decompensated diastolic failure  · Severe ANDRADE/OHS failed CPAP titration. Recently seen in office and scheduled for outpatient BIPAP titration- not completed. Unable to get BiPAP device due to insurance issues. Patient needs PAP device  DIAGNOSIS:  1.Obesity Hypoventilation syndrome   2. Severe complex ANDRADE   Dx: ANDRADE (obstructive sleep apnea) [G47.33 (ICD-10-CM)]      Hypoventilation Syndrome testing needed: PSG or ABG ( done)  Results:     ABG:on 2 L canula  Results for Lorrayne Harada (MRN [id number]) as of 2018 09:58    Ref. Range 2018 09:08   pH (POC) Latest Ref Range: 7.35 - 7.45   7.351   pCO2 (POC) Latest Ref Range: 35.0 - 45.0 MMHG 70.3 (H)   pO2 (POC) Latest Ref Range: 80 - 100 MMHG 60 (L)   HCO3 (POC) Latest Ref Range: 22 - 26 MMOL/L 39.0 (H)   sO2 (POC) Latest Ref Range: 92 - 97 % 88 (L)      Polysomnogram 2017:  Severe complex ANDRADE- need dedicated BIPAP  with possible back up rate.  Use positional therapy  (pillow wedge or sleep on side)due to high pressure requirement     SETTINGS: IPAP 25,EPAP 18 O2- 28% Back up rate-10    · Volume overload- treated  · Paranoid schizophrenia  · Morbid obesity  · Decompensated diastolic heart failure  · Atrial fibrillation rate controlled       PLAN:   · BIPAP preferred. CPAP alternatively ( demonstrated inability to eliminate apnea events.) Patient has had sleep study- on chart. SETTINGS: IPAP 25,EPAP 18 O2- 28% Back up rate-10. If CPAP- then recommend 18 cwp continous pressure  · Continue gentle diuresis. Would avoid overdiuresis as pt is likely preload dependent  · Electrolyte replacement per protocol  · Will probably need higher pressures on NIV, await titration as out patient  · Supplemental O2 to maintain saturation greater than 92%  · Schizophrenia acting as barrier to care as pt with limited insight into her illness  · Continue anticoagulation  · Schedule PFT as outpatient     Subjective:     08/25/18   Could not be discharged as insurance not approving PAP device per . Slept all night with BiPAP intermittently  via nasal mask. Tolerated better. Denies any chest pain, cough, SOB  Laying semisupine in bed  No new complaints. HPI:  This patient has been seen and evaluated at the request of Dr. Davis Pelayo for hypercapnic respiratory failure. Patient is a 60 y.o. female admitted for shortness of breath and hypoxemia on 8/19 and being treated with response in SOB and Oxygenation. Noted to be lethargic on 8/22 so ABG obtained and placed on BiPAP which she does not keep on. She is a poor historian due to Psychiatric illness but review of records reveal severe ANDRADE/OHS with failed CPAP titration. Pt was scheduled for dedicated BIPAP titration but would end up in the hospital for either shortness of breath or AMS due to CO2 narcosis. Pt with severe ANDRADE/OHS Rxed with BIPAP with admitted poor compliance, per pt due to device malfunction.         Past Medical History:   Diagnosis Date    Atrial fibrillation     CHADS score 1  (-CHF, +HTN, -AGE, -DM, -CVA)    Carpal tunnel syndrome     Chronic pain     Congenital heart disease     presumed pulmonary stenosis s/p repair 1969    Congestive heart failure (HCC)     Degenerative disc disease     Degenerative joint disease     GERD     H/O echocardiogram 04/2017    EF 60-65%, mild concentric LVH, dilated RV with RV dysfunction, RVSP 54 mmHg, RA E, moderate to severe pulmonic regurgitation.  Hypercholesterolemia     Hypertension     Hypothyroid     Morbid obesity     Morbid obesity with BMI of 45.0-49.9, adult ([facility] [address] 2/20/2017    Noncompliance     Schizoaffective disorder     Substance abuse     marijuana, crack cocaine      Past Surgical History:   Procedure Laterality Date    CARDIAC SURG PROCEDURE UNLIST  1971    VSD repair    COLONOSCOPY N/A 5/6/2018    COLONOSCOPY with tatooing and biopsy performed by Berna Alvarenga MD at [facility]    HX [address]      left    HX HYSTERECTOMY      HX KNEE REPLACEMENT      left    HX KNEE REPLACEMENT      right    HX ORTHOPAEDIC      left ankle    HX ORTHOPAEDIC      carpel tunnel right and left     HX THYROIDECTOMY Left         Allergies   Allergen Reactions    Gabapentin Other (comments)     Unsteady, weakness LEs    Tetanus Vaccines And Toxoid Swelling    Topamax [Topiramate] Other (comments)     weakness        Current Facility-Administered Medications   Medication Dose Route Frequency    furosemide (LASIX) tablet 40 mg  40 mg Oral ACB&D    lisinopril (PRINIVIL, ZESTRIL) tablet 5 mg  5 mg Oral DAILY    baclofen (LIORESAL) tablet 10 mg  10 mg Oral TID    ARIPiprazole (ABILIFY) tablet 15 mg  15 mg Oral DAILY    docusate sodium (COLACE) capsule 100 mg  100 mg Oral BID    isosorbide mononitrate ER (IMDUR) tablet 30 mg  30 mg Oral QPM    levothyroxine (SYNTHROID) tablet 125 mcg  125 mcg Oral ACB    metoprolol tartrate (LOPRESSOR) tablet 12.5 mg  12.5 mg Oral BID    PARoxetine (PAXIL) tablet 20 mg  20 mg Oral DAILY    polyethylene glycol (MIRALAX) packet 17 g  17 g Oral DAILY    simvastatin (ZOCOR) tablet 40 mg  40 mg Oral QHS    famotidine (PEPCID) tablet 20 mg  20 mg Oral BID    WARFARIN INFORMATION NOTE (COUMADIN)   Other Q24H       Review of Systems:  Review of systems not obtained due to patient factors.     Objective:   Vital Signs:    Visit Vitals    BP 96/57 (BP 1 Location: Right arm, BP Patient Position: At rest)    Pulse 79    Temp 98 °F (36.7 °C)    Resp 18    Ht 5' (1.524 m)    Wt 143.5 kg (316 lb 5.8 oz)    SpO2 97%    BMI 61.78 kg/m2       O2 Device: BIPAP   O2 Flow Rate (L/min): 3 l/min   Temp (24hrs), Av.9 °F (36.6 °C), Min:96.7 °F (35.9 °C), Max:98.6 °F (37 °C)       Intake/Output:   Last shift:         Last 3 shifts:  1901 -  0700  In: 730 [P.O.:730]  Out: 4825 [Urine:4825]    Intake/Output Summary (Last 24 hours) at 18 1056  Last data filed at 18 1841   Gross per 24 hour   Intake              610 ml   Output              600 ml   Net               10 ml      Physical Exam:   General:  Cooperative, no distress, appears stated age. Head:  Normocephalic, without obvious abnormality, atraumatic. Eyes:  Conjunctivae/corneas clear. PERRL, EOMs intact. Nose: Nares normal. Septum midline. Mucosa normal. No drainage or sinus tenderness. Throat: Lips, mucosa, and tongue normal. Teeth and gums normal.   Neck: Supple, symmetrical, trachea midline, no adenopathy, thyroid: no enlargment/tenderness/nodules, no carotid bruit and no JVD. Back:   Symmetric, no curvature. ROM normal.   Lungs:   Clear to auscultation bilaterally. Chest wall:  No tenderness or deformity. Heart:  Regular rate and rhythm, S1, S2 normal, no murmur, click, rub or gallop. Abdomen:   Soft, non-tender. Bowel sounds normal. No masses,  No organomegaly. Extremities: Extremities normal, atraumatic, no cyanosis or edema. Pulses: 2+ and symmetric all extremities.    Skin: Skin color, texture, turgor normal. No rashes or lesions   Lymph nodes: Cervical, supraclavicular, and axillary nodes normal.   Neurologic: Grossly nonfocal     Data review:     Recent Results (from the past 24 hour(s))   PROTHROMBIN TIME + INR    Collection Time: 18  1:27 AM   Result Value Ref Range    Prothrombin time 22.3 (H) 11.5 - 15.2 sec    INR 2.0 (H) 0.8 - 1.2     CBC WITH AUTOMATED DIFF    Collection Time: 08/25/18  1:27 AM   Result Value Ref Range    WBC 5.2 4.6 - 13.2 K/uL    RBC 4.23 4.20 - 5.30 M/uL    HGB 9.7 (L) 12.0 - 16.0 g/dL    HCT 33.7 (L) 35.0 - 45.0 %    MCV 79.7 74.0 - 97.0 FL    MCH 22.9 (L) 24.0 - 34.0 PG    MCHC 28.8 (L) 31.0 - 37.0 g/dL    RDW 17.8 (H) 11.6 - 14.5 %    PLATELET 881 314 - 938 K/uL    MPV 9.8 9.2 - 11.8 FL    NEUTROPHILS 59 40 - 73 %    LYMPHOCYTES 30 21 - 52 %    MONOCYTES 8 3 - 10 %    EOSINOPHILS 3 0 - 5 %    BASOPHILS 0 0 - 2 %    ABS. NEUTROPHILS 3.0 1.8 - 8.0 K/UL    ABS. LYMPHOCYTES 1.5 0.9 - 3.6 K/UL    ABS. MONOCYTES 0.4 0.05 - 1.2 K/UL    ABS. EOSINOPHILS 0.2 0.0 - 0.4 K/UL    ABS. BASOPHILS 0.0 0.0 - 0.1 K/UL    DF AUTOMATED     METABOLIC PANEL, BASIC    Collection Time: 08/25/18  1:27 AM   Result Value Ref Range    Sodium 141 136 - 145 mmol/L    Potassium 3.9 3.5 - 5.5 mmol/L    Chloride 105 100 - 108 mmol/L    CO2 28 21 - 32 mmol/L    Anion gap 8 3.0 - 18 mmol/L    Glucose 100 (H) 74 - 99 mg/dL    BUN 27 (H) 7.0 - 18 MG/DL    Creatinine 1.22 0.6 - 1.3 MG/DL    BUN/Creatinine ratio 22 (H) 12 - 20      GFR est AA 55 (L) >60 ml/min/1.73m2    GFR est non-AA 45 (L) >60 ml/min/1.73m2    Calcium 8.5 8.5 - 10.1 MG/DL       Imaging:  I have personally reviewed the patients radiographs and have reviewed the reports:  CXR Results 8/19/2018    IMPRESSION:  1. Enlarged cardiac silhouette with prominent hilar shadows similar to prior,  correlating with enlarged pulmonary arteries, and suspected mild perihilar  vascular congestion versus infiltrate.         CT Results  (Last 48 hours)    None           PFT Results  2015    Normal flows, reduced DLCO- 50% predicted        Echo Results  (Last 48 hours)    None      High complexity decision making was performed during the evaluation of this patient at high risk for decompensation with multiple organ involvement     Above mentioned total time spent on reviewing the case/medical record/data/notes/EMR/patient examination/documentation/coordinating care with nurse/consultants, exclusive of procedures with complex decision making performed and > 50% time spent in face to face evaluation.        Amira Sanabria MD

## 2018-08-26 LAB
ANION GAP SERPL CALC-SCNC: 6 MMOL/L (ref 3–18)
BASOPHILS # BLD: 0 K/UL (ref 0–0.1)
BASOPHILS NFR BLD: 0 % (ref 0–2)
BUN SERPL-MCNC: 27 MG/DL (ref 7–18)
BUN/CREAT SERPL: 23 (ref 12–20)
CALCIUM SERPL-MCNC: 8.7 MG/DL (ref 8.5–10.1)
CHLORIDE SERPL-SCNC: 105 MMOL/L (ref 100–108)
CO2 SERPL-SCNC: 29 MMOL/L (ref 21–32)
CREAT SERPL-MCNC: 1.19 MG/DL (ref 0.6–1.3)
DIFFERENTIAL METHOD BLD: ABNORMAL
EOSINOPHIL # BLD: 0.2 K/UL (ref 0–0.4)
EOSINOPHIL NFR BLD: 3 % (ref 0–5)
ERYTHROCYTE [DISTWIDTH] IN BLOOD BY AUTOMATED COUNT: 17.8 % (ref 11.6–14.5)
GLUCOSE SERPL-MCNC: 102 MG/DL (ref 74–99)
HCT VFR BLD AUTO: 35.7 % (ref 35–45)
HGB BLD-MCNC: 10.2 G/DL (ref 12–16)
INR PPP: 1.9 (ref 0.8–1.2)
LYMPHOCYTES # BLD: 1.5 K/UL (ref 0.9–3.6)
LYMPHOCYTES NFR BLD: 32 % (ref 21–52)
MCH RBC QN AUTO: 23.2 PG (ref 24–34)
MCHC RBC AUTO-ENTMCNC: 28.6 G/DL (ref 31–37)
MCV RBC AUTO: 81.3 FL (ref 74–97)
MONOCYTES # BLD: 0.3 K/UL (ref 0.05–1.2)
MONOCYTES NFR BLD: 6 % (ref 3–10)
NEUTS SEG # BLD: 2.7 K/UL (ref 1.8–8)
NEUTS SEG NFR BLD: 59 % (ref 40–73)
PLATELET # BLD AUTO: 199 K/UL (ref 135–420)
PMV BLD AUTO: 9.8 FL (ref 9.2–11.8)
POTASSIUM SERPL-SCNC: 4.1 MMOL/L (ref 3.5–5.5)
PROTHROMBIN TIME: 21.6 SEC (ref 11.5–15.2)
RBC # BLD AUTO: 4.39 M/UL (ref 4.2–5.3)
SODIUM SERPL-SCNC: 140 MMOL/L (ref 136–145)
WBC # BLD AUTO: 4.6 K/UL (ref 4.6–13.2)

## 2018-08-26 PROCEDURE — 65660000000 HC RM CCU STEPDOWN

## 2018-08-26 PROCEDURE — 94660 CPAP INITIATION&MGMT: CPT

## 2018-08-26 PROCEDURE — 74011250637 HC RX REV CODE- 250/637: Performed by: PHYSICIAN ASSISTANT

## 2018-08-26 PROCEDURE — 80048 BASIC METABOLIC PNL TOTAL CA: CPT | Performed by: INTERNAL MEDICINE

## 2018-08-26 PROCEDURE — 85610 PROTHROMBIN TIME: CPT | Performed by: INTERNAL MEDICINE

## 2018-08-26 PROCEDURE — 74011250637 HC RX REV CODE- 250/637: Performed by: INTERNAL MEDICINE

## 2018-08-26 PROCEDURE — 85025 COMPLETE CBC W/AUTO DIFF WBC: CPT | Performed by: INTERNAL MEDICINE

## 2018-08-26 PROCEDURE — 36415 COLL VENOUS BLD VENIPUNCTURE: CPT | Performed by: INTERNAL MEDICINE

## 2018-08-26 PROCEDURE — 77030038269 HC DRN EXT URIN PURWCK BARD -A

## 2018-08-26 PROCEDURE — 74011000250 HC RX REV CODE- 250: Performed by: INTERNAL MEDICINE

## 2018-08-26 RX ADMIN — PAROXETINE 20 MG: 20 TABLET, FILM COATED ORAL at 09:22

## 2018-08-26 RX ADMIN — DOCUSATE SODIUM 100 MG: 100 CAPSULE, LIQUID FILLED ORAL at 09:19

## 2018-08-26 RX ADMIN — FUROSEMIDE 40 MG: 40 TABLET ORAL at 15:58

## 2018-08-26 RX ADMIN — ACETAMINOPHEN AND CODEINE PHOSPHATE 1 TABLET: 300; 30 TABLET ORAL at 09:35

## 2018-08-26 RX ADMIN — SIMVASTATIN 40 MG: 40 TABLET, FILM COATED ORAL at 21:36

## 2018-08-26 RX ADMIN — LISINOPRIL 5 MG: 5 TABLET ORAL at 09:20

## 2018-08-26 RX ADMIN — WARFARIN SODIUM 12.5 MG: 1 TABLET ORAL at 21:35

## 2018-08-26 RX ADMIN — ISOSORBIDE MONONITRATE 30 MG: 30 TABLET, EXTENDED RELEASE ORAL at 17:54

## 2018-08-26 RX ADMIN — LEVOTHYROXINE SODIUM 125 MCG: 125 TABLET ORAL at 09:20

## 2018-08-26 RX ADMIN — METOPROLOL TARTRATE 12.5 MG: 25 TABLET ORAL at 09:21

## 2018-08-26 RX ADMIN — BACLOFEN 10 MG: 10 TABLET ORAL at 09:18

## 2018-08-26 RX ADMIN — BACLOFEN 10 MG: 10 TABLET ORAL at 15:58

## 2018-08-26 RX ADMIN — FAMOTIDINE 20 MG: 20 TABLET ORAL at 09:19

## 2018-08-26 RX ADMIN — ACETAMINOPHEN 650 MG: 325 TABLET ORAL at 02:14

## 2018-08-26 RX ADMIN — DOCUSATE SODIUM 100 MG: 100 CAPSULE, LIQUID FILLED ORAL at 17:54

## 2018-08-26 RX ADMIN — FAMOTIDINE 20 MG: 20 TABLET ORAL at 17:54

## 2018-08-26 RX ADMIN — BACLOFEN 10 MG: 10 TABLET ORAL at 21:36

## 2018-08-26 RX ADMIN — ACETAMINOPHEN AND CODEINE PHOSPHATE 1 TABLET: 300; 30 TABLET ORAL at 23:15

## 2018-08-26 RX ADMIN — FUROSEMIDE 40 MG: 40 TABLET ORAL at 09:19

## 2018-08-26 RX ADMIN — METOPROLOL TARTRATE 12.5 MG: 25 TABLET ORAL at 17:55

## 2018-08-26 RX ADMIN — ARIPIPRAZOLE 15 MG: 15 TABLET ORAL at 09:18

## 2018-08-26 RX ADMIN — ACETAMINOPHEN 650 MG: 325 TABLET ORAL at 15:57

## 2018-08-26 RX ADMIN — POLYETHYLENE GLYCOL 3350 17 G: 17 POWDER, FOR SOLUTION ORAL at 09:22

## 2018-08-26 NOTE — ROUTINE PROCESS
Received  Bedside shift change report from DELMI Leroy (offgoing nurse) Report included the following information, SBAR, kardex, MAR, and recent results. Assumed care of patient in bed , awake and alert. Fall prevention program maintained, call bell and bedside table in reach. Bed alarm on. No problems identified at this time. No complaints made by patient. Will continue care.

## 2018-08-26 NOTE — PROGRESS NOTES
[facility]  Progress Note    Patient: Melissa Valenzuela Age: 60 y.o. : 1960 MR#: [id number] SSN: [id number]  Date: 2018     Subjective:     Pt alert and awake, has no complaints. Assessment/Plan:   -SOB as presenting complaint. Likely multifactorial in this pt w/ morbid obesity/OHS/ANDRADE  -History of severe pulm HTN, ANDRADE and non compliance w/ CPAP. Appreciate pulm assistance.  -Acute on chronice diastolic HF-cardiology following  -Hypercarbia somewhat improved after use of BiPAP overnight, PaC02 remains high and pt remains very somnolent however. Pulmonary consulted. -Chronic afib on coumadin: rate controlled  -HTN  -Dyslipidemia  -History of polysubstance abuse  -Paranoid schizophrenia  -Morbid obesity  -Chronic right hip pain m/l due to severe OA     PLAN:  -Diuresis per cardiology  -give dose (12.5 mg) of coumadin and follow INR  -PT/OT will need SNF placement. Awaiting insurance approval for SNF acceptance.      Additional Notes:       Case discussed with:  [x]Patient  []Family  [x]Nursing  []Case Management  DVT Prophylaxis:  []Lovenox  []Hep SQ  []SCDs  [x]Coumadin   []On Heparin gtt     Objective:   VS:   Visit Vitals    /76 (BP 1 Location: Right arm, BP Patient Position: At rest)    Pulse 78    Temp 97.7 °F (36.5 °C)    Resp 18    Ht 5' (1.524 m)    Wt 143.5 kg (316 lb 5.8 oz)    SpO2 97%    BMI 61.78 kg/m2      Tmax/24hrs: Temp (24hrs), Av.1 °F (36.7 °C), Min:97.4 °F (36.3 °C), Max:99.1 °F (37.3 °C)      Intake/Output Summary (Last 24 hours) at 18 1620  Last data filed at 18 0845   Gross per 24 hour   Intake              470 ml   Output              300 ml   Net              170 ml       General:  Alert, awake.    Cardiovascular:  RRR  Pulmonary: CTA   GI:  NT, normal BS  Extremities:  No edema or cyanosis      Labs:    Recent Results (from the past 24 hour(s))   PROTHROMBIN TIME + INR    Collection Time: 18  1:13 AM Result Value Ref Range    Prothrombin time 21.6 (H) 11.5 - 15.2 sec    INR 1.9 (H) 0.8 - 1.2     CBC WITH AUTOMATED DIFF    Collection Time: 08/26/18  1:13 AM   Result Value Ref Range    WBC 4.6 4.6 - 13.2 K/uL    RBC 4.39 4.20 - 5.30 M/uL    HGB 10.2 (L) 12.0 - 16.0 g/dL    HCT 35.7 35.0 - 45.0 %    MCV 81.3 74.0 - 97.0 FL    MCH 23.2 (L) 24.0 - 34.0 PG    MCHC 28.6 (L) 31.0 - 37.0 g/dL    RDW 17.8 (H) 11.6 - 14.5 %    PLATELET 864 263 - 682 K/uL    MPV 9.8 9.2 - 11.8 FL    NEUTROPHILS 59 40 - 73 %    LYMPHOCYTES 32 21 - 52 %    MONOCYTES 6 3 - 10 %    EOSINOPHILS 3 0 - 5 %    BASOPHILS 0 0 - 2 %    ABS. NEUTROPHILS 2.7 1.8 - 8.0 K/UL    ABS. LYMPHOCYTES 1.5 0.9 - 3.6 K/UL    ABS. MONOCYTES 0.3 0.05 - 1.2 K/UL    ABS. EOSINOPHILS 0.2 0.0 - 0.4 K/UL    ABS.  BASOPHILS 0.0 0.0 - 0.1 K/UL    DF AUTOMATED     METABOLIC PANEL, BASIC    Collection Time: 08/26/18  1:13 AM   Result Value Ref Range    Sodium 140 136 - 145 mmol/L    Potassium 4.1 3.5 - 5.5 mmol/L    Chloride 105 100 - 108 mmol/L    CO2 29 21 - 32 mmol/L    Anion gap 6 3.0 - 18 mmol/L    Glucose 102 (H) 74 - 99 mg/dL    BUN 27 (H) 7.0 - 18 MG/DL    Creatinine 1.19 0.6 - 1.3 MG/DL    BUN/Creatinine ratio 23 (H) 12 - 20      GFR est AA 56 (L) >60 ml/min/1.73m2    GFR est non-AA 46 (L) >60 ml/min/1.73m2    Calcium 8.7 8.5 - 10.1 MG/DL       Signed By: Gilmer Armstrong MD     August 26, 2018 3:37 PM

## 2018-08-26 NOTE — PROGRESS NOTES
Patient just placed on bipap due to higher priorities in the emergency room tonight and due to short staffing.

## 2018-08-26 NOTE — ROUTINE PROCESS
Bedside and Verbal shift change report given to [address] (oncoming nurse) by Shailesh Taylor RN (offgoing nurse). Report included the following information SBAR, Kardex, MAR and Recent Results. SITUATION:    Code Status: Full Code   Reason for Admission: Acute exacerbation of CHF (congestive heart failure) ([facility] [address]    [facility] day: 7   Problem List:       Hospital Problems  Date Reviewed: 5/22/2018          Codes Class Noted POA    * (Principal)Acute exacerbation of [facility] ([facility]est[facility] failure) [facility] ICD-10-CM: I50.9  ICD-9-CM: 428.0  6/2/2018 Unknown        Chronic anticoagulation ICD-10-CM: Z79.01  ICD-9-CM: V58.61  6/2/2018 Yes        Fluid overload ICD-10-CM: E87.70  ICD-9-CM: 276.69  4/18/2017 Yes        Morbid obesity with BMI of 45.0-49.9, adult [facility]) ICD-10-CM: E66.01, Z68.42  ICD-9-CM: 278.01, V85.42  2/20/2017 Yes        Hypertension ICD-10-CM: I11.9  ICD-9-CM: 402.10  Unknown Yes        Atrial fibrillation ICD-10-CM: I48.91  ICD-9-CM: 427.31  Unknown Yes    Overview Signed 1/25/2013 10:46 AM by Christal Tilley     CHADS score 1  (-CHF, +HTN, -AGE, -DM, -CVA)                   BACKGROUND:    Past Medical History:   Past Medical History:   Diagnosis Date    Atrial fibrillation     CHADS score 1  (-CHF, +HTN, -AGE, -DM, -CVA)    Carpal tunnel syndrome     Chronic pain     Congenital heart disease     presumed pulmonary stenosis s/p repair 1969    Congestive heart failure ([facility] [address]     Degenerative disc disease     Degenerative joint disease     GERD     H/O echocardiogram 04/2017    EF 60-65%, mild concentric LVH, dilated RV with RV dysfunction, RVSP 54 mmHg, RA E, moderate to severe pulmonic regurgitation.  Hypercholesterolemia     Hypertension     Hypothyroid     Morbid obesity     Morbid obesity with BMI of 45.0-49.9, adult ([facility] 2/20/2017    Noncompliance     Schizoaffective disorder     Substance abuse     marijuana, crack cocaine         Patient taking anticoagulants yes . Coumadin on hold today. INR 2.9    ASSESSMENT:    Changes in Assessment Throughout Shift: no     Patient has Central Line: no Reasons if yes: n/a   Patient has Shaikh Cath: yes Reasons if yes: Monitor I/O. CHF, lasix.  Last Vitals:     Vitals:    08/25/18 2048 08/26/18 0028 08/26/18 0447 08/26/18 0748   BP: 114/74 165/84 111/64 120/65   Pulse: 64 88 70 100   Resp: 18 18 18 20   Temp: 97.8 °F (36.6 °C) 98.3 °F (36.8 °C) 98.3 °F (36.8 °C) 98.1 °F (36.7 °C)   SpO2: 99% 99% 99% 98%   Weight:       Height:            IV and DRAINS (will only show if present)   Peripheral IV 08/19/18 Left Hand-Site Assessment: Clean, dry, & intact     WOUND (if present)   Wound Type:  none   Dressing present Dressing Present : No   Wound Concerns/Notes:  none     PAIN    Pain Assessment    Pain Intensity 1: 7 (08/26/18 0213)    Pain Location 1: Back    Pain Intervention(s) 1: Medication (see MAR)    Patient Stated Pain Goal: 0  o Interventions for Pain:  See mar  o Intervention effective: yes  o Time of last intervention: 2328  o Reassessment Completed: yes      Last 3 Weights:  Last 3 Recorded Weights in this Encounter    08/21/18 0850 08/23/18 0015 08/25/18 0400   Weight: 146.1 kg (322 lb) 144.7 kg (319 lb 1.6 oz) 143.5 kg (316 lb 5.8 oz)     Weight change:      INTAKE/OUPUT    Current Shift:      Last three shifts: 08/24 1901 - 08/26 0700  In: 100 [P.O.:100]  Out: 700 [Urine:700]     LAB RESULTS     Recent Labs      08/26/18   0113  08/25/18   0127  08/24/18   0445   WBC  4.6  5.2  5.6   HGB  10.2*  9.7*  10.2*   HCT  35.7  33.7*  36.3   PLT  199  198  201        Recent Labs      08/26/18   0113  08/25/18   0127  08/24/18   0445   NA  140  141  141   K  4.1  3.9  3.5   GLU  102*  100*  91   BUN  27*  27*  19*   CREA  1.19  1.22  1.05   CA  8.7  8.5  8.4*   INR  1.9*  2.0*  1.8*       RECOMMENDATIONS AND DISCHARGE PLANNING     1. Pending tests/procedures/ Plan of Care or Other Needs: Discharge     2.  Discharge plan for patient and Needs/Barriers: Home with [facility]    3. Estimated Discharge Date: 8/22/18 Posted on Whiteboard in [facility] Room: yes      4. The patient's care plan was reviewed with the oncoming nurse. \"HEALS\" SAFETY CHECK      Fall Risk    Total Score: 4    Safety Measures: Safety Measures: Bed/Chair alarm on, Bed/Chair-Wheels locked, Bed in low position, Call light within reach, Fall prevention (comment), Side rails X 3    A safety check occurred in the patient's room between off going nurse and oncoming nurse listed above. The safety check included the below items  Area Items   H  High Alert Medications - Verify all high alert medication drips (heparin, PCA, etc.)   E  Equipment - Suction is set up for ALL patients (with danya)  - Red plugs utilized for all equipment (IV pumps, etc.)  - WOWs wiped down at end of shift.  - Room stocked with oxygen, suction, and other unit-specific supplies   A  Alarms - Bed alarm is set for fall risk patients  - Ensure chair alarm is in place and activated if patient is up in a chair   L  Lines - Check IV for any infiltration  - Shaikh bag is empty if patient has a Shaikh   - Tubing and IV bags are labeled   S  Safety   - Room is clean, patient is clean, and equipment is clean. - Hallways are clear from equipment besides carts. - Fall bracelet on for fall risk patients  - Ensure room is clear and free of clutter  - Suction is set up for ALL patients (with danya)  - Hallways are clear from equipment besides carts.    - Isolation precautions followed, supplies available outside room, sign posted     Serena Valdez RN

## 2018-08-26 NOTE — PROGRESS NOTES
[facility]  Pulmonary, Critical Care, and Sleep Medicine    Progress note    Name: Niles Walker MRN: [id number]   : 1960 Hospital: [facility]   Date: 2018        IMPRESSION:   · Acute  on chronic respiratory failure improved with diuresis, likely due to CHF exacerbation superimposed on severe ANDRADE/OHS and pulmonary HTN. ABG with Compensated chronic respiratory acidosis with mild acute component  · Pulmonary HTN probably Grp 3 +/- Grp 2 in pt with chronic hypoxemia due to ANDRADE/OHS and decompensated diastolic failure  · Severe ANDRADE/OHS failed CPAP titration. Recently seen in office and scheduled for outpatient BIPAP titration- not completed. Unable to get BiPAP device due to insurance issues. Patient needs PAP device  DIAGNOSIS:  1.Obesity Hypoventilation syndrome   2. Severe complex ANDRADE   Dx: ANDRADE (obstructive sleep apnea) [G47.33 (ICD-10-CM)]      Hypoventilation Syndrome testing needed: PSG or ABG ( done)  Results:     ABG:on 2 L canula  Results for Becki Schroeder (MRN [id number]) as of 2018 09:58    Ref. Range 2018 09:08   pH (POC) Latest Ref Range: 7.35 - 7.45   7.351   pCO2 (POC) Latest Ref Range: 35.0 - 45.0 MMHG 70.3 (H)   pO2 (POC) Latest Ref Range: 80 - 100 MMHG 60 (L)   HCO3 (POC) Latest Ref Range: 22 - 26 MMOL/L 39.0 (H)   sO2 (POC) Latest Ref Range: 92 - 97 % 88 (L)      Polysomnogram 2017:  Severe complex ANDRADE- need dedicated BIPAP  with possible back up rate.  Use positional therapy  (pillow wedge or sleep on side)due to high pressure requirement     SETTINGS: IPAP 25,EPAP 18 O2- 28% Back up rate-10    · Volume overload- treated  · Paranoid schizophrenia  · Morbid obesity  · Decompensated diastolic heart failure  · Atrial fibrillation rate controlled       PLAN:   · BIPAP preferred. CPAP alternatively ( demonstrated inability to eliminate apnea events.) Patient has had sleep study- on chart. SETTINGS: IPAP 25,EPAP 18 O2- 28% Back up rate-10. If CPAP- then recommend 18 cwp continous pressure  · Continue gentle diuresis. Would avoid overdiuresis as pt is likely preload dependent  · Electrolyte replacement per protocol  · Will probably need higher pressures on NIV, await titration as out patient  · Supplemental O2 to maintain saturation greater than 92%  · Schizophrenia acting as barrier to care as pt with limited insight into her illness  · Continue anticoagulation  · Schedule PFT as outpatient     Subjective:     08/26/18   Could not be discharged as insurance not approving PAP device per . Slept all night with BiPAP intermittently  via nasal mask. Tolerated better. Feels good today- more interactive and conversing  Denies any chest pain, cough, SOB  No new complaints. HPI:  This patient has been seen and evaluated at the request of Dr. Aarti Roberts for hypercapnic respiratory failure. Patient is a 60 y.o. female admitted for shortness of breath and hypoxemia on 8/19 and being treated with response in SOB and Oxygenation. Noted to be lethargic on 8/22 so ABG obtained and placed on BiPAP which she does not keep on. She is a poor historian due to Psychiatric illness but review of records reveal severe ANDRADE/OHS with failed CPAP titration. Pt was scheduled for dedicated BIPAP titration but would end up in the hospital for either shortness of breath or AMS due to CO2 narcosis. Pt with severe ANDRADE/OHS Rxed with BIPAP with admitted poor compliance, per pt due to device malfunction.         Past Medical History:   Diagnosis Date    Atrial fibrillation     CHADS score 1  (-CHF, +HTN, -AGE, -DM, -CVA)    Carpal tunnel syndrome     Chronic pain     Congenital heart disease     presumed pulmonary stenosis s/p repair 1969    Congestive heart failure (HCC)     Degenerative disc disease     Degenerative joint disease     GERD     H/O echocardiogram 04/2017    EF 60-65%, mild concentric LVH, dilated RV with RV dysfunction, RVSP 54 mmHg, RA E, moderate to severe pulmonic regurgitation.  Hypercholesterolemia     Hypertension     Hypothyroid     Morbid obesity     Morbid obesity with BMI of 45.0-49.9, adult ([address] 2/20/2017    Noncompliance     Schizoaffective disorder     Substance abuse     marijuana, crack cocaine      Past Surgical History:   Procedure Laterality Date    CARDIAC SURG PROCEDURE UNLIST  1971    VSD repair    COLONOSCOPY N/A 5/6/2018    COLONOSCOPY with tatooing and biopsy performed by Nathan Gaytan MD at [facility]    HX [address]      left    HX HYSTERECTOMY      HX KNEE REPLACEMENT      left    HX KNEE REPLACEMENT      right    HX ORTHOPAEDIC      left ankle    HX ORTHOPAEDIC      carpel tunnel right and left     HX THYROIDECTOMY Left         Allergies   Allergen Reactions    Gabapentin Other (comments)     Unsteady, weakness LEs    Tetanus Vaccines And Toxoid Swelling    Topamax [Topiramate] Other (comments)     weakness        Current Facility-Administered Medications   Medication Dose Route Frequency    furosemide (LASIX) tablet 40 mg  40 mg Oral ACB&D    lisinopril (PRINIVIL, ZESTRIL) tablet 5 mg  5 mg Oral DAILY    baclofen (LIORESAL) tablet 10 mg  10 mg Oral TID    ARIPiprazole (ABILIFY) tablet 15 mg  15 mg Oral DAILY    docusate sodium (COLACE) capsule 100 mg  100 mg Oral BID    isosorbide mononitrate ER (IMDUR) tablet 30 mg  30 mg Oral QPM    levothyroxine (SYNTHROID) tablet 125 mcg  125 mcg Oral ACB    metoprolol tartrate (LOPRESSOR) tablet 12.5 mg  12.5 mg Oral BID    PARoxetine (PAXIL) tablet 20 mg  20 mg Oral DAILY    polyethylene glycol (MIRALAX) packet 17 g  17 g Oral DAILY    simvastatin (ZOCOR) tablet 40 mg  40 mg Oral QHS    famotidine (PEPCID) tablet 20 mg  20 mg Oral BID    WARFARIN INFORMATION NOTE (COUMADIN)   Other Q24H       Review of Systems:  Review of systems not obtained due to patient factors.     Objective:   Vital Signs:    Visit Vitals    /65 (BP 1 Location: Right arm, BP Patient Position: At rest)    Pulse 100    Temp 98.1 °F (36.7 °C)    Resp 20    Ht 5' (1.524 m)    Wt 143.5 kg (316 lb 5.8 oz)    SpO2 98%    BMI 61.78 kg/m2       O2 Device: BIPAP   O2 Flow Rate (L/min): 3 l/min   Temp (24hrs), Av.1 °F (36.7 °C), Min:97.4 °F (36.3 °C), Max:99.1 °F (37.3 °C)       Intake/Output:   Last shift:      701 - 1900  In: 120 [P.O.:120]  Out: -   Last 3 shifts: 1901 -  0700  In: 350 [P.O.:350]  Out: 1000 [Urine:1000]    Intake/Output Summary (Last 24 hours) at 18 1136  Last data filed at 18 0845   Gross per 24 hour   Intake              470 ml   Output             1000 ml   Net             -530 ml      Physical Exam:   General:  Cooperative, no distress, appears stated age. Head:  Normocephalic, without obvious abnormality, atraumatic. Eyes:  Conjunctivae/corneas clear. PERRL, EOMs intact. Nose: Nares normal. Septum midline. Mucosa normal. No drainage or sinus tenderness. Throat: Lips, mucosa, and tongue normal. Teeth and gums normal.   Neck: Supple, symmetrical, trachea midline, no adenopathy, thyroid: no enlargment/tenderness/nodules, no carotid bruit and no JVD. Back:   Symmetric, no curvature. ROM normal.   Lungs:   Clear to auscultation bilaterally. Chest wall:  No tenderness or deformity. Heart:  Regular rate and rhythm, S1, S2 normal, no murmur, click, rub or gallop. Abdomen:   Soft, non-tender. Bowel sounds normal. No masses,  No organomegaly. Extremities: Extremities normal, atraumatic, no cyanosis or edema. Pulses: 2+ and symmetric all extremities.    Skin: Skin color, texture, turgor normal. No rashes or lesions   Lymph nodes: Cervical, supraclavicular, and axillary nodes normal.   Neurologic: Grossly nonfocal     Data review:     Recent Results (from the past 24 hour(s))   PROTHROMBIN TIME + INR    Collection Time: 18  1:13 AM   Result Value Ref Range    Prothrombin time 21.6 (H) 11.5 - 15.2 sec    INR 1.9 (H) 0.8 - 1.2     CBC WITH AUTOMATED DIFF    Collection Time: 08/26/18  1:13 AM   Result Value Ref Range    WBC 4.6 4.6 - 13.2 K/uL    RBC 4.39 4.20 - 5.30 M/uL    HGB 10.2 (L) 12.0 - 16.0 g/dL    HCT 35.7 35.0 - 45.0 %    MCV 81.3 74.0 - 97.0 FL    MCH 23.2 (L) 24.0 - 34.0 PG    MCHC 28.6 (L) 31.0 - 37.0 g/dL    RDW 17.8 (H) 11.6 - 14.5 %    PLATELET 060 915 - 497 K/uL    MPV 9.8 9.2 - 11.8 FL    NEUTROPHILS 59 40 - 73 %    LYMPHOCYTES 32 21 - 52 %    MONOCYTES 6 3 - 10 %    EOSINOPHILS 3 0 - 5 %    BASOPHILS 0 0 - 2 %    ABS. NEUTROPHILS 2.7 1.8 - 8.0 K/UL    ABS. LYMPHOCYTES 1.5 0.9 - 3.6 K/UL    ABS. MONOCYTES 0.3 0.05 - 1.2 K/UL    ABS. EOSINOPHILS 0.2 0.0 - 0.4 K/UL    ABS. BASOPHILS 0.0 0.0 - 0.1 K/UL    DF AUTOMATED     METABOLIC PANEL, BASIC    Collection Time: 08/26/18  1:13 AM   Result Value Ref Range    Sodium 140 136 - 145 mmol/L    Potassium 4.1 3.5 - 5.5 mmol/L    Chloride 105 100 - 108 mmol/L    CO2 29 21 - 32 mmol/L    Anion gap 6 3.0 - 18 mmol/L    Glucose 102 (H) 74 - 99 mg/dL    BUN 27 (H) 7.0 - 18 MG/DL    Creatinine 1.19 0.6 - 1.3 MG/DL    BUN/Creatinine ratio 23 (H) 12 - 20      GFR est AA 56 (L) >60 ml/min/1.73m2    GFR est non-AA 46 (L) >60 ml/min/1.73m2    Calcium 8.7 8.5 - 10.1 MG/DL       Imaging:  I have personally reviewed the patients radiographs and have reviewed the reports:  CXR Results 8/19/2018    IMPRESSION:  1. Enlarged cardiac silhouette with prominent hilar shadows similar to prior,  correlating with enlarged pulmonary arteries, and suspected mild perihilar  vascular congestion versus infiltrate.         CT Results  (Last 48 hours)    None           PFT Results  2015    Normal flows, reduced DLCO- 50% predicted        Echo Results  (Last 48 hours)    None      High complexity decision making was performed during the evaluation of this patient at high risk for decompensation with multiple organ involvement     Above mentioned total time spent on reviewing the case/medical record/data/notes/EMR/patient examination/documentation/coordinating care with nurse/consultants, exclusive of procedures with complex decision making performed and > 50% time spent in face to face evaluation.        Curt Molina MD

## 2018-08-26 NOTE — PROGRESS NOTES
Patient Vitals for the past 12 hrs:   Temp Pulse Resp BP SpO2   08/25/18 2048 (P) 97.8 °F (36.6 °C) (P) 64 (P) 18 (P) 114/74 (P) 99 %   08/25/18 2021 97.8 °F (36.6 °C) 86 18 (!) 87/58 99 %   08/25/18 2017 97.4 °F (36.3 °C) 76 18 124/71 99 %   08/25/18 2011 99.1 °F (37.3 °C) 80 18 111/71 100 %   08/25/18 1547 97.8 °F (36.6 °C) 76 18 129/80 98 %    Recheck BP  by primary nurse @ 2048.

## 2018-08-27 LAB
GLUCOSE BLD STRIP.AUTO-MCNC: 113 MG/DL (ref 70–110)
INR PPP: 2.1 (ref 0.8–1.2)
PROTHROMBIN TIME: 23.3 SEC (ref 11.5–15.2)

## 2018-08-27 PROCEDURE — 74011000250 HC RX REV CODE- 250: Performed by: INTERNAL MEDICINE

## 2018-08-27 PROCEDURE — 82962 GLUCOSE BLOOD TEST: CPT

## 2018-08-27 PROCEDURE — 77030038269 HC DRN EXT URIN PURWCK BARD -A

## 2018-08-27 PROCEDURE — 74011250637 HC RX REV CODE- 250/637: Performed by: PHYSICIAN ASSISTANT

## 2018-08-27 PROCEDURE — 74011250637 HC RX REV CODE- 250/637: Performed by: INTERNAL MEDICINE

## 2018-08-27 PROCEDURE — 65660000000 HC RM CCU STEPDOWN

## 2018-08-27 PROCEDURE — 74011250637 HC RX REV CODE- 250/637: Performed by: NURSE PRACTITIONER

## 2018-08-27 PROCEDURE — 85610 PROTHROMBIN TIME: CPT | Performed by: INTERNAL MEDICINE

## 2018-08-27 PROCEDURE — 36415 COLL VENOUS BLD VENIPUNCTURE: CPT | Performed by: INTERNAL MEDICINE

## 2018-08-27 RX ORDER — WARFARIN 7.5 MG/1
7.5 TABLET ORAL ONCE
Status: COMPLETED | OUTPATIENT
Start: 2018-08-27 | End: 2018-08-27

## 2018-08-27 RX ADMIN — ISOSORBIDE MONONITRATE 30 MG: 30 TABLET, EXTENDED RELEASE ORAL at 17:32

## 2018-08-27 RX ADMIN — ACETAMINOPHEN AND CODEINE PHOSPHATE 1 TABLET: 300; 30 TABLET ORAL at 21:48

## 2018-08-27 RX ADMIN — FAMOTIDINE 20 MG: 20 TABLET ORAL at 17:32

## 2018-08-27 RX ADMIN — FUROSEMIDE 40 MG: 40 TABLET ORAL at 17:31

## 2018-08-27 RX ADMIN — DOCUSATE SODIUM 100 MG: 100 CAPSULE, LIQUID FILLED ORAL at 08:15

## 2018-08-27 RX ADMIN — PAROXETINE 20 MG: 20 TABLET, FILM COATED ORAL at 08:16

## 2018-08-27 RX ADMIN — FUROSEMIDE 40 MG: 40 TABLET ORAL at 08:15

## 2018-08-27 RX ADMIN — ACETAMINOPHEN AND CODEINE PHOSPHATE 1 TABLET: 300; 30 TABLET ORAL at 15:31

## 2018-08-27 RX ADMIN — DOCUSATE SODIUM 100 MG: 100 CAPSULE, LIQUID FILLED ORAL at 17:31

## 2018-08-27 RX ADMIN — METOPROLOL TARTRATE 12.5 MG: 25 TABLET ORAL at 17:31

## 2018-08-27 RX ADMIN — BACLOFEN 10 MG: 10 TABLET ORAL at 15:31

## 2018-08-27 RX ADMIN — LEVOTHYROXINE SODIUM 125 MCG: 125 TABLET ORAL at 08:15

## 2018-08-27 RX ADMIN — ARIPIPRAZOLE 15 MG: 15 TABLET ORAL at 08:16

## 2018-08-27 RX ADMIN — METOPROLOL TARTRATE 12.5 MG: 25 TABLET ORAL at 08:15

## 2018-08-27 RX ADMIN — BACLOFEN 10 MG: 10 TABLET ORAL at 21:48

## 2018-08-27 RX ADMIN — LISINOPRIL 5 MG: 5 TABLET ORAL at 08:15

## 2018-08-27 RX ADMIN — FAMOTIDINE 20 MG: 20 TABLET ORAL at 08:15

## 2018-08-27 RX ADMIN — SIMVASTATIN 40 MG: 40 TABLET, FILM COATED ORAL at 21:48

## 2018-08-27 RX ADMIN — POLYETHYLENE GLYCOL 3350 17 G: 17 POWDER, FOR SOLUTION ORAL at 08:15

## 2018-08-27 RX ADMIN — BACLOFEN 10 MG: 10 TABLET ORAL at 08:15

## 2018-08-27 RX ADMIN — WARFARIN SODIUM 7.5 MG: 7.5 TABLET ORAL at 18:36

## 2018-08-27 NOTE — PROGRESS NOTES
[facility]  -  Progress Note      Patient: Camryn Sabillon MRN: [id number]  SSN: [id number]    YOB: 1960  Age: 60 y.o. Sex: female      Admit Date: 8/19/2018    Hospital Problem List:     Hospital Problems  Date Reviewed: 5/22/2018          Codes Class Noted POA    * (Principal)Acute exacerbation of CHF (congestive heart failure) ([facility] [address] ICD-10-CM: I50.9  ICD-9-CM: 428.0  6/2/2018 Unknown        Chronic anticoagulation ICD-10-CM: Z79.01  ICD-9-CM: V58.61  6/2/2018 Yes        Fluid overload ICD-10-CM: E87.70  ICD-9-CM: 276.69  4/18/2017 Yes        Morbid obesity with BMI of 45.0-49.9, adult [facility]) ICD-10-CM: E66.01, Z68.42  ICD-9-CM: 278.01, V85.42  2/20/2017 Yes        Hypertension ICD-10-CM: I11.9  ICD-9-CM: 402.10  Unknown Yes        Atrial fibrillation ICD-10-CM: I48.91  ICD-9-CM: 427.31  Unknown Yes    Overview Signed 1/25/2013 10:46 AM by Shahnaz Hogue     CHADS score 1  (-CHF, +HTN, -AGE, -DM, -CVA)                 -Acute on chronic hypercapnic respiratory failure.  PCO2 > 80 on ABG. Improved. -Acute on chronic diastolic heart failure.  This is likely contributing as well to her symptomatology. Diuresed with IV diuretics.  -Chronic LE swelling, history of non-compliance with diuretic due to difficulty ambulating. Her edema appears to be about the same as in the past.  -Chronic atrial fibrillation, on Coumadin. She is maintained on low dose B-blocker for rate control.  -Echo 04/2018: EF 50-55%, mildly to moderately increased LV wall thickness, RV moderately to severely dilated with est. Peak pressure 87 mmHg, moderately to severely dilated RA, moderate to severe tricuspid regurgitation, severe pulmonic insufficiency.   -Severe pulmonary hypertension, RV dysfunction with moderate to severe pulmonary insufficiency and moderate pulmonary pressure elevation.  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 Esmer Elliott:     -Diuresed well. Symptomatically improved. Would continue PO lasix. BP stable. HR acceptable. Continue BB, ACEi, long-acting nitrate. -Appreciate pulmonary team input.   -Follow up with our office in 4 weeks. Subjective:     Feels her breathing has improved. No other complaints. Objective:      Patient Vitals for the past 8 hrs:   Temp Pulse Resp BP SpO2   08/27/18 0748 97.5 °F (36.4 °C) (!) 54 17 136/64 95 %   08/27/18 0536 96.9 °F (36.1 °C) (!) 56 18 95/61 99 %         Patient Vitals for the past 96 hrs:   Weight   08/27/18 0548 137.7 kg (303 lb 9.2 oz)   08/25/18 0400 143.5 kg (316 lb 5.8 oz)         Intake/Output Summary (Last 24 hours) at 08/27/18 0920  Last data filed at 08/27/18 0748   Gross per 24 hour   Intake              600 ml   Output              150 ml   Net              450 ml       Physical Exam:  General:  Resting in bed, obese AA female, pleasant and appropriate in responses  Neck:  Supple, cannot assess jvd d/t body habitus  Lungs:   On NC O2, normal effort, clear to auscultation bilaterally   Heart:  RRR, no murmur  Abdomen:  Obese, soft  Extremities:  Trace ankle edema, no cyanosis, atraumatic    Data Review:     Labs: Results:       Chemistry Recent Labs      08/26/18   0113  08/25/18   0127   GLU  102*  100*   NA  140  141   K  4.1  3.9   CL  105  105   CO2  29  28   BUN  27*  27*   CREA  1.19  1.22   CA  8.7  8.5   AGAP  6  8   BUCR  23*  22*      CBC w/Diff Recent Labs      08/26/18   0113  08/25/18   0127   WBC  4.6  5.2   RBC  4.39  4.23   HGB  10.2*  9.7*   HCT  35.7  33.7*   PLT  199  198   GRANS  59  59   LYMPH  32  30   EOS  3  3      Cardiac Enzymes No results found for: CPK, CK, CKMMB, CKMB, RCK3, CKMBT, CKNDX, CKND1, SHANNAN, TROPT, TROIQ, JELANI, TROPT, TNIPOC, BNP, BNPP   Coagulation Recent Labs 08/27/18   0112  08/26/18   0113   PTP  23.3*  21.6*   INR  2.1*  1.9*       Lipid Panel Lab Results   Component Value Date/Time    Cholesterol, total 90 05/03/2018 10:25 AM    HDL Cholesterol 34 (L) 05/03/2018 10:25 AM    LDL, calculated 41.4 05/03/2018 10:25 AM    VLDL, calculated 14.6 05/03/2018 10:25 AM    Triglyceride 73 05/03/2018 10:25 AM    CHOL/HDL Ratio 2.6 05/03/2018 10:25 AM      BNP Lab Results   Component Value Date/Time    B-type Natriuretic Peptide 3187 (H) 05/06/2016 11:10 AM    B-type Natriuretic Peptide 3075 (H) 05/06/2016 11:00 AM      Liver Enzymes No results for input(s): TP, ALB, TBIL, AP, SGOT, GPT in the last 72 hours.     No lab exists for component: DBIL   Digoxin    Thyroid Studies Lab Results   Component Value Date/Time    T4, Total 12.3 (H) 01/15/2014 02:01 AM    TSH 1.97 05/04/2018 03:15 AM            Signed By: VERONICA Spence     August 27, 2018

## 2018-08-27 NOTE — PROGRESS NOTES
Problem: Falls - Risk of  Goal: *Absence of Falls  Document Piyush Fall Risk and appropriate interventions in the flowsheet. Outcome: Progressing Towards Goal  Fall Risk Interventions:  Mobility Interventions: Bed/chair exit alarm, PT Consult for mobility concerns    Mentation Interventions: Bed/chair exit alarm    Medication Interventions: Bed/chair exit alarm    Elimination Interventions: Call light in reach, Bed/chair exit alarm    History of Falls Interventions: Door open when patient unattended        Problem: Pressure Injury - Risk of  Goal: *Prevention of pressure injury  Document Stas Scale and appropriate interventions in the flowsheet.    Outcome: Progressing Towards Goal  Pressure Injury Interventions:       Moisture Interventions: Absorbent underpads, Internal/External urinary devices    Activity Interventions: Pressure redistribution bed/mattress(bed type), PT/OT evaluation    Mobility Interventions: HOB 30 degrees or less    Nutrition Interventions: Document food/fluid/supplement intake    Friction and Shear Interventions: HOB 30 degrees or less

## 2018-08-27 NOTE — PROGRESS NOTES
4760- Bedside and Verbal shift change report given to Padron RN (oncoming nurse) by Brayden Scruggs RN (offgoing nurse). Report included the following information SBAR, Kardex, Intake/Output, MAR and Recent Results. 1915- Bedside and Verbal shift change report given to Scot Bryson RN (oncoming nurse) by Funmi Cali RN (offgoing nurse). Report included the following information SBAR, Kardex, Intake/Output, MAR and Recent Results.

## 2018-08-27 NOTE — ROUTINE PROCESS
Bedside and Verbal shift change report given to Tildon Callow, RN (oncoming nurse) by Erika Gómez RN (offgoing nurse). Report included the following information SBAR, Kardex, MAR and Recent Results. SITUATION:    Code Status: Full Code   Reason for Admission: Acute exacerbation of CHF (congestive heart failure) ([facility] [address]    [facility] day: 8   Problem List:       Hospital Problems  Date Reviewed: 5/22/2018          Codes Class Noted POA    * (Principal)Acute exacerbation of CHF (congestive heart failure) ([facility] [address] ICD-10-CM: I50.9  ICD-9-CM: 428.0  6/2/2018 Unknown        Chronic anticoagulation ICD-10-CM: Z79.01  ICD-9-CM: V58.61  6/2/2018 Yes        Fluid overload ICD-10-CM: E87.70  ICD-9-CM: 276.69  4/18/2017 Yes        Morbid obesity with BMI of 45.0-49.9, adult [facility]) ICD-10-CM: E66.01, Z68.42  ICD-9-CM: 278.01, V85.42  2/20/2017 Yes        Hypertension ICD-10-CM: I11.9  ICD-9-CM: 402.10  Unknown Yes        Atrial fibrillation ICD-10-CM: I48.91  ICD-9-CM: 427.31  Unknown Yes    Overview Signed 1/25/2013 10:46 AM by Paty Hanna     CHADS score 1  (-CHF, +HTN, -AGE, -DM, -CVA)                   BACKGROUND:    Past Medical History:   Past Medical History:   Diagnosis Date    Atrial fibrillation     CHADS score 1  (-CHF, +HTN, -AGE, -DM, -CVA)    Carpal tunnel syndrome     Chronic pain     Congenital heart disease     presumed pulmonary stenosis s/p repair 1969    Congestive heart failure ([facility] [address]     Degenerative disc disease     Degenerative joint disease     GERD     H/O echocardiogram 04/2017    EF 60-65%, mild concentric LVH, dilated RV with RV dysfunction, RVSP 54 mmHg, RA E, moderate to severe pulmonic regurgitation.     Hypercholesterolemia     Hypertension     Hypothyroid     Morbid obesity     Morbid obesity with BMI of 45.0-49.9, adult ([facility] [address] 2/20/2017    Noncompliance     Schizoaffective disorder     Substance abuse     marijuana, crack cocaine         Patient taking anticoagulants yes . Coumadin on hold today. INR 2.9    ASSESSMENT:    Changes in Assessment Throughout Shift: no     Patient has Central Line: no Reasons if yes: n/a   Patient has Shaikh Cath: no Reasons if yes:      Last Vitals:     Vitals:    08/26/18 2311 08/27/18 0101 08/27/18 0536 08/27/18 0548   BP:  118/57 95/61    Pulse:  (!) 53 (!) 56    Resp:  18 18    Temp:  97 °F (36.1 °C) 96.9 °F (36.1 °C)    SpO2: 98% 99% 99%    Weight:    137.7 kg (303 lb 9.2 oz)   Height:            IV and DRAINS (will only show if present)   Peripheral IV 08/19/18 Left Hand-Site Assessment: Clean, dry, & intact     WOUND (if present)   Wound Type:  none   Dressing present Dressing Present : No   Wound Concerns/Notes:  none     PAIN    Pain Assessment    Pain Intensity 1: 0 (08/27/18 0430)    Pain Location 1: Hip    Pain Intervention(s) 1: Medication (see MAR)    Patient Stated Pain Goal: 0  o Interventions for Pain:  See mar  o Intervention effective: yes  o Time of last intervention: 2315  o Reassessment Completed: yes      Last 3 Weights:  Last 3 Recorded Weights in this Encounter    08/23/18 0015 08/25/18 0400 08/27/18 0548   Weight: 144.7 kg (319 lb 1.6 oz) 143.5 kg (316 lb 5.8 oz) 137.7 kg (303 lb 9.2 oz)     Weight change:      INTAKE/OUPUT    Current Shift:      Last three shifts: 08/25 1901 - 08/27 0700  In: 970 [P.O.:970]  Out: 300 [Urine:300]     LAB RESULTS     Recent Labs      08/26/18   0113  08/25/18   0127   WBC  4.6  5.2   HGB  10.2*  9.7*   HCT  35.7  33.7*   PLT  199  198        Recent Labs      08/27/18   0112  08/26/18   0113  08/25/18   0127   NA   --   140  141   K   --   4.1  3.9   GLU   --   102*  100*   BUN   --   27*  27*   CREA   --   1.19  1.22   CA   --   8.7  8.5   INR  2.1*  1.9*  2.0*       RECOMMENDATIONS AND DISCHARGE PLANNING     1. Pending tests/procedures/ Plan of Care or Other Needs: PT/OT    2. Discharge plan for patient and Needs/Barriers: SNF    3.  Estimated Discharge Date: 8/28/18 Posted on Whiteboard in [facility]: yes      4. The patient's care plan was reviewed with the oncoming nurse. \"HEALS\" SAFETY CHECK      Fall Risk    Total Score: 3    Safety Measures: Safety Measures: Bed/Chair alarm on, Bed/Chair-Wheels locked, Bed in low position, Call light within reach    A safety check occurred in the patient's room between off going nurse and oncoming nurse listed above. The safety check included the below items  Area Items   H  High Alert Medications - Verify all high alert medication drips (heparin, PCA, etc.)   E  Equipment - Suction is set up for ALL patients (with danya)  - Red plugs utilized for all equipment (IV pumps, etc.)  - WOWs wiped down at end of shift.  - Room stocked with oxygen, suction, and other unit-specific supplies   A  Alarms - Bed alarm is set for fall risk patients  - Ensure chair alarm is in place and activated if patient is up in a chair   L  Lines - Check IV for any infiltration  - Shaikh bag is empty if patient has a Shaikh   - Tubing and IV bags are labeled   S  Safety   - Room is clean, patient is clean, and equipment is clean. - Hallways are clear from equipment besides carts. - Fall bracelet on for fall risk patients  - Ensure room is clear and free of clutter  - Suction is set up for ALL patients (with danya)  - Hallways are clear from equipment besides carts.    - Isolation precautions followed, supplies available outside room, sign posted     Nehal Tatum RN

## 2018-08-27 NOTE — PROGRESS NOTES
[facility] Hospitalist Group  Progress Note    Patient: Vertis Dance Age: 60 y.o. : 1960 MR#: [id number] SSN: [id number]  Date: 2018     Subjective:     Pt alert and awake, has no complaints. Denies SOB, chest pain    Assessment/Plan:   -SOB as presenting complaint. Likely multifactorial in this pt w/ morbid obesity/OHS/ANDRADE  -History of severe pulm HTN, ANDRADE and non compliance w/ CPAP. Appreciate pulm assistance.  -Acute on chronice diastolic HF-cardiology following  -Hypercarbia somewhat improved after use of BiPAP overnight, PaC02 last 29 on   -Chronic afib on coumadin: rate controlled  -HTN  -Dyslipidemia  -History of polysubstance abuse  -Paranoid schizophrenia  -Morbid obesity  -Chronic right hip pain m/l due to severe OA     PLAN:  -Diuresis per cardiology  -give dose (7.5 mg) of coumadin and follow INR  -PT/OT will need SNF placement. Awaiting insurance approval for SNF acceptance.      Additional Notes:       Case discussed with:  [x]Patient  []Family  [x]Nursing  []Case Management  DVT Prophylaxis:  []Lovenox  []Hep SQ  []SCDs  [x]Coumadin   []On Heparin gtt     Objective:   VS:   Visit Vitals    /74 (BP 1 Location: Right arm, BP Patient Position: At rest)    Pulse (!) 48    Temp 97.3 °F (36.3 °C)    Resp 20    Ht 5' (1.524 m)    Wt 137.7 kg (303 lb 9.2 oz)    SpO2 98%    BMI 59.29 kg/m2      Tmax/24hrs: Temp (24hrs), Av.3 °F (36.3 °C), Min:96.9 °F (36.1 °C), Max:97.9 °F (36.6 °C)      Intake/Output Summary (Last 24 hours) at 18 1620  Last data filed at 18 0748   Gross per 24 hour   Intake              600 ml   Output              150 ml   Net              450 ml     General:  Alert, awake.    Cardiovascular:  RRR  Pulmonary: CTA   GI:  NT, normal BS  Extremities:  No edema or cyanosis    Labs:    Recent Results (from the past 24 hour(s))   PROTHROMBIN TIME + INR    Collection Time: 18  1:12 AM   Result Value Ref Range Prothrombin time 23.3 (H) 11.5 - 15.2 sec    INR 2.1 (H) 0.8 - 1.2       Signed By: Yumiko Duggan NP     August 27, 2018 4:18 PM

## 2018-08-27 NOTE — PROGRESS NOTES
[facility]  Pulmonary, Critical Care, and Sleep Medicine    Progress note    Name: Ata Marcus MRN: [id number]   : 1960 Hospital: [address] [place] [address]   Date: 2018        IMPRESSION:   · Acute on chronic respiratory failure - hypercapnic and hypoxic: improved with diuresis, likely due to CHF exacerbation superimposed on severe ANDRADE/OHS and pulmonary HTN. ABG with Compensated chronic respiratory acidosis with mild acute component  · Pulmonary HTN probably Grp 3 +/- Grp 2 in pt with chronic hypoxemia due to ANDRAED/OHS and decompensated diastolic failure  · Severe ANDRADE/OHS failed CPAP titration. Recently seen in office and scheduled for outpatient BIPAP titration- not completed. Unable to get BiPAP device due to insurance issues. Patient needs PAP device  DIAGNOSIS:  1.Obesity Hypoventilation syndrome   2. Severe complex ANDRADE   Dx: ANDRADE (obstructive sleep apnea) [G47.33 (ICD-10-CM)]      Hypoventilation Syndrome testing needed: PSG or ABG ( done)  Results:     ABG:on 2 L canula  Results for Rabia Angelo (MRN [id number]) as of 2018 09:58    Ref. Range 2018 09:08   pH (POC) Latest Ref Range: 7.35 - 7.45   7.351   pCO2 (POC) Latest Ref Range: 35.0 - 45.0 MMHG 70.3 (H)   pO2 (POC) Latest Ref Range: 80 - 100 MMHG 60 (L)   HCO3 (POC) Latest Ref Range: 22 - 26 MMOL/L 39.0 (H)   sO2 (POC) Latest Ref Range: 92 - 97 % 88 (L)      Polysomnogram 2017:  Severe complex ANDRADE- need dedicated BIPAP  with possible back up rate.  Use positional therapy  (pillow wedge or sleep on side)due to high pressure requirement     SETTINGS: IPAP 25,EPAP 18 O2- 28% Back up rate-10    · Volume overload- treated  · Paranoid schizophrenia  · Morbid obesity  · Decompensated diastolic heart failure  · Atrial fibrillation rate controlled       PLAN:   · BIPAP preferred. CPAP alternatively ( demonstrated inability to eliminate apnea events.) Patient has had sleep study- on chart. SETTINGS: IPAP 25,EPAP 18 O2- 28% Back up rate-10. If CPAP- then recommend 18 cwp continous pressure  · Continue gentle diuresis. Would avoid overdiuresis as pt is likely preload dependent  · Electrolyte replacement per protocol  · Will probably need higher pressures on NIV, await titration as out patient  · Supplemental O2 to maintain saturation greater than 92%  · Schizophrenia acting as barrier to care as pt with limited insight into her illness  · Continue anticoagulation  · Schedule PFT as outpatient     Subjective:     08/27/18   Could not be discharged as insurance not approving PAP device per . Slept all night with BiPAP intermittently  via nasal mask. Tolerated better. Feels good today- more interactive and conversing  Denies any chest pain, cough, SOB  No new complaints. HPI:  This patient has been seen and evaluated at the request of Dr. Antonio Hernández for hypercapnic respiratory failure. Patient is a 60 y.o. female admitted for shortness of breath and hypoxemia on 8/19 and being treated with response in SOB and Oxygenation. Noted to be lethargic on 8/22 so ABG obtained and placed on BiPAP which she does not keep on. She is a poor historian due to Psychiatric illness but review of records reveal severe ANDRADE/OHS with failed CPAP titration. Pt was scheduled for dedicated BIPAP titration but would end up in the hospital for either shortness of breath or AMS due to CO2 narcosis. Pt with severe ANDRADE/OHS Rxed with BIPAP with admitted poor compliance, per pt due to device malfunction.         Past Medical History:   Diagnosis Date    Atrial fibrillation     CHADS score 1  (-CHF, +HTN, -AGE, -DM, -CVA)    Carpal tunnel syndrome     Chronic pain     Congenital heart disease     presumed pulmonary stenosis s/p repair 1969    Congestive heart failure (HCC)     Degenerative disc disease     Degenerative joint disease     GERD     H/O echocardiogram 04/2017    EF 60-65%, mild concentric LVH, dilated RV with RV dysfunction, RVSP 54 mmHg, RA E, moderate to severe pulmonic regurgitation.  Hypercholesterolemia     Hypertension     Hypothyroid     Morbid obesity     Morbid obesity with BMI of 45.0-49.9, adult ([address] 2/20/2017    Noncompliance     Schizoaffective disorder     Substance abuse     marijuana, crack cocaine      Past Surgical History:   Procedure Laterality Date    CARDIAC SURG PROCEDURE UNLIST  1971    VSD repair    COLONOSCOPY N/A 5/6/2018    COLONOSCOPY with tatooing and biopsy performed by Derek Thomason MD at [facility]    HX [address]      left    HX HYSTERECTOMY      HX KNEE REPLACEMENT      left    HX KNEE REPLACEMENT      right    HX ORTHOPAEDIC      left ankle    HX ORTHOPAEDIC      carpel tunnel right and left     HX THYROIDECTOMY Left         Allergies   Allergen Reactions    Gabapentin Other (comments)     Unsteady, weakness LEs    Tetanus Vaccines And Toxoid Swelling    Topamax [Topiramate] Other (comments)     weakness        Current Facility-Administered Medications   Medication Dose Route Frequency    furosemide (LASIX) tablet 40 mg  40 mg Oral ACB&D    lisinopril (PRINIVIL, ZESTRIL) tablet 5 mg  5 mg Oral DAILY    baclofen (LIORESAL) tablet 10 mg  10 mg Oral TID    ARIPiprazole (ABILIFY) tablet 15 mg  15 mg Oral DAILY    docusate sodium (COLACE) capsule 100 mg  100 mg Oral BID    isosorbide mononitrate ER (IMDUR) tablet 30 mg  30 mg Oral QPM    levothyroxine (SYNTHROID) tablet 125 mcg  125 mcg Oral ACB    metoprolol tartrate (LOPRESSOR) tablet 12.5 mg  12.5 mg Oral BID    PARoxetine (PAXIL) tablet 20 mg  20 mg Oral DAILY    polyethylene glycol (MIRALAX) packet 17 g  17 g Oral DAILY    simvastatin (ZOCOR) tablet 40 mg  40 mg Oral QHS    famotidine (PEPCID) tablet 20 mg  20 mg Oral BID    WARFARIN INFORMATION NOTE (COUMADIN)   Other Q24H       Review of Systems:  Review of systems not obtained due to patient factors.     Objective:   Vital Signs: Visit Vitals    /74 (BP 1 Location: Right arm, BP Patient Position: At rest)    Pulse (!) 48    Temp 97.3 °F (36.3 °C)    Resp 20    Ht 5' (1.524 m)    Wt 137.7 kg (303 lb 9.2 oz)    SpO2 98%    BMI 59.29 kg/m2       O2 Device: Hi flow nasal cannula   O2 Flow Rate (L/min): 4 l/min   Temp (24hrs), Av.4 °F (36.3 °C), Min:96.9 °F (36.1 °C), Max:97.9 °F (36.6 °C)       Intake/Output:   Last shift:       07 -  190  In: 100 [P.O.:100]  Out: 150 [Urine:150]  Last 3 shifts:  190 -  0700  In: 970 [P.O.:970]  Out: 300 [Urine:300]    Intake/Output Summary (Last 24 hours) at 18 1345  Last data filed at 18 0748   Gross per 24 hour   Intake              600 ml   Output              150 ml   Net              450 ml      Physical Exam:   General:  Cooperative, no distress, appears stated age. Head:  Normocephalic, without obvious abnormality, atraumatic. Eyes:  Conjunctivae/corneas clear. PERRL, EOMs intact. Nose: Nares normal. Septum midline. Mucosa normal. No drainage or sinus tenderness. Throat: Lips, mucosa, and tongue normal. Teeth and gums normal.   Neck: Supple, symmetrical, trachea midline, no adenopathy, thyroid: no enlargment/tenderness/nodules, no carotid bruit and no JVD. Back:   Symmetric, no curvature. ROM normal.   Lungs:   Clear to auscultation bilaterally. Chest wall:  No tenderness or deformity. Heart:  Regular rate and rhythm, S1, S2 normal, no murmur, click, rub or gallop. Abdomen:   Soft, non-tender. Bowel sounds normal. No masses,  No organomegaly. Extremities: Extremities normal, atraumatic, no cyanosis or edema. Pulses: 2+ and symmetric all extremities.    Skin: Skin color, texture, turgor normal. No rashes or lesions   Lymph nodes: Cervical, supraclavicular, and axillary nodes normal.   Neurologic: Grossly nonfocal     Data review:     Recent Results (from the past 24 hour(s))   PROTHROMBIN TIME + INR    Collection Time: 18 1:12 AM   Result Value Ref Range    Prothrombin time 23.3 (H) 11.5 - 15.2 sec    INR 2.1 (H) 0.8 - 1.2         Imaging:  I have personally reviewed the patients radiographs and have reviewed the reports:  CXR Results 8/19/2018    IMPRESSION:  1. Enlarged cardiac silhouette with prominent hilar shadows similar to prior,  correlating with enlarged pulmonary arteries, and suspected mild perihilar  vascular congestion versus infiltrate.         CT Results  (Last 48 hours)    None           PFT Results  2015    Normal flows, reduced DLCO- 50% predicted        Echo Results  (Last 48 hours)    None      High complexity decision making was performed during the evaluation of this patient at high risk for decompensation with multiple organ involvement          Edith Madsen MD

## 2018-08-27 NOTE — PROGRESS NOTES
This writer contacted [facility] who reports that this pt's authorization has not arrived from the [facility].

## 2018-08-28 ENCOUNTER — APPOINTMENT (OUTPATIENT)
Dept: GENERAL RADIOLOGY | Age: 58
DRG: 194 | End: 2018-08-28
Attending: NURSE PRACTITIONER
Payer: MEDICAID

## 2018-08-28 LAB
ANION GAP SERPL CALC-SCNC: 7 MMOL/L (ref 3–18)
BASOPHILS # BLD: 0 K/UL (ref 0–0.1)
BASOPHILS NFR BLD: 0 % (ref 0–2)
BUN SERPL-MCNC: 25 MG/DL (ref 7–18)
BUN/CREAT SERPL: 24 (ref 12–20)
CALCIUM SERPL-MCNC: 8.7 MG/DL (ref 8.5–10.1)
CHLORIDE SERPL-SCNC: 105 MMOL/L (ref 100–108)
CO2 SERPL-SCNC: 30 MMOL/L (ref 21–32)
CREAT SERPL-MCNC: 1.03 MG/DL (ref 0.6–1.3)
DIFFERENTIAL METHOD BLD: ABNORMAL
EOSINOPHIL # BLD: 0.2 K/UL (ref 0–0.4)
EOSINOPHIL NFR BLD: 3 % (ref 0–5)
ERYTHROCYTE [DISTWIDTH] IN BLOOD BY AUTOMATED COUNT: 17.6 % (ref 11.6–14.5)
GLUCOSE BLD STRIP.AUTO-MCNC: 103 MG/DL (ref 70–110)
GLUCOSE BLD STRIP.AUTO-MCNC: 117 MG/DL (ref 70–110)
GLUCOSE BLD STRIP.AUTO-MCNC: 89 MG/DL (ref 70–110)
GLUCOSE SERPL-MCNC: 89 MG/DL (ref 74–99)
HCT VFR BLD AUTO: 32.4 % (ref 35–45)
HGB BLD-MCNC: 9.5 G/DL (ref 12–16)
INR PPP: 2.2 (ref 0.8–1.2)
LYMPHOCYTES # BLD: 1.3 K/UL (ref 0.9–3.6)
LYMPHOCYTES NFR BLD: 26 % (ref 21–52)
MCH RBC QN AUTO: 22.7 PG (ref 24–34)
MCHC RBC AUTO-ENTMCNC: 29.3 G/DL (ref 31–37)
MCV RBC AUTO: 77.5 FL (ref 74–97)
MONOCYTES # BLD: 0.4 K/UL (ref 0.05–1.2)
MONOCYTES NFR BLD: 8 % (ref 3–10)
NEUTS SEG # BLD: 2.9 K/UL (ref 1.8–8)
NEUTS SEG NFR BLD: 63 % (ref 40–73)
PLATELET # BLD AUTO: 194 K/UL (ref 135–420)
PMV BLD AUTO: 10 FL (ref 9.2–11.8)
POTASSIUM SERPL-SCNC: 4.1 MMOL/L (ref 3.5–5.5)
PROTHROMBIN TIME: 24.8 SEC (ref 11.5–15.2)
RBC # BLD AUTO: 4.18 M/UL (ref 4.2–5.3)
SODIUM SERPL-SCNC: 142 MMOL/L (ref 136–145)
WBC # BLD AUTO: 4.7 K/UL (ref 4.6–13.2)

## 2018-08-28 PROCEDURE — 97530 THERAPEUTIC ACTIVITIES: CPT

## 2018-08-28 PROCEDURE — 85610 PROTHROMBIN TIME: CPT | Performed by: INTERNAL MEDICINE

## 2018-08-28 PROCEDURE — 94660 CPAP INITIATION&MGMT: CPT

## 2018-08-28 PROCEDURE — 85025 COMPLETE CBC W/AUTO DIFF WBC: CPT | Performed by: INTERNAL MEDICINE

## 2018-08-28 PROCEDURE — 74011250637 HC RX REV CODE- 250/637: Performed by: INTERNAL MEDICINE

## 2018-08-28 PROCEDURE — 97535 SELF CARE MNGMENT TRAINING: CPT

## 2018-08-28 PROCEDURE — 77030038269 HC DRN EXT URIN PURWCK BARD -A

## 2018-08-28 PROCEDURE — 80048 BASIC METABOLIC PNL TOTAL CA: CPT | Performed by: INTERNAL MEDICINE

## 2018-08-28 PROCEDURE — 74011250637 HC RX REV CODE- 250/637: Performed by: NURSE PRACTITIONER

## 2018-08-28 PROCEDURE — 36415 COLL VENOUS BLD VENIPUNCTURE: CPT | Performed by: INTERNAL MEDICINE

## 2018-08-28 PROCEDURE — 82962 GLUCOSE BLOOD TEST: CPT

## 2018-08-28 PROCEDURE — 97164 PT RE-EVAL EST PLAN CARE: CPT

## 2018-08-28 PROCEDURE — 74011000250 HC RX REV CODE- 250: Performed by: INTERNAL MEDICINE

## 2018-08-28 PROCEDURE — 73560 X-RAY EXAM OF KNEE 1 OR 2: CPT

## 2018-08-28 PROCEDURE — 74011250637 HC RX REV CODE- 250/637: Performed by: PHYSICIAN ASSISTANT

## 2018-08-28 PROCEDURE — 65660000000 HC RM CCU STEPDOWN

## 2018-08-28 RX ORDER — WARFARIN 7.5 MG/1
7.5 TABLET ORAL ONCE
Status: COMPLETED | OUTPATIENT
Start: 2018-08-28 | End: 2018-08-28

## 2018-08-28 RX ORDER — AMOXICILLIN 250 MG
1 CAPSULE ORAL
Status: DISCONTINUED | OUTPATIENT
Start: 2018-08-28 | End: 2018-08-30 | Stop reason: HOSPADM

## 2018-08-28 RX ORDER — FLUTICASONE PROPIONATE 50 MCG
2 SPRAY, SUSPENSION (ML) NASAL DAILY
Status: DISCONTINUED | OUTPATIENT
Start: 2018-08-28 | End: 2018-08-30 | Stop reason: HOSPADM

## 2018-08-28 RX ADMIN — DOCUSATE SODIUM 100 MG: 100 CAPSULE, LIQUID FILLED ORAL at 08:38

## 2018-08-28 RX ADMIN — DOCUSATE SODIUM 100 MG: 100 CAPSULE, LIQUID FILLED ORAL at 17:32

## 2018-08-28 RX ADMIN — FUROSEMIDE 40 MG: 40 TABLET ORAL at 08:38

## 2018-08-28 RX ADMIN — ACETAMINOPHEN AND CODEINE PHOSPHATE 1 TABLET: 300; 30 TABLET ORAL at 17:31

## 2018-08-28 RX ADMIN — FAMOTIDINE 20 MG: 20 TABLET ORAL at 08:38

## 2018-08-28 RX ADMIN — ACETAMINOPHEN AND CODEINE PHOSPHATE 1 TABLET: 300; 30 TABLET ORAL at 22:54

## 2018-08-28 RX ADMIN — BACLOFEN 10 MG: 10 TABLET ORAL at 17:33

## 2018-08-28 RX ADMIN — WARFARIN SODIUM 7.5 MG: 7.5 TABLET ORAL at 22:54

## 2018-08-28 RX ADMIN — BACLOFEN 10 MG: 10 TABLET ORAL at 08:38

## 2018-08-28 RX ADMIN — SIMVASTATIN 40 MG: 40 TABLET, FILM COATED ORAL at 22:54

## 2018-08-28 RX ADMIN — ARIPIPRAZOLE 15 MG: 15 TABLET ORAL at 08:38

## 2018-08-28 RX ADMIN — LEVOTHYROXINE SODIUM 125 MCG: 125 TABLET ORAL at 08:38

## 2018-08-28 RX ADMIN — METOPROLOL TARTRATE 12.5 MG: 25 TABLET ORAL at 08:37

## 2018-08-28 RX ADMIN — METOPROLOL TARTRATE 12.5 MG: 25 TABLET ORAL at 17:32

## 2018-08-28 RX ADMIN — STANDARDIZED SENNA CONCENTRATE AND DOCUSATE SODIUM 1 TABLET: 8.6; 5 TABLET, FILM COATED ORAL at 22:54

## 2018-08-28 RX ADMIN — ISOSORBIDE MONONITRATE 30 MG: 30 TABLET, EXTENDED RELEASE ORAL at 17:31

## 2018-08-28 RX ADMIN — LISINOPRIL 5 MG: 5 TABLET ORAL at 08:38

## 2018-08-28 RX ADMIN — FAMOTIDINE 20 MG: 20 TABLET ORAL at 17:31

## 2018-08-28 RX ADMIN — POLYETHYLENE GLYCOL 3350 17 G: 17 POWDER, FOR SOLUTION ORAL at 08:38

## 2018-08-28 RX ADMIN — FUROSEMIDE 40 MG: 40 TABLET ORAL at 17:33

## 2018-08-28 RX ADMIN — PAROXETINE 20 MG: 20 TABLET, FILM COATED ORAL at 08:38

## 2018-08-28 RX ADMIN — FLUTICASONE PROPIONATE 2 SPRAY: 50 SPRAY, METERED NASAL at 11:23

## 2018-08-28 RX ADMIN — BACLOFEN 10 MG: 10 TABLET ORAL at 22:54

## 2018-08-28 NOTE — PROGRESS NOTES
Problem: Falls - Risk of  Goal: *Absence of Falls  Document Piyush Fall Risk and appropriate interventions in the flowsheet.    Outcome: Progressing Towards Goal  Fall Risk Interventions:  Mobility Interventions: Bed/chair exit alarm    Mentation Interventions: Bed/chair exit alarm    Medication Interventions: Bed/chair exit alarm    Elimination Interventions: Call light in reach    History of Falls Interventions: Room close to nurse's station

## 2018-08-28 NOTE — PROGRESS NOTES
attempted to visit patient and was not able to do a spiritual assessment. Patient was asleep. Will follow up with patient. Deyanira Orellana MA  Spiritual Care 
[phone]

## 2018-08-28 NOTE — PROGRESS NOTES
Problem: Self Care Deficits Care Plan (Adult) Goal: *Acute Goals and Plan of Care (Insert Text) 1. The pt will use the LRAD to complete bed mobility & OOB functional transfers with modified independence, for improved ADL participation. 2. The pt will use AE as needed to complete lower body dressing with stand-by assist. 
3. The pt will complete all toileting tasks at bedside commode vs. toilet level with modified independence. 4. The pt will complete at least 3 grooming tasks in standing at sink level with modified independence. Occupational Therapy TREATMENT Patient: Michelle Moss (60 y.o. female) Date: 8/28/2018 Diagnosis: Acute exacerbation of CHF (congestive heart failure) ([facility]) [facility] Precautions:   
Chart, occupational therapy assessment, plan of care, and goals were reviewed. ASSESSMENT: 
Pt presented supinei n bed with HOB raised agreeable to skilled OT services this date. Pt maneuvered to seated EOB CGA with additional time. Pt performed UB dressing task, doffing/donning hospital gown with Supervision/Setup. Pt performed grooming task seated EOB, washing face (Supervision/Setup). Pt performed simulated grooming task (combing hair) Mod I. Pt demonstrated decreased ROM in R shoulder during task. Pt would benefit from sock aid, reacher, and LH sponge to facilitate increased independence with LB ADLs. Pt returned to supine in bed Mod A and additional time. Pt was left comfortable in bed and call bell within reach. Progression toward goals: 
[]          Improving appropriately and progressing toward goals [x]          Improving slowly and progressing toward goals 
[]          Not making progress toward goals and plan of care will be adjusted PLAN: 
Patient continues to benefit from skilled intervention to address the above impairments. Continue treatment per established plan of care. Discharge Recommendations:  Zechariah Juárez Further Equipment Recommendations for Discharge:  bedside commode G-CODES:  
 
Self Care  Current  CK= 40-59%. The severity rating is based on the Level of Assistance required for Functional Mobility and ADLs. SUBJECTIVE:  
Patient stated Donna Masters was born and raised in [place].  OBJECTIVE DATA SUMMARY:  
Cognitive/Behavioral Status: 
Neurologic State: Alert Orientation Level: Oriented X4 Cognition: Follows commands Functional Mobility and Transfers for ADLs: 
 Bed Mobility: 
  
Supine to Sit: Contact guard assistance; Additional time Sit to Supine: Moderate assistance; Additional time Balance: 
Sitting: Intact; With support ADL Intervention: 
 
Grooming Washing Face: Supervision/set-up (performed seated EOB) Brushing/Combing Hair: Modified independent (performed seated EOB; pt demos decreased ROM in R shoulder) Upper Body Dressing Assistance Hospital Gown: Supervision/ set-up (performed seated EOB) Pain: 
Pt did not rate pain or discomfort prior to treatment.   
Pt did not pain or discomfort post treatment. Activity Tolerance:   
Fair Please refer to the flowsheet for vital signs taken during this treatment. After treatment:  
[]  Patient left in no apparent distress sitting up in chair 
[x]  Patient left in no apparent distress in bed 
[x]  Call bell left within reach 
[]  Nursing notified 
[]  Caregiver present 
[]  Bed alarm activated RITO Kramer Time Calculation: 25 mins

## 2018-08-28 NOTE — PROGRESS NOTES
[facility]ist [facility] Progress Note Patient: Cirilo Jenkins Age: 60 y.o. : 1960 MR#: [id number]: [id number] Date: 2018 Subjective:  
 
Pt alert and awake, has no complaints. Denies SOB, chest pain; states concern for injury left knee with fall but without inc pain. Also with + BM today but notes needing to strain. Assessment/Plan:  
-SOB as presenting complaint. Likely multifactorial in this pt w/ morbid obesity/OHS/ANDRADE 
-History of severe pulm HTN, ANDRADE and non compliance w/ CPAP. Appreciate pulm assistance. 
-Acute on chronice diastolic HF-cardiology following 
-Hypercarbia somewhat improved after use of BiPAP overnight, PaC02 last  on  
-Chronic afib on coumadin: rate controlled -HTN 
-Dyslipidemia 
-History of polysubstance abuse 
-Paranoid schizophrenia 
-Morbid obesity 
-Chronic right hip pain m/l due to severe OA 
  
PLAN: 
-cont current diuresis 
-give dose (7.5 mg) of coumadin and follow INR 
-PT/OT will need SNF placement. Awaiting insurance approval for SNF acceptance. Discussed with CM anticipate answer tomorrow  
- L knee xray ordered 
- add pericolace as straining with colace alone 
  
Additional Notes:   
  
Case discussed with:  [x]Patient  []Family  [x]Nursing  []Case Management DVT Prophylaxis:  []Lovenox  []Hep SQ  []SCDs  [x]Coumadin   []On Heparin gtt 
  
Objective:  
VS:  
Visit Vitals  /70 (BP 1 Location: Right arm, BP Patient Position: Sitting)  Pulse 63  Temp 99.4 °F (37.4 °C)  Resp 18  Ht 5' (1.524 m)  Wt 137.7 kg (303 lb 9.2 oz)  SpO2 97%  BMI 59.29 kg/m2 Tmax/24hrs: Temp (24hrs), Av.3 °F (36.8 °C), Min:97.2 °F (36.2 °C), Max:99.4 °F (37.4 °C) Intake/Output Summary (Last 24 hours) at 18 1544 Last data filed at 18 1251 Gross per 24 hour Intake              680 ml Output             1775 ml Net            -1095 ml General:  Alert, awake. Cardiovascular:  RRR Pulmonary: CTA  
GI:  NT, normal BS Extremities:  No edema or cyanosis; full ROM of BLE Labs:   
Recent Results (from the past 24 hour(s)) GLUCOSE, POC Collection Time: 08/27/18 10:01 PM  
Result Value Ref Range Glucose (POC) 113 (H) 70 - 110 mg/dL PROTHROMBIN TIME + INR Collection Time: 08/28/18  4:16 AM  
Result Value Ref Range Prothrombin time 24.8 (H) 11.5 - 15.2 sec INR 2.2 (H) 0.8 - 1.2 METABOLIC PANEL, BASIC Collection Time: 08/28/18  4:16 AM  
Result Value Ref Range Sodium 142 136 - 145 mmol/L Potassium 4.1 3.5 - 5.5 mmol/L Chloride 105 100 - 108 mmol/L  
 CO2 30 21 - 32 mmol/L Anion gap 7 3.0 - 18 mmol/L Glucose 89 74 - 99 mg/dL BUN 25 (H) 7.0 - 18 MG/DL Creatinine 1.03 0.6 - 1.3 MG/DL  
 BUN/Creatinine ratio 24 (H) 12 - 20 GFR est AA >60 >60 ml/min/1.73m2 GFR est non-AA 55 (L) >60 ml/min/1.73m2 Calcium 8.7 8.5 - 10.1 MG/DL  
CBC WITH AUTOMATED DIFF Collection Time: 08/28/18  4:16 AM  
Result Value Ref Range WBC 4.7 4.6 - 13.2 K/uL  
 RBC 4.18 (L) 4.20 - 5.30 M/uL HGB 9.5 (L) 12.0 - 16.0 g/dL HCT 32.4 (L) 35.0 - 45.0 % MCV 77.5 74.0 - 97.0 FL  
 MCH 22.7 (L) 24.0 - 34.0 PG  
 MCHC 29.3 (L) 31.0 - 37.0 g/dL  
 RDW 17.6 (H) 11.6 - 14.5 % PLATELET 365 662 - 031 K/uL MPV 10.0 9.2 - 11.8 FL  
 NEUTROPHILS 63 40 - 73 % LYMPHOCYTES 26 21 - 52 % MONOCYTES 8 3 - 10 % EOSINOPHILS 3 0 - 5 % BASOPHILS 0 0 - 2 %  
 ABS. NEUTROPHILS 2.9 1.8 - 8.0 K/UL  
 ABS. LYMPHOCYTES 1.3 0.9 - 3.6 K/UL  
 ABS. MONOCYTES 0.4 0.05 - 1.2 K/UL  
 ABS. EOSINOPHILS 0.2 0.0 - 0.4 K/UL  
 ABS. BASOPHILS 0.0 0.0 - 0.1 K/UL  
 DF AUTOMATED    
GLUCOSE, POC Collection Time: 08/28/18  8:27 AM  
Result Value Ref Range Glucose (POC) 117 (H) 70 - 110 mg/dL GLUCOSE, POC Collection Time: 08/28/18 12:06 PM  
Result Value Ref Range Glucose (POC) 89 70 - 110 mg/dL Signed By: Jace Ruiz NP August 28, 2018

## 2018-08-28 NOTE — PROGRESS NOTES
9990- Bedside and Verbal shift change report given to Autumn Eubanks RN (oncoming nurse) by Boyd RN (offgoing nurse). Report included the following information SBAR, Kardex, Intake/Output, MAR and Recent Results.

## 2018-08-28 NOTE — PROGRESS NOTES
Problem: Mobility Impaired (Adult and Pediatric) Goal: *Acute Goals and Plan of Care (Insert Text) Physical Therapy Goals Initiated 8/21/2018, reevaluation 8/28/18, and to be accomplished within 7 day(s) 1. Patient will move from supine to sit and sit to supine, scoot up and down and roll side to side in bed with contact guard assist.    
2.  Patient will transfer from bed to chair and chair to bed with contact guard assist using the least restrictive device. 3.  Patient will perform sit to stand with contact guard assist.  
4.  Patient will ambulate 15 feet with LRAD CGA/min A. Outcome: Progressing Towards Goal 
physical Therapy RE-EVALUATION and TREATMENT Patient: Niles Walker (60 y.o. female) Date: 8/28/2018 Diagnosis: Acute exacerbation of CHF (congestive heart failure) ([facility]) Acute exacerbation of CHF (congestive heart failure) ([facility] Precautions:  falls ASSESSMENT: 
Patient is cleared by nursing for PT reevaluation, and patient consents to therapy. Pt just finished with OT prior to PT but agreed to PT treatment. Pt performed supine to sit CGA/min A with HOB raised. Scooting to EOB mod A. Pt performed sit to stands with rocking technique from bed and from chair mod Ax2 for safety. Gait training bed to chair 3 feet RW mod Ax2 for safety. Pt requires increased time with all activities. Educated pt on [address] [place] [address] with nursing assistance (advised nursing for 2 person assist for safety and to use rocking technique for transfers). Pt ended therapy sitting in recliner with all needs met. Patient's progression toward goals since last assessment: Pt initially evaluated 8/21/18 and has made progress over the last week decreasing assistance with all mobility and just starting to take steps today. Pt continues to benefit from skilled inpatient PT. PLAN: 
Goals have been reviewed and updated based on progression since last assessment. Patient continues to benefit from skilled intervention to address the above impairments to increase functional independence. Continue to follow the patient 1-2 times per day/4-7 days per week to address goals. Planned Interventions: 
[x]     Bed Mobility Training          [x]     Neuromuscular Re-Education 
[x]     Transfer Training                []    Orthotic/Prosthetic Training 
[x]     Gait Training                       [x]     Modalities [x]     Therapeutic Exercises       [x]     Edema Management/Control 
[x]     Therapeutic Activities         [x]     Patient and Family Training/Education 
[]     Other (comment): 
Discharge Recommendations: Zechariah Juárez Further Equipment Recommendations for Discharge: pt has WC and RW SUBJECTIVE:  
Patient stated Fayrene Pretzel you pregnant? I don't want to hurt you.  OBJECTIVE DATA SUMMARY:  
Critical Behavior: 
Neurologic State: Alert Orientation Level: Oriented X4 Cognition: Follows commands Functional Mobility Training: 
Bed Mobility: 
Supine to Sit: Contact guard assistance; Additional time;Minimum assistance Sit to Supine: Moderate assistance; Additional time Scooting: Minimum assistance; Moderate assistance Transfers: 
Sit to Stand: Moderate assistance;Assist x2 Stand to Sit: Moderate assistance;Assist x2 Balance: 
Sitting: Intact; With support Sitting - Static: Fair (occasional) Sitting - Dynamic: Fair (occasional) Standing: Impaired; With support Standing - Static: Fair Standing - Dynamic : Poor Ambulation/Gait Training: 
Distance (ft): 3 Feet (ft) Assistive Device: Walker, rolling Ambulation - Level of Assistance: Moderate assistance;Assist x2 Gait Abnormalities: Decreased step clearance;Shuffling gait Base of Support: Center of gravity altered; Widened Speed/Reyna: Slow Step Length: Right shortened;Left shortened Therapeutic Exercises:  
Reviewed ankle pumps Pain: 
Pre: 0/10 Post: 0/10 Activity Tolerance:  
fair Please refer to the flowsheet for vital signs taken during this treatment. After treatment:  
[x]  Patient left in no apparent distress sitting up in chair 
[]  Patient left in no apparent distress in bed 
[x]  Call bell left within reach [x]  Nursing notified, Magui Case 
[]  Caregiver present 
[]  Bed alarm activated 
[x] Personal items in reach Ceasar Hill, PT, DPT Time Calculation: 23 mins Mobility [id number] Current  CK= 40-59%   Goal  CJ= 20-39%. The severity rating is based on the Level of Assistance required for Functional Mobility and ADLs.

## 2018-08-28 NOTE — PROGRESS NOTES
[employer] [facility] Pulmonary, Critical Care, and Sleep Medicine Progress note Name: Twin Carrasco MRN: [id number] : 1960 Hospital: [address] [place] [address] Date: 2018 IMPRESSION:  
· Acute on chronic respiratory failure - hypercapnic and hypoxic: improved with diuresis, likely due to CHF exacerbation superimposed on severe ANDRADE/OHS and pulmonary HTN. ABG with Compensated chronic respiratory acidosis with mild acute component · Pulmonary HTN probably Grp 3 +/- Grp 2 in pt with chronic hypoxemia due to ANDRADE/OHS and decompensated diastolic failure · Severe ANDRADE/OHS failed CPAP titration. Recently seen in office and scheduled for outpatient BIPAP titration- not completed. Unable to get BiPAP device due to insurance issues. Patient needs PAP device DIAGNOSIS: 
1.Obesity Hypoventilation syndrome 2. Severe complex ANDRADE Dx: ANDRADE (obstructive sleep apnea) [G47.33 (ICD-10-CM)]  
  
Hypoventilation Syndrome testing needed: PSG or ABG ( done) Results: 
  
ABG:on 2 L canula Results for Rian Cespedes (MRN [id number]) as of 2018 09:58 
  Ref. Range 2018 09:08  
pH (POC) Latest Ref Range: 7.35 - 7.45   7.351 pCO2 (POC) Latest Ref Range: 35.0 - 45.0 MMHG 70.3 (H)  
pO2 (POC) Latest Ref Range: 80 - 100 MMHG 60 (L) HCO3 (POC) Latest Ref Range: 22 - 26 MMOL/L 39.0 (H)  
sO2 (POC) Latest Ref Range: 92 - 97 % 88 (L)  
  
Polysomnogram 2017: 
Severe complex ANDRADE- need dedicated BIPAP  with possible back up rate.  Use positional therapy  (pillow wedge or sleep on side)due to high pressure requirement 
  
SETTINGS: IPAP 25,EPAP 18 O2- 28% Back up rate-10 · Volume overload- treated · Paranoid schizophrenia · Morbid obesity · Decompensated diastolic heart failure · Atrial fibrillation rate controlled  
  PLAN:  
· BIPAP preferred. CPAP alternatively ( demonstrated inability to eliminate apnea events.) Patient has had sleep study- on chart. SETTINGS: IPAP 25,EPAP 18 O2- 28% Back up rate-10. If CPAP- then recommend 15 cwp continous pressure · Continue gentle diuresis. Would avoid overdiuresis as pt is likely preload dependent · Electrolyte replacement per protocol · Will probably need higher pressures on NIV, await titration as out patient · Supplemental O2 to maintain saturation greater than 92% · Schizophrenia acting as barrier to care as pt with limited insight into her illness · Continue anticoagulation · Schedule PFT as outpatient · Will complete her PAP titration/BiPAP trial in Sleep lab after discgharge Subjective:  
 
08/28/18 Could not be discharged as insurance not approving PAP device per . Slept all night with BiPAP intermittently  via nasal mask. Tolerated better. Feels good today- more interactive and conversing Denies any chest pain, cough, SOB No new complaints. HPI: 
This patient has been seen and evaluated at the request of Dr. Valarie Quintana for hypercapnic respiratory failure. Patient is a 60 y.o. female admitted for shortness of breath and hypoxemia on 8/19 and being treated with response in SOB and Oxygenation. Noted to be lethargic on 8/22 so ABG obtained and placed on BiPAP which she does not keep on. She is a poor historian due to Psychiatric illness but review of records reveal severe ANDRADE/OHS with failed CPAP titration. Pt was scheduled for dedicated BIPAP titration but would end up in the hospital for either shortness of breath or AMS due to CO2 narcosis. Pt with severe ANDRADE/OHS Rxed with BIPAP with admitted poor compliance, per pt due to device malfunction. Past Medical History:  
Diagnosis Date  Atrial fibrillation CHADS score 1  (-CHF, +HTN, -AGE, -DM, -CVA)  Carpal tunnel syndrome  Chronic pain  Congenital heart disease   
 presumed pulmonary stenosis s/p repair 1969  Congestive heart failure ([facility] [address]  Degenerative disc disease  Degenerative joint disease  GERD   
  H/O echocardiogram 04/2017 EF 60-65%, mild concentric LVH, dilated RV with RV dysfunction, RVSP 54 mmHg, RA E, moderate to severe pulmonic regurgitation.  Hypercholesterolemia  Hypertension  Hypothyroid  Morbid obesity  Morbid obesity with BMI of 45.0-49.9, adult ([facility] [address] 2/20/2017  Noncompliance  Schizoaffective disorder  Substance abuse   
 marijuana, crack cocaine Past Surgical History:  
Procedure Laterality Date [address] VSD repair  COLONOSCOPY N/A 5/6/2018 COLONOSCOPY with tatooing and biopsy performed by Rahat Melendez MD at [address] [facility]    
 left  HX HYSTERECTOMY  HX KNEE REPLACEMENT    
 left  HX KNEE REPLACEMENT    
 right  HX ORTHOPAEDIC    
 left ankle  HX ORTHOPAEDIC    
 carpel tunnel right and left  HX THYROIDECTOMY Left Allergies Allergen Reactions  Gabapentin Other (comments) Unsteady, weakness LEs  Tetanus Vaccines And Toxoid Swelling  Topamax [Topiramate] Other (comments)  
  weakness Current Facility-Administered Medications Medication Dose Route Frequency  fluticasone (FLONASE) 50 mcg/actuation nasal spray 2 Spray  2 Spray Both Nostrils DAILY  furosemide (LASIX) tablet 40 mg  40 mg Oral ACB&D  
 lisinopril (PRINIVIL, ZESTRIL) tablet 5 mg  5 mg Oral DAILY  baclofen (LIORESAL) tablet 10 mg  10 mg Oral TID  ARIPiprazole (ABILIFY) tablet 15 mg  15 mg Oral DAILY  docusate sodium (COLACE) capsule 100 mg  100 mg Oral BID  isosorbide mononitrate ER (IMDUR) tablet 30 mg  30 mg Oral QPM  
 levothyroxine (SYNTHROID) tablet 125 mcg  125 mcg Oral ACB  metoprolol tartrate (LOPRESSOR) tablet 12.5 mg  12.5 mg Oral BID  PARoxetine (PAXIL) tablet 20 mg  20 mg Oral DAILY  polyethylene glycol (MIRALAX) packet 17 g  17 g Oral DAILY  simvastatin (ZOCOR) tablet 40 mg  40 mg Oral QHS  famotidine (PEPCID) tablet 20 mg  20 mg Oral BID  WARFARIN INFORMATION NOTE (COUMADIN)   Other Q24H Review of Systems: 
Review of systems not obtained due to patient factors. Objective:  
Vital Signs:   
Visit Vitals  /70 (BP 1 Location: Right arm, BP Patient Position: At rest)  Pulse 78  Temp 98.3 °F (36.8 °C)  Resp 18  Ht 5' (1.524 m)  Wt 137.7 kg (303 lb 9.2 oz)  SpO2 94%  BMI 59.29 kg/m2 O2 Device: Nasal cannula O2 Flow Rate (L/min): 3 l/min Temp (24hrs), Av °F (36.7 °C), Min:97.2 °F (36.2 °C), Max:98.6 °F (37 °C) Intake/Output:  
Last shift:      701 - 1900 In: 240 [P.O.:240] Out: 400 [Urine:400] Last 3 shifts: 1901 -  0700 In: 600 [P.O.:600] Out: 800 [Urine:800] Intake/Output Summary (Last 24 hours) at 18 0945 Last data filed at 18 2232 Gross per 24 hour Intake              240 ml Output             1050 ml Net             -810 ml Physical Exam:  
General:  Cooperative, no distress, appears stated age. Head:  Normocephalic, without obvious abnormality, atraumatic. Eyes:  Conjunctivae/corneas clear. PERRL, EOMs intact. Nose: Nares normal. Septum midline. Mucosa normal. No drainage or sinus tenderness. Throat: Lips, mucosa, and tongue normal. Teeth and gums normal.  
Neck: Supple, symmetrical, trachea midline, no adenopathy, thyroid: no enlargment/tenderness/nodules, no carotid bruit and no JVD. Back:   Symmetric, no curvature. ROM normal.  
Lungs:   Clear to auscultation bilaterally. Chest wall:  No tenderness or deformity. Heart:  Regular rate and rhythm, S1, S2 normal, no murmur, click, rub or gallop. Abdomen:   Soft, non-tender. Bowel sounds normal. No masses,  No organomegaly. Extremities: Extremities normal, atraumatic, no cyanosis or edema. Pulses: 2+ and symmetric all extremities. Skin: Skin color, texture, turgor normal. No rashes or lesions Lymph nodes: Cervical, supraclavicular, and axillary nodes normal.  
Neurologic: Grossly nonfocal  
 
Data review:  
 
Recent Results (from the past 24 hour(s)) GLUCOSE, POC Collection Time: 08/27/18 10:01 PM  
Result Value Ref Range Glucose (POC) 113 (H) 70 - 110 mg/dL PROTHROMBIN TIME + INR Collection Time: 08/28/18  4:16 AM  
Result Value Ref Range Prothrombin time 24.8 (H) 11.5 - 15.2 sec INR 2.2 (H) 0.8 - 1.2 METABOLIC PANEL, BASIC Collection Time: 08/28/18  4:16 AM  
Result Value Ref Range Sodium 142 136 - 145 mmol/L Potassium 4.1 3.5 - 5.5 mmol/L Chloride 105 100 - 108 mmol/L  
 CO2 30 21 - 32 mmol/L Anion gap 7 3.0 - 18 mmol/L Glucose 89 74 - 99 mg/dL BUN 25 (H) 7.0 - 18 MG/DL Creatinine 1.03 0.6 - 1.3 MG/DL  
 BUN/Creatinine ratio 24 (H) 12 - 20 GFR est AA >60 >60 ml/min/1.73m2 GFR est non-AA 55 (L) >60 ml/min/1.73m2 Calcium 8.7 8.5 - 10.1 MG/DL  
CBC WITH AUTOMATED DIFF Collection Time: 08/28/18  4:16 AM  
Result Value Ref Range WBC 4.7 4.6 - 13.2 K/uL  
 RBC 4.18 (L) 4.20 - 5.30 M/uL HGB 9.5 (L) 12.0 - 16.0 g/dL HCT 32.4 (L) 35.0 - 45.0 % MCV 77.5 74.0 - 97.0 FL  
 MCH 22.7 (L) 24.0 - 34.0 PG  
 MCHC 29.3 (L) 31.0 - 37.0 g/dL  
 RDW 17.6 (H) 11.6 - 14.5 % PLATELET 426 967 - 898 K/uL MPV 10.0 9.2 - 11.8 FL  
 NEUTROPHILS 63 40 - 73 % LYMPHOCYTES 26 21 - 52 % MONOCYTES 8 3 - 10 % EOSINOPHILS 3 0 - 5 % BASOPHILS 0 0 - 2 %  
 ABS. NEUTROPHILS 2.9 1.8 - 8.0 K/UL  
 ABS. LYMPHOCYTES 1.3 0.9 - 3.6 K/UL  
 ABS. MONOCYTES 0.4 0.05 - 1.2 K/UL  
 ABS. EOSINOPHILS 0.2 0.0 - 0.4 K/UL  
 ABS. BASOPHILS 0.0 0.0 - 0.1 K/UL  
 DF AUTOMATED    
GLUCOSE, POC Collection Time: 08/28/18  8:27 AM  
Result Value Ref Range Glucose (POC) 117 (H) 70 - 110 mg/dL Imaging: 
I have personally reviewed the patients radiographs and have reviewed the reports: CXR Results 8/19/2018  IMPRESSION: 
 1. Enlarged cardiac silhouette with prominent hilar shadows similar to prior, 
correlating with enlarged pulmonary arteries, and suspected mild perihilar 
vascular congestion versus infiltrate. CT Results  (Last 48 hours) None PFT Results  2015 Normal flows, reduced DLCO- 50% predicted Echo Results  (Last 48 hours) None High complexity decision making was performed during the evaluation of this patient at high risk for decompensation with multiple organ involvement 
  
 
  
Reina Teresa MD

## 2018-08-29 LAB
GLUCOSE BLD STRIP.AUTO-MCNC: 102 MG/DL (ref 70–110)
GLUCOSE BLD STRIP.AUTO-MCNC: 105 MG/DL (ref 70–110)
GLUCOSE BLD STRIP.AUTO-MCNC: 94 MG/DL (ref 70–110)
INR PPP: 2.3 (ref 0.8–1.2)
PROTHROMBIN TIME: 24.9 SEC (ref 11.5–15.2)

## 2018-08-29 PROCEDURE — 74011250637 HC RX REV CODE- 250/637: Performed by: PHYSICIAN ASSISTANT

## 2018-08-29 PROCEDURE — 94660 CPAP INITIATION&MGMT: CPT

## 2018-08-29 PROCEDURE — 74011250637 HC RX REV CODE- 250/637: Performed by: INTERNAL MEDICINE

## 2018-08-29 PROCEDURE — 74011250637 HC RX REV CODE- 250/637: Performed by: NURSE PRACTITIONER

## 2018-08-29 PROCEDURE — 65660000000 HC RM CCU STEPDOWN

## 2018-08-29 PROCEDURE — 82962 GLUCOSE BLOOD TEST: CPT

## 2018-08-29 PROCEDURE — 74011000250 HC RX REV CODE- 250: Performed by: INTERNAL MEDICINE

## 2018-08-29 PROCEDURE — 36415 COLL VENOUS BLD VENIPUNCTURE: CPT | Performed by: INTERNAL MEDICINE

## 2018-08-29 PROCEDURE — 85610 PROTHROMBIN TIME: CPT | Performed by: INTERNAL MEDICINE

## 2018-08-29 RX ORDER — AMOXICILLIN 250 MG
1 CAPSULE ORAL
Qty: 30 TAB | Refills: 0 | Status: SHIPPED
Start: 2018-08-29 | End: 2020-06-09 | Stop reason: ALTCHOICE

## 2018-08-29 RX ORDER — WARFARIN 7.5 MG/1
7.5 TABLET ORAL ONCE
Status: COMPLETED | OUTPATIENT
Start: 2018-08-29 | End: 2018-08-29

## 2018-08-29 RX ORDER — ACETAMINOPHEN AND CODEINE PHOSPHATE 300; 30 MG/1; MG/1
1 TABLET ORAL
Qty: 12 TAB | Refills: 0 | Status: ON HOLD | OUTPATIENT
Start: 2018-08-29 | End: 2018-09-29

## 2018-08-29 RX ORDER — WARFARIN 7.5 MG/1
7.5 TABLET ORAL DAILY
Qty: 2 TAB | Refills: 0 | Status: SHIPPED
Start: 2018-08-29 | End: 2018-09-10 | Stop reason: SDUPTHER

## 2018-08-29 RX ADMIN — ACETAMINOPHEN AND CODEINE PHOSPHATE 1 TABLET: 300; 30 TABLET ORAL at 18:53

## 2018-08-29 RX ADMIN — BACLOFEN 10 MG: 10 TABLET ORAL at 17:39

## 2018-08-29 RX ADMIN — SIMVASTATIN 40 MG: 40 TABLET, FILM COATED ORAL at 21:54

## 2018-08-29 RX ADMIN — ARIPIPRAZOLE 15 MG: 15 TABLET ORAL at 08:02

## 2018-08-29 RX ADMIN — ISOSORBIDE MONONITRATE 30 MG: 30 TABLET, EXTENDED RELEASE ORAL at 17:42

## 2018-08-29 RX ADMIN — BACLOFEN 10 MG: 10 TABLET ORAL at 21:54

## 2018-08-29 RX ADMIN — FLUTICASONE PROPIONATE 2 SPRAY: 50 SPRAY, METERED NASAL at 08:05

## 2018-08-29 RX ADMIN — METOPROLOL TARTRATE 12.5 MG: 25 TABLET ORAL at 17:39

## 2018-08-29 RX ADMIN — POLYETHYLENE GLYCOL 3350 17 G: 17 POWDER, FOR SOLUTION ORAL at 08:02

## 2018-08-29 RX ADMIN — WARFARIN SODIUM 7.5 MG: 7.5 TABLET ORAL at 19:40

## 2018-08-29 RX ADMIN — FAMOTIDINE 20 MG: 20 TABLET ORAL at 17:40

## 2018-08-29 RX ADMIN — ACETAMINOPHEN AND CODEINE PHOSPHATE 1 TABLET: 300; 30 TABLET ORAL at 21:54

## 2018-08-29 RX ADMIN — ACETAMINOPHEN AND CODEINE PHOSPHATE 1 TABLET: 300; 30 TABLET ORAL at 13:02

## 2018-08-29 RX ADMIN — LEVOTHYROXINE SODIUM 125 MCG: 125 TABLET ORAL at 08:02

## 2018-08-29 RX ADMIN — STANDARDIZED SENNA CONCENTRATE AND DOCUSATE SODIUM 1 TABLET: 8.6; 5 TABLET, FILM COATED ORAL at 21:54

## 2018-08-29 RX ADMIN — FUROSEMIDE 40 MG: 40 TABLET ORAL at 08:01

## 2018-08-29 RX ADMIN — LISINOPRIL 5 MG: 5 TABLET ORAL at 08:01

## 2018-08-29 RX ADMIN — METOPROLOL TARTRATE 12.5 MG: 25 TABLET ORAL at 08:01

## 2018-08-29 RX ADMIN — PAROXETINE 20 MG: 20 TABLET, FILM COATED ORAL at 08:02

## 2018-08-29 RX ADMIN — BACLOFEN 10 MG: 10 TABLET ORAL at 08:01

## 2018-08-29 RX ADMIN — FUROSEMIDE 40 MG: 40 TABLET ORAL at 17:40

## 2018-08-29 RX ADMIN — FAMOTIDINE 20 MG: 20 TABLET ORAL at 08:01

## 2018-08-29 NOTE — DISCHARGE SUMMARY
[facility]  [facility]       Patient: Michelle Moss Age: 60 y.o. : 1960 MR#: [id number] SSN: [id number]  PCP on record: Garfield Gunn MD  Admit date: 2018  Discharge date: 2018    Disposition:    []Home   []Home with Home Health   [x]SNF/NH   []Rehab   []Home with family   []Alternate Facility:____________________    Reason for Admission: Increased shortness of breath and worsening BLE edema for last few days prior to admission in setting of eating salty foods and non-compliance with fluid restriction prior to admission. Discharge Diagnoses:                             1.  Acute on chronic diastolic heart failure  2. Acute on chronic hypercapnic respiratory failure   3. Chronic atrial fibrillation on coumadin  4. Obstructive Sleep Apnea and severe pulmonary hypertension with history of non-compliance with CPAP   5. Hypertension  6. Dyslipidemia  7. History of polysubstance abuse  8. H/o Paranoid schizophrenia  9. Morbid obesity  10. Chronic right hip pain m/l due to severe osteoarthritis    Discharge Medications:     Current Discharge Medication List      START taking these medications    Details   senna-docusate (PERICOLACE) 8.6-50 mg per tablet Take 1 Tab by mouth nightly. Qty: 30 Tab, Refills: 0      acetaminophen-codeine (TYLENOL #3) 300-30 mg per tablet Take 1 Tab by mouth every eight (8) hours as needed (moderate pain). Max Daily Amount: 3 Tabs. Qty: 12 Tab, Refills: 0    Associated Diagnoses: Primary osteoarthritis of both knees      albuterol-ipratropium (DUO-NEB) 2.5 mg-0.5 mg/3 ml nebu 3 mL by Nebulization route every six (6) hours as needed. Qty: 30 Nebule, Refills: 0         CONTINUE these medications which have CHANGED    Details   warfarin (COUMADIN) 7.5 mg tablet Take 1 Tab by mouth daily. Qty: 2 Tab, Refills: 0      lisinopril (PRINIVIL, ZESTRIL) 5 mg tablet Take 1 Tab by mouth daily.   Qty: 30 Tab, Refills: 0 CONTINUE these medications which have NOT CHANGED    Details   furosemide (LASIX) 40 mg tablet Take 1 Tab by mouth two (2) times a day. 40 mg po daily  Qty: 60 Tab, Refills: 2      docusate sodium (COLACE) 100 mg capsule Take 1 Cap by mouth two (2) times a day for 90 days. HOLD for loose stool. Qty: 60 Cap, Refills: 2      metoprolol tartrate (LOPRESSOR) 25 mg tablet Take 0.5 Tabs by mouth two (2) times a day. Qty: 30 Tab, Refills: 6      fluticasone (FLONASE) 50 mcg/actuation nasal spray 2 Sprays by Both Nostrils route daily. Qty: 1 Bottle, Refills: 2      isosorbide mononitrate ER (IMDUR) 30 mg tablet Take 1 Tab by mouth every evening. Qty: 90 Tab, Refills: 3      multivitamin, tx-iron-ca-min (THERA-M W/ IRON) 9 mg iron-400 mcg tab tablet Take 1 Tab by mouth daily. Qty: 30 Tab, Refills: 0      raNITIdine (ZANTAC) 150 mg tablet Take 150 mg by mouth two (2) times a day. VENTOLIN HFA 90 mcg/actuation inhaler Take 2 Puffs by inhalation every four (4) hours as needed for Wheezing or Shortness of Breath. Qty: 1 Inhaler, Refills: 0      PARoxetine (PAXIL) 20 mg tablet Take 1 Tab by mouth daily. Qty: 30 Tab, Refills: 3      simvastatin (ZOCOR) 40 mg tablet Take  by mouth nightly. to obtain from pharmacy      aripiprazole (ABILIFY) 15 mg tablet Take 15 mg by mouth daily. to obtain from pharmacy      levothyroxine (SYNTHROID) 125 mcg tablet Take 125 mcg by mouth Daily (before breakfast). polyethylene glycol (MIRALAX) 17 gram packet Take 1 Packet by mouth daily. Qty: 30 Packet, Refills: 2         STOP taking these medications       oxyCODONE IR (ROXICODONE) 5 mg immediate release tablet Comments:   Reason for Stopping:         loperamide (IMODIUM) 2 mg capsule Comments:   Reason for Stopping:             Consults:    - Cardiology   - Pulmonary     Procedures:  -   None    Significant Diagnostic Studies:   -  XR CHEST PORT on 08/19/2018  IMPRESSION:  1.  Enlarged cardiac silhouette with prominent hilar shadows similar to prior,  correlating with enlarged pulmonary arteries, and suspected mild perihilar  vascular congestion versus infiltrate. XR ELBOW RT MIN 3 V 08/23/2018: IMPRESSION:  1. Enlarged cardiac silhouette with prominent hilar shadows similar to prior,  correlating with enlarged pulmonary arteries, and suspected mild perihilar  vascular congestion versus infiltrate. XR HIP RT W OR WO PELV 2-3 VWS on 08/23/2018  IMPRESSION:     1. Technically suboptimal study. 2.  Correlation with cross-sectional imaging could be helpful for further  delineation. XR HIP RT W OR WO PELV 2-3 VWS on 08/24/2018  Impression:      No significant interval change compared to the prior radiographs as described. XR KNEE LT MAX 2 VWS on 08/28/2018  IMPRESSION:     Positive joint effusion. No evidence of acute fracture or dislocation. Hospital Course by Problem   1. Acute on chronic diastolic heart failure - At time of presentation with acute shortness of breath with evidence of fluid overload for which cardiology followed and patient underwent IV diuresis per cardiology. Patient with good urinary output and progressive improvement in shortness of breath. On day of discharge patient reports feeling well and with no worsening breathing concerns. Have encouraged / discussed fluid restriction of 1500ml daily and compliance with cardiac diet. Total urinary output during admission net of -22.2 liters, current weight is 137.7 kg.  F/u with primary cardiology in 4 weeks. 2.  Acute on chronic hypercapnic and hypoxic respiratory failure - improved with diuresis, likely due to CHF exacerbation superimposed on severe ANDRADE/OHS and pulmonary hypertension. Continue oxygen 2-3 L nasal cannula. 3.  Chronic atrial fibrillation on coumadin - Patient has been therapeutic during admission.   Last INR trends 2.1 - 2.2 and then on day of discharge 2.3 for which patient has received coumadin 7.5 mg over past 2 days and 12.5 mg on . Please continue coumadin 7.5 mg x 2 days and recheck INR on 2018.   4.  Obstructive Sleep Apnea and severe pulmonary hypertension with history of non-compliance with CPAP  - Per pulmonary bipap preferred but CPAP alternative. Patient has had sleep study- on chart. SETTINGS: IPAP 25,EPAP 18 O2- 28% Back up rate-10. If CPAP- then recommend 15 cwp continous pressure  5. Hypertension - controlled, no acute concerns  6. Dyslipidemia - cont statin  7. History of polysubstance abuse - no active concerns. Last couple to  8. H/o Paranoid schizophrenia - well controlled with abilify and paxil. No current issues, not actively following with psychiatry and receives her medications from PCP. Encourage compliance, no behavioral issues while inpatient. 9.  Morbid obesity - encourage safe weight loss  10. Chronic right hip pain m/l due to severe osteoarthritis - No acute issues per imaging and pain stable at this time.       Today's examination of the patient revealed:     Subjective:   States feels fine, denies SOB at rest, no chest pain, n/v/constipation; chronic multi joint pain  Objective:    VS:   Visit Vitals    /81 (BP 1 Location: Right arm, BP Patient Position: At rest)    Pulse 86    Temp 98.4 °F (36.9 °C)    Resp 20    Ht 5' (1.524 m)    Wt 137.7 kg (303 lb 9.2 oz)    SpO2 97%    BMI 59.29 kg/m2      Tmax/24hrs: Temp (24hrs), Av.4 °F (36.9 °C), Min:97.8 °F (36.6 °C), Max:99.4 °F (37.4 °C)     Input/Output:   Intake/Output Summary (Last 24 hours) at 18 1002  Last data filed at 18 0834   Gross per 24 hour   Intake             1340 ml   Output             2575 ml   Net            -1235 ml     General:  Alert, NAD; obese  Cardiovascular:  irreg irreg rhythm  Pulmonary:  LSC throughout, oxygen in place  GI:  +BS in all four quadrants, soft, non-tender  Extremities:  Trace BLE edema; 2+ dorsalis pedis pulses bilaterally  Neurology: Patient A&O x 3    Labs:    Recent Results (from the past 24 hour(s))   GLUCOSE, POC    Collection Time: 08/28/18 12:06 PM   Result Value Ref Range    Glucose (POC) 89 70 - 110 mg/dL   GLUCOSE, POC    Collection Time: 08/28/18  3:50 PM   Result Value Ref Range    Glucose (POC) 103 70 - 110 mg/dL   PROTHROMBIN TIME + INR    Collection Time: 08/29/18  2:10 AM   Result Value Ref Range    Prothrombin time 24.9 (H) 11.5 - 15.2 sec    INR 2.3 (H) 0.8 - 1.2     GLUCOSE, POC    Collection Time: 08/29/18  7:55 AM   Result Value Ref Range    Glucose (POC) 102 70 - 110 mg/dL     Additional Data Reviewed:     Condition:   Follow-up Appointments:   Dr. Josie Sanchez (pulm) in 4 weeks  Dr. Ethan Faria cardiology in 4 weeks  Will need follow up with PCP within one week from d/c from SNF    >30 minutes spent coordinating this discharge (review instructions/follow-up, prescriptions, preparing report for sign off)    Signed:  Marilynn Jaimes NP  8/29/2018  10:02 AM

## 2018-08-29 NOTE — PROGRESS NOTES
Pt received authorization for this pt to be admitted to [facility], pt's family notified and Rosy Harrison N.P. Transportation set-up for 1:00 p.m. Pt's [facility] notified of acceptance and transport time to facility. This writer called for [facility]. 
 
This writer called [facility] spoke with the Help Desk regarding this pt qualifying for a need for a Level 2 or if it is not required this pt has not received psychiatric care for the last ten years and has received psychiatric medications from her primary care physician.

## 2018-08-29 NOTE — PROGRESS NOTES
[facility] Pulmonary, Critical Care, and Sleep Medicine Progress note Name: Valentina Yip MRN: [id number] : 1960 Hospital: [facility] Date: 2018 IMPRESSION:  
· Acute on chronic respiratory failure - hypercapnic and hypoxic: improved with diuresis, likely due to CHF exacerbation superimposed on severe ANDRADE/OHS and pulmonary HTN. ABG with Compensated chronic respiratory acidosis with mild acute component · Pulmonary HTN probably Grp 3 +/- Grp 2 in pt with chronic hypoxemia due to ANDRADE/OHS and decompensated diastolic failure · Severe ANDRADE/OHS failed CPAP titration. Recently seen in office and scheduled for outpatient BIPAP titration- not completed. Unable to get BiPAP device due to insurance issues. Patient needs PAP device DIAGNOSIS: 
1.Obesity Hypoventilation syndrome 2. Severe complex ANDRADE Dx: ANDRADE (obstructive sleep apnea) [G47.33 (ICD-10-CM)]  
  
Hypoventilation Syndrome testing needed: PSG or ABG ( done) Results: 
  
ABG:on 2 L canula Results for Brent Alexander (MRN [id number]) as of 2018 09:58 
  Ref. Range 2018 09:08  
pH (POC) Latest Ref Range: 7.35 - 7.45   7.351 pCO2 (POC) Latest Ref Range: 35.0 - 45.0 MMHG 70.3 (H)  
pO2 (POC) Latest Ref Range: 80 - 100 MMHG 60 (L) HCO3 (POC) Latest Ref Range: 22 - 26 MMOL/L 39.0 (H)  
sO2 (POC) Latest Ref Range: 92 - 97 % 88 (L)  
  
Polysomnogram 2017: 
Severe complex ANDRADE- need dedicated BIPAP  with possible back up rate.  Use positional therapy  (pillow wedge or sleep on side)due to high pressure requirement 
  
SETTINGS: IPAP 25,EPAP 18 O2- 28% Back up rate-10 · Volume overload- treated · Paranoid schizophrenia · Morbid obesity · Decompensated diastolic heart failure · Atrial fibrillation rate controlled  
  PLAN:  
· BIPAP preferred. CPAP alternatively ( demonstrated inability to eliminate apnea events.) Patient has had sleep study- on chart. SETTINGS: IPAP 25,EPAP 18 O2- 28% Back up rate-10. If CPAP- then recommend 15 cwp continous pressure · Continue gentle diuresis. Would avoid overdiuresis as pt is likely preload dependent · Electrolyte replacement per protocol · Will probably need higher pressures on NIV, await titration as out patient · Supplemental O2 to maintain saturation greater than 92% · Schizophrenia acting as barrier to care as pt with limited insight into her illness · Continue anticoagulation · Schedule PFT as outpatient · Will complete her PAP titration/BiPAP trial in Sleep lab after discgharge Subjective:  
 
08/29/18 Could not be discharged as insurance not approving PAP device per . For discharge to [facility] today No new overnight events. PAP machine at bedside Denies any chest pain, cough, SOB No new complaints. HPI: 
This patient has been seen and evaluated at the request of Dr. Robinson Diaz for hypercapnic respiratory failure. Patient is a 60 y.o. female admitted for shortness of breath and hypoxemia on 8/19 and being treated with response in SOB and Oxygenation. Noted to be lethargic on 8/22 so ABG obtained and placed on BiPAP which she does not keep on. She is a poor historian due to Psychiatric illness but review of records reveal severe ANDRADE/OHS with failed CPAP titration. Pt was scheduled for dedicated BIPAP titration but would end up in the hospital for either shortness of breath or AMS due to CO2 narcosis. Pt with severe ANDRADE/OHS Rxed with BIPAP with admitted poor compliance, per pt due to device malfunction. Past Medical History:  
Diagnosis Date  Atrial fibrillation CHADS score 1  (-CHF, +HTN, -AGE, -DM, -CVA)  Carpal tunnel syndrome  Chronic pain  Congenital heart disease   
 presumed pulmonary stenosis s/p repair 1969  Congestive heart failure ([facility] [address]  Degenerative disc disease  Degenerative joint disease  GERD   
 H/O echocardiogram 04/2017 EF 60-65%, mild concentric LVH, dilated RV with RV dysfunction, RVSP 54 mmHg, RA E, moderate to severe pulmonic regurgitation.  Hypercholesterolemia  Hypertension  Hypothyroid  Morbid obesity  Morbid obesity with BMI of 45.0-49.9, adult ([address] 2/20/2017  Noncompliance  Schizoaffective disorder  Substance abuse   
 marijuana, crack cocaine Past Surgical History:  
Procedure Laterality Date [address] VSD repair  COLONOSCOPY N/A 5/6/2018 COLONOSCOPY with tatooing and biopsy performed by Hardeep Friend MD at [address] [facility]    
 left  HX HYSTERECTOMY  HX KNEE REPLACEMENT    
 left  HX KNEE REPLACEMENT    
 right  HX ORTHOPAEDIC    
 left ankle  HX ORTHOPAEDIC    
 carpel tunnel right and left  HX THYROIDECTOMY Left Allergies Allergen Reactions  Gabapentin Other (comments) Unsteady, weakness LEs  Tetanus Vaccines And Toxoid Swelling  Topamax [Topiramate] Other (comments)  
  weakness Current Facility-Administered Medications Medication Dose Route Frequency  fluticasone (FLONASE) 50 mcg/actuation nasal spray 2 Spray  2 Spray Both Nostrils DAILY  senna-docusate (PERICOLACE) 8.6-50 mg per tablet 1 Tab  1 Tab Oral QHS  furosemide (LASIX) tablet 40 mg  40 mg Oral ACB&D  
 lisinopril (PRINIVIL, ZESTRIL) tablet 5 mg  5 mg Oral DAILY  baclofen (LIORESAL) tablet 10 mg  10 mg Oral TID  ARIPiprazole (ABILIFY) tablet 15 mg  15 mg Oral DAILY  isosorbide mononitrate ER (IMDUR) tablet 30 mg  30 mg Oral QPM  
 levothyroxine (SYNTHROID) tablet 125 mcg  125 mcg Oral ACB  metoprolol tartrate (LOPRESSOR) tablet 12.5 mg  12.5 mg Oral BID  PARoxetine (PAXIL) tablet 20 mg  20 mg Oral DAILY  polyethylene glycol (MIRALAX) packet 17 g  17 g Oral DAILY  simvastatin (ZOCOR) tablet 40 mg  40 mg Oral QHS  famotidine (PEPCID) tablet 20 mg  20 mg Oral BID  
  WARFARIN INFORMATION NOTE (COUMADIN)   Other Q24H Review of Systems: 
Review of systems not obtained due to patient factors. Objective:  
Vital Signs:   
Visit Vitals  /81 (BP 1 Location: Right arm, BP Patient Position: At rest)  Pulse 86  Temp 98.4 °F (36.9 °C)  Resp 20  
 Ht 5' (1.524 m)  Wt 137.7 kg (303 lb 9.2 oz)  SpO2 97%  BMI 59.29 kg/m2 O2 Device: Nasal cannula O2 Flow Rate (L/min): 3 l/min Temp (24hrs), Av.4 °F (36.9 °C), Min:97.8 °F (36.6 °C), Max:99.4 °F (37.4 °C) Intake/Output:  
Last shift:      701 -  190 In: 540 [P.O.:540] Out: 2000 [YIMMQ:4046] Last 3 shifts: 1901 -  0700 In: 1160 [P.O.:1160] Out:  [Urine:] Intake/Output Summary (Last 24 hours) at 18 1040 Last data filed at 18 6519 Gross per 24 hour Intake             1360 ml Output             2650 ml Net            -1290 ml Physical Exam:  
General:  Cooperative, no distress, appears stated age. Head:  Normocephalic, without obvious abnormality, atraumatic. Eyes:  Conjunctivae/corneas clear. PERRL, EOMs intact. Nose: Nares normal. Septum midline. Mucosa normal. No drainage or sinus tenderness. Throat: Lips, mucosa, and tongue normal. Teeth and gums normal.  
Neck: Supple, symmetrical, trachea midline, no adenopathy, thyroid: no enlargment/tenderness/nodules, no carotid bruit and no JVD. Back:   Symmetric, no curvature. ROM normal.  
Lungs:   Clear to auscultation bilaterally. Chest wall:  No tenderness or deformity. Heart:  Regular rate and rhythm, S1, S2 normal, no murmur, click, rub or gallop. Abdomen:   Soft, non-tender. Bowel sounds normal. No masses,  No organomegaly. Extremities: Extremities normal, atraumatic, no cyanosis or edema. Pulses: 2+ and symmetric all extremities. Skin: Skin color, texture, turgor normal. No rashes or lesions Lymph nodes: Cervical, supraclavicular, and axillary nodes normal.  
Neurologic: Grossly nonfocal  
 
Data review:  
 
Recent Results (from the past 24 hour(s)) GLUCOSE, POC Collection Time: 08/28/18 12:06 PM  
Result Value Ref Range Glucose (POC) 89 70 - 110 mg/dL GLUCOSE, POC Collection Time: 08/28/18  3:50 PM  
Result Value Ref Range Glucose (POC) 103 70 - 110 mg/dL PROTHROMBIN TIME + INR Collection Time: 08/29/18  2:10 AM  
Result Value Ref Range Prothrombin time 24.9 (H) 11.5 - 15.2 sec INR 2.3 (H) 0.8 - 1.2 GLUCOSE, POC Collection Time: 08/29/18  7:55 AM  
Result Value Ref Range Glucose (POC) 102 70 - 110 mg/dL Imaging: 
I have personally reviewed the patients radiographs and have reviewed the reports: CXR Results 8/19/2018 IMPRESSION: 
1. Enlarged cardiac silhouette with prominent hilar shadows similar to prior, 
correlating with enlarged pulmonary arteries, and suspected mild perihilar 
vascular congestion versus infiltrate. CT Results  (Last 48 hours) None PFT Results  2015 Normal flows, reduced DLCO- 50% predicted Echo Results  (Last 48 hours)  None  
  
moderate  complexity decision making was performed during the evaluation of this patient at high risk for decompensation with multiple organ involvement 
  
 
  
Obed Dempsey MD

## 2018-08-29 NOTE — DISCHARGE INSTRUCTIONS
Patient armband removed and given to patient to take home. Patient was informed of the privacy risks if armband lost or stolen    MyChart Activation    Thank you for requesting access to [employer]. Please follow the instructions below to securely access and download your online medical record. [employer] allows you to send messages to your doctor, view your test results, renew your prescriptions, schedule appointments, and more. How Do I Sign Up? 1. In your internet browser, go to [email or web address]  2. Click on the First Time User? Click Here link in the Sign In box. You will be redirect to the New Member Sign Up page. 3. Enter your [employer] Access Code exactly as it appears below. You will not need to use this code after youve completed the sign-up process. If you do not sign up before the expiration date, you must request a new code. [employer] Access Code: [email or web address]  Expires: 2018  7:58 PM (This is the date your [employer] access code will )    4. Enter the last four digits of your Social Security Number (xxxx) and Date of Birth (mm/dd/yyyy) as indicated and click Submit. You will be taken to the next sign-up page. 5. Create a [employer] ID. This will be your [employer] login ID and cannot be changed, so think of one that is secure and easy to remember. 6. Create a [employer] password. You can change your password at any time. 7. Enter your Password Reset Question and Answer. This can be used at a later time if you forget your password. 8. Enter your e-mail address. You will receive e-mail notification when new information is available in [address]. 9. Click Sign Up. You can now view and download portions of your medical record. 10. Click the Download Summary menu link to download a portable copy of your medical information. Additional Information    If you have questions, please visit the Frequently Asked Questions section of the [employer] website at [email or web address] Remember, MyChart is NOT to be used for urgent needs. For medical emergencies, dial 911. DISCHARGE SUMMARY from Nurse    PATIENT INSTRUCTIONS:    After general anesthesia or intravenous sedation, for 24 hours or while taking prescription Narcotics:  · Limit your activities  · Do not drive and operate hazardous machinery  · Do not make important personal or business decisions  · Do  not drink alcoholic beverages  · If you have not urinated within 8 hours after discharge, please contact your surgeon on call. Report the following to your surgeon:  · Excessive pain, swelling, redness or odor of or around the surgical area  · Temperature over 100.5  · Nausea and vomiting lasting longer than 4 hours or if unable to take medications  · Any signs of decreased circulation or nerve impairment to extremity: change in color, persistent  numbness, tingling, coldness or increase pain  · Any questions    What to do at Home:  Recommended activity: Activity as tolerated    If you experience any of the following symptoms Nausea,vomiting, diarrhea, chest pain, shortness of breath  , please follow up with PCP. *  Please give a list of your current medications to your Primary Care Provider. *  Please update this list whenever your medications are discontinued, doses are      changed, or new medications (including over-the-counter products) are added. *  Please carry medication information at all times in case of emergency situations. These are general instructions for a healthy lifestyle:    No smoking/ No tobacco products/ Avoid exposure to second hand smoke  Surgeon General's Warning:  Quitting smoking now greatly reduces serious risk to your health.     Obesity, smoking, and sedentary lifestyle greatly increases your risk for illness    A healthy diet, regular physical exercise & weight monitoring are important for maintaining a healthy lifestyle    You may be retaining fluid if you have a history of heart failure or if you experience any of the following symptoms:  Weight gain of 3 pounds or more overnight or 5 pounds in a week, increased swelling in our hands or feet or shortness of breath while lying flat in bed. Please call your doctor as soon as you notice any of these symptoms; do not wait until your next office visit. Recognize signs and symptoms of STROKE:    F-face looks uneven    A-arms unable to move or move unevenly    S-speech slurred or non-existent    T-time-call 911 as soon as signs and symptoms begin-DO NOT go       Back to bed or wait to see if you get better-TIME IS BRAIN. Warning Signs of HEART ATTACK     Call 911 if you have these symptoms:   Chest discomfort. Most heart attacks involve discomfort in the center of the chest that lasts more than a few minutes, or that goes away and comes back. It can feel like uncomfortable pressure, squeezing, fullness, or pain.  Discomfort in other areas of the upper body. Symptoms can include pain or discomfort in one or both arms, the back, neck, jaw, or stomach.  Shortness of breath with or without chest discomfort.  Other signs may include breaking out in a cold sweat, nausea, or lightheadedness. Don't wait more than five minutes to call 911 - MINUTES MATTER! Fast action can save your life. Calling 911 is almost always the fastest way to get lifesaving treatment. Emergency Medical Services staff can begin treatment when they arrive -- up to an hour sooner than if someone gets to the hospital by car. The discharge information has been reviewed with the patient. The patient verbalized understanding. Discharge medications reviewed with the patient and appropriate educational materials and side effects teaching were provided.   ___________________________________________________________________________________________________________________________________

## 2018-08-29 NOTE — ROUTINE PROCESS
TRANSFER - OUT REPORT: 
 
Verbal report given to Nurse Ann Coronel (name) on Jean Amaya  being transferred to [facility] 
(unit) for routine progression of care Report consisted of patients Situation, Background, Assessment and  
Recommendations(SBAR). Information from the following report(s) SBAR, Kardex, [facility] and Recent Results was reviewed with the receiving nurse. Lines:  
Peripheral IV 08/27/18 Right Hand (Active) Site Assessment Clean, dry, & intact 8/29/2018  4:51 AM  
Phlebitis Assessment 0 8/29/2018  4:51 AM  
Infiltration Assessment 0 8/29/2018  4:51 AM  
Dressing Status Clean 8/29/2018  4:51 AM  
Dressing Type Transparent;Tape 8/29/2018  4:51 AM  
Hub Color/Line Status Blue 8/29/2018  4:51 AM  
Action Taken Open ports on tubing capped 8/29/2018  4:51 AM  
Alcohol Cap Used Yes 8/29/2018  4:51 AM  
  
 
Opportunity for questions and clarification was provided. Patient transported with: LMT

## 2018-08-30 VITALS
RESPIRATION RATE: 18 BRPM | WEIGHT: 293 LBS | DIASTOLIC BLOOD PRESSURE: 67 MMHG | HEART RATE: 59 BPM | TEMPERATURE: 97.1 F | OXYGEN SATURATION: 100 % | BODY MASS INDEX: 57.52 KG/M2 | SYSTOLIC BLOOD PRESSURE: 130 MMHG | HEIGHT: 60 IN

## 2018-08-30 PROBLEM — F41.9 ANXIETY: Status: ACTIVE | Noted: 2018-08-30

## 2018-08-30 LAB
GLUCOSE BLD STRIP.AUTO-MCNC: 104 MG/DL (ref 70–110)
GLUCOSE BLD STRIP.AUTO-MCNC: 93 MG/DL (ref 70–110)
INR PPP: 2.2 (ref 0.8–1.2)
PROTHROMBIN TIME: 24.5 SEC (ref 11.5–15.2)

## 2018-08-30 PROCEDURE — 74011250637 HC RX REV CODE- 250/637: Performed by: INTERNAL MEDICINE

## 2018-08-30 PROCEDURE — 82962 GLUCOSE BLOOD TEST: CPT

## 2018-08-30 PROCEDURE — 74011250637 HC RX REV CODE- 250/637: Performed by: PHYSICIAN ASSISTANT

## 2018-08-30 PROCEDURE — 36415 COLL VENOUS BLD VENIPUNCTURE: CPT | Performed by: INTERNAL MEDICINE

## 2018-08-30 PROCEDURE — 85610 PROTHROMBIN TIME: CPT | Performed by: INTERNAL MEDICINE

## 2018-08-30 PROCEDURE — 74011000250 HC RX REV CODE- 250: Performed by: INTERNAL MEDICINE

## 2018-08-30 RX ADMIN — BACLOFEN 10 MG: 10 TABLET ORAL at 08:30

## 2018-08-30 RX ADMIN — PAROXETINE 20 MG: 20 TABLET, FILM COATED ORAL at 08:30

## 2018-08-30 RX ADMIN — METOPROLOL TARTRATE 12.5 MG: 25 TABLET ORAL at 08:31

## 2018-08-30 RX ADMIN — LEVOTHYROXINE SODIUM 125 MCG: 125 TABLET ORAL at 08:30

## 2018-08-30 RX ADMIN — FAMOTIDINE 20 MG: 20 TABLET ORAL at 08:30

## 2018-08-30 RX ADMIN — FLUTICASONE PROPIONATE 2 SPRAY: 50 SPRAY, METERED NASAL at 08:32

## 2018-08-30 RX ADMIN — FUROSEMIDE 40 MG: 40 TABLET ORAL at 08:30

## 2018-08-30 RX ADMIN — ARIPIPRAZOLE 15 MG: 15 TABLET ORAL at 08:31

## 2018-08-30 RX ADMIN — POLYETHYLENE GLYCOL 3350 17 G: 17 POWDER, FOR SOLUTION ORAL at 08:33

## 2018-08-30 RX ADMIN — ACETAMINOPHEN AND CODEINE PHOSPHATE 1 TABLET: 300; 30 TABLET ORAL at 06:06

## 2018-08-30 RX ADMIN — LISINOPRIL 5 MG: 5 TABLET ORAL at 08:30

## 2018-08-30 NOTE — DISCHARGE SUMMARY
ADDENDUM TO DISCHARGE SUMMARY:   Visit Vitals    /67 (BP 1 Location: Right arm, BP Patient Position: At rest)    Pulse (!) 53    Temp 97 °F (36.1 °C)    Resp 20    Ht 5' (1.524 m)    Wt 137.7 kg (303 lb 9.2 oz)    SpO2 98%    BMI 59.29 kg/m2     Subjective: Patient feeling well denies shortness of breath, chest pain, n/v/constipation. Intermittent chronic joint pain but no current concerns. Denies any psychiatric issues other than issues with anxiety approximately 12 years ago. Objective:   General:  Alert, NAD; obese  Cardiovascular:  irreg irreg rhythm  Pulmonary:  LSC throughout, oxygen in place  GI:  +BS in all four quadrants, soft, non-tender  Extremities:  Trace BLE edema; 2+ dorsalis pedis pulses bilaterally  Neurology: Patient A&O x 3    A/P:   No changes - INR 2.2 this AM, would continue with 7.5mg x 2 days and recheck INR on 09/01. On further discussion with patient no diagnosis of schizophrenia (removed from problem list). Anxiety ? depression added to problem list and well controlled at this time. Patient remains stable for discharge at this time.      Signed By: Yessi Mejia NP     August 30, 2018

## 2018-08-30 NOTE — PROGRESS NOTES
This writer spoke extensively with this pt's family members which included this pt's daughters and sister of this pt, who all report no prior psychiatric history. The only experience this pt had that would be close to a psychiatric issues is when this pt had a reaction to being given morphine. This pt's family was very upset regarding diagnosis of mental health and previous hospitalization for this diagnosis. This history was reported to this pt's treating physician.

## 2018-08-30 NOTE — PROGRESS NOTES
8126- Bedside and Verbal shift change report given to Jemal Cisneros RN (oncoming nurse) by Pablo Fontanez RN (offgoing nurse). Report included the following information SBAR, Kardex, Intake/Output, MAR and Recent Results.

## 2018-08-30 NOTE — PROGRESS NOTES
CMS was asked to arrange transportation to [facility] CMS first spoke with [facility] ([phone]) representative (Ronna) in regards to prior auth for non-emergency medical transportation (DSEY #[id number]). CMS spoke with \"[facility]\" representative (Christy Lainez), and scheduled a p/u time of 1430, via stretcher. \"[facility]\" packet was initiated and placed in the pt chart for completion. [facility], MILES (care manager) were made aware of the scheduled p/u.

## 2018-08-30 NOTE — ROUTINE PROCESS
Bedside and Verbal shift change report given to Randy Fam (oncoming nurse) by Blanca Franklin RN (offgoing nurse). Report included the following information SBAR, Kardex, MAR and Recent Results. SITUATION:  
? Code Status: Full Code 
? Reason for Admission: Acute exacerbation of CHF (congestive heart failure) ([facility] [address] ? Hospital day: 10 
? Problem List:  
   
Hospital Problems  Date Reviewed: 5/22/2018 Codes Class Noted POA * (Principal)Acute exacerbation of CHF (congestive heart failure) (HCC) ICD-10-CM: I50.9 ICD-9-CM: 428.0  6/2/2018 Unknown Chronic anticoagulation ICD-10-CM: Z79.01 
ICD-9-CM: V58.61  6/2/2018 Yes Fluid overload ICD-10-CM: E87.70 ICD-9-CM: 276.69  4/18/2017 Yes Morbid obesity with BMI of 45.0-49.9, adult [facility]) ICD-10-CM: E66.01, Z68.42 
ICD-9-CM: 278.01, V85.42  2/20/2017 Yes Hypertension ICD-10-CM: I11.9 ICD-9-CM: 402.10  Unknown Yes Atrial fibrillation ICD-10-CM: I48.91 
ICD-9-CM: 427.31  Unknown Yes Overview Signed 1/25/2013 10:46 AM by Christa Rossi CHADS score 1  (-CHF, +HTN, -AGE, -DM, -CVA) BACKGROUND:  
 Past Medical History:  
Past Medical History:  
Diagnosis Date  Atrial fibrillation CHADS score 1  (-CHF, +HTN, -AGE, -DM, -CVA)  Carpal tunnel syndrome  Chronic pain  Congenital heart disease   
 presumed pulmonary stenosis s/p repair 1969  Congestive heart failure ([facility] [address]  Degenerative disc disease  Degenerative joint disease  GERD   
 H/O echocardiogram 04/2017 EF 60-65%, mild concentric LVH, dilated RV with RV dysfunction, RVSP 54 mmHg, RA E, moderate to severe pulmonic regurgitation.  Hypercholesterolemia  Hypertension  Hypothyroid  Morbid obesity  Morbid obesity with BMI of 45.0-49.9, adult ([facility] [address] 2/20/2017  Noncompliance  Schizoaffective disorder  Substance abuse   
 marijuana, crack cocaine Patient taking anticoagulants yes . Coumadin on hold today. INR 2.9 ASSESSMENT:  
? Changes in Assessment Throughout Shift: no 
 
? Patient has Central Line: no Reasons if yes: n/a 
? Patient has Shaikh Cath: no Reasons if yes:  
 
? Last Vitals: 
  
Vitals:  
 08/29/18 0747 08/29/18 1104 08/29/18 1516 08/29/18 1932 BP: 135/81 126/85 110/75 123/80 Pulse: 86 62 88 90 Resp: 20 18 20 18 Temp: 98.4 °F (36.9 °C) 98.3 °F (36.8 °C) 98.4 °F (36.9 °C) 98 °F (36.7 °C) SpO2: 97% 99% 100% 100% Weight:      
Height:      
 
 
? IV and DRAINS (will only show if present) [REMOVED] Peripheral IV 08/27/18 Right Hand-Site Assessment: Clean, dry, & intact [REMOVED] Peripheral IV 08/19/18 Left Hand-Site Assessment: Clean, dry, & intact ? WOUND (if present) Wound Type:  none Dressing present Dressing Present : No 
 Wound Concerns/Notes:  none ? PAIN Pain Assessment Pain Intensity 1: 8 (08/29/18 1302) Pain Location 1: Hip Pain Intervention(s) 1: Medication (see MAR) Patient Stated Pain Goal: 0 
o Interventions for Pain:  See mar 
o Intervention effective: yes 
o Time of last intervention: 2315 
o Reassessment Completed: yes ? Last 3 Weights: 
Last 3 Recorded Weights in this Encounter 08/23/18 0015 08/25/18 0400 08/27/18 3685 Weight: 144.7 kg (319 lb 1.6 oz) 143.5 kg (316 lb 5.8 oz) 137.7 kg (303 lb 9.2 oz) Weight change: ? INTAKE/OUPUT Current Shift:   
  Last three shifts: 08/28 0701 - 08/29 1900 In: 2200 [P.O.:2200] Out: 4522 [WFRMY:1126] ? LAB RESULTS Recent Labs  
   08/28/18 
 4977 WBC  4.7 HGB  9.5* HCT  32.4*  
PLT  194 Recent Labs  
   08/29/18 
 0210  08/28/18 
 0416  08/27/18 
 0112 NA   --   142   --   
K   --   4.1   --   
GLU   --   89   --   
BUN   --   25*   --   
CREA   --   1.03   --   
CA   --   8.7   --   
INR  2.3*  2.2*  2.1*  
 
 
RECOMMENDATIONS AND DISCHARGE PLANNING  
 
 1. Pending tests/procedures/ Plan of Care or Other Needs: PT/OT 2. Discharge plan for patient and Needs/Barriers: SNF 3. Estimated Discharge Date: 8/28/18 Posted on Whiteboard in [facility] Room: yes 4. The patient's care plan was reviewed with the oncoming nurse. \"HEALS\" SAFETY CHECK Fall Risk Total Score: 3 Safety Measures: Safety Measures: Bed in low position, Bed/Chair-Wheels locked, Call light within reach A safety check occurred in the patient's room between off going nurse and oncoming nurse listed above. The safety check included the below items Area Items H High Alert Medications ? Verify all high alert medication drips (heparin, PCA, etc.) E Equipment ? Suction is set up for ALL patients (with danya) ? Red plugs utilized for all equipment (IV pumps, etc.) ? WOWs wiped down at end of shift. ? Room stocked with oxygen, suction, and other unit-specific supplies A Alarms ? Bed alarm is set for fall risk patients ? Ensure chair alarm is in place and activated if patient is up in a chair L Lines ? Check IV for any infiltration ? Shaikh bag is empty if patient has a Shaikh ? Tubing and IV bags are labeled [facility] [place] Safety ? Room is clean, patient is clean, and equipment is clean. ? Hallways are clear from equipment besides carts. ? Fall bracelet on for fall risk patients ? Ensure room is clear and free of clutter ? Suction is set up for ALL patients (with danya) ? Hallways are clear from equipment besides carts. ? Isolation precautions followed, supplies available outside room, sign posted Blanca Franklin RN

## 2018-09-04 ENCOUNTER — HOSPITAL ENCOUNTER (OUTPATIENT)
Dept: LAB | Age: 58
Discharge: HOME | End: 2018-09-04

## 2018-09-04 LAB
ALBUMIN SERPL-MCNC: 3.7 G/DL (ref 3.4–5)
ALBUMIN/GLOB SERPL: 0.8 {RATIO} (ref 0.8–1.7)
ALP SERPL-CCNC: 212 U/L (ref 45–117)
ALT SERPL-CCNC: 28 U/L (ref 13–56)
ANION GAP SERPL CALC-SCNC: 5 MMOL/L (ref 3–18)
AST SERPL-CCNC: 26 U/L (ref 15–37)
BASOPHILS # BLD: 0 K/UL (ref 0–0.1)
BASOPHILS NFR BLD: 0 % (ref 0–2)
BILIRUB SERPL-MCNC: 0.6 MG/DL (ref 0.2–1)
BUN SERPL-MCNC: 22 MG/DL (ref 7–18)
BUN/CREAT SERPL: 17 (ref 12–20)
CALCIUM SERPL-MCNC: 8.6 MG/DL (ref 8.5–10.1)
CHLORIDE SERPL-SCNC: 99 MMOL/L (ref 100–108)
CO2 SERPL-SCNC: 36 MMOL/L (ref 21–32)
CREAT SERPL-MCNC: 1.28 MG/DL (ref 0.6–1.3)
DIFFERENTIAL METHOD BLD: ABNORMAL
EOSINOPHIL # BLD: 0.2 K/UL (ref 0–0.4)
EOSINOPHIL NFR BLD: 3 % (ref 0–5)
ERYTHROCYTE [DISTWIDTH] IN BLOOD BY AUTOMATED COUNT: 17 % (ref 11.6–14.5)
FOLATE SERPL-MCNC: >20 NG/ML (ref 3.1–17.5)
GLOBULIN SER CALC-MCNC: 4.4 G/DL (ref 2–4)
GLUCOSE SERPL-MCNC: 114 MG/DL (ref 74–99)
HCT VFR BLD AUTO: 37.4 % (ref 35–45)
HGB BLD-MCNC: 11.2 G/DL (ref 12–16)
LYMPHOCYTES # BLD: 1.2 K/UL (ref 0.9–3.6)
LYMPHOCYTES NFR BLD: 22 % (ref 21–52)
MAGNESIUM SERPL-MCNC: 2.1 MG/DL (ref 1.6–2.6)
MCH RBC QN AUTO: 23.2 PG (ref 24–34)
MCHC RBC AUTO-ENTMCNC: 29.9 G/DL (ref 31–37)
MCV RBC AUTO: 77.4 FL (ref 74–97)
MONOCYTES # BLD: 0.4 K/UL (ref 0.05–1.2)
MONOCYTES NFR BLD: 7 % (ref 3–10)
NEUTS SEG # BLD: 3.7 K/UL (ref 1.8–8)
NEUTS SEG NFR BLD: 68 % (ref 40–73)
PLATELET # BLD AUTO: 259 K/UL (ref 135–420)
PMV BLD AUTO: 10.3 FL (ref 9.2–11.8)
POTASSIUM SERPL-SCNC: 4.2 MMOL/L (ref 3.5–5.5)
PROT SERPL-MCNC: 8.1 G/DL (ref 6.4–8.2)
RBC # BLD AUTO: 4.83 M/UL (ref 4.2–5.3)
SODIUM SERPL-SCNC: 140 MMOL/L (ref 136–145)
TSH SERPL DL<=0.05 MIU/L-ACNC: 1.87 UIU/ML (ref 0.36–3.74)
VIT B12 SERPL-MCNC: 736 PG/ML (ref 211–911)
WBC # BLD AUTO: 5.5 K/UL (ref 4.6–13.2)

## 2018-09-04 PROCEDURE — 83735 ASSAY OF MAGNESIUM: CPT | Performed by: FAMILY MEDICINE

## 2018-09-04 PROCEDURE — 84443 ASSAY THYROID STIM HORMONE: CPT | Performed by: FAMILY MEDICINE

## 2018-09-04 PROCEDURE — 82607 VITAMIN B-12: CPT | Performed by: FAMILY MEDICINE

## 2018-09-04 PROCEDURE — 80053 COMPREHEN METABOLIC PANEL: CPT | Performed by: FAMILY MEDICINE

## 2018-09-04 PROCEDURE — 85025 COMPLETE CBC W/AUTO DIFF WBC: CPT | Performed by: FAMILY MEDICINE

## 2018-09-05 ENCOUNTER — DOCUMENTATION ONLY (OUTPATIENT)
Dept: BARIATRICS/WEIGHT MGMT | Age: 58
End: 2018-09-05

## 2018-09-05 NOTE — PROGRESS NOTES
9/5/18:  Patient did not show for her class today. I was able to reach her and she stated she is in the hospital and then will go to rehab. She is unsure when she will get out. Patient stated she will call back to reschedule.     Samuel Friedman, MS RD

## 2018-09-10 ENCOUNTER — OFFICE VISIT (OUTPATIENT)
Dept: ORTHOPEDIC SURGERY | Facility: CLINIC | Age: 58
End: 2018-09-10

## 2018-09-10 VITALS
RESPIRATION RATE: 16 BRPM | WEIGHT: 291 LBS | SYSTOLIC BLOOD PRESSURE: 122 MMHG | TEMPERATURE: 96.9 F | HEART RATE: 72 BPM | DIASTOLIC BLOOD PRESSURE: 85 MMHG | HEIGHT: 60 IN | BODY MASS INDEX: 57.13 KG/M2 | OXYGEN SATURATION: 88 %

## 2018-09-10 DIAGNOSIS — M62.838 MUSCLE SPASM OF RIGHT LEG: ICD-10-CM

## 2018-09-10 DIAGNOSIS — M16.11 PRIMARY OSTEOARTHRITIS OF RIGHT HIP: Primary | ICD-10-CM

## 2018-09-10 DIAGNOSIS — M25.551 RIGHT HIP PAIN: ICD-10-CM

## 2018-09-10 RX ORDER — BETAMETHASONE SODIUM PHOSPHATE AND BETAMETHASONE ACETATE 3; 3 MG/ML; MG/ML
6 INJECTION, SUSPENSION INTRA-ARTICULAR; INTRALESIONAL; INTRAMUSCULAR; SOFT TISSUE ONCE
Qty: 0.5 ML | Refills: 0
Start: 2018-09-10 | End: 2018-09-10

## 2018-09-10 RX ORDER — METHOCARBAMOL 500 MG/1
TABLET, FILM COATED ORAL 4 TIMES DAILY
COMMUNITY
End: 2018-10-08

## 2018-09-10 RX ORDER — BUPIVACAINE HYDROCHLORIDE 2.5 MG/ML
4 INJECTION, SOLUTION EPIDURAL; INFILTRATION; INTRACAUDAL ONCE
Qty: 4 ML | Refills: 0
Start: 2018-09-10 | End: 2018-09-10

## 2018-09-10 NOTE — PROGRESS NOTES
Patient: Camryn Sabillon                MRN: [id number]       SSN: [id number] YOB: 1960        AGE: 60 y.o. SEX: female PCP: Liliana Weiner MD 
09/10/18 CC: RIGHT HIP PAIN 
 
HISTORY:  Camryn Sabillon is a 60 y.o. female who is seen for right hip pain. She reports spasms in her right thigh starting from her thigh and radiate to her knee. She had a previous injury where she fell striking her left knee and right hip on 9/9/17 while returning from using the rest room. She has pain walking, standing, sleeping, and climbing stairs. She has increased pain in the morning. Her hip stiffens up when she sits. She is now a limited ambulator. She describes it as a painful lump over the side of her hip. She has been in a wheelchair. She has difficulty rising from a seated position. She has pain laying on her right side. She states that she wants her raggedy hip out and is ready to throw it away. She is using a single-point cane to ambulate. She states that she fell 2 weeks ago and was seen at the ED where she had x rays taken and was prescribed pain medication. She states that she fell while climbing a step to her porch recently. She responded to previous cortisone injections. She was scheduled to begin pain management on 10/10/16. She states that her pains keep her from sleeping at night. She was seen by Dr. Rafaela Gaytan and was told to lose 20 pounds before she is able to undergo bariatric weight loss surgery. She was previously seen for ulnar right hand pain, numbness and tingling. She reports increased pain for the past week. She is s/p left CTR many years ago. She reports pain, numbness, and tingling located in her right ring and little finger. She was previously seen for left knee and right hip injury. She reports that she fell on 9/9/17 after going to the bathroom. She was seen at [facility] ED on 9/9/17.  She is s/p transverse fracture of the left patella on 8/20/15. She is s/p left TKR 7-8 years ago. She states that her left knee has been giving out on her often. She reports a h/o recurrent left knee dislocation/subluxation episdes. She was previously seen for left shoulder pain. She notes great difficulty sleeping due to her hip and shoulder pains. She reports throbbing pains in her left shoulder. She completed a course of PT with some relief. She states that she is currently being prescribed pain medication by Dr. Bindu Painter. She denies any new hip or shoulder injury or trauma. Pain Assessment  9/10/2018 Location of Pain Hip Location Modifiers Right Severity of Pain 10 Quality of Pain Throbbing; Felicia Jed Quality of Pain Comment SPASMS Duration of Pain Persistent Frequency of Pain Constant Aggravating Factors Standing;Walking;Bending Limiting Behavior Yes Relieving Factors Rest  
Relieving Factors Comment - Result of Injury No  
 
Occupation, etc:  Ms. Toñito Tipton receives social security disability benefits for numerus medical problems. She has a  assigned to her. She reports that she has gained 30 lbs recently. Current weight is 291 pounds. She is 5'3\" tall. She does not exercise. She states that she is not able to do much of anything today. She has a h/o diabetes but says that she is no longer diabetic. She lives in [place] with her 26-year-old daughter and her 10 children. She is hard of hearing but is now wearing a hearing aid. She says that she enjoys to play bingo but has not been able to recently due to her pains. She states that she has high blood pressure. Her PCP is Dr. Joss Vogt. Last 3 Recorded Weights in this Encounter 09/10/18 1405 Weight: 291 lb (132 kg) Body mass index is 56.83 kg/(m^2). Patient Active Problem List  
Diagnosis Code  Hypertension I11.9  Congenital heart disease Q24.9  Atrial fibrillation I48.91  
 Osteoarthritis of both knees M17.0  H/O total knee replacement V15.243  DJD (degenerative joint disease), lumbosacral M51.37  
 Hypothyroidism E03.9  Morbid obesity ([facility] [address] E66.01  
 Esophageal reflux K21.9  Dyspnea R06.00  Dyslipidemia E78.5  Morbid obesity with BMI of 45.0-49.9, adult ([facility]) E66.01, Z68.42  
 DDD (degenerative disc disease), lumbar, L4/5 advanced M51.36  
 Hypoxia R09.02  
 Fluid overload E87.70  
 CHF (congestive heart failure) ([facility]) I50.9  Diastolic CHF, chronic ([facility]) I50.32  Bradycardia R00.1  Acute exacerbation of CHF (congestive heart failure) ([facility]) I50.9  Chronic anticoagulation Z79.01  
 Anxiety F41.9 REVIEW OF SYSTEMS: All Below are Negative except: See HPI Constitutional: negative for fever, chills, and weight loss. Cardiovascular: negative for chest pain, claudication, leg swelling, SOB, PITTS Gastrointestinal: Negative for pain, N/V/C/D, Blood in stool or urine, dysuria,  hematuria, incontinence, pelvic pain. Musculoskeletal: See HPI Neurological: Negative for dizziness and weakness. Negative for headaches, Visual changes, confusion, seizures Phychiatric/Behavioral: Negative for depression, memory loss, substance  abuse. Extremities: Negative for hair changes, rash, or skin lesion changes. Hematologic: Negative for bleeding problems, bruising, pallor or swollen lymph  nodes Peripheral Vascular: No calf pain, no circulation deficits. Social History Social History  Marital status: SINGLE Spouse name: N/A  
 Number of children: N/A  
 Years of education: N/A Occupational History  Not on file. Social History Main Topics  Smoking status: Never Smoker  Smokeless tobacco: Never Used  Alcohol use No  
 Drug use: No  
   Comment: Quit crack cocaine and marijuana 2015  Sexual activity: Yes Birth control/ protection: Surgical  
 
Other Topics Concern  Not on file Social History Narrative Allergies Allergen Reactions  Gabapentin Other (comments) Unsteady, weakness LEs  Tetanus Vaccines And Toxoid Swelling  Topamax [Topiramate] Other (comments)  
  weakness Current Outpatient Prescriptions Medication Sig  
 methocarbamol (ROBAXIN) 500 mg tablet Take  by mouth four (4) times daily.  senna-docusate (PERICOLACE) 8.6-50 mg per tablet Take 1 Tab by mouth nightly.  acetaminophen-codeine (TYLENOL #3) 300-30 mg per tablet Take 1 Tab by mouth every eight (8) hours as needed (moderate pain). Max Daily Amount: 3 Tabs.  lisinopril (PRINIVIL, ZESTRIL) 5 mg tablet Take 1 Tab by mouth daily.  warfarin (COUMADIN) 7.5 mg tablet Take 1 Tab by mouth daily.  albuterol-ipratropium (DUO-NEB) 2.5 mg-0.5 mg/3 ml nebu 3 mL by Nebulization route every six (6) hours as needed.  furosemide (LASIX) 40 mg tablet Take 1 Tab by mouth two (2) times a day. 40 mg po daily  polyethylene glycol (MIRALAX) 17 gram packet Take 1 Packet by mouth daily.  metoprolol tartrate (LOPRESSOR) 25 mg tablet Take 0.5 Tabs by mouth two (2) times a day.  fluticasone (FLONASE) 50 mcg/actuation nasal spray 2 Sprays by Both Nostrils route daily.  isosorbide mononitrate ER (IMDUR) 30 mg tablet Take 1 Tab by mouth every evening.  multivitamin, tx-iron-ca-min (THERA-M W/ IRON) 9 mg iron-400 mcg tab tablet Take 1 Tab by mouth daily.  raNITIdine (ZANTAC) 150 mg tablet Take 150 mg by mouth two (2) times a day.  VENTOLIN HFA 90 mcg/actuation inhaler Take 2 Puffs by inhalation every four (4) hours as needed for Wheezing or Shortness of Breath.  PARoxetine (PAXIL) 20 mg tablet Take 1 Tab by mouth daily.  simvastatin (ZOCOR) 40 mg tablet Take  by mouth nightly. to obtain from pharmacy  aripiprazole (ABILIFY) 15 mg tablet Take 15 mg by mouth daily. to obtain from pharmacy  levothyroxine (SYNTHROID) 125 mcg tablet Take 125 mcg by mouth Daily (before breakfast). No current facility-administered medications for this visit. PHYSICAL EXAMINATION: 
Visit Vitals  /85  Pulse 72  Temp 96.9 °F (36.1 °C) (Oral)  Resp 16  
 Ht 5' (1.524 m)  Wt 291 lb (132 kg)  SpO2 (!) 88%  BMI 56.83 kg/m2 tearful, requesting pain meds ORTHO EXAMINATION: 
Examination Left shoulder Skin Intact Effusion - Biceps deformity - Atrophy -  
AC joint tenderness - Acromial tenderness + Biceps tenderness - Forward flexion/Elevation  Active abduction  External rotation ROM 20 Internal rotation ROM 40, with pain Apprehension - Impingement + Drop Arm Test -  
Neurovascular Intact Examination Lumbar Skin Intact Tenderness + Tightness + Lordosis Normal  
Kyphosis N/A Scoliosis - Flexion na Extension na Knee reflexes Normal  
Ankle reflexes Normal  
Straight leg raise - Calf tenderness - Wearing a fall-risk bracelet and medical alert necklace Examination Left hip Right hip Skin Intact Intact External Rotation ROM 10 10 Internal Rotation ROM 0 0 Trochanteric tenderness - +, lateral  
Hip flexion contracture 5 degree 5 degree Antalgic gait + + Trendelenberg sign - - Lumbar tenderness - - Straight leg raise - - Calf tenderness - - Neurovascular Intact Intact  
she has a 5 degree external rotation contracture, and a 5 degree hip flexion contracture, using a single-point cane to walk Examination Right knee Left knee Skin Well healed TKR Well healed TKR Range of motion 110-0 90-0 Effusion - - Medial joint line tenderness - - Lateral joint line tenderness - - Popliteal tenderness - -  
Osteophytes palpable - - Nehals - - Patella crepitus - + Anterior drawer - - Lateral laxity - - Medial laxity - 2+ Varus deformity - -  
Valgus deformity - - Pretibial edema - - Calf tenderness - - Left knee clinically well located Wound well healed Brought into office on a stretcher today Examination Right Hand Left Hand Skin Intact Intact Deformity - - Swelling - - Tenderness - - Finger flexion Full Full Finger extension Full Full Sensation Normal Normal  
Capillary refill Normal Normal  
Heberden's nodes - -  
Dupuytren's - - Mild clawing of right hand PROCEDURE: After discussing treatment options, patient's right hip was injected with 4 cc Marcaine and 1/2 cc Celestone. Chart reviewed for the following: 
 Elsie Abbott MD, have reviewed the History, Physical and updated the Allergic reactions for Melissa Valenzuela TIME OUT performed immediately prior to start of procedure: 
Elsie Abbott MD, have performed the following reviews on Melissani Valenzuela prior to the start of the procedure: 
         
* Patient was identified by name and date of birth * Agreement on procedure being performed was verified * Risks and Benefits explained to the patient * Procedure site verified and marked as necessary * Patient was positioned for comfort * Consent was obtained Time: 2:26 PM  
 
Date of procedure: 9/10/2018 Procedure performed by:  Deanne Hagen MD 
 
Ms. Ortiz tolerated the procedure well with no complications. CTA LOWER EXTREMITY LEFT W CONT 9/9/17 Impression:  
1.  Limited CTA of the left lower extremity due to extensive scatter artifact related to the metallic knee prostheses. Visualized portions of the popliteal artery are normal without evidence of disruption or dissection. PVL of the popliteal artery would be useful in demonstrating its entire length. 2. Visualized portions of the superficial femoral artery and trifurcation vessels are normal. 
3. There is anterior subluxation of the femoral component of the knee prosthesis with respect to the tibial component with associated varus angulation. There is a moderate joint effusion present. No significant hemorrhage is seen. There is no evidence of acute fracture. There is an old fracture involving the superior 
aspect of the patella with nonunion.   
CT PELVIS WO CONT 7/20/15 IMPRESSION:  No acute fracture in the pelvis or sacrum/coccyx. Advanced degenerative joint disease in the right hip. Degenerative disease in the lumbar spine. CT RIGHT HIP WO CONT 5/6/15 IMPRESSION:  Severe osteoarthritis, likely post traumatic, stable since October 2014 with no acute injuries suspected. RADIOGRAPHS: 
XR LEFT KNEE 9/15/17 IMPRESSION:  Two views -s/p reduction of left TKR. No fracture, well-fixed prosthetic components in satisfactory position and alignment. XR RIGHT HIP 9/9/17 IMPRESSION:  
Severe degenerative arthritis of the right hip with remodeling unchanged back to 2015. No evidence of acute fracture. XR LEFT KNEE 9/9/17 IMPRESSION:  
Anterior subluxation if not dislocation at the knee. Joint effusion. Total knee arthroplasty individual components appear intact. 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 Multiple. Articular ossific densities unchanged. XR RIGHT HIP 1/4/17 IMPRESSION:  Redemonstration of very severe osteoarthritis at right hip joint. There is no evidence of acute fracture or dislocation at right hip or on rest of AP view of pelvis. XR LUMBAR SPINE 1/4/17 IMPRESSION:  Redemonstration of very severe degenerative disc disease at L4-L5 level. No evidence of acute fracture or dislocation or subluxation in lumbar spine or sacrum. XR MAREN HIPS 1/25/16 IMPRESSION:  No fractures, near-complete joint space narrowing, no osteophytes present, femoral neck shortening. XR LUMBAR SPINE 7/20/15 IMPRESSION:  No acute findings. Severe degenerative change L4-5 level similar prior exam 05/06/2015. XR RIGHT HIP 5/6/15 IMPRESSION:  Stable chronic findings of the right hip. IMPRESSION:   
  ICD-10-CM ICD-9-CM 1. Primary osteoarthritis of right hip M16.11 715.15 betamethasone (CELESTONE SOLUSPAN) 6 mg/mL injection BETAMETHASONE ACETATE & SODIUM PHOSPHATE INJECTION 3 MG EA.  
   DRAIN/INJECT LARGE JOINT/BURSA bupivacaine, PF, (MARCAINE, PF,) 0.25 % (2.5 mg/mL) injection 2. Muscle spasm of right leg M62.838 728.85   
3. Right hip pain M25.551 719.45 betamethasone (CELESTONE SOLUSPAN) 6 mg/mL injection BETAMETHASONE ACETATE & SODIUM PHOSPHATE INJECTION 3 MG EA.  
   DRAIN/INJECT LARGE JOINT/BURSA  
   bupivacaine, PF, (MARCAINE, PF,) 0.25 % (2.5 mg/mL) injection PLAN:  After discussing treatment options, patient's lateral right hip was injected with 4 cc Marcaine and 1/2 cc Celestone. She will be referred for bariatric surgery consultation. We discussed possible need for right hip arthroplasty at some time in the future if pain continues - pending significant weight loss.     
 
Scribed by Blossom Hickey [facility]) as dictated by Gerard Armendariz MD

## 2018-09-27 ENCOUNTER — HOSPITAL ENCOUNTER (OUTPATIENT)
Dept: LAB | Age: 58
Discharge: HOME | End: 2018-09-27

## 2018-09-27 LAB
ALBUMIN SERPL-MCNC: 3.3 G/DL (ref 3.4–5)
ALBUMIN/GLOB SERPL: 0.9 {RATIO} (ref 0.8–1.7)
ALP SERPL-CCNC: 190 U/L (ref 45–117)
ALT SERPL-CCNC: 27 U/L (ref 13–56)
ANION GAP SERPL CALC-SCNC: 6 MMOL/L (ref 3–18)
AST SERPL-CCNC: 22 U/L (ref 15–37)
BILIRUB SERPL-MCNC: 0.7 MG/DL (ref 0.2–1)
BUN SERPL-MCNC: 27 MG/DL (ref 7–18)
BUN/CREAT SERPL: 23 (ref 12–20)
CALCIUM SERPL-MCNC: 8.4 MG/DL (ref 8.5–10.1)
CHLORIDE SERPL-SCNC: 103 MMOL/L (ref 100–108)
CO2 SERPL-SCNC: 32 MMOL/L (ref 21–32)
CREAT SERPL-MCNC: 1.18 MG/DL (ref 0.6–1.3)
ERYTHROCYTE [DISTWIDTH] IN BLOOD BY AUTOMATED COUNT: 17.7 % (ref 11.6–14.5)
GLOBULIN SER CALC-MCNC: 3.8 G/DL (ref 2–4)
GLUCOSE SERPL-MCNC: 108 MG/DL (ref 74–99)
HCT VFR BLD AUTO: 34.4 % (ref 35–45)
HGB BLD-MCNC: 10.5 G/DL (ref 12–16)
MAGNESIUM SERPL-MCNC: 2.3 MG/DL (ref 1.6–2.6)
MCH RBC QN AUTO: 23.3 PG (ref 24–34)
MCHC RBC AUTO-ENTMCNC: 30.5 G/DL (ref 31–37)
MCV RBC AUTO: 76.3 FL (ref 74–97)
PLATELET # BLD AUTO: 229 K/UL (ref 135–420)
PMV BLD AUTO: 9.9 FL (ref 9.2–11.8)
POTASSIUM SERPL-SCNC: 4 MMOL/L (ref 3.5–5.5)
PROT SERPL-MCNC: 7.1 G/DL (ref 6.4–8.2)
RBC # BLD AUTO: 4.51 M/UL (ref 4.2–5.3)
SODIUM SERPL-SCNC: 141 MMOL/L (ref 136–145)
WBC # BLD AUTO: 6.2 K/UL (ref 4.6–13.2)

## 2018-09-27 PROCEDURE — 83735 ASSAY OF MAGNESIUM: CPT | Performed by: FAMILY MEDICINE

## 2018-09-27 PROCEDURE — 85027 COMPLETE CBC AUTOMATED: CPT | Performed by: FAMILY MEDICINE

## 2018-09-27 PROCEDURE — 80053 COMPREHEN METABOLIC PANEL: CPT | Performed by: FAMILY MEDICINE

## 2018-09-28 ENCOUNTER — HOSPITAL ENCOUNTER (INPATIENT)
Age: 58
LOS: 9 days | Discharge: NURSING, REHAB OR LONG-TERM CARE | DRG: 463 | End: 2018-10-08
Attending: EMERGENCY MEDICINE | Admitting: HOSPITALIST
Payer: MEDICAID

## 2018-09-28 ENCOUNTER — APPOINTMENT (OUTPATIENT)
Dept: GENERAL RADIOLOGY | Age: 58
DRG: 463 | End: 2018-09-28
Attending: EMERGENCY MEDICINE
Payer: MEDICAID

## 2018-09-28 DIAGNOSIS — R53.1 GENERALIZED WEAKNESS: ICD-10-CM

## 2018-09-28 DIAGNOSIS — M16.11 PRIMARY OSTEOARTHRITIS OF RIGHT HIP: Chronic | ICD-10-CM

## 2018-09-28 DIAGNOSIS — I50.9 ACUTE ON CHRONIC CONGESTIVE HEART FAILURE, UNSPECIFIED HEART FAILURE TYPE (HCC): Primary | ICD-10-CM

## 2018-09-28 LAB
ALBUMIN SERPL-MCNC: 3.2 G/DL (ref 3.4–5)
ALBUMIN/GLOB SERPL: 0.7 {RATIO} (ref 0.8–1.7)
ALP SERPL-CCNC: 195 U/L (ref 45–117)
ALT SERPL-CCNC: 24 U/L (ref 13–56)
ANION GAP SERPL CALC-SCNC: 7 MMOL/L (ref 3–18)
AST SERPL-CCNC: 23 U/L (ref 15–37)
BASOPHILS # BLD: 0 K/UL (ref 0–0.1)
BASOPHILS NFR BLD: 0 % (ref 0–2)
BILIRUB SERPL-MCNC: 1.7 MG/DL (ref 0.2–1)
BNP SERPL-MCNC: 3437 PG/ML (ref 0–900)
BUN SERPL-MCNC: 20 MG/DL (ref 7–18)
BUN/CREAT SERPL: 18 (ref 12–20)
CALCIUM SERPL-MCNC: 8.2 MG/DL (ref 8.5–10.1)
CHLORIDE SERPL-SCNC: 99 MMOL/L (ref 100–108)
CO2 SERPL-SCNC: 29 MMOL/L (ref 21–32)
CREAT SERPL-MCNC: 1.14 MG/DL (ref 0.6–1.3)
DIFFERENTIAL METHOD BLD: ABNORMAL
EOSINOPHIL # BLD: 0 K/UL (ref 0–0.4)
EOSINOPHIL NFR BLD: 0 % (ref 0–5)
ERYTHROCYTE [DISTWIDTH] IN BLOOD BY AUTOMATED COUNT: 17.9 % (ref 11.6–14.5)
GLOBULIN SER CALC-MCNC: 4.8 G/DL (ref 2–4)
GLUCOSE SERPL-MCNC: 134 MG/DL (ref 74–99)
HCT VFR BLD AUTO: 34.8 % (ref 35–45)
HGB BLD-MCNC: 10.9 G/DL (ref 12–16)
LYMPHOCYTES # BLD: 1.2 K/UL (ref 0.9–3.6)
LYMPHOCYTES NFR BLD: 14 % (ref 21–52)
MCH RBC QN AUTO: 23.4 PG (ref 24–34)
MCHC RBC AUTO-ENTMCNC: 31.3 G/DL (ref 31–37)
MCV RBC AUTO: 74.8 FL (ref 74–97)
MONOCYTES # BLD: 0.8 K/UL (ref 0.05–1.2)
MONOCYTES NFR BLD: 10 % (ref 3–10)
NEUTS SEG # BLD: 6.3 K/UL (ref 1.8–8)
NEUTS SEG NFR BLD: 76 % (ref 40–73)
PLATELET # BLD AUTO: 212 K/UL (ref 135–420)
PMV BLD AUTO: 9.9 FL (ref 9.2–11.8)
POTASSIUM SERPL-SCNC: 3.7 MMOL/L (ref 3.5–5.5)
PROT SERPL-MCNC: 8 G/DL (ref 6.4–8.2)
RBC # BLD AUTO: 4.65 M/UL (ref 4.2–5.3)
SODIUM SERPL-SCNC: 135 MMOL/L (ref 136–145)
TROPONIN I SERPL-MCNC: 0.02 NG/ML (ref 0–0.04)
WBC # BLD AUTO: 8.3 K/UL (ref 4.6–13.2)

## 2018-09-28 PROCEDURE — 83880 ASSAY OF NATRIURETIC PEPTIDE: CPT | Performed by: EMERGENCY MEDICINE

## 2018-09-28 PROCEDURE — 99285 EMERGENCY DEPT VISIT HI MDM: CPT

## 2018-09-28 PROCEDURE — 72170 X-RAY EXAM OF PELVIS: CPT

## 2018-09-28 PROCEDURE — 80053 COMPREHEN METABOLIC PANEL: CPT | Performed by: EMERGENCY MEDICINE

## 2018-09-28 PROCEDURE — 93005 ELECTROCARDIOGRAM TRACING: CPT

## 2018-09-28 PROCEDURE — 71045 X-RAY EXAM CHEST 1 VIEW: CPT

## 2018-09-28 PROCEDURE — 84484 ASSAY OF TROPONIN QUANT: CPT | Performed by: EMERGENCY MEDICINE

## 2018-09-28 PROCEDURE — 85025 COMPLETE CBC W/AUTO DIFF WBC: CPT | Performed by: EMERGENCY MEDICINE

## 2018-09-28 RX ORDER — FUROSEMIDE 10 MG/ML
80 INJECTION INTRAMUSCULAR; INTRAVENOUS
Status: COMPLETED | OUTPATIENT
Start: 2018-09-28 | End: 2018-09-29

## 2018-09-28 RX ORDER — ONDANSETRON 2 MG/ML
4 INJECTION INTRAMUSCULAR; INTRAVENOUS
Status: ACTIVE | OUTPATIENT
Start: 2018-09-28 | End: 2018-09-29

## 2018-09-28 NOTE — IP AVS SNAPSHOT
[facility], [place], operated by [facility] 
 
 
 [address] [address] [address] Donta Patient: Nathaniel Contreras MRN: [id number] GYQ:[id number] About your hospitalization You were admitted on:  September 29, 2018 You last received care in the:  [address] [place] [address] You were discharged on:  October 8, 2018 Why you were hospitalized Your primary diagnosis was:  Not on File Your diagnoses also included:  Chf (Congestive Heart Failure) (Hcc), Chf Exacerbation (Hcc), Morbid Obesity With Bmi Of 45.0-49.9, Adult (Hcc), Benign Hypertensive Heart Disease Without Heart Failure, Primary Osteoarthritis Of Right Hip Follow-up Information Follow up With Details Comments Contact Info Kelsey Panda MD  Patient being discharged to SNF. [address] [place] [address] 
[phone] Discharge Orders None A check nimo indicates which time of day the medication should be taken. My Medications CHANGE how you take these medications Instructions Each Dose to Equal  
 Morning Noon Evening Bedtime  
 warfarin 7.5 mg tablet Commonly known as:  COUMADIN What changed:   
- how much to take - when to take this Your last dose was: Your next dose is: Take 1.5 Tabs by mouth once for 1 dose. 12.5 mg  
    
   
   
   
  
  
CONTINUE taking these medications Instructions Each Dose to Equal  
 Morning Noon Evening Bedtime ABILIFY 15 mg tablet Generic drug:  ARIPiprazole Your last dose was: Your next dose is: Take 15 mg by mouth daily. to obtain from pharmacy 15 mg  
    
   
   
   
  
 albuterol-ipratropium 2.5 mg-0.5 mg/3 ml Nebu Commonly known as:  Isma Oneill Your last dose was: Your next dose is:    
   
   
 3 mL by Nebulization route every six (6) hours as needed. 3 mL  
    
   
   
   
  
 fluticasone 50 mcg/actuation nasal spray Commonly known as:  [place] Remedies Your last dose was: Your next dose is: 2 Sprays by Both Nostrils route daily. 2 Spray  
    
   
   
   
  
 furosemide 40 mg tablet Commonly known as:  LASIX Your last dose was: Your next dose is: Take 1 Tab by mouth two (2) times a day. 40 mg po daily 40 mg  
    
   
   
   
  
 isosorbide mononitrate ER 30 mg tablet Commonly known as:  IMDUR Your last dose was: Your next dose is: Take 1 Tab by mouth every evening. 30 mg  
    
   
   
   
  
 metoprolol tartrate 25 mg tablet Commonly known as:  LOPRESSOR Your last dose was: Your next dose is: Take 0.5 Tabs by mouth two (2) times a day. 12.5 mg  
    
   
   
   
  
 multivitamin, tx-iron-ca-min 9 mg iron-400 mcg Tab tablet Commonly known as:  THERA-M w/ IRON Your last dose was: Your next dose is: Take 1 Tab by mouth daily. 1 Tab  
    
   
   
   
  
 oxyCODONE IR 5 mg immediate release tablet Commonly known as:  Darin Aparicio Your last dose was: Your next dose is: Take 1 Tab by mouth every six (6) hours as needed for Pain. Max Daily Amount: 20 mg.  
 5 mg PARoxetine 20 mg tablet Commonly known as:  PAXIL Your last dose was: Your next dose is: Take 1 Tab by mouth daily. 20 mg  
    
   
   
   
  
 polyethylene glycol 17 gram packet Commonly known as:  Chris Jose Raul Your last dose was: Your next dose is: Take 1 Packet by mouth daily. 17 g  
    
   
   
   
  
 raNITIdine 150 mg tablet Commonly known as:  ZANTAC Your last dose was: Your next dose is: Take 150 mg by mouth two (2) times a day. 150 mg  
    
   
   
   
  
 senna-docusate 8.6-50 mg per tablet Commonly known as:  Carltoa eWn Your last dose was: Your next dose is: Take 1 Tab by mouth nightly. 1 Tab SYNTHROID 125 mcg tablet Generic drug:  levothyroxine Your last dose was: Your next dose is: Take 125 mcg by mouth Daily (before breakfast). 125 mcg VENTOLIN HFA 90 mcg/actuation inhaler Generic drug:  albuterol Your last dose was: Your next dose is: Take 2 Puffs by inhalation every four (4) hours as needed for Wheezing or Shortness of Breath. 2 Puff ZOCOR 40 mg tablet Generic drug:  simvastatin Your last dose was: Your next dose is: Take  by mouth nightly. to obtain from pharmacy STOP taking these medications   
 lisinopril 5 mg tablet Commonly known as:  PRINIVIL, ZESTRIL  
   
  
 ROBAXIN 500 mg tablet Generic drug:  methocarbamol Where to Get Your Medications Information on where to get these meds will be given to you by the nurse or doctor. ! Ask your nurse or doctor about these medications  
  oxyCODONE IR 5 mg immediate release tablet  
 warfarin 7.5 mg tablet Opioid Education Prescription Opioids: What You Need to Know: 
 
Prescription opioids can be used to help relieve moderate-to-severe pain and are often prescribed following a surgery or injury, or for certain health conditions. These medications can be an important part of treatment but also come with serious risks. Opioids are strong pain medicines. Examples include hydrocodone, oxycodone, fentanyl, and morphine. Heroin is an example of an illegal opioid. It is important to work with your health care provider to make sure you are getting the safest, most effective care. WHAT ARE THE RISKS AND SIDE EFFECTS OF OPIOID USE?  
Prescription opioids carry serious risks of addiction and overdose, especially with prolonged use. An opioid overdose, often marked by slow breathing, can cause sudden death. The use of prescription opioids can have a number of side effects as well, even when taken as directed. · Tolerance-meaning you might need to take more of a medication for the same pain relief · Physical dependence-meaning you have symptoms of withdrawal when the medication is stopped. Withdrawal symptoms can include nausea, sweating, chills, diarrhea, stomach cramps, and muscle aches. Withdrawal can last up to several weeks, depending on which drug you took and how long you took it. · Increased sensitivity to pain · Constipation · Nausea, vomiting, and dry mouth · Sleepiness and dizziness · Confusion · Depression · Low levels of testosterone that can result in lower sex drive, energy, and strength · Itching and sweating RISKS ARE GREATER WITH:      
· History of drug misuse, substance use disorder, or overdose · Mental health conditions (such as depression or anxiety) · Sleep apnea · Older age (72 years or older) · Pregnancy Avoid alcohol while taking prescription opioids. Also, unless specifically advised by your health care provider, medications to avoid include: · Benzodiazepines (such as Xanax or Valium) · Muscle relaxants (such as Soma or Flexeril) · Hypnotics (such as Ambien or Lunesta) · Other prescription opioids KNOW YOUR OPTIONS Talk to your health care provider about ways to manage your pain that don't involve prescription opioids. Some of these options may actually work better and have fewer risks and side effects. Consult your physician before adding or stopping any medications, treatments, or physical activity. Options may include: 
· Pain relievers such as acetaminophen, ibuprofen, and naproxen · Some medications that are also used for depression or seizures · Physical therapy and exercise · Counseling to help patients learn how to cope better with triggers of pain and stress. · Application of heat or cold compress · Massage therapy · Relaxation techniques Be Informed Make sure you know the name of your medication, how much and how often to take it, and its potential risks & side effects. IF YOU ARE PRESCRIBED OPIOIDS FOR PAIN: 
· Never take opioids in greater amounts or more often than prescribed. Remember the goal is not to be pain-free but to manage your pain at a tolerable level. · Follow up with your primary care provider to: · Work together to create a plan on how to manage your pain. · Talk about ways to help manage your pain that don't involve prescription opioids. · Talk about any and all concerns and side effects. · Help prevent misuse and abuse. · Never sell or share prescription opioids · Help prevent misuse and abuse. · Store prescription opioids in a secure place and out of reach of others (this may include visitors, children, friends, and family). · Safely dispose of unused/unwanted prescription opioids: Find your community drug take-back program or your pharmacy mail-back program, or flush them down the toilet, following guidance from the Food and Drug Administration (www.fda.gov/Drugs/ResourcesForYou). · Visit www.cdc.gov/drugoverdose to learn about the risks of opioid abuse and overdose. · If you believe you may be struggling with addiction, tell your health care provider and ask for guidance or call [address] [place] [address] at [phone]. Discharge Instructions Atrial Fibrillation: Care Instructions Your Care Instructions Atrial fibrillation is an irregular and often fast heartbeat. Treating this condition is important for several reasons. It can cause blood clots, which can travel from your heart to your brain and cause a stroke.  If you have a fast heartbeat, you may feel lightheaded, dizzy, and weak. An irregular heartbeat can also increase your risk for heart failure. Atrial fibrillation is often the result of another heart condition, such as high blood pressure or coronary artery disease. Making changes to improve your heart condition will help you stay healthy and active. Follow-up care is a key part of your treatment and safety. Be sure to make and go to all appointments, and call your doctor if you are having problems. It's also a good idea to know your test results and keep a list of the medicines you take. How can you care for yourself at home? Medicines 
  · Take your medicines exactly as prescribed. Call your doctor if you think you are having a problem with your medicine. You will get more details on the specific medicines your doctor prescribes.  
  · If your doctor has given you a blood thinner to prevent a stroke, be sure you get instructions about how to take your medicine safely. Blood thinners can cause serious bleeding problems.  
  · Do not take any vitamins, over-the-counter drugs, or herbal products without talking to your doctor first.  
 Lifestyle changes 
  · Do not smoke. Smoking can increase your chance of a stroke and heart attack. If you need help quitting, talk to your doctor about stop-smoking programs and medicines. These can increase your chances of quitting for good.  
  · Eat a heart-healthy diet.  
  · Stay at a healthy weight. Lose weight if you need to.  
  · Limit alcohol to 2 drinks a day for men and 1 drink a day for women. Too much alcohol can cause health problems.  
  · Avoid colds and flu. Get a pneumococcal vaccine shot. If you have had one before, ask your doctor whether you need another dose. Get a flu shot every year. If you must be around people with colds or flu, wash your hands often. Activity 
  · If your doctor recommends it, get more exercise.  Walking is a good choice. Bit by bit, increase the amount you walk every day. Try for at least 30 minutes on most days of the week. You also may want to swim, bike, or do other activities. Your doctor may suggest that you join a cardiac rehabilitation program so that you can have help increasing your physical activity safely.  
  · Start light exercise if your doctor says it is okay. Even a small amount will help you get stronger, have more energy, and manage stress. Walking is an easy way to get exercise. Start out by walking a little more than you did in the hospital. Gradually increase the amount you walk.  
  · When you exercise, watch for signs that your heart is working too hard. You are pushing too hard if you cannot talk while you are exercising. If you become short of breath or dizzy or have chest pain, sit down and rest immediately.  
  · Check your pulse regularly. Place two fingers on the artery at the palm side of your wrist, in line with your thumb. If your heartbeat seems uneven or fast, talk to your doctor. When should you call for help? Call 911 anytime you think you may need emergency care. For example, call if: 
  · You have symptoms of a heart attack. These may include: ¨ Chest pain or pressure, or a strange feeling in the chest. 
¨ Sweating. ¨ Shortness of breath. ¨ Nausea or vomiting. ¨ Pain, pressure, or a strange feeling in the back, neck, jaw, or upper belly or in one or both shoulders or arms. ¨ Lightheadedness or sudden weakness. ¨ A fast or irregular heartbeat. After you call 911, the  may tell you to chew 1 adult-strength or 2 to 4 low-dose aspirin. Wait for an ambulance. Do not try to drive yourself.  
  · You have symptoms of a stroke. These may include: 
¨ Sudden numbness, tingling, weakness, or loss of movement in your face, arm, or leg, especially on only one side of your body. ¨ Sudden vision changes. ¨ Sudden trouble speaking. ¨ Sudden confusion or trouble understanding simple statements. ¨ Sudden problems with walking or balance. ¨ A sudden, severe headache that is different from past headaches.  
  · You passed out (lost consciousness).  
 Call your doctor now or seek immediate medical care if: 
  · You have new or increased shortness of breath.  
  · You feel dizzy or lightheaded, or you feel like you may faint.  
  · Your heart rate becomes irregular.  
  · You can feel your heart flutter in your chest or skip heartbeats. Tell your doctor if these symptoms are new or worse.  
 Watch closely for changes in your health, and be sure to contact your doctor if you have any problems. Where can you learn more? Go to http://bucky-gerard.info/. Enter U020 in the search box to learn more about \"Atrial Fibrillation: Care Instructions. \" Current as of: December 6, 2017 Content Version: 11.8 © 2528-1440 [employer] Care instructions adapted under license by [employer] (which disclaims liability or warranty for this information). If you have questions about a medical condition or this instruction, always ask your healthcare professional. [address] [address] any warranty or liability for your use of this information. DISCHARGE SUMMARY from Nurse PATIENT INSTRUCTIONS: 
 
 
F-face looks uneven A-arms unable to move or move unevenly S-speech slurred or non-existent T-time-call 911 as soon as signs and symptoms begin-DO NOT go Back to bed or wait to see if you get better-TIME IS BRAIN. Warning Signs of HEART ATTACK Call 911 if you have these symptoms: ? Chest discomfort. Most heart attacks involve discomfort in the center of the chest that lasts more than a few minutes, or that goes away and comes back. It can feel like uncomfortable pressure, squeezing, fullness, or pain. ? Discomfort in other areas of the upper body. Symptoms can include pain or discomfort in one or both arms, the back, neck, jaw, or stomach. ? Shortness of breath with or without chest discomfort. ? Other signs may include breaking out in a cold sweat, nausea, or lightheadedness. Don't wait more than five minutes to call [address]! Fast action can save your life. Calling 911 is almost always the fastest way to get lifesaving treatment. Emergency Medical Services staff can begin treatment when they arrive  up to an hour sooner than if someone gets to the hospital by car. The discharge information has been reviewed with the patient and caregiver. The caregiver and patients verbalized understanding. Discharge medications reviewed with the patient and caregiver and appropriate educational materials and side effects teaching were provided. ___________________________________________________________________________________________________________________________________ Patient armband removed and shredded MyChart Activation Thank you for requesting access to Wix. Please follow the instructions below to securely access and download your online medical record. Wix allows you to send messages to your doctor, view your test results, renew your prescriptions, schedule appointments, and more. How Do I Sign Up? 1. In your internet browser, go to [email or web address] 
2. Click on the First Time User? Click Here link in the Sign In box. You will be redirect to the New Member Sign Up page. 3. Enter your Wix Access Code exactly as it appears below. You will not need to use this code after youve completed the sign-up process.  If you do not sign up before the expiration date, you must request a new code. [employer] Access Code: [email or web address] Expires: 2018 10:17 AM (This is the date your [employer] access code will ) 4. Enter the last four digits of your Social Security Number (xxxx) and Date of Birth (mm/dd/yyyy) as indicated and click Submit. You will be taken to the next sign-up page. 5. Create a [employer] ID. This will be your [employer] login ID and cannot be changed, so think of one that is secure and easy to remember. 6. Create a [employer] password. You can change your password at any time. 7. Enter your Password Reset Question and Answer. This can be used at a later time if you forget your password. 8. Enter your e-mail address. You will receive e-mail notification when new information is available in [address] 9. Click Sign Up. You can now view and download portions of your medical record. 10. Click the Download Summary menu link to download a portable copy of your medical information. Additional Information If you have questions, please visit the Frequently Asked Questions section of the [employer] website at [email or web address] [email or web address] [email or web address] Remember, [employer] is NOT to be used for urgent needs. For medical emergencies, dial 911. [employer] Announcement We are excited to announce that we are making your provider's discharge notes available to you in [employer]. You will see these notes when they are completed and signed by the physician that discharged you from your recent hospital stay. If you have any questions or concerns about any information you see in [employer], please call the Health Information Department where you were seen or reach out to your Primary Care Provider for more information about your plan of care. Introducing \[facility] [place] [facility]\"" & HEALTH SERVICES!    
 Jodi Georges introduces [employer] patient portal. Now you can access parts of your medical record, email your doctor's office, and request medication refills online. 1. In your internet browser, go to [email or web address] 2. Click on the First Time User? Click Here link in the Sign In box. You will see the New Member Sign Up page. 3. Enter your E[employer] Access Code exactly as it appears below. You will not need to use this code after youve completed the sign-up process. If you do not sign up before the expiration date, you must request a new code. · E[employer] Access Code: [email or web address] Expires: 12/24/2018 10:17 AM 
 
4. Enter the last four digits of your Social Security Number (xxxx) and Date of Birth (mm/dd/yyyy) as indicated and click Submit. You will be taken to the next sign-up page. 5. Create a Elyssaf[employer]ori ID. This will be your Elyss[employer] login ID and cannot be changed, so think of one that is secure and easy to remember. 6. Create a E[employer] password. You can change your password at any time. 7. Enter your Password Reset Question and Answer. This can be used at a later time if you forget your password. 8. Enter your e-mail address. You will receive e-mail notification when new information is available in [address] 9. Click Sign Up. You can now view and download portions of your medical record. 10. Click the Download Summary menu link to download a portable copy of your medical information. If you have questions, please visit the Frequently Asked Questions section of the E[employer] website. Remember, E[employer] is NOT to be used for urgent needs. For medical emergencies, dial 911. Now available from your iPhone and Android! Introducing Uri Castellanos As a [facility] patient, I wanted to make you aware of our electronic visit tool called Uri Castellanos. [facility] 24/7 allows you to connect within minutes with a medical provider 24 hours a day, seven days a week via a mobile device or tablet or logging into a secure website from your computer. You can access [employer] from anywhere in the [place]. A virtual visit might be right for you when you have a simple condition and feel like you just dont want to get out of bed, or cant get away from work for an appointment, when your regular [facility] provider is not available (evenings, weekends or holidays), or when youre out of town and need minor care. Electronic visits cost only $49 and if the [employer] 24/PlaceFirst provider determines a prescription is needed to treat your condition, one can be electronically transmitted to a nearby pharmacy*. Please take a moment to enroll today if you have not already done so. The enrollment process is free and takes just a few minutes. To enroll, please download the [employer] larua to your tablet or phone, or visit [email or web address] to enroll on your computer. And, as an [address] [place] [address] patient with a [employer] account, the results of your visits will be scanned into your electronic medical record and your primary care provider will be able to view the scanned results. We urge you to continue to see your regular [facility] provider for your ongoing medical care. And while your primary care provider may not be the one available when you seek a [employer] [employer] Services[employer] virtual visit, the peace of mind you get from getting a real diagnosis real time can be priceless. For more information on [employer], view our Frequently Asked Questions (FAQs) at [email or web address]. Sincerely, 
 
Scott Antonio MD 
Chief Medical Officer [address] *:  certain medications cannot be prescribed via [facility] [employer] Services[employer] Providers Seen During Your Hospitalization Provider Specialty Primary office phone Eileen Wilson MD Emergency Medicine [phone] Michelle Justin MD Internal Medicine [phone] Willie Greenberg MD Internal Medicine [phone] Jaswinder Cavazos MD Internal Medicine [phone] Owen Rodriguez MD Family Practice [phone] Immunizations Administered for This Admission Name Date Influenza Vaccine (Quad) PF 10/8/2018 Your Primary Care Physician (PCP) Primary Care Physician Office Phone Office Fax Lalitha Inch [phone] You are allergic to the following Allergen Reactions Gabapentin Other (comments) Unsteady, weakness LEs Tetanus Vaccines And Toxoid Swelling Topamax (Topiramate) Other (comments)  
 weakness Recent Documentation Height Weight Breastfeeding? BMI OB Status Smoking Status 1.524 m 132.5 kg No 57.07 kg/m2 Hysterectomy Never Smoker Emergency Contacts Name Discharge Info Relation Home Work Mobile Ortiz,Cesilia DISCHARGE CAREGIVER [3] Sister [23] [phone] Ortiz,Tatiana DISCHARGE CAREGIVER [3] Daughter [21] [phone] Ortiz,Tatiana  Child [2] [phone] Patient Belongings The following personal items are in your possession at time of discharge: 
  Dental Appliances: None  Visual Aid: None   Hearing Aids/Status: Right  Home Medications: None   Jewelry: Necklace  Clothing: Robe, Socks, Undergarments, Pajamas, Footwear    Other Valuables: Cell Phone Please provide this summary of care documentation to your next provider. Signatures-by signing, you are acknowledging that this After Visit Summary has been reviewed with you and you have received a copy. Patient Signature:  ____________________________________________________________ Date:  ____________________________________________________________  
  
George Molina Provider Signature:  ____________________________________________________________ Date:  ____________________________________________________________

## 2018-09-28 NOTE — Clinical Note
Status[de-identified] INPATIENT [101] Type of Bed: Telemetry [19] Inpatient Hospitalization Certified Necessary for the Following Reasons: 3. Patient receiving treatment that can only be provided in an inpatient setting (further clarification in H&P documentation) Admitting Diagnosis: CHF (congestive heart failure) ([facility] [[id number]] Admitting Physician: Lanny Bloch [[id number]] Attending Physician: Lanny Bloch [[id number]] Estimated Length of Stay: 2 Midnights Discharge Plan[de-identified] Home with Office Follow-up

## 2018-09-28 NOTE — IP AVS SNAPSHOT
[id number] [address] [place] Patient: Jacqui Balderas MRN: [id number] IXU:[id number] About your hospitalization You were admitted on:  September 29, 2018 You last received care in the:  [address] [place] [address] You were discharged on:  October 8, 2018 Why you were hospitalized Your primary diagnosis was:  Not on File Your diagnoses also included:  Chf (Congestive Heart Failure) (Hcc), Chf Exacerbation (Hcc), Morbid Obesity With Bmi Of 45.0-49.9, Adult (Hcc), Benign Hypertensive Heart Disease Without Heart Failure, Primary Osteoarthritis Of Right Hip Follow-up Information Follow up With Details Comments Contact Info Chaparro Valencia MD  Patient being discharged to SNF. [address] [place] [address] 
[phone] Discharge Orders None A check nimo indicates which time of day the medication should be taken. My Medications CHANGE how you take these medications Instructions Each Dose to Equal  
 Morning Noon Evening Bedtime  
 warfarin 7.5 mg tablet Commonly known as:  COUMADIN What changed:   
- how much to take - when to take this Your last dose was: Your next dose is: Take 1.5 Tabs by mouth once for 1 dose. 12.5 mg  
    
   
   
   
  
  
CONTINUE taking these medications Instructions Each Dose to Equal  
 Morning Noon Evening Bedtime ABILIFY 15 mg tablet Generic drug:  ARIPiprazole Your last dose was: Your next dose is: Take 15 mg by mouth daily. to obtain from pharmacy 15 mg  
    
   
   
   
  
 albuterol-ipratropium 2.5 mg-0.5 mg/3 ml Nebu Commonly known as:  Tanja Barksdale Your last dose was: Your next dose is:    
   
   
 3 mL by Nebulization route every six (6) hours as needed. 3 mL  
    
   
   
   
  
 fluticasone 50 mcg/actuation nasal spray Commonly known as:  Rosalind Reina Your last dose was: Your next dose is: 2 Sprays by Both Nostrils route daily. 2 Spray  
    
   
   
   
  
 furosemide 40 mg tablet Commonly known as:  LASIX Your last dose was: Your next dose is: Take 1 Tab by mouth two (2) times a day. 40 mg po daily 40 mg  
    
   
   
   
  
 isosorbide mononitrate ER 30 mg tablet Commonly known as:  IMDUR Your last dose was: Your next dose is: Take 1 Tab by mouth every evening. 30 mg  
    
   
   
   
  
 metoprolol tartrate 25 mg tablet Commonly known as:  LOPRESSOR Your last dose was: Your next dose is: Take 0.5 Tabs by mouth two (2) times a day. 12.5 mg  
    
   
   
   
  
 multivitamin, tx-iron-ca-min 9 mg iron-400 mcg Tab tablet Commonly known as:  THERA-M w/ IRON Your last dose was: Your next dose is: Take 1 Tab by mouth daily. 1 Tab  
    
   
   
   
  
 oxyCODONE IR 5 mg immediate release tablet Commonly known as:  Selam Nation Your last dose was: Your next dose is: Take 1 Tab by mouth every six (6) hours as needed for Pain. Max Daily Amount: 20 mg.  
 5 mg PARoxetine 20 mg tablet Commonly known as:  PAXIL Your last dose was: Your next dose is: Take 1 Tab by mouth daily. 20 mg  
    
   
   
   
  
 polyethylene glycol 17 gram packet Commonly known as:  Nia David Your last dose was: Your next dose is: Take 1 Packet by mouth daily. 17 g  
    
   
   
   
  
 raNITIdine 150 mg tablet Commonly known as:  ZANTAC Your last dose was: Your next dose is: Take 150 mg by mouth two (2) times a day. 150 mg  
    
   
   
   
  
 senna-docusate 8.6-50 mg per tablet Commonly known as:  Kaci Crowley Your last dose was: Your next dose is: Take 1 Tab by mouth nightly. 1 Tab SYNTHROID 125 mcg tablet Generic drug:  levothyroxine Your last dose was: Your next dose is: Take 125 mcg by mouth Daily (before breakfast). 125 mcg VENTOLIN HFA 90 mcg/actuation inhaler Generic drug:  albuterol Your last dose was: Your next dose is: Take 2 Puffs by inhalation every four (4) hours as needed for Wheezing or Shortness of Breath. 2 Puff ZOCOR 40 mg tablet Generic drug:  simvastatin Your last dose was: Your next dose is: Take  by mouth nightly. to obtain from pharmacy STOP taking these medications   
 lisinopril 5 mg tablet Commonly known as:  PRINIVIL, ZESTRIL  
   
  
 ROBAXIN 500 mg tablet Generic drug:  methocarbamol Where to Get Your Medications Information on where to get these meds will be given to you by the nurse or doctor. ! Ask your nurse or doctor about these medications  
  oxyCODONE IR 5 mg immediate release tablet  
 warfarin 7.5 mg tablet Opioid Education Prescription Opioids: What You Need to Know: 
 
Prescription opioids can be used to help relieve moderate-to-severe pain and are often prescribed following a surgery or injury, or for certain health conditions. These medications can be an important part of treatment but also come with serious risks. Opioids are strong pain medicines. Examples include hydrocodone, oxycodone, fentanyl, and morphine. Heroin is an example of an illegal opioid. It is important to work with your health care provider to make sure you are getting the safest, most effective care. WHAT ARE THE RISKS AND SIDE EFFECTS OF OPIOID USE?  
Prescription opioids carry serious risks of addiction and overdose, especially with prolonged use. An opioid overdose, often marked by slow breathing, can cause sudden death. The use of prescription opioids can have a number of side effects as well, even when taken as directed. · Tolerance-meaning you might need to take more of a medication for the same pain relief · Physical dependence-meaning you have symptoms of withdrawal when the medication is stopped. Withdrawal symptoms can include nausea, sweating, chills, diarrhea, stomach cramps, and muscle aches. Withdrawal can last up to several weeks, depending on which drug you took and how long you took it. · Increased sensitivity to pain · Constipation · Nausea, vomiting, and dry mouth · Sleepiness and dizziness · Confusion · Depression · Low levels of testosterone that can result in lower sex drive, energy, and strength · Itching and sweating RISKS ARE GREATER WITH:      
· History of drug misuse, substance use disorder, or overdose · Mental health conditions (such as depression or anxiety) · Sleep apnea · Older age (72 years or older) · Pregnancy Avoid alcohol while taking prescription opioids. Also, unless specifically advised by your health care provider, medications to avoid include: · Benzodiazepines (such as Xanax or Valium) · Muscle relaxants (such as Soma or Flexeril) · Hypnotics (such as Ambien or Lunesta) · Other prescription opioids KNOW YOUR OPTIONS Talk to your health care provider about ways to manage your pain that don't involve prescription opioids. Some of these options may actually work better and have fewer risks and side effects. Consult your physician before adding or stopping any medications, treatments, or physical activity. Options may include: 
· Pain relievers such as acetaminophen, ibuprofen, and naproxen · Some medications that are also used for depression or seizures · Physical therapy and exercise · Counseling to help patients learn how to cope better with triggers of pain and stress. · Application of heat or cold compress · Massage therapy · Relaxation techniques Be Informed Make sure you know the name of your medication, how much and how often to take it, and its potential risks & side effects. IF YOU ARE PRESCRIBED OPIOIDS FOR PAIN: 
· Never take opioids in greater amounts or more often than prescribed. Remember the goal is not to be pain-free but to manage your pain at a tolerable level. · Follow up with your primary care provider to: · Work together to create a plan on how to manage your pain. · Talk about ways to help manage your pain that don't involve prescription opioids. · Talk about any and all concerns and side effects. · Help prevent misuse and abuse. · Never sell or share prescription opioids · Help prevent misuse and abuse. · Store prescription opioids in a secure place and out of reach of others (this may include visitors, children, friends, and family). · Safely dispose of unused/unwanted prescription opioids: Find your community drug take-back program or your pharmacy mail-back program, or flush them down the toilet, following guidance from the Food and Drug Administration (www.fda.gov/Drugs/ResourcesForYou). · Visit www.cdc.gov/drugoverdose to learn about the risks of opioid abuse and overdose. · If you believe you may be struggling with addiction, tell your health care provider and ask for guidance or call [address] [place] [address] at [phone]. Discharge Instructions None [facility] Announcement We are excited to announce that we are making your provider's discharge notes available to you in [employer].   You will see these notes when they are completed and signed by the physician that discharged you from your recent hospital stay. If you have any questions or concerns about any information you see in [employer]Stak, please call the Health Information Department where you were seen or reach out to your Primary Care Provider for more information about your plan of care. Introducing [facility]! [facility] introduces [employer]Stak patient portal. Now you can access parts of your medical record, email your doctor's office, and request medication refills online. 1. In your internet browser, go to [email or web address] 2. Click on the First Time User? Click Here link in the Sign In box. You will see the New Member Sign Up page. 3. Enter your [employer]Stak Access Code exactly as it appears below. You will not need to use this code after youve completed the sign-up process. If you do not sign up before the expiration date, you must request a new code. · CompStak Access Code: [email or web address] Expires: 12/24/2018 10:17 AM 
 
4. Enter the last four digits of your Social Security Number (xxxx) and Date of Birth (mm/dd/yyyy) as indicated and click Submit. You will be taken to the next sign-up page. 5. Create a CompStak ID. This will be your CompStak login ID and cannot be changed, so think of one that is secure and easy to remember. 6. Create a CompStak password. You can change your password at any time. 7. Enter your Password Reset Question and Answer. This can be used at a later time if you forget your password. 8. Enter your e-mail address. You will receive e-mail notification when new information is available in [address] 9. Click Sign Up. You can now view and download portions of your medical record. 10. Click the Download Summary menu link to download a portable copy of your medical information. If you have questions, please visit the Frequently Asked Questions section of the CompStak website. Remember, [employer]Stak is NOT to be used for urgent needs. For medical emergencies, dial 911. Now available from your iPhone and Android! Introducing Uri Castellanos As a [employer] patient, I wanted to make you aware of our electronic visit tool called Uri Castlelanos. [employer] 24/7 allows you to connect within minutes with a medical provider 24 hours a day, seven days a week via a mobile device or tablet or logging into a secure website from your computer. You can access Uri Castellanos from anywhere in the [place]. A virtual visit might be right for you when you have a simple condition and feel like you just dont want to get out of bed, or cant get away from work for an appointment, when your regular [employer] provider is not available (evenings, weekends or holidays), or when youre out of town and need minor care. Electronic visits cost only $49 and if the [employer] 24/7 provider determines a prescription is needed to treat your condition, one can be electronically transmitted to a nearby pharmacy*. Please take a moment to enroll today if you have not already done so. The enrollment process is free and takes just a few minutes. To enroll, please download the [employer] 24/7 laura to your tablet or phone, or visit [email or web address] to enroll on your computer. And, as an [address] [place] [address] patient with a Sku[employer] account, the results of your visits will be scanned into your electronic medical record and your primary care provider will be able to view the scanned results. We urge you to continue to see your regular [employer] provider for your ongoing medical care. And while your primary care provider may not be the one available when you seek a Uri Castellanos virtual visit, the peace of mind you get from getting a real diagnosis real time can be priceless. For more information on Uri Castellanos, view our Frequently Asked Questions (FAQs) at [email or web address]. Sincerely, 
 
Kaylan Soto MD 
Chief Medical Officer Meme Luong *:  certain medications cannot be prescribed via Uri Castellanos Providers Seen During Your Hospitalization Provider Specialty Primary office phone Ace Ingram MD Emergency Medicine [phone] Barbara Hidalgo MD Internal Medicine [phone] Remy Beckett MD Internal Medicine [phone] Kenya Menon MD Internal Medicine [phone] Manuel Ventura MD Family Practice [phone] Immunizations Administered for This Admission Name Date Influenza Vaccine (Quad) PF 10/8/2018 Your Primary Care Physician (PCP) Primary Care Physician Office Phone Office Fax Garfield Garcia [phone] You are allergic to the following Allergen Reactions Gabapentin Other (comments) Unsteady, weakness LEs Tetanus Vaccines And Toxoid Swelling Topamax (Topiramate) Other (comments)  
 weakness Recent Documentation Height Weight Breastfeeding? BMI OB Status Smoking Status 1.524 m 132.5 kg No 57.07 kg/m2 Hysterectomy Never Smoker Emergency Contacts Name Discharge Info Relation Home Work Mobile Ortiz,Cesilia DISCHARGE CAREGIVER [3] Sister [23] [phone] Tatiana Ortiz DISCHARGE CAREGIVER [3] Daughter [21] [phone] Tatiana Ortiz  Child [2] [phone] Patient Belongings The following personal items are in your possession at time of discharge: 
  Dental Appliances: None  Visual Aid: None   Hearing Aids/Status: Right  Home Medications: None   Jewelry: Necklace  Clothing: Robe, Socks, Undergarments, Pajamas, Footwear    Other Valuables: Cell Phone Please provide this summary of care documentation to your next provider. Signatures-by signing, you are acknowledging that this After Visit Summary has been reviewed with you and you have received a copy.   
  
 
  
    
    
 Patient Signature: ____________________________________________________________ Date:  ____________________________________________________________  
  
Elvi Bond Provider Signature:  ____________________________________________________________ Date:  ____________________________________________________________

## 2018-09-28 NOTE — IP AVS SNAPSHOT
[address] [address] [place] Patient: Jose Clifton MRN: [id number] DDZ:[birth year] A check nimo indicates which time of day the medication should be taken. My Medications CHANGE how you take these medications Instructions Each Dose to Equal  
 Morning Noon Evening Bedtime  
 warfarin 7.5 mg tablet Commonly known as:  COUMADIN What changed:   
- how much to take - when to take this Your last dose was: Your next dose is: Take 1.5 Tabs by mouth once for 1 dose. 12.5 mg  
    
   
   
   
  
  
CONTINUE taking these medications Instructions Each Dose to Equal  
 Morning Noon Evening Bedtime ABILIFY 15 mg tablet Generic drug:  ARIPiprazole Your last dose was: Your next dose is: Take 15 mg by mouth daily. to obtain from pharmacy 15 mg  
    
   
   
   
  
 albuterol-ipratropium 2.5 mg-0.5 mg/3 ml Nebu Commonly known as:  Nico Hollingsworth Your last dose was: Your next dose is:    
   
   
 3 mL by Nebulization route every six (6) hours as needed. 3 mL  
    
   
   
   
  
 fluticasone 50 mcg/actuation nasal spray Commonly known as:  Nathan Face Your last dose was: Your next dose is: 2 Sprays by Both Nostrils route daily. 2 Spray  
    
   
   
   
  
 furosemide 40 mg tablet Commonly known as:  LASIX Your last dose was: Your next dose is: Take 1 Tab by mouth two (2) times a day. 40 mg po daily 40 mg  
    
   
   
   
  
 isosorbide mononitrate ER 30 mg tablet Commonly known as:  IMDUR Your last dose was: Your next dose is: Take 1 Tab by mouth every evening. 30 mg  
    
   
   
   
  
 metoprolol tartrate 25 mg tablet Commonly known as:  LOPRESSOR Your last dose was: Your next dose is: Take 0.5 Tabs by mouth two (2) times a day. 12.5 mg  
    
   
   
   
  
 multivitamin, tx-iron-ca-min 9 mg iron-400 mcg Tab tablet Commonly known as:  THERA-M w/ IRON Your last dose was: Your next dose is: Take 1 Tab by mouth daily. 1 Tab  
    
   
   
   
  
 oxyCODONE IR 5 mg immediate release tablet Commonly known as:  Ayanna Reasons Your last dose was: Your next dose is: Take 1 Tab by mouth every six (6) hours as needed for Pain. Max Daily Amount: 20 mg.  
 5 mg PARoxetine 20 mg tablet Commonly known as:  PAXIL Your last dose was: Your next dose is: Take 1 Tab by mouth daily. 20 mg  
    
   
   
   
  
 polyethylene glycol 17 gram packet Commonly known as:  Zack Scale Your last dose was: Your next dose is: Take 1 Packet by mouth daily. 17 g  
    
   
   
   
  
 raNITIdine 150 mg tablet Commonly known as:  ZANTAC Your last dose was: Your next dose is: Take 150 mg by mouth two (2) times a day. 150 mg  
    
   
   
   
  
 senna-docusate 8.6-50 mg per tablet Commonly known as:  James Spencer Your last dose was: Your next dose is: Take 1 Tab by mouth nightly. 1 Tab SYNTHROID 125 mcg tablet Generic drug:  levothyroxine Your last dose was: Your next dose is: Take 125 mcg by mouth Daily (before breakfast). 125 mcg VENTOLIN HFA 90 mcg/actuation inhaler Generic drug:  albuterol Your last dose was: Your next dose is: Take 2 Puffs by inhalation every four (4) hours as needed for Wheezing or Shortness of Breath. 2 Puff ZOCOR 40 mg tablet Generic drug:  simvastatin Your last dose was: Your next dose is: Take  by mouth nightly. to obtain from pharmacy STOP taking these medications   
 lisinopril 5 mg tablet Commonly known as:  PRINIVIL, ZESTRIL  
   
  
 ROBAXIN 500 mg tablet Generic drug:  methocarbamol Where to Get Your Medications Information on where to get these meds will be given to you by the nurse or doctor. ! Ask your nurse or doctor about these medications  
  oxyCODONE IR 5 mg immediate release tablet  
 warfarin 7.5 mg tablet

## 2018-09-28 NOTE — ED PROVIDER NOTES
EMERGENCY DEPARTMENT HISTORY AND PHYSICAL EXAM 
 
7:56 PM 
 
 
Date: 9/28/2018 Patient Name: Reddy Snyder History of Presenting Illness Chief Complaint Patient presents with  Fatigue History Provided By: Patient Chief Complaint: Weakness Duration:  2 days Timing:  Worsening Location: Generalized Quality: N/A Severity: N/A Modifying Factors: N/A Associated Symptoms: Mild SOB and nausea. Denies abdominal pain, chest pain, vomiting, change in vision, change in urinary frequency, dysuria, diarrhea, or palpitations. Additional History (Context): Reddy Snyder is a 60 y.o. female with a history of CHF, A-fib, and hypothyroidism who presents with worsening generalized weakness and shortness of breath for 2 days. The patient has associated symptoms of mild SOB and nausea. Denies abdominal pain, chest pain, vomiting, change in vision, change in urinary frequency, dysuria, diarrhea, or palpitations. The patient was admitted at a skilled nursing facility for approximately 1 month after being admitted and was discharged today, but collapsed when trying to get out of the car back at home. On 2L O2 at home. PCP: Kirti Sparrow MD 
 
Current Facility-Administered Medications Medication Dose Route Frequency Provider Last Rate Last Dose  ondansetron (ZOFRAN) injection 4 mg  4 mg IntraVENous Jay Renteria MD      
 
Current Outpatient Prescriptions Medication Sig Dispense Refill  methocarbamol (ROBAXIN) 500 mg tablet Take  by mouth four (4) times daily.  senna-docusate (PERICOLACE) 8.6-50 mg per tablet Take 1 Tab by mouth nightly. 30 Tab 0  
 acetaminophen-codeine (TYLENOL #3) 300-30 mg per tablet Take 1 Tab by mouth every eight (8) hours as needed (moderate pain). Max Daily Amount: 3 Tabs. 12 Tab 0  
 lisinopril (PRINIVIL, ZESTRIL) 5 mg tablet Take 1 Tab by mouth daily. 30 Tab 0  
 warfarin (COUMADIN) 7.5 mg tablet Take 1 Tab by mouth daily.  7 Tab 0  
  albuterol-ipratropium (DUO-NEB) 2.5 mg-0.5 mg/3 ml nebu 3 mL by Nebulization route every six (6) hours as needed. 30 Nebule 0  
 furosemide (LASIX) 40 mg tablet Take 1 Tab by mouth two (2) times a day. 40 mg po daily 60 Tab 2  polyethylene glycol (MIRALAX) 17 gram packet Take 1 Packet by mouth daily. 30 Packet 2  
 metoprolol tartrate (LOPRESSOR) 25 mg tablet Take 0.5 Tabs by mouth two (2) times a day. 30 Tab 6  fluticasone (FLONASE) 50 mcg/actuation nasal spray 2 Sprays by Both Nostrils route daily. 1 Bottle 2  
 isosorbide mononitrate ER (IMDUR) 30 mg tablet Take 1 Tab by mouth every evening. 90 Tab 3  
 multivitamin, tx-iron-ca-min (THERA-M W/ IRON) 9 mg iron-400 mcg tab tablet Take 1 Tab by mouth daily. 30 Tab 0  
 raNITIdine (ZANTAC) 150 mg tablet Take 150 mg by mouth two (2) times a day.  VENTOLIN HFA 90 mcg/actuation inhaler Take 2 Puffs by inhalation every four (4) hours as needed for Wheezing or Shortness of Breath. 1 Inhaler 0  
 PARoxetine (PAXIL) 20 mg tablet Take 1 Tab by mouth daily. 30 Tab 3  
 simvastatin (ZOCOR) 40 mg tablet Take  by mouth nightly. to obtain from pharmacy  aripiprazole (ABILIFY) 15 mg tablet Take 15 mg by mouth daily. to obtain from pharmacy  levothyroxine (SYNTHROID) 125 mcg tablet Take 125 mcg by mouth Daily (before breakfast). Past History Past Medical History: 
Past Medical History:  
Diagnosis Date  Atrial fibrillation CHADS score 1  (-CHF, +HTN, -AGE, -DM, -CVA)  Carpal tunnel syndrome  Chronic pain  Congenital heart disease   
 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   
 H/O echocardiogram 04/2017 EF 60-65%, mild concentric LVH, dilated RV with RV dysfunction, RVSP 54 mmHg, RA E, moderate to severe pulmonic regurgitation.  Hypercholesterolemia  Hypertension  Hypothyroid  Morbid obesity  Morbid obesity with BMI of 45.0-49.9, adult ([facility] [address] 2/20/2017  Noncompliance  Schizoaffective disorder  Substance abuse   
 marijuana, crack cocaine Past Surgical History: 
Past Surgical History:  
Procedure Laterality Date [address] VSD repair  COLONOSCOPY N/A 5/6/2018 COLONOSCOPY with tatooing and biopsy performed by He Cortes MD at [address] [facility]    
 left  HX HYSTERECTOMY  HX KNEE REPLACEMENT    
 left  HX KNEE REPLACEMENT    
 right  HX ORTHOPAEDIC    
 left ankle  HX ORTHOPAEDIC    
 carpel tunnel right and left  HX THYROIDECTOMY Left Family History: 
Family History Problem Relation Age of Onset  Hypertension Mother  Coronary Artery Disease Mother  Coronary Artery Disease Father  Hypertension Father Social History: 
Social History Substance Use Topics  Smoking status: Never Smoker  Smokeless tobacco: Never Used  Alcohol use No  
 
 
Allergies: Allergies Allergen Reactions  Gabapentin Other (comments) Unsteady, weakness LEs  Tetanus Vaccines And Toxoid Swelling  Topamax [Topiramate] Other (comments)  
  weakness Review of Systems Review of Systems Constitutional: Negative for activity change and appetite change. HENT: Negative for congestion. Eyes: Negative for visual disturbance. Respiratory: Positive for shortness of breath. Negative for cough. Cardiovascular: Negative for chest pain. Gastrointestinal: Positive for nausea. Negative for abdominal pain, diarrhea and vomiting. Genitourinary: Negative for difficulty urinating and dysuria. Musculoskeletal: Negative for arthralgias and myalgias. Skin: Negative for rash. Neurological: Positive for weakness. Negative for numbness. All other systems reviewed and are negative. Physical Exam  
 
Visit Vitals  BP 98/59  Pulse 82  Resp (!) 5  SpO2 99% Physical Exam  
Constitutional: She is oriented to person, place, and time. She appears well-developed and well-nourished. HENT:  
Head: Normocephalic and atraumatic. Mouth/Throat: Oropharynx is clear and moist.  
Eyes: Conjunctivae are normal.  
Neck: Normal range of motion. Neck supple. No JVD present. Cardiovascular: Normal rate, normal heart sounds and intact distal pulses. No murmur heard. Irregular regular rhythm Pulmonary/Chest: Effort normal and breath sounds normal.  
Abdominal: Soft. Bowel sounds are normal. She exhibits no distension. There is no tenderness. Musculoskeletal: Normal range of motion. She exhibits no deformity. Lymphadenopathy:  
  She has no cervical adenopathy. Neurological: She is alert and oriented to person, place, and time. Coordination normal.  
Skin: Skin is warm and dry. No rash noted. Psychiatric: She has a normal mood and affect. Nursing note and vitals reviewed. Diagnostic Study Results Labs - Recent Results (from the past 12 hour(s)) EKG, 12 LEAD, INITIAL Collection Time: 09/28/18  8:35 PM  
Result Value Ref Range Ventricular Rate 83 BPM  
 Atrial Rate 119 BPM  
 QRS Duration 124 ms Q-T Interval 424 ms QTC Calculation (Bezet) 498 ms Calculated R Axis -36 degrees Calculated T Axis 108 degrees Diagnosis Atrial fibrillation Left axis deviation Nonspecific intraventricular conduction delay Abnormal QRS-T angle, consider primary T wave abnormality Abnormal ECG When compared with ECG of 19-AUG-2018 12:58, Nonspecific T wave abnormality no longer evident in Inferior leads CBC WITH AUTOMATED DIFF Collection Time: 09/28/18  9:07 PM  
Result Value Ref Range WBC 8.3 4.6 - 13.2 K/uL  
 RBC 4.65 4.20 - 5.30 M/uL  
 HGB 10.9 (L) 12.0 - 16.0 g/dL HCT 34.8 (L) 35.0 - 45.0 % MCV 74.8 74.0 - 97.0 FL  
 MCH 23.4 (L) 24.0 - 34.0 PG  
 MCHC 31.3 31.0 - 37.0 g/dL  
 RDW 17.9 (H) 11.6 - 14.5 % PLATELET 761 347 - 763 K/uL MPV 9.9 9.2 - 11.8 FL  
 NEUTROPHILS 76 (H) 40 - 73 % LYMPHOCYTES 14 (L) 21 - 52 % MONOCYTES 10 3 - 10 % EOSINOPHILS 0 0 - 5 % BASOPHILS 0 0 - 2 %  
 ABS. NEUTROPHILS 6.3 1.8 - 8.0 K/UL  
 ABS. LYMPHOCYTES 1.2 0.9 - 3.6 K/UL  
 ABS. MONOCYTES 0.8 0.05 - 1.2 K/UL  
 ABS. EOSINOPHILS 0.0 0.0 - 0.4 K/UL  
 ABS. BASOPHILS 0.0 0.0 - 0.1 K/UL  
 DF AUTOMATED METABOLIC PANEL, COMPREHENSIVE Collection Time: 09/28/18  9:07 PM  
Result Value Ref Range Sodium 135 (L) 136 - 145 mmol/L Potassium 3.7 3.5 - 5.5 mmol/L Chloride 99 (L) 100 - 108 mmol/L  
 CO2 29 21 - 32 mmol/L Anion gap 7 3.0 - 18 mmol/L Glucose 134 (H) 74 - 99 mg/dL BUN 20 (H) 7.0 - 18 MG/DL Creatinine 1.14 0.6 - 1.3 MG/DL  
 BUN/Creatinine ratio 18 12 - 20 GFR est AA 59 (L) >60 ml/min/1.73m2 GFR est non-AA 49 (L) >60 ml/min/1.73m2 Calcium 8.2 (L) 8.5 - 10.1 MG/DL Bilirubin, total 1.7 (H) 0.2 - 1.0 MG/DL  
 ALT (SGPT) 24 13 - 56 U/L  
 AST (SGOT) 23 15 - 37 U/L Alk. phosphatase 195 (H) 45 - 117 U/L Protein, total 8.0 6.4 - 8.2 g/dL Albumin 3.2 (L) 3.4 - 5.0 g/dL Globulin 4.8 (H) 2.0 - 4.0 g/dL A-G Ratio 0.7 (L) 0.8 - 1.7 NT-PRO BNP Collection Time: 09/28/18  9:07 PM  
Result Value Ref Range NT pro-BNP 3437 (H) 0 - 900 PG/ML  
TROPONIN I Collection Time: 09/28/18  9:07 PM  
Result Value Ref Range Troponin-I, Qt. 0.02 0.0 - 0.045 NG/ML  
URINALYSIS W/ RFLX MICROSCOPIC Collection Time: 09/29/18  1:08 AM  
Result Value Ref Range Color DARK YELLOW Appearance CLOUDY Specific gravity 1.016 1.005 - 1.030    
 pH (UA) 6.0 5.0 - 8.0 Protein 30 (A) NEG mg/dL Glucose NEGATIVE  NEG mg/dL Ketone NEGATIVE  NEG mg/dL Bilirubin SMALL (A) NEG Blood LARGE (A) NEG Urobilinogen 4.0 (H) 0.2 - 1.0 EU/dL Nitrites POSITIVE (A) NEG Leukocyte Esterase MODERATE (A) NEG URINE MICROSCOPIC ONLY Collection Time: 09/29/18  1:08 AM  
Result Value Ref Range WBC 20 to 25 0 - 4 /hpf  
 RBC 8 to 10 0 - 5 /hpf Epithelial cells 2+ 0 - 5 /lpf Bacteria 4+ (A) NEG /hpf Radiologic Studies -  
XR PELV 1 OR 2 V Final Result IMPRESSION:  
1. Limited study as described above. 2.  End-stage right hip degenerative change. 3.  No displaced hip fracture is identified however given technical limitations  
and extensive remodeling of the femur, nondisplaced fracture is difficult to  
exclude radiologically. Consider further evaluation with cross-sectional imaging  
if there concern for radiographically occult fracture. XR CHEST PORT Final Result IMPRESSION:  
  
Mild interstitial prominence which may be secondary to lung inflammation or  
interstitial edema. Medical Decision Making I am the first provider for this patient. I reviewed the vital signs, available nursing notes, past medical history, past surgical history, family history and social history. Vital Signs-Reviewed the patient's vital signs. Pulse Oximetry Analysis -  98% on room air (Interpretation) WNL Cardiac Monitor: 
Rate: 71 Rhythm:  Atrial Fibrillation EKG: Interpreted by the EP. Time Interpreted: 20:35 Rate: 83 Rhythm: Atrial Fibrillation Interpretation: Right bundle branch block Records Reviewed: Nursing Notes and Old Medical Records (Time of Review: 7:56 PM) 
 
ED Course: Progress Notes, Reevaluation, and Consults: 
 
1:42 AM  
BNP elevated, edema noted on CXR. Patient unable to ambulate at all. Will plan for admission. Consult: I discussed care with Dr. Kellie Zavaleta (Hospitalist). It was a standard discussion including patient history, chief complaint, available diagnostic results, and predicted treatment course. Accepts patient.   
 
 
Provider Notes (Medical Decision Making):  
 
Morro Wylie is a 60 y.o. female with a history of CHF, A-fib, and hypothyroidism who presents with worsening generalized weakness for 2 days with associated symptoms of SOB and nausea. The patient was recently admitted for leg swelling and transferred for a [facility] nursing facility for 1 month. Patient was discharged today. Some leg swelling, rales at bases noted. afib noted. Differential Diagnosis: Will consider CHF, viral respiratory illness, pneumonia, pneumothorax, anemia, pleural effusion, pulmonary edema. Given the history and exam, I have low suspicion for PE, ACS, pulmonary hypertension, toxicity, pericardial effusion/tamponade, angioedema, anaphylaxis, airway obstruction, pneumomediastinum, dysrhythmia, neoplasm, intraabdominal process, ARDS. Testing: CBC, metabolic panel, UA, NT-PRO BNP, urine microscope, troponin, EKG, CXR, XR pelvis,  
Treatments: Zofran, Lasix For Hospitalized Patients: 1. Critical Care Time:  
Critical Care Time: The services I provided to this patient were to treat and/or prevent clinically significant deterioration that could result in the failure of one or more body systems and/or organ systems due to CHF. Services included the following: 
-reviewing nursing notes and old charts 
-vital sign assessments 
-direct patient care 
-medication orders and management 
-interpreting and reviewing diagnostic studies/labs 
-re-evaluations 
-documentation time Aggregate critical care time was 35 minutes, which includes only time during which I was engaged in work directly related to the patient's care as described above, whether I was at bedside or elsewhere in the Emergency Department. It did not include time spent performing other reported procedures or the services of residents, students, nurses, or advance practice providers. Connie Yoon MD 
 
1:43 AM 
 
 
2. Hospitalization Decision Time: The decision to hospitalize the patient was made by Connie Yoon MD at 1:41 AM on 9/28/2018 3. Aspirin: Aspirin was not given because the patient did not present with a stroke at the time of their Emergency Department evaluation Diagnosis Clinical Impression: 1. Acute on chronic congestive heart failure, unspecified heart failure type ([address] 2. Generalized weakness Disposition: admit Follow-up Information None Patient's Medications Start Taking No medications on file Continue Taking ACETAMINOPHEN-CODEINE (TYLENOL #3) 300-30 MG PER TABLET    Take 1 Tab by mouth every eight (8) hours as needed (moderate pain). Max Daily Amount: 3 Tabs. ALBUTEROL-IPRATROPIUM (DUO-NEB) 2.5 MG-0.5 MG/3 ML NEBU    3 mL by Nebulization route every six (6) hours as needed. ARIPIPRAZOLE (ABILIFY) 15 MG TABLET    Take 15 mg by mouth daily. to obtain from pharmacy FLUTICASONE (FLONASE) 50 MCG/ACTUATION NASAL SPRAY    2 Sprays by Both Nostrils route daily. FUROSEMIDE (LASIX) 40 MG TABLET    Take 1 Tab by mouth two (2) times a day. 40 mg po daily ISOSORBIDE MONONITRATE ER (IMDUR) 30 MG TABLET    Take 1 Tab by mouth every evening. LEVOTHYROXINE (SYNTHROID) 125 MCG TABLET    Take 125 mcg by mouth Daily (before breakfast). LISINOPRIL (PRINIVIL, ZESTRIL) 5 MG TABLET    Take 1 Tab by mouth daily. METHOCARBAMOL (ROBAXIN) 500 MG TABLET    Take  by mouth four (4) times daily. METOPROLOL TARTRATE (LOPRESSOR) 25 MG TABLET    Take 0.5 Tabs by mouth two (2) times a day. MULTIVITAMIN, TX-IRON-CA-MIN (THERA-M W/ IRON) 9 MG IRON-400 MCG TAB TABLET    Take 1 Tab by mouth daily. PAROXETINE (PAXIL) 20 MG TABLET    Take 1 Tab by mouth daily. POLYETHYLENE GLYCOL (MIRALAX) 17 GRAM PACKET    Take 1 Packet by mouth daily. RANITIDINE (ZANTAC) 150 MG TABLET    Take 150 mg by mouth two (2) times a day. SENNA-DOCUSATE (PERICOLACE) 8.6-50 MG PER TABLET    Take 1 Tab by mouth nightly. SIMVASTATIN (ZOCOR) 40 MG TABLET    Take  by mouth nightly. to obtain from pharmacy VENTOLIN HFA 90 MCG/ACTUATION INHALER    Take 2 Puffs by inhalation every four (4) hours as needed for Wheezing or Shortness of Breath. WARFARIN (COUMADIN) 7.5 MG TABLET    Take 1 Tab by mouth daily. These Medications have changed No medications on file Stop Taking No medications on file  
 
_______________________________ Attestations: 
Scribe Attestation Manuel Mccall acting as a scribe for and in the presence of Paola Solo MD     
September 29, 2018 at 3:10 AM 
    
Provider Attestation:     
I personally performed the services described in the documentation, reviewed the documentation, as recorded by the scribe in my presence, and it accurately and completely records my words and actions. September 29, 2018 at 3:10 AM - Paola Solo MD   
_______________________________

## 2018-09-29 ENCOUNTER — APPOINTMENT (OUTPATIENT)
Dept: VASCULAR SURGERY | Age: 58
DRG: 463 | End: 2018-09-29
Attending: NURSE PRACTITIONER
Payer: MEDICAID

## 2018-09-29 PROBLEM — I50.9 CHF EXACERBATION (HCC): Status: ACTIVE | Noted: 2018-09-29

## 2018-09-29 LAB
APPEARANCE UR: ABNORMAL
BACTERIA URNS QL MICRO: ABNORMAL /HPF
BILIRUB UR QL: ABNORMAL
COLOR UR: ABNORMAL
EPITH CASTS URNS QL MICRO: ABNORMAL /LPF (ref 0–5)
GLUCOSE UR STRIP.AUTO-MCNC: NEGATIVE MG/DL
HGB UR QL STRIP: ABNORMAL
INR PPP: 2 (ref 0.8–1.2)
KETONES UR QL STRIP.AUTO: NEGATIVE MG/DL
LEUKOCYTE ESTERASE UR QL STRIP.AUTO: ABNORMAL
NITRITE UR QL STRIP.AUTO: POSITIVE
PH UR STRIP: 6 [PH] (ref 5–8)
PROT UR STRIP-MCNC: 30 MG/DL
PROTHROMBIN TIME: 22.5 SEC (ref 11.5–15.2)
RBC #/AREA URNS HPF: ABNORMAL /HPF (ref 0–5)
SP GR UR REFRACTOMETRY: 1.02 (ref 1–1.03)
TROPONIN I SERPL-MCNC: 0.02 NG/ML (ref 0–0.04)
TROPONIN I SERPL-MCNC: <0.02 NG/ML (ref 0–0.04)
UROBILINOGEN UR QL STRIP.AUTO: 4 EU/DL (ref 0.2–1)
WBC URNS QL MICRO: ABNORMAL /HPF (ref 0–4)

## 2018-09-29 PROCEDURE — 74011000250 HC RX REV CODE- 250: Performed by: HOSPITALIST

## 2018-09-29 PROCEDURE — 65660000000 HC RM CCU STEPDOWN

## 2018-09-29 PROCEDURE — 74011250637 HC RX REV CODE- 250/637: Performed by: HOSPITALIST

## 2018-09-29 PROCEDURE — 74011000250 HC RX REV CODE- 250: Performed by: INTERNAL MEDICINE

## 2018-09-29 PROCEDURE — 87086 URINE CULTURE/COLONY COUNT: CPT | Performed by: NURSE PRACTITIONER

## 2018-09-29 PROCEDURE — 77030038269 HC DRN EXT URIN PURWCK BARD -A

## 2018-09-29 PROCEDURE — 74011250637 HC RX REV CODE- 250/637: Performed by: NURSE PRACTITIONER

## 2018-09-29 PROCEDURE — 84484 ASSAY OF TROPONIN QUANT: CPT | Performed by: HOSPITALIST

## 2018-09-29 PROCEDURE — 93970 EXTREMITY STUDY: CPT

## 2018-09-29 PROCEDURE — 74011250636 HC RX REV CODE- 250/636: Performed by: HOSPITALIST

## 2018-09-29 PROCEDURE — 74011250636 HC RX REV CODE- 250/636: Performed by: EMERGENCY MEDICINE

## 2018-09-29 PROCEDURE — 36415 COLL VENOUS BLD VENIPUNCTURE: CPT | Performed by: HOSPITALIST

## 2018-09-29 PROCEDURE — 85610 PROTHROMBIN TIME: CPT | Performed by: HOSPITALIST

## 2018-09-29 PROCEDURE — 81001 URINALYSIS AUTO W/SCOPE: CPT | Performed by: EMERGENCY MEDICINE

## 2018-09-29 PROCEDURE — 87804 INFLUENZA ASSAY W/OPTIC: CPT | Performed by: NURSE PRACTITIONER

## 2018-09-29 PROCEDURE — 87077 CULTURE AEROBIC IDENTIFY: CPT | Performed by: NURSE PRACTITIONER

## 2018-09-29 PROCEDURE — 74011250636 HC RX REV CODE- 250/636: Performed by: INTERNAL MEDICINE

## 2018-09-29 PROCEDURE — 87186 SC STD MICRODIL/AGAR DIL: CPT | Performed by: NURSE PRACTITIONER

## 2018-09-29 PROCEDURE — 96374 THER/PROPH/DIAG INJ IV PUSH: CPT

## 2018-09-29 RX ORDER — OXYCODONE HYDROCHLORIDE 5 MG/1
5 TABLET ORAL
Status: DISCONTINUED | OUTPATIENT
Start: 2018-09-29 | End: 2018-10-08 | Stop reason: HOSPADM

## 2018-09-29 RX ORDER — ARIPIPRAZOLE 5 MG/1
15 TABLET ORAL DAILY
Status: DISCONTINUED | OUTPATIENT
Start: 2018-09-29 | End: 2018-10-08 | Stop reason: HOSPADM

## 2018-09-29 RX ORDER — POLYETHYLENE GLYCOL 3350 17 G/17G
17 POWDER, FOR SOLUTION ORAL DAILY
Status: DISCONTINUED | OUTPATIENT
Start: 2018-09-29 | End: 2018-09-29

## 2018-09-29 RX ORDER — WARFARIN 7.5 MG/1
7.5 TABLET ORAL DAILY
Status: DISCONTINUED | OUTPATIENT
Start: 2018-09-30 | End: 2018-09-29

## 2018-09-29 RX ORDER — FUROSEMIDE 10 MG/ML
40 INJECTION INTRAMUSCULAR; INTRAVENOUS 2 TIMES DAILY
Status: DISCONTINUED | OUTPATIENT
Start: 2018-09-29 | End: 2018-09-30

## 2018-09-29 RX ORDER — SODIUM CHLORIDE 0.9 % (FLUSH) 0.9 %
5-10 SYRINGE (ML) INJECTION AS NEEDED
Status: DISCONTINUED | OUTPATIENT
Start: 2018-09-29 | End: 2018-10-08 | Stop reason: HOSPADM

## 2018-09-29 RX ORDER — SIMVASTATIN 40 MG/1
40 TABLET, FILM COATED ORAL
Status: DISCONTINUED | OUTPATIENT
Start: 2018-09-29 | End: 2018-10-08 | Stop reason: HOSPADM

## 2018-09-29 RX ORDER — FAMOTIDINE 20 MG/1
20 TABLET, FILM COATED ORAL 2 TIMES DAILY
Status: DISCONTINUED | OUTPATIENT
Start: 2018-09-29 | End: 2018-10-08 | Stop reason: HOSPADM

## 2018-09-29 RX ORDER — WARFARIN 7.5 MG/1
7.5 TABLET ORAL DAILY
Status: DISCONTINUED | OUTPATIENT
Start: 2018-09-29 | End: 2018-09-29

## 2018-09-29 RX ORDER — ACETAMINOPHEN AND CODEINE PHOSPHATE 300; 30 MG/1; MG/1
1 TABLET ORAL
Status: DISCONTINUED | OUTPATIENT
Start: 2018-09-29 | End: 2018-09-29

## 2018-09-29 RX ORDER — WARFARIN 7.5 MG/1
7.5 TABLET ORAL ONCE
Status: COMPLETED | OUTPATIENT
Start: 2018-09-29 | End: 2018-09-29

## 2018-09-29 RX ORDER — SODIUM CHLORIDE 0.9 % (FLUSH) 0.9 %
5-10 SYRINGE (ML) INJECTION EVERY 8 HOURS
Status: DISCONTINUED | OUTPATIENT
Start: 2018-09-29 | End: 2018-10-08 | Stop reason: HOSPADM

## 2018-09-29 RX ORDER — ISOSORBIDE MONONITRATE 30 MG/1
30 TABLET, EXTENDED RELEASE ORAL EVERY EVENING
Status: DISCONTINUED | OUTPATIENT
Start: 2018-09-29 | End: 2018-10-08 | Stop reason: HOSPADM

## 2018-09-29 RX ORDER — POLYETHYLENE GLYCOL 3350 17 G/17G
17 POWDER, FOR SOLUTION ORAL
Status: DISCONTINUED | OUTPATIENT
Start: 2018-09-29 | End: 2018-09-29

## 2018-09-29 RX ORDER — AMOXICILLIN 250 MG
1 CAPSULE ORAL
Status: DISCONTINUED | OUTPATIENT
Start: 2018-09-29 | End: 2018-09-29

## 2018-09-29 RX ORDER — LEVOTHYROXINE SODIUM 125 UG/1
125 TABLET ORAL
Status: DISCONTINUED | OUTPATIENT
Start: 2018-09-29 | End: 2018-10-08 | Stop reason: HOSPADM

## 2018-09-29 RX ORDER — METOPROLOL TARTRATE 25 MG/1
12.5 TABLET, FILM COATED ORAL 2 TIMES DAILY
Status: DISCONTINUED | OUTPATIENT
Start: 2018-09-29 | End: 2018-10-08

## 2018-09-29 RX ORDER — PAROXETINE HYDROCHLORIDE 20 MG/1
20 TABLET, FILM COATED ORAL DAILY
Status: DISCONTINUED | OUTPATIENT
Start: 2018-09-29 | End: 2018-10-08 | Stop reason: HOSPADM

## 2018-09-29 RX ORDER — ONDANSETRON 2 MG/ML
4 INJECTION INTRAMUSCULAR; INTRAVENOUS
Status: DISCONTINUED | OUTPATIENT
Start: 2018-09-29 | End: 2018-10-08 | Stop reason: HOSPADM

## 2018-09-29 RX ORDER — OXYCODONE HYDROCHLORIDE 5 MG/1
5 TABLET ORAL
Status: ON HOLD | COMMUNITY
End: 2018-10-04

## 2018-09-29 RX ORDER — METHOCARBAMOL 500 MG/1
500 TABLET, FILM COATED ORAL 4 TIMES DAILY
Status: DISCONTINUED | OUTPATIENT
Start: 2018-09-29 | End: 2018-09-29 | Stop reason: RX

## 2018-09-29 RX ORDER — LISINOPRIL 5 MG/1
5 TABLET ORAL DAILY
Status: DISCONTINUED | OUTPATIENT
Start: 2018-09-29 | End: 2018-09-30

## 2018-09-29 RX ORDER — IPRATROPIUM BROMIDE AND ALBUTEROL SULFATE 2.5; .5 MG/3ML; MG/3ML
3 SOLUTION RESPIRATORY (INHALATION)
Status: DISCONTINUED | OUTPATIENT
Start: 2018-09-29 | End: 2018-10-08 | Stop reason: HOSPADM

## 2018-09-29 RX ORDER — ACETAMINOPHEN 325 MG/1
650 TABLET ORAL
Status: DISCONTINUED | OUTPATIENT
Start: 2018-09-29 | End: 2018-10-08 | Stop reason: HOSPADM

## 2018-09-29 RX ORDER — FLUTICASONE PROPIONATE 50 MCG
2 SPRAY, SUSPENSION (ML) NASAL DAILY
Status: DISCONTINUED | OUTPATIENT
Start: 2018-09-29 | End: 2018-10-08 | Stop reason: HOSPADM

## 2018-09-29 RX ADMIN — FUROSEMIDE 80 MG: 10 INJECTION, SOLUTION INTRAMUSCULAR; INTRAVENOUS at 00:47

## 2018-09-29 RX ADMIN — METOPROLOL TARTRATE: 25 TABLET ORAL at 18:12

## 2018-09-29 RX ADMIN — OXYCODONE HYDROCHLORIDE 5 MG: 5 TABLET ORAL at 18:12

## 2018-09-29 RX ADMIN — ISOSORBIDE MONONITRATE 30 MG: 30 TABLET, EXTENDED RELEASE ORAL at 18:12

## 2018-09-29 RX ADMIN — CEFTRIAXONE SODIUM 1 G: 1 INJECTION, POWDER, FOR SOLUTION INTRAMUSCULAR; INTRAVENOUS at 09:22

## 2018-09-29 RX ADMIN — CEFTRIAXONE SODIUM 2 G: 2 INJECTION, POWDER, FOR SOLUTION INTRAMUSCULAR; INTRAVENOUS at 23:50

## 2018-09-29 RX ADMIN — METOPROLOL TARTRATE 12.5 MG: 25 TABLET ORAL at 09:09

## 2018-09-29 RX ADMIN — FLUTICASONE PROPIONATE 2 SPRAY: 50 SPRAY, METERED NASAL at 10:13

## 2018-09-29 RX ADMIN — FUROSEMIDE 40 MG: 10 INJECTION, SOLUTION INTRAMUSCULAR; INTRAVENOUS at 09:30

## 2018-09-29 RX ADMIN — ARIPIPRAZOLE 15 MG: 5 TABLET ORAL at 09:10

## 2018-09-29 RX ADMIN — Medication 10 ML: at 09:31

## 2018-09-29 RX ADMIN — Medication 10 ML: at 18:15

## 2018-09-29 RX ADMIN — FAMOTIDINE 20 MG: 20 TABLET ORAL at 18:12

## 2018-09-29 RX ADMIN — LISINOPRIL 5 MG: 5 TABLET ORAL at 09:10

## 2018-09-29 RX ADMIN — LEVOTHYROXINE SODIUM 125 MCG: 125 TABLET ORAL at 09:11

## 2018-09-29 RX ADMIN — MULTIPLE VITAMINS W/ MINERALS TAB 1 TABLET: TAB at 09:18

## 2018-09-29 RX ADMIN — WARFARIN SODIUM 7.5 MG: 7.5 TABLET ORAL at 18:12

## 2018-09-29 RX ADMIN — POLYETHYLENE GLYCOL 3350 17 G: 17 POWDER, FOR SOLUTION ORAL at 09:07

## 2018-09-29 RX ADMIN — ACETAMINOPHEN 650 MG: 325 TABLET ORAL at 23:51

## 2018-09-29 RX ADMIN — FUROSEMIDE 40 MG: 10 INJECTION, SOLUTION INTRAMUSCULAR; INTRAVENOUS at 18:16

## 2018-09-29 RX ADMIN — SIMVASTATIN 40 MG: 40 TABLET, FILM COATED ORAL at 22:48

## 2018-09-29 RX ADMIN — FAMOTIDINE 20 MG: 20 TABLET ORAL at 09:09

## 2018-09-29 RX ADMIN — Medication 10 ML: at 22:47

## 2018-09-29 RX ADMIN — OXYCODONE HYDROCHLORIDE 5 MG: 5 TABLET ORAL at 23:51

## 2018-09-29 RX ADMIN — OXYCODONE HYDROCHLORIDE 5 MG: 5 TABLET ORAL at 12:19

## 2018-09-29 RX ADMIN — PAROXETINE HYDROCHLORIDE 20 MG: 20 TABLET, FILM COATED ORAL at 09:10

## 2018-09-29 NOTE — PROGRESS NOTES
[facility] Hospitalist Group Progress Note Patient: Spring Glover Age: 60 y.o. : 1960 MR#: [id number] SSN: [id number] Date: 2018 Subjective:  
 
C/o pain to right hip ongoing, denies dysuria / urgency / frequency. Has been having multiple loose bowel movements. Has dypnea with exertion Assessment/Plan: 1. Gen weakness with decreased mobility - pt mentions that she was able to walk some at the SNF / rehab but is unable to walk now - PT & OT eval placed 2. Fevers of unclear etiology - febrile despite tx for UTI; check cdiff and influenza; PVL although with therapeutic INR 3. CAD - continue imdur, trop neg x 3 
4. Hypothyroidism - TSH WNL; continue synthroid 5. H/o A fib - continue coumadin, daily INR 6. HLPD - continue lipitor 7. Chronic Hypercapneic Resp failure - reports use of oxygenn as needed PTA 8. Morbid Obesity - encourage safe weight loss 9. CHF - add fluid restriction; cont lasix per home regimen. 10. Loose BM's - check Cdiff; stop all laxatives Additional Notes:   
 
Case discussed with:  [x]Patient  []Family  [x]Nursing  []Case Management DVT Prophylaxis:  []Lovenox  []Hep SQ  []SCDs  [x]Coumadin   []On Heparin gtt Objective:  
VS:  
Visit Vitals  /77 (BP 1 Location: Left arm, BP Patient Position: At rest)  Pulse 69  Temp (!) 100.9 °F (38.3 °C)  Resp 20  
 Ht 5' (1.524 m)  Wt 132.3 kg (291 lb 9.6 oz)  SpO2 96%  Breastfeeding No  
 BMI 56.95 kg/m2 Tmax/24hrs: Temp (24hrs), Av.3 °F (37.9 °C), Min:99.6 °F (37.6 °C), Max:100.9 °F (38.3 °C) Intake/Output Summary (Last 24 hours) at 18 1138 Last data filed at 18 0500 Gross per 24 hour Intake                0 ml Output              950 ml Net             -950 ml General:  Alert, NAD Cardiovascular:  RRR Pulmonary:  LSC throughout; respiratory effort WNL 
GI:  +BS in all four quadrants, soft, non-tender Extremities:  Non-pitting edema; 2+ dorsalis pedis pulses bilaterally Neuro: A&O x 3 Family contact: Sister Rios Fernández [phone] Labs:   
Recent Results (from the past 24 hour(s)) EKG, 12 LEAD, INITIAL Collection Time: 09/28/18  8:35 PM  
Result Value Ref Range Ventricular Rate 83 BPM  
 Atrial Rate 119 BPM  
 QRS Duration 124 ms Q-T Interval 424 ms QTC Calculation (Bezet) 498 ms Calculated R Axis -36 degrees Calculated T Axis 108 degrees Diagnosis Atrial fibrillation Left axis deviation Nonspecific intraventricular conduction delay Abnormal QRS-T angle, consider primary T wave abnormality Abnormal ECG When compared with ECG of 19-AUG-2018 12:58, Nonspecific T wave abnormality no longer evident in Inferior leads CBC WITH AUTOMATED DIFF Collection Time: 09/28/18  9:07 PM  
Result Value Ref Range WBC 8.3 4.6 - 13.2 K/uL  
 RBC 4.65 4.20 - 5.30 M/uL  
 HGB 10.9 (L) 12.0 - 16.0 g/dL HCT 34.8 (L) 35.0 - 45.0 % MCV 74.8 74.0 - 97.0 FL  
 MCH 23.4 (L) 24.0 - 34.0 PG  
 MCHC 31.3 31.0 - 37.0 g/dL  
 RDW 17.9 (H) 11.6 - 14.5 % PLATELET 749 571 - 167 K/uL MPV 9.9 9.2 - 11.8 FL  
 NEUTROPHILS 76 (H) 40 - 73 % LYMPHOCYTES 14 (L) 21 - 52 % MONOCYTES 10 3 - 10 % EOSINOPHILS 0 0 - 5 % BASOPHILS 0 0 - 2 %  
 ABS. NEUTROPHILS 6.3 1.8 - 8.0 K/UL  
 ABS. LYMPHOCYTES 1.2 0.9 - 3.6 K/UL  
 ABS. MONOCYTES 0.8 0.05 - 1.2 K/UL  
 ABS. EOSINOPHILS 0.0 0.0 - 0.4 K/UL  
 ABS. BASOPHILS 0.0 0.0 - 0.1 K/UL  
 DF AUTOMATED METABOLIC PANEL, COMPREHENSIVE Collection Time: 09/28/18  9:07 PM  
Result Value Ref Range Sodium 135 (L) 136 - 145 mmol/L Potassium 3.7 3.5 - 5.5 mmol/L Chloride 99 (L) 100 - 108 mmol/L  
 CO2 29 21 - 32 mmol/L Anion gap 7 3.0 - 18 mmol/L Glucose 134 (H) 74 - 99 mg/dL BUN 20 (H) 7.0 - 18 MG/DL Creatinine 1.14 0.6 - 1.3 MG/DL  
 BUN/Creatinine ratio 18 12 - 20 GFR est AA 59 (L) >60 ml/min/1.73m2 GFR est non-AA 49 (L) >60 ml/min/1.73m2 Calcium 8.2 (L) 8.5 - 10.1 MG/DL Bilirubin, total 1.7 (H) 0.2 - 1.0 MG/DL  
 ALT (SGPT) 24 13 - 56 U/L  
 AST (SGOT) 23 15 - 37 U/L Alk. phosphatase 195 (H) 45 - 117 U/L Protein, total 8.0 6.4 - 8.2 g/dL Albumin 3.2 (L) 3.4 - 5.0 g/dL Globulin 4.8 (H) 2.0 - 4.0 g/dL A-G Ratio 0.7 (L) 0.8 - 1.7 NT-PRO BNP Collection Time: 09/28/18  9:07 PM  
Result Value Ref Range NT pro-BNP 3437 (H) 0 - 900 PG/ML  
TROPONIN I Collection Time: 09/28/18  9:07 PM  
Result Value Ref Range Troponin-I, Qt. 0.02 0.0 - 0.045 NG/ML  
URINALYSIS W/ RFLX MICROSCOPIC Collection Time: 09/29/18  1:08 AM  
Result Value Ref Range Color DARK YELLOW Appearance CLOUDY Specific gravity 1.016 1.005 - 1.030    
 pH (UA) 6.0 5.0 - 8.0 Protein 30 (A) NEG mg/dL Glucose NEGATIVE  NEG mg/dL Ketone NEGATIVE  NEG mg/dL Bilirubin SMALL (A) NEG Blood LARGE (A) NEG Urobilinogen 4.0 (H) 0.2 - 1.0 EU/dL Nitrites POSITIVE (A) NEG Leukocyte Esterase MODERATE (A) NEG URINE MICROSCOPIC ONLY Collection Time: 09/29/18  1:08 AM  
Result Value Ref Range WBC 20 to 25 0 - 4 /hpf  
 RBC 8 to 10 0 - 5 /hpf Epithelial cells 2+ 0 - 5 /lpf Bacteria 4+ (A) NEG /hpf Signed By: Stephan Maguire NP September 29, 2018

## 2018-09-29 NOTE — ROUTINE PROCESS
TRANSFER - IN REPORT: 
 
Verbal report received from Nataly Melchor RN(name) on [address]  being received from ER(unit) for routine progression of care Report consisted of patients Situation, Background, Assessment and  
Recommendations(SBAR). Information from the following report(s) SBAR, Kardex, ED Summary, Intake/Output, MAR and Recent Results was reviewed with the receiving nurse. Opportunity for questions and clarification was provided. Assessment completed upon patients arrival to unit and care assumed. Patient arrived to unit. Assisted to bed with 3 assist.  Alert and oriented. No skin breakdown. Positioned in bed. Call bell in reach.

## 2018-09-29 NOTE — PROGRESS NOTES
Reason for Admission:   CHF, fell at home RRAT Score:   18 Do you (patient/family) have any concerns for transition/discharge? Plan for utilizing home health:    undetermined Likelihood of readmission? Mod/yellow Transition of Care Plan:      Demographic information verified by patient. PCP confirmed as Dr. Donn Laboy. Patient was just d/c from [facility] to [facility] home and fell. Daughter has told staff that she can no longer handle her mother. Patient thinks she is going back there to live. Patient has a walker with wheels and home oxygen she uses prn Care Management Interventions PCP Verified by CM: Yes 
Palliative Care Criteria Met (RRAT>21 & CHF Dx)?: No 
Mode of Transport at Discharge: BLS Transition of Care Consult (CM Consult): Discharge Planning Current Support Network: Relative's Home Confirm Follow Up Transport: Cab Plan discussed with Pt/Family/Caregiver: Yes [employer] Information Provided?: No 
Discharge Location Discharge Placement: Unable to determine at this time

## 2018-09-29 NOTE — ED NOTES
TRANSFER - OUT REPORT: 
 
Verbal report given to Mickie Denson RN (name) on Sophia Guevara  being transferred to [employer] (unit) for routine progression of care Report consisted of patients Situation, Background, Assessment and  
Recommendations(SBAR). Information from the following report(s) SBAR, Kardex, ED Summary, Intake/Output, MAR and Recent Results was reviewed with the receiving nurse. Lines:  
Peripheral IV 09/28/18 Right Antecubital (Active) Site Assessment Clean, dry, & intact 9/28/2018  9:13 PM  
Phlebitis Assessment 0 9/28/2018  9:13 PM  
Infiltration Assessment 0 9/28/2018  9:13 PM  
Dressing Status Clean, dry, & intact 9/28/2018  9:13 PM  
Dressing Type 4 X 4 9/28/2018  9:13 PM  
Hub Color/Line Status Blue;Patent 9/28/2018  9:13 PM  
Action Taken Blood drawn 9/28/2018  9:13 PM  
  
 
Opportunity for questions and clarification was provided. Patient transported with: 
 Monitor O2 @ 3 liters Registered Nurse

## 2018-09-29 NOTE — H&P
HISTORY & PHYSICAL Patient: Ricki Lyon MRN: [id number]  CSN: [id number] YOB: 1960  Age: 60 y.o. Sex: female DOA: 9/28/2018 LOS:  LOS: 0 days DOA: 9/28/2018 Assessment/Plan Active Problems: 
  CHF (congestive heart failure) ([facility] [address] (4/18/2017) CHF exacerbation ([facility] [address] (9/29/2018) Plan: 1. CHF exac - IV lasix , was recently admitted to the hosp for the same problem 2. Gen weakness with decreased mobility - pt mentions that she was able to walk some at the SNF / rehab but is unable to walk now - PT & OT eval  
3. HTN - resume home meds, monitor BP  
4. UTI - IV Rocephin 5. CAD - continue imdur 6. HypoT4 - continue synthroid 7. H/o A fib - continue coumadin 8. HLPD - continue lipitor 9. ANDRADE - CPAP 10. H/o Paranoid schizophrenia - resume home meds, monitor 11. Chronic Hypercapneic Resp failure - continue oxygen support 12. Morbid Obesity DVT px - coumadin FC Severity of Signs & Symptoms -- Moderate Risk of adverse events -- moderate Current Medical Rx Plan - As Above Patient history & comorbidities - Per HPI Discharge Plan -- Home / SNF 
  
 
 
 
HPI:  
 
Ricki Lyon is a 60 y.o. female who is being admitted for CHF exac & Gen weakness Pt is unable to give a good history , keeps falling asleep - she mentions she just discharged from the rehab yesterday , went home but was very weak , unable to move or care for herself , lives with daughter Was brought back to the ER PMHx - As above ER eval - pt found to have CHF exac , UTI Will admit for further eval  
 
 
Past Medical History:  
Diagnosis Date  Atrial fibrillation CHADS score 1  (-CHF, +HTN, -AGE, -DM, -CVA)  Carpal tunnel syndrome  Chronic pain  Congenital heart disease   
 presumed pulmonary stenosis s/p repair 1969  Congestive heart failure ([facility] [address]  Degenerative disc disease  Degenerative joint disease  GERD   
  H/O echocardiogram 04/2017 EF 60-65%, mild concentric LVH, dilated RV with RV dysfunction, RVSP 54 mmHg, RA E, moderate to severe pulmonic regurgitation.  Hypercholesterolemia  Hypertension  Hypothyroid  Morbid obesity  Morbid obesity with BMI of 45.0-49.9, adult ([facility] [address] 2/20/2017  Noncompliance  Schizoaffective disorder  Substance abuse   
 marijuana, crack cocaine Past Surgical History:  
Procedure Laterality Date [address] [place] VSD repair  COLONOSCOPY N/A 5/6/2018 COLONOSCOPY with tatooing and biopsy performed by Lavern Rodriguez MD at [address] [place] [address]    
 left  HX HYSTERECTOMY  HX KNEE REPLACEMENT    
 left  HX KNEE REPLACEMENT    
 right  HX ORTHOPAEDIC    
 left ankle  HX ORTHOPAEDIC    
 carpel tunnel right and left  HX THYROIDECTOMY Left Family History Problem Relation Age of Onset  Hypertension Mother  Coronary Artery Disease Mother  Coronary Artery Disease Father  Hypertension Father Social History Social History  Marital status: SINGLE Spouse name: N/A  
 Number of children: N/A  
 Years of education: N/A Social History Main Topics  Smoking status: Never Smoker  Smokeless tobacco: Never Used  Alcohol use No  
 Drug use: No  
   Comment: Quit crack cocaine and marijuana 2015  Sexual activity: Yes Birth control/ protection: Surgical  
 
Other Topics Concern  Not on file Social History Narrative Prior to Admission medications Medication Sig Start Date End Date Taking? Authorizing Provider  
methocarbamol (ROBAXIN) 500 mg tablet Take  by mouth four (4) times daily. Historical Provider  
senna-docusate (PERICOLACE) 8.6-50 mg per tablet Take 1 Tab by mouth nightly.  8/29/18   Zeynep Cornejo, NP  
acetaminophen-codeine (TYLENOL #3) 300-30 mg per tablet Take 1 Tab by mouth every eight (8) hours as needed (moderate pain). Max Daily Amount: 3 Tabs. 8/29/18   Lupe Hawk NP  
lisinopril (PRINIVIL, ZESTRIL) 5 mg tablet Take 1 Tab by mouth daily. 8/24/18   Jose Rosales PA-C  
warfarin (COUMADIN) 7.5 mg tablet Take 1 Tab by mouth daily. 8/24/18   Jose Rosales PA-C  
albuterol-ipratropium (DUO-NEB) 2.5 mg-0.5 mg/3 ml nebu 3 mL by Nebulization route every six (6) hours as needed. 8/24/18   Jose Rosales PA-C  
furosemide (LASIX) 40 mg tablet Take 1 Tab by mouth two (2) times a day. 40 mg po daily 6/6/18   Apurva Alba MD  
polyethylene glycol (MIRALAX) 17 gram packet Take 1 Packet by mouth daily. 6/6/18   Apurva Alba MD  
metoprolol tartrate (LOPRESSOR) 25 mg tablet Take 0.5 Tabs by mouth two (2) times a day. 5/17/18   Shahid Stallworth NP  
fluticasone (FLONASE) 50 mcg/actuation nasal spray 2 Sprays by Both Nostrils route daily. 5/10/18   Apurva Alba MD  
isosorbide mononitrate ER (IMDUR) 30 mg tablet Take 1 Tab by mouth every evening. 4/24/18   Shahid Stallworth NP  
multivitamin, tx-iron-ca-min (THERA-M W/ IRON) 9 mg iron-400 mcg tab tablet Take 1 Tab by mouth daily. 3/1/18   Marcos Hebert MD  
raNITIdine (ZANTAC) 150 mg tablet Take 150 mg by mouth two (2) times a day. Historical Provider VENTOLIN HFA 90 mcg/actuation inhaler Take 2 Puffs by inhalation every four (4) hours as needed for Wheezing or Shortness of Breath. 4/26/17   Marcos Hebert MD  
PARoxetine (PAXIL) 20 mg tablet Take 1 Tab by mouth daily. 1/21/14   Rosaura Syed MD  
simvastatin (ZOCOR) 40 mg tablet Take  by mouth nightly. to obtain from pharmacy    Historical Provider  
aripiprazole (ABILIFY) 15 mg tablet Take 15 mg by mouth daily. to obtain from pharmacy    Historical Provider  
levothyroxine (SYNTHROID) 125 mcg tablet Take 125 mcg by mouth Daily (before breakfast). Historical Provider Allergies Allergen Reactions  Gabapentin Other (comments) Unsteady, weakness LEs  Tetanus Vaccines And Toxoid Swelling  Topamax [Topiramate] Other (comments)  
  weakness Review of Systems Review of systems not obtained due to patient factors. Physical Exam:  
  
Visit Vitals  BP 98/59  Pulse 82  Resp (!) 5  
 SpO2 99% Physical Exam: 
 
Gen: In general, this is a morbidly obese female in no acute distress HEENT: Sclerae nonicteric. Oral mucous membranes moist. Dentition poor Neck: Supple with midline trachea. CV: RRR without murmur or rub appreciated. Resp:Respirations are unlabored without use of accessory muscles. Decreased BS bilaterally in bases Abd: Soft, nontender, nondistended. Extrem: Extremities are warm, without cyanosis or clubbing. No pitting pretibial edema. Skin: Warm, no visible rashes. Neuro: Patient is alert, oriented, and cooperative. No obvious focal defects. Moves all 4 extremities. Labs Reviewed: 
 
Recent Results (from the past 24 hour(s)) EKG, 12 LEAD, INITIAL Collection Time: 09/28/18  8:35 PM  
Result Value Ref Range Ventricular Rate 83 BPM  
 Atrial Rate 119 BPM  
 QRS Duration 124 ms Q-T Interval 424 ms QTC Calculation (Bezet) 498 ms Calculated R Axis -36 degrees Calculated T Axis 108 degrees Diagnosis Atrial fibrillation Left axis deviation Nonspecific intraventricular conduction delay Abnormal QRS-T angle, consider primary T wave abnormality Abnormal ECG When compared with ECG of 19-AUG-2018 12:58, Nonspecific T wave abnormality no longer evident in Inferior leads CBC WITH AUTOMATED DIFF Collection Time: 09/28/18  9:07 PM  
Result Value Ref Range WBC 8.3 4.6 - 13.2 K/uL  
 RBC 4.65 4.20 - 5.30 M/uL  
 HGB 10.9 (L) 12.0 - 16.0 g/dL HCT 34.8 (L) 35.0 - 45.0 % MCV 74.8 74.0 - 97.0 FL  
 MCH 23.4 (L) 24.0 - 34.0 PG  
 MCHC 31.3 31.0 - 37.0 g/dL  
 RDW 17.9 (H) 11.6 - 14.5 % PLATELET 623 439 - 930 K/uL MPV 9.9 9.2 - 11.8 FL  
 NEUTROPHILS 76 (H) 40 - 73 % LYMPHOCYTES 14 (L) 21 - 52 % MONOCYTES 10 3 - 10 % EOSINOPHILS 0 0 - 5 % BASOPHILS 0 0 - 2 %  
 ABS. NEUTROPHILS 6.3 1.8 - 8.0 K/UL  
 ABS. LYMPHOCYTES 1.2 0.9 - 3.6 K/UL  
 ABS. MONOCYTES 0.8 0.05 - 1.2 K/UL  
 ABS. EOSINOPHILS 0.0 0.0 - 0.4 K/UL  
 ABS. BASOPHILS 0.0 0.0 - 0.1 K/UL  
 DF AUTOMATED METABOLIC PANEL, COMPREHENSIVE Collection Time: 09/28/18  9:07 PM  
Result Value Ref Range Sodium 135 (L) 136 - 145 mmol/L Potassium 3.7 3.5 - 5.5 mmol/L Chloride 99 (L) 100 - 108 mmol/L  
 CO2 29 21 - 32 mmol/L Anion gap 7 3.0 - 18 mmol/L Glucose 134 (H) 74 - 99 mg/dL BUN 20 (H) 7.0 - 18 MG/DL Creatinine 1.14 0.6 - 1.3 MG/DL  
 BUN/Creatinine ratio 18 12 - 20 GFR est AA 59 (L) >60 ml/min/1.73m2 GFR est non-AA 49 (L) >60 ml/min/1.73m2 Calcium 8.2 (L) 8.5 - 10.1 MG/DL Bilirubin, total 1.7 (H) 0.2 - 1.0 MG/DL  
 ALT (SGPT) 24 13 - 56 U/L  
 AST (SGOT) 23 15 - 37 U/L Alk. phosphatase 195 (H) 45 - 117 U/L Protein, total 8.0 6.4 - 8.2 g/dL Albumin 3.2 (L) 3.4 - 5.0 g/dL Globulin 4.8 (H) 2.0 - 4.0 g/dL A-G Ratio 0.7 (L) 0.8 - 1.7 NT-PRO BNP Collection Time: 09/28/18  9:07 PM  
Result Value Ref Range NT pro-BNP 3437 (H) 0 - 900 PG/ML  
TROPONIN I Collection Time: 09/28/18  9:07 PM  
Result Value Ref Range Troponin-I, Qt. 0.02 0.0 - 0.045 NG/ML  
URINALYSIS W/ RFLX MICROSCOPIC Collection Time: 09/29/18  1:08 AM  
Result Value Ref Range Color DARK YELLOW Appearance CLOUDY Specific gravity 1.016 1.005 - 1.030    
 pH (UA) 6.0 5.0 - 8.0 Protein 30 (A) NEG mg/dL Glucose NEGATIVE  NEG mg/dL Ketone NEGATIVE  NEG mg/dL Bilirubin SMALL (A) NEG Blood LARGE (A) NEG Urobilinogen 4.0 (H) 0.2 - 1.0 EU/dL Nitrites POSITIVE (A) NEG Leukocyte Esterase MODERATE (A) NEG URINE MICROSCOPIC ONLY Collection Time: 09/29/18  1:08 AM  
Result Value Ref Range WBC 20 to 25 0 - 4 /hpf  
 RBC 8 to 10 0 - 5 /hpf Epithelial cells 2+ 0 - 5 /lpf Bacteria 4+ (A) NEG /hpf Imaging Reviewed: CXR IMPRESSION: 
  
Mild interstitial prominence which may be secondary to lung inflammation or 
interstitial edema.  
 
 
 
Sandro Kraft MD 
9/29/2018, 2:03 AM

## 2018-09-29 NOTE — PROGRESS NOTES
Bedside shift change report given to Jaswinder Camacho (oncoming nurse) by Yolie Gan (offgoing nurse). Report included the following information SBAR. Patient c/o pain in hip. Oxycodone given with good results. Pt is experiencing diarrhea after morning dose of miralax. Clean catch urine specimen obtained. No distress at this time . Call bell within reach.

## 2018-09-30 ENCOUNTER — APPOINTMENT (OUTPATIENT)
Dept: GENERAL RADIOLOGY | Age: 58
DRG: 463 | End: 2018-09-30
Attending: NURSE PRACTITIONER
Payer: MEDICAID

## 2018-09-30 LAB
ALBUMIN SERPL-MCNC: 2.7 G/DL (ref 3.4–5)
ALBUMIN/GLOB SERPL: 0.6 {RATIO} (ref 0.8–1.7)
ALP SERPL-CCNC: 196 U/L (ref 45–117)
ALT SERPL-CCNC: 26 U/L (ref 13–56)
ANION GAP SERPL CALC-SCNC: 7 MMOL/L (ref 3–18)
APTT PPP: 43.4 SEC (ref 23–36.4)
AST SERPL-CCNC: 34 U/L (ref 15–37)
ATRIAL RATE: 119 BPM
BILIRUB SERPL-MCNC: 1.3 MG/DL (ref 0.2–1)
BUN SERPL-MCNC: 21 MG/DL (ref 7–18)
BUN/CREAT SERPL: 16 (ref 12–20)
CALCIUM SERPL-MCNC: 7.9 MG/DL (ref 8.5–10.1)
CALCULATED R AXIS, ECG10: -36 DEGREES
CALCULATED T AXIS, ECG11: 108 DEGREES
CHLORIDE SERPL-SCNC: 96 MMOL/L (ref 100–108)
CHOLEST SERPL-MCNC: 104 MG/DL
CO2 SERPL-SCNC: 30 MMOL/L (ref 21–32)
CREAT SERPL-MCNC: 1.33 MG/DL (ref 0.6–1.3)
DIAGNOSIS, 93000: NORMAL
ERYTHROCYTE [DISTWIDTH] IN BLOOD BY AUTOMATED COUNT: 17.7 % (ref 11.6–14.5)
FLUAV AG NPH QL IA: NEGATIVE
FLUBV AG NOSE QL IA: NEGATIVE
GLOBULIN SER CALC-MCNC: 4.6 G/DL (ref 2–4)
GLUCOSE SERPL-MCNC: 99 MG/DL (ref 74–99)
HCT VFR BLD AUTO: 31.7 % (ref 35–45)
HDLC SERPL-MCNC: 31 MG/DL (ref 40–60)
HDLC SERPL: 3.4 {RATIO} (ref 0–5)
HGB BLD-MCNC: 9.8 G/DL (ref 12–16)
INR PPP: 2.1 (ref 0.8–1.2)
LACTATE SERPL-SCNC: 1.3 MMOL/L (ref 0.4–2)
LDLC SERPL CALC-MCNC: 54 MG/DL (ref 0–100)
LIPID PROFILE,FLP: ABNORMAL
MAGNESIUM SERPL-MCNC: 2.2 MG/DL (ref 1.6–2.6)
MCH RBC QN AUTO: 23.3 PG (ref 24–34)
MCHC RBC AUTO-ENTMCNC: 30.9 G/DL (ref 31–37)
MCV RBC AUTO: 75.5 FL (ref 74–97)
PLATELET # BLD AUTO: 181 K/UL (ref 135–420)
PMV BLD AUTO: 9.7 FL (ref 9.2–11.8)
POTASSIUM SERPL-SCNC: 3.5 MMOL/L (ref 3.5–5.5)
PROT SERPL-MCNC: 7.3 G/DL (ref 6.4–8.2)
PROTHROMBIN TIME: 23.5 SEC (ref 11.5–15.2)
Q-T INTERVAL, ECG07: 424 MS
QRS DURATION, ECG06: 124 MS
QTC CALCULATION (BEZET), ECG08: 498 MS
RBC # BLD AUTO: 4.2 M/UL (ref 4.2–5.3)
SODIUM SERPL-SCNC: 133 MMOL/L (ref 136–145)
TRIGL SERPL-MCNC: 95 MG/DL (ref ?–150)
VENTRICULAR RATE, ECG03: 83 BPM
VLDLC SERPL CALC-MCNC: 19 MG/DL
WBC # BLD AUTO: 5.4 K/UL (ref 4.6–13.2)

## 2018-09-30 PROCEDURE — 85730 THROMBOPLASTIN TIME PARTIAL: CPT | Performed by: HOSPITALIST

## 2018-09-30 PROCEDURE — 77030027138 HC INCENT SPIROMETER -A

## 2018-09-30 PROCEDURE — 74011250637 HC RX REV CODE- 250/637: Performed by: NURSE PRACTITIONER

## 2018-09-30 PROCEDURE — 83735 ASSAY OF MAGNESIUM: CPT | Performed by: HOSPITALIST

## 2018-09-30 PROCEDURE — 85027 COMPLETE CBC AUTOMATED: CPT | Performed by: HOSPITALIST

## 2018-09-30 PROCEDURE — 74011250636 HC RX REV CODE- 250/636: Performed by: INTERNAL MEDICINE

## 2018-09-30 PROCEDURE — 74011250636 HC RX REV CODE- 250/636: Performed by: HOSPITALIST

## 2018-09-30 PROCEDURE — 74011000250 HC RX REV CODE- 250: Performed by: INTERNAL MEDICINE

## 2018-09-30 PROCEDURE — 87040 BLOOD CULTURE FOR BACTERIA: CPT | Performed by: INTERNAL MEDICINE

## 2018-09-30 PROCEDURE — 36415 COLL VENOUS BLD VENIPUNCTURE: CPT | Performed by: HOSPITALIST

## 2018-09-30 PROCEDURE — 74011250637 HC RX REV CODE- 250/637: Performed by: HOSPITALIST

## 2018-09-30 PROCEDURE — 85610 PROTHROMBIN TIME: CPT | Performed by: NURSE PRACTITIONER

## 2018-09-30 PROCEDURE — 65660000000 HC RM CCU STEPDOWN

## 2018-09-30 PROCEDURE — 83605 ASSAY OF LACTIC ACID: CPT | Performed by: INTERNAL MEDICINE

## 2018-09-30 PROCEDURE — 80061 LIPID PANEL: CPT | Performed by: HOSPITALIST

## 2018-09-30 PROCEDURE — 80053 COMPREHEN METABOLIC PANEL: CPT | Performed by: HOSPITALIST

## 2018-09-30 PROCEDURE — 71045 X-RAY EXAM CHEST 1 VIEW: CPT

## 2018-09-30 RX ORDER — SODIUM CHLORIDE 9 MG/ML
250 INJECTION, SOLUTION INTRAVENOUS ONCE
Status: COMPLETED | OUTPATIENT
Start: 2018-09-30 | End: 2018-09-30

## 2018-09-30 RX ORDER — FUROSEMIDE 40 MG/1
40 TABLET ORAL 2 TIMES DAILY
Status: DISCONTINUED | OUTPATIENT
Start: 2018-09-30 | End: 2018-10-08 | Stop reason: HOSPADM

## 2018-09-30 RX ORDER — WARFARIN 7.5 MG/1
7.5 TABLET ORAL ONCE
Status: COMPLETED | OUTPATIENT
Start: 2018-09-30 | End: 2018-09-30

## 2018-09-30 RX ADMIN — Medication 10 ML: at 22:00

## 2018-09-30 RX ADMIN — PAROXETINE HYDROCHLORIDE 20 MG: 20 TABLET, FILM COATED ORAL at 08:24

## 2018-09-30 RX ADMIN — FUROSEMIDE 40 MG: 10 INJECTION, SOLUTION INTRAMUSCULAR; INTRAVENOUS at 08:25

## 2018-09-30 RX ADMIN — SIMVASTATIN 40 MG: 40 TABLET, FILM COATED ORAL at 21:42

## 2018-09-30 RX ADMIN — OXYCODONE HYDROCHLORIDE 5 MG: 5 TABLET ORAL at 21:48

## 2018-09-30 RX ADMIN — WARFARIN SODIUM 7.5 MG: 7.5 TABLET ORAL at 17:49

## 2018-09-30 RX ADMIN — OXYCODONE HYDROCHLORIDE 5 MG: 5 TABLET ORAL at 14:14

## 2018-09-30 RX ADMIN — SODIUM CHLORIDE 250 ML: 900 INJECTION, SOLUTION INTRAVENOUS at 10:03

## 2018-09-30 RX ADMIN — METOPROLOL TARTRATE 12.5 MG: 25 TABLET ORAL at 08:24

## 2018-09-30 RX ADMIN — FUROSEMIDE 40 MG: 40 TABLET ORAL at 17:49

## 2018-09-30 RX ADMIN — FAMOTIDINE 20 MG: 20 TABLET ORAL at 17:50

## 2018-09-30 RX ADMIN — MULTIPLE VITAMINS W/ MINERALS TAB 1 TABLET: TAB at 08:24

## 2018-09-30 RX ADMIN — CEFTRIAXONE SODIUM 2 G: 2 INJECTION, POWDER, FOR SOLUTION INTRAMUSCULAR; INTRAVENOUS at 23:58

## 2018-09-30 RX ADMIN — LISINOPRIL 5 MG: 5 TABLET ORAL at 08:24

## 2018-09-30 RX ADMIN — Medication 10 ML: at 06:34

## 2018-09-30 RX ADMIN — ARIPIPRAZOLE 15 MG: 5 TABLET ORAL at 08:23

## 2018-09-30 RX ADMIN — FLUTICASONE PROPIONATE 2 SPRAY: 50 SPRAY, METERED NASAL at 12:32

## 2018-09-30 RX ADMIN — FAMOTIDINE 20 MG: 20 TABLET ORAL at 08:24

## 2018-09-30 RX ADMIN — Medication 10 ML: at 14:15

## 2018-09-30 RX ADMIN — LEVOTHYROXINE SODIUM 125 MCG: 125 TABLET ORAL at 08:24

## 2018-09-30 RX ADMIN — METOPROLOL TARTRATE 12.5 MG: 25 TABLET ORAL at 17:50

## 2018-09-30 RX ADMIN — ISOSORBIDE MONONITRATE 30 MG: 30 TABLET, EXTENDED RELEASE ORAL at 17:49

## 2018-09-30 RX ADMIN — OXYCODONE HYDROCHLORIDE 5 MG: 5 TABLET ORAL at 08:24

## 2018-09-30 NOTE — PROGRESS NOTES
[facility] Progress Note Patient: Sophia Guevara Age: 60 y.o. : 1960 MR#: [id number] SSN: [id number] Date: 2018 Subjective:  
 
Reports ongoing R hip pain chronically, States breathing is 100% and denies cough; denies dysuria / urgency / frequency. No further BM's since yesterday. Assessment/Plan: 1. Gen weakness with decreased mobility - pt mentions that she was able to walk some at the SNF / rehab but is unable to walk now - PT & OT eval placed 2. Fevers of unclear etiology - improved today but febrile overnight; blood cultures in process x 2; no clinical concern for PNA, no flu; PVL prelim neg. Consider ID consult if fevers persist without clear cause. 3. CAD - continue imdur, trop neg x 3 
4. Hypothyroidism - TSH WNL; continue synthroid 5. H/o A fib - continue coumadin, daily INR 6. HLPD - continue lipitor 7. Chronic Hypercapneic Resp failure - reports use of oxygenn as needed PTA, off O2 today 8. Morbid Obesity - encourage safe weight loss 9. CHF - add fluid restriction; cont lasix per home regimen. 10. Loose BM's - check Cdiff; stop all laxatives 11. SHANTANU - stop ACEi for now, change to oral lasix neena in setting of hypotension x 1 Additional Notes:   
 
Case discussed with:  [x]Patient  []Family  [x]Nursing  []Case Management DVT Prophylaxis:  []Lovenox  []Hep SQ  []SCDs  [x]Coumadin   []On Heparin gtt Objective:  
VS:  
Visit Vitals  /85 (BP 1 Location: Left arm, BP Patient Position: Sitting)  Pulse 60  Temp 99.4 °F (37.4 °C)  Resp 18  Ht 5' (1.524 m)  Wt 132.1 kg (291 lb 3.2 oz)  SpO2 98%  Breastfeeding No  
 BMI 56.87 kg/m2 Tmax/24hrs: Temp (24hrs), Av.4 °F (37.4 °C), Min:96.8 °F (36 °C), Max:103 °F (39.4 °C) Intake/Output Summary (Last 24 hours) at 18 1351 Last data filed at 18 1911 Gross per 24 hour Intake                0 ml Output              500 ml Net             -500 ml General:  Alert, NAD Cardiovascular:  RRR Pulmonary:  LSC throughout; respiratory effort WNL 
GI:  +BS in all four quadrants, soft, non-tender Extremities:  Non-pitting edema; 2+ dorsalis pedis pulses bilaterally Neuro: A&O x 3 Family contact: Sister Aidan Granda [phone] Labs:   
Recent Results (from the past 24 hour(s)) TROPONIN I Collection Time: 09/29/18  6:32 PM  
Result Value Ref Range Troponin-I, Qt. 0.02 0.0 - 0.045 NG/ML  
INFLUENZA A & B AG (RAPID TEST) Collection Time: 09/29/18  7:00 PM  
Result Value Ref Range Influenza A Antigen NEGATIVE  NEG Influenza B Antigen NEGATIVE  NEG    
METABOLIC PANEL, COMPREHENSIVE Collection Time: 09/30/18  4:17 AM  
Result Value Ref Range Sodium 133 (L) 136 - 145 mmol/L Potassium 3.5 3.5 - 5.5 mmol/L Chloride 96 (L) 100 - 108 mmol/L  
 CO2 30 21 - 32 mmol/L Anion gap 7 3.0 - 18 mmol/L Glucose 99 74 - 99 mg/dL BUN 21 (H) 7.0 - 18 MG/DL Creatinine 1.33 (H) 0.6 - 1.3 MG/DL  
 BUN/Creatinine ratio 16 12 - 20 GFR est AA 49 (L) >60 ml/min/1.73m2 GFR est non-AA 41 (L) >60 ml/min/1.73m2 Calcium 7.9 (L) 8.5 - 10.1 MG/DL Bilirubin, total 1.3 (H) 0.2 - 1.0 MG/DL  
 ALT (SGPT) 26 13 - 56 U/L  
 AST (SGOT) 34 15 - 37 U/L Alk. phosphatase 196 (H) 45 - 117 U/L Protein, total 7.3 6.4 - 8.2 g/dL Albumin 2.7 (L) 3.4 - 5.0 g/dL Globulin 4.6 (H) 2.0 - 4.0 g/dL A-G Ratio 0.6 (L) 0.8 - 1.7 LIPID PANEL Collection Time: 09/30/18  4:17 AM  
Result Value Ref Range LIPID PROFILE Cholesterol, total 104 <200 MG/DL Triglyceride 95 <150 MG/DL  
 HDL Cholesterol 31 (L) 40 - 60 MG/DL  
 LDL, calculated 54 0 - 100 MG/DL VLDL, calculated 19 MG/DL  
 CHOL/HDL Ratio 3.4 0 - 5.0    
CBC W/O DIFF Collection Time: 09/30/18  4:17 AM  
Result Value Ref Range WBC 5.4 4.6 - 13.2 K/uL  
 RBC 4.20 4. 20 - 5.30 M/uL HGB 9.8 (L) 12.0 - 16.0 g/dL HCT 31.7 (L) 35.0 - 45.0 % MCV 75.5 74.0 - 97.0 FL  
 MCH 23.3 (L) 24.0 - 34.0 PG  
 MCHC 30.9 (L) 31.0 - 37.0 g/dL  
 RDW 17.7 (H) 11.6 - 14.5 % PLATELET 618 774 - 232 K/uL MPV 9.7 9.2 - 11.8 FL  
PTT Collection Time: 09/30/18  4:17 AM  
Result Value Ref Range aPTT 43.4 (H) 23.0 - 36.4 SEC MAGNESIUM Collection Time: 09/30/18  4:17 AM  
Result Value Ref Range Magnesium 2.2 1.6 - 2.6 mg/dL LACTIC ACID Collection Time: 09/30/18  7:18 AM  
Result Value Ref Range Lactic acid 1.3 0.4 - 2.0 MMOL/L  
PROTHROMBIN TIME + INR Collection Time: 09/30/18  9:00 AM  
Result Value Ref Range Prothrombin time 23.5 (H) 11.5 - 15.2 sec INR 2.1 (H) 0.8 - 1.2 Signed By: Idalmis Hernandez NP September 30, 2018

## 2018-09-30 NOTE — PROGRESS NOTES
Change of Shift report received from Rosalba Donahue to [address] from [employer] [facility] at bedside new IV started in left hand due to right [facility] site being infiltrated  Pain is controlled with regular dosing of pain medication blood pressure running low  MD is aware and requested that lopressor be held for systolic less then 284  
lopresspr given  bp 101/68 no distress no shortness of breath but will provide education and place IS in room   Side rails up and call bell in reach Shift report given to [address] from [employer] information included from [employer] and [employer]

## 2018-09-30 NOTE — PROGRESS NOTES
7:53 PM Beside shift change report given by Trever Rivas RN  to JOCELYNE Buenrostro RN. Report included the information from the [place], [employer], I/O, and recent results. Denies pain or discomfort. Call light within reach. Bed in low position. 7:45 AM Beside shift change report given by JOCELYNE Buenrostro RN to [place], RN. Report included the information from the [place], [employer], I/O, and recent results.

## 2018-09-30 NOTE — PROGRESS NOTES
09/29/18 2308 Vitals Temp (!) 103 °F (39.4 °C) (RN notified ) Temp Source Oral  
Pulse (Heart Rate) 68 Heart Rate Source Brachial  
Resp Rate 18  
O2 Sat (%) 91 % Level of Consciousness Alert /62 MAP (Calculated) 75 BP 1 Location Left arm BP 1 Method Automatic  
BP Patient Position At rest  
MEWS Score 3  
MEWS of 3 noted. MD called. Orders received.

## 2018-10-01 LAB
ANION GAP SERPL CALC-SCNC: 8 MMOL/L (ref 3–18)
BASOPHILS # BLD: 0 K/UL (ref 0–0.1)
BASOPHILS NFR BLD: 0 % (ref 0–2)
BUN SERPL-MCNC: 21 MG/DL (ref 7–18)
BUN/CREAT SERPL: 19 (ref 12–20)
CALCIUM SERPL-MCNC: 8.3 MG/DL (ref 8.5–10.1)
CHLORIDE SERPL-SCNC: 97 MMOL/L (ref 100–108)
CO2 SERPL-SCNC: 30 MMOL/L (ref 21–32)
CREAT SERPL-MCNC: 1.08 MG/DL (ref 0.6–1.3)
DIFFERENTIAL METHOD BLD: ABNORMAL
EOSINOPHIL # BLD: 0.1 K/UL (ref 0–0.4)
EOSINOPHIL NFR BLD: 3 % (ref 0–5)
ERYTHROCYTE [DISTWIDTH] IN BLOOD BY AUTOMATED COUNT: 17.3 % (ref 11.6–14.5)
GLUCOSE SERPL-MCNC: 93 MG/DL (ref 74–99)
HCT VFR BLD AUTO: 33.8 % (ref 35–45)
HGB BLD-MCNC: 10.2 G/DL (ref 12–16)
INR PPP: 2 (ref 0.8–1.2)
LYMPHOCYTES # BLD: 1.4 K/UL (ref 0.9–3.6)
LYMPHOCYTES NFR BLD: 32 % (ref 21–52)
MCH RBC QN AUTO: 23.2 PG (ref 24–34)
MCHC RBC AUTO-ENTMCNC: 30.2 G/DL (ref 31–37)
MCV RBC AUTO: 77 FL (ref 74–97)
MONOCYTES # BLD: 0.7 K/UL (ref 0.05–1.2)
MONOCYTES NFR BLD: 16 % (ref 3–10)
NEUTS SEG # BLD: 2.1 K/UL (ref 1.8–8)
NEUTS SEG NFR BLD: 49 % (ref 40–73)
PLATELET # BLD AUTO: 184 K/UL (ref 135–420)
PMV BLD AUTO: 9.9 FL (ref 9.2–11.8)
POTASSIUM SERPL-SCNC: 3.7 MMOL/L (ref 3.5–5.5)
PROTHROMBIN TIME: 22.8 SEC (ref 11.5–15.2)
RBC # BLD AUTO: 4.39 M/UL (ref 4.2–5.3)
SODIUM SERPL-SCNC: 135 MMOL/L (ref 136–145)
WBC # BLD AUTO: 4.3 K/UL (ref 4.6–13.2)

## 2018-10-01 PROCEDURE — 74011250637 HC RX REV CODE- 250/637: Performed by: NURSE PRACTITIONER

## 2018-10-01 PROCEDURE — 85025 COMPLETE CBC W/AUTO DIFF WBC: CPT | Performed by: NURSE PRACTITIONER

## 2018-10-01 PROCEDURE — 85610 PROTHROMBIN TIME: CPT | Performed by: NURSE PRACTITIONER

## 2018-10-01 PROCEDURE — 97166 OT EVAL MOD COMPLEX 45 MIN: CPT

## 2018-10-01 PROCEDURE — 97530 THERAPEUTIC ACTIVITIES: CPT

## 2018-10-01 PROCEDURE — 74011250636 HC RX REV CODE- 250/636: Performed by: INTERNAL MEDICINE

## 2018-10-01 PROCEDURE — 74011250636 HC RX REV CODE- 250/636: Performed by: HOSPITALIST

## 2018-10-01 PROCEDURE — 36415 COLL VENOUS BLD VENIPUNCTURE: CPT | Performed by: NURSE PRACTITIONER

## 2018-10-01 PROCEDURE — 74011000250 HC RX REV CODE- 250: Performed by: INTERNAL MEDICINE

## 2018-10-01 PROCEDURE — 80048 BASIC METABOLIC PNL TOTAL CA: CPT | Performed by: NURSE PRACTITIONER

## 2018-10-01 PROCEDURE — 74011250637 HC RX REV CODE- 250/637: Performed by: HOSPITALIST

## 2018-10-01 PROCEDURE — 77030038269 HC DRN EXT URIN PURWCK BARD -A

## 2018-10-01 PROCEDURE — 65660000000 HC RM CCU STEPDOWN

## 2018-10-01 RX ORDER — WARFARIN 7.5 MG/1
7.5 TABLET ORAL EVERY EVENING
Status: COMPLETED | OUTPATIENT
Start: 2018-10-01 | End: 2018-10-01

## 2018-10-01 RX ADMIN — CEFTRIAXONE SODIUM 2 G: 2 INJECTION, POWDER, FOR SOLUTION INTRAMUSCULAR; INTRAVENOUS at 23:12

## 2018-10-01 RX ADMIN — FAMOTIDINE 20 MG: 20 TABLET ORAL at 17:20

## 2018-10-01 RX ADMIN — METOPROLOL TARTRATE 12.5 MG: 25 TABLET ORAL at 08:53

## 2018-10-01 RX ADMIN — OXYCODONE HYDROCHLORIDE 5 MG: 5 TABLET ORAL at 10:13

## 2018-10-01 RX ADMIN — Medication 10 ML: at 07:43

## 2018-10-01 RX ADMIN — FAMOTIDINE 20 MG: 20 TABLET ORAL at 08:53

## 2018-10-01 RX ADMIN — Medication 10 ML: at 14:00

## 2018-10-01 RX ADMIN — Medication 10 ML: at 23:02

## 2018-10-01 RX ADMIN — FUROSEMIDE 40 MG: 40 TABLET ORAL at 08:53

## 2018-10-01 RX ADMIN — OXYCODONE HYDROCHLORIDE 5 MG: 5 TABLET ORAL at 03:18

## 2018-10-01 RX ADMIN — WARFARIN SODIUM 7.5 MG: 7.5 TABLET ORAL at 17:19

## 2018-10-01 RX ADMIN — FUROSEMIDE 40 MG: 40 TABLET ORAL at 17:19

## 2018-10-01 RX ADMIN — ISOSORBIDE MONONITRATE 30 MG: 30 TABLET, EXTENDED RELEASE ORAL at 17:19

## 2018-10-01 RX ADMIN — LEVOTHYROXINE SODIUM 125 MCG: 125 TABLET ORAL at 07:43

## 2018-10-01 RX ADMIN — OXYCODONE HYDROCHLORIDE 5 MG: 5 TABLET ORAL at 17:20

## 2018-10-01 RX ADMIN — PAROXETINE HYDROCHLORIDE 20 MG: 20 TABLET, FILM COATED ORAL at 08:53

## 2018-10-01 RX ADMIN — ONDANSETRON HYDROCHLORIDE 4 MG: 2 INJECTION, SOLUTION INTRAMUSCULAR; INTRAVENOUS at 08:23

## 2018-10-01 RX ADMIN — SIMVASTATIN 40 MG: 40 TABLET, FILM COATED ORAL at 23:01

## 2018-10-01 RX ADMIN — OXYCODONE HYDROCHLORIDE 5 MG: 5 TABLET ORAL at 23:10

## 2018-10-01 RX ADMIN — ARIPIPRAZOLE 15 MG: 5 TABLET ORAL at 08:51

## 2018-10-01 RX ADMIN — Medication 10 ML: at 17:21

## 2018-10-01 RX ADMIN — MULTIPLE VITAMINS W/ MINERALS TAB 1 TABLET: TAB at 08:52

## 2018-10-01 RX ADMIN — METOPROLOL TARTRATE 12.5 MG: 25 TABLET ORAL at 17:20

## 2018-10-01 NOTE — PROGRESS NOTES
Problem: Falls - Risk of 
Goal: *Absence of Falls Document Yennifer Izabela Fall Risk and appropriate interventions in the flowsheet. Outcome: Progressing Towards Goal 
Fall Risk Interventions: 
Mobility Interventions: Bed/chair exit alarm, Communicate number of staff needed for ambulation/transfer, Strengthening exercises (ROM-active/passive) Medication Interventions: Patient to call before getting OOB, Teach patient to arise slowly, Bed/chair exit alarm Elimination Interventions: Bed/chair exit alarm, Call light in reach, Patient to call for help with toileting needs, Toilet paper/wipes in reach, Toileting schedule/hourly rounds History of Falls Interventions: Bed/chair exit alarm, Door open when patient unattended Problem: Pressure Injury - Risk of 
Goal: *Prevention of pressure injury Document Stas Scale and appropriate interventions in the flowsheet. Outcome: Progressing Towards Goal 
Pressure Injury Interventions: 
Sensory Interventions: Assess changes in LOC, Check visual cues for pain, Keep linens dry and wrinkle-free, Minimize linen layers Moisture Interventions: Absorbent underpads, Internal/External urinary devices, Check for incontinence Q2 hours and as needed Activity Interventions: Increase time out of bed Mobility Interventions: HOB 30 degrees or less Nutrition Interventions: Document food/fluid/supplement intake, Offer support with meals,snacks and hydration Friction and Shear Interventions: HOB 30 degrees or less, Minimize layers

## 2018-10-01 NOTE — PROGRESS NOTES
conducted an initial consultation and Spiritual Assessment for Heather, who is a 60 y.o.,female. Patients Primary Language is: [place]. According to the patients EMR Evangelical Affiliation is: [facility].  
 
The reason the Patient came to the hospital is:  
Patient Active Problem List  
 Diagnosis Date Noted  CHF exacerbation ([facility] [address] 09/29/2018  Anxiety 08/30/2018  Acute exacerbation of CHF (congestive heart failure) ([facility] [address] 06/02/2018  Chronic anticoagulation 06/02/2018  Bradycardia 05/03/2018  Diastolic CHF, chronic ([facility] [address] 10/12/2017  Hypoxia 04/18/2017  Fluid overload 04/18/2017  CHF (congestive heart failure) ([facility] [address] 04/18/2017  DDD (degenerative disc disease), lumbar, L4/5 advanced 03/29/2017  Morbid obesity with BMI of 45.0-49.9, adult ([facility] [address] 02/20/2017  Dyspnea  Dyslipidemia  Osteoarthritis of both knees 01/14/2014  
 H/O total knee replacement 01/14/2014  DJD (degenerative joint disease), lumbosacral 01/14/2014  Hypothyroidism 01/14/2014  Morbid obesity ([facility] [address] 01/14/2014  Esophageal reflux 01/14/2014  Hypertension  Congenital heart disease  Atrial fibrillation The  provided the following Interventions: 
Initiated a relationship of care and support. Provided information about Spiritual Care Services. Offered prayer and assurance of continued prayers on patient's behalf. Chart reviewed. The following outcomes where achieved: 
Patient expressed gratitude for 's visit. Assessment: 
Patient does not have any Taoism/cultural needs that will affect patients preferences in health care. There are no spiritual or Taoism issues which require intervention at this time. Plan: 
Chaplains will continue to follow and will provide pastoral care on an as needed/requested basis.  recommends bedside caregivers page  on duty if patient shows signs of acute spiritual or emotional distress. Sonia Mohamud Spiritual Care 
([phone])

## 2018-10-01 NOTE — CDMP QUERY
\"CHF exacerbation\" was noted in the H/P; however, it is not noted in the progress notes. Please clarify if this patient is being treated/managed for: 
 
=> Acute on chronic diastolic CHF treated/resolved 
=> Acute on chronic diastolic CHF ongoing/improving 
=> Chronic diastolic CHF (exacerbation ruled out) => Other Explanation of clinical findings 
=> Unable to Determine (no explanation of clinical findings) The medical record reflects the following: 
 
Risk: admitted with weakness, CHF, UTI Clinical Indicators: per H&P \"CHF exac - IV lasix , was recently admitted to the hosp for the same problem\",  BNP: 3437 (H) Treatment: given IV lasix 80mg Please clarify and document your clinical opinion in the progress notes and discharge summary including the definitive and/or presumptive diagnosis, (suspected or probable), related to the above clinical findings. Please include clinical findings supporting your diagnosis. If you DECLINE this query or would like to communicate with [facility], please utilize the \"ChangeYourFlight message box\" at the TOP of the Progress Note on the right. Thank you, [id number] [facility], [facility] [address]

## 2018-10-01 NOTE — PROGRESS NOTES
Problem: Self Care Deficits Care Plan (Adult) Goal: *Acute Goals and Plan of Care (Insert Text) Occupational Therapy Goals Initiated 10/1/2018 within 7 day(s). 1.  Patient will perform lower body dressing with minimal assistance/contact guard assist using AE. 2.  Patient will perform functional task in standing for 3 minutes with max assist for balance. 3.  Patient will perform toilet transfers with moderate assistance . 4. Patient will participate in upper extremity therapeutic exercise/activities with supervision/set-up for 8 minutes to increase strength/endurance for ADLs. Outcome: Progressing Towards Goal 
Occupational Therapy EVALUATION Patient: Sue Jacinto (60 y.o. female) Date: 10/1/2018 Primary Diagnosis: CHF (congestive heart failure) ([facility] CHF exacerbation ([facility] Precautions:   Fall ASSESSMENT : 
Based on the objective data described below, the patient presents with decreased ADLs, decreased functional mobility and muscle weakness. She was recently discharged home from SNF after hospitalization. Patient unable to ambulate into the house or stand after returning home. She states she was walking with therapy at rehab. Patient now requiring mod to max assist for LB self care and functional standing. She was able to stand with assist at EOB but had increased fear of falling. Transfer to [facility] deferred. Patient scooted up the [facility] with CGA but c/o increased right hip pain. She returned to supine in bed. Recommend continued therapy at SNF to increase her function for safe return to home or assisted level of care. Patient will benefit from skilled intervention to address the above impairments. Patients rehabilitation potential is considered to be Good Factors which may influence rehabilitation potential include:  
[]             None noted []             Mental ability/status [x]             Medical condition []             Home/family situation and support systems []             Safety awareness []             Pain tolerance/management 
[]             Other: PLAN : 
Recommendations and Planned Interventions: 
[x]               Self Care Training                  [x]        Therapeutic Activities [x]               Functional Mobility Training    []        Cognitive Retraining 
[x]               Therapeutic Exercises           [x]        Endurance Activities [x]               Balance Training                   []        Neuromuscular Re-Education []               Visual/Perceptual Training     [x]   Home Safety Training 
[x]               Patient Education                 [x]        Family Training/Education []               Other (comment): Frequency/Duration: Patient will be followed by occupational therapy 1-2 times per day/4-7 days per week to address goals. Discharge Recommendations: Zechariah Juárez Further Equipment Recommendations for Discharge: N/A Barriers to Learning/Limitations: yes;  sensory deficits-hearing Compensate with: visual, verbal, tactile, kinesthetic cues/model PATIENT COMPLEXITY Eval Complexity: History: MEDIUM Complexity : Expanded review of history including physical, cognitive and psychosocial  history ; Examination: MEDIUM Complexity : 3-5 performance deficits relating to physical, cognitive , or psychosocial skils that result in activity limitations and / or participation restrictions; Decision Making:MEDIUM Complexity : Patient may present with comorbidities that affect occupational performnce. Miniml to moderate modification of tasks or assistance (eg, physical or verbal ) with assesment(s) is necessary to enable patient to complete evaluation  Assessment: Moderate Complexity G-CODES:  
 
Self Care  Current  CL= 60-79%  Goal  CK= 40-59%. The severity rating is based on the Level of Assistance required for Functional Mobility and ADLs. SUBJECTIVE:  
Patient stated I don't know how I got so weak.  OBJECTIVE DATA SUMMARY:  
 
Past Medical History:  
Diagnosis Date  Atrial fibrillation CHADS score 1  (-CHF, +HTN, -AGE, -DM, -CVA)  Carpal tunnel syndrome  Chronic pain  Congenital heart disease   
 presumed pulmonary stenosis s/p repair 1969  Congestive heart failure ([facility]  Degenerative disc disease  Degenerative joint disease  GERD   
 H/O echocardiogram 04/2017 EF 60-65%, mild concentric LVH, dilated RV with RV dysfunction, RVSP 54 mmHg, RA E, moderate to severe pulmonic regurgitation.  Hypercholesterolemia  Hypertension  Hypothyroid  Morbid obesity  Morbid obesity with BMI of 45.0-49.9, adult ([facility] 2/20/2017  Noncompliance  Schizoaffective disorder  Substance abuse   
 marijuana, crack cocaine Past Surgical History:  
Procedure Laterality Date [address] VSD repair  COLONOSCOPY N/A 5/6/2018 COLONOSCOPY with tatooing and biopsy performed by Loni Sears MD at [address] [place] [address]    
 left  HX HYSTERECTOMY  HX KNEE REPLACEMENT    
 left  HX KNEE REPLACEMENT    
 right  HX ORTHOPAEDIC    
 left ankle  HX ORTHOPAEDIC    
 carpel tunnel right and left  HX THYROIDECTOMY Left Prior Level of Function/Home Situation: Pt required min assist with basic self care tasks and used a RW/wheelchair for functional mobility PTA. Home Situation Home Environment: Apartment One/Two Story Residence: Two story, live on 1st floor Living Alone: No 
Support Systems: Family member(s) [facility] Current [facility]ME Used/Available at Home: Wheelchair, Walker, rollator, Walker, Hospital bed, Oxygen, portable Tub or Shower Type:  (Pt sponge bathing at home.) [x]  Right hand dominant   []  Left hand dominant Cognitive/Behavioral Status: 
Neurologic State: Alert Orientation Level: Oriented X4 Cognition: Follows commands Safety/Judgement: Awareness of environment; Fall prevention Skin: Intact on UEs Edema: None noted in UEs Vision/Perceptual:   
Acuity: Within Defined Limits Corrective Lenses: Glasses Coordination: 
Fine Motor Skills-Upper: Left Intact; Right Intact Gross Motor Skills-Upper: Left Intact; Right Intact Balance: 
Sitting: Intact Standing: Impaired Strength: 
Strength: Generally decreased, functional (UEs; 4/5 throughout) Tone & Sensation: 
Tone: Normal (UEs) Sensation: Intact (UEs) Range of Motion: 
AROM: Generally decreased, functional (UEs; L shoulder flexion, otherwise [facility]) Functional Mobility and Transfers for ADLs: 
Bed Mobility: 
Supine to Sit: Minimum assistance Sit to Supine: Minimum assistance Transfers: 
Sit to Stand: Maximum assistance Toilet Transfer :  (NT) 
 
ADL Assessment: 
Feeding: Setup Oral Facial Hygiene/Grooming: Setup Bathing: Moderate assistance Upper Body Dressing: Setup Lower Body Dressing: Moderate assistance (using AE at home) Toileting: Maximum assistance Pain: 
Pt reports 7/10 pain or discomfort prior to treatment, in right hip. Pain meds given prior to session.   
Pt reports 7/10 pain or discomfort post treatment, in right hip. Patient resting in bed at end of session. Activity Tolerance:  
Good Please refer to the flowsheet for vital signs taken during this treatment. After treatment:  
[] Patient left in no apparent distress sitting up in chair 
[x] Patient left in no apparent distress in bed 
[x] Call bell left within reach 
[] Nursing notified 
[x] Caregiver (grandchildren) present 
[] Bed alarm activated COMMUNICATION/EDUCATION:  
[x] Home safety education was provided and the patient/caregiver indicated understanding. [x] Patient/family have participated as able in goal setting and plan of care. [x] Patient/family agree to work toward stated goals and plan of care. [] Patient understands intent and goals of therapy, but is neutral about his/her participation. [] Patient is unable to participate in goal setting and plan of care. Thank you for this referral. 
Primo Murphy MS OTR/L Time Calculation: 25 mins

## 2018-10-01 NOTE — PROGRESS NOTES
Bedside shift change report given to this RN (oncoming nurse) by Tomás Marks RN (offgoing nurse). Report included the following information SBAR, Kardex and Cardiac Rhythm A-fib.

## 2018-10-01 NOTE — PROGRESS NOTES
[facility] Hospitalist Group Progress Note Patient: Almita Beckwith Age: 60 y.o. : 1960 MR#: [id number] SSN: [id number] Date: 10/1/2018 Subjective:  
 
Reports ongoing R hip pain chronically, States breathing is 100% and denies cough; denies dysuria / urgency / frequency. No further BM's since yesterday. Assessment/Plan: 1. Gen weakness with decreased mobility: PT & OT eval placed 2. UTI with GNR: cont ceftriaxone, follow up Cx. 3. CAD - continue imdur, trop neg x 3 
4. Hypothyroidism - TSH WNL; continue synthroid 5. H/o A fib - continue coumadin, daily INR 6. HLPD - continue lipitor 7. Chronic Hypercapneic Resp failure - reports use of oxygenn as needed PTA, off O2 today 8. Morbid Obesity - encourage safe weight loss 9. Chronic diastolic HF - add fluid restriction; cont lasix per home regimen. PT/OT eval 
SNF soon D/w pt and daughter in detail Case discussed with:  [x]Patient  [x]Family  [x]Nursing  []Case Management DVT Prophylaxis:  []Lovenox  []Hep SQ  []SCDs  [x]Coumadin   []On Heparin gtt Objective:  
VS:  
Visit Vitals  /72 (BP 1 Location: Right arm, BP Patient Position: At rest)  Pulse 77  Temp 98.9 °F (37.2 °C)  Resp 18  Ht 5' (1.524 m)  Wt 131.3 kg (289 lb 6.4 oz)  SpO2 100%  Breastfeeding No  
 BMI 56.52 kg/m2 Tmax/24hrs: Temp (24hrs), Av.9 °F (37.2 °C), Min:98.4 °F (36.9 °C), Max:99.4 °F (37.4 °C) Intake/Output Summary (Last 24 hours) at 10/01/18 1531 Last data filed at 10/01/18 1005 Gross per 24 hour Intake              128 ml Output              450 ml Net             -322 ml General:  Alert, NAD Cardiovascular:  RRR Pulmonary: B/L clear, no rales GI:  + BS in all four quadrants, soft, non-tender Extremities: Non-pitting edema Neuro: A&O x 3, moves all ext Family contact: Sister Connor Lynne [phone] Labs:   
Recent Results (from the past 24 hour(s)) PROTHROMBIN TIME + INR Collection Time: 10/01/18  5:03 AM  
Result Value Ref Range Prothrombin time 22.8 (H) 11.5 - 15.2 sec INR 2.0 (H) 0.8 - 1.2 METABOLIC PANEL, BASIC Collection Time: 10/01/18  5:03 AM  
Result Value Ref Range Sodium 135 (L) 136 - 145 mmol/L Potassium 3.7 3.5 - 5.5 mmol/L Chloride 97 (L) 100 - 108 mmol/L  
 CO2 30 21 - 32 mmol/L Anion gap 8 3.0 - 18 mmol/L Glucose 93 74 - 99 mg/dL BUN 21 (H) 7.0 - 18 MG/DL Creatinine 1.08 0.6 - 1.3 MG/DL  
 BUN/Creatinine ratio 19 12 - 20 GFR est AA >60 >60 ml/min/1.73m2 GFR est non-AA 52 (L) >60 ml/min/1.73m2 Calcium 8.3 (L) 8.5 - 10.1 MG/DL  
CBC WITH AUTOMATED DIFF Collection Time: 10/01/18  5:03 AM  
Result Value Ref Range WBC 4.3 (L) 4.6 - 13.2 K/uL  
 RBC 4.39 4.20 - 5.30 M/uL  
 HGB 10.2 (L) 12.0 - 16.0 g/dL HCT 33.8 (L) 35.0 - 45.0 % MCV 77.0 74.0 - 97.0 FL  
 MCH 23.2 (L) 24.0 - 34.0 PG  
 MCHC 30.2 (L) 31.0 - 37.0 g/dL  
 RDW 17.3 (H) 11.6 - 14.5 % PLATELET 463 856 - 500 K/uL MPV 9.9 9.2 - 11.8 FL  
 NEUTROPHILS 49 40 - 73 % LYMPHOCYTES 32 21 - 52 % MONOCYTES 16 (H) 3 - 10 % EOSINOPHILS 3 0 - 5 % BASOPHILS 0 0 - 2 %  
 ABS. NEUTROPHILS 2.1 1.8 - 8.0 K/UL  
 ABS. LYMPHOCYTES 1.4 0.9 - 3.6 K/UL  
 ABS. MONOCYTES 0.7 0.05 - 1.2 K/UL  
 ABS. EOSINOPHILS 0.1 0.0 - 0.4 K/UL  
 ABS. BASOPHILS 0.0 0.0 - 0.1 K/UL  
 DF AUTOMATED Signed By: Kimberley Donato MD   
 October 1, 2018

## 2018-10-01 NOTE — PROGRESS NOTES
PT orders received, chart reviewed. Pt unable to participate with PT due to: 
[]  Nausea/vomiting 
[]  Eating 
[]  Pain 
[]  Pt lethargic 
[]  Off Unit 
[]  Pt refused 
[x]  Other, per chart review this morning and this afternoon, pt has possible nondisplaced pelvic fracture with no WB orders or notes regarding plan for possible fracture. Pt was seen by OT and has increased R hip pain with all bed mobility. Nursing also reports increased pain. Please advise for WB status prior to PT evaluation for standing and possible gait. Will f/u later as patient's schedule allows.  Thank you for this referral. 
Cheri Jose, PT, DPT

## 2018-10-02 ENCOUNTER — APPOINTMENT (OUTPATIENT)
Dept: CT IMAGING | Age: 58
DRG: 463 | End: 2018-10-02
Attending: HOSPITALIST
Payer: MEDICAID

## 2018-10-02 PROBLEM — M16.11 PRIMARY OSTEOARTHRITIS OF RIGHT HIP: Chronic | Status: ACTIVE | Noted: 2018-10-02

## 2018-10-02 LAB
BACTERIA SPEC CULT: ABNORMAL
BACTERIA SPEC CULT: ABNORMAL
INR PPP: 1.9 (ref 0.8–1.2)
PROTHROMBIN TIME: 21.9 SEC (ref 11.5–15.2)
SERVICE CMNT-IMP: ABNORMAL

## 2018-10-02 PROCEDURE — 74011250637 HC RX REV CODE- 250/637: Performed by: NURSE PRACTITIONER

## 2018-10-02 PROCEDURE — 85610 PROTHROMBIN TIME: CPT | Performed by: NURSE PRACTITIONER

## 2018-10-02 PROCEDURE — 65660000000 HC RM CCU STEPDOWN

## 2018-10-02 PROCEDURE — 73700 CT LOWER EXTREMITY W/O DYE: CPT

## 2018-10-02 PROCEDURE — 74011250637 HC RX REV CODE- 250/637: Performed by: HOSPITALIST

## 2018-10-02 PROCEDURE — 77030038269 HC DRN EXT URIN PURWCK BARD -A

## 2018-10-02 PROCEDURE — 36415 COLL VENOUS BLD VENIPUNCTURE: CPT | Performed by: NURSE PRACTITIONER

## 2018-10-02 RX ORDER — WARFARIN 7.5 MG/1
7.5 TABLET ORAL EVERY EVENING
Status: COMPLETED | OUTPATIENT
Start: 2018-10-02 | End: 2018-10-02

## 2018-10-02 RX ADMIN — OXYCODONE HYDROCHLORIDE 5 MG: 5 TABLET ORAL at 18:34

## 2018-10-02 RX ADMIN — FLUTICASONE PROPIONATE 2 SPRAY: 50 SPRAY, METERED NASAL at 08:29

## 2018-10-02 RX ADMIN — WARFARIN SODIUM 7.5 MG: 7.5 TABLET ORAL at 17:28

## 2018-10-02 RX ADMIN — SIMVASTATIN 40 MG: 40 TABLET, FILM COATED ORAL at 21:23

## 2018-10-02 RX ADMIN — Medication 10 ML: at 05:04

## 2018-10-02 RX ADMIN — LEVOTHYROXINE SODIUM 125 MCG: 125 TABLET ORAL at 08:18

## 2018-10-02 RX ADMIN — Medication 10 ML: at 18:38

## 2018-10-02 RX ADMIN — MULTIPLE VITAMINS W/ MINERALS TAB 1 TABLET: TAB at 08:19

## 2018-10-02 RX ADMIN — FAMOTIDINE 20 MG: 20 TABLET ORAL at 17:28

## 2018-10-02 RX ADMIN — FUROSEMIDE 40 MG: 40 TABLET ORAL at 17:28

## 2018-10-02 RX ADMIN — OXYCODONE HYDROCHLORIDE 5 MG: 5 TABLET ORAL at 05:04

## 2018-10-02 RX ADMIN — OXYCODONE HYDROCHLORIDE 5 MG: 5 TABLET ORAL at 12:03

## 2018-10-02 RX ADMIN — METOPROLOL TARTRATE 12.5 MG: 25 TABLET ORAL at 08:21

## 2018-10-02 RX ADMIN — ISOSORBIDE MONONITRATE 30 MG: 30 TABLET, EXTENDED RELEASE ORAL at 17:28

## 2018-10-02 RX ADMIN — PAROXETINE HYDROCHLORIDE 20 MG: 20 TABLET, FILM COATED ORAL at 08:19

## 2018-10-02 RX ADMIN — METOPROLOL TARTRATE 12.5 MG: 25 TABLET ORAL at 17:29

## 2018-10-02 RX ADMIN — ARIPIPRAZOLE 15 MG: 5 TABLET ORAL at 08:19

## 2018-10-02 RX ADMIN — FAMOTIDINE 20 MG: 20 TABLET ORAL at 08:20

## 2018-10-02 RX ADMIN — Medication 10 ML: at 21:23

## 2018-10-02 RX ADMIN — FUROSEMIDE 40 MG: 40 TABLET ORAL at 08:19

## 2018-10-02 NOTE — PROGRESS NOTES
PT eval order received and chart reviewed. Continuing to await clarification on WB status in setting of possible non-displaced pelvic fracture. Thank you for this referral. Zaina Zaman, PT, DPT.

## 2018-10-02 NOTE — ROUTINE PROCESS
Assumed patient care. Bedside shift report received from [place]. Patient in bed, awake, alert and oriented x4. Resting well in bed, denies needs at this time. HOB elevated to 30 degrees. Call light and personal items placed in reach. 7:37 AM -Bedside shift change report given to [place] (oncoming nurse) by Nadege Khan RN (offgoing nurse). Report given with SBAR, Kardex, Intake/Output, MAR and Recent Results.

## 2018-10-02 NOTE — PROGRESS NOTES
Per Ortho Patient seen Bedside, hx fall Friday 28 Sept 18, with persistent Right hip pain. Spoke with Dr. Kyara Agosto pending patient NPO as of 1215pm 
 
Message to Dr. Heller Him to follow Surgical decision making Right hip based on CT results

## 2018-10-02 NOTE — PROGRESS NOTES
[facility] Hospitalist Group Progress Note Patient: Hugh Hanson Age: 60 y.o. : 1960 MR#: [id number]: [id number] Date: 10/2/2018 Subjective:  
 
Pt feels better, still has R hip pain, fall at home. Did not hit her hip area. Know sever OA R hip but she says her pain is slightly worse since fall. Pelvic X ray noted, ? Fracture Assessment/Plan: 1. R hip pain: X ray ? Fracture, d/w ortho. Will get CT scan. PT/OT eval once cleared by ortho. 2. Gen weakness with decreased mobility: 3. UTI with E coli and proteus: cont ceftriaxone and can change to Keflex at d/c.  
4. CAD - continue imdur, trop neg x 3 
5. Hypothyroidism - TSH WNL; continue synthroid 6. H/o A fib - continue coumadin, daily INR 7. HLPD - continue lipitor 8. Chronic Hypercapneic Resp failure - reports use of oxygenn as needed PTA, off O2 today 9. Morbid Obesity - encourage safe weight loss 10. Chronic diastolic HF - add fluid restriction; cont lasix per home regimen. SNF once cleared by ortho D/w pt and daughter in detail Addendum: 
Brief episode of bradycardia: HR in 40's. Pt asymptomatic. Will monitor Case discussed with:  [x]Patient  [x]Family  [x]Nursing  []Case Management DVT Prophylaxis:  []Lovenox  []Hep SQ  []SCDs  [x]Coumadin   []On Heparin gtt Objective:  
VS:  
Visit Vitals  /68 (BP 1 Location: Right arm, BP Patient Position: At rest)  Pulse 62  Temp 97.4 °F (36.3 °C)  Resp 18  Ht 5' (1.524 m)  Wt 132.8 kg (292 lb 11.2 oz)  SpO2 98%  Breastfeeding No  
 BMI 57.16 kg/m2 Tmax/24hrs: Temp (24hrs), Av.3 °F (36.8 °C), Min:97.4 °F (36.3 °C), Max:98.9 °F (37.2 °C) Intake/Output Summary (Last 24 hours) at 10/02/18 1052 Last data filed at 10/02/18 0600 Gross per 24 hour Intake              260 ml Output             1350 ml Net            -1090 ml General:  Alert, NAD Cardiovascular:  RRR 
 Pulmonary: B/L clear, no rales GI:  + BS in all four quadrants, soft, non-tender Extremities: Non-pitting edema Neuro: A&O x 3, moves all ext  
R hip area no bruise, tenderness + Family contact: Sister Clementina [phone] Labs:   
Recent Results (from the past 24 hour(s)) PROTHROMBIN TIME + INR Collection Time: 10/02/18  4:00 AM  
Result Value Ref Range Prothrombin time 21.9 (H) 11.5 - 15.2 sec INR 1.9 (H) 0.8 - 1.2 Signed By: Shanon Mackey MD   
 October 2, 2018

## 2018-10-02 NOTE — CONSULTS
Consult    Patient: Yeni Gallardo MRN: [id number]  [id number]: [id number]    YOB: 1960  Age: 60 y.o. Sex: female      Subjective:      Yeni Gallardo is a 60 y.o. female who is being seen for right hip pain s/p fall. Pt collapsed on her porch at home while being transferred back from 1 month stay at [facility] rehab. She was assisted up by three other people and managed to walk into her house with her walker. She was admitted to  on the same day 9/29/18 for weakness and shortness of breath. She has note some increased right hip pain since her fall. She has a known h/o severe right hip OA. She has been a poor candidate for surgery due to morbid obesity and other medical issues. Past Medical History:   Diagnosis Date    Atrial fibrillation     CHADS score 1  (-CHF, +HTN, -AGE, -DM, -CVA)    Carpal tunnel syndrome     Chronic pain     Congenital heart disease     presumed pulmonary stenosis s/p repair 1969    Congestive heart failure (HCC)     Degenerative disc disease     Degenerative joint disease     GERD     H/O echocardiogram 04/2017    EF 60-65%, mild concentric LVH, dilated RV with RV dysfunction, RVSP 54 mmHg, RA E, moderate to severe pulmonic regurgitation.     Hypercholesterolemia     Hypertension     Hypothyroid     Morbid obesity     Morbid obesity with BMI of 45.0-49.9, adult ([address] 2/20/2017    Noncompliance     Schizoaffective disorder     Substance abuse     marijuana, crack cocaine     Past Surgical History:   Procedure Laterality Date    CARDIAC SURG PROCEDURE UNLIST  1971    VSD repair    COLONOSCOPY N/A 5/6/2018    COLONOSCOPY with tatooing and biopsy performed by He Cortes MD at [facility]    HX [address]      left    HX HYSTERECTOMY      HX KNEE REPLACEMENT      left    HX KNEE REPLACEMENT      right    HX ORTHOPAEDIC      left ankle    HX ORTHOPAEDIC      carpel tunnel right and left     HX THYROIDECTOMY Left       Family History Problem Relation Age of Onset    Hypertension Mother     Coronary Artery Disease Mother     Coronary Artery Disease Father     Hypertension Father      Social History   Substance Use Topics    Smoking status: Never Smoker    Smokeless tobacco: Never Used    Alcohol use No      Current Facility-Administered Medications   Medication Dose Route Frequency Provider Last Rate Last Dose    warfarin (COUMADIN) tablet 7.5 mg  7.5 mg Oral QPM Jd Rios MD        [START ON 10/3/2018] cefTRIAXone (ROCEPHIN) 1 g in sterile water (preservative free) 10 mL IV syringe  1 g IntraVENous Q24H Jd Rios MD        furosemide (LASIX) tablet 40 mg  40 mg Oral BID Neida Sloan NP   40 mg at 10/02/18 0819    simvastatin (ZOCOR) tablet 40 mg  40 mg Oral QHS Sandro Kraft MD   40 mg at 10/01/18 2301    ARIPiprazole (ABILIFY) tablet 15 mg  15 mg Oral DAILY Sandro Kraft MD   15 mg at 10/02/18 8917    levothyroxine (SYNTHROID) tablet 125 mcg  125 mcg Oral ACB Sandro Kraft MD   125 mcg at 10/02/18 0818    PARoxetine (PAXIL) tablet 20 mg  20 mg Oral DAILY Sandro Kraft MD   20 mg at 10/02/18 0819    famotidine (PEPCID) tablet 20 mg  20 mg Oral BID Sandro Kraft MD   20 mg at 10/02/18 0820    multivitamin, tx-iron-ca-min (THERA-M w/ IRON) tablet 1 Tab  1 Tab Oral DAILY Sandro Kraft MD   1 Tab at 10/02/18 0819    isosorbide mononitrate ER (IMDUR) tablet 30 mg  30 mg Oral QPM Sandro Kraft MD   30 mg at 10/01/18 1719    fluticasone (FLONASE) 50 mcg/actuation nasal spray 2 Spray  2 Spray Both Nostrils DAILY Sandro Kraft MD   2 [place] at 10/02/18 0829    metoprolol tartrate (LOPRESSOR) tablet 12.5 mg  12.5 mg Oral BID Sandro Kraft MD   12.5 mg at 10/02/18 0821    albuterol-ipratropium (DUO-NEB) 2.5 MG-0.5 MG/3 ML  3 mL Nebulization Q6H PRN Sandro Kraft MD        acetaminophen (TYLENOL) tablet 650 mg  650 mg Oral Q6H PRN Sandro Kraft MD 650 mg at 09/29/18 2351    ondansetron (ZOFRAN) injection 4 mg  4 mg IntraVENous Q6H PRN Hilario Hollingsworth MD   4 mg at 10/01/18 0823    sodium chloride (NS) flush 5-10 mL  5-10 mL IntraVENous Q8H Hilario Hollingsworth MD   10 mL at 10/02/18 0504    sodium chloride (NS) flush 5-10 mL  5-10 mL IntraVENous PRN Hilario Hollingsworth MD   10 mL at 10/01/18 1721    influenza vaccine 2018-19 (6 mos+)(PF) (FLUARIX QUAD/FLULAVAL QUAD) injection 0.5 mL  0.5 mL IntraMUSCular PRIOR TO DISCHARGE Hilario Hollingsworth MD        WARFARIN INFORMATION NOTE (COUMADIN)   Other Q24H Hilairo Hollingsworth MD        oxyCODONE IR (ROXICODONE) tablet 5 mg  5 mg Oral Q6H PRN Tanvi Harper NP   5 mg at 10/02/18 1203        Allergies   Allergen Reactions    Gabapentin Other (comments)     Unsteady, weakness LEs    Tetanus Vaccines And Toxoid Swelling    Topamax [Topiramate] Other (comments)     weakness       Review of Systems:  A comprehensive review of systems was negative except for that written in the History of Present Illness. Objective:     Vitals:    10/01/18 2357 10/02/18 0347 10/02/18 0845 10/02/18 1145   BP: 94/61 114/57 106/68 110/67   Pulse: 79 66 62 64   Resp: 18 18 18 18   Temp: 98.8 °F (37.1 °C) 98.2 °F (36.8 °C) 97.4 °F (36.3 °C) 97.6 °F (36.4 °C)   SpO2: 98% 90% 98% 99%   Weight:  292 lb 11.2 oz (132.8 kg)     Height:            Physical Exam:  EXTREMITIES:  A&OX3      Moderate pain with gentle right hip rotation    Distal CMS intact    No skin abrasions or lacerations    XR PELVIS 9/29/18     IMPRESSION:  1. Limited study as described above. 2.  End-stage right hip degenerative change. 3.  No displaced hip fracture is identified however given technical limitations  and extensive remodeling of the femur, nondisplaced fracture is difficult to  exclude radiologically.  Consider further evaluation with cross-sectional imaging  if there concern for radiographically occult fracture.           Assessment:     Hospital Problems  Date Reviewed: 9/10/2018          Codes Class Noted POA    Severe osteoarthritis right hip (Chronic) ICD-10-CM: M16.11  ICD-9-CM: 715.15  10/2/2018 Unknown        CHF exacerbation ([facility] ICD-10-CM: I50.9  ICD-9-CM: 428.0  9/29/2018 Unknown        CHF (congestive heart failure) ([facility] ICD-10-CM: I50.9  ICD-9-CM: 428.0  4/18/2017 Unknown        Morbid obesity with BMI of 45.0-49.9, adult [facility]) ICD-10-CM: E66.01, Z68.42  ICD-9-CM: 278.01, V85.42  2/20/2017 Yes        Hypertension ICD-10-CM: I11.9  ICD-9-CM: 402.10  Unknown Yes              Plan:     CT right hip pending  So far pain appears to be related to severe R hip OA with exacerbation  There is no apparant need for surgery at this time.   Start PT WBAT if hip CT neg for fracture  Will follow  Signed By: Joaquim Us MD     October 2, 2018

## 2018-10-02 NOTE — PROGRESS NOTES
Met with pt and spoke with Josh Pascal, by phone. Discharge plan is for pt to return home.,gt lives with pt and provides personal care services for pt. Dgt aware she can increase hours using respite. Dgt/pt would like to use [facility]. [place] [facility] obtained for [facility]. Pt will need medical transport at discharge.

## 2018-10-02 NOTE — PROGRESS NOTES
PT eval order received and chart reviewed. Unable to see patient at this time as patient is awaiting Right hip CT. Appreciate ortho input and per Dr. Kendrick Lopez note will \"Start PT WBAT if hip CT neg for fracture. \" Will follow up as patient schedule allows. Thank you for this referral. Mikael Horton, PT, DPT.

## 2018-10-02 NOTE — PROGRESS NOTES
Problem: Falls - Risk of 
Goal: *Absence of Falls Document Poppy Rasmussen Fall Risk and appropriate interventions in the flowsheet. Outcome: Progressing Towards Goal 
Fall Risk Interventions: 
Mobility Interventions: Bed/chair exit alarm, Communicate number of staff needed for ambulation/transfer, Patient to call before getting OOB Medication Interventions: Assess postural VS orthostatic hypotension, Bed/chair exit alarm, Evaluate medications/consider consulting pharmacy, Patient to call before getting OOB, Teach patient to arise slowly Elimination Interventions: Bed/chair exit alarm, Call light in reach, Patient to call for help with toileting needs, Toileting schedule/hourly rounds History of Falls Interventions: Bed/chair exit alarm, Consult care management for discharge planning, Door open when patient unattended, Evaluate medications/consider consulting pharmacy, Investigate reason for fall Problem: Pressure Injury - Risk of 
Goal: *Prevention of pressure injury Document Stas Scale and appropriate interventions in the flowsheet. Outcome: Progressing Towards Goal 
Pressure Injury Interventions: 
Sensory Interventions: Assess changes in LOC, Assess need for specialty bed, Avoid rigorous massage over bony prominences, Float heels, Discuss PT/OT consult with provider, Keep linens dry and wrinkle-free, Maintain/enhance activity level, Minimize linen layers Moisture Interventions: Absorbent underpads, Apply protective barrier, creams and emollients, Internal/External urinary devices, Maintain skin hydration (lotion/cream), Minimize layers, Moisture barrier Activity Interventions: Increase time out of bed, Pressure redistribution bed/mattress(bed type), PT/OT evaluation Mobility Interventions: HOB 30 degrees or less, Pressure redistribution bed/mattress (bed type), PT/OT evaluation Nutrition Interventions: Document food/fluid/supplement intake, Discuss nutritional consult with provider, Offer support with meals,snacks and hydration Friction and Shear Interventions: HOB 30 degrees or less, Minimize layers, Apply protective barrier, creams and emollients

## 2018-10-03 LAB
INR PPP: 2.3 (ref 0.8–1.2)
PROTHROMBIN TIME: 25.1 SEC (ref 11.5–15.2)

## 2018-10-03 PROCEDURE — 36415 COLL VENOUS BLD VENIPUNCTURE: CPT | Performed by: NURSE PRACTITIONER

## 2018-10-03 PROCEDURE — 97162 PT EVAL MOD COMPLEX 30 MIN: CPT

## 2018-10-03 PROCEDURE — 74011250637 HC RX REV CODE- 250/637: Performed by: FAMILY MEDICINE

## 2018-10-03 PROCEDURE — 97116 GAIT TRAINING THERAPY: CPT

## 2018-10-03 PROCEDURE — 97530 THERAPEUTIC ACTIVITIES: CPT

## 2018-10-03 PROCEDURE — 97535 SELF CARE MNGMENT TRAINING: CPT

## 2018-10-03 PROCEDURE — 74011250637 HC RX REV CODE- 250/637: Performed by: NURSE PRACTITIONER

## 2018-10-03 PROCEDURE — 74011250636 HC RX REV CODE- 250/636: Performed by: HOSPITALIST

## 2018-10-03 PROCEDURE — 65660000000 HC RM CCU STEPDOWN

## 2018-10-03 PROCEDURE — 85610 PROTHROMBIN TIME: CPT | Performed by: NURSE PRACTITIONER

## 2018-10-03 PROCEDURE — 77030038269 HC DRN EXT URIN PURWCK BARD -A

## 2018-10-03 PROCEDURE — 74011000250 HC RX REV CODE- 250: Performed by: HOSPITALIST

## 2018-10-03 PROCEDURE — 74011250637 HC RX REV CODE- 250/637: Performed by: HOSPITALIST

## 2018-10-03 RX ORDER — WARFARIN SODIUM 5 MG/1
5 TABLET ORAL ONCE
Status: COMPLETED | OUTPATIENT
Start: 2018-10-03 | End: 2018-10-03

## 2018-10-03 RX ADMIN — ISOSORBIDE MONONITRATE 30 MG: 30 TABLET, EXTENDED RELEASE ORAL at 18:01

## 2018-10-03 RX ADMIN — ARIPIPRAZOLE 15 MG: 5 TABLET ORAL at 08:53

## 2018-10-03 RX ADMIN — FUROSEMIDE 40 MG: 40 TABLET ORAL at 08:54

## 2018-10-03 RX ADMIN — METOPROLOL TARTRATE 12.5 MG: 25 TABLET ORAL at 08:54

## 2018-10-03 RX ADMIN — CEFTRIAXONE SODIUM 1 G: 1 INJECTION, POWDER, FOR SOLUTION INTRAMUSCULAR; INTRAVENOUS at 23:01

## 2018-10-03 RX ADMIN — OXYCODONE HYDROCHLORIDE 5 MG: 5 TABLET ORAL at 23:51

## 2018-10-03 RX ADMIN — WARFARIN SODIUM 5 MG: 5 TABLET ORAL at 18:01

## 2018-10-03 RX ADMIN — OXYCODONE HYDROCHLORIDE 5 MG: 5 TABLET ORAL at 12:24

## 2018-10-03 RX ADMIN — FLUTICASONE PROPIONATE 2 SPRAY: 50 SPRAY, METERED NASAL at 11:00

## 2018-10-03 RX ADMIN — Medication 10 ML: at 13:56

## 2018-10-03 RX ADMIN — FAMOTIDINE 20 MG: 20 TABLET ORAL at 08:54

## 2018-10-03 RX ADMIN — OXYCODONE HYDROCHLORIDE 5 MG: 5 TABLET ORAL at 06:13

## 2018-10-03 RX ADMIN — LEVOTHYROXINE SODIUM 125 MCG: 125 TABLET ORAL at 08:53

## 2018-10-03 RX ADMIN — MULTIPLE VITAMINS W/ MINERALS TAB 1 TABLET: TAB at 08:54

## 2018-10-03 RX ADMIN — SIMVASTATIN 40 MG: 40 TABLET, FILM COATED ORAL at 23:01

## 2018-10-03 RX ADMIN — FUROSEMIDE 40 MG: 40 TABLET ORAL at 18:02

## 2018-10-03 RX ADMIN — PAROXETINE HYDROCHLORIDE 20 MG: 20 TABLET, FILM COATED ORAL at 08:54

## 2018-10-03 RX ADMIN — Medication 10 ML: at 23:02

## 2018-10-03 RX ADMIN — OXYCODONE HYDROCHLORIDE 5 MG: 5 TABLET ORAL at 00:00

## 2018-10-03 RX ADMIN — Medication 10 ML: at 06:13

## 2018-10-03 RX ADMIN — METOPROLOL TARTRATE 12.5 MG: 25 TABLET ORAL at 18:02

## 2018-10-03 RX ADMIN — FAMOTIDINE 20 MG: 20 TABLET ORAL at 18:01

## 2018-10-03 RX ADMIN — OXYCODONE HYDROCHLORIDE 5 MG: 5 TABLET ORAL at 18:02

## 2018-10-03 NOTE — PROGRESS NOTES
PT orders received, chart reviewed. Pt unable to participate with PT due to: 
[]  Nausea/vomiting 
[]  Eating 
[]  Pain 
[]  Pt lethargic 
[]  Off Unit 
[]  Pt refused 
[x]  Other, waiting for Right hip CT results. Appreciate ortho input and per Dr. Nery Cowart note will \"Start PT WBAT if hip CT neg for fracture. \"   
 
Will f/u later as patient's schedule allows.  Thank you for this referral. 
Beatrice Maier, PT, DPT

## 2018-10-03 NOTE — PROGRESS NOTES
Bedside shift change report given to Obey lowery (oncoming nurse) by Blade Paul (offgoing nurse). Report included the following information SBAR. Pt to work with pt and ot. OOB to chair for one hour. Pain medication still required q6. Daughter at bedside. No distress at this time. Call bell within reach Bedside shift change report given to Agnes (oncoming nurse) by [place] (offgoing nurse). Report included the following information SBAR, Kardex and MAR.

## 2018-10-03 NOTE — PROGRESS NOTES
Problem: Self Care Deficits Care Plan (Adult) Goal: *Acute Goals and Plan of Care (Insert Text) Occupational Therapy Goals Initiated 10/1/2018 within 7 day(s). 1.  Patient will perform lower body dressing with minimal assistance/contact guard assist using AE. 2.  Patient will perform functional task in standing for 3 minutes with max assist for balance. 3.  Patient will perform toilet transfers with moderate assistance . 4. Patient will participate in upper extremity therapeutic exercise/activities with supervision/set-up for 8 minutes to increase strength/endurance for ADLs. Outcome: Progressing Towards Goal 
Occupational Therapy TREATMENT Patient: Urmila Jonas (60 y.o. female) Date: 10/3/2018 Diagnosis: CHF (congestive heart failure) ([facility] [address] CHF exacerbation ([facility] [address] <principal problem not specified> Precautions: Fall, WBAT Chart, occupational therapy assessment, plan of care, and goals were reviewed. ASSESSMENT: 
Pt is agreeable to participate in therapy and to maneuver to EOB for ADLs. Pt was able to maneuver to sit EOB w/Min A. Pt participated in ADL grooming task seated EOB w/Set-up, following which requesting to use BSC. Pt maneuvered to std w/Min/Mod A x2 (Pt is seen with PT to increase safety of the pt and staff during functional mobility and ADLs. , following which requested to maneuver to the [facility]. Pt required Min A x2 to maneuver to [facility] w/VCs for safety. Pt required Max A for bowel hygiene following a BM, reporting her daughter normally assisting with that at home, however, she was able to perform bladder hygiene w/CGA. Pt may benefit from toilet tongue training to improve independence with that aspect of ADLs. Pt assisted to the chair at the end of the session, call bell within reach. Progression toward goals: 
[x]          Improving appropriately and progressing toward goals 
[]          Improving slowly and progressing toward goals []          Not making progress toward goals and plan of care will be adjusted PLAN: 
Patient continues to benefit from skilled intervention to address the above impairments. Continue treatment per established plan of care. Discharge Recommendations:  Zechariah Juárez Further Equipment Recommendations for Discharge:  bedside commode, shower chair and rolling walker G-CODES:  
 
Self Care  Current  CL= 60-79%  Goal  CK= 40-59%. The severity rating is based on the Level of Assistance required for Functional Mobility and ADLs. SUBJECTIVE:  
Patient stated My daughter helps me to make sure I'm clean.  OBJECTIVE DATA SUMMARY:  
Cognitive/Behavioral Status: 
Neurologic State: Alert Orientation Level: Oriented X4 Cognition: Follows commands Safety/Judgement: Fall prevention Functional Mobility and Transfers for ADLs: 
 Bed Mobility: 
  
Supine to Sit: Minimum assistance Transfers: 
Sit to Stand: Minimum assistance; Moderate assistance Toilet Transfer : Minimum assistance;Assist x2 (to [facility] w/FWW) Balance: 
Sitting: Intact Standing: Impaired; With support Standing - Static: Fair Standing - Dynamic :  (fair/poor) ADL Intervention: 
 Grooming Grooming Assistance: Supervision/set up (seated EOB and in the chair) Washing Face: Supervision/set-up Washing Hands: Supervision/set-up Lower Body Dressing Assistance Socks: Maximum assistance Toileting Bladder Hygiene: Contact guard assistance Bowel Hygiene: Maximum assistance Cognitive Retraining Safety/Judgement: Fall prevention Pain: 
Pt reports 0/10 pain or discomfort prior to treatment.   
Pt didn't rate pain or discomfort post treatment. Activity Tolerance:   
Fair Please refer to the flowsheet for vital signs taken during this treatment. After treatment:  
[x]  Patient left in no apparent distress sitting up in chair 
[]  Patient left in no apparent distress in bed [x]  Call bell left within reach [x]  Nursing notified 
[]  Caregiver present 
[]  Bed alarm activated [place], GOLD/L Time Calculation: 32 mins

## 2018-10-03 NOTE — PROGRESS NOTES
Problem: Mobility Impaired (Adult and Pediatric) Goal: *Acute Goals and Plan of Care (Insert Text) Physical Therapy Goals Initiated 10/3/2018 and to be accomplished within 7 day(s) 1. Patient will move from supine to sit and sit to supine , scoot up and down and roll side to side in bed with supervision/set-up. 2.  Patient will transfer from bed to chair and chair to bed with supervision/set-up using the least restrictive device. 3.  Patient will perform sit to stand with supervision/set-up. 4.  Patient will ambulate with contact guard assist for  feet with the least restrictive device. 5.  Patient will ascend/descend 4 stairs with 1-2 handrail(s) with minimal assistance/contact guard assist. 
 
Outcome: Progressing Towards Goal 
physical Therapy EVALUATION Patient: Nathaniel Contreras (60 y.o. female) Date: 10/3/2018 Primary Diagnosis: CHF (congestive heart failure) ([facility] [address] CHF exacerbation ([facility] [address] Precautions:   Fall, WBAT 
 
ASSESSMENT : 
PT orders received and patient cleared by nursing to participate with therapy. Patient is a 60 y.o. female admitted to the hospital due to fall with weakness and inability to ambulate. Pt had imagining with unable to exclude a pelvic fracture followed by a CT of R hip which rules out fractures and dislocation. Pt was at a SNF and d/c home on the day she fell. She did not make it into her house (she states her legs and arms gave away) and required 3-4 people to assist into the house. Patient consents to PT evaluation and treatment. Pt seen with OT for increased functional mobility and safety. Pt performed supine to sit with HOB raised min A. Scooting to EOB CGA/SBA. Sit to stands min/mod A with cues for hand placement and safety. Gait training bed to [facility] to chair 5 feet with RW min/mod A with 2nd bout of gait with slight knee buckle.  OT SBA/CGA for safety with gait and mobility for increased safety. Pt ended therapy sitting in recliner with all needs met. Patient will benefit from skilled intervention to address the above impairments and increase functional independence. Patients rehabilitation potential is considered to be Fair Factors which may influence rehabilitation potential include:  
[]         None noted 
[]         Mental ability/status [x]         Medical condition 
[]         Home/family situation and support systems 
[]         Safety awareness 
[]         Pain tolerance/management 
[]         Other: PLAN : 
Recommendations and Planned Interventions: 
[x]           Bed Mobility Training             [x]    Neuromuscular Re-Education 
[x]           Transfer Training                   []    Orthotic/Prosthetic Training 
[x]           Gait Training                          []    Modalities [x]           Therapeutic Exercises          []    Edema Management/Control 
[x]           Therapeutic Activities            [x]    Patient and Family Training/Education 
[]           Other (comment): Frequency/Duration: Patient will be followed by physical therapy 1-2 times per day to address goals. Discharge Recommendations: Zechariah Juárez Further Equipment Recommendations for Discharge: N/A  
 
SUBJECTIVE:  
Patient stated Pink Branden comes the pickle and then strawberry shortcake (pt joking while on the commode).  OBJECTIVE DATA SUMMARY:  
 
Past Medical History:  
Diagnosis Date  Atrial fibrillation CHADS score 1  (-CHF, +HTN, -AGE, -DM, -CVA)  Carpal tunnel syndrome  Chronic pain  Congenital heart disease   
 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   
 H/O echocardiogram 04/2017 EF 60-65%, mild concentric LVH, dilated RV with RV dysfunction, RVSP 54 mmHg, RA E, moderate to severe pulmonic regurgitation.  Hypercholesterolemia  Hypertension  Hypothyroid  Morbid obesity  Morbid obesity with BMI of 45.0-49.9, adult ([facility] [address] 2/20/2017  Noncompliance  Schizoaffective disorder  Substance abuse   
 marijuana, crack cocaine Past Surgical History:  
Procedure Laterality Date [facility] [facility] VSD repair  COLONOSCOPY N/A 5/6/2018 COLONOSCOPY with tatooing and biopsy performed by Jayson Tatum MD at [address] [facility]    
 left  HX HYSTERECTOMY  HX KNEE REPLACEMENT    
 left  HX KNEE REPLACEMENT    
 right  HX ORTHOPAEDIC    
 left ankle  HX ORTHOPAEDIC    
 carpel tunnel right and left  HX THYROIDECTOMY Left Barriers to Learning/Limitations: None Compensate with: N/A Prior Level of Function/Home Situation: Pt recently d/c from SNF and was able to ambulate 180 feet per pt with RW. Pt did have assistance with some mobility. Home Situation Home Environment: Private residence One/Two Story Residence: Two story, live on 1st floor Living Alone: No 
Support Systems:  (lives with daughter) Patient Expects to be Discharged to[de-identified] Skilled nursing facility Current DME Used/Available at Home: Wheelchair, Walker, rollator, Walker, rolling, [address] [employer] chair, Commode, bedside, Hospital bed Tub or Shower Type:  (Pt sponge bathing at home.) Critical Behavior: 
Neurologic State: Alert Psychosocial 
Patient Behaviors: Calm; Cooperative Strength:   
Strength:  (B LE L LE 4/5 R LE 3 to 3+/5) Tone & Sensation:  
Tone: Normal (B LE) Sensation: Intact (B LE) Range Of Motion: 
AROM: Generally decreased, functional (B LE) Functional Mobility: 
Bed Mobility: 
Supine to Sit: Minimum assistance Transfers: 
Sit to Stand: Minimum assistance; Moderate assistance Stand to Sit: Minimum assistance Balance:  
Sitting: Intact Standing: Impaired; With support Standing - Static: Fair Standing - Dynamic :  (fair/poor) Ambulation/Gait Training: Distance (ft): 5 Feet (ft) Assistive Device: Walker, rolling Ambulation - Level of Assistance: Minimal assistance; Moderate assistance Base of Support: Widened;Center of gravity altered Speed/Reyna: Slow;Pace decreased (<100 feet/min) Therapeutic Exercises:  
Reviewed ankle pumps Pain: 
Pre: mild to moderate R hip Post: moderate R hipe Activity Tolerance:  
fair Please refer to the flowsheet for vital signs taken during this treatment. After treatment:  
[x] Patient left in no apparent distress sitting up in chair 
[] Patient left sitting on EOB [] Patient left in no apparent distress in bed 
[] Patient declined to be OOB at this time due to   
[x] Call bell left within reach [x] Nursing notified(Anne Marie and Davis Renteria) [] Caregiver present 
[] Bed alarm activated 
[x] Personal items in reach COMMUNICATION/EDUCATION:  
[x]         Fall prevention education was provided and the patient/caregiver indicated understanding. [x]         Patient/family have participated as able in goal setting and plan of care. [x]         Patient/family agree to work toward stated goals and plan of care. []         Patient understands intent and goals of therapy, but is neutral about his/her participation. []         Patient is unable to participate in goal setting and plan of care. Thank you for this referral. 
Clarance Favre, PT, DPT Time Calculation: 32 mins Mobility [id number] Current  CK= 40-59%   Goal  CJ= 20-39%. The severity rating is based on the Level of Assistance required for Functional Mobility and ADLs.  
 
Eval Complexity: History: MEDIUM  Complexity : 1-2 comorbidities / personal factors will impact the outcome/ POC Exam:HIGH Complexity : 4+ Standardized tests and measures addressing body structure, function, activity limitation and / or participation in recreation  Presentation: MEDIUM Complexity : Evolving with changing characteristics  Clinical Decision Making:Medium Complexity   Overall Complexity:MEDIUM

## 2018-10-03 NOTE — ROUTINE PROCESS
Bedside shift report received from [place] and Assumed patient care. Patient in bed, awake, alert and oriented x4. Incontinence care provided at shift change, placed new purewick. Positioned patient  In comfortable position. Call light and personal items placed in reach. 8:08 AM - Bedside shift change report given to Mika Calzada and Caren Brown (oncoming nurse) by Rigo Gonzalez RN (offgoing nurse). Report given with SBAR, Kardex, Intake/Output, MAR and Recent Results.

## 2018-10-03 NOTE — PROGRESS NOTES
Problem: Falls - Risk of 
Goal: *Absence of Falls Document Ann Marie Flores Fall Risk and appropriate interventions in the flowsheet. Outcome: Progressing Towards Goal 
Fall Risk Interventions: 
Mobility Interventions: Bed/chair exit alarm, Communicate number of staff needed for ambulation/transfer Medication Interventions: Assess postural VS orthostatic hypotension, Bed/chair exit alarm, Evaluate medications/consider consulting pharmacy, Patient to call before getting OOB, Teach patient to arise slowly Elimination Interventions: Bed/chair exit alarm, Call light in reach, Patient to call for help with toileting needs, Toileting schedule/hourly rounds History of Falls Interventions: Bed/chair exit alarm Problem: Pressure Injury - Risk of 
Goal: *Prevention of pressure injury Document Stas Scale and appropriate interventions in the flowsheet. Outcome: Progressing Towards Goal 
Pressure Injury Interventions: 
Sensory Interventions: Assess changes in LOC, Avoid rigorous massage over bony prominences, Keep linens dry and wrinkle-free, Maintain/enhance activity level, Minimize linen layers, Monitor skin under medical devices Moisture Interventions: Absorbent underpads, Apply protective barrier, creams and emollients, Check for incontinence Q2 hours and as needed, Internal/External urinary devices, Maintain skin hydration (lotion/cream), Minimize layers, Moisture barrier Activity Interventions: Increase time out of bed, Pressure redistribution bed/mattress(bed type), PT/OT evaluation Mobility Interventions: HOB 30 degrees or less, Pressure redistribution bed/mattress (bed type), PT/OT evaluation Nutrition Interventions: Document food/fluid/supplement intake, Discuss nutritional consult with provider, Offer support with meals,snacks and hydration Friction and Shear Interventions: HOB 30 degrees or less

## 2018-10-03 NOTE — PROGRESS NOTES
[facility] Hospitalist Group Progress Note Patient: Norma Waltres Age: 60 y.o. : 1960 MR#: [id number] SSN: [id number] Date/Time: 10/3/2018 10:51 AM 
 
Subjective/24-hour events:  
 
Working with therapy currently. Some R hip pain still but no chest pain, SOB or other complaints acutely. Assessment:  
GNR UTI Generalized weakness with impaired mobility Chronic hypercapnic respiratory failure Chronic diastolic CHF Hypothyroidism Hyponatremia, mild CAD HLD 
OA Morbid obesity, BMI 56.6 Atrial fibrillation hx on coumadin Plan: 
Monitor sodium - only slightly low, stable. No further w/u. Coumadin 5 mg tonight, INR daily. PT/OT recommending SNF, but patient has apparently expressed desire to return home to [facility]. Will f/u. Supportive medical management as ordered in interim. Follow. Case discussed with:  [x]Patient  []Family  []Nursing  []Case Management DVT Prophylaxis:  []Lovenox  []Hep SQ  []SCDs  [x]Coumadin   []On Heparin gtt Objective:  
VS:  
Visit Vitals  /50 (BP 1 Location: Left arm, BP Patient Position: At rest)  Pulse 62  Temp 97.9 °F (36.6 °C)  Resp 18  Ht 5' (1.524 m)  Wt 131.9 kg (290 lb 12.6 oz)  SpO2 97%  Breastfeeding No  
 BMI 56.79 kg/m2 Tmax/24hrs: Temp (24hrs), Av.9 °F (36.6 °C), Min:97.5 °F (36.4 °C), Max:98.6 °F (37 °C) Intake/Output Summary (Last 24 hours) at 10/03/18 1051 Last data filed at 10/03/18 7073 Gross per 24 hour Intake              240 ml Output              375 ml Net             -135 ml General:  In NAD. Nontoxic-appearing. Cardiovascular:  S1, S2. Rate normal. 
Pulmonary:  Breath sounds distant, but no wheezes. Effort nonlabored. GI:  Abdomen soft, NTTP. Extremities:  Warm, no ischemia. Neuro:  Awake and alert, motor nonfocal. 
 
Labs:   
Recent Results (from the past 24 hour(s)) PROTHROMBIN TIME + INR  Collection Time: 10/03/18  4:10 AM  
 Result Value Ref Range Prothrombin time 25.1 (H) 11.5 - 15.2 sec INR 2.3 (H) 0.8 - 1.2 Signed By: Tomasa Frazier MD   
 October 3, 2018 10:51 AM

## 2018-10-04 ENCOUNTER — TELEPHONE (OUTPATIENT)
Dept: SURGERY | Age: 58
End: 2018-10-04

## 2018-10-04 LAB
INR PPP: 2.3 (ref 0.8–1.2)
PROTHROMBIN TIME: 25.2 SEC (ref 11.5–15.2)

## 2018-10-04 PROCEDURE — 74011250636 HC RX REV CODE- 250/636: Performed by: HOSPITALIST

## 2018-10-04 PROCEDURE — 77030037875 HC DRSG MEPILEX <16IN BORD MOLN -A

## 2018-10-04 PROCEDURE — 77030038269 HC DRN EXT URIN PURWCK BARD -A

## 2018-10-04 PROCEDURE — 36415 COLL VENOUS BLD VENIPUNCTURE: CPT | Performed by: NURSE PRACTITIONER

## 2018-10-04 PROCEDURE — 74011250637 HC RX REV CODE- 250/637: Performed by: HOSPITALIST

## 2018-10-04 PROCEDURE — 85610 PROTHROMBIN TIME: CPT | Performed by: NURSE PRACTITIONER

## 2018-10-04 PROCEDURE — 97535 SELF CARE MNGMENT TRAINING: CPT

## 2018-10-04 PROCEDURE — 74011250637 HC RX REV CODE- 250/637: Performed by: FAMILY MEDICINE

## 2018-10-04 PROCEDURE — 65660000000 HC RM CCU STEPDOWN

## 2018-10-04 PROCEDURE — 77030037878 HC DRSG MEPILEX >48IN BORD MOLN -B

## 2018-10-04 PROCEDURE — 97530 THERAPEUTIC ACTIVITIES: CPT

## 2018-10-04 PROCEDURE — 74011000250 HC RX REV CODE- 250: Performed by: HOSPITALIST

## 2018-10-04 PROCEDURE — 74011250637 HC RX REV CODE- 250/637: Performed by: NURSE PRACTITIONER

## 2018-10-04 RX ORDER — WARFARIN SODIUM 5 MG/1
5 TABLET ORAL ONCE
Status: COMPLETED | OUTPATIENT
Start: 2018-10-04 | End: 2018-10-04

## 2018-10-04 RX ORDER — OXYCODONE HYDROCHLORIDE 5 MG/1
5 TABLET ORAL
Qty: 6 TAB | Refills: 0 | Status: SHIPPED | OUTPATIENT
Start: 2018-10-04

## 2018-10-04 RX ADMIN — FLUTICASONE PROPIONATE 2 SPRAY: 50 SPRAY, METERED NASAL at 12:40

## 2018-10-04 RX ADMIN — FAMOTIDINE 20 MG: 20 TABLET ORAL at 08:31

## 2018-10-04 RX ADMIN — SIMVASTATIN 40 MG: 40 TABLET, FILM COATED ORAL at 21:12

## 2018-10-04 RX ADMIN — Medication 10 ML: at 06:16

## 2018-10-04 RX ADMIN — OXYCODONE HYDROCHLORIDE 5 MG: 5 TABLET ORAL at 12:40

## 2018-10-04 RX ADMIN — FUROSEMIDE 40 MG: 40 TABLET ORAL at 17:33

## 2018-10-04 RX ADMIN — CEFTRIAXONE SODIUM 1 G: 1 INJECTION, POWDER, FOR SOLUTION INTRAMUSCULAR; INTRAVENOUS at 21:12

## 2018-10-04 RX ADMIN — FUROSEMIDE 40 MG: 40 TABLET ORAL at 08:31

## 2018-10-04 RX ADMIN — LEVOTHYROXINE SODIUM 125 MCG: 125 TABLET ORAL at 08:30

## 2018-10-04 RX ADMIN — Medication 10 ML: at 21:12

## 2018-10-04 RX ADMIN — Medication 10 ML: at 15:03

## 2018-10-04 RX ADMIN — OXYCODONE HYDROCHLORIDE 5 MG: 5 TABLET ORAL at 06:40

## 2018-10-04 RX ADMIN — PAROXETINE HYDROCHLORIDE 20 MG: 20 TABLET, FILM COATED ORAL at 08:30

## 2018-10-04 RX ADMIN — ARIPIPRAZOLE 15 MG: 5 TABLET ORAL at 08:30

## 2018-10-04 RX ADMIN — METOPROLOL TARTRATE 12.5 MG: 25 TABLET ORAL at 17:33

## 2018-10-04 RX ADMIN — OXYCODONE HYDROCHLORIDE 5 MG: 5 TABLET ORAL at 18:09

## 2018-10-04 RX ADMIN — MULTIPLE VITAMINS W/ MINERALS TAB 1 TABLET: TAB at 08:31

## 2018-10-04 RX ADMIN — WARFARIN SODIUM 5 MG: 5 TABLET ORAL at 18:09

## 2018-10-04 RX ADMIN — ISOSORBIDE MONONITRATE 30 MG: 30 TABLET, EXTENDED RELEASE ORAL at 17:33

## 2018-10-04 RX ADMIN — FAMOTIDINE 20 MG: 20 TABLET ORAL at 17:33

## 2018-10-04 RX ADMIN — METOPROLOL TARTRATE 12.5 MG: 25 TABLET ORAL at 08:31

## 2018-10-04 NOTE — PROGRESS NOTES
Per Ortho: 
 
Patient seen at bedside, on telemetry, c/o improved pain right hip, (-) CT right hip fracture, moderate OA though. Recommend PT/OT WBAT Lee LE. OOB to chair

## 2018-10-04 NOTE — PROGRESS NOTES
Per Felipa Bates (DOREEN) uploaded SNF short form in [facility]. Informed Felipa [facility] information has been uploaded.

## 2018-10-04 NOTE — PROGRESS NOTES
[facility] Hospitalist Group Progress Note Patient: Yeni Gallardo Age: 60 y.o. : 1960 MR#: [id number] SSN: [id number] Date/Time: 10/4/2018 10:36 AM 
 
Subjective/24-hour events: No new complaints. Assessment:  
GNR UTI Generalized weakness with impaired mobility Chronic hypercapnic respiratory failure Chronic diastolic CHF Hypothyroidism Hyponatremia, mild CAD HLD 
OA Morbid obesity, BMI 56.6 Atrial fibrillation hx on coumadin Plan: 
Final dose of rocephin today. F/U metabolic panel in AM. Coumadin 5 mg tonight for INR of 2.3. Monitor. Return to SNF as soon as bed available. Case discussed with:  [x]Patient  []Family  []Nursing  [x]Case Management DVT Prophylaxis:  []Lovenox  []Hep SQ  []SCDs  [x]Coumadin   []On Heparin gtt Objective:  
VS:  
Visit Vitals  /82 (BP 1 Location: Right arm, BP Patient Position: At rest)  Pulse 76  Temp 97.9 °F (36.6 °C)  Resp 18  Ht 5' (1.524 m)  Wt 131.7 kg (290 lb 4.8 oz)  SpO2 95%  Breastfeeding No  
 BMI 56.7 kg/m2 Tmax/24hrs: Temp (24hrs), Av °F (36.7 °C), Min:97.7 °F (36.5 °C), Max:98.4 °F (36.9 °C) Intake/Output Summary (Last 24 hours) at 10/04/18 1036 Last data filed at 10/04/18 3086 Gross per 24 hour Intake              490 ml Output             1100 ml Net             -610 ml General:  In NAD. Nontoxic-appearing. Cardiovascular:  S1, S2. Rate normal. 
Pulmonary:  Breath sounds distant, but no wheezes. Effort nonlabored. GI:  Abdomen soft, NTTP. Extremities:  Warm, no ischemia. Neuro:  Awake and alert, motor nonfocal. 
 
Labs:   
Recent Results (from the past 24 hour(s)) PROTHROMBIN TIME + INR Collection Time: 10/04/18  5:26 AM  
Result Value Ref Range Prothrombin time 25.2 (H) 11.5 - 15.2 sec INR 2.3 (H) 0.8 - 1.2 Signed By: Mehdi Suero MD   
 2018 10:36 AM

## 2018-10-04 NOTE — PROGRESS NOTES
Bedside shift change report given to [facility] (oncoming nurse) by Abdias Royal (offgoing nurse). Report included the following information SBAR, Kardex and MAR.

## 2018-10-04 NOTE — PROGRESS NOTES
Problem: Falls - Risk of 
Goal: *Absence of Falls Document Chiara Boyd Fall Risk and appropriate interventions in the flowsheet. Outcome: Progressing Towards Goal 
Fall Risk Interventions: 
Mobility Interventions: Assess mobility with egress test, Communicate number of staff needed for ambulation/transfer, OT consult for ADLs, Patient to call before getting OOB, PT Consult for mobility concerns, PT Consult for assist device competence, Strengthening exercises (ROM-active/passive) Medication Interventions: Evaluate medications/consider consulting pharmacy Elimination Interventions: Call light in reach, Patient to call for help with toileting needs History of Falls Interventions: Consult care management for discharge planning, Door open when patient unattended, Evaluate medications/consider consulting pharmacy, Room close to nurse's station Problem: Pressure Injury - Risk of 
Goal: *Prevention of pressure injury Document Stas Scale and appropriate interventions in the flowsheet. Outcome: Progressing Towards Goal 
Pressure Injury Interventions: 
Sensory Interventions: Assess changes in LOC, Avoid rigorous massage over bony prominences, Discuss PT/OT consult with provider, Monitor skin under medical devices Moisture Interventions: Apply protective barrier, creams and emollients, Assess need for specialty bed, Absorbent underpads, Internal/External urinary devices, Maintain skin hydration (lotion/cream), Moisture barrier Activity Interventions: Increase time out of bed, Pressure redistribution bed/mattress(bed type), PT/OT evaluation Mobility Interventions: HOB 30 degrees or less, Pressure redistribution bed/mattress (bed type), PT/OT evaluation Nutrition Interventions: Document food/fluid/supplement intake Friction and Shear Interventions: Foam dressings/transparent film/skin sealants, HOB 30 degrees or less

## 2018-10-04 NOTE — PROGRESS NOTES
Bedside shift change report given to Delonte Morgan (oncoming nurse) by Louie Hoffman (offgoing nurse). Report included the following information SBAR, Kardex and MAR. Patient still complaining of right hip pain, refusing to be turned and positioned oob to chair with assist  C/o rash on sacral area  meplilex applied , call bell in reach End of shift report given to [facility]. Information from sbar, kardex. and mar

## 2018-10-04 NOTE — PROGRESS NOTES
Problem: Self Care Deficits Care Plan (Adult) Goal: *Acute Goals and Plan of Care (Insert Text) Occupational Therapy Goals Initiated 10/1/2018 within 7 day(s). 1.  Patient will perform lower body dressing with minimal assistance/contact guard assist using AE. 2.  Patient will perform functional task in standing for 3 minutes with max assist for balance. 3.  Patient will perform toilet transfers with moderate assistance . 4. Patient will participate in upper extremity therapeutic exercise/activities with supervision/set-up for 8 minutes to increase strength/endurance for ADLs. Outcome: Progressing Towards Goal 
Occupational Therapy TREATMENT Patient: Spring Glover (60 y.o. female) Date: 10/4/2018 Diagnosis: CHF (congestive heart failure) ([facility] <principal problem not specified> Precautions: Fall, WBAT Chart, occupational therapy assessment, plan of care, and goals were reviewed. ASSESSMENT: 
Pt reports she wants to get on [facility] upon entry. Pt maneuvered to the EOB w/SBA, following which pt maneuvered to the [facility] w/FWW and CGA. Pt reqired Min VCs for safe sequencing. Pt was educated on use of toilet tongs (issued to the pt), pt required Mod A (improved from yesterday) and additional time 2/2 necessity to have 3 BM after wiping, for toileting hygiene after BM w/toilet tongs. Pt was able to complete her bladder hygiene w/SBA in std. Pt maneuvered to the chair at the end of the session. Progression toward goals: 
[x]          Improving appropriately and progressing toward goals 
[]          Improving slowly and progressing toward goals 
[]          Not making progress toward goals and plan of care will be adjusted PLAN: 
Patient continues to benefit from skilled intervention to address the above impairments. Continue treatment per established plan of care. Discharge Recommendations:  Zechariah Juárez Further Equipment Recommendations for Discharge:  bedside commode, shower chair and rolling walker G-CODES:  
 
Self Care  Current  CL= 60-79%  Goal  CK= 40-59%. The severity rating is based on the Level of Assistance required for Functional Mobility and ADLs. SUBJECTIVE:  
Patient stated I ready to get up.  OBJECTIVE DATA SUMMARY:  
Cognitive/Behavioral Status: 
Neurologic State: Alert Orientation Level: Oriented X4 Cognition: Follows commands Safety/Judgement: Fall prevention Functional Mobility and Transfers for ADLs: 
 Bed Mobility: 
  
Supine to Sit: Supervision Transfers: 
Sit to Stand: Contact guard assistance Toilet Transfer : Contact guard assistance (w/FWW) Balance: 
Sitting: Intact Standing: Impaired; With support Standing - Static: Good Standing - Dynamic : Fair ADL Intervention: Toileting Toileting Assistance: Moderate assistance Bladder Hygiene: Stand-by assistance Bowel Hygiene: Moderate assistance Pain: 
Pt reports 0/10 pain or discomfort prior to treatment.   
Pt reports 0/10 pain or discomfort post treatment. Activity Tolerance:   
Fair Please refer to the flowsheet for vital signs taken during this treatment. After treatment:  
[x]  Patient left in no apparent distress sitting up in chair 
[]  Patient left in no apparent distress in bed 
[x]  Call bell left within reach [x]  Nursing notified 
[]  Caregiver present 
[]  Bed alarm activated Wauneta Bamberger, COTA/L Time Calculation: 38 mins

## 2018-10-04 NOTE — PROGRESS NOTES
Bedside shift change report given to Geoff Kang (oncoming nurse) by Patricia Fernandez (offgoing nurse). Report included the following information SBAR, Kardex and MAR.

## 2018-10-04 NOTE — TELEPHONE ENCOUNTER
Called patient to r/s her mid trail with the NP Bryan on 10/17 and the patient informed me that she was in the hospital. She will call back to r/s her appointment when she gets out.

## 2018-10-05 LAB
INR PPP: 2.1 (ref 0.8–1.2)
PROTHROMBIN TIME: 23.5 SEC (ref 11.5–15.2)

## 2018-10-05 PROCEDURE — 36415 COLL VENOUS BLD VENIPUNCTURE: CPT | Performed by: NURSE PRACTITIONER

## 2018-10-05 PROCEDURE — 74011250637 HC RX REV CODE- 250/637: Performed by: NURSE PRACTITIONER

## 2018-10-05 PROCEDURE — 85610 PROTHROMBIN TIME: CPT | Performed by: NURSE PRACTITIONER

## 2018-10-05 PROCEDURE — 97530 THERAPEUTIC ACTIVITIES: CPT

## 2018-10-05 PROCEDURE — 97116 GAIT TRAINING THERAPY: CPT

## 2018-10-05 PROCEDURE — 74011250637 HC RX REV CODE- 250/637: Performed by: HOSPITALIST

## 2018-10-05 PROCEDURE — 65660000000 HC RM CCU STEPDOWN

## 2018-10-05 PROCEDURE — 77030038269 HC DRN EXT URIN PURWCK BARD -A

## 2018-10-05 RX ADMIN — SIMVASTATIN 40 MG: 40 TABLET, FILM COATED ORAL at 21:52

## 2018-10-05 RX ADMIN — FAMOTIDINE 20 MG: 20 TABLET ORAL at 17:10

## 2018-10-05 RX ADMIN — FUROSEMIDE 40 MG: 40 TABLET ORAL at 08:09

## 2018-10-05 RX ADMIN — ISOSORBIDE MONONITRATE 30 MG: 30 TABLET, EXTENDED RELEASE ORAL at 17:10

## 2018-10-05 RX ADMIN — METOPROLOL TARTRATE 12.5 MG: 25 TABLET ORAL at 08:08

## 2018-10-05 RX ADMIN — OXYCODONE HYDROCHLORIDE 5 MG: 5 TABLET ORAL at 00:43

## 2018-10-05 RX ADMIN — OXYCODONE HYDROCHLORIDE 5 MG: 5 TABLET ORAL at 08:16

## 2018-10-05 RX ADMIN — OXYCODONE HYDROCHLORIDE 5 MG: 5 TABLET ORAL at 16:46

## 2018-10-05 RX ADMIN — Medication 10 ML: at 06:47

## 2018-10-05 RX ADMIN — FUROSEMIDE 40 MG: 40 TABLET ORAL at 17:10

## 2018-10-05 RX ADMIN — LEVOTHYROXINE SODIUM 125 MCG: 125 TABLET ORAL at 08:09

## 2018-10-05 RX ADMIN — Medication 10 ML: at 17:11

## 2018-10-05 RX ADMIN — FAMOTIDINE 20 MG: 20 TABLET ORAL at 08:09

## 2018-10-05 RX ADMIN — Medication 10 ML: at 21:52

## 2018-10-05 RX ADMIN — PAROXETINE HYDROCHLORIDE 20 MG: 20 TABLET, FILM COATED ORAL at 08:09

## 2018-10-05 RX ADMIN — MULTIPLE VITAMINS W/ MINERALS TAB 1 TABLET: TAB at 08:09

## 2018-10-05 RX ADMIN — ARIPIPRAZOLE 15 MG: 5 TABLET ORAL at 08:08

## 2018-10-05 RX ADMIN — METOPROLOL TARTRATE 12.5 MG: 25 TABLET ORAL at 17:10

## 2018-10-05 NOTE — ROUTINE PROCESS
Bedside and Verbal shift change report given to Ankit Sanders RN 
 (oncoming nurse) by Litzy Landin RN (offgoing nurse). Report included the following information SBAR, Kardex, Intake/Output, MAR and Recent Results.

## 2018-10-05 NOTE — PROGRESS NOTES
Notified Dr. Marley Prosevie of noted bradycardia and pauses to rhythm. patient stable,VS within normal limits,denies chest pain,no shortness of breath.stated \"will evaluate and possible consult Cardiology\". will continue to monitor.

## 2018-10-05 NOTE — PROGRESS NOTES
[facility] Hospitalist Group Progress Note Patient: Deeapli Tim Age: 60 y.o. : 1960 MR#: [id number] SSN: [id number] Date/Time: 10/5/2018 3:52 PM 
 
Subjective/24-hour events: No new complaints. Assessment:  
GNR UTI Generalized weakness with impaired mobility Chronic hypercapnic respiratory failure Chronic diastolic CHF Hypothyroidism Hyponatremia, mild CAD HLD 
OA Morbid obesity, BMI 56.6 Atrial fibrillation hx on coumadin Plan: Antibiotic therapy for UTI completed. No new issues. Mobilize as tolerated. SNF disposition pending - d/w MCM. Supportive care in interim. Case discussed with:  [x]Patient  []Family  []Nursing  [x]Case Management DVT Prophylaxis:  []Lovenox  []Hep SQ  []SCDs  [x]Coumadin   []On Heparin gtt Objective:  
VS:  
Visit Vitals  /77 (BP 1 Location: Right arm, BP Patient Position: At rest)  Pulse 62  Temp 97.3 °F (36.3 °C)  Resp 17  Ht 5' (1.524 m)  Wt 131.7 kg (290 lb 6.4 oz)  SpO2 92%  Breastfeeding No  
 BMI 56.71 kg/m2 Tmax/24hrs: Temp (24hrs), Av °F (36.7 °C), Min:97.3 °F (36.3 °C), Max:98.6 °F (37 °C) Intake/Output Summary (Last 24 hours) at 10/05/18 1552 Last data filed at 10/05/18 1159 Gross per 24 hour Intake              180 ml Output             1180 ml Net            -1000 ml General:  In NAD. Nontoxic-appearing. Cardiovascular:  S1, S2. Rate normal. 
Pulmonary:  Breath sounds distant, but no wheezes. Effort nonlabored. GI:  Abdomen soft, NTTP. Extremities:  Warm, no ischemia. Neuro:  Awake and alert, motor nonfocal. 
 
Labs:   
Recent Results (from the past 24 hour(s)) PROTHROMBIN TIME + INR Collection Time: 10/05/18  3:18 AM  
Result Value Ref Range Prothrombin time 23.5 (H) 11.5 - 15.2 sec INR 2.1 (H) 0.8 - 1.2 Signed By: Bhakti Singh MD   
 2018 3:52 PM

## 2018-10-05 NOTE — PROGRESS NOTES
Progress Note Patient: Reddy Snyder MRN: [id number]  SSN: [id number] YOB: 1960  Age: 60 y.o. Sex: female Admit Date: 9/28/2018 LOS: 6 days Subjective:  
 
Left hip feels some better PT progressing slowly Objective:  
 
Vitals:  
 10/04/18 2331 10/05/18 0344 10/05/18 0745 10/05/18 1153 BP: 126/70 111/68 113/73 126/77 Pulse: 67 79 86 62 Resp: 18 18 16 17 Temp: 98.6 °F (37 °C) 97.7 °F (36.5 °C) 98.1 °F (36.7 °C) 97.3 °F (36.3 °C) SpO2: 97% 94% 93% 92% Weight:  290 lb 6.4 oz (131.7 kg) Height:      
  
 
Intake and Output: 
Current Shift: 10/05 0701 - 10/05 1900 In: -  
Out: 200 [Urine:200] Last three shifts: 10/03 1901 - 10/05 0700 In: 334 [P.O.:660; I.V.:10] Out: 2580 [Urine:2580] Physical Exam: Moderate lateral hip tenderness ++ pain with hip rotation Lab/Data Review: CBC: No results found for: WBC, HGB, HGBEXT, HCT, HCTEXT, PLT, PLTEXT, HGBEXT, HCTEXT, PLTEXT Assessment:  
 
Active Problems: Hypertension () Morbid obesity with BMI of 45.0-49.9, adult ([facility] (2/20/2017) [facility] (4/18/[facility]) [facility] ([facility] (9/29/2018) Severe osteoarthritis right hip (10/2/2018) Plan:  
 
Continue PT Rehab transfer There is no need for surgery at this time. Signed By: Bandar Corral MD   
 October 5, 2018

## 2018-10-05 NOTE — PROGRESS NOTES
Problem: Mobility Impaired (Adult and Pediatric) Goal: *Acute Goals and Plan of Care (Insert Text) Physical Therapy Goals Initiated 10/3/2018 and to be accomplished within 7 day(s) 1. Patient will move from supine to sit and sit to supine , scoot up and down and roll side to side in bed with supervision/set-up. 2.  Patient will transfer from bed to chair and chair to bed with supervision/set-up using the least restrictive device. 3.  Patient will perform sit to stand with supervision/set-up. 4.  Patient will ambulate with contact guard assist for  feet with the least restrictive device. 5.  Patient will ascend/descend 4 stairs with 1-2 handrail(s) with minimal assistance/contact guard assist. 
  
physical Therapy TREATMENT Patient: Nessa Vogt (60 y.o. female) Date: 10/5/2018 Diagnosis: CHF (congestive heart failure) ([facility] CHF exacerbation ([facility] <principal problem not specified> Precautions: Fall, WBAT Chart, physical therapy assessment, plan of care and goals were reviewed. ASSESSMENT: 
PT doing well today. Pain level at 6/10 at rest and while walking. Pt. Is able to sit at EOB indep with increased effort and time. Pt. Required CGa for sit to stand, with c/o pain in RLE. Pt.'s gait is slow, antalgic and pt. Ambulates with flexed posture. Pt. Has increased pain in R hip stand to sit. Pt. Will most likely need WC at discharge to home for safety due to recent fall and chronic R hip pain. Pt. May consider a ramp for home access for safety as well Progression toward goals: 
[]      Improving appropriately and progressing toward goals [x]      Improving slowly and progressing toward goals 
[]      Not making progress toward goals and plan of care will be adjusted PLAN: 
Patient continues to benefit from skilled intervention to address the above impairments. Continue treatment per established plan of care. Discharge Recommendations:  Zechariah Juárez Further Equipment Recommendations for Discharge:  rolling walker and wheelchair bariatric SUBJECTIVE:  
Patient stated i am ok.  OBJECTIVE DATA SUMMARY:  
Critical Behavior: 
Neurologic State: Alert Orientation Level: Oriented X4 Cognition: Follows commands Safety/Judgement: Fall prevention Functional Mobility Training: 
Bed Mobility: 
Rolling: Modified independent Supine to Sit: Modified independent (increased time and effort) Scooting: Independent Interventions: Safety awareness training;Verbal cues Transfers: 
Sit to Stand: Contact guard assistance Stand to Sit: Supervision Bed to Chair: Supervision Balance: 
Sitting: Intact Standing: Impaired;Pull to stand; With support Standing - Static: Good Standing - Dynamic : Fair Ambulation/Gait Training: 
Distance (ft): 10 Feet (ft) (8') Assistive Device: Walker, rolling Ambulation - Level of Assistance: Contact guard assistance Gait Abnormalities: Antalgic;Decreased step clearance; Toe walking (Toe walking on RLE) Base of Support: Center of gravity altered; Widened Speed/Reyna: Pace decreased (<100 feet/min); Slow Step Length: Left shortened Therapeutic Exercises:  
Seated LAQ, ankle pumps x 10 ea Pain: 
Pain Scale 1: Numeric (0 - 10) Pain Intensity 1: 0 Pain Location 1: Back Pain Description 1: Aching Pain Intervention(s) 1: Medication (see MAR) Activity Tolerance:  
Fair due to pain. Mobility [id number] Current  CJ= 20-39%   Goal  CI= 1-19%. The severity rating is based on the The severity rating is based on the Level of Assistance required for Functional Mobility and ADLs. Please refer to the flowsheet for vital signs taken during this treatment. After treatment:  
[x] Patient left in no apparent distress sitting up in chair 
[] Patient left in no apparent distress in bed 
[x] Call bell left within reach [x] Nursing notified 
[] Caregiver present 
[] Bed alarm activated Dell Age, PT 
 Time Calculation: 32 mins

## 2018-10-05 NOTE — PROGRESS NOTES
NUTRITION Nutrition Screen RECOMMENDATIONS / PLAN:  
 
- Continue current nutrition interventions. - Continue fluid restriction per MD order. 
- Continue RD inpatient monitoring and evaluation. NUTRITION INTERVENTIONS & DIAGNOSIS:  
 
[x] Meals/snacks: modify composition Nutrition Diagnosis: Overweight/obese related to excessive energy intake as evidenced by BMI of 56.7 kg/(m^2). ASSESSMENT:  
 
Pt with good appetite and meal intake. Tolerating diet. Pt with CHF and hyponatremia pt on fluid restriction and lasix. Average po intake adequate to meet patients estimated nutritional needs:   [x] Yes     [] No   [] Unable to determine at this time Diet: DIET CARDIAC Regular; FR 1500ML Food Allergies: NKFA Current Appetite:   [x] Good     [] Fair     [] Poor     [] Other: 
Appetite/meal intake prior to admission:   [] Good     [] Fair     [] Poor     [x] Other:unknown Feeding Limitations:  [] Swallowing difficulty    [] Chewing difficulty    [] Other: 
Current Meal Intake: No data found. BM: 10/3 Skin Integrity: WDL Edema:   [x] No     [] Yes Pertinent Medications: Reviewed: lasix, MVI w/ iron No results for input(s): NA, K, CL, CO2, GLU, BUN, CREA, CA, MG, PHOS, ALB, PREALB, TBIL, SGOT, ALT in the last 72 hours. Intake/Output Summary (Last 24 hours) at 10/05/18 1427 Last data filed at 10/05/18 1159 Gross per 24 hour Intake              180 ml Output             1380 ml Net            -1200 ml Anthropometrics: 
Ht Readings from Last 1 Encounters:  
09/29/18 5' (1.524 m) Last 3 Recorded Weights in this Encounter 10/03/18 8640 10/04/18 2344 10/05/18 5885 Weight: 131.9 kg (290 lb 12.6 oz) 131.7 kg (290 lb 4.8 oz) 131.7 kg (290 lb 6.4 oz) Body mass index is 56.71 kg/(m^2). Obese Class III Weight History: Fluctuations noted in weight hx PTA per chart review. Weight Metrics 10/5/2018 9/10/2018 8/27/2018 8/3/2018 7/25/2018 6/22/2018 6/6/2018 Weight 290 lb 6.4 oz 291 lb 303 lb 9.2 oz 323 lb 323 lb 325 lb 301 lb 5.9 oz  
BMI 56.71 kg/m2 56.83 kg/m2 59.29 kg/m2 57.22 kg/m2 63.08 kg/m2 59.44 kg/m2 58.86 kg/m2 Admitting Diagnosis: CHF (congestive heart failure) ([facility] CHF exacerbation ([facility] Pertinent PMHx: CHF, GERD, hypercholesterolemia, HTN Education Needs:        [x] None identified  [] Identified - Not appropriate at this time  []  Identified and addressed - refer to education log Learning Limitations:   [x] None identified  [] Identified Cultural, Zoroastrianism & ethnic food preferences:  [x] None identified    [] Identified and addressed ESTIMATED NUTRITION NEEDS:  
 
Calories: 4074-3413 kcal (MSJx1.2-1.4) based on  [x] Adjusted BW: 67 kg      [] IBW Protein: 54-67 gm (0.8-1 gm/kg) based on  [x] Adjusted BW      [] IBW Fluid: 1500 mL/day per MD order MONITORING & EVALUATION:  
 
Nutrition Goal(s): 1. Po intake of meals will meet >75% of patient estimated nutritional needs within the next 7 days. Outcome:  [] Met/Ongoing    []  Not Met    [x] New/Initial Goal 
2. Weight loss of 1-2 lbs over the next 7 days. Outcome:  [] Met/Ongoing    []  Not Met    [x] New/Initial Goal   
 
Monitoring:   [x] Food and beverage intake   [x] Diet order   [x] Nutrition-focused physical findings   [x] Treatment/therapy   [] Weight   [] Enteral nutrition intake Previous Recommendations (for follow-up assessments only):     []   Implemented       []   Not Implemented (RD to address)      [] No Longer Appropriate     [] No Recommendation Made Discharge Planning: cardiac diet [x] Participated in care planning, discharge planning, & interdisciplinary rounds as appropriate Lillie Fang RD Pager: [phone]

## 2018-10-06 LAB
BACTERIA SPEC CULT: NORMAL
BACTERIA SPEC CULT: NORMAL
INR PPP: 2 (ref 0.8–1.2)
PROTHROMBIN TIME: 22.3 SEC (ref 11.5–15.2)
SERVICE CMNT-IMP: NORMAL
SERVICE CMNT-IMP: NORMAL

## 2018-10-06 PROCEDURE — 74011250637 HC RX REV CODE- 250/637: Performed by: NURSE PRACTITIONER

## 2018-10-06 PROCEDURE — 74011250637 HC RX REV CODE- 250/637: Performed by: FAMILY MEDICINE

## 2018-10-06 PROCEDURE — 85610 PROTHROMBIN TIME: CPT | Performed by: NURSE PRACTITIONER

## 2018-10-06 PROCEDURE — 65660000000 HC RM CCU STEPDOWN

## 2018-10-06 PROCEDURE — 74011250637 HC RX REV CODE- 250/637: Performed by: HOSPITALIST

## 2018-10-06 PROCEDURE — 36415 COLL VENOUS BLD VENIPUNCTURE: CPT | Performed by: NURSE PRACTITIONER

## 2018-10-06 RX ORDER — WARFARIN 7.5 MG/1
7.5 TABLET ORAL ONCE
Status: COMPLETED | OUTPATIENT
Start: 2018-10-06 | End: 2018-10-06

## 2018-10-06 RX ADMIN — MULTIPLE VITAMINS W/ MINERALS TAB 1 TABLET: TAB at 08:00

## 2018-10-06 RX ADMIN — METOPROLOL TARTRATE 12.5 MG: 25 TABLET ORAL at 17:14

## 2018-10-06 RX ADMIN — OXYCODONE HYDROCHLORIDE 5 MG: 5 TABLET ORAL at 00:11

## 2018-10-06 RX ADMIN — SIMVASTATIN 40 MG: 40 TABLET, FILM COATED ORAL at 21:12

## 2018-10-06 RX ADMIN — FUROSEMIDE 40 MG: 40 TABLET ORAL at 17:14

## 2018-10-06 RX ADMIN — Medication 10 ML: at 05:22

## 2018-10-06 RX ADMIN — Medication 10 ML: at 21:13

## 2018-10-06 RX ADMIN — METOPROLOL TARTRATE 12.5 MG: 25 TABLET ORAL at 08:00

## 2018-10-06 RX ADMIN — OXYCODONE HYDROCHLORIDE 5 MG: 5 TABLET ORAL at 14:36

## 2018-10-06 RX ADMIN — ARIPIPRAZOLE 15 MG: 5 TABLET ORAL at 08:00

## 2018-10-06 RX ADMIN — FAMOTIDINE 20 MG: 20 TABLET ORAL at 17:14

## 2018-10-06 RX ADMIN — PAROXETINE HYDROCHLORIDE 20 MG: 20 TABLET, FILM COATED ORAL at 08:00

## 2018-10-06 RX ADMIN — FAMOTIDINE 20 MG: 20 TABLET ORAL at 08:00

## 2018-10-06 RX ADMIN — OXYCODONE HYDROCHLORIDE 5 MG: 5 TABLET ORAL at 07:59

## 2018-10-06 RX ADMIN — OXYCODONE HYDROCHLORIDE 5 MG: 5 TABLET ORAL at 21:12

## 2018-10-06 RX ADMIN — LEVOTHYROXINE SODIUM 125 MCG: 125 TABLET ORAL at 08:00

## 2018-10-06 RX ADMIN — FUROSEMIDE 40 MG: 40 TABLET ORAL at 08:00

## 2018-10-06 RX ADMIN — Medication 10 ML: at 17:17

## 2018-10-06 RX ADMIN — WARFARIN SODIUM 7.5 MG: 7.5 TABLET ORAL at 17:14

## 2018-10-06 RX ADMIN — ISOSORBIDE MONONITRATE 30 MG: 30 TABLET, EXTENDED RELEASE ORAL at 17:14

## 2018-10-06 NOTE — PROGRESS NOTES
[facility] Hospitalist Group Progress Note Patient: Almita Beckwith Age: 60 y.o. : 1960 MR#: [id number] SSN: [id number] Date/Time: 10/6/2018 9:21 AM 
 
Subjective/24-hour events:  
 
Unchanged medically, no new complaints. Remains afebrile. Assessment:  
E. Coli UTI Generalized weakness with impaired mobility Chronic hypercapnic respiratory failure Chronic diastolic CHF Hypothyroidism Hyponatremia, mild CAD HLD 
OA Morbid obesity, BMI 56.6 Atrial fibrillation hx on coumadin Plan: 
Medical management as as ordered - remain stable off ABX. PT/OT as able tolerated. Awaiting disposition - anticipate return to SNF. D/W [facility] yesterday. Case discussed with:  [x]Patient  []Family  []Nursing  [x]Case Management DVT Prophylaxis:  []Lovenox  []Hep SQ  []SCDs  [x]Coumadin   []On Heparin gtt Objective:  
VS:  
Visit Vitals  /88 (BP 1 Location: Right arm, BP Patient Position: At rest)  Pulse 61  Temp 97.9 °F (36.6 °C)  Resp 18  Ht 5' (1.524 m)  Wt 131.2 kg (289 lb 3.9 oz)  SpO2 94%  Breastfeeding No  
 BMI 56.49 kg/m2 Tmax/24hrs: Temp (24hrs), Av.8 °F (36.6 °C), Min:97 °F (36.1 °C), Max:99.1 °F (37.3 °C) Intake/Output Summary (Last 24 hours) at 10/06/18 5954 Last data filed at 10/06/18 1096 Gross per 24 hour Intake              120 ml Output             1200 ml Net            -1080 ml General:  In NAD. Nontoxic-appearing. Cardiovascular:  S1, S2. Rate normal. 
Pulmonary:  Breath sounds distant, but no wheezes. Effort nonlabored. GI:  Abdomen soft, NTTP. Extremities:  Warm, no ischemia. Neuro:  Awake and alert, motor nonfocal. 
 
Labs:   
Recent Results (from the past 24 hour(s)) PROTHROMBIN TIME + INR Collection Time: 10/06/18  2:11 AM  
Result Value Ref Range Prothrombin time 22.3 (H) 11.5 - 15.2 sec INR 2.0 (H) 0.8 - 1.2 Signed By: Mavis Suarez MD   
 2018 9:21 AM

## 2018-10-06 NOTE — PROGRESS NOTES
Received in bed awake and alert. In no acute distress. Vital signs stable. Presently denies discomfort. Tele#57=A-fib HR in 50`s

## 2018-10-07 LAB
ERYTHROCYTE [DISTWIDTH] IN BLOOD BY AUTOMATED COUNT: 17.5 % (ref 11.6–14.5)
HCT VFR BLD AUTO: 33.3 % (ref 35–45)
HGB BLD-MCNC: 9.8 G/DL (ref 12–16)
INR PPP: 1.8 (ref 0.8–1.2)
MCH RBC QN AUTO: 23.1 PG (ref 24–34)
MCHC RBC AUTO-ENTMCNC: 29.4 G/DL (ref 31–37)
MCV RBC AUTO: 78.4 FL (ref 74–97)
PLATELET # BLD AUTO: 278 K/UL (ref 135–420)
PMV BLD AUTO: 9.6 FL (ref 9.2–11.8)
PROTHROMBIN TIME: 20.6 SEC (ref 11.5–15.2)
RBC # BLD AUTO: 4.25 M/UL (ref 4.2–5.3)
WBC # BLD AUTO: 6.1 K/UL (ref 4.6–13.2)

## 2018-10-07 PROCEDURE — 85610 PROTHROMBIN TIME: CPT | Performed by: NURSE PRACTITIONER

## 2018-10-07 PROCEDURE — 74011250637 HC RX REV CODE- 250/637: Performed by: HOSPITALIST

## 2018-10-07 PROCEDURE — 65660000000 HC RM CCU STEPDOWN

## 2018-10-07 PROCEDURE — 85027 COMPLETE CBC AUTOMATED: CPT | Performed by: NURSE PRACTITIONER

## 2018-10-07 PROCEDURE — 74011250637 HC RX REV CODE- 250/637: Performed by: NURSE PRACTITIONER

## 2018-10-07 PROCEDURE — 74011250637 HC RX REV CODE- 250/637: Performed by: FAMILY MEDICINE

## 2018-10-07 PROCEDURE — 36415 COLL VENOUS BLD VENIPUNCTURE: CPT | Performed by: NURSE PRACTITIONER

## 2018-10-07 RX ORDER — WARFARIN 10 MG/1
10 TABLET ORAL ONCE
Status: COMPLETED | OUTPATIENT
Start: 2018-10-07 | End: 2018-10-07

## 2018-10-07 RX ADMIN — SIMVASTATIN 40 MG: 40 TABLET, FILM COATED ORAL at 22:26

## 2018-10-07 RX ADMIN — MULTIPLE VITAMINS W/ MINERALS TAB 1 TABLET: TAB at 09:03

## 2018-10-07 RX ADMIN — FUROSEMIDE 40 MG: 40 TABLET ORAL at 09:03

## 2018-10-07 RX ADMIN — FAMOTIDINE 20 MG: 20 TABLET ORAL at 09:03

## 2018-10-07 RX ADMIN — ISOSORBIDE MONONITRATE 30 MG: 30 TABLET, EXTENDED RELEASE ORAL at 17:17

## 2018-10-07 RX ADMIN — METOPROLOL TARTRATE 12.5 MG: 25 TABLET ORAL at 09:03

## 2018-10-07 RX ADMIN — Medication 10 ML: at 15:28

## 2018-10-07 RX ADMIN — PAROXETINE HYDROCHLORIDE 20 MG: 20 TABLET, FILM COATED ORAL at 09:04

## 2018-10-07 RX ADMIN — FLUTICASONE PROPIONATE 2 SPRAY: 50 SPRAY, METERED NASAL at 15:28

## 2018-10-07 RX ADMIN — FAMOTIDINE 20 MG: 20 TABLET ORAL at 17:17

## 2018-10-07 RX ADMIN — Medication 10 ML: at 22:27

## 2018-10-07 RX ADMIN — OXYCODONE HYDROCHLORIDE 5 MG: 5 TABLET ORAL at 22:27

## 2018-10-07 RX ADMIN — OXYCODONE HYDROCHLORIDE 5 MG: 5 TABLET ORAL at 15:27

## 2018-10-07 RX ADMIN — OXYCODONE HYDROCHLORIDE 5 MG: 5 TABLET ORAL at 02:59

## 2018-10-07 RX ADMIN — FUROSEMIDE 40 MG: 40 TABLET ORAL at 17:17

## 2018-10-07 RX ADMIN — ARIPIPRAZOLE 15 MG: 5 TABLET ORAL at 09:04

## 2018-10-07 RX ADMIN — OXYCODONE HYDROCHLORIDE 5 MG: 5 TABLET ORAL at 09:03

## 2018-10-07 RX ADMIN — METOPROLOL TARTRATE 12.5 MG: 25 TABLET ORAL at 17:17

## 2018-10-07 RX ADMIN — Medication 10 ML: at 05:29

## 2018-10-07 RX ADMIN — LEVOTHYROXINE SODIUM 125 MCG: 125 TABLET ORAL at 09:03

## 2018-10-07 RX ADMIN — WARFARIN SODIUM 10 MG: 10 TABLET ORAL at 17:17

## 2018-10-07 NOTE — PROGRESS NOTES
Received in chair awake and alert. In no acute distress. Vital signs stable. Denies discomfort. angelito via walker back to bed. Pt talking on cell phone. Tele#57=A-fib with a controlled v-response. Remains on fluid res . bed low, call light within reach.

## 2018-10-07 NOTE — ROUTINE PROCESS
Bedside and Verbal shift change report given to [address] [place] [address] (oncoming nurse) by Rita Dorado RN (offgoing nurse). Report included the following information SBAR, Kardex and MAR.

## 2018-10-07 NOTE — PROGRESS NOTES
[facility] Hospitalist Group Progress Note Patient: Sophia Guevara Age: 60 y.o. : 1960 MR#: [id number] SSN: [id number] Date/Time: 10/7/2018 8:18 AM 
 
Subjective/24-hour events: No new complaints. Assessment:  
E. Coli UTI Generalized weakness with impaired mobility Chronic hypercapnic respiratory failure Chronic diastolic CHF Hypothyroidism Hyponatremia, mild CAD HLD 
OA Morbid obesity, BMI 56.6 Atrial fibrillation hx on coumadin Plan: 
Coumadin 10 mg tonight, follow INR daily. PT/OT, mobilize. Still awaiting placement - medically stable for discharge once obtained. Supportive care otherwise. Follow. Case discussed with:  [x]Patient  []Family  []Nursing  [x]Case Management DVT Prophylaxis:  []Lovenox  []Hep SQ  []SCDs  [x]Coumadin   []On Heparin gtt Objective:  
VS:  
Visit Vitals  /74 (BP 1 Location: Right arm, BP Patient Position: At rest)  Pulse 63  Temp 97.5 °F (36.4 °C)  Resp 18  Ht 5' (1.524 m)  Wt 133 kg (293 lb 3.4 oz)  SpO2 98%  Breastfeeding No  
 BMI 57.26 kg/m2 Tmax/24hrs: Temp (24hrs), Av.6 °F (36.4 °C), Min:97.2 °F (36.2 °C), Max:97.8 °F (36.6 °C) Intake/Output Summary (Last 24 hours) at 10/07/18 0820 Last data filed at 10/07/18 1534 Gross per 24 hour Intake              120 ml Output             1120 ml Net            -1000 ml General:  In NAD. Nontoxic-appearing. Cardiovascular:  S1, S2. Rate normal. 
Pulmonary:  Breath sounds distant, but no wheezes. Effort nonlabored. GI:  Abdomen soft, NTTP. Extremities:  Warm, no ischemia. Neuro:  Awake and alert, motor nonfocal. 
 
Labs:   
Recent Results (from the past 24 hour(s)) PROTHROMBIN TIME + INR Collection Time: 10/07/18  2:43 AM  
Result Value Ref Range Prothrombin time 20.6 (H) 11.5 - 15.2 sec INR 1.8 (H) 0.8 - 1.2    
CBC W/O DIFF Collection Time: 10/07/18  2:43 AM  
Result Value Ref Range WBC 6.1 4.6 - 13.2 K/uL  
 RBC 4.25 4.20 - 5.30 M/uL HGB 9.8 (L) 12.0 - 16.0 g/dL HCT 33.3 (L) 35.0 - 45.0 % MCV 78.4 74.0 - 97.0 FL  
 MCH 23.1 (L) 24.0 - 34.0 PG  
 MCHC 29.4 (L) 31.0 - 37.0 g/dL  
 RDW 17.5 (H) 11.6 - 14.5 % PLATELET 533 453 - 677 K/uL MPV 9.6 9.2 - 11.8 FL Signed By: Saleem Christina MD   
 October 7, 2018 8:18 AM

## 2018-10-07 NOTE — ROUTINE PROCESS
Assumed patient care. bedsdie shift report received from Emelle. Patient sitting in recliner chair, awake, alert and oriented x3. Call light and personal items placed within reach.  
 
7:46 AM -Bedside shift change report given to Joshua Casas  (oncoming nurse) by Melany Cooley RN (offgoing nurse). Report given with SBAR, Kardex, Intake/Output, MAR and Recent Results.

## 2018-10-08 VITALS
HEART RATE: 55 BPM | OXYGEN SATURATION: 90 % | WEIGHT: 292.2 LBS | BODY MASS INDEX: 57.37 KG/M2 | SYSTOLIC BLOOD PRESSURE: 116 MMHG | HEIGHT: 60 IN | TEMPERATURE: 97.9 F | DIASTOLIC BLOOD PRESSURE: 74 MMHG | RESPIRATION RATE: 18 BRPM

## 2018-10-08 LAB
ANION GAP SERPL CALC-SCNC: 7 MMOL/L (ref 3–18)
BUN SERPL-MCNC: 16 MG/DL (ref 7–18)
BUN/CREAT SERPL: 18 (ref 12–20)
CALCIUM SERPL-MCNC: 8.8 MG/DL (ref 8.5–10.1)
CHLORIDE SERPL-SCNC: 102 MMOL/L (ref 100–108)
CO2 SERPL-SCNC: 31 MMOL/L (ref 21–32)
CREAT SERPL-MCNC: 0.91 MG/DL (ref 0.6–1.3)
GLUCOSE SERPL-MCNC: 106 MG/DL (ref 74–99)
INR PPP: 1.7 (ref 0.8–1.2)
POTASSIUM SERPL-SCNC: 4 MMOL/L (ref 3.5–5.5)
PROTHROMBIN TIME: 19.4 SEC (ref 11.5–15.2)
SODIUM SERPL-SCNC: 140 MMOL/L (ref 136–145)

## 2018-10-08 PROCEDURE — 74011250637 HC RX REV CODE- 250/637: Performed by: FAMILY MEDICINE

## 2018-10-08 PROCEDURE — 74011250637 HC RX REV CODE- 250/637: Performed by: HOSPITALIST

## 2018-10-08 PROCEDURE — 97116 GAIT TRAINING THERAPY: CPT

## 2018-10-08 PROCEDURE — 97110 THERAPEUTIC EXERCISES: CPT

## 2018-10-08 PROCEDURE — 74011250637 HC RX REV CODE- 250/637: Performed by: NURSE PRACTITIONER

## 2018-10-08 PROCEDURE — 80048 BASIC METABOLIC PNL TOTAL CA: CPT | Performed by: NURSE PRACTITIONER

## 2018-10-08 PROCEDURE — 36415 COLL VENOUS BLD VENIPUNCTURE: CPT | Performed by: NURSE PRACTITIONER

## 2018-10-08 PROCEDURE — 74011250636 HC RX REV CODE- 250/636: Performed by: FAMILY MEDICINE

## 2018-10-08 PROCEDURE — 90686 IIV4 VACC NO PRSV 0.5 ML IM: CPT | Performed by: FAMILY MEDICINE

## 2018-10-08 PROCEDURE — 97535 SELF CARE MNGMENT TRAINING: CPT

## 2018-10-08 PROCEDURE — 85610 PROTHROMBIN TIME: CPT | Performed by: NURSE PRACTITIONER

## 2018-10-08 PROCEDURE — 90471 IMMUNIZATION ADMIN: CPT

## 2018-10-08 RX ORDER — WARFARIN 7.5 MG/1
12.5 TABLET ORAL ONCE
Qty: 1 TAB | Refills: 0 | Status: SHIPPED
Start: 2018-10-08 | End: 2018-10-08

## 2018-10-08 RX ADMIN — WARFARIN SODIUM 12.5 MG: 2.5 TABLET ORAL at 17:28

## 2018-10-08 RX ADMIN — FLUTICASONE PROPIONATE 2 SPRAY: 50 SPRAY, METERED NASAL at 09:07

## 2018-10-08 RX ADMIN — METOPROLOL TARTRATE 12.5 MG: 25 TABLET ORAL at 09:07

## 2018-10-08 RX ADMIN — MULTIPLE VITAMINS W/ MINERALS TAB 1 TABLET: TAB at 09:06

## 2018-10-08 RX ADMIN — OXYCODONE HYDROCHLORIDE 5 MG: 5 TABLET ORAL at 09:06

## 2018-10-08 RX ADMIN — FAMOTIDINE 20 MG: 20 TABLET ORAL at 17:28

## 2018-10-08 RX ADMIN — ARIPIPRAZOLE 15 MG: 5 TABLET ORAL at 09:06

## 2018-10-08 RX ADMIN — ISOSORBIDE MONONITRATE 30 MG: 30 TABLET, EXTENDED RELEASE ORAL at 17:28

## 2018-10-08 RX ADMIN — Medication 10 ML: at 05:22

## 2018-10-08 RX ADMIN — FAMOTIDINE 20 MG: 20 TABLET ORAL at 09:06

## 2018-10-08 RX ADMIN — FUROSEMIDE 40 MG: 40 TABLET ORAL at 09:06

## 2018-10-08 RX ADMIN — LEVOTHYROXINE SODIUM 125 MCG: 125 TABLET ORAL at 09:06

## 2018-10-08 RX ADMIN — INFLUENZA VIRUS VACCINE 0.5 ML: 15; 15; 15; 15 SUSPENSION INTRAMUSCULAR at 12:49

## 2018-10-08 RX ADMIN — Medication 10 ML: at 18:45

## 2018-10-08 RX ADMIN — OXYCODONE HYDROCHLORIDE 5 MG: 5 TABLET ORAL at 15:41

## 2018-10-08 RX ADMIN — FUROSEMIDE 40 MG: 40 TABLET ORAL at 17:28

## 2018-10-08 RX ADMIN — PAROXETINE HYDROCHLORIDE 20 MG: 20 TABLET, FILM COATED ORAL at 09:06

## 2018-10-08 NOTE — PROGRESS NOTES
Problem: Falls - Risk of 
Goal: *Absence of Falls Document Keniaerendira Cornea Fall Risk and appropriate interventions in the flowsheet. Outcome: Progressing Towards Goal 
Fall Risk Interventions: 
Mobility Interventions: Patient to call before getting OOB Medication Interventions: Patient to call before getting OOB Elimination Interventions: Call light in reach, Patient to call for help with toileting needs History of Falls Interventions: Bed/chair exit alarm Problem: Pressure Injury - Risk of 
Goal: *Prevention of pressure injury Document Stas Scale and appropriate interventions in the flowsheet. Outcome: Progressing Towards Goal 
Pressure Injury Interventions: 
Sensory Interventions: Assess changes in LOC Moisture Interventions: Absorbent underpads Activity Interventions: Pressure redistribution bed/mattress(bed type) Mobility Interventions: Pressure redistribution bed/mattress (bed type) Nutrition Interventions: Document food/fluid/supplement intake Friction and Shear Interventions: Minimize layers

## 2018-10-08 NOTE — DISCHARGE SUMMARY
Discharge Summary    Patient: Carley Griffith MRN: [id number]  CSN: [id number]    YOB: 1960  Age: 60 y.o. Sex: female    DOA: 9/28/2018 LOS:  LOS: 9 days   Discharge Date: 10/8/2018     Admission Diagnoses:   Generalized weakness    Discharge Diagnoses:    E. Coli UTI  Generalized weakness with impaired mobility  Chronic hypercapnic respiratory failure  Chronic diastolic CHF  Transient bradycardia  Hypothyroidism  Hyponatremia, mild  CAD  HLD  OA  Morbid obesity, BMI 56.6  Atrial fibrillation hx on coumadin      Discharge Condition: Stable    PHYSICAL EXAM  Visit Vitals    BP 97/63 (BP 1 Location: Right arm, BP Patient Position: At rest)    Pulse (!) 51    Temp 97.9 °F (36.6 °C)    Resp 18    Ht 5' (1.524 m)    Wt 132.5 kg (292 lb 3.2 oz)    SpO2 90%    Breastfeeding No    BMI 57.07 kg/m2       General: In NAD. Nontoxic-appearing. HEENT: NCAT. Sclerae anicteric. Lungs:  Clear, no wheezes. Effort nonlabored. Heart:  RRR. Abdomen: Soft, NTTP. Extremities: Warm, no ischemia. Psych:   Mood normal, not agitated. Neurologic:  Awake and alert, moves extremities spontaneously. Hospital Course:   See admission H&P for full details of HPI. Patient admitted to telemetry bed and continued on empiric antibiotic therapy for suspected UTI. Urine cultures ultimately grew E. Coli and patient has completed course of treatment for this and remains without any evidence for ongoing infection. Usual cardiac medications have been continued and patient has remained stable in this regard. The last 3-4 days of hospitalization have been unremarkable and patient has been awaiting placement. She has an accepting facility today and is stable for transfer. Recommend coumadin 12.5 tonight due to decrease in INR - continue dosing going forward as appropriate.       Consults:   Orthopedic surgery    Significant Diagnostic Studies:  CT R hip:  IMPRESSION:     No acute fracture or dislocation in the right hip.  Stable appearing end-stage degenerative changes. Stable appearing chronic remodeling of both the femoral head and acetabulum. No surrounding soft tissue masses or abnormal fluid collections. .      XR pelvis:  IMPRESSION:  1. Limited study as described above. 2.  End-stage right hip degenerative change. 3.  No displaced hip fracture is identified however given technical limitations  and extensive remodeling of the femur, nondisplaced fracture is difficult to  exclude radiologically. Consider further evaluation with cross-sectional imaging  if there concern for radiographically occult fracture. CXR:  IMPRESSION: Suggested retrocardiac opacity, may represent atelectasis versus  infiltrate. Labs:  BMP:   Lab Results   Component Value Date/Time     10/08/2018 02:23 AM    K 4.0 10/08/2018 02:23 AM     10/08/2018 02:23 AM    CO2 31 10/08/2018 02:23 AM    AGAP 7 10/08/2018 02:23 AM     (H) 10/08/2018 02:23 AM    BUN 16 10/08/2018 02:23 AM    CREA 0.91 10/08/2018 02:23 AM    GFRAA >60 10/08/2018 02:23 AM    GFRNA >60 10/08/2018 02:23 AM        Results for SYD MCLEOD (MRN [id number]) as of 10/8/2018 13:52   Ref. Range 10/4/2018 05:26 10/5/2018 03:18 10/6/2018 02:11 10/7/2018 02:43 10/8/2018 02:23   INR Latest Ref Range: 0.8 - 1.2   2.3 (H) 2.1 (H) 2.0 (H) 1.8 (H) 1.7 (H)   Prothrombin time Latest Ref Range: 11.5 - 15.2 sec 25.2 (H) 23.5 (H) 22.3 (H) 20.6 (H) 19.4 (H)           Discharge Medications:     Current Discharge Medication List      CONTINUE these medications which have CHANGED    Details   warfarin (COUMADIN) 7.5 mg tablet Take 1.5 Tabs by mouth once for 1 dose. Qty: 1 Tab, Refills: 0      oxyCODONE IR (ROXICODONE) 5 mg immediate release tablet Take 1 Tab by mouth every six (6) hours as needed for Pain.  Max Daily Amount: 20 mg.  Qty: 6 Tab, Refills: 0    Associated Diagnoses: Primary osteoarthritis of right hip         CONTINUE these medications which have NOT CHANGED    Details   senna-docusate (PERICOLACE) 8.6-50 mg per tablet Take 1 Tab by mouth nightly. Qty: 30 Tab, Refills: 0      albuterol-ipratropium (DUO-NEB) 2.5 mg-0.5 mg/3 ml nebu 3 mL by Nebulization route every six (6) hours as needed. Qty: 30 Nebule, Refills: 0      furosemide (LASIX) 40 mg tablet Take 1 Tab by mouth two (2) times a day. 40 mg po daily  Qty: 60 Tab, Refills: 2      polyethylene glycol (MIRALAX) 17 gram packet Take 1 Packet by mouth daily. Qty: 30 Packet, Refills: 2      metoprolol tartrate (LOPRESSOR) 25 mg tablet Take 0.5 Tabs by mouth two (2) times a day. Qty: 30 Tab, Refills: 6      fluticasone (FLONASE) 50 mcg/actuation nasal spray 2 Sprays by Both Nostrils route daily. Qty: 1 Bottle, Refills: 2      isosorbide mononitrate ER (IMDUR) 30 mg tablet Take 1 Tab by mouth every evening. Qty: 90 Tab, Refills: 3      multivitamin, tx-iron-ca-min (THERA-M W/ IRON) 9 mg iron-400 mcg tab tablet Take 1 Tab by mouth daily. Qty: 30 Tab, Refills: 0      raNITIdine (ZANTAC) 150 mg tablet Take 150 mg by mouth two (2) times a day. VENTOLIN HFA 90 mcg/actuation inhaler Take 2 Puffs by inhalation every four (4) hours as needed for Wheezing or Shortness of Breath. Qty: 1 Inhaler, Refills: 0      PARoxetine (PAXIL) 20 mg tablet Take 1 Tab by mouth daily. Qty: 30 Tab, Refills: 3      simvastatin (ZOCOR) 40 mg tablet Take  by mouth nightly. to obtain from pharmacy      aripiprazole (ABILIFY) 15 mg tablet Take 15 mg by mouth daily. to obtain from pharmacy      levothyroxine (SYNTHROID) 125 mcg tablet Take 125 mcg by mouth Daily (before breakfast). STOP taking these medications       methocarbamol (ROBAXIN) 500 mg tablet Comments:   Reason for Stopping:         lisinopril (PRINIVIL, ZESTRIL) 5 mg tablet Comments:   Reason for Stopping:               Activity: As tolerated    Diet: Cardiac Diet    Follow-up: with PCP, Lamin Villa MD on discharge from skilled nursing facility.       [facility] JOCELYNE Ledezma MD  [facility]

## 2018-10-08 NOTE — PROGRESS NOTES
Problem: Self Care Deficits Care Plan (Adult) Goal: *Acute Goals and Plan of Care (Insert Text) Occupational Therapy Goals Initiated 10/1/2018 within 7 day(s). 1.  Patient will perform lower body dressing with minimal assistance/contact guard assist using AE. 2.  Patient will perform functional task in standing for 3 minutes with max assist for balance. 3.  Patient will perform toilet transfers with moderate assistance . 4. Patient will participate in upper extremity therapeutic exercise/activities with supervision/set-up for 8 minutes to increase strength/endurance for ADLs. Outcome: Progressing Towards Goal 
Occupational Therapy TREATMENT Patient: Mesha Wiley (60 y.o. female) Date: 10/8/2018 Diagnosis: CHF (congestive heart failure) ([facility] [address] CHF exacerbation ([facility] [address] <principal problem not specified> Precautions: Fall, WBAT Chart, occupational therapy assessment, plan of care, and goals were reviewed. ASSESSMENT: 
Pt completing toileting on [facility] upon approach for therapy, agreeable to skilled therapy at this time. Pt was seen with PT to increase carryover of therapy, functional mobility during ADLs. PT focused on mobility while this therapist focused on self care. Pt was assisted from [facility] with CGA/Min A and required Total A for bowel/pericare hygiene. Pt request this therapist assist with removing additional BM, pt educated on OT role in acute setting. Pt performed functional mobility with CGA using RW to bedside chair. Seated in chair, pt attempted to doff socks, however unable to reach feet; Max A to adjust socks. Pt completed seated LE TherEx with pt and UE TherEx with decreased ROM in right shoulder flexion/extension; pt educated on joint protection strategies during TherEx. Pt tolerated well with min rest breaks. Pt was left seated in chair, all needs within reach. Progression toward goals: 
[x]          Improving appropriately and progressing toward goals []          Improving slowly and progressing toward goals 
[]          Not making progress toward goals and plan of care will be adjusted PLAN: 
Patient continues to benefit from skilled intervention to address the above impairments. Continue treatment per established plan of care. Discharge Recommendations:  Zechariah Juárez Further Equipment Recommendations for Discharge:  bedside commode, transfer bench and walker G-CODES:  
 
Self Care  Current  CL= 60-79%  Goal  CK= 40-59%. The severity rating is based on the Level of Assistance required for Functional Mobility and ADLs. SUBJECTIVE:  
Patient stated I have arthritis.  OBJECTIVE DATA SUMMARY:  
Cognitive/Behavioral Status: 
Neurologic State: Alert Orientation Level: Oriented X4 Cognition: Follows commands Safety/Judgement: Fall prevention Functional Mobility and Transfers for ADLs: 
 Transfers: 
Sit to Stand: Contact guard assistance Toilet Transfer : Minimum assistance;Contact guard assistance Bathroom Mobility: Contact guard assistance;Minimum assistance Balance: 
Sitting: Intact Standing: Impaired; With support Standing - Static: Fair Standing - Dynamic : Fair ADL Intervention: 
Grooming Grooming Assistance: Supervision/set up Washing Hands: Supervision/set-up Lower Body Dressing Assistance Socks: Maximum assistance (pt attempted, unable to adjust socks ) Position Performed: Seated in chair Toileting Toileting Assistance: Total assistance(dependent) Bladder Hygiene: Total assistance (dependent) Bowel Hygiene: Total assistance (dependent) Clothing Management: Contact guard assistance Adaptive Equipment: Ines  Therapeutic Exercises:  
Pt completed B UE TherEx seated in chair with SBA with demo.  Pt completed x10 reps shoulder elevation/depression, shoulder rolls, scapula retraction/protraction, shoulder flexion (min) with elbow extension (punches), and bicep curls. Pt reported pain to right shoulder with flexion, pt limited in ROM 2/2 to this. Pain: 
Pt reports 0/10 pain or discomfort prior to treatment.   
Pt reports 7/10 pain or discomfort post treatment to right shoulder after ROM TherEx. Activity Tolerance:   
Fair Please refer to the flowsheet for vital signs taken during this treatment. After treatment:  
[x]  Patient left in no apparent distress sitting up in chair 
[]  Patient left in no apparent distress in bed 
[x]  Call bell left within reach 
[]  Nursing notified 
[]  Caregiver present 
[]  Bed alarm activated Chan Gains, GOLD/L Time Calculation: 24 mins

## 2018-10-08 NOTE — DISCHARGE INSTRUCTIONS
Atrial Fibrillation: Care Instructions  Your Care Instructions    Atrial fibrillation is an irregular and often fast heartbeat. Treating this condition is important for several reasons. It can cause blood clots, which can travel from your heart to your brain and cause a stroke. If you have a fast heartbeat, you may feel lightheaded, dizzy, and weak. An irregular heartbeat can also increase your risk for heart failure. Atrial fibrillation is often the result of another heart condition, such as high blood pressure or coronary artery disease. Making changes to improve your heart condition will help you stay healthy and active. Follow-up care is a key part of your treatment and safety. Be sure to make and go to all appointments, and call your doctor if you are having problems. It's also a good idea to know your test results and keep a list of the medicines you take. How can you care for yourself at home? Medicines    · Take your medicines exactly as prescribed. Call your doctor if you think you are having a problem with your medicine. You will get more details on the specific medicines your doctor prescribes.     · If your doctor has given you a blood thinner to prevent a stroke, be sure you get instructions about how to take your medicine safely. Blood thinners can cause serious bleeding problems.     · Do not take any vitamins, over-the-counter drugs, or herbal products without talking to your doctor first.    Lifestyle changes    · Do not smoke. Smoking can increase your chance of a stroke and heart attack. If you need help quitting, talk to your doctor about stop-smoking programs and medicines. These can increase your chances of quitting for good.     · Eat a heart-healthy diet.     · Stay at a healthy weight. Lose weight if you need to.     · Limit alcohol to 2 drinks a day for men and 1 drink a day for women. Too much alcohol can cause health problems.     · Avoid colds and flu.  Get a pneumococcal vaccine shot. If you have had one before, ask your doctor whether you need another dose. Get a flu shot every year. If you must be around people with colds or flu, wash your hands often. Activity    · If your doctor recommends it, get more exercise. Walking is a good choice. Bit by bit, increase the amount you walk every day. Try for at least 30 minutes on most days of the week. You also may want to swim, bike, or do other activities. Your doctor may suggest that you join a cardiac rehabilitation program so that you can have help increasing your physical activity safely.     · Start light exercise if your doctor says it is okay. Even a small amount will help you get stronger, have more energy, and manage stress. Walking is an easy way to get exercise. Start out by walking a little more than you did in the hospital. Gradually increase the amount you walk.     · When you exercise, watch for signs that your heart is working too hard. You are pushing too hard if you cannot talk while you are exercising. If you become short of breath or dizzy or have chest pain, sit down and rest immediately.     · Check your pulse regularly. Place two fingers on the artery at the palm side of your wrist, in line with your thumb. If your heartbeat seems uneven or fast, talk to your doctor. When should you call for help? Call 911 anytime you think you may need emergency care. For example, call if:    · You have symptoms of a heart attack. These may include:  ¨ Chest pain or pressure, or a strange feeling in the chest.  ¨ Sweating. ¨ Shortness of breath. ¨ Nausea or vomiting. ¨ Pain, pressure, or a strange feeling in the back, neck, jaw, or upper belly or in one or both shoulders or arms. ¨ Lightheadedness or sudden weakness. ¨ A fast or irregular heartbeat. After you call 911, the  may tell you to chew 1 adult-strength or 2 to 4 low-dose aspirin. Wait for an ambulance.  Do not try to drive yourself.     · You have symptoms of a stroke. These may include:  ¨ Sudden numbness, tingling, weakness, or loss of movement in your face, arm, or leg, especially on only one side of your body. ¨ Sudden vision changes. ¨ Sudden trouble speaking. ¨ Sudden confusion or trouble understanding simple statements. ¨ Sudden problems with walking or balance. ¨ A sudden, severe headache that is different from past headaches.     · You passed out (lost consciousness).    Call your doctor now or seek immediate medical care if:    · You have new or increased shortness of breath.     · You feel dizzy or lightheaded, or you feel like you may faint.     · Your heart rate becomes irregular.     · You can feel your heart flutter in your chest or skip heartbeats. Tell your doctor if these symptoms are new or worse.    Watch closely for changes in your health, and be sure to contact your doctor if you have any problems. Where can you learn more? Go to http://bucky-gerard.info/. Enter U020 in the search box to learn more about \"Atrial Fibrillation: Care Instructions. \"  Current as of: December 6, 2017  Content Version: 11.8  © 4094-0665 [employer]. Care instructions adapted under license by [employer] (which disclaims liability or warranty for this information). If you have questions about a medical condition or this instruction, always ask your healthcare professional. [place] [address] any warranty or liability for your use of this information. DISCHARGE SUMMARY from Nurse    PATIENT INSTRUCTIONS:    After general anesthesia or intravenous sedation, for 24 hours or while taking prescription Narcotics:  · Limit your activities  · Do not drive and operate hazardous machinery  · Do not make important personal or business decisions  · Do  not drink alcoholic beverages  · If you have not urinated within 8 hours after discharge, please contact your surgeon on call.     Report the following to your surgeon:  · Excessive pain, swelling, redness or odor of or around the surgical area  · Temperature over 100.5  · Nausea and vomiting lasting longer than 4 hours or if unable to take medications  · Any signs of decreased circulation or nerve impairment to extremity: change in color, persistent  numbness, tingling, coldness or increase pain  · Any questions    What to do at Home:  Recommended activity: Activity as tolerated,     If you experience any of the following symptoms of shortness of breath,chest pains, increased weakness, dizziness, temperature greater 100.5, nausea, vomiting, diarrhea please follow up with PCP or call 911. *  Please give a list of your current medications to your Primary Care Provider. *  Please update this list whenever your medications are discontinued, doses are      changed, or new medications (including over-the-counter products) are added. *  Please carry medication information at all times in case of emergency situations. These are general instructions for a healthy lifestyle:    No smoking/ No tobacco products/ Avoid exposure to second hand smoke  Surgeon General's Warning:  Quitting smoking now greatly reduces serious risk to your health. Obesity, smoking, and sedentary lifestyle greatly increases your risk for illness    A healthy diet, regular physical exercise & weight monitoring are important for maintaining a healthy lifestyle    You may be retaining fluid if you have a history of heart failure or if you experience any of the following symptoms:  Weight gain of 3 pounds or more overnight or 5 pounds in a week, increased swelling in our hands or feet or shortness of breath while lying flat in bed. Please call your doctor as soon as you notice any of these symptoms; do not wait until your next office visit.     Recognize signs and symptoms of STROKE:    F-face looks uneven    A-arms unable to move or move unevenly    S-speech slurred or non-existent    T-time-call 911 as soon as signs and symptoms begin-DO NOT go       Back to bed or wait to see if you get better-TIME IS BRAIN. Warning Signs of HEART ATTACK     Call 911 if you have these symptoms:   Chest discomfort. Most heart attacks involve discomfort in the center of the chest that lasts more than a few minutes, or that goes away and comes back. It can feel like uncomfortable pressure, squeezing, fullness, or pain.  Discomfort in other areas of the upper body. Symptoms can include pain or discomfort in one or both arms, the back, neck, jaw, or stomach.  Shortness of breath with or without chest discomfort.  Other signs may include breaking out in a cold sweat, nausea, or lightheadedness. Don't wait more than five minutes to call 911 - MINUTES MATTER! Fast action can save your life. Calling 911 is almost always the fastest way to get lifesaving treatment. Emergency Medical Services staff can begin treatment when they arrive -- up to an hour sooner than if someone gets to the hospital by car. The discharge information has been reviewed with the patient and caregiver. The caregiver and patients verbalized understanding. Discharge medications reviewed with the patient and caregiver and appropriate educational materials and side effects teaching were provided. ___________________________________________________________________________________________________________________________________  Patient armband removed and shredded  MyChart Activation    Thank you for requesting access to [employer]. Please follow the instructions below to securely access and download your online medical record. [employer] allows you to send messages to your doctor, view your test results, renew your prescriptions, schedule appointments, and more. How Do I Sign Up? 1. In your internet browser, go to [email or web address]  2. Click on the First Time User? Click Here link in the Sign In box.  You will be redirect to the New Member Sign Up page. 3. Enter your [employer] Access Code exactly as it appears below. You will not need to use this code after youve completed the sign-up process. If you do not sign up before the expiration date, you must request a new code. [employer] Access Code: [email or web address]  Expires: 2018 10:17 AM (This is the date your [employer] access code will )    4. Enter the last four digits of your Social Security Number (xxxx) and Date of Birth (mm/dd/yyyy) as indicated and click Submit. You will be taken to the next sign-up page. 5. Create a [employer] ID. This will be your [employer] login ID and cannot be changed, so think of one that is secure and easy to remember. 6. Create a [employer] password. You can change your password at any time. 7. Enter your Password Reset Question and Answer. This can be used at a later time if you forget your password. 8. Enter your e-mail address. You will receive e-mail notification when new information is available in [address] 9. Click Sign Up. You can now view and download portions of your medical record. 10. Click the Download Summary menu link to download a portable copy of your medical information. Additional Information    If you have questions, please visit the Frequently Asked Questions section of the [employer] website at [email or web address] Remember, [employer] is NOT to be used for urgent needs. For medical emergencies, dial 911.

## 2018-10-08 NOTE — PROGRESS NOTES
Transport personal is here with very small wheel chair, looks to be pediatric wheel chair. When notified that patient is 292 pounds and 5 foot tall, he showed me where he had the wt/ht on his sheet. But he said that transport knew he did not have a big wheelchair. I called back to [employer] ( her insurance transportatation company) and they said there were not bariatric wheelchair companys in this area that they work with. I asked for them to pay for stretcher transport then. They are in contact with [facility]. [facility] to pick pt up between [facility] # [id number].

## 2018-10-08 NOTE — PROGRESS NOTES
Pt discharged to SNF per MD order. Report called to Negra Orellana LPN. Pt and caregiver given discharge instructions. Pt transported via medical transport to [facility]. No acute distress noted at time of transport.

## 2018-10-08 NOTE — PROGRESS NOTES
10/8/18 Chart reviewed. Voice mail left on [facility] to see if able to accept patient today. Christine Russo. Kristina Santana RN 
[address]: Patient is discharged. Patient is aware, she will let her daughter know. I called [facility] transport for her [facility] [phone] and am awaiting confirmation. [facility]  is expecting her. The transport company that [facility] arranged is [employer] [facility] [employer] [phone]. They are to  pt in a wheelchair at  today. Bernardo Neal RN Case Manager

## 2018-10-08 NOTE — PROGRESS NOTES
Bedside shift change report given to Ok Young (oncoming nurse) by Devan Cox (offgoing nurse). Report included the following information SBAR, Kardex and Cardiac Rhythm A fib, sinus iveth.

## 2018-10-08 NOTE — PROGRESS NOTES
Problem: Mobility Impaired (Adult and Pediatric) Goal: *Acute Goals and Plan of Care (Insert Text) Physical Therapy Goals Initiated 10/3/2018 and to be accomplished within 7 day(s) 1. Patient will move from supine to sit and sit to supine , scoot up and down and roll side to side in bed with supervision/set-up. 2.  Patient will transfer from bed to chair and chair to bed with supervision/set-up using the least restrictive device. 3.  Patient will perform sit to stand with supervision/set-up. 4.  Patient will ambulate with contact guard assist for  feet with the least restrictive device. 5.  Patient will ascend/descend 4 stairs with 1-2 handrail(s) with minimal assistance/contact guard assist. 
  
Outcome: Progressing Towards Goal 
physical Therapy TREATMENT Patient: Hugh Hanson (60 y.o. female) Date: 10/8/2018 Diagnosis: CHF (congestive heart failure) ([facility] [address] CHF exacerbation ([facility] [address] <principal problem not specified> Precautions: Fall, WBAT Chart, physical therapy assessment, plan of care and goals were reviewed. OBJECTIVE/ ASSESSMENT: 
Nursing cleared pt to participate with PT. Patient found seated on b/s commode willing to work with PT. Patient seen with OT to maximize safety of patient and staff. Pt stood from b/s commode to [facility] and tolerated standing for close to 2 minutes for mar care to be performed by GOLD. Pt then ambulated 8ft to recliner chair [facility] and 's for hand placement upon sitting. Pt required a seated rest break due to fatigue prior to therex. Pt then performed seated therex (see below). Pt was positioned to comfort in recliner chair and left with all needs in reach and nursing notified. Education: gait, transfers, therex, activity pacing Progression toward goals: 
[]      Improving appropriately and progressing toward goals [x]      Improving slowly and progressing toward goals []      Not making progress toward goals and plan of care will be adjusted PLAN: 
Patient continues to benefit from skilled intervention to address the above impairments. Continue treatment per established plan of care. Discharge Recommendations:  Zechariah Juárez Further Equipment Recommendations for Discharge:  rolling walker SUBJECTIVE:  
Patient stated Oh I don't use oxygen. I just had that last night because I was snoring.  referring to nasal cannula that was hanging from pt's bed rail. OBJECTIVE DATA SUMMARY:  
Critical Behavior: 
Neurologic State: Alert Orientation Level: Oriented X4 Cognition: Follows commands Safety/Judgement: Fall prevention Functional Mobility Training: 
Transfers: 
Sit to Stand: Contact guard assistance Stand to Sit: Contact guard assistance Balance: 
Sitting: Intact Standing: Impaired; With support Standing - Static: Fair Standing - Dynamic : Fair Ambulation/Gait Training: 
Distance (ft): 8 Feet (ft) Assistive Device: Walker, rolling Ambulation - Level of Assistance: Contact guard assistance Gait Abnormalities: Antalgic;Decreased step clearance Right Side Weight Bearing: As tolerated Base of Support: Center of gravity altered; Widened Speed/Reyna: Pace decreased (<100 feet/min); Slow Step Length: Left shortened;Right shortened Interventions: Verbal cues Therapeutic Exercises:  
 
 
EXERCISE Sets Reps Active Active Assist  
Passive Self- assited ROM Comments Ankle Pumps 1 10  [x] [] [] [] Quad Sets   [] [] [] [] Glut Sets   [] [] [] [] Short Arc Quads   [] [] [] [] Heel Slides   [] [] [] [] Straight Leg Raises   [] [] [] [] Hip Abd 1 10 [x] [] [] [] Long Arc Quads 1 10 [x] [] [] [] Seated Marching 1 10 [x] [] [] [] Seated Knee Flexion   [] [] [] []   
Standing Marching   [] [] [] []   
 
Pain: 
Pre tx pain: 7.5 Post tx pain: 7.5 Activity Tolerance:  
fair Please refer to the flowsheet for vital signs taken during this treatment. After treatment:  
[x] Patient left in no apparent distress sitting up in chair 
[] Patient left in no apparent distress in bed 
[x] Call bell left within reach [x] Nursing notified Colby Bernheim and Wali Bolden) [] Caregiver present 
[] Bed alarm activated 
[] SCDs applied 
[] Ice applied Maria D River PTA Time Calculation: 24 mins Mobility [id number] Current  CI= 1-19%. The severity rating is based on the Level of Assistance required for Functional Mobility and ADLs. Mobility   Goal  CI= 1-19%. The severity rating is based on the Level of Assistance required for Functional Mobility and ADLs.

## 2018-10-08 NOTE — PROGRESS NOTES
[facility] Hospitalist Group Progress Note Patient: Fadia Roger Age: 60 y.o. : 1960 MR#: [id number] SSN: [id number] Date/Time: 10/8/2018 9:07 AM 
 
Subjective/24-hour events: No new complaints. HRs noted to drop to 30s-40s at time per nursing. Remains hemodynamically stable, however - no issues with hypotension. Assessment:  
E. Coli UTI Generalized weakness with impaired mobility Chronic hypercapnic respiratory failure Chronic diastolic CHF Transient bradycardia Hypothyroidism Hyponatremia, mild CAD HLD 
OA Morbid obesity, BMI 56.6 Atrial fibrillation hx on coumadin Plan: 
Hold lopressor for now and monitor HRs. Coumadin 12.5 mg tonight, follow daily INR. Continue PT/OT. Still awaiting placement - medically stable when bed available. Supportive care in interim. Follow. Case discussed with:  [x]Patient  []Family  [x]Nursing  [x]Case Management DVT Prophylaxis:  []Lovenox  []Hep SQ  []SCDs  [x]Coumadin   []On Heparin gtt Objective:  
VS:  
Visit Vitals  /71 (BP 1 Location: Right arm, BP Patient Position: At rest)  Pulse 72  Temp 98.1 °F (36.7 °C)  Resp 20  
 Ht 5' (1.524 m)  Wt 132.5 kg (292 lb 3.2 oz)  SpO2 96%  Breastfeeding No  
 BMI 57.07 kg/m2 Tmax/24hrs: Temp (24hrs), Av.9 °F (36.6 °C), Min:97.3 °F (36.3 °C), Max:98.2 °F (36.8 °C) Intake/Output Summary (Last 24 hours) at 10/08/18 9102 Last data filed at 10/08/18 0530 Gross per 24 hour Intake              120 ml Output              300 ml Net             -180 ml General:  In NAD. Nontoxic-appearing. Cardiovascular:  S1, S2. Rate normal. 
Pulmonary:  Breath sounds distant, but no wheezes. Effort nonlabored. GI:  Abdomen soft, NTTP. Extremities:  Warm, no ischemia. Neuro:  Awake and alert, motor nonfocal. 
 
Labs:   
Recent Results (from the past 24 hour(s)) PROTHROMBIN TIME + INR  Collection Time: 10/08/18  2:23 AM  
 Result Value Ref Range Prothrombin time 19.4 (H) 11.5 - 15.2 sec INR 1.7 (H) 0.8 - 1.2 METABOLIC PANEL, BASIC Collection Time: 10/08/18  2:23 AM  
Result Value Ref Range Sodium 140 136 - 145 mmol/L Potassium 4.0 3.5 - 5.5 mmol/L Chloride 102 100 - 108 mmol/L  
 CO2 31 21 - 32 mmol/L Anion gap 7 3.0 - 18 mmol/L Glucose 106 (H) 74 - 99 mg/dL BUN 16 7.0 - 18 MG/DL Creatinine 0.91 0.6 - 1.3 MG/DL  
 BUN/Creatinine ratio 18 12 - 20 GFR est AA >60 >60 ml/min/1.73m2 GFR est non-AA >60 >60 ml/min/1.73m2 Calcium 8.8 8.5 - 10.1 MG/DL Signed By: Iza Campbell MD   
 October 8, 2018 9:07 AM

## 2018-10-10 ENCOUNTER — HOSPITAL ENCOUNTER (OUTPATIENT)
Dept: LAB | Age: 58
Discharge: HOME | End: 2018-10-10

## 2018-10-10 LAB
ALBUMIN SERPL-MCNC: 3 G/DL (ref 3.4–5)
ALBUMIN/GLOB SERPL: 0.8 {RATIO} (ref 0.8–1.7)
ALP SERPL-CCNC: 195 U/L (ref 45–117)
ALT SERPL-CCNC: 26 U/L (ref 13–56)
ANION GAP SERPL CALC-SCNC: 8 MMOL/L (ref 3–18)
AST SERPL-CCNC: 19 U/L (ref 15–37)
BASOPHILS # BLD: 0 K/UL (ref 0–0.1)
BASOPHILS NFR BLD: 0 % (ref 0–2)
BILIRUB SERPL-MCNC: 0.6 MG/DL (ref 0.2–1)
BUN SERPL-MCNC: 22 MG/DL (ref 7–18)
BUN/CREAT SERPL: 22 (ref 12–20)
CALCIUM SERPL-MCNC: 8.4 MG/DL (ref 8.5–10.1)
CHLORIDE SERPL-SCNC: 104 MMOL/L (ref 100–108)
CO2 SERPL-SCNC: 29 MMOL/L (ref 21–32)
CREAT SERPL-MCNC: 0.99 MG/DL (ref 0.6–1.3)
DIFFERENTIAL METHOD BLD: ABNORMAL
EOSINOPHIL # BLD: 0.1 K/UL (ref 0–0.4)
EOSINOPHIL NFR BLD: 2 % (ref 0–5)
ERYTHROCYTE [DISTWIDTH] IN BLOOD BY AUTOMATED COUNT: 17.7 % (ref 11.6–14.5)
FERRITIN SERPL-MCNC: 33 NG/ML (ref 8–388)
FOLATE SERPL-MCNC: >20 NG/ML (ref 3.1–17.5)
GLOBULIN SER CALC-MCNC: 3.9 G/DL (ref 2–4)
GLUCOSE SERPL-MCNC: 105 MG/DL (ref 74–99)
HCT VFR BLD AUTO: 34 % (ref 35–45)
HGB BLD-MCNC: 10 G/DL (ref 12–16)
IRON SATN MFR SERPL: 12 %
IRON SERPL-MCNC: 38 UG/DL (ref 50–175)
LYMPHOCYTES # BLD: 1.4 K/UL (ref 0.9–3.6)
LYMPHOCYTES NFR BLD: 25 % (ref 21–52)
MAGNESIUM SERPL-MCNC: 2.4 MG/DL (ref 1.6–2.6)
MCH RBC QN AUTO: 23.3 PG (ref 24–34)
MCHC RBC AUTO-ENTMCNC: 29.4 G/DL (ref 31–37)
MCV RBC AUTO: 79.1 FL (ref 74–97)
MONOCYTES # BLD: 0.5 K/UL (ref 0.05–1.2)
MONOCYTES NFR BLD: 8 % (ref 3–10)
NEUTS SEG # BLD: 3.8 K/UL (ref 1.8–8)
NEUTS SEG NFR BLD: 65 % (ref 40–73)
PLATELET # BLD AUTO: 321 K/UL (ref 135–420)
PMV BLD AUTO: 9.9 FL (ref 9.2–11.8)
POTASSIUM SERPL-SCNC: 4.2 MMOL/L (ref 3.5–5.5)
PROT SERPL-MCNC: 6.9 G/DL (ref 6.4–8.2)
RBC # BLD AUTO: 4.3 M/UL (ref 4.2–5.3)
SODIUM SERPL-SCNC: 141 MMOL/L (ref 136–145)
T4 FREE SERPL-MCNC: 1.3 NG/DL (ref 0.7–1.5)
TIBC SERPL-MCNC: 319 UG/DL (ref 250–450)
TSH SERPL DL<=0.05 MIU/L-ACNC: 2.19 UIU/ML (ref 0.36–3.74)
VIT B12 SERPL-MCNC: 672 PG/ML (ref 211–911)
WBC # BLD AUTO: 5.8 K/UL (ref 4.6–13.2)

## 2018-10-10 PROCEDURE — 80053 COMPREHEN METABOLIC PANEL: CPT | Performed by: FAMILY MEDICINE

## 2018-10-10 PROCEDURE — 85025 COMPLETE CBC W/AUTO DIFF WBC: CPT | Performed by: FAMILY MEDICINE

## 2018-10-10 PROCEDURE — 84439 ASSAY OF FREE THYROXINE: CPT | Performed by: FAMILY MEDICINE

## 2018-10-10 PROCEDURE — 83540 ASSAY OF IRON: CPT | Performed by: FAMILY MEDICINE

## 2018-10-10 PROCEDURE — 84443 ASSAY THYROID STIM HORMONE: CPT | Performed by: FAMILY MEDICINE

## 2018-10-10 PROCEDURE — 83735 ASSAY OF MAGNESIUM: CPT | Performed by: FAMILY MEDICINE

## 2018-10-10 PROCEDURE — 82607 VITAMIN B-12: CPT | Performed by: FAMILY MEDICINE

## 2018-10-10 PROCEDURE — 82728 ASSAY OF FERRITIN: CPT | Performed by: FAMILY MEDICINE

## 2018-10-10 PROCEDURE — 82746 ASSAY OF FOLIC ACID SERUM: CPT | Performed by: FAMILY MEDICINE

## 2018-11-07 ENCOUNTER — TELEPHONE ANTICOAG (OUTPATIENT)
Dept: CARDIOLOGY CLINIC | Age: 58
End: 2018-11-07

## 2018-11-07 LAB — INR, EXTERNAL: 1.5

## 2018-11-07 NOTE — PROGRESS NOTES
Called the number provided and left a message for a call back in regards to a patient. I am not sure who this was as no name was given to me. If this patient is on coumadin, she is not followed by our office.

## 2018-11-14 ENCOUNTER — OFFICE VISIT (OUTPATIENT)
Dept: CARDIOLOGY CLINIC | Age: 58
End: 2018-11-14

## 2018-11-14 VITALS
SYSTOLIC BLOOD PRESSURE: 126 MMHG | OXYGEN SATURATION: 97 % | HEART RATE: 66 BPM | BODY MASS INDEX: 57.52 KG/M2 | HEIGHT: 60 IN | WEIGHT: 293 LBS | DIASTOLIC BLOOD PRESSURE: 80 MMHG

## 2018-11-14 DIAGNOSIS — I11.9 BENIGN HYPERTENSIVE HEART DISEASE WITHOUT HEART FAILURE: ICD-10-CM

## 2018-11-14 DIAGNOSIS — I27.20 PULMONARY HYPERTENSION (HCC): ICD-10-CM

## 2018-11-14 DIAGNOSIS — E78.5 DYSLIPIDEMIA: ICD-10-CM

## 2018-11-14 DIAGNOSIS — Q24.9 CONGENITAL HEART DISEASE: ICD-10-CM

## 2018-11-14 DIAGNOSIS — I48.19 PERSISTENT ATRIAL FIBRILLATION (HCC): Primary | ICD-10-CM

## 2018-11-14 DIAGNOSIS — E66.01 MORBID OBESITY (HCC): ICD-10-CM

## 2018-11-14 DIAGNOSIS — I50.32 DIASTOLIC CHF, CHRONIC (HCC): ICD-10-CM

## 2018-11-14 RX ORDER — WARFARIN 7.5 MG/1
7.5 TABLET ORAL DAILY
COMMUNITY
End: 2019-05-23

## 2018-11-14 NOTE — PROGRESS NOTES
[facility] presents today for   Chief Complaint   Patient presents with    CHF     having teeth extraction, date TBD    Shortness of Breath     exertion    Dizziness     sometimes    Leg Swelling     mild    Chest Pain (Angina)     intermittent left sided, dull ache        Kimi Mor Ortiz preferred language for health care discussion is english/other. Is someone accompanying this pt? No     Is the patient using any DME equipment during OV? Wheelchair     Depression Screening:  PHQ over the last two weeks 9/10/2018   PHQ Not Done -   Little interest or pleasure in doing things Not at all   Feeling down, depressed, irritable, or hopeless Not at all   Total Score PHQ 2 0       Learning Assessment:  Learning Assessment 5/6/2016   PRIMARY LEARNER Patient   HIGHEST LEVEL OF EDUCATION - PRIMARY LEARNER  GRADUATED HIGH SCHOOL OR GED   BARRIERS PRIMARY LEARNER NONE   CO-LEARNER CAREGIVER No   PRIMARY LANGUAGE ENGLISH    NEED No   LEARNER PREFERENCE PRIMARY READING   ANSWERED BY patient   RELATIONSHIP SELF       Abuse Screening:  No flowsheet data found. Fall Risk  No flowsheet data found. Pt currently taking Anticoagulant therapy? No     Coordination of Care:  1. Have you been to the ER, urgent care clinic since your last visit? Hospitalized since your last visit? 9/28 - 10/8 for weakness; 8/19 - 8/30 for CHF; 8/3 for SOB; 7/9 for SOB; 6/22 for SOB6/2 - 6/6 for CHF     2. Have you seen or consulted any other health care providers outside of the [address] [place] [address] since your last visit? Include any pap smears or colon screening.  No

## 2018-11-14 NOTE — PROGRESS NOTES
HISTORY OF PRESENT ILLNESS  Jair Gil is a 60 y.o. female. Shortness of Breath   Associated symptoms include leg swelling. Pertinent negatives include no fever, no headaches, no cough, no wheezing, no PND, no orthopnea, no chest pain, no vomiting, no abdominal pain, no rash and no claudication. Leg Swelling   Associated symptoms include shortness of breath. Pertinent negatives include no chest pain, no abdominal pain and no headaches. Patient presents for a post hospital office visit. She has a past medical history significant for atrial fibrillation, which now appears persistent. She also has known congenital heart disease with a defect repaired with open heart surgery as a child. She also has a long history of medication noncompliance and a history of polysubstance abuse in the past.  Lastly, she has a history of psychiatric illnesses which complicates her compliance. Patient was admitted to the hospital in April 2017 for multifactorial shortness of breath which is felt to be at least in part to acute on chronic diastolic heart failure. She underwent an echocardiogram during her hospital stay which showed preserved LV systolic function, EF 75-73% with mild concentric LVH, a moderately dilated right ventricle with reduced systolic function and a moderately elevated PA pressure of 54 mmHg. She had moderate to severe pulmonic insufficiency. She was last seen in the office by me nearly a year ago. Since her last in the office she has been admitted to the hospital at least 4 times for multiple reasons, including recurrent diastolic heart failure, hypoxic and hypercapnic respiratory failure, urinary tract infections and bradycardia. He last underwent a follow-up echocardiogram in May 2018 which showed a low normal LV systolic function, EF 1951%, concentric LVH with a severely dilated right ventricle and right atrium with severe pulmonary hypertension, PASP 87 mmHg.     She was last hospitalized at the end of September this year and discharged approximately 5 weeks ago. Since hospital discharge, she feels her shortness of breath and leg swelling her back to baseline. She admits that she does not use her CPAP regularly at home like she is instructed because she does not like the way the mask feels. She is tolerating her warfarin which is currently being followed by home health and her PCP. Denies any major change in her weight. No worsening orthopnea or PND. She is being scheduled for a tooth extraction in the near future. Past Medical History:   Diagnosis Date    Atrial fibrillation     CHADS score 1  (-CHF, +HTN, -AGE, -DM, -CVA)    Carpal tunnel syndrome     Chronic pain     Congenital heart disease     presumed pulmonary stenosis s/p repair 1969    Congestive heart failure (HCC)     Degenerative disc disease     Degenerative joint disease     GERD     H/O echocardiogram 04/2017    EF 60-65%, mild concentric LVH, dilated RV with RV dysfunction, RVSP 54 mmHg, RA E, moderate to severe pulmonic regurgitation.  Hypercholesterolemia     Hypertension     Hypothyroid     Morbid obesity     Morbid obesity with BMI of 45.0-49.9, adult ([facility] [address] 2/20/2017    Noncompliance     Schizoaffective disorder     Substance abuse     marijuana, crack cocaine     Current Outpatient Medications   Medication Sig Dispense Refill    warfarin (COUMADIN) 7.5 mg tablet Take 7.5 mg by mouth daily.  oxyCODONE IR (ROXICODONE) 5 mg immediate release tablet Take 1 Tab by mouth every six (6) hours as needed for Pain. Max Daily Amount: 20 mg. 6 Tab 0    senna-docusate (PERICOLACE) 8.6-50 mg per tablet Take 1 Tab by mouth nightly. 30 Tab 0    albuterol-ipratropium (DUO-NEB) 2.5 mg-0.5 mg/3 ml nebu 3 mL by Nebulization route every six (6) hours as needed. 30 Nebule 0    furosemide (LASIX) 40 mg tablet Take 1 Tab by mouth two (2) times a day.  40 mg po daily 60 Tab 2    metoprolol tartrate (LOPRESSOR) 25 mg tablet Take 0.5 Tabs by mouth two (2) times a day. 30 Tab 6    fluticasone (FLONASE) 50 mcg/actuation nasal spray 2 Sprays by Both Nostrils route daily. 1 Bottle 2    isosorbide mononitrate ER (IMDUR) 30 mg tablet Take 1 Tab by mouth every evening. 90 Tab 3    multivitamin, tx-iron-ca-min (THERA-M W/ IRON) 9 mg iron-400 mcg tab tablet Take 1 Tab by mouth daily. 30 Tab 0    raNITIdine (ZANTAC) 150 mg tablet Take 150 mg by mouth two (2) times a day.  VENTOLIN HFA 90 mcg/actuation inhaler Take 2 Puffs by inhalation every four (4) hours as needed for Wheezing or Shortness of Breath. 1 Inhaler 0    PARoxetine (PAXIL) 20 mg tablet Take 1 Tab by mouth daily. 30 Tab 3    simvastatin (ZOCOR) 40 mg tablet Take  by mouth nightly. to obtain from pharmacy      aripiprazole (ABILIFY) 15 mg tablet Take 15 mg by mouth daily. to obtain from pharmacy      levothyroxine (SYNTHROID) 125 mcg tablet Take 125 mcg by mouth Daily (before breakfast). Allergies   Allergen Reactions    Gabapentin Other (comments)     Unsteady, weakness LEs    Tetanus Vaccines And Toxoid Swelling    Topamax [Topiramate] Other (comments)     weakness      Social History     Tobacco Use    Smoking status: Never Smoker    Smokeless tobacco: Never Used   Substance Use Topics    Alcohol use: No    Drug use: No     Comment: Quit crack cocaine and marijuana 2015     Family History   Problem Relation Age of Onset    Hypertension Mother     Coronary Artery Disease Mother     Coronary Artery Disease Father     Hypertension Father          Review of Systems   Constitutional: Negative for chills, fever and weight loss. HENT: Negative for nosebleeds. Eyes: Negative for blurred vision and double vision. Respiratory: Positive for shortness of breath. Negative for cough and wheezing. Cardiovascular: Positive for leg swelling. Negative for chest pain, palpitations, orthopnea, claudication and PND.    Gastrointestinal: Negative for abdominal pain, heartburn, nausea and vomiting. Genitourinary: Negative for dysuria and hematuria. Musculoskeletal: Negative for falls and myalgias. Skin: Negative for rash. Neurological: Negative for dizziness, focal weakness and headaches. Endo/Heme/Allergies: Does not bruise/bleed easily. Psychiatric/Behavioral: Negative for substance abuse. Visit Vitals  /80   Pulse 66   Ht 5' (1.524 m)   Wt 135.6 kg (299 lb)   SpO2 97%   BMI 58.39 kg/m²       Physical Exam   Constitutional: She is oriented to person, place, and time. She appears well-developed and well-nourished. HENT:   Head: Normocephalic and atraumatic. Eyes: Conjunctivae are normal.   Neck: Neck supple. No JVD present. Carotid bruit is not present. Cardiovascular: Normal rate, S1 normal, S2 normal and normal pulses. An irregularly irregular rhythm present. Exam reveals distant heart sounds. Exam reveals no gallop. No murmur heard. Pulmonary/Chest: Effort normal and breath sounds normal. She has no wheezes. She has no rales. Abdominal: Soft. Bowel sounds are normal. There is no tenderness. Musculoskeletal: She exhibits edema ( Trace bilateral lower extremity). Neurological: She is alert and oriented to person, place, and time. Skin: Skin is warm and dry. EKG: Atrial fibrillation, ventricular rate in the 60s, occasional PVC or aberrantly conducted complexes, incomplete right bundle branch block, nonspecific ST-T abnormality. Compared to the previous EKGs, the anterior and inferior T wave inversions have improved. ASSESSMENT and PLAN    Persistent atrial fibrillation. Patient's heart rate remains well controlled on low-dose metoprolol as monotherapy. She underwent a Holter monitor in July 2018 which showed reasonably well-controlled atrial fibrillation with an average heart rate in the 60s and range from . She had no significant pauses.   She is now on warfarin for oral anticoagulation which is being followed by her PCP. Pulmonary hypertension. This is likely resultant of her congenital heart disease status post unknown surgical repair as a child. She has RV dysfunction with moderate to severe pulmonary regurgitation with now severe elevation of her PA pressures. She is on a scheduled dose of Lasix. Her oxygen saturation is reasonable. I doubt she would be a candidate for additional cardiac surgery in the future. Congenital heart disease, status post unknown repair as a child. She does have residual pulmonary hypertension and atrial fibrillation as described above. Chronic diastolic heart failure. Patient's volume status appears to be reasonable on her scheduled Lasix regimen. Her blood pressure remains well controlled on her current medical regimen, which we will continue. Dyslipidemia. Patient has been treated with simvastatin 40 mg daily. This followed by her PCP. Essential hypertension. Controlled as described above. Morbid obesity. Patient's weight has been stable for quite some time. She was encouraged to try lose as much weight as possible with lifestyle modification. Dental issues. Patient is high risk for moderate sedation due to her severe pulmonary hypertension, so I would recommend doing any sort of dental procedure with local anesthesia. She can stop her warfarin for 5-7 days prior to the procedure if needed.     Follow-up in 6 months, sooner if needed

## 2019-01-24 ENCOUNTER — OFFICE VISIT (OUTPATIENT)
Dept: ORTHOPEDIC SURGERY | Facility: CLINIC | Age: 59
End: 2019-01-24

## 2019-01-24 VITALS
BODY MASS INDEX: 58.39 KG/M2 | SYSTOLIC BLOOD PRESSURE: 130 MMHG | HEIGHT: 60 IN | DIASTOLIC BLOOD PRESSURE: 79 MMHG | RESPIRATION RATE: 16 BRPM | TEMPERATURE: 97.7 F | OXYGEN SATURATION: 94 % | HEART RATE: 68 BPM

## 2019-01-24 DIAGNOSIS — M16.11 PRIMARY OSTEOARTHRITIS OF RIGHT HIP: ICD-10-CM

## 2019-01-24 DIAGNOSIS — M77.11 LATERAL EPICONDYLITIS OF RIGHT ELBOW: Primary | ICD-10-CM

## 2019-01-24 DIAGNOSIS — M25.551 RIGHT HIP PAIN: ICD-10-CM

## 2019-01-24 DIAGNOSIS — M25.521 RIGHT ELBOW PAIN: ICD-10-CM

## 2019-01-24 RX ORDER — RIVAROXABAN 20 MG/1
TABLET, FILM COATED ORAL
Status: ON HOLD | COMMUNITY
Start: 2019-01-16 | End: 2019-08-21 | Stop reason: SDUPTHER

## 2019-01-24 RX ORDER — BUPIVACAINE HYDROCHLORIDE 2.5 MG/ML
4 INJECTION, SOLUTION EPIDURAL; INFILTRATION; INTRACAUDAL ONCE
Qty: 4 ML | Refills: 0
Start: 2019-01-24 | End: 2019-01-24

## 2019-01-24 RX ORDER — BETAMETHASONE SODIUM PHOSPHATE AND BETAMETHASONE ACETATE 3; 3 MG/ML; MG/ML
6 INJECTION, SUSPENSION INTRA-ARTICULAR; INTRALESIONAL; INTRAMUSCULAR; SOFT TISSUE ONCE
Qty: 0.5 ML | Refills: 0
Start: 2019-01-24 | End: 2019-01-24

## 2019-01-24 NOTE — PROGRESS NOTES
Patient: Cordelia Polk                MRN: [id number]       SSN: [id number] YOB: 1960        AGE: 60 y.o. SEX: female PCP: Elizabeth Alexis MD 
01/24/19 CC: RIGHT HIP and right lateral elbow PAIN 
 
HISTORY:  Cordelia Polk is a 60 y.o. female who is seen for right elbow pain. She notes a dull pain over the lateral aspect of her right elbow. She notes increased pain with lifting and grabbing items. She has been experiencing elbow pain for the past month but there is no h/o injury. She notes that she just woke up with the pain. She is also seen for right hip pain. She reports spasms in her right thigh starting from her thigh and radiate to her knee. She had a previous injury where she fell striking her left knee and right hip on 9/9/17 while returning from using the rest room. She has pain walking, standing, sleeping, and climbing stairs. She has increased pain in the morning. Her hip stiffens up when she sits. She is now a limited ambulator. She describes it as a painful lump over the side of her hip. She has been in a wheelchair. She has difficulty rising from a seated position. She has pain laying on her right side. She states that she wants her raggedy hip out and is ready to throw it away. She is using a single-point cane to ambulate. She states that she fell 2 weeks ago and was seen at the ED where she had x rays taken and was prescribed pain medication. She states that she fell while climbing a step to her porch recently. She responded to previous cortisone injections. She was scheduled to begin pain management on 10/10/16. She states that her pains keep her from sleeping at night. She was previously seen for ulnar right hand pain, numbness and tingling. She reports increased pain for the past week. She is s/p left CTR many years ago. She reports pain, numbness, and tingling located in her right ring and little finger. She was previously seen for left knee and right hip injury. She reports that she fell on 9/9/17 after going to the bathroom. She was seen at [facility] ED on 9/9/17. She is s/p transverse fracture of the left patella on 8/20/15. She is s/p left TKR 7-8 years ago. She states that her left knee has been giving out on her often. She reports a h/o recurrent left knee dislocation/subluxation episdes. She was previously seen for left shoulder pain. She notes great difficulty sleeping due to her hip and shoulder pains. She reports throbbing pains in her left shoulder. She completed a course of PT with some relief. She states that she is currently being prescribed pain medication by Dr. Maria Guadalupe Pope. She denies any new hip or shoulder injury or trauma. Pain Assessment  1/24/2019 Location of Pain Hip Location Modifiers Right Severity of Pain 9 Quality of Pain Burning;Aching Quality of Pain Comment - Duration of Pain Persistent Frequency of Pain Constant Aggravating Factors Bending;Walking;Standing Limiting Behavior Yes Relieving Factors Nothing Relieving Factors Comment - Result of Injury No  
 
Occupation, etc:  Ms. Giana Santillan receives social security disability benefits for numerus medical problems. She has a  assigned to her. She reports that she has gained 30 lbs recently. Current weight is 291 pounds. She is 5'3\" tall. She does not exercise. She states that she is not able to do much of anything today. She has a h/o diabetes but says that she is no longer diabetic. She lives in [place] with her 44-year-old daughter and her 10 children. She is hard of hearing. She now wears a hearing aid. She says that she enjoys bingo but has not been able to play recently due to her pains. She states that she has high blood pressure. Her PCP is Dr. Marleny Keyes. There were no vitals filed for this visit. Body mass index is 58.39 kg/m².  
 
Patient Active Problem List  
 Diagnosis Code  Hypertension I11.9  Congenital heart disease Q24.9  Atrial fibrillation I48.91  
 Osteoarthritis of both knees M17.0  
 H/O total knee replacement Z96.659  DJD (degenerative joint disease), lumbosacral M51.37  
 Hypothyroidism E03.9  Morbid obesity ([facility] [address] E66.01  
 Esophageal reflux K21.9  Dyspnea R06.00  Dyslipidemia E78.5  Morbid obesity with BMI of 45.0-49.9, adult ([facility]) E66.01, Z68.42  
 DDD (degenerative disc disease), lumbar, L4/5 advanced M51.36  
 Hypoxia R09.02  
 Fluid overload E87.70  
 CHF (congestive heart failure) ([facility]) I50.9  Diastolic CHF, chronic ([facility]) I50.32  Bradycardia R00.1  Acute exacerbation of CHF (congestive heart failure) ([facility]) I50.9  Chronic anticoagulation Z79.01  
 Anxiety F41.9  CHF exacerbation ([facility]) I50.9  Severe osteoarthritis right hip M16.11  
 Pulmonary hypertension ([facility]) I27.20 REVIEW OF SYSTEMS: All Below are Negative except: See HPI Constitutional: negative for fever, chills, and weight loss. Cardiovascular: negative for chest pain, claudication, leg swelling, SOB, PITTS Gastrointestinal: Negative for pain, N/V/C/D, Blood in stool or urine, dysuria,  hematuria, incontinence, pelvic pain. Musculoskeletal: See HPI Neurological: Negative for dizziness and weakness. Negative for headaches, Visual changes, confusion, seizures Phychiatric/Behavioral: Negative for depression, memory loss, substance  abuse. Extremities: Negative for hair changes, rash, or skin lesion changes. Hematologic: Negative for bleeding problems, bruising, pallor or swollen lymph  nodes Peripheral Vascular: No calf pain, no circulation deficits. Social History Socioeconomic History  Marital status: SINGLE Spouse name: Not on file  Number of children: Not on file  Years of education: Not on file  Highest education level: Not on file Social Needs  Financial resource strain: Not on file  Food insecurity - worry: Not on file  Food insecurity - inability: Not on file  Transportation needs - medical: Not on file  Transportation needs - non-medical: Not on file Occupational History  Not on file Tobacco Use  Smoking status: Never Smoker  Smokeless tobacco: Never Used Substance and Sexual Activity  Alcohol use: No  
 Drug use: No  
  Comment: Quit crack cocaine and marijuana 2015  Sexual activity: Yes Birth control/protection: Surgical  
Other Topics Concern  Not on file Social History Narrative  Not on file Allergies Allergen Reactions  Gabapentin Other (comments) Unsteady, weakness LEs  Tetanus Vaccines And Toxoid Swelling  Topamax [Topiramate] Other (comments)  
  weakness Current Outpatient Medications Medication Sig  XARELTO 20 mg tab tablet  betamethasone (CELESTONE SOLUSPAN) 6 mg/mL injection 1 mL by Intra artICUlar route once for 1 dose.  bupivacaine, PF, (MARCAINE, PF,) 0.25 % (2.5 mg/mL) injection 4 mL by Intra artICUlar route once for 1 dose.  betamethasone (CELESTONE SOLUSPAN) 6 mg/mL injection 1 mL by Intra artICUlar route once for 1 dose.  bupivacaine, PF, (MARCAINE, PF,) 0.25 % (2.5 mg/mL) injection 4 mL by Intra artICUlar route once for 1 dose.  oxyCODONE IR (ROXICODONE) 5 mg immediate release tablet Take 1 Tab by mouth every six (6) hours as needed for Pain. Max Daily Amount: 20 mg.  
 senna-docusate (PERICOLACE) 8.6-50 mg per tablet Take 1 Tab by mouth nightly.  furosemide (LASIX) 40 mg tablet Take 1 Tab by mouth two (2) times a day. 40 mg po daily  metoprolol tartrate (LOPRESSOR) 25 mg tablet Take 0.5 Tabs by mouth two (2) times a day.  fluticasone (FLONASE) 50 mcg/actuation nasal spray 2 Sprays by Both Nostrils route daily.  isosorbide mononitrate ER (IMDUR) 30 mg tablet Take 1 Tab by mouth every evening.   
 multivitamin, tx-iron-ca-min (THERA-M W/ IRON) 9 mg iron-400 mcg tab tablet Take 1 Tab by mouth daily.  raNITIdine (ZANTAC) 150 mg tablet Take 150 mg by mouth two (2) times a day.  VENTOLIN HFA 90 mcg/actuation inhaler Take 2 Puffs by inhalation every four (4) hours as needed for Wheezing or Shortness of Breath.  PARoxetine (PAXIL) 20 mg tablet Take 1 Tab by mouth daily.  simvastatin (ZOCOR) 40 mg tablet Take  by mouth nightly. to obtain from pharmacy  aripiprazole (ABILIFY) 15 mg tablet Take 15 mg by mouth daily. to obtain from pharmacy  levothyroxine (SYNTHROID) 125 mcg tablet Take 125 mcg by mouth Daily (before breakfast).  warfarin (COUMADIN) 7.5 mg tablet Take 7.5 mg by mouth daily.  albuterol-ipratropium (DUO-NEB) 2.5 mg-0.5 mg/3 ml nebu 3 mL by Nebulization route every six (6) hours as needed. No current facility-administered medications for this visit. PHYSICAL EXAMINATION: 
Visit Vitals /79 (BP 1 Location: Right arm, BP Patient Position: Sitting) Pulse 68 Temp 97.7 °F (36.5 °C) (Oral) Resp 16 Ht 5' (1.524 m) SpO2 94% BMI 58.39 kg/m²  
tearful, requesting pain meds ORTHO EXAMINATION: 
Examination Right Elbow Left Elbow Skin Intact Intact Range of Motion 135-0 135-0 Tenderness + lateral epicondyle - Swelling - - Bruising - - Stability Normal Normal  
Motor Strength  Normal Normal  
Neurovascular Intact Intact Examination Left shoulder Skin Intact Effusion - Biceps deformity - Atrophy -  
AC joint tenderness - Acromial tenderness + Biceps tenderness - Forward flexion/Elevation  Active abduction  External rotation ROM 20 Internal rotation ROM 40, with pain Apprehension - Impingement + Drop Arm Test -  
Neurovascular Intact Examination Lumbar Skin Intact Tenderness + Tightness + Lordosis Normal  
Kyphosis N/A Scoliosis - Flexion na Extension na Knee reflexes Normal  
Ankle reflexes Normal  
Straight leg raise - Calf tenderness -  
 Wearing a fall-risk bracelet and medical alert necklace Examination Left hip Right hip Skin Intact Intact External Rotation ROM 10 10 Internal Rotation ROM 0 0 Trochanteric tenderness - +, lateral  
Hip flexion contracture 5 degree 5 degree Antalgic gait + + Trendelenberg sign - - Lumbar tenderness - - Straight leg raise - - Calf tenderness - - Neurovascular Intact Intact  
she has a 5 degree external rotation contracture, and a 5 degree hip flexion contracture, using a single-point cane to walk Examination Right knee Left knee Skin Well healed TKR Well healed TKR Range of motion 110-0 90-0 Effusion - - Medial joint line tenderness - - Lateral joint line tenderness - - Popliteal tenderness - -  
Osteophytes palpable - - Nehals - - Patella crepitus - + Anterior drawer - - Lateral laxity - - Medial laxity - 2+ Varus deformity - -  
Valgus deformity - - Pretibial edema - - Calf tenderness - - Left knee clinically well located Wound well healed Brought into office on a stretcher today Examination Right Hand Left Hand Skin Intact Intact Deformity - - Swelling - - Tenderness - - Finger flexion Full Full Finger extension Full Full Sensation Normal Normal  
Capillary refill Normal Normal  
Heberden's nodes - -  
Dupuytren's - - Mild clawing of right hand PROCEDURE: After discussing treatment options, patient's right hip and right lateral epicondylar areas were injected with 4 cc Marcaine and 1/2 cc Celestone. Chart reviewed for the following: 
 Nakia Henley MD, have reviewed the History, Physical and updated the Allergic reactions for Cordelia Polk TIME OUT performed immediately prior to start of procedure: 
Nakia Henley MD, have performed the following reviews on Cordelia Polk prior to the start of the procedure: 
         
* Patient was identified by name and date of birth * Agreement on procedure being performed was verified * Risks and Benefits explained to the patient * Procedure site verified and marked as necessary * Patient was positioned for comfort * Consent was obtained Time: 2:26 PM  
 
Date of procedure: 1/24/2019 Procedure performed by:  Zoya Ellsworth MD 
 
Ms. Ortiz tolerated the procedure well with no complications. CTA LOWER EXTREMITY LEFT W CONT 9/9/17 Impression:  
1.  Limited CTA of the left lower extremity due to extensive scatter artifact related to the metallic knee prostheses. Visualized portions of the popliteal artery are normal without evidence of disruption or dissection. PVL of the popliteal artery would be useful in demonstrating its entire length. 2. Visualized portions of the superficial femoral artery and trifurcation vessels are normal. 
3. There is anterior subluxation of the femoral component of the knee prosthesis with respect to the tibial component with associated varus angulation. There is a moderate joint effusion present. No significant hemorrhage is seen. There is no evidence of acute fracture. There is an old fracture involving the superior 
aspect of the patella with nonunion. 
  
CT PELVIS WO CONT 7/20/15 IMPRESSION:  No acute fracture in the pelvis or sacrum/coccyx. Advanced degenerative joint disease in the right hip. Degenerative disease in the lumbar spine. CT RIGHT HIP WO CONT 5/6/15 IMPRESSION:  Severe osteoarthritis, likely post traumatic, stable since October 2014 with no acute injuries suspected. RADIOGRAPHS: 
XR LEFT KNEE 9/15/17 IMPRESSION:  Two views -s/p reduction of left TKR. No fracture, well-fixed prosthetic components in satisfactory position and alignment. XR RIGHT HIP 9/9/17 IMPRESSION:  
Severe degenerative arthritis of the right hip with remodeling unchanged back to 2015. No evidence of acute fracture. XR LEFT KNEE 9/9/17 IMPRESSION:  
 Anterior subluxation if not dislocation at the knee. Joint effusion. Total knee arthroplasty individual components appear intact. 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 Multiple. Articular ossific densities unchanged. XR RIGHT HIP 1/4/17 IMPRESSION:  Redemonstration of very severe osteoarthritis at right hip joint. There is no evidence of acute fracture or dislocation at right hip or on rest of AP view of pelvis. XR LUMBAR SPINE 1/4/17 IMPRESSION:  Redemonstration of very severe degenerative disc disease at L4-L5 level. No evidence of acute fracture or dislocation or subluxation in lumbar spine or sacrum. XR MAREN HIPS 1/25/16 IMPRESSION:  No fractures, near-complete joint space narrowing, no osteophytes present, femoral neck shortening. XR LUMBAR SPINE 7/20/15 IMPRESSION:  No acute findings. Severe degenerative change L4-5 level similar prior exam 05/06/2015. XR RIGHT HIP 5/6/15 IMPRESSION:  Stable chronic findings of the right hip. IMPRESSION:   
  ICD-10-CM ICD-9-CM 1. Lateral epicondylitis of right elbow M77.11 726.32 betamethasone (CELESTONE SOLUSPAN) 6 mg/mL injection BETAMETHASONE ACETATE & SODIUM PHOSPHATE INJECTION 3 MG EA.  
   bupivacaine, PF, (MARCAINE, PF,) 0.25 % (2.5 mg/mL) injection CT INJECT TENDON SHEATH/LIGAMENT 2. Right elbow pain M25.521 719.42 betamethasone (CELESTONE SOLUSPAN) 6 mg/mL injection BETAMETHASONE ACETATE & SODIUM PHOSPHATE INJECTION 3 MG EA.  
   bupivacaine, PF, (MARCAINE, PF,) 0.25 % (2.5 mg/mL) injection CT INJECT TENDON SHEATH/LIGAMENT 3. Primary osteoarthritis of right hip M16.11 715.15 betamethasone (CELESTONE SOLUSPAN) 6 mg/mL injection BETAMETHASONE ACETATE & SODIUM PHOSPHATE INJECTION 3 MG EA.  
   DRAIN/INJECT LARGE JOINT/BURSA  
   bupivacaine, PF, (MARCAINE, PF,) 0.25 % (2.5 mg/mL) injection 4. Right hip pain M25.551 719.45 betamethasone (CELESTONE SOLUSPAN) 6 mg/mL injection BETAMETHASONE ACETATE & SODIUM PHOSPHATE INJECTION 3 MG EA.  
   DRAIN/INJECT LARGE JOINT/BURSA  
   bupivacaine, PF, (MARCAINE, PF,) 0.25 % (2.5 mg/mL) injection PLAN:  After discussing treatment options, patient's lateral right hip and right lateral epicondylar areas were injected with 4 cc Marcaine and 1/2 cc Celestone.   
 
Scribed by Morris Gaxiola [facility] [address]) as dictated by Sandy Rogers MD

## 2019-04-20 ENCOUNTER — HOSPITAL ENCOUNTER (EMERGENCY)
Age: 59
Discharge: HOME | End: 2019-04-20
Attending: EMERGENCY MEDICINE
Payer: MEDICAID

## 2019-04-20 ENCOUNTER — APPOINTMENT (OUTPATIENT)
Dept: CT IMAGING | Age: 59
End: 2019-04-20
Attending: NURSE PRACTITIONER
Payer: MEDICAID

## 2019-04-20 ENCOUNTER — APPOINTMENT (OUTPATIENT)
Dept: GENERAL RADIOLOGY | Age: 59
End: 2019-04-20
Attending: NURSE PRACTITIONER
Payer: MEDICAID

## 2019-04-20 VITALS
HEART RATE: 80 BPM | OXYGEN SATURATION: 94 % | DIASTOLIC BLOOD PRESSURE: 87 MMHG | RESPIRATION RATE: 18 BRPM | TEMPERATURE: 97.6 F | SYSTOLIC BLOOD PRESSURE: 155 MMHG

## 2019-04-20 DIAGNOSIS — R10.84 ABDOMINAL PAIN, GENERALIZED: Primary | ICD-10-CM

## 2019-04-20 LAB
ALBUMIN SERPL-MCNC: 3.6 G/DL (ref 3.4–5)
ALBUMIN/GLOB SERPL: 0.9 {RATIO} (ref 0.8–1.7)
ALP SERPL-CCNC: 171 U/L (ref 45–117)
ALT SERPL-CCNC: 24 U/L (ref 13–56)
ANION GAP SERPL CALC-SCNC: 4 MMOL/L (ref 3–18)
AST SERPL-CCNC: 34 U/L (ref 15–37)
BASOPHILS # BLD: 0 K/UL (ref 0–0.1)
BASOPHILS NFR BLD: 0 % (ref 0–2)
BILIRUB SERPL-MCNC: 1.4 MG/DL (ref 0.2–1)
BUN SERPL-MCNC: 17 MG/DL (ref 7–18)
BUN/CREAT SERPL: 15 (ref 12–20)
CALCIUM SERPL-MCNC: 8.8 MG/DL (ref 8.5–10.1)
CHLORIDE SERPL-SCNC: 105 MMOL/L (ref 100–108)
CO2 SERPL-SCNC: 30 MMOL/L (ref 21–32)
CREAT SERPL-MCNC: 1.16 MG/DL (ref 0.6–1.3)
DIFFERENTIAL METHOD BLD: ABNORMAL
EOSINOPHIL # BLD: 0.1 K/UL (ref 0–0.4)
EOSINOPHIL NFR BLD: 2 % (ref 0–5)
ERYTHROCYTE [DISTWIDTH] IN BLOOD BY AUTOMATED COUNT: 17.1 % (ref 11.6–14.5)
GLOBULIN SER CALC-MCNC: 4.1 G/DL (ref 2–4)
GLUCOSE SERPL-MCNC: 100 MG/DL (ref 74–99)
HCT VFR BLD AUTO: 41.3 % (ref 35–45)
HGB BLD-MCNC: 12.6 G/DL (ref 12–16)
LIPASE SERPL-CCNC: 44 U/L (ref 73–393)
LYMPHOCYTES # BLD: 1.2 K/UL (ref 0.9–3.6)
LYMPHOCYTES NFR BLD: 20 % (ref 21–52)
MCH RBC QN AUTO: 24.7 PG (ref 24–34)
MCHC RBC AUTO-ENTMCNC: 30.5 G/DL (ref 31–37)
MCV RBC AUTO: 81 FL (ref 74–97)
MONOCYTES # BLD: 0.4 K/UL (ref 0.05–1.2)
MONOCYTES NFR BLD: 7 % (ref 3–10)
NEUTS SEG # BLD: 4 K/UL (ref 1.8–8)
NEUTS SEG NFR BLD: 71 % (ref 40–73)
PLATELET # BLD AUTO: 181 K/UL (ref 135–420)
PMV BLD AUTO: 9.8 FL (ref 9.2–11.8)
POTASSIUM SERPL-SCNC: 4.3 MMOL/L (ref 3.5–5.5)
PROT SERPL-MCNC: 7.7 G/DL (ref 6.4–8.2)
RBC # BLD AUTO: 5.1 M/UL (ref 4.2–5.3)
SODIUM SERPL-SCNC: 139 MMOL/L (ref 136–145)
WBC # BLD AUTO: 5.7 K/UL (ref 4.6–13.2)

## 2019-04-20 PROCEDURE — 74018 RADEX ABDOMEN 1 VIEW: CPT

## 2019-04-20 PROCEDURE — 83690 ASSAY OF LIPASE: CPT

## 2019-04-20 PROCEDURE — 74011636320 HC RX REV CODE- 636/320: Performed by: EMERGENCY MEDICINE

## 2019-04-20 PROCEDURE — 99283 EMERGENCY DEPT VISIT LOW MDM: CPT

## 2019-04-20 PROCEDURE — 85025 COMPLETE CBC W/AUTO DIFF WBC: CPT

## 2019-04-20 PROCEDURE — 80053 COMPREHEN METABOLIC PANEL: CPT

## 2019-04-20 PROCEDURE — 74177 CT ABD & PELVIS W/CONTRAST: CPT

## 2019-04-20 RX ORDER — BISACODYL 5 MG
5 TABLET, DELAYED RELEASE (ENTERIC COATED) ORAL
Status: DISCONTINUED | OUTPATIENT
Start: 2019-04-20 | End: 2019-04-20 | Stop reason: HOSPADM

## 2019-04-20 RX ORDER — MAGNESIUM CITRATE
296 SOLUTION, ORAL ORAL
Status: DISCONTINUED | OUTPATIENT
Start: 2019-04-20 | End: 2019-04-20 | Stop reason: HOSPADM

## 2019-04-20 RX ADMIN — IOPAMIDOL 80 ML: 612 INJECTION, SOLUTION INTRAVENOUS at 04:00

## 2019-04-20 NOTE — ED NOTES
I have reviewed discharge instructions with the patient. The patient verbalized understanding. Pt left ED in NAD, ambulatory with assistance pt family wheeled pt to the lobby, safety intact, a&ox4. PT had bowel movement in ED without assistance.

## 2019-04-20 NOTE — ED TRIAGE NOTES
Patient report that she has not had a bowel movement in two days she reports that she had to take magnesium citrate to go the last time and then only some watery stuff came out.

## 2019-04-20 NOTE — DISCHARGE INSTRUCTIONS

## 2019-04-20 NOTE — ED PROVIDER NOTES
Patient with an extensive history of chronic pain degenerative disc disease degenerative joint disease and is on oxycodone 3 times a day presents to the emergency department stating she is constipated patient states she thinks it from eating cheese no nausea vomiting diarrhea or fever reported patient states a little fullness and discomfort on her abdomen for the past few days she states is been 2 or 3 days since her last bowel movement The history is provided by the patient. No  was used. Constipation This is a recurrent problem. The current episode started 12 to 24 hours ago. The stool is described as pellet like. Associated symptoms include abdominal pain and constipation. Pertinent negatives include no dysuria, no fever, no nausea, no vomiting and no diarrhea. She has tried nothing for the symptoms. Past Medical History:  
Diagnosis Date  Atrial fibrillation CHADS score 1  (-CHF, +HTN, -AGE, -DM, -CVA)  Carpal tunnel syndrome  Chronic pain  Congenital heart disease   
 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   
 H/O echocardiogram 04/2017 EF 60-65%, mild concentric LVH, dilated RV with RV dysfunction, RVSP 54 mmHg, RA E, moderate to severe pulmonic regurgitation.  Hypercholesterolemia  Hypertension  Hypothyroid  Morbid obesity  Morbid obesity with BMI of 45.0-49.9, adult ([facility] [address] 2/20/2017  Noncompliance  Schizoaffective disorder  Substance abuse (HCC)   
 marijuana, crack cocaine Past Surgical History:  
Procedure Laterality Date [address] [place] VSD repair  COLONOSCOPY N/A 5/6/2018 COLONOSCOPY with tatooing and biopsy performed by Mesha Villa MD at [facility]    
 left  HX HYSTERECTOMY  HX KNEE REPLACEMENT    
 left  HX KNEE REPLACEMENT    
 right  HX ORTHOPAEDIC    
 left ankle  HX ORTHOPAEDIC    
 carpel tunnel right and left  HX THYROIDECTOMY Left Family History:  
Problem Relation Age of Onset  Hypertension Mother  Coronary Artery Disease Mother  Coronary Artery Disease Father  Hypertension Father Social History Socioeconomic History  Marital status: SINGLE Spouse name: Not on file  Number of children: Not on file  Years of education: Not on file  Highest education level: Not on file Occupational History  Not on file Social Needs  Financial resource strain: Not on file  Food insecurity:  
  Worry: Not on file Inability: Not on file  Transportation needs:  
  Medical: Not on file Non-medical: Not on file Tobacco Use  Smoking status: Never Smoker  Smokeless tobacco: Never Used Substance and Sexual Activity  Alcohol use: No  
 Drug use: No  
  Comment: Quit crack cocaine and marijuana 2015  Sexual activity: Yes Birth control/protection: Surgical  
Lifestyle  Physical activity:  
  Days per week: Not on file Minutes per session: Not on file  Stress: Not on file Relationships  Social connections:  
  Talks on phone: Not on file Gets together: Not on file Attends Tenriism service: Not on file Active member of club or organization: Not on file Attends meetings of clubs or organizations: Not on file Relationship status: Not on file  Intimate partner violence:  
  Fear of current or ex partner: Not on file Emotionally abused: Not on file Physically abused: Not on file Forced sexual activity: Not on file Other Topics Concern  Not on file Social History Narrative  Not on file ALLERGIES: Gabapentin; Tetanus vaccines and toxoid; and Topamax [topiramate]l Review of Systems Constitutional: Negative for fever. Respiratory: Negative for chest tightness. Cardiovascular: Negative for chest pain and palpitations. Gastrointestinal: Positive for abdominal pain and constipation. Negative for diarrhea, nausea and vomiting. Genitourinary: Negative for dysuria. Skin: Negative for color change. All other systems reviewed and are negative. Vitals:  
 04/20/19 0114 BP: 155/87 Pulse: 80 Resp: 18 Temp: 97.6 °F (36.4 °C) SpO2: 94% Physical Exam  
Constitutional: She is oriented to person, place, and time. She appears well-developed and well-nourished. Morbidly obese HENT:  
Head: Normocephalic and atraumatic. Eyes: Pupils are equal, round, and reactive to light. Conjunctivae and EOM are normal.  
Neck: Normal range of motion. Neck supple. Cardiovascular: Normal rate, regular rhythm, normal heart sounds and intact distal pulses. Pulmonary/Chest: Effort normal and breath sounds normal.  
Abdominal: Soft. Bowel sounds are normal. She exhibits no distension. There is tenderness. Generalized abdominal tenderness abdomen is soft Musculoskeletal: Normal range of motion. Neurological: She is alert and oriented to person, place, and time. She has normal reflexes. Skin: Skin is warm and dry. Psychiatric: She has a normal mood and affect. Her behavior is normal. Judgment and thought content normal.  
Nursing note and vitals reviewed. MDM Number of Diagnoses or Management Options Diagnosis management comments: I suspect constipation KUB was ordered and patient had an unusual gas pattern so labs and CT of abdomen were ordered at this time patient is not complaining of any pain nausea or vomiting Amount and/or Complexity of Data Reviewed Clinical lab tests: ordered and reviewed Tests in the radiology section of CPT®: ordered and reviewed Review and summarize past medical records: yes Independent visualization of images, tracings, or specimens: yes Risk of Complications, Morbidity, and/or Mortality Presenting problems: moderate Diagnostic procedures: moderate Management options: moderate Patient Progress Patient progress: stable Procedures Vitals: 
Patient Vitals for the past 12 hrs: 
 Temp Pulse Resp BP SpO2  
04/20/19 0114 97.6 °F (36.4 °C) 80 18 155/87 94 % Medications ordered:  
Medications  
iopamidol (ISOVUE 300) 61 % contrast injection  mL (80 mL IntraVENous Given 4/20/19 0400) Lab findings: 
Recent Results (from the past 12 hour(s)) CBC WITH AUTOMATED DIFF Collection Time: 04/20/19  2:47 AM  
Result Value Ref Range WBC 5.7 4.6 - 13.2 K/uL  
 RBC 5.10 4.20 - 5.30 M/uL  
 HGB 12.6 12.0 - 16.0 g/dL HCT 41.3 35.0 - 45.0 % MCV 81.0 74.0 - 97.0 FL  
 MCH 24.7 24.0 - 34.0 PG  
 MCHC 30.5 (L) 31.0 - 37.0 g/dL  
 RDW 17.1 (H) 11.6 - 14.5 % PLATELET 756 629 - 010 K/uL MPV 9.8 9.2 - 11.8 FL  
 NEUTROPHILS 71 40 - 73 % LYMPHOCYTES 20 (L) 21 - 52 % MONOCYTES 7 3 - 10 % EOSINOPHILS 2 0 - 5 % BASOPHILS 0 0 - 2 %  
 ABS. NEUTROPHILS 4.0 1.8 - 8.0 K/UL  
 ABS. LYMPHOCYTES 1.2 0.9 - 3.6 K/UL  
 ABS. MONOCYTES 0.4 0.05 - 1.2 K/UL  
 ABS. EOSINOPHILS 0.1 0.0 - 0.4 K/UL  
 ABS. BASOPHILS 0.0 0.0 - 0.1 K/UL  
 DF AUTOMATED METABOLIC PANEL, COMPREHENSIVE Collection Time: 04/20/19  2:47 AM  
Result Value Ref Range Sodium 139 136 - 145 mmol/L Potassium 4.3 3.5 - 5.5 mmol/L Chloride 105 100 - 108 mmol/L  
 CO2 30 21 - 32 mmol/L Anion gap 4 3.0 - 18 mmol/L Glucose 100 (H) 74 - 99 mg/dL BUN 17 7.0 - 18 MG/DL Creatinine 1.16 0.6 - 1.3 MG/DL  
 BUN/Creatinine ratio 15 12 - 20 GFR est AA 58 (L) >60 ml/min/1.73m2 GFR est non-AA 48 (L) >60 ml/min/1.73m2 Calcium 8.8 8.5 - 10.1 MG/DL Bilirubin, total 1.4 (H) 0.2 - 1.0 MG/DL  
 ALT (SGPT) 24 13 - 56 U/L  
 AST (SGOT) 34 15 - 37 U/L Alk. phosphatase 171 (H) 45 - 117 U/L Protein, total 7.7 6.4 - 8.2 g/dL Albumin 3.6 3.4 - 5.0 g/dL Globulin 4.1 (H) 2.0 - 4.0 g/dL A-G Ratio 0.9 0.8 - 1.7 LIPASE Collection Time: 04/20/19  2:47 AM  
Result Value Ref Range Lipase 44 (L) 73 - 393 U/L  
 
 
 
X-Ray, CT or other radiology findings or impressions: XR ABD (KUB)    (Results Pending) CT ABD PELV W CONT    (Results Pending)  
pending Disposition: 
 
Diagnosis: 1. Abdominal pain, generalized Disposition: Discharge Follow-up Information Follow up With Specialties Details Why Contact Info Avelina Salguero MD Internal Medicine In 2 days  [address] [place] [address] 
[phone] Patient's Medications Start Taking No medications on file Continue Taking ALBUTEROL-IPRATROPIUM (DUO-NEB) 2.5 MG-0.5 MG/3 ML NEBU    3 mL by Nebulization route every six (6) hours as needed. ARIPIPRAZOLE (ABILIFY) 15 MG TABLET    Take 15 mg by mouth daily. to obtain from pharmacy FLUTICASONE (FLONASE) 50 MCG/ACTUATION NASAL SPRAY    2 Sprays by Both Nostrils route daily. FUROSEMIDE (LASIX) 40 MG TABLET    Take 1 Tab by mouth two (2) times a day. 40 mg po daily ISOSORBIDE MONONITRATE ER (IMDUR) 30 MG TABLET    Take 1 Tab by mouth every evening. LEVOTHYROXINE (SYNTHROID) 125 MCG TABLET    Take 125 mcg by mouth Daily (before breakfast). METOPROLOL TARTRATE (LOPRESSOR) 25 MG TABLET    Take 0.5 Tabs by mouth two (2) times a day. MULTIVITAMIN, TX-IRON-CA-MIN (THERA-M W/ IRON) 9 MG IRON-400 MCG TAB TABLET    Take 1 Tab by mouth daily. OXYCODONE IR (ROXICODONE) 5 MG IMMEDIATE RELEASE TABLET    Take 1 Tab by mouth every six (6) hours as needed for Pain. Max Daily Amount: 20 mg. PAROXETINE (PAXIL) 20 MG TABLET    Take 1 Tab by mouth daily. RANITIDINE (ZANTAC) 150 MG TABLET    Take 150 mg by mouth two (2) times a day. SENNA-DOCUSATE (PERICOLACE) 8.6-50 MG PER TABLET    Take 1 Tab by mouth nightly. SIMVASTATIN (ZOCOR) 40 MG TABLET    Take  by mouth nightly. to obtain from pharmacy VENTOLIN HFA 90 MCG/ACTUATION INHALER    Take 2 Puffs by inhalation every four (4) hours as needed for Wheezing or Shortness of Breath. WARFARIN (COUMADIN) 7.5 MG TABLET    Take 7.5 mg by mouth daily. XARELTO 20 MG TAB TABLET These Medications have changed No medications on file Stop Taking No medications on file Dragon voice recognition was used to generate this report, which may have resulted in some phonetic based errors in grammar and contents. Even though attempts were made to correct all the mistakes, some may have been missed, and remained in the body of the document 4:14 AM : Pt care transferred to Dr. Obed Coello  ,ED provider. History of patient complaint(s), available diagnostic reports and current treatment plan has been discussed thoroughly. Bedside rounding on patient occured : no . Intended disposition of patient : TBD Pending diagnostics reports and/or labs (please list): ct of abdomen and possible enema   Deion Khan ENP-C, FNP-C

## 2019-05-10 ENCOUNTER — OFFICE VISIT (OUTPATIENT)
Dept: PULMONOLOGY | Age: 59
End: 2019-05-10

## 2019-05-10 VITALS
DIASTOLIC BLOOD PRESSURE: 80 MMHG | OXYGEN SATURATION: 96 % | TEMPERATURE: 98.2 F | BODY MASS INDEX: 57.52 KG/M2 | SYSTOLIC BLOOD PRESSURE: 140 MMHG | HEART RATE: 72 BPM | RESPIRATION RATE: 18 BRPM | HEIGHT: 60 IN | WEIGHT: 293 LBS

## 2019-05-10 DIAGNOSIS — I50.22 CHRONIC SYSTOLIC HEART FAILURE (HCC): ICD-10-CM

## 2019-05-10 DIAGNOSIS — J96.12 CHRONIC RESPIRATORY FAILURE WITH HYPOXIA AND HYPERCAPNIA (HCC): Primary | ICD-10-CM

## 2019-05-10 DIAGNOSIS — J96.11 CHRONIC RESPIRATORY FAILURE WITH HYPOXIA AND HYPERCAPNIA (HCC): Primary | ICD-10-CM

## 2019-05-10 DIAGNOSIS — G47.33 OSA (OBSTRUCTIVE SLEEP APNEA): ICD-10-CM

## 2019-05-10 DIAGNOSIS — E66.2 OBESITY HYPOVENTILATION SYNDROME (HCC): ICD-10-CM

## 2019-05-10 DIAGNOSIS — E66.01 MORBID OBESITY WITH BMI OF 50.0-59.9, ADULT (HCC): ICD-10-CM

## 2019-05-10 DIAGNOSIS — I27.20 PULMONARY HTN (HCC): ICD-10-CM

## 2019-05-10 NOTE — PROGRESS NOTES
Sammie Brooks presents today for   Chief Complaint   Patient presents with    Shortness of Breath       Is someone accompanying this pt? Yes Daughter    Is the patient using any DME equipment during [facility]? No     -DME Company None    Depression Screening:  3 most recent PHQ Screens 1/24/2019   PHQ Not Done -   Little interest or pleasure in doing things Not at all   Feeling down, depressed, irritable, or hopeless Not at all   Total Score PHQ 2 0       Learning Assessment:  Learning Assessment 5/6/2016   PRIMARY LEARNER Patient   HIGHEST LEVEL OF EDUCATION - PRIMARY LEARNER  GRADUATED HIGH SCHOOL OR GED   BARRIERS PRIMARY LEARNER NONE   CO-LEARNER CAREGIVER No   PRIMARY LANGUAGE ENGLISH    NEED No   LEARNER PREFERENCE PRIMARY READING   ANSWERED BY patient   RELATIONSHIP SELF       Abuse Screening:  No flowsheet data found. Fall Risk  No flowsheet data found. Coordination of Care:  1. Have you been to the ER, urgent care clinic since your last visit? Hospitalized since your last visit? No    2. Have you seen or consulted any other health care providers outside of the [address] [place] [address] since your last visit? Include any pap smears or colon screening.  No

## 2019-05-10 NOTE — PROGRESS NOTES
PATIENT'S O2 SATS:   84% Room Air Rest, 72 Heart Rate  Patient in Wheel chair and unable to ambulate                                          Oxygen @ 3LPM started                                              O2 Sat 98% on 3LPM HR 68  Dr Lisha Latif informed

## 2019-05-10 NOTE — PROGRESS NOTES
HISTORY OF PRESENT ILLNESS  James Black is a 60 y.o. female. Pt seen in after hospital admission for acute on chronic hypercapnic and hypoxic respiratory failure and multiple admissions for this. She is a poor historian due to Psychiatric illness but review of records reveal severe ANDRADE/OHS with failed CPAP titration. Pt was scheduled for dedicated BIPAP titration but would end up in the hospital for either shortness of breath or AMS due to CO2 narcosis. On top of this, pt states that she cannot tolerate BIPAP mask. She presents for follow up today after an ED visit for obstipation thought related to medications. She was sent home from SNF without supplemental O2 and since then has noted worsening shortness of breath, now even at rest. Pt and family also note worsening peripheral edema and unexplained weight gain. Shortness of Breath   The history is provided by the patient and relative. This is a chronic problem. The problem has been gradually worsening. Associated symptoms include leg swelling. Pertinent negatives include no fever, no headaches, no coryza, no rhinorrhea, no sore throat, no swollen glands, no ear pain, no neck pain, no cough, no sputum production, no hemoptysis, no wheezing, no PND, no orthopnea, no chest pain, no syncope, no vomiting, no abdominal pain, no rash, no leg pain and no claudication. The problem's precipitants include exercise. Review of Systems   Constitutional: Positive for malaise/fatigue. Negative for chills, diaphoresis, fever and weight loss. HENT: Negative for congestion, ear discharge, ear pain, hearing loss, nosebleeds, rhinorrhea, sinus pain, sore throat and tinnitus. Eyes: Negative for blurred vision, double vision, photophobia, pain, discharge and redness. Respiratory: Positive for shortness of breath. Negative for cough, hemoptysis, sputum production, wheezing and stridor. Cardiovascular: Positive for leg swelling.  Negative for chest pain, palpitations, orthopnea, claudication, syncope and PND. Gastrointestinal: Negative for abdominal pain, blood in stool, constipation, diarrhea, heartburn, melena, nausea and vomiting. Genitourinary: Negative for dysuria, flank pain, frequency, hematuria and urgency. Musculoskeletal: Positive for back pain. Negative for falls, joint pain, myalgias and neck pain. Skin: Negative for itching and rash. Neurological: Positive for sensory change and weakness. Negative for dizziness, tingling, tremors, speech change, focal weakness, seizures, loss of consciousness and headaches. Endo/Heme/Allergies: Negative for environmental allergies and polydipsia. Does not bruise/bleed easily. Psychiatric/Behavioral: Positive for depression. Negative for hallucinations, memory loss, substance abuse and suicidal ideas. The patient is not nervous/anxious and does not have insomnia. Past Medical History:   Diagnosis Date    Atrial fibrillation     CHADS score 1  (-CHF, +HTN, -AGE, -DM, -CVA)    Carpal tunnel syndrome     Chronic pain     Congenital heart disease     presumed pulmonary stenosis s/p repair 1969    Congestive heart failure (HCC)     Degenerative disc disease     Degenerative joint disease     GERD     H/O echocardiogram 04/2017    EF 60-65%, mild concentric LVH, dilated RV with RV dysfunction, RVSP 54 mmHg, RA E, moderate to severe pulmonic regurgitation.     Hypercholesterolemia     Hypertension     Hypothyroid     Morbid obesity     Morbid obesity with BMI of 45.0-49.9, adult ([facility] 2/20/2017    Noncompliance     Schizoaffective disorder     Substance abuse ([facility]     marijuana, crack cocaine     Past Surgical History:   Procedure Laterality Date    CARDIAC SURG PROCEDURE UNLIST  1971    VSD repair    COLONOSCOPY N/A 5/6/2018    COLONOSCOPY with tatooing and biopsy performed by Milan Ortiz MD at [facility] 9      left    HX HYSTERECTOMY      HX KNEE REPLACEMENT      left    HX KNEE REPLACEMENT      right    HX ORTHOPAEDIC      left ankle    HX ORTHOPAEDIC      carpel tunnel right and left     HX THYROIDECTOMY Left      Current Outpatient Medications on File Prior to Visit   Medication Sig Dispense Refill    XARELTO 20 mg tab tablet       oxyCODONE IR (ROXICODONE) 5 mg immediate release tablet Take 1 Tab by mouth every six (6) hours as needed for Pain. Max Daily Amount: 20 mg. 6 Tab 0    senna-docusate (PERICOLACE) 8.6-50 mg per tablet Take 1 Tab by mouth nightly. 30 Tab 0    albuterol-ipratropium (DUO-NEB) 2.5 mg-0.5 mg/3 ml nebu 3 mL by Nebulization route every six (6) hours as needed. 30 Nebule 0    furosemide (LASIX) 40 mg tablet Take 1 Tab by mouth two (2) times a day. 40 mg po daily 60 Tab 2    metoprolol tartrate (LOPRESSOR) 25 mg tablet Take 0.5 Tabs by mouth two (2) times a day. 30 Tab 6    fluticasone (FLONASE) 50 mcg/actuation nasal spray 2 Sprays by Both Nostrils route daily. 1 Bottle 2    isosorbide mononitrate ER (IMDUR) 30 mg tablet Take 1 Tab by mouth every evening. 90 Tab 3    raNITIdine (ZANTAC) 150 mg tablet Take 150 mg by mouth two (2) times a day.  VENTOLIN HFA 90 mcg/actuation inhaler Take 2 Puffs by inhalation every four (4) hours as needed for Wheezing or Shortness of Breath. 1 Inhaler 0    PARoxetine (PAXIL) 20 mg tablet Take 1 Tab by mouth daily. 30 Tab 3    simvastatin (ZOCOR) 40 mg tablet Take  by mouth nightly. to obtain from pharmacy      levothyroxine (SYNTHROID) 125 mcg tablet Take 125 mcg by mouth Daily (before breakfast).  warfarin (COUMADIN) 7.5 mg tablet Take 7.5 mg by mouth daily.  multivitamin, tx-iron-ca-min (THERA-M W/ IRON) 9 mg iron-400 mcg tab tablet Take 1 Tab by mouth daily. 30 Tab 0    aripiprazole (ABILIFY) 15 mg tablet Take 15 mg by mouth daily. to obtain from pharmacy       No current facility-administered medications on file prior to visit.       Allergies   Allergen Reactions    Gabapentin Other (comments)     Unsteady, weakness LEs    Tetanus Vaccines And Toxoid Swelling    Topamax [Topiramate] Other (comments)     weakness     Family History   Problem Relation Age of Onset    Hypertension Mother     Coronary Artery Disease Mother     Coronary Artery Disease Father     Hypertension Father      Social History     Socioeconomic History    Marital status: SINGLE     Spouse name: Not on file    Number of children: Not on file    Years of education: Not on file    Highest education level: Not on file   Occupational History    Not on file   Social Needs    Financial resource strain: Not on file    Food insecurity:     Worry: Not on file     Inability: Not on file    Transportation needs:     Medical: Not on file     Non-medical: Not on file   Tobacco Use    Smoking status: Never Smoker    Smokeless tobacco: Never Used   Substance and Sexual Activity    Alcohol use: No    Drug use: No     Comment: Quit crack cocaine and marijuana 2015    Sexual activity: Yes     Birth control/protection: Surgical   Lifestyle    Physical activity:     Days per week: Not on file     Minutes per session: Not on file    Stress: Not on file   Relationships    Social connections:     Talks on phone: Not on file     Gets together: Not on file     Attends Ep[employer] service: Not on file     Active member of club or organization: Not on file     Attends meetings of clubs or organizations: Not on file     Relationship status: Not on file    Intimate partner violence:     Fear of current or ex partner: Not on file     Emotionally abused: Not on file     Physically abused: Not on file     Forced sexual activity: Not on file   Other Topics Concern    Not on file   Social History Narrative    Not on file     Blood pressure 140/80, pulse 72, temperature 98.2 °F (36.8 °C), temperature source Oral, resp. rate 18, height 5' (1.524 m), weight 140.6 kg (310 lb), SpO2 96 %.       Physical Exam   Constitutional: She is oriented to person, place, and time. No distress. Morbidly obese, in wheelchair   HENT:   Head: Normocephalic and atraumatic. Nose: Nose normal.   Mouth/Throat: Oropharynx is clear and moist. No oropharyngeal exudate. Eyes: Pupils are equal, round, and reactive to light. Conjunctivae and EOM are normal. Right eye exhibits no discharge. Left eye exhibits no discharge. No scleral icterus. Neck: No JVD present. No tracheal deviation present. No thyromegaly present. Cardiovascular: Normal rate, regular rhythm, normal heart sounds and intact distal pulses. Exam reveals no gallop. No murmur heard. Pulmonary/Chest: Effort normal. No stridor. No respiratory distress. She has no wheezes. She has no rales. She exhibits no tenderness. Distant breath sounds   Abdominal: Soft. She exhibits no mass. There is no tenderness. There is no rebound. Musculoskeletal: She exhibits no tenderness or deformity. Edema: 3+ bipedal.   Lymphadenopathy:     She has no cervical adenopathy. Neurological: She is alert and oriented to person, place, and time. Abnormal coordination: no deficits in UE, unable to assess LE. Skin: Skin is warm and dry. No rash noted. She is not diaphoretic. Erythema: dependent. No pallor. Psychiatric: Her behavior is normal. Judgment and thought content normal.   Flat affect         2018 12:27 PM - Deepak, Card Result In       [address], [address] [phone]    LimitedTransthoracic Echocardiogram    Patient: Melissa Farley  MRN: [id number]  ACCT #: [de-identified]  : 1960  Age: 60 years  Gender: Female  Height: 60 in  Weight: 312.4 lb  BSA: 2.26 m-sq  BP: 145 / 88 mmHg  Study date: 07-May-2018  Status: Routine  Location: [facility] DMS ACC #: [id number]    Allergies: GABAPENTIN, TETANUS VACCINES AND TOXOID, TOPIRAMATE    Referring_Ordering Physician: Neel Barrera.  Guicho Walker MD  Interpreting Group: [address] [place] [address]  Interpreting Physician:  Frank Gaona MD  Technologist: Michael Arceo RDMS    IMPRESSIONS:  A clinically significant myopathic process is ruled out. The findings are   consistent with significant valve disease. The findings are consistent with significant right ventricular dysfunction. Features were consistent with pulmonary  hypertension. SUMMARY:  Procedure information: This was a technically difficult study. Intravenous   contrast (Definity) was administered to  opacify the left ventricle and enhance Doppler signals. Left ventricle: Size was normal. Systolic function was by visual assessment. Ejection fraction was estimated in the  range of 50 % to 55 %. No obvious wall motion abnormalities identified in the   views obtained. Wall thickness was mildly  to moderately increased. Right ventricle: The ventricle was moderately to severely dilated. Estimated   peak pressure was 87 mmHg. Right atrium: The atrium was moderately to severely dilated. Tricuspid valve: There was moderate to severe regurgitation. Right atrial   pressure estimate: 15 mmHg. There was severe  pulmonary hypertension. Pulmonic valve: There was severe regurgitation. Inferior vena cava, hepatic veins: The inferior vena cava was dilated. The   respirophasic change in diameter was less  than 50%. The proximal IVC diameter was 31 mm. COMPARISONS:  Comparison was made with the previous study of 27-Feb-2018. LV overall   function has not changed. Pulmonary artery  pressure has increased. INDICATIONS: Shortness of breath. HISTORY: Prior history: Atrial fibrillation. Risk factors: hypertension,   diabetes, and morbid obesity. PROCEDURE: This was a routine study. The study included limited 2D imaging,   M-mode, limited spectral Doppler, and color  Doppler. The heart rate was 115 bpm, at the start of the study. Systolic   blood pressure was 145 mmHg, at the start of  the study. Diastolic blood pressure was 88 mmHg, at the start of the study. Intravenous contrast (Definity) was  administered to opacify the left ventricle and enhance Doppler signals. Echocardiographic views were limited by poor  acoustic window availability due to body habitus. This was a technically   difficult study. LEFT VENTRICLE: Size was normal. Systolic function was by visual assessment. Ejection fraction was estimated in the  range of 50 % to 55 %. No obvious wall motion abnormalities identified in the   views obtained. Wall thickness was mildly  to moderately increased. VENTRICULAR SEPTUM: There was leftward deviation. These changes are   consistent with RV volume and pressure overload. RIGHT VENTRICLE: The ventricle was moderately to severely dilated. DOPPLER:   Estimated peak pressure was 87 mmHg. RIGHT ATRIUM: The atrium was moderately to severely dilated. TRICUSPID VALVE: Normal valve structure. There was normal leaflet separation.   DOPPLER: There was no evidence for  tricuspid stenosis. There was moderate to severe regurgitation. Right atrial   pressure estimate: 15 mmHg. There was  severe pulmonary hypertension. PULMONIC VALVE: DOPPLER: There was severe regurgitation. SYSTEMIC VEINS: IVC: The inferior vena cava was dilated. The respirophasic   change in diameter was less than 50%. MEASUREMENT TABLES    2D measurements  Systemic veins   (Reference normals)  Prox IVC diam   31 mm   (12-23)       XR Results (most recent):  Results from Hospital Encounter encounter on 04/20/19   XR ABD (KUB)    Narrative Supine Radiograph Of The Abdomen. CPT CODE: 74376    CLINICAL HISTORY: No bowel movement for 2 days. .    TECHNIQUE: Supine radiograph of the abdomen. COMPARISONS: Previous lumbar spine series 1/4/2017. FINDINGS:    Bowel gas pattern is normal. No dilated loops. No significant quantity of fecal  material at the colon. No organomegaly. No abnormal calcifications. Levoscoliosis lumbar spine.  Advanced degenerative changes at the right hip.      Impression IMPRESSION:    No acute abnormalities. No plain film evidence for constipation. ASSESSMENT and PLAN  Encounter Diagnoses   Name Primary?  Chronic respiratory failure with hypoxia and hypercapnia (HCC) Yes    Obesity hypoventilation syndrome (HCC)     ANDRADE (obstructive sleep apnea)     Morbid obesity with BMI of 50.0-59.9, adult (HCC)     Pulmonary HTN (HCC)     Chronic systolic heart failure (HCC)      Complex pt with multiple comorbidities resulting in repeated hospital admissions. She has severe ANDRADE/OHS difficult to treat as she failed CPAP titration also cannot tolerate BIPAP.]  Pt has resting hypoxemia, unfortunately was discharged on no supplemental O2. She desaturated to 84% at rest during this visit, see nurse note. Will resume O2 titrated to saturation greater than 92%. Pt will also need to be evaluated for a portable concentrator neena as she has multiple MD visits and tests requiring travel outside the house. Pt with pulmonary HTN likely WHO Grp 3 vs Grp 2. Will return in 4 months, will discuss possibility of BIPAP then again. Discussed plan with pt and daughter present in room.

## 2019-05-18 ENCOUNTER — HOSPITAL ENCOUNTER (INPATIENT)
Age: 59
LOS: 4 days | Discharge: HOME HEALTH | DRG: 194 | End: 2019-05-23
Attending: EMERGENCY MEDICINE | Admitting: HOSPITALIST
Payer: MEDICAID

## 2019-05-18 DIAGNOSIS — I50.9 ACUTE ON CHRONIC CONGESTIVE HEART FAILURE, UNSPECIFIED HEART FAILURE TYPE (HCC): Primary | ICD-10-CM

## 2019-05-18 PROCEDURE — 99285 EMERGENCY DEPT VISIT HI MDM: CPT

## 2019-05-18 PROCEDURE — 93005 ELECTROCARDIOGRAM TRACING: CPT

## 2019-05-19 ENCOUNTER — APPOINTMENT (OUTPATIENT)
Dept: GENERAL RADIOLOGY | Age: 59
DRG: 194 | End: 2019-05-19
Attending: EMERGENCY MEDICINE
Payer: MEDICAID

## 2019-05-19 LAB
ALBUMIN SERPL-MCNC: 3.4 G/DL (ref 3.4–5)
ALBUMIN/GLOB SERPL: 0.9 {RATIO} (ref 0.8–1.7)
ALP SERPL-CCNC: 187 U/L (ref 45–117)
ALT SERPL-CCNC: 20 U/L (ref 13–56)
ANION GAP SERPL CALC-SCNC: 3 MMOL/L (ref 3–18)
AST SERPL-CCNC: 21 U/L (ref 15–37)
BASOPHILS # BLD: 0 K/UL (ref 0–0.1)
BASOPHILS NFR BLD: 0 % (ref 0–2)
BILIRUB SERPL-MCNC: 2.1 MG/DL (ref 0.2–1)
BNP SERPL-MCNC: 6129 PG/ML (ref 0–900)
BUN SERPL-MCNC: 13 MG/DL (ref 7–18)
BUN/CREAT SERPL: 14 (ref 12–20)
CALCIUM SERPL-MCNC: 8.6 MG/DL (ref 8.5–10.1)
CHLORIDE SERPL-SCNC: 105 MMOL/L (ref 100–108)
CK MB CFR SERPL CALC: 2 % (ref 0–4)
CK MB SERPL-MCNC: 2.9 NG/ML (ref 5–25)
CK SERPL-CCNC: 147 U/L (ref 26–192)
CO2 SERPL-SCNC: 34 MMOL/L (ref 21–32)
CREAT SERPL-MCNC: 0.9 MG/DL (ref 0.6–1.3)
DIFFERENTIAL METHOD BLD: ABNORMAL
EOSINOPHIL # BLD: 0.1 K/UL (ref 0–0.4)
EOSINOPHIL NFR BLD: 3 % (ref 0–5)
ERYTHROCYTE [DISTWIDTH] IN BLOOD BY AUTOMATED COUNT: 16.9 % (ref 11.6–14.5)
GLOBULIN SER CALC-MCNC: 3.8 G/DL (ref 2–4)
GLUCOSE SERPL-MCNC: 107 MG/DL (ref 74–99)
HCT VFR BLD AUTO: 41.4 % (ref 35–45)
HGB BLD-MCNC: 12.2 G/DL (ref 12–16)
LYMPHOCYTES # BLD: 0.9 K/UL (ref 0.9–3.6)
LYMPHOCYTES NFR BLD: 26 % (ref 21–52)
MCH RBC QN AUTO: 24.3 PG (ref 24–34)
MCHC RBC AUTO-ENTMCNC: 29.5 G/DL (ref 31–37)
MCV RBC AUTO: 82.3 FL (ref 74–97)
MONOCYTES # BLD: 0.2 K/UL (ref 0.05–1.2)
MONOCYTES NFR BLD: 6 % (ref 3–10)
NEUTS SEG # BLD: 2.2 K/UL (ref 1.8–8)
NEUTS SEG NFR BLD: 65 % (ref 40–73)
PLATELET # BLD AUTO: 133 K/UL (ref 135–420)
PMV BLD AUTO: 9.6 FL (ref 9.2–11.8)
POTASSIUM SERPL-SCNC: 3.2 MMOL/L (ref 3.5–5.5)
POTASSIUM SERPL-SCNC: 3.5 MMOL/L (ref 3.5–5.5)
PROT SERPL-MCNC: 7.2 G/DL (ref 6.4–8.2)
RBC # BLD AUTO: 5.03 M/UL (ref 4.2–5.3)
SODIUM SERPL-SCNC: 142 MMOL/L (ref 136–145)
TROPONIN I SERPL-MCNC: <0.02 NG/ML (ref 0–0.04)
WBC # BLD AUTO: 3.4 K/UL (ref 4.6–13.2)

## 2019-05-19 PROCEDURE — 77030038269 HC DRN EXT URIN PURWCK BARD -A

## 2019-05-19 PROCEDURE — 65660000000 HC RM CCU STEPDOWN

## 2019-05-19 PROCEDURE — 82550 ASSAY OF CK (CPK): CPT

## 2019-05-19 PROCEDURE — 74011250636 HC RX REV CODE- 250/636: Performed by: EMERGENCY MEDICINE

## 2019-05-19 PROCEDURE — 74011250636 HC RX REV CODE- 250/636: Performed by: FAMILY MEDICINE

## 2019-05-19 PROCEDURE — 83880 ASSAY OF NATRIURETIC PEPTIDE: CPT

## 2019-05-19 PROCEDURE — 36415 COLL VENOUS BLD VENIPUNCTURE: CPT

## 2019-05-19 PROCEDURE — 77010033678 HC OXYGEN DAILY

## 2019-05-19 PROCEDURE — 74011250636 HC RX REV CODE- 250/636: Performed by: HOSPITALIST

## 2019-05-19 PROCEDURE — 84132 ASSAY OF SERUM POTASSIUM: CPT

## 2019-05-19 PROCEDURE — 74011250637 HC RX REV CODE- 250/637: Performed by: FAMILY MEDICINE

## 2019-05-19 PROCEDURE — 71045 X-RAY EXAM CHEST 1 VIEW: CPT

## 2019-05-19 PROCEDURE — 85025 COMPLETE CBC W/AUTO DIFF WBC: CPT

## 2019-05-19 PROCEDURE — 74011250637 HC RX REV CODE- 250/637: Performed by: HOSPITALIST

## 2019-05-19 RX ORDER — FUROSEMIDE 10 MG/ML
40 INJECTION INTRAMUSCULAR; INTRAVENOUS EVERY 12 HOURS
Status: DISCONTINUED | OUTPATIENT
Start: 2019-05-19 | End: 2019-05-22

## 2019-05-19 RX ORDER — POTASSIUM CHLORIDE 1.5 G/1.77G
40 POWDER, FOR SOLUTION ORAL
Status: COMPLETED | OUTPATIENT
Start: 2019-05-19 | End: 2019-05-19

## 2019-05-19 RX ORDER — AMOXICILLIN 250 MG
1 CAPSULE ORAL
Status: DISCONTINUED | OUTPATIENT
Start: 2019-05-19 | End: 2019-05-23 | Stop reason: HOSPADM

## 2019-05-19 RX ORDER — ARIPIPRAZOLE 15 MG/1
15 TABLET ORAL DAILY
Status: DISCONTINUED | OUTPATIENT
Start: 2019-05-19 | End: 2019-05-23 | Stop reason: HOSPADM

## 2019-05-19 RX ORDER — PAROXETINE HYDROCHLORIDE 20 MG/1
20 TABLET, FILM COATED ORAL DAILY
Status: DISCONTINUED | OUTPATIENT
Start: 2019-05-19 | End: 2019-05-23 | Stop reason: HOSPADM

## 2019-05-19 RX ORDER — IPRATROPIUM BROMIDE AND ALBUTEROL SULFATE 2.5; .5 MG/3ML; MG/3ML
3 SOLUTION RESPIRATORY (INHALATION)
Status: DISCONTINUED | OUTPATIENT
Start: 2019-05-19 | End: 2019-05-23 | Stop reason: HOSPADM

## 2019-05-19 RX ORDER — FAMOTIDINE 20 MG/1
20 TABLET, FILM COATED ORAL 2 TIMES DAILY
Status: DISCONTINUED | OUTPATIENT
Start: 2019-05-19 | End: 2019-05-23 | Stop reason: HOSPADM

## 2019-05-19 RX ORDER — POLYETHYLENE GLYCOL 3350 17 G/17G
17 POWDER, FOR SOLUTION ORAL DAILY
Status: DISCONTINUED | OUTPATIENT
Start: 2019-05-19 | End: 2019-05-23 | Stop reason: HOSPADM

## 2019-05-19 RX ORDER — FUROSEMIDE 10 MG/ML
60 INJECTION INTRAMUSCULAR; INTRAVENOUS
Status: COMPLETED | OUTPATIENT
Start: 2019-05-19 | End: 2019-05-19

## 2019-05-19 RX ORDER — KETOROLAC TROMETHAMINE 15 MG/ML
15 INJECTION, SOLUTION INTRAMUSCULAR; INTRAVENOUS
Status: COMPLETED | OUTPATIENT
Start: 2019-05-19 | End: 2019-05-19

## 2019-05-19 RX ORDER — OXYCODONE HYDROCHLORIDE 5 MG/1
5 TABLET ORAL
Status: DISCONTINUED | OUTPATIENT
Start: 2019-05-19 | End: 2019-05-23 | Stop reason: HOSPADM

## 2019-05-19 RX ORDER — LEVOTHYROXINE SODIUM 125 UG/1
125 TABLET ORAL
Status: DISCONTINUED | OUTPATIENT
Start: 2019-05-19 | End: 2019-05-23 | Stop reason: HOSPADM

## 2019-05-19 RX ORDER — SIMVASTATIN 40 MG/1
40 TABLET, FILM COATED ORAL
Status: DISCONTINUED | OUTPATIENT
Start: 2019-05-19 | End: 2019-05-23 | Stop reason: HOSPADM

## 2019-05-19 RX ORDER — METOPROLOL TARTRATE 25 MG/1
12.5 TABLET, FILM COATED ORAL 2 TIMES DAILY
Status: DISCONTINUED | OUTPATIENT
Start: 2019-05-19 | End: 2019-05-23 | Stop reason: HOSPADM

## 2019-05-19 RX ORDER — IPRATROPIUM BROMIDE AND ALBUTEROL SULFATE 2.5; .5 MG/3ML; MG/3ML
3 SOLUTION RESPIRATORY (INHALATION)
Status: DISCONTINUED | OUTPATIENT
Start: 2019-05-19 | End: 2019-05-19

## 2019-05-19 RX ORDER — FLUTICASONE PROPIONATE 50 MCG
2 SPRAY, SUSPENSION (ML) NASAL DAILY
Status: DISCONTINUED | OUTPATIENT
Start: 2019-05-19 | End: 2019-05-23 | Stop reason: HOSPADM

## 2019-05-19 RX ORDER — ISOSORBIDE MONONITRATE 30 MG/1
30 TABLET, EXTENDED RELEASE ORAL EVERY EVENING
Status: DISCONTINUED | OUTPATIENT
Start: 2019-05-19 | End: 2019-05-23 | Stop reason: HOSPADM

## 2019-05-19 RX ORDER — HYDRALAZINE HYDROCHLORIDE 20 MG/ML
10 INJECTION INTRAMUSCULAR; INTRAVENOUS
Status: DISCONTINUED | OUTPATIENT
Start: 2019-05-19 | End: 2019-05-23 | Stop reason: HOSPADM

## 2019-05-19 RX ADMIN — MULTIPLE VITAMINS W/ MINERALS TAB 1 TABLET: TAB at 09:03

## 2019-05-19 RX ADMIN — KETOROLAC TROMETHAMINE 15 MG: 15 INJECTION, SOLUTION INTRAMUSCULAR; INTRAVENOUS at 13:55

## 2019-05-19 RX ADMIN — LEVOTHYROXINE SODIUM 125 MCG: 125 TABLET ORAL at 09:03

## 2019-05-19 RX ADMIN — RIVAROXABAN 20 MG: 20 TABLET, FILM COATED ORAL at 09:16

## 2019-05-19 RX ADMIN — POTASSIUM CHLORIDE 40 MEQ: 1.5 POWDER, FOR SOLUTION ORAL at 13:55

## 2019-05-19 RX ADMIN — SENNOSIDES,DOCUSATE SODIUM 1 TABLET: 8.6; 5 TABLET, FILM COATED ORAL at 22:41

## 2019-05-19 RX ADMIN — FAMOTIDINE 20 MG: 20 TABLET ORAL at 17:49

## 2019-05-19 RX ADMIN — FUROSEMIDE 60 MG: 10 INJECTION, SOLUTION INTRAMUSCULAR; INTRAVENOUS at 03:25

## 2019-05-19 RX ADMIN — FUROSEMIDE 40 MG: 10 INJECTION, SOLUTION INTRAMUSCULAR; INTRAVENOUS at 09:03

## 2019-05-19 RX ADMIN — FLUTICASONE PROPIONATE 2 SPRAY: 50 SPRAY, METERED NASAL at 09:16

## 2019-05-19 RX ADMIN — PAROXETINE HYDROCHLORIDE 20 MG: 20 TABLET, FILM COATED ORAL at 09:03

## 2019-05-19 RX ADMIN — ISOSORBIDE MONONITRATE 30 MG: 30 TABLET, EXTENDED RELEASE ORAL at 17:49

## 2019-05-19 RX ADMIN — SIMVASTATIN 40 MG: 40 TABLET, FILM COATED ORAL at 22:41

## 2019-05-19 RX ADMIN — METOPROLOL TARTRATE 12.5 MG: 25 TABLET, FILM COATED ORAL at 17:49

## 2019-05-19 RX ADMIN — OXYCODONE HYDROCHLORIDE 5 MG: 5 TABLET ORAL at 17:53

## 2019-05-19 RX ADMIN — OXYCODONE HYDROCHLORIDE 5 MG: 5 TABLET ORAL at 22:41

## 2019-05-19 RX ADMIN — METOPROLOL TARTRATE 12.5 MG: 25 TABLET, FILM COATED ORAL at 09:07

## 2019-05-19 RX ADMIN — POTASSIUM CHLORIDE 40 MEQ: 1.5 POWDER, FOR SOLUTION ORAL at 17:53

## 2019-05-19 RX ADMIN — HYDRALAZINE HYDROCHLORIDE 10 MG: 20 INJECTION, SOLUTION INTRAMUSCULAR; INTRAVENOUS at 04:13

## 2019-05-19 RX ADMIN — FUROSEMIDE 40 MG: 10 INJECTION, SOLUTION INTRAMUSCULAR; INTRAVENOUS at 22:42

## 2019-05-19 RX ADMIN — FAMOTIDINE 20 MG: 20 TABLET ORAL at 09:03

## 2019-05-19 RX ADMIN — OXYCODONE HYDROCHLORIDE 5 MG: 5 TABLET ORAL at 09:16

## 2019-05-19 NOTE — PROGRESS NOTES
Te[facility] ADULT PROTOCOL: JET AEROSOL ASSESSMENT Patient  Brandi Clements     60 y.o.   female     5/19/2019  5:15 PM 
 
Breath Sounds Pre Procedure:    Decreased Breath Sounds Post Procedure:    
                          
              
 
Breathing pattern: Pre procedure   Normal 
        Post procedure Cough: Pre procedure Post procedure Heart Rate: Pre procedure Post procedure Resp Rate: Pre procedure Post procedure Nebulizer Therapy: Current medications Problem List:  
Patient Active Problem List  
Diagnosis Code  Hypertension I11.9  Congenital heart disease Q24.9  Atrial fibrillation I48.91  
 Osteoarthritis of both knees M17.0  
 H/O total knee replacement Z96.659  DJD (degenerative joint disease), lumbosacral M51.37  
 Hypothyroidism E03.9  Morbid obesity ([address] E66.01  
 Esophageal reflux K21.9  Dyspnea R06.00  Dyslipidemia E78.5  Morbid obesity with BMI of 45.0-49.9, adult ([facility]) E66.01, Z68.42  
 DDD (degenerative disc disease), lumbar, L4/5 advanced M51.36  
 Hypoxia R09.02  
 Fluid overload E87.70  
 CHF (congestive heart failure) ([facility]) I50.9  Diastolic CHF, chronic ([facility]) I50.32  Bradycardia R00.1  Acute exacerbation of CHF (congestive heart failure) ([facility]) I50.9  Chronic anticoagulation Z79.01  
 Anxiety F41.9  CHF exacerbation ([facility]) I50.9  Severe osteoarthritis right hip M16.11  
 Pulmonary hypertension ([facility]) I27.20 Patient alert and cooperative to use MDI: Yes 
 
Home Respiratory Therapy Regimen/Frequency:  YES Medication Proventil Device MDI Nebulizer Frequency Q4 PRN SEVERITY INDEX: 
 
ITEM 0 1 2 3 4 Score Respiratory Pattern and or Rate Regular 10-19 Regular 20-24  
24-30   
30-34 Severe SOB or  
Greater than 35 0 Breath Sounds Clear Occasional Wheeze Mild Wheezing Moderate Wheezing  wheezing/Absent breath sounds 1 Shortness of Breath None Dyspnea on Exertion Dyspnea at Rest Moderate Shortness of Breath at Rest Severe Shortness of Breath - Limited Speech 1 Total Score:  2 * Scoring Guidelines 0-4 pts:  PRN-BID  
5-7 pts:  BID, TID, QID 
8-9 pts:  TID, QID, Q6 
10-12 pts:  Q4-Q6 * - Guidelines used with clinical judgement. PRN Treatments can be ordered to supplement scheduled treatments. Regardless of score, frequency should not be less than normal home regimen. Recommended Order/Frequency:  Nebulizer Q4 PRN Comments:   
 
 
 
Respiratory Therapist: Joe Escalante, RT

## 2019-05-19 NOTE — PROGRESS NOTES
conducted an initial consultation and Spiritual Assessment for Heather, who is a 60 y.o.,female. Patients Primary Language is: [place]. According to the patients EMR Zoroastrian Affiliation is: [facility].  
 
The reason the Patient came to the hospital is:  
Patient Active Problem List  
 Diagnosis Date Noted  Osteoarthritis of both knees 01/14/2014 Priority: 1 - One  
 H/O total knee replacement 01/14/2014 Priority: 1 - One  
 DJD (degenerative joint disease), lumbosacral 01/14/2014 Priority: 1 - One  
 Hypothyroidism 01/14/2014 Priority: 1 - One  Morbid obesity ([address] 01/14/2014 Priority: 1 - One  Esophageal reflux 01/14/2014 Priority: 1 - One  Pulmonary hypertension ([address] 11/14/2018  Severe osteoarthritis right hip 10/02/2018  CHF exacerbation ([address] 09/29/2018  Anxiety 08/30/2018  Acute exacerbation of CHF (congestive heart failure) ([address] 06/02/2018  Chronic anticoagulation 06/02/2018  Bradycardia 05/03/2018  Diastolic CHF, chronic ([address] 10/12/2017  Hypoxia 04/18/2017  Fluid overload 04/18/2017  CHF (congestive heart failure) ([address] 04/18/2017  DDD (degenerative disc disease), lumbar, L4/5 advanced 03/29/2017  Morbid obesity with BMI of 45.0-49.9, adult ([address] 02/20/2017  Dyspnea  Dyslipidemia  Hypertension  Congenital heart disease  Atrial fibrillation The  provided the following Interventions: 
Initiated a relationship of care and support. Provided information about Spiritual Care Services. Chart reviewed. Assessment: 
Patient does not have any Bahai/cultural needs that will affect patients preferences in health care. There are no spiritual or Bahai issues which require intervention at this time. Plan: 
Chaplains will continue to follow and will provide pastoral care on an as needed/requested basis.  recommends bedside caregivers page  on duty if patient shows signs of acute spiritual or emotional distress. [phone] [facility] 
([phone])

## 2019-05-19 NOTE — ROUTINE PROCESS
Bedside and Verbal shift change report given to julien Freire (oncoming nurse) by Samy Cottrell (offgoing nurse). Report included the following information SBAR, Kardex, ED Summary, Procedure Summary, Intake/Output, MAR, Recent Results and Cardiac Rhythm a Fib.

## 2019-05-19 NOTE — ROUTINE PROCESS
Bedside shift change report given to DELMI Cali (oncoming nurse) by Herman Stallworth RN (offgoing nurse). Report included the following information SBAR, Kardex, Intake/Output, Recent Results and Cardiac Rhythm A.fib.

## 2019-05-19 NOTE — ED NOTES
TRANSFER - OUT REPORT: 
 
Verbal report given to Jemal Cisneros (name) on Richie Shay  being transferred to [address] [place] [address](unit) for routine progression of care Report consisted of patients Situation, Background, Assessment and  
Recommendations(SBAR). Information from the following report(s) SBAR, ED Summary, [facility] and Recent Results was reviewed with the receiving nurse. Lines:  
Peripheral IV 05/19/19 Right Hand (Active) Site Assessment Clean, dry, & intact 5/19/2019 12:33 AM  
Phlebitis Assessment 0 5/19/2019 12:33 AM  
Infiltration Assessment 0 5/19/2019 12:33 AM  
Dressing Status Clean, dry, & intact 5/19/2019 12:33 AM  
Dressing Type Transparent 5/19/2019 12:33 AM  
Hub Color/Line Status Patent; Flushed;Blue 5/19/2019 12:33 AM  
Action Taken Blood drawn 5/19/2019 12:33 AM  
  
 
Opportunity for questions and clarification was provided. Patient transported with:belongings

## 2019-05-19 NOTE — H&P
History & Physical 
Patient: Angelo Nvaa MRN: [id number]  CSN: [id number] YOB: 1960  Age: 60 y.o. Sex: female DOA: 5/18/2019 Chief Complaint: SOB  
    
HPI:  
 60 y.o. female with ANDRADE, pulmonary hypertension, chronic systolic heart failure, A. fib  Presents to the ED of [facility]   shortness of breath. Patient admits to dietary indiscretion with increased salt intake for the past several weeks. About a week ago she noticed increasing shortness of breath and bilateral leg swelling. She also has chest pain with palpitations, as well as dyspnea with exertion. No fever. No chills. No N/V. She does not have any chest pain right now. No dizziness. She complains of having cough with blackish brownish sputum but no hemoptysis. No sore throat. In the ED of [facility] it was found the she has acute on chronic CHF. Her Echo on 07/2018 showed EF of 50-55%. She is on 3 lit NC oxygen at home. Past Medical History:  
Diagnosis Date  Atrial fibrillation CHADS score 1  (-CHF, +HTN, -AGE, -DM, -CVA)  Carpal tunnel syndrome  Chronic pain  Congenital heart disease   
 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   
 H/O echocardiogram 04/2017 EF 60-65%, mild concentric LVH, dilated RV with RV dysfunction, RVSP 54 mmHg, RA E, moderate to severe pulmonic regurgitation.  Hypercholesterolemia  Hypertension  Hypothyroid  Morbid obesity  Morbid obesity with BMI of 45.0-49.9, adult ([address] 2/20/2017  Noncompliance  Schizoaffective disorder  Substance abuse (HCC)   
 marijuana, crack cocaine Past Surgical History:  
Procedure Laterality Date [address] VSD repair  COLONOSCOPY N/A 5/6/2018 COLONOSCOPY with tatooing and biopsy performed by Sherice Bell MD at [address] [place] [address] left  
 HX HYSTERECTOMY  HX KNEE REPLACEMENT    
 left  HX KNEE REPLACEMENT    
 right  HX ORTHOPAEDIC    
 left ankle  HX ORTHOPAEDIC    
 carpel tunnel right and left  HX THYROIDECTOMY Left Family History Problem Relation Age of Onset  Hypertension Mother  Coronary Artery Disease Mother  Coronary Artery Disease Father  Hypertension Father Social History Socioeconomic History  Marital status: SINGLE Spouse name: Not on file  Number of children: Not on file  Years of education: Not on file  Highest education level: Not on file Tobacco Use  Smoking status: Never Smoker  Smokeless tobacco: Never Used Substance and Sexual Activity  Alcohol use: No  
 Drug use: No  
  Comment: Quit crack cocaine and marijuana 2015  Sexual activity: Yes Birth control/protection: Surgical  
 
 
Prior to Admission medications Medication Sig Start Date End Date Taking? Authorizing Provider XARELTO 20 mg tab tablet  1/16/19   Provider, Historical  
warfarin (COUMADIN) 7.5 mg tablet Take 7.5 mg by mouth daily. Provider, Historical  
oxyCODONE IR (ROXICODONE) 5 mg immediate release tablet Take 1 Tab by mouth every six (6) hours as needed for Pain. Max Daily Amount: 20 mg. 10/4/18   Eileen Cardona MD  
senna-docusate (PERICOLACE) 8.6-50 mg per tablet Take 1 Tab by mouth nightly. 8/29/18   Zelda Urena NP  
albuterol-ipratropium (DUO-NEB) 2.5 mg-0.5 mg/3 ml nebu 3 mL by Nebulization route every six (6) hours as needed. 8/24/18   Cristal Rosales PA-C  
furosemide (LASIX) 40 mg tablet Take 1 Tab by mouth two (2) times a day. 40 mg po daily 6/6/18   Christiane Meigs, MD  
metoprolol tartrate (LOPRESSOR) 25 mg tablet Take 0.5 Tabs by mouth two (2) times a day. 5/17/18   Deb Walsh, NP  
fluticasone (FLONASE) 50 mcg/actuation nasal spray 2 Sprays by Both Nostrils route daily.  5/10/18   Christiane Meigs, MD  
 isosorbide mononitrate ER (IMDUR) 30 mg tablet Take 1 Tab by mouth every evening. 4/24/18   Caro Walsh NP  
multivitamin, tx-iron-ca-min (THERA-M W/ IRON) 9 mg iron-400 mcg tab tablet Take 1 Tab by mouth daily. 3/1/18   Gissel Dalton MD  
raNITIdine (ZANTAC) 150 mg tablet Take 150 mg by mouth two (2) times a day. Provider, Historical  
VENTOLIN HFA 90 mcg/actuation inhaler Take 2 Puffs by inhalation every four (4) hours as needed for Wheezing or Shortness of Breath. 4/26/17   Gissel Dalton MD  
PARoxetine (PAXIL) 20 mg tablet Take 1 Tab by mouth daily. 1/21/14   Chikis Squires MD  
simvastatin (ZOCOR) 40 mg tablet Take  by mouth nightly. to obtain from pharmacy    Provider, Historical  
aripiprazole (ABILIFY) 15 mg tablet Take 15 mg by mouth daily. to obtain from pharmacy    Provider, Historical  
levothyroxine (SYNTHROID) 125 mcg tablet Take 125 mcg by mouth Daily (before breakfast). Provider, Historical  
 
 
Allergies Allergen Reactions  Gabapentin Other (comments) Unsteady, weakness LEs  Tetanus Vaccines And Toxoid Swelling  Topamax [Topiramate] Other (comments)  
  weakness Review of Systems Pertinent positive as noted in HPI. All other systems reviewed and are negative. Physical Exam:  
 
Physical Exam: 
Visit Vitals BP (!) 162/95 Pulse 67 Temp 98.4 °F (36.9 °C) Resp 25 Wt 148.3 kg (327 lb) SpO2 98% BMI 63.86 kg/m² O2 Flow Rate (L/min): 2 l/min O2 Device: Nasal cannula General:  Patient is awake,  cooperative, no distress, appears stated age. Head: Normocephalic, atraumatic, without obvious abnormality. Eyes:  Conjunctivae/corneas clear. PERRL, EOMs intact. Nose: Nares normal. No drainage or sinus tenderness. Neck: Supple, symmetrical, trachea midline, no adenopathy, thyroid: no enlargement, no carotid bruit and no JVD. Lungs:   Diminished to auscultation bilaterally. + rales. No Wheezing. Heart:  Regular rate and rhythm, S1, S2 normal.  
  Abdomen: Soft, non-tender. Bowel sounds normal. No organomegaly. Extremities:  Positive 3 +  Edema bilaterally. Pulses: 2+ and symmetric all extremities. Skin:  No rashes , Ulcer or lesions Neurologic: AAOx3, No focal motor or sensory deficit. Labs Reviewed and discussed with patient and patient's Family. Procedures/imaging:  
See electronic medical records for all procedures/Xrays and details which were not copied into this note but were reviewed prior to creation of Plan Assessment/Plan #  Acute Diastolic CHF Exacerbation: ? Admit to telemetry. ? Monitor daily weight and strict I/O. 
? IV diuretic. Sodium and Fluid restriction. Monitor renal function and electrolytes. ? Obtains 2 Echo. Cardiology Consult. Discussed with Iza MARTIN. Monitor trend of cardiac enzymes. Check TSH. #  Essential Hypertension:  
    Continue her home medication and cover with IV           hydralazine PRN. # Hypothyroidism: Continue Levothyroxine. # CAD : Continue Imdur. # Anxiety/Depression: Continue Paxel. # GERD: Continue Zantac # Atrial Fibrillation: Continue metoprolol and Xarelto. # DVT prophylaxis: Xarelto. Further evaluation and treatment per patient's attending, Consultants recommendation, test results and patient's clinical course. Plan of the care was discussed with patient and she is  in agreement. Patient is Full Code.  
 
Medhat Eaton MD 
5/19/2019 3:44 AM

## 2019-05-19 NOTE — CONSULTS
[facility] - Consult Note    Date of  Admission: 5/18/2019 11:33 PM   Primary Care Physician: Candice Pereyra MD     Assessment:     Patient Active Problem List   Diagnosis Code    Hypertension I11.9    Congenital heart disease Q24.9    Atrial fibrillation I48.91    Osteoarthritis of both knees M17.0    H/O total knee replacement Z96.659    DJD (degenerative joint disease), lumbosacral M51.37    Hypothyroidism E03.9    Morbid obesity ([facility] E66.01    Esophageal reflux K21.9    Dyspnea R06.00    Dyslipidemia E78.5    Morbid obesity with BMI of 45.0-49.9, adult ([facility] E66.01, Z68.42    DDD (degenerative disc disease), lumbar, L4/5 advanced M51.36    Hypoxia R09.02    Fluid overload E87.70    CHF (congestive heart failure) ([facility]) S26.7    Diastolic CHF, chronic ([facility]) I50.32    Bradycardia R00.1    Acute exacerbation of CHF (congestive heart failure) ([facility]) I50.9    Chronic anticoagulation Z79.01    Anxiety F41.9    CHF exacerbation ([facility]) I50.9    Severe osteoarthritis right hip M16.11    Pulmonary hypertension ([facility]) I27.20       -Acute on chronic diastolic heart failure  -Chronic atrial fibrillation, on Xarelto  -Echo 05/2018: EF 50-55%, mildly to moderately increased LV wall thickness, RV moderately to severely dilated with est. Peak pressure 87 mmHg, moderately to severely dilated RA, moderate to severe tricuspid regurgitation, severe pulmonic insufficiency. -Severe pulmonary hypertension, RV dysfunction with moderate to severe pulmonary insufficiency and moderate pulmonary pressure elevation.  -Hx congenital heart defect repaired as child  -HTN  -Dyslipidemia, on Simvastatin  -ANDRADE  -OHS  -Morbid obesity  - Hx substance abuse  - Hx psychiatric illness     Plan:     - IV Lasix ordered for diuresis, strict I&O's, daily weights and follow renal function  - Patient is on Xarelto for anticoagulation  - CHF education discussed with patient     History of Present Illness:      This is a 60 y.o. female admitted for Acute exacerbation of CHF (congestive heart failure) ([facility]) [I50.9]  CHF (congestive heart failure) ([facility] [I50.9]. Patient complains of:  Shortness of breath, leg swelling    This is a 60year old female with a history as outlined above that cardiology is seeing in consult due to CHF. She complains of progressive dyspnea which is present at rest. She also complains of worsening LE edema. +orthopnea and PND. She is on home O2, 2L. She reports compliance with her medications, states that Lasix does still make her urinate. Denies chest pain, syncope or near syncope. Cardiac risk factors: dyslipidemia, diabetes mellitus, obesity, sedentary life style, hypertension, post-menopausal      Review of Symptoms:  Except as stated above include:  Constitutional:  negative  Respiratory:  +dyspnea  Cardiovascular: per HPI  Gastrointestinal: negative  Genitourinary:  negative  Musculoskeletal:  Negative  Neurological:  Negative  Dermatological:  Negative  Endocrinological: Negative  Psychological:  Negative    A comprehensive review of systems was negative except for that written in the HPI. 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     Hypercholesterolemia     Hypertension     Hypothyroid     Morbid obesity     Morbid obesity with BMI of 45.0-49.9, adult ([facility] 2/20/2017    Noncompliance     Schizoaffective disorder     Substance abuse ([facility]     marijuana, crack cocaine         Social History: Social History     Socioeconomic History    Marital status: SINGLE     Spouse name: Not on file    Number of children: Not on file    Years of education: Not on file    Highest education level: Not on file   Tobacco Use    Smoking status: Never Smoker    Smokeless tobacco: Never Used   Substance and Sexual Activity    Alcohol use: No    Drug use: No     Comment: Quit crack cocaine and marijuana 2015    Sexual activity: Yes     Birth control/protection: Surgical        Family History:     Family History   Problem Relation Age of Onset    Hypertension Mother     Coronary Artery Disease Mother     Coronary Artery Disease Father     Hypertension Father         Medications:      Allergies   Allergen Reactions    Gabapentin Other (comments)     Unsteady, weakness LEs    Tetanus Vaccines And Toxoid Swelling    Topamax [Topiramate] Other (comments)     weakness        Current Facility-Administered Medications   Medication Dose Route Frequency    polyethylene glycol (MIRALAX) packet 17 g  17 g Oral DAILY    ARIPiprazole (ABILIFY) tablet 15 mg  15 mg Oral DAILY    fluticasone propionate (FLONASE) 50 mcg/actuation nasal spray 2 Spray  2 Spray Both Nostrils DAILY    isosorbide mononitrate ER (IMDUR) tablet 30 mg  30 mg Oral QPM    levothyroxine (SYNTHROID) tablet 125 mcg  125 mcg Oral 6am    metoprolol tartrate (LOPRESSOR) tablet 12.5 mg  12.5 mg Oral BID    multivitamin, tx-iron-ca-min (THERA-M w/ IRON) tablet 1 Tab  1 Tab Oral DAILY    oxyCODONE IR (ROXICODONE) tablet 5 mg  5 mg Oral Q6H PRN    PARoxetine (PAXIL) tablet 20 mg  20 mg Oral DAILY    famotidine (PEPCID) tablet 20 mg  20 mg Oral BID    senna-docusate (PERICOLACE) 8.6-50 mg per tablet 1 Tab  1 Tab Oral QHS    simvastatin (ZOCOR) tablet 40 mg  40 mg Oral QHS    rivaroxaban (XARELTO) tablet 20 mg  20 mg Oral DAILY    hydrALAZINE (APRESOLINE) 20 mg/mL injection 10 mg  10 mg IntraVENous Q6H PRN    furosemide (LASIX) injection 40 mg  40 mg IntraVENous Q12H    albuterol-ipratropium (DUO-NEB) 2.5 MG-0.5 MG/3 ML  3 mL Nebulization Q4H PRN         Physical Exam:     Visit Vitals  /88 (BP 1 Location: Left arm, BP Patient Position: At rest)   Pulse 61   Temp 97.6 °F (36.4 °C)   Resp 22   Wt 327 lb (148.3 kg)   SpO2 96%   BMI 63.86 kg/m²     BP Readings from Last 3 Encounters:   05/19/19 121/88   05/10/19 140/80   04/20/19 155/87     Pulse Readings from Last 3 Encounters:   05/19/19 61   05/10/19 72   04/20/19 80     Wt Readings from Last 3 Encounters:   05/19/19 327 lb (148.3 kg)   05/10/19 310 lb (140.6 kg)   11/14/18 299 lb (135.6 kg)       General:  alert, cooperative, no distress  Neck:  nontender, no JVD  Lungs:  Decreased breath sounds  Heart:  irregularly irregular rhythm  Abdomen:  abdomen is soft without significant tenderness, masses, organomegaly or guarding  Extremities:  edema 1-2+ bilateral LE's  Skin: Warm and dry.  no hyperpigmentation, vitiligo, or suspicious lesions  Neuro: alert, oriented x3, affect appropriate  Psych: non focal     Data Review:     Recent Labs     05/19/19  0033   WBC 3.4*   HGB 12.2   HCT 41.4   *     Recent Labs     05/19/19  0033      K 3.5      CO2 34*   *   BUN 13   CREA 0.90   CA 8.6   ALB 3.4   SGOT 21   ALT 20       Results for orders placed or performed during the hospital encounter of 05/18/19   EKG, 12 LEAD, INITIAL   Result Value Ref Range    Ventricular Rate 71 BPM    Atrial Rate 68 BPM    QRS Duration 128 ms    Q-T Interval 494 ms    QTC Calculation (Bezet) 536 ms    Calculated R Axis -53 degrees    Calculated T Axis -57 degrees    Diagnosis       Atrial fibrillation with premature ventricular or aberrantly conducted   complexes  Left axis deviation  Nonspecific intraventricular block  Cannot rule out Anterior infarct , age undetermined  T wave abnormality, consider inferior ischemia  Abnormal ECG  When compared with ECG of 28-SEP-2018 20:35,  T wave inversion now evident in Inferior leads     Results for orders placed or performed in visit on 11/14/18   AMB POC EKG ROUTINE W/ 12 LEADS, INTER & REP    Impression    See progress note. All Cardiac Markers in the last 24 hours:    Lab Results   Component Value Date/Time     05/19/2019 12:33 AM    CKMB 2.9 05/19/2019 12:33 AM    CKND1 2.0 05/19/2019 12:33 AM    TROIQ <0.02 05/19/2019 12:33 AM       Last Lipid:    Lab Results   Component Value Date/Time    Cholesterol, total 104 09/30/2018 04:17 AM    HDL Cholesterol 31 (L) 09/30/2018 04:17 AM    LDL, calculated 54 09/30/2018 04:17 AM    Triglyceride 95 09/30/2018 04:17 AM    CHOL/HDL Ratio 3.4 09/30/2018 04:17 AM       Signed By: Laurent Stuart.  Jessica Ta     May 19, 2019

## 2019-05-19 NOTE — ED PROVIDER NOTES
EMERGENCY DEPARTMENT HISTORY AND PHYSICAL EXAM 
 
3:11 AM 
Date: 5/18/2019 Patient Name: Rock Callahan History of Presenting Illness Chief Complaint Patient presents with  Chest Pain History Provided By: Patient HPI: Rock Callahan is a 60 y.o. female with ANDRADE, pulmonary hypertension, chronic systolic heart failure, A. fib here for chest pain shortness of breath. Patient admits to dietary indiscretion with increased salt intake for the past several weeks. About a week ago she noticed increasing shortness of breath and bilateral leg swelling. She also has chest pain with palpitations, as well as dyspnea with exertion. Location: Cardiopulmonary Severity: Moderate Timing/course: Progressive Onset/Duration: Week PCP: Mir Stubbs MD 
 
Past History Past Medical History: 
Past Medical History:  
Diagnosis Date  Atrial fibrillation CHADS score 1  (-CHF, +HTN, -AGE, -DM, -CVA)  Carpal tunnel syndrome  Chronic pain  Congenital heart disease   
 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   
 H/O echocardiogram 04/2017 EF 60-65%, mild concentric LVH, dilated RV with RV dysfunction, RVSP 54 mmHg, RA E, moderate to severe pulmonic regurgitation.  Hypercholesterolemia  Hypertension  Hypothyroid  Morbid obesity  Morbid obesity with BMI of 45.0-49.9, adult ([address] 2/20/2017  Noncompliance  Schizoaffective disorder  Substance abuse (HCC)   
 marijuana, crack cocaine Past Surgical History: 
Past Surgical History:  
Procedure Laterality Date [address] [place] VSD repair  COLONOSCOPY N/A 5/6/2018 COLONOSCOPY with tatooing and biopsy performed by Israel Montoya MD at [address] [place] [address],[address]    
 left  HX HYSTERECTOMY  HX KNEE REPLACEMENT    
 left  HX KNEE REPLACEMENT    
 right  HX ORTHOPAEDIC    
 left ankle  HX ORTHOPAEDIC    
 carpel tunnel right and left  HX THYROIDECTOMY Left Family History: 
Family History Problem Relation Age of Onset  Hypertension Mother  Coronary Artery Disease Mother  Coronary Artery Disease Father  Hypertension Father Social History: 
Social History Tobacco Use  Smoking status: Never Smoker  Smokeless tobacco: Never Used Substance Use Topics  Alcohol use: No  
 Drug use: No  
  Comment: Quit crack cocaine and marijuana 2015 Allergies: Allergies Allergen Reactions  Gabapentin Other (comments) Unsteady, weakness LEs  Tetanus Vaccines And Toxoid Swelling  Topamax [Topiramate] Other (comments)  
  weakness Review of Systems Constitutional: No fevers. Respiratory: Dyspnea on exertion Cardiovascular: Chest pain palpitations Gastrointestinal: No nausea, vomiting, diarrhea. Constipation. Genitourinary: No dysuria, frequency, or urgency. Musculoskeletal: No joint pain or swelling. Integumentary: No rashes. Neurological: No headaches, sensory or motor symptoms. All other systems negative. Physical Exam  
 
Patient Vitals for the past 12 hrs: 
 Temp Pulse Resp BP SpO2  
05/19/19 0300  67 25 (!) 162/95 98 % 05/19/19 0245  69 29 165/82 96 % 05/19/19 0145  66 22 (!) 183/92 98 % 05/19/19 0030  68 17 (!) 176/93 100 % 05/19/19 0015  64 21 (!) 167/97 97 % 05/19/19 0007 98.4 °F (36.9 °C)     Physical Exam  
Constitutional: Appears well-developed. Is not diaphoretic. Eyes: Conjunctiva clear, EOMI Head: Normocephalic and atraumatic. Neck: Normal range of motion. No stridor or tracheal deviation. Cardiovascular: Intact distal pulses. Pulmonary/Chest: Effort normal and no respiratory distress. Abdominal: Pannus. Nontender. Musculoskeletal: Normal range of motion. Exhibits no tenderness.   2+ pitting edema up to both thighs Neurological: Conversant, alert. Skin: Skin is warm and dry. Psychiatric: Has a normal mood and affect. Behavior is normal.  
Nursing note and vitals reviewed. Diagnostic Study Results Labs - Recent Results (from the past 12 hour(s)) EKG, 12 LEAD, INITIAL Collection Time: 05/18/19 11:53 PM  
Result Value Ref Range Ventricular Rate 71 BPM  
 Atrial Rate 68 BPM  
 QRS Duration 128 ms Q-T Interval 494 ms QTC Calculation (Bezet) 536 ms Calculated R Axis -53 degrees Calculated T Axis -57 degrees Diagnosis Atrial fibrillation with premature ventricular or aberrantly conducted  
complexes Left axis deviation Nonspecific intraventricular block Cannot rule out Anterior infarct , age undetermined T wave abnormality, consider inferior ischemia Abnormal ECG When compared with ECG of 28-SEP-2018 20:35, 
T wave inversion now evident in Inferior leads METABOLIC PANEL, COMPREHENSIVE Collection Time: 05/19/19 12:33 AM  
Result Value Ref Range Sodium 142 136 - 145 mmol/L Potassium 3.5 3.5 - 5.5 mmol/L Chloride 105 100 - 108 mmol/L  
 CO2 34 (H) 21 - 32 mmol/L Anion gap 3 3.0 - 18 mmol/L Glucose 107 (H) 74 - 99 mg/dL BUN 13 7.0 - 18 MG/DL Creatinine 0.90 0.6 - 1.3 MG/DL  
 BUN/Creatinine ratio 14 12 - 20 GFR est AA >60 >60 ml/min/1.73m2 GFR est non-AA >60 >60 ml/min/1.73m2 Calcium 8.6 8.5 - 10.1 MG/DL Bilirubin, total 2.1 (H) 0.2 - 1.0 MG/DL  
 ALT (SGPT) 20 13 - 56 U/L  
 AST (SGOT) 21 15 - 37 U/L Alk. phosphatase 187 (H) 45 - 117 U/L Protein, total 7.2 6.4 - 8.2 g/dL Albumin 3.4 3.4 - 5.0 g/dL Globulin 3.8 2.0 - 4.0 g/dL A-G Ratio 0.9 0.8 - 1.7 NT-PRO BNP Collection Time: 05/19/19 12:33 AM  
Result Value Ref Range NT pro-BNP 6,129 (H) 0 - 900 PG/ML  
CBC WITH AUTOMATED DIFF Collection Time: 05/19/19 12:33 AM  
Result Value Ref Range WBC 3.4 (L) 4.6 - 13.2 K/uL RBC 5.03 4.20 - 5.30 M/uL  
 HGB 12.2 12.0 - 16.0 g/dL HCT 41.4 35.0 - 45.0 % MCV 82.3 74.0 - 97.0 FL  
 MCH 24.3 24.0 - 34.0 PG  
 MCHC 29.5 (L) 31.0 - 37.0 g/dL  
 RDW 16.9 (H) 11.6 - 14.5 % PLATELET 044 (L) 551 - 420 K/uL MPV 9.6 9.2 - 11.8 FL  
 NEUTROPHILS 65 40 - 73 % LYMPHOCYTES 26 21 - 52 % MONOCYTES 6 3 - 10 % EOSINOPHILS 3 0 - 5 % BASOPHILS 0 0 - 2 %  
 ABS. NEUTROPHILS 2.2 1.8 - 8.0 K/UL  
 ABS. LYMPHOCYTES 0.9 0.9 - 3.6 K/UL  
 ABS. MONOCYTES 0.2 0.05 - 1.2 K/UL  
 ABS. EOSINOPHILS 0.1 0.0 - 0.4 K/UL  
 ABS. BASOPHILS 0.0 0.0 - 0.1 K/UL  
 DF AUTOMATED CARDIAC PANEL,(CK, CKMB & TROPONIN) Collection Time: 05/19/19 12:33 AM  
Result Value Ref Range  26 - 192 U/L  
 CK - MB 2.9 <3.6 ng/ml CK-MB Index 2.0 0.0 - 4.0 % Troponin-I, QT <0.02 0.0 - 0.045 NG/ML Radiologic Studies - No results found. Medical Decision Making ED Course: Progress Notes, Reevaluation, and Consults: 
 
2:11 AM Initial assessment performed. The patients presenting problems have been discussed, and they/their family are in agreement with the care plan formulated and outlined with them. I have encouraged them to ask questions as they arise throughout their visit. 3:15 AM- spoke with Dr. Tony Lloyd of hospitalist team who will admit pt for further diuresis. Provider Notes (Medical Decision Making): Based on my history, physical exam, and diagnostic evaluation, the patient appears to have symptoms consistent with an acute on chronic CHF exacerbation. Chest xray shows increased vascular markings and evidence of pulmonary overload. The EKG was non diagnostic and laboratory data reveals elevated BNP but normal troponin. Pt was given an IV dose of diuretics here in the ED. On re-exam pt has an improved respiratory pattern. They will be admitted for further diuresis. The case was discussed with the admitting attending Vital Signs-Reviewed the patient's vital signs. Reviewed pt's pulse ox reading. EKG: Interpreted by the EP. Time Interpreted: 7743 Rate: 71 Rhythm: Atrial fibrillation Interpretation: Frequent PVCs, left axis, nonspecific STs,  Records Reviewed: Nursing Notes and Old Medical Records (Time of Review: 3:11 AM) 
-I am the first provider for this patient. 
-I reviewed the vital signs, available nursing notes, past medical history, past surgical history, family history and social history. Current Facility-Administered Medications Medication Dose Route Frequency Provider Last Rate Last Dose  furosemide (LASIX) injection 60 mg  60 mg IntraVENous NOW Shen Marie MD      
 polyethylene glycol (MIRALAX) packet 17 g  17 g Oral DAILY Shen Marie MD      
 
Current Outpatient Medications Medication Sig Dispense Refill  XARELTO 20 mg tab tablet  warfarin (COUMADIN) 7.5 mg tablet Take 7.5 mg by mouth daily.  oxyCODONE IR (ROXICODONE) 5 mg immediate release tablet Take 1 Tab by mouth every six (6) hours as needed for Pain. Max Daily Amount: 20 mg. 6 Tab 0  
 senna-docusate (PERICOLACE) 8.6-50 mg per tablet Take 1 Tab by mouth nightly. 30 Tab 0  
 albuterol-ipratropium (DUO-NEB) 2.5 mg-0.5 mg/3 ml nebu 3 mL by Nebulization route every six (6) hours as needed. 30 Nebule 0  
 furosemide (LASIX) 40 mg tablet Take 1 Tab by mouth two (2) times a day. 40 mg po daily 60 Tab 2  
 metoprolol tartrate (LOPRESSOR) 25 mg tablet Take 0.5 Tabs by mouth two (2) times a day. 30 Tab 6  fluticasone (FLONASE) 50 mcg/actuation nasal spray 2 Sprays by Both Nostrils route daily. 1 Bottle 2  
 isosorbide mononitrate ER (IMDUR) 30 mg tablet Take 1 Tab by mouth every evening. 90 Tab 3  
 multivitamin, tx-iron-ca-min (THERA-M W/ IRON) 9 mg iron-400 mcg tab tablet Take 1 Tab by mouth daily. 30 Tab 0  
 raNITIdine (ZANTAC) 150 mg tablet Take 150 mg by mouth two (2) times a day.  VENTOLIN HFA 90 mcg/actuation inhaler Take 2 Puffs by inhalation every four (4) hours as needed for Wheezing or Shortness of Breath. 1 Inhaler 0  
 PARoxetine (PAXIL) 20 mg tablet Take 1 Tab by mouth daily. 30 Tab 3  
 simvastatin (ZOCOR) 40 mg tablet Take  by mouth nightly. to obtain from pharmacy  aripiprazole (ABILIFY) 15 mg tablet Take 15 mg by mouth daily. to obtain from pharmacy  levothyroxine (SYNTHROID) 125 mcg tablet Take 125 mcg by mouth Daily (before breakfast). Clinical Impression Clinical Impression: 1. Acute on chronic congestive heart failure, unspecified heart failure type ([facility] [address] Disposition: Admit

## 2019-05-20 ENCOUNTER — APPOINTMENT (OUTPATIENT)
Dept: NON INVASIVE DIAGNOSTICS | Age: 59
DRG: 194 | End: 2019-05-20
Attending: PHYSICIAN ASSISTANT
Payer: MEDICAID

## 2019-05-20 LAB
ANION GAP SERPL CALC-SCNC: 4 MMOL/L (ref 3–18)
ATRIAL RATE: 68 BPM
BUN SERPL-MCNC: 12 MG/DL (ref 7–18)
BUN/CREAT SERPL: 13 (ref 12–20)
CALCIUM SERPL-MCNC: 8.6 MG/DL (ref 8.5–10.1)
CALCULATED R AXIS, ECG10: -53 DEGREES
CALCULATED T AXIS, ECG11: -57 DEGREES
CHLORIDE SERPL-SCNC: 97 MMOL/L (ref 100–108)
CO2 SERPL-SCNC: 36 MMOL/L (ref 21–32)
CREAT SERPL-MCNC: 0.91 MG/DL (ref 0.6–1.3)
DIAGNOSIS, 93000: NORMAL
GLUCOSE SERPL-MCNC: 115 MG/DL (ref 74–99)
POTASSIUM SERPL-SCNC: 3.7 MMOL/L (ref 3.5–5.5)
Q-T INTERVAL, ECG07: 494 MS
QRS DURATION, ECG06: 128 MS
QTC CALCULATION (BEZET), ECG08: 536 MS
SODIUM SERPL-SCNC: 137 MMOL/L (ref 136–145)
TSH SERPL DL<=0.05 MIU/L-ACNC: 1.87 UIU/ML (ref 0.36–3.74)
VENTRICULAR RATE, ECG03: 71 BPM

## 2019-05-20 PROCEDURE — 74011250637 HC RX REV CODE- 250/637: Performed by: HOSPITALIST

## 2019-05-20 PROCEDURE — 74011250636 HC RX REV CODE- 250/636: Performed by: HOSPITALIST

## 2019-05-20 PROCEDURE — 36415 COLL VENOUS BLD VENIPUNCTURE: CPT

## 2019-05-20 PROCEDURE — 80048 BASIC METABOLIC PNL TOTAL CA: CPT

## 2019-05-20 PROCEDURE — 84443 ASSAY THYROID STIM HORMONE: CPT

## 2019-05-20 PROCEDURE — 65660000000 HC RM CCU STEPDOWN

## 2019-05-20 PROCEDURE — 77030038269 HC DRN EXT URIN PURWCK BARD -A

## 2019-05-20 RX ADMIN — FUROSEMIDE 40 MG: 10 INJECTION, SOLUTION INTRAMUSCULAR; INTRAVENOUS at 22:29

## 2019-05-20 RX ADMIN — FAMOTIDINE 20 MG: 20 TABLET ORAL at 17:14

## 2019-05-20 RX ADMIN — ARIPIPRAZOLE 15 MG: 15 TABLET ORAL at 08:40

## 2019-05-20 RX ADMIN — SIMVASTATIN 40 MG: 40 TABLET, FILM COATED ORAL at 22:29

## 2019-05-20 RX ADMIN — ISOSORBIDE MONONITRATE 30 MG: 30 TABLET, EXTENDED RELEASE ORAL at 17:14

## 2019-05-20 RX ADMIN — FLUTICASONE PROPIONATE 2 SPRAY: 50 SPRAY, METERED NASAL at 08:41

## 2019-05-20 RX ADMIN — METOPROLOL TARTRATE 12.5 MG: 25 TABLET, FILM COATED ORAL at 17:14

## 2019-05-20 RX ADMIN — LEVOTHYROXINE SODIUM 125 MCG: 125 TABLET ORAL at 07:15

## 2019-05-20 RX ADMIN — OXYCODONE HYDROCHLORIDE 5 MG: 5 TABLET ORAL at 08:40

## 2019-05-20 RX ADMIN — FUROSEMIDE 40 MG: 10 INJECTION, SOLUTION INTRAMUSCULAR; INTRAVENOUS at 08:40

## 2019-05-20 RX ADMIN — SENNOSIDES,DOCUSATE SODIUM 1 TABLET: 8.6; 5 TABLET, FILM COATED ORAL at 22:29

## 2019-05-20 RX ADMIN — METOPROLOL TARTRATE 12.5 MG: 25 TABLET, FILM COATED ORAL at 08:40

## 2019-05-20 RX ADMIN — OXYCODONE HYDROCHLORIDE 5 MG: 5 TABLET ORAL at 22:33

## 2019-05-20 RX ADMIN — RIVAROXABAN 20 MG: 20 TABLET, FILM COATED ORAL at 08:40

## 2019-05-20 RX ADMIN — OXYCODONE HYDROCHLORIDE 5 MG: 5 TABLET ORAL at 17:24

## 2019-05-20 RX ADMIN — MULTIPLE VITAMINS W/ MINERALS TAB 1 TABLET: TAB at 08:40

## 2019-05-20 RX ADMIN — PAROXETINE HYDROCHLORIDE 20 MG: 20 TABLET, FILM COATED ORAL at 08:40

## 2019-05-20 RX ADMIN — FAMOTIDINE 20 MG: 20 TABLET ORAL at 08:40

## 2019-05-20 NOTE — CDMP QUERY
Patient admitted with acute diastolic CHF exacerbation. Patient noted to have home O2 use. ? Chronic respiratory failure 
 o With hypoxia 
 o With hypercapnia 
? Other, please specify ? Clinically unable to determine The medical record reflects the following: 
   Risk Factors: Hx of ANDRADE; morbid obesity; CHF; severe pulmonary hypertension Clinical Indicators: Increasing SOB; Tx for acute CHF Treatment: O2 continued in hospital; Lasix If you DECLINE this query or would like to communicate with [employer], please utilize the \""[employer] message box\" at the TOP of the Progress Note on the right. Thank you, Jem Murphy RN/CCDS 
[phone]

## 2019-05-20 NOTE — PROGRESS NOTES
[facility] Hospitalist Group Progress Note Patient: Marco Antonio Romero Age: 60 y.o. : 1960 MR#: [id number] SSN: [id number] Date/Time: 2019 Subjective: pt feels lot better now, no SOB, leg swelling lot better Assessment/Plan: #  Acute Diastolic CHF Exacerbation: · Cont IV diuretic. >5 L neg balance. Monitor renal function and electrolytes. · 2 Echo pending. Cardiology Consult noted.    
  
#  Essential Hypertension: BP better Continue her home medication and IV hydralazine PRN.   
# Hypothyroidism: Continue Levothyroxine.  
  
# CAD : Continue Imdur.  
  
# Anxiety/Depression: Continue Paxel.  
  
# GERD: Continue Zantac 
  
# Atrial Fibrillation: Continue metoprolol and Xarelto. 
  
# DVT prophylaxis: Xarelto. Full Code. D/w pt and daughter Fifi Fly [phone] in detail Case discussed with:  [x]Patient  [x]Family  [x]Nursing  []Case Management DVT Prophylaxis:  [x]Xarelto  []Hep SQ  []SCDs  []Coumadin   []On Heparin gtt Objective:  
VS:  
Visit Vitals /74 (BP 1 Location: Right arm, BP Patient Position: At rest) Pulse 78 Temp 97 °F (36.1 °C) Resp 21 Wt 134.4 kg (296 lb 4.8 oz) SpO2 95% BMI 57.87 kg/m² Tmax/24hrs: Temp (24hrs), Av.5 °F (36.4 °C), Min:97 °F (36.1 °C), Max:98.2 °F (36.8 °C) IOBRIEF Intake/Output Summary (Last 24 hours) at 2019 1314 Last data filed at 2019 4615 Gross per 24 hour Intake 240 ml Output 2000 ml Net -1760 ml General:  Alert, cooperative, no acute distress   
Pulmonary: Bilaterally clear, min basal rales. No Wheezing. Cardiovascular: Regular rate and Rhythm. GI:  Soft, Non distended, Non tender. + Bowel sounds. Extremities:  + edema. No calf tenderness. Psych: Good insight. Not anxious or agitated. Neurologic: Alert and oriented X 3. No acute neuro deficits. Additional: 
 
Medications:  
Current Facility-Administered Medications Medication Dose Route Frequency  polyethylene glycol (MIRALAX) packet 17 g  17 g Oral DAILY  ARIPiprazole (ABILIFY) tablet 15 mg  15 mg Oral DAILY  fluticasone propionate (FLONASE) 50 mcg/actuation nasal spray 2 Spray  2 Spray Both Nostrils DAILY  isosorbide mononitrate ER (IMDUR) tablet 30 mg  30 mg Oral QPM  
 levothyroxine (SYNTHROID) tablet 125 mcg  125 mcg Oral 6am  
 metoprolol tartrate (LOPRESSOR) tablet 12.5 mg  12.5 mg Oral BID  multivitamin, tx-iron-ca-min (THERA-M w/ IRON) tablet 1 Tab  1 Tab Oral DAILY  oxyCODONE IR (ROXICODONE) tablet 5 mg  5 mg Oral Q6H PRN  
 PARoxetine (PAXIL) tablet 20 mg  20 mg Oral DAILY  famotidine (PEPCID) tablet 20 mg  20 mg Oral BID  senna-docusate (PERICOLACE) 8.6-50 mg per tablet 1 Tab  1 Tab Oral QHS  simvastatin (ZOCOR) tablet 40 mg  40 mg Oral QHS  rivaroxaban (XARELTO) tablet 20 mg  20 mg Oral DAILY  hydrALAZINE (APRESOLINE) 20 mg/mL injection 10 mg  10 mg IntraVENous Q6H PRN  
 furosemide (LASIX) injection 40 mg  40 mg IntraVENous Q12H  
 albuterol-ipratropium (DUO-NEB) 2.5 MG-0.5 MG/3 ML  3 mL Nebulization Q4H PRN Labs:   
Recent Results (from the past 24 hour(s)) METABOLIC PANEL, BASIC Collection Time: 05/20/19  1:53 AM  
Result Value Ref Range Sodium 137 136 - 145 mmol/L Potassium 3.7 3.5 - 5.5 mmol/L Chloride 97 (L) 100 - 108 mmol/L  
 CO2 36 (H) 21 - 32 mmol/L Anion gap 4 3.0 - 18 mmol/L Glucose 115 (H) 74 - 99 mg/dL BUN 12 7.0 - 18 MG/DL Creatinine 0.91 0.6 - 1.3 MG/DL  
 BUN/Creatinine ratio 13 12 - 20 GFR est AA >60 >60 ml/min/1.73m2 GFR est non-AA >60 >60 ml/min/1.73m2 Calcium 8.6 8.5 - 10.1 MG/DL  
TSH 3RD GENERATION Collection Time: 05/20/19  1:53 AM  
Result Value Ref Range TSH 1.87 0.36 - 3.74 uIU/mL Signed By: Diane Tran MD   
 May 20, 2019

## 2019-05-20 NOTE — PROGRESS NOTES
Reason for Admission:  Acute exacerbation of CHF (congestive heart failure) ([facility] [address] [I50.9] CHF (congestive heart failure) ([facility] [address] [I50.9] RRAT Score:    14 Plan for utilizing home health:    [facility] signed for [facility], pt also has personal care aid, could not remember agency name. Likelihood of Readmission:   Moderate Do you (patient/family) have any concerns for transition/discharge? Pt has hospital bed through [facility] and bed is broken and they want a fee to repair. Transition of Care Plan:    
 
Initial assessment completed with patient and pt's sister. Cognitive status of patient: oriented to time, place, person and situation. Face sheet information confirmed:  yes. The patient designates daughter and sister to participate in her discharge plan and to receive any needed information. This patient lives in a single family home with daughter and grandchildren. Patient is not able to navigate steps as needed. Prior to hospitalization, patient was considered to be independent with ADLs/IADLS : no . If not independent,  patient needs assist with : dressing, bathing, food preparation, cooking, toileting, grooming and self feeding Patient has a current ACP document on file: no The daughter or sister will be available to transport patient home upon discharge. The patient already has Gisela Pronto, Rollator, W/C, Shower chair, oxygen, hospital bed, medical equipment available in the home. Patient is not currently active with home health. Patient has stayed in a skilled nursing facility or rehab. Was  stay within last 60 days : no.   Last year [facility] SNF. This patient is on dialysis :no 
 
 
List of available Home Health agencies were provided and reviewed with the patient prior to discharge. Freedom of choice signed: yes, for [facility]. Currently, the discharge plan is Home with [address] [place] [address] Fabricio Ang and personal care. The patient states that she can obtain her medications from the pharmacy, and take her medications as directed. Patient's current insurance is Medicaid Care Management Interventions PCP Verified by CM: Yes 
Palliative Care Criteria Met (RRAT>21 & CHF Dx)?: No 
Mode of Transport at Discharge: Self Transition of Care Consult (CM Consult): Home Health [address] [place] [address] Road: Yes MyChart Signup: No 
Discharge Durable Medical Equipment: No 
Physical Therapy Consult: Yes Occupational Therapy Consult: Yes Current Support Network: Has Personal Caregivers, Lives with Caregiver, Relative's Home Confirm Follow Up Transport: Family Plan discussed with Pt/Family/Caregiver: Yes Freedom of Choice Offered: Yes [employer] Information Provided?: No 
Discharge Location Discharge Placement: Home with home health [facility] Case Management [phone]

## 2019-05-20 NOTE — PROGRESS NOTES
Bedside shift change report given to John Flores (oncoming nurse) by Mavis Martinez RN (offgoing nurse). Report included the following information SBAR, Kardex, Intake/Output, Recent Results and Cardiac Rhythm a. fib.

## 2019-05-20 NOTE — PROGRESS NOTES
[facility]  -  Progress Note Patient: Elsie Solis MRN: [id number]  SSN: [id number] YOB: 1960  Age: 60 y.o. Sex: female Admit Date: 5/18/2019 Hospital Problem List:  
 
Hospital Problems  Date Reviewed: 5/10/2019 Codes Class Noted POA Acute exacerbation of CHF (congestive heart failure) ([facility]) ICD-10-CM: I50.9 ICD-9-CM: 428.0  6/2/2018 Unknown CHF (congestive heart failure) ([facility]) ICD-10-CM: I50.9 ICD-9-CM: 428.0  4/18/2017 Unknown - Acute on chronic diastolic heart failure, BNP 6K - Chronic atrial fibrillation, on Xarelto - Echo 05/2018: EF 50-55%, mildly to moderately increased LV wall thickness, RV moderately to severely dilated with est. Peak pressure 87 mmHg, moderately to severely dilated RA, moderate to severe tricuspid regurgitation, severe pulmonic insufficiency. - Severe pulmonary hypertension, RV dysfunction with moderate to severe pulmonary insufficiency and moderate pulmonary pressure elevation. 
- Hx congenital heart defect repaired as child; details unknown and patient poor historian  
- HTN 
- Dyslipidemia, on Simvastatin - ANDRADE 
- OHS 
- Morbid obesity 
- Hx substance abuse - Hx psychiatric illness Primary cardiologist: Dr. Keller Pean Assessment & Plan:  
 
Negative fluid balance >4L with significant symptom improvement; continue IV lasix. Monitor renal function and replace electrolytes as indicated. Possibly transition to PO tomorrow Will check echocardiogram 
Continue remainder of cardiac regimen Subjective: No CV complaints. Significant improvement of SOB and lower extremity swelling Objective:  
  
Patient Vitals for the past 8 hrs: 
 Temp Pulse Resp BP SpO2  
05/20/19 1203 97 °F (36.1 °C) 78 21 120/74 95 % 05/20/19 0805 97.6 °F (36.4 °C) 73 22 121/78 94 % Patient Vitals for the past 96 hrs: 
 Weight 05/20/19 0414 134.4 kg (296 lb 4.8 oz) 05/19/19 0007 148.3 kg (327 lb) Intake/Output Summary (Last 24 hours) at 5/20/2019 1219 Last data filed at 5/20/2019 7760 Gross per 24 hour Intake 240 ml Output 2000 ml Net -1760 ml Physical Exam: 
General:  alert, cooperative, no distress, appears stated age Neck:  nontender, no masses, no stridor, no JVD Lungs:  clear to auscultation bilaterally; minimal right anterior rhonchi Heart:  regular rate and rhythm, S1, S2 normal, systolic murmur along LSB, click, rub or gallop Abdomen:  abdomen is soft without significant tenderness, masses, organomegaly or guarding Extremities:  extremities normal, atraumatic, no cyanosis; no pitting edema appreciated Data Review:  
 
Labs: Results:  
   
Chemistry Recent Labs  
  05/20/19 
0153 05/19/19 
1218 05/19/19 
7131 *  --  107*   --  142  
K 3.7 3.2* 3.5 CL 97*  --  105 CO2 36*  --  34* BUN 12  --  13  
CREA 0.91  --  0.90  
CA 8.6  --  8.6 AGAP 4  --  3  
BUCR 13  --  14  
AP  --   --  187* TP  --   --  7.2 ALB  --   --  3.4 GLOB  --   --  3.8 AGRAT  --   --  0.9 CBC w/Diff Recent Labs 05/19/19 
0033 WBC 3.4*  
RBC 5.03  
HGB 12.2 HCT 41.4 * GRANS 65 LYMPH 26 EOS 3 Cardiac Enzymes No results found for: CPK, CK, CKMMB, CKMB, RCK3, CKMBT, CKNDX, CKND1, SHANNAN, TROPT, TROIQ, JELANI, TROPT, TNIPOC, BNP, BNPP Coagulation No results for input(s): PTP, INR, APTT in the last 72 hours. No lab exists for component: INREXT Lipid Panel Lab Results Component Value Date/Time Cholesterol, total 104 09/30/2018 04:17 AM  
 HDL Cholesterol 31 (L) 09/30/2018 04:17 AM  
 LDL, calculated 54 09/30/2018 04:17 AM  
 VLDL, calculated 19 09/30/2018 04:17 AM  
 Triglyceride 95 09/30/2018 04:17 AM  
 CHOL/HDL Ratio 3.4 09/30/2018 04:17 AM  
  
BNP Lab Results Component Value Date/Time  
 B-type Natriuretic Peptide 3,187 (H) 05/06/2016 11:10 AM  
 B-type Natriuretic Peptide 3,075 (H) 05/06/2016 11:00 AM  
  
Liver Enzymes Recent Labs 05/19/19 
0033 TP 7.2 ALB 3.4 * SGOT 21  
  
Digoxin Thyroid Studies Lab Results Component Value Date/Time T4, Total 12.3 (H) 01/15/2014 02:01 AM  
 TSH 1.87 05/20/2019 01:53 AM  
    
 
 
Signed By: VERONICA Hendrickson May 20, 2019

## 2019-05-21 ENCOUNTER — APPOINTMENT (OUTPATIENT)
Dept: NON INVASIVE DIAGNOSTICS | Age: 59
DRG: 194 | End: 2019-05-21
Attending: PHYSICIAN ASSISTANT
Payer: MEDICAID

## 2019-05-21 LAB
ANION GAP SERPL CALC-SCNC: 3 MMOL/L (ref 3–18)
BUN SERPL-MCNC: 11 MG/DL (ref 7–18)
BUN/CREAT SERPL: 13 (ref 12–20)
CALCIUM SERPL-MCNC: 8.8 MG/DL (ref 8.5–10.1)
CHLORIDE SERPL-SCNC: 96 MMOL/L (ref 100–108)
CO2 SERPL-SCNC: 40 MMOL/L (ref 21–32)
CREAT SERPL-MCNC: 0.88 MG/DL (ref 0.6–1.3)
ECHO AO ROOT DIAM: 2.67 CM
ECHO PULMONARY ARTERY SYSTOLIC PRESSURE (PASP): 61 MMHG
GLUCOSE SERPL-MCNC: 121 MG/DL (ref 74–99)
MAGNESIUM SERPL-MCNC: 2 MG/DL (ref 1.6–2.6)
POTASSIUM SERPL-SCNC: 3.5 MMOL/L (ref 3.5–5.5)
SODIUM SERPL-SCNC: 139 MMOL/L (ref 136–145)

## 2019-05-21 PROCEDURE — 93306 TTE W/DOPPLER COMPLETE: CPT

## 2019-05-21 PROCEDURE — 74011250636 HC RX REV CODE- 250/636: Performed by: HOSPITALIST

## 2019-05-21 PROCEDURE — 77030038269 HC DRN EXT URIN PURWCK BARD -A

## 2019-05-21 PROCEDURE — 97530 THERAPEUTIC ACTIVITIES: CPT

## 2019-05-21 PROCEDURE — 74011000250 HC RX REV CODE- 250: Performed by: HOSPITALIST

## 2019-05-21 PROCEDURE — 80048 BASIC METABOLIC PNL TOTAL CA: CPT

## 2019-05-21 PROCEDURE — 36415 COLL VENOUS BLD VENIPUNCTURE: CPT

## 2019-05-21 PROCEDURE — 97162 PT EVAL MOD COMPLEX 30 MIN: CPT

## 2019-05-21 PROCEDURE — 97165 OT EVAL LOW COMPLEX 30 MIN: CPT

## 2019-05-21 PROCEDURE — 74011250637 HC RX REV CODE- 250/637: Performed by: HOSPITALIST

## 2019-05-21 PROCEDURE — 65660000000 HC RM CCU STEPDOWN

## 2019-05-21 PROCEDURE — 83735 ASSAY OF MAGNESIUM: CPT

## 2019-05-21 RX ORDER — SODIUM CHLORIDE 9 MG/ML
10 INJECTION INTRAMUSCULAR; INTRAVENOUS; SUBCUTANEOUS
Status: COMPLETED | OUTPATIENT
Start: 2019-05-21 | End: 2019-05-21

## 2019-05-21 RX ADMIN — METOPROLOL TARTRATE 12.5 MG: 25 TABLET, FILM COATED ORAL at 17:00

## 2019-05-21 RX ADMIN — FUROSEMIDE 40 MG: 10 INJECTION, SOLUTION INTRAMUSCULAR; INTRAVENOUS at 23:47

## 2019-05-21 RX ADMIN — LEVOTHYROXINE SODIUM 125 MCG: 125 TABLET ORAL at 06:20

## 2019-05-21 RX ADMIN — RIVAROXABAN 20 MG: 20 TABLET, FILM COATED ORAL at 09:48

## 2019-05-21 RX ADMIN — PAROXETINE HYDROCHLORIDE 20 MG: 20 TABLET, FILM COATED ORAL at 09:48

## 2019-05-21 RX ADMIN — ISOSORBIDE MONONITRATE 30 MG: 30 TABLET, EXTENDED RELEASE ORAL at 17:00

## 2019-05-21 RX ADMIN — ARIPIPRAZOLE 15 MG: 15 TABLET ORAL at 09:48

## 2019-05-21 RX ADMIN — FUROSEMIDE 40 MG: 10 INJECTION, SOLUTION INTRAMUSCULAR; INTRAVENOUS at 09:48

## 2019-05-21 RX ADMIN — OXYCODONE HYDROCHLORIDE 5 MG: 5 TABLET ORAL at 23:47

## 2019-05-21 RX ADMIN — FAMOTIDINE 20 MG: 20 TABLET ORAL at 17:00

## 2019-05-21 RX ADMIN — SODIUM CHLORIDE 10 ML: 9 INJECTION INTRAMUSCULAR; INTRAVENOUS; SUBCUTANEOUS at 09:02

## 2019-05-21 RX ADMIN — FAMOTIDINE 20 MG: 20 TABLET ORAL at 09:48

## 2019-05-21 RX ADMIN — METOPROLOL TARTRATE 12.5 MG: 25 TABLET, FILM COATED ORAL at 09:48

## 2019-05-21 RX ADMIN — SENNOSIDES,DOCUSATE SODIUM 1 TABLET: 8.6; 5 TABLET, FILM COATED ORAL at 23:47

## 2019-05-21 RX ADMIN — OXYCODONE HYDROCHLORIDE 5 MG: 5 TABLET ORAL at 16:58

## 2019-05-21 RX ADMIN — FLUTICASONE PROPIONATE 2 SPRAY: 50 SPRAY, METERED NASAL at 09:51

## 2019-05-21 RX ADMIN — OXYCODONE HYDROCHLORIDE 5 MG: 5 TABLET ORAL at 06:22

## 2019-05-21 RX ADMIN — MULTIPLE VITAMINS W/ MINERALS TAB 1 TABLET: TAB at 09:48

## 2019-05-21 RX ADMIN — SIMVASTATIN 40 MG: 40 TABLET, FILM COATED ORAL at 23:47

## 2019-05-21 NOTE — PROGRESS NOTES
[facility]  Progress Note    Patient: James Section Age: 60 y.o. : 1960 MR#: [id number] SSN: [id number]  Date/Time: 2019     Subjective: pt feels lot better now, no SOB, leg swelling lot better      Assessment/Plan:   #  Acute Diastolic CHF Exacerbation:  · Cont IV diuretic. >9 L neg balance. Monitor renal function and electrolytes. · 2 Echo noted. Cardiology Consult noted.        #  Essential Hypertension: BP better       Continue her home medication and IV hydralazine PRN.    # Hypothyroidism: Continue Levothyroxine.      # CAD : Continue Imdur.      # Anxiety/Depression: Continue Paxel.      # GERD: Continue Zantac     # Atrial Fibrillation: Continue metoprolol and Xarelto.     # Pul HTN: cont O2    # chronic resp failure with hypoxia and hypercapnia on home O2    DVT prophylaxis: Xarelto. Full Code. D/w pt  D/w cardio, cont diuresis and re eval in am  Possible home in am   daughter Josefa Foster [phone]      Case discussed with:  [x]Patient  []Family  [x]Nursing  []Case Management  DVT Prophylaxis:  [x]Xarelto  []Hep SQ  []SCDs  []Coumadin   []On Heparin gtt    Objective:   VS:   Visit Vitals  /83 (BP 1 Location: Left arm, BP Patient Position: At rest)   Pulse 74   Temp 97.6 °F (36.4 °C)   Resp 19   Ht 5' (1.524 m)   Wt 135.6 kg (299 lb)   SpO2 95%   BMI 58.39 kg/m²      Tmax/24hrs: Temp (24hrs), Av.6 °F (36.4 °C), Min:97 °F (36.1 °C), Max:98 °F (36.7 °C)  IOBRIEF    Intake/Output Summary (Last 24 hours) at 2019 1615  Last data filed at 2019 1509  Gross per 24 hour   Intake    Output 5200 ml   Net -5200 ml       General:  Alert, cooperative, no acute distress    Pulmonary: Bilaterally clear, no rales. No Wheezing. Cardiovascular: Regular rate and Rhythm. GI:  Soft, Non distended, Non tender. + Bowel sounds. Extremities:  + edema. No calf tenderness. Psych: Good insight. Not anxious or agitated.   Neurologic: Alert and oriented X 3. No acute neuro deficits.   Additional:    Medications:   Current Facility-Administered Medications   Medication Dose Route Frequency    polyethylene glycol (MIRALAX) packet 17 g  17 g Oral DAILY    ARIPiprazole (ABILIFY) tablet 15 mg  15 mg Oral DAILY    fluticasone propionate (FLONASE) 50 mcg/actuation nasal spray 2 Spray  2 Spray Both Nostrils DAILY    isosorbide mononitrate ER (IMDUR) tablet 30 mg  30 mg Oral QPM    levothyroxine (SYNTHROID) tablet 125 mcg  125 mcg Oral 6am    metoprolol tartrate (LOPRESSOR) tablet 12.5 mg  12.5 mg Oral BID    multivitamin, tx-iron-ca-min (THERA-M w/ IRON) tablet 1 Tab  1 Tab Oral DAILY    oxyCODONE IR (ROXICODONE) tablet 5 mg  5 mg Oral Q6H PRN    PARoxetine (PAXIL) tablet 20 mg  20 mg Oral DAILY    famotidine (PEPCID) tablet 20 mg  20 mg Oral BID    senna-docusate (PERICOLACE) 8.6-50 mg per tablet 1 Tab  1 Tab Oral QHS    simvastatin (ZOCOR) tablet 40 mg  40 mg Oral QHS    rivaroxaban (XARELTO) tablet 20 mg  20 mg Oral DAILY    hydrALAZINE (APRESOLINE) 20 mg/mL injection 10 mg  10 mg IntraVENous Q6H PRN    furosemide (LASIX) injection 40 mg  40 mg IntraVENous Q12H    albuterol-ipratropium (DUO-NEB) 2.5 MG-0.5 MG/3 ML  3 mL Nebulization Q4H PRN       Labs:    Recent Results (from the past 24 hour(s))   METABOLIC PANEL, BASIC    Collection Time: 05/21/19  3:05 AM   Result Value Ref Range    Sodium 139 136 - 145 mmol/L    Potassium 3.5 3.5 - 5.5 mmol/L    Chloride 96 (L) 100 - 108 mmol/L    CO2 40 (H) 21 - 32 mmol/L    Anion gap 3 3.0 - 18 mmol/L    Glucose 121 (H) 74 - 99 mg/dL    BUN 11 7.0 - 18 MG/DL    Creatinine 0.88 0.6 - 1.3 MG/DL    BUN/Creatinine ratio 13 12 - 20      GFR est AA >60 >60 ml/min/1.73m2    GFR est non-AA >60 >60 ml/min/1.73m2    Calcium 8.8 8.5 - 10.1 MG/DL   MAGNESIUM    Collection Time: 05/21/19  3:05 AM   Result Value Ref Range    Magnesium 2.0 1.6 - 2.6 mg/dL   ECHO ADULT COMPLETE    Collection Time: 05/21/19  9:02 AM Result Value Ref Range    Ao Root D 2.67 cm    PASP 61.0 mmHg       Signed By: Josefa Quijano MD     May 21, 2019

## 2019-05-21 NOTE — PROGRESS NOTES
PT eval orders received, chart reviewed. Unable to see pt at this time as she is off unit. Will re-attempt later today as pt's schedule allows.     Sharla Montiel, PT, DPT

## 2019-05-21 NOTE — PROGRESS NOTES
[facility]  -  Progress Note      Patient: Eric Ayala MRN: [id number]  SSN: [id number]    YOB: 1960  Age: 60 y.o. Sex: female      Admit Date: 5/18/2019    Hospital Problem List:     Hospital Problems  Date Reviewed: 5/10/2019          Codes Class Noted POA    Acute exacerbation of CHF (congestive heart failure) ([facility] ICD-10-CM: I50.9  ICD-9-CM: 428.0  6/2/2018 Unknown        CHF (congestive heart failure) ([facility] ICD-10-CM: I50.9  ICD-9-CM: 428.0  4/18/2017 Unknown               - Acute on chronic diastolic heart failure, BNP 6K  - Chronic atrial fibrillation, on Xarelto  - Echo 05/2018: EF 50-55%, mildly to moderately increased LV wall thickness, RV moderately to severely dilated with est. Peak pressure 87 mmHg, moderately to severely dilated RA, moderate to severe tricuspid regurgitation, severe pulmonic insufficiency. - Severe pulmonary hypertension, RV dysfunction with moderate to severe pulmonary insufficiency and moderate pulmonary pressure elevation.  - Hx congenital heart defect repaired as child; details unknown and patient poor historian   - HTN  - Dyslipidemia, on Simvastatin  - ANDRADE  - OHS  - Morbid obesity  - Hx substance abuse  - Hx psychiatric illness     Primary cardiologist: Dr. Felisa Hurley:     Echo completed this AM with results to follow   Symptoms continuing to improve with IV diuresis with a negative fluid balance >7L  Patient would likely benefit from an additional day of IV lasix. Continue to monitor renal function and replace electrolytes as indicated   Would consider adding aldactone to diuretic regimen  Uptitrate medications as indicated for better BP control   Continue remainder of cardiac meds     Subjective:     No CV complaints.  Patient reports that her breathing is improving, but she does not feel at her baseline    Objective:      Patient Vitals for the past 8 hrs:   Temp Pulse Resp BP SpO2   05/21/19 0409 97.8 °F (36.6 °C) 74 22 (!) 155/99 93 %         Patient Vitals for the past 96 hrs:   Weight   05/21/19 0434 135.8 kg (299 lb 6.4 oz)   05/20/19 0414 134.4 kg (296 lb 4.8 oz)   05/19/19 0007 148.3 kg (327 lb)         Intake/Output Summary (Last 24 hours) at 5/21/2019 0810  Last data filed at 5/21/2019 3576  Gross per 24 hour   Intake    Output 2800 ml   Net -2800 ml       Physical Exam:  General:  alert, cooperative, no distress, appears stated age  Neck:  nontender, no masses, no stridor, no JVD  Lungs:  clear to auscultation bilaterally  Heart:  regular rate and rhythm, systolic murmur  Abdomen:  abdomen is soft without significant tenderness, masses, organomegaly or guarding  Extremities:  extremities normal, atraumatic, no cyanosis; no significant pitting edema     Data Review:     Labs: Results:       Chemistry Recent Labs     05/21/19  0305 05/20/19  0153 05/19/19  1218 05/19/19  0033   * 115*  --  107*    137  --  142   K 3.5 3.7 3.2* 3.5   CL 96* 97*  --  105   CO2 40* 36*  --  34*   BUN 11 12  --  13   CREA 0.88 0.91  --  0.90   CA 8.8 8.6  --  8.6   MG 2.0  --   --   --    AGAP 3 4  --  3   BUCR 13 13  --  14   AP  --   --   --  187*   TP  --   --   --  7.2   ALB  --   --   --  3.4   GLOB  --   --   --  3.8   AGRAT  --   --   --  0.9      CBC w/Diff Recent Labs     05/19/19  0033   WBC 3.4*   RBC 5.03   HGB 12.2   HCT 41.4   *   GRANS 65   LYMPH 26   EOS 3      Cardiac Enzymes No results found for: CPK, CK, CKMMB, CKMB, RCK3, CKMBT, CKNDX, CKND1, SHANNAN, TROPT, TROIQ, JELANI, TROPT, TNIPOC, BNP, BNPP   Coagulation No results for input(s): PTP, INR, APTT in the last 72 hours.     No lab exists for component: INREXT    Lipid Panel Lab Results   Component Value Date/Time    Cholesterol, total 104 09/30/2018 04:17 AM    HDL Cholesterol 31 (L) 09/30/2018 04:17 AM    LDL, calculated 54 09/30/2018 04:17 AM    VLDL, calculated 19 09/30/2018 04:17 AM    Triglyceride 95 09/30/2018 04:17 AM    CHOL/HDL Ratio 3.4 09/30/2018 04:17 AM      BNP Lab Results   Component Value Date/Time    B-type Natriuretic Peptide 3,187 (H) 05/06/2016 11:10 AM    B-type Natriuretic Peptide 3,075 (H) 05/06/2016 11:00 AM      Liver Enzymes Recent Labs     05/19/19  0033   TP 7.2   ALB 3.4   *   SGOT 21      Digoxin    Thyroid Studies Lab Results   Component Value Date/Time    T4, Total 12.3 (H) 01/15/2014 02:01 AM    TSH 1.87 05/20/2019 01:53 AM            Signed By: VERONICA Franco     May 21, 2019

## 2019-05-21 NOTE — PROGRESS NOTES
Occupational Therapy Goals  Initiated 5/21/2019 within 7 day(s). 1.  Patient will perform upper body dressing with supervision/set-up. 2.  Patient will perform grooming with independence. 3.  Patient will perform bed mobility in preporation for ADL task with minimal assistance. 4.  Patient will participate in upper extremity therapeutic exercise/activities with independence for 5-7 minutes. 5.  Patient will utilize energy conservation techniques during functional activities with verbal and visual cues. Prior Level of Function: Patient needed assist with self-care (dressing, bathing, toileting, feeding) and reports using rollator and wheelchair for functional mobility PTA. Pt reports having diapers at home, but she using the bedside commode. Patient also reports having aide come to house 3x/week to assist in addition to having her daughter Ijeoma Hernández) assist. Pt has hospital bed (which she says needs to be fixed), BSC, rollator, walker, w/c, and shower chair at home. OCCUPATIONAL THERAPY EVALUATION    Patient: Sly Rucker (46 y.o. female)  Date: 5/21/2019  Primary Diagnosis: Acute exacerbation of CHF (congestive heart failure) ([facility]) [I50.9]  CHF (congestive heart failure) ([facility] [I50.9]  Precautions: Fall      ASSESSMENT :  Pt cleared to participate in OT evaluation by RN. Upon entering the room, the pt was supine in bed, asleep, but agreeable to participate in OT evaluation. Patient easily awoken by light touch to left clavicle with verbal name call. Verbal attempt made previously not successful. Once awaken, pt participated in gross assessment of BUE and performed ADL tasks for OT. Per pt's chart, pt needed assist PTA for ADL's such as dressing, grooming, and feeding self. When presented with cup, pt able to reach with her right dominant hand to retrieve, hold, and take sip from cup independently.  Patient observed with decreased LUE strength and ROM and reported it happening recently; however BUE still functional. Based on the objective data described below, the patient presents with increased time needed for processing information, decreased strength, decreased ROM, decreased endurance and decreased functional mobility, which impedes pt performance in basic self-care/ADL tasks. Pt would benefit from skilled OT to restore PLOF and maximize function. Patient will benefit from skilled intervention to address the above impairments. Patient's rehabilitation potential is considered to be Good  Factors which may influence rehabilitation potential include:   ? None noted  ? Mental ability/status  ? Medical condition  ? Home/family situation and support systems  ? Safety awareness  ? Pain tolerance/management  ? Other:      PLAN :  Recommendations and Planned Interventions:  ?               Self Care Training                  ? Therapeutic Activities  ? Functional Mobility Training   ? Cognitive Retraining  ? Therapeutic Exercises           ? Endurance Activities  ? Balance Training                    ? Neuromuscular Re-Education  ? Visual/Perceptual Training     ? Home Safety Training  ? Patient Education                   ? Family Training/Education  ? Other (comment):    Frequency/Duration: Patient will be followed by occupational therapy 1-2 times per day/4-7 days per week to address goals.   Discharge Recommendations: Home Health  Further Equipment Recommendations for Discharge: Pt has all DME     SUBJECTIVE:   Patient stated I have an aide that comes and helps me three times a week in the morning til the afternoon     OBJECTIVE DATA SUMMARY:     Past Medical History:   Diagnosis Date    Atrial fibrillation     CHADS score 1  (-CHF, +HTN, -AGE, -DM, -CVA)    Carpal tunnel syndrome     Chronic pain     Congenital heart disease     presumed pulmonary stenosis s/p repair 1969    Congestive heart failure (HCC)     Degenerative disc disease     Degenerative joint disease     GERD     H/O echocardiogram 04/2017    EF 60-65%, mild concentric LVH, dilated RV with RV dysfunction, RVSP 54 mmHg, RA E, moderate to severe pulmonic regurgitation.  Hypercholesterolemia     Hypertension     Hypothyroid     Morbid obesity     Morbid obesity with BMI of 45.0-49.9, adult ([facility] 2/20/2017    Noncompliance     Schizoaffective disorder     Substance abuse ([facility]     marijuana, crack cocaine     Past Surgical History:   Procedure Laterality Date    CARDIAC SURG PROCEDURE UNLIST  1971    VSD repair    COLONOSCOPY N/A 5/6/2018    COLONOSCOPY with tatooing and biopsy performed by Radha Greer MD at [facility]    [address]X [address]      left    HX HYSTERECTOMY      HX KNEE REPLACEMENT      left    HX KNEE REPLACEMENT      right    HX ORTHOPAEDIC      left ankle    HX ORTHOPAEDIC      carpel tunnel right and left     HX THYROIDECTOMY Left      Barriers to Learning/Limitations: None  Compensate with: N/A    Home Situation:   Home Situation  Home Environment: Private residence  One/Two Story Residence: One story  Living Alone: No  Support Systems: Family member(s)  Patient Expects to be Discharged to[de-identified] Private residence  Current DME Used/Available at Home: Eveleen Frames, straight, Walker, rolling, Frutoso Mendoza, rollator, Wheelchair, Commode, bedside, Hospital bed, Shower chair  ? Right hand dominant   ? Left hand dominant    Cognitive/Behavioral Status:  Neurologic State: Lethargic; Alert  Orientation Level: Oriented to time;Oriented to place;Oriented to person  Cognition: Follows commands  Safety/Judgement: Fall prevention    Skin: Visible Skin Intact  Edema: None noted    Vision/Perceptual:    Pt unable to correctly read OT nametag and reports usually wearing glasses    Acuity: Impaired near vision    Corrective Lenses: Reading glasses    Coordination: BUE  Fine Motor Skills-Upper: Left Intact; Right Intact    Gross Motor Skills-Upper: Left Intact; Right Intact    Strength: BUE  Strength: Grossly decreased, Functional (RUE> LUE; pt reports LUE weakness just began prior to admittance)    Tone & Sensation: BUE  Tone: Normal  Sensation: Intact    Range of Motion: BUE  AROM: Grossly decreased, Functional    Functional Mobility and Transfers for ADLs:  Bed Mobility:  Rolling: Total assistance    ADL Assessment:   Feeding: Independent    Oral Facial Hygiene/Grooming: Independent    Bathing: Maximum assistance(pt reports she helps with bathing herself)    Upper Body Dressing: Minimum assistance    Lower Body Dressing: Maximum assistance    Toileting: Maximum assistance    ADL Intervention:  Feeding  Feeding Assistance: Independent  Drink to Mouth: Independent    Cognitive Retraining  Safety/Judgement: Fall prevention      Pain:  Pain level pre-treatment: 0/10   Pain level post-treatment: 0/10   Pain Intervention(s): Medication (see MAR); Response to intervention: See doc flow    Activity Tolerance:   Patient demonstrated decreased activity tolerance during OT evaluation. Please refer to the flowsheet for vital signs taken during this treatment. After treatment:   ? Patient left in no apparent distress sitting up in chair  X Patient left in no apparent distress in bed  X  Call bell left within reach  X  Nursing notified  ? Caregiver present  ? Bed alarm activated    COMMUNICATION/EDUCATION:   ? Role of Occupational Therapy in the acute care setting  ? Home safety education was provided and the patient/caregiver indicated understanding. ? Patient/family have participated as able in goal setting and plan of care. ? Patient/family agree to work toward stated goals and plan of care. ? Patient understands intent and goals of therapy, but is neutral about his/her participation. ?  Patient is unable to participate in goal setting and plan of care. Thank you for this referral.  Tea Dupree OTR/L  Time Calculation: 13 mins    Eval Complexity: History: MEDIUM Complexity : Expanded review of history including physical, cognitive and psychosocial  history ; Examination: MEDIUM Complexity : 3-5 performance deficits relating to physical, cognitive , or psychosocial skils that result in activity limitations and / or participation restrictions; Decision Making:MEDIUM Complexity : Patient may present with comorbidities that affect occupational performnce.  Miniml to moderate modification of tasks or assistance (eg, physical or verbal ) with assesment(s) is necessary to enable patient to complete evaluation

## 2019-05-21 NOTE — PROGRESS NOTES
Problem: Mobility Impaired (Adult and Pediatric)  Goal: *Acute Goals and Plan of Care (Insert Text)  Description  Physical Therapy Goals  Initiated 5/21/2019 and to be accomplished within 7 day(s)  1. Patient will roll side to side in bed with minimal assistance/contact guard assist.     2.  Patient will transfer from bed to chair and chair to bed with maximal assistance using the least restrictive device. 3.  Patient will perform sit to stand with maximal assistance. To prepare pt for home mobility. PLOF: Pt lives with daughter in a 2 story home with 1st floor set up. Pt states that she is generally confined to a w/c (can ambulate \"a few steps\") and tries to propel herself in the w/c her home. She has a paid CG 1-2 hours 3 days per week to assist her with bathing. Pt reports that she was able to transfer from bed-w/c using a trapeze to assist her. She also reports that her grandson lifts her up/down the stairs at her front door when she needs to leave the house. Her grandson also transfers her from w/c-car. Outcome: Progressing Towards Goal  PHYSICAL THERAPY EVALUATION    Patient: Shar Thrasher (60 y.o. female)  Date: 5/21/2019  Primary Diagnosis: Acute exacerbation of CHF (congestive heart failure) ([facility]) [I50.9]  CHF (congestive heart failure) ([facility] [I50.9]        Precautions:   Fall    PLOF: See goals section above    ASSESSMENT :  Based on the objective data described below, the patient presents with decreased strength and dependence on caregivers for functional mobility following admission for exacerbation of CHF. Pt was cleared by nursing to work with therapy. Pt was received semi-reclined in bed, agreeable to participate in PT . Pt is able to perform ankle pf/df and SLR against gravity on R but not on L. Pt gave full history of functional mobility and appears to be mostly dependent on caregivers for mobility at home although states she can transfer herself from bed <> w/c.   Pt appears to take extra time to process questions and commands. Pt rolled to L side with moderate assistance and to R with max A. Pt declined to attempt sitting on EOB stating she was tired from having tests. At conclusion of session, pt was left resting comfortably in bed, needs met, call bell in reach, nurse notified. Patient will benefit from skilled intervention to address the above impairments. Patient's rehabilitation potential is considered to be Fair  Factors which may influence rehabilitation potential include:   ? None noted  ? Mental ability/status  ? Medical condition  ? Home/family situation and support systems  ? Safety awareness  ? Pain tolerance/management  ? Other:      PLAN :  Recommendations and Planned Interventions:   ?           Bed Mobility Training             ? Neuromuscular Re-Education  ? Transfer Training                   ? Orthotic/Prosthetic Training  ? Gait Training                          ? Modalities  ? Therapeutic Exercises           ? Edema Management/Control  ? Therapeutic Activities            ? Family Training/Education  ? Patient Education  ? Other (comment):    Frequency/Duration: Patient will be followed by physical therapy 1-2 times per day to address goals. Discharge Recommendations: Home Health  Further Equipment Recommendations for Discharge: N/A     SUBJECTIVE:   Patient stated ? I am so tired. ?    OBJECTIVE DATA SUMMARY:     Past Medical History:   Diagnosis Date    Atrial fibrillation     CHADS score 1  (-CHF, +HTN, -AGE, -DM, -CVA)    Carpal tunnel syndrome     Chronic pain     Congenital heart disease     presumed pulmonary stenosis s/p repair 1969    Congestive heart failure (HCC)     Degenerative disc disease     Degenerative joint disease     GERD     H/O echocardiogram 04/2017    EF 60-65%, mild concentric LVH, dilated RV with RV dysfunction, RVSP 54 mmHg, RA E, moderate to severe pulmonic regurgitation. Hypercholesterolemia     Hypertension     Hypothyroid     Morbid obesity     Morbid obesity with BMI of 45.0-49.9, adult ([facility] 2/20/2017    Noncompliance     Schizoaffective disorder     Substance abuse ([facility]     marijuana, crack cocaine     Past Surgical History:   Procedure Laterality Date    CARDIAC SURG PROCEDURE UNLIST  1971    VSD repair    COLONOSCOPY N/A 5/6/2018    COLONOSCOPY with tatooing and biopsy performed by Juhi Martines MD at [facility]    [address]      left    HX HYSTERECTOMY      HX KNEE REPLACEMENT      left    HX KNEE REPLACEMENT      right    HX ORTHOPAEDIC      left ankle    HX ORTHOPAEDIC      carpel tunnel right and left     HX THYROIDECTOMY Left      Barriers to Learning/Limitations: yes;  cognitive  Compensate with: Visual Cues, Verbal Cues and Tactile Cues  Home Situation:  Home Situation  Home Environment: Private residence  # Steps to Enter: 3  One/Two Story Residence: One story  Living Alone: No  Support Systems: Family member(s), Home care staff  Patient Expects to be Discharged to[de-identified] Private residence  Current DME Used/Available at Home: Hospital bed, Wheelchair  Critical Behavior:  Neurologic State: Alert  Orientation Level: Oriented to person;Oriented to place;Oriented to situation  Cognition: Follows commands  Safety/Judgement: Fall prevention  Psychosocial  Patient Behaviors: Calm; Cooperative  Purposeful Interaction: Yes  Pt Identified Daily Priority: Clinical issues (comment)  Caritas Process: Nurture loving kindness;Enable ken/hope;Establish trust;Nurture spiritual self;Teaching/learning; Attend basic human needs;Create healing environment  Caring Interventions: Reassure; Therapeutic modalities  Reassure: Therapeutic listening; Informing;Caring rounds  Therapeutic Modalities: Humor; Intentional therapeutic touch  Skin Condition/Temp: Dry     Skin Integrity: Wound (add Wound LDA)(Fissure to gluteal fold)  Skin Integumentary  Skin Color: Appropriate for ethnicity  Skin Condition/Temp: Dry  Skin Integrity: Wound (add Wound LDA)(Fissure to gluteal fold)  Turgor: Non-tenting  Hair Growth: Present  Varicosities: Absent     Strength:    Strength: Generally decreased, functional        Range Of Motion:  AROM: Generally decreased, functional    Functional Mobility:  Bed Mobility:  Rolling: Moderate assistance(Toward L side, max A to R)      Pain:  Pain level pre-treatment: 0/10   Pain level post-treatment: 0/10   Pain Intervention(s) : Medication (see MAR); Rest, Ice, Repositioning  Response to intervention: Nurse notified, See doc flow    Activity Tolerance:   Fair  Please refer to the flowsheet for vital signs taken during this treatment. After treatment:   ?         Patient left in no apparent distress sitting up in chair  ? Patient left in no apparent distress in bed  ? Call bell left within reach  ? Nursing notified  ? Caregiver present  ? Bed alarm activated  ? SCDs applied    COMMUNICATION/EDUCATION:   ?         Role of Physical Therapy in the acute care setting. ?         Fall prevention education was provided and the patient/caregiver indicated understanding. ? Patient/family have participated as able in goal setting and plan of care. ?         Patient/family agree to work toward stated goals and plan of care. ?         Patient understands intent and goals of therapy, but is neutral about his/her participation. ? Patient is unable to participate in goal setting/plan of care: ongoing with therapy staff. ?         Other:     Thank you for this referral.  Mery Eric, PT   Time Calculation: 23 mins      Eval Complexity: History: MEDIUM  Complexity : 1-2 comorbidities / personal factors will impact the outcome/ POC Exam:LOW Complexity : 1-2 Standardized tests and measures addressing body structure, function, activity limitation and / or participation in recreation  Presentation: MEDIUM Complexity : Evolving with changing characteristics  Clinical Decision Making:Medium Complexity    Overall Complexity:MEDIUM

## 2019-05-21 NOTE — ROUTINE PROCESS
Bedside shift change report given to Keyon Bradley (oncoming nurse) by Karina Holguin RN (offgoing nurse). Report included the following information SBAR, Kardex, Intake/Output, Recent Results and Cardiac Rhythm A. fib.

## 2019-05-21 NOTE — ROUTINE PROCESS
Bedside verbal report received from Tommie Johnson, report reviewed SBAR, MAR, VS and summary of care. Patient awake and alert, no complaints voiced, call light in reach.

## 2019-05-22 LAB
ANION GAP SERPL CALC-SCNC: 3 MMOL/L (ref 3–18)
BUN SERPL-MCNC: 11 MG/DL (ref 7–18)
BUN/CREAT SERPL: 11 (ref 12–20)
CALCIUM SERPL-MCNC: 8.7 MG/DL (ref 8.5–10.1)
CHLORIDE SERPL-SCNC: 92 MMOL/L (ref 100–108)
CO2 SERPL-SCNC: 40 MMOL/L (ref 21–32)
CREAT SERPL-MCNC: 0.97 MG/DL (ref 0.6–1.3)
ERYTHROCYTE [DISTWIDTH] IN BLOOD BY AUTOMATED COUNT: 16.5 % (ref 11.6–14.5)
GLUCOSE SERPL-MCNC: 120 MG/DL (ref 74–99)
HCT VFR BLD AUTO: 42.9 % (ref 35–45)
HGB BLD-MCNC: 12.8 G/DL (ref 12–16)
MCH RBC QN AUTO: 25 PG (ref 24–34)
MCHC RBC AUTO-ENTMCNC: 29.8 G/DL (ref 31–37)
MCV RBC AUTO: 83.6 FL (ref 74–97)
PLATELET # BLD AUTO: 167 K/UL (ref 135–420)
PMV BLD AUTO: 10.4 FL (ref 9.2–11.8)
POTASSIUM SERPL-SCNC: 3.5 MMOL/L (ref 3.5–5.5)
RBC # BLD AUTO: 5.13 M/UL (ref 4.2–5.3)
SODIUM SERPL-SCNC: 135 MMOL/L (ref 136–145)
WBC # BLD AUTO: 5.6 K/UL (ref 4.6–13.2)

## 2019-05-22 PROCEDURE — 80048 BASIC METABOLIC PNL TOTAL CA: CPT

## 2019-05-22 PROCEDURE — 74011250636 HC RX REV CODE- 250/636: Performed by: HOSPITALIST

## 2019-05-22 PROCEDURE — 77030038269 HC DRN EXT URIN PURWCK BARD -A

## 2019-05-22 PROCEDURE — 85027 COMPLETE CBC AUTOMATED: CPT

## 2019-05-22 PROCEDURE — 36415 COLL VENOUS BLD VENIPUNCTURE: CPT

## 2019-05-22 PROCEDURE — 74011250637 HC RX REV CODE- 250/637: Performed by: HOSPITALIST

## 2019-05-22 PROCEDURE — 65660000000 HC RM CCU STEPDOWN

## 2019-05-22 RX ORDER — FUROSEMIDE 10 MG/ML
40 INJECTION INTRAMUSCULAR; INTRAVENOUS EVERY 12 HOURS
Status: COMPLETED | OUTPATIENT
Start: 2019-05-22 | End: 2019-05-22

## 2019-05-22 RX ORDER — SPIRONOLACTONE 25 MG/1
25 TABLET ORAL DAILY
Status: DISCONTINUED | OUTPATIENT
Start: 2019-05-22 | End: 2019-05-23 | Stop reason: HOSPADM

## 2019-05-22 RX ORDER — FUROSEMIDE 40 MG/1
40 TABLET ORAL
Status: DISCONTINUED | OUTPATIENT
Start: 2019-05-23 | End: 2019-05-23 | Stop reason: HOSPADM

## 2019-05-22 RX ADMIN — OXYCODONE HYDROCHLORIDE 5 MG: 5 TABLET ORAL at 12:25

## 2019-05-22 RX ADMIN — FAMOTIDINE 20 MG: 20 TABLET ORAL at 17:29

## 2019-05-22 RX ADMIN — OXYCODONE HYDROCHLORIDE 5 MG: 5 TABLET ORAL at 18:35

## 2019-05-22 RX ADMIN — PAROXETINE HYDROCHLORIDE 20 MG: 20 TABLET, FILM COATED ORAL at 09:37

## 2019-05-22 RX ADMIN — POLYETHYLENE GLYCOL 3350 17 G: 17 POWDER, FOR SOLUTION ORAL at 09:36

## 2019-05-22 RX ADMIN — METOPROLOL TARTRATE 12.5 MG: 25 TABLET, FILM COATED ORAL at 09:37

## 2019-05-22 RX ADMIN — OXYCODONE HYDROCHLORIDE 5 MG: 5 TABLET ORAL at 06:12

## 2019-05-22 RX ADMIN — FAMOTIDINE 20 MG: 20 TABLET ORAL at 09:37

## 2019-05-22 RX ADMIN — SIMVASTATIN 40 MG: 40 TABLET, FILM COATED ORAL at 21:24

## 2019-05-22 RX ADMIN — SENNOSIDES,DOCUSATE SODIUM 1 TABLET: 8.6; 5 TABLET, FILM COATED ORAL at 21:25

## 2019-05-22 RX ADMIN — ARIPIPRAZOLE 15 MG: 15 TABLET ORAL at 09:36

## 2019-05-22 RX ADMIN — MULTIPLE VITAMINS W/ MINERALS TAB 1 TABLET: TAB at 09:37

## 2019-05-22 RX ADMIN — FUROSEMIDE 40 MG: 10 INJECTION, SOLUTION INTRAMUSCULAR; INTRAVENOUS at 09:37

## 2019-05-22 RX ADMIN — FLUTICASONE PROPIONATE 2 SPRAY: 50 SPRAY, METERED NASAL at 09:00

## 2019-05-22 RX ADMIN — ISOSORBIDE MONONITRATE 30 MG: 30 TABLET, EXTENDED RELEASE ORAL at 17:29

## 2019-05-22 RX ADMIN — FUROSEMIDE 40 MG: 10 INJECTION, SOLUTION INTRAMUSCULAR; INTRAVENOUS at 21:24

## 2019-05-22 RX ADMIN — METOPROLOL TARTRATE 12.5 MG: 25 TABLET, FILM COATED ORAL at 17:29

## 2019-05-22 RX ADMIN — RIVAROXABAN 20 MG: 20 TABLET, FILM COATED ORAL at 09:37

## 2019-05-22 RX ADMIN — SPIRONOLACTONE 25 MG: 25 TABLET ORAL at 09:42

## 2019-05-22 RX ADMIN — LEVOTHYROXINE SODIUM 125 MCG: 125 TABLET ORAL at 05:11

## 2019-05-22 NOTE — PROGRESS NOTES
[facility]  -  Progress Note      Patient: Clint Francis MRN: [id number]  SSN: [id number]    YOB: 1960  Age: 60 y.o. Sex: female      Admit Date: 5/18/2019    Hospital Problem List:     Hospital Problems  Date Reviewed: 5/10/2019          Codes Class Noted POA    Acute exacerbation of CHF (congestive heart failure) ([facility] ICD-10-CM: I50.9  ICD-9-CM: 428.0  6/2/2018 Unknown        CHF (congestive heart failure) ([facility] ICD-10-CM: I50.9  ICD-9-CM: 428.0  4/18/2017 Unknown              Assessment & Plan:     Negative fluid balance of 10.5 L  Would continue IV diuresis today and possibly transition to PO tomorrow. Continue to monitor renal function and electrolytes  Will start aldactone today   Monitor I/O's, daily standing scale weights  Continue remainder of cardiac regimen     Subjective:     No CV complaints.  Patient continuing to improve and feels she is getting closer to her baseline    Objective:      Patient Vitals for the past 8 hrs:   Temp Pulse Resp BP SpO2   05/22/19 0802 98 °F (36.7 °C) 78 17 (!) 141/93 97 %   05/22/19 0309 98.1 °F (36.7 °C) 75 20 129/75 97 %         Patient Vitals for the past 96 hrs:   Weight   05/22/19 0431 131.5 kg (290 lb)   05/21/19 0859 135.6 kg (299 lb)   05/21/19 0434 135.8 kg (299 lb 6.4 oz)   05/20/19 0414 134.4 kg (296 lb 4.8 oz)   05/19/19 0007 148.3 kg (327 lb)         Intake/Output Summary (Last 24 hours) at 5/22/2019 0831  Last data filed at 5/22/2019 0606  Gross per 24 hour   Intake 480 ml   Output 4600 ml   Net -4120 ml       Physical Exam:  General:  fatigued, cooperative, no distress, appears stated age  Neck:  nontender, no masses, no stridor, no JVD  Lungs:  clear to auscultation bilaterally; exam limited due to body habitus  Heart:  regular rate and rhythm, S1, S2 normal, no murmur, click, rub or gallop  Abdomen:  abdomen is soft without significant tenderness, masses, organomegaly or guarding  Extremities:  extremities normal, atraumatic, no cyanosis or pitting edema    Data Review:     Labs: Results:       Chemistry Recent Labs     05/22/19  0330 05/21/19  0305 05/20/19  0153   * 121* 115*   * 139 137   K 3.5 3.5 3.7   CL 92* 96* 97*   CO2 40* 40* 36*   BUN 11 11 12   CREA 0.97 0.88 0.91   CA 8.7 8.8 8.6   MG  --  2.0  --    AGAP 3 3 4   BUCR 11* 13 13      CBC w/Diff Recent Labs     05/22/19  0330   WBC 5.6   RBC 5.13   HGB 12.8   HCT 42.9         Cardiac Enzymes No results found for: CPK, CK, CKMMB, CKMB, RCK3, CKMBT, CKNDX, CKND1, SHANNAN, TROPT, TROIQ, JELANI, TROPT, TNIPOC, BNP, BNPP   Coagulation No results for input(s): PTP, INR, APTT in the last 72 hours. No lab exists for component: INREXT    Lipid Panel Lab Results   Component Value Date/Time    Cholesterol, total 104 09/30/2018 04:17 AM    HDL Cholesterol 31 (L) 09/30/2018 04:17 AM    LDL, calculated 54 09/30/2018 04:17 AM    VLDL, calculated 19 09/30/2018 04:17 AM    Triglyceride 95 09/30/2018 04:17 AM    CHOL/HDL Ratio 3.4 09/30/2018 04:17 AM      BNP Lab Results   Component Value Date/Time    B-type Natriuretic Peptide 3,187 (H) 05/06/2016 11:10 AM    B-type Natriuretic Peptide 3,075 (H) 05/06/2016 11:00 AM      Liver Enzymes No results for input(s): TP, ALB, TBIL, AP, SGOT, GPT in the last 72 hours.     No lab exists for component: DBIL   Digoxin    Thyroid Studies Lab Results   Component Value Date/Time    T4, Total 12.3 (H) 01/15/2014 02:01 AM    TSH 1.87 05/20/2019 01:53 AM            Signed By: Arlester Siemens, PA     May 22, 2019

## 2019-05-22 NOTE — ROUTINE PROCESS
Bedside and Verbal shift change report given to DELMI De Leon (oncoming nurse) by Kaylie Manzo RN 
 (offgoing nurse). Report included the following information SBAR, Kardex, Intake/Output and MAR.

## 2019-05-22 NOTE — ROUTINE PROCESS
Bedside and Verbal shift change report given to Milagros Velazco RN (oncoming nurse) by Yossi Ceja RN (offgoing nurse). Report included the following information SBAR, Kardex, MAR and Cardiac Rhythm A Fib. Patient awake and watching TV.

## 2019-05-22 NOTE — ROUTINE PROCESS
Bedside verbal report received from Girish Adams, report reviewed SBAR, MAR, VS and summary of care. Patient awake and alert, no complaints voiced, call light in reach.

## 2019-05-22 NOTE — PROGRESS NOTES
[facility]  [facility]    Patient: Jeet Russo Age: 60 y.o. : 1960 MR#: [id number] SSN: [id number]  Date/Time: 2019     Subjective: pt cont feel better, no SOB, leg swelling improving       Assessment/Plan:   #  Acute on chronic Diastolic CHF Exacerbation:  · Cont IV diuretic. >13 L neg balance. Monitor renal function and electrolytes. · 2 Echo noted. Cardiology Consult noted, PO lasix in am.        #  Essential Hypertension: BP stable       Continue her home medication and IV hydralazine PRN.    # Hypothyroidism: Continue Levothyroxine.      # CAD : Continue Imdur.      # Anxiety/Depression: Continue Paxel.      # GERD: Continue Zantac     # Atrial Fibrillation: Continue metoprolol and Xarelto.     # Pul HTN: cont O2    # chronic resp failure with hypoxia and hypercapnia on home O2    DVT prophylaxis: Xarelto. Full Code. D/w pt and daughter Jimmy Jeffers [phone] in detail   home in am         Case discussed with:  [x]Patient  [x]Family  [x]Nursing  []Case Management  DVT Prophylaxis:  [x]Xarelto  []Hep SQ  []SCDs  []Coumadin   []On Heparin gtt    Objective:   VS:   Visit Vitals  /88 (BP 1 Location: Left arm, BP Patient Position: At rest)   Pulse 68   Temp 97 °F (36.1 °C)   Resp 15   Ht 5' (1.524 m)   Wt 131.5 kg (290 lb)   SpO2 94%   BMI 56.64 kg/m²      Tmax/24hrs: Temp (24hrs), Av.7 °F (36.5 °C), Min:97 °F (36.1 °C), Max:98.1 °F (36.7 °C)  IOBRIEF    Intake/Output Summary (Last 24 hours) at 2019 1253  Last data filed at 2019 1226  Gross per 24 hour   Intake 480 ml   Output 4400 ml   Net -3920 ml       General:  Alert, cooperative, no acute distress    Pulmonary: Bilaterally clear, no rales. No Wheezing. Cardiovascular: Regular rate and Rhythm. GI:  Soft, Non distended, Non tender. + Bowel sounds. Extremities:  + edema. Psych: Good insight. Not anxious or agitated. Neurologic: Alert and oriented X 3.  No acute neuro deficits.   Additional:    Medications:   Current Facility-Administered Medications   Medication Dose Route Frequency    spironolactone (ALDACTONE) tablet 25 mg  25 mg Oral DAILY    furosemide (LASIX) injection 40 mg  40 mg IntraVENous Q12H    [START ON 5/23/2019] furosemide (LASIX) tablet 40 mg  40 mg Oral ACB&D    polyethylene glycol (MIRALAX) packet 17 g  17 g Oral DAILY    ARIPiprazole (ABILIFY) tablet 15 mg  15 mg Oral DAILY    fluticasone propionate (FLONASE) 50 mcg/actuation nasal spray 2 Spray  2 Spray Both Nostrils DAILY    isosorbide mononitrate ER (IMDUR) tablet 30 mg  30 mg Oral QPM    levothyroxine (SYNTHROID) tablet 125 mcg  125 mcg Oral 6am    metoprolol tartrate (LOPRESSOR) tablet 12.5 mg  12.5 mg Oral BID    multivitamin, tx-iron-ca-min (THERA-M w/ IRON) tablet 1 Tab  1 Tab Oral DAILY    oxyCODONE IR (ROXICODONE) tablet 5 mg  5 mg Oral Q6H PRN    PARoxetine (PAXIL) tablet 20 mg  20 mg Oral DAILY    famotidine (PEPCID) tablet 20 mg  20 mg Oral BID    senna-docusate (PERICOLACE) 8.6-50 mg per tablet 1 Tab  1 Tab Oral QHS    simvastatin (ZOCOR) tablet 40 mg  40 mg Oral QHS    rivaroxaban (XARELTO) tablet 20 mg  20 mg Oral DAILY    hydrALAZINE (APRESOLINE) 20 mg/mL injection 10 mg  10 mg IntraVENous Q6H PRN    albuterol-ipratropium (DUO-NEB) 2.5 MG-0.5 MG/3 ML  3 mL Nebulization Q4H PRN       Labs:    Recent Results (from the past 24 hour(s))   CBC W/O DIFF    Collection Time: 05/22/19  3:30 AM   Result Value Ref Range    WBC 5.6 4.6 - 13.2 K/uL    RBC 5.13 4.20 - 5.30 M/uL    HGB 12.8 12.0 - 16.0 g/dL    HCT 42.9 35.0 - 45.0 %    MCV 83.6 74.0 - 97.0 FL    MCH 25.0 24.0 - 34.0 PG    MCHC 29.8 (L) 31.0 - 37.0 g/dL    RDW 16.5 (H) 11.6 - 14.5 %    PLATELET 113 457 - 600 K/uL    MPV 10.4 9.2 - 28.0 FL   METABOLIC PANEL, BASIC    Collection Time: 05/22/19  3:30 AM   Result Value Ref Range    Sodium 135 (L) 136 - 145 mmol/L    Potassium 3.5 3.5 - 5.5 mmol/L    Chloride 92 (L) 100 - 108 mmol/L    CO2 40 (H) 21 - 32 mmol/L    Anion gap 3 3.0 - 18 mmol/L    Glucose 120 (H) 74 - 99 mg/dL    BUN 11 7.0 - 18 MG/DL    Creatinine 0.97 0.6 - 1.3 MG/DL    BUN/Creatinine ratio 11 (L) 12 - 20      GFR est AA >60 >60 ml/min/1.73m2    GFR est non-AA 59 (L) >60 ml/min/1.73m2    Calcium 8.7 8.5 - 10.1 MG/DL       Signed By: Vania Alston MD     May 22, 2019

## 2019-05-23 ENCOUNTER — HOME HEALTH ADMISSION (OUTPATIENT)
Dept: HOME HEALTH SERVICES | Facility: HOME HEALTH | Age: 59
End: 2019-05-23

## 2019-05-23 VITALS
TEMPERATURE: 98.3 F | SYSTOLIC BLOOD PRESSURE: 108 MMHG | HEIGHT: 60 IN | DIASTOLIC BLOOD PRESSURE: 68 MMHG | HEART RATE: 75 BPM | BODY MASS INDEX: 55.87 KG/M2 | WEIGHT: 284.6 LBS | RESPIRATION RATE: 18 BRPM | OXYGEN SATURATION: 96 %

## 2019-05-23 LAB
ANION GAP SERPL CALC-SCNC: 2 MMOL/L (ref 3–18)
BUN SERPL-MCNC: 15 MG/DL (ref 7–18)
BUN/CREAT SERPL: 16 (ref 12–20)
CALCIUM SERPL-MCNC: 9 MG/DL (ref 8.5–10.1)
CHLORIDE SERPL-SCNC: 95 MMOL/L (ref 100–108)
CO2 SERPL-SCNC: 41 MMOL/L (ref 21–32)
CREAT SERPL-MCNC: 0.95 MG/DL (ref 0.6–1.3)
GLUCOSE SERPL-MCNC: 103 MG/DL (ref 74–99)
POTASSIUM SERPL-SCNC: 3.9 MMOL/L (ref 3.5–5.5)
SODIUM SERPL-SCNC: 138 MMOL/L (ref 136–145)

## 2019-05-23 PROCEDURE — 74011250637 HC RX REV CODE- 250/637: Performed by: HOSPITALIST

## 2019-05-23 PROCEDURE — 80048 BASIC METABOLIC PNL TOTAL CA: CPT

## 2019-05-23 PROCEDURE — 97535 SELF CARE MNGMENT TRAINING: CPT

## 2019-05-23 PROCEDURE — 97116 GAIT TRAINING THERAPY: CPT

## 2019-05-23 PROCEDURE — 97530 THERAPEUTIC ACTIVITIES: CPT

## 2019-05-23 PROCEDURE — 36415 COLL VENOUS BLD VENIPUNCTURE: CPT

## 2019-05-23 PROCEDURE — 77030038269 HC DRN EXT URIN PURWCK BARD -A

## 2019-05-23 RX ORDER — FUROSEMIDE 40 MG/1
40 TABLET ORAL 2 TIMES DAILY
Qty: 60 TAB | Refills: 0 | Status: SHIPPED | OUTPATIENT
Start: 2019-05-23 | End: 2019-06-06 | Stop reason: DRUGHIGH

## 2019-05-23 RX ORDER — SPIRONOLACTONE 25 MG/1
25 TABLET ORAL DAILY
Qty: 30 TAB | Refills: 0 | Status: SHIPPED | OUTPATIENT
Start: 2019-05-24 | End: 2019-06-06 | Stop reason: SDUPTHER

## 2019-05-23 RX ORDER — POLYETHYLENE GLYCOL 3350 17 G/17G
17 POWDER, FOR SOLUTION ORAL DAILY
Qty: 30 PACKET | Refills: 0 | Status: SHIPPED | OUTPATIENT
Start: 2019-05-24 | End: 2020-08-17

## 2019-05-23 RX ADMIN — FUROSEMIDE 40 MG: 40 TABLET ORAL at 08:28

## 2019-05-23 RX ADMIN — MULTIPLE VITAMINS W/ MINERALS TAB 1 TABLET: TAB at 08:29

## 2019-05-23 RX ADMIN — FAMOTIDINE 20 MG: 20 TABLET ORAL at 17:23

## 2019-05-23 RX ADMIN — PAROXETINE HYDROCHLORIDE 20 MG: 20 TABLET, FILM COATED ORAL at 08:29

## 2019-05-23 RX ADMIN — METOPROLOL TARTRATE 12.5 MG: 25 TABLET, FILM COATED ORAL at 08:29

## 2019-05-23 RX ADMIN — SPIRONOLACTONE 25 MG: 25 TABLET ORAL at 08:29

## 2019-05-23 RX ADMIN — OXYCODONE HYDROCHLORIDE 5 MG: 5 TABLET ORAL at 05:05

## 2019-05-23 RX ADMIN — ARIPIPRAZOLE 15 MG: 15 TABLET ORAL at 08:30

## 2019-05-23 RX ADMIN — FAMOTIDINE 20 MG: 20 TABLET ORAL at 08:28

## 2019-05-23 RX ADMIN — RIVAROXABAN 20 MG: 20 TABLET, FILM COATED ORAL at 08:29

## 2019-05-23 RX ADMIN — FUROSEMIDE 40 MG: 40 TABLET ORAL at 17:23

## 2019-05-23 RX ADMIN — POLYETHYLENE GLYCOL 3350 17 G: 17 POWDER, FOR SOLUTION ORAL at 08:28

## 2019-05-23 RX ADMIN — METOPROLOL TARTRATE 12.5 MG: 25 TABLET, FILM COATED ORAL at 17:23

## 2019-05-23 RX ADMIN — OXYCODONE HYDROCHLORIDE 5 MG: 5 TABLET ORAL at 13:48

## 2019-05-23 RX ADMIN — FLUTICASONE PROPIONATE 2 SPRAY: 50 SPRAY, METERED NASAL at 09:00

## 2019-05-23 RX ADMIN — LEVOTHYROXINE SODIUM 125 MCG: 125 TABLET ORAL at 05:02

## 2019-05-23 NOTE — DISCHARGE SUMMARY
[address] [facility] SUMMARY    Name:  Becki Schroeder  MR#:   [id number]  :  1960  ACCOUNT #:  [de-identified]  ADMIT DATE:  2019  DISCHARGE DATE:    DISCHARGE DATE:  2019    PRIMARY CARE PHYSICIAN:  Sowmya Flores MD    DISPOSITION:  Discharged to home with home healthcare. DISCHARGE CONDITION:  Stable. DISCHARGE DIAGNOSES:  1. Acute-on-chronic diastolic heart failure, improved now. 2.  Hypertension. 3.  Hypothyroidism. 4.  Coronary artery disease. 5.  Gastroesophageal reflux disease. 6.  Chronic atrial fibrillation. 7.  Pulmonary hypertension. 8.  Chronic respiratory failure with hypoxia and hypercapnia, on home oxygen. 9.  Morbid obesity. 10.  Wheelchair bound status. 11.  Anxiety and depression. 12.  Pulmonary hypertension. DISCHARGE MEDICATIONS:  1. Abilify 15 mg daily. 2.  DuoNeb every six hours as needed. 3.  Flonase two sprays bilateral nostrils daily. 4.  Lasix 40 mg b.i.d.  5.  Imdur 30 mg daily. 6.  Metoprolol 12.5 mg b.i.d.  7.  Multivitamin one tablet daily. 8.  Oxycodone IR 5 mg every six hours p.r.n.  9.  Paxil 20 mg daily. 10.  MiraLax 17 g daily. 11.  Zantac 150 mg b.i.d.  12.  Chen-Colace one tablet at bedtime. 13.  Spironolactone 25 mg daily. 14.  Synthroid 125 mcg daily. 15.  Ventolin two puffs every six hours as needed. 16.  Xarelto 20 mg daily. 17.  Zocor 40 mg at bedtime. CONSULTATIONS IN THE HOSPITAL:  Consultation with Dr. Awilda Oropeza of Cardiology. PROCEDURES:  None. INVESTIGATIONS IN THE HOSPITAL STAY:  The patient had an echocardiogram, which showed ejection fraction of 55% with no regional wall motion abnormalities. Left ventricular diastolic dysfunction noted. Moderate-to-severe pulmonary hypertension noted. HOSPITAL COURSE:  This is a 70-year-old -American female, who presented to the emergency room with shortness of breath, noted to have CHF with bilateral edema and pulmonary edema.   The patient was admitted to the hospital, was started on IV Lasix and Cardiology was consulted. With IV Lasix, the patient diuresed well. The patient had more than 13.6 liters of negative balance in the hospital.  With the diuresis, the patient's symptoms improved. Her edema improved. The patient has known history of severe pulmonary hypertension and chronic respiratory failure who is on home oxygen, is on 3 liters of oxygen. She has been weaned off back to 3 liters of oxygen now. With the diuresis, she developed some mild contraction alkalosis. She was seen and evaluated by Physical Therapy, recommended home healthcare. She is wheelchair bound and she is able to transfer from bed to chair. The patient after aggressive diuresis with IV Lasix, her Lasix has been changed to p.o. Lasix. Cardiology has been following the patient and Cardiology saw the patient today, recommended okay for discharge from their standpoint and follow up as an outpatient. The patient is currently hemodynamically and medically stable for discharge. The patient will be discharged home with home healthcare. For discharge instructions, followup appointments, and physical exam, please refer to the previous discharge summary. The patient AAOx3. I discussed with the patient about discharge plan and followup appointments. I also discussed with the patient's daughter over the phone in detail about her discharge plan and followup appointment. I have also discussed with both of them about the fluid restrictions and medication compliance and followup appointments. All of them completely understood and agreed with the plan. I also answered all of their questions and concerns appropriately.       Ángel Soto MD      [id number]  D:  05/23/2019 11:54  T:  05/23/2019 15:42  JOB #:  [id number]  CC:  Mamadou Arreola MD

## 2019-05-23 NOTE — HOME CARE
Received HH referral; Discharge noted for today; [facility] will follow for SN,Telehealth for CHF,PT,OT ; pt states she already has DME: has hosp bed,home O2 with [employer],Nebulizer,RW,Rollator,BSC and shower chair; pt states her daughter Kacey Monk lives with her and is her caregiver; [facility] will follow. CARLTON MCDANIEL.

## 2019-05-23 NOTE — PROGRESS NOTES
Per Carlos Hill (DOREEN) called [employer] [phone] transportation to schedule stretcher transport and received confirmation [id number] from Yeny Mir. [place] called [facility] 5:30pm transport to patient's home ([address], [address]). Informed Carlos Hill of transportation arrangements.

## 2019-05-23 NOTE — DISCHARGE SUMMARY
Discharge Summary    Patient: Amparo Kanner MRN: [id number]  CSN: [id number]    YOB: 1960  Age: 60 y.o. Sex: female    DOA: 5/18/2019 LOS:  LOS: 4 days   Discharge Date:      Disposition: Home with [facility] AT [facility]    Admission Diagnoses: Acute exacerbation of CHF (congestive heart failure) ([facility] [I50.9]  [facility] [I[facility]]    Discharge Diagnoses:  PLEASE SEE DICTATION. Discharge Condition: Stable    PHYSICAL EXAM  Visit Vitals  /82 (BP 1 Location: Left arm, BP Patient Position: At rest)   Pulse 75   Temp 98.1 °F (36.7 °C)   Resp 20   Ht 5' (1.524 m)   Wt 129.1 kg (284 lb 9.6 oz)   SpO2 96%   BMI 55.58 kg/m²       General: Alert, cooperative, no acute distress    Lungs:  CTA Bilaterally. No Wheezing/Rales. Heart:  Regular rate and Rhythm. Abdomen: Soft, Non distended, Non tender. + Bowel sounds. Extremities: No edema  Neurologic:  AA oriented X 3. Moves all extremities. Hospital Course: Please see dictation. code # [id number]. Discharge Medications:     Current Discharge Medication List      START taking these medications    Details   spironolactone (ALDACTONE) 25 mg tablet Take 1 Tab by mouth daily. Qty: 30 Tab, Refills: 0      polyethylene glycol (MIRALAX) 17 gram packet Take 1 Packet by mouth daily. Qty: 30 Packet, Refills: 0      OTHER CBC, BMP in 1 week  Dx: CHF  Fax results to Dr. Fan Pod: 1 Each, Refills: 0         CONTINUE these medications which have NOT CHANGED    Details   XARELTO 20 mg tab tablet       oxyCODONE IR (ROXICODONE) 5 mg immediate release tablet Take 1 Tab by mouth every six (6) hours as needed for Pain. Max Daily Amount: 20 mg.  Qty: 6 Tab, Refills: 0    Associated Diagnoses: Primary osteoarthritis of right hip      senna-docusate (PERICOLACE) 8.6-50 mg per tablet Take 1 Tab by mouth nightly.   Qty: 30 Tab, Refills: 0      albuterol-ipratropium (DUO-NEB) 2.5 mg-0.5 mg/3 ml nebu 3 mL by Nebulization route every six (6) hours as needed. Qty: 30 Nebule, Refills: 0      furosemide (LASIX) 40 mg tablet Take 1 Tab by mouth two (2) times a day. 40 mg po daily  Qty: 60 Tab, Refills: 2      metoprolol tartrate (LOPRESSOR) 25 mg tablet Take 0.5 Tabs by mouth two (2) times a day. Qty: 30 Tab, Refills: 6      fluticasone (FLONASE) 50 mcg/actuation nasal spray 2 Sprays by Both Nostrils route daily. Qty: 1 Bottle, Refills: 2      isosorbide mononitrate ER (IMDUR) 30 mg tablet Take 1 Tab by mouth every evening. Qty: 90 Tab, Refills: 3      multivitamin, tx-iron-ca-min (THERA-M W/ IRON) 9 mg iron-400 mcg tab tablet Take 1 Tab by mouth daily. Qty: 30 Tab, Refills: 0      raNITIdine (ZANTAC) 150 mg tablet Take 150 mg by mouth two (2) times a day. VENTOLIN HFA 90 mcg/actuation inhaler Take 2 Puffs by inhalation every four (4) hours as needed for Wheezing or Shortness of Breath. Qty: 1 Inhaler, Refills: 0      PARoxetine (PAXIL) 20 mg tablet Take 1 Tab by mouth daily. Qty: 30 Tab, Refills: 3      simvastatin (ZOCOR) 40 mg tablet Take  by mouth nightly. to obtain from pharmacy      aripiprazole (ABILIFY) 15 mg tablet Take 15 mg by mouth daily. to obtain from pharmacy      levothyroxine (SYNTHROID) 125 mcg tablet Take 125 mcg by mouth Daily (before breakfast). STOP taking these medications       warfarin (COUMADIN) 7.5 mg tablet Comments:   Reason for Stopping:             · It is important that you take the medication exactly as they are prescribed. · Keep your medication in the bottles provided by the pharmacist and keep a list of the medication names, dosages, and times to be taken in your wallet. · Do not take other medications without consulting your doctor.      DIET:  Cardiac Diet, fluid restriction 1200 ml per day       ACTIVITY: Activity as tolerated  Home health care for Skilled care for CHF tele monitoring, Hypertension and medication management    ·    PT/OT consult      ADDITIONAL INFORMATION: If you experience any of the following symptoms but not limited to Fever, chills, nausea, vomiting, diarrhea, change in mentation, falling, bleeding, shortness of breath, chest pain, please call your primary care physician or return to the emergency room if you cannot get hold of your doctor:     FOLLOW UP CARE:  Dr. Kaylan Stubbs MD in 5-7 days. Please call and set up an appointment.   Dr. Gwyn Fried in 2 week  Dr. Jennifer Chun in 3 week    Minutes spent on discharge: 40 minutes spent coordinating this discharge (review instructions/follow-up, prescriptions, preparing report for sign off)    Angelina Sainz MD  5/23/2019 10:28 AM

## 2019-05-23 NOTE — PROGRESS NOTES
Problem: Self Care Deficits Care Plan (Adult)  Goal: *Acute Goals and Plan of Care (Insert Text)  Description  Occupational Therapy Goals  Initiated 5/21/2019 within 7 day(s). 1.  Patient will perform upper body dressing with supervision/set-up. 2.  Patient will perform grooming with independence. 3.  Patient will perform bed mobility in preporation for ADL task with minimal assistance. 4.  Patient will participate in upper extremity therapeutic exercise/activities with independence for 5-7 minutes. 5.  Patient will utilize energy conservation techniques during functional activities with verbal and visual cues. Prior Level of Function: Patient needed assist with self-care (dressing, bathing, toileting, feeding) and reports using rollator and wheelchair for functional mobility PTA. Pt reports having diapers at home, but she using the bedside commode. Patient also reports having aide come to house 3x/week to assist in addition to having her daughter Gagan Wesley) assist. Pt has hospital bed (which she says needs to be fixed), BSC, rollator, walker, w/c, and shower chair at home. Outcome: Progressing Towards Goal   OCCUPATIONAL THERAPY TREATMENT    Patient: Brandi Clmeents (28 y.o. female)  Date: 5/23/2019  Diagnosis: Acute exacerbation of CHF (congestive heart failure) ([facility]) [I50.9]  [facility] [I[facility]] <principal problem not specified>       Precautions: Fall    Chart, occupational therapy assessment, plan of care, and goals were reviewed. ASSESSMENT:  Pt sitting up in bed upon approach and seen with PT to increase safety of pt and staff as pt has previously required Total A for all mobility. Today, she has made significant progress towards goals and mobility. She was calling for assistance to use the bedpan, however agreeable to [facility] transfer as this is her PLOF.  Pt completed all bed mobility with SBA to sit EOB and stood with SBA for stand-pivot transfer to [facility] (pt completed 360 degree turn despite cues for scoot/squat-pivot transfer). Additional time for toileting with hygiene completed seated on commode given SBA; pt in somewhat unsafe positioning on commode to reach backside. She returned to bed with stand-pivot tsf given CGA/HHA; pt then reported she was going to the restroom again and requested to return to [facility]. SPT completed with toileting again; pt returned to bed with HHA/CGA. She required Max A for sit to supine to bring LEs up in bed. She pulled herself up to [facility] and was placed in chair position. She required cues for deep breathing throughout transitional movements as pt tends to hold her breath. Progression toward goals:  ?          Improving appropriately and progressing toward goals  ? Improving slowly and progressing toward goals  ? Not making progress toward goals and plan of care will be adjusted     PLAN:  Patient continues to benefit from skilled intervention to address the above impairments. Continue treatment per established plan of care. Discharge Recommendations:  Home Health  Further Equipment Recommendations for Discharge:  Pt has DME       SUBJECTIVE:   Patient stated ? I'm peeing again. ?    OBJECTIVE DATA SUMMARY:   Cognitive/Behavioral Status:  Neurologic State: Alert  Orientation Level: Oriented X4  Cognition: Impaired decision making  Safety/Judgement: Fall prevention    Functional Mobility and Transfers for ADLs:   Bed Mobility:  Rolling: Supervision  Supine to Sit: Stand-by assistance  Sit to Supine: Maximum assistance  Scooting: Supervision    Transfers:  Sit to Stand: Contact guard assistance  Stand to Sit: Contact guard assistance   Toilet Transfer : Contact guard assistance     Balance:  Sitting: Impaired; With support  Sitting - Static: Good (unsupported)  Sitting - Dynamic: Fair (occasional)  Standing: Impaired; With support  Standing - Static: Fair  Standing - Dynamic : Fair(-)    ADL Intervention:  Grooming  Grooming Assistance: Set-up  Washing Hands: Set-up    Toileting  Toileting Assistance: Minimum assistance  Bladder Hygiene: Stand-by assistance  Bowel Hygiene: Minimum assistance  Clothing Management: Modified independent  Adaptive Equipment: Elevated seat    Pain:  Pain level pre-treatment: 0/10   Pain level post-treatment: 0/10    Activity Tolerance:    Poor; pt holds breath during ADLs/mobility requiring increased rest breaks and cues for DB    Please refer to the flowsheet for vital signs taken during this treatment. After treatment:   ?  Patient left in no apparent distress sitting up in chair  ? Patient left in no apparent distress in bed in chair position  ? Call bell left within reach  ? Nursing notified  ? Caregiver present  ? Bed alarm activated    COMMUNICATION/EDUCATION:   ? Role of Occupational Therapy in the acute care setting  ? Home safety education was provided and the patient/caregiver indicated understanding. ? Patient/family have participated as able in working towards goals and plan of care. ? Patient/family agree to work toward stated goals and plan of care. ? Patient understands intent and goals of therapy, but is neutral about his/her participation. ? Patient is unable to participate in goal setting and plan of care.       Thank you for this referral.  RITO Khan   Time Calculation: 31 mins

## 2019-05-23 NOTE — PROGRESS NOTES
Bedside shift change report given to DELMI De Leon (oncoming nurse) by Chang Ramirez RN (offgoing nurse). Report included the following information SBAR, Kardex and MAR.

## 2019-05-23 NOTE — PROGRESS NOTES
PHYSICAL THERAPY TREATMENT    Patient: Alicia Marcum (34 y.o. female)  Date: 5/23/2019  Diagnosis: Acute exacerbation of CHF (congestive heart failure) ([facility]) [I50.9]  CHF (congestive heart failure) ([email or web address] [I50.9]   Precautions: Fall   Chart, physical therapy assessment, plan of care and goals were reviewed. OBJECTIVE/ ASSESSMENT:  Patient found supine in bed willing to work with PT. Patient seen with OT to maximize safety of patient and staff. Pt made great progress toward goals this tx requiring much less assistance than last tx. Pt sat at EOB then stood to ambulate to b/s commode which she dis by facing commode and placing her bilateral UEs on arm rest for support. Pt took turning steps to sit then had large BM. Pt performed mar-care in sitting then stood to ambulate back to be in same fashion. Pt reports need to use commode again thus transferred to and from commode one more time before she was returned to supine. Pt is able to scoot herself upward in bed utilizing bed rails with SBA. Progression toward goals:  ?      Improving appropriately and progressing toward goals  ? Improving slowly and progressing toward goals  ? Not making progress toward goals and plan of care will be adjusted     PLAN:  Patient continues to benefit from skilled intervention to address the above impairments. Continue treatment per established plan of care. Discharge Recommendations:  Home Health  Further Equipment Recommendations for Discharge:  rolling walker     SUBJECTIVE:   Patient stated ? I got to go to the bathroom. ?    OBJECTIVE DATA SUMMARY:   Critical Behavior:  Neurologic State: Alert  Orientation Level: Oriented X4  Cognition: Appropriate decision making, Appropriate for age attention/concentration, Appropriate safety awareness, Follows commands  Safety/Judgement: Fall prevention  Functional Mobility Training:  Bed Mobility:  Rolling: Supervision  Supine to Sit: Stand-by assistance  Sit to Supine: Maximum assistance  Scooting: Supervision  Transfers:  Sit to Stand: Contact guard assistance  Stand to Sit: Contact guard assistance  Balance:  Sitting: Impaired; With support  Sitting - Static: Good (unsupported)  Sitting - Dynamic: Fair (occasional)  Standing: Impaired; With support  Standing - Static: Fair  Standing - Dynamic : Fair(-)  Ambulation/Gait Training:  Distance (ft): 8 Feet (ft)  Assistive Device: (None)  Ambulation - Level of Assistance: Minimal assistance  Gait Abnormalities: Antalgic;Decreased step clearance  Base of Support: Center of gravity altered  Stance: Weight shift  Speed/Reyna: Slow  Step Length: Left shortened;Right shortened      Pain:  Pain level pre-treatment: Not rated  Pain level post-treatment: Not rated    Activity Tolerance:   Fair  Please refer to the flowsheet for vital signs taken during this treatment. After treatment:   ? Patient left in no apparent distress sitting up in chair  ? Patient left in no apparent distress in bed  ? Call bell left within reach  ? Nursing notified  ? Caregiver present  ? Bed alarm activated  ? SCDs applied    COMMUNICATION/EDUCATION:   ?         Role of Physical Therapy  ? Fall prevention  ? Bed mobility  ? Transfers  ? Ambulation / gait  ? Assistive device management  ? Stairs  ? Body mechanics  ? Position change   ? Therapeutic exercise  ? Activity pacing / energy conservation  ?          Other:      Naman Mendiola PTA   Time Calculation: 32 mins

## 2019-05-23 NOTE — ADT AUTH CERT NOTES
Heart Failure - Care Day 4 (5/22/2019) by Clive Penaloza RN  
 
   
Review Status Review Entered Completed 5/22/2019 16:22  
   
Criteria Review Care Day: 4 Care Date: 5/22/2019 Level of Care: Telemetry Guideline Day 3 Clinical Status  
(X) * Hemodynamic stability 5/22/2019 16:22:47 EDT by Clive Penaloza /88 P 68 TEMP 97 RR 15 SPO2 94%. (X) * Tachypnea absent 5/22/2019 16:22:47 EDT by Clive Penaloza RR 15.   
  
(X) * Oxygenation at baseline or acceptable for next level of care 5/22/2019 16:22:47 EDT by Clive Penaloza SPO2 94% O2 2 LPM NC.   
  
(X) * Dyspnea absent, at baseline, or acceptable for next level of care 5/22/2019 16:22:47 EDT by Clive Penaloza ABSENT.   
  
(X) * Cardiac rate and rhythm acceptable 5/22/2019 16:22:47 EDT by Lukas Hernandez.   
  
( ) * Pulmonary edema absent or acceptable for next level of care   
(X) * Peripheral or sacral edema absent, at baseline, or improved 5/22/2019 16:22:47 EDT by Clive Penaloza LEG EDEMA IMPROVING.   
  
(X) * Mental status at baseline 5/22/2019 16:22:47 EDT by Clive Penaloza AAOX3.   
  
( ) * Volume status acceptable on oral medication 5/22/2019 16:22:47 EDT by Clive Penaloza Cont IV diuretic. >13 L neg balance. Monitor renal function and electrolytes. (X) * Renal function at baseline or acceptable for next level of care 5/22/2019 16:22:47 EDT by Clive Penaloza BUN 11. CREAT 0.97. (X) * Electrolyte levels normal or acceptable for next level of care 5/22/2019 16:22:47 EDT by Clive Penaloza . CL 92. CO2 40. GLUCOSE 120. (X) * Immediate precipitating factors absent or controlled 5/22/2019 16:22:47 EDT by Clive Penaloza CONTROLLED. ( ) * Discharge plans and education understood Activity  
(X) * Ambulatory 5/22/2019 16:22:47 EDT by Clive Penaloza AMBULATE IN ROOM. Routes  
(X) * Oral hydration, medications, and diet 5/22/2019 16:22:47 EDT by Clive Penaloza CARDIAC DIET. HOME PO MEDS. ROXICODONE 5MG PO Q6H PRN. XARELTO 20MG PO QD. ALDACTONE 25MG PO QD. Interventions ( ) * Oxygen absent 5/22/2019 16:22:47 EDT by Shanae Locke O2 2 LPM NC. Medications (X) Diuretics 5/22/2019 16:22:47 EDT by Shanae Locke LASIX 40MG IV Q12H. (X) Beta-blocker 5/22/2019 16:22:47 EDT by Shanae Locke LOPRESSOR 12.5MG PO BID. * Milestone  
  
   
Heart Failure - Care Day 3 (5/21/2019) by Shanae Locke RN  
 
   
Review Status Review Entered Completed 5/22/2019 16:14  
   
Criteria Review Care Day: 3 Care Date: 5/21/2019 Level of Care: Telemetry Guideline Day 2 Level Of Care (X) Intermediate care or floor 5/22/2019 16:14:04 EDT by Shanae Locke TELE. Clinical Status  
(X) * Hemodynamic stability 5/22/2019 16:14:04 EDT by Shanae Locke /83 P 74 TEMP 97.6 RR 19 SPO2 95%. (X) * Mental status at baseline 5/22/2019 16:14:04 EDT by Shanae Locke AAOX3.   
  
(X) * MI excluded 5/22/2019 16:14:04 EDT by Shanae Locke NOT NOTED.   
  
(X) * Cardiac rate and rhythm acceptable 5/22/2019 16:14:04 EDT by Cynthia Monique.   
  
(X) * Oxygenation at baseline or improved 5/22/2019 16:14:04 EDT by Shanae Locke O2 2 LPM NC. SPO2 95%. (X) * Pulmonary edema absent or improved 5/22/2019 16:14:04 EDT by Shanae Locke PATIENT FEELS BETTER, NO SOB. LEG SWELLING BETTER. LUNGS CLEAR. Activity (X) Advance activity as tolerated 5/22/2019 16:14:04 EDT by Shanae Locke AMBULATE IN ROOM. MOBILITY SERVICES DAILY CONSULT. OT AND PT CONSULTS. Routes  
(X) Oral or parenteral medications 5/22/2019 16:14:04 EDT by Shanae Locke HOME PO MEDS. ROXICODONE 5MG PO Q6H PRN. XARELTO 20MG PO QD. (X) Low-salt diet 5/22/2019 16:14:04 EDT by Shanae Locke CARDIAC REGULAR. Interventions  
(X) * Pulmonary catheter absent 5/22/2019 16:14:04 EDT by Shanae Locke ABSENT. (X) Possible electrolytes[I] 5/22/2019 16:14:04 EDT by Gabby Bauman CL 96. CO2 40. GLUCOSE 121. (X) Oxygen 5/22/2019 16:14:04 EDT by Gabby Bauman 2 LPM NC. Medications (X) Diuretics 5/22/2019 16:14:04 EDT by Gabby Bauman LASIX 40MG IV Q12H. (X) Beta-blocker 5/22/2019 16:14:04 EDT by Gabby Bauman LOPRESSOR 12.5MG PO BID. * Milestone Additional Notes 5/21/19 ECHO> · Left Ventricle: Mild concentric hypertrophy. Estimated left ventricular ejection fraction is 51 - 55%. Visually measured ejection fraction. No regional wall motion abnormality noted. Inconclusive left ventricular diastolic function. · Right Ventricle: Dilated right ventricle. Reduced systolic function. Abnormal right ventricular septal motion. Systolic flattening of interventricular septum consistent with right ventricle pressure overload. · Right Atrium: Dilated right atrium. · Tricuspid Valve: Mild to moderate tricuspid valve regurgitation is present. · Pulmonary Artery: Moderate to severe pulmonary hypertension at 61 mmHg. · IVC/Hepatic Veins: Moderately elevated central venous pressure (10-15 mmHg); IVC diameter is larger than 21 mm and collapses more than 50% with respiration. FULL CODE. INTERNAL MED> Assessment/Plan:  
Acute Diastolic CHF Exacerbation: · Cont IV diuretic. >9 L neg balance. Monitor renal function and electrolytes. · 2 Echo noted. Cardiology Consult noted.     
   
Essential Hypertension: BP better   
    Continue her home medication and IV hydralazine PRN.   
   
Hypothyroidism: Continue Levothyroxine.   
   
CAD : Continue Imdur.   
   
Anxiety/Depression: Continue Paxel.   
   
GERD: Continue Zantac  
   
Atrial Fibrillation: Continue metoprolol and Xarelto.  
   
Pul HTN: cont O2  
   
chronic resp failure with hypoxia and hypercapnia on home O2  
   
  
CARDIOLOGY> Assessment & Plan:  
   
 Echo completed this AM with results to follow Symptoms continuing to improve with IV diuresis with a negative fluid balance >7L Patient would likely benefit from an additional day of IV lasix. Continue to monitor renal function and replace electrolytes as indicated Would consider adding aldactone to diuretic regimen Uptitrate medications as indicated for better BP control Continue remainder of cardiac meds

## 2019-05-23 NOTE — DISCHARGE INSTRUCTIONS
Discharge Instructions    Patient: Elio Wei MRN: [id number]  CSN: [id number]    YOB: 1960  Age: 60 y.o. Sex: female    DOA: 5/18/2019 LOS:  LOS: 4 days   Discharge Date:      DIET:  Cardiac Diet    ACTIVITY: Activity as tolerated  Home health care for Skilled care for CHF tele monitoring, Hypertension and medication management    ·    PT/OT consult      ADDITIONAL INFORMATION: If you experience any of the following symptoms but not limited to Fever, chills, nausea, vomiting, diarrhea, change in mentation, falling, bleeding, shortness of breath, chest pain, please call your primary care physician or return to the emergency room if you cannot get hold of your doctor:     FOLLOW UP CARE:  Dr. Frank Guardado MD in 5-7 days. Please call and set up an appointment.   Dr. Del Valle  in 2 week  Dr. Tomeka Newman in 3 week      Dwight Bocanegra MD  5/23/2019 10:26 AM

## 2019-05-23 NOTE — ROUTINE PROCESS
Bedside and Verbal shift change report given to Tirso Alvarez RN (oncoming nurse) by Huong Gilmore (offgoing nurse). Report included the following information SBAR, Kardex, Intake/Output and MAR.

## 2019-05-23 NOTE — PROGRESS NOTES
Discharge order noted for today. Pt has been accepted to [facility] agency. Met with patient who is agreeable to the transition plan today. Transport has been arranged through [employer] transport, will call nurses station when 10 mins away. Patient's discharge summary and home health  orders have been forwarded to [facility] health  agency via que. Updated bedside RN,  to the transition plan. Discharge information has been documented on the AVS.   Called [facility] regarding repair on hospital bed, there are no exceptions to the $75 fee for maintenance, informed pt.      Nuha Naidu, [facility]  Case Management  [phone]

## 2019-05-25 ENCOUNTER — HOME CARE VISIT (OUTPATIENT)
Dept: HOME HEALTH SERVICES | Facility: HOME HEALTH | Age: 59
End: 2019-05-25

## 2019-05-25 ENCOUNTER — HOME CARE VISIT (OUTPATIENT)
Dept: SCHEDULING | Facility: HOME HEALTH | Age: 59
End: 2019-05-25

## 2019-06-05 NOTE — PROGRESS NOTES
HPI:  Chapin Henson is a 60 y.o. female presenting for a post hospital follow up. She was admitted to [facility] from 5/18-5/23/19 for acute exacerbation of chronic heart failure. She presented to the ER with worsening dyspnea and lower extremity swelling. During admission, she did admit to poor compliance with her medications. She diuresed well with IV lasix and was down greater than 13L prior to discharge. Aldactone was also added to her regimen during that time. She was last seen in the office by Dr. Thierry Bajwa in Nov 2018 and had been hospitalized multiple times prior to that visit for recurrent HFpEF, respiratory failure, UTIs and bradycardia. Her most recent echocardiogram was completed on 5/21/19 and revealed low normal LV function with an EF of 50-55%, mild concentric LVH, dilated right atrium and ventricle and pulmonary hypertension with PASP of 61 mmHg. She presents today reporting that she has been doing okay since she was discharged from the hospital. She does have some shortness of breath which is chronic for her and she does admit that she has been out of her home oxygen. She usually uses the oxygen around the clock for her pulmonary hypertension. She is unable to tell if she has any increased swelling since being in the hospital. She admits that she has not been taking her lasix as prescribed and has only been taking 40 mg daily, rather than BID. She does admit compliance with the remainder of her regimen. She denies any chest discomfort, palpitations, syncope, or PND. She does suffer from orthopnea and chronic shortness of breath. She has a past medical history significant for chronic HFpEF, persistent atrial fibrillation anticoagulated with Xarelto, ANDRADE, without CPAP use, severe pulmonary hypertension for which she is on home oxygen, essential hypertension, hyperlipidemia, morbid obesity, hx/o polysubstance abuse and medication noncompliance.  She also has a history of congenital heart disease with a defect repaired with open heart surgery, but she is unable to provide further details as this was when she was a child. Cardiac Imaging/Procedures:   Echocardiogram from 5/21/19:   EF 51-55%, no RWMA, mild LVH, systolic flattening of interventricular septum consistent with right ventricle pressure overload, dilated right atrium and right ventricle, PHTN with PASP of 61 mmHg - no significant changes compared to study from 5/17/18      Impression/Plan:  1. Acute on chronic HFpEF, appears compensated   2. Persistent atrial fibrillation, anticoagulated with Xarelto  3. Essential hypertension, controlled  4. Hyperlipidemia, on simvastatin 40 mg  5. Pulmonary hypertension, on home oxygen   6. ANDRADE, noncompliant with CPAP  7. Medication noncompliance   8. Morbid obesity     Patient is presenting for a post hospital follow up visit for acute exacerbation of chronic HFpEF. She does admit that she has not been taking her lasix as prescribed and has only been taking 40 mg daily. She is unable to tell if she has any swelling in her legs or abdomen. She does admit to shortness of breath, but this is chronic and she has been out of her home oxygen the past few days. She does not appear to be in decompensated heart failure at this time and she is actually down 4 pounds since her last visit with Dr. Kristopher Izaguirre in 11/2018. I did advise her to start taking an additional dose of lasix in the afternoon and she finally agreed to take an additional 20 mg. She will complete a BMP in about 3 weeks and then follow up with Marcelino Munguia in one month to assure she is staying compliant with her medications and to recheck her volume status. I also discussed at length, the importance of volume and sodium restriction in order to help manage her volume status. She does admit to dietary indiscretions and has not been monitoring what she eats and drinks.  I also discussed the importance of daily weight monitoring to assure she is not quickly accumulating fluid. She agrees to purchase a scale and work on this at home. She remains in atrial fibrillation on her EKG today with a rate of 62 bpm. She admits to compliance with her Xarelto for anticoagulation. Her blood pressure is optimal at 132/86 mmHg so no further medication adjustments will be made today. She will follow up with Mary Canseco as scheduled in one month or sooner if needed. All questions were answered. Brennen Donahue PA-C        Physical:  Vitals:  Visit Vitals  /86   Pulse 62   Ht 5' (1.524 m)   Wt 133.8 kg (295 lb)   SpO2 96%   BMI 57.61 kg/m²     ** 4 lbs down fro 11/2018 visit **    PMH:  Past Medical History:   Diagnosis Date    Atrial fibrillation     CHADS score 1  (-CHF, +HTN, -AGE, -DM, -CVA)    Carpal tunnel syndrome     Chronic pain     Congenital heart disease     presumed pulmonary stenosis s/p repair 1969    Congestive heart failure (HCC)     Degenerative disc disease     Degenerative joint disease     GERD     H/O echocardiogram 04/2017    EF 60-65%, mild concentric LVH, dilated RV with RV dysfunction, RVSP 54 mmHg, RA E, moderate to severe pulmonic regurgitation.  Hypercholesterolemia     Hypertension     Hypothyroid     Morbid obesity     Morbid obesity with BMI of 45.0-49.9, adult ([facility] [address] 2/20/2017    Noncompliance     Schizoaffective disorder     Substance abuse ([facility] [address]     marijuana, crack cocaine       MEDS:  Current Outpatient Medications on File Prior to Visit   Medication Sig Dispense Refill    omeprazole (PRILOSEC) 20 mg capsule       polyethylene glycol (MIRALAX) 17 gram packet Take 1 Packet by mouth daily. 30 Packet 0    XARELTO 20 mg tab tablet       oxyCODONE IR (ROXICODONE) 5 mg immediate release tablet Take 1 Tab by mouth every six (6) hours as needed for Pain. Max Daily Amount: 20 mg. 6 Tab 0    senna-docusate (PERICOLACE) 8.6-50 mg per tablet Take 1 Tab by mouth nightly.  30 Tab 0    albuterol-ipratropium (DUO-NEB) 2.5 mg-0.5 mg/3 ml nebu 3 mL by Nebulization route every six (6) hours as needed. 30 Nebule 0    metoprolol tartrate (LOPRESSOR) 25 mg tablet Take 0.5 Tabs by mouth two (2) times a day. 30 Tab 6    fluticasone (FLONASE) 50 mcg/actuation nasal spray 2 Sprays by Both Nostrils route daily. 1 Bottle 2    isosorbide mononitrate ER (IMDUR) 30 mg tablet Take 1 Tab by mouth every evening. 90 Tab 3    multivitamin, tx-iron-ca-min (THERA-M W/ IRON) 9 mg iron-400 mcg tab tablet Take 1 Tab by mouth daily. 30 Tab 0    raNITIdine (ZANTAC) 150 mg tablet Take 150 mg by mouth two (2) times a day.  VENTOLIN HFA 90 mcg/actuation inhaler Take 2 Puffs by inhalation every four (4) hours as needed for Wheezing or Shortness of Breath. 1 Inhaler 0    PARoxetine (PAXIL) 20 mg tablet Take 1 Tab by mouth daily. 30 Tab 3    simvastatin (ZOCOR) 40 mg tablet Take 40 mg by mouth nightly. to obtain from pharmacy      aripiprazole (ABILIFY) 15 mg tablet Take 15 mg by mouth daily. to obtain from pharmacy      levothyroxine (SYNTHROID) 125 mcg tablet Take 125 mcg by mouth Daily (before breakfast). No current facility-administered medications on file prior to visit. Allergies and Sensitivities:  Allergies   Allergen Reactions    Gabapentin Other (comments)     Unsteady, weakness LEs    Tetanus Vaccines And Toxoid Swelling    Topamax [Topiramate] Other (comments)     weakness       Family History:  Family History   Problem Relation Age of Onset    Hypertension Mother     Coronary Artery Disease Mother     Coronary Artery Disease Father     Hypertension Father        Social History:  She  reports that she has never smoked. She has never used smokeless tobacco.  She  reports that she does not drink alcohol.       Review of Systems:    Constitutional: negative for fevers, chills, sweats, fatigue and malaise  Respiratory: negative for cough  Cardiovascular: negative for chest pain, palpitations, syncope,  PND; positive for SOB, PITTS  Gastrointestinal: negative for nausea, vomiting, diarrhea, melena and abdominal pain  Genitourinary: negative for hematuria  Musculoskeletal: negative for lower extremity swelling or claudication   Neurological: negative for dizziness and weakness.    Behavioral/Psych: negative for anxiety         Physical Exam:     General appearance: no apparent distress  HEENT: normocephalic, atraumatic  Neck: supple, no JVD, no carotid bruits   Respiratory: clear to auscultation bilaterally  Cardiovascular: irregularly irregular rhythm   Abdomen: soft, non-tender, no hepatomegaly  Extremities: trace lower extremity edema  Neurological: alert and oriented to person, place and time  Behavioral/Psych: normal mood and affect       Data:  EKG:    LABS:  Lab Results   Component Value Date/Time    Sodium 138 05/23/2019 02:00 PM    Potassium 3.9 05/23/2019 02:00 PM    Chloride 95 (L) 05/23/2019 02:00 PM    CO2 41 (HH) 05/23/2019 02:00 PM    Glucose 103 (H) 05/23/2019 02:00 PM    BUN 15 05/23/2019 02:00 PM    Creatinine 0.95 05/23/2019 02:00 PM     Lab Results   Component Value Date/Time    Cholesterol, total 104 09/30/2018 04:17 AM    HDL Cholesterol 31 (L) 09/30/2018 04:17 AM    LDL, calculated 54 09/30/2018 04:17 AM    Triglyceride 95 09/30/2018 04:17 AM    CHOL/HDL Ratio 3.4 09/30/2018 04:17 AM     Lab Results   Component Value Date/Time    ALT (SGPT) 20 05/19/2019 12:33 AM

## 2019-06-06 ENCOUNTER — OFFICE VISIT (OUTPATIENT)
Dept: CARDIOLOGY CLINIC | Age: 59
End: 2019-06-06

## 2019-06-06 VITALS
WEIGHT: 293 LBS | SYSTOLIC BLOOD PRESSURE: 132 MMHG | BODY MASS INDEX: 57.52 KG/M2 | HEIGHT: 60 IN | HEART RATE: 62 BPM | OXYGEN SATURATION: 96 % | DIASTOLIC BLOOD PRESSURE: 86 MMHG

## 2019-06-06 DIAGNOSIS — G47.33 OSA (OBSTRUCTIVE SLEEP APNEA): ICD-10-CM

## 2019-06-06 DIAGNOSIS — E78.5 DYSLIPIDEMIA: ICD-10-CM

## 2019-06-06 DIAGNOSIS — I48.19 PERSISTENT ATRIAL FIBRILLATION (HCC): Primary | ICD-10-CM

## 2019-06-06 DIAGNOSIS — Z91.14 HISTORY OF MEDICATION NONCOMPLIANCE: ICD-10-CM

## 2019-06-06 DIAGNOSIS — E66.01 MORBID OBESITY WITH BMI OF 50.0-59.9, ADULT (HCC): ICD-10-CM

## 2019-06-06 DIAGNOSIS — I11.9 BENIGN HYPERTENSIVE HEART DISEASE WITHOUT HEART FAILURE: ICD-10-CM

## 2019-06-06 DIAGNOSIS — I50.32 DIASTOLIC CHF, CHRONIC (HCC): ICD-10-CM

## 2019-06-06 DIAGNOSIS — I27.20 PULMONARY HYPERTENSION (HCC): ICD-10-CM

## 2019-06-06 DIAGNOSIS — E66.01 MORBID OBESITY (HCC): ICD-10-CM

## 2019-06-06 RX ORDER — FUROSEMIDE 40 MG/1
TABLET ORAL
Qty: 60 TAB | Refills: 6 | Status: SHIPPED | OUTPATIENT
Start: 2019-06-06 | End: 2019-08-21

## 2019-06-06 RX ORDER — OMEPRAZOLE 20 MG/1
CAPSULE, DELAYED RELEASE ORAL
COMMUNITY
Start: 2019-05-29 | End: 2019-08-21

## 2019-06-06 RX ORDER — SPIRONOLACTONE 25 MG/1
25 TABLET ORAL DAILY
Qty: 30 TAB | Refills: 6 | Status: SHIPPED | OUTPATIENT
Start: 2019-06-06 | End: 2019-08-21

## 2019-06-06 NOTE — PROGRESS NOTES
Honglucrecia Craven presents today for   Chief Complaint   Patient presents with   [facility] Follow Up       Yolanda Craven preferred language for health care discussion is english/other. Is someone accompanying this pt? Yes minor grandchild    Is the patient using any DME equipment during OV? wheelchair    Depression Screening:  3 most recent PHQ Screens 1/24/2019   PHQ Not Done -   Little interest or pleasure in doing things Not at all   Feeling down, depressed, irritable, or hopeless Not at all   Total Score PHQ 2 0       Learning Assessment:  Learning Assessment 5/6/2016   PRIMARY LEARNER Patient   HIGHEST LEVEL OF EDUCATION - PRIMARY LEARNER  GRADUATED HIGH SCHOOL OR GED   BARRIERS PRIMARY LEARNER NONE   CO-LEARNER CAREGIVER No   PRIMARY LANGUAGE ENGLISH    NEED No   LEARNER PREFERENCE PRIMARY READING   ANSWERED BY patient   RELATIONSHIP SELF       Abuse Screening:  No flowsheet data found. Fall Risk  No flowsheet data found. Pt currently taking Anticoagulant therapy? yes    Coordination of Care:  1. Have you been to the ER, urgent care clinic since your last visit? Hospitalized since your last visit? yes    2. Have you seen or consulted any other health care providers outside of the [address] [place] [address] since your last visit? Include any pap smears or colon screening. no

## 2019-06-06 NOTE — PATIENT INSTRUCTIONS
Start taking lasix 40 mg in the morning and 20 mg in the afternoon  All other medications to remain the same  Monitor weights and diet daily. Complete BMP a few days prior to your next appointment ( around July 5)  Follow up with Rosalio Poole as scheduled in one month    Congestive heart failure teaching reinforced today. Advised to limit sodium intake to no more than 2000mg per day and to also limit fluid intake to 48 ounces per day (unless otherwise specified). Advised to weigh daily every morning and record weights. Instructed to call our office if progressive weight gain is noted over a 2 to 3 day period of time, shortness of breath increases, or if abdominal bloating, nausea, fatigue, or increased lower extremity edema is noted.

## 2019-07-14 ENCOUNTER — APPOINTMENT (OUTPATIENT)
Dept: GENERAL RADIOLOGY | Age: 59
End: 2019-07-14
Attending: EMERGENCY MEDICINE
Payer: MEDICAID

## 2019-07-14 ENCOUNTER — HOSPITAL ENCOUNTER (EMERGENCY)
Age: 59
Discharge: HOME | End: 2019-07-14
Attending: EMERGENCY MEDICINE | Admitting: EMERGENCY MEDICINE
Payer: MEDICAID

## 2019-07-14 VITALS
DIASTOLIC BLOOD PRESSURE: 90 MMHG | TEMPERATURE: 98.6 F | OXYGEN SATURATION: 100 % | HEIGHT: 63 IN | BODY MASS INDEX: 51.91 KG/M2 | RESPIRATION RATE: 13 BRPM | HEART RATE: 52 BPM | WEIGHT: 293 LBS | SYSTOLIC BLOOD PRESSURE: 143 MMHG

## 2019-07-14 DIAGNOSIS — I50.9 ACUTE ON CHRONIC CONGESTIVE HEART FAILURE, UNSPECIFIED HEART FAILURE TYPE (HCC): Primary | ICD-10-CM

## 2019-07-14 LAB
ALBUMIN SERPL-MCNC: 3.6 G/DL (ref 3.4–5)
ALBUMIN/GLOB SERPL: 1 {RATIO} (ref 0.8–1.7)
ALP SERPL-CCNC: 187 U/L (ref 45–117)
ALT SERPL-CCNC: 16 U/L (ref 13–56)
ANION GAP SERPL CALC-SCNC: 3 MMOL/L (ref 3–18)
APPEARANCE UR: CLEAR
ARTERIAL PATENCY WRIST A: YES
AST SERPL-CCNC: 14 U/L (ref 15–37)
BASE EXCESS BLD CALC-SCNC: 12 MMOL/L
BASOPHILS # BLD: 0 K/UL (ref 0–0.1)
BASOPHILS NFR BLD: 0 % (ref 0–2)
BDY SITE: ABNORMAL
BILIRUB SERPL-MCNC: 1.6 MG/DL (ref 0.2–1)
BILIRUB UR QL: NEGATIVE
BNP SERPL-MCNC: 2728 PG/ML (ref 0–900)
BUN SERPL-MCNC: 14 MG/DL (ref 7–18)
BUN/CREAT SERPL: 16 (ref 12–20)
CALCIUM SERPL-MCNC: 8.2 MG/DL (ref 8.5–10.1)
CHLORIDE SERPL-SCNC: 102 MMOL/L (ref 100–108)
CO2 SERPL-SCNC: 38 MMOL/L (ref 21–32)
COLOR UR: YELLOW
CREAT SERPL-MCNC: 0.89 MG/DL (ref 0.6–1.3)
DIFFERENTIAL METHOD BLD: ABNORMAL
EOSINOPHIL # BLD: 0.1 K/UL (ref 0–0.4)
EOSINOPHIL NFR BLD: 3 % (ref 0–5)
ERYTHROCYTE [DISTWIDTH] IN BLOOD BY AUTOMATED COUNT: 16.7 % (ref 11.6–14.5)
GAS FLOW.O2 O2 DELIVERY SYS: ABNORMAL L/MIN
GAS FLOW.O2 SETTING OXYMISER: 4 L/M
GLOBULIN SER CALC-MCNC: 3.7 G/DL (ref 2–4)
GLUCOSE SERPL-MCNC: 92 MG/DL (ref 74–99)
GLUCOSE UR STRIP.AUTO-MCNC: NEGATIVE MG/DL
HCO3 BLD-SCNC: 37.4 MMOL/L (ref 22–26)
HCT VFR BLD AUTO: 35.3 % (ref 35–45)
HGB BLD-MCNC: 10.2 G/DL (ref 12–16)
HGB UR QL STRIP: NEGATIVE
KETONES UR QL STRIP.AUTO: NEGATIVE MG/DL
LACTATE BLD-SCNC: 0.46 MMOL/L (ref 0.4–2)
LEUKOCYTE ESTERASE UR QL STRIP.AUTO: NEGATIVE
LYMPHOCYTES # BLD: 0.7 K/UL (ref 0.9–3.6)
LYMPHOCYTES NFR BLD: 19 % (ref 21–52)
MCH RBC QN AUTO: 24.6 PG (ref 24–34)
MCHC RBC AUTO-ENTMCNC: 28.9 G/DL (ref 31–37)
MCV RBC AUTO: 85.1 FL (ref 74–97)
MONOCYTES # BLD: 0.3 K/UL (ref 0.05–1.2)
MONOCYTES NFR BLD: 9 % (ref 3–10)
NEUTS SEG # BLD: 2.5 K/UL (ref 1.8–8)
NEUTS SEG NFR BLD: 69 % (ref 40–73)
NITRITE UR QL STRIP.AUTO: NEGATIVE
PCO2 BLD: 71.1 MMHG (ref 35–45)
PH BLD: 7.33 [PH] (ref 7.35–7.45)
PH UR STRIP: 7.5 [PH] (ref 5–8)
PLATELET # BLD AUTO: 120 K/UL (ref 135–420)
PMV BLD AUTO: 10.1 FL (ref 9.2–11.8)
PO2 BLD: 104 MMHG (ref 80–100)
POTASSIUM SERPL-SCNC: 3.5 MMOL/L (ref 3.5–5.5)
PROT SERPL-MCNC: 7.3 G/DL (ref 6.4–8.2)
PROT UR STRIP-MCNC: NEGATIVE MG/DL
RBC # BLD AUTO: 4.15 M/UL (ref 4.2–5.3)
SAO2 % BLD: 97 % (ref 92–97)
SERVICE CMNT-IMP: ABNORMAL
SODIUM SERPL-SCNC: 143 MMOL/L (ref 136–145)
SP GR UR REFRACTOMETRY: 1.01 (ref 1–1.03)
SPECIMEN TYPE: ABNORMAL
TOTAL RESP. RATE, ITRR: 16
TROPONIN I SERPL-MCNC: <0.02 NG/ML (ref 0–0.04)
UROBILINOGEN UR QL STRIP.AUTO: 1 EU/DL (ref 0.2–1)
WBC # BLD AUTO: 3.6 K/UL (ref 4.6–13.2)

## 2019-07-14 PROCEDURE — 71045 X-RAY EXAM CHEST 1 VIEW: CPT

## 2019-07-14 PROCEDURE — 81003 URINALYSIS AUTO W/O SCOPE: CPT

## 2019-07-14 PROCEDURE — 99285 EMERGENCY DEPT VISIT HI MDM: CPT

## 2019-07-14 PROCEDURE — 82803 BLOOD GASES ANY COMBINATION: CPT

## 2019-07-14 PROCEDURE — 74011250636 HC RX REV CODE- 250/636: Performed by: EMERGENCY MEDICINE

## 2019-07-14 PROCEDURE — 77010033678 HC OXYGEN DAILY

## 2019-07-14 PROCEDURE — 94762 N-INVAS EAR/PLS OXIMTRY CONT: CPT

## 2019-07-14 PROCEDURE — 83880 ASSAY OF NATRIURETIC PEPTIDE: CPT

## 2019-07-14 PROCEDURE — 36600 WITHDRAWAL OF ARTERIAL BLOOD: CPT

## 2019-07-14 PROCEDURE — 85025 COMPLETE CBC W/AUTO DIFF WBC: CPT

## 2019-07-14 PROCEDURE — 83605 ASSAY OF LACTIC ACID: CPT

## 2019-07-14 PROCEDURE — 80053 COMPREHEN METABOLIC PANEL: CPT

## 2019-07-14 PROCEDURE — 96374 THER/PROPH/DIAG INJ IV PUSH: CPT

## 2019-07-14 PROCEDURE — 84484 ASSAY OF TROPONIN QUANT: CPT

## 2019-07-14 PROCEDURE — 93005 ELECTROCARDIOGRAM TRACING: CPT

## 2019-07-14 RX ORDER — METOPROLOL TARTRATE 25 MG/1
12.5 TABLET, FILM COATED ORAL DAILY
Qty: 30 TAB | Refills: 6 | Status: SHIPPED | OUTPATIENT
Start: 2019-07-14 | End: 2020-06-09 | Stop reason: ALTCHOICE

## 2019-07-14 RX ORDER — FUROSEMIDE 10 MG/ML
40 INJECTION INTRAMUSCULAR; INTRAVENOUS
Status: COMPLETED | OUTPATIENT
Start: 2019-07-14 | End: 2019-07-14

## 2019-07-14 RX ADMIN — FUROSEMIDE 40 MG: 10 INJECTION, SOLUTION INTRAMUSCULAR; INTRAVENOUS at 17:51

## 2019-07-14 NOTE — ED PROVIDER NOTES
EMERGENCY DEPARTMENT HISTORY AND PHYSICAL EXAM    6:53 PM  Date: 7/14/2019  Patient Name: Lashonda Salamanca    History of Presenting Illness     Chief Complaint   Patient presents with    Shortness of Breath        History Provided By: Patient and EMS    HPI: Lashonda Salamanca is a 60 y.o. female with history of CHF, A. fib, chronic respiratory failure. She was brought in by ambulance for shortness of breath that started yesterday. That this feels worse than her baseline. Denies chest pain or cough. No nausea or vomiting. She is on 3 L of home oxygen and did not require an increase in her oxygen flow. Denies fevers or chills. She reports that she is taking her Lasix every day noncompliant to her diet    Location:  Severity:  Timing/course: Onset/Duration:     PCP: Zan Haskins MD    Past History     Past Medical History:  Past Medical History:   Diagnosis Date    Atrial fibrillation     CHADS score 1  (-CHF, +HTN, -AGE, -DM, -CVA)    Carpal tunnel syndrome     Chronic pain     Congenital heart disease     presumed pulmonary stenosis s/p repair 1969    Congestive heart failure ([facility] [address]     Degenerative disc disease     Degenerative joint disease     GERD     H/O echocardiogram 04/2017    EF 60-65%, mild concentric LVH, dilated RV with RV dysfunction, RVSP 54 mmHg, RA E, moderate to severe pulmonic regurgitation.     Hypercholesterolemia     Hypertension     Hypothyroid     Morbid obesity     Morbid obesity with BMI of 45.0-49.9, adult ([facility] [address] 2/20/2017    Noncompliance     Schizoaffective disorder     Substance abuse ([facility] [address]     marijuana, crack cocaine       Past Surgical History:  Past Surgical History:   Procedure Laterality Date    CARDIAC SURG PROCEDURE UNLIST  1971    VSD repair    COLONOSCOPY N/A 5/6/2018    COLONOSCOPY with tatooing and biopsy performed by Perez De Leon [facility]      left    HX HYSTERECTOMY      HX KNEE REPLACEMENT      left    HX KNEE REPLACEMENT      right    HX ORTHOPAEDIC      left ankle    HX ORTHOPAEDIC      carpel tunnel right and left     HX THYROIDECTOMY Left        Family History:  Family History   Problem Relation Age of Onset    Hypertension Mother     Coronary Artery Disease Mother     Coronary Artery Disease Father     Hypertension Father        Social History:  Social History     Tobacco Use    Smoking status: Never Smoker    Smokeless tobacco: Never Used   Substance Use Topics    Alcohol use: No    Drug use: No     Comment: Quit crack cocaine and marijuana 2015       Allergies: Allergies   Allergen Reactions    Gabapentin Other (comments)     Unsteady, weakness LEs    Tetanus Vaccines And Toxoid Swelling    Topamax [Topiramate] Other (comments)     weakness       Review of Systems   Review of Systems   Respiratory: Positive for chest tightness and shortness of breath. All other systems reviewed and are negative. Physical Exam     Patient Vitals for the past 12 hrs:   Temp Pulse Resp BP SpO2   07/14/19 1900  60 16 124/71 96 %   07/14/19 1830  (!) 51 14 139/77 93 %   07/14/19 1745  (!) 46 17 144/82 97 %   07/14/19 1730  (!) 53 11 96/59 94 %   07/14/19 1729 98.6 °F (37 °C) (!) 55 24 146/65 92 %       Physical Exam   Constitutional: She is oriented to person, place, and time. No distress. HENT:   Head: Normocephalic and atraumatic. Eyes: Conjunctivae and EOM are normal.   Neck: Normal range of motion. Neck supple. Cardiovascular: Normal heart sounds and intact distal pulses. Pulmonary/Chest: Effort normal. No respiratory distress. She has rales. Abdominal: Soft. She exhibits no distension. Musculoskeletal: Normal range of motion. She exhibits edema. She exhibits no deformity. Neurological: She is alert and oriented to person, place, and time. Skin: Skin is warm and dry. She is not diaphoretic. Psychiatric: She has a normal mood and affect.  Her behavior is normal. Judgment and thought content normal.       Diagnostic Study Results     Labs -  Recent Results (from the past 12 hour(s))   CBC WITH AUTOMATED DIFF    Collection Time: 07/14/19  5:44 PM   Result Value Ref Range    WBC 3.6 (L) 4.6 - 13.2 K/uL    RBC 4.15 (L) 4.20 - 5.30 M/uL    HGB 10.2 (L) 12.0 - 16.0 g/dL    HCT 35.3 35.0 - 45.0 %    MCV 85.1 74.0 - 97.0 FL    MCH 24.6 24.0 - 34.0 PG    MCHC 28.9 (L) 31.0 - 37.0 g/dL    RDW 16.7 (H) 11.6 - 14.5 %    PLATELET 012 (L) 684 - 420 K/uL    MPV 10.1 9.2 - 11.8 FL    NEUTROPHILS 69 40 - 73 %    LYMPHOCYTES 19 (L) 21 - 52 %    MONOCYTES 9 3 - 10 %    EOSINOPHILS 3 0 - 5 %    BASOPHILS 0 0 - 2 %    ABS. NEUTROPHILS 2.5 1.8 - 8.0 K/UL    ABS. LYMPHOCYTES 0.7 (L) 0.9 - 3.6 K/UL    ABS. MONOCYTES 0.3 0.05 - 1.2 K/UL    ABS. EOSINOPHILS 0.1 0.0 - 0.4 K/UL    ABS. BASOPHILS 0.0 0.0 - 0.1 K/UL    DF AUTOMATED     METABOLIC PANEL, COMPREHENSIVE    Collection Time: 07/14/19  5:44 PM   Result Value Ref Range    Sodium 143 136 - 145 mmol/L    Potassium 3.5 3.5 - 5.5 mmol/L    Chloride 102 100 - 108 mmol/L    CO2 38 (H) 21 - 32 mmol/L    Anion gap 3 3.0 - 18 mmol/L    Glucose 92 74 - 99 mg/dL    BUN 14 7.0 - 18 MG/DL    Creatinine 0.89 0.6 - 1.3 MG/DL    BUN/Creatinine ratio 16 12 - 20      GFR est AA >60 >60 ml/min/1.73m2    GFR est non-AA >60 >60 ml/min/1.73m2    Calcium 8.2 (L) 8.5 - 10.1 MG/DL    Bilirubin, total 1.6 (H) 0.2 - 1.0 MG/DL    ALT (SGPT) 16 13 - 56 U/L    AST (SGOT) 14 (L) 15 - 37 U/L    Alk.  phosphatase 187 (H) 45 - 117 U/L    Protein, total 7.3 6.4 - 8.2 g/dL    Albumin 3.6 3.4 - 5.0 g/dL    Globulin 3.7 2.0 - 4.0 g/dL    A-G Ratio 1.0 0.8 - 1.7     NT-PRO BNP    Collection Time: 07/14/19  5:44 PM   Result Value Ref Range    NT pro-BNP 2,728 (H) 0 - 900 PG/ML   TROPONIN I    Collection Time: 07/14/19  5:44 PM   Result Value Ref Range    Troponin-I, QT <0.02 0.0 - 0.045 NG/ML   POC G3    Collection Time: 07/14/19  5:56 PM   Result Value Ref Range    Device: NASAL CANNULA      Flow rate (POC) 4 L/M    pH (POC) 7.329 (L) 7.35 - 7.45      pCO2 (POC) 71.1 (H) 35.0 - 45.0 MMHG    pO2 (POC) 104 (H) 80 - 100 MMHG    HCO3 (POC) 37.4 (H) 22 - 26 MMOL/L    sO2 (POC) 97 92 - 97 %    Base excess (POC) 12 mmol/L    Allens test (POC) YES      Total resp. rate 16      Site RIGHT RADIAL      Specimen type (POC) ARTERIAL      Performed by Chris Quintanilla    EKG, 12 LEAD, INITIAL    Collection Time: 07/14/19  6:06 PM   Result Value Ref Range    Ventricular Rate 54 BPM    Atrial Rate 78 BPM    QRS Duration 122 ms    Q-T Interval 506 ms    QTC Calculation (Bezet) 479 ms    Calculated R Axis -43 degrees    Calculated T Axis 113 degrees    Diagnosis       Atrial fibrillation with slow ventricular response  Left axis deviation  Anterior infarct (cited on or before 18-MAY-2019)  Abnormal ECG  When compared with ECG of 18-MAY-2019 23:53,  T wave inversion no longer evident in Inferior leads  QT has shortened     POC LACTIC ACID    Collection Time: 07/14/19  6:18 PM   Result Value Ref Range    Lactic Acid (POC) 0.46 0.40 - 2.00 mmol/L   URINALYSIS W/ RFLX MICROSCOPIC    Collection Time: 07/14/19  6:40 PM   Result Value Ref Range    Color YELLOW      Appearance CLEAR      Specific gravity 1.008 1.005 - 1.030      pH (UA) 7.5 5.0 - 8.0      Protein NEGATIVE  NEG mg/dL    Glucose NEGATIVE  NEG mg/dL    Ketone NEGATIVE  NEG mg/dL    Bilirubin NEGATIVE  NEG      Blood NEGATIVE  NEG      Urobilinogen 1.0 0.2 - 1.0 EU/dL    Nitrites NEGATIVE  NEG      Leukocyte Esterase NEGATIVE  NEG         Radiologic Studies -   No results found. Medical Decision Making     ED Course: Progress Notes, Reevaluation, and Consults:    6:53 PM Initial assessment performed. The patients presenting problems have been discussed, and they/their family are in agreement with the care plan formulated and outlined with them. I have encouraged them to ask questions as they arise throughout their visit.     7:58 PM  Patient had multiple episodes of bradycardia to 30s but she remained asymptomatic with stable blood pressure. This is likely related to her metoprolol no evidence of hypoxia at this point. I discussed with the patient the need to go down on her metoprolol dose I will adjust that and have her follow-up with her primary care doctor    Provider Notes (Medical Decision Making): Atrial fibrillation noncompliant to her diet presenting as evident on exam.  Will get a cardiac work-up to rule out acute coronary syndrome as a cause and blood gas to evaluate for respiratory failure as well as give her Lasix pulmonary edema then reevaluate. Procedures:     Critical Care Time:     Vital Signs-Reviewed the patient's vital signs. Reviewed pt's pulse ox reading. EKG: Interpreted by the EP. Time Interpreted:    Rate:    Rhythm: Atrial fibrillation, bradycardia,54   Interpretation:  left axis deviation. Comparison: No significant interval changes    Records Reviewed: Nursing Notes, Old Medical Records, Previous Radiology Studies and Previous Laboratory Studies (Time of Review: 6:53 PM)  -I am the first provider for this patient.  -I reviewed the vital signs, available nursing notes, past medical history, past surgical history, family history and social history. Current Outpatient Medications   Medication Sig Dispense Refill    omeprazole (PRILOSEC) 20 mg capsule       spironolactone (ALDACTONE) 25 mg tablet Take 1 Tab by mouth daily. 30 Tab 6    furosemide (LASIX) 40 mg tablet Take 40 mg PO in the morning and 20 mg PO in the afternoon 60 Tab 6    polyethylene glycol (MIRALAX) 17 gram packet Take 1 Packet by mouth daily. 30 Packet 0    XARELTO 20 mg tab tablet       oxyCODONE IR (ROXICODONE) 5 mg immediate release tablet Take 1 Tab by mouth every six (6) hours as needed for Pain. Max Daily Amount: 20 mg. 6 Tab 0    senna-docusate (PERICOLACE) 8.6-50 mg per tablet Take 1 Tab by mouth nightly.  30 Tab 0    albuterol-ipratropium (DUO-NEB) 2.5 mg-0.5 mg/3 ml nebu 3 mL by Nebulization route every six (6) hours as needed. 30 Nebule 0    metoprolol tartrate (LOPRESSOR) 25 mg tablet Take 0.5 Tabs by mouth two (2) times a day. 30 Tab 6    fluticasone (FLONASE) 50 mcg/actuation nasal spray 2 Sprays by Both Nostrils route daily. 1 Bottle 2    isosorbide mononitrate ER (IMDUR) 30 mg tablet Take 1 Tab by mouth every evening. 90 Tab 3    multivitamin, tx-iron-ca-min (THERA-M W/ IRON) 9 mg iron-400 mcg tab tablet Take 1 Tab by mouth daily. 30 Tab 0    raNITIdine (ZANTAC) 150 mg tablet Take 150 mg by mouth two (2) times a day.  VENTOLIN HFA 90 mcg/actuation inhaler Take 2 Puffs by inhalation every four (4) hours as needed for Wheezing or Shortness of Breath. 1 Inhaler 0    PARoxetine (PAXIL) 20 mg tablet Take 1 Tab by mouth daily. 30 Tab 3    simvastatin (ZOCOR) 40 mg tablet Take 40 mg by mouth nightly. to obtain from pharmacy      aripiprazole (ABILIFY) 15 mg tablet Take 15 mg by mouth daily. to obtain from pharmacy      levothyroxine (SYNTHROID) 125 mcg tablet Take 125 mcg by mouth Daily (before breakfast). Clinical Impression     Clinical Impression: No diagnosis found. Disposition: DC      DISCHARGE NOTE:     Pt has been reexamined. Patient has no new complaints, changes, or physical findings. Care plan outlined and precautions discussed. Results of imaging and labs were reviewed with the patient. All medications were reviewed with the patient; will d/c home with diet and medication precautions. All of pt's questions and concerns were addressed. Patient was instructed and agrees to follow up with primary care doctor, as well as to return to the ED upon further deterioration. Patient is ready to go home. This note was dictated utilizing voice recognition software which may lead to typographical errors. I apologize in advance if the situation occurs.   If questions arise please do not hesitate to contact me or call our department.     Felipe Duffy MD  6:53 PM

## 2019-07-14 NOTE — ED NOTES
Bedside report given to [facility] [place] [facility]", RN. Pt is on stretcher, in gown, on monitor. . Pt has iV access, monitor showing Afib w/ junctional bradycardia.

## 2019-07-14 NOTE — DISCHARGE INSTRUCTIONS
Patient Education   Patient Education        Heart Failure: Care Instructions  Your Care Instructions    Heart failure occurs when your heart does not pump as much blood as the body needs. Failure does not mean that the heart has stopped pumping but rather that it is not pumping as well as it should. Over time, this causes fluid buildup in your lungs and other parts of your body. Fluid buildup can cause shortness of breath, fatigue, swollen ankles, and other problems. By taking medicines regularly, reducing sodium (salt) in your diet, checking your weight every day, and making lifestyle changes, you can feel better and live longer. Follow-up care is a key part of your treatment and safety. Be sure to make and go to all appointments, and call your doctor if you are having problems. It's also a good idea to know your test results and keep a list of the medicines you take. How can you care for yourself at home? Medicines    · Be safe with medicines. Take your medicines exactly as prescribed. Call your doctor if you think you are having a problem with your medicine.     · Do not take any vitamins, over-the-counter medicine, or herbal products without talking to your doctor first. Brissa Oas not take ibuprofen (Advil or Motrin) and naproxen (Aleve) without talking to your doctor first. They could make your heart failure worse.     · You may be taking some of the following medicine. ? Beta-blockers can slow heart rate, decrease blood pressure, and improve your condition. Taking a beta-blocker may lower your chance of needing to be hospitalized. ? Angiotensin-converting enzyme inhibitors (ACEIs) reduce the heart's workload, lower blood pressure, and reduce swelling. Taking an ACEI may lower your chance of needing to be hospitalized again. ? Angiotensin II receptor blockers (ARBs) work like ACEIs. Your doctor may prescribe them instead of ACEIs. ? Diuretics, also called water pills, reduce swelling. ?  Potassium supplements replace this important mineral, which is sometimes lost with diuretics. ? Aspirin and other blood thinners prevent blood clots, which can cause a stroke or heart attack.    You will get more details on the specific medicines your doctor prescribes. Diet    · Your doctor may suggest that you limit sodium to 2,000 milligrams (mg) a day or less. That is less than 1 teaspoon of salt a day, including all the salt you eat in cooking or in packaged foods. People get most of their sodium from processed foods. Fast food and restaurant meals also tend to be very high in sodium.     · Ask your doctor how much liquid you can drink each day. You may have to limit liquids.    Weight    · Weigh yourself without clothing at the same time each day. Record your weight. Call your doctor if you have a sudden weight gain, such as more than 2 to 3 pounds in a day or 5 pounds in a week. (Your doctor may suggest a different range of weight gain.) A sudden weight gain may mean that your heart failure is getting worse.    Activity level    · Start light exercise (if your doctor says it is okay). Even if you can only do a small amount, exercise will help you get stronger, have more energy, and manage your weight and your stress. Walking is an easy way to get exercise. Start out by walking a little more than you did before. Bit by bit, increase the amount you walk.     · When you exercise, watch for signs that your heart is working too hard. You are pushing yourself too hard if you cannot talk while you are exercising. If you become short of breath or dizzy or have chest pain, stop, sit down, and rest.     · If you feel \"wiped out\" the day after you exercise, walk slower or for a shorter distance until you can work up to a better pace.     · Get enough rest at night. Sleeping with 1 or 2 pillows under your upper body and head may help you breathe easier.    Lifestyle changes    · Do not smoke. Smoking can make a heart condition worse.  If you need help quitting, talk to your doctor about stop-smoking programs and medicines. These can increase your chances of quitting for good. Quitting smoking may be the most important step you can take to protect your heart.     · Limit alcohol to 2 drinks a day for men and 1 drink a day for women. Too much alcohol can cause health problems.     · Avoid getting sick from colds and the flu. Get a pneumococcal vaccine shot. If you have had one before, ask your doctor whether you need another dose. Get a flu shot each year. If you must be around people with colds or the flu, wash your hands often. When should you call for help? Call 911 if you have symptoms of sudden heart failure such as:    · You have severe trouble breathing.     · You cough up pink, foamy mucus.     · You have a new irregular or rapid heartbeat.    Call your doctor now or seek immediate medical care if:    · You have new or increased shortness of breath.     · You are dizzy or lightheaded, or you feel like you may faint.     · You have sudden weight gain, such as more than 2 to 3 pounds in a day or 5 pounds in a week. (Your doctor may suggest a different range of weight gain.)     · You have increased swelling in your legs, ankles, or feet.     · You are suddenly so tired or weak that you cannot do your usual activities.    Watch closely for changes in your health, and be sure to contact your doctor if you develop new symptoms. Where can you learn more? Go to http://bucky-gerard.info/. Enter R003 in the search box to learn more about \"Heart Failure: Care Instructions. \"  Current as of: July 22, 2018  Content Version: 11.9  © 6633-8224 [employer]. Care instructions adapted under license by [employer] (which disclaims liability or warranty for this information).  If you have questions about a medical condition or this instruction, always ask your healthcare professional. [address]  any warranty or liability for your use of this information. Low Sodium Diet (2,000 Milligram): Care Instructions  Your Care Instructions    Too much sodium causes your body to hold on to extra water. This can raise your blood pressure and force your heart and kidneys to work harder. In very serious cases, this could cause you to be put in the hospital. It might even be life-threatening. By limiting sodium, you will feel better and lower your risk of serious problems. The most common source of sodium is salt. People get most of the salt in their diet from canned, prepared, and packaged foods. Fast food and restaurant meals also are very high in sodium. Your doctor will probably limit your sodium to less than 2,000 milligrams (mg) a day. This limit counts all the sodium in prepared and packaged foods and any salt you add to your food. Follow-up care is a key part of your treatment and safety. Be sure to make and go to all appointments, and call your doctor if you are having problems. It's also a good idea to know your test results and keep a list of the medicines you take. How can you care for yourself at home? Read food labels  · Read labels on cans and food packages. The labels tell you how much sodium is in each serving. Make sure that you look at the serving size. If you eat more than the serving size, you have eaten more sodium. · Food labels also tell you the Percent Daily Value for sodium. Choose products with low Percent Daily Values for sodium. · Be aware that sodium can come in forms other than salt, including monosodium glutamate (MSG), sodium citrate, and sodium bicarbonate (baking soda). MSG is often added to Asian food. When you eat out, you can sometimes ask for food without MSG or added salt. Buy low-sodium foods  · Buy foods that are labeled \"unsalted\" (no salt added), \"sodium-free\" (less than 5 mg of sodium per serving), or \"low-sodium\" (less than 140 mg of sodium per serving). Foods labeled \"reduced-sodium\" and \"light sodium\" may still have too much sodium. Be sure to read the label to see how much sodium you are getting. · Buy fresh vegetables, or frozen vegetables without added sauces. Buy low-sodium versions of canned vegetables, soups, and other canned goods. Prepare low-sodium meals  · Cut back on the amount of salt you use in cooking. This will help you adjust to the taste. Do not add salt after cooking. One teaspoon of salt has about 2,300 mg of sodium. · Take the salt shaker off the table. · Flavor your food with garlic, lemon juice, onion, vinegar, herbs, and spices. Do not use soy sauce, lite soy sauce, steak sauce, onion salt, garlic salt, celery salt, mustard, or ketchup on your food. · Use low-sodium salad dressings, sauces, and ketchup. Or make your own salad dressings and sauces without adding salt. · Use less salt (or none) when recipes call for it. You can often use half the salt a recipe calls for without losing flavor. Other foods such as rice, pasta, and grains do not need added salt. · Rinse canned vegetables, and cook them in fresh water. This removes some--but not all--of the salt. · Avoid water that is naturally high in sodium or that has been treated with water softeners, which add sodium. Call your local water company to find out the sodium content of your water supply. If you buy bottled water, read the label and choose a sodium-free brand. Avoid high-sodium foods  · Avoid eating:  ? Smoked, cured, salted, and canned meat, fish, and poultry. ? Ham, acosta, hot dogs, and luncheon meats. ? Regular, hard, and processed cheese and regular peanut butter. ? Crackers with salted tops, and other salted snack foods such as pretzels, chips, and salted popcorn. ? Frozen prepared meals, unless labeled low-sodium. ? Canned and dried soups, broths, and bouillon, unless labeled sodium-free or low-sodium. ?  Canned vegetables, unless labeled sodium-free or low-sodium. ? [place] fries, pizza, tacos, and other fast foods. ? Pickles, olives, ketchup, and other condiments, especially soy sauce, unless labeled sodium-free or low-sodium. Where can you learn more? Go to http://bucky-gerard.info/. Enter U784 in the search box to learn more about \"Low Sodium Diet (2,000 Milligram): Care Instructions. \"  Current as of: March 28, 2018  Content Version: 11.9  © 3527-0919 [employer]. Care instructions adapted under license by [employer] (which disclaims liability or warranty for this information). If you have questions about a medical condition or this instruction, always ask your healthcare professional. [address] any warranty or liability for your use of this information.

## 2019-07-15 LAB
ATRIAL RATE: 78 BPM
CALCULATED R AXIS, ECG10: -43 DEGREES
CALCULATED T AXIS, ECG11: 113 DEGREES
DIAGNOSIS, 93000: NORMAL
Q-T INTERVAL, ECG07: 506 MS
QRS DURATION, ECG06: 122 MS
QTC CALCULATION (BEZET), ECG08: 479 MS
VENTRICULAR RATE, ECG03: 54 BPM

## 2019-08-12 ENCOUNTER — HOSPITAL ENCOUNTER (INPATIENT)
Age: 59
LOS: 9 days | Discharge: HOME HEALTH | DRG: 194 | End: 2019-08-21
Attending: EMERGENCY MEDICINE | Admitting: FAMILY MEDICINE
Payer: MEDICAID

## 2019-08-12 ENCOUNTER — APPOINTMENT (OUTPATIENT)
Dept: GENERAL RADIOLOGY | Age: 59
DRG: 194 | End: 2019-08-12
Attending: EMERGENCY MEDICINE
Payer: MEDICAID

## 2019-08-12 ENCOUNTER — APPOINTMENT (OUTPATIENT)
Dept: VASCULAR SURGERY | Age: 59
DRG: 194 | End: 2019-08-12
Attending: EMERGENCY MEDICINE
Payer: MEDICAID

## 2019-08-12 DIAGNOSIS — J44.1 COPD EXACERBATION (HCC): Primary | ICD-10-CM

## 2019-08-12 PROBLEM — J96.00 ACUTE RESPIRATORY FAILURE (HCC): Status: ACTIVE | Noted: 2019-08-12

## 2019-08-12 PROBLEM — J44.9 COPD (CHRONIC OBSTRUCTIVE PULMONARY DISEASE) (HCC): Status: ACTIVE | Noted: 2019-08-12

## 2019-08-12 LAB
ALBUMIN SERPL-MCNC: 3.4 G/DL (ref 3.4–5)
ALBUMIN/GLOB SERPL: 0.9 {RATIO} (ref 0.8–1.7)
ALP SERPL-CCNC: 211 U/L (ref 45–117)
ALT SERPL-CCNC: 21 U/L (ref 13–56)
AMPHET UR QL SCN: NEGATIVE
ANION GAP SERPL CALC-SCNC: 3 MMOL/L (ref 3–18)
ANION GAP SERPL CALC-SCNC: 6 MMOL/L (ref 3–18)
ARTERIAL PATENCY WRIST A: YES
AST SERPL-CCNC: 24 U/L (ref 10–38)
ATRIAL RATE: 75 BPM
BARBITURATES UR QL SCN: NEGATIVE
BASE EXCESS BLD CALC-SCNC: 12 MMOL/L
BASE EXCESS BLD CALC-SCNC: 8 MMOL/L
BASE EXCESS BLD CALC-SCNC: 8 MMOL/L
BASE EXCESS BLD CALC-SCNC: 9 MMOL/L
BASOPHILS # BLD: 0 K/UL (ref 0–0.1)
BASOPHILS NFR BLD: 0 % (ref 0–2)
BDY SITE: ABNORMAL
BENZODIAZ UR QL: NEGATIVE
BILIRUB SERPL-MCNC: 1.6 MG/DL (ref 0.2–1)
BNP SERPL-MCNC: 3381 PG/ML (ref 0–900)
BODY TEMPERATURE: 36.7
BODY TEMPERATURE: 36.7
BODY TEMPERATURE: 37
BUN SERPL-MCNC: 15 MG/DL (ref 7–18)
BUN SERPL-MCNC: 15 MG/DL (ref 7–18)
BUN/CREAT SERPL: 17 (ref 12–20)
BUN/CREAT SERPL: 19 (ref 12–20)
CALCIUM SERPL-MCNC: 8.2 MG/DL (ref 8.5–10.1)
CALCIUM SERPL-MCNC: 8.4 MG/DL (ref 8.5–10.1)
CALCULATED R AXIS, ECG10: -36 DEGREES
CALCULATED T AXIS, ECG11: 97 DEGREES
CANNABINOIDS UR QL SCN: NEGATIVE
CHLORIDE SERPL-SCNC: 100 MMOL/L (ref 100–111)
CHLORIDE SERPL-SCNC: 104 MMOL/L (ref 100–111)
CO2 SERPL-SCNC: 33 MMOL/L (ref 21–32)
CO2 SERPL-SCNC: 35 MMOL/L (ref 21–32)
COCAINE UR QL SCN: NEGATIVE
CREAT SERPL-MCNC: 0.8 MG/DL (ref 0.6–1.3)
CREAT SERPL-MCNC: 0.86 MG/DL (ref 0.6–1.3)
DIAGNOSIS, 93000: NORMAL
DIFFERENTIAL METHOD BLD: ABNORMAL
EOSINOPHIL # BLD: 0.1 K/UL (ref 0–0.4)
EOSINOPHIL NFR BLD: 2 % (ref 0–5)
ERYTHROCYTE [DISTWIDTH] IN BLOOD BY AUTOMATED COUNT: 17 % (ref 11.6–14.5)
EST. AVERAGE GLUCOSE BLD GHB EST-MCNC: 108 MG/DL
GAS FLOW.O2 O2 DELIVERY SYS: ABNORMAL L/MIN
GLOBULIN SER CALC-MCNC: 3.8 G/DL (ref 2–4)
GLUCOSE BLD STRIP.AUTO-MCNC: 108 MG/DL (ref 70–110)
GLUCOSE BLD STRIP.AUTO-MCNC: 130 MG/DL (ref 70–110)
GLUCOSE SERPL-MCNC: 111 MG/DL (ref 74–99)
GLUCOSE SERPL-MCNC: 98 MG/DL (ref 74–99)
HBA1C MFR BLD: 5.4 % (ref 4.2–5.6)
HCO3 BLD-SCNC: 37.4 MMOL/L (ref 22–26)
HCO3 BLD-SCNC: 37.7 MMOL/L (ref 22–26)
HCO3 BLD-SCNC: 37.8 MMOL/L (ref 22–26)
HCO3 BLD-SCNC: 38.3 MMOL/L (ref 22–26)
HCT VFR BLD AUTO: 34.2 % (ref 35–45)
HDSCOM,HDSCOM: NORMAL
HGB BLD-MCNC: 9.9 G/DL (ref 12–16)
LYMPHOCYTES # BLD: 0.7 K/UL (ref 0.9–3.6)
LYMPHOCYTES NFR BLD: 17 % (ref 21–52)
MAGNESIUM SERPL-MCNC: 2.1 MG/DL (ref 1.6–2.6)
MCH RBC QN AUTO: 25.4 PG (ref 24–34)
MCHC RBC AUTO-ENTMCNC: 28.9 G/DL (ref 31–37)
MCV RBC AUTO: 87.7 FL (ref 74–97)
METHADONE UR QL: NEGATIVE
MONOCYTES # BLD: 0.4 K/UL (ref 0.05–1.2)
MONOCYTES NFR BLD: 9 % (ref 3–10)
NEUTS SEG # BLD: 3.1 K/UL (ref 1.8–8)
NEUTS SEG NFR BLD: 72 % (ref 40–73)
O2/TOTAL GAS SETTING VFR VENT: 21 %
O2/TOTAL GAS SETTING VFR VENT: 40 %
O2/TOTAL GAS SETTING VFR VENT: 50 %
O2/TOTAL GAS SETTING VFR VENT: 50 %
OPIATES UR QL: NEGATIVE
PCO2 BLD: 68.7 MMHG (ref 35–45)
PCO2 BLD: 78.6 MMHG (ref 35–45)
PCO2 BLD: 82.3 MMHG (ref 35–45)
PCO2 BLD: 83.5 MMHG (ref 35–45)
PCP UR QL: NEGATIVE
PEEP RESPIRATORY: 10 CMH2O
PEEP RESPIRATORY: 6 CMH2O
PEEP RESPIRATORY: 6 CMH2O
PH BLD: 7.26 [PH] (ref 7.35–7.45)
PH BLD: 7.28 [PH] (ref 7.35–7.45)
PH BLD: 7.29 [PH] (ref 7.35–7.45)
PH BLD: 7.34 [PH] (ref 7.35–7.45)
PHOSPHATE SERPL-MCNC: 4.8 MG/DL (ref 2.5–4.9)
PIP ISTAT,IPIP: 18
PIP ISTAT,IPIP: 18
PIP ISTAT,IPIP: 22
PLATELET # BLD AUTO: 127 K/UL (ref 135–420)
PMV BLD AUTO: 9.8 FL (ref 9.2–11.8)
PO2 BLD: 117 MMHG (ref 80–100)
PO2 BLD: 119 MMHG (ref 80–100)
PO2 BLD: 54 MMHG (ref 80–100)
PO2 BLD: 83 MMHG (ref 80–100)
POTASSIUM SERPL-SCNC: 4.4 MMOL/L (ref 3.5–5.5)
POTASSIUM SERPL-SCNC: 4.4 MMOL/L (ref 3.5–5.5)
PRESSURE SUPPORT SETTING VENT: 12 CMH2O
PROT SERPL-MCNC: 7.2 G/DL (ref 6.4–8.2)
Q-T INTERVAL, ECG07: 488 MS
QRS DURATION, ECG06: 118 MS
QTC CALCULATION (BEZET), ECG08: 534 MS
RBC # BLD AUTO: 3.9 M/UL (ref 4.2–5.3)
SAO2 % BLD: 81 % (ref 92–97)
SAO2 % BLD: 94 % (ref 92–97)
SAO2 % BLD: 98 % (ref 92–97)
SAO2 % BLD: 98 % (ref 92–97)
SERVICE CMNT-IMP: ABNORMAL
SODIUM SERPL-SCNC: 139 MMOL/L (ref 136–145)
SODIUM SERPL-SCNC: 142 MMOL/L (ref 136–145)
SPECIMEN TYPE: ABNORMAL
SPONTANEOUS TIMED, IST: YES
TOTAL RESP. RATE, ITRR: 12
TOTAL RESP. RATE, ITRR: 12
TOTAL RESP. RATE, ITRR: 15
TOTAL RESP. RATE, ITRR: 21
TROPONIN I SERPL-MCNC: <0.02 NG/ML (ref 0–0.04)
TROPONIN I SERPL-MCNC: <0.02 NG/ML (ref 0–0.04)
TSH SERPL DL<=0.05 MIU/L-ACNC: 2.29 UIU/ML (ref 0.36–3.74)
VENTRICULAR RATE, ECG03: 72 BPM
WBC # BLD AUTO: 4.3 K/UL (ref 4.6–13.2)

## 2019-08-12 PROCEDURE — 65660000000 HC RM CCU STEPDOWN

## 2019-08-12 PROCEDURE — 94660 CPAP INITIATION&MGMT: CPT

## 2019-08-12 PROCEDURE — 74011000250 HC RX REV CODE- 250: Performed by: EMERGENCY MEDICINE

## 2019-08-12 PROCEDURE — 74011250636 HC RX REV CODE- 250/636: Performed by: EMERGENCY MEDICINE

## 2019-08-12 PROCEDURE — 94640 AIRWAY INHALATION TREATMENT: CPT

## 2019-08-12 PROCEDURE — 36600 WITHDRAWAL OF ARTERIAL BLOOD: CPT

## 2019-08-12 PROCEDURE — 93005 ELECTROCARDIOGRAM TRACING: CPT

## 2019-08-12 PROCEDURE — 87641 MR-STAPH DNA AMP PROBE: CPT

## 2019-08-12 PROCEDURE — 77030005514 HC CATH URETH FOL14 BARD -A

## 2019-08-12 PROCEDURE — 65610000006 HC RM INTENSIVE CARE

## 2019-08-12 PROCEDURE — 74011250636 HC RX REV CODE- 250/636: Performed by: PHYSICIAN ASSISTANT

## 2019-08-12 PROCEDURE — 82962 GLUCOSE BLOOD TEST: CPT

## 2019-08-12 PROCEDURE — 96375 TX/PRO/DX INJ NEW DRUG ADDON: CPT

## 2019-08-12 PROCEDURE — 84443 ASSAY THYROID STIM HORMONE: CPT

## 2019-08-12 PROCEDURE — 99285 EMERGENCY DEPT VISIT HI MDM: CPT

## 2019-08-12 PROCEDURE — 74011250637 HC RX REV CODE- 250/637: Performed by: PHYSICIAN ASSISTANT

## 2019-08-12 PROCEDURE — 83735 ASSAY OF MAGNESIUM: CPT

## 2019-08-12 PROCEDURE — 83036 HEMOGLOBIN GLYCOSYLATED A1C: CPT

## 2019-08-12 PROCEDURE — 80307 DRUG TEST PRSMV CHEM ANLYZR: CPT

## 2019-08-12 PROCEDURE — 84100 ASSAY OF PHOSPHORUS: CPT

## 2019-08-12 PROCEDURE — 93971 EXTREMITY STUDY: CPT

## 2019-08-12 PROCEDURE — 82803 BLOOD GASES ANY COMBINATION: CPT

## 2019-08-12 PROCEDURE — 74011250637 HC RX REV CODE- 250/637: Performed by: EMERGENCY MEDICINE

## 2019-08-12 PROCEDURE — 96374 THER/PROPH/DIAG INJ IV PUSH: CPT

## 2019-08-12 PROCEDURE — 84484 ASSAY OF TROPONIN QUANT: CPT

## 2019-08-12 PROCEDURE — 83880 ASSAY OF NATRIURETIC PEPTIDE: CPT

## 2019-08-12 PROCEDURE — 74011000250 HC RX REV CODE- 250: Performed by: PHYSICIAN ASSISTANT

## 2019-08-12 PROCEDURE — 80053 COMPREHEN METABOLIC PANEL: CPT

## 2019-08-12 PROCEDURE — 94762 N-INVAS EAR/PLS OXIMTRY CONT: CPT

## 2019-08-12 PROCEDURE — 77030029684 HC NEB SM VOL KT MONA -A

## 2019-08-12 PROCEDURE — 5A09457 ASSISTANCE WITH RESPIRATORY VENTILATION, 24-96 CONSECUTIVE HOURS, CONTINUOUS POSITIVE AIRWAY PRESSURE: ICD-10-PCS | Performed by: INTERNAL MEDICINE

## 2019-08-12 PROCEDURE — 36415 COLL VENOUS BLD VENIPUNCTURE: CPT

## 2019-08-12 PROCEDURE — 85025 COMPLETE CBC W/AUTO DIFF WBC: CPT

## 2019-08-12 PROCEDURE — 71045 X-RAY EXAM CHEST 1 VIEW: CPT

## 2019-08-12 PROCEDURE — 51702 INSERT TEMP BLADDER CATH: CPT

## 2019-08-12 RX ORDER — ACETAMINOPHEN 325 MG/1
650 TABLET ORAL
Status: DISCONTINUED | OUTPATIENT
Start: 2019-08-12 | End: 2019-08-21 | Stop reason: HOSPADM

## 2019-08-12 RX ORDER — INSULIN LISPRO 100 [IU]/ML
INJECTION, SOLUTION INTRAVENOUS; SUBCUTANEOUS EVERY 6 HOURS
Status: DISCONTINUED | OUTPATIENT
Start: 2019-08-12 | End: 2019-08-14

## 2019-08-12 RX ORDER — FUROSEMIDE 10 MG/ML
40 INJECTION INTRAMUSCULAR; INTRAVENOUS ONCE
Status: COMPLETED | OUTPATIENT
Start: 2019-08-12 | End: 2019-08-12

## 2019-08-12 RX ORDER — GUAIFENESIN 100 MG/5ML
324 LIQUID (ML) ORAL
Status: COMPLETED | OUTPATIENT
Start: 2019-08-12 | End: 2019-08-12

## 2019-08-12 RX ORDER — SODIUM CHLORIDE 0.9 % (FLUSH) 0.9 %
5-40 SYRINGE (ML) INJECTION AS NEEDED
Status: DISCONTINUED | OUTPATIENT
Start: 2019-08-12 | End: 2019-08-21 | Stop reason: HOSPADM

## 2019-08-12 RX ORDER — SODIUM CHLORIDE 0.9 % (FLUSH) 0.9 %
5-40 SYRINGE (ML) INJECTION EVERY 8 HOURS
Status: DISCONTINUED | OUTPATIENT
Start: 2019-08-12 | End: 2019-08-21 | Stop reason: HOSPADM

## 2019-08-12 RX ORDER — IPRATROPIUM BROMIDE AND ALBUTEROL SULFATE 2.5; .5 MG/3ML; MG/3ML
3 SOLUTION RESPIRATORY (INHALATION)
Status: DISCONTINUED | OUTPATIENT
Start: 2019-08-12 | End: 2019-08-14

## 2019-08-12 RX ORDER — FUROSEMIDE 40 MG/1
40 TABLET ORAL 2 TIMES DAILY
Status: DISCONTINUED | OUTPATIENT
Start: 2019-08-13 | End: 2019-08-13

## 2019-08-12 RX ORDER — METOPROLOL TARTRATE 25 MG/1
25 TABLET, FILM COATED ORAL 2 TIMES DAILY
Status: DISCONTINUED | OUTPATIENT
Start: 2019-08-13 | End: 2019-08-15

## 2019-08-12 RX ORDER — NALOXONE HYDROCHLORIDE 0.4 MG/ML
0.4 INJECTION, SOLUTION INTRAMUSCULAR; INTRAVENOUS; SUBCUTANEOUS ONCE
Status: COMPLETED | OUTPATIENT
Start: 2019-08-12 | End: 2019-08-12

## 2019-08-12 RX ORDER — IPRATROPIUM BROMIDE AND ALBUTEROL SULFATE 2.5; .5 MG/3ML; MG/3ML
3 SOLUTION RESPIRATORY (INHALATION)
Status: CANCELLED | OUTPATIENT
Start: 2019-08-12

## 2019-08-12 RX ORDER — IPRATROPIUM BROMIDE AND ALBUTEROL SULFATE 2.5; .5 MG/3ML; MG/3ML
3 SOLUTION RESPIRATORY (INHALATION) ONCE
Status: COMPLETED | OUTPATIENT
Start: 2019-08-12 | End: 2019-08-12

## 2019-08-12 RX ORDER — DEXTROSE 50 % IN WATER (D50W) INTRAVENOUS SYRINGE
25-50 AS NEEDED
Status: DISCONTINUED | OUTPATIENT
Start: 2019-08-12 | End: 2019-08-21 | Stop reason: HOSPADM

## 2019-08-12 RX ORDER — MAGNESIUM SULFATE 100 %
4 CRYSTALS MISCELLANEOUS AS NEEDED
Status: DISCONTINUED | OUTPATIENT
Start: 2019-08-12 | End: 2019-08-21 | Stop reason: HOSPADM

## 2019-08-12 RX ADMIN — METOPROLOL TARTRATE 25 MG: 25 TABLET ORAL at 23:18

## 2019-08-12 RX ADMIN — CEFTRIAXONE 1 G: 1 INJECTION, POWDER, FOR SOLUTION INTRAMUSCULAR; INTRAVENOUS at 17:03

## 2019-08-12 RX ADMIN — IPRATROPIUM BROMIDE AND ALBUTEROL SULFATE 3 ML: .5; 3 SOLUTION RESPIRATORY (INHALATION) at 09:30

## 2019-08-12 RX ADMIN — IPRATROPIUM BROMIDE AND ALBUTEROL SULFATE 3 ML: .5; 3 SOLUTION RESPIRATORY (INHALATION) at 13:19

## 2019-08-12 RX ADMIN — FAMOTIDINE 20 MG: 10 INJECTION, SOLUTION INTRAVENOUS at 22:43

## 2019-08-12 RX ADMIN — FUROSEMIDE 40 MG: 10 INJECTION, SOLUTION INTRAMUSCULAR; INTRAVENOUS at 09:30

## 2019-08-12 RX ADMIN — AZITHROMYCIN MONOHYDRATE 500 MG: 500 INJECTION, POWDER, LYOPHILIZED, FOR SOLUTION INTRAVENOUS at 17:08

## 2019-08-12 RX ADMIN — METHYLPREDNISOLONE SODIUM SUCCINATE 125 MG: 125 INJECTION, POWDER, FOR SOLUTION INTRAMUSCULAR; INTRAVENOUS at 13:21

## 2019-08-12 RX ADMIN — Medication 10 ML: at 17:16

## 2019-08-12 RX ADMIN — ASPIRIN 81 MG 324 MG: 81 TABLET ORAL at 09:29

## 2019-08-12 RX ADMIN — NALOXONE HYDROCHLORIDE 0.4 MG: 0.4 INJECTION, SOLUTION INTRAMUSCULAR; INTRAVENOUS; SUBCUTANEOUS at 17:02

## 2019-08-12 RX ADMIN — Medication 10 ML: at 23:14

## 2019-08-12 NOTE — ED PROVIDER NOTES
EMERGENCY DEPARTMENT HISTORY AND PHYSICAL EXAM    9:06 AM  Date: 8/12/2019  Patient Name: Claude Davidson    History of Presenting Illness     Chief Complaint   Patient presents with    Chest Pain    Shortness of Breath       History Provided By:patient    HPI: Claude Davidson is a 60 y.o. female with history of COPD, hypothyroidism, pulmonary hypertension, A. Fib, CHF on Xarelto presents with a few days of worsening shortness of breath. She normally has home oxygen 3 L, states that her shortness of breath is worse when she is mobilizing. Denies any cough any fever. She experiences some intermittent chest tightness that has now resolved. She also complains of pedal edema, worse in the left         PCP: Dayna Lawrence MD    Past History     Past Medical History:  Past Medical History:   Diagnosis Date    Atrial fibrillation     CHADS score 1  (-CHF, +HTN, -AGE, -DM, -CVA)    Carpal tunnel syndrome     Chronic pain     Congenital heart disease     presumed pulmonary stenosis s/p repair 1969    Congest[facility] heart failure ([facility] [address]     Degenerative disc disease     Degenerative joint disease     GERD     H/O echocardiogram 04/2017    EF 60-65%, mild concentric LVH, dilated RV with RV dysfunction, RVSP 54 mmHg, RA E, moderate to severe pulmonic regurgitation.     Hypercholesterolemia     Hypertension     Hypothyroid     Morbid obesity     Morbid obesity with BMI of 45.0-49.9, adult ([facility] [address] 2/20/2017    Noncompliance     Schizoaffective disorder     Substance abuse ([facility] [address]     marijuana, crack cocaine       Past Surgical History:  Past Surgical History:   Procedure Laterality Date    CARDIAC SURG PROCEDURE UNLIST  1971    VSD repair    COLONOSCOPY N/A 5/6/2018    COLONOSCOPY with tatooing and biopsy performed by Maira Caballero MD at [facility]      left    HX HYSTERECTOMY      HX KNEE REPLACEMENT      left    HX KNEE REPLACEMENT      right    HX ORTHOPAEDIC      left ankle    HX ORTHOPAEDIC      carpel tunnel right and left     HX THYROIDECTOMY Left        Family History:  Family History   Problem Relation Age of Onset    Hypertension Mother     Coronary Artery Disease Mother     Coronary Artery Disease Father     Hypertension Father        Social History:  Social History     Tobacco Use    Smoking status: Never Smoker    Smokeless tobacco: Never Used   Substance Use Topics    Alcohol use: No    Drug use: No     Comment: Quit crack cocaine and marijuana 2015       Allergies: Allergies   Allergen Reactions    Gabapentin Other (comments)     Unsteady, weakness LEs    Tetanus Vaccines And Toxoid Swelling    Topamax [Topiramate] Other (comments)     weakness       Review of Systems   Review of Systems   Constitutional: Negative for activity change, appetite change and chills. HENT: Negative for congestion, ear discharge, ear pain and sore throat. Eyes: Negative for photophobia and pain. Respiratory: Positive for shortness of breath. Negative for cough and choking. Cardiovascular: Positive for chest pain and leg swelling. Negative for palpitations. Gastrointestinal: Negative for anal bleeding and rectal pain. Endocrine: Negative for polydipsia and polyuria. Genitourinary: Negative for genital sores and urgency. Musculoskeletal: Negative for arthralgias and myalgias. Neurological: Negative for dizziness, seizures and speech difficulty. Psychiatric/Behavioral: Negative for hallucinations, self-injury and suicidal ideas. Physical Exam     Patient Vitals for the past 12 hrs:   Temp Pulse Resp BP SpO2   08/12/19 1332     100 %   08/12/19 0945  71 18 130/78 100 %   08/12/19 0915  62 21 139/66 97 %   08/12/19 0904  61 17 136/61 93 %   08/12/19 0845 98 °F (36.7 °C) 63 18 142/65 97 %       Physical Exam   Constitutional: She is oriented to person, place, and time. She appears well-developed and well-nourished.    HENT:   Head: Normocephalic and atraumatic. Eyes: Right eye exhibits no discharge. Left eye exhibits no discharge. Neck: Normal range of motion. Neck supple. Cardiovascular: Normal rate, regular rhythm, normal heart sounds and intact distal pulses. Pulmonary/Chest: Breath sounds normal. She is in respiratory distress. She has no wheezes. She has no rales. She exhibits tenderness. Abdominal: Soft. Bowel sounds are normal. She exhibits no distension. There is no tenderness. There is no rebound. Genitourinary: Rectal exam shows guaiac negative stool. No penile tenderness. Musculoskeletal: Normal range of motion. Neurological: She is alert and oriented to person, place, and time. She has normal reflexes. Skin: Skin is warm and dry. Psychiatric: She has a normal mood and affect.        Diagnostic Study Results     Labs -  Recent Results (from the past 12 hour(s))   EKG, 12 LEAD, INITIAL    Collection Time: 08/12/19  8:36 AM   Result Value Ref Range    Ventricular Rate 72 BPM    Atrial Rate 75 BPM    QRS Duration 118 ms    Q-T Interval 488 ms    QTC Calculation (Bezet) 534 ms    Calculated R Axis -36 degrees    Calculated T Axis 97 degrees    Diagnosis       Undetermined rhythm  Left axis deviation  Low voltage QRS  Cannot rule out Anterior infarct (cited on or before 18-MAY-2019)  Prolonged QT  Abnormal ECG  When compared with ECG of 14-JUL-2019 18:06,  Current undetermined rhythm precludes rhythm comparison, needs review  Nonspecific T wave abnormality now evident in Inferior leads  QT has lengthened  Confirmed by Frank Serrato MD, --- ([id number]) on 8/12/2019 11:29:36 AM     CBC WITH AUTOMATED DIFF    Collection Time: 08/12/19  9:08 AM   Result Value Ref Range    WBC 4.3 (L) 4.6 - 13.2 K/uL    RBC 3.90 (L) 4.20 - 5.30 M/uL    HGB 9.9 (L) 12.0 - 16.0 g/dL    HCT 34.2 (L) 35.0 - 45.0 %    MCV 87.7 74.0 - 97.0 FL    MCH 25.4 24.0 - 34.0 PG    MCHC 28.9 (L) 31.0 - 37.0 g/dL    RDW 17.0 (H) 11.6 - 14.5 %    PLATELET 141 (L) 515 - 420 K/uL MPV 9.8 9.2 - 11.8 FL    NEUTROPHILS 72 40 - 73 %    LYMPHOCYTES 17 (L) 21 - 52 %    MONOCYTES 9 3 - 10 %    EOSINOPHILS 2 0 - 5 %    BASOPHILS 0 0 - 2 %    ABS. NEUTROPHILS 3.1 1.8 - 8.0 K/UL    ABS. LYMPHOCYTES 0.7 (L) 0.9 - 3.6 K/UL    ABS. MONOCYTES 0.4 0.05 - 1.2 K/UL    ABS. EOSINOPHILS 0.1 0.0 - 0.4 K/UL    ABS. BASOPHILS 0.0 0.0 - 0.1 K/UL    DF AUTOMATED     NT-PRO BNP    Collection Time: 08/12/19  9:08 AM   Result Value Ref Range    NT pro-BNP 3,381 (H) 0 - 900 PG/ML   TROPONIN I    Collection Time: 08/12/19  9:08 AM   Result Value Ref Range    Troponin-I, QT <0.02 0.0 - 1.323 NG/ML   METABOLIC PANEL, COMPREHENSIVE    Collection Time: 08/12/19  9:08 AM   Result Value Ref Range    Sodium 142 136 - 145 mmol/L    Potassium 4.4 3.5 - 5.5 mmol/L    Chloride 104 100 - 111 mmol/L    CO2 35 (H) 21 - 32 mmol/L    Anion gap 3 3.0 - 18 mmol/L    Glucose 98 74 - 99 mg/dL    BUN 15 7.0 - 18 MG/DL    Creatinine 0.80 0.6 - 1.3 MG/DL    BUN/Creatinine ratio 19 12 - 20      GFR est AA >60 >60 ml/min/1.73m2    GFR est non-AA >60 >60 ml/min/1.73m2    Calcium 8.2 (L) 8.5 - 10.1 MG/DL    Bilirubin, total 1.6 (H) 0.2 - 1.0 MG/DL    ALT (SGPT) 21 13 - 56 U/L    AST (SGOT) 24 10 - 38 U/L    Alk. phosphatase 211 (H) 45 - 117 U/L    Protein, total 7.2 6.4 - 8.2 g/dL    Albumin 3.4 3.4 - 5.0 g/dL    Globulin 3.8 2.0 - 4.0 g/dL    A-G Ratio 0.9 0.8 - 1.7     POC G3    Collection Time: 08/12/19  1:08 PM   Result Value Ref Range    Device: ROOM AIR      FIO2 (POC) 21 %    pH (POC) 7.275 (L) 7.35 - 7.45      pCO2 (POC) 82.3 (H) 35.0 - 45.0 MMHG    pO2 (POC) 83 80 - 100 MMHG    HCO3 (POC) 38.3 (H) 22 - 26 MMOL/L    sO2 (POC) 94 92 - 97 %    Base excess (POC) 9 mmol/L    Allens test (POC) YES      Total resp. rate 12      Site RIGHT RADIAL      Specimen type (POC) ARTERIAL      Performed by Tomas Singh        Radiologic Studies -   Xr Chest Port    Result Date: 8/12/2019  IMPRESSION: Findings which can be related to CHF.  Correlate with BNP. Superimposed pneumonitis not excluded. Please see report for additional findings and further details. Medical Decision Making     ED Course: Progress Notes, Reevaluation, and Consults:    9:06 AM Initial assessment performed. The patients presenting problems have been discussed, and they/their family are in agreement with the care plan formulated and outlined with them. I have encouraged them to ask questions as they arise throughout their visit. Provider Notes (Medical Decision Making): Patient with shortness of breath on 3 L saturating 97% able to speak in full sentences  Will obtain a cardiac work-up x-ray chest DVT study give patient Lasix and reassess  B/L opacities seen on xray,antibiotics started. Duo nebs, steroids. Patient more lethargic on reassessment,ABG performed, CO2[de-identified] 82, previous visit 70s  Patient will be started on BiPAP  Impression:  pneumonia and COPD exacerbation  We will obtain repeat ABG after patient on bipap- Dr Annel Ayers- hospitalist is aware and will f/u repeat ABG results  Dr. Valerie Pratt pulmonology has been consulted. Discussed with Dr. Annel Ayers hospitalist      Vital Signs-Reviewed the patient's vital signs. Reviewed pt's pulse ox reading. EKG: Interpreted by the EP. Time Interpreted: 09:00   Rate: 72   Rhythm: atrial fibrillation   Interpretation:   Comparison: Atrial fibrillation rate controlled, PVCs, no STEMI    Records Reviewed: old medical records (Time of Review: 9:06 AM)  -I am the first provider for this patient.  -I reviewed the vital signs, available nursing notes, past medical history, past surgical history, family history and social history. Current Outpatient Medications   Medication Sig Dispense Refill    metoprolol tartrate (LOPRESSOR) 25 mg tablet Take 0.5 Tabs by mouth daily. 30 Tab 6    omeprazole (PRILOSEC) 20 mg capsule       spironolactone (ALDACTONE) 25 mg tablet Take 1 Tab by mouth daily.  30 Tab 6    furosemide (LASIX) 40 mg tablet Take 40 mg PO in the morning and 20 mg PO in the afternoon 60 Tab 6    polyethylene glycol (MIRALAX) 17 gram packet Take 1 Packet by mouth daily. 30 Packet 0    XARELTO 20 mg tab tablet       oxyCODONE IR (ROXICODONE) 5 mg immediate release tablet Take 1 Tab by mouth every six (6) hours as needed for Pain. Max Daily Amount: 20 mg. 6 Tab 0    senna-docusate (PERICOLACE) 8.6-50 mg per tablet Take 1 Tab by mouth nightly. 30 Tab 0    albuterol-ipratropium (DUO-NEB) 2.5 mg-0.5 mg/3 ml nebu 3 mL by Nebulization route every six (6) hours as needed. 30 Nebule 0    fluticasone (FLONASE) 50 mcg/actuation nasal spray 2 Sprays by Both Nostrils route daily. 1 Bottle 2    isosorbide mononitrate ER (IMDUR) 30 mg tablet Take 1 Tab by mouth every evening. 90 Tab 3    multivitamin, tx-iron-ca-min (THERA-M W/ IRON) 9 mg iron-400 mcg tab tablet Take 1 Tab by mouth daily. 30 Tab 0    raNITIdine (ZANTAC) 150 mg tablet Take 150 mg by mouth two (2) times a day.  VENTOLIN HFA 90 mcg/actuation inhaler Take 2 Puffs by inhalation every four (4) hours as needed for Wheezing or Shortness of Breath. 1 Inhaler 0    PARoxetine (PAXIL) 20 mg tablet Take 1 Tab by mouth daily. 30 Tab 3    simvastatin (ZOCOR) 40 mg tablet Take 40 mg by mouth nightly. to obtain from pharmacy      aripiprazole (ABILIFY) 15 mg tablet Take 15 mg by mouth daily. to obtain from pharmacy      levothyroxine (SYNTHROID) 125 mcg tablet Take 125 mcg by mouth Daily (before breakfast). Clinical Impression     Clinical Impression:   1. COPD exacerbation ([address]        Disposition: Admit to stepdown        This note was dictated utilizing voice recognition software which may lead to typographical errors. I apologize in advance if the situation occurs. If questions arise please do not hesitate to contact me or call our department.     Sobia Galloway MD  9:06 AM

## 2019-08-12 NOTE — PROGRESS NOTES
Titrate FIO2 to 40%, Increase IPAP 22 EPAP 10, Ve to 8.5, RR 16     08/12/19 1545   Oxygen Therapy   O2 Sat (%) 97 %   Pulse via Oximetry 58 beats per minute   O2 Device BIPAP   FIO2 (%) 40 %   CPAP/BIPAP   Device Mode S/T   $$ Bipap Daily Yes   Mask Type and Size Small   Skin Condition intact   PIP Observed 22 cm H20   IPAP (cm H2O) 22 cm H2O   EPAP (cm H2O) 10 cm H2O   Inspiratory Time (sec) 0.9 seconds   Vt Spont (ml) 594 ml   Ve Observed (l/min) 8.5 l/min   Backup Rate 14   Total RR (Spontaneous) 14 breaths per minute   Insp Rise Time (sec) 3   Leak (Estimated) 48 L/min   Pt's Home Machine No   Settings Verified Yes   Alarm Settings   High Pressure 30   Low Pressure 10   Low Ve 2   High Rate 40   Low Rate 6

## 2019-08-12 NOTE — H&P
Admission H&P dictated. In interval time between exam and dictation, pt was seen by pulmonology and will be admitted to the ICU. ID#: [id number].   Hardy Mir MD  8/12/2019  5:08 PM

## 2019-08-12 NOTE — PROGRESS NOTES
conducted an initial consultation and Spiritual Assessment for Heather, who is a 60 y.o.,female. Patients Primary Language is: [place]. According to the patients EMR Moravian Affiliation is: Orlin Montes.     The reason the Patient came to the hospital is:   Patient Active Problem List    Diagnosis Date Noted    Osteoarthritis of both knees 01/14/2014     Priority: 1 - One    H/O total knee replacement 01/14/2014     Priority: 1 - One    DJD (degenerative joint disease), lumbosacral 01/14/2014     Priority: 1 - One    Hypothyroidism 01/14/2014     Priority: 1 - One    Morbid obesity ([address] 01/14/2014     Priority: 1 - One    Esophageal reflux 01/14/2014     Priority: 1 - One    COPD (chronic obstructive pulmonary disease) ([address] 08/12/2019    Pulmonary hypertension ([address] 11/14/2018    Severe osteoarthritis right hip 10/02/2018    CHF exacerbation ([address] 09/29/2018    Anxiety 08/30/2018    Acute exacerbation of CHF (congestive heart failure) ([address] 06/02/2018    Chronic anticoagulation 06/02/2018    Bradycardia 29/14/8617    Diastolic CHF, chronic ([address] 10/12/2017    Hypoxia 04/18/2017    Fluid overload 04/18/2017    CHF (congestive heart failure) ([address] 04/18/2017    DDD (degenerative disc disease), lumbar, L4/5 advanced 03/29/2017    Morbid obesity with BMI of 45.0-49.9, adult ([address] 02/20/2017    Dyspnea     Dyslipidemia     Hypertension     Congenital heart disease     Atrial fibrillation         The  provided the following Interventions:  Initiated a relationship of care and support. Explored issues of ken, spirituality and/or Latter-day needs while hospitalized. Listened empathically. Provided information about Spiritual Care Services. Offered prayer and assurance of continued prayers on patient's behalf. Chart reviewed.     The following outcomes were achieved:  Patient shared some information about their medical narrative and spiritual journey/beliefs. Patient processed feeling about current hospitalization. Patient expressed gratitude for the 's visit. Assessment:  Patient did not indicate any spiritual or Druze issues which require Spiritual Care Services interventions at this time. Patient does not have any Druze/cultural needs that will affect patients preferences in health care. Plan:  Chaplains will continue to follow and will provide pastoral care on an as needed or requested basis.  recommends bedside caregivers page  on duty if patient shows signs of acute spiritual or emotional distress.     [address] [facility] Department  [phone]

## 2019-08-12 NOTE — H&P
[address] [place] [address]   HISTORY AND PHYSICAL    Name:  El Quiroga  MR#:   [id number]  :  1960  ACCOUNT #:  [de-identified]  ADMIT DATE:  2019      CHIEF COMPLAINT:  Shortness of breath. HISTORY OF PRESENT ILLNESS:  Please note this history is limited because the patient cannot provide history as she is quite somnolent on BiPAP. Briefly, the patient is a 68-year-old female with a past medical history significant for diastolic heart failure; hypertension; hypothyroidism; coronary artery disease; GERD; AFib; morbid obesity; chronic respiratory failure, on home O2; who came to the emergency department with the aforementioned complaints. Per the ED record, she has had several days worth of worsening shortness of breath. Despite using home O2 at 3 L per minute, she was having worsening symptoms. Denied any cough. Reportedly had some intermittent chest tightness. She was also complaining of some pedal edema, worse on the left side. When I saw her, she was quite somnolent. She would arouse very briefly but quickly fall back asleep. She was maintained on BiPAP during this period of time. In the interval time between my examination and this dictation, she was seen by Pulmonology, and owing to her current state, she is slated to be brought up to the ICU. REVIEW OF SYSTEMS:  Unable to obtain due to the patient's condition. ALLERGIES:  ACCORDING TO THE RECORD, SHE IS ALLERGIC TO GABAPENTIN, TETANUS VACCINE, TOXOID, AND TOPAMAX. MEDICATIONS:  According to her last summary, she is on the followin. Lopressor 12.5 mg daily. 2.  Prilosec 20 mg daily. 3.  Aldactone 25 mg daily. 4.  Lasix 40 mg in the morning and 20 mg in the afternoon. 5.  MiraLax 17 g daily. 6.  Xarelto 20 mg daily. 7.  Oxycodone IR 5 mg every 6 hours as needed for pain. 8.  Chen-Colace nightly as needed. 9.  DuoNeb nebulizer every 6 hours as needed. 10.  Flonase two sprays both nostrils daily.   11.  Imdur 30 mg every evening. 12.  Multivitamin daily. 13.  Zantac 150 mg b.i.d.  14.  Paxil 20 mg daily. 15.  Zocor 40 mg nightly. 16.  Abilify 15 mg daily. 17.  Synthroid 125 mcg every morning. 18. She has an albuterol MDI as well. PAST MEDICAL HISTORY:  1.  Diastolic congestive heart failure. 2.  Chronic hypoxic respiratory failure, on home O2 at 3 L per minute via nasal cannula. 3.  Hypertension. 4.  Hypothyroidism. 5.  Coronary artery disease. 6.  GERD. 7.  Chronic AFib. 8.  Pulmonary hypertension. 9.  Morbid obesity. 10.  Anxiety and depression. PAST SURGICAL HISTORY:  No interval surgical history. According to the records, she had a VSD repair of her heart in . She has left-sided thyroidectomy, various orthopedic surgeries including bilateral knee replacement, history of hysterectomy. FAMILY HISTORY:  Unable to obtain due to condition. Both parents are , mother had coronary artery disease and hypertension episode in the father. SOCIAL HISTORY:  According to the records, no alcohol or tobacco use, no other drug use. I do not see anything to denote any DNR status. The patient is full code. PHYSICAL EXAMINATION:  VITAL SIGNS:   Last set of vitals includes a temperature of 98, pulse 66, blood pressure 137/81, respiratory rate 17, saturating at 97% on BiPAP. GENERAL:  On examination, the patient is a normally developed, obese patient who is extremely somnolent on BiPAP. She will arouse briefly to very loud verbal stimuli and light tactile stimuli but quickly falls back asleep. HEENT:  Head is normocephalic and atraumatic. Pupils appeared to be equally round. They are reactive to light. I cannot assess her extraocular motions as she is somnolent and will not follow commands from me. She has a BiPAP mask in place. NECK:  Supple. I do not feel any lymphadenopathy. CARDIOVASCULAR:  Heart is irregularly irregular. She has a 3/6 systolic ejection murmur.   PULMONARY:  She has decreased air exchange, bilateral lower lung fields, rales in bilateral bases. She does not follow commands, so I cannot get her to take a deep inspiratory breath. ABDOMEN:  Soft, obese, nontender, and nondistended with normal bowel sounds. I do not feel any masses. EXTREMITIES:  She has 2 to 3+ bilateral lower extremity edema. There is no apparent calf tenderness. NEUROLOGIC:  No facial asymmetry, does not follow commands as noted above. LABORATORY DATA:  Include a metabolic panel with a sodium of 142, potassium 4.4, chloride 104, CO2 35, BUN 15, creatinine 0.8, glucose 98, calcium 8.2. Liver function tests with a total protein 7.2, total bilirubin 1.6, albumin 3.4, alkaline phosphatase 211, ALT 21, AST 24. BN peptide of 3381. Hemoglobin A1c of 5.4%. Troponin I less than 0.02 x2 sets. CBC with a white count of 4.3, H and H of 9.9/34.2, platelets of 242. Differential:  72 segs, 17 lymphs, 9 monos, 2 eosinophils. She has three ABGs in the chart. On the initial presentation, her first ABG was about 1 p.m., showed a pH of 7.275, pCO2 of 83.3, pO2 of 83, bicarb 38.3, saturating at 94%, this is on room air. Most recent ABG from 3:27 p.m. with a pH of 7.289, pCO2 of 78.6, pO2 of 119, bicarbonate 37.8, saturating at 98% on BiPAP. The one ABG from 3:15 shows a pCO2 of 54, this was thought to be mixed sample. Chest x-ray by report shows prominence in hilum, right greater than left; aortic atherosclerosis; large cardiac silhouette; chronic osseous findings without definite acute osseous abnormality; probable layering pleural effusions bilaterally, left greater than right; patchy bilateral interstitial opacities. Bilateral lower extremity duplex studies were done, no evidence of DVT, could not assess peroneal veins due to body habitus. She had an EKG here as well which on my review shows what looks like AFib. She does have some P waves or some QRS complexes, rate is upto 70s. When I compared to the prior from 07/14/2019, I do not see any significant changes in terms of the waveforms. IMPRESSION AND PLAN:  A 56-year-old female with a medical history for the above who is here with acute hypoxic and hypercapnic respiratory failure on top of chronic respiratory failure of the same. Baseline 3 L per minute round the clock via nasal cannula. Here with worsening symptomatology and also declining mentation and increased somnolence while she has been here. In speaking with the Pulmonology service, decision was made to admit to the intensive care unit. She has been started on Rocephin and Zithromax. She has already received some Lasix, and she has been diuresing well. As the patient decompensated after she was admitted to our service, I will be dictating this H and P, and we will follow the patient per protocol for the first 24 to 48 hours in the intensive care unit but defer treatment plan and further orders to the intensivist service.       Priyanka Saucedo MD      [id number]  D:  08/12/2019 17:07  T:  08/12/2019 18:08  JOB #:  [id number]  CC:  Gilma Barnett MD

## 2019-08-12 NOTE — CONSULTS
[employer] [facility]  Pulmonary, Critical Care, and Sleep Medicine      Name: Aurora Anna MRN: [id number]   : 1960 Hospital: [facility]   Date: 2019          Critical Care Initial Patient Consult    Requesting MD:   Clemente Roberts                                               Reason for CC Consult: Acute on Chronic Hypoxic Hypercapnic Respiratory Failure requiring BiPAP, high risk of decompensation     IMPRESSION:   · Acute on Chronic Hypercapnic Respiratory Failure - Initial ABG with partially compensated Respiratory Acidosis on RA. (-) LA on admission. Multifactorial, likely 2/2 CHF/COPD exacerbation. With subject improvement per pt while on BiPAP & s/p Lasix in ED, however remains somnolent. Multifactorial in setting of severe OHS/ANDRADE with HFpEF. Low suspicion for infection in setting of normal LA, (-) SIRS criteria and no infiltrate/consolidation noted on CXR. Cardiac ischemia as contributing factor unlikely in setting of (-) Troponin x2 in ED. · HFpHF with current CHF Exacerbation - Improving s/p Lasix with excellent UOP in ED, while also on BiPAP in setting of OHS/ANDRADE. Pro-BNP elevated from previous admission in  (3381 >2728). · Severe OHS/ANDRADE, with failed CPAP titration per EMR; cannot tolerate BiPAP mask. · Pulmonary HTN - Likely WHO grp 3 vs grp 2.   · Morbid Obesity    · Afib - Currently on Xarelto at home   RECOMMENDATIONS:   · Resp: Titrate FiO2 for SpO2 goal >88-93%; titrate supp O2 PRN; currently requiring continuous BiPAP. Con't BiPAP for now until better clinical improvement; qHS thereafter. Serial ABG with slow improvement. Decrease FiO2 accordingly. Increased BiPAP settings to 20/8 from 18/6. Repeat ABG in 1hr - Closely monitor respiratory and mental status for improvement. Low threshold to intubate pt for airway protection in setting of somnolence despite pCO2 improvement. Aggressive pulmonary toilet. Bronchodilators q6 PRN.  No need for further steroids at this time. · I/D: Afebrile; aleukocytosis; Sepsis bundle per hospital protocol - Rocephin and Zithromax ordered to cover for HAP. F/u BxCx & UA/UC & Sputum Cx's. LA normal. Deescalate ABX once Cx's finalize. Low suspicion for infection. · Hem/Onc: Daily CBC; H/H, and plts are stable. SQH.  · CVS: HD stable; Troponin (-)x2 in ED. Aggressive diuresis. Repeat EKG; Consult Cardiology. Trend Pro-BNP   · Metabolic: Repeat E-lytes STAT and then q8 while diuresing, anticipate e-lyte shifts as pt diureses; aggresisively monitor e-lytes; replace PRN  · Toxins: give narcan & monitor response with somnolence. Check UDS  · Renal: Trend Renal indices; agree with Ariza placement for strict/accurate I/O's. · Endocrine: POC Glucose q6; SSI. Check TSH  · GI: SUP, Trend LFTs, Zofran PRN for N/V   · Musc/Skin: No acute issues, wound care  · Neuro: Avoid sedation; closely monitor mental status for CO2 narcosis. · Fluids: N/A  · Code Status: Full     Best Practice:  · Sepsis Bundle per Hospital Protocol  · Glycemic control; avoid Hypoglycemia  · IHI ICU Bundles:  ·  Ariza Bundle Followed    · [facility]   · VAP bundle, Aim to keep peak plateau pressure 69-92NL H2O  · Aspiration Precautions - HOB >30'  · Daily sedation holiday as indicated  · SBT as tolerated/appropriate  · Stress ulcer prophylaxis: Xarelto at home  · DVT prophylaxis. Pepcid  · Need for Lines, ariza assessed. ·     Subjective/History: This patient has been seen and evaluated at the request of Dr. Kim Squires for Acute on Chronic Hypercapnic Respiratory Failure in setting of COPD/CHF exacerbation      Patient is a 60 y.o. female with PMH significant for COPD ( on 3 LPM NC at home), HFpEF, ANDRADE (CPAP at home but non-compliant), Afib (on Xarelto at home), HTN and morbid obesity who presented to [facility] ED on 08/12/19 c/o sudden onset with acute worsening of SOB with associated CP and sleepiness.  Pt is poor historian currently, 2/2 BiPAP mask on and current state of sleepiness, but states onset of SOB was last evening (08/11/19) and progressively worsened overnight with CP/tightness. Pt also admits that her LE edema is worse than normal. States she is complaint with current medications, including Lasix, however admits to not wearing her CPAP - pt has failed titration.      Upon evaluation, pt is somnolent but will respond to verbal stimuli while on BiPAP. Pt states that her breathing does feel better however she is still quite sleepy. Repeat ABG with mild improvement in hypercapnia. Upon discussion with Hospitalist and Dr. Rene Way, it was decided that pt would be further upgraded to ICU status in setting of high risk of decompensation, possibly requiring intubation.      The patient is critically ill and can not provide additional history due to BiPAP and sleepiness. Past Medical History:   Diagnosis Date    Atrial fibrillation     CHADS score 1  (-CHF, +HTN, -AGE, -DM, -CVA)    Carpal tunnel syndrome     Chronic pain     Congenital heart disease     presumed pulmonary stenosis s/p repair 1969    Congestive heart failure (HCC)     Degenerative disc disease     Degenerative joint disease     GERD     H/O echocardiogram 04/2017    EF 60-65%, mild concentric LVH, dilated RV with RV dysfunction, RVSP 54 mmHg, RA E, moderate to severe pulmonic regurgitation.     Hypercholesterolemia     Hypertension     Hypothyroid     Morbid obesity     Morbid obesity with BMI of 45.0-49.9, adult ([facility] [address] 2/20/2017    Noncompliance     Schizoaffective disorder     Substance abuse ([facility] [address]     marijuana, crack cocaine      Past Surgical History:   Procedure Laterality Date    CARDIAC SURG PROCEDURE UNLIST  1971    VSD repair    COLONOSCOPY N/A 5/6/2018    COLONOSCOPY with tatooing and biopsy performed by Walker Lewis MD at [address]      left    HX HYSTERECTOMY      HX KNEE REPLACEMENT      left    HX KNEE REPLACEMENT right    HX ORTHOPAEDIC      left ankle    HX ORTHOPAEDIC      carpel tunnel right and left     HX THYROIDECTOMY Left       Prior to Admission medications    Medication Sig Start Date End Date Taking? Authorizing Provider   metoprolol tartrate (LOPRESSOR) 25 mg tablet Take 0.5 Tabs by mouth daily. 7/14/19   Colleen Huber MD   omeprazole (PRILOSEC) 20 mg capsule  5/29/19   Provider, Historical   spironolactone (ALDACTONE) 25 mg tablet Take 1 Tab by mouth daily. 6/6/19   VERONICA Saavedra   furosemide (LASIX) 40 mg tablet Take 40 mg PO in the morning and 20 mg PO in the afternoon 6/6/19   VERONICA Saavedra   polyethylene glycol (MIRALAX) 17 gram packet Take 1 Packet by mouth daily. 5/24/19   Jonny Caputo MD   XARELTO 20 mg tab tablet  1/16/19   Provider, Historical   oxyCODONE IR (ROXICODONE) 5 mg immediate release tablet Take 1 Tab by mouth every six (6) hours as needed for Pain. Max Daily Amount: 20 mg. 10/4/18   Alyssa Alba MD   senna-docusate (PERICOLACE) 8.6-50 mg per tablet Take 1 Tab by mouth nightly. 8/29/18   Marly Jaramillo NP   albuterol-ipratropium (DUO-NEB) 2.5 mg-0.5 mg/3 ml nebu 3 mL by Nebulization route every six (6) hours as needed. 8/24/18   Archana Rosales PA-C   fluticasone (FLONASE) 50 mcg/actuation nasal spray 2 Sprays by Both Nostrils route daily. 5/10/18   Morro aPt MD   isosorbide mononitrate ER (IMDUR) 30 mg tablet Take 1 Tab by mouth every evening. 4/24/18   Praful Walsh NP   multivitamin, tx-iron-ca-min (THERA-M W/ IRON) 9 mg iron-400 mcg tab tablet Take 1 Tab by mouth daily. 3/1/18   Wing Aj MD   raNITIdine (ZANTAC) 150 mg tablet Take 150 mg by mouth two (2) times a day.     Provider, Historical   VENTOLIN HFA 90 mcg/actuation inhaler Take 2 Puffs by inhalation every four (4) hours as needed for Wheezing or Shortness of Breath. 4/26/17   Wing Aj MD   PARoxetine (PAXIL) 20 mg tablet Take 1 Tab by mouth daily. 14   Maikel Fang MD   simvastatin (ZOCOR) 40 mg tablet Take 40 mg by mouth nightly. to obtain from pharmacy    Provider, Historical   aripiprazole (ABILIFY) 15 mg tablet Take 15 mg by mouth daily. to obtain from pharmacy    Provider, Historical   levothyroxine (SYNTHROID) 125 mcg tablet Take 125 mcg by mouth Daily (before breakfast). Provider, Historical     Current Facility-Administered Medications   Medication Dose Route Frequency    cefTRIAXone (ROCEPHIN) 1 g in sterile water (preservative free) 10 mL IV syringe  1 g IntraVENous NOW    azithromycin (ZITHROMAX) 500 mg in  mL  500 mg IntraVENous NOW    naloxone (NARCAN) injection 0.4 mg  0.4 mg IntraVENous ONCE    sodium chloride (NS) flush 5-40 mL  5-40 mL IntraVENous Q8H    famotidine (PF) (PEPCID) 20 mg in sodium chloride 0.9% 10 mL injection  20 mg IntraVENous Q12H    insulin lispro (HUMALOG) injection   SubCUTAneous Q6H     Allergies   Allergen Reactions    Gabapentin Other (comments)     Unsteady, weakness LEs    Tetanus Vaccines And Toxoid Swelling    Topamax [Topiramate] Other (comments)     weakness      Social History     Tobacco Use    Smoking status: Never Smoker    Smokeless tobacco: Never Used   Substance Use Topics    Alcohol use: No      Family History   Problem Relation Age of Onset    Hypertension Mother     Coronary Artery Disease Mother     Coronary Artery Disease Father     Hypertension Father         Review of Systems:  Review of systems not obtained due to patient factors. Objective:   Vital Signs:    Visit Vitals  /81   Pulse 66   Temp 98 °F (36.7 °C)   Resp 17   SpO2 97%       O2 Device: BIPAP       Temp (24hrs), Av °F (36.7 °C), Min:98 °F (36.7 °C), Max:98 °F (36.7 °C)       Intake/Output:   Last shift:      No intake/output data recorded. Last 3 shifts: No intake/output data recorded.   No intake or output data in the 24 hours ending 19 1640    >1.5L UOP s/p 40mg Lasix in ED.    Peak airway pressure:      Minute ventilation: 8.5 l/min        Physical Exam:   General:  Alert, sleepy but will respond to voice, currently on BiPAP; NAD,appears stated age. HEENT:  Normocephalic, atraumatic; Conjunctivae/corneas clear; anicteric; Nares/mucosa normal/moist; No drainage/sinus tenderness; Lips/oral mucosa moist; No thrush; Neck supple, symmetrical; trachea midline; No adenopathy; No thyroid enlargement/tenderness/nodules; No JVD noted   Back:   Symmetric, no curvature, no spine tenderness or flank pain   Resp:   Symmetrical chest rise; mod AE bilat; distant breath sounds, 2/2 body habitus. Diminished bibasilar. No wheezes/rhonchi/rales noted. CVS:  No chest wall tenderness/ deformity/crepitus; RRR; S1,S2 normal; No m/r/g   GI:   Abdomen Soft, obese. Non-tender.(+) B. S x4; No masses/ organomegaly/ paradox   Extremities: Atraumatic; +1-2 edema, acute on chronic. No cyanosis/clubbing. Pulses: 1-2+ and symmetric all extremities. Skin: Skin color, texture, turgor normal. No rashes or lesions   Lymph nodes: Cervical, supraclavicular, and axillary nodes normal.   Neurologic: Sleepy but will respond to voice, able to answer questions appropriately for the most part. Poor historian currently.        Data:     Recent Results (from the past 24 hour(s))   EKG, 12 LEAD, INITIAL    Collection Time: 08/12/19  8:36 AM   Result Value Ref Range    Ventricular Rate 72 BPM    Atrial Rate 75 BPM    QRS Duration 118 ms    Q-T Interval 488 ms    QTC Calculation (Bezet) 534 ms    Calculated R Axis -36 degrees    Calculated T Axis 97 degrees    Diagnosis       Undetermined rhythm  Left axis deviation  Low voltage QRS  Cannot rule out Anterior infarct (cited on or before 18-MAY-2019)  Prolonged QT  Abnormal ECG  When compared with ECG of 14-JUL-2019 18:06,  Current undetermined rhythm precludes rhythm comparison, needs review  Nonspecific T wave abnormality now evident in Inferior leads  QT has lengthened  Confirmed by Leighann aSbillon MD, --- ([id number]) on 8/12/2019 11:29:36 AM     CBC WITH AUTOMATED DIFF    Collection Time: 08/12/19  9:08 AM   Result Value Ref Range    WBC 4.3 (L) 4.6 - 13.2 K/uL    RBC 3.90 (L) 4.20 - 5.30 M/uL    HGB 9.9 (L) 12.0 - 16.0 g/dL    HCT 34.2 (L) 35.0 - 45.0 %    MCV 87.7 74.0 - 97.0 FL    MCH 25.4 24.0 - 34.0 PG    MCHC 28.9 (L) 31.0 - 37.0 g/dL    RDW 17.0 (H) 11.6 - 14.5 %    PLATELET 478 (L) 327 - 420 K/uL    MPV 9.8 9.2 - 11.8 FL    NEUTROPHILS 72 40 - 73 %    LYMPHOCYTES 17 (L) 21 - 52 %    MONOCYTES 9 3 - 10 %    EOSINOPHILS 2 0 - 5 %    BASOPHILS 0 0 - 2 %    ABS. NEUTROPHILS 3.1 1.8 - 8.0 K/UL    ABS. LYMPHOCYTES 0.7 (L) 0.9 - 3.6 K/UL    ABS. MONOCYTES 0.4 0.05 - 1.2 K/UL    ABS. EOSINOPHILS 0.1 0.0 - 0.4 K/UL    ABS. BASOPHILS 0.0 0.0 - 0.1 K/UL    DF AUTOMATED     NT-PRO BNP    Collection Time: 08/12/19  9:08 AM   Result Value Ref Range    NT pro-BNP 3,381 (H) 0 - 900 PG/ML   TROPONIN I    Collection Time: 08/12/19  9:08 AM   Result Value Ref Range    Troponin-I, QT <0.02 0.0 - 7.119 NG/ML   METABOLIC PANEL, COMPREHENSIVE    Collection Time: 08/12/19  9:08 AM   Result Value Ref Range    Sodium 142 136 - 145 mmol/L    Potassium 4.4 3.5 - 5.5 mmol/L    Chloride 104 100 - 111 mmol/L    CO2 35 (H) 21 - 32 mmol/L    Anion gap 3 3.0 - 18 mmol/L    Glucose 98 74 - 99 mg/dL    BUN 15 7.0 - 18 MG/DL    Creatinine 0.80 0.6 - 1.3 MG/DL    BUN/Creatinine ratio 19 12 - 20      GFR est AA >60 >60 ml/min/1.73m2    GFR est non-AA >60 >60 ml/min/1.73m2    Calcium 8.2 (L) 8.5 - 10.1 MG/DL    Bilirubin, total 1.6 (H) 0.2 - 1.0 MG/DL    ALT (SGPT) 21 13 - 56 U/L    AST (SGOT) 24 10 - 38 U/L    Alk.  phosphatase 211 (H) 45 - 117 U/L    Protein, total 7.2 6.4 - 8.2 g/dL    Albumin 3.4 3.4 - 5.0 g/dL    Globulin 3.8 2.0 - 4.0 g/dL    A-G Ratio 0.9 0.8 - 1.7     POC G3    Collection Time: 08/12/19  1:08 PM   Result Value Ref Range    Device: ROOM AIR      FIO2 (POC) 21 %    pH (POC) 7.275 (L) 7.35 - 7.45      pCO2 (POC) 82.3 (H) 35.0 - 45.0 MMHG    pO2 (POC) 83 80 - 100 MMHG    HCO3 (POC) 38.3 (H) 22 - 26 MMOL/L    sO2 (POC) 94 92 - 97 %    Base excess (POC) 9 mmol/L    Allens test (POC) YES      Total resp. rate 12      Site RIGHT RADIAL      Specimen type (POC) ARTERIAL      Performed by Kenna Martines    TROPONIN I    Collection Time: 08/12/19  1:35 PM   Result Value Ref Range    Troponin-I, QT <0.02 0.0 - 0.045 NG/ML   POC G3    Collection Time: 08/12/19  3:15 PM   Result Value Ref Range    Device: BIPAP      FIO2 (POC) 50 %    pH (POC) 7.261 (L) 7.35 - 7.45      pCO2 (POC) 83.5 (H) 35.0 - 45.0 MMHG    pO2 (POC) 54 (L) 80 - 100 MMHG    HCO3 (POC) 37.7 (H) 22 - 26 MMOL/L    sO2 (POC) 81 (L) 92 - 97 %    Base excess (POC) 8 mmol/L    PEEP/CPAP (POC) 6 cmH2O    PIP (POC) 18      Pressure support 12 cmH2O    Allens test (POC) YES      Total resp. rate 15      Site RIGHT RADIAL      Patient temp. 36.7      Specimen type (POC) ARTERIAL      Performed by Lenora March Spontaneous timed YES     POC G3    Collection Time: 08/12/19  3:27 PM   Result Value Ref Range    Device: BIPAP      FIO2 (POC) 50 %    pH (POC) 7.289 (L) 7.35 - 7.45      pCO2 (POC) 78.6 (H) 35.0 - 45.0 MMHG    pO2 (POC) 119 (H) 80 - 100 MMHG    HCO3 (POC) 37.8 (H) 22 - 26 MMOL/L    sO2 (POC) 98 (H) 92 - 97 %    Base excess (POC) 8 mmol/L    PEEP/CPAP (POC) 6 cmH2O    PIP (POC) 18      Pressure support 12 cmH2O    Allens test (POC) YES      Total resp. rate 12      Site RIGHT RADIAL      Patient temp. 36.7      Specimen type (POC) ARTERIAL      Performed by Lenora March Spontaneous timed YES               Telemetry:AFIB    Imaging:  ECHO [05/21/19]: · Left Ventricle: Mild concentric hypertrophy. Estimated left ventricular ejection fraction is 51 - 55%. Visually measured ejection fraction. No regional wall motion abnormality noted. Inconclusive left ventricular diastolic function. · Right Ventricle: Dilated right ventricle.  Reduced systolic function. Abnormal right ventricular septal motion. Systolic flattening of interventricular septum consistent with right ventricle pressure overload. · Right Atrium: Dilated right atrium. · Tricuspid Valve: Mild to moderate tricuspid valve regurgitation is present. · Pulmonary Artery: Moderate to severe pulmonary hypertension at 61 mmHg. · IVC/Hepatic Veins: Moderately elevated central venous pressure (10-15 mmHg); IVC diameter is larger than 21 mm and collapses more than 50% with respiration. Imaging:  DUPLEX LOWER EXT VENOUS BILAT [08/12/19]  The peroneal veins were not well visualized due to large body habitus. No evidence of DVT within the left lower extremity or right common femoral vein     CXR [08/12/19]: FINDINGS:   Telemetry leads noted.     Prominence of the hilum again noted, right greater than left.     Aortic atherosclerosis present.     Enlarged cardiac silhouette stable.     Chronic osseous findings without definite acute osseous abnormality.     Probable layering pleural effusions noted bilaterally, left greater than right. Patchy bilateral interstitial opacities present.     IMPRESSION:  Findings which can be related to CHF. Correlate with BNP. Superimposed pneumonitis not excluded.     Please see report for additional findings and further details    I have personally reviewed the patients radiographs and have reviewed the reports:  AMD        Total of  60   min critical care time spent at bedside during the course of care providing evaluation,management and care decisions and ordering appropriate treatment related to critical care problems exclusive of procedures. The reason for providing this level of medical care for this critically ill patient was due a critical illness that impaired one or more vital organ systems such that there was a high probability of imminent or life threatening deterioration in the patients condition.  This care involved high complexity decision making to assess, manipulate, and support vital system functions, to treat this degree vital organ system failure and to prevent further life threatening deterioration of the patients condition. Herve Talamantes PA-C    Pulmonary Critical Care Medicine  [facility]        [facility] Staff Addendum     I have independently evaluated the patient and reviewed the patient's chart. I have discussed the findings and care plan with ICU/ Hospitalist, [facility] Team.     I agree with the above evaluation, assessment and recommendations along with the following comments and observations. Patient with persistent hypersomnia and acute on chronic hypercapnea in the  ED. Patient being admitted for aggressive diuresis, airway monitoring and electrolyte management. If patient is able to adequately diuresis and corrects acute hypercapnea may be able to transfer out of ICU tomorrow. Opioid noted on OP MAR, unclear if this is an active medication. PRN narcan if needed.      Further recommendations pending clinical course      Critical Care and time spent coordinating care, minus procedure time: 40 min    Maya Davis DO, BREANNAP  Pulmonary, Sleep and Critical Care Medicine  6:27 PM

## 2019-08-13 ENCOUNTER — APPOINTMENT (OUTPATIENT)
Dept: GENERAL RADIOLOGY | Age: 59
DRG: 194 | End: 2019-08-13
Attending: PHYSICIAN ASSISTANT
Payer: MEDICAID

## 2019-08-13 LAB
ANION GAP SERPL CALC-SCNC: 6 MMOL/L (ref 3–18)
ARTERIAL PATENCY WRIST A: YES
ATRIAL RATE: 100 BPM
BACTERIA SPEC CULT: NORMAL
BASE EXCESS BLD CALC-SCNC: 11 MMOL/L
BASOPHILS # BLD: 0 K/UL (ref 0–0.1)
BASOPHILS NFR BLD: 0 % (ref 0–2)
BDY SITE: ABNORMAL
BNP SERPL-MCNC: 3494 PG/ML (ref 0–900)
BODY TEMPERATURE: 37
BUN SERPL-MCNC: 17 MG/DL (ref 7–18)
BUN/CREAT SERPL: 23 (ref 12–20)
CALCIUM SERPL-MCNC: 8 MG/DL (ref 8.5–10.1)
CALCULATED R AXIS, ECG10: -51 DEGREES
CALCULATED T AXIS, ECG11: 149 DEGREES
CHLORIDE SERPL-SCNC: 100 MMOL/L (ref 100–111)
CO2 SERPL-SCNC: 34 MMOL/L (ref 21–32)
CREAT SERPL-MCNC: 0.73 MG/DL (ref 0.6–1.3)
DIAGNOSIS, 93000: NORMAL
DIFFERENTIAL METHOD BLD: ABNORMAL
EOSINOPHIL # BLD: 0 K/UL (ref 0–0.4)
EOSINOPHIL NFR BLD: 0 % (ref 0–5)
ERYTHROCYTE [DISTWIDTH] IN BLOOD BY AUTOMATED COUNT: 16.5 % (ref 11.6–14.5)
GAS FLOW.O2 O2 DELIVERY SYS: ABNORMAL L/MIN
GLUCOSE BLD STRIP.AUTO-MCNC: 118 MG/DL (ref 70–110)
GLUCOSE BLD STRIP.AUTO-MCNC: 120 MG/DL (ref 70–110)
GLUCOSE BLD STRIP.AUTO-MCNC: 130 MG/DL (ref 70–110)
GLUCOSE BLD STRIP.AUTO-MCNC: 130 MG/DL (ref 70–110)
GLUCOSE SERPL-MCNC: 120 MG/DL (ref 74–99)
HCO3 BLD-SCNC: 37.1 MMOL/L (ref 22–26)
HCT VFR BLD AUTO: 34.3 % (ref 35–45)
HGB BLD-MCNC: 10 G/DL (ref 12–16)
INR PPP: 1.4 (ref 0.8–1.2)
LYMPHOCYTES # BLD: 0.5 K/UL (ref 0.9–3.6)
LYMPHOCYTES NFR BLD: 11 % (ref 21–52)
MAGNESIUM SERPL-MCNC: 2 MG/DL (ref 1.6–2.6)
MCH RBC QN AUTO: 25.3 PG (ref 24–34)
MCHC RBC AUTO-ENTMCNC: 29.2 G/DL (ref 31–37)
MCV RBC AUTO: 86.6 FL (ref 74–97)
MONOCYTES # BLD: 0.1 K/UL (ref 0.05–1.2)
MONOCYTES NFR BLD: 2 % (ref 3–10)
NEUTS SEG # BLD: 3.7 K/UL (ref 1.8–8)
NEUTS SEG NFR BLD: 87 % (ref 40–73)
O2/TOTAL GAS SETTING VFR VENT: 40 %
PCO2 BLD: 76.3 MMHG (ref 35–45)
PEEP RESPIRATORY: 10 CMH2O
PH BLD: 7.29 [PH] (ref 7.35–7.45)
PHOSPHATE SERPL-MCNC: 4.8 MG/DL (ref 2.5–4.9)
PIP ISTAT,IPIP: 22
PLATELET # BLD AUTO: 129 K/UL (ref 135–420)
PMV BLD AUTO: 10 FL (ref 9.2–11.8)
PO2 BLD: 127 MMHG (ref 80–100)
POTASSIUM SERPL-SCNC: 4.1 MMOL/L (ref 3.5–5.5)
PRESSURE SUPPORT SETTING VENT: 12 CMH2O
PROTHROMBIN TIME: 17.4 SEC (ref 11.5–15.2)
Q-T INTERVAL, ECG07: 460 MS
QRS DURATION, ECG06: 114 MS
QTC CALCULATION (BEZET), ECG08: 492 MS
RBC # BLD AUTO: 3.96 M/UL (ref 4.2–5.3)
SAO2 % BLD: 98 % (ref 92–97)
SERVICE CMNT-IMP: ABNORMAL
SERVICE CMNT-IMP: NORMAL
SODIUM SERPL-SCNC: 140 MMOL/L (ref 136–145)
SPECIMEN TYPE: ABNORMAL
SPONTANEOUS TIMED, IST: YES
TOTAL RESP. RATE, ITRR: 17
VENTRICULAR RATE, ECG03: 69 BPM
WBC # BLD AUTO: 4.2 K/UL (ref 4.6–13.2)

## 2019-08-13 PROCEDURE — 36415 COLL VENOUS BLD VENIPUNCTURE: CPT

## 2019-08-13 PROCEDURE — 36600 WITHDRAWAL OF ARTERIAL BLOOD: CPT

## 2019-08-13 PROCEDURE — 84100 ASSAY OF PHOSPHORUS: CPT

## 2019-08-13 PROCEDURE — 77010033678 HC OXYGEN DAILY

## 2019-08-13 PROCEDURE — 74011250636 HC RX REV CODE- 250/636: Performed by: PHYSICIAN ASSISTANT

## 2019-08-13 PROCEDURE — 83735 ASSAY OF MAGNESIUM: CPT

## 2019-08-13 PROCEDURE — 77030038269 HC DRN EXT URIN PURWCK BARD -A

## 2019-08-13 PROCEDURE — 82803 BLOOD GASES ANY COMBINATION: CPT

## 2019-08-13 PROCEDURE — 65610000006 HC RM INTENSIVE CARE

## 2019-08-13 PROCEDURE — 85610 PROTHROMBIN TIME: CPT

## 2019-08-13 PROCEDURE — 82962 GLUCOSE BLOOD TEST: CPT

## 2019-08-13 PROCEDURE — 94660 CPAP INITIATION&MGMT: CPT

## 2019-08-13 PROCEDURE — 65660000000 HC RM CCU STEPDOWN

## 2019-08-13 PROCEDURE — 71045 X-RAY EXAM CHEST 1 VIEW: CPT

## 2019-08-13 PROCEDURE — 85025 COMPLETE CBC W/AUTO DIFF WBC: CPT

## 2019-08-13 PROCEDURE — 74011250637 HC RX REV CODE- 250/637: Performed by: PHYSICIAN ASSISTANT

## 2019-08-13 PROCEDURE — 80048 BASIC METABOLIC PNL TOTAL CA: CPT

## 2019-08-13 PROCEDURE — 74011000250 HC RX REV CODE- 250: Performed by: PHYSICIAN ASSISTANT

## 2019-08-13 PROCEDURE — 94762 N-INVAS EAR/PLS OXIMTRY CONT: CPT

## 2019-08-13 PROCEDURE — 83880 ASSAY OF NATRIURETIC PEPTIDE: CPT

## 2019-08-13 RX ORDER — HEPARIN SODIUM 5000 [USP'U]/ML
5000 INJECTION, SOLUTION INTRAVENOUS; SUBCUTANEOUS EVERY 8 HOURS
Status: DISCONTINUED | OUTPATIENT
Start: 2019-08-13 | End: 2019-08-14

## 2019-08-13 RX ORDER — ONDANSETRON 2 MG/ML
4 INJECTION INTRAMUSCULAR; INTRAVENOUS
Status: DISCONTINUED | OUTPATIENT
Start: 2019-08-13 | End: 2019-08-13

## 2019-08-13 RX ORDER — ONDANSETRON 2 MG/ML
4 INJECTION INTRAMUSCULAR; INTRAVENOUS
Status: DISCONTINUED | OUTPATIENT
Start: 2019-08-13 | End: 2019-08-21 | Stop reason: HOSPADM

## 2019-08-13 RX ORDER — FUROSEMIDE 10 MG/ML
40 INJECTION INTRAMUSCULAR; INTRAVENOUS 2 TIMES DAILY
Status: DISPENSED | OUTPATIENT
Start: 2019-08-13 | End: 2019-08-17

## 2019-08-13 RX ADMIN — METOPROLOL TARTRATE 25 MG: 25 TABLET ORAL at 17:11

## 2019-08-13 RX ADMIN — FAMOTIDINE 20 MG: 10 INJECTION, SOLUTION INTRAVENOUS at 08:43

## 2019-08-13 RX ADMIN — ACETAMINOPHEN 650 MG: 325 TABLET ORAL at 00:04

## 2019-08-13 RX ADMIN — HEPARIN SODIUM 5000 UNITS: 5000 INJECTION INTRAVENOUS; SUBCUTANEOUS at 16:03

## 2019-08-13 RX ADMIN — Medication 10 ML: at 08:02

## 2019-08-13 RX ADMIN — HEPARIN SODIUM 5000 UNITS: 5000 INJECTION INTRAVENOUS; SUBCUTANEOUS at 08:43

## 2019-08-13 RX ADMIN — FUROSEMIDE 40 MG: 10 INJECTION, SOLUTION INTRAMUSCULAR; INTRAVENOUS at 17:11

## 2019-08-13 RX ADMIN — METOPROLOL TARTRATE 25 MG: 25 TABLET ORAL at 08:42

## 2019-08-13 RX ADMIN — FAMOTIDINE 20 MG: 10 INJECTION, SOLUTION INTRAVENOUS at 22:03

## 2019-08-13 RX ADMIN — FUROSEMIDE 40 MG: 10 INJECTION, SOLUTION INTRAMUSCULAR; INTRAVENOUS at 08:43

## 2019-08-13 RX ADMIN — Medication 10 ML: at 16:04

## 2019-08-13 RX ADMIN — ACETAMINOPHEN 650 MG: 325 TABLET ORAL at 16:00

## 2019-08-13 NOTE — PROGRESS NOTES
Reason for Admission:  COPD (chronic obstructive pulmonary disease) ([facility] [address] [J44.9]  Acute respiratory failure ([facility] [address] [J96.00]                 RRAT Score:    27            Plan for utilizing home health:    Yes. Pt will need h2h for COPD when discharged                      Likelihood of Readmission:   Moderate                         Do you (patient/family) have any concerns for transition/discharge?  no    Transition of Care Plan:       Initial assessment completed with patient. Cognitive status of patient: oriented to time, place, person and situation. Face sheet information confirmed:  yes. The patient designates her daughter Sharyle Kief to participate in her discharge plan and to receive any needed information. This patient lives in a  home with daughter. Patient is not able to navigate steps as needed. Prior to hospitalization, patient was considered to be independent with ADLs/IADLS : yes . Patient has a current ACP document on file: no  The patient's daughter will be available to transport patient home upon discharge. The patient already has Walker, Rollator, and CPAP medical equipment available in the home. Patient is not currently active with home health. Patient has not stayed in a skilled nursing facility or rehab. Was  stay within last 60 days : no. This patient is on dialysis :no     Currently, the discharge plan is Home with [place] [facility] for COPD    The patient states that she can obtain her medications from the pharmacy, and take her medications as directed. Patient's current insurance is [facility] Medicaid      Care Management Interventions  PCP Verified by CM: Yes(per pt, she saw her pcp Dr. Becki Renteria last week)  Palliative Care Criteria Met (RRAT>21 & CHF Dx)?: No  Mode of Transport at Discharge:  Other (see comment)(family)  Transition of Care Consult (CM Consult): Discharge Planning  Physical Therapy Consult: No  Occupational Therapy Consult: No  Speech Therapy Consult: No  Current Support Network: Relative's Home(lives with her daughter Sharyle Kief)  Confirm Follow Up Transport: Family  Plan discussed with Pt/Family/Caregiver: Yes  Discharge Location  Discharge Placement: Home with home health        REG Tay RN  Care Management  Pager: [phone]

## 2019-08-13 NOTE — ROUTINE PROCESS
0730 Bedside and Verbal shift change report given to [employer] (oncoming nurse) by RIGO Oviedo (offgoing nurse). Report included the following information SBAR, Kardex, MAR and Recent Results.

## 2019-08-13 NOTE — PROGRESS NOTES
[facility]  ICU Progress Note      Name: Vicente Montero   : 1960   MRN: [id number]   Date: 2019 5:40 AM     [x]I have reviewed the flowsheet and previous days notes. Events overnight reviewed and discussed with nursing staff. Vital signs and records reviewed. Subjective:  19:    Admitted yesterday for Acute on Chronic hypercapnic respiratory failure, requiring BiPAP  Serial ABG checks yesterday with improving PC02, P02, & pH   HTN'pratima last night = resumed Lopressor 25 mg PO BID  Hematuria s/p Shaikh insertion last night.  (h/o Xarelto use for Afib). This AM, ABG c/w respiratory acidosis. Continue BiPAP as-is, however, ok to give breaks with meals  INR 1.40 this AM.  Hold A/C for now. Rising BNP this AM (~ 3500)  Add diabetic diet today  Add SQ Heparin for DVT Prophylaxis  (PVL negative for DVT yesterday)  Add Lasix IVP BID for gentle diuresis for Volume overloaded CXR   Change labs to daily checks         []The patient is unable to give any meaningful history or review of systems because the patient is:  []Intubated []Sedated   []Unresponsive      [x]The patient is critically ill on      []Mechanical ventilation []Pressors   [x]BiPAP []                 ROS:A comprehensive review of systems was negative except for that written in the HPI. Medication Review:  · Pressors - None  · Sedation - None  · Antibiotics - None  · Pain - APAP, PRN  · GI/ DVT - Pepcid IVP BID / SQ Heparin        Vital Signs:    Visit Vitals  /88   Pulse 65   Temp 98.4 °F (36.9 °C)   Resp 17   Wt 142.2 kg (313 lb 7.9 oz)   SpO2 100%   Breastfeeding? No   BMI 55.53 kg/m²       O2 Device: BIPAP   O2 Flow Rate (L/min): 3 l/min   Temp (24hrs), Av.1 °F (36.7 °C), Min:97.8 °F (36.6 °C), Max:98.4 °F (36.9 °C)       Intake/Output:   Last shift:       1901 -  0700  In: 480 [P.O.:480]  Out: 550 [Urine:550]  Last 3 shifts: No intake/output data recorded.     Intake/Output Summary (Last 24 hours) at 8/13/2019 0540  Last data filed at 8/13/2019 0200  Gross per 24 hour   Intake 480 ml   Output 550 ml   Net -70 ml         Physical Exam:    General: On BiPAP. Alert & orient x 3. Comfortable. NAD. HEENT:  Anicteric sclerae; pink palpebral conjunctivae; mucosa moist  Resp:  Symmetrical chest rise, no accessory muscle use. BS = B/L Coarse, decreased bases. No rales/ wheezing/ rhonchi noted  CV:  Afib, rate-controlled (50-60's). No r/m/g  GI:  Abdomen soft, non-tender; (+) active bowel sounds  Extremities:  +2 pulses on all extremities; +2 B/L LE edema. No cyanosis/ clubbing noted  Skin:  Warm; no rashes/ lesions noted  Neurologic:  Alert & [place] x 3. Good = B/L Strength.   No C/o's  Devices:  No NGT/OGT, Central line/ PICC, ETT/tracheostomy, chest tube.  + P. IV & Shaikh       DATA:     Current Facility-Administered Medications   Medication Dose Route Frequency    sodium chloride (NS) flush 5-40 mL  5-40 mL IntraVENous Q8H    sodium chloride (NS) flush 5-40 mL  5-40 mL IntraVENous PRN    famotidine (PF) (PEPCID) 20 mg in sodium chloride 0.9% 10 mL injection  20 mg IntraVENous Q12H    insulin lispro (HUMALOG) injection   SubCUTAneous Q6H    glucose chewable tablet 16 g  4 Tab Oral PRN    glucagon (GLUCAGEN) injection 1 mg  1 mg IntraMUSCular PRN    dextrose (D50W) injection syrg 12.5-25 g  25-50 mL IntraVENous PRN    albuterol-ipratropium (DUO-NEB) 2.5 MG-0.5 MG/3 ML  3 mL Nebulization Q4H PRN    metoprolol tartrate (LOPRESSOR) tablet 25 mg  25 mg Oral BID    furosemide (LASIX) tablet 40 mg  40 mg Oral BID    acetaminophen (TYLENOL) tablet 650 mg  650 mg Oral Q4H PRN         Labs: Results:       Chemistry Recent Labs     08/13/19  0348 08/12/19  2202 08/12/19  0908   * 111* 98    139 142   K 4.1 4.4 4.4    100 104   CO2 34* 33* 35*   BUN 17 15 15   CREA 0.73 0.86 0.80   CA 8.0* 8.4* 8.2*   AGAP 6 6 3   BUCR 23* 17 19   AP  --   --  211*   TP  --   --  7.2   ALB --   --  3.4   GLOB  --   --  3.8   AGRAT  --   --  0.9      CBC w/Diff Recent Labs     08/13/19  0348 08/12/19  0908   WBC 4.2* 4.3*   RBC 3.96* 3.90*   HGB 10.0* 9.9*   HCT 34.3* 34.2*   * 127*   GRANS 87* 72   LYMPH 11* 17*   EOS 0 2      Coagulation Recent Labs     08/13/19  0348   PTP 17.4*   INR 1.4*       Liver Enzymes Recent Labs     08/12/19  0908   TP 7.2   ALB 3.4   *   SGOT 24      ABG Lab Results   Component Value Date/Time    PHI 7.295 (L) 08/13/2019 04:28 AM    PCO2I 76.3 (H) 08/13/2019 04:28 AM    PO2I 127 (H) 08/13/2019 04:28 AM    HCO3I 37.1 (H) 08/13/2019 04:28 AM    FIO2I 40 08/13/2019 04:28 AM      Microbiology No results for input(s): CULT in the last 72 hours. Telemetry: []Sinus []A-flutter []Paced    [x]A-fib []Multiple PVCs                  Imaging:    CXR 8/13/2019:  Findings: Mild pulmonary edema is likely similar to prior comparison. Possible  small left pleural effusion. No pneumothorax. Significant cardiomegaly is  stable. Atherosclerosis. Stable enlargement of the right hilum over multiple  prior comparisons with prior CT demonstrating enlargement of the pulmonary  arteries explaining this finding.     IMPRESSION  Impression:  1. Similar mild pulmonary edema. IMPRESSION:   · Acute on Chronic Hypercapnic Respiratory Failure  · HFprEF with current CHF Exacerbation- acute on chronic diastolic heart failure  · Severe ANDRADE/OHS  · Pulmonary HTN  · Afib, on Xarelto (currently on-hold)  · Morbid Obesity    RECOMMENDATIONS:   · Resp: Luiza BiPAP this AM.  ABG this AM c/w Respiratory Acidosis (7.30/76/127/37.1/98%) on BiPAP 22/10. Titrate FiO2/ supp O2 for SpO2 >90%. Continue Daily PCXR & ABG. · I/D: Afebrile. WBC WNL's. Non-toxic. Received ABX yesterday (Zithromax & Rocephin x 1). Observe w/o further ABX. · Hem/Onc: Hct / PLT's stable (34.3 & 129 K). INR checked this AM with h/o Xarelto use & hematuria last night s/p Shaikh insertion.   INR 1.40 this AM.   Daily CBC.  Add SQ Heparin for DVT Prophylaxis. Hold A/C for now  · CVS: Stable. Tele = Afib, rate controlled (50-60's). Xarelto on hold, restart timing per MD.  No gtt's. HTN'pratima last night. Resumed home med (Lopressor 25 mg PO BID). BNP trending up (~3500 this AM). · Metabolic: Daily BMP; monitor e-lytes; replace PRN  · Renal: I ~ O's. Cr 0.73. Add Lasix 40 mg IVP BID. K+ 4.1. Na 140. Trend Renal indices; Diuresis, Ariza to BSD  · Endocrine: POC Glucose q6  · GI: Diabetic Diet. Pepcid IVP BID for SUP. Zofran PRN for N/V   · Musc/Skin: No acute issues, wound care  · Neuro: Alert & [place] x 3. Follows commands. No C/o's. APAP, PRN  · Fluids: None  · To Be Discussed in Interdisciplinary Rounds (Continue ICU Care while on BiPAP)     Best Practices/ Safety Bundles:  · Sepsis Bundle per Hospital Protocol  · CAUTI Bundle  · Electrolyte Replacement Bundle  · Glycemic control goal <180; avoid Hypoglycemia  · IHI ICU Bundles:  ·  Ariza Bundle Followed    · Mech Vent patients:   · VAP bundle, Aim to keep peak plateau pressure 30-36PC H2O  · Aspiration Precautions - HOB >30'  · Daily sedation holiday as indicated  · SBT as tolerated/appropriate  · Titrate FiO2 for SpO2 >94%  · Aggressive Pulmonary [place]  · Stress ulcer prophylaxis. Pepcid IVP BID   · DVT prophylaxis. SQ Heparin  · Need for Lines, ariza assessed. · Restraints need. · Palliative care evaluation      The patient is: [x] acutely ill Risk of deterioration: [] moderate    [] critically ill  [x] high     [x]See my orders for details    My assessment/plan was discussed with:  [x]Nursing []PT/OT    []Respiratory therapy [].   []Family []         Critical Care Time = Jarek Chavez 1154      Tito Randolph, [address]  08/13/19  Pulmonary, [address] [facility]     I personally saw and examined the patient. I have reviewed and agree with the APC's findings, including all diagnostic interpretations, and plans as written.  I was present during the key portions of separately billed procedures.     Radha Lott MD

## 2019-08-13 NOTE — PROGRESS NOTES
Physical Exam   Skin:             0700:  Assumed care of pt at this time with preceptor, Agustin Matta. Assessment as charted. Patient taken off bipap and placed on 3L NC for turn. Tolerated well with no distress. Bipap replaced post turn. Pt c/o vague 3/10 right hip pain. Skin is intact with multiple scars including bilat knees and scattered discoloration over torso and face. 0845:  Pt taken off bipap and placed on 3L NC for meal.  Tolerating well.      1000:  Bipap replaced 30 minutes post completion of meal. Moderate soft brown BM on bedpan. 1045: Shaikh cath removed per MD order. Purewick placed per patient request.      1400:  Pt agreed to left sided turn post mar care. Purewick leaked, large amount of urine in bed, small amount of urine in suction tubing. Large soft brown BM. Placed back on bipap.     1750: Pt transferred to a specialty bariatric total care bed.     1902: Bedside and Verbal shift change report given to Kristine Shah RN (oncoming nurse) by Marco Antonio Chowdhury RN   (offgoing nurse). Report included the following information Intake/Output, MAR, Recent Results and Cardiac Rhythm afib .

## 2019-08-13 NOTE — CDMP QUERY
Pt admitted with acute on chronic resp failure  secondary to CHF/  COPD   exacerbations Pt noted to have CHF If possible, please document in progress notes and d/c summary if you are evaluating and /or treating any of the following: ? Acute on Chronic Systolic CHF ? Acute on Chronic Diastolic CHF ? Acute on Chronic Systolic and Diastolic CHF ? Acute Systolic CHF ? Acute Diastolic CHF ? Acute Systolic and Diastolic CHF 
? Other, please specify ? Clinically unable to determine The medical record reflects the following Clinical Indicators: Acute on chronic hypercapnic respiratory failure secondary to CHF and COPD exacerbation Congestive heart failure with preserved ejection fraction EF   5-55%  in May 2019 Treatment:   IV Lasix 40mg  BID Thank you,    Milton Jacques RN   CCDS   x [phone]

## 2019-08-13 NOTE — ROUTINE PROCESS
TRANSFER - IN REPORT: 
 
46 Verbal report received from [address] adams(name) on Heather  being received from ED(unit) for routine progression of care Report consisted of patients Situation, Background, Assessment and  
Recommendations(SBAR). Information from the following report(s) SBAR, Kardex, [facility] and Recent Results was reviewed with the receiving nurse. Opportunity for questions and clarification was provided. 2030 Assessment completed upon patients arrival to unit and care assumed.

## 2019-08-14 ENCOUNTER — APPOINTMENT (OUTPATIENT)
Dept: GENERAL RADIOLOGY | Age: 59
DRG: 194 | End: 2019-08-14
Attending: PHYSICIAN ASSISTANT
Payer: MEDICAID

## 2019-08-14 LAB
ANION GAP SERPL CALC-SCNC: 3 MMOL/L (ref 3–18)
ARTERIAL PATENCY WRIST A: ABNORMAL
BASE EXCESS BLD CALC-SCNC: 14 MMOL/L
BASOPHILS # BLD: 0 K/UL (ref 0–0.1)
BASOPHILS NFR BLD: 0 % (ref 0–2)
BDY SITE: ABNORMAL
BODY TEMPERATURE: 97
BUN SERPL-MCNC: 21 MG/DL (ref 7–18)
BUN/CREAT SERPL: 22 (ref 12–20)
CA-I SERPL-SCNC: 1.06 MMOL/L (ref 1.12–1.32)
CALCIUM SERPL-MCNC: 7.9 MG/DL (ref 8.5–10.1)
CHLORIDE SERPL-SCNC: 101 MMOL/L (ref 100–111)
CO2 SERPL-SCNC: 36 MMOL/L (ref 21–32)
CREAT SERPL-MCNC: 0.96 MG/DL (ref 0.6–1.3)
DIFFERENTIAL METHOD BLD: ABNORMAL
EOSINOPHIL # BLD: 0 K/UL (ref 0–0.4)
EOSINOPHIL NFR BLD: 1 % (ref 0–5)
ERYTHROCYTE [DISTWIDTH] IN BLOOD BY AUTOMATED COUNT: 17.1 % (ref 11.6–14.5)
GAS FLOW.O2 O2 DELIVERY SYS: ABNORMAL L/MIN
GLUCOSE BLD STRIP.AUTO-MCNC: 92 MG/DL (ref 70–110)
GLUCOSE BLD STRIP.AUTO-MCNC: 98 MG/DL (ref 70–110)
GLUCOSE SERPL-MCNC: 110 MG/DL (ref 74–99)
HCO3 BLD-SCNC: 38.7 MMOL/L (ref 22–26)
HCT VFR BLD AUTO: 32.9 % (ref 35–45)
HGB BLD-MCNC: 9.5 G/DL (ref 12–16)
LYMPHOCYTES # BLD: 1.1 K/UL (ref 0.9–3.6)
LYMPHOCYTES NFR BLD: 24 % (ref 21–52)
MAGNESIUM SERPL-MCNC: 2.1 MG/DL (ref 1.6–2.6)
MCH RBC QN AUTO: 24.9 PG (ref 24–34)
MCHC RBC AUTO-ENTMCNC: 28.9 G/DL (ref 31–37)
MCV RBC AUTO: 86.4 FL (ref 74–97)
MONOCYTES # BLD: 0.4 K/UL (ref 0.05–1.2)
MONOCYTES NFR BLD: 8 % (ref 3–10)
NEUTS SEG # BLD: 3.1 K/UL (ref 1.8–8)
NEUTS SEG NFR BLD: 67 % (ref 40–73)
O2/TOTAL GAS SETTING VFR VENT: 0.35 %
PCO2 BLD: 60.1 MMHG (ref 35–45)
PEEP RESPIRATORY: 10 CMH2O
PH BLD: 7.41 [PH] (ref 7.35–7.45)
PHOSPHATE SERPL-MCNC: 3.1 MG/DL (ref 2.5–4.9)
PIP ISTAT,IPIP: 20
PLATELET # BLD AUTO: 147 K/UL (ref 135–420)
PMV BLD AUTO: 9.8 FL (ref 9.2–11.8)
PO2 BLD: 72 MMHG (ref 80–100)
POTASSIUM SERPL-SCNC: 3.8 MMOL/L (ref 3.5–5.5)
PRESSURE SUPPORT SETTING VENT: 10 CMH2O
RBC # BLD AUTO: 3.81 M/UL (ref 4.2–5.3)
SAO2 % BLD: 95 % (ref 92–97)
SERVICE CMNT-IMP: ABNORMAL
SODIUM SERPL-SCNC: 140 MMOL/L (ref 136–145)
SPECIMEN TYPE: ABNORMAL
TOTAL RESP. RATE, ITRR: 18
WBC # BLD AUTO: 4.7 K/UL (ref 4.6–13.2)

## 2019-08-14 PROCEDURE — 82330 ASSAY OF CALCIUM: CPT

## 2019-08-14 PROCEDURE — 84100 ASSAY OF PHOSPHORUS: CPT

## 2019-08-14 PROCEDURE — 97162 PT EVAL MOD COMPLEX 30 MIN: CPT

## 2019-08-14 PROCEDURE — 97165 OT EVAL LOW COMPLEX 30 MIN: CPT

## 2019-08-14 PROCEDURE — 74011000258 HC RX REV CODE- 258: Performed by: PHYSICIAN ASSISTANT

## 2019-08-14 PROCEDURE — 94762 N-INVAS EAR/PLS OXIMTRY CONT: CPT

## 2019-08-14 PROCEDURE — 97535 SELF CARE MNGMENT TRAINING: CPT

## 2019-08-14 PROCEDURE — 74011250637 HC RX REV CODE- 250/637: Performed by: PHYSICIAN ASSISTANT

## 2019-08-14 PROCEDURE — 74011000250 HC RX REV CODE- 250: Performed by: INTERNAL MEDICINE

## 2019-08-14 PROCEDURE — 65660000000 HC RM CCU STEPDOWN

## 2019-08-14 PROCEDURE — 94640 AIRWAY INHALATION TREATMENT: CPT

## 2019-08-14 PROCEDURE — 82803 BLOOD GASES ANY COMBINATION: CPT

## 2019-08-14 PROCEDURE — 94660 CPAP INITIATION&MGMT: CPT

## 2019-08-14 PROCEDURE — 83735 ASSAY OF MAGNESIUM: CPT

## 2019-08-14 PROCEDURE — 82962 GLUCOSE BLOOD TEST: CPT

## 2019-08-14 PROCEDURE — 80048 BASIC METABOLIC PNL TOTAL CA: CPT

## 2019-08-14 PROCEDURE — 36600 WITHDRAWAL OF ARTERIAL BLOOD: CPT

## 2019-08-14 PROCEDURE — 77010033678 HC OXYGEN DAILY

## 2019-08-14 PROCEDURE — 74011250636 HC RX REV CODE- 250/636: Performed by: PHYSICIAN ASSISTANT

## 2019-08-14 PROCEDURE — 36415 COLL VENOUS BLD VENIPUNCTURE: CPT

## 2019-08-14 PROCEDURE — 97530 THERAPEUTIC ACTIVITIES: CPT

## 2019-08-14 PROCEDURE — 74011250637 HC RX REV CODE- 250/637: Performed by: INTERNAL MEDICINE

## 2019-08-14 PROCEDURE — 85025 COMPLETE CBC W/AUTO DIFF WBC: CPT

## 2019-08-14 PROCEDURE — 74011250637 HC RX REV CODE- 250/637: Performed by: FAMILY MEDICINE

## 2019-08-14 PROCEDURE — 77030038269 HC DRN EXT URIN PURWCK BARD -A

## 2019-08-14 PROCEDURE — 71045 X-RAY EXAM CHEST 1 VIEW: CPT

## 2019-08-14 RX ORDER — IPRATROPIUM BROMIDE AND ALBUTEROL SULFATE 2.5; .5 MG/3ML; MG/3ML
3 SOLUTION RESPIRATORY (INHALATION)
Status: DISCONTINUED | OUTPATIENT
Start: 2019-08-14 | End: 2019-08-16

## 2019-08-14 RX ORDER — ARIPIPRAZOLE 15 MG/1
15 TABLET ORAL DAILY
Status: DISCONTINUED | OUTPATIENT
Start: 2019-08-15 | End: 2019-08-21 | Stop reason: HOSPADM

## 2019-08-14 RX ORDER — OXYCODONE HYDROCHLORIDE 5 MG/1
5 TABLET ORAL
Status: DISCONTINUED | OUTPATIENT
Start: 2019-08-14 | End: 2019-08-21 | Stop reason: HOSPADM

## 2019-08-14 RX ORDER — PAROXETINE HYDROCHLORIDE 20 MG/1
20 TABLET, FILM COATED ORAL DAILY
Status: DISCONTINUED | OUTPATIENT
Start: 2019-08-15 | End: 2019-08-21 | Stop reason: HOSPADM

## 2019-08-14 RX ORDER — INSULIN LISPRO 100 [IU]/ML
INJECTION, SOLUTION INTRAVENOUS; SUBCUTANEOUS
Status: DISCONTINUED | OUTPATIENT
Start: 2019-08-14 | End: 2019-08-14

## 2019-08-14 RX ORDER — FAMOTIDINE 20 MG/1
20 TABLET, FILM COATED ORAL 2 TIMES DAILY
Status: DISCONTINUED | OUTPATIENT
Start: 2019-08-14 | End: 2019-08-14

## 2019-08-14 RX ORDER — POTASSIUM CHLORIDE 20 MEQ/1
20 TABLET, EXTENDED RELEASE ORAL ONCE
Status: COMPLETED | OUTPATIENT
Start: 2019-08-14 | End: 2019-08-14

## 2019-08-14 RX ORDER — SIMVASTATIN 40 MG/1
40 TABLET, FILM COATED ORAL
Status: DISCONTINUED | OUTPATIENT
Start: 2019-08-14 | End: 2019-08-21 | Stop reason: HOSPADM

## 2019-08-14 RX ORDER — ACETAZOLAMIDE 250 MG/1
250 TABLET ORAL 2 TIMES DAILY
Status: DISCONTINUED | OUTPATIENT
Start: 2019-08-14 | End: 2019-08-19

## 2019-08-14 RX ORDER — FLUTICASONE PROPIONATE 50 MCG
2 SPRAY, SUSPENSION (ML) NASAL DAILY
Status: DISCONTINUED | OUTPATIENT
Start: 2019-08-14 | End: 2019-08-21 | Stop reason: HOSPADM

## 2019-08-14 RX ORDER — FAMOTIDINE 20 MG/1
20 TABLET, FILM COATED ORAL 2 TIMES DAILY
Status: DISCONTINUED | OUTPATIENT
Start: 2019-08-14 | End: 2019-08-21 | Stop reason: HOSPADM

## 2019-08-14 RX ADMIN — POTASSIUM CHLORIDE 20 MEQ: 20 TABLET, EXTENDED RELEASE ORAL at 08:03

## 2019-08-14 RX ADMIN — ACETAMINOPHEN 650 MG: 325 TABLET ORAL at 01:29

## 2019-08-14 RX ADMIN — Medication 10 ML: at 21:27

## 2019-08-14 RX ADMIN — ACETAZOLAMIDE 250 MG: 250 TABLET ORAL at 09:32

## 2019-08-14 RX ADMIN — ACETAZOLAMIDE 250 MG: 250 TABLET ORAL at 17:00

## 2019-08-14 RX ADMIN — HEPARIN SODIUM 5000 UNITS: 5000 INJECTION INTRAVENOUS; SUBCUTANEOUS at 01:12

## 2019-08-14 RX ADMIN — IPRATROPIUM BROMIDE AND ALBUTEROL SULFATE 3 ML: .5; 3 SOLUTION RESPIRATORY (INHALATION) at 12:27

## 2019-08-14 RX ADMIN — FAMOTIDINE 20 MG: 20 TABLET ORAL at 08:12

## 2019-08-14 RX ADMIN — SIMVASTATIN 40 MG: 40 TABLET, FILM COATED ORAL at 21:26

## 2019-08-14 RX ADMIN — OXYCODONE HYDROCHLORIDE 5 MG: 5 TABLET ORAL at 12:46

## 2019-08-14 RX ADMIN — FLUTICASONE PROPIONATE 2 SPRAY: 50 SPRAY, METERED NASAL at 16:59

## 2019-08-14 RX ADMIN — METOPROLOL TARTRATE 25 MG: 25 TABLET ORAL at 08:03

## 2019-08-14 RX ADMIN — Medication 10 ML: at 06:03

## 2019-08-14 RX ADMIN — OXYCODONE HYDROCHLORIDE 5 MG: 5 TABLET ORAL at 19:45

## 2019-08-14 RX ADMIN — FUROSEMIDE 40 MG: 10 INJECTION, SOLUTION INTRAMUSCULAR; INTRAVENOUS at 17:00

## 2019-08-14 RX ADMIN — Medication 10 ML: at 01:12

## 2019-08-14 RX ADMIN — METOPROLOL TARTRATE 25 MG: 25 TABLET ORAL at 17:04

## 2019-08-14 RX ADMIN — IPRATROPIUM BROMIDE AND ALBUTEROL SULFATE 3 ML: .5; 3 SOLUTION RESPIRATORY (INHALATION) at 23:29

## 2019-08-14 RX ADMIN — LEVOTHYROXINE SODIUM 125 MCG: 25 TABLET ORAL at 06:05

## 2019-08-14 RX ADMIN — CALCIUM GLUCONATE 2 G: 94 INJECTION, SOLUTION INTRAVENOUS at 14:07

## 2019-08-14 RX ADMIN — FUROSEMIDE 40 MG: 10 INJECTION, SOLUTION INTRAMUSCULAR; INTRAVENOUS at 08:04

## 2019-08-14 RX ADMIN — IPRATROPIUM BROMIDE AND ALBUTEROL SULFATE 3 ML: .5; 3 SOLUTION RESPIRATORY (INHALATION) at 16:36

## 2019-08-14 RX ADMIN — RIVAROXABAN 20 MG: 20 TABLET, FILM COATED ORAL at 17:08

## 2019-08-14 RX ADMIN — FAMOTIDINE 20 MG: 20 TABLET ORAL at 17:08

## 2019-08-14 RX ADMIN — Medication 10 ML: at 14:11

## 2019-08-14 NOTE — ROUTINE PROCESS
0730 Bedside and Verbal shift change report given to [place] (oncoming nurse) by RIGO Casanova (offgoing nurse). Report included the following information SBAR, Kardex, MAR and Recent Results.

## 2019-08-14 NOTE — PROGRESS NOTES
Problem: Mobility Impaired (Adult and Pediatric)  Goal: *Acute Goals and Plan of Care (Insert Text)  Description  Physical Therapy Goals  Initiated 8/14/2019 and to be accomplished within 7 day(s)  1. Patient will move from supine to sit and sit to supine , scoot up and down and roll side to side in bed with modified independence. 2.  Patient will transfer from bed to chair and chair to bed with modified independence using the least restrictive device. 3.  Patient will perform sit to stand with modified independence. 4.  Patient will ambulate with supervision/set-up for  feet with the least restrictive device. Prior Level of Function: Pt was independent with mobility including mostly using WC but ambulated in her home using RW. She reports she is unable to ascend/descend any stairs with her family assisting using a WC for in/out of the home. Pt lives in a 2 story home with 1st floor set up with 1 step to enter the home. Pt lives with her 10 grandkids and her daughter. Outcome: Progressing Towards Goal   PHYSICAL THERAPY EVALUATION    Patient: Tate Smith (60 y.o. female)  Date: 8/14/2019  Primary Diagnosis: COPD [facility]chronic obstructive pulmonary disease) [facility] [J44.9]  Acute respiratory failure ([facility] [J96.00]        Precautions:   Fall    ASSESSMENT :  PT orders received and patient cleared by nursing to participate with therapy. Patient is a 60 y.o. female admitted to the hospital due to SOB. Patient consents to PT evaluation and treatment. Performed co-treatment with occupational therapy to increase participation, safety, and functional mobility. Performed supine to sit with head of bed elevated minimal assistance. Sit to stands from bed and from [facility] minimal assistance x2. Gait bed to [facility] and [facility] to chair total 5 feet using RW minimal assistance. Pt has very wide EMA with decreased anaid and decreased step length. Pt shuffling feet at times.  Pt unsteady with gait and standing balance. Pt ended therapy sitting up in recliner with all needs met. Pt is very concerned about her pain management appointment next Thursday. She does not want to miss it. Patient will benefit from skilled intervention to address the above impairments. Patient's rehabilitation potential is considered to be Fair  Factors which may influence rehabilitation potential include:    ? None noted  ? Mental ability/status  ? Medical condition  ? Home/family situation and support systems  ? Safety awareness  ? Pain tolerance/management  ? Other:      PLAN :  Recommendations and Planned Interventions:    ?           Bed Mobility Training             ? Neuromuscular Re-Education  ? Transfer Training                   ? Orthotic/Prosthetic Training  ? Gait Training                          ? Modalities  ? Therapeutic Exercises           ? Edema Management/Control  ? Therapeutic Activities            ? Family Training/Education  ? Patient Education  ? Other (comment):    Frequency/Duration: Patient will be followed by physical therapy 1-2 times per day/4-7 days per week to address goals. Discharge Recommendations: Skilled Nursing Facility  Further Equipment Recommendations for Discharge: N/A     SUBJECTIVE:   Patient stated Bao Pander girl (excited to sit up in the recliner).     OBJECTIVE DATA SUMMARY:     Past Medical History:   Diagnosis Date    Atrial fibrillation     CHADS score 1  (-CHF, +HTN, -AGE, -DM, -CVA)    Carpal tunnel syndrome     Chronic pain     Congenital heart disease     presumed pulmonary stenosis s/p repair 1969    Congestive heart failure (HCC)     Degenerative disc disease     Degenerative joint disease     GERD     H/O echocardiogram 04/2017    EF 60-65%, mild concentric LVH, dilated RV with RV dysfunction, RVSP 54 mmHg, RA E, moderate to severe pulmonic regurgitation. Hypercholesterolemia     Hypertension     Hypothyroid     Morbid obesity     Morbid obesity with BMI of 45.0-49.9, adult ([facility] 2/20/2017    Noncompliance     Schizoaffective disorder     Substance abuse ([facility]     marijuana, crack cocaine     Past Surgical History:   Procedure Laterality Date    CARDIAC SURG PROCEDURE UNLIST  1971    VSD repair    COLONOSCOPY N/A 5/6/2018    COLONOSCOPY with tatooing and biopsy performed by Natividad Davis MD at [facility]    [address]      left    HX HYSTERECTOMY      HX KNEE REPLACEMENT      left    HX KNEE REPLACEMENT      right    HX ORTHOPAEDIC      left ankle    HX ORTHOPAEDIC      carpel tunnel right and left     HX THYROIDECTOMY Left      Barriers to Learning/Limitations: None  Compensate with: N/A  Home Situation:  Home Situation  Home Environment: Private residence  # Steps to Enter: 1  One/Two Story Residence: Two story, live on 1st floor  Living Alone: No  Support Systems: Child(jules)  Patient Expects to be Discharged to[de-identified] Private residence  Current DME Used/Available at Home: Walker, rolling, Wheelchair, Oxygen, portable, Raised toilet seat, Shower chair  Critical Behavior:  Neurologic State: Alert  Orientation Level: Oriented X4  Cognition: Follows commands  Safety/Judgement: Fall prevention  Psychosocial  Patient Behaviors: Calm; Cooperative  Caritas Process: Establish trust;Teaching/learning; Attend basic human needs  Caring Interventions: Reassure  Reassure: Informing  Skin Condition/Temp: Dry;Warm     Skin Integrity: Intact  Skin Integumentary  Skin Color: Appropriate for ethnicity; Hyperpigmentation  Skin Condition/Temp: Dry;Warm  Skin Integrity: Intact  Turgor: Non-tenting  Hair Growth: Present  Varicosities: Absent     B LE Strength:    Strength: Generally decreased, functional              B LE Tone & Sensation:   Tone: Normal          Sensation: Intact           B LE Range Of Motion:  AROM: Generally decreased, functional Posture:  Posture (WDL): Exceptions to WDL  Posture Assessment: Forward head  Functional Mobility:  Bed Mobility:     Supine to Sit: Minimum assistance;Bed Modified        Transfers:  Sit to Stand: Minimum assistance;Assist x2                           Balance:   Sitting: Impaired; With support  Sitting - Static: Good (unsupported)  Sitting - Dynamic: Good (unsupported); Fair (occasional)  Standing: Impaired; With support  Standing - Static: Fair  Standing - Dynamic : Fair    Ambulation/Gait Training:  Distance (ft): 5 Feet (ft)  Assistive Device: Walker, rolling  Ambulation - Level of Assistance: Minimal assistance     Gait Abnormalities: Decreased step clearance        Base of Support: Center of gravity altered; Widened     Speed/Reyna: Slow  Step Length: Right shortened;Left shortened       Therapeutic Exercises:   Reviewed and performed ankle pumps to increase blood flow and circulation. Pain:  Pain level pre-treatment: 0/10  -Mild/moderate pain noted with mobility in R hip and L LE   Pain level post-treatment: 8/10 R hip and L LE  Pain Intervention(s) : Medication (see MAR); Rest, Repositioning  Response to intervention: Nurse notified, See doc flow    Activity Tolerance:   fair  Please refer to the flowsheet for vital signs taken during this treatment. After treatment:   ?         Patient left in no apparent distress sitting up in chair  ? Patient left in no apparent distress in bed  ? Call bell left within reach  ? Personal items in reach   ? Nursing notified Blessie  ? Caregiver present  ? Bed/chair alarm activated  ? SCDs applied     COMMUNICATION/EDUCATION:   ?         Role of Physical Therapy in the acute care setting. ?         Fall prevention education was provided and the patient/caregiver indicated understanding. ? Patient/family have participated as able in goal setting and plan of care.   ?         Patient/family agree to work toward stated goals and plan of care. ?         Patient understands intent and goals of therapy, but is neutral about his/her participation. ? Patient is unable to participate in goal setting/plan of care: ongoing with therapy staff. ?         Out of bed with nursing assistance 3-5 times a day. ? Other:     Thank you for this referral.  Lincoln Mech, PT, DPT   Time Calculation: 38 mins      Eval Complexity: History: MEDIUM  Complexity : 1-2 comorbidities / personal factors will impact the outcome/ POC Exam:HIGH Complexity : 4+ Standardized tests and measures addressing body structure, function, activity limitation and / or participation in recreation  Presentation: MEDIUM Complexity : Evolving with changing characteristics  Clinical Decision Making:Medium Complexity    Overall Complexity:MEDIUM

## 2019-08-14 NOTE — PROGRESS NOTES
[facility] [place] [facility]  ICU Progress Note      Name: Jem Burger   : 1960   MRN: [id number]   Date: 2019 5:40 AM     [x]I have reviewed the flowsheet and previous days notes. Events overnight reviewed and discussed with nursing staff. Vital signs and records reviewed. Subjective:  19: Luiza BiPAP yesterday (continuous) with breaks for meals  Shaikh D/c'd yesterday  Good UOP with addition of Lasix IVP BID = continue    PCXR this AM pending  ABG this AM at baseline, with exception of rising HC03  Currently, on N/c (3 L/min)  Good mentation  No C/o's  Add Synthroid (home med) this AM    ?Transfer out of ICU, defer to PCCM MD      []The patient is unable to give any meaningful history or review of systems because the patient is:  []Intubated []Sedated   []Unresponsive      [x]The patient is critically ill on      []Mechanical ventilation []Pressors   []BiPAP []                 ROS:A comprehensive review of systems was negative except for that written in the HPI. Medication Review:  · Pressors - None  · Sedation - None  · Antibiotics - None  · Pain - APAP, PRN  · GI/ DVT - Pepcid PO BID / SQ Heparin        Vital Signs:    Visit Vitals  /90 (BP 1 Location: Right arm, BP Patient Position: At rest;Head of bed elevated (Comment degrees))   Pulse (!) 57   Temp 98.6 °F (37 °C)   Resp 13   Wt 142.2 kg (313 lb 7.9 oz)   SpO2 99%   Breastfeeding?  No   BMI 55.53 kg/m²       O2 Device: Nasal cannula   O2 Flow Rate (L/min): 3 l/min   Temp (24hrs), Av.4 °F (36.9 °C), Min:98.4 °F (36.9 °C), Max:98.6 °F (37 °C)       Intake/Output:   Last shift:      1901 -  0700  In: 300 [P.O.:300]  Out: -   Last 3 shifts:  0701 -  1900  In: 900 [P.O.:900]  Out: 1895 [Urine:1895]    Intake/Output Summary (Last 24 hours) at 2019 0518  Last data filed at 2019 2200  Gross per 24 hour   Intake 720 ml   Output 1195 ml   Net -475 ml         Physical Exam:    General: On N/c. Alert & orient x 3. Comfortable. NAD. HEENT:  Anicteric sclerae; pink palpebral conjunctivae; mucosa moist  Resp:  Symmetrical chest rise, no accessory muscle use. BS = B/L Coarse, decreased L base. No rales/ wheezing/ rhonchi noted  CV:  Afib, rate-controlled (50-60's). No r/m/g  GI:  Abdomen soft, non-tender; (+) active bowel sounds  Extremities:  +2 pulses on all extremities; +2 B/L LE edema. No cyanosis/ clubbing noted  Skin:  Warm; no rashes/ lesions noted  Neurologic:  Alert & Irene x 3. Good = B/L Strength. No C/o's  Devices:  No NGT/OGT, Central line/ PICC, ETT/tracheostomy, chest tube.  + P.  IV       DATA:     Current Facility-Administered Medications   Medication Dose Route Frequency    heparin (porcine) injection 5,000 Units  5,000 Units SubCUTAneous Q8H    furosemide (LASIX) injection 40 mg  40 mg IntraVENous BID    ondansetron (ZOFRAN) injection 4 mg  4 mg IntraVENous Q6H PRN    levothyroxine (SYNTHROID) tablet 125 mcg  125 mcg Oral 6am    sodium chloride (NS) flush 5-40 mL  5-40 mL IntraVENous Q8H    sodium chloride (NS) flush 5-40 mL  5-40 mL IntraVENous PRN    famotidine (PF) (PEPCID) 20 mg in sodium chloride 0.9% 10 mL injection  20 mg IntraVENous Q12H    insulin lispro (HUMALOG) injection   SubCUTAneous Q6H    glucose chewable tablet 16 g  4 Tab Oral PRN    glucagon (GLUCAGEN) injection 1 mg  1 mg IntraMUSCular PRN    dextrose (D50W) injection syrg 12.5-25 g  25-50 mL IntraVENous PRN    albuterol-ipratropium (DUO-NEB) 2.5 MG-0.5 MG/3 ML  3 mL Nebulization Q4H PRN    metoprolol tartrate (LOPRESSOR) tablet 25 mg  25 mg Oral BID    acetaminophen (TYLENOL) tablet 650 mg  650 mg Oral Q4H PRN         Labs: Results:       Chemistry Recent Labs     08/14/19  0348 08/13/19  0348 08/12/19  2202 08/12/19  0908   * 120* 111* 98    140 139 142   K 3.8 4.1 4.4 4.4    100 100 104   CO2 36* 34* 33* 35*   BUN 21* 17 15 15   CREA 0.96 0.73 0.86 0.80   CA 7.9* 8.0* 8.4* 8.2* AGAP 3 6 6 3   BUCR 22* 23* 17 19   AP  --   --   --  211*   TP  --   --   --  7.2   ALB  --   --   --  3.4   GLOB  --   --   --  3.8   AGRAT  --   --   --  0.9      CBC w/Diff Recent Labs     08/14/19 0348 08/13/19 0348 08/12/19  0908   WBC 4.7 4.2* 4.3*   RBC 3.81* 3.96* 3.90*   HGB 9.5* 10.0* 9.9*   HCT 32.9* 34.3* 34.2*    129* 127*   GRANS 67 87* 72   LYMPH 24 11* 17*   EOS 1 0 2      Coagulation Recent Labs     08/13/19 0348   PTP 17.4*   INR 1.4*       Liver Enzymes Recent Labs     08/12/19  0908   TP 7.2   ALB 3.4   *   SGOT 24      ABG Lab Results   Component Value Date/Time    PHI 7.413 08/14/2019 04:00 AM    PCO2I 60.1 (H) 08/14/2019 04:00 AM    PO2I 72 (L) 08/14/2019 04:00 AM    HCO3I 38.7 (H) 08/14/2019 04:00 AM    FIO2I 0.35 08/14/2019 04:00 AM      Microbiology Recent Labs     08/12/19  2330   CULT MRSA target DNA is not detected (presumptive not colonized with MRSA)          Telemetry: []Sinus []A-flutter []Paced    [x]A-fib []Multiple PVCs                  Imaging:    CXR 8/14/2019: PENDING      IMPRESSION:   · Acute on Chronic Hypercapnic Respiratory Failure  · HFprEF with current CHF Exacerbation- acute on chronic diastolic heart failure  · Severe ANDRADE/OHS  · Pulmonary HTN  · Afib, on Xarelto (currently on-hold)  · Morbid Obesity    RECOMMENDATIONS:   · Resp: Jason BiPAP. ABG this AM with pH, PO2, & PC02 at baseline, however, rising HC03 (7.41/60.1/72/38.7/95%) on BiPAP 20/10. Titrate FiO2/ supp O2 for SpO2 88-93%. D/c Daily PCXR & ABG. Ok to change BiPAP to qHS & PRN. Encouraged pulmonary toilet (IS) / up OOB as jason. · I/D: Afebrile. WBC WNL's. Non-toxic. Observe w/o further ABX. · Hem/Onc: Hct / PLT's stable (32.9 & 147 K). Daily CBC. SQ Heparin for DVT Prophylaxis. · CVS: Stable. No gtt's. Tele = Afib, rate controlled (50-60's). Xarelto on hold, restart timing per MD.   Continue home med (Lopressor 25 mg PO BID).   SBP goal < 402  · Metabolic: Daily BMP; monitor e-lytes; replace PRN  · Renal: O > I's. Cr 0.96. Continue Lasix 40 mg IVP BID. Trend Renal indices; Diuresis  · Endocrine: POC Glucose q6. Synthroid (home med) to be resumed this AM   · GI: Diabetic Diet. Pepcid PO BID for SUP. Zofran PRN for N/V   · Musc/Skin: No acute issues, wound care  · Neuro: Alert & [place] x 3. Follows commands. No C/o's. APAP, PRN  · Fluids: None  · PT/OT Consults today  · To Be Discussed in Interdisciplinary Rounds (? transfer out of ICU today)     Best Practices/ Safety Bundles:  · Sepsis Bundle per Hospital Protocol  · CAUTI Bundle  · Electrolyte Replacement Bundle  · Glycemic control goal <180; avoid Hypoglycemia  · IHI ICU Bundles:  ·  None  · Mech Vent patients:   · VAP bundle, Aim to keep peak plateau pressure 20-37KL H2O  · Aspiration Precautions - HOB >30'  · Daily sedation holiday as indicated  · SBT as tolerated/appropriate  · Titrate FiO2 for SpO2 >94%  · Aggressive Pulmonary Hygeiene  · Stress ulcer prophylaxis. Pepcid PO BID   · DVT prophylaxis. SQ Heparin  · Need for Lines, ariza assessed. · Restraints need.   · Palliative care evaluation      The patient is: [x] acutely ill Risk of deterioration: [] moderate    [] critically ill  [x] high     [x]See my orders for details    My assessment/plan was discussed with:  [x]Nursing []PT/OT    []Respiratory therapy []   []Family []         Critical Care Time = [facility]      [place], [place]  08/14/19  Pulmonary, [facility],[address] [facility]

## 2019-08-14 NOTE — PROGRESS NOTES
Physical Exam   Skin:             0730: Assumed care of pt at this time. Assessment as charted. 0900: Pt transferred to step-down status. 1300:  Pt on BSC, large BM and void. Assisted to chair by PT/OT with use of walker. Call bell within reach. 1600:  Back to bed with 2 person assistance. Purewick positioned appropriately. Call bell within reach. 1923: Bedside and Verbal shift change report given to Nilson Nelson RN (oncoming nurse) by Nat Oliver RN   (offgoing nurse). Report included the following information ED Summary, Intake/Output, MAR, Recent Results and Cardiac Rhythm afib.

## 2019-08-14 NOTE — PROGRESS NOTES
Problem: Self Care Deficits Care Plan (Adult)  Goal: *Acute Goals and Plan of Care (Insert Text)  Description  Occupational Therapy Goals  Initiated 8/14/2019 within 7 day(s). 1.  Patient will perform functional activity/ADL task with independence standing for 5-7 minutes with G balance. 2.  Patient will perform lower body dressing with modified independence using AE prn.  3.  Patient will perform toilet transfers with modified independence. 4.  Patient will perform all aspects of toileting with modified independence. 5.  Patient will participate in upper extremity therapeutic exercise/activities with independence for 5-7 minutes to increase BUE strength for self-care/ADLs. 6.  Patient will utilize energy conservation techniques during functional activities with verbal cues. Prior Level of Function: Patient was independent with most self-care and used a RW for functional mobility in home, W/C outside the home PTA. Pt reports asking grandchildren/daughter for assist prn with LBD and LB bathing. Outcome: Progressing Towards Goal    OCCUPATIONAL THERAPY EVALUATION    Patient: Luwana Gosselin (60 y.o. female)  Date: 8/14/2019  Primary Diagnosis: COPD (chronic obstructive pulmonary disease) ([facility] [address] [J44.9]  Acute respiratory failure ([facility] [address] [J96.00]  Precautions: Fall    ASSESSMENT :  Pt cleared to participate in OT evaluation by RN. Upon entering the room, the pt was semi-recline in bed, alert, and agreeable to participate in OT evaluation. Pt was seen with PT to maximize pt safety and participation. Pt immediately requesting to use BR when therapy entered room and RN present and retrieved [facility] for pt. Pt performed supine to sit with HOB elevated, min assist and functional transfers using RW with min assist x2. Pt performed all aspects of toileting this session with SBA. Pt reports having dressing aides at home from TKR sx x 1 year ago and needing assist for LBD.  Pt observed with desat to 89% O2 with NC donned after functional mobility but increased to WNL after approx 30 seconds. Based on the objective data described below, the patient presents with decreased strength, decreased endurance, decreased independence, decreased functional balance, and decreased functional mobility, which impedes pt performance in basic self-care/ADL tasks. Pt would benefit from skilled OT to restore PLOF and maximize function. Patient will benefit from skilled intervention to address the above impairments. Patient's rehabilitation potential is considered to be Good  Factors which may influence rehabilitation potential include:   ? None noted  ? Mental ability/status  ? Medical condition  ? Home/family situation and support systems  ? Safety awareness  ? Pain tolerance/management  ? Other:      PLAN :  Recommendations and Planned Interventions:   ?               Self Care Training                  ? Therapeutic Activities  ? Functional Mobility Training   ? Cognitive Retraining  ? Therapeutic Exercises           ? Endurance Activities  ? Balance Training                    ? Neuromuscular Re-Education  ? Visual/Perceptual Training     ? Home Safety Training  ? Patient Education                   ? Family Training/Education  ? Other (comment):    Frequency/Duration: Patient will be followed by occupational therapy 1-2 times per day/4-7 days per week to address goals.   Discharge Recommendations: OVI vs [place] pending pt progress  Further Equipment Recommendations for Discharge: Pt has all DME     SUBJECTIVE:   Patient stated I have to wipe myself sitting down    OBJECTIVE DATA SUMMARY:     Past Medical History:   Diagnosis Date    Atrial fibrillation     CHADS score 1  (-CHF, +HTN, -AGE, -DM, -CVA)    Carpal tunnel syndrome     Chronic pain     Congenital heart disease     presumed pulmonary stenosis s/p repair 1969    Congestive heart failure (HCC)     Degenerative disc disease     Degenerative joint disease     GERD     H/O echocardiogram 04/2017    EF 60-65%, mild concentric LVH, dilated RV with RV dysfunction, RVSP 54 mmHg, RA E, moderate to severe pulmonic regurgitation. Hypercholesterolemia     Hypertension     Hypothyroid     Morbid obesity     Morbid obesity with BMI of 45.0-49.9, adult ([facility] [address] 2/20/2017    Noncompliance     Schizoaffective disorder     Substance abuse ([facility] [address]     marijuana, crack cocaine     Past Surgical History:   Procedure Laterality Date    CARDIAC SURG PROCEDURE UNLIST  1971    VSD repair    COLONOSCOPY N/A 5/6/2018    COLONOSCOPY with tatooing and biopsy performed by Sachin Pyle MD at [facility]    R [address]      left    HX HYSTERECTOMY      HX KNEE REPLACEMENT      left    HX KNEE REPLACEMENT      right    HX ORTHOPAEDIC      left ankle    HX ORTHOPAEDIC      carpel tunnel right and left     HX THYROIDECTOMY Left      Barriers to Learning/Limitations: None  Compensate with: visual, verbal, tactile, kinesthetic cues/model    Home Situation:   Home Situation  Home Environment: Private residence  # Steps to Enter: 1  One/Two Story Residence: Two story, live on 1st floor  Living Alone: No  Support Systems: Child(jules)  Patient Expects to be Discharged to[de-identified] Private residence  Current DME Used/Available at Home: Walker, rolling, Wheelchair, Oxygen, portable, Raised toilet seat, Shower chair  Tub or Shower Type: (sink bath)  ? Right hand dominant   ?   Left hand dominant    Cognitive/Behavioral Status:  Neurologic State: Alert  Orientation Level: Oriented X4  Cognition: Follows commands  Safety/Judgement: Fall prevention    Skin: Intact  Edema: None noted    Vision/Perceptual:    Acuity: Within Defined Limits      Coordination: BUE  Coordination: Within functional limits  Fine Motor Skills-Upper: Left Intact; Right Intact    Gross Motor Skills-Upper: Left Intact; Right Intact    Balance:  Sitting: Intact  Sitting - Static: Good (unsupported)  Sitting - Dynamic: Good (unsupported); Fair (occasional)  Standing: Impaired; With support  Standing - Static: Fair  Standing - Dynamic : Fair    Strength: BUE  Strength: Generally decreased, functional    Tone & Sensation: BUE  Tone: Normal  Sensation: Intact    Range of Motion: BUE  AROM: Within functional limits      Functional Mobility and Transfers for ADLs:  Bed Mobility:  Supine to Sit: Minimum assistance    Transfers:  Sit to Stand: Minimum assistance (assist x2 initially)  Stand to Sit: Minimum assistance   Toilet Transfer : Minimum assistance     ADL Assessment:   Feeding: Independent    Oral Facial Hygiene/Grooming: Independent(seated)    Bathing: Maximum assistance    Upper Body Dressing: Independent    Lower Body Dressing: Moderate assistance    Toileting: Stand by assistance      ADL Intervention:  Upper Body Dressing Assistance  Dressing Assistance: [id number] [facility] [id number]: Independent    Toileting  Toileting Assistance: Stand-by assistance  Bladder Hygiene: Stand-by assistance  Bowel Hygiene: Stand-by assistance    Cognitive Retraining  Safety/Judgement: Fall prevention    Pain:  Pain level pre-treatment: 8/10, LLE  Pain level post-treatment: 8/10, LLE  Pain Intervention(s): Medication (see MAR); Response to intervention:See doc flow    Activity Tolerance:   Fair    Please refer to the flowsheet for vital signs taken during this treatment. After treatment:   ? Patient left in no apparent distress sitting up in chair  ? Patient left in no apparent distress in bed  ? Call bell left within reach  ? Nursing notified  ? Caregiver present  ? Bed alarm activated    COMMUNICATION/EDUCATION:   ? Role of Occupational Therapy in the acute care setting  ? Home safety education was provided and the patient/caregiver indicated understanding.   ? Patient/family have participated as able in goal setting and plan of care. ? Patient/family agree to work toward stated goals and plan of care. ? Patient understands intent and goals of therapy, but is neutral about his/her participation. ? Patient is unable to participate in goal setting and plan of care. Thank you for this referral.  Eladio Arrieta, OTR/L  Time Calculation: 38 mins    Eval Complexity: History: MEDIUM Complexity : Expanded review of history including physical, cognitive and psychosocial  history ; Examination: MEDIUM Complexity : 3-5 performance deficits relating to physical, cognitive , or psychosocial skils that result in activity limitations and / or participation restrictions; Decision Making:MEDIUM Complexity : Patient may present with comorbidities that affect occupational performnce.  Miniml to moderate modification of tasks or assistance (eg, physical or verbal ) with assesment(s) is necessary to enable patient to complete evaluation

## 2019-08-14 NOTE — PROGRESS NOTES
OT order received and chart reviewed. Patient currently has an active bedrest order. Please discontinue bedrest order for full participation in skilled OT evaluation/treatment.         Thank you for the referral.   Colton Camacho MS, OTR/L

## 2019-08-14 NOTE — PROGRESS NOTES
[facility]  Progress Note    Patient: Claude Davidson Age: 60 y.o. : 1960 MR#: [id number] SSN: [id number]  Date/Time: 2019    Subjective:     Pt being xferred out of ICU. Seen with therapy, who just assisted pt ambulating from bed to chair. Pt now feels a bit out of breath, but had no SOB while resting in bed on O2 via NC @ 3lpm.    No F/C, N/V, CP c/o. Compared to when I saw her on the day of admission, she subjectively appears much better overall to me. Assessment/Plan:   1. Acute hypoxic and hypercapnic resp failure due to acute diastolic CHF exac - much improved. On 3lpm O2 via NC, which is her baseline. Continue Lasix, Diamox. Ambulate. 2. Chronic hypoxic resp failure - no acute. At baseline. 3. HTN - BPs acceptable with some elevations. Maintain BBlocker. 4. Chronic, persistent AFib - Xarelto. Rate controlled with BBlocker. 5. Hypothyroidism - Synthroid. 6. Hyperlipidemia - statin. 7. Morbid obesity, ANDRADE/OHS - with severe pulm HTN. RD consult, outpt f/u. BiPAP prn/night. 8. Chronic pain - c/o R hip pain. Oxycodone IR resume, sees pain mgmt. Will not plan to write for any new Rx regarding this. Additional Notes:     CXR:   IMPRESSION:  Stable cardiomegaly. Improved lung aeration and pulmonary edema. Case discussed with:  [x]Patient  []Family  [x]Nursing  []Case Management  DVT Prophylaxis:  []Lovenox  []Hep SQ  []SCDs  []Coumadin   [x]On Xarelto    Objective:   VS:   Visit Vitals  /51   Pulse 72   Temp 98.2 °F (36.8 °C)   Resp 17   Ht 5' 2.99\" (1.6 m)   Wt 145.2 kg (320 lb)   SpO2 100%   Breastfeeding?  No   BMI 56.70 kg/m²      Tmax/24hrs: Temp (24hrs), Av.4 °F (36.9 °C), Min:98.2 °F (36.8 °C), Max:98.6 °F (37 °C)    Input/Output:     Intake/Output Summary (Last 24 hours) at 2019 1151  Last data filed at 2019 1135  Gross per 24 hour   Intake 780 ml   Output 2850 ml   Net -2070 ml       General:  Awake, alert, NAD, seen ambulating from bed to chair with assistance. Cardiovascular:  iRRR. Pulmonary:  CTA B.  GI:  Soft, obese, NT/ND, NABS. Extremities:  No CT, 1+ BLE edema. Additional:      Labs:    Recent Results (from the past 24 hour(s))   GLUCOSE, POC    Collection Time: 08/13/19  3:48 PM   Result Value Ref Range    Glucose (POC) 120 (H) 70 - 110 mg/dL   GLUCOSE, POC    Collection Time: 08/13/19  9:39 PM   Result Value Ref Range    Glucose (POC) 130 (H) 70 - 110 mg/dL   MAGNESIUM    Collection Time: 08/14/19  3:48 AM   Result Value Ref Range    Magnesium 2.1 1.6 - 2.6 mg/dL   METABOLIC PANEL, BASIC    Collection Time: 08/14/19  3:48 AM   Result Value Ref Range    Sodium 140 136 - 145 mmol/L    Potassium 3.8 3.5 - 5.5 mmol/L    Chloride 101 100 - 111 mmol/L    CO2 36 (H) 21 - 32 mmol/L    Anion gap 3 3.0 - 18 mmol/L    Glucose 110 (H) 74 - 99 mg/dL    BUN 21 (H) 7.0 - 18 MG/DL    Creatinine 0.96 0.6 - 1.3 MG/DL    BUN/Creatinine ratio 22 (H) 12 - 20      GFR est AA >60 >60 ml/min/1.73m2    GFR est non-AA 59 (L) >60 ml/min/1.73m2    Calcium 7.9 (L) 8.5 - 10.1 MG/DL   CBC WITH AUTOMATED DIFF    Collection Time: 08/14/19  3:48 AM   Result Value Ref Range    WBC 4.7 4.6 - 13.2 K/uL    RBC 3.81 (L) 4.20 - 5.30 M/uL    HGB 9.5 (L) 12.0 - 16.0 g/dL    HCT 32.9 (L) 35.0 - 45.0 %    MCV 86.4 74.0 - 97.0 FL    MCH 24.9 24.0 - 34.0 PG    MCHC 28.9 (L) 31.0 - 37.0 g/dL    RDW 17.1 (H) 11.6 - 14.5 %    PLATELET 416 901 - 115 K/uL    MPV 9.8 9.2 - 11.8 FL    NEUTROPHILS 67 40 - 73 %    LYMPHOCYTES 24 21 - 52 %    MONOCYTES 8 3 - 10 %    EOSINOPHILS 1 0 - 5 %    BASOPHILS 0 0 - 2 %    ABS. NEUTROPHILS 3.1 1.8 - 8.0 K/UL    ABS. LYMPHOCYTES 1.1 0.9 - 3.6 K/UL    ABS. MONOCYTES 0.4 0.05 - 1.2 K/UL    ABS. EOSINOPHILS 0.0 0.0 - 0.4 K/UL    ABS.  BASOPHILS 0.0 0.0 - 0.1 K/UL    DF AUTOMATED     PHOSPHORUS    Collection Time: 08/14/19  3:48 AM   Result Value Ref Range    Phosphorus 3.1 2.5 - 4.9 MG/DL   CALCIUM, IONIZED Collection Time: 08/14/19  3:48 AM   Result Value Ref Range    Ionized Calcium 1.06 (L) 1.12 - 1.32 MMOL/L   POC G3    Collection Time: 08/14/19  4:00 AM   Result Value Ref Range    Device: BIPAP      FIO2 (POC) 0.35 %    pH (POC) 7.413 7.35 - 7.45      pCO2 (POC) 60.1 (H) 35.0 - 45.0 MMHG    pO2 (POC) 72 (L) 80 - 100 MMHG    HCO3 (POC) 38.7 (H) 22 - 26 MMOL/L    sO2 (POC) 95 92 - 97 %    Base excess (POC) 14 mmol/L    PEEP/CPAP (POC) 10 cmH2O    PIP (POC) 20      Pressure support 10 cmH2O    Allens test (POC) N/A      Total resp. rate 18      Site LEFT RADIAL      Patient temp.  97.0      Specimen type (POC) ARTERIAL      Performed by OSMEL Grant    Collection Time: 08/14/19  7:58 AM   Result Value Ref Range    Glucose (POC) 98 70 - 110 mg/dL     Additional Data Reviewed:      Signed By: Sandy Carlos MD     August 14, 2019 11:51 AM

## 2019-08-14 NOTE — PROGRESS NOTES
PT orders received, chart reviewed. Pt unable to participate with PT due to pt on continuous bedrest orders. Please remove to participate in therapy. Will f/u later as patient's schedule allows.  Thank you for this referral.  Fermin Triplett, PT, DPT

## 2019-08-15 LAB
ANION GAP SERPL CALC-SCNC: 3 MMOL/L (ref 3–18)
ARTERIAL PATENCY WRIST A: YES
ATRIAL RATE: 47 BPM
BASE EXCESS BLD CALC-SCNC: 12 MMOL/L
BASOPHILS # BLD: 0 K/UL (ref 0–0.1)
BASOPHILS NFR BLD: 0 % (ref 0–2)
BDY SITE: ABNORMAL
BNP SERPL-MCNC: 2217 PG/ML (ref 0–900)
BODY TEMPERATURE: 98.6
BUN SERPL-MCNC: 20 MG/DL (ref 7–18)
BUN/CREAT SERPL: 19 (ref 12–20)
CALCIUM SERPL-MCNC: 8.4 MG/DL (ref 8.5–10.1)
CALCULATED R AXIS, ECG10: -38 DEGREES
CALCULATED T AXIS, ECG11: -142 DEGREES
CHLORIDE SERPL-SCNC: 98 MMOL/L (ref 100–111)
CO2 SERPL-SCNC: 37 MMOL/L (ref 21–32)
CREAT SERPL-MCNC: 1.06 MG/DL (ref 0.6–1.3)
DIAGNOSIS, 93000: NORMAL
DIFFERENTIAL METHOD BLD: ABNORMAL
EOSINOPHIL # BLD: 0.1 K/UL (ref 0–0.4)
EOSINOPHIL NFR BLD: 3 % (ref 0–5)
ERYTHROCYTE [DISTWIDTH] IN BLOOD BY AUTOMATED COUNT: 17 % (ref 11.6–14.5)
GAS FLOW.O2 O2 DELIVERY SYS: ABNORMAL L/MIN
GLUCOSE BLD STRIP.AUTO-MCNC: 75 MG/DL (ref 70–110)
GLUCOSE BLD STRIP.AUTO-MCNC: 82 MG/DL (ref 70–110)
GLUCOSE SERPL-MCNC: 100 MG/DL (ref 74–99)
HCO3 BLD-SCNC: 38.6 MMOL/L (ref 22–26)
HCT VFR BLD AUTO: 35.9 % (ref 35–45)
HGB BLD-MCNC: 10.2 G/DL (ref 12–16)
LYMPHOCYTES # BLD: 1 K/UL (ref 0.9–3.6)
LYMPHOCYTES NFR BLD: 24 % (ref 21–52)
MCH RBC QN AUTO: 24.9 PG (ref 24–34)
MCHC RBC AUTO-ENTMCNC: 28.4 G/DL (ref 31–37)
MCV RBC AUTO: 87.6 FL (ref 74–97)
MONOCYTES # BLD: 0.4 K/UL (ref 0.05–1.2)
MONOCYTES NFR BLD: 9 % (ref 3–10)
NEUTS SEG # BLD: 2.5 K/UL (ref 1.8–8)
NEUTS SEG NFR BLD: 64 % (ref 40–73)
O2/TOTAL GAS SETTING VFR VENT: 35 %
PCO2 BLD: 79.7 MMHG (ref 35–45)
PEEP RESPIRATORY: 10 CMH2O
PH BLD: 7.29 [PH] (ref 7.35–7.45)
PIP ISTAT,IPIP: 20
PLATELET # BLD AUTO: 145 K/UL (ref 135–420)
PMV BLD AUTO: 9.9 FL (ref 9.2–11.8)
PO2 BLD: 116 MMHG (ref 80–100)
POTASSIUM SERPL-SCNC: 4.1 MMOL/L (ref 3.5–5.5)
PRESSURE SUPPORT SETTING VENT: 10 CMH2O
Q-T INTERVAL, ECG07: 532 MS
QRS DURATION, ECG06: 130 MS
QTC CALCULATION (BEZET), ECG08: 449 MS
RBC # BLD AUTO: 4.1 M/UL (ref 4.2–5.3)
SAO2 % BLD: 98 % (ref 92–97)
SERVICE CMNT-IMP: ABNORMAL
SODIUM SERPL-SCNC: 138 MMOL/L (ref 136–145)
SPECIMEN TYPE: ABNORMAL
TOTAL RESP. RATE, ITRR: 12
VENTRICULAR RATE, ECG03: 43 BPM
WBC # BLD AUTO: 4 K/UL (ref 4.6–13.2)

## 2019-08-15 PROCEDURE — 82803 BLOOD GASES ANY COMBINATION: CPT

## 2019-08-15 PROCEDURE — 97535 SELF CARE MNGMENT TRAINING: CPT

## 2019-08-15 PROCEDURE — 93005 ELECTROCARDIOGRAM TRACING: CPT

## 2019-08-15 PROCEDURE — 74011000250 HC RX REV CODE- 250: Performed by: INTERNAL MEDICINE

## 2019-08-15 PROCEDURE — 77030037870 HC GLD SHT PREVALON SAGE -B

## 2019-08-15 PROCEDURE — 85025 COMPLETE CBC W/AUTO DIFF WBC: CPT

## 2019-08-15 PROCEDURE — 94640 AIRWAY INHALATION TREATMENT: CPT

## 2019-08-15 PROCEDURE — 74011250637 HC RX REV CODE- 250/637: Performed by: PHYSICIAN ASSISTANT

## 2019-08-15 PROCEDURE — 74011250637 HC RX REV CODE- 250/637: Performed by: FAMILY MEDICINE

## 2019-08-15 PROCEDURE — 82962 GLUCOSE BLOOD TEST: CPT

## 2019-08-15 PROCEDURE — 83880 ASSAY OF NATRIURETIC PEPTIDE: CPT

## 2019-08-15 PROCEDURE — 65660000000 HC RM CCU STEPDOWN

## 2019-08-15 PROCEDURE — 80048 BASIC METABOLIC PNL TOTAL CA: CPT

## 2019-08-15 PROCEDURE — 94660 CPAP INITIATION&MGMT: CPT

## 2019-08-15 PROCEDURE — 77030038269 HC DRN EXT URIN PURWCK BARD -A

## 2019-08-15 PROCEDURE — 74011250636 HC RX REV CODE- 250/636: Performed by: PHYSICIAN ASSISTANT

## 2019-08-15 PROCEDURE — 97530 THERAPEUTIC ACTIVITIES: CPT

## 2019-08-15 PROCEDURE — 94762 N-INVAS EAR/PLS OXIMTRY CONT: CPT

## 2019-08-15 PROCEDURE — 36600 WITHDRAWAL OF ARTERIAL BLOOD: CPT

## 2019-08-15 PROCEDURE — 36415 COLL VENOUS BLD VENIPUNCTURE: CPT

## 2019-08-15 PROCEDURE — 74011250637 HC RX REV CODE- 250/637: Performed by: INTERNAL MEDICINE

## 2019-08-15 PROCEDURE — 5A09457 ASSISTANCE WITH RESPIRATORY VENTILATION, 24-96 CONSECUTIVE HOURS, CONTINUOUS POSITIVE AIRWAY PRESSURE: ICD-10-PCS | Performed by: INTERNAL MEDICINE

## 2019-08-15 RX ADMIN — IPRATROPIUM BROMIDE AND ALBUTEROL SULFATE 3 ML: .5; 3 SOLUTION RESPIRATORY (INHALATION) at 19:49

## 2019-08-15 RX ADMIN — IPRATROPIUM BROMIDE AND ALBUTEROL SULFATE 3 ML: .5; 3 SOLUTION RESPIRATORY (INHALATION) at 17:03

## 2019-08-15 RX ADMIN — FUROSEMIDE 40 MG: 10 INJECTION, SOLUTION INTRAMUSCULAR; INTRAVENOUS at 11:29

## 2019-08-15 RX ADMIN — RIVAROXABAN 20 MG: 20 TABLET, FILM COATED ORAL at 18:54

## 2019-08-15 RX ADMIN — OXYCODONE HYDROCHLORIDE 5 MG: 5 TABLET ORAL at 03:09

## 2019-08-15 RX ADMIN — IPRATROPIUM BROMIDE AND ALBUTEROL SULFATE 3 ML: .5; 3 SOLUTION RESPIRATORY (INHALATION) at 08:29

## 2019-08-15 RX ADMIN — OXYCODONE HYDROCHLORIDE 5 MG: 5 TABLET ORAL at 15:03

## 2019-08-15 RX ADMIN — IPRATROPIUM BROMIDE AND ALBUTEROL SULFATE 3 ML: .5; 3 SOLUTION RESPIRATORY (INHALATION) at 12:39

## 2019-08-15 RX ADMIN — FAMOTIDINE 20 MG: 20 TABLET ORAL at 18:54

## 2019-08-15 RX ADMIN — Medication 10 ML: at 06:24

## 2019-08-15 RX ADMIN — LEVOTHYROXINE SODIUM 125 MCG: 25 TABLET ORAL at 06:23

## 2019-08-15 RX ADMIN — ACETAZOLAMIDE 250 MG: 250 TABLET ORAL at 18:53

## 2019-08-15 RX ADMIN — Medication 10 ML: at 18:54

## 2019-08-15 NOTE — PROGRESS NOTES
PT orders received, chart reviewed. Pt unable to participate with PT due to:  []  Nausea/vomiting  []  Eating  []  Pain  []  Pt lethargic  []  Off Unit  []  Pt refused  [x]  Other pt's family members/visitors just arrived with pt wanting to visit with them for a little bit. She would like therapy to come back if possible and for tomorrow to be in the am if possible. Will f/u as patient's schedule allows.  Thank you for this referral.  Jorge Segura, PT, DPT

## 2019-08-15 NOTE — PROGRESS NOTES
[facility]  [facility]    Patient: Lisette Link Age: 60 y.o. : 1960 MR#: [id number] SSN: [id number]  Date/Time: 8/15/2019    Subjective:       Pt seen while on BiPAP. She has no specific complaints. Was called by nurse/pulm PA w/ reports of bradycardia, pt w/o symptoms. Assessment/Plan:   60 y o female w/ h/o diastolic heart failure, chronic respiratory failure on home 02 admitted on 19 after presenting to the ED w/ cc of SOB, initially admitted to the ICU due to need for BiPAP and increased somnolence:    -Acute on chronic respiratory failure, w/ significant respiratory acidosis this am, discussed w/ [facility] apc. Pt w/ h/o severe ANDRADE. -Acute on chronic diastolic HF on IV lasix    -Bradycardia w/ HR in 40's in pt w/ h/o afib on bb, asymptomatic, has h/o hypothyroidism, but TSH nl this admission    History of:    -Hypothyroidism on synthroid  -Dyslipidemia  -Persistent afib on xarelto, bb held due to bradycardia  -Morbid obesity  -Chronic pain, sees pain management in outpt setting, currently on prn narc    PLAN:  -resp management per pulm  -hold bb and follow HR, cardiology consult if cont'd significant iveth  -cont PT/OT    Disp: discussed w/ CM, per PT/OT recs will look into SNF placement. Additional Notes:      Case discussed with:  [x]Patient  []Family  []Nursing  []Case Management  DVT Prophylaxis:  []Lovenox  []Hep SQ  []SCDs  []Coumadin   []On Heparin gtt on doac    Objective:   VS:   Visit Vitals  /90   Pulse (!) 49   Temp 97.9 °F (36.6 °C)   Resp 14   Ht 5' 2.99\" (1.6 m)   Wt 145.2 kg (320 lb)   SpO2 100%   Breastfeeding?  No   BMI 56.70 kg/m²      Tmax/24hrs: Temp (24hrs), Av.8 °F (36.6 °C), Min:97.5 °F (36.4 °C), Max:98 °F (36.7 °C)    Input/Output:     Intake/Output Summary (Last 24 hours) at 8/15/2019 1610  Last data filed at 8/15/2019 0600  Gross per 24 hour   Intake    Output 4000 ml   Net -4000 ml       General: Somnolent, easily wakes up, on BiPAP  Cardiovascular:  No murmurs  Pulmonary:  ctab  GI:  Soft, nt, nd  Extremities:  No edema  Additional:      Labs:    Recent Results (from the past 24 hour(s))   METABOLIC PANEL, BASIC    Collection Time: 08/15/19  1:53 AM   Result Value Ref Range    Sodium 138 136 - 145 mmol/L    Potassium 4.1 3.5 - 5.5 mmol/L    Chloride 98 (L) 100 - 111 mmol/L    CO2 37 (H) 21 - 32 mmol/L    Anion gap 3 3.0 - 18 mmol/L    Glucose 100 (H) 74 - 99 mg/dL    BUN 20 (H) 7.0 - 18 MG/DL    Creatinine 1.06 0.6 - 1.3 MG/DL    BUN/Creatinine ratio 19 12 - 20      GFR est AA >60 >60 ml/min/1.73m2    GFR est non-AA 53 (L) >60 ml/min/1.73m2    Calcium 8.4 (L) 8.5 - 10.1 MG/DL   CBC WITH AUTOMATED DIFF    Collection Time: 08/15/19  1:53 AM   Result Value Ref Range    WBC 4.0 (L) 4.6 - 13.2 K/uL    RBC 4.10 (L) 4.20 - 5.30 M/uL    HGB 10.2 (L) 12.0 - 16.0 g/dL    HCT 35.9 35.0 - 45.0 %    MCV 87.6 74.0 - 97.0 FL    MCH 24.9 24.0 - 34.0 PG    MCHC 28.4 (L) 31.0 - 37.0 g/dL    RDW 17.0 (H) 11.6 - 14.5 %    PLATELET 441 228 - 107 K/uL    MPV 9.9 9.2 - 11.8 FL    NEUTROPHILS 64 40 - 73 %    LYMPHOCYTES 24 21 - 52 %    MONOCYTES 9 3 - 10 %    EOSINOPHILS 3 0 - 5 %    BASOPHILS 0 0 - 2 %    ABS. NEUTROPHILS 2.5 1.8 - 8.0 K/UL    ABS. LYMPHOCYTES 1.0 0.9 - 3.6 K/UL    ABS. MONOCYTES 0.4 0.05 - 1.2 K/UL    ABS. EOSINOPHILS 0.1 0.0 - 0.4 K/UL    ABS.  BASOPHILS 0.0 0.0 - 0.1 K/UL    DF AUTOMATED     NT-PRO BNP    Collection Time: 08/15/19  1:53 AM   Result Value Ref Range    NT pro-BNP 2,217 (H) 0 - 900 PG/ML   POC G3    Collection Time: 08/15/19  4:26 AM   Result Value Ref Range    Device: BIPAP      FIO2 (POC) 35 %    pH (POC) 7.293 (L) 7.35 - 7.45      pCO2 (POC) 79.7 (H) 35.0 - 45.0 MMHG    pO2 (POC) 116 (H) 80 - 100 MMHG    HCO3 (POC) 38.6 (H) 22 - 26 MMOL/L    sO2 (POC) 98 (H) 92 - 97 %    Base excess (POC) 12 mmol/L    PEEP/CPAP (POC) 10 cmH2O    PIP (POC) 20      Pressure support 10 cmH2O    Allens test (POC) YES      Total resp. rate 12      Site LEFT RADIAL      Patient temp. 98.6      Specimen type (POC) ARTERIAL      Performed by [facility]    EKG, 12 LEAD, SUBSEQUENT    Collection Time: 08/15/19 10:57 AM   Result Value Ref Range    Ventricular Rate 43 BPM    Atrial Rate 47 BPM    QRS Duration 130 ms    Q-T Interval 532 ms    QTC Calculation (Bezet) 449 ms    Calculated R Axis -38 degrees    Calculated T Axis -142 degrees    Diagnosis       Atrial fibrillation with slow ventricular response with premature ventricular   or aberrantly conducted complexes  Left axis deviation  Nonspecific intraventricular block  T wave abnormality, consider anterolateral ischemia or digitalis effect  Abnormal ECG  When compared with ECG of 12-AUG-2019 17:24,  Vent.  rate has decreased BY  26 BPM  Confirmed by Nargis Rivera ([id number]) on 8/15/2019 3:35:18 PM     GLUCOSE, POC    Collection Time: 08/15/19 11:48 AM   Result Value Ref Range    Glucose (POC) 75 70 - 110 mg/dL     Additional Data Reviewed:      Signed By: Ronal Barrientos MD     August 15, 2019 4:10 PM

## 2019-08-15 NOTE — PROGRESS NOTES
[facility]  Pulmonary, Critical Care, and Sleep Medicine    Name: Devon Rush MRN: [id number]   : 1960 Hospital: [address] [place] [address] Dr   Date: 8/15/2019        Subjective/Interval History:     HPI:  Patient is a 56 y.o. female with PMH significant for COPD ( on 3 LPM NC at home), HFpEF, ANDRADE (CPAP at home but non-compliant), Afib (on Xarelto at home), HTN and morbid obesity who presented to [facility] ED on 19 c/o sudden onset with acute worsening of SOB with associated CP and sleepiness. PT admitted for Acute on chronic hypoxic hypercapnic respiratory failure, 2/2 severe ANDRADE/OHS with component of CHF exacerbation as well. Pt was placed on BiPAP with poor clinical response despite improvement in respiratory acidosis on ABG; was upgraded to ICU for airway and e-lyte monitoring in setting of heavy diuresis. Pt showed improvement in overall respiratory status and was downgraded to SDU on . Course complicated with bradycardia with EKG showing Afib with slow ventricular response. BB stopped (08/15) and will monitor response. 08/15/19:  - ABG this AM with worsening respiratory acidosis with Hypercapnia  - AOx3 but sleepy and nodding off during exam; Placed back on BiPAP and will reassess in a few hours  - States that she didn't wear her BiPAP the whole night, 2/2 the alarms going off and the mask not fitting right. Per RT, pt was on BiPAP this AM at shift change. - Denies any SOB/cough/congestion; No CP, palpitations, abd pain, N/V/D.   - No new respiratory complaints. ROS:Pertinent items are noted in HPI. Objective:   Vital Signs:    Visit Vitals  /69   Pulse (!) 47   Temp 98 °F (36.7 °C)   Resp 14   Ht 5' 2.99\" (1.6 m)   Wt 145.2 kg (320 lb)   SpO2 100%   Breastfeeding?  No   BMI 56.70 kg/m²       O2 Device: BIPAP   O2 Flow Rate (L/min): 3 l/min   Temp (24hrs), Av.7 °F (36.5 °C), Min:97.3 °F (36.3 °C), Max:98 °F (36.7 °C)       Intake/Output:   Last shift: No intake/output data recorded. Last 3 shifts: 08/13 1901 - 08/15 0700  In: 660 [P.O.:560; I.V.:100]  Out: 6450 [Urine:6450]    Intake/Output Summary (Last 24 hours) at 8/15/2019 1113  Last data filed at 8/15/2019 0600  Gross per 24 hour   Intake 100 ml   Output 5500 ml   Net -5400 ml        Physical Exam:   General:  Sleepy but easily woke and AOx3, cooperative,NAD; appears stated age. HEENT:  Normocephalic,atraumatic; PERRL; EOMI; Conjunctivae/corneas clear; Anicteric; nares/mucosa normal, moist; No drainage/sinus tenderness; Neck supple, symmetrical; trachea midline; No adenopathy; Thyroid - no enlargement/tenderness/nodules noted; No carotid bruit; No JVD   Back:   Symmetric, no curvature, no spine tenderness or flank pain   Lungs:   Symmetrical chest rise; mod AE Bilat; distant breath sounds 2/2 body habitus; diminished bibasilar; CTAB; No wheezes/rhonchi/rales noted. Chest wall:  No tenderness or deformity. NO CREPITUS   Heart:  RRR, S1, S2 normal, no m/r/g   Abdomen:   Soft, obese; non-tender.(+) B. S x4; No masses/organomegaly/paradox   Extremities: Normal, atraumatic, no cyanosis or edema. Pulses: 1-2+ and symmetric all extremities. Skin: Skin color, texture, turgor normal. No rashes or lesions   Lymph nodes: Cervical, supraclavicular, and axillary nodes normal.   Neurologic: Sleepy but improved from when I saw her upon admission; able to answer questions appropriately; AOx3.          DATA:  Labs:  Recent Labs     08/15/19  0153 08/14/19  0348 08/13/19  0348   WBC 4.0* 4.7 4.2*   HGB 10.2* 9.5* 10.0*   HCT 35.9 32.9* 34.3*    147 129*     Recent Labs     08/15/19  0153 08/14/19  0348 08/13/19  0348 08/12/19  2202    140 140 139   K 4.1 3.8 4.1 4.4   CL 98* 101 100 100   CO2 37* 36* 34* 33*   * 110* 120* 111*   BUN 20* 21* 17 15   CREA 1.06 0.96 0.73 0.86   CA 8.4* 7.9* 8.0* 8.4*   MG  --  2.1 2.0 2.1   PHOS  --  3.1 4.8 4.8   INR  --   --  1.4*  --        ECHO [05/21/19]: · Left Ventricle: Mild concentric hypertrophy. Estimated left ventricular ejection fraction is 51 - 55%. Visually measured ejection fraction. No regional wall motion abnormality noted. Inconclusive left ventricular diastolic function. · Right Ventricle: Dilated right ventricle. Reduced systolic function. Abnormal right ventricular septal motion. Systolic flattening of interventricular septum consistent with right ventricle pressure overload. · Right Atrium: Dilated right atrium. · Tricuspid Valve: Mild to moderate tricuspid valve regurgitation is present. · Pulmonary Artery: Moderate to severe pulmonary hypertension at 61 mmHg. · IVC/Hepatic Veins: Moderately elevated central venous pressure (10-15 mmHg); IVC diameter is larger than 21 mm and collapses more than 50% with respiration. Imaging:  [x]I have personally reviewed the patients radiographs  []Radiographs reviewed with radiologist   []No change from prior, tubes and lines in adequate position  []Improved   []Worsening      CXR [08/14/19]: FINDINGS:  Stable moderate enlarged cardiac silhouette. Stable enlargement of the ravindra due to known enlarged pulmonary arteries. No pneumothorax. No significant pleural effusion. Interval improvement in basilar lung aeration, vascular congestion and pulmonary edema. No consolidation. Intact osseous structures.     IMPRESSION:  Stable cardiomegaly. Improved lung aeration and pulmonary edema          [facility] Staff Addendum     I have independently evaluated the patient and reviewed the patient's chart. I agree with evaluation, assessment and recommendations along with the following comments and observations. Patient resting on BIPAP at time of my evaluated. Patient is alert and interactive.  Beta blocker held and patient with HR in the 50-70's - AFIB on bedside monitor- Discussed with hospitalist    Patient states that she has not been using her PAP device at home for several weeks. She also reports moving last week and her PAP device was placed in storage- we need to try and obtain that device if possible    Agree with trying to use Diamox as a respiratory stimulant in this setting. Clinical Care and time spent coordinating care, minus procedure time: 40 min    Zahida Rashad, DO, [facility]  Pulmonary, Sleep and Critical Care Medicine  2:20 PM         IMPRESSION:   · Acute on Chronic Hypercapnic Respiratory Failure - 2/2 severe ANDRADE/OHA and CHF exacerbation  · HFprEF with current CHF Exacerbation- acute on chronic diastolic heart failure  · Severe ANDRADE/OHS  · Pulmonary HTN  · Afib, on Xarelto (currently on-hold)  · Morbid Obesity    RECOMMENDATIONS:   · SpO2 goal btwn 88-93%; titrate supp O2 PRN; currently resting on 3LPM but sleepy. ABG worse today with recurrent respiratory acidosis with hypercapnia. Will place back on BiPAP and reassess in a few hours. · PRN and qHS BIPAP  · Aggressive pulmonary toilet; encourage ICS; pep device; Bronchial hygiene protocol  · Aspiration precautions - HOB >30;  · Duo-nebs q4 scheduled; con't Flonase  · HD stable however more persistent bradycardia, HR dropping in the 30's more frequently. =   · STAT EKG with Afib with slow response, similar to previous (08/12) but now with Bradycardia  · Diuresis per primary; consider trending BNP in setting of diuresis for CHF  · Discussed HR status with Dr. Nikki Stallworth and Dr. Joselyn Devine; will D/C BB and monitor response. Per RN, HR has been dipping into 30's for a few days, but was not as persistent and not documented on chart. Keep in ICU as SDU overflow for now; will update Dr. Kirsten Monsalve of possible upgrade pending clinical response. · Out patient testing- PFT, 6 min walk, Polysomnogram  · Assess home Oxygen needs at discharge  · Case Management- need to try and get patient's family to bring in PAP device.  If patient does not start using PAP as outpatient, anticipate recurrent admissions, potentially life- threatening exacerbations and potential future need for tracheostomy. · OT, PT, OOB and ambulate  · Healthy weight  · Will Follow  · DVT, PUD prophylaxis per Primary Team      High complexity decision making was performed during the evaluation of this patient at high risk for decompens ation with multiple organ involvement      Above mentioned total time spent on reviewing the case/medical record/data/notes/EMR/patient examination/documentation/coordinating care with nurse/consultants, exclusive of procedures with complex decision making performed and > 50% time spent in face to face evaluation.     Vidal David PA-C      Pulmonary Critical Care Medicine  [facility]

## 2019-08-15 NOTE — ROUTINE PROCESS
Received pt in bed watching tV. Voiced no complaints. Call bell within reach. Bed low and locked. Will continue to monitor. Bedside and Verbal shift change report given to rita AWAD (oncoming nurse) by Kenia Bartholomew RN   (offgoing nurse). Report included the following information SBAR, Kardex, Intake/Output and MAR.

## 2019-08-15 NOTE — PROGRESS NOTES
Problem: Self Care Deficits Care Plan (Adult)  Goal: *Acute Goals and Plan of Care (Insert Text)  Description  Occupational Therapy Goals  Initiated 8/14/2019 within 7 day(s). 1.  Patient will perform functional activity/ADL task with independence standing for 5-7 minutes with G balance. 2.  Patient will perform lower body dressing with modified independence using AE prn.  3.  Patient will perform toilet transfers with modified independence. 4.  Patient will perform all aspects of toileting with modified independence. 5.  Patient will participate in upper extremity therapeutic exercise/activities with independence for 5-7 minutes to increase BUE strength for self-care/ADLs. 6.  Patient will utilize energy conservation techniques during functional activities with verbal cues. Prior Level of Function: Patient was independent with most self-care and used a RW for functional mobility in home, W/C outside the home PTA. Pt reports asking grandchildren/daughter for assist prn with LBD and LB bathing. Outcome: Progressing Towards Goal   OCCUPATIONAL THERAPY TREATMENT    Patient: Tushar Culp (67 y.o. female)  Date: 8/15/2019  Diagnosis: COPD (chronic obstructive pulmonary disease) ([facility] [J44.9]  [facility] ([facility] [J96.00] <principal problem not specified>       Precautions: Fall    Chart, occupational therapy assessment, plan of care, and goals were reviewed. ASSESSMENT:  Pt sitting up in chair reporting the purewick is not catching all her urine; agreeable to [facility] transfer. Placed 3:1 commode next to chair and pt completed SPT given Min A with HHA (no walker in room). Extra time for toileting needs with pt able to complete toileting hygiene given SBA seated on commode. SPT back to chair with all needs in reach. End of session, RN requesting to assist pt back to bed reporting pt needed assist x3 yesterday. PTA present to increase safety with transfer.  Pt given Min A for SPT HHA to EOB and able to side step to [facility] prior to sitting. Max A of 2 for B LEs donning onto bed and assist of 3 to scoot to [facility]. Pt made comfortable with all needs in reach. Progression toward goals:  ?          Improving appropriately and progressing toward goals  ? Improving slowly and progressing toward goals  ? Not making progress toward goals and plan of care will be adjusted     PLAN:  Patient continues to benefit from skilled intervention to address the above impairments. Continue treatment per established plan of care. Discharge Recommendations:  Rehab  Further Equipment Recommendations for Discharge:  3:1 commode      SUBJECTIVE:   Patient stated Veronica Kicks I have the phone? I need to call my daughter.     OBJECTIVE DATA SUMMARY:   Cognitive/Behavioral Status:  Neurologic State: Alert  Orientation Level: Oriented X4  Cognition: Follows commands  Safety/Judgement: Fall prevention, Home safety    Functional Mobility and Transfers for ADLs:  Bed Mobility:  Sit to Supine: Maximum assistance;Assist x2(2 therapists assisting B LEs)     Transfers:  Sit to Stand: Minimum assistance  Stand to Sit: Contact guard assistance  Bed to Chair: Minimum assistance(chair>bed )  Toilet Transfer : Minimum assistance(A [facility] )    Balance:  Sitting: Intact  Standing: Impaired; With support  Standing - Static: Fair  Standing - Dynamic : Fair    ADL Intervention:  Grooming  Grooming Assistance: Modified independent  Washing Hands: Modified independent    Toileting  Toileting Assistance: Stand-by assistance  Bladder Hygiene: Stand-by assistance  Clothing Management: Contact guard assistance    Cognitive Retraining  Safety/Judgement: Fall prevention;Home safety    Pain:  Pt did not report any pain    Activity Tolerance:    Fair    Please refer to the flowsheet for vital signs taken during this treatment. After treatment:   ?  Patient left in no apparent distress sitting up in chair  ? Patient left in no apparent distress in bed  ? Call bell left within reach  ? Nursing notified  ? Caregiver present  ? Bed alarm activated    COMMUNICATION/EDUCATION:   ? Role of Occupational Therapy in the acute care setting  ? Home safety education was provided and the patient/caregiver indicated understanding. ? Patient/family have participated as able in working towards goals and plan of care. ? Patient/family agree to work toward stated goals and plan of care. ? Patient understands intent and goals of therapy, but is neutral about his/her participation. ? Patient is unable to participate in goal setting and plan of care.       Thank you for this referral.  Maceo Barthel, COTA/XAVIER   Time Calculation: 39 mins

## 2019-08-15 NOTE — ROUTINE PROCESS
1930 - Bedside report received from Cathy Hinson, [address] [place] [address].  
 
2030 - Pt dozing off in bed, refusing bipap at this time, states she is not ready for it, intermittently desats into the 80s, will continue to monitor. 2255 - Pt placed on bipap after o2 dropped into 70s while sleeping. o2 returned to 90s. 0030 - pt continuously self removing bipap, removed at this time, respiratory therapy aware, 02 100% on NC when awake, will continue to monitor. 8444 - pt o2 dropped to the 60s, placed back on bipap 
 
 
0731 - Bedside and Verbal shift change report given to Agatha Jordan RN (oncoming nurse) by DELMI Greene (offgoing nurse). Report included the following information SBAR, Kardex, Intake/Output, MAR, Recent Results, Cardiac Rhythm A-fib and Quality Measures.

## 2019-08-15 NOTE — PROGRESS NOTES
Problem: Mobility Impaired (Adult and Pediatric)  Goal: *Acute Goals and Plan of Care (Insert Text)  Description  Physical Therapy Goals  Initiated 8/14/2019 and to be accomplished within 7 day(s)  1. Patient will move from supine to sit and sit to supine , scoot up and down and roll side to side in bed with modified independence. 2.  Patient will transfer from bed to chair and chair to bed with modified independence using the least restrictive device. 3.  Patient will perform sit to stand with modified independence. 4.  Patient will ambulate with supervision/set-up for  feet with the least restrictive device. Prior Level of Function: Pt was independent with mobility including mostly using WC but ambulated in her home using RW. She reports she is unable to ascend/descend any stairs with her family assisting using a WC for in/out of the home. Pt lives in a 2 story home with 1st floor set up with 1 step to enter the home. Pt lives with her 10 grandkids and her daughter. Outcome: Progressing Towards Goal   PHYSICAL THERAPY TREATMENT    Patient: Luwana Gosselin (49 y.o. female)  Date: 8/15/2019  Diagnosis: [facility] disease) [facility] [J44.9]  Acute respiratory failure ([facility] [J96.00] <principal problem not specified>  Precautions: Fall   Chart, physical therapy assessment, plan of care and goals were reviewed. OBJECTIVE/ ASSESSMENT:  Patient found sitting in b/s chair willing to work with PT. Patient seen with OT to maximize safety of patient and staff. Pt stood form b/s chair and was able to pivot to EOB. Pt's right foot remained planted due to reported right hip dysfunction / pain. Pt then returned to supine and scooted toward head of bed with 2 person draw sheet transfer. Pt left supine with needs in reach. Progression toward goals:  ?      Improving appropriately and progressing toward goals  ? Improving slowly and progressing toward goals  ?       Not making progress toward goals and plan of care will be adjusted     PLAN:  Patient continues to benefit from skilled intervention to address the above impairments. Continue treatment per established plan of care. Discharge Recommendations:  Skilled Nursing Facility  Further Equipment Recommendations for Discharge: None     SUBJECTIVE:   Patient stated I can't move my foot.     OBJECTIVE DATA SUMMARY:   Critical Behavior:  Neurologic State: Alert  Orientation Level: Oriented X4  Cognition: Follows commands  Safety/Judgement: Fall prevention, Home safety  Functional Mobility Training:  Bed Mobility:  Sit to Supine: Maximum assistance;Assist x2(2 therapists assisting B LEs)  Transfers:  Sit to Stand: Minimum assistance  Stand to Sit: Contact guard assistance  Bed to Chair: Minimum assistance(chair>bed )  Balance:  Sitting: Intact  Standing: Impaired; With support  Standing - Static: Fair  Standing - Dynamic : Fair      Pain:  Pain level pre-treatment: Not rated  Pain level post-treatment: Not rated  Pain Intervention(s): Rest    Activity Tolerance:   Fair  Please refer to the flowsheet for vital signs taken during this treatment. After treatment:   ? Patient left in no apparent distress sitting up in chair  ? Patient left in no apparent distress in bed  ? Call bell left within reach  ? Nursing notified  ? Caregiver present  ? Bed alarm activated  ? SCDs applied    COMMUNICATION/EDUCATION:   ?         Role of Physical Therapy  ? Fall prevention  ? Bed mobility  ? Transfers  ? Ambulation / gait  ? Assistive device management  ? Stairs  ? Body mechanics  ? Position change   ? Therapeutic exercise  ? Activity pacing / energy conservation  ?          Other:      See Reeves PTA   Time Calculation: 13 mins

## 2019-08-15 NOTE — PROGRESS NOTES
Chart reviewed. PT recommending snf and OT recommending hh vs snf. Discussed with pt and she stated she wants to go home with hh  Called pt's daughter Stefani Bradley and she stated if pt wants to go home it is fine but she will discuss with her and call me back with a decision. 1323: Attempted to see pt but pt sleeping. No family member in room. Called pt's daughter Trevor Byrd but could not leave message. Mailbox is full.       LESLEY MckayN RN  Care Management  Pager: [phone]

## 2019-08-16 LAB
ANION GAP SERPL CALC-SCNC: 4 MMOL/L (ref 3–18)
ARTERIAL PATENCY WRIST A: YES
BASE EXCESS BLD CALC-SCNC: 9 MMOL/L
BASOPHILS # BLD: 0 K/UL (ref 0–0.1)
BASOPHILS NFR BLD: 0 % (ref 0–2)
BDY SITE: ABNORMAL
BNP SERPL-MCNC: 1451 PG/ML (ref 0–900)
BUN SERPL-MCNC: 21 MG/DL (ref 7–18)
BUN/CREAT SERPL: 17 (ref 12–20)
CA-I SERPL-SCNC: 1.15 MMOL/L (ref 1.12–1.32)
CALCIUM SERPL-MCNC: 8.1 MG/DL (ref 8.5–10.1)
CHLORIDE SERPL-SCNC: 100 MMOL/L (ref 100–111)
CO2 SERPL-SCNC: 36 MMOL/L (ref 21–32)
CREAT SERPL-MCNC: 1.22 MG/DL (ref 0.6–1.3)
DIFFERENTIAL METHOD BLD: ABNORMAL
EOSINOPHIL # BLD: 0.2 K/UL (ref 0–0.4)
EOSINOPHIL NFR BLD: 3 % (ref 0–5)
ERYTHROCYTE [DISTWIDTH] IN BLOOD BY AUTOMATED COUNT: 16.9 % (ref 11.6–14.5)
GAS FLOW.O2 O2 DELIVERY SYS: ABNORMAL L/MIN
GLUCOSE BLD STRIP.AUTO-MCNC: 106 MG/DL (ref 70–110)
GLUCOSE BLD STRIP.AUTO-MCNC: 107 MG/DL (ref 70–110)
GLUCOSE BLD STRIP.AUTO-MCNC: 111 MG/DL (ref 70–110)
GLUCOSE BLD STRIP.AUTO-MCNC: 118 MG/DL (ref 70–110)
GLUCOSE SERPL-MCNC: 98 MG/DL (ref 74–99)
HCO3 BLD-SCNC: 35.4 MMOL/L (ref 22–26)
HCT VFR BLD AUTO: 37.2 % (ref 35–45)
HGB BLD-MCNC: 10.7 G/DL (ref 12–16)
LYMPHOCYTES # BLD: 1.2 K/UL (ref 0.9–3.6)
LYMPHOCYTES NFR BLD: 21 % (ref 21–52)
MCH RBC QN AUTO: 25.5 PG (ref 24–34)
MCHC RBC AUTO-ENTMCNC: 28.8 G/DL (ref 31–37)
MCV RBC AUTO: 88.6 FL (ref 74–97)
MONOCYTES # BLD: 0.8 K/UL (ref 0.05–1.2)
MONOCYTES NFR BLD: 14 % (ref 3–10)
NEUTS SEG # BLD: 3.4 K/UL (ref 1.8–8)
NEUTS SEG NFR BLD: 62 % (ref 40–73)
O2/TOTAL GAS SETTING VFR VENT: 35 %
PCO2 BLD: 69.8 MMHG (ref 35–45)
PEEP RESPIRATORY: 10 CMH2O
PH BLD: 7.31 [PH] (ref 7.35–7.45)
PIP ISTAT,IPIP: 22
PLATELET # BLD AUTO: 159 K/UL (ref 135–420)
PMV BLD AUTO: 9.9 FL (ref 9.2–11.8)
PO2 BLD: 152 MMHG (ref 80–100)
POTASSIUM SERPL-SCNC: 3.9 MMOL/L (ref 3.5–5.5)
RBC # BLD AUTO: 4.2 M/UL (ref 4.2–5.3)
SAO2 % BLD: 99 % (ref 92–97)
SERVICE CMNT-IMP: 12 L/MIN
SERVICE CMNT-IMP: ABNORMAL
SODIUM SERPL-SCNC: 140 MMOL/L (ref 136–145)
SPECIMEN TYPE: ABNORMAL
SPONTANEOUS TIMED, IST: YES
TOTAL RESP. RATE, ITRR: 15
WBC # BLD AUTO: 5.5 K/UL (ref 4.6–13.2)

## 2019-08-16 PROCEDURE — 94660 CPAP INITIATION&MGMT: CPT

## 2019-08-16 PROCEDURE — 74011250637 HC RX REV CODE- 250/637: Performed by: FAMILY MEDICINE

## 2019-08-16 PROCEDURE — 80048 BASIC METABOLIC PNL TOTAL CA: CPT

## 2019-08-16 PROCEDURE — 65660000000 HC RM CCU STEPDOWN

## 2019-08-16 PROCEDURE — 94640 AIRWAY INHALATION TREATMENT: CPT

## 2019-08-16 PROCEDURE — 74011250637 HC RX REV CODE- 250/637: Performed by: PHYSICIAN ASSISTANT

## 2019-08-16 PROCEDURE — 36415 COLL VENOUS BLD VENIPUNCTURE: CPT

## 2019-08-16 PROCEDURE — 74011000250 HC RX REV CODE- 250: Performed by: INTERNAL MEDICINE

## 2019-08-16 PROCEDURE — 74011250637 HC RX REV CODE- 250/637: Performed by: INTERNAL MEDICINE

## 2019-08-16 PROCEDURE — 36600 WITHDRAWAL OF ARTERIAL BLOOD: CPT

## 2019-08-16 PROCEDURE — 77010033678 HC OXYGEN DAILY

## 2019-08-16 PROCEDURE — 82330 ASSAY OF CALCIUM: CPT

## 2019-08-16 PROCEDURE — 74011250636 HC RX REV CODE- 250/636: Performed by: PHYSICIAN ASSISTANT

## 2019-08-16 PROCEDURE — 82962 GLUCOSE BLOOD TEST: CPT

## 2019-08-16 PROCEDURE — 94762 N-INVAS EAR/PLS OXIMTRY CONT: CPT

## 2019-08-16 PROCEDURE — 85025 COMPLETE CBC W/AUTO DIFF WBC: CPT

## 2019-08-16 PROCEDURE — 74011000250 HC RX REV CODE- 250: Performed by: PHYSICIAN ASSISTANT

## 2019-08-16 PROCEDURE — 83880 ASSAY OF NATRIURETIC PEPTIDE: CPT

## 2019-08-16 PROCEDURE — 82803 BLOOD GASES ANY COMBINATION: CPT

## 2019-08-16 RX ORDER — IPRATROPIUM BROMIDE AND ALBUTEROL SULFATE 2.5; .5 MG/3ML; MG/3ML
3 SOLUTION RESPIRATORY (INHALATION)
Status: DISCONTINUED | OUTPATIENT
Start: 2019-08-16 | End: 2019-08-21 | Stop reason: HOSPADM

## 2019-08-16 RX ADMIN — Medication 10 ML: at 22:05

## 2019-08-16 RX ADMIN — ACETAZOLAMIDE 250 MG: 250 TABLET ORAL at 19:09

## 2019-08-16 RX ADMIN — FAMOTIDINE 20 MG: 20 TABLET ORAL at 10:00

## 2019-08-16 RX ADMIN — IPRATROPIUM BROMIDE AND ALBUTEROL SULFATE 3 ML: .5; 3 SOLUTION RESPIRATORY (INHALATION) at 14:16

## 2019-08-16 RX ADMIN — Medication 10 ML: at 10:05

## 2019-08-16 RX ADMIN — IPRATROPIUM BROMIDE AND ALBUTEROL SULFATE 3 ML: .5; 3 SOLUTION RESPIRATORY (INHALATION) at 10:02

## 2019-08-16 RX ADMIN — SIMVASTATIN 40 MG: 40 TABLET, FILM COATED ORAL at 00:31

## 2019-08-16 RX ADMIN — OXYCODONE HYDROCHLORIDE 5 MG: 5 TABLET ORAL at 09:59

## 2019-08-16 RX ADMIN — ACETAZOLAMIDE 250 MG: 250 TABLET ORAL at 10:00

## 2019-08-16 RX ADMIN — FAMOTIDINE 20 MG: 20 TABLET ORAL at 19:09

## 2019-08-16 RX ADMIN — Medication 10 ML: at 05:52

## 2019-08-16 RX ADMIN — Medication 10 ML: at 00:31

## 2019-08-16 RX ADMIN — SIMVASTATIN 40 MG: 40 TABLET, FILM COATED ORAL at 21:59

## 2019-08-16 RX ADMIN — Medication 10 ML: at 19:10

## 2019-08-16 RX ADMIN — IPRATROPIUM BROMIDE AND ALBUTEROL SULFATE 3 ML: .5; 3 SOLUTION RESPIRATORY (INHALATION) at 20:27

## 2019-08-16 RX ADMIN — LEVOTHYROXINE SODIUM 125 MCG: 25 TABLET ORAL at 05:51

## 2019-08-16 RX ADMIN — PAROXETINE 20 MG: 20 TABLET, FILM COATED ORAL at 10:00

## 2019-08-16 RX ADMIN — FLUTICASONE PROPIONATE 2 SPRAY: 50 SPRAY, METERED NASAL at 10:04

## 2019-08-16 RX ADMIN — RIVAROXABAN 20 MG: 20 TABLET, FILM COATED ORAL at 19:09

## 2019-08-16 RX ADMIN — ARIPIPRAZOLE 15 MG: 15 TABLET ORAL at 09:59

## 2019-08-16 RX ADMIN — FUROSEMIDE 40 MG: 10 INJECTION, SOLUTION INTRAMUSCULAR; INTRAVENOUS at 19:06

## 2019-08-16 RX ADMIN — FUROSEMIDE 40 MG: 10 INJECTION, SOLUTION INTRAMUSCULAR; INTRAVENOUS at 09:54

## 2019-08-16 RX ADMIN — OXYCODONE HYDROCHLORIDE 5 MG: 5 TABLET ORAL at 19:09

## 2019-08-16 NOTE — PROGRESS NOTES
ARU/IPR REFERRAL CONTACT NOTE  [id number] [place] [address] for Physical Rehabilitation      RE:  Jody Moore                Thank you for the opportunity to review this patient's case for admission to [address] [place] [address] for Physical Rehabilitation. Per further review with team,  patient does not meet criteria for admission to [facility] for Physical Rehabilitation.  Documents do not reflect rehab diagnosis.  [facility] 3 vs home with home care upon discharge. Again, Thank you for this referral. Should you have any questions please do not hesitate to call. Sincerely,  Jennie Gagnon.  [address] for Physical Rehabilitation  [phone]

## 2019-08-16 NOTE — PROGRESS NOTES
[facility]  Pulmonary, Critical Care, and Sleep Medicine    Name: Charlotte Guido MRN: [id number]   : 1960 Hospital: [address] [place] [address] Dr   Date: 2019        Subjective/Interval History:     HPI:  Patient is a 56 y.o. female with PMH significant for COPD ( on 3 LPM NC at home), HFpEF, ANDRADE (CPAP at home but non-compliant), Afib (on Xarelto at home), HTN and morbid obesity who presented to [facility] ED on 19 c/o sudden onset with acute worsening of SOB with associated CP and sleepiness. PT admitted for Acute on chronic hypoxic hypercapnic respiratory failure, 2/2 severe ANDRADE/OHS with component of CHF exacerbation as well. S/p brief stay in ICU with continuous BiPAP and diuresis. Downgraded to SDU on  and doing well. Course complicated with bradycardia with EKG showing Afib with slow ventricular response. BB stopped (08/15) with HR improvement. 19:  - ABG this AM with improvement from y'day, pCO2 back in the 60's, appears to be at or near baseline  - Pt much more alert this AM, currently resting on 4LMP NC  - Wore BiPAP overnight with better compliance; BiPAP mask was adjusted for better fit as well  - Improvement in SOB; ate breakfast; no dyspnea with conversation  - HD stable; HR stable (60-70's) since D/C of BB y'day  - New new respiratory complaints  - Denies any cough/congestion; No CP, palpitations, abd pain, N/V/D.       ROS:Pertinent items are noted in HPI. Objective:   Vital Signs:    Visit Vitals  BP (!) 138/97   Pulse 74   Temp 98.5 °F (36.9 °C)   Resp 19   Ht 5' 2.99\" (1.6 m)   Wt 145.9 kg (321 lb 9.6 oz)   SpO2 92%   Breastfeeding?  No   BMI 56.98 kg/m²       O2 Device: Nasal cannula, Room air(nasal cannula on top of pts head)   O2 Flow Rate (L/min): 3 l/min   Temp (24hrs), Av.2 °F (36.8 °C), Min:97.6 °F (36.4 °C), Max:98.5 °F (36.9 °C)       Intake/Output:   Last shift:      701 -  1900  In: -   Out: 1200 [Urine:1200]  Last 3 shifts: 08/14 1901 - 08/16 0700  In: 0   Out: 5270 [Urine:5270]    Intake/Output Summary (Last 24 hours) at 8/16/2019 1116  Last data filed at 8/16/2019 1101  Gross per 24 hour   Intake 0 ml   Output 4170 ml   Net -4170 ml        Physical Exam:   General:  AOx3, cooperative, NAD; appears stated age. HEENT:  Normocephalic,atraumatic; PERRL; EOMI; Conjunctivae/corneas clear; Anicteric; nares/mucosa normal, moist; No drainage/sinus tenderness; Neck supple, symmetrical; trachea midline; No adenopathy; Thyroid - no enlargement/tenderness/nodules noted; No carotid bruit; No JVD   Back:   Symmetric, no curvature, no spine tenderness or flank pain   Lungs:   Symmetrical chest rise; mod AE Bilat; distant breath sounds 2/2 body habitus; diminished bibasilar; CTAB; No wheezes/rhonchi/rales noted. Chest wall:  No tenderness or deformity. NO CREPITUS   Heart:  RRR, S1, S2 normal, no m/r/g   Abdomen:   Soft, obese; non-tender.(+) B. S x4; No masses/organomegaly/paradox   Extremities: Normal, atraumatic, no cyanosis or edema. Pulses: 1-2+ and symmetric all extremities. Skin: Skin color, texture, turgor normal. No rashes or lesions   Lymph nodes: Cervical, supraclavicular, and axillary nodes normal.   Neurologic: Non focal         DATA:  Labs:  Recent Labs     08/16/19  0240 08/15/19  0153 08/14/19  0348   WBC 5.5 4.0* 4.7   HGB 10.7* 10.2* 9.5*   HCT 37.2 35.9 32.9*    145 147     Recent Labs     08/16/19  0240 08/15/19  0153 08/14/19  0348    138 140   K 3.9 4.1 3.8    98* 101   CO2 36* 37* 36*   GLU 98 100* 110*   BUN 21* 20* 21*   CREA 1.22 1.06 0.96   CA 8.1* 8.4* 7.9*   MG  --   --  2.1   PHOS  --   --  3.1       ECHO [05/21/19]: · Left Ventricle: Mild concentric hypertrophy. Estimated left ventricular ejection fraction is 51 - 55%. Visually measured ejection fraction. No regional wall motion abnormality noted. Inconclusive left ventricular diastolic function. · Right Ventricle: Dilated right ventricle. Reduced systolic function. Abnormal right ventricular septal motion. Systolic flattening of interventricular septum consistent with right ventricle pressure overload. · Right Atrium: Dilated right atrium. · Tricuspid Valve: Mild to moderate tricuspid valve regurgitation is present. · Pulmonary Artery: Moderate to severe pulmonary hypertension at 61 mmHg. · IVC/Hepatic Veins: Moderately elevated central venous pressure (10-15 mmHg); IVC diameter is larger than 21 mm and collapses more than 50% with respiration. Imaging:  [x]I have personally reviewed the patients radiographs  []Radiographs reviewed with radiologist   []No change from prior, tubes and lines in adequate position  []Improved   []Worsening      CXR [08/14/19]: FINDINGS:  Stable moderate enlarged cardiac silhouette. Stable enlargement of the ravindra due to known enlarged pulmonary arteries. No pneumothorax. No significant pleural effusion. Interval improvement in basilar lung aeration, vascular congestion and pulmonary edema. No consolidation. Intact osseous structures.     IMPRESSION:  Stable cardiomegaly. Improved lung aeration and pulmonary edema      IMPRESSION:   · Acute on Chronic Hypercapnic Respiratory Failure - 2/2 severe ANDRADE/OHA and CHF exacerbation - Improving  · HFprEF with current CHF Exacerbation- acute on chronic diastolic heart failure; improving with diuresis  · Severe ANDRADE/OHS - States she currently does not use her PAP device at home - was lost while moving; states family is currently unpacking boxes to find device. · Pulmonary HTN  · Afib, on Xarelto - Restarted 08/14  · Morbid Obesity    RECOMMENDATIONS:   · SpO2 goal btwn 88-93%; titrate supp O2 PRN; currently resting on 3LPM  · PRN and qHS BIPAP - encouraged and educated on importance of compliance   · Encourage ICS;  Bronchial hygiene protocol  · Aspiration precautions - HOB >30  · Duo-nebs q6 scheduled; con't Flonase  · HD stable, Hr improved with D/C of BB, Primary to manage. · Diuresis per primary; trend BNP in setting of diuresis for CHF  · Con't use of Diamox as respiratory stimulant  · Out patient testing- PFT, 6 min walk, Polysomnogram  · Assess home Oxygen needs at discharge  · Case Management- need to try and get patient's family to bring in PAP device. If patient does not start using PAP as outpatient, anticipate recurrent admissions, potentially life- threatening exacerbations and potential future need for tracheostomy. · OT, PT, OOB and ambulate  · Healthy weight  · Will Follow  · DVT, PUD prophylaxis per Primary Team      High complexity decision making was performed during the evaluation of this patient at high risk for decompens ation with multiple organ involvement      Above mentioned total time spent on reviewing the case/medical record/data/notes/EMR/patient examination/documentation/coordinating care with nurse/consultants, exclusive of procedures with complex decision making performed and > 50% time spent in face to face evaluation. Taylor Orr PA-C      Pulmonary Critical Care Medicine  [facility]        [facility] Staff Addendum     I have independently evaluated the patient and reviewed the patient's chart. I agree with the above evaluation, assessment and recommendations along with the following comments and observations. Patient had sleep study done in 2017. At that time patient required max settings in the 24-28/18-20 range. Patient's weight at that time was 295 lbs. It does not appear that she followed up after that study. It is unclear to me what unit she has at home and if she is really using it. Would like to explore NIV with AVAPS while in the hospital and see if we can get her to qualify for possible home AVAPs. I have contacted her DME- [facility] to see what our options are at this time. Continuing with supportive care. Please keep patient ICU-SD status for now.  Continue with Diamox for now.       Clinical Care and time spent coordinating care, minus procedure time:60  min    Kain Elaine DO, [facility]  Pulmonary, Sleep and Critical Care Medicine  2:30 PM

## 2019-08-16 NOTE — PROGRESS NOTES
Thank you for your referral for a cpap, [facility] is not in-network with patients insurance. Order faxed to [facility], please call [facility] with any questions [phone] (phone) [phone] (fax).     Felipe Young Liaison  [facility]

## 2019-08-16 NOTE — PROGRESS NOTES
[facility] Hospitalist Group  Progress Note    Patient: Isael Cobian Age: 60 y.o. : 1960 MR#: [id number] SSN: [id number]  Date/Time: 2019    Subjective:       Pt has no complaints. Assessment/Plan:   60 y o female w/ h/o diastolic heart failure, chronic respiratory failure on home 02 admitted on 19 after presenting to the ED w/ cc of SOB, initially admitted to the ICU due to need for BiPAP and increased somnolence:    -Acute on chronic respiratory failure, w/ cont'd respiratory acidosis this am, although clinically appears to be doing better. Management per pccm. Pt has ?cpap/Bipap at home that per pt and daughter is packed up in a box, and daughter is in the process of locating item. -Acute on chronic diastolic HF on IV lasix    -Bradycardia resolved now w/ some degree of rvr, off bb, pt w/ h/o afib. Cardiology consulted. History of:    -Hypothyroidism on synthroid  -Dyslipidemia  -Persistent afib on xarelto, bb held due to bradycardia, now w/ RVR  -Morbid obesity  -Chronic pain, sees pain management in outpt setting, currently on prn narc    PLAN:  -resp management per pulm  -cardiology consult  -cont PT/OT    Disp: per CM, pt declining SNF placement. Will look into home placement once her cpap/bipap issues are addressed. Additional Notes:      Case discussed with:  [x]Patient  []Family  []Nursing  []Case Management  DVT Prophylaxis:  []Lovenox  []Hep SQ  []SCDs  []Coumadin   []On Heparin gtt on doac    Objective:   VS:   Visit Vitals  BP (!) 138/97   Pulse 74   Temp 98 °F (36.7 °C)   Resp 19   Ht 5' 2.99\" (1.6 m)   Wt 145.9 kg (321 lb 9.6 oz)   SpO2 92%   Breastfeeding?  No   BMI 56.98 kg/m²      Tmax/24hrs: Temp (24hrs), Av.2 °F (36.8 °C), Min:97.6 °F (36.4 °C), Max:98.5 °F (36.9 °C)    Input/Output:     Intake/Output Summary (Last 24 hours) at 2019 1340  Last data filed at 2019 1101  Gross per 24 hour   Intake 0 ml   Output 3670 ml Net -7624 ml       General: alert, awake in nad  Cardiovascular:  No murmurs  Pulmonary:  ctab  GI:  Soft, nt, nd  Extremities:  No edema  Additional:      Labs:    Recent Results (from the past 24 hour(s))   GLUCOSE, POC    Collection Time: 08/15/19  4:24 PM   Result Value Ref Range    Glucose (POC) 82 70 - 338 mg/dL   METABOLIC PANEL, BASIC    Collection Time: 08/16/19  2:40 AM   Result Value Ref Range    Sodium 140 136 - 145 mmol/L    Potassium 3.9 3.5 - 5.5 mmol/L    Chloride 100 100 - 111 mmol/L    CO2 36 (H) 21 - 32 mmol/L    Anion gap 4 3.0 - 18 mmol/L    Glucose 98 74 - 99 mg/dL    BUN 21 (H) 7.0 - 18 MG/DL    Creatinine 1.22 0.6 - 1.3 MG/DL    BUN/Creatinine ratio 17 12 - 20      GFR est AA 54 (L) >60 ml/min/1.73m2    GFR est non-AA 45 (L) >60 ml/min/1.73m2    Calcium 8.1 (L) 8.5 - 10.1 MG/DL   CBC WITH AUTOMATED DIFF    Collection Time: 08/16/19  2:40 AM   Result Value Ref Range    WBC 5.5 4.6 - 13.2 K/uL    RBC 4.20 4. 20 - 5.30 M/uL    HGB 10.7 (L) 12.0 - 16.0 g/dL    HCT 37.2 35.0 - 45.0 %    MCV 88.6 74.0 - 97.0 FL    MCH 25.5 24.0 - 34.0 PG    MCHC 28.8 (L) 31.0 - 37.0 g/dL    RDW 16.9 (H) 11.6 - 14.5 %    PLATELET 840 798 - 873 K/uL    MPV 9.9 9.2 - 11.8 FL    NEUTROPHILS 62 40 - 73 %    LYMPHOCYTES 21 21 - 52 %    MONOCYTES 14 (H) 3 - 10 %    EOSINOPHILS 3 0 - 5 %    BASOPHILS 0 0 - 2 %    ABS. NEUTROPHILS 3.4 1.8 - 8.0 K/UL    ABS. LYMPHOCYTES 1.2 0.9 - 3.6 K/UL    ABS. MONOCYTES 0.8 0.05 - 1.2 K/UL    ABS. EOSINOPHILS 0.2 0.0 - 0.4 K/UL    ABS.  BASOPHILS 0.0 0.0 - 0.1 K/UL    DF AUTOMATED     NT-PRO BNP    Collection Time: 08/16/19  2:40 AM   Result Value Ref Range    NT pro-BNP 1,451 (H) 0 - 900 PG/ML   POC G3    Collection Time: 08/16/19  3:54 AM   Result Value Ref Range    Device: BIPAP      FIO2 (POC) 35 %    pH (POC) 7.313 (L) 7.35 - 7.45      pCO2 (POC) 69.8 (H) 35.0 - 45.0 MMHG    pO2 (POC) 152 (H) 80 - 100 MMHG    HCO3 (POC) 35.4 (H) 22 - 26 MMOL/L    sO2 (POC) 99 (H) 92 - 97 %    Base excess (POC) 9 mmol/L    PEEP/CPAP (POC) 10 cmH2O    PIP (POC) 22      Allens test (POC) YES      Bleed In 12 L/min    Total resp.  rate 15      Site RIGHT RADIAL      Specimen type (POC) ARTERIAL      Performed by Clary Cowden     Spontaneous timed YES     CALCIUM, IONIZED    Collection Time: 08/16/19  5:30 AM   Result Value Ref Range    Ionized Calcium 1.15 1.12 - 1.32 MMOL/L   GLUCOSE, POC    Collection Time: 08/16/19  7:41 AM   Result Value Ref Range    Glucose (POC) 118 (H) 70 - 110 mg/dL   GLUCOSE, POC    Collection Time: 08/16/19 11:39 AM   Result Value Ref Range    Glucose (POC) 107 70 - 110 mg/dL     Additional Data Reviewed:      Signed By: Presley Pascual MD     August 16, 2019 4:10 PM

## 2019-08-16 NOTE — CONSULTS
Cardiovascular Specialists - Consult Note    Consultation request by Dr. Hortensia Vickers for advice/opinion related to evaluating atrial fibrillation with RVR    Date of  Admission: 8/12/2019  8:26 AM   Primary Care Physician: Rupa De La Cruz MD     Assessment:     - Acute on chronic diastolic heart failure, initial BNP 6K. Patient was diuresed 13 L thus far this week. She states her swelling has significantly improved. Shortness of breath has improved as well. - Chronic atrial fibrillation, on Xarelto for oral anticoagulation. Historically on a very low dose of metoprolol for rate control. Patient was noted to be bradycardic yesterday morning with heart rates in the 40s, so her beta-blocker was stopped. Since stopping the beta-blocker her heart rates have been reasonable primarily in the 70-90 range. Occasionally goes over 100 which is acceptable in the setting of respiratory failure. - Echo 05/2018: EF 50-55%, mildly to moderately increased LV wall thickness, RV moderately to severely dilated with est. Peak pressure 87 mmHg, moderately to severely dilated RA, moderate to severe tricuspid regurgitation, severe pulmonic insufficiency. - Severe pulmonary hypertension, RV dysfunction with moderate to severe pulmonary insufficiency and moderate pulmonary pressure elevation. This is likely due to a combination of chronic diastolic heart failure, obstructive sleep apnea, and obesity hypoventilation syndrome.  - Hx congenital heart defect repaired as child; details unknown and patient poor historian   - HTN  - Dyslipidemia, on Simvastatin  - ANDRADE  - OHS  - Morbid obesity  - Hx substance abuse  - Hx psychiatric illness     Primary cardiologist: Dr. Singh Larry:     -HR currently appears stable without rate-controlling agents. HR 70's-90's on bedside monitor. May need to start any rate slowing agents at this time.   No indication for permanent pacemaker.  -Continue Xarelto for anticoagulation.  -Continue IV Lasix for now, anticipate switch to PO agents this weekend. Since the patient reports little response to oral Lasix at home, I would consider changing her to scheduled Bumex.  -Continue pulmonary treatment per pulmonary team, appreciate assistance. History of Present Illness: This is a 60 y.o. female admitted for COPD (chronic obstructive pulmonary disease) ([facility] [J44.9]  Acute respiratory failure ([facility] [J96.00]. Patient complains of:  Shortness of breath    Luwana Gosselin is a 60 y.o. female with PMHx as described above, who presented to the hospital due to shortness of breath and chest tightness. Pt states that she had worsening shortness of breath associated with chest tightness and lower extremity swelling over the past week. She states that she had continued to take her Lasix twice daily without improvement. She states that her breathing is almost back to baseline. She is currently on O2 at [facility], which is her baseline. Cardiac risk factors: dyslipidemia, hypertension, chronic diastolic heart failure      Review of Symptoms:  Except as stated above include:  Constitutional:  negative  Respiratory:  + shortness of breath, chest tightness  Cardiovascular:  + orthopnea, leg swelling  Gastrointestinal: negative  Genitourinary:  negative  Musculoskeletal:  Negative  Neurological:  Negative  Dermatological:  Negative  Endocrinological: Negative  Psychological:  Negative       Past Medical History:     Past Medical History:   Diagnosis Date    Atrial fibrillation     CHADS score 1  (-CHF, +HTN, -AGE, -DM, -CVA)    Carpal tunnel syndrome     Chronic pain     Congenital heart disease     presumed pulmonary stenosis s/p repair 1969    Congestive heart failure (HCC)     Degenerative disc disease     Degenerative joint disease     GERD     H/O echocardiogram 04/2017    EF 60-65%, mild concentric LVH, dilated RV with RV dysfunction, RVSP 54 mmHg, RA E, moderate to severe pulmonic regurgitation.  Hypercholesterolemia     Hypertension     Hypothyroid     Morbid obesity     Morbid obesity with BMI of 45.0-49.9, adult ([facility] 2/20/2017    Noncompliance     Schizoaffective disorder     Substance abuse ([facility]     marijuana, crack cocaine         Social History:     Social History     Socioeconomic History    Marital status: SINGLE     Spouse name: Not on file    Number of children: Not on file    Years of education: Not on file    Highest education level: Not on file   Tobacco Use    Smoking status: Never Smoker    Smokeless tobacco: Never Used   Substance and Sexual Activity    Alcohol use: No    Drug use: No     Comment: Quit crack cocaine and marijuana 2015    Sexual activity: Yes     Birth control/protection: Surgical        Family History:     Family History   Problem Relation Age of Onset    Hypertension Mother     Coronary Artery Disease Mother     Coronary Artery Disease Father     Hypertension Father         Medications:      Allergies   Allergen Reactions    Gabapentin Other (comments)     Unsteady, weakness LEs    Tetanus Vaccines And Toxoid Swelling    Topamax [Topiramate] Other (comments)     weakness        Current Facility-Administered Medications   Medication Dose Route Frequency    albuterol-ipratropium (DUO-NEB) 2.5 MG-0.5 MG/3 ML  3 mL Nebulization Q6H RT    fluticasone propionate (FLONASE) 50 mcg/actuation nasal spray 2 Spray  2 Spray Both Nostrils DAILY    sodium chloride (OCEAN) 0.65 % nasal squeeze bottle 2 Spray  2 Spray Both Nostrils Q2H PRN    acetaZOLAMIDE (DIAMOX) tablet 250 mg  250 mg Oral BID    rivaroxaban (XARELTO) tablet 20 mg  20 mg Oral QPM    famotidine (PEPCID) tablet 20 mg  20 mg Oral BID    PARoxetine (PAXIL) tablet 20 mg  20 mg Oral DAILY    ARIPiprazole (ABILIFY) tablet 15 mg  15 mg Oral DAILY    simvastatin (ZOCOR) tablet 40 mg  40 mg Oral QHS    oxyCODONE IR (ROXICODONE) tablet 5 mg  5 mg Oral Q6H PRN    furosemide (LASIX) injection 40 mg  40 mg IntraVENous BID    ondansetron (ZOFRAN) injection 4 mg  4 mg IntraVENous Q6H PRN    levothyroxine (SYNTHROID) tablet 125 mcg  125 mcg Oral 6am    sodium chloride (NS) flush 5-40 mL  5-40 mL IntraVENous Q8H    sodium chloride (NS) flush 5-40 mL  5-40 mL IntraVENous PRN    glucose chewable tablet 16 g  4 Tab Oral PRN    glucagon (GLUCAGEN) injection 1 mg  1 mg IntraMUSCular PRN    dextrose (D50W) injection syrg 12.5-25 g  25-50 mL IntraVENous PRN    acetaminophen (TYLENOL) tablet 650 mg  650 mg Oral Q4H PRN         Physical Exam:     Visit Vitals  BP (!) 138/97   Pulse 74   Temp 98 °F (36.7 °C)   Resp 19   Ht 5' 2.99\" (1.6 m)   Wt 321 lb 9.6 oz (145.9 kg)   SpO2 100%   Breastfeeding? No   BMI 56.98 kg/m²     BP Readings from Last 3 Encounters:   08/16/19 (!) 138/97   07/14/19 143/90   06/06/19 132/86     Pulse Readings from Last 3 Encounters:   08/16/19 74   07/14/19 (!) 52   06/06/19 62     Wt Readings from Last 3 Encounters:   08/16/19 321 lb 9.6 oz (145.9 kg)   07/14/19 333 lb (151 kg)   06/06/19 295 lb (133.8 kg)       General:  alert, cooperative, no distress, appears stated age  Neck:  No JVD  Lungs:  clear to auscultation bilaterally to anterolateral lung fields  Heart:  Irregularly irregular rhythm  Abdomen:  abdomen is soft without significant tenderness, masses, organomegaly or guarding  Extremities:  Atraumatic, trace lower extremity edema  Skin: Warm and dry.    Neuro: alert, oriented x3, affect appropriate, no focal neurological deficits, moves all extremities well, no involuntary movements  Psych: non focal     Data Review:     Recent Labs     08/16/19  0240 08/15/19  0153 08/14/19  0348   WBC 5.5 4.0* 4.7   HGB 10.7* 10.2* 9.5*   HCT 37.2 35.9 32.9*    145 147     Recent Labs     08/16/19  0240 08/15/19  0153 08/14/19  0348    138 140   K 3.9 4.1 3.8    98* 101   CO2 36* 37* 36*   GLU 98 100* 110*   BUN 21* 20* 21*   CREA 1.22 1.06 0.96   CA 8.1* 8.4* 7.9*   MG  -- --  2.1   PHOS  --   --  3.1       Results for orders placed or performed during the hospital encounter of 08/12/19   EKG, 12 LEAD, INITIAL   Result Value Ref Range    Ventricular Rate 72 BPM    Atrial Rate 75 BPM    QRS Duration 118 ms    Q-T Interval 488 ms    QTC Calculation (Bezet) 534 ms    Calculated R Axis -36 degrees    Calculated T Axis 97 degrees    Diagnosis       Undetermined rhythm  Left axis deviation  Low voltage QRS  Cannot rule out Anterior infarct (cited on or before 18-MAY-2019)  Prolonged QT  Abnormal ECG  When compared with ECG of 14-JUL-2019 18:06,  Current undetermined rhythm precludes rhythm comparison, needs review  Nonspecific T wave abnormality now evident in Inferior leads  QT has lengthened  Confirmed by Frank Jones MD, --- ([id number]) on 8/12/2019 11:29:36 AM     Results for orders placed or performed in visit on 06/06/19   AMB POC EKG ROUTINE W/ 12 LEADS, INTER & REP    Narrative    Atrial fibrillation. Nonspecific QRS widening and anterior fascicular block. Nonspecific T-abnormaility.        Last Lipid:    Lab Results   Component Value Date/Time    Cholesterol, total 104 09/30/2018 04:17 AM    HDL Cholesterol 31 (L) 09/30/2018 04:17 AM    LDL, calculated 54 09/30/2018 04:17 AM    Triglyceride 95 09/30/2018 04:17 AM    CHOL/HDL Ratio 3.4 09/30/2018 04:17 AM       Signed By: Soila Bautista PA-C     August 16, 2019      Mavis Cruz MD

## 2019-08-16 NOTE — PROGRESS NOTES
Pt agreeable to go to snf but wants her daughter Naty Aguero to decide which facility. She stated they are in the process of moving to [facility]. Called pt's daughter Ronald Crocker and she stated she will call me back  1309:  Called Tatiana back and she stated they are moving to [facility] and Wants snf in  or [place]. She signed [facility] of choice form via telephone. Clinicals sent to multiple snf in  and [place] via [place]. 1355: [facility] notified of CPAP order.           Kerrie Runner, BSN RN  Care Management  Pager: [phone]

## 2019-08-16 NOTE — PROGRESS NOTES
conducted a Follow up consultation and Spiritual Assessment for Ursula Bales, who is a 60 y.o.,female. The  provided the following Interventions:  Continued the relationship of care and support. Listened empathically. Chart reviewed. The following outcomes were achieved:  Patient expressed gratitude for 's visit. Plan:  Chaplains will continue to follow and will provide pastoral care on an as needed/requested basis.  recommends bedside caregivers page  on duty if patient shows signs of acute spiritual or emotional distress.      [facility] Certified [address] [place] [address]   [phone]

## 2019-08-16 NOTE — PROGRESS NOTES
1010 - Pt receiving breathing tx at this time. Will follow up later in day. 1442 - Pt with Dr. Jalen Russell at this time will follow up per pt's schedule.

## 2019-08-16 NOTE — ROUTINE PROCESS
Bedside and Verbal shift change report given to [address] [place] [address] (oncoming nurse) by Jorge Parker RN   (offgoing nurse). Report included the following information SBAR, MAR and Cardiac Rhythm . Lizet Crooks

## 2019-08-17 LAB
ANION GAP SERPL CALC-SCNC: 6 MMOL/L (ref 3–18)
ARTERIAL PATENCY WRIST A: ABNORMAL
AVAPS, IAVAPS: YES
BASE EXCESS BLD CALC-SCNC: 7 MMOL/L
BASOPHILS # BLD: 0 K/UL (ref 0–0.1)
BASOPHILS NFR BLD: 0 % (ref 0–2)
BDY SITE: ABNORMAL
BODY TEMPERATURE: 98
BUN SERPL-MCNC: 19 MG/DL (ref 7–18)
BUN/CREAT SERPL: 16 (ref 12–20)
CALCIUM SERPL-MCNC: 8 MG/DL (ref 8.5–10.1)
CHLORIDE SERPL-SCNC: 101 MMOL/L (ref 100–111)
CO2 SERPL-SCNC: 32 MMOL/L (ref 21–32)
CREAT SERPL-MCNC: 1.18 MG/DL (ref 0.6–1.3)
DIFFERENTIAL METHOD BLD: ABNORMAL
EOSINOPHIL # BLD: 0.1 K/UL (ref 0–0.4)
EOSINOPHIL NFR BLD: 3 % (ref 0–5)
ERYTHROCYTE [DISTWIDTH] IN BLOOD BY AUTOMATED COUNT: 16.7 % (ref 11.6–14.5)
GAS FLOW.O2 O2 DELIVERY SYS: ABNORMAL L/MIN
GLUCOSE BLD STRIP.AUTO-MCNC: 92 MG/DL (ref 70–110)
GLUCOSE BLD STRIP.AUTO-MCNC: 93 MG/DL (ref 70–110)
GLUCOSE SERPL-MCNC: 117 MG/DL (ref 74–99)
HCO3 BLD-SCNC: 33.6 MMOL/L (ref 22–26)
HCT VFR BLD AUTO: 36.7 % (ref 35–45)
HGB BLD-MCNC: 10.6 G/DL (ref 12–16)
LYMPHOCYTES # BLD: 0.9 K/UL (ref 0.9–3.6)
LYMPHOCYTES NFR BLD: 20 % (ref 21–52)
MCH RBC QN AUTO: 25.4 PG (ref 24–34)
MCHC RBC AUTO-ENTMCNC: 28.9 G/DL (ref 31–37)
MCV RBC AUTO: 87.8 FL (ref 74–97)
MONOCYTES # BLD: 0.5 K/UL (ref 0.05–1.2)
MONOCYTES NFR BLD: 11 % (ref 3–10)
NEUTS SEG # BLD: 2.9 K/UL (ref 1.8–8)
NEUTS SEG NFR BLD: 66 % (ref 40–73)
O2/TOTAL GAS SETTING VFR VENT: 0.3 %
PCO2 BLD: 73.6 MMHG (ref 35–45)
PEEP RESPIRATORY: 12 CMH2O
PH BLD: 7.27 [PH] (ref 7.35–7.45)
PIP ISTAT,IPIP: 26
PLATELET # BLD AUTO: 170 K/UL (ref 135–420)
PMV BLD AUTO: 9.7 FL (ref 9.2–11.8)
PO2 BLD: 99 MMHG (ref 80–100)
POTASSIUM SERPL-SCNC: 3.5 MMOL/L (ref 3.5–5.5)
RBC # BLD AUTO: 4.18 M/UL (ref 4.2–5.3)
SAO2 % BLD: 96 % (ref 92–97)
SERVICE CMNT-IMP: ABNORMAL
SODIUM SERPL-SCNC: 139 MMOL/L (ref 136–145)
SPECIMEN TYPE: ABNORMAL
TOTAL RESP. RATE, ITRR: 14
WBC # BLD AUTO: 4.4 K/UL (ref 4.6–13.2)

## 2019-08-17 PROCEDURE — 94640 AIRWAY INHALATION TREATMENT: CPT

## 2019-08-17 PROCEDURE — 77030038269 HC DRN EXT URIN PURWCK BARD -A

## 2019-08-17 PROCEDURE — 65660000000 HC RM CCU STEPDOWN

## 2019-08-17 PROCEDURE — 74011250637 HC RX REV CODE- 250/637: Performed by: PHYSICIAN ASSISTANT

## 2019-08-17 PROCEDURE — 74011250636 HC RX REV CODE- 250/636: Performed by: PHYSICIAN ASSISTANT

## 2019-08-17 PROCEDURE — 80048 BASIC METABOLIC PNL TOTAL CA: CPT

## 2019-08-17 PROCEDURE — 74011250636 HC RX REV CODE- 250/636: Performed by: INTERNAL MEDICINE

## 2019-08-17 PROCEDURE — 94660 CPAP INITIATION&MGMT: CPT

## 2019-08-17 PROCEDURE — 74011000250 HC RX REV CODE- 250: Performed by: PHYSICIAN ASSISTANT

## 2019-08-17 PROCEDURE — 94762 N-INVAS EAR/PLS OXIMTRY CONT: CPT

## 2019-08-17 PROCEDURE — 77010033678 HC OXYGEN DAILY

## 2019-08-17 PROCEDURE — 36600 WITHDRAWAL OF ARTERIAL BLOOD: CPT

## 2019-08-17 PROCEDURE — 74011250637 HC RX REV CODE- 250/637: Performed by: FAMILY MEDICINE

## 2019-08-17 PROCEDURE — 36415 COLL VENOUS BLD VENIPUNCTURE: CPT

## 2019-08-17 PROCEDURE — 85025 COMPLETE CBC W/AUTO DIFF WBC: CPT

## 2019-08-17 PROCEDURE — 82962 GLUCOSE BLOOD TEST: CPT

## 2019-08-17 PROCEDURE — 82803 BLOOD GASES ANY COMBINATION: CPT

## 2019-08-17 PROCEDURE — 74011250637 HC RX REV CODE- 250/637: Performed by: INTERNAL MEDICINE

## 2019-08-17 RX ORDER — BUMETANIDE 1 MG/1
2 TABLET ORAL DAILY
Status: DISCONTINUED | OUTPATIENT
Start: 2019-08-18 | End: 2019-08-21 | Stop reason: HOSPADM

## 2019-08-17 RX ADMIN — SIMVASTATIN 40 MG: 40 TABLET, FILM COATED ORAL at 22:00

## 2019-08-17 RX ADMIN — ACETAZOLAMIDE 250 MG: 250 TABLET ORAL at 08:43

## 2019-08-17 RX ADMIN — Medication 10 ML: at 22:00

## 2019-08-17 RX ADMIN — LEVOTHYROXINE SODIUM 125 MCG: 25 TABLET ORAL at 06:08

## 2019-08-17 RX ADMIN — IPRATROPIUM BROMIDE AND ALBUTEROL SULFATE 3 ML: .5; 3 SOLUTION RESPIRATORY (INHALATION) at 02:19

## 2019-08-17 RX ADMIN — Medication 10 ML: at 14:00

## 2019-08-17 RX ADMIN — FLUTICASONE PROPIONATE 2 SPRAY: 50 SPRAY, METERED NASAL at 08:43

## 2019-08-17 RX ADMIN — OXYCODONE HYDROCHLORIDE 5 MG: 5 TABLET ORAL at 23:07

## 2019-08-17 RX ADMIN — ARIPIPRAZOLE 15 MG: 15 TABLET ORAL at 08:43

## 2019-08-17 RX ADMIN — FUROSEMIDE 40 MG: 10 INJECTION, SOLUTION INTRAMUSCULAR; INTRAVENOUS at 17:01

## 2019-08-17 RX ADMIN — PAROXETINE 20 MG: 20 TABLET, FILM COATED ORAL at 08:43

## 2019-08-17 RX ADMIN — IPRATROPIUM BROMIDE AND ALBUTEROL SULFATE 3 ML: .5; 3 SOLUTION RESPIRATORY (INHALATION) at 16:12

## 2019-08-17 RX ADMIN — Medication 10 ML: at 06:00

## 2019-08-17 RX ADMIN — OXYCODONE HYDROCHLORIDE 5 MG: 5 TABLET ORAL at 01:58

## 2019-08-17 RX ADMIN — IPRATROPIUM BROMIDE AND ALBUTEROL SULFATE 3 ML: .5; 3 SOLUTION RESPIRATORY (INHALATION) at 20:07

## 2019-08-17 RX ADMIN — FAMOTIDINE 20 MG: 20 TABLET ORAL at 08:42

## 2019-08-17 RX ADMIN — FAMOTIDINE 20 MG: 20 TABLET ORAL at 17:01

## 2019-08-17 RX ADMIN — FUROSEMIDE 40 MG: 10 INJECTION, SOLUTION INTRAMUSCULAR; INTRAVENOUS at 08:43

## 2019-08-17 RX ADMIN — RIVAROXABAN 20 MG: 20 TABLET, FILM COATED ORAL at 17:01

## 2019-08-17 RX ADMIN — ACETAZOLAMIDE 250 MG: 250 TABLET ORAL at 17:01

## 2019-08-17 NOTE — PROGRESS NOTES
Pulmonary Update    Patient's Daughter brought in patient's device. Patient's daughter states she had no idea how serious her mother's status was and how critical it is that she use her PAP device. The patient sleeps upright in a hospital bed    She has a ResMed V-Auto- This device has a max IPAP of 25 and no back up rate    She has not used the device in over 6 months but I suspect longer    I interrogated device which is set to the following settings    IPAP: 12  EPAP: 5  PS 4    I am not sure when she had these initial settings placed but based on most recent sleep study, the patient needs more support    I have changed her settings to the following    IPAP 25  EPAP: 12  PS: 6- this too may not be adequate- I may have to increase EPAP more- PAP tolerance is also an issue    I have made it clear to the patient and her daughter that the patient has limited options    - use PAP device  - Tracheostomy   -  Risk of death due to refractory hypercapnic respiratory failure. Continue to keep patient SDU in ICU and monitor respiratory and mental status.     Vitor Guevara DO, [facility]  Pulmonary, Critical Care, and Sleep Medicine      Clinical care, chart review and time spent coordinating care: 35 min

## 2019-08-17 NOTE — ROUTINE PROCESS
Bedside and Verbal shift change report given to Tim Rico RN (oncoming nurse) by Blade Armstrong RN (offgoing nurse). Report included the following information SBAR, Intake/Output, MAR, Recent Results, Cardiac Rhythm A-Fib and Quality Measures.

## 2019-08-17 NOTE — PROGRESS NOTES
AM ABG done. Pt had only been on NIV avaps mode for 2/1/2 during night when this was drawn. 7.26/75/101 7/36. ,,,RR increased to 18 Fio2 decreased to 28%.

## 2019-08-17 NOTE — ROUTINE PROCESS
Bedside and Verbal shift change report given to [facility] (oncoming nurse) by Shekhar Chávez (offgoing nurse). Report included the following information SBAR, ED Summary, Intake/Output, MAR, Recent Results, Cardiac Rhythm (A-fib) and Alarm Parameters .

## 2019-08-17 NOTE — PROGRESS NOTES
[facility] Hospitalist Group  Progress Note    Patient: Idalia Dunn Age: 60 y.o. : 1960 MR#: [id number] SSN: [id number]  Date/Time: 2019    Subjective:       Pt has no complaints. Seen with friend at bedside. Assessment/Plan:   60 y o female w/ h/o diastolic heart failure, chronic respiratory failure on home 02 admitted on 19 after presenting to the ED w/ cc of SOB, initially admitted to the ICU due to need for BiPAP and increased somnolence:    -Acute on chronic respiratory failure, w/ cont'd respiratory acidosis and hypercarbia, although subjectively appears to be doing better. Management per pccm. Pt has ?cpap/Bipap at home that per pt and daughter is packed up in a box, and daughter is in the process of locating item. -Acute on chronic diastolic HF, on IV lasix, appears to be compensated, will switch to oral bumex. -Bradycardia while on bb, now rate controlled for the most part w/o rate control meds, will cont to monitor. History of:    -Hypothyroidism on synthroid  -Dyslipidemia  -Persistent afib on xarelto, bb held due to bradycardia, now rate controlled for the most part. -Morbid obesity  -Chronic pain, sees pain management in outpt setting, currently on prn narc    PLAN:  -resp management per pulm  -cardiology consult  -cont PT/OT  -d/c iv lasix, start oral bumex    Disp: per CM, pt declining SNF placement. Will look into home placement once her cpap/bipap issues are addressed. Additional Notes:      Case discussed with:  [x]Patient  []Family  []Nursing  []Case Management  DVT Prophylaxis:  []Lovenox  []Hep SQ  []SCDs  []Coumadin   []On Heparin gtt on doac    Objective:   VS:   Visit Vitals  /66   Pulse (!) 104   Temp 98.6 °F (37 °C)   Resp 19   Ht 5' 2.99\" (1.6 m)   Wt 145.9 kg (321 lb 9.6 oz)   SpO2 93%   Breastfeeding?  No   BMI 56.98 kg/m²      Tmax/24hrs: Temp (24hrs), Av.2 °F (36.8 °C), Min:97.6 °F (36.4 °C), Max:98.6 °F (37 °C)    Input/Output:     Intake/Output Summary (Last 24 hours) at 8/17/2019 1426  Last data filed at 8/17/2019 1044  Gross per 24 hour   Intake 480 ml   Output 5300 ml   Net -4820 ml       General: alert, awake in nad  Cardiovascular:  No murmurs  Pulmonary:  ctab  GI:  Soft, nt, nd  Extremities:  No edema  Additional:      Labs:    Recent Results (from the past 24 hour(s))   GLUCOSE, POC    Collection Time: 08/16/19  4:21 PM   Result Value Ref Range    Glucose (POC) 111 (H) 70 - 110 mg/dL   GLUCOSE, POC    Collection Time: 08/16/19  9:27 PM   Result Value Ref Range    Glucose (POC) 106 70 - 647 mg/dL   METABOLIC PANEL, BASIC    Collection Time: 08/17/19  4:26 AM   Result Value Ref Range    Sodium 139 136 - 145 mmol/L    Potassium 3.5 3.5 - 5.5 mmol/L    Chloride 101 100 - 111 mmol/L    CO2 32 21 - 32 mmol/L    Anion gap 6 3.0 - 18 mmol/L    Glucose 117 (H) 74 - 99 mg/dL    BUN 19 (H) 7.0 - 18 MG/DL    Creatinine 1.18 0.6 - 1.3 MG/DL    BUN/Creatinine ratio 16 12 - 20      GFR est AA 57 (L) >60 ml/min/1.73m2    GFR est non-AA 47 (L) >60 ml/min/1.73m2    Calcium 8.0 (L) 8.5 - 10.1 MG/DL   CBC WITH AUTOMATED DIFF    Collection Time: 08/17/19  4:26 AM   Result Value Ref Range    WBC 4.4 (L) 4.6 - 13.2 K/uL    RBC 4.18 (L) 4.20 - 5.30 M/uL    HGB 10.6 (L) 12.0 - 16.0 g/dL    HCT 36.7 35.0 - 45.0 %    MCV 87.8 74.0 - 97.0 FL    MCH 25.4 24.0 - 34.0 PG    MCHC 28.9 (L) 31.0 - 37.0 g/dL    RDW 16.7 (H) 11.6 - 14.5 %    PLATELET 255 974 - 344 K/uL    MPV 9.7 9.2 - 11.8 FL    NEUTROPHILS 66 40 - 73 %    LYMPHOCYTES 20 (L) 21 - 52 %    MONOCYTES 11 (H) 3 - 10 %    EOSINOPHILS 3 0 - 5 %    BASOPHILS 0 0 - 2 %    ABS. NEUTROPHILS 2.9 1.8 - 8.0 K/UL    ABS. LYMPHOCYTES 0.9 0.9 - 3.6 K/UL    ABS. MONOCYTES 0.5 0.05 - 1.2 K/UL    ABS. EOSINOPHILS 0.1 0.0 - 0.4 K/UL    ABS.  BASOPHILS 0.0 0.0 - 0.1 K/UL    DF AUTOMATED     POC G3    Collection Time: 08/17/19  4:40 AM   Result Value Ref Range    Device: BIPAP      FIO2 (POC) 0.30 % pH (POC) 7.266 (L) 7.35 - 7.45      pCO2 (POC) 73.6 (H) 35.0 - 45.0 MMHG    pO2 (POC) 99 80 - 100 MMHG    HCO3 (POC) 33.6 (H) 22 - 26 MMOL/L    sO2 (POC) 96 92 - 97 %    Base excess (POC) 7 mmol/L    PEEP/CPAP (POC) 12 cmH2O    PIP (POC) 26      Allens test (POC) N/A      Total resp. rate 14      Site RIGHT RADIAL      Patient temp.  98.0      Specimen type (POC) ARTERIAL      Performed by SERAFIN Godfrey YES     GLUCOSE, POC    Collection Time: 08/17/19 11:57 AM   Result Value Ref Range    Glucose (POC) 92 70 - 110 mg/dL     Additional Data Reviewed:      Signed By: Dajuan Rossi MD     August 17, 2019 4:10 PM

## 2019-08-17 NOTE — PROGRESS NOTES
[facility]  Pulmonary, Critical Care, and Sleep Medicine    Name: Charlotte Guido MRN: [id number]   : 1960 Hospital: [address] [place] [address] Dr   Date: 2019        Subjective/Interval History:     HPI:  Patient is a 56 y.o. female with PMH significant for COPD ( on 3 LPM NC at home), HFpEF, ANDRADE (CPAP at home but non-compliant), Afib (on Xarelto at home), HTN and morbid obesity who presented to [facility] ED on 19 c/o sudden onset with acute worsening of SOB with associated CP and sleepiness. PT admitted for Acute on chronic hypoxic hypercapnic respiratory failure, 2/2 severe ANDRADE/OHS with component of CHF exacerbation as well. S/p brief stay in ICU with continuous BiPAP and diuresis. Downgraded to SDU on  and doing well. Course complicated with bradycardia with EKG showing Afib with slow ventricular response. BB stopped (08/15) with HR improvement. 19:  ·  ABG worse this morning but patient did not use BIPAP much  · Patient eating lunch at time of my exam - awake but a little sleepy  · Family has not located her OP PAP device. Patient had not been using  · I called and spoke with her DME-[employer]- they recently stopped accepting her current insurance- [employer]. 291  · Patient will need some sort of PAP device for discharge- she has very high pressure requirements if not sleeping upright in hospital bed  · Patient really needs NIV but not sure we can qualify her and she also needs to use device  · No chest pain. No nausea or emesis. Tolerating PO  · Reports that she has right hip pain today which is a chronic issues  · At home she states that she will get up in the morning from bed- walk with the aid of a walker to the front room and sit there for most of the day. Family just moved into a new home last week. ROS:Pertinent items are noted in HPI.     Objective:   Vital Signs:    Visit Vitals  /66   Pulse (!) 104   Temp 98.6 °F (37 °C)   Resp 19   Ht 5' 2.99\" (1.6 m)   Wt 145.9 kg (321 lb 9.6 oz)   SpO2 93%   Breastfeeding? No   BMI 56.98 kg/m²       O2 Device: Nasal cannula, Humidifier   O2 Flow Rate (L/min): 3 l/min   Temp (24hrs), Av.2 °F (36.8 °C), Min:97.6 °F (36.4 °C), Max:98.6 °F (37 °C)       Intake/Output:   Last shift:       07 -  190  In: 240 [P.O.:240]  Out: 2500 [Urine:2500]  Last 3 shifts: 08/15 1901 -  0700  In: 1080 [P.O.:1080]  Out: 3177 [Urine:5600]    Intake/Output Summary (Last 24 hours) at 2019 1213  Last data filed at 2019 1044  Gross per 24 hour   Intake 800 ml   Output 5300 ml   Net -4500 ml        Physical Exam:   General:  AOx3, cooperative, NAD; appears stated age. HEENT:  Normocephalic,atraumatic; PERRL; EOMI; Conjunctivae/corneas clear; Anicteric; nares/mucosa normal, moist; No drainage/sinus tenderness; Neck supple, symmetrical; trachea midline; No adenopathy; Thyroid - no enlargement/tenderness/nodules noted; No carotid bruit; No JVD   Back:   Symmetric, no curvature, no spine tenderness or flank pain   Lungs:   Symmetrical chest rise; mod AE Bilat; distant breath sounds 2/2 body habitus; diminished bibasilar; CTAB; No wheezes/rhonchi/rales noted. Chest wall:  No tenderness or deformity. NO CREPITUS   Heart:  RRR, S1, S2 normal, no m/r/g   Abdomen:   Soft, obese; non-tender.(+) B. S x4; No masses/organomegaly/paradox   Extremities: Normal, atraumatic, no cyanosis or edema. Pulses: 1-2+ and symmetric all extremities.    Skin: Skin color, texture, turgor normal. No rashes or lesions   Lymph nodes: Cervical, supraclavicular, and axillary nodes normal.   Neurologic: Non focal         DATA:  Labs:  Recent Labs     19  0426 19  0240 08/15/19  0153   WBC 4.4* 5.5 4.0*   HGB 10.6* 10.7* 10.2*   HCT 36.7 37.2 35.9    159 145     Recent Labs     19  0426 19  0240 08/15/19  0153    140 138   K 3.5 3.9 4.1    100 98*   CO2 32 36* 37*   * 98 100*   BUN 19* 21* 20*   CREA 1. 18 1.22 1.06   CA 8.0* 8.1* 8.4*       ECHO [05/21/19]: · Left Ventricle: Mild concentric hypertrophy. Estimated left ventricular ejection fraction is 51 - 55%. Visually measured ejection fraction. No regional wall motion abnormality noted. Inconclusive left ventricular diastolic function. · Right Ventricle: Dilated right ventricle. Reduced systolic function. Abnormal right ventricular septal motion. Systolic flattening of interventricular septum consistent with right ventricle pressure overload. · Right Atrium: Dilated right atrium. · Tricuspid Valve: Mild to moderate tricuspid valve regurgitation is present. · Pulmonary Artery: Moderate to severe pulmonary hypertension at 61 mmHg. · IVC/Hepatic Veins: Moderately elevated central venous pressure (10-15 mmHg); IVC diameter is larger than 21 mm and collapses more than 50% with respiration. Imaging:  [x]I have personally reviewed the patients radiographs  []Radiographs reviewed with radiologist   []No change from prior, tubes and lines in adequate position  []Improved   []Worsening      CXR [08/14/19]: FINDINGS:  Stable moderate enlarged cardiac silhouette. Stable enlargement of the ravindra due to known enlarged pulmonary arteries. No pneumothorax. No significant pleural effusion. Interval improvement in basilar lung aeration, vascular congestion and pulmonary edema. No consolidation. Intact osseous structures.     IMPRESSION:  Stable cardiomegaly. Improved lung aeration and pulmonary edema      IMPRESSION:   · Hypersomnia- due to persistent hypercapnea  · Acute on Chronic Hypercapnic Respiratory Failure - 2/2 severe ANDRADE/OHA and CHF exacerbation - Improving  · HFprEF with current CHF Exacerbation- acute on chronic diastolic heart failure; improving with diuresis  · Severe ANDRADE/OHS - States she currently does not use her PAP device at home - was lost while moving; states family is currently unpacking boxes to find device.    · Pulmonary HTN  · Afib, on Xarelto - Restarted 08/14  · Hearing deficient: wearing hearing aid  · Morbid Obesity : Body mass index is 56.98 kg/m². RECOMMENDATIONS:   · SpO2 goal btwn 88-93%; titrate supp O2 PRN; currently resting on 3LPM  · PRN and qHS BIPAP/NIV - encouraged and educated on importance of compliance   · Encourage ICS; Bronchial hygiene protocol  · Aspiration precautions - HOB >30  · Duo-nebs q6 scheduled; con't Flonase  · HD stable, Hr improved with D/C of BB, Primary to manage. · Diuresis per primary; trend BNP in setting of diuresis for CHF  · Con't use of Diamox as respiratory stimulant  · Out patient testing- PFT, 6 min walk, Polysomnogram  · Assess home Oxygen needs at discharge  · Case Management- need to try and get patient's family to bring in PAP device. If patient does not start using PAP as outpatient, anticipate recurrent admissions, potentially life- threatening exacerbations and potential future need for tracheostomy. · Need family meeting to discussed PAP compliance and severity of respiratory status  · OT, PT, OOB and ambulate  · Healthy weight  · Will Follow  · DVT, PUD prophylaxis per Primary Team      High complexity decision making was performed during the evaluation of this patient at high risk for decompens ation with multiple organ involvement      Above mentioned total time spent on reviewing the case/medical record/data/notes/EMR/patient examination/documentation/coordinating care with nurse/consultants, exclusive of procedures with complex decision making performed and > 50% time spent in face to face evaluation.       Irlanda Molbey DO, [facility]  Pulmonary, Critical Care, and Sleep Medicine    Clinical care, chart review and time spent coordinating care: 35 min

## 2019-08-17 NOTE — ROUTINE PROCESS
Bedside and Verbal shift change report given to [address] [place] [address] Avenue (oncoming nurse) by Jalil Farooq (offgoing nurse). Report included the following information SBAR, Kardex and MAR.

## 2019-08-18 ENCOUNTER — APPOINTMENT (OUTPATIENT)
Dept: GENERAL RADIOLOGY | Age: 59
DRG: 194 | End: 2019-08-18
Attending: INTERNAL MEDICINE
Payer: MEDICAID

## 2019-08-18 LAB
ANION GAP SERPL CALC-SCNC: 6 MMOL/L (ref 3–18)
ARTERIAL PATENCY WRIST A: YES
BASE EXCESS BLD CALC-SCNC: 5 MMOL/L
BASOPHILS # BLD: 0 K/UL (ref 0–0.1)
BASOPHILS NFR BLD: 0 % (ref 0–2)
BDY SITE: ABNORMAL
BNP SERPL-MCNC: 706 PG/ML (ref 0–900)
BUN SERPL-MCNC: 20 MG/DL (ref 7–18)
BUN/CREAT SERPL: 20 (ref 12–20)
CALCIUM SERPL-MCNC: 9 MG/DL (ref 8.5–10.1)
CHLORIDE SERPL-SCNC: 103 MMOL/L (ref 100–111)
CO2 SERPL-SCNC: 29 MMOL/L (ref 21–32)
CREAT SERPL-MCNC: 0.99 MG/DL (ref 0.6–1.3)
DIFFERENTIAL METHOD BLD: ABNORMAL
EOSINOPHIL # BLD: 0.2 K/UL (ref 0–0.4)
EOSINOPHIL NFR BLD: 3 % (ref 0–5)
ERYTHROCYTE [DISTWIDTH] IN BLOOD BY AUTOMATED COUNT: 16.5 % (ref 11.6–14.5)
GAS FLOW.O2 O2 DELIVERY SYS: ABNORMAL L/MIN
GAS FLOW.O2 SETTING OXYMISER: 3 L/M
GLUCOSE SERPL-MCNC: 94 MG/DL (ref 74–99)
HCO3 BLD-SCNC: 31.9 MMOL/L (ref 22–26)
HCT VFR BLD AUTO: 42.7 % (ref 35–45)
HGB BLD-MCNC: 12.3 G/DL (ref 12–16)
LYMPHOCYTES # BLD: 1.6 K/UL (ref 0.9–3.6)
LYMPHOCYTES NFR BLD: 26 % (ref 21–52)
MCH RBC QN AUTO: 25.6 PG (ref 24–34)
MCHC RBC AUTO-ENTMCNC: 28.8 G/DL (ref 31–37)
MCV RBC AUTO: 89 FL (ref 74–97)
MONOCYTES # BLD: 0.7 K/UL (ref 0.05–1.2)
MONOCYTES NFR BLD: 11 % (ref 3–10)
NEUTS SEG # BLD: 3.7 K/UL (ref 1.8–8)
NEUTS SEG NFR BLD: 60 % (ref 40–73)
O2/TOTAL GAS SETTING VFR VENT: 32 %
PCO2 BLD: 67.7 MMHG (ref 35–45)
PH BLD: 7.28 [PH] (ref 7.35–7.45)
PLATELET # BLD AUTO: 173 K/UL (ref 135–420)
PMV BLD AUTO: 9.6 FL (ref 9.2–11.8)
PO2 BLD: 129 MMHG (ref 80–100)
POTASSIUM SERPL-SCNC: 4.3 MMOL/L (ref 3.5–5.5)
RBC # BLD AUTO: 4.8 M/UL (ref 4.2–5.3)
SAO2 % BLD: 98 % (ref 92–97)
SERVICE CMNT-IMP: ABNORMAL
SODIUM SERPL-SCNC: 138 MMOL/L (ref 136–145)
SPECIMEN TYPE: ABNORMAL
TOTAL RESP. RATE, ITRR: 18
WBC # BLD AUTO: 6.2 K/UL (ref 4.6–13.2)

## 2019-08-18 PROCEDURE — 94640 AIRWAY INHALATION TREATMENT: CPT

## 2019-08-18 PROCEDURE — 74011250637 HC RX REV CODE- 250/637: Performed by: INTERNAL MEDICINE

## 2019-08-18 PROCEDURE — 74011000250 HC RX REV CODE- 250: Performed by: PHYSICIAN ASSISTANT

## 2019-08-18 PROCEDURE — 77030038269 HC DRN EXT URIN PURWCK BARD -A

## 2019-08-18 PROCEDURE — 74011250637 HC RX REV CODE- 250/637: Performed by: PHYSICIAN ASSISTANT

## 2019-08-18 PROCEDURE — 83880 ASSAY OF NATRIURETIC PEPTIDE: CPT

## 2019-08-18 PROCEDURE — 65660000000 HC RM CCU STEPDOWN

## 2019-08-18 PROCEDURE — 77030037870 HC GLD SHT PREVALON SAGE -B

## 2019-08-18 PROCEDURE — 77010033678 HC OXYGEN DAILY

## 2019-08-18 PROCEDURE — 36600 WITHDRAWAL OF ARTERIAL BLOOD: CPT

## 2019-08-18 PROCEDURE — 82803 BLOOD GASES ANY COMBINATION: CPT

## 2019-08-18 PROCEDURE — 85025 COMPLETE CBC W/AUTO DIFF WBC: CPT

## 2019-08-18 PROCEDURE — 71045 X-RAY EXAM CHEST 1 VIEW: CPT

## 2019-08-18 PROCEDURE — 74011250637 HC RX REV CODE- 250/637: Performed by: FAMILY MEDICINE

## 2019-08-18 PROCEDURE — 36415 COLL VENOUS BLD VENIPUNCTURE: CPT

## 2019-08-18 PROCEDURE — 94762 N-INVAS EAR/PLS OXIMTRY CONT: CPT

## 2019-08-18 PROCEDURE — 74011250637 HC RX REV CODE- 250/637

## 2019-08-18 PROCEDURE — 80048 BASIC METABOLIC PNL TOTAL CA: CPT

## 2019-08-18 RX ADMIN — Medication 10 ML: at 14:00

## 2019-08-18 RX ADMIN — FAMOTIDINE 20 MG: 20 TABLET ORAL at 09:15

## 2019-08-18 RX ADMIN — FAMOTIDINE 20 MG: 20 TABLET ORAL at 17:18

## 2019-08-18 RX ADMIN — ACETAZOLAMIDE 250 MG: 250 TABLET ORAL at 09:15

## 2019-08-18 RX ADMIN — Medication 10 ML: at 06:41

## 2019-08-18 RX ADMIN — OXYCODONE HYDROCHLORIDE 5 MG: 5 TABLET ORAL at 21:48

## 2019-08-18 RX ADMIN — Medication 10 ML: at 22:23

## 2019-08-18 RX ADMIN — PAROXETINE 20 MG: 20 TABLET, FILM COATED ORAL at 09:15

## 2019-08-18 RX ADMIN — SIMVASTATIN 40 MG: 40 TABLET, FILM COATED ORAL at 21:46

## 2019-08-18 RX ADMIN — BUMETANIDE 2 MG: 1 TABLET ORAL at 09:15

## 2019-08-18 RX ADMIN — IPRATROPIUM BROMIDE AND ALBUTEROL SULFATE 3 ML: .5; 3 SOLUTION RESPIRATORY (INHALATION) at 09:06

## 2019-08-18 RX ADMIN — IPRATROPIUM BROMIDE AND ALBUTEROL SULFATE 3 ML: .5; 3 SOLUTION RESPIRATORY (INHALATION) at 19:42

## 2019-08-18 RX ADMIN — ACETAZOLAMIDE 250 MG: 250 TABLET ORAL at 17:18

## 2019-08-18 RX ADMIN — Medication 2 SPRAY: at 09:15

## 2019-08-18 RX ADMIN — RIVAROXABAN 20 MG: 20 TABLET, FILM COATED ORAL at 17:18

## 2019-08-18 RX ADMIN — LEVOTHYROXINE SODIUM 125 MCG: 25 TABLET ORAL at 06:40

## 2019-08-18 RX ADMIN — IPRATROPIUM BROMIDE AND ALBUTEROL SULFATE 3 ML: .5; 3 SOLUTION RESPIRATORY (INHALATION) at 02:33

## 2019-08-18 RX ADMIN — ARIPIPRAZOLE 15 MG: 15 TABLET ORAL at 09:15

## 2019-08-18 NOTE — ROUTINE PROCESS
Bedside and Verbal shift change report given to Christie Elaine RN (oncoming nurse) by Cain Shelton RN (offgoing nurse). Report included the following information SBAR, Kardex, Intake/Output, MAR, Recent Results, Cardiac Rhythm is A-Fib, RVR on telemetry and Quality Measures.

## 2019-08-18 NOTE — ROUTINE PROCESS
Bedside and Verbal shift change report given to renata valentine rn (oncoming nurse) by Sam Su RN (offgoing nurse). Report included the following information OR Summary, Intake/Output, MAR, Recent Results and Cardiac Rhythm afib.

## 2019-08-18 NOTE — PROGRESS NOTES
Pt c/o rt hip pain when asked if any pain present but pt was asleep & was awakened for assessment then drifted back to sleep. Pt refused bipap for nap . Med given when pt fully awake & was suggested to use bipap for sleep especially with pain med on board.  Pt did so & slept well with med & bipap

## 2019-08-18 NOTE — PROGRESS NOTES
[facility] Hospitalist Group  Progress Note    Patient: Devon Rush Age: 60 y.o. : 1960 MR#: [id number] SSN: [id number]  Date/Time: 2019    Subjective:       Pt easily woke up from sleep. Has no complaints. Assessment/Plan:   60 y o female w/ h/o diastolic heart failure, chronic respiratory failure on home 02 admitted on 19 after presenting to the ED w/ cc of SOB, initially admitted to the ICU due to need for BiPAP and increased somnolence now has stepdown status:    -Acute on chronic respiratory failure, w/ cont'd respiratory acidosis and hypercarbia, although subjectively appears to be doing better. Management per pccm. Pt has Bipap device brought from home by daughter yesterday, pulmonary consultant has changed settings of the device and is in process of determining optimal settings, recommends that pt stay in stepdown status during this process.    -Acute on chronic diastolic HF appears to be compensated at this time, switched to oral bumex .    -Bradycardia while on bb, now w/ intermittent episodes of tachycardia t w/o rate control meds, cardiology consulted, will cont to monitor. History of:    -Hypothyroidism on synthroid  -Dyslipidemia  -Persistent afib on xarelto, bb held due to bradycardia, now w/ intermittent episodes of tachy off of charline agents, cardiology consulted  -Morbid obesity  -Chronic pain, sees pain management in outpt setting, currently on prn narc    PLAN:  -resp management per pulm  -cont PT/OT  -contt oral bumex    Disp: per CM, pt declining SNF placement. Will look into home placement once her cpap/bipap issues are addressed.        Additional Notes:      Case discussed with:  [x]Patient  []Family  []Nursing  []Case Management  DVT Prophylaxis:  []Lovenox  []Hep SQ  []SCDs  []Coumadin   []On Heparin gtt on doac    Objective:   VS:   Visit Vitals  BP (!) 98/36   Pulse (!) 112   Temp 98.8 °F (37.1 °C)   Resp 19   Ht 5' 2.99\" (1.6 m) Wt 138 kg (304 lb 3.8 oz)   SpO2 96%   Breastfeeding? No   BMI 53.91 kg/m²      Tmax/24hrs: Temp (24hrs), Av.6 °F (37 °C), Min:98.1 °F (36.7 °C), Max:98.8 °F (37.1 °C)    Input/Output:     Intake/Output Summary (Last 24 hours) at 2019 1334  Last data filed at 2019 0700  Gross per 24 hour   Intake 1240 ml   Output 3300 ml   Net -2060 ml       General: alert, awake in nad  Cardiovascular:  No murmurs  Pulmonary:  ctab  GI:  Soft, nt, nd  Extremities:  No edema  Additional:      Labs:    Recent Results (from the past 24 hour(s))   GLUCOSE, POC    Collection Time: 19  4:43 PM   Result Value Ref Range    Glucose (POC) 93 70 - 497 mg/dL   METABOLIC PANEL, BASIC    Collection Time: 19  2:54 AM   Result Value Ref Range    Sodium 138 136 - 145 mmol/L    Potassium 4.3 3.5 - 5.5 mmol/L    Chloride 103 100 - 111 mmol/L    CO2 29 21 - 32 mmol/L    Anion gap 6 3.0 - 18 mmol/L    Glucose 94 74 - 99 mg/dL    BUN 20 (H) 7.0 - 18 MG/DL    Creatinine 0.99 0.6 - 1.3 MG/DL    BUN/Creatinine ratio 20 12 - 20      GFR est AA >60 >60 ml/min/1.73m2    GFR est non-AA 57 (L) >60 ml/min/1.73m2    Calcium 9.0 8.5 - 10.1 MG/DL   CBC WITH AUTOMATED DIFF    Collection Time: 19  2:54 AM   Result Value Ref Range    WBC 6.2 4.6 - 13.2 K/uL    RBC 4.80 4.20 - 5.30 M/uL    HGB 12.3 12.0 - 16.0 g/dL    HCT 42.7 35.0 - 45.0 %    MCV 89.0 74.0 - 97.0 FL    MCH 25.6 24.0 - 34.0 PG    MCHC 28.8 (L) 31.0 - 37.0 g/dL    RDW 16.5 (H) 11.6 - 14.5 %    PLATELET 609 334 - 884 K/uL    MPV 9.6 9.2 - 11.8 FL    NEUTROPHILS 60 40 - 73 %    LYMPHOCYTES 26 21 - 52 %    MONOCYTES 11 (H) 3 - 10 %    EOSINOPHILS 3 0 - 5 %    BASOPHILS 0 0 - 2 %    ABS. NEUTROPHILS 3.7 1.8 - 8.0 K/UL    ABS. LYMPHOCYTES 1.6 0.9 - 3.6 K/UL    ABS. MONOCYTES 0.7 0.05 - 1.2 K/UL    ABS. EOSINOPHILS 0.2 0.0 - 0.4 K/UL    ABS.  BASOPHILS 0.0 0.0 - 0.1 K/UL    DF AUTOMATED     POC G3    Collection Time: 19  4:34 AM   Result Value Ref Range    Device: NASAL CANNULA      Flow rate (POC) 3 L/M    FIO2 (POC) 32 %    pH (POC) 7.281 (L) 7.35 - 7.45      pCO2 (POC) 67.7 (H) 35.0 - 45.0 MMHG    pO2 (POC) 129 (H) 80 - 100 MMHG    HCO3 (POC) 31.9 (H) 22 - 26 MMOL/L    sO2 (POC) 98 (H) 92 - 97 %    Base excess (POC) 5 mmol/L    Allens test (POC) YES      Total resp.  rate 18      Site RIGHT RADIAL      Specimen type (POC) ARTERIAL      Performed by Angie Mayers      Additional Data Reviewed:      Signed By: Abelino Rooney MD     August 18, 2019 4:10 PM

## 2019-08-18 NOTE — PROGRESS NOTES
[facility]  Pulmonary, Critical Care, and Sleep Medicine    Name: Arleth Shelton MRN: [id number]   : 1960 Hospital: [address] [place] [address]   Date: 2019        Subjective/Interval History:     HPI:  Patient is a 56 y.o. female with PMH significant for COPD ( on 3 LPM NC at home), HFpEF, ANDRADE (CPAP at home but non-compliant), Afib (on Xarelto at home), HTN and morbid obesity who presented to [facility] ED on 19 c/o sudden onset with acute worsening of SOB with associated CP and sleepiness. PT admitted for Acute on chronic hypoxic hypercapnic respiratory failure, 2/2 severe ANDRADE/OHS with component of CHF exacerbation as well. S/p brief stay in ICU with continuous BiPAP and diuresis. Downgraded to SDU on  and doing well. Course complicated with bradycardia with EKG showing Afib with slow ventricular response. BB stopped (08/15) with HR improvement. 19  Contacted patient's DME[facility]- they recently stopped accepting her current insurance- [employer]    19  Daughter brought in Home Device: Auto BIPAP- Resmed Does not have a backup rate:  IPAP: 12  EPAP 5  PS 4 TiMax 2. No use recorded on device over the past 6 months: [address] FFM- these settings are inadequate for patient's current status. 19    · Patient switched back to regular BIPAP on settings that can be used on her current Auto BIPAP device. Patient's OP device has a max IPAP of 25 and NO backup rate. · Patient appeared to tolerate BIPAP fairly well yesterday- PCo2 improved on this mornings ABG  · Some leak issues with FFM due to higher pressures but acceptable. · Afebrile  · CO2 down to 29    Current: OP PAP Device: ResMed: AirCurve 10- Auto: This unit does not have a back up rate. - I change settings today  MAX IPAP: 25  Min EPAP: 10  PS: 8  TiMax: 2.0, TiMin: 0.3  Full Face Mask:   Ramp: Off   Humidity: changed from 4-->7 (8 is max setting)    ROS:Pertinent items are noted in HPI.    Objective:   Vital Signs:    Visit Vitals  BP (!) 98/36   Pulse (!) 112   Temp 98.8 °F (37.1 °C)   Resp 19   Ht 5' 2.99\" (1.6 m)   Wt 138 kg (304 lb 3.8 oz)   SpO2 96%   Breastfeeding? No   BMI 53.91 kg/m²       O2 Device: Nasal cannula, Humidifier   O2 Flow Rate (L/min): 2 l/min   Temp (24hrs), Av.6 °F (37 °C), Min:98.1 °F (36.7 °C), Max:98.8 °F (37.1 °C)       Intake/Output:   Last shift:      No intake/output data recorded. Last 3 shifts:  1901 -  0700  In: 1480 [P.O.:1480]  Out: 5800 [Urine:5800]    Intake/Output Summary (Last 24 hours) at 2019 1335  Last data filed at 2019 0700  Gross per 24 hour   Intake 1240 ml   Output 3300 ml   Net -2060 ml        Physical Exam:   General:  AOx3, cooperative, NAD; appears stated age. HEENT:  Normocephalic,atraumatic; PERRL; EOMI; Conjunctivae/corneas clear; Anicteric; nares/mucosa normal, moist; No drainage/sinus tenderness; Neck supple, symmetrical; trachea midline; No adenopathy; Thyroid - no enlargement/tenderness/nodules noted; No carotid bruit; No JVD   Back:   Symmetric, no curvature, no spine tenderness or flank pain   Lungs:   Symmetrical chest rise; mod AE Bilat; distant breath sounds 2/2 body habitus; diminished bibasilar; CTAB; No wheezes/rhonchi/rales noted. Chest wall:  No tenderness or deformity. NO CREPITUS   Heart:  IRRR,  no m/r/g   Abdomen:   Soft, obese; non-tender.(+) B. S x4; No masses/palpable organomegaly/paradox   Extremities: Normal, atraumatic, no cyanosis or edema. Pulses: 1-2+ and symmetric all extremities.    Skin: Skin color, texture, turgor normal. No rashes or lesions   Lymph nodes: Cervical, supraclavicular nodes - unremarkable   Neurologic: Non focal         DATA:  Labs:  ABG:    Lab Results   Component Value Date/Time    PHI 7.281 (L) 2019 04:34 AM    PCO2I 67.7 (H) 2019 04:34 AM    PO2I 129 (H) 2019 04:34 AM    HCO3I 31.9 (H) 2019 04:34 AM    FIO2I 32 2019 04:34 AM Recent Labs     08/18/19  0254 08/17/19  0426 08/16/19  0240   WBC 6.2 4.4* 5.5   HGB 12.3 10.6* 10.7*   HCT 42.7 36.7 37.2    170 159     Recent Labs     08/18/19  0254 08/17/19  0426 08/16/19  0240    139 140   K 4.3 3.5 3.9    101 100   CO2 29 32 36*   GLU 94 117* 98   BUN 20* 19* 21*   CREA 0.99 1.18 1.22   CA 9.0 8.0* 8.1*     Lab Results   Component Value Date/Time    B-type Natriuretic Peptide 3,187 (H) 05/06/2016 11:10 AM    B-type Natriuretic Peptide 3,075 (H) 05/06/2016 11:00 AM    NT pro-BNP 1,451 (H) 08/16/2019 02:40 AM    NT pro-BNP 2,217 (H) 08/15/2019 01:53 AM    NT pro-BNP 3,494 (H) 08/13/2019 03:48 AM    NT pro-BNP 3,381 (H) 08/12/2019 09:08 AM    NT pro-BNP 2,728 (H) 07/14/2019 05:44 PM         ECHO [05/21/19]: · Left Ventricle: Mild concentric hypertrophy. Estimated left ventricular ejection fraction is 51 - 55%. Visually measured ejection fraction. No regional wall motion abnormality noted. Inconclusive left ventricular diastolic function. · Right Ventricle: Dilated right ventricle. Reduced systolic function. Abnormal right ventricular septal motion. Systolic flattening of interventricular septum consistent with right ventricle pressure overload. · Right Atrium: Dilated right atrium. · Tricuspid Valve: Mild to moderate tricuspid valve regurgitation is present. · Pulmonary Artery: Moderate to severe pulmonary hypertension at 61 mmHg. · IVC/Hepatic Veins: Moderately elevated central venous pressure (10-15 mmHg); IVC diameter is larger than 21 mm and collapses more than 50% with respiration. Imaging:  [x]I have personally reviewed the patients radiographs  []Radiographs reviewed with radiologist   []No change from prior, tubes and lines in adequate position  []Improved   []Worsening      CXR [08/14/19]: FINDINGS:  Stable moderate enlarged cardiac silhouette. Stable enlargement of the ravindra due to known enlarged pulmonary arteries. No pneumothorax.  No significant pleural effusion. Interval improvement in basilar lung aeration, vascular congestion and pulmonary edema. No consolidation. Intact osseous structures.     IMPRESSION:  Stable cardiomegaly. Improved lung aeration and pulmonary edema      IMPRESSION:   · Hypersomnia- due to persistent hypercapnea  · Acute on Chronic Hypercapnic Respiratory Failure - 2/2 severe ANDRADE/OHA and CHF exacerbation - Improving  · HFprEF with current CHF Exacerbation- acute on chronic diastolic heart failure; improving with diuresis  · Severe ANDRADE/OHS - States she currently does not use her PAP device at home - was lost while moving; states family is currently unpacking boxes to find device. · Pulmonary HTN  · Afib, on Xarelto - Restarted 08/14  · Hearing deficient: wearing hearing aid  · Morbid Obesity : Body mass index is 53.91 kg/m². RECOMMENDATIONS:   · SpO2 goal btwn 88-93%; titrate supp O2 PRN; currently resting on 3LPM  · Check BNP - add to labs today  ·   · BIPAP IPAP: 18  EPAP: 10- monitor for any apneas and need for a backup rate- If patient does well today would like to try patient on her OP device to see if she can tolerate it  ·   · Avoid narcotics as much as possible as this will promote further hypoventilation  · Encourage ICS; Bronchial hygiene protocol  · Aspiration precautions - HOB >30  · Duo-nebs q6 scheduled; con't Flonase  · HD stable, Hr improved with D/C of BB, Primary to manage. · Con't use of Diamox BID for now as respiratory stimulant- monitor HCO3 - may need to decrease to daily dose  · Assess home Oxygen needs at discharge  · Case Management- need to try and get patient's family to bring in PAP device. If patient does not start using PAP as outpatient, anticipate recurrent admissions, potentially life- threatening exacerbations and potential future need for tracheostomy.   · Need family meeting to discussed PAP compliance and severity of respiratory status  · OT, PT, OOB and ambulate  · Healthy weight encouraged  · Will Follow  · DVT, PUD prophylaxis per Primary Team   · OP pulmonary follow up     High complexity decision making was performed during the evaluation of this patient at high risk for decompens ation with multiple organ involvement      Above mentioned total time spent on reviewing the case/medical record/data/notes/EMR/patient examination/documentation/coordinating care with nurse/consultants, exclusive of procedures with complex decision making performed and > 50% time spent in face to face evaluation.       Billy Wood, , [facility]  Pulmonary, Critical Care, and Sleep Medicine    Clinical care, chart review and time spent coordinating care: 40 min

## 2019-08-19 LAB
ANION GAP SERPL CALC-SCNC: 7 MMOL/L (ref 3–18)
BASOPHILS # BLD: 0 K/UL (ref 0–0.1)
BASOPHILS NFR BLD: 0 % (ref 0–2)
BUN SERPL-MCNC: 23 MG/DL (ref 7–18)
BUN/CREAT SERPL: 23 (ref 12–20)
CALCIUM SERPL-MCNC: 8.3 MG/DL (ref 8.5–10.1)
CHLORIDE SERPL-SCNC: 104 MMOL/L (ref 100–111)
CO2 SERPL-SCNC: 28 MMOL/L (ref 21–32)
CREAT SERPL-MCNC: 1.02 MG/DL (ref 0.6–1.3)
DIFFERENTIAL METHOD BLD: ABNORMAL
EOSINOPHIL # BLD: 0.2 K/UL (ref 0–0.4)
EOSINOPHIL NFR BLD: 4 % (ref 0–5)
ERYTHROCYTE [DISTWIDTH] IN BLOOD BY AUTOMATED COUNT: 16.4 % (ref 11.6–14.5)
GLUCOSE BLD STRIP.AUTO-MCNC: 145 MG/DL (ref 70–110)
GLUCOSE SERPL-MCNC: 116 MG/DL (ref 74–99)
HCT VFR BLD AUTO: 37 % (ref 35–45)
HGB BLD-MCNC: 10.7 G/DL (ref 12–16)
LYMPHOCYTES # BLD: 1.4 K/UL (ref 0.9–3.6)
LYMPHOCYTES NFR BLD: 26 % (ref 21–52)
MCH RBC QN AUTO: 25.2 PG (ref 24–34)
MCHC RBC AUTO-ENTMCNC: 28.9 G/DL (ref 31–37)
MCV RBC AUTO: 87.1 FL (ref 74–97)
MONOCYTES # BLD: 0.4 K/UL (ref 0.05–1.2)
MONOCYTES NFR BLD: 7 % (ref 3–10)
NEUTS SEG # BLD: 3.4 K/UL (ref 1.8–8)
NEUTS SEG NFR BLD: 63 % (ref 40–73)
PLATELET # BLD AUTO: 185 K/UL (ref 135–420)
PMV BLD AUTO: 9.6 FL (ref 9.2–11.8)
POTASSIUM SERPL-SCNC: 4 MMOL/L (ref 3.5–5.5)
RBC # BLD AUTO: 4.25 M/UL (ref 4.2–5.3)
SODIUM SERPL-SCNC: 139 MMOL/L (ref 136–145)
WBC # BLD AUTO: 5.3 K/UL (ref 4.6–13.2)

## 2019-08-19 PROCEDURE — 36415 COLL VENOUS BLD VENIPUNCTURE: CPT

## 2019-08-19 PROCEDURE — 82962 GLUCOSE BLOOD TEST: CPT

## 2019-08-19 PROCEDURE — 74011250637 HC RX REV CODE- 250/637: Performed by: INTERNAL MEDICINE

## 2019-08-19 PROCEDURE — 97530 THERAPEUTIC ACTIVITIES: CPT

## 2019-08-19 PROCEDURE — 94640 AIRWAY INHALATION TREATMENT: CPT

## 2019-08-19 PROCEDURE — 80048 BASIC METABOLIC PNL TOTAL CA: CPT

## 2019-08-19 PROCEDURE — 85025 COMPLETE CBC W/AUTO DIFF WBC: CPT

## 2019-08-19 PROCEDURE — 94660 CPAP INITIATION&MGMT: CPT

## 2019-08-19 PROCEDURE — 74011250637 HC RX REV CODE- 250/637: Performed by: PHYSICIAN ASSISTANT

## 2019-08-19 PROCEDURE — 74011250637 HC RX REV CODE- 250/637: Performed by: FAMILY MEDICINE

## 2019-08-19 PROCEDURE — 97535 SELF CARE MNGMENT TRAINING: CPT

## 2019-08-19 PROCEDURE — 74011000250 HC RX REV CODE- 250: Performed by: PHYSICIAN ASSISTANT

## 2019-08-19 PROCEDURE — 77010033678 HC OXYGEN DAILY

## 2019-08-19 PROCEDURE — 97116 GAIT TRAINING THERAPY: CPT

## 2019-08-19 PROCEDURE — 94762 N-INVAS EAR/PLS OXIMTRY CONT: CPT

## 2019-08-19 PROCEDURE — 65660000000 HC RM CCU STEPDOWN

## 2019-08-19 RX ORDER — METOPROLOL TARTRATE 25 MG/1
12.5 TABLET, FILM COATED ORAL 2 TIMES DAILY
Status: DISCONTINUED | OUTPATIENT
Start: 2019-08-19 | End: 2019-08-21 | Stop reason: HOSPADM

## 2019-08-19 RX ADMIN — FAMOTIDINE 20 MG: 20 TABLET ORAL at 18:13

## 2019-08-19 RX ADMIN — Medication 10 ML: at 22:17

## 2019-08-19 RX ADMIN — IPRATROPIUM BROMIDE AND ALBUTEROL SULFATE 3 ML: .5; 3 SOLUTION RESPIRATORY (INHALATION) at 01:30

## 2019-08-19 RX ADMIN — METOPROLOL TARTRATE 12.5 MG: 25 TABLET ORAL at 22:04

## 2019-08-19 RX ADMIN — OXYCODONE HYDROCHLORIDE 5 MG: 5 TABLET ORAL at 08:55

## 2019-08-19 RX ADMIN — FAMOTIDINE 20 MG: 20 TABLET ORAL at 08:38

## 2019-08-19 RX ADMIN — SIMVASTATIN 40 MG: 40 TABLET, FILM COATED ORAL at 22:04

## 2019-08-19 RX ADMIN — ARIPIPRAZOLE 15 MG: 15 TABLET ORAL at 08:38

## 2019-08-19 RX ADMIN — Medication 10 ML: at 06:23

## 2019-08-19 RX ADMIN — PAROXETINE 20 MG: 20 TABLET, FILM COATED ORAL at 08:38

## 2019-08-19 RX ADMIN — BUMETANIDE 2 MG: 1 TABLET ORAL at 08:38

## 2019-08-19 RX ADMIN — IPRATROPIUM BROMIDE AND ALBUTEROL SULFATE 3 ML: .5; 3 SOLUTION RESPIRATORY (INHALATION) at 14:14

## 2019-08-19 RX ADMIN — IPRATROPIUM BROMIDE AND ALBUTEROL SULFATE 3 ML: .5; 3 SOLUTION RESPIRATORY (INHALATION) at 21:53

## 2019-08-19 RX ADMIN — ACETAZOLAMIDE 250 MG: 250 TABLET ORAL at 08:38

## 2019-08-19 RX ADMIN — RIVAROXABAN 20 MG: 20 TABLET, FILM COATED ORAL at 18:13

## 2019-08-19 RX ADMIN — IPRATROPIUM BROMIDE AND ALBUTEROL SULFATE 3 ML: .5; 3 SOLUTION RESPIRATORY (INHALATION) at 07:25

## 2019-08-19 RX ADMIN — OXYCODONE HYDROCHLORIDE 5 MG: 5 TABLET ORAL at 14:51

## 2019-08-19 RX ADMIN — LEVOTHYROXINE SODIUM 125 MCG: 25 TABLET ORAL at 06:22

## 2019-08-19 NOTE — PROGRESS NOTES
Hospitalist Progress Note    Patient: Aurora Anna Age: 60 y.o. : 1960 MR#: [id number] SSN: [id number]  Date/Time: 2019 1:25 PM    DOA: 2019  PCP: Jez Dewey MD    Subjective:     Pt at beside with her family, stated that she wants to go to rehab. She has home CPAP at bedside. No complaint of breathing difficulty. No fever. No cough. Has not been very compliant with BiPAP use while in ICU     She complained of a boil in her buttock area, but no evidence on close examination     ROS: no fever/chills, no headache, no dizziness, no facial pain, no sinus congestion,   No swallowing pain, No chest pain, no palpitation, +same shortness of breath, no abd pain,  No diarrhea, no urinary complaint, no leg pain or swelling      Assessment/Plan:   1. Acute on chronic respiratory failure with persistent hypercapnia, respiratory acidosis   2. COPD with severe ANDRADE/OHS, non compliance with use   3. Hypersomnia due CO2 narcosis, improved   4. Acute on chronic diastolic heart failure, improved with diuresis   5. Pulmonary hypertension   6. Persistent AFIB on xarelto, she had episode of bradycardia with slow ventricular response. Her Bblocker has been stopped. 7.  Hypertension   8. CAD   9. Hypothyroidism   10. Morbid obesity   11. Hyperlipidemia  12. Depression/anxiety   13. Chronic back pain, lower leg pain, on narcotic OP  14. GERD     Continue to advocate and support her to use her CPAP machine at night. She continues on Duoneb treatment. She is on Bumex daily, she continues to be tachycardic today, might need to resume lower dose of bblocker. Will need to follow up for her cardiology consult  Continue with Abilify, Paxil, simvastatin, Xarelto. Cont levothyroxine     Full Code   Family at bedside updated.      Additional Notes:    Time spent >30 minutes    Case discussed with:  [x]Patient  [x]Family  [x]Nursing  [x]Case Management  DVT Prophylaxis:  []Lovenox  []Hep SQ  []SCDs []Coumadin   [x]On Xarelto    Signed By: Thelma Colindres MD     2019 1:25 PM              Objective:   VS:   Visit Vitals  BP (!) 100/37   Pulse 76   Temp 99 °F (37.2 °C)   Resp 13   Ht 5' 2.99\" (1.6 m)   Wt 138 kg (304 lb 3.8 oz)   SpO2 97%   Breastfeeding? No   BMI 53.91 kg/m²      Tmax/24hrs: Temp (24hrs), Av °F (37.2 °C), Min:98.7 °F (37.1 °C), Max:99.3 °F (37.4 °C)      Intake/Output Summary (Last 24 hours) at 2019 1325  Last data filed at 2019 0600  Gross per 24 hour   Intake 240 ml   Output 1950 ml   Net -1710 ml       Tele: afib with RVR  General:  Cooperative, Not in acute distress, speaks in short sentence while in chair  Morbid obesity  HEENT: PERRL, EOMI, supple neck, no JVD, dry oral mucosa  Cardiovascular: S1S2 irregular, no rub/gallop   Pulmonary: air entry bilaterally, no wheezing, + crackle  GI:  Soft, non tender, non distended, +bs, no guarding   Extremities:  No pedal edema, +distal pulses appreciated   Her gluteal area without breakdown or boil. Neuro: AOx3, moving all extremities, no gross deficit.      Additional:       Current Facility-Administered Medications   Medication Dose Route Frequency    bumetanide (BUMEX) tablet 2 mg  2 mg Oral DAILY    albuterol-ipratropium (DUO-NEB) 2.5 MG-0.5 MG/3 ML  3 mL Nebulization Q6H RT    fluticasone propionate (FLONASE) 50 mcg/actuation nasal spray 2 Spray  2 Spray Both Nostrils DAILY    sodium chloride (OCEAN) 0.65 % nasal squeeze bottle 2 Spray  2 Spray Both Nostrils Q2H PRN    acetaZOLAMIDE (DIAMOX) tablet 250 mg  250 mg Oral BID    rivaroxaban (XARELTO) tablet 20 mg  20 mg Oral QPM    famotidine (PEPCID) tablet 20 mg  20 mg Oral BID    PARoxetine (PAXIL) tablet 20 mg  20 mg Oral DAILY    ARIPiprazole (ABILIFY) tablet 15 mg  15 mg Oral DAILY    simvastatin (ZOCOR) tablet 40 mg  40 mg Oral QHS    oxyCODONE IR (ROXICODONE) tablet 5 mg  5 mg Oral Q6H PRN    ondansetron (ZOFRAN) injection 4 mg  4 mg IntraVENous Q6H PRN    levothyroxine (SYNTHROID) tablet 125 mcg  125 mcg Oral 6am    sodium chloride (NS) flush 5-40 mL  5-40 mL IntraVENous Q8H    sodium chloride (NS) flush 5-40 mL  5-40 mL IntraVENous PRN    glucose chewable tablet 16 g  4 Tab Oral PRN    glucagon (GLUCAGEN) injection 1 mg  1 mg IntraMUSCular PRN    dextrose (D50W) injection syrg 12.5-25 g  25-50 mL IntraVENous PRN    acetaminophen (TYLENOL) tablet 650 mg  650 mg Oral Q4H PRN            Lab/Data Review:  Labs: Results:       Chemistry Recent Labs     08/19/19  0300 08/18/19 0254 08/17/19  0426   * 94 117*    138 139   K 4.0 4.3 3.5    103 101   CO2 28 29 32   BUN 23* 20* 19*   CREA 1.02 0.99 1.18   BUCR 23* 20 16   AGAP 7 6 6   CA 8.3* 9.0 8.0*     No results for input(s): TBIL, ALT, SGOT, ALKP, TP, ALB, GLOB, AGRAT in the last 72 hours. CBC w/Diff Recent Labs     08/19/19 0300 08/18/19 0254 08/17/19  0426   WBC 5.3 6.2 4.4*   RBC 4.25 4.80 4.18*   HGB 10.7* 12.3 10.6*   HCT 37.0 42.7 36.7   MCV 87.1 89.0 87.8   MCH 25.2 25.6 25.4   MCHC 28.9* 28.8* 28.9*   RDW 16.4* 16.5* 16.7*    173 170   GRANS 63 60 66   LYMPH 26 26 20*   EOS 4 3 3      Coagulation No results for input(s): PTP, INR, APTT in the last 72 hours.     No lab exists for component: INREXT    Iron/Ferritin Lab Results   Component Value Date/Time    Iron 38 (L) 10/10/2018 08:30 AM    TIBC 319 10/10/2018 08:30 AM    Iron % saturation 12 10/10/2018 08:30 AM    Ferritin 33 10/10/2018 08:30 AM       BNP    Cardiac Enzymes Lab Results   Component Value Date/Time     05/19/2019 12:33 AM    CK - MB 2.9 05/19/2019 12:33 AM    CK-MB Index 2.0 05/19/2019 12:33 AM    Troponin-I, QT <0.02 08/12/2019 01:35 PM    B-type Natriuretic Peptide 3,187 (H) 05/06/2016 11:10 AM        Lactic Acid    Thyroid Studies          All Micro Results     Procedure Component Value Units Date/Time    MRSA SCREEN - PCR (NASAL) [[id number]] Collected:  08/12/19 0800    Order Status:  Completed Specimen:  Nasal from Nares Updated:  08/13/19 0916     Special Requests: NO SPECIAL REQUESTS        Culture result:       MRSA target DNA is not detected (presumptive not colonized with MRSA)                  Images:    CT (Most Recent). XRAY (Most Recent)      EKG Results for orders placed or performed in visit on 06/06/19   AMB POC EKG ROUTINE W/ 12 LEADS, INTER & REP     Status: None    Narrative    Atrial fibrillation. Nonspecific QRS widening and anterior fascicular block. Nonspecific T-abnormaility.         2D ECHO

## 2019-08-19 NOTE — PROGRESS NOTES
Problem: Mobility Impaired (Adult and Pediatric)  Goal: *Acute Goals and Plan of Care (Insert Text)  Description  Physical Therapy Goals  Initiated 8/14/2019 and to be accomplished within 7 day(s)  1. Patient will move from supine to sit and sit to supine , scoot up and down and roll side to side in bed with modified independence. 2.  Patient will transfer from bed to chair and chair to bed with modified independence using the least restrictive device. 3.  Patient will perform sit to stand with modified independence. 4.  Patient will ambulate with supervision/set-up for  feet with the least restrictive device. Prior Level of Function: Pt was independent with mobility including mostly using WC but ambulated in her home using RW. She reports she is unable to ascend/descend any stairs with her family assisting using a WC for in/out of the home. Pt lives in a 2 story home with 1st floor set up with 1 step to enter the home. Pt lives with her 10 grandkids and her daughter. Outcome: Progressing Towards Goal    PHYSICAL THERAPY TREATMENT    Patient: Isael Cobian (60 y.o. female)  Date: 8/19/2019  Diagnosis: COPD (chronic obstructive pulmonary disease) ([facility] [J44.9]  Acute respiratory failure ([facility] [J96.00] <principal problem not specified>       Precautions: Fall  PLOF: see above    ASSESSMENT:  Pt cleared for PT treatment session per nursing and co-treatment performed with OT to maximize pt safety and participation throughout functional mobility. Vital signs WNL prior to mobility. Pt received in supine with supportive friend at bedside, agreeable to therapy. Pt initially reluctant to move LEs without assistance, but pt able to maneuver LEs toward EOB with additional time and min A. Pt required min A for supine>sit overall with verbal/tactile cues for hand placement and safe sequencing. Additional time sitting EOB as pt c/o lightheadedness upon seated position; BP assessed 148/83 sitting EOB. After ~2 min BP re-assessed 134/89 with pt verbalizing slightly improved lightheadedness. Pt required min A x2 for sit>stand from EOB. Pt ambulated 5 ft to bedside chair min A with RW, verbal/tactile cues for RW management and safe sequencing. Pt CGA for stand>sit, min cuing for hand placement during descent. Pt instructed in TherEx longsitting in bedside chair to promote circulation, decrease LE edema, and maintain strength for future transfers and ambulation. Additional time overall for safely managing lines and communication with nursing staff. Pt left sitting up in bedside chair with feet elevated, supportive friend at bedside, and all needs met/within reach. Vitals WNL with BP at 122/64 post session. RN notified of further pt needs. Progression toward goals:   ?      Improving appropriately and progressing toward goals  ? Improving slowly and progressing toward goals  ? Not making progress toward goals and plan of care will be adjusted     PLAN:  Patient continues to benefit from skilled intervention to address the above impairments. Continue treatment per established plan of care. Discharge Recommendations:  Skilled Nursing Facility  Further Equipment Recommendations for Discharge: none, pt owns RW and W/C     SUBJECTIVE:   Patient stated This PureWick is itching me.     OBJECTIVE DATA SUMMARY:   Critical Behavior:  Neurologic State: Alert, Appropriate for age, Eyes open spontaneously  Orientation Level: Oriented X4  Cognition: Appropriate decision making, Appropriate for age attention/concentration, Appropriate safety awareness  Safety/Judgement: Fall prevention, Home safety  Functional Mobility Training:  Bed Mobility:  Supine to Sit: Minimum assistance    Transfers:  Sit to Stand: Minimum assistance;Assist x2  Stand to Sit: Contact guard assistance    Balance:  Sitting: Intact  Standing: Impaired; With support  Standing - Static: Fair  Standing - Dynamic : Fair    Ambulation/Gait Training:  Distance (ft): 5 Feet (ft)  Assistive Device: Walker, rolling  Ambulation - Level of Assistance: Minimal assistance  Gait Abnormalities: Decreased step clearance  Base of Support: Widened  Step Length: Left shortened;Right shortened    Therapeutic Exercises:   See flowsheet below. All TE performed to promote circulation, decrease LE edema, and maintain strength for future transfers and ambulation. EXERCISE   Sets   Reps   Active Active Assist   Passive Self ROM   Comments   Ankle Pumps 1 10  ? ? ? ?    Quad Sets/Glut Sets    ? ? ? ? Hold for 5 secs   Hamstring Sets   ? ? ? ? Short Arc Quads   ? ? ? ? Heel Slides 1 10 ? ? ? ? Straight Leg Raises   ? ? ? ? Hip Add   ? ? ? ? Hold for 5 secs, w/ pillow squeeze   Long Arc Quads   ? ? ? ? Seated Marching   ? ? ? ? Standing Marching   ? ? ? ?       ? ? ? ? Pain:  Pain level pre-treatment: Not rated  Pain level post-treatment: Not rated    Activity Tolerance:   Fair  Please refer to the flowsheet for vital signs taken during this treatment. After treatment:   ? Patient left in no apparent distress sitting up in chair  ? Patient left in no apparent distress in bed  ? Call bell left within reach  ? Nursing notified: DELMI Greenberg  ? Caregiver present: Visitor at bedside  ? Bed alarm activated  ? SCDs applied      COMMUNICATION/EDUCATION:   ?         Role of Physical Therapy in the acute care setting. ?         Fall prevention education was provided and the patient/caregiver indicated understanding. ? Patient/family have participated as able in working toward goals and plan of care. ?         Patient/family agree to work toward stated goals and plan of care. ?         Patient understands intent and goals of therapy, but is neutral about his/her participation. ? Patient is unable to participate in stated goals/plan of care: ongoing with therapy staff.   ?         Other:        Mike Livingston PTA   Time Calculation: 38 mins

## 2019-08-19 NOTE — PROGRESS NOTES
Problem: Self Care Deficits Care Plan (Adult)  Goal: *Acute Goals and Plan of Care (Insert Text)  Description  Occupational Therapy Goals  Initiated 8/14/2019 within 7 day(s). 1.  Patient will perform functional activity/ADL task with independence standing for 5-7 minutes with G balance. 2.  Patient will perform lower body dressing with modified independence using AE prn.  3.  Patient will perform toilet transfers with modified independence. 4.  Patient will perform all aspects of toileting with modified independence. 5.  Patient will participate in upper extremity therapeutic exercise/activities with independence for 5-7 minutes to increase BUE strength for self-care/ADLs. 6.  Patient will utilize energy conservation techniques during functional activities with verbal cues. Prior Level of Function: Patient was independent with most self-care and used a RW for functional mobility in home, W/C outside the home PTA. Pt reports asking grandchildren/daughter for assist prn with LBD and LB bathing. Outcome: Progressing Towards Goal     OCCUPATIONAL THERAPY TREATMENT    Patient: Morro Brown (31 y.o. female)  Date: 8/19/2019  Diagnosis: COPD (chronic obstructive pulmonary disease) ([facility] [J44.9]  Acute respiratory failure ([facility] [J96.00]   Precautions: Fall    Chart, occupational therapy assessment, plan of care, and goals were reviewed. ASSESSMENT:  Pt cleared to participate in OT tx session by RN. Upon entering the room, the pt was semi-reclined in bed, alert, and agreeable to participate in OT tx with family friend present. Pt was seen with PT to maximize pt safety and participation. Pt immediately with c/o purewick itching and needing it to be removed. Purewick removed and observed bent out of form; RN notified. Pt performed bed mobility with min assist for BLE and additional time secondary to not being up for a few days. Pt with c/o dizziness seated EOB, /83.  Pt educated on energy conservation techniques and given strategies for maintaining good posture and keeping eyes opened during situation. BP retake 134/89. Pt performed functional transfers this session using RW with additional time and min assist x 2. Pt independent with grooming task seated with set-up. Standing functional activity not assessed secondary to pt with light headedness and diastolic being high. Retake BP after performing functional activity seated 122/64. Pt left seated in chair with all needs met. Progression toward goals:  ?          Improving appropriately and progressing toward goals  ? Improving slowly and progressing toward goals  ? Not making progress toward goals and plan of care will be adjusted     PLAN:  Patient continues to benefit from skilled intervention to address the above impairments. Continue treatment per established plan of care. Discharge Recommendations:  Zechariah Juárez  Further Equipment Recommendations for Discharge:  N/A     SUBJECTIVE:   Patient stated I need this purewick out now    OBJECTIVE DATA SUMMARY:   Cognitive/Behavioral Status:  Neurologic State: Alert  Orientation Level: Oriented X4  Cognition: Follows commands  Safety/Judgement: Fall prevention    Functional Mobility and Transfers for ADLs:   Bed Mobility:  Supine to Sit: Minimum assistance  Scooting: Minimum assistance     Transfers:  Sit to Stand: Minimum assistance;Assist x2  Stand to Sit: Contact guard assistance   Toilet Transfer : Minimum assistance;Assist x2     Balance:  Sitting: Intact  Standing: Impaired; With support  Standing - Static: Fair  Standing - Dynamic : Fair    ADL Intervention:  Grooming  Grooming Assistance: Set-up; Independent(seated)  Washing Face: Set-up; Independent  Brushing/Combing Hair: Set-up; Independent    Cognitive Retraining  Safety/Judgement: Fall prevention    Pain:  Pain level pre-treatment: 0/10   Pain level post-treatment: 0/10  Pain Intervention(s): Medication (see MAR); Response to intervention: See doc flow    Activity Tolerance:    Fair    Please refer to the flowsheet for vital signs taken during this treatment. After treatment:   ?  Patient left in no apparent distress sitting up in chair  ? Patient left in no apparent distress in bed  ? Call bell left within reach  ? Nursing notified  ? Caregiver present  ? Bed alarm activated    COMMUNICATION/EDUCATION:   ? Role of Occupational Therapy in the acute care setting  ? Home safety education was provided and the patient/caregiver indicated understanding. ? Patient/family have participated as able in working towards goals and plan of care. ? Patient/family agree to work toward stated goals and plan of care. ? Patient understands intent and goals of therapy, but is neutral about his/her participation. ? Patient is unable to participate in goal setting and plan of care.       Thank you for this referral.  German Story OTR/L  Time Calculation: 38 mins

## 2019-08-19 NOTE — PROGRESS NOTES
RT aware of ABGs needed tonight, however patient is non-compliant and will take off mask without alerting staff. Patient education provided.

## 2019-08-19 NOTE — PROGRESS NOTES
Pt stated her family brought her home use CPAP and its sitting on the chair in her room.     REG Tay RN  Care Management  Pager: [phone]

## 2019-08-19 NOTE — PROGRESS NOTES
Bedside and Verbal shift change report given to Zahira Banuelos (oncoming nurse) by XAVIER arango (offgoing nurse). Report included the following information SBAR.

## 2019-08-19 NOTE — PROGRESS NOTES
Pt requested to be removed from NIV at 3 amby nursing (RT not aware). ABG not done since pt is on nasal cannula and had one done yesterday on same. . Pt wore from 11pm to 3 am.

## 2019-08-19 NOTE — PROGRESS NOTES
[facility]  Pulmonary, Critical Care, and Sleep Medicine    Name: Guanako Plasencia MRN: [id number]   : 1960 Hospital: [address] [place] [address] Dr   Date: 2019      F/U consultation    Consulting: Salma Mathew  Reason: hypercarbic resp failure  Subjective/Interval History:   19  Patient seen and examined at bedside. No acute events overnight. Patient wore BiPAP from 11 PM to a little before 3 AM.  RT was unable to perform ABG because patient was already off of BiPAP at that time per her request.  Patient does not understand why she needs to be on BiPAP therapy. Patient reports that she does not like to wear it when she wakes up. Patient denies any nausea, vomiting, diarrhea, chest pain, shortness of breath. HPI per Dr. Johnny Black:  Patient is a 56 y.o. female with PMH significant for COPD ( on 3 LPM NC at home), HFpEF, ANDRADE (CPAP at home but non-compliant), Afib (on Xarelto at home), HTN and morbid obesity who presented to [facility] ED on 19 c/o sudden onset with acute worsening of SOB with associated CP and sleepiness. PT admitted for Acute on chronic hypoxic hypercapnic respiratory failure, 2/2 severe ANDRADE/OHS with component of CHF exacerbation as well. S/p brief stay in ICU with continuous BiPAP and diuresis. Downgraded to SDU on  and doing well. Course complicated with bradycardia with EKG showing Afib with slow ventricular response. BB stopped (08/15) with HR improvement. 19  Contacted patient's DME-[employer]- they recently stopped accepting her current insurance- [employer]    19  Daughter brought in Home Device: Auto BIPAP- Resmed Does not have a backup rate:  IPAP: 12  EPAP 5  PS 4 TiMax 2. No use recorded on device over the past 6 months: [address]- these settings are inadequate for patient's current status. 19    · Patient switched back to regular BIPAP on settings that can be used on her current Auto BIPAP device.  Patient's OP device has a max IPAP of 25 and NO backup rate. · Patient appeared to tolerate BIPAP fairly well yesterday- PCo2 improved on this mornings ABG  · Some leak issues with FFM due to higher pressures but acceptable. · Afebrile  · CO2 down to 29    Current: OP PAP Device: ResMed: AirCurve 10- Auto: This unit does not have a back up rate. - I change settings today  MAX IPAP: 25  Min EPAP: 10  PS: 8  TiMax: 2.0, TiMin: 0.3  Full Face Mask:   Ramp: Off   Humidity: changed from 4-->7 (8 is max setting)    ROS:Pertinent items are noted in HPI. Objective:   Vital Signs:    Visit Vitals  BP (!) 137/91   Pulse (!) 102   Temp 98.9 °F (37.2 °C)   Resp 17   Ht 5' 2.99\" (1.6 m)   Wt 138 kg (304 lb 3.8 oz)   SpO2 96%   Breastfeeding? No   BMI 53.91 kg/m²       O2 Device: Nasal cannula   O2 Flow Rate (L/min): 3 l/min   Temp (24hrs), Av.9 °F (37.2 °C), Min:98.7 °F (37.1 °C), Max:99 °F (37.2 °C)       Intake/Output:   Last shift:       07 - 1900  In: -   Out: 750 [Urine:750]  Last 3 shifts:  190 -  0700  In: 3144 [P.O.:1840]  Out: 7977 [Urine:4750]    Intake/Output Summary (Last 24 hours) at 2019 1740  Last data filed at 2019 1200  Gross per 24 hour   Intake    Output 2700 ml   Net -2700 ml        Physical Exam:   General:  AOx3, cooperative, NAD; appears stated age, laying in bed. HEENT:  Normocephalic,atraumatic; PERRL; EOMI; Conjunctivae/corneas clear; Anicteric; nares/mucosa normal, moist; No drainage/sinus tenderness; Neck supple, symmetrical; trachea midline; No adenopathy; No carotid bruit; No JVD   Back:   Symmetric, no curvature, no spine tenderness or flank pain   Lungs:   Symmetrical chest rise; mod AE Bilat; distant breath sounds 2/2 body habitus; diminished bibasilar; CTABL; No wheezes/rhonchi/rales noted. Chest wall:  No tenderness or deformity. NO CREPITUS   Heart:  IRRR, S1/S2, no m/r/g   Abdomen:   Soft, obese; non-tender.(+) B. S x4; No masses/palpable organomegaly/paradoxical motion   Extremities: Normal, atraumatic, no cyanosis or clubbing. 1+ pitting edema bilaterally. Pulses: 1-2+ and symmetric all extremities. Skin:  Has multiple 1-like lesions located along face, upper chest and arms, old scarring, skin color, texture, turgor normal. No other rashes or lesions   Lymph nodes: Cervical, supraclavicular nodes - unremarkable   Neurologic: Non focal, strength and coordination grossly intact throughout all extremities         DATA:  Labs:  ABG:    No results found for: PH, PHI, PCO2, PCO2I, PO2, PO2I, HCO3, HCO3I, FIO2, FIO2I        Recent Labs     08/19/19  0300 08/18/19  0254 08/17/19  0426   WBC 5.3 6.2 4.4*   HGB 10.7* 12.3 10.6*   HCT 37.0 42.7 36.7    173 170     Recent Labs     08/19/19  0300 08/18/19  0254 08/17/19  0426    138 139   K 4.0 4.3 3.5    103 101   CO2 28 29 32   * 94 117*   BUN 23* 20* 19*   CREA 1.02 0.99 1.18   CA 8.3* 9.0 8.0*     Lab Results   Component Value Date/Time    B-type Natriuretic Peptide 3,187 (H) 05/06/2016 11:10 AM    B-type Natriuretic Peptide 3,075 (H) 05/06/2016 11:00 AM    NT pro- 08/18/2019 02:54 AM    NT pro-BNP 1,451 (H) 08/16/2019 02:40 AM    NT pro-BNP 2,217 (H) 08/15/2019 01:53 AM    NT pro-BNP 3,494 (H) 08/13/2019 03:48 AM    NT pro-BNP 3,381 (H) 08/12/2019 09:08 AM         ECHO [05/21/19]: · Left Ventricle: Mild concentric hypertrophy. Estimated left ventricular ejection fraction is 51 - 55%. Visually measured ejection fraction. No regional wall motion abnormality noted. Inconclusive left ventricular diastolic function. · Right Ventricle: Dilated right ventricle. Reduced systolic function. Abnormal right ventricular septal motion. Systolic flattening of interventricular septum consistent with right ventricle pressure overload. · Right Atrium: Dilated right atrium. · Tricuspid Valve: Mild to moderate tricuspid valve regurgitation is present. · Pulmonary Artery:  Moderate to severe pulmonary hypertension at 61 mmHg. · IVC/Hepatic Veins: Moderately elevated central venous pressure (10-15 mmHg); IVC diameter is larger than 21 mm and collapses more than 50% with respiration. Imaging:  [x]I have personally reviewed the patients radiographs  []Radiographs reviewed with radiologist   []No change from prior, tubes and lines in adequate position  []Improved   []Worsening      CXR [08/14/19]: FINDINGS:  Stable moderate enlarged cardiac silhouette. Stable enlargement of the ravindra due to known enlarged pulmonary arteries. No pneumothorax. No significant pleural effusion. Interval improvement in basilar lung aeration, vascular congestion and pulmonary edema. No consolidation. Intact osseous structures.     IMPRESSION:  Stable cardiomegaly. Improved lung aeration and pulmonary edema      IMPRESSION:   · Acute encephalopathy- due to persistent hypercapnea  · Acute on Chronic Hypercapnic Respiratory Failure - 2/2 severe ANDRADE/OHA and CHF exacerbation - Improving  · HFprEF with current CHF Exacerbation- acute on chronic diastolic heart failure; improving with diuresis  · Severe ANDRADE/OHS -patient did not wear BiPAP for very long overnight. States she currently does not use her PAP device at home - was lost while moving; states family is currently unpacking boxes to find device. · Pulmonary HTN  · Afib, on Xarelto - Restarted 08/14  · Hearing deficient: wearing hearing aid  · Morbid Obesity : Body mass index is 53.91 kg/m². RECOMMENDATIONS:   · SpO2 goal btwn 88-93%; titrate supp O2 PRN  · We will attempt home BIPAP to see if patient can tolerate device. We will obtain ABG just prior to initiation of therapy as well as just before removing mask in the morning. Advised the patient that she needs full compliance in order to avoid episode of respiratory failure. Counseled the patient against noncompliance, given that patient did not use hospital BiPAP all night.   · Avoid narcotics as much as possible as this will promote further hypoventilation  · Encourage ICS; Bronchial hygiene protocol  · Aspiration precautions - HOB >30  · Duo-nebs q6 scheduled; con't Flonase  · HD stable, Hr improved with D/C of BB, Primary to manage. · Hold Diamox. Will give additional doses of patient's bicarb increases above 30. · Assess home Oxygen needs at discharge  · If patient does not start using PAP as outpatient, anticipate recurrent admissions, potentially life- threatening exacerbations and potential future need for tracheostomy. · OT, PT, OOB and ambulate  · Healthy weight encouraged  · Will Follow  · DVT prophylaxis per Primary Team   · OP pulmonary follow up with sleep medicine-Dr. Inge Weldon in 2 to 4 weeks post discharge     High complexity decision making was performed during the evaluation of this patient at high risk for decompens ation with multiple organ involvement      Above mentioned total time spent on reviewing the case/medical record/data/notes/EMR/patient examination/documentation/coordinating care with nurse/consultants, exclusive of procedures with complex decision making performed and > 50% time spent in face to face evaluation.       Carmen Koenig MD/MPH     Pulmonary, Critical Care Medicine  [facility]

## 2019-08-20 LAB
ANION GAP SERPL CALC-SCNC: 4 MMOL/L (ref 3–18)
ARTERIAL PATENCY WRIST A: YES
ARTERIAL PATENCY WRIST A: YES
BASE EXCESS BLD CALC-SCNC: 5 MMOL/L
BASE EXCESS BLD CALC-SCNC: 7 MMOL/L
BASOPHILS # BLD: 0 K/UL (ref 0–0.1)
BASOPHILS NFR BLD: 0 % (ref 0–2)
BDY SITE: ABNORMAL
BDY SITE: ABNORMAL
BODY TEMPERATURE: 37
BODY TEMPERATURE: 98.6
BUN SERPL-MCNC: 23 MG/DL (ref 7–18)
BUN/CREAT SERPL: 22 (ref 12–20)
CALCIUM SERPL-MCNC: 8.4 MG/DL (ref 8.5–10.1)
CHLORIDE SERPL-SCNC: 102 MMOL/L (ref 100–111)
CO2 SERPL-SCNC: 30 MMOL/L (ref 21–32)
CREAT SERPL-MCNC: 1.04 MG/DL (ref 0.6–1.3)
DIFFERENTIAL METHOD BLD: ABNORMAL
EOSINOPHIL # BLD: 0.2 K/UL (ref 0–0.4)
EOSINOPHIL NFR BLD: 4 % (ref 0–5)
ERYTHROCYTE [DISTWIDTH] IN BLOOD BY AUTOMATED COUNT: 16.2 % (ref 11.6–14.5)
GAS FLOW.O2 O2 DELIVERY SYS: ABNORMAL L/MIN
GAS FLOW.O2 O2 DELIVERY SYS: ABNORMAL L/MIN
GAS FLOW.O2 SETTING OXYMISER: 3 L/M
GLUCOSE BLD STRIP.AUTO-MCNC: 106 MG/DL (ref 70–110)
GLUCOSE BLD STRIP.AUTO-MCNC: 107 MG/DL (ref 70–110)
GLUCOSE SERPL-MCNC: 119 MG/DL (ref 74–99)
HCO3 BLD-SCNC: 32.8 MMOL/L (ref 22–26)
HCO3 BLD-SCNC: 33.6 MMOL/L (ref 22–26)
HCT VFR BLD AUTO: 37.5 % (ref 35–45)
HGB BLD-MCNC: 10.8 G/DL (ref 12–16)
LYMPHOCYTES # BLD: 1.3 K/UL (ref 0.9–3.6)
LYMPHOCYTES NFR BLD: 21 % (ref 21–52)
MCH RBC QN AUTO: 25.4 PG (ref 24–34)
MCHC RBC AUTO-ENTMCNC: 28.8 G/DL (ref 31–37)
MCV RBC AUTO: 88.2 FL (ref 74–97)
MONOCYTES # BLD: 0.7 K/UL (ref 0.05–1.2)
MONOCYTES NFR BLD: 12 % (ref 3–10)
NEUTS SEG # BLD: 3.8 K/UL (ref 1.8–8)
NEUTS SEG NFR BLD: 63 % (ref 40–73)
O2/TOTAL GAS SETTING VFR VENT: 28 %
O2/TOTAL GAS SETTING VFR VENT: 32 %
PCO2 BLD: 70.9 MMHG (ref 35–45)
PCO2 BLD: 77.5 MMHG (ref 35–45)
PEEP RESPIRATORY: 10 CMH2O
PH BLD: 7.24 [PH] (ref 7.35–7.45)
PH BLD: 7.28 [PH] (ref 7.35–7.45)
PIP ISTAT,IPIP: 18
PLATELET # BLD AUTO: 214 K/UL (ref 135–420)
PMV BLD AUTO: 9.8 FL (ref 9.2–11.8)
PO2 BLD: 118 MMHG (ref 80–100)
PO2 BLD: 125 MMHG (ref 80–100)
POTASSIUM SERPL-SCNC: 4.1 MMOL/L (ref 3.5–5.5)
PRESSURE SUPPORT SETTING VENT: 8 CMH2O
RBC # BLD AUTO: 4.25 M/UL (ref 4.2–5.3)
SAO2 % BLD: 98 % (ref 92–97)
SAO2 % BLD: 98 % (ref 92–97)
SERVICE CMNT-IMP: ABNORMAL
SERVICE CMNT-IMP: ABNORMAL
SODIUM SERPL-SCNC: 136 MMOL/L (ref 136–145)
SPECIMEN TYPE: ABNORMAL
SPECIMEN TYPE: ABNORMAL
SPONTANEOUS TIMED, IST: YES
TOTAL RESP. RATE, ITRR: 15
TOTAL RESP. RATE, ITRR: 21
WBC # BLD AUTO: 6 K/UL (ref 4.6–13.2)

## 2019-08-20 PROCEDURE — 74011250637 HC RX REV CODE- 250/637: Performed by: FAMILY MEDICINE

## 2019-08-20 PROCEDURE — 36600 WITHDRAWAL OF ARTERIAL BLOOD: CPT

## 2019-08-20 PROCEDURE — 94660 CPAP INITIATION&MGMT: CPT

## 2019-08-20 PROCEDURE — 74011000250 HC RX REV CODE- 250: Performed by: PHYSICIAN ASSISTANT

## 2019-08-20 PROCEDURE — 36415 COLL VENOUS BLD VENIPUNCTURE: CPT

## 2019-08-20 PROCEDURE — 74011250637 HC RX REV CODE- 250/637: Performed by: PHYSICIAN ASSISTANT

## 2019-08-20 PROCEDURE — 74011250636 HC RX REV CODE- 250/636: Performed by: INTERNAL MEDICINE

## 2019-08-20 PROCEDURE — 82803 BLOOD GASES ANY COMBINATION: CPT

## 2019-08-20 PROCEDURE — 85025 COMPLETE CBC W/AUTO DIFF WBC: CPT

## 2019-08-20 PROCEDURE — 97110 THERAPEUTIC EXERCISES: CPT

## 2019-08-20 PROCEDURE — 77010033678 HC OXYGEN DAILY

## 2019-08-20 PROCEDURE — 94640 AIRWAY INHALATION TREATMENT: CPT

## 2019-08-20 PROCEDURE — 74011000250 HC RX REV CODE- 250: Performed by: INTERNAL MEDICINE

## 2019-08-20 PROCEDURE — 97116 GAIT TRAINING THERAPY: CPT

## 2019-08-20 PROCEDURE — 74011250637 HC RX REV CODE- 250/637: Performed by: INTERNAL MEDICINE

## 2019-08-20 PROCEDURE — 80048 BASIC METABOLIC PNL TOTAL CA: CPT

## 2019-08-20 PROCEDURE — 65660000000 HC RM CCU STEPDOWN

## 2019-08-20 PROCEDURE — 94762 N-INVAS EAR/PLS OXIMTRY CONT: CPT

## 2019-08-20 PROCEDURE — 82962 GLUCOSE BLOOD TEST: CPT

## 2019-08-20 RX ORDER — TRIAMCINOLONE ACETONIDE 1 MG/G
CREAM TOPICAL 2 TIMES DAILY
Status: DISCONTINUED | OUTPATIENT
Start: 2019-08-20 | End: 2019-08-21 | Stop reason: HOSPADM

## 2019-08-20 RX ADMIN — METOPROLOL TARTRATE 12.5 MG: 25 TABLET ORAL at 13:00

## 2019-08-20 RX ADMIN — IPRATROPIUM BROMIDE AND ALBUTEROL SULFATE 3 ML: .5; 3 SOLUTION RESPIRATORY (INHALATION) at 01:21

## 2019-08-20 RX ADMIN — Medication 10 ML: at 06:18

## 2019-08-20 RX ADMIN — BUMETANIDE 2 MG: 1 TABLET ORAL at 09:19

## 2019-08-20 RX ADMIN — IPRATROPIUM BROMIDE AND ALBUTEROL SULFATE 3 ML: .5; 3 SOLUTION RESPIRATORY (INHALATION) at 20:23

## 2019-08-20 RX ADMIN — RIVAROXABAN 20 MG: 20 TABLET, FILM COATED ORAL at 17:32

## 2019-08-20 RX ADMIN — IPRATROPIUM BROMIDE AND ALBUTEROL SULFATE 3 ML: .5; 3 SOLUTION RESPIRATORY (INHALATION) at 14:14

## 2019-08-20 RX ADMIN — OXYCODONE HYDROCHLORIDE 5 MG: 5 TABLET ORAL at 22:55

## 2019-08-20 RX ADMIN — SIMVASTATIN 40 MG: 40 TABLET, FILM COATED ORAL at 22:55

## 2019-08-20 RX ADMIN — LEVOTHYROXINE SODIUM 125 MCG: 25 TABLET ORAL at 06:15

## 2019-08-20 RX ADMIN — ARIPIPRAZOLE 15 MG: 15 TABLET ORAL at 09:18

## 2019-08-20 RX ADMIN — Medication 10 ML: at 22:56

## 2019-08-20 RX ADMIN — FLUTICASONE PROPIONATE 2 SPRAY: 50 SPRAY, METERED NASAL at 09:20

## 2019-08-20 RX ADMIN — PAROXETINE 20 MG: 20 TABLET, FILM COATED ORAL at 09:19

## 2019-08-20 RX ADMIN — METOPROLOL TARTRATE 12.5 MG: 25 TABLET ORAL at 18:00

## 2019-08-20 RX ADMIN — ACETAZOLAMIDE 250 MG: 500 INJECTION, POWDER, LYOPHILIZED, FOR SOLUTION INTRAVENOUS at 12:52

## 2019-08-20 RX ADMIN — FAMOTIDINE 20 MG: 20 TABLET ORAL at 17:32

## 2019-08-20 RX ADMIN — IPRATROPIUM BROMIDE AND ALBUTEROL SULFATE 3 ML: .5; 3 SOLUTION RESPIRATORY (INHALATION) at 08:23

## 2019-08-20 RX ADMIN — Medication 10 ML: at 13:01

## 2019-08-20 RX ADMIN — FAMOTIDINE 20 MG: 20 TABLET ORAL at 09:18

## 2019-08-20 NOTE — PROGRESS NOTES
Hospitalist Progress Note    Patient: Charlotte Guido Age: 60 y.o. : 1960 MR#: [id number] SSN: [id number]  Date/Time: 2019 2:57 PM    DOA: 2019  PCP: Deonna Childress MD    Subjective:     She expressed that she feels better, however she did not use her machine last night. She only use he hospital PAP machine briefly. Her ABG remains respiratory acidosis, hypercapnia   No somnolence today    No bradycardia or pause today      ROS: no fever/chills, no headache, no dizziness, no facial pain, no sinus congestion,   No swallowing pain, No chest pain, no palpitation, + shortness of breath, no abd pain,  No diarrhea, no urinary complaint, no leg pain or swelling, +groin pain      Assessment/Plan:     1. Acute on chronic respiratory failure with persistent hypercapnia,        PERSISTENT RESPIRATORY ACIDOSIS   2.  COPD with severe ANDRADE/OHS, non compliance with CPAP machine use   3. Hypersomnia due CO2 narcosis, improved   4. Acute on chronic diastolic heart failure, improved with diuresis   5. Pulmonary hypertension   6. Persistent AFIB on xarelto, she had episode of bradycardia with slow ventricular response. 7.  Hypertension   8. CAD   9. Hypothyroidism   10. Morbid obesity   11. Hyperlipidemia  12. Depression/anxiety   13. Chronic back pain, lower leg pain, on narcotic OP  14. GERD       Unclear how to discharge this patient since she continues to be with respiratory acidosis   Educated her to use her CPAP machine at night with her home mask tonight,   ABG to monitor after she has use PAP machine   She continues on Duoneb treatment. She is on Bumex daily, tolerated metoprolol added back yesterday  Continue with Abilify, Paxil, simvastatin, Xarelto.  Cont levothyroxine     Full Code     Reviewed her disposition status, she will likely be discharged home with home health       Additional Notes:    Time spent >30 minutes    Case discussed with:  [x]Patient  [x]Family  [x]Nursing  [x]Case Management  DVT Prophylaxis:  []Lovenox  []Hep SQ  []SCDs  []Coumadin   [x]On Xarelto    Signed By: Gwyn Gibson MD     2019 2:57 PM              Objective:   VS:   Visit Vitals  /76   Pulse 70   Temp 98.3 °F (36.8 °C)   Resp 17   Ht 5' 2.99\" (1.6 m)   Wt 137.3 kg (302 lb 11.1 oz)   SpO2 100%   Breastfeeding? No   BMI 53.63 kg/m²      Tmax/24hrs: Temp (24hrs), Av.4 °F (36.9 °C), Min:98 °F (36.7 °C), Max:98.8 °F (37.1 °C)      Intake/Output Summary (Last 24 hours) at 2019 1457  Last data filed at 2019 1800  Gross per 24 hour   Intake 480 ml   Output 1100 ml   Net -620 ml       Tele: afib with RVR  General:  Cooperative, Not in acute distress, speaks in short sentence while in chair  Morbid obesity  HEENT: PERRL, EOMI, supple neck, no JVD, dry oral mucosa  Cardiovascular: S1S2 irregular, no rub/gallop   Pulmonary: air entry bilaterally, no wheezing, + crackle  GI:  Soft, non tender, non distended, +bs, no guarding   Extremities:  No pedal edema, +distal pulses appreciated   Her gluteal area without breakdown or boil. Neuro: AOx3, moving all extremities, no gross deficit.      Additional:       Current Facility-Administered Medications   Medication Dose Route Frequency    triamcinolone acetonide (KENALOG) 0.1 % cream   Topical BID    metoprolol tartrate (LOPRESSOR) tablet 12.5 mg  12.5 mg Oral BID    bumetanide (BUMEX) tablet 2 mg  2 mg Oral DAILY    albuterol-ipratropium (DUO-NEB) 2.5 MG-0.5 MG/3 ML  3 mL Nebulization Q6H RT    fluticasone propionate (FLONASE) 50 mcg/actuation nasal spray 2 Spray  2 Spray Both Nostrils DAILY    sodium chloride (OCEAN) 0.65 % nasal squeeze bottle 2 Spray  2 Spray Both Nostrils Q2H PRN    rivaroxaban (XARELTO) tablet 20 mg  20 mg Oral QPM    famotidine (PEPCID) tablet 20 mg  20 mg Oral BID    PARoxetine (PAXIL) tablet 20 mg  20 mg Oral DAILY    ARIPiprazole (ABILIFY) tablet 15 mg  15 mg Oral DAILY    simvastatin (ZOCOR) tablet 40 mg  40 mg Oral QHS    oxyCODONE IR (ROXICODONE) tablet 5 mg  5 mg Oral Q6H PRN    ondansetron (ZOFRAN) injection 4 mg  4 mg IntraVENous Q6H PRN    levothyroxine (SYNTHROID) tablet 125 mcg  125 mcg Oral 6am    sodium chloride (NS) flush 5-40 mL  5-40 mL IntraVENous Q8H    sodium chloride (NS) flush 5-40 mL  5-40 mL IntraVENous PRN    glucose chewable tablet 16 g  4 Tab Oral PRN    glucagon (GLUCAGEN) injection 1 mg  1 mg IntraMUSCular PRN    dextrose (D50W) injection syrg 12.5-25 g  25-50 mL IntraVENous PRN    acetaminophen (TYLENOL) tablet 650 mg  650 mg Oral Q4H PRN            Lab/Data Review:  Labs: Results:       Chemistry Recent Labs     08/20/19 0220 08/19/19  0300 08/18/19  0254   * 116* 94    139 138   K 4.1 4.0 4.3    104 103   CO2 30 28 29   BUN 23* 23* 20*   CREA 1.04 1.02 0.99   BUCR 22* 23* 20   AGAP 4 7 6   CA 8.4* 8.3* 9.0     No results for input(s): TBIL, ALT, SGOT, ALKP, TP, ALB, GLOB, AGRAT in the last 72 hours. CBC w/Diff Recent Labs     08/20/19 0220 08/19/19  0300 08/18/19  0254   WBC 6.0 5.3 6.2   RBC 4.25 4.25 4.80   HGB 10.8* 10.7* 12.3   HCT 37.5 37.0 42.7   MCV 88.2 87.1 89.0   MCH 25.4 25.2 25.6   MCHC 28.8* 28.9* 28.8*   RDW 16.2* 16.4* 16.5*    185 173   GRANS 63 63 60   LYMPH 21 26 26   EOS 4 4 3      Coagulation No results for input(s): PTP, INR, APTT in the last 72 hours.     No lab exists for component: INREXT, INREXT    Iron/Ferritin Lab Results   Component Value Date/Time    Iron 38 (L) 10/10/2018 08:30 AM    TIBC 319 10/10/2018 08:30 AM    Iron % saturation 12 10/10/2018 08:30 AM    Ferritin 33 10/10/2018 08:30 AM       BNP    Cardiac Enzymes Lab Results   Component Value Date/Time     05/19/2019 12:33 AM    CK - MB 2.9 05/19/2019 12:33 AM    CK-MB Index 2.0 05/19/2019 12:33 AM    Troponin-I, QT <0.02 08/12/2019 01:35 PM    B-type Natriuretic Peptide 3,187 (H) 05/06/2016 11:10 AM        Lactic Acid    Thyroid Studies          All Micro Results Procedure Component Value Units Date/Time    MRSA SCREEN - PCR (NASAL) [[id number]] Collected:  08/12/19 2330    Order Status:  Completed Specimen:  Nasal from Nares Updated:  08/13/19 0916     Special Requests: NO SPECIAL REQUESTS        Culture result:       MRSA target DNA is not detected (presumptive not colonized with MRSA)                  Images:    CT (Most Recent). XRAY (Most Recent)      EKG Results for orders placed or performed in visit on 06/06/19   AMB POC EKG ROUTINE W/ 12 LEADS, INTER & REP     Status: None    Narrative    Atrial fibrillation. Nonspecific QRS widening and anterior fascicular block. Nonspecific T-abnormaility.         2D ECHO

## 2019-08-20 NOTE — ROUTINE PROCESS
Bedside and Verbal shift change report given to [facility] [employer] (oncoming nurse) by William Sanders RN   (offgoing nurse). Report included the following information SBAR, MAR and Cardiac Rhythm . Arthur Marion

## 2019-08-20 NOTE — PROGRESS NOTES
Multiple SNF declined pt for placement stating they do not accept pt's insurance or pt does not have skilled benefits with her insurance. Spoke with pt's daughter Cory Apodaca on the phone. Informed her pt does not have skilled benefits with her insurance. She stated it is ok if pt is discharged with home health and she gave verbal agreement for [address] [place] [address] or any home care that will accept her insurance. We also discussed pt's compliance with PAP machine and she stated she already met with one of the doctors the day she brought the machine to the hospital.  She stated she will be monitoring pt to make sure she is compliant.       LESLEY WhittingtonN RN  Care Management  Pager: [phone]

## 2019-08-20 NOTE — PROGRESS NOTES
PATIENT  HAD  TAKEN  BIPAP OFF  . PLACED  NASAL  CANNULA  At  2lpm  Onto  Patient. O2  Sats  Are  98%.

## 2019-08-20 NOTE — PROGRESS NOTES
NUTRITION    Nutrition Screen      RECOMMENDATIONS / PLAN:     - Continue current nutrition interventions. - Continue RD inpatient monitoring and evaluation. NUTRITION INTERVENTIONS & DIAGNOSIS:     [x] Meals/snacks: modified composition    Nutrition Diagnosis: No nutrition diagnosis at this time. ASSESSMENT:     Tolerating diet with good appetite, consuming 100% of most meals. Average po intake adequate to meet patients estimated nutritional needs:   [x] Yes     [] No   [] Unable to determine at this time    Diet: DIET DIABETIC CONSISTENT CARB Regular      Food Allergies: NKFA  Current Appetite:   [x] Good     [] Fair     [] Poor     [] Other:  Appetite/meal intake prior to admission:   [x] Good     [] Fair     [] Poor     [] Other:  Feeding Limitations:  [] Swallowing difficulty    [] Chewing difficulty    [] Other:  Current Meal Intake:   Patient Vitals for the past 100 hrs:   % Diet Eaten   08/19/19 1800 100 %   08/19/19 1300 100 %   08/19/19 0800 100 %   08/18/19 1700 100 %   08/18/19 1200 100 %   08/18/19 0800 100 %   08/17/19 1726 75 %   08/17/19 1244 60 %   08/17/19 0839 100 %   08/16/19 1713 100 %   08/16/19 1219 100 %   08/16/19 0945 100 %       BM: 8/18  Skin Integrity: WDL  Edema:   [] No     [x] Yes   Pertinent Medications: Reviewed    Recent Labs     08/20/19  0220 08/19/19  0300 08/18/19  0254    139 138   K 4.1 4.0 4.3    104 103   CO2 30 28 29   * 116* 94   BUN 23* 23* 20*   CREA 1.04 1.02 0.99   CA 8.4* 8.3* 9.0       Intake/Output Summary (Last 24 hours) at 8/20/2019 1340  Last data filed at 8/19/2019 1800  Gross per 24 hour   Intake 480 ml   Output 1100 ml   Net -620 ml       Anthropometrics:  Ht Readings from Last 1 Encounters:   08/14/19 5' 2.99\" (1.6 m)     Last 3 Recorded Weights in this Encounter    08/17/19 1449 08/18/19 0300 08/20/19 0049   Weight: 145.8 kg (321 lb 6.9 oz) 138 kg (304 lb 3.8 oz) 137.3 kg (302 lb 11.1 oz)     Body mass index is 53.63 kg/m². Obese, Class III     Weight History: patient reports usual weight of 280 lb and fluctuations due to fluid accumulation     Weight Metrics 8/20/2019 7/14/2019 6/6/2019 5/23/2019 5/10/2019 1/24/2019 11/14/2018   Weight 302 lb 11.1 oz 333 lb 295 lb 284 lb 9.6 oz 310 lb - 299 lb   BMI 53.63 kg/m2 58.99 kg/m2 57.61 kg/m2 55.58 kg/m2 60.54 kg/m2 58.39 kg/m2 58.39 kg/m2        Admitting Diagnosis: COPD (chronic obstructive pulmonary disease) ([facility]) [J44.9]  Acute respiratory failure ([facility]) [J96.00]  Pertinent PMHx: CHF, HTN, GERD, CAD, hypothyroidism, obesity    Education Needs:        [x] None identified  [] Identified - Not appropriate at this time  []  Identified and addressed - refer to education log  Learning Limitations:   [x] None identified  [] Identified    Cultural, Gnosticism & ethnic food preferences:  [x] None identified    [] Identified and addressed     ESTIMATED NUTRITION NEEDS:     Calories: 1931-4476 kcal (MSJx1-1.1) based on  [x] Actual  kg     [] IBW   Protein: 110-137 gm (0.8-1 gm/kg) based on  [x] Actual BW      [] IBW   Fluid: 1 mL/kcal     MONITORING & EVALUATION:     Nutrition Goal(s):   1. Po intake of meals will meet >75% of patient estimated nutritional needs within the next 7 days. Outcome:  [] Met/Ongoing    [] Progressing towards goal    [] Not progressing towards goal    [x] New/Initial goal     Monitoring:   [x] Food and beverage intake   [x] Diet order   [x] Nutrition-focused physical findings   [x] Treatment/therapy   [] Weight   [] Enteral nutrition intake        Previous Recommendations (for follow-up assessments only):     []   Implemented       []   Not Implemented (RD to address)      [] No Longer Appropriate     [] No Recommendation Made     Discharge Planning: No nutritional discharge needs at this time.    [x] Participated in care planning, discharge planning, & interdisciplinary rounds as appropriate      Lilliana Erickson, [address]    Pager: [phone]

## 2019-08-21 ENCOUNTER — HOME HEALTH ADMISSION (OUTPATIENT)
Dept: HOME HEALTH SERVICES | Facility: HOME HEALTH | Age: 59
End: 2019-08-21
Payer: MEDICAID

## 2019-08-21 VITALS
OXYGEN SATURATION: 100 % | BODY MASS INDEX: 51.17 KG/M2 | RESPIRATION RATE: 12 BRPM | DIASTOLIC BLOOD PRESSURE: 51 MMHG | SYSTOLIC BLOOD PRESSURE: 100 MMHG | HEIGHT: 63 IN | TEMPERATURE: 97.9 F | WEIGHT: 288.8 LBS | HEART RATE: 58 BPM

## 2019-08-21 LAB
ANION GAP SERPL CALC-SCNC: 7 MMOL/L (ref 3–18)
ARTERIAL PATENCY WRIST A: YES
BASE EXCESS BLD CALC-SCNC: 4 MMOL/L
BASOPHILS # BLD: 0 K/UL (ref 0–0.1)
BASOPHILS NFR BLD: 0 % (ref 0–2)
BDY SITE: ABNORMAL
BODY TEMPERATURE: 98.6
BUN SERPL-MCNC: 24 MG/DL (ref 7–18)
BUN/CREAT SERPL: 21 (ref 12–20)
CALCIUM SERPL-MCNC: 8.5 MG/DL (ref 8.5–10.1)
CHLORIDE SERPL-SCNC: 103 MMOL/L (ref 100–111)
CO2 SERPL-SCNC: 25 MMOL/L (ref 21–32)
CREAT SERPL-MCNC: 1.17 MG/DL (ref 0.6–1.3)
DIFFERENTIAL METHOD BLD: ABNORMAL
EOSINOPHIL # BLD: 0.2 K/UL (ref 0–0.4)
EOSINOPHIL NFR BLD: 4 % (ref 0–5)
ERYTHROCYTE [DISTWIDTH] IN BLOOD BY AUTOMATED COUNT: 16.1 % (ref 11.6–14.5)
GAS FLOW.O2 O2 DELIVERY SYS: ABNORMAL L/MIN
GLUCOSE SERPL-MCNC: 78 MG/DL (ref 74–99)
HCO3 BLD-SCNC: 30.9 MMOL/L (ref 22–26)
HCT VFR BLD AUTO: 42.5 % (ref 35–45)
HGB BLD-MCNC: 12.8 G/DL (ref 12–16)
LYMPHOCYTES # BLD: 1.2 K/UL (ref 0.9–3.6)
LYMPHOCYTES NFR BLD: 25 % (ref 21–52)
MCH RBC QN AUTO: 26.3 PG (ref 24–34)
MCHC RBC AUTO-ENTMCNC: 30.1 G/DL (ref 31–37)
MCV RBC AUTO: 87.3 FL (ref 74–97)
MONOCYTES # BLD: 0.4 K/UL (ref 0.05–1.2)
MONOCYTES NFR BLD: 9 % (ref 3–10)
NEUTS SEG # BLD: 3 K/UL (ref 1.8–8)
NEUTS SEG NFR BLD: 62 % (ref 40–73)
O2/TOTAL GAS SETTING VFR VENT: 32 %
PCO2 BLD: 71.2 MMHG (ref 35–45)
PH BLD: 7.25 [PH] (ref 7.35–7.45)
PLATELET # BLD AUTO: 198 K/UL (ref 135–420)
PMV BLD AUTO: 10.3 FL (ref 9.2–11.8)
PO2 BLD: 85 MMHG (ref 80–100)
POTASSIUM SERPL-SCNC: 4.1 MMOL/L (ref 3.5–5.5)
RBC # BLD AUTO: 4.87 M/UL (ref 4.2–5.3)
SAO2 % BLD: 94 % (ref 92–97)
SERVICE CMNT-IMP: ABNORMAL
SODIUM SERPL-SCNC: 135 MMOL/L (ref 136–145)
SPECIMEN TYPE: ABNORMAL
TOTAL RESP. RATE, ITRR: 15
WBC # BLD AUTO: 4.9 K/UL (ref 4.6–13.2)

## 2019-08-21 PROCEDURE — 74011000250 HC RX REV CODE- 250: Performed by: INTERNAL MEDICINE

## 2019-08-21 PROCEDURE — 80048 BASIC METABOLIC PNL TOTAL CA: CPT

## 2019-08-21 PROCEDURE — 74011250637 HC RX REV CODE- 250/637: Performed by: PHYSICIAN ASSISTANT

## 2019-08-21 PROCEDURE — 82803 BLOOD GASES ANY COMBINATION: CPT

## 2019-08-21 PROCEDURE — 85025 COMPLETE CBC W/AUTO DIFF WBC: CPT

## 2019-08-21 PROCEDURE — 77010033678 HC OXYGEN DAILY

## 2019-08-21 PROCEDURE — 74011250637 HC RX REV CODE- 250/637: Performed by: INTERNAL MEDICINE

## 2019-08-21 PROCEDURE — 36415 COLL VENOUS BLD VENIPUNCTURE: CPT

## 2019-08-21 PROCEDURE — 36600 WITHDRAWAL OF ARTERIAL BLOOD: CPT

## 2019-08-21 PROCEDURE — 74011250636 HC RX REV CODE- 250/636: Performed by: INTERNAL MEDICINE

## 2019-08-21 PROCEDURE — 94640 AIRWAY INHALATION TREATMENT: CPT

## 2019-08-21 PROCEDURE — 94762 N-INVAS EAR/PLS OXIMTRY CONT: CPT

## 2019-08-21 PROCEDURE — 74011250637 HC RX REV CODE- 250/637: Performed by: FAMILY MEDICINE

## 2019-08-21 PROCEDURE — 74011000250 HC RX REV CODE- 250: Performed by: PHYSICIAN ASSISTANT

## 2019-08-21 RX ORDER — RIVAROXABAN 20 MG/1
20 TABLET, FILM COATED ORAL
Qty: 30 TAB | Refills: 3 | Status: SHIPPED | OUTPATIENT
Start: 2019-08-21

## 2019-08-21 RX ORDER — TRIAMCINOLONE ACETONIDE 1 MG/G
2 CREAM TOPICAL 2 TIMES DAILY
Qty: 45 G | Refills: 0 | Status: SHIPPED | OUTPATIENT
Start: 2019-08-21

## 2019-08-21 RX ORDER — BUMETANIDE 2 MG/1
2 TABLET ORAL DAILY
Qty: 60 TAB | Refills: 3 | Status: SHIPPED | OUTPATIENT
Start: 2019-08-22 | End: 2020-06-09 | Stop reason: SDUPTHER

## 2019-08-21 RX ORDER — IPRATROPIUM BROMIDE AND ALBUTEROL SULFATE 2.5; .5 MG/3ML; MG/3ML
3 SOLUTION RESPIRATORY (INHALATION)
Qty: 30 NEBULE | Refills: 6 | Status: SHIPPED | OUTPATIENT
Start: 2019-08-21

## 2019-08-21 RX ADMIN — OXYCODONE HYDROCHLORIDE 5 MG: 5 TABLET ORAL at 09:17

## 2019-08-21 RX ADMIN — RIVAROXABAN 20 MG: 20 TABLET, FILM COATED ORAL at 17:11

## 2019-08-21 RX ADMIN — ACETAZOLAMIDE 500 MG: 500 INJECTION, POWDER, LYOPHILIZED, FOR SOLUTION INTRAVENOUS at 09:18

## 2019-08-21 RX ADMIN — LEVOTHYROXINE SODIUM 125 MCG: 25 TABLET ORAL at 05:53

## 2019-08-21 RX ADMIN — IPRATROPIUM BROMIDE AND ALBUTEROL SULFATE 3 ML: .5; 3 SOLUTION RESPIRATORY (INHALATION) at 08:02

## 2019-08-21 RX ADMIN — BUMETANIDE 2 MG: 1 TABLET ORAL at 09:17

## 2019-08-21 RX ADMIN — FAMOTIDINE 20 MG: 20 TABLET ORAL at 09:08

## 2019-08-21 RX ADMIN — METOPROLOL TARTRATE 12.5 MG: 25 TABLET ORAL at 09:09

## 2019-08-21 RX ADMIN — METOPROLOL TARTRATE 12.5 MG: 25 TABLET ORAL at 17:11

## 2019-08-21 RX ADMIN — OXYCODONE HYDROCHLORIDE 5 MG: 5 TABLET ORAL at 18:09

## 2019-08-21 RX ADMIN — IPRATROPIUM BROMIDE AND ALBUTEROL SULFATE 3 ML: .5; 3 SOLUTION RESPIRATORY (INHALATION) at 13:21

## 2019-08-21 RX ADMIN — ARIPIPRAZOLE 15 MG: 15 TABLET ORAL at 09:08

## 2019-08-21 RX ADMIN — FAMOTIDINE 20 MG: 20 TABLET ORAL at 17:11

## 2019-08-21 RX ADMIN — FLUTICASONE PROPIONATE 2 SPRAY: 50 SPRAY, METERED NASAL at 09:11

## 2019-08-21 RX ADMIN — PAROXETINE 20 MG: 20 TABLET, FILM COATED ORAL at 09:10

## 2019-08-21 RX ADMIN — Medication 10 ML: at 07:17

## 2019-08-21 RX ADMIN — TRIAMCINOLONE ACETONIDE: 1 CREAM TOPICAL at 09:11

## 2019-08-21 NOTE — PROGRESS NOTES
[facility]  Pulmonary, Critical Care, and Sleep Medicine    Name: Danni Price MRN: [id number]   : 1960 Hospital: [address] [place] [address]   Date: 2019      F/U consultation    Consulting: Alicia Conley  Reason: hypercarbic resp failure  Subjective/Interval History:   19  Patient seen and examined at bedside. No acute events overnight. Patient wore hospital BiPAP, no blood gas was obtained in the morning. Patient did not wear her home BiPAP because no circuit was reported present. However I inspected this at bedside, there was a mask and tubing with circuit. Patient denies any complaints, no nausea, vomiting, diarrhea, chest pain, dyspnea      HPI per Dr. Darling Lewis:  Patient is a 56 y.o. female with PMH significant for COPD ( on 3 LPM NC at home), HFpEF, ANDRADE (CPAP at home but non-compliant), Afib (on Xarelto at home), HTN and morbid obesity who presented to [facility] ED on 19 c/o sudden onset with acute worsening of SOB with associated CP and sleepiness. PT admitted for Acute on chronic hypoxic hypercapnic respiratory failure, 2/2 severe ANDRADE/OHS with component of CHF exacerbation as well. S/p brief stay in ICU with continuous BiPAP and diuresis. Downgraded to SDU on  and doing well. Course complicated with bradycardia with EKG showing Afib with slow ventricular response. BB stopped (08/15) with HR improvement. 19  Contacted patient's DME-[facility]- they recently stopped accepting her current insurance- [employer]    19  Daughter brought in Home Device: Auto BIPAP- Resmed Does not have a backup rate:  IPAP: 12  EPAP 5  PS 4 TiMax 2. No use recorded on device over the past 6 months: [address] [facility]- these settings are inadequate for patient's current status. · Patient switched back to regular BIPAP on settings that can be used on her current Auto BIPAP device. Patient's OP device has a max IPAP of 25 and NO backup rate.    · Patient appeared to tolerate BIPAP fairly well yesterday- PCo2 improved on this mornings ABG  · Some leak issues with FFM due to higher pressures but acceptable. · Afebrile  · CO2 down to 29    Current: OP PAP Device: ResMed: AirCurve 10- Auto: This unit does not have a back up rate. - I change settings today  MAX IPAP: 25  Min EPAP: 10  PS: 8  TiMax: 2.0, TiMin: 0.3  Full Face Mask:   Ramp: Off   Humidity: changed from 4-->7 (8 is max setting)    ROS:A comprehensive review of systems was negative except for that written in the HPI. Objective:   Vital Signs:    Visit Vitals  /51 (BP 1 Location: Right arm, BP Patient Position: At rest)   Pulse 70   Temp 98.3 °F (36.8 °C)   Resp 18   Ht 5' 2.99\" (1.6 m)   Wt 137.3 kg (302 lb 11.1 oz)   SpO2 100%   Breastfeeding? No   BMI 53.63 kg/m²       O2 Device: Nasal cannula   O2 Flow Rate (L/min): 3 l/min   Temp (24hrs), Av.5 °F (36.9 °C), Min:98.3 °F (36.8 °C), Max:98.8 °F (37.1 °C)       Intake/Output:   Last shift:      No intake/output data recorded. Last 3 shifts:  07 -  1900  In: 1440 [P.O.:1440]  Out: 3350 [Urine:3350]    Intake/Output Summary (Last 24 hours) at 2019 2345  Last data filed at 2019 1832  Gross per 24 hour   Intake    Output 1200 ml   Net -1200 ml        Physical Exam:   General:  AOx3, cooperative, NAD; appears stated age, sitting up in bed   HEENT:  Normocephalic,atraumatic; PERRL; EOMI; Conjunctivae/corneas clear; Anicteric; nares/mucosa normal, moist; No drainage/sinus tenderness; Neck supple, symmetrical; trachea midline; No adenopathy; No carotid bruit; No JVD   Back:   Symmetric, no curvature, no spine tenderness or flank pain   Lungs:   Symmetrical chest rise; mod AE Bilat; distant breath sounds 2/2 body habitus; diminished bibasilar; CTABL; No wheezes/rhonchi/rales noted. Chest wall:  No tenderness or deformity. NO CREPITUS   Heart:  IRRR, S1/S2, no m/r/g   Abdomen:   Soft, obese; non-tender.(+) B. S x4; No masses/palpable organomegaly/paradoxical motion   Extremities: Normal, atraumatic, no cyanosis or clubbing. 1+ pitting edema bilaterally. Pulses: 1-2+ and symmetric all extremities. Skin:  Has multiple 1-like lesions located along face, upper chest and arms, old scarring, skin color, texture, turgor normal. No other rashes or lesions   Lymph nodes: Cervical, supraclavicular nodes - unremarkable   Neurologic: Non focal, strength and coordination grossly intact throughout all extremities         DATA:  Labs:  ABG:    Lab Results   Component Value Date/Time    PHI 7.283 (L) 08/20/2019 10:04 PM    PCO2I 70.9 (H) 08/20/2019 10:04 PM    PO2I 118 (H) 08/20/2019 10:04 PM    HCO3I 33.6 (H) 08/20/2019 10:04 PM    FIO2I 32 08/20/2019 10:04 PM           Recent Labs     08/20/19  0220 08/19/19  0300 08/18/19  0254   WBC 6.0 5.3 6.2   HGB 10.8* 10.7* 12.3   HCT 37.5 37.0 42.7    185 173     Recent Labs     08/20/19  0220 08/19/19  0300 08/18/19  0254    139 138   K 4.1 4.0 4.3    104 103   CO2 30 28 29   * 116* 94   BUN 23* 23* 20*   CREA 1.04 1.02 0.99   CA 8.4* 8.3* 9.0     Lab Results   Component Value Date/Time    B-type Natriuretic Peptide 3,187 (H) 05/06/2016 11:10 AM    B-type Natriuretic Peptide 3,075 (H) 05/06/2016 11:00 AM    NT pro- 08/18/2019 02:54 AM    NT pro-BNP 1,451 (H) 08/16/2019 02:40 AM    NT pro-BNP 2,217 (H) 08/15/2019 01:53 AM    NT pro-BNP 3,494 (H) 08/13/2019 03:48 AM    NT pro-BNP 3,381 (H) 08/12/2019 09:08 AM         ECHO [05/21/19]: · Left Ventricle: Mild concentric hypertrophy. Estimated left ventricular ejection fraction is 51 - 55%. Visually measured ejection fraction. No regional wall motion abnormality noted. Inconclusive left ventricular diastolic function. · Right Ventricle: Dilated right ventricle. Reduced systolic function. Abnormal right ventricular septal motion. Systolic flattening of interventricular septum consistent with right ventricle pressure overload.   · Right Atrium: Dilated right atrium. · Tricuspid Valve: Mild to moderate tricuspid valve regurgitation is present. · Pulmonary Artery: Moderate to severe pulmonary hypertension at 61 mmHg. · IVC/Hepatic Veins: Moderately elevated central venous pressure (10-15 mmHg); IVC diameter is larger than 21 mm and collapses more than 50% with respiration. Imaging:  [x]I have personally reviewed the patients radiographs  []Radiographs reviewed with radiologist   []No change from prior, tubes and lines in adequate position  []Improved   []Worsening      CXR [08/14/19]: FINDINGS:  Stable moderate enlarged cardiac silhouette. Stable enlargement of the ravindra due to known enlarged pulmonary arteries. No pneumothorax. No significant pleural effusion. Interval improvement in basilar lung aeration, vascular congestion and pulmonary edema. No consolidation. Intact osseous structures.     IMPRESSION:  Stable cardiomegaly. Improved lung aeration and pulmonary edema      IMPRESSION:   · Acute encephalopathy- due to persistent hypercapnea  · Acute on Chronic Hypercapnic Respiratory Failure - 2/2 severe ANDRADE/OHA and CHF exacerbation - Improving  · HFprEF with current CHF Exacerbation- acute on chronic diastolic heart failure; improving with diuresis  · Severe ANDRADE/OHS -patient did not wear BiPAP for very long overnight. States she currently does not use her PAP device at home - was lost while moving; states family is currently unpacking boxes to find device. · Pulmonary HTN  · Afib, on Xarelto - Restarted 08/14  · Hearing deficient: wearing hearing aid  · Super morbid obesity : Body mass index is 53.63 kg/m². RECOMMENDATIONS:   · SpO2 goal btwn 88-93%; titrate supp O2 PRN  · AGAIN, we will attempt home BIPAP to see if patient can tolerate device. We will obtain ABG just prior to initiation of therapy as well as just before removing mask in the morning.   Advised the patient that she needs full compliance in order to avoid episode of respiratory failure. Counseled the patient against noncompliance, given that patient did not use hospital BiPAP all night. This was discussed in depth with RT and nursing at bedside  · Avoid narcotics as much as possible as this will promote further hypoventilation  · Encourage ICS; Bronchial hygiene protocol  · Aspiration precautions - HOB >30  · Duo-nebs q6 scheduled; con't Flonase  · HD stable, Hr improved with D/C of BB, Primary to manage. · Hold Diamox. Will give additional doses of patient's bicarb increases above 30. · Assess home Oxygen needs at discharge  · If patient does not start using PAP as outpatient, anticipate recurrent admissions, potentially life- threatening exacerbations and potential future need for tracheostomy. · OT, PT, OOB and ambulate  · Healthy weight encouraged  · Will Follow  · DVT prophylaxis per Primary Team   · OP pulmonary follow up with sleep medicine-Dr. Rene Way in 2 to 4 weeks post discharge     High complexity decision making was performed during the evaluation of this patient at high risk for decompens ation with multiple organ involvement  Total time spent in clinical encounter 43 minutes    Above mentioned total time spent on reviewing the case/medical record/data/notes/EMR/patient examination/documentation/coordinating care with nurse/consultants, exclusive of procedures with complex decision making performed and > 50% time spent in face to face evaluation.       Elio Kat MD/MPH     Pulmonary, Critical Care Medicine  [address] [place] [address] [facility]

## 2019-08-21 NOTE — ROUTINE PROCESS
Bedside and Verbal shift change report given to [address] [place] [address] (oncoming nurse) by Neha Walker RN   (offgoing nurse). Report given with SBAR, Kardex, Intake/Output, MAR, Accordion and Recent Results.

## 2019-08-21 NOTE — PROGRESS NOTES
Pt aox4 needs lots of re education regarding use of c-pap every time take naps/ fall asleep,  Discharge home order with DME order,  on board. Daughter will be availabe to  pt after 1730 this evening. 1728 discharge instructions and prescriptions given to patient and daughter, verbalizes understanding, emphasized the importance of putting on the cpap machine when sleeping, C PAP  by respiratory.       1811 patient discharged with transport, stable condition

## 2019-08-21 NOTE — PROGRESS NOTES
Contacted [facility] for nebulizer order. Per pt, her daughter will be coming to pick her up at 5:30pm.  Pt's nurse aware. Discharge order noted for today. Pt has been accepted to [facility] agency. Met with patient and her daughter  are agreeable to the transition plan today. Transport has been arranged through pt's daughter. Patient's home health  orders have been forwarded to [facility] home health  agency. Updated bedside RN, Eve,  to the transition plan. Discharge information has been documented on the AVS.     [facility] does not accept pt's insurance. Nebulizer order has been faxed to [facility]. Informed pt's nurse Eve and pt. Called [facility] and spoke with Mark Harley who stated it takes about 3 hours for orders to come through to them. This CM will follow up tomorrow.       LESLEY MorrellN RN  Care Management  Pager: [phone]

## 2019-08-21 NOTE — DISCHARGE SUMMARY
Discharge Summary    Patient: Isael Cobian               Sex: female          DOA: 8/12/2019         YOB: 1960      Age:  60 y.o.        LOS:  LOS: 9 days                Admit Date: 8/12/2019    Discharge Date: 8/21/2019    Primary care physician: Davis Bobby MD    Discharge Diagnoses:    1. Acute on chronic respiratory failure with persistent hypercapnia, improved  2. PERSISTENT RESPIRATORY ACIDOSIS  3. Hypersomnia due CO2 narcosis, resolved    4. COPD with severe ANDRADE/OHS, non compliance with CPAP machine use   5. Acute on chronic diastolic heart failure, improved with diuresis   6. Pulmonary hypertension   7. Persistent AFIB on xarelto, with bradycardia with slow ventricular response. resolved    Discharge Condition: Stable  Disposition: home with home health   Code Status: Full Code     Follow up for Primary Care Physician:  1) She needs close follow up with her Pulmonologist to help with her chronic respiratory status. 2) Please continue to advocate and remind her to use her CPAP during anytime she sleep. 3) Note, her lasix/aldactone have been stopped, Bumex has been started, monitor her renal function and clinical status and adjust medication. Hospital Course:   60 y.o female with Morbid obesity, ANDRADE/OHS, chronic respiratory failure, CAD, chronic diastolic heart failure, HTN,  AFIB, hypothyroidism, presented to ER with worsened shortness of breath and being somnolent. She also report intermittent chest pain and leg swelling. In the ER, she was on BiPAP with ABG pH7.275, pCO2 83, pO2 83, bicarb 38. Her probnp [phone] with negative troponin. Chest xray was done but no clear infection, she received IV ceftriaxone and azithromycin. Her blood gas improved with BIPAP support. She was transferred and admitted to the ICU. She also received diuretic with diamox. She was transferred out to [facility] unit on 8/14/2019 with O2 needs at 3Liters.    She remains non-compliant with her BiPAP support with persistent ABG showing respiratory acidosis. Her mentation improved and no new respiratory complaint. Her daughter brought in her home CPAP devise for use but she remained non-complaint with it use during sleep. Pulmonary continue to follow and help her understand the importance of using her PAP device during sleep. Since her mentation improved over the course of her stay and remained in respiratory acidosis, suggest her tolerant. Dr. Johnnie Cavanaugh spoke with daughter extensively about encourage her mother to use PAP machine. She can be discharged but she can bring patient back to ER if pt continues to be somnolent again. Patient vehemently want to go home today, and re-iterated that she will be compliant with PAP device. During her state, she was diuresed with about +14Liter of fluid with improved in leg swelling and breathing. Her duplex was negative for clot. She was seen by Cardiology and transition to bumex orally. She was continue on her rate control medication but she was observed to have slow ventricular response. Cardiology did not think that she need PPM. Her betablocker was resumed without further slow ventricular rate. Last 24 Hours: she remains alert and expressed that she want to go home. No report of somnolence within the last few days. She was able to use her CPAP last night, tolerated well. She expressed understanding to use every time she sleep including naps and at bedtime. Groin pain resolved with cream yesterday. No fever. ROS: no fever/chills, no headache, no dizziness, no facial pain, no sinus congestion,   No swallowing pain, No chest pain, no palpitation, + shortness of breath, no abd pain,  No diarrhea, no urinary complaint, no leg pain or swelling    VS:   Visit Vitals  /55   Pulse (!) 59   Temp 98.6 °F (37 °C)   Resp 13   Ht 5' 2.99\" (1.6 m)   Wt 131 kg (288 lb 12.8 oz)   SpO2 100%   Breastfeeding?  No   BMI 51.17 kg/m²      Tmax/24hrs: Temp (24hrs), Av.4 °F (36.9 °C), Min:98.2 °F (36.8 °C), Max:98.6 °F (37 °C)      Intake/Output Summary (Last 24 hours) at 2019 1522  Last data filed at 2019 1457  Gross per 24 hour   Intake 580 ml   Output 1600 ml   Net -1020 ml       Tele: afib with rate at 60s  General:  Cooperative, Not in acute distress, speaks in short sentence while in bed  Morbid obesity  HEENT: PERRL, EOMI, supple neck, no JVD, dry oral mucosa  Cardiovascular: S1S2 irregular, no rub/gallop   Pulmonary: air entry bilaterally, no wheezing, + crackle  GI:  Soft, non tender, non distended, +bs, no guarding   Extremities:  No pedal edema, +distal pulses appreciated   Neuro: AOx3, moving all extremities, no gross deficit. :     Consults:   Pulmonary: Dr. Yue Cody  Cardiology: Dr Linda Patino     Significant Diagnostic Studies:     XR Results (most recent):  Results from Hospital Encounter encounter on 19   XR CHEST PORT    Narrative EXAM:  Chest Portable. INDICATION:  COPD. Chronic respiratory failure. COMPARISON:  19. TECHNIQUE:  Portable AP chest study    FINDINGS:      - The retrocardiac region is difficult to evaluate confidently due to suboptimal  penetration. Subtle infiltrate cannot be entirely excluded. - No definite airspace opacities are noted elsewhere. - Moderate cardiac silhouette enlargement. - Enlargement of the pulmonary artery silhouette compared to the adjacent aortic  arch silhouette, i.e. 4.1 cm vs. 3.5 cm respectively. Hilar regions demonstrate  prominently distended vessels with rapid tapering. This pattern is felt to be  suggestive of pulmonary arterial hypertension.  - No peripheral septal line thickening. No pneumothorax or convincing evidence  of pleural effusion. Impression IMPRESSION:    1. Cardiac enlargement with pulmonary arterial and hilar vascular enlargement. Rapid tapering of the hilar vessels. Suggestive of pulmonary arterial  hypertension.     2.  Technically suboptimal study with suboptimal penetration of the retrocardiac  region. No airspace opacities elsewhere. Chest xray 8/14/2019  FINDINGS:  Stable moderate enlarged cardiac silhouette. Stable enlargement of the ravindra due  to known enlarged pulmonary arteries. No pneumothorax. No significant pleural  effusion. Interval improvement in basilar lung aeration, vascular congestion and  pulmonary edema. No consolidation. Intact osseous structures.     IMPRESSION  IMPRESSION:     Stable cardiomegaly. Improved lung aeration and pulmonary edema. Duplex of legs: 8/12/2019  Interpretation Summary     The peroneal veins were not well visualized due to large body habitus. No evidence of DVT within the left lower extremity or right common femoral vein   Lower Extremity Venous Findings     Right Lower Venous     For comparison purposes, the right common femoral vein was briefly interrogated. The vein demonstrates normal color filing and compressibility. Doppler flow was phasic and spontaneous. Left Lower Venous     No evidence of deep vein thrombosis in the common femoral, profunda femoral, superficial femoral, popliteal, posterior tibial, and peroneal veins. The veins were imaged in the transverse and longitudinal planes. The vessels showed normal color filling and compressibility. Doppler interrogation showed phasic and spontaneous flow. The peroneal vein(s) were not well visualized. The left posterior tibial artery has biphasic waveforms.          Lab/Data Review:  Labs: Results:       Chemistry Recent Labs     08/21/19  0301 08/20/19  0220 08/19/19  0300   GLU 78 119* 116*   * 136 139   K 4.1 4.1 4.0    102 104   CO2 25 30 28   BUN 24* 23* 23*   CREA 1.17 1.04 1.02   CA 8.5 8.4* 8.3*   AGAP 7 4 7   BUCR 21* 22* 23*      CBC w/Diff Recent Labs     08/21/19  0301 08/20/19  0220 08/19/19  0300   WBC 4.9 6.0 5.3   RBC 4.87 4.25 4.25   HGB 12.8 10.8* 10.7*   HCT 42.5 37.5 37.0    214 185   GRANS 62 63 63   LYMPH 25 21 26   EOS 4 4 4      Coagulation    Iron/Ferritin    BNP    Cardiac Enzymes    Liver Enzymes    Thyroid Studies      All Micro Results     Procedure Component Value Units Date/Time    MRSA SCREEN - PCR (NASAL) [[id number]] Collected:  08/12/19 3640    Order Status:  Completed Specimen:  Nasal from Nares Updated:  08/13/19 0999     Special Requests: NO SPECIAL REQUESTS        Culture result:       MRSA target DNA is not detected (presumptive not colonized with MRSA)                      Medications at discharge  including reasons for change and indications for new ones:   Current Discharge Medication List      START taking these medications    Details   triamcinolone acetonide (KENALOG) 0.1 % topical cream Apply 2 g to affected area two (2) times a day. use thin layer  Qty: 45 g, Refills: 0      bumetanide (BUMEX) 2 mg tablet Take 1 Tab by mouth daily. Indications: visible water retention, high blood pressure  Qty: 60 Tab, Refills: 3         CONTINUE these medications which have CHANGED    Details   XARELTO 20 mg tab tablet Take 1 Tab by mouth daily (with dinner). Qty: 30 Tab, Refills: 3      albuterol-ipratropium (DUO-NEB) 2.5 mg-0.5 mg/3 ml nebu 3 mL by Nebulization route every six (6) hours as needed (shortness of beath, wheezing). Indications: Bronchi Muscle Spasm resulting from COPD  Qty: 30 Nebule, Refills: 6         CONTINUE these medications which have NOT CHANGED    Details   metoprolol tartrate (LOPRESSOR) 25 mg tablet Take 0.5 Tabs by mouth daily. Qty: 30 Tab, Refills: 6      polyethylene glycol (MIRALAX) 17 gram packet Take 1 Packet by mouth daily. Qty: 30 Packet, Refills: 0      oxyCODONE IR (ROXICODONE) 5 mg immediate release tablet Take 1 Tab by mouth every six (6) hours as needed for Pain.  Max Daily Amount: 20 mg.  Qty: 6 Tab, Refills: 0    Associated Diagnoses: Primary osteoarthritis of right hip      senna-docusate (PERICOLACE) 8.6-50 mg per tablet Take 1 Tab by mouth nightly. Qty: 30 Tab, Refills: 0      fluticasone (FLONASE) 50 mcg/actuation nasal spray 2 Sprays by Both Nostrils route daily. Qty: 1 Bottle, Refills: 2      isosorbide mononitrate ER (IMDUR) 30 mg tablet Take 1 Tab by mouth every evening. Qty: 90 Tab, Refills: 3      multivitamin, tx-iron-ca-min (THERA-M W/ IRON) 9 mg iron-400 mcg tab tablet Take 1 Tab by mouth daily. Qty: 30 Tab, Refills: 0      raNITIdine (ZANTAC) 150 mg tablet Take 150 mg by mouth two (2) times a day. VENTOLIN HFA 90 mcg/actuation inhaler Take 2 Puffs by inhalation every four (4) hours as needed for Wheezing or Shortness of Breath. Qty: 1 Inhaler, Refills: 0      PARoxetine (PAXIL) 20 mg tablet Take 1 Tab by mouth daily. Qty: 30 Tab, Refills: 3      simvastatin (ZOCOR) 40 mg tablet Take 40 mg by mouth nightly. to obtain from pharmacy      aripiprazole (ABILIFY) 15 mg tablet Take 15 mg by mouth daily. to obtain from pharmacy      levothyroxine (SYNTHROID) 125 mcg tablet Take 125 mcg by mouth Daily (before breakfast). STOP taking these medications       omeprazole (PRILOSEC) 20 mg capsule Comments:   Reason for Stopping:         spironolactone (ALDACTONE) 25 mg tablet Comments:   Reason for Stopping:         furosemide (LASIX) 40 mg tablet Comments:   Reason for Stopping:                       Pending laboratory work and tests: none    Activity: Activity as tolerated  Encourage to use CPAP with sleep, continue oxygen supplement.       Diet: Cardiac Diet, Diabetic Diet and Low fat, Low cholesterol    Wound Care: None needed        Jordan Romo MD  8/21/2019  3:15 PM

## 2019-08-21 NOTE — DISCHARGE INSTRUCTIONS
DISCHARGE SUMMARY from Nurse    PATIENT INSTRUCTIONS:    After general anesthesia or intravenous sedation, for 24 hours or while taking prescription Narcotics:  · Limit your activities  · Do not drive and operate hazardous machinery  · Do not make important personal or business decisions  · Do  not drink alcoholic beverages  · If you have not urinated within 8 hours after discharge, please contact your surgeon on call. Report the following to your surgeon:  · Excessive pain, swelling, redness or odor of or around the surgical area  · Temperature over 100.5  · Nausea and vomiting lasting longer than 4 hours or if unable to take medications  · Any signs of decreased circulation or nerve impairment to extremity: change in color, persistent  numbness, tingling, coldness or increase pain  · Any questions    What to do at Home:  Recommended activity: Activity as tolerated and PT/OT per Home Health, as tolerated    If you experience any of the following symptoms shortness of breath chest pains, please follow up with Primary Doctor    *  Please give a list of your current medications to your Primary Care Provider. *  Please update this list whenever your medications are discontinued, doses are      changed, or new medications (including over-the-counter products) are added. *  Please carry medication information at all times in case of emergency situations. These are general instructions for a healthy lifestyle:    No smoking/ No tobacco products/ Avoid exposure to second hand smoke  Surgeon General's Warning:  Quitting smoking now greatly reduces serious risk to your health.     Obesity, smoking, and sedentary lifestyle greatly increases your risk for illness    A healthy diet, regular physical exercise & weight monitoring are important for maintaining a healthy lifestyle    You may be retaining fluid if you have a history of heart failure or if you experience any of the following symptoms:  Weight gain of 3 pounds or more overnight or 5 pounds in a week, increased swelling in our hands or feet or shortness of breath while lying flat in bed. Please call your doctor as soon as you notice any of these symptoms; do not wait until your next office visit. The discharge information has been reviewed with the patient. The patient verbalized understanding. Discharge medications reviewed with the patient and appropriate educational materials and side effects teaching were provided. ___________________________________________________________________________________________________________________________________  Discharge Instructions    Patient: Claude Davidson MRN: [id number]  CSN: [id number]    YOB: 1960  Age: 60 y.o. Sex: female    DOA: 8/12/2019 LOS:  LOS: 9 days   Discharge Date:      ACUTE DIAGNOSES:    1. Acute on chronic respiratory failure with persistent hypercapnia, improved  2. PERSISTENT RESPIRATORY ACIDOSIS  3. Hypersomnia due CO2 narcosis, resolved    4. COPD with severe ANDRADE/OHS, non compliance with CPAP machine use   5. Acute on chronic diastolic heart failure, improved with diuresis   6. Pulmonary hypertension   7. Persistent AFIB on xarelto, with bradycardia with slow ventricular response. resolved        DISCHARGE MEDICATIONS:       · It is important that you take the medication exactly as they are prescribed. · Keep your medication in the bottles provided by the pharmacist and keep a list of the medication names, dosages, and times to be taken in your wallet. · Do not take other medications without consulting your doctor. DIET:  Cardiac Diet, Diabetic Diet and Low fat, Low cholesterol    ACTIVITY: Activity as tolerated  Please use your CPAP machine every time take a nap or sleep at night. Continue to use oxygen with CPAP  Monitor and if your are more somnolence please come back to ER.      ADDITIONAL INFORMATION: If you experience any of the following symptoms then please call your primary care physician or return to the emergency room if you cannot get hold of your doctor: Fever, chills, nausea, vomiting, diarrhea, change in mentation, falling, bleeding, shortness of breath. FOLLOW UP CARE:  Dr. Jeffery Cummins MD  you are to call and set up an appointment to see them in 4-7 days. 2) follow up with ian Her in 1-2 weeks. Information obtained by :  I understand that if any problems occur once I am at home I am to contact my physician. I understand and acknowledge receipt of the instructions indicated above.                                                                                                                                            Physician's or R.N.'s Signature                                                                  Date/Time                                                                                                                                              Patient or Representative Signature                                                          Date/Time    Asmita Madden MD  8/21/2019  3:09 PM

## 2019-08-22 NOTE — HOME CARE
Discharge noted. Received home health referral for [facility] for SN, PT, and OT. Order processed and called to Haley DE SOUZA in intake. Patient awaiting deliver of nebulizer. , [facility] notified.    Camila Medina LPN

## 2019-08-22 NOTE — PROGRESS NOTES
[facility] AT THE [facility]  Pulmonary, Critical Care, and Sleep Medicine    Name: Guanako Plasencia MRN: [id number]   : 1960 Hospital: [address] [place] [address] Dr   Date: 2019      F/U consultation    Consulting: Salma Mathew  Reason: hypercarbic resp failure  Subjective/Interval History:   19  Patient seen and examined at bedside. No acute events overnight. Patient wore home BiPAP overnight, no change to ABG. Patient reports she wants to go home, has a visit with her pain doctor, once pain medications. I have advised the patient that pain medications may lead to CO2 narcosis. I also advised the patient that she needs to wear home BiPAP religiously when she sleeps. Patient denies cough, nausea, vomiting, diarrhea, chest pain      HPI per Dr. Johnny Black:  Patient is a 56 y.o. female with PMH significant for COPD ( on 3 LPM NC at home), HFpEF, ANDRADE (CPAP at home but non-compliant), Afib (on Xarelto at home), HTN and morbid obesity who presented to [facility] ED on 19 c/o sudden onset with acute worsening of SOB with associated CP and sleepiness. PT admitted for Acute on chronic hypoxic hypercapnic respiratory failure, 2/2 severe ANDRADE/OHS with component of CHF exacerbation as well. S/p brief stay in ICU with continuous BiPAP and diuresis. Downgraded to SDU on  and doing well. Course complicated with bradycardia with EKG showing Afib with slow ventricular response. BB stopped (08/15) with HR improvement. 19  Contacted patient's DME[facility]- they recently stopped accepting her current insurance- [employer]    19  Daughter brought in Home Device: Auto BIPAP- Resmed Does not have a backup rate:  IPAP: 12  EPAP 5  PS 4 TiMax 2. No use recorded on device over the past 6 months: [address]- these settings are inadequate for patient's current status. · Patient switched back to regular BIPAP on settings that can be used on her current Auto BIPAP device.  Patient's OP device has a max IPAP of 25 and NO backup rate. · Patient appeared to tolerate BIPAP fairly well yesterday- PCo2 improved on this mornings ABG  · Some leak issues with FFM due to higher pressures but acceptable. · Afebrile  · CO2 down to 29    Current: OP PAP Device: ResMed: AirCurve 10- Auto: This unit does not have a back up rate. - I change settings today  MAX IPAP: 25  Min EPAP: 10  PS: 8  TiMax: 2.0, TiMin: 0.3  Full Face Mask:   Ramp: Off   Humidity: changed from 4-->7 (8 is max setting)    ROS:A comprehensive review of systems was negative except for that written in the HPI. Objective:   Vital Signs:    Visit Vitals  /51   Pulse (!) 58   Temp 97.9 °F (36.6 °C)   Resp 12   Ht 5' 2.99\" (1.6 m)   Wt 131 kg (288 lb 12.8 oz)   SpO2 100%   Breastfeeding? No   BMI 51.17 kg/m²       O2 Device: Nasal cannula   O2 Flow Rate (L/min): 3 l/min   Temp (24hrs), Av.2 °F (36.8 °C), Min:97.9 °F (36.6 °C), Max:98.6 °F (37 °C)       Intake/Output:   Last shift:      No intake/output data recorded. Last 3 shifts:  0701 -  1900  In: 760 [P.O.:760]  Out: 3000 [Urine:3000]    Intake/Output Summary (Last 24 hours) at 2019 2318  Last data filed at 2019 1748  Gross per 24 hour   Intake 760 ml   Output 1800 ml   Net -1040 ml        Physical Exam:   General:  AOx3, cooperative, NAD; appears stated age, sitting up in bed, wearing nasal cannula   HEENT:  Normocephalic,atraumatic; PERRL; EOMI; Conjunctivae/corneas clear; Anicteric; nares/mucosa normal, moist; No drainage/sinus tenderness; Neck supple, symmetrical; trachea midline; No adenopathy; No carotid bruit; No JVD   Back:   Symmetric, no curvature, no spine tenderness or flank pain   Lungs:   Symmetrical chest rise; mod AE Bilat; distant breath sounds 2/2 body habitus; diminished bibasilar; CTABL; No wheezes/rhonchi/rales noted. Chest wall:  No tenderness or deformity. NO CREPITUS   Heart:  IRRR, S1/S2, no m/r/g   Abdomen:   Soft, obese; non-tender.(+) B. S x4; No masses/palpable organomegaly/paradoxical motion   Extremities: Normal, atraumatic, no cyanosis or clubbing. 1+ pitting edema bilaterally. Pulses: 1-2+ and symmetric all extremities. Skin:  Has multiple 1-like lesions located along face, upper chest and arms, old scarring, skin color, texture, turgor normal. No other rashes or lesions   Lymph nodes: Cervical, supraclavicular nodes - unremarkable   Neurologic: Non focal, strength and coordination grossly intact throughout all extremities         DATA:  Labs:  ABG:    Lab Results   Component Value Date/Time    PHI 7.246 (LL) 08/21/2019 04:01 AM    PCO2I 71.2 (H) 08/21/2019 04:01 AM    PO2I 85 08/21/2019 04:01 AM    HCO3I 30.9 (H) 08/21/2019 04:01 AM    FIO2I 32 08/21/2019 04:01 AM           Recent Labs     08/21/19  0301 08/20/19  0220 08/19/19  0300   WBC 4.9 6.0 5.3   HGB 12.8 10.8* 10.7*   HCT 42.5 37.5 37.0    214 185     Recent Labs     08/21/19  0301 08/20/19  0220 08/19/19  0300   * 136 139   K 4.1 4.1 4.0    102 104   CO2 25 30 28   GLU 78 119* 116*   BUN 24* 23* 23*   CREA 1.17 1.04 1.02   CA 8.5 8.4* 8.3*     Lab Results   Component Value Date/Time    B-type Natriuretic Peptide 3,187 (H) 05/06/2016 11:10 AM    B-type Natriuretic Peptide 3,075 (H) 05/06/2016 11:00 AM    NT pro- 08/18/2019 02:54 AM    NT pro-BNP 1,451 (H) 08/16/2019 02:40 AM    NT pro-BNP 2,217 (H) 08/15/2019 01:53 AM    NT pro-BNP 3,494 (H) 08/13/2019 03:48 AM    NT pro-BNP 3,381 (H) 08/12/2019 09:08 AM         ECHO [05/21/19]: · Left Ventricle: Mild concentric hypertrophy. Estimated left ventricular ejection fraction is 51 - 55%. Visually measured ejection fraction. No regional wall motion abnormality noted. Inconclusive left ventricular diastolic function. · Right Ventricle: Dilated right ventricle. Reduced systolic function. Abnormal right ventricular septal motion.  Systolic flattening of interventricular septum consistent with right ventricle pressure overload. · Right Atrium: Dilated right atrium. · Tricuspid Valve: Mild to moderate tricuspid valve regurgitation is present. · Pulmonary Artery: Moderate to severe pulmonary hypertension at 61 mmHg. · IVC/Hepatic Veins: Moderately elevated central venous pressure (10-15 mmHg); IVC diameter is larger than 21 mm and collapses more than 50% with respiration. Imaging:  [x]I have personally reviewed the patients radiographs  []Radiographs reviewed with radiologist   []No change from prior, tubes and lines in adequate position  []Improved   []Worsening      CXR [08/14/19]: FINDINGS:  Stable moderate enlarged cardiac silhouette. Stable enlargement of the ravindra due to known enlarged pulmonary arteries. No pneumothorax. No significant pleural effusion. Interval improvement in basilar lung aeration, vascular congestion and pulmonary edema. No consolidation. Intact osseous structures.     IMPRESSION:  Stable cardiomegaly. Improved lung aeration and pulmonary edema      IMPRESSION:   · Acute encephalopathy- due to persistent hypercapnea  · Acute on Chronic Hypercapnic Respiratory Failure - 2/2 severe ANDRADE/OHA and CHF exacerbation - Improving  · HFprEF with current CHF Exacerbation- acute on chronic diastolic heart failure; improving with diuresis  · Severe ANDRADE/OHS -patient did not wear BiPAP for very long overnight. Patient wore home BiPAP overnight, blood gas remains with chronic hypercarbia, no worsening than prior. · Pulmonary HTN  · Afib, on Xarelto - Restarted 08/14  · Hearing deficient: wearing hearing aid  · Super morbid obesity : Body mass index is 51.17 kg/m². RECOMMENDATIONS:   · Patient wore home BiPAP, no change with blood gas, continues to have chronic hypercapnia. Patient is on settings of 25/21 cm H2O BiPAP. I strongly counseled patient and we have advised her family that the patient needs to wear her BiPAP when she sleeps at night and during the day with naps.   Patient absolutely needs to sleep reclined at a 45 degree angle due to her severe obesity hypoventilation syndrome. Patient also needs to avoid all sedating medications, including chronic narcotics. Otherwise patient runs a risk of CO2 narcosis and subsequent respiratory failure or death  · SpO2 goal btwn 88-93%; titrate supp O2 PRN  · Encourage ICS; Bronchial hygiene protocol  · Aspiration precautions - HOB >30  · Duo-nebs q6 scheduled; con't Flonase  · HD stable, Hr improved with D/C of BB, Primary to manage. · Hold Diamox. Will give additional doses of patient's bicarb increases above 30. · Assess home Oxygen needs at discharge  · If patient does not start using PAP as outpatient, anticipate recurrent admissions, potentially life- threatening exacerbations and potential future need for tracheostomy versus death  · OT, PT, OOB and ambulate  · Healthy weight encouraged  · Will Follow  · DVT prophylaxis per Primary Team   · OP pulmonary follow up with sleep medicine-Dr. Marla Haider in 2 to 4 weeks post discharge     High complexity decision making was performed during the evaluation of this patient at high risk for decompens ation with multiple organ involvement  Total time spent in clinical encounter 41 minutes    Above mentioned total time spent on reviewing the case/medical record/data/notes/EMR/patient examination/documentation/coordinating care with nurse/consultants, exclusive of procedures with complex decision making performed and > 50% time spent in face to face evaluation.       Moniqeu Chicas MD/MPH     Pulmonary, Critical Care Medicine  [facility]

## 2019-08-22 NOTE — PROGRESS NOTES
Called [facility] and spoke with Santo Bateman.   He stated order for nebulizer has been processed and it will be delivered to pt today between 1045am and 710RY.      LESLEY TovarN RN  Care Management  Pager: [phone]

## 2019-08-23 ENCOUNTER — HOME CARE VISIT (OUTPATIENT)
Dept: SCHEDULING | Facility: HOME HEALTH | Age: 59
End: 2019-08-23
Payer: MEDICAID

## 2019-08-23 PROCEDURE — G0299 HHS/HOSPICE OF RN EA 15 MIN: HCPCS

## 2019-08-23 PROCEDURE — 400013 HH SOC

## 2019-08-26 ENCOUNTER — HOME CARE VISIT (OUTPATIENT)
Dept: HOME HEALTH SERVICES | Facility: HOME HEALTH | Age: 59
End: 2019-08-26
Payer: MEDICAID

## 2019-08-26 ENCOUNTER — HOME CARE VISIT (OUTPATIENT)
Dept: SCHEDULING | Facility: HOME HEALTH | Age: 59
End: 2019-08-26
Payer: MEDICAID

## 2019-08-26 VITALS
TEMPERATURE: 97 F | RESPIRATION RATE: 20 BRPM | HEART RATE: 80 BPM | DIASTOLIC BLOOD PRESSURE: 78 MMHG | SYSTOLIC BLOOD PRESSURE: 118 MMHG | OXYGEN SATURATION: 94 %

## 2019-08-26 PROCEDURE — G0151 HHCP-SERV OF PT,EA 15 MIN: HCPCS

## 2019-08-27 ENCOUNTER — HOME CARE VISIT (OUTPATIENT)
Dept: HOME HEALTH SERVICES | Facility: HOME HEALTH | Age: 59
End: 2019-08-27
Payer: MEDICAID

## 2019-08-27 ENCOUNTER — HOME CARE VISIT (OUTPATIENT)
Dept: SCHEDULING | Facility: HOME HEALTH | Age: 59
End: 2019-08-27
Payer: MEDICAID

## 2019-08-27 VITALS
OXYGEN SATURATION: 97 % | DIASTOLIC BLOOD PRESSURE: 90 MMHG | TEMPERATURE: 97 F | SYSTOLIC BLOOD PRESSURE: 138 MMHG | HEART RATE: 56 BPM

## 2019-08-27 PROCEDURE — G0300 HHS/HOSPICE OF LPN EA 15 MIN: HCPCS

## 2019-08-28 VITALS
SYSTOLIC BLOOD PRESSURE: 134 MMHG | HEART RATE: 95 BPM | DIASTOLIC BLOOD PRESSURE: 66 MMHG | TEMPERATURE: 97.9 F | OXYGEN SATURATION: 97 %

## 2019-08-29 ENCOUNTER — HOME CARE VISIT (OUTPATIENT)
Dept: HOME HEALTH SERVICES | Facility: HOME HEALTH | Age: 59
End: 2019-08-29
Payer: MEDICAID

## 2019-08-29 ENCOUNTER — HOME CARE VISIT (OUTPATIENT)
Dept: SCHEDULING | Facility: HOME HEALTH | Age: 59
End: 2019-08-29
Payer: MEDICAID

## 2019-08-29 VITALS
HEART RATE: 72 BPM | OXYGEN SATURATION: 97 % | TEMPERATURE: 98.6 F | DIASTOLIC BLOOD PRESSURE: 68 MMHG | SYSTOLIC BLOOD PRESSURE: 122 MMHG

## 2019-08-29 PROCEDURE — G0157 HHC PT ASSISTANT EA 15: HCPCS

## 2019-08-29 PROCEDURE — G0300 HHS/HOSPICE OF LPN EA 15 MIN: HCPCS

## 2019-08-30 VITALS
SYSTOLIC BLOOD PRESSURE: 132 MMHG | DIASTOLIC BLOOD PRESSURE: 74 MMHG | HEART RATE: 60 BPM | OXYGEN SATURATION: 97 % | TEMPERATURE: 97.7 F

## 2019-09-03 ENCOUNTER — HOME CARE VISIT (OUTPATIENT)
Dept: SCHEDULING | Facility: HOME HEALTH | Age: 59
End: 2019-09-03
Payer: MEDICAID

## 2019-09-03 PROCEDURE — G0300 HHS/HOSPICE OF LPN EA 15 MIN: HCPCS

## 2019-09-04 ENCOUNTER — HOME CARE VISIT (OUTPATIENT)
Dept: HOME HEALTH SERVICES | Facility: HOME HEALTH | Age: 59
End: 2019-09-04
Payer: MEDICAID

## 2019-09-04 VITALS
DIASTOLIC BLOOD PRESSURE: 84 MMHG | OXYGEN SATURATION: 98 % | TEMPERATURE: 98.6 F | SYSTOLIC BLOOD PRESSURE: 133 MMHG | HEART RATE: 53 BPM

## 2019-09-04 PROCEDURE — G0157 HHC PT ASSISTANT EA 15: HCPCS

## 2019-09-05 ENCOUNTER — HOME CARE VISIT (OUTPATIENT)
Dept: HOME HEALTH SERVICES | Facility: HOME HEALTH | Age: 59
End: 2019-09-05
Payer: MEDICAID

## 2019-09-05 ENCOUNTER — HOME CARE VISIT (OUTPATIENT)
Dept: SCHEDULING | Facility: HOME HEALTH | Age: 59
End: 2019-09-05
Payer: MEDICAID

## 2019-09-05 PROCEDURE — G0300 HHS/HOSPICE OF LPN EA 15 MIN: HCPCS

## 2019-09-05 PROCEDURE — G0157 HHC PT ASSISTANT EA 15: HCPCS

## 2019-09-06 VITALS
TEMPERATURE: 98 F | SYSTOLIC BLOOD PRESSURE: 126 MMHG | HEART RATE: 62 BPM | HEART RATE: 77 BPM | DIASTOLIC BLOOD PRESSURE: 88 MMHG | TEMPERATURE: 98 F | DIASTOLIC BLOOD PRESSURE: 60 MMHG | SYSTOLIC BLOOD PRESSURE: 136 MMHG | OXYGEN SATURATION: 97 % | OXYGEN SATURATION: 98 %

## 2019-09-07 VITALS
TEMPERATURE: 98.8 F | HEART RATE: 69 BPM | DIASTOLIC BLOOD PRESSURE: 74 MMHG | OXYGEN SATURATION: 97 % | SYSTOLIC BLOOD PRESSURE: 126 MMHG

## 2019-09-10 ENCOUNTER — HOME CARE VISIT (OUTPATIENT)
Dept: HOME HEALTH SERVICES | Facility: HOME HEALTH | Age: 59
End: 2019-09-10
Payer: MEDICAID

## 2019-09-10 ENCOUNTER — HOME CARE VISIT (OUTPATIENT)
Dept: SCHEDULING | Facility: HOME HEALTH | Age: 59
End: 2019-09-10
Payer: MEDICAID

## 2019-09-10 PROCEDURE — G0157 HHC PT ASSISTANT EA 15: HCPCS

## 2019-09-12 ENCOUNTER — HOME CARE VISIT (OUTPATIENT)
Dept: HOME HEALTH SERVICES | Facility: HOME HEALTH | Age: 59
End: 2019-09-12
Payer: MEDICAID

## 2019-09-12 VITALS
WEIGHT: 293 LBS | BODY MASS INDEX: 53.24 KG/M2 | DIASTOLIC BLOOD PRESSURE: 57 MMHG | OXYGEN SATURATION: 97 % | SYSTOLIC BLOOD PRESSURE: 142 MMHG | HEART RATE: 66 BPM

## 2019-09-12 PROCEDURE — G0495 RN CARE TRAIN/EDU IN HH: HCPCS

## 2019-09-13 ENCOUNTER — HOME CARE VISIT (OUTPATIENT)
Dept: SCHEDULING | Facility: HOME HEALTH | Age: 59
End: 2019-09-13
Payer: MEDICAID

## 2019-09-13 PROCEDURE — G0157 HHC PT ASSISTANT EA 15: HCPCS

## 2019-09-14 ENCOUNTER — HOME CARE VISIT (OUTPATIENT)
Dept: SCHEDULING | Facility: HOME HEALTH | Age: 59
End: 2019-09-14
Payer: MEDICAID

## 2019-09-14 PROCEDURE — G0299 HHS/HOSPICE OF RN EA 15 MIN: HCPCS

## 2019-09-16 ENCOUNTER — HOME CARE VISIT (OUTPATIENT)
Dept: SCHEDULING | Facility: HOME HEALTH | Age: 59
End: 2019-09-16
Payer: MEDICAID

## 2019-09-16 VITALS
HEART RATE: 74 BPM | OXYGEN SATURATION: 93 % | TEMPERATURE: 97.7 F | DIASTOLIC BLOOD PRESSURE: 68 MMHG | HEART RATE: 57 BPM | SYSTOLIC BLOOD PRESSURE: 144 MMHG | TEMPERATURE: 98 F | DIASTOLIC BLOOD PRESSURE: 80 MMHG | OXYGEN SATURATION: 94 % | SYSTOLIC BLOOD PRESSURE: 124 MMHG

## 2019-09-16 VITALS
OXYGEN SATURATION: 98 % | SYSTOLIC BLOOD PRESSURE: 122 MMHG | DIASTOLIC BLOOD PRESSURE: 74 MMHG | RESPIRATION RATE: 20 BRPM | TEMPERATURE: 97.8 F | HEART RATE: 68 BPM

## 2019-09-16 VITALS
SYSTOLIC BLOOD PRESSURE: 118 MMHG | OXYGEN SATURATION: 97 % | DIASTOLIC BLOOD PRESSURE: 72 MMHG | HEART RATE: 57 BPM | TEMPERATURE: 97.4 F | RESPIRATION RATE: 20 BRPM

## 2019-09-16 PROCEDURE — G0299 HHS/HOSPICE OF RN EA 15 MIN: HCPCS

## 2019-09-18 ENCOUNTER — HOME CARE VISIT (OUTPATIENT)
Dept: SCHEDULING | Facility: HOME HEALTH | Age: 59
End: 2019-09-18
Payer: MEDICAID

## 2019-09-18 VITALS
SYSTOLIC BLOOD PRESSURE: 158 MMHG | TEMPERATURE: 97.4 F | HEART RATE: 63 BPM | OXYGEN SATURATION: 93 % | DIASTOLIC BLOOD PRESSURE: 78 MMHG

## 2019-09-18 PROCEDURE — G0151 HHCP-SERV OF PT,EA 15 MIN: HCPCS

## 2019-09-19 ENCOUNTER — HOME CARE VISIT (OUTPATIENT)
Dept: SCHEDULING | Facility: HOME HEALTH | Age: 59
End: 2019-09-19
Payer: MEDICAID

## 2019-09-19 VITALS
TEMPERATURE: 97.8 F | OXYGEN SATURATION: 96 % | HEART RATE: 53 BPM | SYSTOLIC BLOOD PRESSURE: 135 MMHG | BODY MASS INDEX: 53.69 KG/M2 | DIASTOLIC BLOOD PRESSURE: 78 MMHG | WEIGHT: 293 LBS

## 2019-09-19 PROCEDURE — G0152 HHCP-SERV OF OT,EA 15 MIN: HCPCS

## 2019-09-23 ENCOUNTER — HOME CARE VISIT (OUTPATIENT)
Dept: SCHEDULING | Facility: HOME HEALTH | Age: 59
End: 2019-09-23
Payer: MEDICAID

## 2019-09-23 VITALS
DIASTOLIC BLOOD PRESSURE: 92 MMHG | RESPIRATION RATE: 18 BRPM | HEART RATE: 63 BPM | OXYGEN SATURATION: 98 % | TEMPERATURE: 98 F | SYSTOLIC BLOOD PRESSURE: 140 MMHG

## 2019-09-23 PROCEDURE — G0299 HHS/HOSPICE OF RN EA 15 MIN: HCPCS

## 2019-09-24 ENCOUNTER — HOME CARE VISIT (OUTPATIENT)
Dept: SCHEDULING | Facility: HOME HEALTH | Age: 59
End: 2019-09-24
Payer: MEDICAID

## 2019-09-24 PROCEDURE — G0157 HHC PT ASSISTANT EA 15: HCPCS

## 2019-09-26 ENCOUNTER — HOME CARE VISIT (OUTPATIENT)
Dept: SCHEDULING | Facility: HOME HEALTH | Age: 59
End: 2019-09-26
Payer: MEDICAID

## 2019-09-26 PROCEDURE — G0157 HHC PT ASSISTANT EA 15: HCPCS

## 2019-09-30 ENCOUNTER — HOME CARE VISIT (OUTPATIENT)
Dept: SCHEDULING | Facility: HOME HEALTH | Age: 59
End: 2019-09-30
Payer: MEDICAID

## 2019-09-30 VITALS
HEART RATE: 64 BPM | RESPIRATION RATE: 20 BRPM | TEMPERATURE: 98.1 F | SYSTOLIC BLOOD PRESSURE: 128 MMHG | DIASTOLIC BLOOD PRESSURE: 78 MMHG | OXYGEN SATURATION: 97 %

## 2019-09-30 VITALS
TEMPERATURE: 97.8 F | OXYGEN SATURATION: 98 % | SYSTOLIC BLOOD PRESSURE: 160 MMHG | DIASTOLIC BLOOD PRESSURE: 80 MMHG | SYSTOLIC BLOOD PRESSURE: 140 MMHG | DIASTOLIC BLOOD PRESSURE: 80 MMHG | HEART RATE: 62 BPM | TEMPERATURE: 97.8 F | OXYGEN SATURATION: 93 % | HEART RATE: 68 BPM

## 2019-09-30 PROCEDURE — G0299 HHS/HOSPICE OF RN EA 15 MIN: HCPCS

## 2019-10-01 ENCOUNTER — HOME CARE VISIT (OUTPATIENT)
Dept: SCHEDULING | Facility: HOME HEALTH | Age: 59
End: 2019-10-01
Payer: MEDICAID

## 2019-10-01 PROCEDURE — G0157 HHC PT ASSISTANT EA 15: HCPCS

## 2019-10-02 ENCOUNTER — HOME CARE VISIT (OUTPATIENT)
Dept: SCHEDULING | Facility: HOME HEALTH | Age: 59
End: 2019-10-02
Payer: MEDICAID

## 2019-10-02 PROCEDURE — G0152 HHCP-SERV OF OT,EA 15 MIN: HCPCS

## 2019-10-03 ENCOUNTER — HOME CARE VISIT (OUTPATIENT)
Dept: SCHEDULING | Facility: HOME HEALTH | Age: 59
End: 2019-10-03
Payer: MEDICAID

## 2019-10-03 VITALS — SYSTOLIC BLOOD PRESSURE: 142 MMHG | HEART RATE: 80 BPM | OXYGEN SATURATION: 96 % | DIASTOLIC BLOOD PRESSURE: 69 MMHG

## 2019-10-03 PROCEDURE — G0157 HHC PT ASSISTANT EA 15: HCPCS

## 2019-10-04 ENCOUNTER — HOME CARE VISIT (OUTPATIENT)
Dept: SCHEDULING | Facility: HOME HEALTH | Age: 59
End: 2019-10-04
Payer: MEDICAID

## 2019-10-04 VITALS
OXYGEN SATURATION: 98 % | DIASTOLIC BLOOD PRESSURE: 80 MMHG | TEMPERATURE: 98.2 F | SYSTOLIC BLOOD PRESSURE: 132 MMHG | HEART RATE: 60 BPM

## 2019-10-04 PROCEDURE — G0158 HHC OT ASSISTANT EA 15: HCPCS

## 2019-10-07 ENCOUNTER — HOME CARE VISIT (OUTPATIENT)
Dept: SCHEDULING | Facility: HOME HEALTH | Age: 59
End: 2019-10-07
Payer: MEDICAID

## 2019-10-07 VITALS
HEART RATE: 64 BPM | DIASTOLIC BLOOD PRESSURE: 80 MMHG | RESPIRATION RATE: 20 BRPM | SYSTOLIC BLOOD PRESSURE: 110 MMHG | OXYGEN SATURATION: 96 % | TEMPERATURE: 97.5 F

## 2019-10-07 VITALS
HEART RATE: 70 BPM | SYSTOLIC BLOOD PRESSURE: 140 MMHG | HEART RATE: 58 BPM | DIASTOLIC BLOOD PRESSURE: 68 MMHG | TEMPERATURE: 97.5 F | OXYGEN SATURATION: 97 % | DIASTOLIC BLOOD PRESSURE: 80 MMHG | TEMPERATURE: 97.7 F | OXYGEN SATURATION: 95 % | SYSTOLIC BLOOD PRESSURE: 144 MMHG

## 2019-10-07 VITALS
SYSTOLIC BLOOD PRESSURE: 134 MMHG | HEART RATE: 65 BPM | OXYGEN SATURATION: 96 % | DIASTOLIC BLOOD PRESSURE: 82 MMHG | TEMPERATURE: 97.8 F

## 2019-10-07 PROCEDURE — G0299 HHS/HOSPICE OF RN EA 15 MIN: HCPCS

## 2019-10-07 PROCEDURE — G0158 HHC OT ASSISTANT EA 15: HCPCS

## 2019-10-08 ENCOUNTER — HOME CARE VISIT (OUTPATIENT)
Dept: HOME HEALTH SERVICES | Facility: HOME HEALTH | Age: 59
End: 2019-10-08
Payer: MEDICAID

## 2019-10-09 ENCOUNTER — HOME CARE VISIT (OUTPATIENT)
Dept: SCHEDULING | Facility: HOME HEALTH | Age: 59
End: 2019-10-09
Payer: MEDICAID

## 2019-10-09 PROCEDURE — G0158 HHC OT ASSISTANT EA 15: HCPCS

## 2019-10-10 ENCOUNTER — HOME CARE VISIT (OUTPATIENT)
Dept: SCHEDULING | Facility: HOME HEALTH | Age: 59
End: 2019-10-10
Payer: MEDICAID

## 2019-10-10 VITALS
SYSTOLIC BLOOD PRESSURE: 145 MMHG | TEMPERATURE: 98.2 F | DIASTOLIC BLOOD PRESSURE: 92 MMHG | OXYGEN SATURATION: 97 % | HEART RATE: 50 BPM

## 2019-10-10 PROCEDURE — G0157 HHC PT ASSISTANT EA 15: HCPCS

## 2019-10-14 VITALS
OXYGEN SATURATION: 94 % | HEART RATE: 60 BPM | SYSTOLIC BLOOD PRESSURE: 139 MMHG | DIASTOLIC BLOOD PRESSURE: 71 MMHG | TEMPERATURE: 97.8 F

## 2019-10-15 ENCOUNTER — HOME CARE VISIT (OUTPATIENT)
Dept: SCHEDULING | Facility: HOME HEALTH | Age: 59
End: 2019-10-15
Payer: MEDICAID

## 2019-10-15 VITALS
TEMPERATURE: 97.7 F | HEART RATE: 52 BPM | DIASTOLIC BLOOD PRESSURE: 82 MMHG | OXYGEN SATURATION: 97 % | SYSTOLIC BLOOD PRESSURE: 140 MMHG

## 2019-10-15 PROCEDURE — G0151 HHCP-SERV OF PT,EA 15 MIN: HCPCS

## 2019-10-16 ENCOUNTER — HOME CARE VISIT (OUTPATIENT)
Dept: SCHEDULING | Facility: HOME HEALTH | Age: 59
End: 2019-10-16
Payer: MEDICAID

## 2019-10-16 PROCEDURE — G0152 HHCP-SERV OF OT,EA 15 MIN: HCPCS

## 2019-10-17 VITALS
TEMPERATURE: 97.5 F | SYSTOLIC BLOOD PRESSURE: 155 MMHG | OXYGEN SATURATION: 97 % | HEART RATE: 75 BPM | DIASTOLIC BLOOD PRESSURE: 79 MMHG

## 2019-10-21 ENCOUNTER — OFFICE VISIT (OUTPATIENT)
Dept: ORTHOPEDIC SURGERY | Facility: CLINIC | Age: 59
End: 2019-10-21

## 2019-10-21 VITALS
TEMPERATURE: 98.4 F | HEART RATE: 73 BPM | RESPIRATION RATE: 18 BRPM | HEIGHT: 62 IN | OXYGEN SATURATION: 88 % | BODY MASS INDEX: 55.42 KG/M2 | DIASTOLIC BLOOD PRESSURE: 95 MMHG | SYSTOLIC BLOOD PRESSURE: 166 MMHG

## 2019-10-21 DIAGNOSIS — G89.29 CHRONIC RIGHT SHOULDER PAIN: ICD-10-CM

## 2019-10-21 DIAGNOSIS — M25.511 CHRONIC RIGHT SHOULDER PAIN: ICD-10-CM

## 2019-10-21 DIAGNOSIS — M25.522 LEFT ELBOW PAIN: ICD-10-CM

## 2019-10-21 DIAGNOSIS — M77.11 RIGHT LATERAL EPICONDYLITIS: ICD-10-CM

## 2019-10-21 DIAGNOSIS — M75.51 CHRONIC BURSITIS OF RIGHT SHOULDER: Primary | ICD-10-CM

## 2019-10-21 DIAGNOSIS — M77.12 LEFT LATERAL EPICONDYLITIS: ICD-10-CM

## 2019-10-21 DIAGNOSIS — M25.521 RIGHT ELBOW PAIN: ICD-10-CM

## 2019-10-21 RX ORDER — BETAMETHASONE SODIUM PHOSPHATE AND BETAMETHASONE ACETATE 3; 3 MG/ML; MG/ML
6 INJECTION, SUSPENSION INTRA-ARTICULAR; INTRALESIONAL; INTRAMUSCULAR; SOFT TISSUE ONCE
Qty: 0.5 ML | Refills: 0
Start: 2019-10-21 | End: 2019-10-21

## 2019-10-21 NOTE — PROGRESS NOTES
Verbal order given by Dr. Zack Guzman to draw up 4 mL 0.25% Sensorcaine and 0.5 mL 30 mg/5mL Betamethasone x 3.

## 2019-11-08 ENCOUNTER — HOME HEALTH ADMISSION (OUTPATIENT)
Dept: HOME HEALTH SERVICES | Facility: HOME HEALTH | Age: 59
End: 2019-11-08
Payer: MEDICAID

## 2019-11-08 ENCOUNTER — HOME HEALTH ADMISSION (OUTPATIENT)
Dept: HOME HEALTH SERVICES | Facility: HOME HEALTH | Age: 59
End: 2019-11-08

## 2019-11-09 ENCOUNTER — HOME CARE VISIT (OUTPATIENT)
Dept: SCHEDULING | Facility: HOME HEALTH | Age: 59
End: 2019-11-09
Payer: MEDICAID

## 2019-11-09 PROCEDURE — G0299 HHS/HOSPICE OF RN EA 15 MIN: HCPCS

## 2019-11-09 PROCEDURE — 400013 HH SOC

## 2019-11-11 ENCOUNTER — HOME CARE VISIT (OUTPATIENT)
Dept: HOME HEALTH SERVICES | Facility: HOME HEALTH | Age: 59
End: 2019-11-11
Payer: MEDICAID

## 2019-11-11 VITALS
DIASTOLIC BLOOD PRESSURE: 78 MMHG | HEART RATE: 70 BPM | SYSTOLIC BLOOD PRESSURE: 128 MMHG | OXYGEN SATURATION: 96 % | RESPIRATION RATE: 16 BRPM | TEMPERATURE: 97.8 F

## 2019-11-12 ENCOUNTER — HOME CARE VISIT (OUTPATIENT)
Dept: SCHEDULING | Facility: HOME HEALTH | Age: 59
End: 2019-11-12
Payer: MEDICAID

## 2019-11-12 VITALS
TEMPERATURE: 98.1 F | SYSTOLIC BLOOD PRESSURE: 136 MMHG | DIASTOLIC BLOOD PRESSURE: 80 MMHG | OXYGEN SATURATION: 94 % | HEART RATE: 70 BPM

## 2019-11-12 VITALS
TEMPERATURE: 98.1 F | SYSTOLIC BLOOD PRESSURE: 136 MMHG | HEART RATE: 70 BPM | RESPIRATION RATE: 18 BRPM | OXYGEN SATURATION: 94 % | DIASTOLIC BLOOD PRESSURE: 80 MMHG

## 2019-11-12 PROCEDURE — G0151 HHCP-SERV OF PT,EA 15 MIN: HCPCS

## 2019-11-12 PROCEDURE — G0299 HHS/HOSPICE OF RN EA 15 MIN: HCPCS

## 2019-11-14 ENCOUNTER — HOME CARE VISIT (OUTPATIENT)
Dept: SCHEDULING | Facility: HOME HEALTH | Age: 59
End: 2019-11-14
Payer: MEDICAID

## 2019-11-14 VITALS
HEART RATE: 72 BPM | DIASTOLIC BLOOD PRESSURE: 70 MMHG | TEMPERATURE: 97.8 F | OXYGEN SATURATION: 96 % | SYSTOLIC BLOOD PRESSURE: 135 MMHG

## 2019-11-14 VITALS
TEMPERATURE: 97.6 F | HEART RATE: 56 BPM | SYSTOLIC BLOOD PRESSURE: 130 MMHG | OXYGEN SATURATION: 91 % | DIASTOLIC BLOOD PRESSURE: 70 MMHG

## 2019-11-14 PROCEDURE — G0299 HHS/HOSPICE OF RN EA 15 MIN: HCPCS

## 2019-11-14 PROCEDURE — G0157 HHC PT ASSISTANT EA 15: HCPCS

## 2019-11-19 ENCOUNTER — HOME CARE VISIT (OUTPATIENT)
Dept: SCHEDULING | Facility: HOME HEALTH | Age: 59
End: 2019-11-19
Payer: MEDICAID

## 2019-11-19 VITALS
OXYGEN SATURATION: 96 % | DIASTOLIC BLOOD PRESSURE: 70 MMHG | SYSTOLIC BLOOD PRESSURE: 120 MMHG | TEMPERATURE: 97.4 F | HEART RATE: 62 BPM

## 2019-11-19 VITALS
SYSTOLIC BLOOD PRESSURE: 120 MMHG | RESPIRATION RATE: 20 BRPM | OXYGEN SATURATION: 96 % | TEMPERATURE: 97.4 F | HEART RATE: 62 BPM | DIASTOLIC BLOOD PRESSURE: 70 MMHG

## 2019-11-19 PROCEDURE — G0157 HHC PT ASSISTANT EA 15: HCPCS

## 2019-11-19 PROCEDURE — G0299 HHS/HOSPICE OF RN EA 15 MIN: HCPCS

## 2019-11-22 ENCOUNTER — HOME CARE VISIT (OUTPATIENT)
Dept: SCHEDULING | Facility: HOME HEALTH | Age: 59
End: 2019-11-22
Payer: MEDICAID

## 2019-11-22 VITALS
OXYGEN SATURATION: 96 % | SYSTOLIC BLOOD PRESSURE: 118 MMHG | TEMPERATURE: 97.9 F | HEART RATE: 54 BPM | DIASTOLIC BLOOD PRESSURE: 70 MMHG | RESPIRATION RATE: 18 BRPM

## 2019-11-22 PROCEDURE — G0299 HHS/HOSPICE OF RN EA 15 MIN: HCPCS

## 2019-11-22 PROCEDURE — G0157 HHC PT ASSISTANT EA 15: HCPCS

## 2019-11-24 VITALS
SYSTOLIC BLOOD PRESSURE: 127 MMHG | TEMPERATURE: 97.3 F | DIASTOLIC BLOOD PRESSURE: 72 MMHG | OXYGEN SATURATION: 97 % | HEART RATE: 62 BPM

## 2019-11-27 ENCOUNTER — HOME CARE VISIT (OUTPATIENT)
Dept: SCHEDULING | Facility: HOME HEALTH | Age: 59
End: 2019-11-27
Payer: MEDICAID

## 2019-11-27 PROCEDURE — G0157 HHC PT ASSISTANT EA 15: HCPCS

## 2019-11-29 ENCOUNTER — HOME CARE VISIT (OUTPATIENT)
Dept: SCHEDULING | Facility: HOME HEALTH | Age: 59
End: 2019-11-29
Payer: MEDICAID

## 2019-11-29 ENCOUNTER — HOME CARE VISIT (OUTPATIENT)
Dept: HOME HEALTH SERVICES | Facility: HOME HEALTH | Age: 59
End: 2019-11-29
Payer: MEDICAID

## 2019-11-29 VITALS
TEMPERATURE: 97.8 F | HEART RATE: 58 BPM | OXYGEN SATURATION: 96 % | DIASTOLIC BLOOD PRESSURE: 82 MMHG | SYSTOLIC BLOOD PRESSURE: 132 MMHG

## 2019-11-29 VITALS
OXYGEN SATURATION: 95 % | SYSTOLIC BLOOD PRESSURE: 132 MMHG | TEMPERATURE: 97.4 F | DIASTOLIC BLOOD PRESSURE: 77 MMHG | HEART RATE: 50 BPM

## 2019-11-29 PROCEDURE — G0151 HHCP-SERV OF PT,EA 15 MIN: HCPCS

## 2019-12-06 ENCOUNTER — HOME CARE VISIT (OUTPATIENT)
Dept: SCHEDULING | Facility: HOME HEALTH | Age: 59
End: 2019-12-06
Payer: MEDICAID

## 2019-12-06 VITALS
HEART RATE: 54 BPM | TEMPERATURE: 97.5 F | DIASTOLIC BLOOD PRESSURE: 74 MMHG | OXYGEN SATURATION: 97 % | RESPIRATION RATE: 20 BRPM | SYSTOLIC BLOOD PRESSURE: 128 MMHG

## 2019-12-06 PROCEDURE — G0299 HHS/HOSPICE OF RN EA 15 MIN: HCPCS

## 2019-12-13 ENCOUNTER — HOME CARE VISIT (OUTPATIENT)
Dept: SCHEDULING | Facility: HOME HEALTH | Age: 59
End: 2019-12-13
Payer: MEDICAID

## 2019-12-20 ENCOUNTER — HOME CARE VISIT (OUTPATIENT)
Dept: SCHEDULING | Facility: HOME HEALTH | Age: 59
End: 2019-12-20
Payer: MEDICAID

## 2019-12-28 ENCOUNTER — HOME CARE VISIT (OUTPATIENT)
Dept: SCHEDULING | Facility: HOME HEALTH | Age: 59
End: 2019-12-28
Payer: MEDICAID

## 2019-12-28 PROCEDURE — G0299 HHS/HOSPICE OF RN EA 15 MIN: HCPCS

## 2019-12-30 VITALS
RESPIRATION RATE: 18 BRPM | TEMPERATURE: 98.2 F | SYSTOLIC BLOOD PRESSURE: 150 MMHG | OXYGEN SATURATION: 99 % | HEART RATE: 58 BPM | DIASTOLIC BLOOD PRESSURE: 80 MMHG

## 2020-01-04 ENCOUNTER — HOME CARE VISIT (OUTPATIENT)
Dept: HOME HEALTH SERVICES | Facility: HOME HEALTH | Age: 60
End: 2020-01-04
Payer: MEDICAID

## 2020-01-11 ENCOUNTER — HOME HEALTH ADMISSION (OUTPATIENT)
Dept: HOME HEALTH SERVICES | Facility: HOME HEALTH | Age: 60
End: 2020-01-11
Payer: MEDICAID

## 2020-01-13 NOTE — PROGRESS NOTES
SUBJECTIVE:    MORE AWAKE TODAY. DENIES FOR CHEST AND ABDOMINAL PAIN. No nausea or vomiting. No BM yet. PITTS present. States she was walking somewhat. OBJECTIVE:    /58 (BP 1 Location: Right arm, BP Patient Position: At rest)  Pulse 78  Temp 97 °F (36.1 °C)  Resp 16  Ht 5' (1.524 m)  Wt 144.6 kg (318 lb 12.8 oz)  SpO2 100%  Breastfeeding? No  BMI 62.26 kg/m2     General: NAD, ORIENTED X 3  HEENT: PERRLA, Oral mucosa is moist  Neck: no JVD  CVS: IRIR  RS: Diminished BS in bases, no wheezes  GI: NT, BS +  Extremities: pedal edema  CNS: moves both UEs well     ASSESSMENT:    1. Acute on chronic hypoxic hypercapnic respiratory failure due to Severe Complex sleep apnea with hypoxemia. 2. Acute on chronic diastolic heart failure. EF 60% . 3. Hypertension and hypertensive heart disease  4. Pulmonary hypertension,Obesity hypoventilation syndrome  AND Obstructive sleep apnea, non compliant with CPAP machine. 5. Obesity with likely obesity ventilation syndrome. 6. Persistent atrial fibrillation. 7. Congenital heart disease, status post unknown repair as a child.    8. Dyslipidemia. 9. H/o polysubstance abuse. 10. H/o schizoaffective disorder. 11. H/o noncompliance.   12. Hypothyroidism     PLAN:    Cont current management  Replete K  Asked nursing to call [employer] to check CPAP  Cardiology and pulmonology to follow    CMP:   Lab Results   Component Value Date/Time     02/28/2018 03:30 AM    K 3.4 (L) 02/28/2018 03:30 AM    CL 96 (L) 02/28/2018 03:30 AM    CO2 37 (H) 02/28/2018 03:30 AM    AGAP 6 02/28/2018 03:30 AM     (H) 02/28/2018 03:30 AM    BUN 10 02/28/2018 03:30 AM    CREA 0.98 02/28/2018 03:30 AM    GFRAA >60 02/28/2018 03:30 AM    GFRNA 58 (L) 02/28/2018 03:30 AM    CA 8.1 (L) 02/28/2018 03:30 AM    MG 2.0 02/28/2018 03:30 AM     CBC:   Lab Results   Component Value Date/Time    HGB 8.5 (L) 02/28/2018 03:30 AM    HCT 31.6 (L) 02/28/2018 03:30 AM [[email or web address]] : 1/13/20: Patient presents today for a follow up. He had an episode of his 16 in Tamazight Salgado cath did not drain, he went to the ED where it was changed to a 18in Salgado cath. He is due today for a catheter change and will get an 18 in catheter. Pt had no kidney or uretal calculi but did show small bladder stones on recent scan. He will be scheduled today for a laser lithotripsy and laser nucleation of the prostate with Dr. Rodas. \par \par FU with Dr. Rodas

## 2020-01-14 ENCOUNTER — HOME CARE VISIT (OUTPATIENT)
Dept: HOME HEALTH SERVICES | Facility: HOME HEALTH | Age: 60
End: 2020-01-14

## 2020-01-15 ENCOUNTER — HOME CARE VISIT (OUTPATIENT)
Dept: SCHEDULING | Facility: HOME HEALTH | Age: 60
End: 2020-01-15
Payer: MEDICAID

## 2020-01-15 PROCEDURE — G0299 HHS/HOSPICE OF RN EA 15 MIN: HCPCS

## 2020-01-15 PROCEDURE — 400013 HH SOC

## 2020-01-16 ENCOUNTER — HOME CARE VISIT (OUTPATIENT)
Dept: HOME HEALTH SERVICES | Facility: HOME HEALTH | Age: 60
End: 2020-01-16
Payer: MEDICAID

## 2020-01-17 ENCOUNTER — HOME CARE VISIT (OUTPATIENT)
Dept: SCHEDULING | Facility: HOME HEALTH | Age: 60
End: 2020-01-17
Payer: MEDICAID

## 2020-01-17 VITALS
DIASTOLIC BLOOD PRESSURE: 80 MMHG | RESPIRATION RATE: 16 BRPM | TEMPERATURE: 97.5 F | SYSTOLIC BLOOD PRESSURE: 120 MMHG | HEART RATE: 73 BPM | OXYGEN SATURATION: 97 %

## 2020-01-17 PROCEDURE — G0151 HHCP-SERV OF PT,EA 15 MIN: HCPCS

## 2020-01-21 ENCOUNTER — HOME CARE VISIT (OUTPATIENT)
Dept: SCHEDULING | Facility: HOME HEALTH | Age: 60
End: 2020-01-21
Payer: MEDICAID

## 2020-01-21 PROCEDURE — G0157 HHC PT ASSISTANT EA 15: HCPCS

## 2020-01-22 ENCOUNTER — HOME CARE VISIT (OUTPATIENT)
Dept: SCHEDULING | Facility: HOME HEALTH | Age: 60
End: 2020-01-22
Payer: MEDICAID

## 2020-01-22 VITALS
TEMPERATURE: 98.4 F | DIASTOLIC BLOOD PRESSURE: 80 MMHG | HEART RATE: 52 BPM | OXYGEN SATURATION: 98 % | SYSTOLIC BLOOD PRESSURE: 142 MMHG

## 2020-01-22 PROCEDURE — G0299 HHS/HOSPICE OF RN EA 15 MIN: HCPCS

## 2020-01-23 ENCOUNTER — HOME CARE VISIT (OUTPATIENT)
Dept: SCHEDULING | Facility: HOME HEALTH | Age: 60
End: 2020-01-23
Payer: MEDICAID

## 2020-01-23 VITALS
DIASTOLIC BLOOD PRESSURE: 68 MMHG | OXYGEN SATURATION: 98 % | SYSTOLIC BLOOD PRESSURE: 124 MMHG | RESPIRATION RATE: 20 BRPM | TEMPERATURE: 97.6 F | HEART RATE: 62 BPM

## 2020-01-23 PROCEDURE — G0157 HHC PT ASSISTANT EA 15: HCPCS

## 2020-01-24 VITALS
DIASTOLIC BLOOD PRESSURE: 88 MMHG | TEMPERATURE: 97.2 F | SYSTOLIC BLOOD PRESSURE: 145 MMHG | OXYGEN SATURATION: 99 % | HEART RATE: 60 BPM

## 2020-01-28 ENCOUNTER — HOME CARE VISIT (OUTPATIENT)
Dept: SCHEDULING | Facility: HOME HEALTH | Age: 60
End: 2020-01-28
Payer: MEDICAID

## 2020-01-28 VITALS
SYSTOLIC BLOOD PRESSURE: 142 MMHG | OXYGEN SATURATION: 94 % | DIASTOLIC BLOOD PRESSURE: 80 MMHG | TEMPERATURE: 98.5 F | HEART RATE: 80 BPM

## 2020-01-28 PROCEDURE — G0151 HHCP-SERV OF PT,EA 15 MIN: HCPCS

## 2020-01-30 ENCOUNTER — HOME CARE VISIT (OUTPATIENT)
Dept: SCHEDULING | Facility: HOME HEALTH | Age: 60
End: 2020-01-30
Payer: MEDICAID

## 2020-01-30 PROCEDURE — G0299 HHS/HOSPICE OF RN EA 15 MIN: HCPCS

## 2020-01-31 ENCOUNTER — HOME CARE VISIT (OUTPATIENT)
Dept: HOME HEALTH SERVICES | Facility: HOME HEALTH | Age: 60
End: 2020-01-31
Payer: MEDICAID

## 2020-01-31 VITALS
SYSTOLIC BLOOD PRESSURE: 121 MMHG | DIASTOLIC BLOOD PRESSURE: 65 MMHG | RESPIRATION RATE: 14 BRPM | HEART RATE: 63 BPM | OXYGEN SATURATION: 98 %

## 2020-02-01 ENCOUNTER — HOME CARE VISIT (OUTPATIENT)
Dept: SCHEDULING | Facility: HOME HEALTH | Age: 60
End: 2020-02-01
Payer: MEDICAID

## 2020-02-04 ENCOUNTER — HOME CARE VISIT (OUTPATIENT)
Dept: HOME HEALTH SERVICES | Facility: HOME HEALTH | Age: 60
End: 2020-02-04
Payer: MEDICAID

## 2020-02-05 ENCOUNTER — HOME CARE VISIT (OUTPATIENT)
Dept: HOME HEALTH SERVICES | Facility: HOME HEALTH | Age: 60
End: 2020-02-05
Payer: MEDICAID

## 2020-02-06 ENCOUNTER — HOME CARE VISIT (OUTPATIENT)
Dept: HOME HEALTH SERVICES | Facility: HOME HEALTH | Age: 60
End: 2020-02-06
Payer: MEDICAID

## 2020-02-06 PROCEDURE — G0299 HHS/HOSPICE OF RN EA 15 MIN: HCPCS

## 2020-02-07 ENCOUNTER — HOME CARE VISIT (OUTPATIENT)
Dept: HOME HEALTH SERVICES | Facility: HOME HEALTH | Age: 60
End: 2020-02-07
Payer: MEDICAID

## 2020-02-08 VITALS
HEART RATE: 79 BPM | TEMPERATURE: 98.2 F | DIASTOLIC BLOOD PRESSURE: 74 MMHG | OXYGEN SATURATION: 97 % | SYSTOLIC BLOOD PRESSURE: 124 MMHG | RESPIRATION RATE: 20 BRPM

## 2020-02-11 ENCOUNTER — HOME CARE VISIT (OUTPATIENT)
Dept: SCHEDULING | Facility: HOME HEALTH | Age: 60
End: 2020-02-11
Payer: MEDICAID

## 2020-02-11 VITALS
TEMPERATURE: 98 F | OXYGEN SATURATION: 95 % | SYSTOLIC BLOOD PRESSURE: 118 MMHG | RESPIRATION RATE: 18 BRPM | HEART RATE: 77 BPM | DIASTOLIC BLOOD PRESSURE: 68 MMHG

## 2020-02-11 PROCEDURE — G0299 HHS/HOSPICE OF RN EA 15 MIN: HCPCS

## 2020-02-12 ENCOUNTER — HOME CARE VISIT (OUTPATIENT)
Dept: SCHEDULING | Facility: HOME HEALTH | Age: 60
End: 2020-02-12
Payer: MEDICAID

## 2020-02-12 VITALS
HEART RATE: 76 BPM | TEMPERATURE: 97.2 F | SYSTOLIC BLOOD PRESSURE: 124 MMHG | DIASTOLIC BLOOD PRESSURE: 82 MMHG | OXYGEN SATURATION: 98 %

## 2020-02-12 PROCEDURE — G0151 HHCP-SERV OF PT,EA 15 MIN: HCPCS

## 2020-02-14 ENCOUNTER — HOME CARE VISIT (OUTPATIENT)
Dept: SCHEDULING | Facility: HOME HEALTH | Age: 60
End: 2020-02-14
Payer: MEDICAID

## 2020-02-14 VITALS
OXYGEN SATURATION: 98 % | RESPIRATION RATE: 16 BRPM | HEART RATE: 61 BPM | TEMPERATURE: 98.1 F | SYSTOLIC BLOOD PRESSURE: 118 MMHG | DIASTOLIC BLOOD PRESSURE: 70 MMHG

## 2020-02-14 PROCEDURE — 400013 HH SOC

## 2020-02-14 PROCEDURE — G0299 HHS/HOSPICE OF RN EA 15 MIN: HCPCS

## 2020-02-18 ENCOUNTER — HOME CARE VISIT (OUTPATIENT)
Dept: SCHEDULING | Facility: HOME HEALTH | Age: 60
End: 2020-02-18
Payer: MEDICAID

## 2020-02-18 VITALS
SYSTOLIC BLOOD PRESSURE: 120 MMHG | DIASTOLIC BLOOD PRESSURE: 68 MMHG | TEMPERATURE: 98 F | RESPIRATION RATE: 18 BRPM | HEART RATE: 74 BPM | OXYGEN SATURATION: 97 %

## 2020-02-18 PROCEDURE — G0299 HHS/HOSPICE OF RN EA 15 MIN: HCPCS

## 2020-02-21 ENCOUNTER — HOME CARE VISIT (OUTPATIENT)
Dept: SCHEDULING | Facility: HOME HEALTH | Age: 60
End: 2020-02-21
Payer: MEDICAID

## 2020-02-21 VITALS
RESPIRATION RATE: 18 BRPM | OXYGEN SATURATION: 94 % | HEART RATE: 58 BPM | SYSTOLIC BLOOD PRESSURE: 124 MMHG | TEMPERATURE: 98.2 F | DIASTOLIC BLOOD PRESSURE: 68 MMHG

## 2020-02-21 PROCEDURE — G0299 HHS/HOSPICE OF RN EA 15 MIN: HCPCS

## 2020-02-25 ENCOUNTER — HOME CARE VISIT (OUTPATIENT)
Dept: HOME HEALTH SERVICES | Facility: HOME HEALTH | Age: 60
End: 2020-02-25
Payer: MEDICAID

## 2020-03-08 ENCOUNTER — HOME CARE VISIT (OUTPATIENT)
Dept: SCHEDULING | Facility: HOME HEALTH | Age: 60
End: 2020-03-08
Payer: MEDICAID

## 2020-03-08 PROCEDURE — G0299 HHS/HOSPICE OF RN EA 15 MIN: HCPCS

## 2020-03-09 VITALS
TEMPERATURE: 97.8 F | HEART RATE: 60 BPM | SYSTOLIC BLOOD PRESSURE: 130 MMHG | RESPIRATION RATE: 14 BRPM | OXYGEN SATURATION: 98 % | DIASTOLIC BLOOD PRESSURE: 70 MMHG

## 2020-03-11 ENCOUNTER — HOME CARE VISIT (OUTPATIENT)
Dept: SCHEDULING | Facility: HOME HEALTH | Age: 60
End: 2020-03-11
Payer: MEDICAID

## 2020-03-11 VITALS
TEMPERATURE: 97.6 F | HEART RATE: 56 BPM | OXYGEN SATURATION: 98 % | RESPIRATION RATE: 14 BRPM | SYSTOLIC BLOOD PRESSURE: 130 MMHG | DIASTOLIC BLOOD PRESSURE: 75 MMHG

## 2020-03-11 PROCEDURE — G0151 HHCP-SERV OF PT,EA 15 MIN: HCPCS

## 2020-03-13 ENCOUNTER — HOME CARE VISIT (OUTPATIENT)
Dept: SCHEDULING | Facility: HOME HEALTH | Age: 60
End: 2020-03-13
Payer: MEDICAID

## 2020-03-13 VITALS
TEMPERATURE: 97.7 F | RESPIRATION RATE: 16 BRPM | OXYGEN SATURATION: 98 % | HEART RATE: 54 BPM | DIASTOLIC BLOOD PRESSURE: 72 MMHG | SYSTOLIC BLOOD PRESSURE: 114 MMHG

## 2020-03-13 PROCEDURE — G0299 HHS/HOSPICE OF RN EA 15 MIN: HCPCS

## 2020-03-18 ENCOUNTER — HOME CARE VISIT (OUTPATIENT)
Dept: SCHEDULING | Facility: HOME HEALTH | Age: 60
End: 2020-03-18
Payer: MEDICAID

## 2020-03-18 VITALS
RESPIRATION RATE: 16 BRPM | OXYGEN SATURATION: 96 % | SYSTOLIC BLOOD PRESSURE: 126 MMHG | HEART RATE: 52 BPM | TEMPERATURE: 97.6 F | DIASTOLIC BLOOD PRESSURE: 74 MMHG

## 2020-03-18 PROCEDURE — G0299 HHS/HOSPICE OF RN EA 15 MIN: HCPCS

## 2020-05-16 NOTE — PROGRESS NOTES
Patient: Shadi Bruner                MRN: [id number]       SSN: [id number]  YOB: 1960        AGE: 60 y.o. SEX: female    PCP: Grupo Garcia MD  10/21/19    Chief Complaint   Patient presents with    Arm Pain     Lee     HISTORY:  Shadi Bruner is a 60 y.o. female who is seen for bilateral elbow pain. She notes a dull pain over the lateral aspect of her elbows. She notes increased pain with lifting and grabbing items. She has been experiencing elbow pain for the past several months but there is no h/o injury. She notes that she just woke up with the pain. She is also seen for right shoulder pain. She has been experiencing shoulder pain for the past several months. She does not recall any injury. She notes shoulder pain with overhead activities and at night. She was previously seen for right hip pain. She reports spasms in her right thigh starting from her thigh and radiate to her knee. Pain Assessment  10/21/2019   Location of Pain Arm   Location Modifiers Left;Right   Severity of Pain 8   Quality of Pain Aching   Quality of Pain Comment -   Duration of Pain Persistent   Frequency of Pain Constant   Aggravating Factors Bending;Stretching;Straightening   Limiting Behavior Yes   Relieving Factors Rest   Relieving Factors Comment -   Result of Injury No     Occupation, etc:  Ms. Duane Muscat receives social security disability benefits for numerus medical problems. She has a  assigned to her. She reports that she has gained 30 lbs recently. Current weight is 291 pounds. She is 5'3\" tall. She does not exercise. She states that she is not able to do much of anything today. She has a h/o diabetes but says that she is no longer diabetic. She lives in [place] with her 75-year-old daughter and her 10 children. She is hard of hearing. She now wears a hearing aid. She says that she enjoys bingo but has not been able to play recently due to her pains.   She states that she has high blood pressure. Her PCP is Dr. Ann Marie Lawton. There were no vitals filed for this visit. Body mass index is 55.42 kg/m². Patient Active Problem List   Diagnosis Code    Hypertension I11.9    Congenital heart disease Q24.9    Atrial fibrillation I48.91    Osteoarthritis of both knees M17.0    H/O total knee replacement Z96.659    DJD (degenerative joint disease), lumbosacral M47.817    Hypothyroidism E03.9    Morbid obesity ([facility] E66.01    Esophageal reflux K21.9    Dyspnea R06.00    Dyslipidemia E78.5    Morbid obesity with BMI of 45.0-49.9, adult ([facility] E66.01, Z68.42    DDD (degenerative disc disease), lumbar, L4/5 advanced M51.36    Hypoxia R09.02    Fluid overload E87.70    CHF (congestive heart failure) ([facility]) L91.9    Diastolic CHF, chronic ([facility]) I50.32    Bradycardia R00.1    Acute exacerbation of CHF (congestive heart failure) ([facility]) I50.9    Chronic anticoagulation Z79.01    Anxiety F41.9    CHF exacerbation ([facility]) I50.9    Severe osteoarthritis right hip M16.11    Pulmonary hypertension ([facility]) I27.20    COPD (chronic obstructive pulmonary disease) ([facility]) J44.9    Acute respiratory failure ([facility]) J96.00     REVIEW OF SYSTEMS: All Below are Negative except: See HPI   Constitutional: negative for fever, chills, and weight loss. Cardiovascular: negative for chest pain, claudication, leg swelling, SOB, PITTS   Gastrointestinal: Negative for pain, N/V/C/D, Blood in stool or urine, dysuria,  hematuria, incontinence, pelvic pain. Musculoskeletal: See HPI   Neurological: Negative for dizziness and weakness. Negative for headaches, Visual changes, confusion, seizures   Phychiatric/Behavioral: Negative for depression, memory loss, substance  abuse. Extremities: Negative for hair changes, rash, or skin lesion changes.    Hematologic: Negative for bleeding problems, bruising, pallor or swollen lymph  nodes   Peripheral Vascular: No calf pain, no circulation deficits. Social History     Socioeconomic History    Marital status: SINGLE     Spouse name: Not on file    Number of children: Not on file    Years of education: Not on file    Highest education level: Not on file   Occupational History    Not on file   Social Needs    Financial resource strain: Not on file    Food insecurity:     Worry: Not on file     Inability: Not on file    Transportation needs:     Medical: Not on file     Non-medical: Not on file   Tobacco Use    Smoking status: Never Smoker    Smokeless tobacco: Never Used   Substance and Sexual Activity    Alcohol use: No    Drug use: No     Comment: Quit crack cocaine and marijuana 2015    Sexual activity: Yes     Birth control/protection: Surgical   Lifestyle    Physical activity:     Days per week: Not on file     Minutes per session: Not on file    Stress: Not on file   Relationships    Social connections:     Talks on phone: Not on file     Gets together: Not on file     Attends Alevism service: Not on file     Active member of club or organization: Not on file     Attends meetings of clubs or organizations: Not on file     Relationship status: Not on file    Intimate partner violence:     Fear of current or ex partner: Not on file     Emotionally abused: Not on file     Physically abused: Not on file     Forced sexual activity: Not on file   Other Topics Concern    Not on file   Social History Narrative    Not on file     Allergies   Allergen Reactions    Gabapentin Other (comments)     Unsteady, weakness LEs    Tetanus Vaccines And Toxoid Swelling    Topamax [Topiramate] Other (comments)     weakness     Current Outpatient Medications   Medication Sig    OXYGEN-AIR DELIVERY SYSTEMS 3 L by IntraNASal route continuous.  triamcinolone acetonide (KENALOG) 0.1 % topical cream Apply 2 g to affected area two (2) times a day. use thin layer    bumetanide (BUMEX) 2 mg tablet Take 1 Tab by mouth daily.  Indications: visible water retention, high blood pressure    XARELTO 20 mg tab tablet Take 1 Tab by mouth daily (with dinner).  albuterol-ipratropium (DUO-NEB) 2.5 mg-0.5 mg/3 ml nebu 3 mL by Nebulization route every six (6) hours as needed (shortness of beath, wheezing). Indications: Bronchi Muscle Spasm resulting from COPD    metoprolol tartrate (LOPRESSOR) 25 mg tablet Take 0.5 Tabs by mouth daily.  polyethylene glycol (MIRALAX) 17 gram packet Take 1 Packet by mouth daily.  oxyCODONE IR (ROXICODONE) 5 mg immediate release tablet Take 1 Tab by mouth every six (6) hours as needed for Pain. Max Daily Amount: 20 mg.    senna-docusate (PERICOLACE) 8.6-50 mg per tablet Take 1 Tab by mouth nightly.  fluticasone (FLONASE) 50 mcg/actuation nasal spray 2 Sprays by Both Nostrils route daily.  isosorbide mononitrate ER (IMDUR) 30 mg tablet Take 1 Tab by mouth every evening.  multivitamin, tx-iron-ca-min (THERA-M W/ IRON) 9 mg iron-400 mcg tab tablet Take 1 Tab by mouth daily.  VENTOLIN HFA 90 mcg/actuation inhaler Take 2 Puffs by inhalation every four (4) hours as needed for Wheezing or Shortness of Breath.  PARoxetine (PAXIL) 20 mg tablet Take 1 Tab by mouth daily.  simvastatin (ZOCOR) 40 mg tablet Take 40 mg by mouth nightly. to obtain from pharmacy    aripiprazole (ABILIFY) 15 mg tablet Take 15 mg by mouth daily. to obtain from pharmacy    levothyroxine (SYNTHROID) 125 mcg tablet Take 125 mcg by mouth Daily (before breakfast).  raNITIdine (ZANTAC) 150 mg tablet Take 150 mg by mouth two (2) times a day. No current facility-administered medications for this visit. PHYSICAL EXAMINATION:  Visit Vitals  BP (!) 166/95   Pulse 73   Temp 98.4 °F (36.9 °C) (Oral)   Resp 18   Ht 5' 2\" (1.575 m)   SpO2 (!) 88% Comment: Patient is normally on 02 @ 3L.  Patient left 02 @ home   BMI 55.42 kg/m²   tearful, requesting pain meds  ORTHO EXAMINATION:  Examination Right Elbow Left Elbow   Skin Intact Intact   Range of Motion 135-0 135-0   Tenderness + lateral epicondyle -   Swelling - -   Bruising - -   Stability Normal Normal   Motor Strength  Normal Normal   Neurovascular Intact Intact     Examination Right shoulder Left shoulder   Skin Intact Intact   Effusion - -   Biceps deformity - -   Atrophy - -   AC joint tenderness - -   Acromial tenderness + +   Biceps tenderness - -   Forward flexion/Elevation  170   Active abduction  160   External rotation ROM 20 30   Internal rotation ROM 40 w/ pain 90   Apprehension - -   Impingement + -   Drop Arm Test - -   Neurovascular Intact Intact     Examination Lumbar   Skin Intact   Tenderness +   Tightness +   Lordosis Normal   Kyphosis N/A   Scoliosis -   Flexion na   Extension na   Knee reflexes Normal   Ankle reflexes Normal   Straight leg raise -   Calf tenderness -   Wearing a fall-risk bracelet and medical alert necklace    Examination Left hip Right hip   Skin Intact Intact   External Rotation ROM 10 10   Internal Rotation ROM 0 0   Trochanteric tenderness - +, lateral   Hip flexion contracture 5 degree 5 degree   Antalgic gait + +   Trendelenberg sign - -   Lumbar tenderness - -   Straight leg raise - -   Calf tenderness - -   Neurovascular Intact Intact   she has a 5 degree external rotation contracture, and a 5 degree hip flexion contracture, using a single-point cane to walk  Examination Right knee Left knee   Skin Well healed TKR Well healed TKR   Range of motion 110-0 90-0   Effusion - -   Medial joint line tenderness - -   Lateral joint line tenderness - -   Popliteal tenderness - -   Osteophytes palpable - -   Nehals - -   Patella crepitus - +   Anterior drawer - -   Lateral laxity - -   Medial laxity - 2+   Varus deformity - -   Valgus deformity - -   Pretibial edema - -   Calf tenderness - -   Left knee clinically well located  Wound well healed  Brought into office on a stretcher today  Examination Right Hand Left Hand   Skin Intact Intact   Deformity - -   Swelling - -   Tenderness - -   Finger flexion Full Full   Finger extension Full Full   Sensation Normal Normal   Capillary refill Normal Normal   Heberden's nodes - -   Dupuytren's - -   Mild clawing of right hand    Chart reviewed for the following:   Jose Luis Avelar MD, have reviewed the History, Physical and updated the Allergic reactions for [place] [id number] performed immediately prior to start of procedure:  Jose Luis Avelar MD, have performed the following reviews on [address] [facility] prior to the start of the procedure:            * Patient was identified by name and date of birth   * Agreement on procedure being performed was verified  * Risks and Benefits explained to the patient  * Procedure site verified and marked as necessary  * Patient was positioned for comfort  * Consent was obtained     Time: 3:33 PM    Date of procedure: 10/21/2019    Procedure performed by:  Renita Bell MD    Ms. Ortiz tolerated the procedure well with no complications. CTA LOWER EXTREMITY LEFT W CONT 9/9/17  Impression:   1.  Limited CTA of the left lower extremity due to extensive scatter artifact related to the metallic knee prostheses. Visualized portions of the popliteal artery are normal without evidence of disruption or dissection. PVL of the popliteal artery would be useful in demonstrating its entire length. 2. Visualized portions of the superficial femoral artery and trifurcation vessels are normal.  3. There is anterior subluxation of the femoral component of the knee prosthesis with respect to the tibial component with associated varus angulation. There is a moderate joint effusion present. No significant hemorrhage is seen. There is no evidence of acute fracture. There is an old fracture involving the superior  aspect of the patella with nonunion.     CT PELVIS WO CONT 7/20/15  IMPRESSION:  No acute fracture in the pelvis or sacrum/coccyx.   Advanced degenerative joint disease in the right hip. Degenerative disease in the lumbar spine. CT RIGHT HIP WO CONT 5/6/15  IMPRESSION:  Severe osteoarthritis, likely post traumatic, stable since October 2014 with no acute injuries suspected. RADIOGRAPHS:  XR LEFT KNEE 9/15/17  IMPRESSION:  Two views -s/p reduction of left TKR. No fracture, well-fixed prosthetic components in satisfactory position and alignment. XR RIGHT HIP 9/9/17  IMPRESSION:   Severe degenerative arthritis of the right hip with remodeling unchanged back to 2015. No evidence of acute fracture. XR LEFT KNEE 9/9/17  IMPRESSION:   Anterior subluxation if not dislocation at the knee. Joint effusion. Total knee arthroplasty individual components appear intact. 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 Multiple. Articular ossific densities unchanged. XR RIGHT HIP 1/4/17  IMPRESSION:  Redemonstration of very severe osteoarthritis at right hip joint. There is no evidence of acute fracture or dislocation at right hip or on rest of AP view of pelvis. XR LUMBAR SPINE 1/4/17  IMPRESSION:  Redemonstration of very severe degenerative disc disease at L4-L5 level. No evidence of acute fracture or dislocation or subluxation in lumbar spine or sacrum. XR MAREN HIPS 1/25/16    IMPRESSION:  No fractures, near-complete joint space narrowing, no osteophytes present, femoral neck shortening. XR LUMBAR SPINE 7/20/15  IMPRESSION:  No acute findings. Severe degenerative change L4-5 level similar prior exam 05/06/2015. XR RIGHT HIP 5/6/15  IMPRESSION:  Stable chronic findings of the right hip. IMPRESSION:      ICD-10-CM ICD-9-CM    1. Chronic bursitis of right shoulder M75.51 726.10 betamethasone (CELESTONE SOLUSPAN) 6 mg/mL injection      BETAMETHASONE ACETATE & SODIUM PHOSPHATE INJECTION 3 MG EA.      DRAIN/INJECT LARGE JOINT/BURSA   2.  Chronic right shoulder pain M25.511 719.41 betamethasone (CELESTONE SOLUSPAN) 6 mg/mL injection    G89.29 338.29 BETAMETHASONE ACETATE & SODIUM PHOSPHATE INJECTION 3 MG EA.      DRAIN/INJECT LARGE JOINT/BURSA   3. Right lateral epicondylitis M77.11 726.32 betamethasone (CELESTONE SOLUSPAN) 6 mg/mL injection      BETAMETHASONE ACETATE & SODIUM PHOSPHATE INJECTION 3 MG EA.      DRAIN/INJECT INTERMEDIATE JOINT/BURSA   4. Left lateral epicondylitis M77.12 726.32 betamethasone (CELESTONE SOLUSPAN) 6 mg/mL injection      BETAMETHASONE ACETATE & SODIUM PHOSPHATE INJECTION 3 MG EA.      DRAIN/INJECT INTERMEDIATE JOINT/BURSA   5. Right elbow pain M25.521 719.42 betamethasone (CELESTONE SOLUSPAN) 6 mg/mL injection      BETAMETHASONE ACETATE & SODIUM PHOSPHATE INJECTION 3 MG EA.      DRAIN/INJECT INTERMEDIATE JOINT/BURSA   6. Left elbow pain M25.522 719.42 betamethasone (CELESTONE SOLUSPAN) 6 mg/mL injection      BETAMETHASONE ACETATE & SODIUM PHOSPHATE INJECTION 3 MG EA.      DRAIN/INJECT INTERMEDIATE JOINT/BURSA      PLAN:  After discussing treatment options, patient's right shoulder and bilateral lateral epicondyle areas were injected with 4 cc Marcaine and 1/2 cc Celestone. There is no need for surgery at this time. She will follow up as needed.      Scribed by Augustina Monday  ([address]) as dictated by Jaqueline Hand MD [facility]

## 2020-06-04 NOTE — ED NOTES
Received telephone order for labetolol 20 mg IV, percocet 5-325 mg oral. Will order and administer at this time. Double Island Pedicle Flap Text: The defect edges were debeveled with a #15 scalpel blade.  Given the location of the defect, shape of the defect and the proximity to free margins a double island pedicle advancement flap was deemed most appropriate.  Using a sterile surgical marker, an appropriate advancement flap was drawn incorporating the defect, outlining the appropriate donor tissue and placing the expected incisions within the relaxed skin tension lines where possible.    The area thus outlined was incised deep to adipose tissue with a #15 scalpel blade.  The skin margins were undermined to an appropriate distance in all directions around the primary defect and laterally outward around the island pedicle utilizing iris scissors.  There was minimal undermining beneath the pedicle flap.

## 2020-06-09 ENCOUNTER — VIRTUAL VISIT (OUTPATIENT)
Dept: CARDIOLOGY CLINIC | Age: 60
End: 2020-06-09

## 2020-06-09 RX ORDER — CARVEDILOL 6.25 MG/1
6.25 TABLET ORAL 2 TIMES DAILY
COMMUNITY
Start: 2020-05-29

## 2020-08-17 ENCOUNTER — VIRTUAL VISIT (OUTPATIENT)
Dept: CARDIOLOGY CLINIC | Age: 60
End: 2020-08-17

## 2020-08-17 DIAGNOSIS — G47.33 OSA ON CPAP: ICD-10-CM

## 2020-08-17 DIAGNOSIS — Z99.89 OSA ON CPAP: ICD-10-CM

## 2020-08-17 DIAGNOSIS — I50.32 DIASTOLIC CHF, CHRONIC (HCC): Primary | ICD-10-CM

## 2020-08-17 DIAGNOSIS — E66.01 MORBID OBESITY (HCC): ICD-10-CM

## 2020-08-17 DIAGNOSIS — I27.20 PULMONARY HYPERTENSION (HCC): ICD-10-CM

## 2020-08-17 DIAGNOSIS — I11.9 BENIGN HYPERTENSIVE HEART DISEASE WITHOUT HEART FAILURE: ICD-10-CM

## 2020-08-17 DIAGNOSIS — I48.19 OTHER PERSISTENT ATRIAL FIBRILLATION (HCC): ICD-10-CM

## 2020-08-17 NOTE — PROGRESS NOTES
Aliya Larry is a 63 y.o. female, evaluated via audio-only technology on 8/17/2020 for Follow-up (overdue follow up, last seen in 2018) and Leg Swelling (left leg swelling)      Assessment & Plan:   Diagnoses and all orders for this visit:    1. Diastolic CHF, chronic ([address]    2. Pulmonary hypertension ([address]    3. Morbid obesity ([address]    4. Hypertension    5. ANDRADE on CPAP    6. Other persistent atrial fibrillation ([address]      I recommended the patient can continue her current diuretic regimen and adhere to a strict fluid restriction of 1.5 L daily. I did advise that she needs to wear her BiPAP at home nightly. She should remain on her current dose of carvedilol for rate control and Xarelto for oral anticoagulation. A follow-up virtual visit can be arranged in 3 to 4 months. 12  Subjective:     Patient presents for an overdue virtual/telephone only office visit. She has a past medical history significant for atrial fibrillation, which now appears persistent. She also has known congenital heart disease with a defect repaired with open heart surgery as a child. She also has a long history of medication noncompliance and a history of polysubstance abuse in the past.  Lastly, she has a history of psychiatric illnesses which complicates her compliance.     Patient was admitted to the hospital in April 2017 for multifactorial shortness of breath which is felt to be at least in part to acute on chronic diastolic heart failure. She underwent an echocardiogram during her hospital stay which showed preserved LV systolic function, EF 86-89% with mild concentric LVH, a moderately dilated right ventricle with reduced systolic function and a moderately elevated PA pressure of 54 mmHg. She had moderate to severe pulmonic insufficiency.     The patient was last seen by me in the office a little less than 2 years ago.   She has been hospitalized at least 3 times over the past 6 months for acute on chronic respiratory failure felt to be multifactorial due to chronic ANDRADE and obesity hypoventilation syndrome and noncompliant with her CPAP at home. She is also been felt to have a component of acute on chronic heart failure with preserved ejection fraction. Her most recent echocardiogram was in May 2020 at [facility] which demonstrated preserved LV function, EF 65%, mild concentric LVH, preserved right ventricular systolic function with elevated PA pressure of 55 mmHg and no significant valvular heart disease. Her last hospital admission was in July 2020 at [facility] where she was treated with IV Bumex, initially continuous BiPAP and then BiPAP at night. She was eventually discharged home on oral Bumex. Since hospital discharge, the patient states her leg swelling has not significantly increased. Her shortness of breath is at its baseline. She admits that she is not using her CPAP/BiPAP on a nightly basis. She may only use it once or twice a week. Prior to Admission medications    Medication Sig Start Date End Date Taking? Authorizing Provider   carvediloL (COREG) 6.25 mg tablet Take 6.25 mg by mouth two (2) times a day. 5/29/20  Yes Provider, Historical   OXYGEN-AIR DELIVERY SYSTEMS 3 L by Nasal route continuous. Yes Provider, Historical   albuterol (PROVENTIL HFA, VENTOLIN HFA, PROAIR HFA) 90 mcg/actuation inhaler Take 2 Puffs by inhalation every four (4) hours as needed for Shortness of Breath or Wheezing. Yes Provider, Historical   bumetanide (BUMEX) 2 mg tablet Take 2 mg by mouth daily. Yes Provider, Historical   acetaminophen (TYLENOL) 325 mg tablet Take 1,000 mg by mouth every six (6) hours as needed for Fever or Pain. Yes Provider, Historical   azelastine (ASTELIN) 137 mcg (0.1 %) nasal spray 2 Sprays by Both Nostrils route two (2) times a day. Yes Provider, Historical   naloxone (NARCAN) 4 mg/actuation nasal spray 1 [place] by IntraNASal route once as needed (over dosr).    Yes Provider, Historical   famotidine (PEPCID) 20 mg tablet Take 20 mg by mouth daily. Yes Provider, Historical   triamcinolone acetonide (KENALOG) 0.1 % topical cream Apply 2 g to affected area two (2) times a day. use thin layer 8/21/19  Yes Andrew Trivedi MD   XARELTO 20 mg tab tablet Take 1 Tab by mouth daily (with dinner). 8/21/19  Yes Andrew Trivedi MD   albuterol-ipratropium (DUO-NEB) 2.5 mg-0.5 mg/3 ml nebu 3 mL by Nebulization route every six (6) hours as needed (shortness of beath, wheezing). Indications: Bronchi Muscle Spasm resulting from COPD 8/21/19  Yes Andrew Trivedi MD   oxyCODONE IR (ROXICODONE) 5 mg immediate release tablet Take 1 Tab by mouth every six (6) hours as needed for Pain. Max Daily Amount: 20 mg. 10/4/18  Yes Tatyana Britton MD   fluticasone (FLONASE) 50 mcg/actuation nasal spray 2 Sprays by Both Nostrils route daily. 5/10/18  Yes Malick Maretl MD   isosorbide mononitrate ER (IMDUR) 30 mg tablet Take 1 Tab by mouth every evening. 4/24/18  Yes Rg RUVALCABA NP   VENTOLIN HFA 90 mcg/actuation inhaler Take 2 Puffs by inhalation every four (4) hours as needed for Wheezing or Shortness of Breath. 4/26/17  Yes Caleb Calderón MD   PARoxetine (PAXIL) 20 mg tablet Take 1 Tab by mouth daily. 1/21/14  Yes Harmeet West MD   simvastatin (ZOCOR) 40 mg tablet Take 40 mg by mouth nightly. Yes Provider, Historical   aripiprazole (ABILIFY) 15 mg tablet Take 15 mg by mouth daily. Yes Provider, Historical   levothyroxine (SYNTHROID) 125 mcg tablet Take 125 mcg by mouth Daily (before breakfast). Yes Provider, Historical         Review of Systems   Constitutional: Negative for chills, fever and weight loss. HENT: Negative for nosebleeds. Eyes: Negative for blurred vision and double vision. Respiratory: Negative for cough, shortness of breath and wheezing. Cardiovascular: Positive for leg swelling. Negative for chest pain, palpitations, orthopnea, claudication and PND. Gastrointestinal: Negative for abdominal pain, heartburn, nausea and vomiting. Genitourinary: Negative for dysuria and hematuria. Musculoskeletal: Negative for falls and myalgias. Skin: Negative for rash. Neurological: Negative for dizziness, focal weakness and headaches. Endo/Heme/Allergies: Does not bruise/bleed easily. Psychiatric/Behavioral: Negative for substance abuse. Patient-Reported Vitals 8/17/2020   Patient-Reported Height 5'2   Patient-Reported Pulse -   Patient-Reported SpO2 -   Patient-Reported Systolic  -   Patient-Reported Diastolic -        Jesus Ortiz, who was evaluated through a patient-initiated, synchronous (real-time) audio only encounter, and/or her healthcare decision maker, is aware that it is a billable service, with coverage as determined by her insurance carrier. She provided verbal consent to proceed: Yes. She has not had a related appointment within my department in the past 7 days or scheduled within the next 24 hours.       Total Time: minutes: 11-20 minutes    Tonie Craig MD

## 2020-08-17 NOTE — PROGRESS NOTES
Alyssa Escobar presents today for   Chief Complaint   Patient presents with    Follow-up     overdue follow up, last seen in 2018    Leg Swelling     left leg swelling       [facility] preferred language for health care discussion is english/other. Is someone accompanying this pt? Virtual Visit    Is the patient using any DME equipment during OV? Virtual Visit    Depression Screening:  3 most recent PHQ Screens 8/17/2020   PHQ Not Done -   Little interest or pleasure in doing things Not at all   Feeling down, depressed, irritable, or hopeless Not at all   Total Score PHQ 2 0       Learning Assessment:  Learning Assessment 5/6/2016   PRIMARY LEARNER Patient   HIGHEST LEVEL OF EDUCATION - PRIMARY LEARNER  GRADUATED HIGH SCHOOL OR GED   BARRIERS PRIMARY LEARNER NONE   CO-LEARNER CAREGIVER No   PRIMARY LANGUAGE ENGLISH    NEED No   LEARNER PREFERENCE PRIMARY READING   ANSWERED BY patient   RELATIONSHIP SELF       Abuse Screening:  Abuse Screening Questionnaire 8/17/2020   Do you ever feel afraid of your partner? N   Are you in a relationship with someone who physically or mentally threatens you? N   Is it safe for you to go home? Y       Fall Risk  Fall Risk Assessment, last 12 mths 8/17/2020   Able to walk? Yes   Fall in past 12 months? No       Pt currently taking Anticoagulant therapy? Xarelto 20 mg once a day    Coordination of Care:  1. Have you been to the ER, urgent care clinic since your last visit? Hospitalized since your last visit? 02-23-20 to 03-05-20 for leg swelling, 05-21-20 to 05-29-20 for CHF, and 07-20-20 to 07-24-20 for CHF    2. Have you seen or consulted any other health care providers outside of the [address] [place] [address] since your last visit? Include any pap smears or colon screening.  no

## 2020-09-11 NOTE — ED TRIAGE NOTES
CBC/CMP Pt brought in by [facility] from home d/t vaginal bleeding and leg swelling. Pt states she noticed some light pink bleeding after wiping herself. Pt also states she is having leg swelling because she does not take her Lasix d/t excessive urination. Pt A & O X 3, follows commands, no distress noted.

## 2021-08-03 PROBLEM — I50.9 CHF (CONGESTIVE HEART FAILURE) (HCC): Status: RESOLVED | Noted: 2017-04-18 | Resolved: 2021-08-03

## 2021-10-22 NOTE — PROGRESS NOTES
Faxed copy of patient's UAI to [employer] multiple times per patient's request, faxed to [phone] and [phone].

## 2021-10-27 NOTE — ED TRIAGE NOTES
Pt arrived to ED via EMS. Per EMS, pt was discharged from skilled care one hour prior to arrival.  EMS also states that family called 911 due to not being able to take care of pt because of her weakness not being able to stand to use commode. PT is A&O x4.
Normal for race

## 2022-02-28 NOTE — ED NOTES
I have reviewed discharge instructions with the patient. The patient verbalized understanding. Paper discharge instructions signed and placed in scan bin.
all other ROS negative except as per HPI

## 2022-03-18 PROBLEM — Z79.01 CHRONIC ANTICOAGULATION: Status: ACTIVE | Noted: 2018-06-02

## 2022-03-18 PROBLEM — E66.01 MORBID OBESITY WITH BMI OF 45.0-49.9, ADULT (HCC): Status: ACTIVE | Noted: 2017-02-20

## 2022-03-18 PROBLEM — J44.9 COPD (CHRONIC OBSTRUCTIVE PULMONARY DISEASE) (HCC): Status: ACTIVE | Noted: 2019-08-12

## 2022-03-18 PROBLEM — M16.11 PRIMARY OSTEOARTHRITIS OF RIGHT HIP: Status: ACTIVE | Noted: 2018-10-02

## 2022-03-19 PROBLEM — E87.70 FLUID OVERLOAD: Status: ACTIVE | Noted: 2017-04-18

## 2022-03-19 PROBLEM — F41.9 ANXIETY: Status: ACTIVE | Noted: 2018-08-30

## 2022-03-19 PROBLEM — M51.36 DDD (DEGENERATIVE DISC DISEASE), LUMBAR: Status: ACTIVE | Noted: 2017-03-29

## 2022-03-19 PROBLEM — G47.33 OSA ON CPAP: Status: ACTIVE | Noted: 2020-08-17

## 2022-03-19 PROBLEM — J96.00 ACUTE RESPIRATORY FAILURE (HCC): Status: ACTIVE | Noted: 2019-08-12

## 2022-03-19 PROBLEM — M51.369 DDD (DEGENERATIVE DISC DISEASE), LUMBAR: Status: ACTIVE | Noted: 2017-03-29

## 2022-03-19 PROBLEM — R09.02 HYPOXIA: Status: ACTIVE | Noted: 2017-04-18

## 2022-03-19 PROBLEM — R00.1 BRADYCARDIA: Status: ACTIVE | Noted: 2018-05-03

## 2022-03-20 PROBLEM — I50.9 CHF EXACERBATION (HCC): Status: ACTIVE | Noted: 2018-09-29

## 2022-03-20 PROBLEM — I27.20 PULMONARY HYPERTENSION (HCC): Status: ACTIVE | Noted: 2018-11-14

## 2022-03-20 PROBLEM — I50.32 DIASTOLIC CHF, CHRONIC (HCC): Status: ACTIVE | Noted: 2017-10-12

## 2022-03-20 PROBLEM — I50.9 ACUTE EXACERBATION OF CHF (CONGESTIVE HEART FAILURE) (HCC): Status: ACTIVE | Noted: 2018-06-02

## 2023-04-21 NOTE — PROGRESS NOTES
9/5/18:  Patient did not show for her nutrition class. She was left a voicemail to reschedule.     Rosie Robin MS RD [Normal Growth] : growth [Normal Development] : development [None] : No medical problems [No Elimination Concerns] : elimination [No Feeding Concerns] : feeding [No Skin Concerns] : skin [Normal Sleep Pattern] : sleep [No Medications] : ~He/She~ is not on any medications [Parent/Guardian] : parent/guardian

## 2024-05-10 ENCOUNTER — HOME HEALTH ADMISSION (OUTPATIENT)
Age: 64
End: 2024-05-10
Payer: MEDICARE

## 2024-05-12 ENCOUNTER — HOME CARE VISIT (OUTPATIENT)
Age: 64
End: 2024-05-12

## 2024-05-15 ENCOUNTER — HOME CARE VISIT (OUTPATIENT)
Age: 64
End: 2024-05-15
Payer: MEDICARE

## 2024-05-15 ENCOUNTER — HOME CARE VISIT (OUTPATIENT)
Age: 64
End: 2024-05-15

## 2024-05-15 VITALS
DIASTOLIC BLOOD PRESSURE: 70 MMHG | SYSTOLIC BLOOD PRESSURE: 132 MMHG | RESPIRATION RATE: 16 BRPM | OXYGEN SATURATION: 97 % | TEMPERATURE: 98.3 F | HEART RATE: 68 BPM

## 2024-05-15 PROCEDURE — G0299 HHS/HOSPICE OF RN EA 15 MIN: HCPCS

## 2024-05-15 ASSESSMENT — ENCOUNTER SYMPTOMS
PAIN LOCATION - PAIN QUALITY: DULL
BOWEL INCONTINENCE: 1

## 2024-05-16 NOTE — HOME HEALTH
Skilled services/Home bound verification:     Skilled Reason for admission/summary of clinical condition: CHF/MEDICATION TEACHING .  This patient is homebound for the following reasons Requires considerable and taxing effort to leave the home , Requires the assistance of 1 or more persons to leave the home  and Only leaves the home for medical reasons or Jewish services and are infrequent and of short duration for other reasons .    Caregiver: relative.  Caregiver assists with ADLS/MEAL PREP/MEDICAID FOR TRANSPORTATION BUT NOW STAYING ON 2ND FLOOR UNSURE IF THEY CAN PROVIDE TRANSPORTATION AS FAMILY CARRIED UP THE STAIRS. Patient unable to ambulate safely  and caregiver assistance, caregiver demonstrated poor safety with patient and requires training.ADL training needed caregiver needs further training      Medications reconciled and all medications are not available in the home this visit. SEE BELOW     The following education was provided regarding medications: reviewed all medication bottles in home for frequency, side effects and precautions.. verbalized understanding but needs reinforcement    Medications  are too early to assess effectiveness. at this time.      High risk medication teaching regarding anticoagulants, antiplatelets, antibiotics, antipsychotics, hypoglycemic agents, or opioid/narcotics performed (specify): see intervention tab.      Dr Boudreaux notified of any discrepancies/look a like medications/medication interactions: [facility] order was sent already to DME already at patient request AND will send 3-1 commode/nebulizer 5/15/2024. will call in thiamine and vitamin d3 to [facility] today.    Home health supplies by type and quantity ordered/delivered this visit include: na    Patient education provided this visit to include: see intervention tab.          Home exercise program/Homework provided: cont to take all medications/diet as prescribed,

## 2024-05-17 ENCOUNTER — HOME CARE VISIT (OUTPATIENT)
Age: 64
End: 2024-05-17
Payer: MEDICARE

## 2024-05-17 PROCEDURE — G0300 HHS/HOSPICE OF LPN EA 15 MIN: HCPCS

## 2024-05-20 VITALS
DIASTOLIC BLOOD PRESSURE: 88 MMHG | HEART RATE: 59 BPM | OXYGEN SATURATION: 93 % | SYSTOLIC BLOOD PRESSURE: 127 MMHG | RESPIRATION RATE: 18 BRPM | TEMPERATURE: 97.8 F

## 2024-05-20 ASSESSMENT — ENCOUNTER SYMPTOMS: PAIN LOCATION - PAIN QUALITY: THROBBING

## 2024-05-20 NOTE — HOME HEALTH
Skilled reason for visit: Patient education    Caregiver involvement: michelle    Medications reviewed and all medications are available in the home this visit.  Y  The following education was provided regarding medications: Pain management and medication management   MD notified of any discrepancies/look a-like medications/medication interactions: Y  Medications are effective at this time.  Y    Home health supplies by type and quantity ordered/delivered this visit include: N/A    Patient education provided this visit: Fall risk, pain mangement, depression management    Sharps education provided: N/A    Patient level of understanding of education provided: Effective, verbalizes understanding    Patient response to procedure performed:  N/A    Agency Progress toward goals: Agency will continue visits at ordered frequency for patient education reinforcement.    Patient's Progress towards personal goals: Patient continues homecare visits and provider visits at ordered freuquency.    Home exercise program: N/A    Continued need for the following skills: Patient education reinforcement    Plan for next visit: Patient education    Patient and/or caregiver notified and agrees to changes in the Plan of Care: yes     The following discharge planning was discussed with the pt/caregiver:N/A    Patient made this clinician aware that Tylenol order is somewhat effective and that she was taking Oxycodone 5mg in rehab and that it was more effective as tylenol is only somewhat effective for right sided hip and leg pain which has been chronic. This clinician spoke with nurse for patients primary in regards to having a pain management referral at patient's request. Nurse stated she will make MD aware and contact patient.

## 2024-05-21 ENCOUNTER — HOME CARE VISIT (OUTPATIENT)
Age: 64
End: 2024-05-21
Payer: MEDICARE

## 2024-05-21 VITALS
TEMPERATURE: 79.7 F | OXYGEN SATURATION: 92 % | SYSTOLIC BLOOD PRESSURE: 159 MMHG | RESPIRATION RATE: 17 BRPM | DIASTOLIC BLOOD PRESSURE: 92 MMHG | HEART RATE: 74 BPM

## 2024-05-21 PROCEDURE — G0155 HHCP-SVS OF CSW,EA 15 MIN: HCPCS

## 2024-05-21 PROCEDURE — G0151 HHCP-SERV OF PT,EA 15 MIN: HCPCS

## 2024-05-21 ASSESSMENT — ENCOUNTER SYMPTOMS: PAIN LOCATION - PAIN QUALITY: ACHING

## 2024-05-21 NOTE — HOME HEALTH
PT INITIAL EVALUATION    Past Medical Hx:   CHF  COPD  Depression  Cocaine abuse  HTN  Chronic respiratory failure  CKD  Hypothyroidism  TIA  Dysphagia   Recent H/o current illness:  64  year old female presents with MD referral for HHPT s/p hospitalization due to CHF. Pt. is morbidly obese with a left leg length discrepancy  Medication Management: grandson assist with medications  Social hx and home eval:  Pt lives in 2nd floor apartment with grandson.   Caregiver Involvement: assist with ADL's, medical appointments  PLOF:  Pt PLOF is ambulation w FWW, pts base level of function needed some assistance  BALANCE:     Seated unsupported balance is good   Standing static balance is fair  Standing dynamic balance is fair-  Tinetti: 10/28  Patient is at risk for falls due to impaired balance  VITALS: Pt. BP was elevated at 159/92. Called and notified Dr. Prasad office and scheduled a post-hospital f/u visit with PCP for 5/22 at 11:15 am  BLE Strength:  Left Hip flexion 3-/5 , hip abduction 3-/5, hip adduction 3-/5, Knee flexion 3/5  knee extension 3/5, ankle dorsiflexion 3/5  Right Hip flexion 3-/5 , hip abduction 3-/5, hip adduction 3-/5, Knee flexion 3/5  knee extension 3/5, ankle dorsiflexion 3/5  BLE ROM:  Right hip/knee/ankle: WFL  Left hip/knee/ankle: WFL  Bed mobility:  mod A   Transfers:  max A with sit<->stand for bed to chair, with FWW AD. mod cues and instruction needed for safety and sequencing.  GAIT:  Patient ambulated  10 ft  with FWW  on level  surfaces max A.  Pt demonstrates with decreased hip and knee flexion on BLE in pre and mid swing phase of gait as well as decreased stride-length and karen. Patient is unable to safely ambulate without assistance at this time.    Pt required mod cues for  safety and sequencing  Stairs: NT  Patient education provided this visit: Safety with functional mobility and instruction with HEP.   Patient level of understanding of education provided: fair ,able to return

## 2024-05-23 ENCOUNTER — HOME CARE VISIT (OUTPATIENT)
Age: 64
End: 2024-05-23
Payer: MEDICARE

## 2024-05-23 VITALS
RESPIRATION RATE: 18 BRPM | HEART RATE: 73 BPM | SYSTOLIC BLOOD PRESSURE: 158 MMHG | OXYGEN SATURATION: 97 % | TEMPERATURE: 97.3 F | DIASTOLIC BLOOD PRESSURE: 88 MMHG

## 2024-05-23 PROCEDURE — G0300 HHS/HOSPICE OF LPN EA 15 MIN: HCPCS

## 2024-05-24 ENCOUNTER — HOME CARE VISIT (OUTPATIENT)
Age: 64
End: 2024-05-24
Payer: MEDICARE

## 2024-05-24 NOTE — HOME HEALTH
MSW met with the pt privately and with her grandson/caregiver Reuben Aldridge, with whom the pt lives. Pt needs assistance with most ADLs. MH support discussed and virtual provider resources given to support clinical counseling/therapy services. MSW discussed resources and invited a return call if resource questions arise.

## 2024-05-24 NOTE — HOME HEALTH
Skilled reason for visit: Patient education reinforcement    Caregiver involvement: Chhaya    Medications reviewed and all medications are available in the home this visit.  Y  The following education was provided regarding medications:  N/A  MD notified of any discrepancies/look a-like medications/medication interactions: N/A  Medications are effective at this time. Y    Home health supplies by type and quantity ordered/delivered this visit include: N/A    Patient education provided this visit: Fall risk, medication management.    Sharps education provided: N/A    Patient level of understanding of education provided: Verbalizes understanding may need reinforcement.  Patient response to procedure performed:  N/A    Agency Progress toward goals: Agency continues visits as ordered to reinforce education to patient.    Patient's Progress towards personal goals: Patient continues homecare visits as ordered and provider visits.    Home exercise program: N/A    Continued need for the following skills: Patient education reinforcement.    Plan for next visit: Patient education    Patient and/or caregiver notified and agrees to changes in the Plan of Care: yes     The following discharge planning was discussed with the pt/caregiver: Patient will discharge once all goals are met.

## 2024-05-29 ENCOUNTER — HOME CARE VISIT (OUTPATIENT)
Age: 64
End: 2024-05-29
Payer: MEDICARE

## 2024-05-29 VITALS
HEART RATE: 55 BPM | RESPIRATION RATE: 18 BRPM | DIASTOLIC BLOOD PRESSURE: 80 MMHG | SYSTOLIC BLOOD PRESSURE: 136 MMHG | TEMPERATURE: 97.3 F | OXYGEN SATURATION: 95 %

## 2024-05-29 PROCEDURE — G0152 HHCP-SERV OF OT,EA 15 MIN: HCPCS

## 2024-05-29 PROCEDURE — G0157 HHC PT ASSISTANT EA 15: HCPCS

## 2024-05-29 PROCEDURE — G0300 HHS/HOSPICE OF LPN EA 15 MIN: HCPCS

## 2024-05-29 NOTE — HOME HEALTH
Skilled Reason for admission/summary of clinical condition: 64 year old female presents with MD referral for HHOT s/p hospitalization due to CHF.    Caregiver Involvement: Pt lives in second story apartment with grandson who assists with ADLs and functional mobility    PMHx: CHF COPD Depression Cocaine abuse HTN Chronic respiratory failure CKD Hypothyroidism TIA Dysphagia    PLOF: Pt was ambulating with FWW and required assistance with ADLs and functional mobility    Medications: Pt reports no changes to medications since last reviewed and educated to continue as directed per MD.    SUBJECTIVE: Pt reports no pain on this date. Pt with fear of falling while ambulating. Pt with leg length discrepancy. Pt reports new patient appointment on 6/11 with new PCP. Pt reports she has a hospital bed in storage, however she is unable to obtain due to financial reasons     DME IN HOME: Manual WC, FWW, rollator, 3:1 commode     DME ORDERED/RECOMMENDED: tub transfer bench, grab bars, sock aid     OBJECTIVE:  BATHING: Pt completing sponge bathing at the bed level. Pt with tub shower however pt reports, \"I can't get my legs up over the side\". Pt would benefit from tub transfer bench to increase safety and independence with tub transfers and bathing  TOILETING: Pt CGA for toileting on 3:1 commode in bedroom   UB DRESSING: Pt CGA for upper body dressing to sepideh/doff shirts   LB DRESSING: Pt minimal assistance for lower body dressing to sepideh/doff socks, shoes and pants with increased difficulty reaching feed due to weight   GROOMING: Pt setup assistance for grooming for hair care, oral care and washing hands/face at the bed level  FEEDING: Pt supervision for self feeding     Modified Jyoti RPE 8/10 after performing ambulation, transfers, and I/ADL assessment     BUE MMT: 3+/5  BUE AROM: WFL     VISION: Pt vision is WFL for reading directions/medications and to navigate their home environment safely.     BALANCE: Pt with good sitting

## 2024-05-30 VITALS
HEART RATE: 58 BPM | TEMPERATURE: 98.1 F | OXYGEN SATURATION: 99 % | SYSTOLIC BLOOD PRESSURE: 130 MMHG | RESPIRATION RATE: 18 BRPM | DIASTOLIC BLOOD PRESSURE: 88 MMHG

## 2024-05-30 NOTE — HOME HEALTH
Skilled reason for visit: Patient teaching reinforcement    Caregiver involvement: Chhaya    Medications reviewed and all medications are available in the home this visit.  Y  The following education was provided regarding medications:  N/A  MD notified of any discrepancies/look a-like medications/medication interactions: N/A  Medications are effective at this time.  Y    Home health supplies by type and quantity ordered/delivered this visit include: Supplies in home    Patient education provided this visit: high risk medications, medication box setup, chf teaching.    Sharps education provided: N/A    Patient level of understanding of education provided: Verbalizes understanding, able to repeat back but may need reinforcement r/t knowledge deficit.    Patient response to procedure performed:  N/A    Agency Progress toward goals: Agency continues visits at ordered frequency to provide patient teaching    Patient's Progress towards personal goals: Patient continues homecare and provider visits at ordered frequency to provide home care.    Home exercise program: N/A    Continued need for the following skills: Patient education reinforcement    Plan for next visit: Patient education    Patient and/or caregiver notified and agrees to changes in the Plan of Care: yes     The following discharge planning was discussed with the pt/caregiver: N/A

## 2024-05-31 ENCOUNTER — HOME CARE VISIT (OUTPATIENT)
Age: 64
End: 2024-05-31
Payer: MEDICARE

## 2024-05-31 PROCEDURE — G0157 HHC PT ASSISTANT EA 15: HCPCS

## 2024-06-03 ENCOUNTER — HOME CARE VISIT (OUTPATIENT)
Age: 64
End: 2024-06-03
Payer: MEDICARE

## 2024-06-03 PROCEDURE — G0158 HHC OT ASSISTANT EA 15: HCPCS

## 2024-06-04 VITALS
RESPIRATION RATE: 17 BRPM | HEART RATE: 86 BPM | TEMPERATURE: 97.6 F | SYSTOLIC BLOOD PRESSURE: 132 MMHG | DIASTOLIC BLOOD PRESSURE: 86 MMHG | OXYGEN SATURATION: 98 %

## 2024-06-04 VITALS
SYSTOLIC BLOOD PRESSURE: 120 MMHG | TEMPERATURE: 97.3 F | TEMPERATURE: 98.1 F | DIASTOLIC BLOOD PRESSURE: 81 MMHG | RESPIRATION RATE: 18 BRPM | RESPIRATION RATE: 17 BRPM | DIASTOLIC BLOOD PRESSURE: 80 MMHG | OXYGEN SATURATION: 94 % | SYSTOLIC BLOOD PRESSURE: 136 MMHG | OXYGEN SATURATION: 99 % | HEART RATE: 71 BPM | HEART RATE: 55 BPM

## 2024-06-04 NOTE — HOME HEALTH
SUBJECTIVE: Patient has no complaints but reports she stays in bed most of the day. Patient denies any recent falls or medication changes.     CAREGIVER INVOLVEMENT/ASSISTANCE NEEDED FOR: Patient lives in a second floor apartment with her son who is aiding with ADLs and meal preparation as needed. Patient has steps present to enter/exit home.     OBJECTIVE:  See interventions.    PATIENT RESPONSE TO TREATMENT:  Patient was agreeable to PT session but is nervous with all standing tasks- patient has no pain present or complaints with tasks performed.     PATIENT LEVEL OF UNDERSTANDING OF EDUCATION PROVIDED: Patient was instructed on importance of using AD at all times with standing to decrease risk for tripping/falls and to improve patient safety.    ASSESSMENT OF PROGRESS TOWARD GOALS: Patient was seen today for a PT follow up and was able to perform sit to stand from low surface with MOD A and UE support- patient requires education to sequence properly. During gait training in home patient was nervous but was able to perform without LOB present or increase in pain. Much cueing is needed to relax during this task. Patient deferred therex tasks secondary to much fatigue present- OT was present for some of session today too.     HOME EXERCISE PROGRAM:Patient was instructed on taking a walk at least once per hour to increase time spent in standing. Patient was educated that therex will be added in coming visit.    THE FOLLOWING DISCHARGE PLANNING WAS DISCUSSED WITH THE PATIENT/CAREGIVER: DC from  PT once goals are met.     PLAN NEXT VISIT: Plan in coming visit to increase therex tasks and gait training in home as able.

## 2024-06-04 NOTE — HOME HEALTH
SUBJECTIVE: Patient reports she would like to do things more independently.   .   CAREGIVER INVOLVEMENT/ASSISTANCE NEEDED FOR: The pt family helps with all I/ADLS due to patient's inability to do so.   .  HOME HEALTH SUPPLIES BY TYPE AND QUANTITY ORDERED/DELIVERED THIS VISIT INCLUDE: NONE   .  OBJECTIVE:  See interventions.    .  Patient education provided this visit: Education primary focus on this visit was the use of body mechanics when performing standing balance tasks.  .    Patient level of understanding of education provided: Patient was able to teach-back education provided from HUGHES,while requiring multiple verbal cuing form HUGHES for re-enforcement of Proper stance when performing functional balance retraining tasks.     .  RESPONSE TO TREATMENT: Pt able to demonstrate Plan of care addressing functional transfers and balance; while requiring breaks for balance and AROM exercises re-training.  (Please see interventions for more details of goal previously mentioned)   .   ASSESSMENT OF PROGRESS TOWARD GOALS((Please see interventions for more details)): Pt able to to perform balance tasks with Fair balance with use of RW for safety. Pt requires Mod Prompting for proper body stance and posture when standing. Pt able to BUE graded exercises with the use off weights. This is an advancement as the patient was achieved an upgraded from AROM HEP regimen  It is also advised that client continue to adhere to help new HEP addressing BUE strength and endurance.  It is recommended that the patient continue to adhere to her HEP address BUE strength and endurance retraining to continue to build strength in her BUE to successfully conduct funcitonal transfers in her home setting.        .  CONTINUED NEED FOR THE FOLLOWING SKILLS: HH OT is medically necessary to address pain, decreased functional strength, decreased independence and safety with functional transfers, decreased independence and safety performing ADL/IADL

## 2024-06-05 ENCOUNTER — HOME CARE VISIT (OUTPATIENT)
Age: 64
End: 2024-06-05
Payer: MEDICARE

## 2024-06-05 VITALS
SYSTOLIC BLOOD PRESSURE: 143 MMHG | HEART RATE: 65 BPM | RESPIRATION RATE: 19 BRPM | TEMPERATURE: 98.3 F | OXYGEN SATURATION: 99 % | DIASTOLIC BLOOD PRESSURE: 80 MMHG

## 2024-06-05 PROCEDURE — G0157 HHC PT ASSISTANT EA 15: HCPCS

## 2024-06-05 NOTE — HOME HEALTH
SUBJECTIVE: Patient was present with her son and sitting up in bed, she denies any complaints. Patient denies any recent falls or medication changes.     CAREGIVER INVOLVEMENT/ASSISTANCE NEEDED FOR: Patient lives in a second floor apartment with her son who is aiding with ADLs and meal preparation as needed. Patient has steps present to enter/exit home.     OBJECTIVE:  See interventions.    PATIENT RESPONSE TO TREATMENT:  Patient was agreeable to PT session and has less nervousness present today with all standing tasks- patient has no pain present or complaints with tasks performed.     PATIENT LEVEL OF UNDERSTANDING OF EDUCATION PROVIDED: Patient was instructed on importance of using AD at all times with standing to decrease risk for tripping/falls and to improve patient safety.     ASSESSMENT OF PROGRESS TOWARD GOALS: Patient was seen today for a PT follow up and was able to perform gait training x15 feet with use of FWW- she demonstrates less nervousness today and requires education to keep AD closer to body to aide with overall stability. She also performed sitting strengthening tasks- updated HEP and requires demonstrations and cues to improve form throughout. Min/MOD A required for sit to stand transfers today from blow up mattress.     HOME EXERCISE PROGRAM:Patient was instructed on taking a walk at least once per hour to increase time spent in standing. Patient was educated to perform in sitting to BLEs with AROM as follows: ankle pumps, LAQ, knee flexion, hip marching x10 reps each all 3x per day as able.    THE FOLLOWING DISCHARGE PLANNING WAS DISCUSSED WITH THE PATIENT/CAREGIVER: DC from  PT once goals are met.     PLAN NEXT VISIT: Plan in coming visit to increase therex tasks and gait training in home as able.

## 2024-06-06 ENCOUNTER — HOME CARE VISIT (OUTPATIENT)
Age: 64
End: 2024-06-06
Payer: MEDICARE

## 2024-06-06 PROCEDURE — G0158 HHC OT ASSISTANT EA 15: HCPCS

## 2024-06-06 PROCEDURE — G0300 HHS/HOSPICE OF LPN EA 15 MIN: HCPCS

## 2024-06-06 NOTE — HOME HEALTH
SUBJECTIVE: Patient reports reports generalized pain on the numeric scale of an 6/10.    .     CAREGIVER INVOLVEMENT/ASSISTANCE NEEDED FOR: The pt family helps with all I/ADLS due to patient's inability to do so.     .    HOME HEALTH SUPPLIES BY TYPE AND QUANTITY ORDERED/DELIVERED THIS VISIT INCLUDE: NONE     .    OBJECTIVE:  See interventions.         .    Patient education provided this visit: Education primary focus on this visit was the use of body mechanics when performing functional transfers and optimal level of functioning.  .         Patient level of understanding of education provided: Patient was able to teach-back education provided from HUGHES,while requiring multiple verbal cuing form HUGHES for re-enforcement of Proper stance when performing functional transfers and optimal level of functioning.          .    RESPONSE TO TREATMENT: Pt able to demonstrate Plan of care addressing functional transfers and balance; while requiring breaks for balance and AROM exercises re-training. Pt reports fatigue on the scale of 5/10 upon the end of the OT HH session. (Please see interventions for more details of goal previously mentioned)     .     ASSESSMENT OF PROGRESS TOWARD GOALS((Please see interventions for more details)): Pt able to to perform functional transfers with Poor+ balance and SB/CGA  with use of FWW for safety. Pt requires Mod Prompting for proper body stance and posture when standing. Pt was able to perfom AROM exercises with SUP/SBA with use of energy conservation techniques.  It is also advised that client continue to adhere to help new HEP addressing BUE strength and endurance.  It is recommended that the patient continue to adhere to her HEP address BUE strength and endurance retraining to continue to build strength in her BUE to successfully conduct funcitonal transfers in her home setting.        .    CONTINUED NEED FOR THE FOLLOWING SKILLS: HH OT is medically necessary to address pain, decreased

## 2024-06-07 ENCOUNTER — HOME CARE VISIT (OUTPATIENT)
Age: 64
End: 2024-06-07
Payer: MEDICARE

## 2024-06-07 VITALS
TEMPERATURE: 97.2 F | OXYGEN SATURATION: 98 % | SYSTOLIC BLOOD PRESSURE: 154 MMHG | DIASTOLIC BLOOD PRESSURE: 88 MMHG | RESPIRATION RATE: 18 BRPM | HEART RATE: 75 BPM

## 2024-06-07 PROCEDURE — G0157 HHC PT ASSISTANT EA 15: HCPCS

## 2024-06-07 NOTE — HOME HEALTH
Skilled reason for visit: Patient teaching reinforcement/reassessment    Caregiver involvement: Grandson in home and helps care for patient    Medications reviewed and all medications are available in the home this visit.  Y  The following education was provided regarding medications:  N/A  MD notified of any discrepancies/look a-like medications/medication interactions: N/A  Medications are effective at this time.  Y    Home health supplies by type and quantity ordered/delivered this visit include: N/A    Patient education provided this visit: Health Management, high risk medications    Sharps education provided: N/A    Patient level of understanding of education provided: Verbalizes understanding, able to repeat back. May need reinforcement due to knowledge deficit.    Patient response to procedure performed:  effective    Agency Progress toward goals: agency continues visits at ordered frequency to provide teaching reinforcement.    Patient's Progress towards personal goals: Patient continues homecare and provider visits at ordered frequency.    Home exercise program: N/A    Continued need for the following skills: Patient education reinforcement. Patient states she will be seeing no primary care provider next week. May need reinforcement if medications change and plan of care changes.    Plan for next visit: Patient education    Patient and/or caregiver notified and agrees to changes in the Plan of Care: yes

## 2024-06-08 VITALS
HEART RATE: 86 BPM | RESPIRATION RATE: 17 BRPM | DIASTOLIC BLOOD PRESSURE: 74 MMHG | SYSTOLIC BLOOD PRESSURE: 132 MMHG | TEMPERATURE: 98.2 F | OXYGEN SATURATION: 98 %

## 2024-06-08 ASSESSMENT — ENCOUNTER SYMPTOMS: PAIN LOCATION - PAIN QUALITY: ACHING

## 2024-06-10 ENCOUNTER — HOME CARE VISIT (OUTPATIENT)
Age: 64
End: 2024-06-10
Payer: MEDICARE

## 2024-06-10 VITALS
TEMPERATURE: 98.2 F | OXYGEN SATURATION: 98 % | SYSTOLIC BLOOD PRESSURE: 142 MMHG | DIASTOLIC BLOOD PRESSURE: 86 MMHG | HEART RATE: 86 BPM | RESPIRATION RATE: 17 BRPM

## 2024-06-10 PROCEDURE — G0158 HHC OT ASSISTANT EA 15: HCPCS

## 2024-06-10 NOTE — HOME HEALTH
SUBJECTIVE: Patient reports no pain on this date. Pt reports no SOB or LOB.    .     CAREGIVER INVOLVEMENT/ASSISTANCE NEEDED FOR: The pt family helps with all I/ADLS due to patient's inability to do so.     .    HOME HEALTH SUPPLIES BY TYPE AND QUANTITY ORDERED/DELIVERED THIS VISIT INCLUDE: NONE     .    OBJECTIVE:  See interventions.         .    Patient education provided this visit: Education primary focus on this visit was the use of body mechanics when performing standing transfer task.    .         Patient level of understanding of education provided: Patient was able to teach-back education provided from HUGHES,while requiring multiple verbal cuing form HUGHES for re-enforcement of Proper stance when performing functional transfers retraining tasks.          .    RESPONSE TO TREATMENT: Pt able to demonstrate Plan of care addressing functional transfers and optimal level of functional; while requiring breaks for balance and AROM exercises re-training. Pt reports an 7/10 on fatigue scale towards the end of the session.  (Please see interventions for more details of goal previously mentioned)     .     ASSESSMENT OF PROGRESS TOWARD GOALS((Please see interventions for more details)): HUGHES provided education and instruction that this HEP is to be performed 2-3x a day with rest breaks as needed, completing each exercise 20 times each daily. Although client was able to demo each exercise with SUP, HUGHES provided verbal cuing for purse-lip breathing and correct body posture for increased activity tolerance while performing this exercises to prevent SOB and fatigue while engaging in BUE exercises. In return, she demo these exercises with SBA, pt in agreeance to performing this HEP regimen daily for increase functional gains. Patient able to perform toliet transfers on this date with SB/CGA with Tactile and verbal cues provided from HUGHES for proper placement of the hands upon entering and exiting the tub for increased

## 2024-06-12 ENCOUNTER — HOME CARE VISIT (OUTPATIENT)
Age: 64
End: 2024-06-12
Payer: MEDICARE

## 2024-06-12 VITALS
TEMPERATURE: 98.6 F | DIASTOLIC BLOOD PRESSURE: 70 MMHG | SYSTOLIC BLOOD PRESSURE: 128 MMHG | OXYGEN SATURATION: 98 % | RESPIRATION RATE: 18 BRPM | HEART RATE: 68 BPM

## 2024-06-12 VITALS
SYSTOLIC BLOOD PRESSURE: 118 MMHG | DIASTOLIC BLOOD PRESSURE: 74 MMHG | HEART RATE: 73 BPM | OXYGEN SATURATION: 94 % | TEMPERATURE: 97.3 F | RESPIRATION RATE: 18 BRPM

## 2024-06-12 PROCEDURE — G0299 HHS/HOSPICE OF RN EA 15 MIN: HCPCS

## 2024-06-12 ASSESSMENT — ENCOUNTER SYMPTOMS
PAIN LOCATION - PAIN QUALITY: ACHING
DYSPNEA ACTIVITY LEVEL: AFTER AMBULATING 10 - 20 FT

## 2024-06-12 NOTE — HOME HEALTH
SUBJECTIVE: Patient reports pain throughout at 5/10. Patient denies any recent falls or medication changes.     CAREGIVER INVOLVEMENT/ASSISTANCE NEEDED FOR: Patient lives in a second floor apartment with her son who is aiding with ADLs and meal preparation as needed. Patient has steps present to enter/exit home.     OBJECTIVE:  See interventions.    PATIENT RESPONSE TO TREATMENT:  Patient was agreeable to PT session- he has no change in pain, remained 5/10.    PATIENT LEVEL OF UNDERSTANDING OF EDUCATION PROVIDED: Patient was instructed on importance of using AD at all times with standing to decrease risk for tripping/falls and to improve patient safety.     ASSESSMENT OF PROGRESS TOWARD GOALS: Patient was seen today for a PT follow up and was able to perform gait training indoors x30 feet with cues needed to increase step length and heel strike- she has no LOB present. Patient has limited challenge present with this task today as she was more relaxed. Patient then performed sit to stands from mattress with MIN A and this was improvement from MOD A on last visit. Reviewed HEP with patient as below and reports understanding and compliance.     HOME EXERCISE PROGRAM:Patient was instructed on taking a walk at least once per hour to increase time spent in standing. Patient was educated to perform in sitting to BLEs with AROM as follows: ankle pumps, LAQ, knee flexion, hip marching x10 reps each all 3x per day as able.    THE FOLLOWING DISCHARGE PLANNING WAS DISCUSSED WITH THE PATIENT/CAREGIVER: DC from  PT once goals are met.     PLAN NEXT VISIT: Plan in coming visit to increase therex tasks and gait training in home as able.

## 2024-06-13 NOTE — HOME HEALTH
Skilled reason for visit: discharge due to out network for insurance        Medications reviewed and all medications are available in the home this visit.    The following education was provided regarding medications:  reviewed all medication bottles in home for frequency, side effects and precautions. verbalized understanding and repeat back        Medications are effective at this time.      Home health supplies by type and quantity ordered/delivered this visit include: AN    Patient education provided this visit: see discharge summary/intervention tab            Patient level of understanding of education provided:   verbalized understanding and repeat back TO ALL TEACHING IN  INTERVENTION/DISCHARGE SUMMARY TAB        Agency Progress toward goals:nursing goals met

## 2024-09-02 ENCOUNTER — APPOINTMENT (OUTPATIENT)
Facility: HOSPITAL | Age: 64
DRG: 682 | End: 2024-09-02
Payer: MEDICARE

## 2024-09-02 ENCOUNTER — HOSPITAL ENCOUNTER (INPATIENT)
Facility: HOSPITAL | Age: 64
LOS: 6 days | Discharge: HOME HEALTH | DRG: 682 | End: 2024-09-08
Attending: STUDENT IN AN ORGANIZED HEALTH CARE EDUCATION/TRAINING PROGRAM | Admitting: HOSPITALIST
Payer: MEDICARE

## 2024-09-02 DIAGNOSIS — I50.23 ACUTE ON CHRONIC SYSTOLIC CONGESTIVE HEART FAILURE (HCC): ICD-10-CM

## 2024-09-02 DIAGNOSIS — T14.8XXA OPEN WOUND OF SKIN: ICD-10-CM

## 2024-09-02 DIAGNOSIS — I50.32 CHRONIC DIASTOLIC CONGESTIVE HEART FAILURE (HCC): ICD-10-CM

## 2024-09-02 DIAGNOSIS — N17.9 ACUTE RENAL FAILURE, UNSPECIFIED ACUTE RENAL FAILURE TYPE (HCC): Primary | ICD-10-CM

## 2024-09-02 PROBLEM — I50.33 ACUTE ON CHRONIC DIASTOLIC HEART FAILURE (HCC): Status: ACTIVE | Noted: 2017-10-12

## 2024-09-02 PROBLEM — I36.1 NONRHEUMATIC TRICUSPID VALVE REGURGITATION: Status: ACTIVE | Noted: 2024-09-02

## 2024-09-02 PROBLEM — L03.317 CELLULITIS OF BUTTOCK: Status: ACTIVE | Noted: 2024-09-02

## 2024-09-02 LAB
ALBUMIN SERPL-MCNC: 3.7 G/DL (ref 3.4–5)
ALBUMIN/GLOB SERPL: 0.9 (ref 0.8–1.7)
ALP SERPL-CCNC: 110 U/L (ref 45–117)
ALT SERPL-CCNC: 11 U/L (ref 13–56)
ANION GAP SERPL CALC-SCNC: 9 MMOL/L (ref 3–18)
AST SERPL-CCNC: 15 U/L (ref 10–38)
BASOPHILS # BLD: 0 K/UL (ref 0–0.1)
BASOPHILS NFR BLD: 1 % (ref 0–2)
BILIRUB SERPL-MCNC: 0.6 MG/DL (ref 0.2–1)
BUN SERPL-MCNC: 56 MG/DL (ref 7–18)
BUN/CREAT SERPL: 18 (ref 12–20)
CALCIUM SERPL-MCNC: 9 MG/DL (ref 8.5–10.1)
CHLORIDE SERPL-SCNC: 108 MMOL/L (ref 100–111)
CO2 SERPL-SCNC: 22 MMOL/L (ref 21–32)
CREAT SERPL-MCNC: 3.17 MG/DL (ref 0.6–1.3)
CREAT UR-MCNC: 151 MG/DL (ref 30–125)
DIFFERENTIAL METHOD BLD: ABNORMAL
EOSINOPHIL # BLD: 0.1 K/UL (ref 0–0.4)
EOSINOPHIL NFR BLD: 2 % (ref 0–5)
ERYTHROCYTE [DISTWIDTH] IN BLOOD BY AUTOMATED COUNT: 17.6 % (ref 11.6–14.5)
GLOBULIN SER CALC-MCNC: 3.9 G/DL (ref 2–4)
GLUCOSE SERPL-MCNC: 73 MG/DL (ref 74–99)
HCT VFR BLD AUTO: 29.8 % (ref 35–45)
HGB BLD-MCNC: 8.2 G/DL (ref 12–16)
IMM GRANULOCYTES # BLD AUTO: 0 K/UL (ref 0–0.04)
IMM GRANULOCYTES NFR BLD AUTO: 1 % (ref 0–0.5)
LYMPHOCYTES # BLD: 0.7 K/UL (ref 0.9–3.6)
LYMPHOCYTES NFR BLD: 21 % (ref 21–52)
MCH RBC QN AUTO: 21.4 PG (ref 24–34)
MCHC RBC AUTO-ENTMCNC: 27.5 G/DL (ref 31–37)
MCV RBC AUTO: 77.6 FL (ref 78–100)
MONOCYTES # BLD: 0.4 K/UL (ref 0.05–1.2)
MONOCYTES NFR BLD: 12 % (ref 3–10)
NEUTS SEG # BLD: 2.1 K/UL (ref 1.8–8)
NEUTS SEG NFR BLD: 63 % (ref 40–73)
NRBC # BLD: 0.03 K/UL (ref 0–0.01)
NRBC BLD-RTO: 0.9 PER 100 WBC
NT PRO BNP: 9981 PG/ML (ref 0–900)
OSMOLALITY SERPL: 310 MOSM/KG H2O (ref 280–301)
OSMOLALITY UR: 451 MOSM/KG H2O
PLATELET # BLD AUTO: 109 K/UL (ref 135–420)
PLATELET COMMENT: ABNORMAL
PMV BLD AUTO: 9.4 FL (ref 9.2–11.8)
POTASSIUM SERPL-SCNC: 3.8 MMOL/L (ref 3.5–5.5)
PROT SERPL-MCNC: 7.6 G/DL (ref 6.4–8.2)
RBC # BLD AUTO: 3.84 M/UL (ref 4.2–5.3)
RBC MORPH BLD: ABNORMAL
RBC MORPH BLD: ABNORMAL
SODIUM SERPL-SCNC: 139 MMOL/L (ref 136–145)
SODIUM UR-SCNC: 44 MMOL/L (ref 20–110)
WBC # BLD AUTO: 3.3 K/UL (ref 4.6–13.2)

## 2024-09-02 PROCEDURE — 85025 COMPLETE CBC W/AUTO DIFF WBC: CPT

## 2024-09-02 PROCEDURE — 2580000003 HC RX 258: Performed by: STUDENT IN AN ORGANIZED HEALTH CARE EDUCATION/TRAINING PROGRAM

## 2024-09-02 PROCEDURE — 83930 ASSAY OF BLOOD OSMOLALITY: CPT

## 2024-09-02 PROCEDURE — 1100000003 HC PRIVATE W/ TELEMETRY

## 2024-09-02 PROCEDURE — 6360000002 HC RX W HCPCS: Performed by: STUDENT IN AN ORGANIZED HEALTH CARE EDUCATION/TRAINING PROGRAM

## 2024-09-02 PROCEDURE — 71045 X-RAY EXAM CHEST 1 VIEW: CPT

## 2024-09-02 PROCEDURE — 74176 CT ABD & PELVIS W/O CONTRAST: CPT

## 2024-09-02 PROCEDURE — 83935 ASSAY OF URINE OSMOLALITY: CPT

## 2024-09-02 PROCEDURE — 6370000000 HC RX 637 (ALT 250 FOR IP): Performed by: STUDENT IN AN ORGANIZED HEALTH CARE EDUCATION/TRAINING PROGRAM

## 2024-09-02 PROCEDURE — 99285 EMERGENCY DEPT VISIT HI MDM: CPT

## 2024-09-02 PROCEDURE — 93005 ELECTROCARDIOGRAM TRACING: CPT | Performed by: STUDENT IN AN ORGANIZED HEALTH CARE EDUCATION/TRAINING PROGRAM

## 2024-09-02 PROCEDURE — 82570 ASSAY OF URINE CREATININE: CPT

## 2024-09-02 PROCEDURE — 99223 1ST HOSP IP/OBS HIGH 75: CPT | Performed by: STUDENT IN AN ORGANIZED HEALTH CARE EDUCATION/TRAINING PROGRAM

## 2024-09-02 PROCEDURE — 83880 ASSAY OF NATRIURETIC PEPTIDE: CPT

## 2024-09-02 PROCEDURE — 80053 COMPREHEN METABOLIC PANEL: CPT

## 2024-09-02 PROCEDURE — 84300 ASSAY OF URINE SODIUM: CPT

## 2024-09-02 RX ORDER — ACETAMINOPHEN 650 MG/1
650 SUPPOSITORY RECTAL EVERY 6 HOURS PRN
Status: DISCONTINUED | OUTPATIENT
Start: 2024-09-02 | End: 2024-09-02

## 2024-09-02 RX ORDER — FUROSEMIDE 10 MG/ML
60 INJECTION INTRAMUSCULAR; INTRAVENOUS ONCE
Status: COMPLETED | OUTPATIENT
Start: 2024-09-02 | End: 2024-09-02

## 2024-09-02 RX ORDER — HYDROXYZINE HYDROCHLORIDE 25 MG/1
25 TABLET, FILM COATED ORAL
Status: COMPLETED | OUTPATIENT
Start: 2024-09-02 | End: 2024-09-02

## 2024-09-02 RX ORDER — ONDANSETRON 4 MG/1
4 TABLET, ORALLY DISINTEGRATING ORAL EVERY 8 HOURS PRN
Status: DISCONTINUED | OUTPATIENT
Start: 2024-09-02 | End: 2024-09-08 | Stop reason: HOSPADM

## 2024-09-02 RX ORDER — POLYETHYLENE GLYCOL 3350 17 G/17G
17 POWDER, FOR SOLUTION ORAL DAILY PRN
Status: DISCONTINUED | OUTPATIENT
Start: 2024-09-02 | End: 2024-09-08 | Stop reason: HOSPADM

## 2024-09-02 RX ORDER — MIRTAZAPINE 15 MG/1
7.5 TABLET, FILM COATED ORAL NIGHTLY
Status: DISCONTINUED | OUTPATIENT
Start: 2024-09-02 | End: 2024-09-08 | Stop reason: HOSPADM

## 2024-09-02 RX ORDER — LEVOTHYROXINE SODIUM 125 UG/1
125 TABLET ORAL DAILY
Status: DISCONTINUED | OUTPATIENT
Start: 2024-09-03 | End: 2024-09-08 | Stop reason: HOSPADM

## 2024-09-02 RX ORDER — LANOLIN ALCOHOL/MO/W.PET/CERES
3 CREAM (GRAM) TOPICAL NIGHTLY
Status: DISCONTINUED | OUTPATIENT
Start: 2024-09-02 | End: 2024-09-08 | Stop reason: HOSPADM

## 2024-09-02 RX ORDER — ATORVASTATIN CALCIUM 20 MG/1
20 TABLET, FILM COATED ORAL DAILY
Status: DISCONTINUED | OUTPATIENT
Start: 2024-09-03 | End: 2024-09-08 | Stop reason: HOSPADM

## 2024-09-02 RX ORDER — SODIUM CHLORIDE 0.9 % (FLUSH) 0.9 %
5-40 SYRINGE (ML) INJECTION EVERY 12 HOURS SCHEDULED
Status: DISCONTINUED | OUTPATIENT
Start: 2024-09-02 | End: 2024-09-08 | Stop reason: HOSPADM

## 2024-09-02 RX ORDER — ACETAMINOPHEN 500 MG
1000 TABLET ORAL
Status: COMPLETED | OUTPATIENT
Start: 2024-09-02 | End: 2024-09-02

## 2024-09-02 RX ORDER — FAMOTIDINE 20 MG/1
20 TABLET, FILM COATED ORAL DAILY
Status: DISCONTINUED | OUTPATIENT
Start: 2024-09-03 | End: 2024-09-08 | Stop reason: HOSPADM

## 2024-09-02 RX ORDER — SODIUM CHLORIDE 0.9 % (FLUSH) 0.9 %
5-40 SYRINGE (ML) INJECTION PRN
Status: DISCONTINUED | OUTPATIENT
Start: 2024-09-02 | End: 2024-09-08 | Stop reason: HOSPADM

## 2024-09-02 RX ORDER — SODIUM CHLORIDE 9 MG/ML
INJECTION, SOLUTION INTRAVENOUS PRN
Status: DISCONTINUED | OUTPATIENT
Start: 2024-09-02 | End: 2024-09-08 | Stop reason: HOSPADM

## 2024-09-02 RX ORDER — OXYCODONE AND ACETAMINOPHEN 5; 325 MG/1; MG/1
1 TABLET ORAL ONCE
Status: COMPLETED | OUTPATIENT
Start: 2024-09-02 | End: 2024-09-03

## 2024-09-02 RX ORDER — ACETAMINOPHEN 325 MG/1
650 TABLET ORAL EVERY 6 HOURS PRN
Status: DISCONTINUED | OUTPATIENT
Start: 2024-09-02 | End: 2024-09-02

## 2024-09-02 RX ORDER — ONDANSETRON 2 MG/ML
4 INJECTION INTRAMUSCULAR; INTRAVENOUS EVERY 6 HOURS PRN
Status: DISCONTINUED | OUTPATIENT
Start: 2024-09-02 | End: 2024-09-08 | Stop reason: HOSPADM

## 2024-09-02 RX ADMIN — FUROSEMIDE 60 MG: 10 INJECTION, SOLUTION INTRAMUSCULAR; INTRAVENOUS at 18:11

## 2024-09-02 RX ADMIN — SODIUM CHLORIDE, PRESERVATIVE FREE 10 ML: 5 INJECTION INTRAVENOUS at 21:00

## 2024-09-02 RX ADMIN — HYDROXYZINE HYDROCHLORIDE 25 MG: 25 TABLET, FILM COATED ORAL at 18:12

## 2024-09-02 RX ADMIN — ACETAMINOPHEN 1000 MG: 500 TABLET ORAL at 16:45

## 2024-09-02 ASSESSMENT — PAIN SCALES - GENERAL: PAINLEVEL_OUTOF10: 0

## 2024-09-03 PROBLEM — L02.31 GLUTEAL ABSCESS: Status: ACTIVE | Noted: 2024-09-03

## 2024-09-03 LAB
ANION GAP SERPL CALC-SCNC: 8 MMOL/L (ref 3–18)
APPEARANCE UR: CLEAR
BACTERIA URNS QL MICRO: ABNORMAL /HPF
BASOPHILS # BLD: 0 K/UL (ref 0–0.1)
BASOPHILS NFR BLD: 1 % (ref 0–2)
BILIRUB UR QL: NEGATIVE
BUN SERPL-MCNC: 60 MG/DL (ref 7–18)
BUN/CREAT SERPL: 19 (ref 12–20)
CALCIUM SERPL-MCNC: 8.9 MG/DL (ref 8.5–10.1)
CHLORIDE SERPL-SCNC: 108 MMOL/L (ref 100–111)
CK SERPL-CCNC: 64 U/L (ref 26–192)
CK SERPL-CCNC: 82 U/L (ref 26–192)
CO2 SERPL-SCNC: 22 MMOL/L (ref 21–32)
COLOR UR: YELLOW
CREAT SERPL-MCNC: 3.1 MG/DL (ref 0.6–1.3)
CREAT UR-MCNC: 127 MG/DL (ref 30–125)
CREAT UR-MCNC: 78 MG/DL (ref 30–125)
DIFFERENTIAL METHOD BLD: ABNORMAL
EKG DIAGNOSIS: NORMAL
EKG Q-T INTERVAL: 492 MS
EKG QRS DURATION: 138 MS
EKG QTC CALCULATION (BAZETT): 487 MS
EKG R AXIS: -53 DEGREES
EKG T AXIS: 252 DEGREES
EKG VENTRICULAR RATE: 59 BPM
EOSINOPHIL # BLD: 0.1 K/UL (ref 0–0.4)
EOSINOPHIL NFR BLD: 2 % (ref 0–5)
EPITH CASTS URNS QL MICRO: ABNORMAL /LPF (ref 0–5)
ERYTHROCYTE [DISTWIDTH] IN BLOOD BY AUTOMATED COUNT: 17.4 % (ref 11.6–14.5)
FERRITIN SERPL-MCNC: 4 NG/ML (ref 8–388)
GLUCOSE SERPL-MCNC: 102 MG/DL (ref 74–99)
GLUCOSE UR STRIP.AUTO-MCNC: NEGATIVE MG/DL
HCT VFR BLD AUTO: 29.8 % (ref 35–45)
HGB BLD-MCNC: 7.9 G/DL (ref 12–16)
HGB UR QL STRIP: ABNORMAL
IMM GRANULOCYTES # BLD AUTO: 0 K/UL (ref 0–0.04)
IMM GRANULOCYTES NFR BLD AUTO: 1 % (ref 0–0.5)
IRON SATN MFR SERPL: 4 % (ref 20–50)
IRON SERPL-MCNC: 15 UG/DL (ref 50–175)
KETONES UR QL STRIP.AUTO: NEGATIVE MG/DL
LEUKOCYTE ESTERASE UR QL STRIP.AUTO: ABNORMAL
LYMPHOCYTES # BLD: 0.8 K/UL (ref 0.9–3.6)
LYMPHOCYTES NFR BLD: 21 % (ref 21–52)
MAGNESIUM SERPL-MCNC: 2.3 MG/DL (ref 1.6–2.6)
MCH RBC QN AUTO: 20.8 PG (ref 24–34)
MCHC RBC AUTO-ENTMCNC: 26.5 G/DL (ref 31–37)
MCV RBC AUTO: 78.6 FL (ref 78–100)
MONOCYTES # BLD: 0.4 K/UL (ref 0.05–1.2)
MONOCYTES NFR BLD: 12 % (ref 3–10)
NEUTS SEG # BLD: 2.4 K/UL (ref 1.8–8)
NEUTS SEG NFR BLD: 64 % (ref 40–73)
NITRITE UR QL STRIP.AUTO: NEGATIVE
NRBC # BLD: 0.04 K/UL (ref 0–0.01)
NRBC BLD-RTO: 1.1 PER 100 WBC
PH UR STRIP: 5.5 (ref 5–8)
PLATELET # BLD AUTO: 115 K/UL (ref 135–420)
PMV BLD AUTO: 9.8 FL (ref 9.2–11.8)
POTASSIUM SERPL-SCNC: 3.5 MMOL/L (ref 3.5–5.5)
PROT UR STRIP-MCNC: 30 MG/DL
PROT UR-MCNC: 63 MG/DL
PROT UR-MCNC: 66 MG/DL
RBC # BLD AUTO: 3.79 M/UL (ref 4.2–5.3)
RBC #/AREA URNS HPF: ABNORMAL /HPF (ref 0–5)
SODIUM SERPL-SCNC: 138 MMOL/L (ref 136–145)
SP GR UR REFRACTOMETRY: 1.01 (ref 1–1.03)
TIBC SERPL-MCNC: 385 UG/DL (ref 250–450)
UROBILINOGEN UR QL STRIP.AUTO: 1 EU/DL (ref 0.2–1)
WBC # BLD AUTO: 3.8 K/UL (ref 4.6–13.2)
WBC URNS QL MICRO: ABNORMAL /HPF (ref 0–4)

## 2024-09-03 PROCEDURE — 83735 ASSAY OF MAGNESIUM: CPT

## 2024-09-03 PROCEDURE — 83516 IMMUNOASSAY NONANTIBODY: CPT

## 2024-09-03 PROCEDURE — 84156 ASSAY OF PROTEIN URINE: CPT

## 2024-09-03 PROCEDURE — 86037 ANCA TITER EACH ANTIBODY: CPT

## 2024-09-03 PROCEDURE — 81001 URINALYSIS AUTO W/SCOPE: CPT

## 2024-09-03 PROCEDURE — 36415 COLL VENOUS BLD VENIPUNCTURE: CPT

## 2024-09-03 PROCEDURE — 99232 SBSQ HOSP IP/OBS MODERATE 35: CPT | Performed by: HOSPITALIST

## 2024-09-03 PROCEDURE — 83540 ASSAY OF IRON: CPT

## 2024-09-03 PROCEDURE — 82570 ASSAY OF URINE CREATININE: CPT

## 2024-09-03 PROCEDURE — 97530 THERAPEUTIC ACTIVITIES: CPT

## 2024-09-03 PROCEDURE — 97162 PT EVAL MOD COMPLEX 30 MIN: CPT

## 2024-09-03 PROCEDURE — 1100000003 HC PRIVATE W/ TELEMETRY

## 2024-09-03 PROCEDURE — 97166 OT EVAL MOD COMPLEX 45 MIN: CPT

## 2024-09-03 PROCEDURE — 6370000000 HC RX 637 (ALT 250 FOR IP): Performed by: HOSPITALIST

## 2024-09-03 PROCEDURE — 1100000000 HC RM PRIVATE

## 2024-09-03 PROCEDURE — 85025 COMPLETE CBC W/AUTO DIFF WBC: CPT

## 2024-09-03 PROCEDURE — 93010 ELECTROCARDIOGRAM REPORT: CPT | Performed by: INTERNAL MEDICINE

## 2024-09-03 PROCEDURE — 82550 ASSAY OF CK (CPK): CPT

## 2024-09-03 PROCEDURE — 82728 ASSAY OF FERRITIN: CPT

## 2024-09-03 PROCEDURE — 2580000003 HC RX 258: Performed by: STUDENT IN AN ORGANIZED HEALTH CARE EDUCATION/TRAINING PROGRAM

## 2024-09-03 PROCEDURE — 6360000002 HC RX W HCPCS: Performed by: STUDENT IN AN ORGANIZED HEALTH CARE EDUCATION/TRAINING PROGRAM

## 2024-09-03 PROCEDURE — 51702 INSERT TEMP BLADDER CATH: CPT

## 2024-09-03 PROCEDURE — 94761 N-INVAS EAR/PLS OXIMETRY MLT: CPT

## 2024-09-03 PROCEDURE — 83550 IRON BINDING TEST: CPT

## 2024-09-03 PROCEDURE — 6370000000 HC RX 637 (ALT 250 FOR IP): Performed by: STUDENT IN AN ORGANIZED HEALTH CARE EDUCATION/TRAINING PROGRAM

## 2024-09-03 PROCEDURE — 80048 BASIC METABOLIC PNL TOTAL CA: CPT

## 2024-09-03 PROCEDURE — 86160 COMPLEMENT ANTIGEN: CPT

## 2024-09-03 RX ORDER — FUROSEMIDE 10 MG/ML
40 INJECTION INTRAMUSCULAR; INTRAVENOUS 2 TIMES DAILY
Status: DISCONTINUED | OUTPATIENT
Start: 2024-09-03 | End: 2024-09-07

## 2024-09-03 RX ORDER — ACETAMINOPHEN 325 MG/1
650 TABLET ORAL EVERY 6 HOURS PRN
Status: DISCONTINUED | OUTPATIENT
Start: 2024-09-03 | End: 2024-09-08 | Stop reason: HOSPADM

## 2024-09-03 RX ADMIN — ATORVASTATIN CALCIUM 20 MG: 20 TABLET, FILM COATED ORAL at 10:12

## 2024-09-03 RX ADMIN — VANCOMYCIN HYDROCHLORIDE 2500 MG: 1 INJECTION, POWDER, LYOPHILIZED, FOR SOLUTION INTRAVENOUS at 02:06

## 2024-09-03 RX ADMIN — MIRTAZAPINE 7.5 MG: 15 TABLET, FILM COATED ORAL at 21:45

## 2024-09-03 RX ADMIN — APIXABAN 5 MG: 5 TABLET, FILM COATED ORAL at 10:12

## 2024-09-03 RX ADMIN — ACETAMINOPHEN 325MG 650 MG: 325 TABLET ORAL at 14:26

## 2024-09-03 RX ADMIN — FAMOTIDINE 20 MG: 20 TABLET ORAL at 10:12

## 2024-09-03 RX ADMIN — MIRTAZAPINE 7.5 MG: 15 TABLET, FILM COATED ORAL at 00:11

## 2024-09-03 RX ADMIN — SODIUM CHLORIDE, PRESERVATIVE FREE 10 ML: 5 INJECTION INTRAVENOUS at 10:14

## 2024-09-03 RX ADMIN — Medication 3 MG: at 00:12

## 2024-09-03 RX ADMIN — Medication 3 MG: at 21:45

## 2024-09-03 RX ADMIN — LEVOTHYROXINE SODIUM 125 MCG: 125 TABLET ORAL at 06:31

## 2024-09-03 RX ADMIN — ACETAMINOPHEN 325MG 650 MG: 325 TABLET ORAL at 21:45

## 2024-09-03 RX ADMIN — APIXABAN 5 MG: 5 TABLET, FILM COATED ORAL at 14:25

## 2024-09-03 RX ADMIN — FUROSEMIDE 40 MG: 10 INJECTION, SOLUTION INTRAMUSCULAR; INTRAVENOUS at 10:12

## 2024-09-03 RX ADMIN — OXYCODONE HYDROCHLORIDE AND ACETAMINOPHEN 1 TABLET: 5; 325 TABLET ORAL at 00:11

## 2024-09-03 RX ADMIN — FUROSEMIDE 40 MG: 10 INJECTION, SOLUTION INTRAMUSCULAR; INTRAVENOUS at 17:38

## 2024-09-03 RX ADMIN — SODIUM CHLORIDE, PRESERVATIVE FREE 10 ML: 5 INJECTION INTRAVENOUS at 21:47

## 2024-09-03 ASSESSMENT — PAIN DESCRIPTION - ORIENTATION
ORIENTATION: LOWER
ORIENTATION: LOWER

## 2024-09-03 ASSESSMENT — PAIN - FUNCTIONAL ASSESSMENT: PAIN_FUNCTIONAL_ASSESSMENT: PREVENTS OR INTERFERES SOME ACTIVE ACTIVITIES AND ADLS

## 2024-09-03 ASSESSMENT — PAIN DESCRIPTION - PAIN TYPE: TYPE: CHRONIC PAIN

## 2024-09-03 ASSESSMENT — PAIN DESCRIPTION - DESCRIPTORS
DESCRIPTORS: STABBING
DESCRIPTORS: SHARP

## 2024-09-03 ASSESSMENT — PAIN SCALES - GENERAL
PAINLEVEL_OUTOF10: 0
PAINLEVEL_OUTOF10: 10
PAINLEVEL_OUTOF10: 3
PAINLEVEL_OUTOF10: 0
PAINLEVEL_OUTOF10: 9
PAINLEVEL_OUTOF10: 7

## 2024-09-03 ASSESSMENT — PAIN DESCRIPTION - LOCATION
LOCATION: BUTTOCKS;BACK;LEG
LOCATION: BUTTOCKS

## 2024-09-03 ASSESSMENT — PAIN DESCRIPTION - FREQUENCY: FREQUENCY: INTERMITTENT

## 2024-09-03 ASSESSMENT — PAIN DESCRIPTION - ONSET: ONSET: GRADUAL

## 2024-09-04 ENCOUNTER — APPOINTMENT (OUTPATIENT)
Facility: HOSPITAL | Age: 64
DRG: 682 | End: 2024-09-04
Attending: INTERNAL MEDICINE
Payer: MEDICARE

## 2024-09-04 PROBLEM — K60.30 ANAL FISTULA: Status: ACTIVE | Noted: 2024-01-01

## 2024-09-04 PROBLEM — I27.81 COR PULMONALE (CHRONIC) (HCC): Status: ACTIVE | Noted: 2024-09-04

## 2024-09-04 PROBLEM — K60.3 ANAL FISTULA: Status: ACTIVE | Noted: 2024-09-04

## 2024-09-04 LAB
ANION GAP SERPL CALC-SCNC: 8 MMOL/L (ref 3–18)
BASOPHILS # BLD: 0 K/UL (ref 0–0.1)
BASOPHILS NFR BLD: 1 % (ref 0–2)
BUN SERPL-MCNC: 56 MG/DL (ref 7–18)
BUN/CREAT SERPL: 18 (ref 12–20)
CALCIUM SERPL-MCNC: 8.5 MG/DL (ref 8.5–10.1)
CHLORIDE SERPL-SCNC: 109 MMOL/L (ref 100–111)
CO2 SERPL-SCNC: 24 MMOL/L (ref 21–32)
CREAT SERPL-MCNC: 3.13 MG/DL (ref 0.6–1.3)
DIFFERENTIAL METHOD BLD: ABNORMAL
ECHO AO ASC DIAM: 3 CM
ECHO AO ASCENDING AORTA INDEX: 1.39 CM/M2
ECHO AO ROOT DIAM: 2.5 CM
ECHO AO ROOT INDEX: 1.16 CM/M2
ECHO AV PEAK GRADIENT: 10 MMHG
ECHO AV PEAK VELOCITY: 1.6 M/S
ECHO BSA: 2.33 M2
ECHO EST RA PRESSURE: 15 MMHG
ECHO LA VOL A-L A2C: 99 ML (ref 22–52)
ECHO LA VOL A-L A4C: 92 ML (ref 22–52)
ECHO LA VOL MOD A2C: 95 ML (ref 22–52)
ECHO LA VOL MOD A4C: 85 ML (ref 22–52)
ECHO LA VOLUME AREA LENGTH: 117 ML
ECHO LA VOLUME INDEX A-L A2C: 46 ML/M2 (ref 16–34)
ECHO LA VOLUME INDEX A-L A4C: 43 ML/M2 (ref 16–34)
ECHO LA VOLUME INDEX AREA LENGTH: 54 ML/M2 (ref 16–34)
ECHO LA VOLUME INDEX MOD A2C: 44 ML/M2 (ref 16–34)
ECHO LA VOLUME INDEX MOD A4C: 39 ML/M2 (ref 16–34)
ECHO LV EF PHYSICIAN: 60 %
ECHO LV FRACTIONAL SHORTENING: 37 % (ref 28–44)
ECHO LV INTERNAL DIMENSION DIASTOLE INDEX: 2.73 CM/M2
ECHO LV INTERNAL DIMENSION DIASTOLIC: 5.9 CM (ref 3.9–5.3)
ECHO LV INTERNAL DIMENSION SYSTOLIC INDEX: 1.71 CM/M2
ECHO LV INTERNAL DIMENSION SYSTOLIC: 3.7 CM
ECHO LV IVSD: 1.2 CM (ref 0.6–0.9)
ECHO LV MASS 2D: 322.9 G (ref 67–162)
ECHO LV MASS INDEX 2D: 149.5 G/M2 (ref 43–95)
ECHO LV POSTERIOR WALL DIASTOLIC: 1.3 CM (ref 0.6–0.9)
ECHO LV RELATIVE WALL THICKNESS RATIO: 0.44
ECHO LVOT AREA: 2 CM2
ECHO LVOT DIAM: 1.6 CM
ECHO PV MAX VELOCITY: 2.6 M/S
ECHO PV PEAK GRADIENT: 27 MMHG
ECHO RA VOLUME BIPLANE METHOD OF DISKS: 190 ML
ECHO RA VOLUME INDEX BP: 88 ML/M2
ECHO RA VOLUME: 190 ML
ECHO RA VOLUME: 202 ML
ECHO RIGHT VENTRICULAR SYSTOLIC PRESSURE (RVSP): 72 MMHG
ECHO RV BASAL DIMENSION: 5.8 CM
ECHO RV FRACTIONAL AREA CHANGE: 14 %
ECHO RV TAPSE: 1 CM (ref 1.7–?)
ECHO TV REGURGITANT MAX VELOCITY: 3.77 M/S
ECHO TV REGURGITANT PEAK GRADIENT: 57 MMHG
EOSINOPHIL # BLD: 0.1 K/UL (ref 0–0.4)
EOSINOPHIL NFR BLD: 3 % (ref 0–5)
ERYTHROCYTE [DISTWIDTH] IN BLOOD BY AUTOMATED COUNT: 17.4 % (ref 11.6–14.5)
GLUCOSE SERPL-MCNC: 85 MG/DL (ref 74–99)
HCT VFR BLD AUTO: 29 % (ref 35–45)
HGB BLD-MCNC: 7.5 G/DL (ref 12–16)
IMM GRANULOCYTES # BLD AUTO: 0 K/UL (ref 0–0.04)
IMM GRANULOCYTES NFR BLD AUTO: 1 % (ref 0–0.5)
LYMPHOCYTES # BLD: 0.6 K/UL (ref 0.9–3.6)
LYMPHOCYTES NFR BLD: 18 % (ref 21–52)
MCH RBC QN AUTO: 20.9 PG (ref 24–34)
MCHC RBC AUTO-ENTMCNC: 25.9 G/DL (ref 31–37)
MCV RBC AUTO: 81 FL (ref 78–100)
MONOCYTES # BLD: 0.5 K/UL (ref 0.05–1.2)
MONOCYTES NFR BLD: 14 % (ref 3–10)
NEUTS SEG # BLD: 2.2 K/UL (ref 1.8–8)
NEUTS SEG NFR BLD: 64 % (ref 40–73)
NRBC # BLD: 0.05 K/UL (ref 0–0.01)
NRBC BLD-RTO: 1.4 PER 100 WBC
PLATELET # BLD AUTO: 118 K/UL (ref 135–420)
PMV BLD AUTO: 10.7 FL (ref 9.2–11.8)
POTASSIUM SERPL-SCNC: 3.8 MMOL/L (ref 3.5–5.5)
RBC # BLD AUTO: 3.58 M/UL (ref 4.2–5.3)
SODIUM SERPL-SCNC: 141 MMOL/L (ref 136–145)
VANCOMYCIN SERPL-MCNC: 19.1 UG/ML (ref 5–40)
WBC # BLD AUTO: 3.5 K/UL (ref 4.6–13.2)

## 2024-09-04 PROCEDURE — 80202 ASSAY OF VANCOMYCIN: CPT

## 2024-09-04 PROCEDURE — 80048 BASIC METABOLIC PNL TOTAL CA: CPT

## 2024-09-04 PROCEDURE — 93306 TTE W/DOPPLER COMPLETE: CPT | Performed by: INTERNAL MEDICINE

## 2024-09-04 PROCEDURE — 6370000000 HC RX 637 (ALT 250 FOR IP): Performed by: STUDENT IN AN ORGANIZED HEALTH CARE EDUCATION/TRAINING PROGRAM

## 2024-09-04 PROCEDURE — 94760 N-INVAS EAR/PLS OXIMETRY 1: CPT

## 2024-09-04 PROCEDURE — 85025 COMPLETE CBC W/AUTO DIFF WBC: CPT

## 2024-09-04 PROCEDURE — 6370000000 HC RX 637 (ALT 250 FOR IP): Performed by: HOSPITALIST

## 2024-09-04 PROCEDURE — 2580000003 HC RX 258: Performed by: STUDENT IN AN ORGANIZED HEALTH CARE EDUCATION/TRAINING PROGRAM

## 2024-09-04 PROCEDURE — 6360000002 HC RX W HCPCS: Performed by: STUDENT IN AN ORGANIZED HEALTH CARE EDUCATION/TRAINING PROGRAM

## 2024-09-04 PROCEDURE — 36415 COLL VENOUS BLD VENIPUNCTURE: CPT

## 2024-09-04 PROCEDURE — 1100000000 HC RM PRIVATE

## 2024-09-04 PROCEDURE — 99233 SBSQ HOSP IP/OBS HIGH 50: CPT | Performed by: INTERNAL MEDICINE

## 2024-09-04 PROCEDURE — 6370000000 HC RX 637 (ALT 250 FOR IP): Performed by: COLON & RECTAL SURGERY

## 2024-09-04 PROCEDURE — 2700000000 HC OXYGEN THERAPY PER DAY

## 2024-09-04 PROCEDURE — 99222 1ST HOSP IP/OBS MODERATE 55: CPT | Performed by: COLON & RECTAL SURGERY

## 2024-09-04 PROCEDURE — 97535 SELF CARE MNGMENT TRAINING: CPT

## 2024-09-04 PROCEDURE — 93306 TTE W/DOPPLER COMPLETE: CPT

## 2024-09-04 PROCEDURE — 97530 THERAPEUTIC ACTIVITIES: CPT

## 2024-09-04 RX ORDER — MULTIVITAMIN WITH IRON
1 TABLET ORAL DAILY
Status: DISCONTINUED | OUTPATIENT
Start: 2024-09-04 | End: 2024-09-08 | Stop reason: HOSPADM

## 2024-09-04 RX ADMIN — ATORVASTATIN CALCIUM 20 MG: 20 TABLET, FILM COATED ORAL at 08:45

## 2024-09-04 RX ADMIN — SODIUM CHLORIDE, PRESERVATIVE FREE 10 ML: 5 INJECTION INTRAVENOUS at 20:52

## 2024-09-04 RX ADMIN — SODIUM CHLORIDE, PRESERVATIVE FREE 10 ML: 5 INJECTION INTRAVENOUS at 08:45

## 2024-09-04 RX ADMIN — FUROSEMIDE 40 MG: 10 INJECTION, SOLUTION INTRAMUSCULAR; INTRAVENOUS at 17:42

## 2024-09-04 RX ADMIN — MIRTAZAPINE 7.5 MG: 15 TABLET, FILM COATED ORAL at 20:48

## 2024-09-04 RX ADMIN — APIXABAN 5 MG: 5 TABLET, FILM COATED ORAL at 13:27

## 2024-09-04 RX ADMIN — FUROSEMIDE 40 MG: 10 INJECTION, SOLUTION INTRAMUSCULAR; INTRAVENOUS at 08:45

## 2024-09-04 RX ADMIN — Medication 3 MG: at 20:48

## 2024-09-04 RX ADMIN — FAMOTIDINE 20 MG: 20 TABLET ORAL at 08:45

## 2024-09-04 RX ADMIN — LEVOTHYROXINE SODIUM 125 MCG: 125 TABLET ORAL at 06:36

## 2024-09-04 RX ADMIN — THERA TABS 1 TABLET: TAB at 17:42

## 2024-09-04 ASSESSMENT — PAIN SCALES - GENERAL
PAINLEVEL_OUTOF10: 8
PAINLEVEL_OUTOF10: 0

## 2024-09-05 LAB
ANION GAP SERPL CALC-SCNC: 9 MMOL/L (ref 3–18)
BASOPHILS # BLD: 0 K/UL (ref 0–0.1)
BASOPHILS NFR BLD: 1 % (ref 0–2)
BUN SERPL-MCNC: 60 MG/DL (ref 7–18)
BUN/CREAT SERPL: 21 (ref 12–20)
C-ANCA TITR SER IF: NORMAL TITER
C3 SERPL-MCNC: 82 MG/DL (ref 82–167)
C4 SERPL-MCNC: 20 MG/DL (ref 12–38)
CALCIUM SERPL-MCNC: 8.9 MG/DL (ref 8.5–10.1)
CHLORIDE SERPL-SCNC: 108 MMOL/L (ref 100–111)
CO2 SERPL-SCNC: 23 MMOL/L (ref 21–32)
CREAT SERPL-MCNC: 2.84 MG/DL (ref 0.6–1.3)
DIFFERENTIAL METHOD BLD: ABNORMAL
EOSINOPHIL # BLD: 0.1 K/UL (ref 0–0.4)
EOSINOPHIL NFR BLD: 2 % (ref 0–5)
ERYTHROCYTE [DISTWIDTH] IN BLOOD BY AUTOMATED COUNT: 17.7 % (ref 11.6–14.5)
GLUCOSE SERPL-MCNC: 78 MG/DL (ref 74–99)
HCT VFR BLD AUTO: 29.9 % (ref 35–45)
HGB BLD-MCNC: 7.8 G/DL (ref 12–16)
IMM GRANULOCYTES # BLD AUTO: 0 K/UL (ref 0–0.04)
IMM GRANULOCYTES NFR BLD AUTO: 1 % (ref 0–0.5)
LYMPHOCYTES # BLD: 0.6 K/UL (ref 0.9–3.6)
LYMPHOCYTES NFR BLD: 17 % (ref 21–52)
MCH RBC QN AUTO: 20.9 PG (ref 24–34)
MCHC RBC AUTO-ENTMCNC: 26.1 G/DL (ref 31–37)
MCV RBC AUTO: 80.2 FL (ref 78–100)
MONOCYTES # BLD: 0.5 K/UL (ref 0.05–1.2)
MONOCYTES NFR BLD: 13 % (ref 3–10)
MYELOPEROXIDASE AB SER IA-ACNC: <0.2 UNITS (ref 0–0.9)
NEUTS SEG # BLD: 2.5 K/UL (ref 1.8–8)
NEUTS SEG NFR BLD: 68 % (ref 40–73)
NRBC # BLD: 0.04 K/UL (ref 0–0.01)
NRBC BLD-RTO: 1.1 PER 100 WBC
P-ANCA ATYPICAL TITR SER IF: NORMAL TITER
P-ANCA TITR SER IF: NORMAL TITER
PLATELET # BLD AUTO: 116 K/UL (ref 135–420)
PMV BLD AUTO: 10.3 FL (ref 9.2–11.8)
POTASSIUM SERPL-SCNC: 4.2 MMOL/L (ref 3.5–5.5)
PROTEINASE3 AB SER IA-ACNC: <0.2 UNITS (ref 0–0.9)
RBC # BLD AUTO: 3.73 M/UL (ref 4.2–5.3)
SODIUM SERPL-SCNC: 140 MMOL/L (ref 136–145)
VANCOMYCIN SERPL-MCNC: 16.4 UG/ML (ref 5–40)
WBC # BLD AUTO: 3.6 K/UL (ref 4.6–13.2)

## 2024-09-05 PROCEDURE — 97535 SELF CARE MNGMENT TRAINING: CPT

## 2024-09-05 PROCEDURE — 99232 SBSQ HOSP IP/OBS MODERATE 35: CPT | Performed by: INTERNAL MEDICINE

## 2024-09-05 PROCEDURE — 6370000000 HC RX 637 (ALT 250 FOR IP): Performed by: INTERNAL MEDICINE

## 2024-09-05 PROCEDURE — 51702 INSERT TEMP BLADDER CATH: CPT

## 2024-09-05 PROCEDURE — 6370000000 HC RX 637 (ALT 250 FOR IP): Performed by: COLON & RECTAL SURGERY

## 2024-09-05 PROCEDURE — 94761 N-INVAS EAR/PLS OXIMETRY MLT: CPT

## 2024-09-05 PROCEDURE — 80048 BASIC METABOLIC PNL TOTAL CA: CPT

## 2024-09-05 PROCEDURE — 6360000002 HC RX W HCPCS: Performed by: STUDENT IN AN ORGANIZED HEALTH CARE EDUCATION/TRAINING PROGRAM

## 2024-09-05 PROCEDURE — 1100000000 HC RM PRIVATE

## 2024-09-05 PROCEDURE — 85025 COMPLETE CBC W/AUTO DIFF WBC: CPT

## 2024-09-05 PROCEDURE — 80202 ASSAY OF VANCOMYCIN: CPT

## 2024-09-05 PROCEDURE — 6370000000 HC RX 637 (ALT 250 FOR IP): Performed by: HOSPITALIST

## 2024-09-05 PROCEDURE — 97110 THERAPEUTIC EXERCISES: CPT

## 2024-09-05 PROCEDURE — 6370000000 HC RX 637 (ALT 250 FOR IP): Performed by: STUDENT IN AN ORGANIZED HEALTH CARE EDUCATION/TRAINING PROGRAM

## 2024-09-05 PROCEDURE — 2580000003 HC RX 258: Performed by: STUDENT IN AN ORGANIZED HEALTH CARE EDUCATION/TRAINING PROGRAM

## 2024-09-05 PROCEDURE — 36415 COLL VENOUS BLD VENIPUNCTURE: CPT

## 2024-09-05 PROCEDURE — 97530 THERAPEUTIC ACTIVITIES: CPT

## 2024-09-05 RX ORDER — LEVOFLOXACIN 250 MG/1
250 TABLET, FILM COATED ORAL EVERY 24 HOURS
Status: DISCONTINUED | OUTPATIENT
Start: 2024-09-05 | End: 2024-09-08 | Stop reason: HOSPADM

## 2024-09-05 RX ADMIN — FAMOTIDINE 20 MG: 20 TABLET ORAL at 08:28

## 2024-09-05 RX ADMIN — APIXABAN 5 MG: 5 TABLET, FILM COATED ORAL at 15:13

## 2024-09-05 RX ADMIN — SODIUM CHLORIDE, PRESERVATIVE FREE 10 ML: 5 INJECTION INTRAVENOUS at 08:28

## 2024-09-05 RX ADMIN — FUROSEMIDE 40 MG: 10 INJECTION, SOLUTION INTRAMUSCULAR; INTRAVENOUS at 16:46

## 2024-09-05 RX ADMIN — Medication 3 MG: at 20:46

## 2024-09-05 RX ADMIN — THERA TABS 1 TABLET: TAB at 08:28

## 2024-09-05 RX ADMIN — LEVOFLOXACIN 250 MG: 250 TABLET, FILM COATED ORAL at 17:45

## 2024-09-05 RX ADMIN — SODIUM CHLORIDE, PRESERVATIVE FREE 10 ML: 5 INJECTION INTRAVENOUS at 20:46

## 2024-09-05 RX ADMIN — FUROSEMIDE 40 MG: 10 INJECTION, SOLUTION INTRAMUSCULAR; INTRAVENOUS at 08:28

## 2024-09-05 RX ADMIN — ATORVASTATIN CALCIUM 20 MG: 20 TABLET, FILM COATED ORAL at 08:28

## 2024-09-05 RX ADMIN — MIRTAZAPINE 7.5 MG: 15 TABLET, FILM COATED ORAL at 20:46

## 2024-09-05 RX ADMIN — LEVOTHYROXINE SODIUM 125 MCG: 125 TABLET ORAL at 05:47

## 2024-09-05 RX ADMIN — APIXABAN 5 MG: 5 TABLET, FILM COATED ORAL at 08:28

## 2024-09-05 RX ADMIN — ACETAMINOPHEN 325MG 650 MG: 325 TABLET ORAL at 03:05

## 2024-09-05 ASSESSMENT — PAIN SCALES - GENERAL
PAINLEVEL_OUTOF10: 0
PAINLEVEL_OUTOF10: 3

## 2024-09-05 ASSESSMENT — PAIN DESCRIPTION - ORIENTATION: ORIENTATION: POSTERIOR

## 2024-09-05 ASSESSMENT — PAIN SCALES - WONG BAKER
WONGBAKER_NUMERICALRESPONSE: NO HURT

## 2024-09-05 ASSESSMENT — PAIN DESCRIPTION - DESCRIPTORS: DESCRIPTORS: ACHING

## 2024-09-05 ASSESSMENT — PAIN DESCRIPTION - PAIN TYPE: TYPE: CHRONIC PAIN

## 2024-09-05 ASSESSMENT — PAIN DESCRIPTION - FREQUENCY: FREQUENCY: INTERMITTENT

## 2024-09-05 ASSESSMENT — PAIN - FUNCTIONAL ASSESSMENT: PAIN_FUNCTIONAL_ASSESSMENT: ACTIVITIES ARE NOT PREVENTED

## 2024-09-05 ASSESSMENT — PAIN DESCRIPTION - LOCATION: LOCATION: BUTTOCKS

## 2024-09-05 ASSESSMENT — PAIN DESCRIPTION - DIRECTION: RADIATING_TOWARDS: BUTTOCKS

## 2024-09-06 LAB
ALBUMIN SERPL-MCNC: 3.2 G/DL (ref 3.4–5)
ANION GAP SERPL CALC-SCNC: 7 MMOL/L (ref 3–18)
BASOPHILS # BLD: 0 K/UL (ref 0–0.1)
BASOPHILS NFR BLD: 1 % (ref 0–2)
BUN SERPL-MCNC: 59 MG/DL (ref 7–18)
BUN/CREAT SERPL: 24 (ref 12–20)
CALCIUM SERPL-MCNC: 8.9 MG/DL (ref 8.5–10.1)
CHLORIDE SERPL-SCNC: 106 MMOL/L (ref 100–111)
CO2 SERPL-SCNC: 27 MMOL/L (ref 21–32)
CREAT SERPL-MCNC: 2.51 MG/DL (ref 0.6–1.3)
DIFFERENTIAL METHOD BLD: ABNORMAL
EOSINOPHIL # BLD: 0.1 K/UL (ref 0–0.4)
EOSINOPHIL NFR BLD: 3 % (ref 0–5)
ERYTHROCYTE [DISTWIDTH] IN BLOOD BY AUTOMATED COUNT: 17.8 % (ref 11.6–14.5)
GLUCOSE SERPL-MCNC: 110 MG/DL (ref 74–99)
HCT VFR BLD AUTO: 29.2 % (ref 35–45)
HGB BLD-MCNC: 7.7 G/DL (ref 12–16)
IMM GRANULOCYTES # BLD AUTO: 0 K/UL (ref 0–0.04)
IMM GRANULOCYTES NFR BLD AUTO: 0 % (ref 0–0.5)
LYMPHOCYTES # BLD: 0.6 K/UL (ref 0.9–3.6)
LYMPHOCYTES NFR BLD: 19 % (ref 21–52)
MCH RBC QN AUTO: 21.1 PG (ref 24–34)
MCHC RBC AUTO-ENTMCNC: 26.4 G/DL (ref 31–37)
MCV RBC AUTO: 80 FL (ref 78–100)
MONOCYTES # BLD: 0.4 K/UL (ref 0.05–1.2)
MONOCYTES NFR BLD: 13 % (ref 3–10)
NEUTS SEG # BLD: 2.1 K/UL (ref 1.8–8)
NEUTS SEG NFR BLD: 64 % (ref 40–73)
NRBC # BLD: 0.02 K/UL (ref 0–0.01)
NRBC BLD-RTO: 0.6 PER 100 WBC
PHOSPHATE SERPL-MCNC: 5.7 MG/DL (ref 2.5–4.9)
PLATELET # BLD AUTO: 106 K/UL (ref 135–420)
PLATELET COMMENT: ABNORMAL
PMV BLD AUTO: 10 FL (ref 9.2–11.8)
POTASSIUM SERPL-SCNC: 3.7 MMOL/L (ref 3.5–5.5)
RBC # BLD AUTO: 3.65 M/UL (ref 4.2–5.3)
RBC MORPH BLD: ABNORMAL
SODIUM SERPL-SCNC: 140 MMOL/L (ref 136–145)
WBC # BLD AUTO: 3.2 K/UL (ref 4.6–13.2)

## 2024-09-06 PROCEDURE — 99232 SBSQ HOSP IP/OBS MODERATE 35: CPT | Performed by: INTERNAL MEDICINE

## 2024-09-06 PROCEDURE — 85025 COMPLETE CBC W/AUTO DIFF WBC: CPT

## 2024-09-06 PROCEDURE — 6370000000 HC RX 637 (ALT 250 FOR IP): Performed by: INTERNAL MEDICINE

## 2024-09-06 PROCEDURE — 6370000000 HC RX 637 (ALT 250 FOR IP): Performed by: STUDENT IN AN ORGANIZED HEALTH CARE EDUCATION/TRAINING PROGRAM

## 2024-09-06 PROCEDURE — 80069 RENAL FUNCTION PANEL: CPT

## 2024-09-06 PROCEDURE — 94761 N-INVAS EAR/PLS OXIMETRY MLT: CPT

## 2024-09-06 PROCEDURE — 97530 THERAPEUTIC ACTIVITIES: CPT

## 2024-09-06 PROCEDURE — 36415 COLL VENOUS BLD VENIPUNCTURE: CPT

## 2024-09-06 PROCEDURE — 51702 INSERT TEMP BLADDER CATH: CPT

## 2024-09-06 PROCEDURE — 99232 SBSQ HOSP IP/OBS MODERATE 35: CPT | Performed by: HOSPITALIST

## 2024-09-06 PROCEDURE — 2700000000 HC OXYGEN THERAPY PER DAY

## 2024-09-06 PROCEDURE — 97535 SELF CARE MNGMENT TRAINING: CPT

## 2024-09-06 PROCEDURE — 1100000000 HC RM PRIVATE

## 2024-09-06 PROCEDURE — 97110 THERAPEUTIC EXERCISES: CPT

## 2024-09-06 PROCEDURE — 2580000003 HC RX 258: Performed by: STUDENT IN AN ORGANIZED HEALTH CARE EDUCATION/TRAINING PROGRAM

## 2024-09-06 PROCEDURE — 6370000000 HC RX 637 (ALT 250 FOR IP): Performed by: COLON & RECTAL SURGERY

## 2024-09-06 PROCEDURE — 6360000002 HC RX W HCPCS: Performed by: STUDENT IN AN ORGANIZED HEALTH CARE EDUCATION/TRAINING PROGRAM

## 2024-09-06 PROCEDURE — 6370000000 HC RX 637 (ALT 250 FOR IP): Performed by: HOSPITALIST

## 2024-09-06 RX ORDER — BISACODYL 10 MG
10 SUPPOSITORY, RECTAL RECTAL DAILY PRN
Status: DISCONTINUED | OUTPATIENT
Start: 2024-09-06 | End: 2024-09-08 | Stop reason: HOSPADM

## 2024-09-06 RX ADMIN — LEVOTHYROXINE SODIUM 125 MCG: 125 TABLET ORAL at 05:59

## 2024-09-06 RX ADMIN — Medication 3 MG: at 21:34

## 2024-09-06 RX ADMIN — APIXABAN 5 MG: 5 TABLET, FILM COATED ORAL at 15:40

## 2024-09-06 RX ADMIN — THERA TABS 1 TABLET: TAB at 08:50

## 2024-09-06 RX ADMIN — ATORVASTATIN CALCIUM 20 MG: 20 TABLET, FILM COATED ORAL at 08:50

## 2024-09-06 RX ADMIN — APIXABAN 5 MG: 5 TABLET, FILM COATED ORAL at 08:50

## 2024-09-06 RX ADMIN — SODIUM CHLORIDE, PRESERVATIVE FREE 10 ML: 5 INJECTION INTRAVENOUS at 21:33

## 2024-09-06 RX ADMIN — ACETAMINOPHEN 325MG 650 MG: 325 TABLET ORAL at 08:50

## 2024-09-06 RX ADMIN — BISACODYL 10 MG: 10 SUPPOSITORY RECTAL at 17:21

## 2024-09-06 RX ADMIN — POLYETHYLENE GLYCOL 3350 17 G: 17 POWDER, FOR SOLUTION ORAL at 10:07

## 2024-09-06 RX ADMIN — FUROSEMIDE 40 MG: 10 INJECTION, SOLUTION INTRAMUSCULAR; INTRAVENOUS at 08:50

## 2024-09-06 RX ADMIN — FAMOTIDINE 20 MG: 20 TABLET ORAL at 08:51

## 2024-09-06 RX ADMIN — MIRTAZAPINE 7.5 MG: 15 TABLET, FILM COATED ORAL at 21:34

## 2024-09-06 RX ADMIN — FUROSEMIDE 40 MG: 10 INJECTION, SOLUTION INTRAMUSCULAR; INTRAVENOUS at 17:26

## 2024-09-06 RX ADMIN — SODIUM CHLORIDE, PRESERVATIVE FREE 10 ML: 5 INJECTION INTRAVENOUS at 08:50

## 2024-09-06 RX ADMIN — LEVOFLOXACIN 250 MG: 250 TABLET, FILM COATED ORAL at 17:26

## 2024-09-06 ASSESSMENT — PAIN SCALES - GENERAL
PAINLEVEL_OUTOF10: 0
PAINLEVEL_OUTOF10: 9
PAINLEVEL_OUTOF10: 0
PAINLEVEL_OUTOF10: 6
PAINLEVEL_OUTOF10: 6
PAINLEVEL_OUTOF10: 0
PAINLEVEL_OUTOF10: 0

## 2024-09-06 ASSESSMENT — PAIN DESCRIPTION - DESCRIPTORS
DESCRIPTORS: ACHING

## 2024-09-06 ASSESSMENT — PAIN - FUNCTIONAL ASSESSMENT
PAIN_FUNCTIONAL_ASSESSMENT: ACTIVITIES ARE NOT PREVENTED
PAIN_FUNCTIONAL_ASSESSMENT: PREVENTS OR INTERFERES WITH MANY ACTIVE NOT PASSIVE ACTIVITIES
PAIN_FUNCTIONAL_ASSESSMENT: PREVENTS OR INTERFERES WITH MANY ACTIVE NOT PASSIVE ACTIVITIES
PAIN_FUNCTIONAL_ASSESSMENT: ACTIVITIES ARE NOT PREVENTED

## 2024-09-06 ASSESSMENT — PAIN DESCRIPTION - ORIENTATION
ORIENTATION: POSTERIOR;ANTERIOR
ORIENTATION: MID;LOWER;POSTERIOR
ORIENTATION: POSTERIOR;ANTERIOR

## 2024-09-06 ASSESSMENT — PAIN DESCRIPTION - PAIN TYPE
TYPE: CHRONIC PAIN;ACUTE PAIN

## 2024-09-06 ASSESSMENT — PAIN DESCRIPTION - ONSET
ONSET: SUDDEN

## 2024-09-06 ASSESSMENT — PAIN DESCRIPTION - FREQUENCY
FREQUENCY: INTERMITTENT

## 2024-09-06 ASSESSMENT — PAIN DESCRIPTION - LOCATION
LOCATION: ABDOMEN;BUTTOCKS
LOCATION: ABDOMEN;BUTTOCKS
LOCATION: BACK;BUTTOCKS

## 2024-09-06 ASSESSMENT — PAIN DESCRIPTION - DIRECTION
RADIATING_TOWARDS: N./A

## 2024-09-07 PROBLEM — J81.0 ACUTE PULMONARY EDEMA (HCC): Status: ACTIVE | Noted: 2024-09-07

## 2024-09-07 PROBLEM — I27.20 PULMONARY HTN (HCC): Status: ACTIVE | Noted: 2024-09-07

## 2024-09-07 LAB
ANION GAP SERPL CALC-SCNC: 3 MMOL/L (ref 3–18)
BUN SERPL-MCNC: 55 MG/DL (ref 7–18)
BUN/CREAT SERPL: 28 (ref 12–20)
CALCIUM SERPL-MCNC: 9 MG/DL (ref 8.5–10.1)
CHLORIDE SERPL-SCNC: 108 MMOL/L (ref 100–111)
CO2 SERPL-SCNC: 30 MMOL/L (ref 21–32)
CREAT SERPL-MCNC: 1.99 MG/DL (ref 0.6–1.3)
ERYTHROCYTE [DISTWIDTH] IN BLOOD BY AUTOMATED COUNT: 17.8 % (ref 11.6–14.5)
GLUCOSE SERPL-MCNC: 87 MG/DL (ref 74–99)
HCT VFR BLD AUTO: 27.5 % (ref 35–45)
HGB BLD-MCNC: 7.5 G/DL (ref 12–16)
MCH RBC QN AUTO: 21.1 PG (ref 24–34)
MCHC RBC AUTO-ENTMCNC: 27.3 G/DL (ref 31–37)
MCV RBC AUTO: 77.2 FL (ref 78–100)
NRBC # BLD: 0.02 K/UL (ref 0–0.01)
NRBC BLD-RTO: 0.6 PER 100 WBC
PLATELET # BLD AUTO: 110 K/UL (ref 135–420)
PMV BLD AUTO: 9.9 FL (ref 9.2–11.8)
POTASSIUM SERPL-SCNC: 3.4 MMOL/L (ref 3.5–5.5)
RBC # BLD AUTO: 3.56 M/UL (ref 4.2–5.3)
SODIUM SERPL-SCNC: 141 MMOL/L (ref 136–145)
WBC # BLD AUTO: 3.2 K/UL (ref 4.6–13.2)

## 2024-09-07 PROCEDURE — 94761 N-INVAS EAR/PLS OXIMETRY MLT: CPT

## 2024-09-07 PROCEDURE — 6370000000 HC RX 637 (ALT 250 FOR IP): Performed by: STUDENT IN AN ORGANIZED HEALTH CARE EDUCATION/TRAINING PROGRAM

## 2024-09-07 PROCEDURE — 51702 INSERT TEMP BLADDER CATH: CPT

## 2024-09-07 PROCEDURE — 6360000002 HC RX W HCPCS: Performed by: STUDENT IN AN ORGANIZED HEALTH CARE EDUCATION/TRAINING PROGRAM

## 2024-09-07 PROCEDURE — 99223 1ST HOSP IP/OBS HIGH 75: CPT | Performed by: INTERNAL MEDICINE

## 2024-09-07 PROCEDURE — 6370000000 HC RX 637 (ALT 250 FOR IP): Performed by: COLON & RECTAL SURGERY

## 2024-09-07 PROCEDURE — 6370000000 HC RX 637 (ALT 250 FOR IP): Performed by: INTERNAL MEDICINE

## 2024-09-07 PROCEDURE — 2700000000 HC OXYGEN THERAPY PER DAY

## 2024-09-07 PROCEDURE — 80048 BASIC METABOLIC PNL TOTAL CA: CPT

## 2024-09-07 PROCEDURE — 36415 COLL VENOUS BLD VENIPUNCTURE: CPT

## 2024-09-07 PROCEDURE — 2580000003 HC RX 258: Performed by: STUDENT IN AN ORGANIZED HEALTH CARE EDUCATION/TRAINING PROGRAM

## 2024-09-07 PROCEDURE — 99232 SBSQ HOSP IP/OBS MODERATE 35: CPT | Performed by: HOSPITALIST

## 2024-09-07 PROCEDURE — 85027 COMPLETE CBC AUTOMATED: CPT

## 2024-09-07 PROCEDURE — 1100000000 HC RM PRIVATE

## 2024-09-07 PROCEDURE — 6370000000 HC RX 637 (ALT 250 FOR IP): Performed by: HOSPITALIST

## 2024-09-07 RX ORDER — FUROSEMIDE 40 MG
40 TABLET ORAL 2 TIMES DAILY
Status: DISCONTINUED | OUTPATIENT
Start: 2024-09-07 | End: 2024-09-08 | Stop reason: HOSPADM

## 2024-09-07 RX ADMIN — LEVOTHYROXINE SODIUM 125 MCG: 125 TABLET ORAL at 05:13

## 2024-09-07 RX ADMIN — THERA TABS 1 TABLET: TAB at 08:44

## 2024-09-07 RX ADMIN — APIXABAN 5 MG: 5 TABLET, FILM COATED ORAL at 17:27

## 2024-09-07 RX ADMIN — FUROSEMIDE 40 MG: 40 TABLET ORAL at 17:27

## 2024-09-07 RX ADMIN — LEVOFLOXACIN 250 MG: 250 TABLET, FILM COATED ORAL at 17:30

## 2024-09-07 RX ADMIN — SODIUM CHLORIDE, PRESERVATIVE FREE 10 ML: 5 INJECTION INTRAVENOUS at 08:45

## 2024-09-07 RX ADMIN — ACETAMINOPHEN 325MG 650 MG: 325 TABLET ORAL at 20:43

## 2024-09-07 RX ADMIN — SODIUM CHLORIDE, PRESERVATIVE FREE 10 ML: 5 INJECTION INTRAVENOUS at 20:44

## 2024-09-07 RX ADMIN — APIXABAN 5 MG: 5 TABLET, FILM COATED ORAL at 08:30

## 2024-09-07 RX ADMIN — FUROSEMIDE 40 MG: 10 INJECTION, SOLUTION INTRAMUSCULAR; INTRAVENOUS at 08:44

## 2024-09-07 RX ADMIN — FAMOTIDINE 20 MG: 20 TABLET ORAL at 08:44

## 2024-09-07 RX ADMIN — MIRTAZAPINE 7.5 MG: 15 TABLET, FILM COATED ORAL at 20:43

## 2024-09-07 RX ADMIN — ATORVASTATIN CALCIUM 20 MG: 20 TABLET, FILM COATED ORAL at 08:44

## 2024-09-07 RX ADMIN — Medication 3 MG: at 20:43

## 2024-09-07 ASSESSMENT — PAIN SCALES - WONG BAKER
WONGBAKER_NUMERICALRESPONSE: NO HURT
WONGBAKER_NUMERICALRESPONSE: NO HURT

## 2024-09-07 ASSESSMENT — PAIN - FUNCTIONAL ASSESSMENT
PAIN_FUNCTIONAL_ASSESSMENT: ACTIVITIES ARE NOT PREVENTED

## 2024-09-07 ASSESSMENT — PAIN DESCRIPTION - ORIENTATION: ORIENTATION: ANTERIOR;POSTERIOR

## 2024-09-07 ASSESSMENT — PAIN SCALES - GENERAL
PAINLEVEL_OUTOF10: 0
PAINLEVEL_OUTOF10: 0
PAINLEVEL_OUTOF10: 3
PAINLEVEL_OUTOF10: 0
PAINLEVEL_OUTOF10: 2

## 2024-09-07 ASSESSMENT — PAIN DESCRIPTION - DESCRIPTORS: DESCRIPTORS: ACHING

## 2024-09-07 ASSESSMENT — PAIN DESCRIPTION - LOCATION
LOCATION: BACK;HEAD
LOCATION: BUTTOCKS

## 2024-09-08 VITALS
BODY MASS INDEX: 55.56 KG/M2 | HEIGHT: 60 IN | WEIGHT: 283 LBS | OXYGEN SATURATION: 98 % | DIASTOLIC BLOOD PRESSURE: 79 MMHG | SYSTOLIC BLOOD PRESSURE: 130 MMHG | RESPIRATION RATE: 16 BRPM | HEART RATE: 53 BPM | TEMPERATURE: 98.7 F

## 2024-09-08 PROCEDURE — 99239 HOSP IP/OBS DSCHRG MGMT >30: CPT | Performed by: HOSPITALIST

## 2024-09-08 PROCEDURE — 6370000000 HC RX 637 (ALT 250 FOR IP): Performed by: COLON & RECTAL SURGERY

## 2024-09-08 PROCEDURE — 2580000003 HC RX 258: Performed by: STUDENT IN AN ORGANIZED HEALTH CARE EDUCATION/TRAINING PROGRAM

## 2024-09-08 PROCEDURE — 94761 N-INVAS EAR/PLS OXIMETRY MLT: CPT

## 2024-09-08 PROCEDURE — 6370000000 HC RX 637 (ALT 250 FOR IP): Performed by: HOSPITALIST

## 2024-09-08 PROCEDURE — 6370000000 HC RX 637 (ALT 250 FOR IP): Performed by: INTERNAL MEDICINE

## 2024-09-08 PROCEDURE — 6370000000 HC RX 637 (ALT 250 FOR IP): Performed by: STUDENT IN AN ORGANIZED HEALTH CARE EDUCATION/TRAINING PROGRAM

## 2024-09-08 PROCEDURE — 2700000000 HC OXYGEN THERAPY PER DAY

## 2024-09-08 PROCEDURE — 51702 INSERT TEMP BLADDER CATH: CPT

## 2024-09-08 RX ORDER — FERROUS SULFATE 325(65) MG
325 TABLET ORAL 2 TIMES DAILY
Qty: 180 TABLET | Refills: 1 | Status: SHIPPED | OUTPATIENT
Start: 2024-09-08

## 2024-09-08 RX ORDER — LEVOFLOXACIN 250 MG/1
250 TABLET, FILM COATED ORAL EVERY 24 HOURS
Qty: 4 TABLET | Refills: 0 | Status: SHIPPED | OUTPATIENT
Start: 2024-09-08 | End: 2024-09-12

## 2024-09-08 RX ORDER — FUROSEMIDE 40 MG
40 TABLET ORAL 2 TIMES DAILY
Qty: 60 TABLET | Refills: 3 | Status: SHIPPED | OUTPATIENT
Start: 2024-09-08

## 2024-09-08 RX ORDER — POTASSIUM CHLORIDE 1500 MG/1
20 TABLET, EXTENDED RELEASE ORAL DAILY
Status: DISCONTINUED | OUTPATIENT
Start: 2024-09-08 | End: 2024-09-08 | Stop reason: HOSPADM

## 2024-09-08 RX ADMIN — FAMOTIDINE 20 MG: 20 TABLET ORAL at 09:02

## 2024-09-08 RX ADMIN — FUROSEMIDE 40 MG: 40 TABLET ORAL at 09:02

## 2024-09-08 RX ADMIN — SODIUM CHLORIDE, PRESERVATIVE FREE 10 ML: 5 INJECTION INTRAVENOUS at 09:04

## 2024-09-08 RX ADMIN — THERA TABS 1 TABLET: TAB at 09:02

## 2024-09-08 RX ADMIN — LEVOTHYROXINE SODIUM 125 MCG: 125 TABLET ORAL at 06:45

## 2024-09-08 RX ADMIN — ATORVASTATIN CALCIUM 20 MG: 20 TABLET, FILM COATED ORAL at 09:02

## 2024-09-08 RX ADMIN — APIXABAN 5 MG: 5 TABLET, FILM COATED ORAL at 09:02

## 2024-09-08 RX ADMIN — POTASSIUM CHLORIDE 20 MEQ: 1500 TABLET, EXTENDED RELEASE ORAL at 13:44

## 2024-09-08 ASSESSMENT — PAIN SCALES - GENERAL
PAINLEVEL_OUTOF10: 0
PAINLEVEL_OUTOF10: 0

## 2024-09-08 ASSESSMENT — PAIN - FUNCTIONAL ASSESSMENT: PAIN_FUNCTIONAL_ASSESSMENT: ACTIVITIES ARE NOT PREVENTED

## 2024-10-20 NOTE — PROGRESS NOTES
attended the interdisciplinary rounds for DEXTER [facility], who is a 60 y.o.,female. Patients Primary Language is: [place]. According to the patients EMR Anabaptism Affiliation is: [facility].     The reason the Patient came to the hospital is:   Patient Active Problem List    Diagnosis Date Noted    Osteoarthritis of both knees 01/14/2014     Priority: 1 - One    H/O total knee replacement 01/14/2014     Priority: 1 - One    DJD (degenerative joint disease), lumbosacral 01/14/2014     Priority: 1 - One    Hypothyroidism 01/14/2014     Priority: 1 - One    Morbid obesity ([address] 01/14/2014     Priority: 1 - One    Esophageal reflux 01/14/2014     Priority: 1 - One    COPD (chronic obstructive pulmonary disease) ([address] 08/12/2019    Acute respiratory failure ([address] 08/12/2019    Pulmonary hypertension ([address] 11/14/2018    Severe osteoarthritis right hip 10/02/2018    CHF exacerbation ([address] 09/29/2018    Anxiety 08/30/2018    Acute exacerbation of CHF (congestive heart failure) ([address] 06/02/2018    Chronic anticoagulation 06/02/2018    Bradycardia 99/23/7266    Diastolic CHF, chronic ([address] 10/12/2017    Hypoxia 04/18/2017    Fluid overload 04/18/2017    CHF (congestive heart failure) ([address] 04/18/2017    DDD (degenerative disc disease), lumbar, L4/5 advanced 03/29/2017    Morbid obesity with BMI of 45.0-49.9, adult ([address] 02/20/2017    Dyspnea     Dyslipidemia     Hypertension     Congenital heart disease     Atrial fibrillation         Plan:  Chaplains will continue to follow and will provide pastoral care on an as needed/requested basis.  recommends bedside caregivers page  on duty if patient shows signs of acute spiritual or emotional distress.     Augie Moon  Board Certified [address] [place] [address]   [phone] No

## 2025-01-01 ENCOUNTER — HOSPITAL ENCOUNTER (EMERGENCY)
Facility: HOSPITAL | Age: 65
End: 2025-01-18
Attending: EMERGENCY MEDICINE
Payer: MEDICARE

## 2025-01-01 VITALS
HEART RATE: 132 BPM | DIASTOLIC BLOOD PRESSURE: 76 MMHG | BODY MASS INDEX: 55.27 KG/M2 | RESPIRATION RATE: 60 BRPM | OXYGEN SATURATION: 73 % | WEIGHT: 283 LBS | SYSTOLIC BLOOD PRESSURE: 110 MMHG

## 2025-01-01 DIAGNOSIS — I50.9 CONGESTIVE HEART FAILURE, UNSPECIFIED HF CHRONICITY, UNSPECIFIED HEART FAILURE TYPE (HCC): ICD-10-CM

## 2025-01-01 DIAGNOSIS — I46.9 CARDIAC ARREST: Primary | ICD-10-CM

## 2025-01-01 LAB
GLUCOSE BLD STRIP.AUTO-MCNC: 146 MG/DL (ref 70–110)
LACTATE BLD-SCNC: 11.68 MMOL/L (ref 0.4–2)
PCO2 BLD: >110 MMHG (ref 35–48)
PH BLD: 6.85 (ref 7.35–7.45)
PO2 BLD: 22 MMHG (ref 83–108)
SERVICE CMNT-IMP: ABNORMAL
SPECIMEN TYPE: ABNORMAL

## 2025-01-01 PROCEDURE — 83605 ASSAY OF LACTIC ACID: CPT

## 2025-01-01 PROCEDURE — 94761 N-INVAS EAR/PLS OXIMETRY MLT: CPT

## 2025-01-01 PROCEDURE — 92950 HEART/LUNG RESUSCITATION CPR: CPT

## 2025-01-01 PROCEDURE — 31500 INSERT EMERGENCY AIRWAY: CPT

## 2025-01-01 PROCEDURE — 2700000000 HC OXYGEN THERAPY PER DAY

## 2025-01-01 PROCEDURE — 82803 BLOOD GASES ANY COMBINATION: CPT

## 2025-01-01 PROCEDURE — 99285 EMERGENCY DEPT VISIT HI MDM: CPT

## 2025-01-01 PROCEDURE — 82962 GLUCOSE BLOOD TEST: CPT

## 2025-01-18 NOTE — ED PROVIDER NOTES
EMERGENCY DEPARTMENT HISTORY AND PHYSICAL EXAM      Date: 1/18/2025  Patient Name: Judith Aldridge    History of Presenting Illness     No chief complaint on file.      History (Context): Judith Aldridge is a 65 y.o. female who was recently discharged from the hospital for CHF exacerbation was brought in by ambulance in cardiac arrest.  EMS reports the patient was likely down for approximately 30 minutes prior to their arrival.  EMS found the patient unresponsive, apneic, and pulseless.  EMS worked this code approximately 20 minutes on scene delivering multiple shocks, several rounds of epi, and 2 rounds of amiodarone prior to transporting to this facility.    PCP: Zhang Boudreaux MD    No current facility-administered medications for this encounter.     Current Outpatient Medications   Medication Sig Dispense Refill    ferrous sulfate (IRON 325) 325 (65 Fe) MG tablet Take 1 tablet by mouth 2 times daily 180 tablet 1    furosemide (LASIX) 40 MG tablet Take 1 tablet by mouth in the morning and 1 tablet in the evening. 60 tablet 3    albuterol sulfate HFA (PROVENTIL;VENTOLIN;PROAIR) 108 (90 Base) MCG/ACT inhaler Inhale 2 puffs into the lungs every 4 hours as needed for Shortness of Breath or Wheezing      levothyroxine (SYNTHROID) 125 MCG tablet Take 1 tablet by mouth Daily      apixaban (ELIQUIS) 5 MG TABS tablet Take 1 tablet by mouth 2 times daily at 0800 and 1400      acetaminophen (TYLENOL) 650 MG extended release tablet Take 2 tablets by mouth every 8 hours as needed for Fever or Pain      famotidine (PEPCID) 20 MG tablet Take 1 tablet by mouth 2 times daily      albuterol (PROVENTIL) (2.5 MG/3ML) 0.083% nebulizer solution Take 3 mLs by nebulization every 4 hours as needed for Shortness of Breath or Wheezing      simvastatin (ZOCOR) 40 MG tablet Take 1 tablet by mouth nightly      mirtazapine (REMERON) 7.5 MG tablet Take 1 tablet by mouth nightly      Multiple Vitamins-Minerals (MULTIVITAMIN GUMMIES ADULT

## 2025-01-18 NOTE — ED TRIAGE NOTES
1807: Pt arrives via ems unconscious and unresponsive. Compressions taken over by [facility] staff    1808 pulse check  1810 cpr resumed no pulse idioventricular pea  1810 Epi   1812 pulse check  1812 pea no pulse  1813 epi   1815  pulse check  1815 Copressions resumed  Poc Bg 146   Epi 1816  1817 pulse check   1818 Vfib on monitor. Compressions resumed.   1818 Shock administered.    1819 epi   1820 pulse check  1820 vfib shock administered  1820 Compressions resumed  1823 pulse check  1823 Shock   Organized rhythm at 50bpm  1823 pulse palpated  1824 shock   1824 Vfib/Compressions resumed  1826 pulse check  1826 Vtach no pulse  1826 Shock  1827 epi  1829 pulse check  1829 Asystole no pulse    TOD 1829

## 2025-01-18 NOTE — ED NOTES
Pt found unresponsive in asystole by ems. Pt was give 6 doses epi and shocked 4/5 times in field due to vfib. Pt also received 2 rounds amiodarone via ems

## 2025-01-23 NOTE — PROGRESS NOTES
[facility]  -  Progress Note      Patient: Alicia Marcum MRN: [id number]  SSN: [id number]    YOB: 1960  Age: 60 y.o. Sex: female      Admit Date: 5/18/2019    Hospital Problem List:     Hospital Problems  Date Reviewed: 5/10/2019          Codes Class Noted POA    Acute exacerbation of CHF (congestive heart failure) ([facility] ICD-10-CM: I50.9  ICD-9-CM: 428.0  6/2/2018 Unknown        CHF (congestive heart failure) ([facility] ICD-10-CM: I50.9  ICD-9-CM: 428.0  4/18/2017 Unknown            - Acute on chronic diastolic heart failure, initial BNP 6K  - Chronic atrial fibrillation, on Xarelto  - Echo 05/2018: EF 50-55%, mildly to moderately increased LV wall thickness, RV moderately to severely dilated with est. Peak pressure 87 mmHg, moderately to severely dilated RA, moderate to severe tricuspid regurgitation, severe pulmonic insufficiency. - Severe pulmonary hypertension, RV dysfunction with moderate to severe pulmonary insufficiency and moderate pulmonary pressure elevation.  - Hx congenital heart defect repaired as child; details unknown and patient poor historian   - HTN  - Dyslipidemia, on Simvastatin  - ANDRADE  - OHS  - Morbid obesity  - Hx substance abuse  - Hx psychiatric illness     Primary cardiologist: Dr. Cole Mace:     Negative fluid balance of 13.6 L. Patient reports that she feels at baseline  Transitioned to PO lasix today and also on aldactone  Will repeat BMP today  Continue Xarelto and BB  Continue statin and nitrate   Discussed importance of dietary and medication compliance  Encourage ambulation today. Okay to discharge from cardiac standpoint     Subjective:     No new complaints.  Patient reports significant improvement in breathing and swelling    Objective:      Patient Vitals for the past 8 hrs:   Temp Pulse Resp BP SpO2   05/23/19 0340 98 °F (36.7 °C) 87 18 144/78 97 %   05/23/19 0021 97.6 °F (36.4 °C) (!) 52 20 128/79 92 %         Patient Continue current dose of warfarin as instructed on dosing calendar provided. Continue to monitor urine and stool for signs and symptoms of bleeding. Please notify the clinic of any medication changes. Please remember to bring all medications (both prescription and OTC) to your next visit. Kindly notify the clinic if you are unable to make to your next appointment. Vitals for the past 96 hrs:   Weight   05/23/19 0414 129.1 kg (284 lb 9.6 oz)   05/22/19 0431 131.5 kg (290 lb)   05/21/19 0859 135.6 kg (299 lb)   05/21/19 0434 135.8 kg (299 lb 6.4 oz)   05/20/19 0414 134.4 kg (296 lb 4.8 oz)         Intake/Output Summary (Last 24 hours) at 5/23/2019 0806  Last data filed at 5/23/2019 0731  Gross per 24 hour   Intake 700 ml   Output 2000 ml   Net -1300 ml       Physical Exam:  General:  alert, cooperative, no distress, appears stated age  Neck:  nontender, no masses, no stridor, no JVD  Lungs:  clear to auscultation bilaterally  Heart:  irregularly irregular rhythm  Abdomen:  abdomen is soft without significant tenderness, masses, organomegaly or guarding  Extremities:  extremities normal, atraumatic, no cyanosis or edema    Data Review:     Labs: Results:       Chemistry Recent Labs     05/22/19  0330 05/21/19  0305   * 121*   * 139   K 3.5 3.5   CL 92* 96*   CO2 40* 40*   BUN 11 11   CREA 0.97 0.88   CA 8.7 8.8   MG  --  2.0   AGAP 3 3   BUCR 11* 13      CBC w/Diff Recent Labs     05/22/19  0330   WBC 5.6   RBC 5.13   HGB 12.8   HCT 42.9         Cardiac Enzymes No results found for: CPK, CK, CKMMB, CKMB, RCK3, CKMBT, CKNDX, CKND1, SHANNAN, TROPT, TROIQ, JELANI, TROPT, TNIPOC, BNP, BNPP   Coagulation No results for input(s): PTP, INR, APTT in the last 72 hours.     No lab exists for component: INREXT    Lipid Panel Lab Results   Component Value Date/Time    Cholesterol, total 104 09/30/2018 04:17 AM    HDL Cholesterol 31 (L) 09/30/2018 04:17 AM    LDL, calculated 54 09/30/2018 04:17 AM    VLDL, calculated 19 09/30/2018 04:17 AM    Triglyceride 95 09/30/2018 04:17 AM    CHOL/HDL Ratio 3.4 09/30/2018 04:17 AM      BNP Lab Results   Component Value Date/Time    B-type Natriuretic Peptide 3,187 (H) 05/06/2016 11:10 AM    B-type Natriuretic Peptide 3,075 (H) 05/06/2016 11:00 AM      Liver Enzymes No results for input(s): TP, ALB, TBIL, AP, SGOT, GPT in the last 72 hours.    No lab exists for component: DBIL   Digoxin    Thyroid Studies Lab Results   Component Value Date/Time    T4, Total 12.3 (H) 01/15/2014 02:01 AM    TSH 1.87 05/20/2019 01:53 AM            Signed By: VERONICA Gaviria     May 23, 2019 (4) rarely moist

## (undated) DEVICE — CATHETER SUCT TR FL TIP 14FR W/ O CTRL

## (undated) DEVICE — (D)GLOVE EXAM LG NITRL NS -- DISC BY MFR NO SUB

## (undated) DEVICE — FLUFF AND POLYMER UNDERPAD,EXTRA HEAVY: Brand: WINGS

## (undated) DEVICE — NDL INJ SCLERO 25G 240CM -- INTERJECT M00518360 BX/5

## (undated) DEVICE — GAUZE SPONGES,16 PLY: Brand: CURITY

## (undated) DEVICE — AIRLIFE™ NASAL OXYGEN CANNULA CURVED, NONFLARED TIP WITH 14 FOOT (4.3 M) CRUSH-RESISTANT TUBING, OVER-THE-EAR STYLE: Brand: AIRLIFE™

## (undated) DEVICE — MEDI-VAC SUCTION HIGH CAPACITY: Brand: CARDINAL HEALTH

## (undated) DEVICE — SYRINGE MED 20ML STD CLR PLAS LUERLOCK TIP N CTRL DISP

## (undated) DEVICE — SOLUTION IRRIG 1000ML H2O STRL BLT

## (undated) DEVICE — GOWN ISOL IMPERV UNIV, DISP, OPEN BACK, BLUE --

## (undated) DEVICE — Device: Brand: SPOT EX ENDOSCOPIC TATTOO

## (undated) DEVICE — (D)SYR 10ML 1/5ML GRAD NSAF -- PKGING CHANGE USE ITEM 338027

## (undated) DEVICE — ENDOSCOPY PUMP TUBING/ CAP SET: Brand: ERBE

## (undated) DEVICE — SYR 50ML SLIP TIP NSAF LF STRL --

## (undated) DEVICE — FORCEPS BX L240CM JAW DIA2.8MM L CAP W/ NDL MIC MESH TOOTH

## (undated) DEVICE — SNARE POLYP M W27MMXL240CM OVL STIFF DISP CAPTIVATOR

## (undated) DEVICE — MEDI-VAC NON-CONDUCTIVE SUCTION TUBING: Brand: CARDINAL HEALTH

## (undated) DEVICE — CANNULA ORIG TL CLR W FOAM CUSHIONS AND 14FT SUPL TB 3 CHN

## (undated) DEVICE — FLEX ADVANTAGE 3000CC: Brand: FLEX ADVANTAGE

## (undated) DEVICE — SYRINGE MED 25GA 3ML L5/8IN SUBQ PLAS W/ DETACH NDL SFTY